# Patient Record
Sex: MALE | Race: BLACK OR AFRICAN AMERICAN | NOT HISPANIC OR LATINO | Employment: OTHER | URBAN - METROPOLITAN AREA
[De-identification: names, ages, dates, MRNs, and addresses within clinical notes are randomized per-mention and may not be internally consistent; named-entity substitution may affect disease eponyms.]

---

## 2017-01-23 ENCOUNTER — TELEPHONE (OUTPATIENT)
Dept: INTERNAL MEDICINE | Facility: CLINIC | Age: 57
End: 2017-01-23

## 2017-01-23 ENCOUNTER — OFFICE VISIT (OUTPATIENT)
Dept: PAIN MEDICINE | Facility: CLINIC | Age: 57
End: 2017-01-23
Payer: MEDICARE

## 2017-01-23 ENCOUNTER — OFFICE VISIT (OUTPATIENT)
Dept: INTERNAL MEDICINE | Facility: CLINIC | Age: 57
End: 2017-01-23
Attending: INTERNAL MEDICINE
Payer: MEDICARE

## 2017-01-23 VITALS
HEART RATE: 83 BPM | TEMPERATURE: 98 F | HEIGHT: 71 IN | SYSTOLIC BLOOD PRESSURE: 100 MMHG | DIASTOLIC BLOOD PRESSURE: 72 MMHG | WEIGHT: 248.25 LBS | OXYGEN SATURATION: 94 % | BODY MASS INDEX: 34.75 KG/M2

## 2017-01-23 VITALS
HEART RATE: 95 BPM | WEIGHT: 246.94 LBS | DIASTOLIC BLOOD PRESSURE: 80 MMHG | HEIGHT: 71 IN | SYSTOLIC BLOOD PRESSURE: 106 MMHG | BODY MASS INDEX: 34.57 KG/M2 | TEMPERATURE: 98 F | RESPIRATION RATE: 18 BRPM

## 2017-01-23 DIAGNOSIS — J01.11 ACUTE RECURRENT FRONTAL SINUSITIS: Primary | ICD-10-CM

## 2017-01-23 DIAGNOSIS — M79.10 MYALGIA: ICD-10-CM

## 2017-01-23 DIAGNOSIS — M96.1 POSTLAMINECTOMY SYNDROME OF LUMBAR REGION: ICD-10-CM

## 2017-01-23 DIAGNOSIS — Z79.891 CHRONIC USE OF OPIATE DRUGS THERAPEUTIC PURPOSES: ICD-10-CM

## 2017-01-23 DIAGNOSIS — M53.3 SACROILIAC JOINT PAIN: ICD-10-CM

## 2017-01-23 DIAGNOSIS — M51.37 DEGENERATION OF LUMBAR OR LUMBOSACRAL INTERVERTEBRAL DISC: Primary | ICD-10-CM

## 2017-01-23 PROCEDURE — 99999 PR PBB SHADOW E&M-EST. PATIENT-LVL IV: CPT | Mod: PBBFAC,,, | Performed by: INTERNAL MEDICINE

## 2017-01-23 PROCEDURE — 3074F SYST BP LT 130 MM HG: CPT | Mod: S$GLB,,, | Performed by: INTERNAL MEDICINE

## 2017-01-23 PROCEDURE — 1159F MED LIST DOCD IN RCRD: CPT | Mod: S$GLB,,, | Performed by: NURSE PRACTITIONER

## 2017-01-23 PROCEDURE — 99214 OFFICE O/P EST MOD 30 MIN: CPT | Mod: S$GLB,,, | Performed by: NURSE PRACTITIONER

## 2017-01-23 PROCEDURE — 99999 PR PBB SHADOW E&M-EST. PATIENT-LVL III: CPT | Mod: PBBFAC,,, | Performed by: NURSE PRACTITIONER

## 2017-01-23 PROCEDURE — 99213 OFFICE O/P EST LOW 20 MIN: CPT | Mod: 25,S$GLB,, | Performed by: INTERNAL MEDICINE

## 2017-01-23 PROCEDURE — 96372 THER/PROPH/DIAG INJ SC/IM: CPT | Mod: S$GLB,,, | Performed by: INTERNAL MEDICINE

## 2017-01-23 PROCEDURE — 3074F SYST BP LT 130 MM HG: CPT | Mod: S$GLB,,, | Performed by: NURSE PRACTITIONER

## 2017-01-23 PROCEDURE — 3079F DIAST BP 80-89 MM HG: CPT | Mod: S$GLB,,, | Performed by: NURSE PRACTITIONER

## 2017-01-23 PROCEDURE — 1159F MED LIST DOCD IN RCRD: CPT | Mod: S$GLB,,, | Performed by: INTERNAL MEDICINE

## 2017-01-23 PROCEDURE — 3078F DIAST BP <80 MM HG: CPT | Mod: S$GLB,,, | Performed by: INTERNAL MEDICINE

## 2017-01-23 RX ORDER — AZITHROMYCIN 250 MG/1
TABLET, FILM COATED ORAL
Qty: 6 TABLET | Refills: 0 | Status: ON HOLD | OUTPATIENT
Start: 2017-01-23 | End: 2017-05-02

## 2017-01-23 RX ORDER — TRAMADOL HYDROCHLORIDE 50 MG/1
50 TABLET ORAL EVERY 6 HOURS PRN
Qty: 120 TABLET | Refills: 1 | Status: SHIPPED | OUTPATIENT
Start: 2017-01-23 | End: 2017-03-23 | Stop reason: SDUPTHER

## 2017-01-23 RX ORDER — TRIAMCINOLONE ACETONIDE 40 MG/ML
40 INJECTION, SUSPENSION INTRA-ARTICULAR; INTRAMUSCULAR
Status: COMPLETED | OUTPATIENT
Start: 2017-01-23 | End: 2017-01-23

## 2017-01-23 RX ADMIN — TRIAMCINOLONE ACETONIDE 40 MG: 40 INJECTION, SUSPENSION INTRA-ARTICULAR; INTRAMUSCULAR at 11:01

## 2017-01-23 NOTE — MR AVS SNAPSHOT
[facility]  Internal Medicine  [address]  [facility] [address]  Phone: [phone]  Fax: [phone]                  Bri Santiago   2017 10:30 AM   Office Visit    Description:  Male : 1960   Provider:  Cate Galeas MD   Department:  [facility]  Internal Medicine           Reason for Visit     Sinusitis           Diagnoses this Visit        Comments    Acute recurrent frontal sinusitis    -  Primary            To Do List           Future Appointments        Provider Department Dept Phone    3/23/2017 7:40 AM JENI Rondon [facility] [phone]      Goals (5 Years of Data)     None       These Medications        Disp Refills Start End    azithromycin (ZITHROMAX Z-ERIN) 250 MG tablet 6 tablet 0 2017     Two po once then one po daily    Pharmacy: [facility] [address] [place] [address] AT [facility] Ph #: [phone]         [facility] On Call     [facility] On Call Nurse [facility] -  Assistance  Registered nurses in the [facility] On Call Center provide clinical advisement, health education, appointment booking, and other advisory services.  Call for this free service at [phone].             Medications           Message regarding Medications     Verify the changes and/or additions to your medication regime listed below are the same as discussed with your clinician today.  If any of these changes or additions are incorrect, please notify your healthcare provider.        START taking these NEW medications        Refills    azithromycin (ZITHROMAX Z-ERIN) 250 MG tablet 0    Sig: Two po once then one po daily    Class: Normal      These medications were administered today        Dose Freq    triamcinolone acetonide injection 40 mg 40 mg Clinic/HOD 1 time    Sig: Inject 1 mL (40 mg total) into the muscle one time.    Class: Normal    Route: Intramuscular           Verify that the below list of medications is  an accurate representation of the medications you are currently taking.  If none reported, the list may be blank. If incorrect, please contact your healthcare provider. Carry this list with you in case of emergency.           Current Medications     acetaminophen (TYLENOL) 500 MG tablet Take 2 tablets (1,000 mg total) by mouth every 8 (eight) hours as needed.    albuterol (PROVENTIL HFA) 90 mcg/actuation inhaler Inhale 2 puffs into the lungs every 6 (six) hours as needed.    atorvastatin (LIPITOR) 20 MG tablet Take 1 tablet (20 mg total) by mouth once daily.    azelastine (ASTELIN) 137 mcg (0.1 %) nasal spray 1 spray (137 mcg total) by Nasal route 2 (two) times daily.    azelastine-fluticasone 137-50 mcg/spray Spry nassal spray 1 spray by Each Nare route 2 (two) times daily.    calcium citrate-vitamin D3 315-200 mg (CITRACAL+D) 315-200 mg-unit per tablet Take 1 tablet by mouth once daily.    chlorthalidone (HYGROTEN) 25 MG Tab Take 1 tablet (25 mg total) by mouth once daily.    cyanocobalamin, vitamin B-12, (VITAMIN B-12) 50 mcg tablet Take 50 mcg by mouth once daily.    docusate sodium (COLACE) 100 MG capsule Take 1 tablet by mouth Twice daily. 1 Capsule Oral Twice a day .  Take with pain medicine    esomeprazole (NEXIUM) 40 MG capsule Take 1 capsule (40 mg total) by mouth every 12 (twelve) hours.    fluticasone (FLONASE) 50 mcg/actuation nasal spray 2 sprays by Each Nare route once daily.    fluticasone-salmeterol 250-50 mcg/dose (ADVAIR DISKUS) 250-50 mcg/dose diskus inhaler Inhale 1 puff into the lungs 2 (two) times daily.    sildenafil (VIAGRA) 100 MG tablet Take 1 tablet (100 mg total) by mouth daily as needed for Erectile Dysfunction.    tamsulosin (FLOMAX) 0.4 mg Cp24 TAKE ONE CAPSULE BY MOUTH EVERY EVENING    tramadol (ULTRAM) 50 mg tablet Take 1 tablet (50 mg total) by mouth every 6 (six) hours as needed for Pain.    VITAMIN D2 50,000 unit capsule TK 1 C PO Q 7 DAYS    zolpidem (AMBIEN) 10 mg Tab Take 1  "tablet (10 mg total) by mouth nightly as needed.    azithromycin (ZITHROMAX Z-ERIN) 250 MG tablet Two po once then one po daily           Clinical Reference Information           Vital Signs - Last Recorded  Most recent update: 1/23/2017 10:50 AM by Alvina Oviedo MA    BP Pulse Temp Ht    100/72 (BP Location: Left arm, Patient Position: Sitting, BP Method: Manual) 83 97.8 °F (36.6 °C) (Oral) 5' 11" (1.803 m)    Wt SpO2 BMI    112.6 kg (248 lb 3.8 oz) (!) 94% 34.62 kg/m2      Blood Pressure          Most Recent Value    BP  100/72      Allergies as of 1/23/2017     Ace Inhibitors    Aspirin    Codeine    Dilaudid  [Hydromorphone]    Penicillins      Immunizations Administered on Date of Encounter - 1/23/2017     None      Administrations This Visit     triamcinolone acetonide injection 40 mg     Admin Date Action Dose Route Administered By             01/23/2017 Given 40 mg Intramuscular Vj Romero LPN                      Instructions    Your test results will be communicated to you via: [facility], Telephone or Letter.  If you have not received your test results within one week. Please contact the clinic at [phone].      Sinusitis, Antibiotic Treatment (Child)  The sinuses are air-filled spaces in the skull. They are behind the forehead, in the nasal bones and cheeks, and around the eyes. When sinuses are healthy, air moves freely and mucus drains. When a child has a cold or an allergy, the lining of the nose and sinuses can become swollen. Mucus can become trapped. Bacteria may then multiply, causing bacterial sinusitis. This is also called a sinus infection.  Sinusitis often starts with a cold. Cold symptoms usually go away in 5 or 10 days. If sinusitis develops, the symptoms continue and may even get worse. Thick, yellow-green mucus may drain from the nose. Your child may cough more. Your child may also have bad breath that doesnt go away. Other symptoms may include pain or swelling in the face, " sore throat, or headache.  The health care provider has prescribed antibiotics to treat the bacterial infection. Symptoms usually get better 2 to 3 days after your child starts the medicine.  Home care  Follow these guidelines when caring for your child at home:  · The health care provider has prescribed an oral antibiotic for your child. This is to help stop the infection. Follow all instructions for giving this medicine to your child. Make sure your child takes the medication every day until it is gone. You should not have any left over. You may also be told to use saline nasal drops or a decongestant.  · If your child has pain, give him or her pain medicine as advised by your childs provider. Don't give your child aspirin unless told to do so. Don't give your child any other medicine without first asking the provider.  · Give your child plenty of time to rest. Try to make your child as comfortable as possible. Some children may be distracted by quiet activities.  · Encourage your child to drink liquids. Toddlers or older children may prefer cold drinks, frozen desserts, or popsicles. They may also like warm chicken soup or beverages with lemon and honey. Don't give honey to children younger than 1 year old.  · Use a cool-mist humidifier in your childs bedroom to make breathing easier, especially at night. Clean and dry the humidifier to keep bacteria and mold from growing. Dont use using a hot water vaporizer. It can cause burns.  · Dont smoke around your child. Tobacco smoke can make your childs symptoms worse.  Follow-up care  Follow up with your childs healthcare provider, or as directed.  When to seek medical advice  Unless advised otherwise, call your child's healthcare provider if:  · Your child is 3 months old or younger and has a fever of 100.4°F (38°C) or higher. Your child may need to see a healthcare provider.  · Your child is of any age and has fevers higher than 104°F (40°C) that come back  again and again.  Call your child's provider right away if your child has any of these:  · Swelling or redness around eyes that lasts all day, not just in the morning  · Vomiting that continues  · Sensitivity to light  · Irritability that gets worse  · Sudden or severe pain in face or head  · Double vision  · Not acting right or not thinking clearly  · Stiff neck  · Breathing problems  · Symptoms not going away in 10 days  © 4539-9747 [employer]. [address] [place] [address], [place] [address]. All rights reserved. This information is not intended as a substitute for professional medical care. Always follow your healthcare professional's instructions.        Self-Care for Sinusitis     Drinking plenty of water can help sinuses drain.     Sinusitis can often be managed with self-care. Self-care can keep sinuses moist and make you feel more comfortable. Remember to follow your doctor's instructions closely, which can make a big difference in getting your sinus problem under control.  Drink fluids  Drinking extra fluids -- a glass every hour or two -- helps thin your mucus, allowing it to drain from your sinuses more easily. A humidifier helps in much the same way. Fluids can also offset the drying effects of certain drugs.  Use saltwater rinses  Rinses help keep your sinuses and nose moist. Mix a teaspoon of salt in 8 ounces of fresh, warm water. Use a bulb syringe to gently squirt the water into your nose a few times a day. You can also buy ready-made saline nasal sprays.  Apply hot or cold packs  Applying heat to the area surrounding your sinuses may make you feel more comfortable. Use a hot water bottle or a hand towel dipped in hot water. Some people also find ice packs effective for relieving pain.  Medications  Your doctor may prescribe medications to help treat your sinusitis. If you have an infection, antibiotics can help clear it up. If you are prescribed antibiotics, take all pills on schedule until  they are gone, even if you feel better. Decongestants help relieve swelling. Use decongestant sprays for short periods only under the direction of your doctor. If you have allergies, your doctor may prescribe medications to help relieve them.   © 9225-6686 [employer]. [address] [place] [address]. All rights reserved. This information is not intended as a substitute for professional medical care. Always follow your healthcare professional's instructions.
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
## 2017-01-23 NOTE — MR AVS SNAPSHOT
[facility]  [address]  [facility] [address]  Phone: [phone]  Fax: [phone]                  Bri Santiago   2017 8:20 AM   Office Visit    Description:  Male : 1960   Provider:  JENI Rondon   Department:  [facility]           Reason for Visit     Low-back Pain           Diagnoses this Visit        Comments    Degeneration of lumbar or lumbosacral intervertebral disc    -  Primary     Myalgia         Postlaminectomy syndrome of lumbar region         Sacroiliac joint pain         Chronic use of opiate drugs therapeutic purposes                To Do List           Future Appointments        Provider Department Dept Phone    2017 10:30 AM Cate Galeas MD [facility] [phone]    3/23/2017 7:40 AM JENI Rondon [facility] [phone]      Goals (5 Years of Data)     None       These Medications        Disp Refills Start End    tramadol (ULTRAM) 50 mg tablet 120 tablet 1 2017 3/24/2017    Take 1 tablet (50 mg total) by mouth every 6 (six) hours as needed for Pain. - Oral    Pharmacy: [facility] [address] [place] [address] AT [facility] Ph #: [phone]         [facility]     [facility] On Call Nurse Care Line -  Assistance  Registered nurses in the [facility] On Call Center provide clinical advisement, health education, appointment booking, and other advisory services.  Call for this free service at [phone].             Medications           Message regarding Medications     Verify the changes and/or additions to your medication regime listed below are the same as discussed with your clinician today.  If any of these changes or additions are incorrect, please notify your healthcare provider.        CHANGE how you are taking these medications     Start Taking Instead of    tramadol (ULTRAM) 50 mg tablet tramadol (ULTRAM)  50 mg tablet    Dosage:  Take 1 tablet (50 mg total) by mouth every 6 (six) hours as needed for Pain. Dosage:  Take 1 tablet (50 mg total) by mouth every 6 (six) hours as needed.    Reason for Change:  Reorder            Verify that the below list of medications is an accurate representation of the medications you are currently taking.  If none reported, the list may be blank. If incorrect, please contact your healthcare provider. Carry this list with you in case of emergency.           Current Medications     acetaminophen (TYLENOL) 500 MG tablet Take 2 tablets (1,000 mg total) by mouth every 8 (eight) hours as needed.    albuterol (PROVENTIL HFA) 90 mcg/actuation inhaler Inhale 2 puffs into the lungs every 6 (six) hours as needed.    atorvastatin (LIPITOR) 20 MG tablet Take 1 tablet (20 mg total) by mouth once daily.    azelastine (ASTELIN) 137 mcg (0.1 %) nasal spray 1 spray (137 mcg total) by Nasal route 2 (two) times daily.    azelastine-fluticasone 137-50 mcg/spray Spry nassal spray 1 spray by Each Nare route 2 (two) times daily.    calcium citrate-vitamin D3 315-200 mg (CITRACAL+D) 315-200 mg-unit per tablet Take 1 tablet by mouth once daily.    chlorthalidone (HYGROTEN) 25 MG Tab Take 1 tablet (25 mg total) by mouth once daily.    cyanocobalamin, vitamin B-12, (VITAMIN B-12) 50 mcg tablet Take 50 mcg by mouth once daily.    docusate sodium (COLACE) 100 MG capsule Take 1 tablet by mouth Twice daily. 1 Capsule Oral Twice a day .  Take with pain medicine    esomeprazole (NEXIUM) 40 MG capsule Take 1 capsule (40 mg total) by mouth every 12 (twelve) hours.    fluticasone (FLONASE) 50 mcg/actuation nasal spray 2 sprays by Each Nare route once daily.    fluticasone-salmeterol 250-50 mcg/dose (ADVAIR DISKUS) 250-50 mcg/dose diskus inhaler Inhale 1 puff into the lungs 2 (two) times daily.    sildenafil (VIAGRA) 100 MG tablet Take 1 tablet (100 mg total) by mouth daily as needed for Erectile Dysfunction.    tamsulosin  "(FLOMAX) 0.4 mg Cp24 TAKE ONE CAPSULE BY MOUTH EVERY EVENING    tramadol (ULTRAM) 50 mg tablet Take 1 tablet (50 mg total) by mouth every 6 (six) hours as needed for Pain.    VITAMIN D2 50,000 unit capsule TK 1 C PO Q 7 DAYS    zolpidem (AMBIEN) 10 mg Tab Take 1 tablet (10 mg total) by mouth nightly as needed.           Clinical Reference Information           Vital Signs - Last Recorded  Most recent update: 1/23/2017  8:27 AM by Leeann Kim MA    BP Pulse Temp Resp Ht Wt    106/80 95 98.4 °F (36.9 °C) (Oral) 18 5' 11" (1.803 m) 112 kg (246 lb 14.6 oz)    BMI                34.44 kg/m2          Blood Pressure          Most Recent Value    BP  106/80      Allergies as of 1/23/2017     Ace Inhibitors    Aspirin    Codeine    Dilaudid  [Hydromorphone]    Penicillins      Immunizations Administered on Date of Encounter - 1/23/2017     None      "

## 2017-01-23 NOTE — PROGRESS NOTES
Subjective:       Patient ID: Bri Santiago is a 56 y.o. male.    Chief Complaint: Sinusitis     Bri Santiago is a 56 y.o.  male who presents for Sinusitis  .  Sinusitis   This is a new problem. The current episode started 1 to 4 weeks ago. The problem has been gradually worsening since onset. The maximum temperature recorded prior to his arrival was 100.4 - 100.9 F. Associated symptoms include coughing (nonproductive, improving), diaphoresis, ear pain (left), headaches and sinus pressure. Past treatments include oral decongestants. The treatment provided no relief.     Review of Systems   Constitutional: Positive for diaphoresis.   HENT: Positive for ear pain (left) and sinus pressure.    Respiratory: Positive for cough (nonproductive, improving).    Neurological: Positive for headaches.       Patient Active Problem List   Diagnosis    Asthma in remission    Von Willebrand disease    HTN (hypertension)    ABDON (obstructive sleep apnea)    Spondylosis without myelopathy    Thoracic or lumbosacral neuritis or radiculitis, unspecified    Spinal stenosis, lumbar region, without neurogenic claudication    Degeneration of lumbar or lumbosacral intervertebral disc    Acquired spondylolisthesis    Pseudoarthrosis    Family history of colon cancer    Vitamin D deficiency    Encounter for monitoring long-term proton pump inhibitor therapy    Postlaminectomy syndrome of lumbar region    Myalgia and myositis    Facet syndrome    Gastroesophageal reflux disease without esophagitis    Osteopenia    Thoracic aorta atherosclerosis    Obesity, Class II, BMI 35-39.9, with comorbidity    BPH (benign prostatic hypertrophy) with urinary obstruction    Cough    Allergic rhinitis    Allergic conjunctivitis of both eyes    Encounter for long-term current use of high risk medication    Gastroesophageal reflux disease with esophagitis       Past Medical History   Diagnosis Date    Acute pancreatitis     Anal  fissure     Anemia     Arthritis     Asthma in remission     Back pain     BPH (benign prostatic hypertrophy)     Cancer 2000     prostate- treated at [facility] with chemo- in remission since 2000    Clotting disorder     Dysphagia 10/7/2014    Family history of colon cancer     GERD (gastroesophageal reflux disease)     H. pylori infection 10/8/2014    Helicobacter pylori (H. pylori) infection      Chronic    History of chronic pancreatitis     HTN (hypertension)     Lumbago 11/12/2012    Obesity     ABDON (obstructive sleep apnea)     Pneumonia      during childhood     Prostate cancer 2000     dx and treated at [facility], had chemotherapy, in remission [id number]    Sacroiliac joint pain 2/10/2015    Spinal stenosis of lumbar region     Trouble in sleeping     Vitamin D deficiency disease     VWD (acquired von Willebrand's disease)        Past Surgical History   Procedure Laterality Date    Lumbar fusion  2012    Carpal tunnel release  2003     left hand    Anal fissure repair       x2    Cervical discectomy  2003    Colonoscopy N/A 10/7/2015     Procedure: COLONOSCOPY;  Surgeon: Rosendo Boyer MD;  Location: [facility] ([address] [place] [address]);  Service: Endoscopy;  Laterality: N/A;  PM Prep    Vasectomy  1996    Tonsillectomy       at age 24    Spine surgery         Family History   Problem Relation Age of Onset    Colon cancer Father 67     colon cancer    Hypertension Father     Glaucoma Father     Colon cancer Paternal Grandfather 65          Coronary artery disease Mother 45    Hypertension Mother     Heart disease Mother     No Known Problems Brother     No Known Problems Sister     No Known Problems Daughter     No Known Problems Son     No Known Problems Brother     No Known Problems Daughter     No Known Problems Daughter     No Known Problems Son     No Known Problems Son     Colon cancer Paternal Uncle 65    Diabetes Mellitus Paternal Grandmother        Social  "History   Substance Use Topics    Smoking status: Never Smoker    Smokeless tobacco: Never Used    Alcohol use No       Objective:   Blood pressure 100/72, pulse 83, temperature 97.8 °F (36.6 °C), temperature source Oral, height 5' 11" (1.803 m), weight 112.6 kg (248 lb 3.8 oz), SpO2 (!) 94 %.     Physical Exam   Constitutional: He is oriented to person, place, and time. He appears well-developed and well-nourished. No distress.   HENT:   Head: Normocephalic and atraumatic.   Right Ear: Tympanic membrane, external ear and ear canal normal.   Left Ear: Tympanic membrane, external ear and ear canal normal.   Nose: Mucosal edema and rhinorrhea present. Right sinus exhibits maxillary sinus tenderness and frontal sinus tenderness. Left sinus exhibits maxillary sinus tenderness and frontal sinus tenderness.   Mouth/Throat: Uvula is midline and oropharynx is clear and moist.   Eyes: Conjunctivae are normal. No scleral icterus.   Cardiovascular: Normal heart sounds.  Exam reveals no gallop and no friction rub.    No murmur heard.  Pulmonary/Chest: Effort normal and breath sounds normal. He has no wheezes. He has no rales.   Abdominal: There is tenderness.   Musculoskeletal: He exhibits no edema or tenderness.   Lymphadenopathy:        Head (right side): No submental and no submandibular adenopathy present.        Head (left side): No submental and no submandibular adenopathy present.     He has no cervical adenopathy.        Right cervical: No superficial cervical adenopathy present.       Left cervical: No superficial cervical adenopathy present.        Right: No supraclavicular adenopathy present.        Left: No supraclavicular adenopathy present.   Neurological: He is alert and oriented to person, place, and time.   Skin: Skin is warm and dry.   Psychiatric: He has a normal mood and affect. Thought content normal.       Prior labs reviewed  Assessment/Plan:        Bri was seen today for sinusitis.    Diagnoses and " all orders for this visit:    Acute recurrent frontal sinusitis  -     triamcinolone acetonide injection 40 mg; Inject 1 mL (40 mg total) into the muscle one time.  -     azithromycin (ZITHROMAX Z-ERIN) 250 MG tablet; Two po once then one po daily      otc decongestants, linda pot

## 2017-01-23 NOTE — TELEPHONE ENCOUNTER
----- Message from Laure White sent at 1/23/2017  9:59 AM CST -----  _  1st Request  _  2nd Request  _  3rd Request        Who: Siobhan from      Why: esomeprazole (NEXIUM) 40 MG capsule, [facility] is calling to check if you received the PA form them faxed over for this RX . They need a list of med the pt has tried and failed and also office notes   What Number to Call Back: fax [phone]  Phone [phone]    When to Expect a call back: (Before the end of the day)   -- if call after 3:00 call back will be tomorrow.

## 2017-01-23 NOTE — PROGRESS NOTES
Patient given Kenalog 40mg IM in the LUOQ. Patient tolerated well and Band-Aid was applied. Lot#[id number] Exp:2018. Patient advised to wait in the lobby for 15 min to make sure no adverse reactions occur. Patient states verbal understanding and has no further questions.

## 2017-01-23 NOTE — PATIENT INSTRUCTIONS
Your test results will be communicated to you via: [facility], Telephone or Letter.  If you have not received your test results within one week. Please contact the clinic at [phone].      Sinusitis, Antibiotic Treatment (Child)  The sinuses are air-filled spaces in the skull. They are behind the forehead, in the nasal bones and cheeks, and around the eyes. When sinuses are healthy, air moves freely and mucus drains. When a child has a cold or an allergy, the lining of the nose and sinuses can become swollen. Mucus can become trapped. Bacteria may then multiply, causing bacterial sinusitis. This is also called a sinus infection.  Sinusitis often starts with a cold. Cold symptoms usually go away in 5 or 10 days. If sinusitis develops, the symptoms continue and may even get worse. Thick, yellow-green mucus may drain from the nose. Your child may cough more. Your child may also have bad breath that doesnt go away. Other symptoms may include pain or swelling in the face, sore throat, or headache.  The health care provider has prescribed antibiotics to treat the bacterial infection. Symptoms usually get better 2 to 3 days after your child starts the medicine.  Home care  Follow these guidelines when caring for your child at home:  · The health care provider has prescribed an oral antibiotic for your child. This is to help stop the infection. Follow all instructions for giving this medicine to your child. Make sure your child takes the medication every day until it is gone. You should not have any left over. You may also be told to use saline nasal drops or a decongestant.  · If your child has pain, give him or her pain medicine as advised by your childs provider. Don't give your child aspirin unless told to do so. Don't give your child any other medicine without first asking the provider.  · Give your child plenty of time to rest. Try to make your child as comfortable as possible. Some children may be distracted by  quiet activities.  · Encourage your child to drink liquids. Toddlers or older children may prefer cold drinks, frozen desserts, or popsicles. They may also like warm chicken soup or beverages with lemon and honey. Don't give honey to children younger than 1 year old.  · Use a cool-mist humidifier in your childs bedroom to make breathing easier, especially at night. Clean and dry the humidifier to keep bacteria and mold from growing. Dont use using a hot water vaporizer. It can cause burns.  · Dont smoke around your child. Tobacco smoke can make your childs symptoms worse.  Follow-up care  Follow up with your childs healthcare provider, or as directed.  When to seek medical advice  Unless advised otherwise, call your child's healthcare provider if:  · Your child is 3 months old or younger and has a fever of 100.4°F (38°C) or higher. Your child may need to see a healthcare provider.  · Your child is of any age and has fevers higher than 104°F (40°C) that come back again and again.  Call your child's provider right away if your child has any of these:  · Swelling or redness around eyes that lasts all day, not just in the morning  · Vomiting that continues  · Sensitivity to light  · Irritability that gets worse  · Sudden or severe pain in face or head  · Double vision  · Not acting right or not thinking clearly  · Stiff neck  · Breathing problems  · Symptoms not going away in 10 days  © 2122-8325 [employer]. [address] [place] [address], [place] [address]. All rights reserved. This information is not intended as a substitute for professional medical care. Always follow your healthcare professional's instructions.        Self-Care for Sinusitis     Drinking plenty of water can help sinuses drain.     Sinusitis can often be managed with self-care. Self-care can keep sinuses moist and make you feel more comfortable. Remember to follow your doctor's instructions closely, which can make a big difference in  getting your sinus problem under control.  Drink fluids  Drinking extra fluids -- a glass every hour or two -- helps thin your mucus, allowing it to drain from your sinuses more easily. A humidifier helps in much the same way. Fluids can also offset the drying effects of certain drugs.  Use saltwater rinses  Rinses help keep your sinuses and nose moist. Mix a teaspoon of salt in 8 ounces of fresh, warm water. Use a bulb syringe to gently squirt the water into your nose a few times a day. You can also buy ready-made saline nasal sprays.  Apply hot or cold packs  Applying heat to the area surrounding your sinuses may make you feel more comfortable. Use a hot water bottle or a hand towel dipped in hot water. Some people also find ice packs effective for relieving pain.  Medications  Your doctor may prescribe medications to help treat your sinusitis. If you have an infection, antibiotics can help clear it up. If you are prescribed antibiotics, take all pills on schedule until they are gone, even if you feel better. Decongestants help relieve swelling. Use decongestant sprays for short periods only under the direction of your doctor. If you have allergies, your doctor may prescribe medications to help relieve them.   © 5351-3394 [employer]. [address] [place] [address], [place] [address]. All rights reserved. This information is not intended as a substitute for professional medical care. Always follow your healthcare professional's instructions.

## 2017-02-03 ENCOUNTER — DOCUMENTATION ONLY (OUTPATIENT)
Dept: RESEARCH | Facility: HOSPITAL | Age: 57
End: 2017-02-03

## 2017-02-03 NOTE — RESEARCH
Documentation of Informed Consent Obtained for Research by Non-[facility] Personnel    Study: PROmoting Successful Weight Loss in Primary CarE in [place] (PROP)  IRB Number: #[id number]; [facility] IRB ID: [id number]  : Kodak Quintero, PhD.  Coordinating Site: [facility] Co-Investigators: Mamie Vaughn MD and Corby Monreal MD  [facility] Approval Date: 11/6/15  This study is reviewed and acknowledged by the [facility] IRB. The IRB of Record is the [facility] ([facility]) IRB.    Is the volunteer currently enrolled in any other research trials (per Epic)? [ ] Yes  [X] No  [facility] staff administering informed consent: FLACO MARTINEZ  Date: 11/16/16  Does the volunteer agree to participate in the trial? [X] Yes  [ ] No  If no, document the reason here:       [X] I acknowledge that I have reviewed the informed consent form and the volunteer signed and dated the signature section of the consent forms.    Name: ([facility] personnel reviewing consent form):           Aaron Edmondson  Review and Scan Date (if different than date of note):   1/26/2017, 2/3/2017  Comments: See  for Consent Forms    [facility]-trained recruiters will obtain informed consent form all participants as well as HIPAA authorization.  Study protocol states all questions and concerns are clarified before any forms are signed. The following elements of the informed consent document were to be discussed:  · What you should know about a research study (e.g. purpose of the consent form; right to refuse or agree and change your mind later; participation is voluntary)?  · Who is doing the study?    · Where is the study being conducted?    · What is the purpose of this study?  · Who is eligible to participate in the study?     · What will happen to you if you take part in the study (e.g. Screening, Measurement visits, Lifestyle change meetings activities)?  · What  are the possible risks and discomforts? Benefits?  · If you do not want to take part in the study, are there other choices?  · If you have any questions or problems, whom can you call?  · What information will be kept private?  · Can your taking part in the study end early?  · What if information becomes available that might affect your decision to stay in the study?  · What charges will you have to pay? What payment will you receive?  · Will you be compensated for a study-related injury or medical illness?

## 2017-03-23 ENCOUNTER — OFFICE VISIT (OUTPATIENT)
Dept: PAIN MEDICINE | Facility: CLINIC | Age: 57
End: 2017-03-23
Payer: MEDICARE

## 2017-03-23 VITALS
WEIGHT: 252.88 LBS | BODY MASS INDEX: 35.4 KG/M2 | SYSTOLIC BLOOD PRESSURE: 108 MMHG | HEART RATE: 90 BPM | DIASTOLIC BLOOD PRESSURE: 76 MMHG | TEMPERATURE: 98 F | HEIGHT: 71 IN

## 2017-03-23 DIAGNOSIS — M48.061 SPINAL STENOSIS, LUMBAR REGION, WITHOUT NEUROGENIC CLAUDICATION: ICD-10-CM

## 2017-03-23 DIAGNOSIS — Z79.891 CHRONIC USE OF OPIATE DRUGS THERAPEUTIC PURPOSES: ICD-10-CM

## 2017-03-23 DIAGNOSIS — M51.37 DEGENERATION OF LUMBAR OR LUMBOSACRAL INTERVERTEBRAL DISC: ICD-10-CM

## 2017-03-23 DIAGNOSIS — D68.00 VON WILLEBRAND DISEASE: ICD-10-CM

## 2017-03-23 DIAGNOSIS — M96.1 POSTLAMINECTOMY SYNDROME OF LUMBAR REGION: ICD-10-CM

## 2017-03-23 DIAGNOSIS — M47.816 LUMBAR FACET ARTHROPATHY: Primary | ICD-10-CM

## 2017-03-23 DIAGNOSIS — M79.10 MYALGIA: ICD-10-CM

## 2017-03-23 PROCEDURE — 99999 PR PBB SHADOW E&M-EST. PATIENT-LVL III: CPT | Mod: PBBFAC,,, | Performed by: NURSE PRACTITIONER

## 2017-03-23 PROCEDURE — 3078F DIAST BP <80 MM HG: CPT | Mod: S$GLB,,, | Performed by: NURSE PRACTITIONER

## 2017-03-23 PROCEDURE — 3074F SYST BP LT 130 MM HG: CPT | Mod: S$GLB,,, | Performed by: NURSE PRACTITIONER

## 2017-03-23 PROCEDURE — 99499 UNLISTED E&M SERVICE: CPT | Mod: S$GLB,,, | Performed by: NURSE PRACTITIONER

## 2017-03-23 PROCEDURE — 99214 OFFICE O/P EST MOD 30 MIN: CPT | Mod: S$GLB,,, | Performed by: NURSE PRACTITIONER

## 2017-03-23 PROCEDURE — 1160F RVW MEDS BY RX/DR IN RCRD: CPT | Mod: S$GLB,,, | Performed by: NURSE PRACTITIONER

## 2017-03-23 RX ORDER — TRAMADOL HYDROCHLORIDE 50 MG/1
50 TABLET ORAL EVERY 6 HOURS PRN
Qty: 120 TABLET | Refills: 1 | Status: SHIPPED | OUTPATIENT
Start: 2017-03-23 | End: 2017-05-22 | Stop reason: SDUPTHER

## 2017-03-23 NOTE — MR AVS SNAPSHOT
[facility]  [address]  [facility] [address]  Phone: [phone]  Fax: [phone]                  Bri Santiago   3/23/2017 8:40 AM   Office Visit    Description:  Male : 1960   Provider:  JENI Rondon   Department:  [facility]           Diagnoses this Visit        Comments    Lumbar facet arthropathy    -  Primary     Degeneration of lumbar or lumbosacral intervertebral disc         Postlaminectomy syndrome of lumbar region         Myalgia         Spinal stenosis, lumbar region, without neurogenic claudication         Von Willebrand disease         Chronic use of opiate drugs therapeutic purposes                To Do List           Future Appointments        Provider Department Dept Phone    2017 9:20 AM JENI Rondon [facility] [phone]      Goals (5 Years of Data)     None       These Medications        Disp Refills Start End    tramadol (ULTRAM) 50 mg tablet 120 tablet 1 3/23/2017 2017    Take 1 tablet (50 mg total) by mouth every 6 (six) hours as needed for Pain. - Oral    Pharmacy: [facility] [address] [place] [address] [address]  [address] Ph #: [phone]         [facility] On Call     [facility] On Call Nurse Care Line -  Assistance  Registered nurses in the [facility] provide clinical advisement, health education, appointment booking, and other advisory services.  Call for this free service at [phone].             Medications           Message regarding Medications     Verify the changes and/or additions to your medication regime listed below are the same as discussed with your clinician today.  If any of these changes or additions are incorrect, please notify your healthcare provider.             Verify that the below list of medications is an accurate representation of the medications you are currently taking.  If none reported, the  list may be blank. If incorrect, please contact your healthcare provider. Carry this list with you in case of emergency.           Current Medications     acetaminophen (TYLENOL) 500 MG tablet Take 2 tablets (1,000 mg total) by mouth every 8 (eight) hours as needed.    albuterol (PROVENTIL HFA) 90 mcg/actuation inhaler Inhale 2 puffs into the lungs every 6 (six) hours as needed.    atorvastatin (LIPITOR) 20 MG tablet Take 1 tablet (20 mg total) by mouth once daily.    azelastine (ASTELIN) 137 mcg (0.1 %) nasal spray 1 spray (137 mcg total) by Nasal route 2 (two) times daily.    azelastine-fluticasone 137-50 mcg/spray Spry nassal spray 1 spray by Each Nare route 2 (two) times daily.    azithromycin (ZITHROMAX Z-ERIN) 250 MG tablet Two po once then one po daily    calcium citrate-vitamin D3 315-200 mg (CITRACAL+D) 315-200 mg-unit per tablet Take 1 tablet by mouth once daily.    chlorthalidone (HYGROTEN) 25 MG Tab Take 1 tablet (25 mg total) by mouth once daily.    cyanocobalamin, vitamin B-12, (VITAMIN B-12) 50 mcg tablet Take 50 mcg by mouth once daily.    docusate sodium (COLACE) 100 MG capsule Take 1 tablet by mouth Twice daily. 1 Capsule Oral Twice a day .  Take with pain medicine    esomeprazole (NEXIUM) 40 MG capsule Take 1 capsule (40 mg total) by mouth every 12 (twelve) hours.    fluticasone (FLONASE) 50 mcg/actuation nasal spray 2 sprays by Each Nare route once daily.    fluticasone-salmeterol 250-50 mcg/dose (ADVAIR DISKUS) 250-50 mcg/dose diskus inhaler Inhale 1 puff into the lungs 2 (two) times daily.    sildenafil (VIAGRA) 100 MG tablet Take 1 tablet (100 mg total) by mouth daily as needed for Erectile Dysfunction.    tamsulosin (FLOMAX) 0.4 mg Cp24 TAKE ONE CAPSULE BY MOUTH EVERY EVENING    tramadol (ULTRAM) 50 mg tablet Take 1 tablet (50 mg total) by mouth every 6 (six) hours as needed for Pain.    VITAMIN D2 50,000 unit capsule TK 1 C PO Q 7 DAYS    zolpidem (AMBIEN) 10 mg Tab Take 1 tablet (10 mg  "total) by mouth nightly as needed.           Clinical Reference Information           Your Vitals Were     BP Pulse Temp Height Weight BMI    108/76 (BP Location: Right arm, Patient Position: Sitting) 90 97.7 °F (36.5 °C) (Oral) 5' 11" (1.803 m) 114.7 kg (252 lb 13.9 oz) 35.27 kg/m2      Blood Pressure          Most Recent Value    BP  108/76      Allergies as of 3/23/2017     Ace Inhibitors    Aspirin    Codeine    Dilaudid  [Hydromorphone]    Penicillins      Immunizations Administered on Date of Encounter - 3/23/2017     None      Language Assistance Services     ATTENTION: Language assistance services are available, free of charge. Please call [phone].      ATENCIÓN: Si heatherla gricelda, tiene a carpenter disposición servicios gratuitos de asistencia lingüística. Llame al [phone].     CHÚ Ý: N?u b?n nói Ti?ng Vi?t, có các d?ch v? h? tr? ngôn ng? mi?n phí dành cho b?n. G?i s? [phone].         [facility] complies with applicable Federal civil rights laws and does not discriminate on the basis of race, color, national origin, age, disability, or sex.        "

## 2017-05-02 ENCOUNTER — TELEPHONE (OUTPATIENT)
Dept: GASTROENTEROLOGY | Facility: CLINIC | Age: 57
End: 2017-05-02

## 2017-05-02 ENCOUNTER — HOSPITAL ENCOUNTER (OUTPATIENT)
Facility: OTHER | Age: 57
Discharge: HOME | End: 2017-05-02
Attending: ANESTHESIOLOGY | Admitting: ANESTHESIOLOGY
Payer: MEDICARE

## 2017-05-02 VITALS
BODY MASS INDEX: 35 KG/M2 | RESPIRATION RATE: 18 BRPM | HEART RATE: 75 BPM | SYSTOLIC BLOOD PRESSURE: 122 MMHG | HEIGHT: 71 IN | DIASTOLIC BLOOD PRESSURE: 75 MMHG | TEMPERATURE: 98 F | OXYGEN SATURATION: 96 % | WEIGHT: 250 LBS

## 2017-05-02 DIAGNOSIS — M47.899 FACET SYNDROME: ICD-10-CM

## 2017-05-02 DIAGNOSIS — M96.1 POSTLAMINECTOMY SYNDROME OF LUMBAR REGION: Primary | ICD-10-CM

## 2017-05-02 PROCEDURE — 64635 DESTROY LUMB/SAC FACET JNT: CPT | Mod: RT,,, | Performed by: ANESTHESIOLOGY

## 2017-05-02 PROCEDURE — 99152 MOD SED SAME PHYS/QHP 5/>YRS: CPT | Mod: ,,, | Performed by: ANESTHESIOLOGY

## 2017-05-02 PROCEDURE — 64636 DESTROY L/S FACET JNT ADDL: CPT | Performed by: ANESTHESIOLOGY

## 2017-05-02 PROCEDURE — 64636 DESTROY L/S FACET JNT ADDL: CPT | Mod: RT,,, | Performed by: ANESTHESIOLOGY

## 2017-05-02 PROCEDURE — 25000003 PHARM REV CODE 250: Performed by: ANESTHESIOLOGY

## 2017-05-02 PROCEDURE — 63600175 PHARM REV CODE 636 W HCPCS: Performed by: ANESTHESIOLOGY

## 2017-05-02 PROCEDURE — 64635 DESTROY LUMB/SAC FACET JNT: CPT | Performed by: ANESTHESIOLOGY

## 2017-05-02 RX ORDER — BUPIVACAINE HYDROCHLORIDE 2.5 MG/ML
INJECTION, SOLUTION EPIDURAL; INFILTRATION; INTRACAUDAL
Status: DISCONTINUED | OUTPATIENT
Start: 2017-05-02 | End: 2017-05-02 | Stop reason: HOSPADM

## 2017-05-02 RX ORDER — MIDAZOLAM HYDROCHLORIDE 1 MG/ML
2 INJECTION INTRAMUSCULAR; INTRAVENOUS
Status: DISCONTINUED | OUTPATIENT
Start: 2017-05-02 | End: 2017-05-02 | Stop reason: HOSPADM

## 2017-05-02 RX ORDER — LIDOCAINE HYDROCHLORIDE 10 MG/ML
10 INJECTION INFILTRATION; PERINEURAL
Status: COMPLETED | OUTPATIENT
Start: 2017-05-02 | End: 2017-05-02

## 2017-05-02 RX ORDER — SODIUM CHLORIDE 9 MG/ML
INJECTION, SOLUTION INTRAVENOUS CONTINUOUS
Status: DISCONTINUED | OUTPATIENT
Start: 2017-05-02 | End: 2017-05-02 | Stop reason: HOSPADM

## 2017-05-02 RX ORDER — MIDAZOLAM HYDROCHLORIDE 1 MG/ML
INJECTION INTRAMUSCULAR; INTRAVENOUS
Status: DISCONTINUED | OUTPATIENT
Start: 2017-05-02 | End: 2017-05-02 | Stop reason: HOSPADM

## 2017-05-02 RX ORDER — BUPIVACAINE HYDROCHLORIDE 2.5 MG/ML
10 INJECTION, SOLUTION EPIDURAL; INFILTRATION; INTRACAUDAL ONCE
Status: DISCONTINUED | OUTPATIENT
Start: 2017-05-02 | End: 2017-05-02 | Stop reason: HOSPADM

## 2017-05-02 RX ORDER — FENTANYL CITRATE 50 UG/ML
100 INJECTION, SOLUTION INTRAMUSCULAR; INTRAVENOUS ONCE
Status: DISCONTINUED | OUTPATIENT
Start: 2017-05-02 | End: 2017-05-02 | Stop reason: HOSPADM

## 2017-05-02 RX ORDER — FENTANYL CITRATE 50 UG/ML
INJECTION, SOLUTION INTRAMUSCULAR; INTRAVENOUS
Status: DISCONTINUED | OUTPATIENT
Start: 2017-05-02 | End: 2017-05-02 | Stop reason: HOSPADM

## 2017-05-02 RX ADMIN — DESMOPRESSIN ACETATE 36 MCG: 4 SOLUTION INTRAVENOUS at 01:05

## 2017-05-02 RX ADMIN — SODIUM CHLORIDE: 0.9 INJECTION, SOLUTION INTRAVENOUS at 12:05

## 2017-05-02 NOTE — IP AVS SNAPSHOT
[facility] Location ([facility])  [address] [place], [place] [address]  Phone: [phone]           Patient Discharge Instructions   Our goal is to set you up for success. This packet includes information on your condition, medications, and your home care.  It will help you care for yourself to prevent having to return to the hospital.     Please ask your nurse if you have any questions.      There are many details to remember when preparing to leave the hospital. Here is what you will need to do:    1. Take your medicine. If you are prescribed medications, review your Medication List on the following pages. You may have new medications to  at the pharmacy and others that you'll need to stop taking. Review the instructions for how and when to take your medications. Talk with your doctor or nurses if you are unsure of what to do.     2. Go to your follow-up appointments. Specific follow-up information is listed in the following pages. Your may be contacted by a nurse or clinical provider about future appointments. Be sure we have all of the phone numbers to reach you. Please contact your provider's office if you are unable to make an appointment.     3. Watch for warning signs. Your doctor or nurse will give you detailed warning signs to watch for and when to call for assistance. These instructions may also include educational information about your condition. If you experience any of warning signs to your health, call your doctor.           [facility]  Unless otherwise directed by your provider, please   contact [facility], our nurse care line   that is available for 24/7 assistance.     [phone] (toll-free)     Registered nurses in the [facility]   provide: appointment scheduling, clinical advisement, health education, and other advisory services.                  ** Verify the list of medication(s) below is accurate and up to date. Carry this with you in case of  emergency. If your medications have changed, please notify your healthcare provider.             Medication List      CONTINUE taking these medications        Additional Info                      acetaminophen 500 MG tablet   Commonly known as:  TYLENOL   Quantity:  90 tablet   Refills:  0   Dose:  1000 mg    Instructions:  Take 2 tablets (1,000 mg total) by mouth every 8 (eight) hours as needed.     Begin Date    AM    Noon    PM    Bedtime       albuterol 90 mcg/actuation inhaler   Commonly known as:  PROVENTIL HFA   Quantity:  18 g   Refills:  11   Dose:  2 puff    Instructions:  Inhale 2 puffs into the lungs every 6 (six) hours as needed.     Begin Date    AM    Noon    PM    Bedtime       atorvastatin 20 MG tablet   Commonly known as:  LIPITOR   Quantity:  90 tablet   Refills:  3   Dose:  20 mg    Instructions:  Take 1 tablet (20 mg total) by mouth once daily.     Begin Date    AM    Noon    PM    Bedtime       azelastine 137 mcg (0.1 %) nasal spray   Commonly known as:  ASTELIN   Quantity:  30 mL   Refills:  11   Dose:  1 spray    Instructions:  1 spray (137 mcg total) by Nasal route 2 (two) times daily.     Begin Date    AM    Noon    PM    Bedtime       azelastine-fluticasone 137-50 mcg/spray [place] nassal spray   Quantity:  23 g   Refills:  6   Dose:  1 spray    Instructions:  1 spray by Each Nare route 2 (two) times daily.     Begin Date    AM    Noon    PM    Bedtime       calcium citrate-vitamin D3 315-200 mg 315-200 mg-unit per tablet   Commonly known as:  CITRACAL+D   Refills:  0   Dose:  1 tablet    Instructions:  Take 1 tablet by mouth once daily.     Begin Date    AM    Noon    PM    Bedtime       chlorthalidone 25 MG Tab   Commonly known as:  HYGROTEN   Quantity:  30 tablet   Refills:  11   Dose:  25 mg    Instructions:  Take 1 tablet (25 mg total) by mouth once daily.     Begin Date    AM    Noon    PM    Bedtime       COLACE 100 MG capsule   Refills:  0   Dose:  1 tablet   Generic drug:  docusate  sodium    Instructions:  Take 1 tablet by mouth Twice daily. 1 Capsule Oral Twice a day .  Take with pain medicine     Begin Date    AM    Noon    PM    Bedtime       esomeprazole 40 MG capsule   Commonly known as:  NEXIUM   Quantity:  60 capsule   Refills:  11   Dose:  40 mg   Comments:  **Patient requests 90 days supply**    Instructions:  Take 1 capsule (40 mg total) by mouth every 12 (twelve) hours.     Begin Date    AM    Noon    PM    Bedtime       fluticasone 50 mcg/actuation nasal spray   Commonly known as:  FLONASE   Quantity:  3 Bottle   Refills:  2   Dose:  2 spray   Comments:  **Patient requests 90 days supply**    Instructions:  2 sprays by Each Nare route once daily.     Begin Date    AM    Noon    PM    Bedtime       fluticasone-salmeterol 250-50 mcg/dose 250-50 mcg/dose diskus inhaler   Commonly known as:  ADVAIR DISKUS   Quantity:  1 each   Refills:  5   Dose:  1 puff    Instructions:  Inhale 1 puff into the lungs 2 (two) times daily.     Begin Date    AM    Noon    PM    Bedtime       sildenafil 100 MG tablet   Commonly known as:  VIAGRA   Quantity:  6 tablet   Refills:  11   Dose:  100 mg    Instructions:  Take 1 tablet (100 mg total) by mouth daily as needed for Erectile Dysfunction.     Begin Date    AM    Noon    PM    Bedtime       tamsulosin 0.4 mg Cp24   Commonly known as:  FLOMAX   Quantity:  90 capsule   Refills:  12   Comments:  **Patient requests 90 days supply**    Instructions:  TAKE ONE CAPSULE BY MOUTH EVERY EVENING     Begin Date    AM    Noon    PM    Bedtime       tramadol 50 mg tablet   Commonly known as:  ULTRAM   Quantity:  120 tablet   Refills:  1   Dose:  50 mg    Instructions:  Take 1 tablet (50 mg total) by mouth every 6 (six) hours as needed for Pain.     Begin Date    AM    Noon    PM    Bedtime       VITAMIN B-12 50 mcg tablet   Refills:  0   Dose:  50 mcg   Generic drug:  cyanocobalamin (vitamin B-12)    Instructions:  Take 50 mcg by mouth once daily.     Begin Date     AM    Noon    PM    Bedtime       VITAMIN D2 50,000 unit Cap   Refills:  1   Generic drug:  ergocalciferol    Instructions:  TK 1 C PO Q 7 DAYS     Begin Date    AM    Noon    PM    Bedtime       zolpidem 10 mg Tab   Commonly known as:  AMBIEN   Quantity:  20 tablet   Refills:  0   Dose:  10 mg    Instructions:  Take 1 tablet (10 mg total) by mouth nightly as needed.     Begin Date    AM    Noon    PM    Bedtime                  Please bring to all follow up appointments:    1. A copy of your discharge instructions.  2. All medicines you are currently taking in their original bottles.  3. Identification and insurance card.    Please arrive 15 minutes ahead of scheduled appointment time.    Please call 24 hours in advance if you must reschedule your appointment and/or time.        Your Scheduled Appointments     May 22, 2017  9:20 AM CDT   Established Patient Visit with JENI Rondon   [facility] ([facility])    [address]  [facility] [address]   [phone]            Beto 15, 2017  8:40 AM CDT   Established Patient Visit with Buffy Villagran SACHA   [facility] ([facility])    [address]  [facility] [address]   [phone]              Your Future Surgeries/Procedures     May 16, 2017   Surgery with Fredy Magana MD   [facility] ([facility])    [address]  [facility] [address]   [phone]                  Discharge Instructions       Home Care Instructions Pain Management:    1. DIET:   You may resume your normal diet today.   2. BATHING:   You may shower with luke warm water. No soaking in tub.  3. DRESSING:   You may remove your bandage today.   4. ACTIVITY LEVEL:   You may resume your normal activities 24 hrs after your procedure.  5. MEDICATIONS:   You may resume your normal medications today.   6. SPECIAL INSTRUCTIONS:   No heat to the injection site for 24 hrs including, bath  "or shower, heating pad, moist heat, or hot tubs.    Use ice pack to injection site for any pain or discomfort.  Apply ice packs for 20 minute intervals as needed.   If you have received any sedatives by mouth today you may not drive for 12 hours.    If you have received any sedation through your IV, you may not drive for 24 hrs.     PLEASE CALL YOUR DOCTOR IF:  1. Redness or swelling around the injection site.  2. Fever of 101 degrees  3. Drainage (pus) from the injection site.  4. For any continuous bleeding (some dried blood over the incision is normal.)  5. For severe headache that is relieved when lying flat.    FOR EMERGENCIES:   If any unusual problems or difficulties occur during clinic hours, call [phone] or [phone].         Admission Information     Date & Time Provider Department CSN    5/2/2017 12:11 PM Fredy Magana MD [facility] [id number]      Care Providers     Provider Role Specialty Primary office phone    Fredy Magana MD Attending Provider Pain Medicine [phone]    Fredy Magana MD Surgeon  Pain Medicine [phone]      Your Vitals Were     BP Pulse Temp Resp Height Weight    117/79 (BP Location: Right arm, Patient Position: Lying, BP Method: Automatic) 77 97.7 °F (36.5 °C) (Oral) 18 5' 11" (1.803 m) 113.4 kg (250 lb)    SpO2 BMI             96% 34.87 kg/m2         Recent Lab Values        5/25/2012 3/11/2014                       12:07 PM 10:20 AM          A1C 4.5 4.7                     Allergies as of 5/2/2017        Reactions    Ace Inhibitors     Aspirin     Other reaction(s): Hives    Codeine     Dilaudid  [Hydromorphone]     Other reaction(s): Itching    Penicillins Hives, Swelling    Has had allergy testing and can prob tolerate penicillin      Advance Directives     An advance directive is a document which, in the event you are no longer able to make decisions for yourself, tells your healthcare team what kind of treatment you do or do not want to " receive, or who you would like to make those decisions for you.  If you do not currently have an advance directive, [facility] encourages you to create one.  For more information call:  [phone]), [phone] ([phone]),  or log on to [email or web address].        Language Assistance Services     ATTENTION: Language assistance services are available, free of charge. Please call [phone].      ATENCIÓN: Si habla español, tiene a carpenter disposición servicios gratuitos de asistencia lingüística. Llame al [phone].     CHÚ Ý: N?u b?n nói Ti?ng Vi?t, có các d?ch v? h? tr? ngôn ng? mi?n phí dành cho b?n. G?i s? [phone].        [facility] Sign-Up     Activating your [facility] account is as easy as 1-2-3!     1) Visit [email or web address], select Sign Up Now, enter this activation code and your date of birth, then select Next.  Activation code not generated  Current Patient Portal Status: Account disabled      2) Create a username and password to use when you visit [facility] in the future and select a security question in case you lose your password and select Next.    3) Enter your e-mail address and click Sign Up!    Additional Information  If you have questions, please e-mail [email or web address] or call [phone] to talk to our [facility] staff. Remember, [facility] is NOT to be used for urgent needs. For medical emergencies, dial 911.          [facility] complies with applicable Federal civil rights laws and does not discriminate on the basis of race, color, national origin, age, disability, or sex.

## 2017-05-02 NOTE — DISCHARGE SUMMARY
Discharge Note  Short Stay      SUMMARY     Admit Date: 5/2/2017    Attending Physician: Fredy Magana      Discharge Physician: Fredy Magana      Discharge Date: 5/2/2017 2:05 PM     PROCEDURE: Right radiofrequency ablation of the the medial branch nerves at the   transverse process of  L4, L5 and sacral ala    2)Conscious sedation provided by MD     REASON FOR PROCEDURE: Facet arthropathy       Disposition: Home or self care    Patient Instructions:   Current Discharge Medication List      CONTINUE these medications which have NOT CHANGED    Details   acetaminophen (TYLENOL) 500 MG tablet Take 2 tablets (1,000 mg total) by mouth every 8 (eight) hours as needed.  Qty: 90 tablet, Refills: 0      albuterol (PROVENTIL HFA) 90 mcg/actuation inhaler Inhale 2 puffs into the lungs every 6 (six) hours as needed.  Qty: 18 g, Refills: 11    Associated Diagnoses: Asthma in remission      atorvastatin (LIPITOR) 20 MG tablet Take 1 tablet (20 mg total) by mouth once daily.  Qty: 90 tablet, Refills: 3    Associated Diagnoses: Hyperlipidemia, unspecified hyperlipidemia type      azelastine (ASTELIN) 137 mcg (0.1 %) nasal spray 1 spray (137 mcg total) by Nasal route 2 (two) times daily.  Qty: 30 mL, Refills: 11    Associated Diagnoses: Chronic allergic rhinitis      calcium citrate-vitamin D3 315-200 mg (CITRACAL+D) 315-200 mg-unit per tablet Take 1 tablet by mouth once daily.      chlorthalidone (HYGROTEN) 25 MG Tab Take 1 tablet (25 mg total) by mouth once daily.  Qty: 30 tablet, Refills: 11    Associated Diagnoses: Essential hypertension      cyanocobalamin, vitamin B-12, (VITAMIN B-12) 50 mcg tablet Take 50 mcg by mouth once daily.      docusate sodium (COLACE) 100 MG capsule Take 1 tablet by mouth Twice daily. 1 Capsule Oral Twice a day .  Take with pain medicine      esomeprazole (NEXIUM) 40 MG capsule Take 1 capsule (40 mg total) by mouth every 12 (twelve) hours.  Qty: 60 capsule, Refills: 11    Comments: **Patient  requests 90 days supply**  Associated Diagnoses: Gastroesophageal reflux disease with esophagitis      fluticasone (FLONASE) 50 mcg/actuation nasal spray 2 sprays by Each Nare route once daily.  Qty: 3 Bottle, Refills: 2    Comments: **Patient requests 90 days supply**  Associated Diagnoses: Chronic nasopharyngitis      fluticasone-salmeterol 250-50 mcg/dose (ADVAIR DISKUS) 250-50 mcg/dose diskus inhaler Inhale 1 puff into the lungs 2 (two) times daily.  Qty: 1 each, Refills: 5      sildenafil (VIAGRA) 100 MG tablet Take 1 tablet (100 mg total) by mouth daily as needed for Erectile Dysfunction.  Qty: 6 tablet, Refills: 11      tamsulosin (FLOMAX) 0.4 mg Cp24 TAKE ONE CAPSULE BY MOUTH EVERY EVENING  Qty: 90 capsule, Refills: 12    Comments: **Patient requests 90 days supply**      tramadol (ULTRAM) 50 mg tablet Take 1 tablet (50 mg total) by mouth every 6 (six) hours as needed for Pain.  Qty: 120 tablet, Refills: 1      VITAMIN D2 50,000 unit capsule TK 1 C PO Q 7 DAYS  Refills: 1      zolpidem (AMBIEN) 10 mg Tab Take 1 tablet (10 mg total) by mouth nightly as needed.  Qty: 20 tablet, Refills: 0    Associated Diagnoses: Sleep disorder      azelastine-fluticasone 137-50 mcg/spray [place] nassal spray 1 spray by Each Nare route 2 (two) times daily.  Qty: 23 g, Refills: 6    Associated Diagnoses: Chronic sinusitis, unspecified location             Resume home diet and activity

## 2017-05-02 NOTE — TELEPHONE ENCOUNTER
----- Message from Salma Izquierdo sent at 5/2/2017  2:38 PM CDT -----  Contact: self - [phone]  anahi - returning your call - please call patient at

## 2017-05-02 NOTE — OP NOTE
Lumbar Medial nerve branch block radiofrequency ablation Under Fluoroscopy     Time-out taken to identify patient and procedure side prior to starting the procedure.     05/02/2017    PROCEDURE: Right radiofrequency ablation of the the medial branch nerves at the   transverse process of  L4, L5 and sacral ala    2)Conscious sedation provided by MD     REASON FOR PROCEDURE: Facet arthropathy     PHYSICIAN: Fredy Magana MD     ASSISTANTS: None     MEDICATIONS INJECTED: 0.25% Bupivicaine total 6mL     LOCAL ANESTHETIC USED: Xylocaine 1% 1mL / site     ESTIMATED BLOOD LOSS: None.     COMPLICATIONS: None.     Interval history: Patient reports that he had complete relief of pain for the day of the procedure, we will proceed with the RFA     TECHNIQUE: Laying in a prone position, the patient was prepped and draped in the usual sterile fashion using ChloraPrep and fenestrated drape. The level was determined under fluoroscopic guidance. Local anesthetic was given by going down to the hub of the 27-gauge 1.25in needle and raising a wheel. A 18-gauge 10mm curved active tip needle was introduced to the anatomic local of the medial branch at each of the above levels using fluoroscopy. Then sensory and motor testing was performed to confirm that the needle tips were in the correct location. Then after negative aspiration, 1 mL of 0.25% bupivacaine was injected into each level. This was followed by thermal lesioning at 80 degrees celsius for 90 seconds.     Conscious sedation provided by M.D   The patient was monitored with continuous pulse oximetry, EKG, and intermittent blood pressure monitors. The patient was hemodynamically stable throughout the entire process was responsive to voice, and breathing spontaneously. Supplemental O2 was provided at 2L/min via nasal cannula. Patient was comfortable for the duration of the procedure.     There was a total of 2mg IV Midazolam and 50mcg Fentanyl titrated for the procedure    The  patient tolerated the procedure well. Was able to move their leg at the knee and ankle at the conclusion of the procedure    The patient was monitored after the procedure. Patient was given post procedure and discharge instructions to follow at home. We will see the patient back in two weeks or the patient may call to inform of status. The patient was discharged in a stable condition

## 2017-05-02 NOTE — TELEPHONE ENCOUNTER
----- Message from Yoselin Salvador sent at 5/2/2017 11:32 AM CDT -----  Contact: Self- [phone]  Merlin- pt called to schedule an ep appt with Dr. Boyer- has a hard time pushing his bowels out but says he's not constipated- please call pt back at [phone]

## 2017-05-02 NOTE — H&P
"HPI  Patient presents for repeat lumbar RFA, previous good relief.  Patient has previously received DDAVP, and is receiving now.  No other health changes since previous encounter.      PMHx, PSHx, Allergies, Medications reviewed in epic    ROS negative except pain complaints in HPI    OBJECTIVE:    /83  Pulse 74  Temp 97.7 °F (36.5 °C) (Oral)   Resp 20  Ht 5' 11" (1.803 m)  Wt 113.4 kg (250 lb)  SpO2 (!) 94%  BMI 34.87 kg/m2    PHYSICAL EXAMINATION:    GENERAL: Well appearing, in no acute distress, alert and oriented x3.  PSYCH:  Mood and affect appropriate.  SKIN: Skin color, texture, turgor normal, no rashes or lesions.  CV: RRR with palpation of the radial artery.  PULM: No evidence of respiratory difficulty, symmetric chest rise. Clear to auscultation.  NEURO: Cranial nerves grossly intact.    Plan:    Proceed with procedure as planned    Fredy Magana  05/02/2017    "

## 2017-05-03 ENCOUNTER — OFFICE VISIT (OUTPATIENT)
Dept: GASTROENTEROLOGY | Facility: CLINIC | Age: 57
End: 2017-05-03
Payer: MEDICARE

## 2017-05-03 ENCOUNTER — TELEPHONE (OUTPATIENT)
Dept: ENDOSCOPY | Facility: HOSPITAL | Age: 57
End: 2017-05-03

## 2017-05-03 VITALS
HEIGHT: 71 IN | HEART RATE: 77 BPM | SYSTOLIC BLOOD PRESSURE: 114 MMHG | BODY MASS INDEX: 35.18 KG/M2 | DIASTOLIC BLOOD PRESSURE: 79 MMHG | WEIGHT: 251.31 LBS

## 2017-05-03 DIAGNOSIS — K92.1 HEMATOCHEZIA: Primary | ICD-10-CM

## 2017-05-03 DIAGNOSIS — K59.09 CONSTIPATION, CHRONIC: ICD-10-CM

## 2017-05-03 PROCEDURE — 99999 PR PBB SHADOW E&M-EST. PATIENT-LVL IV: CPT | Mod: PBBFAC,,, | Performed by: NURSE PRACTITIONER

## 2017-05-03 PROCEDURE — 3074F SYST BP LT 130 MM HG: CPT | Mod: S$GLB,,, | Performed by: NURSE PRACTITIONER

## 2017-05-03 PROCEDURE — 1160F RVW MEDS BY RX/DR IN RCRD: CPT | Mod: S$GLB,,, | Performed by: NURSE PRACTITIONER

## 2017-05-03 PROCEDURE — 3078F DIAST BP <80 MM HG: CPT | Mod: S$GLB,,, | Performed by: NURSE PRACTITIONER

## 2017-05-03 PROCEDURE — 99214 OFFICE O/P EST MOD 30 MIN: CPT | Mod: S$GLB,,, | Performed by: NURSE PRACTITIONER

## 2017-05-03 PROCEDURE — 99499 UNLISTED E&M SERVICE: CPT | Mod: S$PBB,,, | Performed by: NURSE PRACTITIONER

## 2017-05-03 NOTE — MR AVS SNAPSHOT
[facility]  [address]  [facility] [address]  Phone: [phone]  Fax: [phone]                  Bri Santiago   5/3/2017 2:00 PM   Office Visit    Description:  Male : 1960   Provider:  Rosie Gordon NP   Department:  [facility]           Diagnoses this Visit        Comments    Hematochezia    -  Primary     Constipation, chronic                To Do List           Future Appointments        Provider Department Dept Phone    2017 9:20 AM Buffy Villagran [facility] - Pain Management [phone]    6/15/2017 8:40 AM Buffy Villagran [facility] Pain Management [phone]      Your Future Surgeries/Procedures     May 16, 2017   Surgery with Freyd Magana MD   [facility] ([facility])    [address] [facility] [address] [phone]              Goals (5 Years of Data)     None       These Medications        Disp Refills Start End    linaclotide (LINZESS) 145 mcg Cap capsule 30 capsule 2 5/3/2017     Take 1 capsule (145 mcg total) by mouth once daily. - Oral    Pharmacy: [facility] [id number] [place] [address] - [address] [place] [address] AT [facility] [place]  [place] Ph #: [phone]         [facility]     [facility] Nurse Care Line - 24 Assistance  Unless otherwise directed by your provider, please contact [facility], our nurse care line that is available for 24/ assistance.     Registered nurses in the [facility] provide: appointment scheduling, clinical advisement, health education, and other advisory services.  Call: [phone] (toll free)               Medications           Message regarding Medications     Verify the changes and/or additions to your medication regime listed below are the same as discussed with your clinician today.  If any of these changes or additions are incorrect, please notify your healthcare provider.        START  taking these NEW medications        Refills    linaclotide (LINZESS) 145 mcg Cap capsule 2    Sig: Take 1 capsule (145 mcg total) by mouth once daily.    Class: Normal    Route: Oral           Verify that the below list of medications is an accurate representation of the medications you are currently taking.  If none reported, the list may be blank. If incorrect, please contact your healthcare provider. Carry this list with you in case of emergency.           Current Medications     acetaminophen (TYLENOL) 500 MG tablet Take 2 tablets (1,000 mg total) by mouth every 8 (eight) hours as needed.    albuterol (PROVENTIL HFA) 90 mcg/actuation inhaler Inhale 2 puffs into the lungs every 6 (six) hours as needed.    atorvastatin (LIPITOR) 20 MG tablet Take 1 tablet (20 mg total) by mouth once daily.    calcium citrate-vitamin D3 315-200 mg (CITRACAL+D) 315-200 mg-unit per tablet Take 1 tablet by mouth once daily.    chlorthalidone (HYGROTEN) 25 MG Tab Take 1 tablet (25 mg total) by mouth once daily.    cyanocobalamin, vitamin B-12, (VITAMIN B-12) 50 mcg tablet Take 50 mcg by mouth once daily.    docusate sodium (COLACE) 100 MG capsule Take 1 tablet by mouth Twice daily. 1 Capsule Oral Twice a day .  Take with pain medicine    esomeprazole (NEXIUM) 40 MG capsule Take 1 capsule (40 mg total) by mouth every 12 (twelve) hours.    fluticasone (FLONASE) 50 mcg/actuation nasal spray 2 sprays by Each Nare route once daily.    fluticasone-salmeterol 250-50 mcg/dose (ADVAIR DISKUS) 250-50 mcg/dose diskus inhaler Inhale 1 puff into the lungs 2 (two) times daily.    sildenafil (VIAGRA) 100 MG tablet Take 1 tablet (100 mg total) by mouth daily as needed for Erectile Dysfunction.    tamsulosin (FLOMAX) 0.4 mg Cp24 TAKE ONE CAPSULE BY MOUTH EVERY EVENING    tramadol (ULTRAM) 50 mg tablet Take 1 tablet (50 mg total) by mouth every 6 (six) hours as needed for Pain.    VITAMIN D2 50,000 unit capsule TK 1 C PO Q 7 DAYS    zolpidem (AMBIEN) 10  "mg Tab Take 1 tablet (10 mg total) by mouth nightly as needed.    azelastine (ASTELIN) 137 mcg (0.1 %) nasal spray 1 spray (137 mcg total) by Nasal route 2 (two) times daily.    azelastine-fluticasone 137-50 mcg/spray Spry nassal spray 1 spray by Each Nare route 2 (two) times daily.    linaclotide (LINZESS) 145 mcg Cap capsule Take 1 capsule (145 mcg total) by mouth once daily.           Clinical Reference Information           Your Vitals Were     BP Pulse Height Weight BMI    114/79 77 5' 11" (1.803 m) 114 kg (251 lb 5.2 oz) 35.05 kg/m2      Blood Pressure          Most Recent Value    BP  114/79      Allergies as of 5/3/2017     Ace Inhibitors    Aspirin    Codeine    Dilaudid  [Hydromorphone]    Penicillins      Immunizations Administered on Date of Encounter - 5/3/2017     None      Orders Placed During Today's Visit      Normal Orders This Visit    Case request GI: ESOPHAGOGASTRODUODENOSCOPY (EGD), COLONOSCOPY       [facility] Sign-Up     Activating your [facility] account is as easy as 1-2-3!     1) Visit [email or web address], select Sign Up Now, enter this activation code and your date of birth, then select Next.  Activation code not generated  Current Patient Portal Status: Account disabled      2) Create a username and password to use when you visit [facility] in the future and select a security question in case you lose your password and select Next.    3) Enter your e-mail address and click Sign Up!    Additional Information  If you have questions, please e-mail [email or web address] or call [phone] to talk to our [facility] staff. Remember, [facility] is NOT to be used for urgent needs. For medical emergencies, dial 911.         Language Assistance Services     ATTENTION: Language assistance services are available, free of charge. Please call [phone].      ATENCIÓN: Si habla español, tiene a carpenter disposición servicios gratuitos de asistencia lingüística. Llame al [phone].     CHÚ Ý: N?u b?n nói " Ti?ng Vi?t, có các d?ch v? h? tr? ngôn ng? mi?n phí dành cho b?n. G?i s? [phone].         [facility] complies with applicable Federal civil rights laws and does not discriminate on the basis of race, color, national origin, age, disability, or sex.

## 2017-05-03 NOTE — PROGRESS NOTES
"    [facility] Note    Reason for Visit:  The primary encounter diagnosis was Hematochezia. A diagnosis of Constipation, chronic was also pertinent to this visit.    PCP: Cate Galeas       HPI:  This is a 56 y.o. male here for evaluation of constipation.  Mr. Santiago has been followed by Dr. Boyer for GERD w/ LA grade A esophagitis, dysphagia, long term PPI use, and family hx of colon cancer (father, brother, and possibly P.grandfather). His last visit with Dr. Boyer was in 11/2016. He currently reports constipation as detailed below:    Duration - 3-4 months, but hx of constipation a few years ago(seen by [facility] at that time).   Bowel movement frequency - twice per week; with straining  Stool consistency - bristol type 3  Blood in stools - moderate to large amount of BRBPR in toilet and mixed in with stool last month x 3-4 separate occasions.   OB complications - N/A  Digital assistance - denies  Laxatives used - dulcolax on 3 separate occasions w/o much relief-lead to abd cramping. Mag citrate x 4 with small volume BMs. Took Miralax one cap 3-4 times daily x 6 months with not much improvement-a few years ago. Takes one tablet of colace three times daily x many years.   Fiber/water usage - taking fiber one powder- one cap full daily x 2.5 months. No improvement  Probiotics- none  Constipating medications - tramadol    Abdominal pain - + lower abd cramping, generalized bloating and gas. Better after "good BM"  Reflux - + hx GERD. Well controlled with BID Nexium.  Dysphagia/odynophagia - no   GI bleeding - + hematochezia. denies  hematemesis, melena,  black/tarry stools, and coffee ground emesis  NSAID usage - denies    ROS:  Constitutional: No fevers, no chills, No unintentional weight loss, + mild fatigue,   ENT: + allergies  CV: No chest pain, no palpitations, no perif. edema, no sob on exertion  Pulm: No cough, No shortness of breath, no wheezes, no sputum  Ophtho: No vision " changes  GI: see HPI; also occasional nausea, no vomiting, no change in appetite  Derm: No rash  Heme: No lymphadenopathy, No bruising  MSK: No arthritis, + back pain, no muscle weakness  : No dysuria, No hematuria  Endo: No hot or cold intolerance  Neuro: No syncope, No seizure,     Medical History:  has a past medical history of Acute pancreatitis; Anal fissure; Anemia; Arthritis; Asthma in remission; Back pain; BPH (benign prostatic hypertrophy); Cancer (2000); Clotting disorder; Dysphagia (10/7/2014); Family history of colon cancer; GERD (gastroesophageal reflux disease); H. pylori infection (10/8/2014); Helicobacter pylori (H. pylori) infection; History of chronic pancreatitis; HTN (hypertension); Lumbago (11/12/2012); Obesity; ABDON (obstructive sleep apnea); Pneumonia; Prostate cancer (2000); Sacroiliac joint pain (2/10/2015); Spinal stenosis of lumbar region; Trouble in sleeping; Vitamin D deficiency disease; and VWD (acquired von Willebrand's disease).    Surgical History:  has a past surgical history that includes Lumbar fusion (2012); Carpal tunnel release (2003); anal fissure repair; Cervical discectomy (2003); Colonoscopy (N/A, 10/7/2015); Vasectomy (1996); Tonsillectomy; and Spine surgery.    Family History: family history includes Colon cancer (age of onset: 65) in his paternal grandfather and paternal uncle; Colon cancer (age of onset: 67) in his father; Coronary artery disease (age of onset: 45) in his mother; Diabetes Mellitus in his paternal grandmother; Glaucoma in his father; Heart disease in his mother; Hypertension in his father and mother; No Known Problems in his brother, brother, daughter, daughter, daughter, sister, son, son, and son..     Social History:  reports that he has never smoked. He has never used smokeless tobacco. He reports that he does not drink alcohol or use illicit drugs.    Review of patient's allergies indicates:   Allergen Reactions    Ace inhibitors     Aspirin       Other reaction(s): Hives    Codeine     Dilaudid  [hydromorphone]      Other reaction(s): Itching    Penicillins Hives and Swelling     Has had allergy testing and can prob tolerate penicillin       Current Outpatient Prescriptions   Medication Sig    acetaminophen (TYLENOL) 500 MG tablet Take 2 tablets (1,000 mg total) by mouth every 8 (eight) hours as needed.    albuterol (PROVENTIL HFA) 90 mcg/actuation inhaler Inhale 2 puffs into the lungs every 6 (six) hours as needed.    atorvastatin (LIPITOR) 20 MG tablet Take 1 tablet (20 mg total) by mouth once daily.    calcium citrate-vitamin D3 315-200 mg (CITRACAL+D) 315-200 mg-unit per tablet Take 1 tablet by mouth once daily.    chlorthalidone (HYGROTEN) 25 MG Tab Take 1 tablet (25 mg total) by mouth once daily.    cyanocobalamin, vitamin B-12, (VITAMIN B-12) 50 mcg tablet Take 50 mcg by mouth once daily.    docusate sodium (COLACE) 100 MG capsule Take 1 tablet by mouth Twice daily. 1 Capsule Oral Twice a day .  Take with pain medicine    esomeprazole (NEXIUM) 40 MG capsule Take 1 capsule (40 mg total) by mouth every 12 (twelve) hours.    fluticasone-salmeterol 250-50 mcg/dose (ADVAIR DISKUS) 250-50 mcg/dose diskus inhaler Inhale 1 puff into the lungs 2 (two) times daily.    sildenafil (VIAGRA) 100 MG tablet Take 1 tablet (100 mg total) by mouth daily as needed for Erectile Dysfunction.    tramadol (ULTRAM) 50 mg tablet Take 1 tablet (50 mg total) by mouth every 6 (six) hours as needed for Pain.    VITAMIN D2 50,000 unit capsule TK 1 C PO Q 7 DAYS    zolpidem (AMBIEN) 10 mg Tab Take 1 tablet (10 mg total) by mouth nightly as needed.    azelastine (ASTELIN) 137 mcg (0.1 %) nasal spray 1 spray (137 mcg total) by Nasal route 2 (two) times daily.    azelastine-fluticasone 137-50 mcg/spray Spry nassal spray 1 spray by Each Nare route 2 (two) times daily.     No current facility-administered medications for this visit.        Objective Findings:    Vital  "Signs:  /79  Pulse 77  Ht 5' 11" (1.803 m)  Wt 114 kg (251 lb 5.2 oz)  BMI 35.05 kg/m2  Body mass index is 35.05 kg/(m^2).    Physical Exam:  General Appearance: Well appearing in no acute distress  Head:   Normocephalic, without obvious abnormality  Eyes:    No scleral icterus, EOMI  ENT: Neck supple, Lips, mucosa, and tongue normal; teeth and gums normal  Lungs: CTA bilaterally in anterior and posterior fields, no wheezes, no crackles.  Heart:  Regular rate and rhythm, S1, S2 normal, no murmurs heard  Abdomen: Soft, obese, non tender, non distended with positive bowel sounds in all four quadrants. No hepatosplenomegaly, ascites, or mass  Extremities: 2+ radial pulses, no clubbing, cyanosis or edema  Skin: No rash to exposed areas  Neurologic: A&Ox4      Labs:  Lab Results   Component Value Date    WBC 9.32 09/24/2015    HGB 13.0 (L) 09/24/2015    HCT 39.7 (L) 09/24/2015     09/24/2015    CHOL 136 10/18/2016    TRIG 126 10/18/2016    HDL 30 (L) 10/18/2016    ALT 20 10/18/2016    ALT 20 10/18/2016    AST 21 10/18/2016    AST 21 10/18/2016     11/21/2016    K 3.6 11/21/2016     11/21/2016    CREATININE 1.0 11/21/2016    BUN 13 11/21/2016    CO2 29 11/21/2016    TSH 1.007 04/19/2016    PSA 0.96 02/06/2013    INR 1.0 08/27/2014    HGBA1C 4.7 03/11/2014         Endoscopy:    10/7/2015 Colonoscopy Dr. Boyer- non bleeding internal hemorrhoids. Sigmoid tics. 2 mm descending colon polyp. Path-non neoplastic colonic mucosa. Repeat in 2 years (10/2017).   7/2015 manometry w/ impedence Dr. Boyer-ineffective esophageal motility. No significant non acid nor acid reflux.   10/8/2014 EGD Dr. Boyer-HH. LA grade A esophagitis. bx-WNL. Repeat in 6 months to check healing.   10/2013 Colonoscopy Dr. Luis-WNL. Repeat in 2 years  9/2012 colonoscopy Dr. Luis-WNL. Repeat in 1 year  9/2012 EGD Dr. ManzanoHH. Stomach bx-WNL  6/2011 colonoscopy Dr. Boyer  Assessment:  1. Hematochezia    2. Constipation, " chronic           Recommendations:  1. Hematochezia-EGD/colon for further eval. Constipation prep(no DM, no CHF) noted to scheduling note.   2. Constipation-trial 145 mcg linzess once daily 30 min b/f breakfast.     F/u pending results.       Order summary:  Orders Placed This Encounter    Case request GI: ESOPHAGOGASTRODUODENOSCOPY (EGD), COLONOSCOPY         Thank you so much for allowing me to participate in the care of LORIE Crenshaw, FNP-C

## 2017-05-04 ENCOUNTER — TELEPHONE (OUTPATIENT)
Dept: ENDOSCOPY | Facility: HOSPITAL | Age: 57
End: 2017-05-04

## 2017-05-04 NOTE — TELEPHONE ENCOUNTER
Patient wanting to schedule an EGD and colonoscopy.  He informed me that he has von Willebrand's and usually receives an infusion of dDAVP prior to any invasive procedure.  Patient stated that he has seen you in the past for this.  What is your recommendation for proceeding with helping to get this patient scheduled for these ordered procedures?   Please advise.  Thank you.  Laure

## 2017-05-05 DIAGNOSIS — Z12.11 SPECIAL SCREENING FOR MALIGNANT NEOPLASMS, COLON: Primary | ICD-10-CM

## 2017-05-05 RX ORDER — POLYETHYLENE GLYCOL 3350, SODIUM SULFATE ANHYDROUS, SODIUM BICARBONATE, SODIUM CHLORIDE, POTASSIUM CHLORIDE 236; 22.74; 6.74; 5.86; 2.97 G/4L; G/4L; G/4L; G/4L; G/4L
4 POWDER, FOR SOLUTION ORAL ONCE
Qty: 4000 ML | Refills: 0 | Status: SHIPPED | OUTPATIENT
Start: 2017-05-05 | End: 2017-05-05

## 2017-05-12 ENCOUNTER — TELEPHONE (OUTPATIENT)
Dept: ENDOSCOPY | Facility: HOSPITAL | Age: 57
End: 2017-05-12

## 2017-05-12 NOTE — TELEPHONE ENCOUNTER
"Patient contacted to reschedule colonoscopy.  Informed patient that the procedure would need to be rescheduled either to a later time on that same day, or on another day. It was suggested at the time of the original scheduling, to schedule it in late morning or early afternoon for coordination purposes.  I explained to him that there is a process that needs to take place in order for him to have the infusion for his vonWillebrand's done, prior to procedure.   Even after explaining all that was needed to be done in detail, he told me several times that he did not want to change his procedure time.   After further explanation he stated-"I don't care what you do. Just put me on later that day and send me the instructions".  Rescheduled to 6/19/17 at 1:30 pm with Dr Boyer.  Please contact patient to follow-up for coordination of his infusion.  Thank you.  Laure"

## 2017-05-15 ENCOUNTER — TELEPHONE (OUTPATIENT)
Dept: GASTROENTEROLOGY | Facility: CLINIC | Age: 57
End: 2017-05-15

## 2017-05-15 NOTE — TELEPHONE ENCOUNTER
----- Message from Winnie Escobar RN sent at 5/15/2017  9:05 AM CDT -----      ----- Message -----     From: Marcello Álvarez Jr., MD     Sent: 5/15/2017   8:50 AM       To: Winnie Escobar RN    20 mcg of DDAVP given IV over 3-5 minutes 20-30 minutes prior to procedure is the dose to be given; best to give in Endoscopy area; will take forever to try to give over here.  LEAH Álvarez  ----- Message -----     From: Winnie Escobar RN     Sent: 5/15/2017   8:30 AM       To: Marcello Álvarez Jr., MD        ----- Message -----     From: Rita Hogan MA     Sent: 5/15/2017   8:22 AM       To: Henri Zhou,  Mr Santiago is scheduled for a colonoscopy on 6/19 at 1:30. He needs to have an infusion for vonWillebrand's done, prior to the procedure. Can Dr Álvarez do this? If not can you let us know how to order the infusion, so Dr Boyer can.  Thanks,  Rita

## 2017-05-16 ENCOUNTER — HOSPITAL ENCOUNTER (OUTPATIENT)
Facility: OTHER | Age: 57
Discharge: HOME | End: 2017-05-16
Attending: ANESTHESIOLOGY | Admitting: ANESTHESIOLOGY
Payer: MEDICARE

## 2017-05-16 VITALS
TEMPERATURE: 98 F | OXYGEN SATURATION: 100 % | BODY MASS INDEX: 35 KG/M2 | RESPIRATION RATE: 16 BRPM | HEIGHT: 71 IN | SYSTOLIC BLOOD PRESSURE: 123 MMHG | HEART RATE: 66 BPM | DIASTOLIC BLOOD PRESSURE: 77 MMHG | WEIGHT: 250 LBS

## 2017-05-16 DIAGNOSIS — M47.899 FACET SYNDROME: Primary | ICD-10-CM

## 2017-05-16 PROCEDURE — 64635 DESTROY LUMB/SAC FACET JNT: CPT | Performed by: ANESTHESIOLOGY

## 2017-05-16 PROCEDURE — 99152 MOD SED SAME PHYS/QHP 5/>YRS: CPT | Mod: ,,, | Performed by: ANESTHESIOLOGY

## 2017-05-16 PROCEDURE — 63600175 PHARM REV CODE 636 W HCPCS: Performed by: ANESTHESIOLOGY

## 2017-05-16 PROCEDURE — 64635 DESTROY LUMB/SAC FACET JNT: CPT | Mod: LT,,, | Performed by: ANESTHESIOLOGY

## 2017-05-16 PROCEDURE — 64636 DESTROY L/S FACET JNT ADDL: CPT | Mod: LT,,, | Performed by: ANESTHESIOLOGY

## 2017-05-16 PROCEDURE — 25500020 PHARM REV CODE 255: Performed by: ANESTHESIOLOGY

## 2017-05-16 PROCEDURE — 25000003 PHARM REV CODE 250: Performed by: ANESTHESIOLOGY

## 2017-05-16 PROCEDURE — 64636 DESTROY L/S FACET JNT ADDL: CPT | Performed by: ANESTHESIOLOGY

## 2017-05-16 RX ORDER — FENTANYL CITRATE 50 UG/ML
INJECTION, SOLUTION INTRAMUSCULAR; INTRAVENOUS
Status: DISCONTINUED | OUTPATIENT
Start: 2017-05-16 | End: 2017-05-16 | Stop reason: HOSPADM

## 2017-05-16 RX ORDER — LIDOCAINE HYDROCHLORIDE 10 MG/ML
10 INJECTION INFILTRATION; PERINEURAL
Status: COMPLETED | OUTPATIENT
Start: 2017-05-16 | End: 2017-05-16

## 2017-05-16 RX ORDER — SODIUM CHLORIDE 9 MG/ML
INJECTION, SOLUTION INTRAVENOUS CONTINUOUS
Status: DISCONTINUED | OUTPATIENT
Start: 2017-05-16 | End: 2017-05-16 | Stop reason: HOSPADM

## 2017-05-16 RX ORDER — FENTANYL CITRATE 50 UG/ML
100 INJECTION, SOLUTION INTRAMUSCULAR; INTRAVENOUS ONCE
Status: COMPLETED | OUTPATIENT
Start: 2017-05-16 | End: 2017-05-16

## 2017-05-16 RX ORDER — BUPIVACAINE HYDROCHLORIDE 2.5 MG/ML
INJECTION, SOLUTION EPIDURAL; INFILTRATION; INTRACAUDAL
Status: DISCONTINUED | OUTPATIENT
Start: 2017-05-16 | End: 2017-05-16 | Stop reason: HOSPADM

## 2017-05-16 RX ORDER — MIDAZOLAM HYDROCHLORIDE 1 MG/ML
2 INJECTION INTRAMUSCULAR; INTRAVENOUS
Status: DISCONTINUED | OUTPATIENT
Start: 2017-05-16 | End: 2017-05-16 | Stop reason: HOSPADM

## 2017-05-16 RX ORDER — SODIUM CHLORIDE 9 MG/ML
3 INJECTION, SOLUTION INTRAMUSCULAR; INTRAVENOUS; SUBCUTANEOUS ONCE
Status: DISCONTINUED | OUTPATIENT
Start: 2017-05-16 | End: 2017-05-16 | Stop reason: HOSPADM

## 2017-05-16 RX ORDER — BUPIVACAINE HYDROCHLORIDE 2.5 MG/ML
10 INJECTION, SOLUTION EPIDURAL; INFILTRATION; INTRACAUDAL ONCE
Status: DISCONTINUED | OUTPATIENT
Start: 2017-05-16 | End: 2017-05-16 | Stop reason: HOSPADM

## 2017-05-16 RX ORDER — MIDAZOLAM HYDROCHLORIDE 1 MG/ML
INJECTION INTRAMUSCULAR; INTRAVENOUS
Status: DISCONTINUED | OUTPATIENT
Start: 2017-05-16 | End: 2017-05-16 | Stop reason: HOSPADM

## 2017-05-16 RX ADMIN — DESMOPRESSIN ACETATE 34.02 MCG: 4 SOLUTION INTRAVENOUS at 09:05

## 2017-05-16 NOTE — IP AVS SNAPSHOT
[facility], [place], operated by [facility]  [address] [place], [place] [id number]  Phone: [phone]           Patient Discharge Instructions   Our goal is to set you up for success. This packet includes information on your condition, medications, and your home care.  It will help you care for yourself to prevent having to return to the hospital.     Please ask your nurse if you have any questions.      There are many details to remember when preparing to leave the hospital. Here is what you will need to do:    1. Take your medicine. If you are prescribed medications, review your Medication List on the following pages. You may have new medications to  at the pharmacy and others that you'll need to stop taking. Review the instructions for how and when to take your medications. Talk with your doctor or nurses if you are unsure of what to do.     2. Go to your follow-up appointments. Specific follow-up information is listed in the following pages. Your may be contacted by a nurse or clinical provider about future appointments. Be sure we have all of the phone numbers to reach you. Please contact your provider's office if you are unable to make an appointment.     3. Watch for warning signs. Your doctor or nurse will give you detailed warning signs to watch for and when to call for assistance. These instructions may also include educational information about your condition. If you experience any of warning signs to your health, call your doctor.           [facility]  Unless otherwise directed by your provider, please   contact [facility], our nurse care line   that is available for 24/7 assistance.     [phone] (toll-free)     Registered nurses in the [facility]   provide: appointment scheduling, clinical advisement, health education, and other advisory services.                  ** Verify the list of medication(s) below is accurate and up to date. Carry this with you in case of  emergency. If your medications have changed, please notify your healthcare provider.             Medication List      CONTINUE taking these medications        Additional Info                      acetaminophen 500 MG tablet   Commonly known as:  TYLENOL   Quantity:  90 tablet   Refills:  0   Dose:  1000 mg    Instructions:  Take 2 tablets (1,000 mg total) by mouth every 8 (eight) hours as needed.     Begin Date    AM    Noon    PM    Bedtime       albuterol 90 mcg/actuation inhaler   Commonly known as:  PROVENTIL HFA   Quantity:  18 g   Refills:  11   Dose:  2 puff    Instructions:  Inhale 2 puffs into the lungs every 6 (six) hours as needed.     Begin Date    AM    Noon    PM    Bedtime       atorvastatin 20 MG tablet   Commonly known as:  LIPITOR   Quantity:  90 tablet   Refills:  3   Dose:  20 mg    Instructions:  Take 1 tablet (20 mg total) by mouth once daily.     Begin Date    AM    Noon    PM    Bedtime       azelastine 137 mcg (0.1 %) nasal spray   Commonly known as:  ASTELIN   Quantity:  30 mL   Refills:  11   Dose:  1 spray    Instructions:  1 spray (137 mcg total) by Nasal route 2 (two) times daily.     Begin Date    AM    Noon    PM    Bedtime       azelastine-fluticasone 137-50 mcg/spray Di Giorgio nassal spray   Quantity:  23 g   Refills:  6   Dose:  1 spray    Instructions:  1 spray by Each Nare route 2 (two) times daily.     Begin Date    AM    Noon    PM    Bedtime       calcium citrate-vitamin D3 315-200 mg 315-200 mg-unit per tablet   Commonly known as:  CITRACAL+D   Refills:  0   Dose:  1 tablet    Instructions:  Take 1 tablet by mouth once daily.     Begin Date    AM    Noon    PM    Bedtime       chlorthalidone 25 MG Tab   Commonly known as:  HYGROTEN   Quantity:  30 tablet   Refills:  11   Dose:  25 mg    Instructions:  Take 1 tablet (25 mg total) by mouth once daily.     Begin Date    AM    Noon    PM    Bedtime       COLACE 100 MG capsule   Refills:  0   Dose:  1 tablet   Generic drug:  docusate  sodium    Instructions:  Take 1 tablet by mouth Twice daily. 1 Capsule Oral Twice a day .  Take with pain medicine     Begin Date    AM    Noon    PM    Bedtime       esomeprazole 40 MG capsule   Commonly known as:  NEXIUM   Quantity:  60 capsule   Refills:  11   Dose:  40 mg   Comments:  **Patient requests 90 days supply**    Instructions:  Take 1 capsule (40 mg total) by mouth every 12 (twelve) hours.     Begin Date    AM    Noon    PM    Bedtime       fluticasone-salmeterol 250-50 mcg/dose 250-50 mcg/dose diskus inhaler   Commonly known as:  ADVAIR DISKUS   Quantity:  1 each   Refills:  5   Dose:  1 puff    Instructions:  Inhale 1 puff into the lungs 2 (two) times daily.     Begin Date    AM    Noon    PM    Bedtime       sildenafil 100 MG tablet   Commonly known as:  VIAGRA   Quantity:  6 tablet   Refills:  11   Dose:  100 mg    Instructions:  Take 1 tablet (100 mg total) by mouth daily as needed for Erectile Dysfunction.     Begin Date    AM    Noon    PM    Bedtime       tramadol 50 mg tablet   Commonly known as:  ULTRAM   Quantity:  120 tablet   Refills:  1   Dose:  50 mg    Instructions:  Take 1 tablet (50 mg total) by mouth every 6 (six) hours as needed for Pain.     Begin Date    AM    Noon    PM    Bedtime       VITAMIN B-12 50 mcg tablet   Refills:  0   Dose:  50 mcg   Generic drug:  cyanocobalamin (vitamin B-12)    Instructions:  Take 50 mcg by mouth once daily.     Begin Date    AM    Noon    PM    Bedtime       VITAMIN D2 50,000 unit Cap   Refills:  1   Generic drug:  ergocalciferol    Instructions:  TK 1 C PO Q 7 DAYS     Begin Date    AM    Noon    PM    Bedtime       zolpidem 10 mg Tab   Commonly known as:  AMBIEN   Quantity:  20 tablet   Refills:  0   Dose:  10 mg    Instructions:  Take 1 tablet (10 mg total) by mouth nightly as needed.     Begin Date    AM    Noon    PM    Bedtime                  Please bring to all follow up appointments:    1. A copy of your discharge instructions.  2. All  medicines you are currently taking in their original bottles.  3. Identification and insurance card.    Please arrive 15 minutes ahead of scheduled appointment time.    Please call 24 hours in advance if you must reschedule your appointment and/or time.        Your Scheduled Appointments     May 17, 2017  8:00 AM CDT   Consult with MD Pete Odom Jr. - Hematology Oncology ([facility])    [address]  [place] [address]   [phone]            May 22, 2017  9:20 AM CDT   Established Patient Visit with JENI Rondon - Pain Management ([facility])    [address]  [facility] [address]   [phone]            Beto 15, 2017  8:40 AM CDT   Established Patient Visit with JENI Rondon - Pain Management ([facility])    [address]  [facility] [address]   [phone]              Your Future Surgeries/Procedures     Jun 19, 2017   Surgery with Rosendo Boyer MD   [facility] ([facility])    [address] [facility] [id number] [phone]                  Discharge Instructions       Home Care Instructions Pain Management:    1. DIET:   You may resume your normal diet today.   2. BATHING:   You may shower with luke warm water.  3. DRESSING:   You may remove your bandage today.   4. ACTIVITY LEVEL:   You may resume your normal activities 24 hrs after your procedure.  5. MEDICATIONS:   You may resume your normal medications today.   6. SPECIAL INSTRUCTIONS:   No heat to the injection site for 24 hrs including, bath or shower, heating pad, moist heat, or hot tubs.    Use ice pack to injection site for any pain or discomfort.  Apply ice packs for 20 minute intervals as needed.   If you have received any sedatives by mouth today you may not drive for 12 hours.    If you have received any sedation through your IV, you may not drive for 24 hrs.  "    PLEASE CALL YOUR DOCTOR IF:  1. Redness or swelling around the injection site.  2. Fever of 101 degrees  3. Drainage (pus) from the injection site.  4. For any continuous bleeding (some dried blood over the incision is normal.)    FOR EMERGENCIES:   If any unusual problems or difficulties occur during clinic hours, call [phone] or [phone].         Admission Information     Date & Time Provider Department CSN    5/16/2017  7:51 AM Fredy Magana MD [facility] [id number]      Care Providers     Provider Role Specialty Primary office phone    Fredy Magana MD Attending Provider Pain Medicine [phone]    Fredy Magana MD Surgeon  Pain Medicine [phone]      Your Vitals Were     BP Pulse Temp Resp Height Weight    121/71 63 97.7 °F (36.5 °C) (Oral) 16 5' 11" (1.803 m) 113.4 kg (250 lb)    SpO2 BMI             100% 34.87 kg/m2         Recent Lab Values        5/25/2012 3/11/2014                       12:07 PM 10:20 AM          A1C 4.5 4.7                     Allergies as of 5/16/2017        Reactions    Ace Inhibitors     Aspirin     Other reaction(s): Hives    Codeine     Dilaudid  [Hydromorphone]     Other reaction(s): Itching    Penicillins Hives, Swelling    Has had allergy testing and can prob tolerate penicillin      Advance Directives     An advance directive is a document which, in the event you are no longer able to make decisions for yourself, tells your healthcare team what kind of treatment you do or do not want to receive, or who you would like to make those decisions for you.  If you do not currently have an advance directive, [facility] encourages you to create one.  For more information call:  [phone]), [phone] ([phone]),  or log on to [email or web address].        Language Assistance Services     ATTENTION: Language assistance services are available, free of charge. Please call [phone].      ATENCIÓN: Si shahzad panda " a carpenter disposición servicios gratuitos de asistencia lingüística. Aldo al [phone].     NARAYAN Ý: N?u b?n nói Ti?ng Vi?t, có các d?ch v? h? tr? ngôn ng? mi?n phí dành cho b?n. G?i s? [phone].        [facility] Sign-Up     Activating your [facility] account is as easy as 1-2-3!     1) Visit [email or web address], select Sign Up Now, enter this activation code and your date of birth, then select Next.  Activation code not generated  Current Patient Portal Status: Account disabled      2) Create a username and password to use when you visit [facility] in the future and select a security question in case you lose your password and select Next.    3) Enter your e-mail address and click Sign Up!    Additional Information  If you have questions, please e-mail [email or web address] or call [phone] to talk to our [facility] staff. Remember, [facility] is NOT to be used for urgent needs. For medical emergencies, dial 911.          [facility] complies with applicable Federal civil rights laws and does not discriminate on the basis of race, color, national origin, age, disability, or sex.

## 2017-05-16 NOTE — DISCHARGE INSTRUCTIONS

## 2017-05-16 NOTE — DISCHARGE SUMMARY
Discharge Note  Short Stay      SUMMARY     Admit Date: 5/16/2017    Attending Physician: Fredy Magana      Discharge Physician: Fredy Magana      Discharge Date: 5/16/2017 10:17 AM     PROCEDURE: Left radiofrequency ablation of the the medial branch nerves at the   transverse process of  L4, L5 and sacral ala    2)Conscious sedation provided by MD     REASON FOR PROCEDURE: Facet arthropathy     Disposition: Home or self care    Patient Instructions:   Current Discharge Medication List      CONTINUE these medications which have NOT CHANGED    Details   acetaminophen (TYLENOL) 500 MG tablet Take 2 tablets (1,000 mg total) by mouth every 8 (eight) hours as needed.  Qty: 90 tablet, Refills: 0      albuterol (PROVENTIL HFA) 90 mcg/actuation inhaler Inhale 2 puffs into the lungs every 6 (six) hours as needed.  Qty: 18 g, Refills: 11    Associated Diagnoses: Asthma in remission      atorvastatin (LIPITOR) 20 MG tablet Take 1 tablet (20 mg total) by mouth once daily.  Qty: 90 tablet, Refills: 3    Associated Diagnoses: Hyperlipidemia, unspecified hyperlipidemia type      azelastine (ASTELIN) 137 mcg (0.1 %) nasal spray 1 spray (137 mcg total) by Nasal route 2 (two) times daily.  Qty: 30 mL, Refills: 11    Associated Diagnoses: Chronic allergic rhinitis      azelastine-fluticasone 137-50 mcg/spray Spry nassal spray 1 spray by Each Nare route 2 (two) times daily.  Qty: 23 g, Refills: 6    Associated Diagnoses: Chronic sinusitis, unspecified location      calcium citrate-vitamin D3 315-200 mg (CITRACAL+D) 315-200 mg-unit per tablet Take 1 tablet by mouth once daily.      chlorthalidone (HYGROTEN) 25 MG Tab Take 1 tablet (25 mg total) by mouth once daily.  Qty: 30 tablet, Refills: 11    Associated Diagnoses: Essential hypertension      cyanocobalamin, vitamin B-12, (VITAMIN B-12) 50 mcg tablet Take 50 mcg by mouth once daily.      docusate sodium (COLACE) 100 MG capsule Take 1 tablet by mouth Twice daily. 1 Capsule  Oral Twice a day .  Take with pain medicine      esomeprazole (NEXIUM) 40 MG capsule Take 1 capsule (40 mg total) by mouth every 12 (twelve) hours.  Qty: 60 capsule, Refills: 11    Comments: **Patient requests 90 days supply**  Associated Diagnoses: Gastroesophageal reflux disease with esophagitis      fluticasone-salmeterol 250-50 mcg/dose (ADVAIR DISKUS) 250-50 mcg/dose diskus inhaler Inhale 1 puff into the lungs 2 (two) times daily.  Qty: 1 each, Refills: 5      sildenafil (VIAGRA) 100 MG tablet Take 1 tablet (100 mg total) by mouth daily as needed for Erectile Dysfunction.  Qty: 6 tablet, Refills: 11      tramadol (ULTRAM) 50 mg tablet Take 1 tablet (50 mg total) by mouth every 6 (six) hours as needed for Pain.  Qty: 120 tablet, Refills: 1      VITAMIN D2 50,000 unit capsule TK 1 C PO Q 7 DAYS  Refills: 1      zolpidem (AMBIEN) 10 mg Tab Take 1 tablet (10 mg total) by mouth nightly as needed.  Qty: 20 tablet, Refills: 0    Associated Diagnoses: Sleep disorder             Resume home diet and activity

## 2017-05-16 NOTE — OP NOTE
Lumbar Medial nerve branch block radiofrequency ablation Under Fluoroscopy     Time-out taken to identify patient and procedure side prior to starting the procedure.     05/16/2017    PROCEDURE: Left radiofrequency ablation of the the medial branch nerves at the   transverse process of  L4, L5 and sacral ala    2)Conscious sedation provided by MD     REASON FOR PROCEDURE: Facet arthropathy     PHYSICIAN: Fredy Magana MD     ASSISTANTS: None     MEDICATIONS INJECTED: 0.25% Bupivicaine total 6mL     LOCAL ANESTHETIC USED: Xylocaine 1% 1mL / site     ESTIMATED BLOOD LOSS: None.     COMPLICATIONS: None.     Interval history: Patient reports that he had complete relief of pain for the day of the procedure, we will proceed with the RFA     TECHNIQUE: Laying in a prone position, the patient was prepped and draped in the usual sterile fashion using ChloraPrep and fenestrated drape. The level was determined under fluoroscopic guidance. Local anesthetic was given by going down to the hub of the 27-gauge 1.25in needle and raising a wheel. A 18-gauge 10mm curved active tip needle was introduced to the anatomic local of the medial branch at each of the above levels using fluoroscopy. Then sensory and motor testing was performed to confirm that the needle tips were in the correct location. Then after negative aspiration, 1 mL of 0.25% bupivacaine was injected into each level. This was followed by thermal lesioning at 80 degrees celsius for 90 seconds.     Conscious sedation provided by M.D   The patient was monitored with continuous pulse oximetry, EKG, and intermittent blood pressure monitors. The patient was hemodynamically stable throughout the entire process was responsive to voice, and breathing spontaneously. Supplemental O2 was provided at 2L/min via nasal cannula. Patient was comfortable for the duration of the procedure.     There was a total of 2mg IV Midazolam and 75mcg Fentanyl titrated for the procedure    The  patient tolerated the procedure well. Was able to move their leg at the knee and ankle at the conclusion of the procedure    The patient was monitored after the procedure. Patient was given post procedure and discharge instructions to follow at home. We will see the patient back in two weeks or the patient may call to inform of status. The patient was discharged in a stable condition

## 2017-05-17 ENCOUNTER — INITIAL CONSULT (OUTPATIENT)
Dept: HEMATOLOGY/ONCOLOGY | Facility: CLINIC | Age: 57
End: 2017-05-17
Payer: MEDICARE

## 2017-05-17 VITALS
BODY MASS INDEX: 35.56 KG/M2 | HEIGHT: 71 IN | HEART RATE: 72 BPM | WEIGHT: 254 LBS | SYSTOLIC BLOOD PRESSURE: 108 MMHG | DIASTOLIC BLOOD PRESSURE: 75 MMHG

## 2017-05-17 DIAGNOSIS — D68.00 VON WILLEBRAND DISEASE: Primary | ICD-10-CM

## 2017-05-17 PROCEDURE — 99999 PR PBB SHADOW E&M-EST. PATIENT-LVL II: CPT | Mod: PBBFAC,,, | Performed by: INTERNAL MEDICINE

## 2017-05-17 PROCEDURE — 3078F DIAST BP <80 MM HG: CPT | Mod: S$GLB,,, | Performed by: INTERNAL MEDICINE

## 2017-05-17 PROCEDURE — 3074F SYST BP LT 130 MM HG: CPT | Mod: S$GLB,,, | Performed by: INTERNAL MEDICINE

## 2017-05-17 PROCEDURE — 1160F RVW MEDS BY RX/DR IN RCRD: CPT | Mod: S$GLB,,, | Performed by: INTERNAL MEDICINE

## 2017-05-17 PROCEDURE — 99203 OFFICE O/P NEW LOW 30 MIN: CPT | Mod: S$GLB,,, | Performed by: INTERNAL MEDICINE

## 2017-05-17 RX ORDER — HEPARIN 100 UNIT/ML
500 SYRINGE INTRAVENOUS
Status: CANCELLED | OUTPATIENT
Start: 2017-06-19

## 2017-05-17 RX ORDER — SODIUM CHLORIDE 0.9 % (FLUSH) 0.9 %
10 SYRINGE (ML) INJECTION
Status: CANCELLED | OUTPATIENT
Start: 2017-06-19

## 2017-05-17 NOTE — LETTER
May 17, 2017      Rosendo Estevez  [address]  [facility] [address]           [facility]ematology Oncology  [address]  [place] [address]  Phone: [phone]          Patient: Bri Santiago   MR Number: [id number]   YOB: 1960   Date of Visit: 5/17/2017       Dear Rosendo Estevez:    Thank you for referring Bri Santiago to me for evaluation. Attached you will find relevant portions of my assessment and plan of care.    If you have questions, please do not hesitate to call me. I look forward to following Bri Santiago along with you.    Sincerely,    Marcello Álvarez Jr., MD    Enclosure  CC:  No Recipients    If you would like to receive this communication electronically, please contact [email or web address] or [phone] to request more information on [employer] access.    For providers and/or their staff who would like to refer a patient to [facility], please contact us through our one-stop-shop provider referral line, Dilshad Robert, at [phone].    If you feel you have received this communication in error or would no longer like to receive these types of communications, please e-mail [email or web address]

## 2017-05-17 NOTE — PROGRESS NOTES
HISTORY OF PRESENT ILLNESS:  Mr. Santiago is a 56-year-old man whom I last saw in   July 2012.  He had a tonsillectomy about age 32 without complications.  He has   also had teeth extracted without complications.    About 2005, he had a severe injury to his right foot.  Lacerations were sutured,   but he continued to have substantial bleeding.  He had a series of studies   performed at [facility] and was told that he had Von Willebrand's disease.    In the summer of 2016, he saw Dr. Jorge Chung at [facility] about this matter.    Dr. Chung obtained a number of studies, which included a platelet aggregation   test.  There was a marked decrease in platelet aggregation with ristocetin.    The factor VIII level was normal at 89%.  The ristocetin cofactor was in the   lower portion of the normal range at 56 () and the von Willebrand antigen   was 75 ().    The patient was advised at [facility] and again by Dr. Chung that he be given   DDAVP prior to surgical procedures or dental extractions. I agreed with that  advice.    He has had multiple procedures since I last saw him in 2015.  With each of   these, he has been given an injection of DDAVP prior to the procedure and he has   had no abnormal bleeding.  In fact, he had a series of injections into his   lumbar facet joints yesterday and was given DDAVP in the procedure area of the   Pain Management Department at [facility].  There were no complications with that   procedure.    He subsequently had a von Willebrand multimer analysis performed and it was   normal.  As I have noted in the past, it is impossible to be completely certain   whether or not he has von Willebrand disease, as some patients who have this   disorder will have normal coagulation studies at times.  I have recommended that   he be given intravenous DDAVP prior to surgical or invasive procedures.    IMPRESSION:  Probable von Willebrand disease.    RECOMMENDATIONS:  1.  He is scheduled to  have endoscopic examinations at 1:00 p.m. on 06/19/2017.  2.  It has been impossible this time to arrange for him to have DDAVP   administered prior to those procedures.  Therefore, I will order an infusion of   DDAVP, 20 mcg, to be given over 30 minutes at 11:30 a.m. 06/19/2017.  3.  I have told the patient to contact my office if the date or time of his   procedure is changed.  I hope that in the future whatever process has been used   many times over the last several years to administer DDAVP at the locations   of various procedures can be re-instituted, since an additional visit to our chemotherapy  infusion area to receive a simple and generally safe medication seems to waste  considerable time for everyone involved. Why he has been given the drug  before several endoscopiesand other procedures, yet for this procedure it it  impossible remains a mystery to me (and has been very annoying to the patient).      AWB/REJI  dd: 05/17/2017 08:26:06 (CDT)  td: 05/17/2017 23:34:13 (CDT)  Doc ID   #[id number]  Job ID #[id number]    CC: Rosendo Boyer M.D.

## 2017-05-22 ENCOUNTER — OFFICE VISIT (OUTPATIENT)
Dept: PAIN MEDICINE | Facility: CLINIC | Age: 57
End: 2017-05-22
Payer: MEDICARE

## 2017-05-22 VITALS
BODY MASS INDEX: 35.14 KG/M2 | TEMPERATURE: 98 F | HEART RATE: 73 BPM | HEIGHT: 71 IN | SYSTOLIC BLOOD PRESSURE: 107 MMHG | WEIGHT: 251 LBS | DIASTOLIC BLOOD PRESSURE: 74 MMHG

## 2017-05-22 DIAGNOSIS — M47.816 LUMBAR FACET ARTHROPATHY: ICD-10-CM

## 2017-05-22 DIAGNOSIS — M51.37 DEGENERATION OF LUMBAR OR LUMBOSACRAL INTERVERTEBRAL DISC: ICD-10-CM

## 2017-05-22 DIAGNOSIS — M79.18 MYOFASCIAL MUSCLE PAIN: Primary | ICD-10-CM

## 2017-05-22 DIAGNOSIS — M79.10 MYALGIA: ICD-10-CM

## 2017-05-22 DIAGNOSIS — M96.1 POSTLAMINECTOMY SYNDROME OF LUMBAR REGION: ICD-10-CM

## 2017-05-22 PROCEDURE — 99024 POSTOP FOLLOW-UP VISIT: CPT | Mod: S$GLB,,, | Performed by: NURSE PRACTITIONER

## 2017-05-22 PROCEDURE — 20553 NJX 1/MLT TRIGGER POINTS 3/>: CPT | Mod: 58,S$GLB,, | Performed by: NURSE PRACTITIONER

## 2017-05-22 PROCEDURE — 3078F DIAST BP <80 MM HG: CPT | Mod: S$GLB,,, | Performed by: NURSE PRACTITIONER

## 2017-05-22 PROCEDURE — 3074F SYST BP LT 130 MM HG: CPT | Mod: S$GLB,,, | Performed by: NURSE PRACTITIONER

## 2017-05-22 PROCEDURE — 99999 PR PBB SHADOW E&M-EST. PATIENT-LVL III: CPT | Mod: PBBFAC,,, | Performed by: NURSE PRACTITIONER

## 2017-05-22 PROCEDURE — 1160F RVW MEDS BY RX/DR IN RCRD: CPT | Mod: S$GLB,,, | Performed by: NURSE PRACTITIONER

## 2017-05-22 PROCEDURE — 99499 UNLISTED E&M SERVICE: CPT | Mod: S$GLB,,, | Performed by: NURSE PRACTITIONER

## 2017-05-22 RX ORDER — METHYLPREDNISOLONE ACETATE 40 MG/ML
40 INJECTION, SUSPENSION INTRA-ARTICULAR; INTRALESIONAL; INTRAMUSCULAR; SOFT TISSUE ONCE
Status: COMPLETED | OUTPATIENT
Start: 2017-05-22 | End: 2017-05-22

## 2017-05-22 RX ORDER — TRAMADOL HYDROCHLORIDE 50 MG/1
50 TABLET ORAL EVERY 6 HOURS PRN
Qty: 120 TABLET | Refills: 1 | Status: SHIPPED | OUTPATIENT
Start: 2017-05-22 | End: 2017-07-21

## 2017-05-22 RX ADMIN — METHYLPREDNISOLONE ACETATE 40 MG: 40 INJECTION, SUSPENSION INTRA-ARTICULAR; INTRALESIONAL; INTRAMUSCULAR; SOFT TISSUE at 09:05
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
## 2017-06-19 ENCOUNTER — ANESTHESIA (OUTPATIENT)
Dept: ENDOSCOPY | Facility: HOSPITAL | Age: 57
End: 2017-06-19
Payer: MEDICARE

## 2017-06-19 ENCOUNTER — ANESTHESIA EVENT (OUTPATIENT)
Dept: ENDOSCOPY | Facility: HOSPITAL | Age: 57
End: 2017-06-19
Payer: MEDICARE

## 2017-06-19 ENCOUNTER — INFUSION (OUTPATIENT)
Dept: INFUSION THERAPY | Facility: HOSPITAL | Age: 57
End: 2017-06-19
Attending: INTERNAL MEDICINE
Payer: MEDICARE

## 2017-06-19 ENCOUNTER — SURGERY (OUTPATIENT)
Age: 57
End: 2017-06-19

## 2017-06-19 ENCOUNTER — HOSPITAL ENCOUNTER (OUTPATIENT)
Facility: HOSPITAL | Age: 57
Discharge: HOME | End: 2017-06-19
Attending: INTERNAL MEDICINE | Admitting: INTERNAL MEDICINE
Payer: MEDICARE

## 2017-06-19 VITALS
SYSTOLIC BLOOD PRESSURE: 130 MMHG | DIASTOLIC BLOOD PRESSURE: 92 MMHG | BODY MASS INDEX: 34.86 KG/M2 | HEIGHT: 71 IN | HEART RATE: 77 BPM | OXYGEN SATURATION: 98 % | WEIGHT: 249 LBS | RESPIRATION RATE: 16 BRPM | TEMPERATURE: 98 F

## 2017-06-19 VITALS
WEIGHT: 250 LBS | HEART RATE: 86 BPM | DIASTOLIC BLOOD PRESSURE: 79 MMHG | RESPIRATION RATE: 18 BRPM | BODY MASS INDEX: 35 KG/M2 | TEMPERATURE: 98 F | OXYGEN SATURATION: 96 % | SYSTOLIC BLOOD PRESSURE: 113 MMHG | HEIGHT: 71 IN

## 2017-06-19 VITALS — RESPIRATION RATE: 24 BRPM

## 2017-06-19 DIAGNOSIS — D12.2 ADENOMATOUS POLYP OF ASCENDING COLON: Primary | ICD-10-CM

## 2017-06-19 DIAGNOSIS — K92.1 HEMATOCHEZIA: ICD-10-CM

## 2017-06-19 DIAGNOSIS — D68.00 VON WILLEBRAND DISEASE: Primary | ICD-10-CM

## 2017-06-19 PROCEDURE — 88305 TISSUE EXAM BY PATHOLOGIST: CPT | Mod: 26,,, | Performed by: PATHOLOGY

## 2017-06-19 PROCEDURE — 27201012 HC FORCEPS, HOT/COLD, DISP: Performed by: INTERNAL MEDICINE

## 2017-06-19 PROCEDURE — 27201089 HC SNARE, DISP (ANY): Performed by: INTERNAL MEDICINE

## 2017-06-19 PROCEDURE — 45380 COLONOSCOPY AND BIOPSY: CPT | Performed by: INTERNAL MEDICINE

## 2017-06-19 PROCEDURE — 25000003 PHARM REV CODE 250: Performed by: INTERNAL MEDICINE

## 2017-06-19 PROCEDURE — 25000003 PHARM REV CODE 250: Performed by: NURSE ANESTHETIST, CERTIFIED REGISTERED

## 2017-06-19 PROCEDURE — D9220A PRA ANESTHESIA: Mod: ANES,,, | Performed by: ANESTHESIOLOGY

## 2017-06-19 PROCEDURE — 37000009 HC ANESTHESIA EA ADD 15 MINS: Performed by: INTERNAL MEDICINE

## 2017-06-19 PROCEDURE — 63600175 PHARM REV CODE 636 W HCPCS: Performed by: NURSE ANESTHETIST, CERTIFIED REGISTERED

## 2017-06-19 PROCEDURE — 88305 TISSUE EXAM BY PATHOLOGIST: CPT | Performed by: PATHOLOGY

## 2017-06-19 PROCEDURE — 37000008 HC ANESTHESIA 1ST 15 MINUTES: Performed by: INTERNAL MEDICINE

## 2017-06-19 PROCEDURE — 43251 EGD REMOVE LESION SNARE: CPT | Mod: 51,,, | Performed by: INTERNAL MEDICINE

## 2017-06-19 PROCEDURE — 45380 COLONOSCOPY AND BIOPSY: CPT | Mod: PT,,, | Performed by: INTERNAL MEDICINE

## 2017-06-19 PROCEDURE — 43251 EGD REMOVE LESION SNARE: CPT | Performed by: INTERNAL MEDICINE

## 2017-06-19 PROCEDURE — D9220A PRA ANESTHESIA: Mod: CRNA,,, | Performed by: NURSE ANESTHETIST, CERTIFIED REGISTERED

## 2017-06-19 RX ORDER — SODIUM CHLORIDE 0.9 % (FLUSH) 0.9 %
10 SYRINGE (ML) INJECTION
Status: DISCONTINUED | OUTPATIENT
Start: 2017-06-19 | End: 2017-06-19 | Stop reason: HOSPADM

## 2017-06-19 RX ORDER — LIDOCAINE HCL/PF 100 MG/5ML
SYRINGE (ML) INTRAVENOUS
Status: DISCONTINUED | OUTPATIENT
Start: 2017-06-19 | End: 2017-06-19

## 2017-06-19 RX ORDER — PROPOFOL 10 MG/ML
VIAL (ML) INTRAVENOUS
Status: DISCONTINUED | OUTPATIENT
Start: 2017-06-19 | End: 2017-06-19

## 2017-06-19 RX ORDER — PROPOFOL 10 MG/ML
VIAL (ML) INTRAVENOUS CONTINUOUS PRN
Status: DISCONTINUED | OUTPATIENT
Start: 2017-06-19 | End: 2017-06-19

## 2017-06-19 RX ORDER — GLYCOPYRROLATE 0.2 MG/ML
INJECTION INTRAMUSCULAR; INTRAVENOUS
Status: DISCONTINUED | OUTPATIENT
Start: 2017-06-19 | End: 2017-06-19

## 2017-06-19 RX ORDER — SODIUM CHLORIDE 9 MG/ML
INJECTION, SOLUTION INTRAVENOUS CONTINUOUS
Status: DISCONTINUED | OUTPATIENT
Start: 2017-06-19 | End: 2017-06-19 | Stop reason: HOSPADM

## 2017-06-19 RX ORDER — KETAMINE HCL IN 0.9 % NACL 50 MG/5 ML
SYRINGE (ML) INTRAVENOUS
Status: DISCONTINUED | OUTPATIENT
Start: 2017-06-19 | End: 2017-06-19

## 2017-06-19 RX ORDER — HEPARIN 100 UNIT/ML
500 SYRINGE INTRAVENOUS
Status: DISCONTINUED | OUTPATIENT
Start: 2017-06-19 | End: 2017-06-19 | Stop reason: HOSPADM

## 2017-06-19 RX ADMIN — SODIUM CHLORIDE: 0.9 INJECTION, SOLUTION INTRAVENOUS at 01:06

## 2017-06-19 RX ADMIN — SODIUM CHLORIDE, PRESERVATIVE FREE 10 ML: 5 INJECTION INTRAVENOUS at 11:06

## 2017-06-19 RX ADMIN — LIDOCAINE HYDROCHLORIDE 100 MG: 20 INJECTION, SOLUTION INTRAVENOUS at 01:06

## 2017-06-19 RX ADMIN — GLYCOPYRROLATE 0.2 MG: 0.2 INJECTION, SOLUTION INTRAMUSCULAR; INTRAVENOUS at 01:06

## 2017-06-19 RX ADMIN — Medication 50 MG: at 01:06

## 2017-06-19 RX ADMIN — SODIUM CHLORIDE: 0.9 INJECTION, SOLUTION INTRAVENOUS at 12:06

## 2017-06-19 RX ADMIN — SODIUM CHLORIDE 20 MCG: 9 INJECTION, SOLUTION INTRAVENOUS at 11:06

## 2017-06-19 RX ADMIN — Medication 30 MG: at 01:06

## 2017-06-19 RX ADMIN — PROPOFOL 200 MCG/KG/MIN: 10 INJECTION, EMULSION INTRAVENOUS at 01:06

## 2017-06-19 RX ADMIN — PROPOFOL 80 MG: 10 INJECTION, EMULSION INTRAVENOUS at 01:06

## 2017-06-19 NOTE — PATIENT INSTRUCTIONS
Discharge Summary/Instructions after an Endoscopic Procedure  Patient Name: Bri Santiago  Patient MRN: [id number]  Patient YOB: 1960 Monday, June 19, 2017  Rosendo Boyer MD  RESTRICTIONS ON ACTIVITY:  - DO NOT drive a car, operate machinery, make legal/financial decisions, or   drink alcohol until the day after the procedure.    - The following day: return to full activity including work, except no heavy   lifting, straining or running for 3 days if polyps were removed.  - Diet: Eat and drink normally unless instructed otherwise.  TREATMENT FOR COMMON SIDE EFFECTS:  - Mild abdominal pain, bloating or excessive gas: rest, eat lightly and use   a heating pad.  - Sore Throat - treat with throat lozenges. Gargle with warm salt water.  SYMPTOMS TO WATCH FOR AND REPORT TO YOUR PHYSICIAN:  1. Severe abdominal pain or bloating.  2. Pain in chest.  3. Chills or fever occurring within 24 hours after a procedure.  4. A large amount of rectal bleeding, which would show as bright red,   maroon, or black stools. (A small amount of blood from the rectum is not   serious, especially if hemorrhoids are present.)  5. Because air was used during the procedure, expelling large amounts of air   from your rectum or belching is normal.  6. If a bowel prep was taken, you may not have a bowel movement for 1-3   days.  This is normal.  7. Go directly to the emergency room if you notice any of the following:   Chills and/or fever over 101 F   Persistent vomiting   Severe abdominal pain, other than gas cramps   Severe chest pain   Black, tarry stools   Any bleeding - exceeding one tablespoon  Your doctor recommends these additional instructions:  If any biopsies were performed, my office will call you in 5 to 6 business   days with any results.  You are being discharged to home.   Follow an antireflux regimen indefinitely.  This includes:       - Do not lie down for at least 3 to 4 hours after meals.        - Raise the head of  the bed 4 to 6 inches.        - Decrease excess weight.        - Avoid citrus juices and other acidic foods, alcohol, chocolate, mints,   coffee and other caffeinated beverages, carbonated beverages, fatty and   fried foods.        - Avoid tight-fitting clothing.        - Avoid cigarettes and other tobacco products.   We are waiting for your pathology results.   Telephone your endoscopist for pathology results in two weeks.   Your physician has recommended a repeat upper endoscopy in three years for   surveillance based on pathology results.   The findings and recommendations have been discussed with you.   Return to your GI clinic.  For questions, problems or results please call your physician - Rosendo Boyer MD at Work:  [phone].  [facility], EMERGENCY ROOM PHONE NUMBER: [phone]  IF A COMPLICATION OR EMERGENCY SITUATION ARISES AND YOU ARE UNABLE TO REACH   YOUR PHYSICIAN - GO TO THE EMERGENCY ROOM.  Rosendo Boyer MD  6/19/2017 1:34:59 PM  This report has been verified and signed electronically.

## 2017-06-19 NOTE — TRANSFER OF CARE
"Anesthesia Transfer of Care Note    Patient: Bri Santiago    Procedure(s) Performed: Procedure(s) (LRB):  ESOPHAGOGASTRODUODENOSCOPY (EGD) (N/A)  COLONOSCOPY (N/A)    Patient location: PACU    Anesthesia Type: general    Transport from OR: Transported from OR on room air with adequate spontaneous ventilation    Post pain: adequate analgesia    Post assessment: no apparent anesthetic complications    Post vital signs: stable    Level of consciousness: awake and alert    Nausea/Vomiting: no nausea/vomiting    Complications: none    Transfer of care protocol was followed      Last vitals:   Visit Vitals  /79 (BP Location: Left arm, Patient Position: Lying, BP Method: Automatic)   Pulse 94   Temp 36.7 °C (98.1 °F) (Oral)   Resp 18   Ht 5' 11" (1.803 m)   Wt 112.9 kg (249 lb)   SpO2 95%   BMI 34.73 kg/m²     "

## 2017-06-19 NOTE — H&P
[facility]  History & Physical    Subjective:      Chief Complaint/Reason for Admission:    EGD for GERD and colonoscopy for history of colon polyps and family history of CRC.    Bri Santiago is a 56 y.o. male.    Past Medical History:   Diagnosis Date    Acute pancreatitis     Anal fissure     Anemia     Arthritis     Asthma in remission     Back pain     BPH (benign prostatic hypertrophy)     Cancer 2000    prostate- treated at [facility] with chemo- in remission since 2000    Clotting disorder     Dysphagia 10/7/2014    Family history of colon cancer     GERD (gastroesophageal reflux disease)     H. pylori infection 10/8/2014    Helicobacter pylori (H. pylori) infection     Chronic    History of chronic pancreatitis     HTN (hypertension)     Lumbago 11/12/2012    Obesity     ABDON (obstructive sleep apnea)     Pneumonia     during childhood     Prostate cancer 2000    dx and treated at [facility], had chemotherapy, in remission [id number]    Sacroiliac joint pain 2/10/2015    Spinal stenosis of lumbar region     Trouble in sleeping     Vitamin D deficiency disease     VWD (acquired von Willebrand's disease)      Past Surgical History:   Procedure Laterality Date    anal fissure repair      x2    CARPAL TUNNEL RELEASE  2003    left hand    CERVICAL DISCECTOMY  2003    COLONOSCOPY N/A 10/7/2015    Procedure: COLONOSCOPY;  Surgeon: Rosendo Boyer MD;  Location: [facility] ([address] [place] [address]);  Service: Endoscopy;  Laterality: N/A;  PM Prep    LUMBAR FUSION  2012    SPINE SURGERY      TONSILLECTOMY      at age 24    VASECTOMY  1996     Family History   Problem Relation Age of Onset    Colon cancer Father 67     colon cancer    Hypertension Father     Glaucoma Father     Colon cancer Paternal Grandfather 65          Coronary artery disease Mother 45    Hypertension Mother     Heart disease Mother     No Known Problems Brother     No Known Problems Sister      No Known Problems Daughter     No Known Problems Son     No Known Problems Brother     No Known Problems Daughter     No Known Problems Daughter     No Known Problems Son     No Known Problems Son     Colon cancer Paternal Uncle 65    Diabetes Mellitus Paternal Grandmother      Social History   Substance Use Topics    Smoking status: Never Smoker    Smokeless tobacco: Never Used    Alcohol use No       PTA Medications   Medication Sig    acetaminophen (TYLENOL) 500 MG tablet Take 2 tablets (1,000 mg total) by mouth every 8 (eight) hours as needed.    albuterol (PROVENTIL HFA) 90 mcg/actuation inhaler Inhale 2 puffs into the lungs every 6 (six) hours as needed.    atorvastatin (LIPITOR) 20 MG tablet Take 1 tablet (20 mg total) by mouth once daily.    azelastine (ASTELIN) 137 mcg (0.1 %) nasal spray 1 spray (137 mcg total) by Nasal route 2 (two) times daily.    azelastine-fluticasone 137-50 mcg/spray Spry nassal spray 1 spray by Each Nare route 2 (two) times daily.    calcium citrate-vitamin D3 315-200 mg (CITRACAL+D) 315-200 mg-unit per tablet Take 1 tablet by mouth once daily.    chlorthalidone (HYGROTEN) 25 MG Tab Take 1 tablet (25 mg total) by mouth once daily.    cyanocobalamin, vitamin B-12, (VITAMIN B-12) 50 mcg tablet Take 50 mcg by mouth once daily.    docusate sodium (COLACE) 100 MG capsule Take 1 tablet by mouth Twice daily. 1 Capsule Oral Twice a day .  Take with pain medicine    esomeprazole (NEXIUM) 40 MG capsule Take 1 capsule (40 mg total) by mouth every 12 (twelve) hours.    tramadol (ULTRAM) 50 mg tablet Take 1 tablet (50 mg total) by mouth every 6 (six) hours as needed for Pain.    VITAMIN D2 50,000 unit capsule TK 1 C PO Q 7 DAYS    zolpidem (AMBIEN) 10 mg Tab Take 1 tablet (10 mg total) by mouth nightly as needed.    fluticasone-salmeterol 250-50 mcg/dose (ADVAIR DISKUS) 250-50 mcg/dose diskus inhaler Inhale 1 puff into the lungs 2 (two) times daily.    sildenafil  (VIAGRA) 100 MG tablet Take 1 tablet (100 mg total) by mouth daily as needed for Erectile Dysfunction.     Review of patient's allergies indicates:   Allergen Reactions    Ace inhibitors     Aspirin      Other reaction(s): Hives    Codeine     Dilaudid  [hydromorphone]      Other reaction(s): Itching    Penicillins Hives and Swelling     Has had allergy testing and can prob tolerate penicillin        Review of Systems   Constitutional: Negative for chills, fever and weight loss.   Respiratory: Negative for shortness of breath and wheezing.    Cardiovascular: Negative for chest pain.   Gastrointestinal: Positive for heartburn. Negative for abdominal pain, blood in stool, constipation, diarrhea and melena.       Objective:      Vital Signs (Most Recent)  Temp: 98.1 °F (36.7 °C) (06/19/17 1224)  Pulse: 94 (06/19/17 1224)  Resp: 18 (06/19/17 1224)  BP: 127/79 (06/19/17 1224)  SpO2: 95 % (06/19/17 1224)    Vital Signs Range (Last 24H):  Temp:  [98.1 °F (36.7 °C)-98.3 °F (36.8 °C)]   Pulse:  [86-94]   Resp:  [15-51]   BP: (113-127)/(79)   SpO2:  [95 %-96 %]     Physical Exam   Constitutional: He is oriented to person, place, and time. He appears well-developed and well-nourished.   Cardiovascular: Normal rate.    Pulmonary/Chest: Effort normal.   Abdominal: Soft.   Neurological: He is alert and oriented to person, place, and time.   Skin: Skin is warm and dry.   Psychiatric: He has a normal mood and affect. His behavior is normal. Judgment and thought content normal.          Assessment:      Active Hospital Problems    Diagnosis  POA    Hematochezia [K92.1]  Yes      Resolved Hospital Problems    Diagnosis Date Resolved POA   No resolved problems to display.       Plan:    EGD for GERD and colonoscopy for history of colon polyps and family history of CRC.

## 2017-06-19 NOTE — ANESTHESIA PREPROCEDURE EVALUATION
06/19/2017  Bri Santiago is a 56 y.o., male.    Anesthesia Evaluation         Review of Systems  Anesthesia Hx:  No problems with previous Anesthesia   Social:  No Alcohol Use, Non-Smoker    Hematology/Oncology:        Hematology Comments: Von Willebrand disease --  Cancer in past history:  Oncology Comments: prostate     Cardiovascular:   Hypertension    Pulmonary:   Sleep Apnea    Renal/:   BPH Prostate CA   Hepatic/GI:   GERD    Musculoskeletal:  Spine Disorders: lumbar Degenerative disease, Disc disease and Chronic Pain        Physical Exam  General:  Obesity    Airway/Jaw/Neck:  Airway Findings: Mouth Opening: Normal Tongue: Normal  General Airway Assessment: Adult  Mallampati: II  TM Distance: Normal, at least 6 cm            Mental Status:  Mental Status Findings:  Alert and Oriented, Cooperative         Anesthesia Plan  Type of Anesthesia, risks & benefits discussed:  Anesthesia Type:  general  Patient's Preference:   Intra-op Monitoring Plan: standard ASA monitors  Intra-op Monitoring Plan Comments:   Post Op Pain Control Plan:   Post Op Pain Control Plan Comments:   Induction:   IV  Beta Blocker:  Patient is not currently on a Beta-Blocker (No further documentation required).       Informed Consent: Patient understands risks and agrees with Anesthesia plan.  Questions answered. Anesthesia consent signed with patient.  ASA Score: 2     Day of Surgery Review of History & Physical:    H&P update referred to the provider.         Ready For Surgery From Anesthesia Perspective.

## 2017-06-19 NOTE — ANESTHESIA POSTPROCEDURE EVALUATION
"Anesthesia Post Evaluation    Patient: Bri Santiago    Procedure(s) Performed: Procedure(s) (LRB):  ESOPHAGOGASTRODUODENOSCOPY (EGD) (N/A)  COLONOSCOPY (N/A)    Final Anesthesia Type: general  Patient location during evaluation: GI PACU  Patient participation: Yes- Able to Participate  Level of consciousness: awake and alert  Post-procedure vital signs: reviewed and stable  Pain management: adequate  Airway patency: patent  PONV status at discharge: No PONV  Anesthetic complications: no      Cardiovascular status: blood pressure returned to baseline  Respiratory status: unassisted  Hydration status: euvolemic  Follow-up not needed.        Visit Vitals  BP (!) 130/92 (Patient Position: Lying, BP Method: Automatic)   Pulse 77   Temp 36.7 °C (98 °F) (Oral)   Resp 16   Ht 5' 11" (1.803 m)   Wt 112.9 kg (249 lb)   SpO2 98%   BMI 34.73 kg/m²       Pain/Tammy Score: Pain Assessment Performed: Yes (6/19/2017  2:29 PM)  Presence of Pain: denies (6/19/2017  2:29 PM)  Tammy Score: 10 (6/19/2017  2:29 PM)      "

## 2017-06-19 NOTE — PATIENT INSTRUCTIONS
Discharge Summary/Instructions after an Endoscopic Procedure  Patient Name: Bri Santiago  Patient MRN: [id number]  Patient YOB: 1960 Monday, June 19, 2017  Rosendo Boyer MD  RESTRICTIONS ON ACTIVITY:  - DO NOT drive a car, operate machinery, make legal/financial decisions, or   drink alcohol until the day after the procedure.    - The following day: return to full activity including work, except no heavy   lifting, straining or running for 3 days if polyps were removed.  - Diet: Eat and drink normally unless instructed otherwise.  TREATMENT FOR COMMON SIDE EFFECTS:  - Mild abdominal pain, bloating or excessive gas: rest, eat lightly and use   a heating pad.  - Sore Throat - treat with throat lozenges. Gargle with warm salt water.  SYMPTOMS TO WATCH FOR AND REPORT TO YOUR PHYSICIAN:  1. Severe abdominal pain or bloating.  2. Pain in chest.  3. Chills or fever occurring within 24 hours after a procedure.  4. A large amount of rectal bleeding, which would show as bright red,   maroon, or black stools. (A small amount of blood from the rectum is not   serious, especially if hemorrhoids are present.)  5. Because air was used during the procedure, expelling large amounts of air   from your rectum or belching is normal.  6. If a bowel prep was taken, you may not have a bowel movement for 1-3   days.  This is normal.  7. Go directly to the emergency room if you notice any of the following:   Chills and/or fever over 101 F   Persistent vomiting   Severe abdominal pain, other than gas cramps   Severe chest pain   Black, tarry stools   Any bleeding - exceeding one tablespoon  Your doctor recommends these additional instructions:  If any biopsies were performed, my office will call you in 5 to 6 business   days with any results.  You are being discharged to home.   We are waiting for your pathology results.   Telephone your endoscopist for pathology results in two weeks.   Your physician has recommended a repeat  colonoscopy in two years for   surveillance.   The findings and recommendations have been discussed with you.   Return to your GI clinic.  For questions, problems or results please call your physician - Rosendo Boyer MD at Work:  [phone].  [facility], EMERGENCY ROOM PHONE NUMBER: [phone]  IF A COMPLICATION OR EMERGENCY SITUATION ARISES AND YOU ARE UNABLE TO REACH   YOUR PHYSICIAN - GO TO THE EMERGENCY ROOM.  Rosendo Boyer MD  6/19/2017 2:02:36 PM  This report has been verified and signed electronically.

## 2017-06-22 ENCOUNTER — TELEPHONE (OUTPATIENT)
Dept: GASTROENTEROLOGY | Facility: CLINIC | Age: 57
End: 2017-06-22

## 2017-06-22 NOTE — TELEPHONE ENCOUNTER
----- Message from Rosendo Boyer MD sent at 6/21/2017  5:59 PM CDT -----  Rita - please tell Bri that his EGD and colonoscopy pathology was benign and no dysplasia.  Next surveillance colonoscopy in 2-years.    SPECIMEN  1) Small gastric antrum polyp, 1 mm.  2) Ascending colon 1 mm polyp.    FINAL PATHOLOGIC DIAGNOSIS    1. BIOPSY OF GASTRIC ANTRUM:  MILD NONSPECIFIC CHRONIC INFLAMMATORY AND HYPERPLASTIC CHANGES WITH NO CARCINOMA OR  DYSPLASIA IDENTIFIED (INFLAMMATORY POLYP)    2. BIOPSY OF ASCENDING COLON:  FRAGMENT OF BENIGN NONNEOPLASTIC: MUCOSA WITH NO SIGNIFICANT HISTOLOGIC ALTERATION    Diagnosed by: Brian Fernandes M.D.

## 2017-06-23 DIAGNOSIS — G47.9 SLEEP DISORDER: ICD-10-CM

## 2017-06-23 RX ORDER — ZOLPIDEM TARTRATE 10 MG/1
TABLET ORAL
Qty: 20 TABLET | Refills: 0 | Status: SHIPPED | OUTPATIENT
Start: 2017-06-23

## 2017-06-26 ENCOUNTER — TELEPHONE (OUTPATIENT)
Dept: ENDOSCOPY | Facility: HOSPITAL | Age: 57
End: 2017-06-26
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
## 2017-07-25 ENCOUNTER — OFFICE VISIT (OUTPATIENT)
Dept: PAIN MEDICINE | Facility: CLINIC | Age: 57
End: 2017-07-25
Payer: MEDICARE

## 2017-07-25 VITALS
HEIGHT: 71 IN | WEIGHT: 251.31 LBS | RESPIRATION RATE: 18 BRPM | TEMPERATURE: 98 F | DIASTOLIC BLOOD PRESSURE: 77 MMHG | SYSTOLIC BLOOD PRESSURE: 109 MMHG | BODY MASS INDEX: 35.18 KG/M2 | HEART RATE: 69 BPM

## 2017-07-25 DIAGNOSIS — M48.061 SPINAL STENOSIS, LUMBAR REGION, WITHOUT NEUROGENIC CLAUDICATION: ICD-10-CM

## 2017-07-25 DIAGNOSIS — M96.1 POSTLAMINECTOMY SYNDROME OF LUMBAR REGION: Primary | ICD-10-CM

## 2017-07-25 DIAGNOSIS — M51.37 DEGENERATION OF LUMBAR OR LUMBOSACRAL INTERVERTEBRAL DISC: ICD-10-CM

## 2017-07-25 DIAGNOSIS — M51.36 DDD (DEGENERATIVE DISC DISEASE), LUMBAR: ICD-10-CM

## 2017-07-25 DIAGNOSIS — M79.18 MYOFASCIAL MUSCLE PAIN: ICD-10-CM

## 2017-07-25 DIAGNOSIS — M47.899 FACET SYNDROME: ICD-10-CM

## 2017-07-25 DIAGNOSIS — M47.816 LUMBAR FACET ARTHROPATHY: ICD-10-CM

## 2017-07-25 DIAGNOSIS — M53.3 SACROILIAC JOINT PAIN: ICD-10-CM

## 2017-07-25 DIAGNOSIS — M50.30 DDD (DEGENERATIVE DISC DISEASE), CERVICAL: ICD-10-CM

## 2017-07-25 PROCEDURE — 99214 OFFICE O/P EST MOD 30 MIN: CPT | Mod: S$GLB,,, | Performed by: NURSE PRACTITIONER

## 2017-07-25 PROCEDURE — 99499 UNLISTED E&M SERVICE: CPT | Mod: S$GLB,,, | Performed by: NURSE PRACTITIONER

## 2017-07-25 PROCEDURE — 99999 PR PBB SHADOW E&M-EST. PATIENT-LVL III: CPT | Mod: PBBFAC,,, | Performed by: NURSE PRACTITIONER

## 2017-07-25 RX ORDER — TRAMADOL HYDROCHLORIDE 50 MG/1
50 TABLET ORAL EVERY 6 HOURS PRN
Qty: 120 TABLET | Refills: 2 | Status: SHIPPED | OUTPATIENT
Start: 2017-07-25 | End: 2017-10-25 | Stop reason: SDUPTHER

## 2017-07-25 RX ORDER — CYCLOBENZAPRINE HCL 10 MG
10 TABLET ORAL 3 TIMES DAILY PRN
Qty: 90 TABLET | Refills: 2 | Status: SHIPPED | OUTPATIENT
Start: 2017-07-25 | End: 2017-10-23

## 2017-07-25 RX ORDER — POLYETHYLENE GLYCOL-3350 AND ELECTROLYTES 236; 6.74; 5.86; 2.97; 22.74 G/274.31G; G/274.31G; G/274.31G; G/274.31G; G/274.31G
POWDER, FOR SOLUTION ORAL
Refills: 0 | COMMUNITY
Start: 2017-06-07 | End: 2018-04-03

## 2017-08-17 ENCOUNTER — CLINICAL SUPPORT (OUTPATIENT)
Dept: REHABILITATION | Facility: OTHER | Age: 57
End: 2017-08-17
Attending: NURSE PRACTITIONER
Payer: MEDICARE

## 2017-08-17 DIAGNOSIS — M51.36 DDD (DEGENERATIVE DISC DISEASE), LUMBAR: ICD-10-CM

## 2017-08-17 PROBLEM — M51.369 DDD (DEGENERATIVE DISC DISEASE), LUMBAR: Status: ACTIVE | Noted: 2017-08-17

## 2017-08-17 PROCEDURE — G8979 MOBILITY GOAL STATUS: HCPCS | Mod: CJ

## 2017-08-17 PROCEDURE — 97110 THERAPEUTIC EXERCISES: CPT

## 2017-08-17 PROCEDURE — G8978 MOBILITY CURRENT STATUS: HCPCS | Mod: CK

## 2017-08-17 PROCEDURE — 97162 PT EVAL MOD COMPLEX 30 MIN: CPT

## 2017-08-17 NOTE — PROGRESS NOTES
[facility]AL[facility]ATION     Name: Bri Santiago  Clinic Number: [id number]    Bri is a 56 y.o. male evaluated on 08/17/2017.   Time In: 10:45 (patient late)  Time out: 12:10    Diagnosis:   Encounter Diagnosis   Name Primary?    DDD (degenerative disc disease), lumbar      Physician: Buffy Villagran,*  Treatment Orders: PT Eval and Treat    Past Medical History:   Diagnosis Date    Acute pancreatitis     Anal fissure     Anemia     Arthritis     Asthma in remission     Back pain     BPH (benign prostatic hypertrophy)     Cancer 2000    prostate- treated at [facility] with chemo- in remission since 2000    Clotting disorder     Dysphagia 10/7/2014    Family history of colon cancer     GERD (gastroesophageal reflux disease)     H. pylori infection 10/8/2014    Helicobacter pylori (H. pylori) infection     Chronic    History of chronic pancreatitis     HTN (hypertension)     Lumbago 11/12/2012    Obesity     ABDON (obstructive sleep apnea)     Pneumonia     during childhood     Prostate cancer 2000    dx and treated at [facility], had chemotherapy, in remission [id number]    Sacroiliac joint pain 2/10/2015    Spinal stenosis of lumbar region     Trouble in sleeping     Vitamin D deficiency disease     VWD (acquired von Willebrand's disease)      Current Outpatient Prescriptions   Medication Sig    acetaminophen (TYLENOL) 500 MG tablet Take 2 tablets (1,000 mg total) by mouth every 8 (eight) hours as needed.    albuterol (PROVENTIL HFA) 90 mcg/actuation inhaler Inhale 2 puffs into the lungs every 6 (six) hours as needed.    atorvastatin (LIPITOR) 20 MG tablet Take 1 tablet (20 mg total) by mouth once daily.    azelastine (ASTELIN) 137 mcg (0.1 %) nasal spray 1 spray (137 mcg total) by Nasal route 2 (two) times daily.    azelastine-fluticasone 137-50 mcg/spray Spry nassal spray 1 spray by Each Nare route 2 (two) times daily.    calcium citrate-vitamin  D3 315-200 mg (CITRACAL+D) 315-200 mg-unit per tablet Take 1 tablet by mouth once daily.    chlorthalidone (HYGROTEN) 25 MG Tab Take 1 tablet (25 mg total) by mouth once daily.    cyanocobalamin, vitamin B-12, (VITAMIN B-12) 50 mcg tablet Take 50 mcg by mouth once daily.    cyclobenzaprine (FLEXERIL) 10 MG tablet Take 1 tablet (10 mg total) by mouth 3 (three) times daily as needed for Muscle spasms.    docusate sodium (COLACE) 100 MG capsule Take 1 tablet by mouth Twice daily. 1 Capsule Oral Twice a day .  Take with pain medicine    esomeprazole (NEXIUM) 40 MG capsule Take 1 capsule (40 mg total) by mouth every 12 (twelve) hours.    fluticasone-salmeterol 250-50 mcg/dose (ADVAIR DISKUS) 250-50 mcg/dose diskus inhaler Inhale 1 puff into the lungs 2 (two) times daily.    GAVILYTE-G 236-22.74-6.74 -5.86 gram suspension TK 4000 ML PO ONCE    sildenafil (VIAGRA) 100 MG tablet Take 1 tablet (100 mg total) by mouth daily as needed for Erectile Dysfunction.    tramadol (ULTRAM) 50 mg tablet Take 1 tablet (50 mg total) by mouth every 6 (six) hours as needed for Pain.    VITAMIN D2 50,000 unit capsule TK 1 C PO Q 7 DAYS    zolpidem (AMBIEN) 10 mg Tab TAKE 1 TABLET BY MOUTH EVERY DAY AT BEDTIME     No current facility-administered medications for this visit.      Review of patient's allergies indicates:   Allergen Reactions    Ace inhibitors     Aspirin      Other reaction(s): Hives    Codeine     Dilaudid  [hydromorphone]      Other reaction(s): Itching    Penicillins Hives and Swelling     Has had allergy testing and can prob tolerate penicillin     Precautions: Hx of cancer, HTN, pancreatitis, VWD, LUMBAR FUSION, cervical surgery.      Visit # authorized: 12  Authorization period: 10/16/17  Plan of care Expiration: 11/15/2017    HISTORY   History of Present Illness: Patient is a 57 yo male who presents with back pain.  Patient states that back pain started in 2007.  Patient was moving a pallet for work.  History of lumbar fusion, spine stimulator, RFA, and spinal injections.  History of neck surgery. Pain is low back dominant but will travel down legs.  Patient is active with [employer] and likes to cook.  Pain worse with bending.  Patient states that pain is better with standing and walking.  Patient feels like R leg is weaker than L.     Diagnostic Tests: From EPIC MRI  MRI lumbar spine without contrast.    Comparison: 1/26/10    Technique: Sagittal T1, T2, stir and axial T2 and T1-weighted images were obtained.  No IV contrast was utilized.    Results: Status post lumbar L4 through S1 fusion with pedicular screws and vertical rods.  The alignment of the lumbar spine is normal.  Vertebral body heights are well-maintained.  Vertebral body the disk spacers at L4-L5 and L5-S1.  The conus   medullaris terminates in good position.    L1-L2 demonstrate a mild diffuse disk bulge causing no central canal or foraminal stenosis.    L2-L3 and L3-L4 demonstrate no disk abnormality, no central canal or foraminal narrowing.    L4-5 demonstrates mild diffuse disk bulge, patent canal and foramina   .  The L4 pedicular screws appear intact.    L5-S1 demonstrate a widely patent canal, mild left foramina narrowing.  The left L5 pedicular screw   traverse slightly the anterior cortex of the anterior lateral vertebrae.  Bilateral S1 pedicular screws traverse slightly the anterior cortex of S1.    The bilateral sacroiliac joints appear normal.  Signal abnormalities seen within the surgical bed and consistent with granulation tissue, normal post op finding.  There is also 1.3 x 1.6 cm small pocket of fluid just inferior to the left S1 pedicular   screws, likely a small postop hematoma or seroma.  No strong evidence for abscess. No abnormal enhancement seen within the canal, cord or nerve roots.   Impression         Status post L4 through S1 spinal fusion, no central canal or severe foramina narrowing.  Small amount of fluid in the surgical  bed just inferior to the left pedicular screw posteriorly is not uncommonly seen and likely represents a small seroma or hematoma.     Pain Scale: Bri rates pain on a scale of 0-10 to be 10 at worst; 6 currently; 3 at best using VAS.   Pain location: Low back from shoulder blades to hips.     Aggravating factors: running and bending  Easing Factors: walking, and standing.   Disturbed Sleep: yes    Pattern of pain questions:  1.  Where is your pain the worst? Low back   2.  Is your pain constant or intermittent? Constant   3.  Does bending forward make your typical pain worse? Yes  4.  Since the start of your back pain, has there been a change in your bowel or bladder? Yes (MD aware)  5.  What can't you do now that you use to be able to do? Camping, play ball.     Prior Treatment: History of therapy and back surgeries.   Prior functional status: Independent  DME owned/used: none  Occupation:  Currently on disability   Leisure: Be active and spend time with family.                      Pts goals:  Be able to do all the outdoor activities he used to do.     Red Flag Screening:   Cough  Sneeze  Strain: Occasionally  Bladder/ bowel: Yes (MD aware)  Falls: No  General health: Good  Night pain: No   Unexplained weight loss: No     OBJECTIVE     POSTURE  Posture Alignment :forward head  Postural examination/scapula alignment: Rounded shoulder and Head forward  Joint integrity: Lumbar spine hypomobile and painful as seen through spring testing  Skin integrity: WNL   Edema: Not significant   Sitting: Fair  Standing: Fair  Correction of posture: Added slimline roll, Improved posture in sitting.     MOVEMENT LOSS    ROM Loss   Flexion major loss ERP   Extension major loss ERP   Side bending Right major loss ERP   Side bending Left major loss ERP   Rotation Right major loss ERP   Rotation Left major loss ERP     Lower Extremity Strength  Right LE  Left LE    Hip flexion: 5/5 Hip flexion: 5/5   Hip extension:  5/5 Hip  extension: 5/5   Hip abduction: 5/5 Hip abduction: 5/5   Hip adduction:  5/5 Hip adduction:  5/5   Hip Internal rotation   5/5 Hip Internal rotation 5/5   Knee Flexion 5/5 Knee Flexion 5/5   Knee Extension 5/5 Knee Extension 5/5   Ankle dorsiflexion: 5/5 Ankle dorsiflexion: 5/5   Ankle plantarflexion: 5/5 Ankle plantarflexion: 5/5       GAIT:  Assistive Device used: None  Level of Assistance: Independent  Patient displays the following gait deviations: Decreased stride length.      Special Tests:   Test Name  Test Result   Prone Instability Test (--)   SI Joint Provocation Test (--)   Straight Leg Raise (+)   Neural Tension Test (+)   Crossed Straight Leg Raise (+)   Walking on toes (--)   Walking on heels  (--)     Ely's test - positive B  HYUN - positive B  Hip scour - positive R    NEUROLOGICAL SCREENING     Sensory deficit: Tactile WNL    Reflexes:    Left Right   Patella Tendon 2+ 2+   Achilles Tendon 2+ 2+   Babinski NT NT   Clonus - -     REPEATED TEST MOVEMENTS:  Repeated Flexion in Standing worse   Repeated Extension in Standing worse   Repeated Flexion in lying worse   Repeated Extension in lying  worse       STATIC TESTS   Sitting slouched  worse   Sitting erect better  with lumbar roll   Standing slouched worse   Standing erect  better   Lying prone in extension  worse   Long sitting   worse       Baseline Isometric Testing on Med X equipment: Testing administered by PT    Baseline IM Testing Results:   Date of testin2017  ROM 6-21 deg   Max Peak Torque 37    Min Peak Torque 3    Flex/Ext Ratio 12.33;1   % below normative data -93       FOTO: Focus on Therapeutic Outcomes   Category: lumbar   % Impaired: 50%  Current Score  = CK = at least 40% but < 60% impaired, limited or restricted  Goal at Discharge Score = CJ = at least 20% but < 40% impaired, limited or restricted    Score interpretation is as follows:     TEST SCORE  Modifier  Impairment Limitation Restriction    0/50  CH  0 % impaired,  limited or restricted   1-9/50  CI  @ least 1% but less than 20% impaired, limited or restricted   10-19/50  CJ  @ least 20%<40% impaired, limited or restricted   20-29/50  CK  @ least 40%<60% impaired, limited or restricted   30-39/50  CL  @ least 60% <80% impaired, limited or restricted   40-49/50  CM  @ least 80%<100% impaired limited or restricted   50/50  CN  100% impaired, limited or restricted       Treatment   Time In: 11:30  Time Out: 12:10    PT Evaluation Completed? Yes  Discussed Plan of Care with patient: Yes      Written Home Exercises Provided:   Handouts were given to the patient. Pt demo good understanding of the education provided. Bri demonstrated good return demonstration of activities.     - Patient received education regarding proper posture and body mechanics.    - Lenard roll tried, recommended, and purchase information was provided.    - Patient received a handout regarding anticipated muscular soreness following the isometric test and strategies for management were reviewed with patient including stretching, using ice and scheduled rest.     HEP as follows    Z-lie 10 min, 2x/day   Scapular retraction 10x, 3x/day    Pt was instructed in and performed the following:   Cardiovascular exercise and therapeutic exercise to improve posture, lumbar/cervical ROM, strength, and muscular endurance as follows:     Bri received therapeutic exercises to develop/improve posture, lumbar/cervical ROM, strength and muscular endurance for 30 minutes including the following exercises: med-Wheebox lumbar machine testing    [facility] 8/17/2017   Visit Number 1   VAS Pain Rating 6   Scapular Retraction 10   Lumbar Extension Seat Pad 0   Femur Restraint 5   Top Dead Center 21   Counterweight 415   Lumbar Flexion 21   Lumbar Extension 6   Lumbar Peak Torque 37   Min Torque 3   Percent From Norm -93   Ice - Z Lie (in min.) 10     Assessment   This is a 56 y.o. male referred to [facility] and  presents with a medical diagnosis of   Encounter Diagnosis   Name Primary?    DDD (degenerative disc disease), lumbar     and demonstrates limitations as described below in the problem list. Pt rehab potential is Good. Pt presents with lumbar dysfunction, poor posture, muscle weakness.     Pain Pattern: 1 PEN       Patient tolerated tx poorly.  Patient reported no increases in symptoms with med-x testing but had increases with pain during extension in standing, extension in lying, flexion in standing, flexion in lying, PPT in supine, and LTR.  Patient was given HEP and stated that he felt comfortable performing the exercises at home.  He stated that he was planning on buying a lumbar roll for home. Patient stated that ice in z-lie position felt good.     Patient received education on the [facility] program, purpose of the isometric test, progression of back strengthening as well as wellness approach and systemic strengthening.  Details of the program were discussed.  Reviewed that patient should feel support/pressure from med ex restraints but no pain or discomfort and patient expressed understanding.    Based on the above history and physical examination an active physical therapy program is recommended.  Pt will continue to benefit from skilled outpatient physical therapy to address the deficits listed below in the chart, provide pt/family education and to maximize pt's level of independence in the home and community environment. .     No environmental, cultural, spiritual, developmental or education needs expressed or noted    Medical necessity is demonstrated by the following problem list.    Pt presents with the following impairments:   History  Co-morbidities and personal factors that may impact the plan of care Examination  Body Structures and Functions, activity limitations and participation restrictions that may impact the plan of care Clinical Presentation   Decision Making/ Complexity Score    Co-morbidities:   see PMH        Personal Factors:   no deficits Body Regions:   neck  back  lower extremities  upper extremities    Body Systems:   gross symmetry  ROM  strength  gait  transfers  motor control    Activity limitations:   Learning and applying knowledge  no deficits    General Tasks and Commands  no deficits    Communication  no deficits    Mobility  lifting and carrying objects  walking  no deficits    Self care  no deficits    Domestic Life  shopping  cooking  doing house work (cleaning house, washing dishes, laundry)  assisting others    Interactions/Relationships  no deficits    Life Areas  employment    Community and Social Life  recreation and leisure    Participation Restrictions:   Camping, playing sports     evolving clinical presentation with changing clinical characteristics   moderate         GOALS: Pt is in agreement with the following goals.    Short term goals:  6 weeks or 10 visits   1.  Pt will demonstrate increased lumbar ROM by at least 3 degrees from the initial ROM value with improvements noted in functional ROM and ability to perform ADLs  2.  Pt will demonstrate increased maximum isometric torque value by 5% when compared to the initial value resulting in improved ability to perform bending, lifting, and carrying activities safely, confidently.  3.  Patient report a reduction in worst pain score by 1-2 points for improved tolerance during work and recreational activities  4.  Pt able to perform HEP correctly with minimal cueing or supervision for therapist    Long term goals: 13 weeks or 20 visits   1. Pt will demonstrate increased lumbar ROM by at least 6 degrees from initial ROM value, resulting in improved ability to perform functional fwd bending while standing and sitting.   2. Pt will demonstrate increased maximum isometric torque value by 10% when compared to the initial value resulting in improved ability to perform bending, lifting, and carrying activities safely,  "confidently.  3. Pt to demonstrate ability to independently control and reduce their pain through posture positioning and mechanical movements throughout a typical day.  4.  Patient will demonstrate improved overall function per FOTO Survey to CJ = at least 20% but < 40% impaired, limited or restricted score or less.      Plan   Outpatient physical therapy 2x week for 13 weeks or 20 visits to include the following:   - Patient education  - Therapeutic exercise  - Manual therapy  - Performance testing   - Neuromuscular Re-education  - Therapeutic activity   - Modalities    Pt may be seen by PTA as part of the rehabilitation team.     Therapist: Alec Morin, PT  8/17/2017    "I certify the need for these services furnished under this plan of treatment and while under my care."    ____________________________________  Physician/Referring Practitioner    _______________  Date of Signature          "

## 2017-08-17 NOTE — PLAN OF CARE
[facility]     Name: Bri Santiago  Clinic Number: [id number]    Bri is a 56 y.o. male evaluated on 08/17/2017.   Time In: 10:45 (patient late)  Time out: 12:10    Diagnosis:   Encounter Diagnosis   Name Primary?    DDD (degenerative disc disease), lumbar      Physician: Buffy Villagran,*  Treatment Orders: PT Eval and Treat    Past Medical History:   Diagnosis Date    Acute pancreatitis     Anal fissure     Anemia     Arthritis     Asthma in remission     Back pain     BPH (benign prostatic hypertrophy)     Cancer 2000    prostate- treated at [facility] with chemo- in remission since 2000    Clotting disorder     Dysphagia 10/7/2014    Family history of colon cancer     GERD (gastroesophageal reflux disease)     H. pylori infection 10/8/2014    Helicobacter pylori (H. pylori) infection     Chronic    History of chronic pancreatitis     HTN (hypertension)     Lumbago 11/12/2012    Obesity     ABDON (obstructive sleep apnea)     Pneumonia     during childhood     Prostate cancer 2000    dx and treated at [facility], had chemotherapy, in remission [id number]    Sacroiliac joint pain 2/10/2015    Spinal stenosis of lumbar region     Trouble in sleeping     Vitamin D deficiency disease     VWD (acquired von Willebrand's disease)      Current Outpatient Prescriptions   Medication Sig    acetaminophen (TYLENOL) 500 MG tablet Take 2 tablets (1,000 mg total) by mouth every 8 (eight) hours as needed.    albuterol (PROVENTIL HFA) 90 mcg/actuation inhaler Inhale 2 puffs into the lungs every 6 (six) hours as needed.    atorvastatin (LIPITOR) 20 MG tablet Take 1 tablet (20 mg total) by mouth once daily.    azelastine (ASTELIN) 137 mcg (0.1 %) nasal spray 1 spray (137 mcg total) by Nasal route 2 (two) times daily.    azelastine-fluticasone 137-50 mcg/spray Spry nassal spray 1 spray by Each Nare route 2 (two) times daily.    calcium citrate-vitamin  D3 315-200 mg (CITRACAL+D) 315-200 mg-unit per tablet Take 1 tablet by mouth once daily.    chlorthalidone (HYGROTEN) 25 MG Tab Take 1 tablet (25 mg total) by mouth once daily.    cyanocobalamin, vitamin B-12, (VITAMIN B-12) 50 mcg tablet Take 50 mcg by mouth once daily.    cyclobenzaprine (FLEXERIL) 10 MG tablet Take 1 tablet (10 mg total) by mouth 3 (three) times daily as needed for Muscle spasms.    docusate sodium (COLACE) 100 MG capsule Take 1 tablet by mouth Twice daily. 1 Capsule Oral Twice a day .  Take with pain medicine    esomeprazole (NEXIUM) 40 MG capsule Take 1 capsule (40 mg total) by mouth every 12 (twelve) hours.    fluticasone-salmeterol 250-50 mcg/dose (ADVAIR DISKUS) 250-50 mcg/dose diskus inhaler Inhale 1 puff into the lungs 2 (two) times daily.    GAVILYTE-G 236-22.74-6.74 -5.86 gram suspension TK 4000 ML PO ONCE    sildenafil (VIAGRA) 100 MG tablet Take 1 tablet (100 mg total) by mouth daily as needed for Erectile Dysfunction.    tramadol (ULTRAM) 50 mg tablet Take 1 tablet (50 mg total) by mouth every 6 (six) hours as needed for Pain.    VITAMIN D2 50,000 unit capsule TK 1 C PO Q 7 DAYS    zolpidem (AMBIEN) 10 mg Tab TAKE 1 TABLET BY MOUTH EVERY DAY AT BEDTIME     No current facility-administered medications for this visit.      Review of patient's allergies indicates:   Allergen Reactions    Ace inhibitors     Aspirin      Other reaction(s): Hives    Codeine     Dilaudid  [hydromorphone]      Other reaction(s): Itching    Penicillins Hives and Swelling     Has had allergy testing and can prob tolerate penicillin     Precautions: Hx of cancer, HTN, pancreatitis, VWD, LUMBAR FUSION, cervical surgery.      Visit # authorized: 12  Authorization period: 10/16/17  Plan of care Expiration: 11/15/2017    HISTORY   History of Present Illness: Patient is a 57 yo male who presents with back pain.  Patient states that back pain started in 2007.  Patient was moving a pallet for work.  History of lumbar fusion, spine stimulator, RFA, and spinal injections.  History of neck surgery. Pain is low back dominant but will travel down legs.  Patient is active with [employer] and likes to cook.  Pain worse with bending.  Patient states that pain is better with standing and walking.  Patient feels like R leg is weaker than L.     Diagnostic Tests: From EPIC MRI  MRI lumbar spine without contrast.    Comparison: 1/26/10    Technique: Sagittal T1, T2, stir and axial T2 and T1-weighted images were obtained.  No IV contrast was utilized.    Results: Status post lumbar L4 through S1 fusion with pedicular screws and vertical rods.  The alignment of the lumbar spine is normal.  Vertebral body heights are well-maintained.  Vertebral body the disk spacers at L4-L5 and L5-S1.  The conus   medullaris terminates in good position.    L1-L2 demonstrate a mild diffuse disk bulge causing no central canal or foraminal stenosis.    L2-L3 and L3-L4 demonstrate no disk abnormality, no central canal or foraminal narrowing.    L4-5 demonstrates mild diffuse disk bulge, patent canal and foramina   .  The L4 pedicular screws appear intact.    L5-S1 demonstrate a widely patent canal, mild left foramina narrowing.  The left L5 pedicular screw   traverse slightly the anterior cortex of the anterior lateral vertebrae.  Bilateral S1 pedicular screws traverse slightly the anterior cortex of S1.    The bilateral sacroiliac joints appear normal.  Signal abnormalities seen within the surgical bed and consistent with granulation tissue, normal post op finding.  There is also 1.3 x 1.6 cm small pocket of fluid just inferior to the left S1 pedicular   screws, likely a small postop hematoma or seroma.  No strong evidence for abscess. No abnormal enhancement seen within the canal, cord or nerve roots.   Impression         Status post L4 through S1 spinal fusion, no central canal or severe foramina narrowing.  Small amount of fluid in the surgical  bed just inferior to the left pedicular screw posteriorly is not uncommonly seen and likely represents a small seroma or hematoma.     Pain Scale: Bri rates pain on a scale of 0-10 to be 10 at worst; 6 currently; 3 at best using VAS.   Pain location: Low back from shoulder blades to hips.     Aggravating factors: running and bending  Easing Factors: walking, and standing.   Disturbed Sleep: yes    Pattern of pain questions:  1.  Where is your pain the worst? Low back   2.  Is your pain constant or intermittent? Constant   3.  Does bending forward make your typical pain worse? Yes  4.  Since the start of your back pain, has there been a change in your bowel or bladder? Yes (MD aware)  5.  What can't you do now that you use to be able to do? Camping, play ball.     Prior Treatment: History of therapy and back surgeries.   Prior functional status: Independent  DME owned/used: none  Occupation:  Currently on disability   Leisure: Be active and spend time with family.                      Pts goals:  Be able to do all the outdoor activities he used to do.     Red Flag Screening:   Cough  Sneeze  Strain: Occasionally  Bladder/ bowel: Yes (MD aware)  Falls: No  General health: Good  Night pain: No   Unexplained weight loss: No     OBJECTIVE     POSTURE  Posture Alignment :forward head  Postural examination/scapula alignment: Rounded shoulder and Head forward  Joint integrity: Lumbar spine hypomobile and painful as seen through spring testing  Skin integrity: WNL   Edema: Not significant   Sitting: Fair  Standing: Fair  Correction of posture: Added slimline roll, Improved posture in sitting.     MOVEMENT LOSS    ROM Loss   Flexion major loss ERP   Extension major loss ERP   Side bending Right major loss ERP   Side bending Left major loss ERP   Rotation Right major loss ERP   Rotation Left major loss ERP     Lower Extremity Strength  Right LE  Left LE    Hip flexion: 5/5 Hip flexion: 5/5   Hip extension:  5/5 Hip  extension: 5/5   Hip abduction: 5/5 Hip abduction: 5/5   Hip adduction:  5/5 Hip adduction:  5/5   Hip Internal rotation   5/5 Hip Internal rotation 5/5   Knee Flexion 5/5 Knee Flexion 5/5   Knee Extension 5/5 Knee Extension 5/5   Ankle dorsiflexion: 5/5 Ankle dorsiflexion: 5/5   Ankle plantarflexion: 5/5 Ankle plantarflexion: 5/5       GAIT:  Assistive Device used: None  Level of Assistance: Independent  Patient displays the following gait deviations: Decreased stride length.      Special Tests:   Test Name  Test Result   Prone Instability Test (--)   SI Joint Provocation Test (--)   Straight Leg Raise (+)   Neural Tension Test (+)   Crossed Straight Leg Raise (+)   Walking on toes (--)   Walking on heels  (--)     Ely's test - positive B  HYUN - positive B  Hip scour - positive R    NEUROLOGICAL SCREENING     Sensory deficit: Tactile WNL    Reflexes:    Left Right   Patella Tendon 2+ 2+   Achilles Tendon 2+ 2+   Babinski NT NT   Clonus - -     REPEATED TEST MOVEMENTS:  Repeated Flexion in Standing worse   Repeated Extension in Standing worse   Repeated Flexion in lying worse   Repeated Extension in lying  worse       STATIC TESTS   Sitting slouched  worse   Sitting erect better  with lumbar roll   Standing slouched worse   Standing erect  better   Lying prone in extension  worse   Long sitting   worse       Baseline Isometric Testing on Med X equipment: Testing administered by PT    Baseline IM Testing Results:   Date of testin2017  ROM 6-21 deg   Max Peak Torque 37    Min Peak Torque 3    Flex/Ext Ratio 12.33;1   % below normative data -93       FOTO: Focus on Therapeutic Outcomes   Category: lumbar   % Impaired: 50%  Current Score  = CK = at least 40% but < 60% impaired, limited or restricted  Goal at Discharge Score = CJ = at least 20% but < 40% impaired, limited or restricted    Score interpretation is as follows:     TEST SCORE  Modifier  Impairment Limitation Restriction    0/50  CH  0 % impaired,  limited or restricted   1-9/50  CI  @ least 1% but less than 20% impaired, limited or restricted   10-19/50  CJ  @ least 20%<40% impaired, limited or restricted   20-29/50  CK  @ least 40%<60% impaired, limited or restricted   30-39/50  CL  @ least 60% <80% impaired, limited or restricted   40-49/50  CM  @ least 80%<100% impaired limited or restricted   50/50  CN  100% impaired, limited or restricted       Treatment   Time In: 11:30  Time Out: 12:10    PT Evaluation Completed? Yes  Discussed Plan of Care with patient: Yes      Written Home Exercises Provided:   Handouts were given to the patient. Pt demo good understanding of the education provided. Bri demonstrated good return demonstration of activities.     - Patient received education regarding proper posture and body mechanics.    - Lenard roll tried, recommended, and purchase information was provided.    - Patient received a handout regarding anticipated muscular soreness following the isometric test and strategies for management were reviewed with patient including stretching, using ice and scheduled rest.     HEP as follows    Z-lie 10 min, 2x/day   Scapular retraction 10x, 3x/day    Pt was instructed in and performed the following:   Cardiovascular exercise and therapeutic exercise to improve posture, lumbar/cervical ROM, strength, and muscular endurance as follows:     Bri received therapeutic exercises to develop/improve posture, lumbar/cervical ROM, strength and muscular endurance for 30 minutes including the following exercises: med-Kareo lumbar machine testing    [facility] 8/17/2017   Visit Number 1   VAS Pain Rating 6   Scapular Retraction 10   Lumbar Extension Seat Pad 0   Femur Restraint 5   Top Dead Center 21   Counterweight 415   Lumbar Flexion 21   Lumbar Extension 6   Lumbar Peak Torque 37   Min Torque 3   Percent From Norm -93   Ice - Z Lie (in min.) 10     Assessment   This is a 56 y.o. male referred to [facility] and  presents with a medical diagnosis of   Encounter Diagnosis   Name Primary?    DDD (degenerative disc disease), lumbar     and demonstrates limitations as described below in the problem list. Pt rehab potential is Good. Pt presents with lumbar dysfunction, poor posture, muscle weakness.     Pain Pattern: 1 PEN       Patient tolerated tx poorly.  Patient reported no increases in symptoms with med-x testing but had increases with pain during extension in standing, extension in lying, flexion in standing, flexion in lying, PPT in supine, and LTR.  Patient was given HEP and stated that he felt comfortable performing the exercises at home.  He stated that he was planning on buying a lumbar roll for home. Patient stated that ice in z-lie position felt good.     Patient received education on the [facility] program, purpose of the isometric test, progression of back strengthening as well as wellness approach and systemic strengthening.  Details of the program were discussed.  Reviewed that patient should feel support/pressure from med ex restraints but no pain or discomfort and patient expressed understanding.    Based on the above history and physical examination an active physical therapy program is recommended.  Pt will continue to benefit from skilled outpatient physical therapy to address the deficits listed below in the chart, provide pt/family education and to maximize pt's level of independence in the home and community environment. .     No environmental, cultural, spiritual, developmental or education needs expressed or noted    Medical necessity is demonstrated by the following problem list.    Pt presents with the following impairments:   History  Co-morbidities and personal factors that may impact the plan of care Examination  Body Structures and Functions, activity limitations and participation restrictions that may impact the plan of care Clinical Presentation   Decision Making/ Complexity Score    Co-morbidities:   see PMH        Personal Factors:   no deficits Body Regions:   neck  back  lower extremities  upper extremities    Body Systems:   gross symmetry  ROM  strength  gait  transfers  motor control    Activity limitations:   Learning and applying knowledge  no deficits    General Tasks and Commands  no deficits    Communication  no deficits    Mobility  lifting and carrying objects  walking  no deficits    Self care  no deficits    Domestic Life  shopping  cooking  doing house work (cleaning house, washing dishes, laundry)  assisting others    Interactions/Relationships  no deficits    Life Areas  employment    Community and Social Life  recreation and leisure    Participation Restrictions:   Camping, playing sports     evolving clinical presentation with changing clinical characteristics   moderate         GOALS: Pt is in agreement with the following goals.    Short term goals:  6 weeks or 10 visits   1.  Pt will demonstrate increased lumbar ROM by at least 3 degrees from the initial ROM value with improvements noted in functional ROM and ability to perform ADLs  2.  Pt will demonstrate increased maximum isometric torque value by 5% when compared to the initial value resulting in improved ability to perform bending, lifting, and carrying activities safely, confidently.  3.  Patient report a reduction in worst pain score by 1-2 points for improved tolerance during work and recreational activities  4.  Pt able to perform HEP correctly with minimal cueing or supervision for therapist    Long term goals: 13 weeks or 20 visits   1. Pt will demonstrate increased lumbar ROM by at least 6 degrees from initial ROM value, resulting in improved ability to perform functional fwd bending while standing and sitting.   2. Pt will demonstrate increased maximum isometric torque value by 10% when compared to the initial value resulting in improved ability to perform bending, lifting, and carrying activities safely,  "confidently.  3. Pt to demonstrate ability to independently control and reduce their pain through posture positioning and mechanical movements throughout a typical day.  4.  Patient will demonstrate improved overall function per FOTO Survey to CJ = at least 20% but < 40% impaired, limited or restricted score or less.      Plan   Outpatient physical therapy 2x week for 13 weeks or 20 visits to include the following:   - Patient education  - Therapeutic exercise  - Manual therapy  - Performance testing   - Neuromuscular Re-education  - Therapeutic activity   - Modalities    Pt may be seen by PTA as part of the rehabilitation team.     Therapist: Alec Morin, PT  8/17/2017    "I certify the need for these services furnished under this plan of treatment and while under my care."    ____________________________________  Physician/Referring Practitioner    _______________  Date of Signature            "

## 2017-08-17 NOTE — ADDENDUM NOTE
Encounter addended by: Alec Morin PT on: 8/17/2017 12:39 PM<BR>    Actions taken: Actions taken from a BestPractice Advisory

## 2017-08-22 ENCOUNTER — CLINICAL SUPPORT (OUTPATIENT)
Dept: REHABILITATION | Facility: OTHER | Age: 57
End: 2017-08-22
Attending: NURSE PRACTITIONER
Payer: MEDICARE

## 2017-08-22 DIAGNOSIS — M51.36 DDD (DEGENERATIVE DISC DISEASE), LUMBAR: Primary | ICD-10-CM

## 2017-08-22 PROCEDURE — 97110 THERAPEUTIC EXERCISES: CPT
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
## 2017-08-24 ENCOUNTER — CLINICAL SUPPORT (OUTPATIENT)
Dept: REHABILITATION | Facility: OTHER | Age: 57
End: 2017-08-24
Attending: NURSE PRACTITIONER
Payer: MEDICARE

## 2017-08-24 DIAGNOSIS — M51.36 DDD (DEGENERATIVE DISC DISEASE), LUMBAR: Primary | ICD-10-CM

## 2017-08-24 PROCEDURE — 97110 THERAPEUTIC EXERCISES: CPT
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
## 2017-08-29 ENCOUNTER — CLINICAL SUPPORT (OUTPATIENT)
Dept: REHABILITATION | Facility: OTHER | Age: 57
End: 2017-08-29
Attending: NURSE PRACTITIONER
Payer: MEDICARE

## 2017-08-29 DIAGNOSIS — M51.36 DDD (DEGENERATIVE DISC DISEASE), LUMBAR: Primary | ICD-10-CM

## 2017-08-29 PROCEDURE — 97110 THERAPEUTIC EXERCISES: CPT
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
## 2017-09-06 ENCOUNTER — CLINICAL SUPPORT (OUTPATIENT)
Dept: REHABILITATION | Facility: OTHER | Age: 57
End: 2017-09-06
Attending: NURSE PRACTITIONER
Payer: MEDICARE

## 2017-09-06 DIAGNOSIS — M51.36 DDD (DEGENERATIVE DISC DISEASE), LUMBAR: ICD-10-CM

## 2017-09-06 PROCEDURE — 97110 THERAPEUTIC EXERCISES: CPT
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
## 2017-09-08 ENCOUNTER — CLINICAL SUPPORT (OUTPATIENT)
Dept: REHABILITATION | Facility: OTHER | Age: 57
End: 2017-09-08
Attending: NURSE PRACTITIONER
Payer: MEDICARE

## 2017-09-08 DIAGNOSIS — M51.36 DDD (DEGENERATIVE DISC DISEASE), LUMBAR: ICD-10-CM

## 2017-09-08 PROCEDURE — 97110 THERAPEUTIC EXERCISES: CPT

## 2017-09-08 NOTE — PROGRESS NOTES
[facility]      Name: Bri Santiago  Clinic Number: [id number]  Date of Treatment: 2017   Diagnosis:   Encounter Diagnosis   Name Primary?    DDD (degenerative disc disease), lumbar      Physician: Buffy Villagran,*    Pain pattern determined: 1 PEN  Plan of care signed: 17   Time in:   8:30  Time Out: 9:30  Total Treatment time: 40  Precautions: Hx of cancer, HTN, pancreatitis, VWD, LUMBAR FUSION, cervical surgery.   Visit #: 6    POC due:11/15/17  Reassessment due:17, done 17  Next reassessment due: 17    Subjective   Bri reports moderate low back pain.  Pain is improved since starting therapy.  He has been compliant with HEP and states that he believes it helps.     Patient reports their pain to be 6/10 on a 0-10 scale with 0 being no pain and 10 being the worst pain imaginable.    Pain Location: LB, Upper back and shoulders.     Work and leisure:  Currently on disability   Leisure: Be active and spend time with family.                      Pts goals:  Be able to do all the outdoor activities he used to do.      Objective     Baseline IM Testing Results:   Baseline IM Testing Results:   Date of testin2017  ROM 6-21 deg   Max Peak Torque 37    Min Peak Torque 3    Flex/Ext Ratio 12.33;1   % below normative data -93        MOVEMENT LOSS 2017     ROM Loss   Flexion major loss   Extension major loss   Side bending Right moderate loss   Side bending Left moderate loss   Rotation Right major loss   Rotation Left major loss       FOTO: Focus on Therapeutic Outcomes   Category: lumbar   % Impaired: 50%  Current Score  = CK = at least 40% but < 60% impaired, limited or restricted  Goal at Discharge Score = CJ = at least 20% but < 40% impaired, limited or restricted    Treatment    Pt was instructed in and performed the following:     Bri received therapeutic exercises to develop/improved posture, cardiovascular endurance, muscular  endurance, lumbar/cervical ROM, strength and muscular endurance for 50 minutes including the following exercises:     [facility] 9/8/2017   Visit Number 6   VAS Pain Rating 6   Recumbent Bike Seat Pos. 20   Time 10   Scapular Retraction 10   Flexion in Lying 10   Lumbar Extension Seat Pad -   Femur Restraint -   Top Dead Center -   Counterweight -   Lumbar Flexion -   Lumbar Extension -   Lumbar Peak Torque -   Min Torque -   Percent From Norm -   Lumbar Weight 47   Repetitions 20   Rating of Perceived Exertion 4   Ice - Z Lie (in min.) 10   LTR 10x  Active HSS 5s/h 5x B    Peripheral muscle strengthening which included 1 set of 15-20 repetitions at a slow, controlled 7 second per rep pace focused on strengthening supporting musculature for improved body mechanics and functional mobility.  Pt and therapist focused on proper form during treatment to ensure optimal strengthening of each targeted muscle group.  Machines were utilized including( torso rotation omit due to lumbar fusion,) leg extension introduced and add leg curl, chest press, upright row, Tricep extension, bicep curl, leg press, and hip abduction added on 2nd and third visit.     Home Exercise Program as follows:   Z-lie 10 min, 2x/day  Scapular retraction 10x, 3x/day  LTR 10x  Active HSS 5s/h 5x    Handouts were given to the patient. Pt demo good understanding of the education provided. Harlin demonstrated good return demonstration of activities.      Lumbar roll use compliance: unknown  Additional exercises taught this treatment session:   LTR 10x  Active HSS     Assessment     Pt with moderate LBP that decreased during session. Reviewed HEP with min vc for tech. Introduced active hss and LTR with good response.  Added trial for home use. Pt tolerated lumbar medx machine with no c/o pain. ROM could be reassessed next visit. Pt tolerated the medx machines well with no c/o increased LB, shoulder pain or any limb pain. Pt able to tolerate leg  press with no increased symptoms. Educated him to continue to ice in z lie postion at home with his B LE on the couch/ chair or bed to decrease pain. He understood. Patient reports that he has been more aware of seated posture recently and it helps.     Patient is making fair progress towards established goals.  Pt will continue to benefit from skilled outpatient physical therapy to address the deficits stated in the impairment chart, provide pt/family education and to maximize pt's level of independence in the home and community environment.       Pt's spiritual, cultural and educational needs considered and pt agreeable to plan of care and goals as stated below:     Medical necessity is demonstrated by the following IMPAIRMENTS/PROBLEMS:  History  Co-morbidities and personal factors that may impact the plan of care Examination  Body Structures and Functions, activity limitations and participation restrictions that may impact the plan of care Clinical Presentation Decision Making/ Complexity Score   Co-morbidities:   see PMH           Personal Factors:   no deficits Body Regions:   neck  back  lower extremities  upper extremities     Body Systems:   gross symmetry  ROM  strength  gait  transfers  motor control     Activity limitations:   Learning and applying knowledge  no deficits     General Tasks and Commands  no deficits     Communication  no deficits     Mobility  lifting and carrying objects  walking  no deficits     Self care  no deficits     Domestic Life  shopping  cooking  doing house work (cleaning house, washing dishes, laundry)  assisting others     Interactions/Relationships  no deficits     Life Areas  employment     Community and Social Life  recreation and leisure     Participation Restrictions:   Camping, playing sports    evolving clinical presentation with changing clinical characteristics    moderate          Short term goals:  6 weeks or 10 visits   1.  Pt will demonstrate increased lumbar ROM  by at least 3 degrees from the initial ROM value with improvements noted in functional ROM and ability to perform ADLs (progressing, not met)  2.  Pt will demonstrate increased maximum isometric torque value by 5% when compared to the initial value resulting in improved ability to perform bending, lifting, and carrying activities safely, confidently. (progressing, not met)  3.  Patient report a reduction in worst pain score by 1-2 points for improved tolerance during work and recreational activities. (progressing, not met)  4.  Pt able to perform HEP correctly with minimal cueing or supervision for therapist. (progressing, not met)     Long term goals: 13 weeks or 20 visits   1. Pt will demonstrate increased lumbar ROM by at least 6 degrees from initial ROM value, resulting in improved ability to perform functional fwd bending while standing and sitting. (progressing, not met)  2. Pt will demonstrate increased maximum isometric torque value by 10% when compared to the initial value resulting in improved ability to perform bending, lifting, and carrying activities safely, confidently. (progressing, not met)  3. Pt to demonstrate ability to independently control and reduce their pain through posture positioning and mechanical movements throughout a typical day. (progressing, not met)  4.  Patient will demonstrate improved overall function per FOTO Survey to CJ = at least 20% but < 40% impaired, limited or restricted score or less. (progressing, not met)      Plan   Continue with established Plan of Care towards established PT goals.

## 2017-09-12 ENCOUNTER — CLINICAL SUPPORT (OUTPATIENT)
Dept: REHABILITATION | Facility: OTHER | Age: 57
End: 2017-09-12
Attending: NURSE PRACTITIONER
Payer: MEDICARE

## 2017-09-12 DIAGNOSIS — M51.36 DDD (DEGENERATIVE DISC DISEASE), LUMBAR: Primary | ICD-10-CM

## 2017-09-12 PROCEDURE — 97110 THERAPEUTIC EXERCISES: CPT

## 2017-09-12 NOTE — PROGRESS NOTES
[facility]      Name: Bri Santiago  Clinic Number: [id number]  Date of Treatment: 2017   Diagnosis:   Encounter Diagnosis   Name Primary?    DDD (degenerative disc disease), lumbar Yes     Physician: Buffy Villagran,*    Pain pattern determined: 1 PEN  Plan of care signed: 17   Time in:   8:30  Time Out: 9:30  Total Treatment time: 40  Precautions: Hx of cancer, HTN, pancreatitis, VWD, LUMBAR FUSION, cervical surgery.   Visit #: 7    POC due:11/15/17  Reassessment due:17, done 17  Next reassessment due: 17    Subjective   Bri reports moderate low back pain with spasms. Pain is improved since starting therapy.  He has been compliant with HEP and states that he believes it helps, but not long term.    Patient reports their pain to be 6/10 on a 0-10 scale with 0 being no pain and 10 being the worst pain imaginable.    Pain Location: LB, Upper back and shoulders.     Work and leisure:  Currently on disability   Leisure: Be active and spend time with family.                      Pts goals:  Be able to do all the outdoor activities he used to do.      Objective     Baseline IM Testing Results:   Baseline IM Testing Results:   Date of testin2017  ROM 6-21 deg   Max Peak Torque 37    Min Peak Torque 3    Flex/Ext Ratio 12.33;1   % below normative data -93        MOVEMENT LOSS 2017     ROM Loss   Flexion major loss   Extension major loss   Side bending Right moderate loss   Side bending Left moderate loss   Rotation Right major loss   Rotation Left major loss       FOTO: Focus on Therapeutic Outcomes   Category: lumbar   % Impaired: 50%  Current Score  = CK = at least 40% but < 60% impaired, limited or restricted  Goal at Discharge Score = CJ = at least 20% but < 40% impaired, limited or restricted    Treatment    Pt was instructed in and performed the following:     Bri received therapeutic exercises to develop/improved posture,  cardiovascular endurance, muscular endurance, lumbar/cervical ROM, strength and muscular endurance for 50 minutes including the following exercises:   [facility] 9/12/2017   Visit Number 7   VAS Pain Rating 6   Recumbent Bike Seat Pos. 20   Time 10   Scapular Retraction 10   Flexion in Lying 10 SKTC   Lumbar Extension Seat Pad -   Femur Restraint -   Top Dead Center -   Counterweight -   Lumbar Flexion -   Lumbar Extension -   Lumbar Peak Torque -   Min Torque -   Percent From Norm -   Lumbar Weight 50   Repetitions 20   Rating of Perceived Exertion 3   Ice - Z Lie (in min.) 10       LTR 10x  Active HSS 5s/h 5x B    Peripheral muscle strengthening which included 1 set of 15-20 repetitions at a slow, controlled 7 second per rep pace focused on strengthening supporting musculature for improved body mechanics and functional mobility.  Pt and therapist focused on proper form during treatment to ensure optimal strengthening of each targeted muscle group.  Machines were utilized including( torso rotation omit due to lumbar fusion,) leg extension introduced and add leg curl, chest press, upright row, Tricep extension, bicep curl, leg press, and hip abduction added on 2nd and third visit.     Home Exercise Program as follows:   Z-lie 10 min, 2x/day  Scapular retraction 10x, 3x/day  LTR 10x  Active HSS 5s/h 5x  SKTC 10x  Handouts were given to the patient. Pt demo good understanding of the education provided. Harlin demonstrated good return demonstration of activities.      Lumbar roll use compliance: unknown  Additional exercises taught this treatment session:       Assessment     Pt with moderate LBP that decreased during session. Reviewed HEP with min vc for tech.  Pt tolerated lumbar medx machine with no c/o pain. ROM needs to be reassess next session due to pt could be able to tolerate more range. Pt tolerated the medx machines well with no c/o increased LB, shoulder pain or any limb pain. Pt able to tolerate  leg press with no increased symptoms. Educated him  to continue to ice in z lie postion at home with his B LE on the couch/ chair or bed to decrease pain. He understood. Patient reports that he has been more aware of seated posture recently and it helps.     Patient is making fair progress towards established goals.  Pt will continue to benefit from skilled outpatient physical therapy to address the deficits stated in the impairment chart, provide pt/family education and to maximize pt's level of independence in the home and community environment.       Pt's spiritual, cultural and educational needs considered and pt agreeable to plan of care and goals as stated below:     Medical necessity is demonstrated by the following IMPAIRMENTS/PROBLEMS:  History  Co-morbidities and personal factors that may impact the plan of care Examination  Body Structures and Functions, activity limitations and participation restrictions that may impact the plan of care Clinical Presentation Decision Making/ Complexity Score   Co-morbidities:   see PMH           Personal Factors:   no deficits Body Regions:   neck  back  lower extremities  upper extremities     Body Systems:   gross symmetry  ROM  strength  gait  transfers  motor control     Activity limitations:   Learning and applying knowledge  no deficits     General Tasks and Commands  no deficits     Communication  no deficits     Mobility  lifting and carrying objects  walking  no deficits     Self care  no deficits     Domestic Life  shopping  cooking  doing house work (cleaning house, washing dishes, laundry)  assisting others     Interactions/Relationships  no deficits     Life Areas  employment     Community and Social Life  recreation and leisure     Participation Restrictions:   Camping, playing sports    evolving clinical presentation with changing clinical characteristics    moderate          Short term goals:  6 weeks or 10 visits   1.  Pt will demonstrate increased  lumbar ROM by at least 3 degrees from the initial ROM value with improvements noted in functional ROM and ability to perform ADLs (progressing, not met)  2.  Pt will demonstrate increased maximum isometric torque value by 5% when compared to the initial value resulting in improved ability to perform bending, lifting, and carrying activities safely, confidently. (progressing, not met)  3.  Patient report a reduction in worst pain score by 1-2 points for improved tolerance during work and recreational activities. (progressing, not met)  4.  Pt able to perform HEP correctly with minimal cueing or supervision for therapist. (progressing, not met)     Long term goals: 13 weeks or 20 visits   1. Pt will demonstrate increased lumbar ROM by at least 6 degrees from initial ROM value, resulting in improved ability to perform functional fwd bending while standing and sitting. (progressing, not met)  2. Pt will demonstrate increased maximum isometric torque value by 10% when compared to the initial value resulting in improved ability to perform bending, lifting, and carrying activities safely, confidently. (progressing, not met)  3. Pt to demonstrate ability to independently control and reduce their pain through posture positioning and mechanical movements throughout a typical day. (progressing, not met)  4.  Patient will demonstrate improved overall function per FOTO Survey to CJ = at least 20% but < 40% impaired, limited or restricted score or less. (progressing, not met)      Plan   Continue with established Plan of Care towards established PT goals.

## 2017-09-14 ENCOUNTER — CLINICAL SUPPORT (OUTPATIENT)
Dept: REHABILITATION | Facility: OTHER | Age: 57
End: 2017-09-14
Attending: NURSE PRACTITIONER
Payer: MEDICARE

## 2017-09-14 DIAGNOSIS — M51.36 DDD (DEGENERATIVE DISC DISEASE), LUMBAR: Primary | ICD-10-CM

## 2017-09-14 PROCEDURE — 97110 THERAPEUTIC EXERCISES: CPT

## 2017-09-14 NOTE — PROGRESS NOTES
[facility]      Name: Bri Santiago  Clinic Number: [id number]  Date of Treatment: 2017   Diagnosis:   No diagnosis found.  Physician: Buffy Villagran,*    Pain pattern determined: 1 PEN  Plan of care signed: 17   Time in:   8:50 (Pt 20 mins late)  Time Out: 9:50  Total Treatment time: 40  Precautions: Hx of cancer, HTN, pancreatitis, VWD, LUMBAR FUSION, cervical surgery.   Visit #: 8    POC due:11/15/17  Reassessment due:17, done 17  Next reassessment due: 17    Subjective   Bri reports moderate low back pain with spasms. Pain is improved since starting therapy.  He has been compliant with HEP and states that he believes it helps, but not long term.    Patient reports their pain to be 6/10 on a 0-10 scale with 0 being no pain and 10 being the worst pain imaginable.    Pain Location: LB, Upper back and shoulders.     Work and leisure:  Currently on disability   Leisure: Be active and spend time with family.                      Pts goals:  Be able to do all the outdoor activities he used to do.      Objective     Baseline IM Testing Results:   Baseline IM Testing Results:   Date of testin2017  ROM 6-21 deg   Max Peak Torque 37    Min Peak Torque 3    Flex/Ext Ratio 12.33;1   % below normative data -93        MOVEMENT LOSS 2017     ROM Loss   Flexion major loss   Extension major loss   Side bending Right moderate loss   Side bending Left moderate loss   Rotation Right major loss   Rotation Left major loss       FOTO: Focus on Therapeutic Outcomes   Category: lumbar   % Impaired: 50%  Current Score  = CK = at least 40% but < 60% impaired, limited or restricted  Goal at Discharge Score = CJ = at least 20% but < 40% impaired, limited or restricted    Treatment    Pt was instructed in and performed the following:     Bri received therapeutic exercises to develop/improved posture, cardiovascular endurance, muscular endurance,  lumbar/cervical ROM, strength and muscular endurance for 50 minutes including the following exercises:     [facility] 9/14/2017   Visit Number 8   VAS Pain Rating 6   Recumbent Bike Seat Pos. 20   Time 10   Scapular Retraction 10   Flexion in Lying 10   Lumbar Weight 54   Repetitions 20   Rating of Perceived Exertion 4   Ice - Z Lie (in min.) 10       LTR 10x  Active HSS 5s/h 5x B    Peripheral muscle strengthening which included 1 set of 15-20 repetitions at a slow, controlled 7 second per rep pace focused on strengthening supporting musculature for improved body mechanics and functional mobility.  Pt and therapist focused on proper form during treatment to ensure optimal strengthening of each targeted muscle group.  Machines were utilized including( torso rotation omit due to lumbar fusion,) leg extension introduced and add leg curl, chest press, upright row, Tricep extension, bicep curl, leg press, and hip abduction added on 2nd and third visit.     Home Exercise Program as follows:   Z-lie 10 min, 2x/day  Scapular retraction 10x, 3x/day  LTR 10x  Active HSS 5s/h 5x  SKTC 10x  Handouts were given to the patient. Pt demo good understanding of the education provided. Harlin demonstrated good return demonstration of activities.      Lumbar roll use compliance: unknown  Additional exercises taught this treatment session:       Assessment     Pt with moderate LBP that decreased during session. Reviewed HEP with min vc for tech.  Pt tolerated lumbar medx machine with no c/o pain. ROM needs to be reassess next session due to pt could be able to tolerate more range. Pt tolerated the medx machines well with no c/o increased LB, shoulder pain or any limb pain. Pt able to tolerate leg press with no increased symptoms. Educated him  to continue to ice in z lie postion at home with his B LE on the couch/ chair or bed to decrease pain. He understood. Patient reports that he has been more aware of seated posture  recently and it helps.     Patient is making fair progress towards established goals.  Pt will continue to benefit from skilled outpatient physical therapy to address the deficits stated in the impairment chart, provide pt/family education and to maximize pt's level of independence in the home and community environment.       Pt's spiritual, cultural and educational needs considered and pt agreeable to plan of care and goals as stated below:     Medical necessity is demonstrated by the following IMPAIRMENTS/PROBLEMS:  History  Co-morbidities and personal factors that may impact the plan of care Examination  Body Structures and Functions, activity limitations and participation restrictions that may impact the plan of care Clinical Presentation Decision Making/ Complexity Score   Co-morbidities:   see PMH           Personal Factors:   no deficits Body Regions:   neck  back  lower extremities  upper extremities     Body Systems:   gross symmetry  ROM  strength  gait  transfers  motor control     Activity limitations:   Learning and applying knowledge  no deficits     General Tasks and Commands  no deficits     Communication  no deficits     Mobility  lifting and carrying objects  walking  no deficits     Self care  no deficits     Domestic Life  shopping  cooking  doing house work (cleaning house, washing dishes, laundry)  assisting others     Interactions/Relationships  no deficits     Life Areas  employment     Community and Social Life  recreation and leisure     Participation Restrictions:   Camping, playing sports    evolving clinical presentation with changing clinical characteristics    moderate          Short term goals:  6 weeks or 10 visits   1.  Pt will demonstrate increased lumbar ROM by at least 3 degrees from the initial ROM value with improvements noted in functional ROM and ability to perform ADLs (progressing, not met)  2.  Pt will demonstrate increased maximum isometric torque value by 5% when  compared to the initial value resulting in improved ability to perform bending, lifting, and carrying activities safely, confidently. (progressing, not met)  3.  Patient report a reduction in worst pain score by 1-2 points for improved tolerance during work and recreational activities. (progressing, not met)  4.  Pt able to perform HEP correctly with minimal cueing or supervision for therapist. (progressing, not met)     Long term goals: 13 weeks or 20 visits   1. Pt will demonstrate increased lumbar ROM by at least 6 degrees from initial ROM value, resulting in improved ability to perform functional fwd bending while standing and sitting. (progressing, not met)  2. Pt will demonstrate increased maximum isometric torque value by 10% when compared to the initial value resulting in improved ability to perform bending, lifting, and carrying activities safely, confidently. (progressing, not met)  3. Pt to demonstrate ability to independently control and reduce their pain through posture positioning and mechanical movements throughout a typical day. (progressing, not met)  4.  Patient will demonstrate improved overall function per FOTO Survey to CJ = at least 20% but < 40% impaired, limited or restricted score or less. (progressing, not met)      Plan   Continue with established Plan of Care towards established PT goals.

## 2017-10-02 ENCOUNTER — CLINICAL SUPPORT (OUTPATIENT)
Dept: REHABILITATION | Facility: OTHER | Age: 57
End: 2017-10-02
Attending: NURSE PRACTITIONER
Payer: MEDICARE

## 2017-10-02 DIAGNOSIS — M51.36 DDD (DEGENERATIVE DISC DISEASE), LUMBAR: ICD-10-CM

## 2017-10-02 PROCEDURE — 97750 PHYSICAL PERFORMANCE TEST: CPT

## 2017-10-02 PROCEDURE — 97110 THERAPEUTIC EXERCISES: CPT

## 2017-10-02 NOTE — PROGRESS NOTES
[facility]      Name: Bri Santiago  Clinic Number: [id number]  Date of Treatment: 10/02/2017   Diagnosis:   Encounter Diagnosis   Name Primary?    DDD (degenerative disc disease), lumbar      Physician: Buffy Villagran,Christina    Pain pattern determined: 1 PEN  Plan of care signed: 17   Time in:   10:  Time Out: 9:50  Total Treatment time: 40  Precautions: Hx of cancer, HTN, pancreatitis, VWD, LUMBAR FUSION, cervical surgery.   Visit #: 9 (Pt tested this session, Please issue FOTO next session)     POC due:11/15/17  Reassessment done: 17, 10/2/17  Next reassessment due: 17    Subjective   Bri reports moderate-severe low back pain with spasms. Pain has not worsened since starting therapy.  He has been compliant with HEP and states that he believes it helps, but not long term. He reports increased symptoms secondary to poor weather.     Patient reports their pain to be 8/10 on a 0-10 scale with 0 being no pain and 10 being the worst pain imaginable.    Pain Location: LB, Upper back and shoulders.     Work and leisure:  Currently on disability   Leisure: Be active and spend time with family.                      Pts goals:  Be able to do all the outdoor activities he used to do.      Objective     Baseline IM Testing Results:   Baseline IM Testing Results:   Date of testin2017  ROM 6-21 deg   Max Peak Torque 37    Min Peak Torque 3    Flex/Ext Ratio 12.33;1   % below normative data -93          Midpoint IM Testing Results:   Date of testing: 10/02/2017  ROM 27-0   Max Peak Torque 79   Min Peak Torque 32   Flex/Ext Ratio 2.4   % below relative normative data -75   % Change from Initial Evaluation +917       MOVEMENT LOSS 2017     ROM Loss   Flexion major loss   Extension major loss   Side bending Right moderate loss   Side bending Left moderate loss   Rotation Right major loss   Rotation Left major loss       FOTO: Focus on Therapeutic Outcomes    Category: lumbar   % Impaired: 50%  Current Score  = CK = at least 40% but < 60% impaired, limited or restricted  Goal at Discharge Score = CJ = at least 20% but < 40% impaired, limited or restricted    Treatment    Pt was instructed in and performed the following:     Bri received therapeutic exercises to develop/improved posture, cardiovascular endurance, muscular endurance, lumbar/cervical ROM, strength and muscular endurance for 50 minutes including the following exercises:      [facility] 10/2/2017   Visit Number 9   VAS Pain Rating 8   Recumbent Bike Seat Pos. 20   Time 10   Scapular Retraction 10   Flexion in Lying 10   Lumbar Extension Seat Pad -   Femur Restraint -   Top Dead Center -   Counterweight -   Lumbar Flexion 27   Lumbar Extension 0   Lumbar Peak Torque 79   Min Torque 32   Percent From Norm -   Lumbar Weight -   Repetitions -   Rating of Perceived Exertion -   Ice - Z Lie (in min.) 10     LTR 10x  Active HSS 5s/h 5x B  SKTC 10x    Peripheral muscle strengthening which included 1 set of 15-20 repetitions at a slow, controlled 7 second per rep pace focused on strengthening supporting musculature for improved body mechanics and functional mobility.  Pt and therapist focused on proper form during treatment to ensure optimal strengthening of each targeted muscle group.  Machines were utilized including( torso rotation omit due to lumbar fusion,) leg extension introduced and add leg curl, chest press, upright row, Tricep extension, bicep curl, leg press, and hip abduction added on 2nd and third visit.     Home Exercise Program as follows:   Z-lie 10 min, 2x/day  Scapular retraction 10x, 3x/day  LTR 10x  Active HSS 5s/h 5x  SKTC 10x    Handouts were given to the patient. Pt demo good understanding of the education provided. Bri demonstrated good return demonstration of activities.      Lumbar roll use compliance: unknown  Additional exercises taught this treatment session:   HEP review      Assessment     Patient has attended 10 visits at [facility] which included MD evaluation, PT evaluation with isometric testing, and physical therapy treatment including HEP instruction, education, aerobic work, dynamic strengthening on med ex equipment for the spine, and whole body strengthening on med ex equipment with increasing weight loads.  Patient  is demonstrating increased ability to reduce symptoms, improved posture, improved lumbar ROM to 27-0, and improved lumbar strength on med ex test by  917% average.    Pt with moderate LBP that decreased during session. Reviewed HEP with min vc for tech.  Pt tolerated lumbar medx machine testing with no c/o pain. Pt tolerated the medx machines well with no c/o increased LB, shoulder pain or any limb pain. Pt able to tolerate leg press with no increased symptoms. Educated him  to continue to ice in z lie postion at home with his B LE on the couch/ chair or bed to decrease pain. He understood. Patient reports that he has been more aware of seated posture recently and it helps.     Patient is making fair progress towards established goals.  Pt will continue to benefit from skilled outpatient physical therapy to address the deficits stated in the impairment chart, provide pt/family education and to maximize pt's level of independence in the home and community environment.       Pt's spiritual, cultural and educational needs considered and pt agreeable to plan of care and goals as stated below:     Medical necessity is demonstrated by the following IMPAIRMENTS/PROBLEMS:  History  Co-morbidities and personal factors that may impact the plan of care Examination  Body Structures and Functions, activity limitations and participation restrictions that may impact the plan of care Clinical Presentation Decision Making/ Complexity Score   Co-morbidities:   see PMH           Personal Factors:   no deficits Body Regions:   neck  back  lower extremities  upper  extremities     Body Systems:   gross symmetry  ROM  strength  gait  transfers  motor control     Activity limitations:   Learning and applying knowledge  no deficits     General Tasks and Commands  no deficits     Communication  no deficits     Mobility  lifting and carrying objects  walking  no deficits     Self care  no deficits     Domestic Life  shopping  cooking  doing house work (cleaning house, washing dishes, laundry)  assisting others     Interactions/Relationships  no deficits     Life Areas  employment     Community and Social Life  recreation and leisure     Participation Restrictions:   Camping, playing sports    evolving clinical presentation with changing clinical characteristics    moderate          Short term goals:  6 weeks or 10 visits   1.  Pt will demonstrate increased lumbar ROM by at least 3 degrees from the initial ROM value with improvements noted in functional ROM and ability to perform ADLs Met 10/02/2017  2.  Pt will demonstrate increased maximum isometric torque value by 5% when compared to the initial value resulting in improved ability to perform bending, lifting, and carrying activities safely, confidently. Met 10/02/2017  3.  Patient report a reduction in worst pain score by 1-2 points for improved tolerance during work and recreational activities. (progressing, not met)  4.  Pt able to perform HEP correctly with minimal cueing or supervision for therapist. (progressing, not met)     Long term goals: 13 weeks or 20 visits   1. Pt will demonstrate increased lumbar ROM by at least 6 degrees from initial ROM value, resulting in improved ability to perform functional fwd bending while standing and sitting.Met 10/02/2017  2. Pt will demonstrate increased maximum isometric torque value by 10% when compared to the initial value resulting in improved ability to perform bending, lifting, and carrying activities safely, confidently. Met 10/02/2017  3. Pt to demonstrate ability to  independently control and reduce their pain through posture positioning and mechanical movements throughout a typical day. (progressing, not met)  4.  Patient will demonstrate improved overall function per FOTO Survey to CJ = at least 20% but < 40% impaired, limited or restricted score or less. (progressing, not met)      Plan   Continue with established Plan of Care towards established PT goals.

## 2017-10-04 ENCOUNTER — PATIENT OUTREACH (OUTPATIENT)
Dept: INTERNAL MEDICINE | Facility: CLINIC | Age: 57
End: 2017-10-04

## 2017-10-04 ENCOUNTER — CLINICAL SUPPORT (OUTPATIENT)
Dept: REHABILITATION | Facility: OTHER | Age: 57
End: 2017-10-04
Attending: NURSE PRACTITIONER
Payer: MEDICARE

## 2017-10-04 DIAGNOSIS — M51.36 DDD (DEGENERATIVE DISC DISEASE), LUMBAR: ICD-10-CM

## 2017-10-04 PROCEDURE — 97110 THERAPEUTIC EXERCISES: CPT | Performed by: PHYSICAL MEDICINE & REHABILITATION

## 2017-10-04 PROCEDURE — G8978 MOBILITY CURRENT STATUS: HCPCS | Mod: CK | Performed by: PHYSICAL MEDICINE & REHABILITATION

## 2017-10-04 PROCEDURE — G8979 MOBILITY GOAL STATUS: HCPCS | Mod: CJ | Performed by: PHYSICAL MEDICINE & REHABILITATION

## 2017-10-04 NOTE — PROGRESS NOTES
[facility] is committed to your overall health.  To help you get the most out of each of your visits, we will review your information to make sure you are up to date on all of your recommended tests and/or procedures.       It  Has been  found that you may be due for:       Health Maintenance Due   Topic Date Due    TETANUS VACCINE  08/23/1980    Influenza Vaccine  08/01/2017             If you have had any of the above done at another facility, please bring the records or information with you so that your record at [facility] will be complete.  If you would like to schedule any of these, please contact me.     If you are currently taking medication, please bring it with you to your appointment for review.     Also, if you have any type of Advanced Directives, please bring them with you to your office visit so we may scan them into your chart.

## 2017-10-04 NOTE — PROGRESS NOTES
[facility]      Name: Bri Santiago  Clinic Number: [id number]  Date of Treatment: 10/04/2017   Diagnosis:   Encounter Diagnosis   Name Primary?    DDD (degenerative disc disease), lumbar      Physician: Buffy Villagran,*    Pain pattern determined: 1 PEN  Plan of care signed: 17   Time in:   8:30 am  Time Out: 9:50  Total Treatment time: 50  Precautions: Hx of cancer, HTN, pancreatitis, VWD, LUMBAR FUSION, cervical surgery.   Visit #: 10, IM test visit 9    POC due:11/15/17  Reassessment done: 17, 10/2/17  Next reassessment due: 17    Subjective   Bri reports feeling pretty good today.  He reports that the program is helping him and he notes he is moving better and having less pain.  He does his HEP 2-3/day, he uses lumbar roll.  He is very motivated to fel better.   2/10 back pain pre visit, 0/10 post visit.   He has been compliant with HEP and states that he believes it helps, but not long term. He reports increased symptoms secondary to poor weather, he does wear extra t shirt on cold days as this helps with his pain.    Patient reports their pain to be 2/10 on a 0-10 scale with 0 being no pain and 10 being the worst pain imaginable.    Pain Location: LB, Upper back and shoulders.     Work and leisure:  Currently on disability   Leisure: Be active and spend time with family.                      Pts goals:  Be able to do all the outdoor activities he used to do.      Objective     Baseline IM Testing Results:   Baseline IM Testing Results:   Date of testin2017  ROM 6-21 deg   Max Peak Torque 37    Min Peak Torque 3    Flex/Ext Ratio 12.33;1   % below normative data -93          Midpoint IM Testing Results:   Date of testing: 10/02/2017  ROM 27-0 improved to 0-30 visit 10   Max Peak Torque 79   Min Peak Torque 32   Flex/Ext Ratio 2.4   % below relative normative data -75   % Change from Initial Evaluation +917       MOVEMENT LOSS 10/4/17      ROM Loss   Flexion major loss improved to mod loss, fingers 8 inches from floor   Extension major loss improved to mod loss   Side bending Right moderate loss   Side bending Left moderate loss   Rotation Right major loss   Rotation Left major loss     Back pain 2/10  Flexion in lie, no effect, added to HEP to regain ROM -handouts given  Ext in standing, end range stretch, no worse, added to HEP  Taught traffic light model and stopping there ex that makes him worse but adding stretching to assist with mobility    FOTO: Focus on Therapeutic Outcomes   Category: lumbar   % Impaired: 50%  Current Score  = CK = at least 40% but < 60% impaired, limited or restricted  Goal at Discharge Score = CJ = at least 20% but < 40% impaired, limited or restricted  FOTO  visit 10, 41 %, improved from initial  50%, progressing towards goals    Treatment    Pt was instructed in and performed the following:     Bri received therapeutic exercises to develop/improved posture, cardiovascular endurance, muscular endurance, lumbar/cervical ROM, strength and muscular endurance for 50 minutes including the following exercises:          LTR 10x  Active HSS 5s/h 5x B  SKTC 10x  Flexion in lie 10  Ext in standing 10 reps    [facility] 10/4/2017   Visit Number 10   VAS Pain Rating 1   Recumbent Bike Seat Pos. 20   Time 10   Scapular Retraction -   Extension in Standing 10   Flexion in Lying 10   Lumbar Extension Seat Pad -   Femur Restraint -   Top Dead Center -   Counterweight -   Lumbar Flexion 30   Lumbar Extension 0   Lumbar Peak Torque -   Min Torque -   Percent From Norm -   Lumbar Weight 60   Repetitions 20   Rating of Perceived Exertion 3   Ice - Z Lie (in min.) 10         Peripheral muscle strengthening which included 1 set of 15-20 repetitions at a slow, controlled 7 second per rep pace focused on strengthening supporting musculature for improved body mechanics and functional mobility.  Pt and therapist focused on proper form  during treatment to ensure optimal strengthening of each targeted muscle group.  Machines were utilized including( torso rotation omit due to lumbar fusion,) leg extension introduced and add leg curl, chest press, upright row, Tricep extension, bicep curl, leg press, and hip abduction added on 2nd and third visit.     Home Exercise Program as follows:   Z-lie 10 min, 2x/day  Scapular retraction 10x, 3x/day  LTR 10x  Active HSS 5s/h 5x  SKTC 10x  Flexion in lie 2/day  Ext in standing 2/day     Handouts were given to the patient. Pt demo good understanding of the education provided. Harlin demonstrated good return demonstration of activities.      Lumbar roll use compliance:  Yes he is using  Additional exercises taught this treatment session:   HEP review   Flexion in lie 2/day  Ext in standing 2/day       Assessment     Patient has attended 10 visits at [facility] which included MD evaluation, PT evaluation with isometric testing, and physical therapy treatment including HEP instruction, education, aerobic work, dynamic strengthening on med ex equipment for the spine, and whole body strengthening on med ex equipment with increasing weight loads.  Patient  is demonstrating increased ability to reduce symptoms, improved posture, improved lumbar ROM to 27-0, and improved lumbar strength on med ex test by  917% average.   He was reassessed and found lumbar ROM improving.  He tolerated flexion in lie and ext in standing without increased symptoms, and stretches added to HEP.  He is using lumbar roll and consistent with stretching.      Pt with mild  LBP that decreased during session.    Pt tolerated lumbar medx machine there ex in new range with increase in weight, reporting it was easy.  He could tolerate 5-10% next visit.  Pt tolerated the medx machines well with no c/o increased LB, shoulder pain or any limb pain. Pt able to tolerate leg press with no increased symptoms. Educated him  to continue to ice in z  lie postion at home with his B LE on the couch/ chair or bed to decrease pain. He understood. Patient reports that he has been more aware of seated posture recently and it helps.     Patient is making fair progress towards established goals.  Pt will continue to benefit from skilled outpatient physical therapy to address the deficits stated in the impairment chart, provide pt/family education and to maximize pt's level of independence in the home and community environment.       Pt's spiritual, cultural and educational needs considered and pt agreeable to plan of care and goals as stated below:     Medical necessity is demonstrated by the following IMPAIRMENTS/PROBLEMS:  History  Co-morbidities and personal factors that may impact the plan of care Examination  Body Structures and Functions, activity limitations and participation restrictions that may impact the plan of care Clinical Presentation Decision Making/ Complexity Score   Co-morbidities:   see PMH           Personal Factors:   no deficits Body Regions:   neck  back  lower extremities  upper extremities     Body Systems:   gross symmetry  ROM  strength  gait  transfers  motor control     Activity limitations:   Learning and applying knowledge  no deficits     General Tasks and Commands  no deficits     Communication  no deficits     Mobility  lifting and carrying objects  walking  no deficits     Self care  no deficits     Domestic Life  shopping  cooking  doing house work (cleaning house, washing dishes, laundry)  assisting others     Interactions/Relationships  no deficits     Life Areas  employment     Community and Social Life  recreation and leisure     Participation Restrictions:   Camping, playing sports    evolving clinical presentation with changing clinical characteristics    moderate          Short term goals:  6 weeks or 10 visits   1.  Pt will demonstrate increased lumbar ROM by at least 3 degrees from the initial ROM value with improvements  noted in functional ROM and ability to perform ADLs Met 10/02/2017  2.  Pt will demonstrate increased maximum isometric torque value by 5% when compared to the initial value resulting in improved ability to perform bending, lifting, and carrying activities safely, confidently. Met 10/02/2017  3.  Patient report a reduction in worst pain score by 1-2 points for improved tolerance during work and recreational activities. (progressing, not met)  4.  Pt able to perform HEP correctly with minimal cueing or supervision for therapist. (progressing, not met)     Long term goals: 13 weeks or 20 visits   1. Pt will demonstrate increased lumbar ROM by at least 6 degrees from initial ROM value, resulting in improved ability to perform functional fwd bending while standing and sitting.Met 10/02/2017  2. Pt will demonstrate increased maximum isometric torque value by 10% when compared to the initial value resulting in improved ability to perform bending, lifting, and carrying activities safely, confidently. Met 10/02/2017  3. Pt to demonstrate ability to independently control and reduce their pain through posture positioning and mechanical movements throughout a typical day. (progressing, not met)  4.  Patient will demonstrate improved overall function per FOTO Survey to CJ = at least 20% but < 40% impaired, limited or restricted score or less. (progressing, not met)      FOTO  visit 10, 41 %, improved from initial  50%, progressing towards goals    Plan   Continue with established Plan of Care towards established PT goals. Ensure flexion and extension tolerated well

## 2017-10-09 ENCOUNTER — CLINICAL SUPPORT (OUTPATIENT)
Dept: REHABILITATION | Facility: OTHER | Age: 57
End: 2017-10-09
Attending: NURSE PRACTITIONER
Payer: MEDICARE

## 2017-10-09 DIAGNOSIS — M51.36 DDD (DEGENERATIVE DISC DISEASE), LUMBAR: ICD-10-CM

## 2017-10-09 PROCEDURE — 97110 THERAPEUTIC EXERCISES: CPT

## 2017-10-09 NOTE — PROGRESS NOTES
[facility]      Name: Bri Santiago  Clinic Number: [id number]  Date of Treatment: 10/09/2017   Diagnosis:   Encounter Diagnosis   Name Primary?    DDD (degenerative disc disease), lumbar      Physician: Buffy Villagran,*    Pain pattern determined: 1 PEN  Plan of care signed: 17   Time in:   8:10 am  Time Out: 9:10  Total Treatment time: 50  Precautions: Hx of cancer, HTN, pancreatitis, VWD, LUMBAR FUSION, cervical surgery.   Visit #: 11    POC due:11/15/17    Reassessment done: 17, 10/2/17  Next reassessment due: 17    Subjective   Bri reports feeling pretty good today.  He reports that the program is helping him and he notes he is moving better and having less pain. He walked a mile this morning and had no pain. He does his HEP 2-3/day, he uses lumbar roll.  He is very motivated to feel better.   0/10 back pain pre visit, 0/10 post visit.       Patient reports their pain to be 0/10 on a 0-10 scale with 0 being no pain and 10 being the worst pain imaginable.    Pain Location: LB, Upper back and shoulders.     Work and leisure:  Currently on disability   Leisure: Be active and spend time with family.                      Pts goals:  Be able to do all the outdoor activities he used to do.      Objective     Baseline IM Testing Results:   Baseline IM Testing Results:   Date of testin2017  ROM 6-21 deg   Max Peak Torque 37    Min Peak Torque 3    Flex/Ext Ratio 12.33;1   % below normative data -93          [facility] IM Testing Results:   Date of testing: 10/02/2017  ROM 27-0 improved to 0-30 visit 10   Max Peak Torque 79   Min Peak Torque 32   Flex/Ext Ratio 2.4   % below relative normative data -75   % Change from Initial Evaluation +917       MOVEMENT LOSS 10/4/17     ROM Loss   Flexion major loss improved to mod loss, fingers 8 inches from floor   Extension major loss improved to mod loss   Side bending Right moderate loss   Side bending Left  moderate loss   Rotation Right major loss   Rotation Left major loss     Back pain 2/10  Flexion in lie, no effect, added to HEP to regain ROM -handouts given  Ext in standing, end range stretch, no worse, added to HEP  Taught traffic light model and stopping there ex that makes him worse but adding stretching to assist with mobility    FOTO: Focus on Therapeutic Outcomes   Category: lumbar   % Impaired: 50%  Current Score  = CK = at least 40% but < 60% impaired, limited or restricted  Goal at Discharge Score = CJ = at least 20% but < 40% impaired, limited or restricted  FOTO  visit 10, 41 %, improved from initial  50%, progressing towards goals    Treatment    Pt was instructed in and performed the following:     Bri received therapeutic exercises to develop/improved posture, cardiovascular endurance, muscular endurance, lumbar/cervical ROM, strength and muscular endurance for 50 minutes including the following exercises:        [facility] 10/9/2017   Visit Number 11   VAS Pain Rating 0   Recumbent Bike Seat Pos. 20   Time 10   Scapular Retraction 10   Extension in Standing 10   Flexion in Lying 10   Lumbar Weight 63   Repetitions 20   Rating of Perceived Exertion 3   Ice - Z Lie (in min.) 10       LTR 10x  Active HSS 5s/h 5x B  SKTC 10x  Flexion in lie 10  Ext in standing 10 reps          Peripheral muscle strengthening which included 1 set of 15-20 repetitions at a slow, controlled 7 second per rep pace focused on strengthening supporting musculature for improved body mechanics and functional mobility.  Pt and therapist focused on proper form during treatment to ensure optimal strengthening of each targeted muscle group.  Machines were utilized including( torso rotation omit due to lumbar fusion,) leg extension introduced and add leg curl, chest press, upright row, Tricep extension, bicep curl, leg press, and hip abduction added on 2nd and third visit.     Home Exercise Program as follows:   Z-lie 10  min, 2x/day  Scapular retraction 10x, 3x/day  LTR 10x  Active HSS 5s/h 5x  SKTC 10x  Flexion in lie 2/day  Ext in standing 2/day     Handouts were given to the patient. Pt demo good understanding of the education provided. Bri demonstrated good return demonstration of activities.      Lumbar roll use compliance:  Yes he is using  Additional exercises taught this treatment session:   HEP review   Flexion in lie 2/day  Ext in standing 2/day       Assessment     Pt with no LBP pre,during or post session.   Pt tolerated lumbar medx machine with no c/o LBP.   Pt tolerated the medx machines well with no c/o increased LB, shoulder pain or any limb pain. Pt able to tolerate leg press with no increased symptoms. Educated him  to continue to ice in z lie postion at home with his B LE on the couch/ chair or bed to decrease pain. He understood.Praised him walking and encouraged him to cont.  Patient reports that he has been more aware of seated posture recently and it helps.     Patient is making fair progress towards established goals.  Pt will continue to benefit from skilled outpatient physical therapy to address the deficits stated in the impairment chart, provide pt/family education and to maximize pt's level of independence in the home and community environment.       Pt's spiritual, cultural and educational needs considered and pt agreeable to plan of care and goals as stated below:     Medical necessity is demonstrated by the following IMPAIRMENTS/PROBLEMS:  History  Co-morbidities and personal factors that may impact the plan of care Examination  Body Structures and Functions, activity limitations and participation restrictions that may impact the plan of care Clinical Presentation Decision Making/ Complexity Score   Co-morbidities:   see PMH           Personal Factors:   no deficits Body Regions:   neck  back  lower extremities  upper extremities     Body Systems:   gross  symmetry  ROM  strength  gait  transfers  motor control     Activity limitations:   Learning and applying knowledge  no deficits     General Tasks and Commands  no deficits     Communication  no deficits     Mobility  lifting and carrying objects  walking  no deficits     Self care  no deficits     Domestic Life  shopping  cooking  doing house work (cleaning house, washing dishes, laundry)  assisting others     Interactions/Relationships  no deficits     Life Areas  employment     Community and Social Life  recreation and leisure     Participation Restrictions:   Camping, playing sports    evolving clinical presentation with changing clinical characteristics    moderate          Short term goals:  6 weeks or 10 visits   1.  Pt will demonstrate increased lumbar ROM by at least 3 degrees from the initial ROM value with improvements noted in functional ROM and ability to perform ADLs Met 10/02/2017  2.  Pt will demonstrate increased maximum isometric torque value by 5% when compared to the initial value resulting in improved ability to perform bending, lifting, and carrying activities safely, confidently. Met 10/02/2017  3.  Patient report a reduction in worst pain score by 1-2 points for improved tolerance during work and recreational activities. (progressing, not met)  4.  Pt able to perform HEP correctly with minimal cueing or supervision for therapist. (progressing, not met)     Long term goals: 13 weeks or 20 visits   1. Pt will demonstrate increased lumbar ROM by at least 6 degrees from initial ROM value, resulting in improved ability to perform functional fwd bending while standing and sitting.Met 10/02/2017  2. Pt will demonstrate increased maximum isometric torque value by 10% when compared to the initial value resulting in improved ability to perform bending, lifting, and carrying activities safely, confidently. Met 10/02/2017  3. Pt to demonstrate ability to independently control and reduce their pain  through posture positioning and mechanical movements throughout a typical day. (progressing, not met)  4.  Patient will demonstrate improved overall function per FOTO Survey to CJ = at least 20% but < 40% impaired, limited or restricted score or less. (progressing, not met)      FOTO  visit 10, 41 %, improved from initial  50%, progressing towards goals    Plan   Continue with established Plan of Care towards established PT goals. Ensure flexion and extension tolerated well

## 2017-10-12 ENCOUNTER — CLINICAL SUPPORT (OUTPATIENT)
Dept: REHABILITATION | Facility: OTHER | Age: 57
End: 2017-10-12
Attending: NURSE PRACTITIONER
Payer: MEDICARE

## 2017-10-12 DIAGNOSIS — M51.36 DDD (DEGENERATIVE DISC DISEASE), LUMBAR: ICD-10-CM

## 2017-10-12 PROCEDURE — 97110 THERAPEUTIC EXERCISES: CPT

## 2017-10-12 NOTE — PROGRESS NOTES
"[facility]      Name: Bri Santiago  Clinic Number: [id number]  Date of Treatment: 10/12/2017   Diagnosis:   Encounter Diagnosis   Name Primary?    DDD (degenerative disc disease), lumbar      Physician: Buffy Villagran,*    Pain pattern determined: 1 PEP (able tolerate EIL 10/12/17)  Plan of care signed: 17   Time in:   9:30 am  Time Out: 10:30  Total Treatment time: 45  Precautions: Hx of cancer, HTN, pancreatitis, VWD, LUMBAR FUSION, cervical surgery.   Visit #: 12    POC due:11/15/17    Reassessment done: 17, 10/2/17  Next reassessment due: 17    Subjective   Bri reports feeling a little sore and stiff today today. He knows "its gonna rain" soon and finds the change of weather is the only source of increased pain. He also reports increased symptoms when performing EIS. Pt did tolerate EIL without pain, pt instructed to defer EIS at this time. He reports overall, the program is helping him and he notes he is moving better and having less pain. He walked a mile this morning and had no pain. He does his HEP 2-3/day, he uses lumbar roll.  He is very motivated to feel better.   5/10 back pain pre visit, 4/10 post visit.       Patient reports their pain to be 5/10 on a 0-10 scale with 0 being no pain and 10 being the worst pain imaginable.    Pain Location: LB, Upper back and shoulders.     Work and leisure:  Currently on disability   Leisure: Be active and spend time with family.                      Pts goals:  Be able to do all the outdoor activities he used to do.      Objective     Baseline IM Testing Results:   Baseline IM Testing Results:   Date of testin2017  ROM 6-21 deg   Max Peak Torque 37    Min Peak Torque 3    Flex/Ext Ratio 12.33;1   % below normative data -93          [facility] IM Testing Results:   Date of testing: 10/02/2017  ROM 27-0 improved to 0-30 visit 10   Max Peak Torque 79   Min Peak Torque 32   Flex/Ext Ratio 2.4   % below " relative normative data -75   % Change from Initial Evaluation +917       MOVEMENT LOSS 10/4/17     ROM Loss   Flexion major loss improved to mod loss, fingers 8 inches from floor   Extension major loss improved to mod loss   Side bending Right moderate loss   Side bending Left moderate loss   Rotation Right major loss   Rotation Left major loss     5/10 Back pain  EIL- pleasant stretch, reduced symptoms, added trail to HEP  EIS with hand support, ERP worse, defer at this time  EIS with LE supported, pleasant stretch, no effect on symptoms     FOTO: Focus on Therapeutic Outcomes   Category: lumbar   % Impaired: 50%  Current Score  = CK = at least 40% but < 60% impaired, limited or restricted  Goal at Discharge Score = CJ = at least 20% but < 40% impaired, limited or restricted  FOTO  visit 10, 41 %, improved from initial  50%, progressing towards goals    Treatment    Pt was instructed in and performed the following:     Bri received therapeutic exercises to develop/improved posture, cardiovascular endurance, muscular endurance, lumbar/cervical ROM, strength and muscular endurance for 50 minutes including the following exercises:      [facility] 10/12/2017   Visit Number 12   VAS Pain Rating 5   Recumbent Bike Seat Pos. 20   Time 10   Scapular Retraction 10   Extension in Lying 10   Extension in Standing -   Flexion in Lying 10   Lumbar Extension Seat Pad -   Femur Restraint -   Top Dead Center -   Counterweight -   Lumbar Flexion -   Lumbar Extension -   Lumbar Peak Torque -   Min Torque -   Percent From Norm -   Lumbar Weight 66   Repetitions 20   Rating of Perceived Exertion 3   Ice - Z Lie (in min.) 10           LTR 10x  HSS with strap 5s/h 5x B  SKTC 10x  Flexion in lie 10  Ext in standing 10 reps          Peripheral muscle strengthening which included 1 set of 15-20 repetitions at a slow, controlled 7 second per rep pace focused on strengthening supporting musculature for improved body mechanics  and functional mobility.  Pt and therapist focused on proper form during treatment to ensure optimal strengthening of each targeted muscle group.  Machines were utilized including( torso rotation omit due to lumbar fusion,) leg extension introduced and add leg curl, chest press, upright row, Tricep extension, bicep curl, leg press, and hip abduction added on 2nd and third visit.     Home Exercise Program as follows:   Z-lie 10 min, 2x/day  Scapular retraction 10x, 3x/day  LTR 10x, 2x daily    HSS with strap 5s/h 5x  SKTC 10x  Flexion in lie 10x, 2/day  Prone lumbar extension 10x,  2/day       Handouts were given to the patient. Pt demo good understanding of the education provided. Harlin demonstrated good return demonstration of activities.      Lumbar roll use compliance:  Yes he is using  Additional exercises taught this treatment session:   HEP review   Prone lumbar extension 2/day       Assessment     Pt presents with moderate LBP 5/10 which improved to 4/10 following HEP stretches, resistance exercise, and ice in z-lie. Pt demonstrates increased symptoms with EIS, defer exercise at this time. Reassessed tolerance to EIL with reduced overall symptoms. Added trial to HEP.  Pt tolerated lumbar medx machine with no c/o LBP.   Pt tolerated the medx machines well with no c/o increased LB, shoulder pain or any limb pain. Educated him  to continue to ice in z lie postion at home with his B LE on the couch/ chair or bed to decrease pain. He understood. Praised him walking and encouraged him to cont.  Patient reports that he has been more aware of seated posture recently and it helps.     Patient is making fair progress towards established goals.  Pt will continue to benefit from skilled outpatient physical therapy to address the deficits stated in the impairment chart, provide pt/family education and to maximize pt's level of independence in the home and community environment.       Pt's spiritual, cultural and  educational needs considered and pt agreeable to plan of care and goals as stated below:     Medical necessity is demonstrated by the following IMPAIRMENTS/PROBLEMS:  History  Co-morbidities and personal factors that may impact the plan of care Examination  Body Structures and Functions, activity limitations and participation restrictions that may impact the plan of care Clinical Presentation Decision Making/ Complexity Score   Co-morbidities:   see PMH           Personal Factors:   no deficits Body Regions:   neck  back  lower extremities  upper extremities     Body Systems:   gross symmetry  ROM  strength  gait  transfers  motor control     Activity limitations:   Learning and applying knowledge  no deficits     General Tasks and Commands  no deficits     Communication  no deficits     Mobility  lifting and carrying objects  walking  no deficits     Self care  no deficits     Domestic Life  shopping  cooking  doing house work (cleaning house, washing dishes, laundry)  assisting others     Interactions/Relationships  no deficits     Life Areas  employment     Community and Social Life  recreation and leisure     Participation Restrictions:   Camping, playing sports    evolving clinical presentation with changing clinical characteristics    moderate          Short term goals:  6 weeks or 10 visits   1.  Pt will demonstrate increased lumbar ROM by at least 3 degrees from the initial ROM value with improvements noted in functional ROM and ability to perform ADLs Met 10/02/2017  2.  Pt will demonstrate increased maximum isometric torque value by 5% when compared to the initial value resulting in improved ability to perform bending, lifting, and carrying activities safely, confidently. Met 10/02/2017  3.  Patient report a reduction in worst pain score by 1-2 points for improved tolerance during work and recreational activities. (progressing, not met)  4.  Pt able to perform HEP correctly with minimal cueing or  supervision for therapist. (progressing, not met)     Long term goals: 13 weeks or 20 visits   1. Pt will demonstrate increased lumbar ROM by at least 6 degrees from initial ROM value, resulting in improved ability to perform functional fwd bending while standing and sitting.Met 10/02/2017  2. Pt will demonstrate increased maximum isometric torque value by 10% when compared to the initial value resulting in improved ability to perform bending, lifting, and carrying activities safely, confidently. Met 10/02/2017  3. Pt to demonstrate ability to independently control and reduce their pain through posture positioning and mechanical movements throughout a typical day. (progressing, not met)  4.  Patient will demonstrate improved overall function per FOTO Survey to CJ = at least 20% but < 40% impaired, limited or restricted score or less. (progressing, not met)      FOTO  visit 10, 41 %, improved from initial  50%, progressing towards goals    Plan   Continue with established Plan of Care towards established PT goals. Ensure flexion and extension in laying tolerated well

## 2017-10-13 DIAGNOSIS — M51.36 DDD (DEGENERATIVE DISC DISEASE), LUMBAR: Primary | ICD-10-CM

## 2017-10-13 DIAGNOSIS — M79.18 MYOFASCIAL MUSCLE PAIN: ICD-10-CM

## 2017-10-13 DIAGNOSIS — M51.37 DEGENERATION OF LUMBAR OR LUMBOSACRAL INTERVERTEBRAL DISC: ICD-10-CM

## 2017-10-13 DIAGNOSIS — M96.1 POSTLAMINECTOMY SYNDROME OF LUMBAR REGION: ICD-10-CM

## 2017-10-13 DIAGNOSIS — M48.061 SPINAL STENOSIS, LUMBAR REGION, WITHOUT NEUROGENIC CLAUDICATION: ICD-10-CM

## 2017-10-13 DIAGNOSIS — M53.3 SACROILIAC JOINT PAIN: ICD-10-CM

## 2017-10-13 DIAGNOSIS — M47.816 LUMBAR FACET ARTHROPATHY: ICD-10-CM

## 2017-10-16 ENCOUNTER — OFFICE VISIT (OUTPATIENT)
Dept: INTERNAL MEDICINE | Facility: CLINIC | Age: 57
End: 2017-10-16
Attending: INTERNAL MEDICINE
Payer: MEDICARE

## 2017-10-16 ENCOUNTER — DOCUMENTATION ONLY (OUTPATIENT)
Dept: REHABILITATION | Facility: OTHER | Age: 57
End: 2017-10-16

## 2017-10-16 VITALS
WEIGHT: 248 LBS | DIASTOLIC BLOOD PRESSURE: 52 MMHG | OXYGEN SATURATION: 97 % | BODY MASS INDEX: 34.59 KG/M2 | HEART RATE: 85 BPM | SYSTOLIC BLOOD PRESSURE: 100 MMHG

## 2017-10-16 DIAGNOSIS — E78.5 HYPERLIPIDEMIA, UNSPECIFIED HYPERLIPIDEMIA TYPE: ICD-10-CM

## 2017-10-16 DIAGNOSIS — I10 ESSENTIAL HYPERTENSION: ICD-10-CM

## 2017-10-16 DIAGNOSIS — M85.80 OSTEOPENIA, UNSPECIFIED LOCATION: ICD-10-CM

## 2017-10-16 DIAGNOSIS — Z00.00 ANNUAL PHYSICAL EXAM: Primary | ICD-10-CM

## 2017-10-16 DIAGNOSIS — Z23 NEEDS FLU SHOT: ICD-10-CM

## 2017-10-16 DIAGNOSIS — M51.36 DDD (DEGENERATIVE DISC DISEASE), LUMBAR: ICD-10-CM

## 2017-10-16 DIAGNOSIS — E55.9 VITAMIN D DEFICIENCY: ICD-10-CM

## 2017-10-16 DIAGNOSIS — R07.89 ATYPICAL CHEST PAIN: ICD-10-CM

## 2017-10-16 DIAGNOSIS — J45.998 ASTHMA IN REMISSION: ICD-10-CM

## 2017-10-16 DIAGNOSIS — Z23 NEED FOR PNEUMOCOCCAL VACCINATION: ICD-10-CM

## 2017-10-16 DIAGNOSIS — G47.33 OSA (OBSTRUCTIVE SLEEP APNEA): ICD-10-CM

## 2017-10-16 DIAGNOSIS — K21.9 GASTROESOPHAGEAL REFLUX DISEASE WITHOUT ESOPHAGITIS: ICD-10-CM

## 2017-10-16 DIAGNOSIS — Z80.0 FAMILY HISTORY OF COLON CANCER: ICD-10-CM

## 2017-10-16 DIAGNOSIS — J45.990 MILD EXERCISE-INDUCED ASTHMA: ICD-10-CM

## 2017-10-16 DIAGNOSIS — I70.0 THORACIC AORTA ATHEROSCLEROSIS: ICD-10-CM

## 2017-10-16 DIAGNOSIS — Z85.46 HISTORY OF PROSTATE CANCER: ICD-10-CM

## 2017-10-16 LAB
BILIRUB UR QL STRIP: NEGATIVE
CLARITY UR: CLEAR
COLOR UR: YELLOW
GLUCOSE UR QL STRIP: NEGATIVE
HGB UR QL STRIP: ABNORMAL
KETONES UR QL STRIP: NEGATIVE
LEUKOCYTE ESTERASE UR QL STRIP: NEGATIVE
MICROSCOPIC COMMENT: NORMAL
NITRITE UR QL STRIP: NEGATIVE
PH UR STRIP: 6 [PH] (ref 5–8)
PROT UR QL STRIP: NEGATIVE
RBC #/AREA URNS HPF: 4 /HPF (ref 0–4)
SP GR UR STRIP: 1.02 (ref 1–1.03)
SQUAMOUS #/AREA URNS HPF: 2 /HPF
URN SPEC COLLECT METH UR: ABNORMAL
UROBILINOGEN UR STRIP-ACNC: 1 EU/DL

## 2017-10-16 PROCEDURE — 99999 PR PBB SHADOW E&M-EST. PATIENT-LVL III: CPT | Mod: PBBFAC,,, | Performed by: INTERNAL MEDICINE

## 2017-10-16 PROCEDURE — 99499 UNLISTED E&M SERVICE: CPT | Mod: S$PBB,,, | Performed by: INTERNAL MEDICINE

## 2017-10-16 PROCEDURE — 90471 IMMUNIZATION ADMIN: CPT | Mod: 59,S$GLB,, | Performed by: INTERNAL MEDICINE

## 2017-10-16 PROCEDURE — 90686 IIV4 VACC NO PRSV 0.5 ML IM: CPT | Mod: S$GLB,,, | Performed by: INTERNAL MEDICINE

## 2017-10-16 PROCEDURE — 99396 PREV VISIT EST AGE 40-64: CPT | Mod: S$GLB,,, | Performed by: INTERNAL MEDICINE

## 2017-10-16 PROCEDURE — G0008 ADMIN INFLUENZA VIRUS VAC: HCPCS | Mod: 59,S$GLB,, | Performed by: INTERNAL MEDICINE

## 2017-10-16 PROCEDURE — 90732 PPSV23 VACC 2 YRS+ SUBQ/IM: CPT | Mod: S$GLB,,, | Performed by: INTERNAL MEDICINE

## 2017-10-16 PROCEDURE — 90715 TDAP VACCINE 7 YRS/> IM: CPT | Mod: S$GLB,,, | Performed by: INTERNAL MEDICINE

## 2017-10-16 PROCEDURE — G0009 ADMIN PNEUMOCOCCAL VACCINE: HCPCS | Mod: 59,S$GLB,, | Performed by: INTERNAL MEDICINE

## 2017-10-16 PROCEDURE — 81000 URINALYSIS NONAUTO W/SCOPE: CPT

## 2017-10-16 RX ORDER — ALBUTEROL SULFATE 90 UG/1
2 AEROSOL, METERED RESPIRATORY (INHALATION) EVERY 6 HOURS PRN
Qty: 18 G | Refills: 11 | Status: SHIPPED | OUTPATIENT
Start: 2017-10-16 | End: 2018-09-04 | Stop reason: SDUPTHER

## 2017-10-16 RX ORDER — ATORVASTATIN CALCIUM 20 MG/1
20 TABLET, FILM COATED ORAL DAILY
Qty: 90 TABLET | Refills: 3 | Status: SHIPPED | OUTPATIENT
Start: 2017-10-16 | End: 2018-04-12 | Stop reason: SDUPTHER

## 2017-10-16 NOTE — PROGRESS NOTES
"Pt presents to PT with 5/10 moderate low back pain. Pt performed bike warm up and HEP stretches.   Had difficulty checking in pt secondary to pending referral. Pt advised he can complete session but may be billed for session at later time. Pt expressed understanding of problem. Pt opted to stop and call insurance services. Pt said. "These things happen."   "

## 2017-10-16 NOTE — PROGRESS NOTES
Subjective:       Patient ID: Bri Santiago is a 57 y.o. male.    Chief Complaint: Annual Exam     Bri Santiago is a 57 y.o.  male who presents for Annual Exam  .  Patient Active Problem List   Diagnosis    Asthma in remission    Von Willebrand disease    HTN (hypertension)    ABDON (obstructive sleep apnea)    Spondylosis without myelopathy    Thoracic or lumbosacral neuritis or radiculitis, unspecified    Spinal stenosis, lumbar region, without neurogenic claudication    Degeneration of lumbar or lumbosacral intervertebral disc    Acquired spondylolisthesis    Pseudoarthrosis    Family history of colon cancer    Vitamin D deficiency    Encounter for monitoring long-term proton pump inhibitor therapy    Postlaminectomy syndrome of lumbar region    Myalgia and myositis    Facet syndrome    Gastroesophageal reflux disease without esophagitis    Osteopenia    Thoracic aorta atherosclerosis    BMI 34.0-34.9,adult    BPH (benign prostatic hypertrophy) with urinary obstruction    Allergic rhinitis    Allergic conjunctivitis of both eyes    Encounter for long-term current use of high risk medication    Gastroesophageal reflux disease with esophagitis    Hematochezia    DDD (degenerative disc disease), lumbar      Bri aSntiago is a 57 y.o.  male who presents for Annual Exam  Continues to complain of substernal cp. Not exertional, no diaphoresis, non radiating. Has hx of HTN, recently has been tired and BP has been low.     Patient has a history of hyperlipidemia. Pt is complaint with hismedication. Denies muscle cramping and weakness. Pt attempts to follow a low cholesterol diet and exercise. No CP, SOB, orthopnea or PND.         Health Maintenance       Date Due Completion Date    TETANUS VACCINE 08/23/1980 ---    Influenza Vaccine 08/01/2017 10/20/2016    Override on 10/13/2015: Done    Override on 10/20/2014: Done    Colonoscopy 06/19/2019 6/19/2017    Lipid Panel 10/18/2021 10/18/2016           Review of Systems   Constitutional: Negative for chills and fever.   HENT: Negative for rhinorrhea and sore throat.    Respiratory: Negative for cough and shortness of breath.    Cardiovascular: Positive for chest pain. Negative for palpitations.   Gastrointestinal: Negative for nausea and vomiting.   Genitourinary: Negative for dysuria and hematuria.   Musculoskeletal: Negative for arthralgias and back pain.   Skin: Negative for color change and rash.   Neurological: Negative for weakness and numbness.   Psychiatric/Behavioral: Negative for agitation and dysphoric mood.         Past Medical History:   Diagnosis Date    Acute pancreatitis     Anal fissure     Anemia     Arthritis     Asthma in remission     Back pain     BPH (benign prostatic hypertrophy)     Cancer 2000    prostate- treated at [facility] with chemo- in remission since 2000    Clotting disorder     Dysphagia 10/7/2014    Family history of colon cancer     Family history of early CAD     GERD (gastroesophageal reflux disease)     H. pylori infection 10/8/2014    Helicobacter pylori (H. pylori) infection     Chronic    History of chronic pancreatitis     HTN (hypertension)     Lumbago 11/12/2012    Obesity     ABDON (obstructive sleep apnea)     Pneumonia     during childhood     Prostate cancer 2000    dx and treated at [facility], had chemotherapy, in remission [id number]    Sacroiliac joint pain 2/10/2015    Spinal stenosis of lumbar region     Trouble in sleeping     Vitamin D deficiency disease     VWD (acquired von Willebrand's disease)        Past Surgical History:   Procedure Laterality Date    anal fissure repair      x2    CARPAL TUNNEL RELEASE  2003    left hand    CERVICAL DISCECTOMY  2003    COLONOSCOPY N/A 10/7/2015    Procedure: COLONOSCOPY;  Surgeon: Rosendo Boyer MD;  Location: [address];  Service: Endoscopy;  Laterality: N/A;  PM Prep    COLONOSCOPY N/A 6/19/2017    Procedure:  COLONOSCOPY;  Surgeon: Rosendo Boyer MD;  Location: [facility] ([address] [place] [address]);  Service: Endoscopy;  Laterality: N/A;  constipation prep (no DM no CHF)       hx of vonWillebrand's disease-will need infusion prior    LUMBAR FUSION  2012    SPINE SURGERY      TONSILLECTOMY      at age 24    VASECTOMY  1996       Family History   Problem Relation Age of Onset    Colon cancer Father 67     colon cancer    Hypertension Father     Glaucoma Father     Colon cancer Paternal Grandfather 65          Coronary artery disease Mother 45    Hypertension Mother     Heart disease Mother     No Known Problems Brother     No Known Problems Sister     No Known Problems Daughter     No Known Problems Son     Coronary artery disease Brother 51    No Known Problems Daughter     No Known Problems Daughter     No Known Problems Son     No Known Problems Son     Colon cancer Paternal Uncle 65    Diabetes Mellitus Paternal Grandmother        Social History   Substance Use Topics    Smoking status: Never Smoker    Smokeless tobacco: Never Used    Alcohol use No             Objective:   Blood pressure (!) 100/52, pulse 85, weight 112.5 kg (248 lb 0.3 oz), SpO2 97 %.     Physical Exam   Constitutional: He is oriented to person, place, and time. He appears well-developed and well-nourished. No distress.   HENT:   Head: Normocephalic and atraumatic.   Right Ear: External ear normal.   Left Ear: External ear normal.   Eyes: Conjunctivae are normal. No scleral icterus.   Neck: No JVD present. No thyromegaly present.   Cardiovascular: Normal heart sounds.  Exam reveals no gallop and no friction rub.    No murmur heard.  Pulmonary/Chest: Effort normal and breath sounds normal. He has no wheezes. He has no rales.   CP not reproducible with palpation   Abdominal: Soft. Bowel sounds are normal. He exhibits no distension. There is no tenderness.   Musculoskeletal: He exhibits no edema or tenderness.   Lymphadenopathy:     He has no  cervical adenopathy.   Neurological: He is alert and oriented to person, place, and time.   Skin: Skin is warm and dry.   Psychiatric: He has a normal mood and affect. Thought content normal.       Prior labs reviewed  Assessment/Plan:        Bri was seen today for annual exam.    Diagnoses and all orders for this visit:    Annual physical exam  -     Hemoglobin A1c; Future  -     Comprehensive metabolic panel; Future  -     Lipid panel; Future  -     Urinalysis Microscopic; Future  -     Urinalysis  Recommend daily sunscreen, cardiovascular exercise min 30 min 5 days per week. Seatbelts routinely.    Essential hypertension  Comments: currently hypotensive  stop chlorthalidone 25 mg   RN BP check in one week. if elevated will start hctz 12.5 mg daily    Mild exercise-induced asthma  Asthma in remission  -     Cont albuterol (PROVENTIL HFA) 90 mcg/actuation inhaler; Inhale 2 puffs into the lungs every 6 (six) hours as needed.    BMI 34.0-34.9,adult    -rec  diet and exercise  - increase intensity and duration of CV exercise to continue weight loss  - goal wt loss one pound per week  - portion control, healthy choices      Thoracic aorta atherosclerosis    Vitamin D deficiency  -     Vitamin D; Future    Needs flu shot  -     Influenza - Quadrivalent (3 years & older) (PF)    Need for pneumococcal vaccination  -     (In Office Administered) Pneumococcal Polysaccharide Vaccine (23 Valent) (SQ/IM)    ABDON (obstructive sleep apnea)    Family history of colon cancer  -     Cardiac treadmill stress test; Future    Gastroesophageal reflux disease without esophagitis    Osteopenia, unspecified location    Atypical chest pain  -     Cardiac treadmill stress test; Future    Hyperlipidemia, unspecified hyperlipidemia type  -     atorvastatin (LIPITOR) 20 MG tablet; Take 1 tablet (20 mg total) by mouth once daily.  -  well controlled  - cont current medication  - recommend low cholesterol diet and regular cardiovascular  exercise to reduce risk of cardiovascular events  - repeat lipid profile and CMP annually    History of prostate cancer  -     PROSTATE SPECIFIC ANTIGEN, DIAGNOSTIC; Future    Other orders  -     Cancel: Tdap Vaccine  -     (In Office Administered) Tdap Vaccine  -     Urinalysis Microscopic         ADDENDUM: labs showed severe hypokalemia, treated in ED and with po potassium  Repeat still low, replacement continued.   Recent lab now normal. Will not restart chorthalidone in the future without potassium supplementation

## 2017-10-16 NOTE — PROGRESS NOTES
"Patient was given vaccine information sheet for the Golicwomm44 (pneumococcal polyvalent) vaccine. The area of injection was palpated using the acromion process as a landmark. This area was cleaned with alcohol. Using a 25g 1" safety needle, 0.5mL of the vaccine was placed into the right deltoid muscle. The injection site was dressed with a bandage. Patient experienced no complications and was discharged in stable condition. Pneumovax 23 (pneumococcal polyvalent) vaccine Lot: [id number] Exp: [id number]    Patient was given vaccine information sheet for the Tdap immunization. The area of injection was palpated using the acromion process as a landmark. This area was cleaned with alcohol. Using a 25g 1" safety needle, 0.5mL of the vaccine was placed into the left deltoid muscle. The injection site was dressed with a bandage. Patient experienced no complications and was discharged in stable condition. Tdap Lot: [id number] Exp: [id number]    Patient was given vaccine information sheet for the Flu Vaccine. The area of injection was palpated using the acromion process as a landmark. This area was cleaned with alcohol. Using a 25g 1" safety needle, 0.5mL of the vaccine was placed into the left deltoid muscle. The injection site was dressed with a bandage. Patient experienced no complications and was discharged in stable condition. Fluzone vaccine Lot: [id number] Exp: [id number]    "

## 2017-10-17 ENCOUNTER — HOSPITAL ENCOUNTER (EMERGENCY)
Facility: OTHER | Age: 57
Discharge: HOME | End: 2017-10-17
Attending: EMERGENCY MEDICINE
Payer: MEDICARE

## 2017-10-17 ENCOUNTER — TELEPHONE (OUTPATIENT)
Dept: INTERNAL MEDICINE | Facility: CLINIC | Age: 57
End: 2017-10-17

## 2017-10-17 VITALS
WEIGHT: 248 LBS | HEIGHT: 71 IN | BODY MASS INDEX: 34.72 KG/M2 | OXYGEN SATURATION: 97 % | DIASTOLIC BLOOD PRESSURE: 79 MMHG | SYSTOLIC BLOOD PRESSURE: 122 MMHG | TEMPERATURE: 98 F | HEART RATE: 90 BPM | RESPIRATION RATE: 16 BRPM

## 2017-10-17 DIAGNOSIS — E87.6 HYPOKALEMIA: Primary | ICD-10-CM

## 2017-10-17 LAB
ALBUMIN SERPL BCP-MCNC: 3.8 G/DL
ALP SERPL-CCNC: 61 U/L
ALT SERPL W/O P-5'-P-CCNC: 21 U/L
ANION GAP SERPL CALC-SCNC: 8 MMOL/L
AST SERPL-CCNC: 21 U/L
BASOPHILS # BLD AUTO: 0.02 K/UL
BASOPHILS NFR BLD: 0.2 %
BILIRUB SERPL-MCNC: 1.5 MG/DL
BUN SERPL-MCNC: 20 MG/DL
CALCIUM SERPL-MCNC: 9 MG/DL
CHLORIDE SERPL-SCNC: 98 MMOL/L
CO2 SERPL-SCNC: 33 MMOL/L
CREAT SERPL-MCNC: 0.9 MG/DL
DIFFERENTIAL METHOD: NORMAL
EOSINOPHIL # BLD AUTO: 0.3 K/UL
EOSINOPHIL NFR BLD: 3 %
ERYTHROCYTE [DISTWIDTH] IN BLOOD BY AUTOMATED COUNT: 13.4 %
EST. GFR  (AFRICAN AMERICAN): >60 ML/MIN/1.73 M^2
EST. GFR  (NON AFRICAN AMERICAN): >60 ML/MIN/1.73 M^2
GLUCOSE SERPL-MCNC: 98 MG/DL
HCT VFR BLD AUTO: 41.5 %
HGB BLD-MCNC: 14 G/DL
LYMPHOCYTES # BLD AUTO: 1.9 K/UL
LYMPHOCYTES NFR BLD: 21.3 %
MAGNESIUM SERPL-MCNC: 1.9 MG/DL
MCH RBC QN AUTO: 30.1 PG
MCHC RBC AUTO-ENTMCNC: 33.7 G/DL
MCV RBC AUTO: 89 FL
MONOCYTES # BLD AUTO: 0.6 K/UL
MONOCYTES NFR BLD: 6.1 %
NEUTROPHILS # BLD AUTO: 6.3 K/UL
NEUTROPHILS NFR BLD: 69.3 %
PLATELET # BLD AUTO: 266 K/UL
PMV BLD AUTO: 9.7 FL
POTASSIUM SERPL-SCNC: 3.1 MMOL/L
PROT SERPL-MCNC: 8 G/DL
RBC # BLD AUTO: 4.65 M/UL
SODIUM SERPL-SCNC: 139 MMOL/L
WBC # BLD AUTO: 9.06 K/UL

## 2017-10-17 PROCEDURE — 63600175 PHARM REV CODE 636 W HCPCS: Performed by: EMERGENCY MEDICINE

## 2017-10-17 PROCEDURE — 85025 COMPLETE CBC W/AUTO DIFF WBC: CPT

## 2017-10-17 PROCEDURE — 93005 ELECTROCARDIOGRAM TRACING: CPT

## 2017-10-17 PROCEDURE — 96374 THER/PROPH/DIAG INJ IV PUSH: CPT

## 2017-10-17 PROCEDURE — 83735 ASSAY OF MAGNESIUM: CPT

## 2017-10-17 PROCEDURE — 93010 ELECTROCARDIOGRAM REPORT: CPT | Mod: ,,, | Performed by: INTERNAL MEDICINE

## 2017-10-17 PROCEDURE — 80053 COMPREHEN METABOLIC PANEL: CPT

## 2017-10-17 PROCEDURE — 99284 EMERGENCY DEPT VISIT MOD MDM: CPT | Mod: 25

## 2017-10-17 PROCEDURE — 25000003 PHARM REV CODE 250: Performed by: EMERGENCY MEDICINE

## 2017-10-17 RX ORDER — POTASSIUM CHLORIDE 20 MEQ/15ML
60 SOLUTION ORAL
Status: COMPLETED | OUTPATIENT
Start: 2017-10-17 | End: 2017-10-17

## 2017-10-17 RX ORDER — ONDANSETRON 2 MG/ML
4 INJECTION INTRAMUSCULAR; INTRAVENOUS
Status: COMPLETED | OUTPATIENT
Start: 2017-10-17 | End: 2017-10-17

## 2017-10-17 RX ADMIN — POTASSIUM CHLORIDE 60 MEQ: 40 SOLUTION ORAL at 12:10

## 2017-10-17 RX ADMIN — ONDANSETRON 4 MG: 2 INJECTION, SOLUTION INTRAMUSCULAR; INTRAVENOUS at 12:10

## 2017-10-17 NOTE — TELEPHONE ENCOUNTER
Pt called in stating that he had a vm stating that his potassium level is very low and they rec that he goes to ED b/c this can effect his heart.  Pt was calling in to see if he still go to the ED b/c this message was sent to him last night but he just woke up and checked his vm and wants to makes sure this plan is still in effect?    Please authorize and advise

## 2017-10-17 NOTE — TELEPHONE ENCOUNTER
Cate, I attempted to call this patient twice last evening and left my cell number but he did not return the call. He has a  Potassium of 2.7. I did see that he was on chlorthalidone but it looks like it was stopped.

## 2017-10-17 NOTE — ED NOTES
Patient Identifiers for Bri Santiago checked and correct  LOC: The patient is awake, alert and aware of environment with an appropriate affect, the patient is oriented x 3 and speaking appropriate.  APPEARANCE: Patient resting comfortably and in no acute distress, patient is clean and well groomed, patient's clothing is properly fastened.  SKIN: The skin is warm and dry, patient has normal skin turgor and moist mucus membranes,no rashes or lesions.Skin Intact , No Breakdown Noted  Musculoskeletal :  Normal range of motion noted. Moves all extremeties well, No swelling or tenderness noted  RESPIRATORY: Airway is open and patent, respirations are spontaneous, patient has a normal effort and rate.Bilateral Lungs Sounds are clear  CARDIAC: Patient has a normal rate and rhythm, no periphreal edema noted, capillary refill < 3 seconds.   ABDOMEN: Soft and non tender to palpation, no distention noted. Bowels Sounds are normal  PULSES: 2+  And symmetrical in all extremeties  NEUROLOGIC: PERRLA . facial expression is symmetrical, patient moving all extremities, normal sensation in all extremities when touched with a finger.The patient is awake, alert and cooperative with a calm affect, patient is aware of environment.    Will continue to monitor

## 2017-10-17 NOTE — TELEPHONE ENCOUNTER
Returned pt's call - he reports profound fatigue, palpitations and intermittent substernal chest pain x 1 week. rec he go to er for symptoms - reviewed potassium level at 2.7. Pt denies use of chlorthalidone - last use was 6 months ago. Denies use of any other diuretic medication - no hctz or furosemide. Was on oral potassium in past but reports unable to swallow tabs due to size of pill - reports thinks was on this while on diuretic in past. Can consider liquid oral potassium if repeat bmp still markedly low.     rec he go to er due to mult complaints above

## 2017-10-17 NOTE — ED PROVIDER NOTES
"Encounter Date: 10/17/2017    SCRIBE #1 NOTE: I, Lois Bass, am scribing for, and in the presence of,  Dr. Go. I have scribed the entire note.       History     Chief Complaint   Patient presents with    Abnormal Lab     " My doctor called me and told me my potassium was too low at 2.7, so come to the ER. They left me a messgae last night to come here". + generalzied fatigue and weakness x several days. + intermittent left sided chest pains x 1 week. non radiating, described as "sharp, quick pains". Denies SOB or dizziness     Time seen by provider: 11:35 AM    This is a 57 y.o. male who presents with complaint of abnormal lab finding. He reports onset of symptoms was a few days ago. The patient states he had routine blood work drawn with his doctor yesterday. He was then called last night concerning low potassium of 2.7. The patient reports he has been feeling weak and light headed for the last few weeks. He denies any associated nausea, vomiting, shortness of breath, palpitation, fever or chills. The patient reports he has experienced similar symptoms in the past. At that time he was taking Lasix. He reports he has been off of Lasix for about 6 months.       The history is provided by the patient.     Review of patient's allergies indicates:   Allergen Reactions    Ace inhibitors     Aspirin      Other reaction(s): Hives    Codeine     Dilaudid  [hydromorphone]      Other reaction(s): Itching    Penicillins Hives and Swelling     Has had allergy testing and can prob tolerate penicillin     Past Medical History:   Diagnosis Date    Acute pancreatitis     Anal fissure     Anemia     Arthritis     Asthma in remission     Back pain     BPH (benign prostatic hypertrophy)     Cancer 2000    prostate- treated at [facility] with chemo- in remission since 2000    Clotting disorder     Dysphagia 10/7/2014    Family history of colon cancer     Family history of early CAD     GERD (gastroesophageal " reflux disease)     H. pylori infection 10/8/2014    Helicobacter pylori (H. pylori) infection     Chronic    History of chronic pancreatitis     HTN (hypertension)     Lumbago 11/12/2012    Obesity     ABDON (obstructive sleep apnea)     Pneumonia     during childhood     Prostate cancer 2000    dx and treated at [facility] had chemotherapy, in remission [id number]    Sacroiliac joint pain 2/10/2015    Spinal stenosis of lumbar region     Trouble in sleeping     Vitamin D deficiency disease     VWD (acquired von Willebrand's disease)      Past Surgical History:   Procedure Laterality Date    anal fissure repair      x2    CARPAL TUNNEL RELEASE  2003    left hand    CERVICAL DISCECTOMY  2003    COLONOSCOPY N/A 10/7/2015    Procedure: COLONOSCOPY;  Surgeon: Rosendo Boyer MD;  Location: [facility] ENDO (4TH FLR);  Service: Endoscopy;  Laterality: N/A;  PM Prep    COLONOSCOPY N/A 6/19/2017    Procedure: COLONOSCOPY;  Surgeon: Rosendo Boyer MD;  Location: [facility] ENDO (4TH FLR);  Service: Endoscopy;  Laterality: N/A;  constipation prep (no DM no CHF)       hx of vonWillebrand's disease-will need infusion prior    LUMBAR FUSION  2012    SPINE SURGERY      TONSILLECTOMY      at age 24    VASECTOMY  1996     Family History   Problem Relation Age of Onset    Colon cancer Father 67     colon cancer    Hypertension Father     Glaucoma Father     Colon cancer Paternal Grandfather 65          Coronary artery disease Mother 45    Hypertension Mother     Heart disease Mother     No Known Problems Brother     No Known Problems Sister     No Known Problems Daughter     No Known Problems Son     Coronary artery disease Brother 51    No Known Problems Daughter     No Known Problems Daughter     No Known Problems Son     No Known Problems Son     Colon cancer Paternal Uncle 65    Diabetes Mellitus Paternal Grandmother      Social History   Substance Use Topics    Smoking status: Never  Smoker    Smokeless tobacco: Never Used    Alcohol use No     Review of Systems   Constitutional: Positive for fatigue. Negative for chills and fever.   Eyes: Negative for visual disturbance.   Respiratory: Negative for cough and shortness of breath.    Cardiovascular: Negative for chest pain and palpitations.   Gastrointestinal: Negative for abdominal pain, diarrhea and vomiting.   Genitourinary: Negative for dysuria and frequency.   Musculoskeletal: Negative for joint swelling, neck pain and neck stiffness.   Skin: Negative for rash.   Neurological: Positive for weakness (generalized). Negative for numbness and headaches.   Psychiatric/Behavioral: Negative for confusion.       Physical Exam     Initial Vitals [10/17/17 1117]   BP Pulse Resp Temp SpO2   (!) 150/69 96 16 97.6 °F (36.4 °C) 97 %      MAP       96         Physical Exam    Nursing note and vitals reviewed.  Constitutional: He appears well-developed and well-nourished. He is not diaphoretic. No distress.   HENT:   Head: Normocephalic and atraumatic.   Right Ear: External ear normal.   Left Ear: External ear normal.   Eyes: Conjunctivae and EOM are normal.   Neck: Normal range of motion. Neck supple.   Cardiovascular: Normal rate, regular rhythm and normal heart sounds. Exam reveals no gallop and no friction rub.    No murmur heard.  Pulmonary/Chest: He has no wheezes. He has no rhonchi. He has no rales.   Abdominal: Soft. Bowel sounds are normal. There is no tenderness. There is no rebound and no guarding.   Musculoskeletal: Normal range of motion. He exhibits no edema or tenderness.   Lymphadenopathy:     He has no cervical adenopathy.   Neurological: He is alert and oriented to person, place, and time. He has normal strength.   Skin: Skin is warm and dry. No rash noted.         ED Course   Procedures  Labs Reviewed   CBC W/ AUTO DIFFERENTIAL   COMPREHENSIVE METABOLIC PANEL   MAGNESIUM     EKG Readings: (Independently Interpreted)   EKG Reading:  Normal sinus rhythm at 92 bpm. No STEMI. Normal ST segments          Medical Decision Making:   Independently Interpreted Test(s):   I have ordered and independently interpreted EKG Reading(s) - see prior notes  Clinical Tests:   Lab Tests: Ordered and Reviewed  Medical Tests: Ordered and Reviewed  ED Management:  Patient presents complaining of an abnormal lab that he was informed of by his primary care.  He reports his low potassium.  Reports it happened once before, but he has since stopped Lasix.  He is complaining of fatigue but no other symptoms.  EKG demonstrates no concerning changes.  Repeat lab work here demonstrates much improved 3.1.  Replaced orally.  Discharge follow-up with primary care.    I did have an extensive talk regarding signs to return for and need for follow up. Patient expressed understanding and will monitor symptoms closely and follow-up as needed.    PAU Go M.D.  10/17/2017  1:12 PM      Additional MDM:   EKG: I have independently interpreted EKG(s) - see notes.          Scribe Attestation:   Scribe #1: I performed the above scribed service and the documentation accurately describes the services I performed. I attest to the accuracy of the note.    Attending Attestation:           Physician Attestation for Scribe:      Comments: I, Dr. Axel Go, personally performed the services described in this documentation. All medical record entries made by the scribe were at my direction and in my presence.  I have reviewed the chart and agree that the record reflects my personal performance and is accurate and complete. Axel Go MD.  1:13 PM 10/17/2017              ED Course      Clinical Impression:     1. Hypokalemia                                 Axel Go MD  10/17/17 [id number]

## 2017-10-17 NOTE — PROGRESS NOTES
Called patient to tell him his potassium is 2.7. No answer on only phone number noted in chart. I left a message and my cell phone and suggested the patient go to the ER. Per chart he had been on chlorthalidone but appears to have been stopped on 10/4.

## 2017-10-18 ENCOUNTER — TELEPHONE (OUTPATIENT)
Dept: INTERNAL MEDICINE | Facility: CLINIC | Age: 57
End: 2017-10-18

## 2017-10-18 DIAGNOSIS — E87.6 HYPOKALEMIA: Primary | ICD-10-CM

## 2017-10-18 RX ORDER — POTASSIUM CHLORIDE 3 G/15ML
20 SOLUTION ORAL DAILY
Qty: 473 ML | Refills: 1 | Status: SHIPPED | OUTPATIENT
Start: 2017-10-18 | End: 2017-10-25 | Stop reason: SDUPTHER

## 2017-10-18 NOTE — TELEPHONE ENCOUNTER
----- Message from Jeannine Gomez sent at 10/18/2017  1:58 PM CDT -----  Contact: pt  x_  1st Request  _  2nd Request  _  3rd Request      Who:pt    Why: returning call back     What Number to Call Back: [phone]    When to Expect a call back: (Before the end of the day)   -- if call after 3:00 call back will be tomorrow.

## 2017-10-18 NOTE — TELEPHONE ENCOUNTER
"----- Message from Ghazala Mace sent at 10/18/2017 12:36 PM CDT -----  Contact: Patient himself  X 1st Request  _  2nd Request  _  3rd Request    Who:  Bri Santiago (mrn# [id number])    Why:  Patient called requesting to schedule with either Dr. Galeas or Dr. Lopez ONLY before the end of the week.  Says, "he was advised by the ED doctor to follow-up with his PCP within 72 hours because his potassium is very low."  I did attempt to schedule per patient's request but was unsuccessful.  Please give patient a call back at your earliest convenience.     THANKS!    What Number to Call Back: [phone]    When to Expect a call back: (Before the end of the day)   -- if the call is after 12:00, the call back will be tomorrow.                          "

## 2017-10-18 NOTE — TELEPHONE ENCOUNTER
called pt and left an vm stating that I went ahead and scheduled him to see  on 10/25/2017 at 11 am for an ED f/u due to his low potassium . Left office number for pt to call and confirm if this time and day is okay .

## 2017-10-18 NOTE — TELEPHONE ENCOUNTER
Please call mr finley and make sure he is taking potassium supplements. Let him kow I sent in a liquid potassium supplement if he can't take the pils. Needs to repeat his labs in one week.

## 2017-10-18 NOTE — TELEPHONE ENCOUNTER
Call pt and inform him that  escribe him some liquid potassium choride at 40 mg and she wants to repeat lab in one wk.Pt agrees and verbalize and is schedule for repeat labs on 10/25.

## 2017-10-24 ENCOUNTER — CLINICAL SUPPORT (OUTPATIENT)
Dept: REHABILITATION | Facility: OTHER | Age: 57
End: 2017-10-24
Attending: NURSE PRACTITIONER
Payer: MEDICARE

## 2017-10-24 DIAGNOSIS — M51.36 DDD (DEGENERATIVE DISC DISEASE), LUMBAR: ICD-10-CM

## 2017-10-24 PROCEDURE — 97110 THERAPEUTIC EXERCISES: CPT

## 2017-10-24 NOTE — PROGRESS NOTES
[facility]      Name: Bri Santiago  Clinic Number: [id number]  Date of Treatment: 10/24/2017   Diagnosis:   Encounter Diagnosis   Name Primary?    DDD (degenerative disc disease), lumbar      Physician: Buffy Villagran,Christina    Pain pattern determined: 1 PEP (able tolerate EIL 10/12/17)  Plan of care signed: 17   Time in:   9:30 am  Time Out: 10:30  Total Treatment time: 45  Precautions: Hx of cancer, HTN, pancreatitis, VWD, LUMBAR FUSION, cervical surgery.   Visit #: 13    POC due:11/15/17    Reassessment done: 17, 10/2/17, 10/24/17  Next reassessment due: 17    Subjective   Bri reports feeling a little sore and stiff today today. Pt no longer performing EIS as it irritates symptoms. Pt did tolerate EIL without pain and overall reduced symptoms. He reports overall, the program is helping him and he notes he is moving better and having less pain. He does his HEP 2-3/day, he uses lumbar roll.  He is very motivated to feel better.   5/10 back pain pre visit, 4/10 post visit.   Pt had a recent onset of hypokalemia with some lingering fatigue. Pt did not report any SOB, heart palpitations, excessive sweating, or any difficulty performing exercises.     Patient reports their pain to be 5/10 on a 0-10 scale with 0 being no pain and 10 being the worst pain imaginable.    Pain Location: LB, Upper back and shoulders.     Work and leisure:  Currently on disability   Leisure: Be active and spend time with family.                      Pts goals:  Be able to do all the outdoor activities he used to do.      Objective     Baseline IM Testing Results:   Baseline IM Testing Results:   Date of testin2017  ROM 6-21 deg   Max Peak Torque 37    Min Peak Torque 3    Flex/Ext Ratio 12.33;1   % below normative data -93          [facility] IM Testing Results:   Date of testing: 10/02/2017  ROM 27-0 improved to 0-30 visit 10   Max Peak Torque 79   Min Peak Torque 32    Flex/Ext Ratio 2.4   % below relative normative data -75   % Change from Initial Evaluation +917       MOVEMENT LOSS     ROM Loss   Flexion major loss improved to mod loss, fingers 8 inches from floor   Extension major loss improved to mod loss   Side bending Right moderate loss   Side bending Left moderate loss   Rotation Right major loss   Rotation Left major loss     FOTO: Focus on Therapeutic Outcomes   Category: lumbar   % Impaired: 50%  Current Score  = CK = at least 40% but < 60% impaired, limited or restricted  Goal at Discharge Score = CJ = at least 20% but < 40% impaired, limited or restricted  FOTO  visit 10, 41 %, improved from initial  50%, progressing towards goals    Treatment    Pt was instructed in and performed the following:     Bri received therapeutic exercises to develop/improved posture, cardiovascular endurance, muscular endurance, lumbar/cervical ROM, strength and muscular endurance for 50 minutes including the following exercises:      [facility] 10/24/2017   Visit Number 13   VAS Pain Rating 5   Recumbent Bike Seat Pos. 20   Time 10   Scapular Retraction 10   Extension in Lying 10   Extension in Standing -   Flexion in Lying 10   Lumbar Extension Seat Pad -   Femur Restraint -   Top Dead Center -   Counterweight -   Lumbar Flexion -   Lumbar Extension -   Lumbar Peak Torque -   Min Torque -   Percent From Norm -   Lumbar Weight 69   Repetitions 20   Rating of Perceived Exertion 3   Ice - Z Lie (in min.) 10         LTR 10x  HSS with strap 5s/h 5x B  SKTC 10x  Flexion in lie 10  Ext in lying 10 reps      Peripheral muscle strengthening which included 1 set of 15-20 repetitions at a slow, controlled 7 second per rep pace focused on strengthening supporting musculature for improved body mechanics and functional mobility.  Pt and therapist focused on proper form during treatment to ensure optimal strengthening of each targeted muscle group.  Machines were utilized including( torso  rotation omit due to lumbar fusion,) leg extension introduced and add leg curl, chest press, upright row, Tricep extension, bicep curl, leg press, and hip abduction added on 2nd and third visit.     Home Exercise Program as follows:   Z-lie 10 min, 2x/day  Scapular retraction 10x, 3x/day  LTR 10x, 2x daily    HSS with strap 5s/h 5x  SKTC 10x  Flexion in lie 10x, 2/day  Prone lumbar extension 10x,  2/day       Handouts were given to the patient. Pt demo good understanding of the education provided. Harlin demonstrated good return demonstration of activities.      Lumbar roll use compliance:  Yes he is using  Additional exercises taught this treatment session:   HEP review     Assessment     Pt presents with moderate LBP 5/10 which improved to 4/10 following HEP stretches, resistance exercise, and ice in z-lie. Pt continues to demonstrate improved tolerance to EIL with reduced overall symptoms. Continue trial for HEP.  Pt tolerated lumbar medx machine with no c/o LBP.   Pt tolerated the medx machines well with no c/o increased LB, shoulder pain or any limb pain. Educated him  to continue to ice in z lie postion at home with his B LE on the couch/ chair or bed to decrease pain. He understood. Praised him walking and encouraged him to cont.  Patient reports that he has been more aware of seated posture recently and it helps.  Pt had a recent onset of hypokalemia with some lingering fatigue. Pt did not report any SOB, heart palpitations, excessive sweating, or any difficulty performing exercises.    Patient is making fair progress towards established goals.  Pt will continue to benefit from skilled outpatient physical therapy to address the deficits stated in the impairment chart, provide pt/family education and to maximize pt's level of independence in the home and community environment.       Pt's spiritual, cultural and educational needs considered and pt agreeable to plan of care and goals as stated below:      Medical necessity is demonstrated by the following IMPAIRMENTS/PROBLEMS:  History  Co-morbidities and personal factors that may impact the plan of care Examination  Body Structures and Functions, activity limitations and participation restrictions that may impact the plan of care Clinical Presentation Decision Making/ Complexity Score   Co-morbidities:   see PMH           Personal Factors:   no deficits Body Regions:   neck  back  lower extremities  upper extremities     Body Systems:   gross symmetry  ROM  strength  gait  transfers  motor control     Activity limitations:   Learning and applying knowledge  no deficits     General Tasks and Commands  no deficits     Communication  no deficits     Mobility  lifting and carrying objects  walking  no deficits     Self care  no deficits     Domestic Life  shopping  cooking  doing house work (cleaning house, washing dishes, laundry)  assisting others     Interactions/Relationships  no deficits     Life Areas  employment     Community and Social Life  recreation and leisure     Participation Restrictions:   Camping, playing sports    evolving clinical presentation with changing clinical characteristics    moderate          Short term goals:  6 weeks or 10 visits   1.  Pt will demonstrate increased lumbar ROM by at least 3 degrees from the initial ROM value with improvements noted in functional ROM and ability to perform ADLs Met 10/02/2017  2.  Pt will demonstrate increased maximum isometric torque value by 5% when compared to the initial value resulting in improved ability to perform bending, lifting, and carrying activities safely, confidently. Met 10/02/2017  3.  Patient report a reduction in worst pain score by 1-2 points for improved tolerance during work and recreational activities. (progressing, not met)  4.  Pt able to perform HEP correctly with minimal cueing or supervision for therapist. (progressing, not met)     Long term goals: 13 weeks or 20 visits   1.  Pt will demonstrate increased lumbar ROM by at least 6 degrees from initial ROM value, resulting in improved ability to perform functional fwd bending while standing and sitting.Met 10/02/2017  2. Pt will demonstrate increased maximum isometric torque value by 10% when compared to the initial value resulting in improved ability to perform bending, lifting, and carrying activities safely, confidently. Met 10/02/2017  3. Pt to demonstrate ability to independently control and reduce their pain through posture positioning and mechanical movements throughout a typical day. (progressing, not met)  4.  Patient will demonstrate improved overall function per FOTO Survey to CJ = at least 20% but < 40% impaired, limited or restricted score or less. (progressing, not met)      FOTO  visit 10, 41 %, improved from initial  50%, progressing towards goals    Plan   Continue with established Plan of Care towards established PT goals. Ensure flexion and extension in laying tolerated well

## 2017-10-25 ENCOUNTER — OFFICE VISIT (OUTPATIENT)
Dept: INTERNAL MEDICINE | Facility: CLINIC | Age: 57
End: 2017-10-25
Attending: INTERNAL MEDICINE
Payer: MEDICARE

## 2017-10-25 ENCOUNTER — LAB VISIT (OUTPATIENT)
Dept: LAB | Facility: OTHER | Age: 57
End: 2017-10-25
Attending: INTERNAL MEDICINE
Payer: MEDICARE

## 2017-10-25 ENCOUNTER — OFFICE VISIT (OUTPATIENT)
Dept: PAIN MEDICINE | Facility: CLINIC | Age: 57
End: 2017-10-25
Payer: MEDICARE

## 2017-10-25 VITALS
DIASTOLIC BLOOD PRESSURE: 81 MMHG | BODY MASS INDEX: 36.11 KG/M2 | WEIGHT: 257.94 LBS | SYSTOLIC BLOOD PRESSURE: 109 MMHG | RESPIRATION RATE: 16 BRPM | HEIGHT: 71 IN | TEMPERATURE: 97 F | HEART RATE: 74 BPM

## 2017-10-25 VITALS
OXYGEN SATURATION: 97 % | BODY MASS INDEX: 36.15 KG/M2 | HEART RATE: 82 BPM | WEIGHT: 258.19 LBS | DIASTOLIC BLOOD PRESSURE: 84 MMHG | HEIGHT: 71 IN | SYSTOLIC BLOOD PRESSURE: 120 MMHG

## 2017-10-25 DIAGNOSIS — M96.1 POSTLAMINECTOMY SYNDROME OF LUMBAR REGION: ICD-10-CM

## 2017-10-25 DIAGNOSIS — M51.36 DDD (DEGENERATIVE DISC DISEASE), LUMBAR: Primary | ICD-10-CM

## 2017-10-25 DIAGNOSIS — E87.6 HYPOKALEMIA: Primary | ICD-10-CM

## 2017-10-25 DIAGNOSIS — M79.18 MYOFASCIAL MUSCLE PAIN: ICD-10-CM

## 2017-10-25 DIAGNOSIS — E87.6 HYPOKALEMIA: ICD-10-CM

## 2017-10-25 DIAGNOSIS — M48.061 SPINAL STENOSIS, LUMBAR REGION, WITHOUT NEUROGENIC CLAUDICATION: ICD-10-CM

## 2017-10-25 DIAGNOSIS — M53.3 SACROILIAC JOINT PAIN: ICD-10-CM

## 2017-10-25 DIAGNOSIS — M51.37 DEGENERATION OF LUMBAR OR LUMBOSACRAL INTERVERTEBRAL DISC: ICD-10-CM

## 2017-10-25 LAB
ANION GAP SERPL CALC-SCNC: 6 MMOL/L
BUN SERPL-MCNC: 13 MG/DL
CALCIUM SERPL-MCNC: 8.8 MG/DL
CHLORIDE SERPL-SCNC: 103 MMOL/L
CO2 SERPL-SCNC: 30 MMOL/L
CREAT SERPL-MCNC: 1 MG/DL
EST. GFR  (AFRICAN AMERICAN): >60 ML/MIN/1.73 M^2
EST. GFR  (NON AFRICAN AMERICAN): >60 ML/MIN/1.73 M^2
GLUCOSE SERPL-MCNC: 91 MG/DL
POTASSIUM SERPL-SCNC: 3.7 MMOL/L
SODIUM SERPL-SCNC: 139 MMOL/L

## 2017-10-25 PROCEDURE — 99213 OFFICE O/P EST LOW 20 MIN: CPT | Mod: S$GLB,,, | Performed by: INTERNAL MEDICINE

## 2017-10-25 PROCEDURE — 99999 PR PBB SHADOW E&M-EST. PATIENT-LVL III: CPT | Mod: PBBFAC,,, | Performed by: NURSE PRACTITIONER

## 2017-10-25 PROCEDURE — 36415 COLL VENOUS BLD VENIPUNCTURE: CPT

## 2017-10-25 PROCEDURE — 99999 PR PBB SHADOW E&M-EST. PATIENT-LVL III: CPT | Mod: PBBFAC,,, | Performed by: INTERNAL MEDICINE

## 2017-10-25 PROCEDURE — 80048 BASIC METABOLIC PNL TOTAL CA: CPT

## 2017-10-25 PROCEDURE — 99214 OFFICE O/P EST MOD 30 MIN: CPT | Mod: S$GLB,,, | Performed by: NURSE PRACTITIONER

## 2017-10-25 RX ORDER — TRAMADOL HYDROCHLORIDE 50 MG/1
50 TABLET ORAL EVERY 6 HOURS PRN
Qty: 120 TABLET | Refills: 2 | Status: SHIPPED | OUTPATIENT
Start: 2017-10-25 | End: 2018-01-25 | Stop reason: SDUPTHER

## 2017-10-25 RX ORDER — POTASSIUM CHLORIDE 3 G/15ML
20 SOLUTION ORAL DAILY
Qty: 473 ML | Refills: 1 | Status: SHIPPED | OUTPATIENT
Start: 2017-10-25 | End: 2017-11-24
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
## 2017-10-25 NOTE — PROGRESS NOTES
"Subjective:   Patient ID: Bri Santiago is a 57 y.o. male  Chief complaint:   Chief Complaint   Patient presents with    Abnormal Lab     low potassium ED f/u       HPI    Here for er f/u of low potassium  Was low in past and pt not on supplements at that time due to difficulty with swallowing large potassium pills.   K was 2.7 and when repeated in ER without tx was 3.1.   Has been taking 20meq or liquid potassium daily since discharge. tony this well.   Hx of HTN in past - weaned off of bp meds due to being low normal in past - bp wnl today  Has been off of diurretic for > 6 months - reports has improved diet and eating healthy at this time.     Is scheduled for stress test - no cp, sob, chest pressure, palpitations at this time.     Had MRI of neck and lumbar spine outside of [facility] and had to reschedule stress test originally scheduled for 10/17 per pt - followed by [facility] for this.     Reviewed recent labs with pt and informed him of PSA results. He f/u with urology yearly. UA showed microscopic hematuria - denies urinary frequency, urgency, burning with urination, hematuria, foul smelling urine, cloudy urine, suprapubic pain or pressure, flank pain, pain with defecation. He reports has had blood in urine in past      Review of Systems    Objective:  Vitals:    10/25/17 1127   BP: 120/84   Pulse: 82   SpO2: 97%   Weight: 117.1 kg (258 lb 2.5 oz)   Height: 5' 11" (1.803 m)     Body mass index is 36.01 kg/m².    Physical Exam   Constitutional: He is oriented to person, place, and time. He appears well-developed and well-nourished. No distress.   HENT:   Head: Normocephalic and atraumatic.   Eyes: Conjunctivae and EOM are normal.   Neck: Neck supple.   Cardiovascular: Normal rate, regular rhythm and intact distal pulses.    Pulmonary/Chest: Effort normal and breath sounds normal.   Abdominal: Soft. Bowel sounds are normal.   Lymphadenopathy:     He has no cervical adenopathy.   Neurological: He is alert " and oriented to person, place, and time.   Skin: Skin is warm and dry. He is not diaphoretic.   Psychiatric: He has a normal mood and affect. His behavior is normal. Judgment and thought content normal.   Vitals reviewed.      Assessment:  1. Hypokalemia        Plan:  Bri was seen today for abnormal lab.    Diagnoses and all orders for this visit:    Hypokalemia: improved after resuming 20meq of oral potassium. Reviewed lab from today with pt. Will cont 20meq daily and repeat bmp in 2 weeks to eval for stability   -     Basic metabolic panel; Future    Other orders  -     potassium chloride 40 mEq/15 mL Liqd; Take 7.5 mLs (20 mEq total) by mouth once daily.    Health Maintenance   Topic Date Due    Colonoscopy  06/19/2019    Lipid Panel  10/16/2022    TETANUS VACCINE  10/16/2027    Hepatitis C Screening  Completed    Influenza Vaccine  Completed     Pt to boy f/u with urology - denies prostatitis symptoms - UA with microscopic hematuria - he reports will schedule appt after this appt

## 2017-10-26 ENCOUNTER — CLINICAL SUPPORT (OUTPATIENT)
Dept: REHABILITATION | Facility: OTHER | Age: 57
End: 2017-10-26
Attending: NURSE PRACTITIONER
Payer: MEDICARE

## 2017-10-26 DIAGNOSIS — M51.36 DDD (DEGENERATIVE DISC DISEASE), LUMBAR: ICD-10-CM

## 2017-10-26 PROCEDURE — 97110 THERAPEUTIC EXERCISES: CPT

## 2017-10-26 NOTE — PROGRESS NOTES
[facility]      Name: Bri Santiago  Clinic Number: [id number]  Date of Treatment: 10/26/2017   Diagnosis:   Encounter Diagnosis   Name Primary?    DDD (degenerative disc disease), lumbar      Physician: Buffy Villagran,Christina    Pain pattern determined: 1 PEP (able tolerate EIL 10/12/17)  Plan of care signed: 17   Time in:   2:40   Time Out: 3:30  Total Treatment time: 45  Precautions: Hx of cancer, HTN, pancreatitis, VWD, LUMBAR FUSION, cervical surgery.   Visit #: 14    POC due:11/15/17    Reassessment done: 17, 10/2/17, 10/24/17  Next reassessment due: 17    Subjective   Bri reports feeling a little sore and stiff today today. Pt no longer performing EIS as it irritates symptoms. Pt did tolerate EIL without pain and overall reduced symptoms. He reports overall, the program is helping him and he notes he is moving better and having less pain. He does his HEP 2-3/day, he uses lumbar roll.  He is very motivated to feel better.   5/10 back pain pre visit, 4/10 post visit.   Pt had a recent onset of hypokalemia with some lingering fatigue. Pt did not report any SOB, heart palpitations, excessive sweating, or any difficulty performing exercises.     Patient reports their pain to be 5/10 on a 0-10 scale with 0 being no pain and 10 being the worst pain imaginable.    Pain Location: LB, Upper back and shoulders.     Work and leisure:  Currently on disability   Leisure: Be active and spend time with family.                      Pts goals:  Be able to do all the outdoor activities he used to do.      Objective     Baseline IM Testing Results:   Baseline IM Testing Results:   Date of testin2017  ROM 6-21 deg   Max Peak Torque 37    Min Peak Torque 3    Flex/Ext Ratio 12.33;1   % below normative data -93          [facility]point IM Testing Results:   Date of testing: 10/02/2017  ROM 27-0 improved to 0-30 visit 10   Max Peak Torque 79   Min Peak Torque 32   Flex/Ext  Ratio 2.4   % below relative normative data -75   % Change from Initial Evaluation +917       MOVEMENT LOSS     ROM Loss   Flexion major loss improved to mod loss, fingers 8 inches from floor   Extension major loss improved to mod loss   Side bending Right moderate loss   Side bending Left moderate loss   Rotation Right major loss   Rotation Left major loss     FOTO: Focus on Therapeutic Outcomes   Category: lumbar   % Impaired: 50%  Current Score  = CK = at least 40% but < 60% impaired, limited or restricted  Goal at Discharge Score = CJ = at least 20% but < 40% impaired, limited or restricted  FOTO  visit 10, 41 %, improved from initial  50%, progressing towards goals    Treatment    Pt was instructed in and performed the following:     Bri received therapeutic exercises to develop/improved posture, cardiovascular endurance, muscular endurance, lumbar/cervical ROM, strength and muscular endurance for 50 minutes including the following exercises:      [facility] 10/26/2017   Visit Number 14   VAS Pain Rating 5   Recumbent Bike Seat Pos. 20   Time 10   Scapular Retraction 10   Extension in Lying 10   Extension in Standing -   Flexion in Lying 10   Lumbar Extension Seat Pad -   Femur Restraint -   Top Dead Center -   Counterweight -   Lumbar Flexion -   Lumbar Extension -   Lumbar Peak Torque -   Min Torque -   Percent From Norm -   Lumbar Weight 72   Repetitions 20   Rating of Perceived Exertion 3   Ice - Z Lie (in min.) 10     LTR 10x  SKTC 10x  Flexion in lie 10  Ext in lying 10 reps  Supine pelvic tilts + kegel 5-10 s/h 10x,    Peripheral muscle strengthening which included 1 set of 15-20 repetitions at a slow, controlled 7 second per rep pace focused on strengthening supporting musculature for improved body mechanics and functional mobility.  Pt and therapist focused on proper form during treatment to ensure optimal strengthening of each targeted muscle group.  Machines were utilized including( torso  rotation omit due to lumbar fusion,) leg extension introduced and add leg curl, chest press, upright row, Tricep extension, bicep curl, leg press, and hip abduction added on 2nd and third visit.     Home Exercise Program as follows:   Z-lie 10 min, 2x/day  Scapular retraction 10x, 3x/day  LTR 10x, 2x daily    HSS with strap 5s/h 5x  SKTC 10x  Flexion in lie 10x, 2/day  Prone lumbar extension 10x,  2/day   Supine pelvic tilts + kegel 5-10 s/h 10x, 2x daily       Handouts were given to the patient. Pt demo good understanding of the education provided. Harlin demonstrated good return demonstration of activities.      Lumbar roll use compliance:  Yes he is using  Additional exercises taught this treatment session:   HEP review   Supine pelvic tilts + kegel 5-10 s/h 10x, 2x daily     Assessment     Pt presents with moderate LBP 5/10 which improved to 4/10 following HEP stretches, resistance exercise, and ice in z-lie. Pt continues to demonstrate improved tolerance to EIL with reduced overall symptoms. Continue trial for HEP. Pt expressed continued pain with with prolonged standing and walking, suggesting continued poor trunk endurance. Attempted PPT with positive pt response. Added trial for HEP.  Pt tolerated lumbar medx machine with no c/o LBP.   Pt tolerated the medx machines well with no c/o increased LB, shoulder pain or any limb pain. Praised him walking and encouraged him to cont.  Patient reports that he has been more aware of seated posture recently and it helps.  Pt had a recent onset of hypokalemia with some lingering fatigue. Pt did not report any SOB, heart palpitations, excessive sweating, or any difficulty performing exercises.    Patient is making fair progress towards established goals.  Pt will continue to benefit from skilled outpatient physical therapy to address the deficits stated in the impairment chart, provide pt/family education and to maximize pt's level of independence in the home and  community environment.       Pt's spiritual, cultural and educational needs considered and pt agreeable to plan of care and goals as stated below:     Medical necessity is demonstrated by the following IMPAIRMENTS/PROBLEMS:  History  Co-morbidities and personal factors that may impact the plan of care Examination  Body Structures and Functions, activity limitations and participation restrictions that may impact the plan of care Clinical Presentation Decision Making/ Complexity Score   Co-morbidities:   see PMH           Personal Factors:   no deficits Body Regions:   neck  back  lower extremities  upper extremities     Body Systems:   gross symmetry  ROM  strength  gait  transfers  motor control     Activity limitations:   Learning and applying knowledge  no deficits     General Tasks and Commands  no deficits     Communication  no deficits     Mobility  lifting and carrying objects  walking  no deficits     Self care  no deficits     Domestic Life  shopping  cooking  doing house work (cleaning house, washing dishes, laundry)  assisting others     Interactions/Relationships  no deficits     Life Areas  employment     Community and Social Life  recreation and leisure     Participation Restrictions:   Camping, playing sports    evolving clinical presentation with changing clinical characteristics    moderate          Short term goals:  6 weeks or 10 visits   1.  Pt will demonstrate increased lumbar ROM by at least 3 degrees from the initial ROM value with improvements noted in functional ROM and ability to perform ADLs Met 10/02/2017  2.  Pt will demonstrate increased maximum isometric torque value by 5% when compared to the initial value resulting in improved ability to perform bending, lifting, and carrying activities safely, confidently. Met 10/02/2017  3.  Patient report a reduction in worst pain score by 1-2 points for improved tolerance during work and recreational activities. (progressing, not met)  4.  Pt able  to perform HEP correctly with minimal cueing or supervision for therapist. (progressing, not met)     Long term goals: 13 weeks or 20 visits   1. Pt will demonstrate increased lumbar ROM by at least 6 degrees from initial ROM value, resulting in improved ability to perform functional fwd bending while standing and sitting.Met 10/02/2017  2. Pt will demonstrate increased maximum isometric torque value by 10% when compared to the initial value resulting in improved ability to perform bending, lifting, and carrying activities safely, confidently. Met 10/02/2017  3. Pt to demonstrate ability to independently control and reduce their pain through posture positioning and mechanical movements throughout a typical day. (progressing, not met)  4.  Patient will demonstrate improved overall function per FOTO Survey to CJ = at least 20% but < 40% impaired, limited or restricted score or less. (progressing, not met)      FOTO  visit 10, 41 %, improved from initial  50%, progressing towards goals    Plan   Continue with established Plan of Care towards established PT goals. Ensure flexion and extension in laying tolerated well

## 2017-11-02 ENCOUNTER — CLINICAL SUPPORT (OUTPATIENT)
Dept: REHABILITATION | Facility: OTHER | Age: 57
End: 2017-11-02
Attending: NURSE PRACTITIONER
Payer: MEDICARE

## 2017-11-02 DIAGNOSIS — M51.36 DDD (DEGENERATIVE DISC DISEASE), LUMBAR: ICD-10-CM

## 2017-11-02 PROCEDURE — 97110 THERAPEUTIC EXERCISES: CPT | Performed by: PHYSICAL THERAPIST

## 2017-11-02 NOTE — PROGRESS NOTES
[facility]      Name: Bri Santiago  Clinic Number: [id number]  Date of Treatment: 2017   Diagnosis:   Encounter Diagnosis   Name Primary?    DDD (degenerative disc disease), lumbar      Physician: Buffy Villagran,Christina    Pain pattern determined: 1 PEP (able tolerate EIL 10/12/17)  Plan of care signed: 17   Time in:   930  Time Out: 1030  Total Treatment time: 45  Precautions: Hx of cancer, HTN, pancreatitis, VWD, LUMBAR FUSION, cervical surgery.   Visit #: 15    POC due:11/15/17    Reassessment done: 17, 10/2/17, 10/24/17  Next reassessment due: 17    Subjective   Bri reports he no longer performing EIS as it irritates symptoms. Pt did tolerate EIL without pain and overall reduced symptoms. He reports overall, the program is helping him and he notes he is moving better and having less pain. He does his HEP 2-3/day, he uses lumbar roll.  He is very motivated to feel better.  5/10 back pain pre visit, 4/10 post visit.   Pt had a recent onset of hypokalemia with some lingering fatigue. Pt did not report any SOB, heart palpitations, excessive sweating, or any difficulty performing exercises.     Patient reports their pain to be 5/10 on a 0-10 scale with 0 being no pain and 10 being the worst pain imaginable.    Pain Location: LB, Upper back and shoulders.     Work and leisure:  Currently on disability   Leisure: Be active and spend time with family.                      Pts goals:  Be able to do all the outdoor activities he used to do.      Objective     Baseline IM Testing Results:   Baseline IM Testing Results:   Date of testin2017  ROM 6-21 deg   Max Peak Torque 37    Min Peak Torque 3    Flex/Ext Ratio 12.33;1   % below normative data -93          Midpoint IM Testing Results:   Date of testing: 10/02/2017  ROM 27-0 improved to 0-30 visit 10   Max Peak Torque 79   Min Peak Torque 32   Flex/Ext Ratio 2.4   % below relative normative data -75    % Change from Initial Evaluation +917       MOVEMENT LOSS     ROM Loss   Flexion major loss improved to mod loss, fingers 8 inches from floor   Extension major loss improved to mod loss   Side bending Right moderate loss   Side bending Left moderate loss   Rotation Right major loss   Rotation Left major loss     FOTO: Focus on Therapeutic Outcomes   Category: lumbar   % Impaired: 50%  Current Score  = CK = at least 40% but < 60% impaired, limited or restricted  Goal at Discharge Score = CJ = at least 20% but < 40% impaired, limited or restricted  FOTO  visit 10, 41 %, improved from initial  50%, progressing towards goals    Treatment    Pt was instructed in and performed the following:     Bri received therapeutic exercises to develop/improved posture, cardiovascular endurance, muscular endurance, lumbar/cervical ROM, strength and muscular endurance for 50 minutes including the following exercises:      [facility] 11/2/2017   Visit Number 15   VAS Pain Rating 5   Recumbent Bike Seat Pos. 20   Time 10   Scapular Retraction 10   Extension in Lying 10   Extension in Standing -   Flexion in Lying 10   Lumbar Weight 76   Repetitions 20   Rating of Perceived Exertion 3   Ice - Z Lie (in min.) 10         LTR 10x  SKTC 10x  Flexion in lie 10  Ext in lying 10 reps  Supine pelvic tilts + kegel 5-10 s/h 10x,    Peripheral muscle strengthening which included 1 set of 15-20 repetitions at a slow, controlled 7 second per rep pace focused on strengthening supporting musculature for improved body mechanics and functional mobility.  Pt and therapist focused on proper form during treatment to ensure optimal strengthening of each targeted muscle group.  Machines were utilized including( torso rotation omit due to lumbar fusion,) leg extension introduced and add leg curl, chest press, upright row, Tricep extension, bicep curl, leg press, and hip abduction added on 2nd and third visit.     Home Exercise Program as follows:    Z-lie 10 min, 2x/day  Scapular retraction 10x, 3x/day  LTR 10x, 2x daily    HSS with strap 5s/h 5x  SKTC 10x  Flexion in lie 10x, 2/day  Prone lumbar extension 10x,  2/day   Supine pelvic tilts + kegel 5-10 s/h 10x, 2x daily       Handouts were given to the patient. Pt demo good understanding of the education provided. Harlin demonstrated good return demonstration of activities.      Lumbar roll use compliance:  Yes he is using  Additional exercises taught this treatment session:   HEP review   Supine pelvic tilts + kegel 5-10 s/h 10x, 2x daily     Assessment     Pt presents with moderate LBP 5/10 which improved to 4/10 following HEP stretches, resistance exercise, and ice in z-lie. Pt continues to demonstrate improved tolerance to EIL with reduced overall symptoms. Continue trial for HEP. Pt expressed continued pain with with prolonged standing and walking, suggesting continued poor trunk endurance. Continue with PPT with positive pt response.   Pt tolerated lumbar medx machine with no c/o LBP.   Pt tolerated the medx machines well with no c/o increased LB, shoulder pain or any limb pain. Patient reports that he has been more aware of seated posture recently and it helps.  Pt had a recent onset of hypokalemia with some lingering fatigue. Pt did not report any SOB, heart palpitations, excessive sweating, or any difficulty performing exercises.    Patient is making fair progress towards established goals.  Pt will continue to benefit from skilled outpatient physical therapy to address the deficits stated in the impairment chart, provide pt/family education and to maximize pt's level of independence in the home and community environment.       Pt's spiritual, cultural and educational needs considered and pt agreeable to plan of care and goals as stated below:     Medical necessity is demonstrated by the following IMPAIRMENTS/PROBLEMS:  History  Co-morbidities and personal factors that may impact the plan of care  Examination  Body Structures and Functions, activity limitations and participation restrictions that may impact the plan of care Clinical Presentation Decision Making/ Complexity Score   Co-morbidities:   see PMH           Personal Factors:   no deficits Body Regions:   neck  back  lower extremities  upper extremities     Body Systems:   gross symmetry  ROM  strength  gait  transfers  motor control     Activity limitations:   Learning and applying knowledge  no deficits     General Tasks and Commands  no deficits     Communication  no deficits     Mobility  lifting and carrying objects  walking  no deficits     Self care  no deficits     Domestic Life  shopping  cooking  doing house work (cleaning house, washing dishes, laundry)  assisting others     Interactions/Relationships  no deficits     Life Areas  employment     Community and Social Life  recreation and leisure     Participation Restrictions:   Camping, playing sports    evolving clinical presentation with changing clinical characteristics    moderate          Short term goals:  6 weeks or 10 visits   1.  Pt will demonstrate increased lumbar ROM by at least 3 degrees from the initial ROM value with improvements noted in functional ROM and ability to perform ADLs Met 10/02/2017  2.  Pt will demonstrate increased maximum isometric torque value by 5% when compared to the initial value resulting in improved ability to perform bending, lifting, and carrying activities safely, confidently. Met 10/02/2017  3.  Patient report a reduction in worst pain score by 1-2 points for improved tolerance during work and recreational activities. (progressing, not met)  4.  Pt able to perform HEP correctly with minimal cueing or supervision for therapist. (progressing, not met)     Long term goals: 13 weeks or 20 visits   1. Pt will demonstrate increased lumbar ROM by at least 6 degrees from initial ROM value, resulting in improved ability to perform functional fwd bending while  standing and sitting.Met 10/02/2017  2. Pt will demonstrate increased maximum isometric torque value by 10% when compared to the initial value resulting in improved ability to perform bending, lifting, and carrying activities safely, confidently. Met 10/02/2017  3. Pt to demonstrate ability to independently control and reduce their pain through posture positioning and mechanical movements throughout a typical day. (progressing, not met)  4.  Patient will demonstrate improved overall function per FOTO Survey to CJ = at least 20% but < 40% impaired, limited or restricted score or less. (progressing, not met)      FOTO  visit 10, 41 %, improved from initial  50%, progressing towards goals    Plan   Continue with established Plan of Care towards established PT goals. Ensure flexion and extension in laying tolerated well

## 2017-11-08 ENCOUNTER — TELEPHONE (OUTPATIENT)
Dept: INTERNAL MEDICINE | Facility: CLINIC | Age: 57
End: 2017-11-08

## 2017-11-09 NOTE — TELEPHONE ENCOUNTER
Called pt and left  stating to potassium level is in normal range and that  rec that he cont current med.   Left office number for pt to call for any questions .

## 2017-11-09 NOTE — TELEPHONE ENCOUNTER
Please notify pt that his potassium is in normal range - rec cont oral potassium at 20meq daily to maintain this.

## 2017-11-16 ENCOUNTER — CLINICAL SUPPORT (OUTPATIENT)
Dept: REHABILITATION | Facility: OTHER | Age: 57
End: 2017-11-16
Attending: NURSE PRACTITIONER
Payer: MEDICARE

## 2017-11-16 DIAGNOSIS — M51.36 DDD (DEGENERATIVE DISC DISEASE), LUMBAR: ICD-10-CM

## 2017-11-16 PROCEDURE — 97110 THERAPEUTIC EXERCISES: CPT | Performed by: PHYSICAL THERAPIST

## 2017-11-16 NOTE — PROGRESS NOTES
[facility]      Name: Bri Santiago  Clinic Number: [id number]  Date of Treatment: 2017   Diagnosis:   Encounter Diagnosis   Name Primary?    DDD (degenerative disc disease), lumbar      Physician: Buffy Villagran,Christina    Pain pattern determined: 1 PEP (able tolerate EIL 10/12/17)  Plan of care signed: 17   Time in:   930 (scheduled for 11, accomodated)  Time Out: 1030  Total Treatment time: 45  Precautions: Hx of cancer, HTN, pancreatitis, VWD, LUMBAR FUSION, cervical surgery.   Visit #: 16    POC due:11/15/17, sent 17    Reassessment done: 17, 10/2/17, 10/24/17  Next reassessment due: 17    Subjective   Bri reports he no longer performing EIS as it irritates symptoms. Pt did tolerate EIL without pain and overall reduced symptoms. He reports overall, the program is helping him and he notes he is moving better and having less pain. He does his HEP 2-3/day, he uses lumbar roll.  He is very motivated to feel better.  3/10 back pain pre visit, 1/10 post visit.   Pt had a recent onset of hypokalemia with some lingering fatigue. Pt did not report any SOB, heart palpitations, excessive sweating, or any difficulty performing exercises.     Patient reports their pain to be 5/10 on a 0-10 scale with 0 being no pain and 10 being the worst pain imaginable.    Pain Location: LB, Upper back and shoulders.     Work and leisure:  Currently on disability   Leisure: Be active and spend time with family.                      Pts goals:  Be able to do all the outdoor activities he used to do.      Objective     Baseline IM Testing Results:   Baseline IM Testing Results:   Date of testin2017  ROM 6-21 deg   Max Peak Torque 37    Min Peak Torque 3    Flex/Ext Ratio 12.33;1   % below normative data -93          Midpoint IM Testing Results:   Date of testing: 10/02/2017  ROM 27-0 improved to 0-30 visit 10   Max Peak Torque 79   Min Peak Torque 32   Flex/Ext  Ratio 2.4   % below relative normative data -75   % Change from Initial Evaluation +917       MOVEMENT LOSS     ROM Loss   Flexion major loss improved to mod loss, fingers 8 inches from floor   Extension major loss improved to mod loss   Side bending Right moderate loss   Side bending Left moderate loss   Rotation Right major loss   Rotation Left major loss     FOTO: Focus on Therapeutic Outcomes   Category: lumbar   % Impaired: 50%  Current Score  = CK = at least 40% but < 60% impaired, limited or restricted  Goal at Discharge Score = CJ = at least 20% but < 40% impaired, limited or restricted  FOTO  visit 10, 41 %, improved from initial  50%, progressing towards goals    Treatment    Pt was instructed in and performed the following:     Bri received therapeutic exercises to develop/improved posture, cardiovascular endurance, muscular endurance, lumbar ROM, strength and muscular endurance for 50 minutes including the following exercises:        [facility] 11/16/2017   Visit Number 16   VAS Pain Rating 3   Recumbent Bike Seat Pos. 20   Time 10   Scapular Retraction 10   Extension in Lying 10   Flexion in Lying 10   Lumbar Weight 80   Repetitions 20   Rating of Perceived Exertion 2   Ice - Z Lie (in min.) 10         LTR 10x  SKTC 10x  Flexion in lie 10  Ext in lying 10 reps  Supine pelvic tilts + kegel 5-10 s/h 10x,    Peripheral muscle strengthening which included 1 set of 15-20 repetitions at a slow, controlled 7 second per rep pace focused on strengthening supporting musculature for improved body mechanics and functional mobility.  Pt and therapist focused on proper form during treatment to ensure optimal strengthening of each targeted muscle group.  Machines were utilized including( torso rotation omit due to lumbar fusion,) leg extension introduced and add leg curl, chest press, upright row, Tricep extension, bicep curl, leg press, and hip abduction added on 2nd and third visit.     Home Exercise  Program as follows:   Z-lie 10 min, 2x/day  Scapular retraction 10x, 3x/day  LTR 10x, 2x daily    HSS with strap 5s/h 5x  SKTC 10x  Flexion in lie 10x, 2/day  Prone lumbar extension 10x,  2/day   Supine pelvic tilts + kegel 5-10 s/h 10x, 2x daily       Handouts were given to the patient. Pt demo good understanding of the education provided. Harlin demonstrated good return demonstration of activities.      Lumbar roll use compliance:  Yes he is using  Additional exercises taught this treatment session:   HEP review   Supine pelvic tilts + kegel 5-10 s/h 10x, 2x daily     Assessment     Pt presents with moderate LBP 3-5/10 which improved to 2/10 following HEP stretches, resistance exercise, and ice in z-lie. Pt continues to demonstrate improved tolerance to EIL with reduced overall symptoms. Continue trial for HEP. Pt expressed continued pain with with prolonged standing and walking, suggesting continued poor trunk endurance. Continue with PPT with positive pt response.   Pt tolerated lumbar medx machine with no c/o LBP.   Pt tolerated the medx machines well with no c/o increased LB, shoulder pain or any limb pain. Patient reports that he has been more aware of seated posture recently and it helps.  Pt was in [place] with his brother earlier this week as he had a heart attack.  He noted that he managed well with the travel, sitting at hospital etc with some lower back symptoms but doing well.     Patient is making fair progress towards established goals.  Pt will continue to benefit from skilled outpatient physical therapy to address the deficits stated in the impairment chart, provide pt/family education and to maximize pt's level of independence in the home and community environment.       Pt's spiritual, cultural and educational needs considered and pt agreeable to plan of care and goals as stated below:     Medical necessity is demonstrated by the following IMPAIRMENTS/PROBLEMS:  History  Co-morbidities and personal  factors that may impact the plan of care Examination  Body Structures and Functions, activity limitations and participation restrictions that may impact the plan of care Clinical Presentation Decision Making/ Complexity Score   Co-morbidities:   see PMH           Personal Factors:   no deficits Body Regions:   neck  back  lower extremities  upper extremities     Body Systems:   gross symmetry  ROM  strength  gait  transfers  motor control     Activity limitations:   Learning and applying knowledge  no deficits     General Tasks and Commands  no deficits     Communication  no deficits     Mobility  lifting and carrying objects  walking  no deficits     Self care  no deficits     Domestic Life  shopping  cooking  doing house work (cleaning house, washing dishes, laundry)  assisting others     Interactions/Relationships  no deficits     Life Areas  employment     Community and Social Life  recreation and leisure     Participation Restrictions:   Camping, playing sports    evolving clinical presentation with changing clinical characteristics    moderate          Short term goals:  6 weeks or 10 visits   1.  Pt will demonstrate increased lumbar ROM by at least 3 degrees from the initial ROM value with improvements noted in functional ROM and ability to perform ADLs Met 10/02/2017  2.  Pt will demonstrate increased maximum isometric torque value by 5% when compared to the initial value resulting in improved ability to perform bending, lifting, and carrying activities safely, confidently. Met 10/02/2017  3.  Patient report a reduction in worst pain score by 1-2 points for improved tolerance during work and recreational activities. (progressing, not met)  4.  Pt able to perform HEP correctly with minimal cueing or supervision for therapist. (progressing, not met)     Long term goals: 13 weeks or 20 visits   1. Pt will demonstrate increased lumbar ROM by at least 6 degrees from initial ROM value, resulting in improved  ability to perform functional fwd bending while standing and sitting.Met 10/02/2017  2. Pt will demonstrate increased maximum isometric torque value by 10% when compared to the initial value resulting in improved ability to perform bending, lifting, and carrying activities safely, confidently. Met 10/02/2017  3. Pt to demonstrate ability to independently control and reduce their pain through posture positioning and mechanical movements throughout a typical day. (progressing, not met)  4.  Patient will demonstrate improved overall function per FOTO Survey to CJ = at least 20% but < 40% impaired, limited or restricted score or less. (progressing, not met)      FOTO  visit 10, 41 %, improved from initial  50%, progressing towards goals    Plan   Continue with established Plan of Care towards established PT goals. Ensure flexion and extension in laying tolerated well    I certify the need for these services furnished under this plan of treatment and while under my care    ____________________________________  Physician/Referring Practitioner    _______________  Date of Signature

## 2017-11-16 NOTE — PLAN OF CARE
Treatment    Pt was instructed in and performed the following:     Bri received therapeutic exercises to develop/improved posture, cardiovascular endurance, muscular endurance, lumbar ROM, strength and muscular endurance for 50 minutes including the following exercises:        [facility] 11/16/2017   Visit Number 16   VAS Pain Rating 3   Recumbent Bike Seat Pos. 20   Time 10   Scapular Retraction 10   Extension in Lying 10   Flexion in Lying 10   Lumbar Weight 80   Repetitions 20   Rating of Perceived Exertion 2   Ice - Z Lie (in min.) 10         LTR 10x  SKTC 10x  Flexion in lie 10  Ext in lying 10 reps  Supine pelvic tilts + kegel 5-10 s/h 10x,    Peripheral muscle strengthening which included 1 set of 15-20 repetitions at a slow, controlled 7 second per rep pace focused on strengthening supporting musculature for improved body mechanics and functional mobility.  Pt and therapist focused on proper form during treatment to ensure optimal strengthening of each targeted muscle group.  Machines were utilized including( torso rotation omit due to lumbar fusion,) leg extension introduced and add leg curl, chest press, upright row, Tricep extension, bicep curl, leg press, and hip abduction added on 2nd and third visit.     Home Exercise Program as follows:   Z-lie 10 min, 2x/day  Scapular retraction 10x, 3x/day  LTR 10x, 2x daily    HSS with strap 5s/h 5x  SKTC 10x  Flexion in lie 10x, 2/day  Prone lumbar extension 10x,  2/day   Supine pelvic tilts + kegel 5-10 s/h 10x, 2x daily       Handouts were given to the patient. Pt demo good understanding of the education provided. Bri demonstrated good return demonstration of activities.      Lumbar roll use compliance:  Yes he is using  Additional exercises taught this treatment session:   HEP review   Supine pelvic tilts + kegel 5-10 s/h 10x, 2x daily     Assessment     Pt presents with moderate LBP 3-5/10 which improved to 2/10 following HEP stretches,  resistance exercise, and ice in z-lie. Pt continues to demonstrate improved tolerance to EIL with reduced overall symptoms. Continue trial for HEP. Pt expressed continued pain with with prolonged standing and walking, suggesting continued poor trunk endurance. Continue with PPT with positive pt response.   Pt tolerated lumbar medx machine with no c/o LBP.   Pt tolerated the medx machines well with no c/o increased LB, shoulder pain or any limb pain. Patient reports that he has been more aware of seated posture recently and it helps.  Pt was in [place] with his brother earlier this week as he had a heart attack.  He noted that he managed well with the travel, sitting at hospital etc with some lower back symptoms but doing well.     Patient is making fair progress towards established goals.  Pt will continue to benefit from skilled outpatient physical therapy to address the deficits stated in the impairment chart, provide pt/family education and to maximize pt's level of independence in the home and community environment.       Pt's spiritual, cultural and educational needs considered and pt agreeable to plan of care and goals as stated below:     Medical necessity is demonstrated by the following IMPAIRMENTS/PROBLEMS:  History  Co-morbidities and personal factors that may impact the plan of care Examination  Body Structures and Functions, activity limitations and participation restrictions that may impact the plan of care Clinical Presentation Decision Making/ Complexity Score   Co-morbidities:   see PMH           Personal Factors:   no deficits Body Regions:   neck  back  lower extremities  upper extremities     Body Systems:   gross symmetry  ROM  strength  gait  transfers  motor control     Activity limitations:   Learning and applying knowledge  no deficits     General Tasks and Commands  no deficits     Communication  no deficits     Mobility  lifting and carrying objects  walking  no deficits     Self care  no  deficits     Domestic Life  shopping  cooking  doing house work (cleaning house, washing dishes, laundry)  assisting others     Interactions/Relationships  no deficits     Life Areas  employment     Community and Social Life  recreation and leisure     Participation Restrictions:   Camping, playing sports    evolving clinical presentation with changing clinical characteristics    moderate          Short term goals:  6 weeks or 10 visits   1.  Pt will demonstrate increased lumbar ROM by at least 3 degrees from the initial ROM value with improvements noted in functional ROM and ability to perform ADLs Met 10/02/2017  2.  Pt will demonstrate increased maximum isometric torque value by 5% when compared to the initial value resulting in improved ability to perform bending, lifting, and carrying activities safely, confidently. Met 10/02/2017  3.  Patient report a reduction in worst pain score by 1-2 points for improved tolerance during work and recreational activities. (progressing, not met)  4.  Pt able to perform HEP correctly with minimal cueing or supervision for therapist. (progressing, not met)     Long term goals: 13 weeks or 20 visits   1. Pt will demonstrate increased lumbar ROM by at least 6 degrees from initial ROM value, resulting in improved ability to perform functional fwd bending while standing and sitting.Met 10/02/2017  2. Pt will demonstrate increased maximum isometric torque value by 10% when compared to the initial value resulting in improved ability to perform bending, lifting, and carrying activities safely, confidently. Met 10/02/2017  3. Pt to demonstrate ability to independently control and reduce their pain through posture positioning and mechanical movements throughout a typical day. (progressing, not met)  4.  Patient will demonstrate improved overall function per FOTO Survey to CJ = at least 20% but < 40% impaired, limited or restricted score or less. (progressing, not met)      FOTO  visit  10, 41 %, improved from initial  50%, progressing towards goals    Plan   Continue with established Plan of Care towards established PT goals. Ensure flexion and extension in laying tolerated well    I certify the need for these services furnished under this plan of treatment and while under my care    ____________________________________  Physician/Referring Practitioner    _______________  Date of Signature

## 2017-11-21 ENCOUNTER — CLINICAL SUPPORT (OUTPATIENT)
Dept: REHABILITATION | Facility: OTHER | Age: 57
End: 2017-11-21
Attending: NURSE PRACTITIONER
Payer: MEDICARE

## 2017-11-21 DIAGNOSIS — M51.36 DDD (DEGENERATIVE DISC DISEASE), LUMBAR: ICD-10-CM

## 2017-11-21 PROCEDURE — 97110 THERAPEUTIC EXERCISES: CPT

## 2017-11-21 NOTE — PROGRESS NOTES
[facility]      Name: Bri Santiago  Clinic Number: [id number]  Date of Treatment: 2017   Diagnosis:   Encounter Diagnosis   Name Primary?    DDD (degenerative disc disease), lumbar      Physician: Buffy Villagran,Christina    Pain pattern determined: 1 PEP (able tolerate EIL 10/12/17)  Plan of care signed: 17   Time in:   9:30am  Time Out: 10:30am  Total Treatment time: 45  Precautions: Hx of cancer, HTN, pancreatitis, VWD, LUMBAR FUSION, cervical surgery.   Visit #: 17    POC due:11/15/17, sent 17    Reassessment done: 17, 10/2/17, 10/24/17  Next reassessment due: 17    Subjective   Bri reports he performs HEP 3x/day and reports improved functional mobility.  Pt states he would like to ride horses again    Patient reports their pain to be 2/10 on a 0-10 scale with 0 being no pain and 10 being the worst pain imaginable.    Pain Location: LB, Upper back and shoulders.     Work and leisure:  Currently on disability   Leisure: Be active and spend time with family.                      Pts goals:  Be able to do all the outdoor activities he used to do.      Objective     Baseline IM Testing Results:   Baseline IM Testing Results:   Date of testin2017  ROM 6-21 deg   Max Peak Torque 37    Min Peak Torque 3    Flex/Ext Ratio 12.33;1   % below normative data -93          Midpoint IM Testing Results:   Date of testing: 10/02/2017  ROM 27-0 improved to 0-30 visit 10   Max Peak Torque 79   Min Peak Torque 32   Flex/Ext Ratio 2.4   % below relative normative data -75   % Change from Initial Evaluation +917       MOVEMENT LOSS     ROM Loss   Flexion major loss improved to mod loss, fingers 8 inches from floor   Extension major loss improved to mod loss   Side bending Right moderate loss   Side bending Left moderate loss   Rotation Right major loss   Rotation Left major loss     FOTO: Focus on Therapeutic Outcomes   Category: lumbar   % Impaired:  50%  Current Score  = CK = at least 40% but < 60% impaired, limited or restricted  Goal at Discharge Score = CJ = at least 20% but < 40% impaired, limited or restricted  FOTO  visit 10, 41 %, improved from initial  50%, progressing towards goals    Treatment    Pt was instructed in and performed the following:     Bri received therapeutic exercises to develop/improved posture, cardiovascular endurance, muscular endurance, lumbar ROM, strength and muscular endurance for 50 minutes including the following exercises:        [facility] 11/21/2017   Visit Number 17   VAS Pain Rating 4   Recumbent Bike Seat Pos. 20   Time 10   Scapular Retraction 10   Extension in Lying 10   Extension in Standing -   Flexion in Lying 10   Lumbar Extension Seat Pad -   Femur Restraint -   Top Dead Center -   Counterweight -   Lumbar Flexion -   Lumbar Extension -   Lumbar Peak Torque -   Min Torque -   Percent From Norm -   Lumbar Weight 84   Repetitions 20   Rating of Perceived Exertion 3   Ice - Z Lie (in min.) 10      t    LTR 10x  SKTC 10x  Flexion in lie 10  Ext in lying 10 reps  Supine pelvic tilts + kegel 5-10 s/h 10x,    Peripheral muscle strengthening which included 1 set of 15-20 repetitions at a slow, controlled 7 second per rep pace focused on strengthening supporting musculature for improved body mechanics and functional mobility.  Pt and therapist focused on proper form during treatment to ensure optimal strengthening of each targeted muscle group.  Machines were utilized including( torso rotation omit due to lumbar fusion,) leg extension introduced and add leg curl, chest press, upright row, Tricep extension, bicep curl, leg press, and hip abduction added on 2nd and third visit.     Home Exercise Program as follows:   Z-lie 10 min, 2x/day  Scapular retraction 10x, 3x/day  LTR 10x, 2x daily    HSS with strap 5s/h 5x  SKTC 10x  Flexion in lie 10x, 2/day  Prone lumbar extension 10x,  2/day   Supine pelvic tilts +  kegel 5-10 s/h 10x, 2x daily       Handouts were given to the patient. Pt demo good understanding of the education provided. Bri demonstrated good return demonstration of activities.      Lumbar roll use compliance:  Yes he is using  Additional exercises taught this treatment session:   HEP review   Supine pelvic tilts + kegel 5-10 s/h 10x, 2x daily     Assessment   Pt tolerated treatment well without increased pain.  Med X weights increased 5% without difficulty.  Discussed wellness program or gym membership following DC from program.  Discussed importance of Med X mache for lumbar extension strengthening and continued maintenance for LBP. Discussed  horse back riding and preventing LBP through decrease amount of time performing activity  Patient is making fair progress towards established goals.  Pt will continue to benefit from skilled outpatient physical therapy to address the deficits stated in the impairment chart, provide pt/family education and to maximize pt's level of independence in the home and community environment.       Pt's spiritual, cultural and educational needs considered and pt agreeable to plan of care and goals as stated below:     Medical necessity is demonstrated by the following IMPAIRMENTS/PROBLEMS:  History  Co-morbidities and personal factors that may impact the plan of care Examination  Body Structures and Functions, activity limitations and participation restrictions that may impact the plan of care Clinical Presentation Decision Making/ Complexity Score   Co-morbidities:   see PMH           Personal Factors:   no deficits Body Regions:   neck  back  lower extremities  upper extremities     Body Systems:   gross symmetry  ROM  strength  gait  transfers  motor control     Activity limitations:   Learning and applying knowledge  no deficits     General Tasks and Commands  no deficits     Communication  no deficits     Mobility  lifting and carrying objects  walking  no  deficits     Self care  no deficits     Domestic Life  shopping  cooking  doing house work (cleaning house, washing dishes, laundry)  assisting others     Interactions/Relationships  no deficits     Life Areas  employment     Community and Social Life  recreation and leisure     Participation Restrictions:   Camping, playing sports    evolving clinical presentation with changing clinical characteristics    moderate          Short term goals:  6 weeks or 10 visits   1.  Pt will demonstrate increased lumbar ROM by at least 3 degrees from the initial ROM value with improvements noted in functional ROM and ability to perform ADLs Met 10/02/2017  2.  Pt will demonstrate increased maximum isometric torque value by 5% when compared to the initial value resulting in improved ability to perform bending, lifting, and carrying activities safely, confidently. Met 10/02/2017  3.  Patient report a reduction in worst pain score by 1-2 points for improved tolerance during work and recreational activities.MET  4.  Pt able to perform HEP correctly with minimal cueing or supervision for therapist. MET     Long term goals: 13 weeks or 20 visits   1. Pt will demonstrate increased lumbar ROM by at least 6 degrees from initial ROM value, resulting in improved ability to perform functional fwd bending while standing and sitting.Met 10/02/2017  2. Pt will demonstrate increased maximum isometric torque value by 10% when compared to the initial value resulting in improved ability to perform bending, lifting, and carrying activities safely, confidently. Met 10/02/2017  3. Pt to demonstrate ability to independently control and reduce their pain through posture positioning and mechanical movements throughout a typical day. (progressing, not met)  4.  Patient will demonstrate improved overall function per FOTO Survey to CJ = at least 20% but < 40% impaired, limited or restricted score or less. (progressing, not met)      FOTO  visit 10, 41 %,  improved from initial  50%, progressing towards goals    Plan   Continue with established Plan of Care towards established PT goals. Ensure flexion and extension in laying tolerated well    I certify the need for these services furnished under this plan of treatment and while under my care    ____________________________________  Physician/Referring Practitioner    _______________  Date of Signature

## 2017-11-24 ENCOUNTER — CLINICAL SUPPORT (OUTPATIENT)
Dept: REHABILITATION | Facility: OTHER | Age: 57
End: 2017-11-24
Attending: NURSE PRACTITIONER
Payer: MEDICARE

## 2017-11-24 DIAGNOSIS — M51.36 DDD (DEGENERATIVE DISC DISEASE), LUMBAR: ICD-10-CM

## 2017-11-24 PROCEDURE — 97110 THERAPEUTIC EXERCISES: CPT

## 2017-11-24 NOTE — PROGRESS NOTES
[facility]      Name: Bri Santiago  Clinic Number: [id number]  Date of Treatment: 2017   Diagnosis:   No diagnosis found.  Physician: Buffy Villagran,*    Pain pattern determined: 1 PEP (able tolerate EIL 10/12/17)  Plan of care signed: 17   Time in:   9:15am  Time Out: 10:10am  Total Treatment time: 55  Precautions: Hx of cancer, HTN, pancreatitis, VWD, LUMBAR FUSION, cervical surgery.   Visit #: 18    POC due:11/15/17, sent 17    Reassessment done: 17, 10/2/17, 10/24/17, 17  Next reassessment due: 17    Subjective   Bri reports he is not having any pain at this time, overall he is feeling better, he is able to bend and stretch better and is able to do things around the house better.    Patient reports their pain to be 0/10 on a 0-10 scale with 0 being no pain and 10 being the worst pain imaginable.    Pain Location: LB, Upper back and shoulders.     Work and leisure:  Currently on disability   Leisure: Be active and spend time with family.                      Pts goals:  Be able to do all the outdoor activities he used to do.      Objective     Baseline IM Testing Results:   Baseline IM Testing Results:   Date of testin2017  ROM 6-21 deg   Max Peak Torque 37    Min Peak Torque 3    Flex/Ext Ratio 12.33;1   % below normative data -93          Midpoint IM Testing Results:   Date of testing: 10/02/2017  ROM 27-0 improved to 0-30 visit 10   Max Peak Torque 79   Min Peak Torque 32   Flex/Ext Ratio 2.4   % below relative normative data -75   % Change from Initial Evaluation +917       MOVEMENT LOSS (17)     ROM Loss   Flexion Moderate  loss   Extension Moderate loss   Side bending Right Minimal loss   Side bending Left Minimal loss   Rotation Right Minimal loss   Rotation Left Minimal loss     Increased ROM on MEDX this visit from 0-30, to 0-36    FOTO: Focus on Therapeutic Outcomes   Category: lumbar   % Impaired:  50%  Current Score  = CK = at least 40% but < 60% impaired, limited or restricted  Goal at Discharge Score = CJ = at least 20% but < 40% impaired, limited or restricted  FOTO  visit 10, 41 %, improved from initial  50%, progressing towards goals    Treatment    Pt was instructed in and performed the following:     Bri received therapeutic exercises to develop/improved posture, cardiovascular endurance, muscular endurance, lumbar ROM, strength and muscular endurance for 50 minutes including the following exercises:      [facility] 11/24/2017   Visit Number 18   VAS Pain Rating 0   Recumbent Bike Seat Pos. 20   Time 10   Scapular Retraction -   Extension in Lying 10   Extension in Standing -   Flexion in Lying 10   Lumbar Extension Seat Pad -   Femur Restraint -   Top Dead Center -   Counterweight -   Lumbar Flexion 36   Lumbar Extension 0   Lumbar Peak Torque -   Min Torque -   Percent From Norm -   Lumbar Weight 88   Repetitions 20   Rating of Perceived Exertion 3   Ice - Z Lie (in min.) -         LTR 10x  SKTC 10x -NT  Flexion in lie 10  Ext in lying 10 reps  Supine pelvic tilts + kegel 5-10 s/h 10x,    Peripheral muscle strengthening which included 1 set of 15-20 repetitions at a slow, controlled 7 second per rep pace focused on strengthening supporting musculature for improved body mechanics and functional mobility.  Pt and therapist focused on proper form during treatment to ensure optimal strengthening of each targeted muscle group.  Machines were utilized including( torso rotation omit due to lumbar fusion,) leg extension introduced and add leg curl, chest press, upright row, Tricep extension, bicep curl, leg press, and hip abduction added on 2nd and third visit.     Home Exercise Program as follows:   Z-lie 10 min, 2x/day  Scapular retraction 10x, 3x/day  LTR 10x, 2x daily    HSS with strap 5s/h 5x  SKTC 10x  Flexion in lie 10x, 2/day  Prone lumbar extension 10x,  2/day   Supine pelvic tilts +  david 5-10 s/h 10x, 2x daily       Handouts were given to the patient. Pt demo good understanding of the education provided. Bri demonstrated good return demonstration of activities.      Lumbar roll use compliance:  Yes he is using  Additional exercises taught this treatment session:   HEP review       Assessment   Pt tolerated treatment well without increased pain.  Med X weights increased 5% without difficulty, and increased ROM by 6 degrees. Pt would continue to benefit from skilled PT to increase ROM, strength, stability, and functional mobility though therapeutic exercise, manual therapy, and modalities as tolerated.  Patient is making fair progress towards established goals.  Pt will continue to benefit from skilled outpatient physical therapy to address the deficits stated in the impairment chart, provide pt/family education and to maximize pt's level of independence in the home and community environment.       Pt's spiritual, cultural and educational needs considered and pt agreeable to plan of care and goals as stated below:     Medical necessity is demonstrated by the following IMPAIRMENTS/PROBLEMS:  History  Co-morbidities and personal factors that may impact the plan of care Examination  Body Structures and Functions, activity limitations and participation restrictions that may impact the plan of care Clinical Presentation Decision Making/ Complexity Score   Co-morbidities:   see PMH           Personal Factors:   no deficits Body Regions:   neck  back  lower extremities  upper extremities     Body Systems:   gross symmetry  ROM  strength  gait  transfers  motor control     Activity limitations:   Learning and applying knowledge  no deficits     General Tasks and Commands  no deficits     Communication  no deficits     Mobility  lifting and carrying objects  walking  no deficits     Self care  no deficits     Domestic Life  shopping  cooking  doing house work (cleaning house, washing dishes,  laundry)  assisting others     Interactions/Relationships  no deficits     Life Areas  employment     Community and Social Life  recreation and leisure     Participation Restrictions:   Camping, playing sports    evolving clinical presentation with changing clinical characteristics    moderate          Short term goals:  6 weeks or 10 visits   1.  Pt will demonstrate increased lumbar ROM by at least 3 degrees from the initial ROM value with improvements noted in functional ROM and ability to perform ADLs Met 10/02/2017  2.  Pt will demonstrate increased maximum isometric torque value by 5% when compared to the initial value resulting in improved ability to perform bending, lifting, and carrying activities safely, confidently. Met 10/02/2017  3.  Patient report a reduction in worst pain score by 1-2 points for improved tolerance during work and recreational activities.MET  4.  Pt able to perform HEP correctly with minimal cueing or supervision for therapist. MET     Long term goals: 13 weeks or 20 visits   1. Pt will demonstrate increased lumbar ROM by at least 6 degrees from initial ROM value, resulting in improved ability to perform functional fwd bending while standing and sitting.Met 10/02/2017  2. Pt will demonstrate increased maximum isometric torque value by 10% when compared to the initial value resulting in improved ability to perform bending, lifting, and carrying activities safely, confidently. Met 10/02/2017  3. Pt to demonstrate ability to independently control and reduce their pain through posture positioning and mechanical movements throughout a typical day. (progressing, not met)  4.  Patient will demonstrate improved overall function per FOTO Survey to CJ = at least 20% but < 40% impaired, limited or restricted score or less. (progressing, not met)      FOTO  visit 10, 41 %, improved from initial  50%, progressing towards goals    Plan   Continue with established Plan of Care towards established PT  goals.

## 2017-12-18 ENCOUNTER — TELEPHONE (OUTPATIENT)
Dept: INTERNAL MEDICINE | Facility: CLINIC | Age: 57
End: 2017-12-18

## 2017-12-18 NOTE — TELEPHONE ENCOUNTER
Patient called to request appointment today to be seen for sinus headache and congestion. Denies fever and chest congestion. States he has post-nasal drip which is causing him to cough and congestion in his sinuses which is causing a frontal/nasal headache. He is asking for any advice to treat this. Advised him I had appointment available this afternoon with Dr. Lopez. Scheduled appointment.

## 2017-12-18 NOTE — TELEPHONE ENCOUNTER
----- Message from Marysol Guerin sent at 12/18/2017  2:48 PM CST -----  Contact: PURNIMA IYER [[id number]]  x_  1st Request  _  2nd Request  _  3rd Request        Who: PURNIMA IYER [[id number]]    Why: patient states he is waiting on a call back from the nurse.     What Number to Call Back: [phone]    When to Expect a call back: (Before the end of the day)   -- if call after 3:00 call back will be tomorrow.

## 2017-12-19 ENCOUNTER — OFFICE VISIT (OUTPATIENT)
Dept: INTERNAL MEDICINE | Facility: CLINIC | Age: 57
End: 2017-12-19
Attending: INTERNAL MEDICINE
Payer: MEDICARE

## 2017-12-19 VITALS
SYSTOLIC BLOOD PRESSURE: 130 MMHG | WEIGHT: 262.13 LBS | BODY MASS INDEX: 36.7 KG/M2 | HEIGHT: 71 IN | OXYGEN SATURATION: 96 % | DIASTOLIC BLOOD PRESSURE: 84 MMHG | HEART RATE: 83 BPM

## 2017-12-19 DIAGNOSIS — J30.2 CHRONIC SEASONAL ALLERGIC RHINITIS, UNSPECIFIED TRIGGER: ICD-10-CM

## 2017-12-19 DIAGNOSIS — J01.00 ACUTE NON-RECURRENT MAXILLARY SINUSITIS: Primary | ICD-10-CM

## 2017-12-19 PROCEDURE — 99213 OFFICE O/P EST LOW 20 MIN: CPT | Mod: 25,S$GLB,, | Performed by: INTERNAL MEDICINE

## 2017-12-19 PROCEDURE — 96372 THER/PROPH/DIAG INJ SC/IM: CPT | Mod: S$GLB,,, | Performed by: INTERNAL MEDICINE

## 2017-12-19 PROCEDURE — 99999 PR PBB SHADOW E&M-EST. PATIENT-LVL III: CPT | Mod: PBBFAC,,, | Performed by: INTERNAL MEDICINE

## 2017-12-19 RX ORDER — AZELASTINE 1 MG/ML
1 SPRAY, METERED NASAL 2 TIMES DAILY
Qty: 30 ML | Refills: 11 | Status: SHIPPED | OUTPATIENT
Start: 2017-12-19 | End: 2018-04-12 | Stop reason: SDUPTHER

## 2017-12-19 RX ORDER — DOXYCYCLINE 100 MG/1
100 CAPSULE ORAL EVERY 12 HOURS
Qty: 14 CAPSULE | Refills: 0 | Status: SHIPPED | OUTPATIENT
Start: 2017-12-19 | End: 2018-04-03

## 2017-12-19 RX ORDER — FLUTICASONE PROPIONATE 50 MCG
2 SPRAY, SUSPENSION (ML) NASAL DAILY
Qty: 16 G | Refills: 11 | Status: SHIPPED | OUTPATIENT
Start: 2017-12-19 | End: 2018-01-18

## 2017-12-19 RX ORDER — TRIAMCINOLONE ACETONIDE 40 MG/ML
40 INJECTION, SUSPENSION INTRA-ARTICULAR; INTRAMUSCULAR
Status: COMPLETED | OUTPATIENT
Start: 2017-12-19 | End: 2017-12-19

## 2017-12-19 RX ADMIN — TRIAMCINOLONE ACETONIDE 40 MG: 40 INJECTION, SUSPENSION INTRA-ARTICULAR; INTRAMUSCULAR at 03:12

## 2017-12-19 NOTE — PROGRESS NOTES
"Per order, patient to receive 1mL of Kenalog (triamcinolone acetonide) 40mg/mL. The area of injection was palpated using the medial fold and the iliac crest as anatomical landmarks. Patient was advised to relax the muscle. The area was cleaned with alcohol and allowed to dry. Using a 25g 1.5" needle, 1mL of Kenalog (triamcinolone acetonide) 40mg/mL was placed intramuscularly into the left upper outer gluteal quadrant. Patient experienced no complications and was discharged in stable condition. Kenalog Lot: [id number] Exp: [id number]    "

## 2017-12-19 NOTE — PROGRESS NOTES
"Subjective:   Patient ID: Bri Santiago is a 57 y.o. male  Chief complaint:   Chief Complaint   Patient presents with    Sinusitis       HPI  Pt here for UC appt - reports sx for 10 days  Reports inc sinus congestion, post nasal drip, sinus pressure and facial pain, runny nose.    Left ear pressure and mild discomfort  No fevers or chills, nausea, vomiting or diarrhea.   No cough or wheezing.   Tried otc apap and not helpful. Taking claritin for chronic allergic rhinitis and not effective.     Review of Systems   Constitutional: Positive for fatigue. Negative for chills and fever.   HENT: Positive for congestion, ear pain, postnasal drip, rhinorrhea, sinus pain and sinus pressure. Negative for ear discharge and sore throat.    Eyes: Negative for pain and visual disturbance.   Respiratory: Negative for cough, shortness of breath and wheezing.    Cardiovascular: Negative for chest pain and palpitations.   Gastrointestinal: Negative for abdominal pain, constipation and diarrhea.   Genitourinary: Negative for dysuria and hematuria.   Musculoskeletal: Negative for back pain and joint swelling.   Skin: Negative for color change and rash.   Neurological: Positive for headaches. Negative for dizziness, facial asymmetry, weakness, light-headedness and numbness.   Psychiatric/Behavioral: Negative for sleep disturbance. The patient is not nervous/anxious.      Objective:  Vitals:    12/19/17 1500   BP: 130/84   Pulse: 83   SpO2: 96%   Weight: 118.9 kg (262 lb 2 oz)   Height: 5' 11" (1.803 m)     Body mass index is 36.56 kg/m².    Physical Exam   Constitutional: He is oriented to person, place, and time. He appears well-developed and well-nourished.   HENT:   Head: Normocephalic and atraumatic.   Right Ear: External ear normal.   Mouth/Throat: No oropharyngeal exudate.   Small clear effusion at left TM  Nasal turbinates with edema and erythema, no drainage  + ttp over maxillary sinuses  Mild posterior pharyngeal erythema "   Eyes: Conjunctivae and EOM are normal.   Neck: Neck supple.   Cardiovascular: Normal rate, regular rhythm and intact distal pulses.    Pulmonary/Chest: Effort normal and breath sounds normal. He has no wheezes. He has no rales.   Abdominal: Soft. Bowel sounds are normal.   Lymphadenopathy:     He has no cervical adenopathy.   Neurological: He is alert and oriented to person, place, and time.   Skin: Skin is warm and dry. Capillary refill takes less than 2 seconds.   Psychiatric: He has a normal mood and affect. His behavior is normal. Judgment and thought content normal.   Vitals reviewed.    Assessment:  1. Acute non-recurrent maxillary sinusitis    2. Chronic seasonal allergic rhinitis, unspecified trigger        Plan:  Bri was seen today for sinusitis.    Diagnoses and all orders for this visit:    Acute non-recurrent maxillary sinusitis  -     doxycycline (VIBRAMYCIN) 100 MG Cap; Take 1 capsule (100 mg total) by mouth every 12 (twelve) hours.  -     triamcinolone acetonide injection 40 mg; Inject 1 mL (40 mg total) into the muscle one time.    Chronic seasonal allergic rhinitis, unspecified trigger  -     azelastine (ASTELIN) 137 mcg (0.1 %) nasal spray; 1 spray (137 mcg total) by Nasal route 2 (two) times daily.  -     triamcinolone acetonide injection 40 mg; Inject 1 mL (40 mg total) into the muscle one time.    Other orders  -     fluticasone (FLONASE) 50 mcg/actuation nasal spray; 2 sprays by Each Nare route once daily.    rec nasal saline rinses followed by flonase and astelin  Give doxy for sinusitis.   Kenalog inj x 1   A Kenalog injection was given in the clinic today. I discussed the risks and benefits of steroid injection with the patient. The risks include liponecrosis, exacerbation of diabetes, specifically elevated blood sugars, adrenal suppression, and markedly elevated mood and energy. The patient is aware of and accepts these risks.  Er and rtc prompts given - if worsens or does not improve  over next 1-3 days he understands to let me know  otc meds discussed    Health Maintenance   Topic Date Due    Colonoscopy  06/19/2019    Lipid Panel  10/16/2022    TETANUS VACCINE  10/16/2027    Hepatitis C Screening  Completed    Influenza Vaccine  Completed

## 2018-01-05 DIAGNOSIS — E78.5 HYPERLIPIDEMIA, UNSPECIFIED HYPERLIPIDEMIA TYPE: ICD-10-CM

## 2018-01-05 RX ORDER — ATORVASTATIN CALCIUM 20 MG/1
TABLET, FILM COATED ORAL
Qty: 90 TABLET | Refills: 0 | Status: SHIPPED | OUTPATIENT
Start: 2018-01-05 | End: 2018-04-12 | Stop reason: SDUPTHER

## 2018-01-25 ENCOUNTER — OFFICE VISIT (OUTPATIENT)
Dept: PAIN MEDICINE | Facility: CLINIC | Age: 58
End: 2018-01-25
Payer: MEDICARE

## 2018-01-25 VITALS
TEMPERATURE: 98 F | BODY MASS INDEX: 36.42 KG/M2 | HEIGHT: 71 IN | DIASTOLIC BLOOD PRESSURE: 86 MMHG | WEIGHT: 260.13 LBS | HEART RATE: 77 BPM | RESPIRATION RATE: 16 BRPM | SYSTOLIC BLOOD PRESSURE: 126 MMHG

## 2018-01-25 DIAGNOSIS — M48.061 SPINAL STENOSIS, LUMBAR REGION, WITHOUT NEUROGENIC CLAUDICATION: ICD-10-CM

## 2018-01-25 DIAGNOSIS — M50.30 DDD (DEGENERATIVE DISC DISEASE), CERVICAL: ICD-10-CM

## 2018-01-25 DIAGNOSIS — M51.37 DEGENERATION OF LUMBAR OR LUMBOSACRAL INTERVERTEBRAL DISC: ICD-10-CM

## 2018-01-25 DIAGNOSIS — Z79.891 ENCOUNTER FOR LONG-TERM OPIATE ANALGESIC USE: ICD-10-CM

## 2018-01-25 DIAGNOSIS — M96.1 POSTLAMINECTOMY SYNDROME OF LUMBAR REGION: ICD-10-CM

## 2018-01-25 DIAGNOSIS — M53.3 SACROILIAC JOINT PAIN: ICD-10-CM

## 2018-01-25 DIAGNOSIS — M51.36 DDD (DEGENERATIVE DISC DISEASE), LUMBAR: Primary | ICD-10-CM

## 2018-01-25 PROCEDURE — 99999 PR PBB SHADOW E&M-EST. PATIENT-LVL III: CPT | Mod: PBBFAC,,, | Performed by: NURSE PRACTITIONER

## 2018-01-25 PROCEDURE — 80307 DRUG TEST PRSMV CHEM ANLYZR: CPT

## 2018-01-25 PROCEDURE — 99214 OFFICE O/P EST MOD 30 MIN: CPT | Mod: S$GLB,,, | Performed by: NURSE PRACTITIONER

## 2018-01-25 RX ORDER — TRAMADOL HYDROCHLORIDE 50 MG/1
50 TABLET ORAL EVERY 6 HOURS PRN
Qty: 120 TABLET | Refills: 2 | Status: SHIPPED | OUTPATIENT
Start: 2018-01-25 | End: 2018-04-24 | Stop reason: SDUPTHER
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
## 2018-01-31 LAB

## 2018-02-27 ENCOUNTER — DOCUMENTATION ONLY (OUTPATIENT)
Dept: REHABILITATION | Facility: OTHER | Age: 58
End: 2018-02-27

## 2018-02-28 ENCOUNTER — HOSPITAL ENCOUNTER (EMERGENCY)
Facility: OTHER | Age: 58
Discharge: HOME | End: 2018-03-01
Attending: EMERGENCY MEDICINE
Payer: MEDICARE

## 2018-02-28 DIAGNOSIS — R07.9 CHEST PAIN: ICD-10-CM

## 2018-02-28 LAB
ANION GAP SERPL CALC-SCNC: 12 MMOL/L
BASOPHILS # BLD AUTO: 0.01 K/UL
BASOPHILS NFR BLD: 0.1 %
BUN SERPL-MCNC: 12 MG/DL
CALCIUM SERPL-MCNC: 9.1 MG/DL
CHLORIDE SERPL-SCNC: 101 MMOL/L
CO2 SERPL-SCNC: 27 MMOL/L
CREAT SERPL-MCNC: 0.9 MG/DL
DIFFERENTIAL METHOD: ABNORMAL
EOSINOPHIL # BLD AUTO: 0.2 K/UL
EOSINOPHIL NFR BLD: 2.8 %
ERYTHROCYTE [DISTWIDTH] IN BLOOD BY AUTOMATED COUNT: 12.8 %
EST. GFR  (AFRICAN AMERICAN): >60 ML/MIN/1.73 M^2
EST. GFR  (NON AFRICAN AMERICAN): >60 ML/MIN/1.73 M^2
GLUCOSE SERPL-MCNC: 101 MG/DL
HCT VFR BLD AUTO: 41.8 %
HGB BLD-MCNC: 13.7 G/DL
LYMPHOCYTES # BLD AUTO: 2.1 K/UL
LYMPHOCYTES NFR BLD: 24.8 %
MCH RBC QN AUTO: 30 PG
MCHC RBC AUTO-ENTMCNC: 32.8 G/DL
MCV RBC AUTO: 92 FL
MONOCYTES # BLD AUTO: 0.5 K/UL
MONOCYTES NFR BLD: 6 %
NEUTROPHILS # BLD AUTO: 5.7 K/UL
NEUTROPHILS NFR BLD: 66.1 %
PLATELET # BLD AUTO: 242 K/UL
PMV BLD AUTO: 9.6 FL
POTASSIUM SERPL-SCNC: 3.3 MMOL/L
RBC # BLD AUTO: 4.57 M/UL
SODIUM SERPL-SCNC: 140 MMOL/L
TROPONIN I SERPL DL<=0.01 NG/ML-MCNC: 0.01 NG/ML
WBC # BLD AUTO: 8.55 K/UL

## 2018-02-28 PROCEDURE — 93010 ELECTROCARDIOGRAM REPORT: CPT | Mod: ,,, | Performed by: INTERNAL MEDICINE

## 2018-02-28 PROCEDURE — 84484 ASSAY OF TROPONIN QUANT: CPT

## 2018-02-28 PROCEDURE — 25000003 PHARM REV CODE 250: Performed by: EMERGENCY MEDICINE

## 2018-02-28 PROCEDURE — 85025 COMPLETE CBC W/AUTO DIFF WBC: CPT

## 2018-02-28 PROCEDURE — 99284 EMERGENCY DEPT VISIT MOD MDM: CPT | Mod: 25

## 2018-02-28 PROCEDURE — 80048 BASIC METABOLIC PNL TOTAL CA: CPT

## 2018-02-28 PROCEDURE — 93005 ELECTROCARDIOGRAM TRACING: CPT

## 2018-02-28 RX ORDER — ACETAMINOPHEN 325 MG/1
650 TABLET ORAL
Status: COMPLETED | OUTPATIENT
Start: 2018-02-28 | End: 2018-02-28

## 2018-02-28 RX ORDER — SUCRALFATE 1 G/10ML
1 SUSPENSION ORAL
Status: COMPLETED | OUTPATIENT
Start: 2018-02-28 | End: 2018-02-28

## 2018-02-28 RX ORDER — METHOCARBAMOL 500 MG/1
1000 TABLET, FILM COATED ORAL
Status: COMPLETED | OUTPATIENT
Start: 2018-02-28 | End: 2018-02-28

## 2018-02-28 RX ORDER — BUTALBITAL, ACETAMINOPHEN AND CAFFEINE 50; 325; 40 MG/1; MG/1; MG/1
1 TABLET ORAL
Status: COMPLETED | OUTPATIENT
Start: 2018-02-28 | End: 2018-02-28

## 2018-02-28 RX ADMIN — METHOCARBAMOL 1000 MG: 500 TABLET ORAL at 09:02

## 2018-02-28 RX ADMIN — SUCRALFATE 1 G: 1 SUSPENSION ORAL at 09:02

## 2018-02-28 RX ADMIN — BUTALBITAL, ACETAMINOPHEN AND CAFFEINE 1 TABLET: 50; 325; 40 TABLET ORAL at 09:02

## 2018-02-28 RX ADMIN — ACETAMINOPHEN 650 MG: 325 TABLET ORAL at 09:02

## 2018-03-01 VITALS
HEIGHT: 71 IN | DIASTOLIC BLOOD PRESSURE: 94 MMHG | RESPIRATION RATE: 18 BRPM | SYSTOLIC BLOOD PRESSURE: 152 MMHG | TEMPERATURE: 98 F | OXYGEN SATURATION: 95 % | BODY MASS INDEX: 35.84 KG/M2 | WEIGHT: 256 LBS | HEART RATE: 80 BPM

## 2018-03-01 LAB — TROPONIN I SERPL DL<=0.01 NG/ML-MCNC: 0.01 NG/ML

## 2018-03-01 NOTE — ED PROVIDER NOTES
"Encounter Date: 2/28/2018    SCRIBE #1 NOTE: I, Kathi Williamson, am scribing for, and in the presence of, Dr. Zapien.       History     Chief Complaint   Patient presents with    Chest Pain     Midsternal chest pain radiating down left arm for 4 days.  "I just thought it was gas"  Described as tightness and can get short and can feel palipations with the pain.  Sometimes shortness of breath.      Time seen by provider: 8:32 PM    This is a 57 y.o. male who presents with complaint of left sided chest pain that began four days ago. The 7/10 pain is waxing and waning, described as sharp, and radiates down the left arm. He believed pain was due to gas but became concerned when the pain worsened today. He denies that the pain worsens with movement. He reports associated fatigue for two days with intermittent SOB and palpitations, though he notes the palpitations are not new. He also endorses upper mid abdominal pain ("a knot") and chronic back pain. He denies fever, chills, congestion, rhinorrhea, cough, nausea, vomiting, gait problem, dysuria, headache, numbness, or extremity weakness. He was seen at [facility] ED for a similar episode in the past, and he underwent a cardiac stress test with no findings. He had an endoscopy and colonoscopy last year with no significant findings. He is allergic to Aspirin. He no longer takes HTN meds at the direction of his doctor. He has no additional complaints.     Additional past medical, surgical, and social history as outlined in the nursing assessment was reviewed by me.        The history is provided by the patient.     Review of patient's allergies indicates:   Allergen Reactions    Ace inhibitors     Aspirin      Other reaction(s): Hives    Codeine     Dilaudid  [hydromorphone]      Other reaction(s): Itching    Penicillins Hives and Swelling     Has had allergy testing and can prob tolerate penicillin     Past Medical History:   Diagnosis Date    Acute pancreatitis     Anal " fissure     Anemia     Arthritis     Asthma in remission     Back pain     BPH (benign prostatic hypertrophy)     Cancer 2000    prostate- treated at [facility] with chemo- in remission since 2000    Clotting disorder     Dysphagia 10/7/2014    Family history of colon cancer     Family history of early CAD     GERD (gastroesophageal reflux disease)     H. pylori infection 10/8/2014    Helicobacter pylori (H. pylori) infection     Chronic    History of chronic pancreatitis     HTN (hypertension)     Lumbago 11/12/2012    Obesity     ABDON (obstructive sleep apnea)     Pneumonia     during childhood     Prostate cancer 2000    dx and treated at [facility], had chemotherapy, in remission [id number]    Sacroiliac joint pain 2/10/2015    Spinal stenosis of lumbar region     Trouble in sleeping     Vitamin D deficiency disease     VWD (acquired von Willebrand's disease)      Past Surgical History:   Procedure Laterality Date    anal fissure repair      x2    CARPAL TUNNEL RELEASE  2003    left hand    CERVICAL DISCECTOMY  2003    COLONOSCOPY N/A 10/7/2015    Procedure: COLONOSCOPY;  Surgeon: Rosendo Boyer MD;  [facility]  Service: Endoscopy;  Laterality: N/A;  PM Prep    COLONOSCOPY N/A 6/19/2017    Procedure: COLONOSCOPY;  Surgeon: Rosendo Boyer MD;  [facility]  Service: Endoscopy;  Laterality: N/A;  constipation prep (no DM no CHF)       hx of vonWillebrand's disease-will need infusion prior    LUMBAR FUSION  2012    SPINE SURGERY      TONSILLECTOMY      at age 24    VASECTOMY  1996     Family History   Problem Relation Age of Onset    Colon cancer Father 67     colon cancer    Hypertension Father     Glaucoma Father     Colon cancer Paternal Grandfather 65          Coronary artery disease Mother 45    Hypertension Mother     Heart disease Mother     No Known Problems Brother     No Known Problems Sister     No Known Problems  Daughter     No Known Problems Son     Coronary artery disease Brother 51    No Known Problems Daughter     No Known Problems Daughter     No Known Problems Son     No Known Problems Son     Colon cancer Paternal Uncle 65    Diabetes Mellitus Paternal Grandmother      Social History   Substance Use Topics    Smoking status: Never Smoker    Smokeless tobacco: Never Used    Alcohol use No     Review of Systems   Constitutional: Positive for fatigue. Negative for chills and fever.   HENT: Positive for sore throat. Negative for congestion and rhinorrhea.    Respiratory: Positive for shortness of breath. Negative for cough.    Cardiovascular: Positive for chest pain and palpitations.   Gastrointestinal: Positive for abdominal pain. Negative for diarrhea, nausea and vomiting.   Endocrine: Negative for polyuria.   Genitourinary: Negative for decreased urine volume and dysuria.   Musculoskeletal: Positive for back pain and neck pain. Negative for gait problem.   Skin: Negative for rash.   Allergic/Immunologic: Negative for immunocompromised state.   Neurological: Positive for light-headedness. Negative for dizziness, weakness, numbness and headaches.   Hematological: Does not bruise/bleed easily.   Psychiatric/Behavioral: Negative for confusion.       Physical Exam     Initial Vitals [02/28/18 1949]   BP Pulse Resp Temp SpO2   (!) 185/108 88 18 98.5 °F (36.9 °C) 99 %      MAP       133.67         Physical Exam    Nursing note and vitals reviewed.  Constitutional: He appears well-developed and well-nourished. He is not diaphoretic. No distress.   HENT:   Head: Normocephalic and atraumatic.   Right Ear: External ear normal.   Left Ear: External ear normal.   Eyes: Conjunctivae and EOM are normal. Right eye exhibits no discharge. Left eye exhibits no discharge.   Neck: Normal range of motion. Neck supple. No tracheal deviation present.   Cardiovascular: Normal rate, regular rhythm, normal heart sounds and intact  distal pulses. Exam reveals no gallop and no friction rub.    No murmur heard.  Pulmonary/Chest: Breath sounds normal. No stridor. No respiratory distress. He has no wheezes. He has no rhonchi. He has no rales. He exhibits tenderness (left anterior chest wall).   Abdominal: Soft. Bowel sounds are normal. He exhibits no distension. There is no tenderness. There is no rebound and no guarding.   Musculoskeletal: Normal range of motion. He exhibits no edema or tenderness.   There is bilateral tenderness in the trapezius muscles. There is trace pitting edema in the bilateral lower extremities.   Neurological: He is alert and oriented to person, place, and time. He has normal strength. No cranial nerve deficit or sensory deficit.   Skin: Skin is warm and dry. Capillary refill takes less than 2 seconds. No erythema. No pallor.   Psychiatric: He has a normal mood and affect. Thought content normal.         ED Course   Procedures  Labs Reviewed   CBC W/ AUTO DIFFERENTIAL - Abnormal; Notable for the following:        Result Value    RBC 4.57 (*)     Hemoglobin 13.7 (*)     All other components within normal limits   BASIC METABOLIC PANEL - Abnormal; Notable for the following:     Potassium 3.3 (*)     All other components within normal limits   TROPONIN I   TROPONIN I     Imaging Results          X-Ray Chest AP Portable (Final result)  Result time 02/28/18 20:57:44    Final result by Aashish Honeycutt MD (02/28/18 20:57:44)                 Impression:        No detrimental change or radiographic acute intrathoracic process definitively seen on this limited single view.      Electronically signed by: AASHISH HONEYCUTT MD, MD  Date:     02/28/18  Time:    20:57              Narrative:    COMPARISON: Chest radiograph 4/22/16    FINDINGS: AP portable upright view of the chest. Resolution is limited by body habitus with underpenetration. No detrimental change. Chronic nonspecific elevation of the right hemidiaphragm. Right basilar  platelike scarring versus atelectasis, unchanged. No large consolidation or new focal opacity. No large pleural effusion or pneumothorax.  Cardiomediastinal silhouette appears grossly stable without convincing evidence of failure.  No acute osseous process definitively seen.   PA and lateral views can be obtained.                            EKG Readings: (Independently Interpreted)   Initial Reading: No STEMI.   Normal sinus rhythm at a rate of 78 bpm. LVH present.       X-Rays:   Independently Interpreted Readings:   Chest X-Ray: Normal heart size. Poor expiratory effort.     Medical Decision Making:   History:   Old Medical Records: I decided to obtain old medical records.  Old Records Summarized: records from clinic visits, records from previous admission(s) and records from another hospital.       <> Summary of Records: Patient with multiple visits for lumbar arthropathy. He was admitted at [facility] in 2014 for chest pain and had a negative stress test at that time.  Initial Assessment:   Patient presents with persistent chest pain. History provided is atypical for ACS. I will send a troponin. I will obtain an X-Ray to assure no widening of mediastinum. I will give oral analgesia for relief. I will reassess.  Clinical Tests:   Lab Tests: Ordered and Reviewed  Radiological Study: Ordered and Reviewed  Medical Tests: Ordered and Reviewed  ED Management:  9:50 PM - Patient is resting comfortable though his pain persists. His first troponin is normal. I will send a second at 3h to ensure it is not rising. He does not wish to have any additional medication at this time.     11:00 PM - Care endorsed to Dr. Guerrero at this time. Repeat troponin at 11:30pm. If negative patient can be discharged.             Scribe Attestation:   Scribe #1: I performed the above scribed service and the documentation accurately describes the services I performed. I attest to the accuracy of the note.    Attending Attestation:            Physician Attestation for Scribe:  Physician Attestation Statement for Scribe #1: I, Dr. Zapien, reviewed documentation, as scribed by Kathi Williamson in my presence, and it is both accurate and complete.                    Clinical Impression:     1. Chest pain                               Asha Zapien MD  02/28/18 [id number]

## 2018-03-01 NOTE — ED NOTES
Pt complains of left sided chest pain on and off for 3-4 days now, denies any SOB, N/V/D or diaphoresis.   LOC: Pt is awake alert and aware of environment, oriented X3 and speaking appropriately  Appearance: Pt is in no acute distress, Pt is well groomed and clean  Skin: skin is warm and dry with normal turgor, mucus membranes are moist and pink, skin is intact with no bruising or breakdown  Muskuloskeletal: Pt moves all extremities well, there is no obvious swelling or deformities noted, pulses are intact.  Respiratory: Airway is open and patent, respirations are spontaneous and even.  Cardiac: no edema and cap refill is <3sec  Abdomen: soft, non-tender and non-distended  Neuro: Pt follows commands easily and has no obvious deficits

## 2018-03-01 NOTE — ED NOTES
Pt resting in bed with eyes closed, NAD noted, pt has no needs or concerns at this time, blanket given for comfort, call light within reach, will continue to monitor

## 2018-03-20 ENCOUNTER — TELEPHONE (OUTPATIENT)
Dept: INTERNAL MEDICINE | Facility: CLINIC | Age: 58
End: 2018-03-20

## 2018-03-20 DIAGNOSIS — R07.9 CHEST PAIN, UNSPECIFIED TYPE: Primary | ICD-10-CM

## 2018-03-20 NOTE — TELEPHONE ENCOUNTER
Spoke with pt he states he wants a referral  And recommendation for a cardiologist from Dr. Galeas.

## 2018-03-20 NOTE — TELEPHONE ENCOUNTER
----- Message from Ghazala Mace sent at 3/20/2018  2:48 PM CDT -----  Contact: patient himself  X 1st Request  _  2nd Request  _  3rd Request    Who: Bri Santiago (mrn# [id number])     Why: Patient called requesting a referral and a recommendation to see the cardiologist.   Please give a call back at your earliest convenience.        THANKS!    What Number to Call Back: [phone]    .

## 2018-03-21 NOTE — TELEPHONE ENCOUNTER
Spoke w/ pt and provided names to cardiology doc as he rec .  Pt has no further questions or concerns

## 2018-04-03 ENCOUNTER — TELEPHONE (OUTPATIENT)
Dept: CARDIOLOGY | Facility: CLINIC | Age: 58
End: 2018-04-03

## 2018-04-03 ENCOUNTER — HOSPITAL ENCOUNTER (OUTPATIENT)
Dept: CARDIOLOGY | Facility: CLINIC | Age: 58
Discharge: HOME | End: 2018-04-03
Payer: MEDICARE

## 2018-04-03 ENCOUNTER — LAB VISIT (OUTPATIENT)
Dept: LAB | Facility: HOSPITAL | Age: 58
End: 2018-04-03
Payer: MEDICARE

## 2018-04-03 ENCOUNTER — OFFICE VISIT (OUTPATIENT)
Dept: CARDIOLOGY | Facility: CLINIC | Age: 58
End: 2018-04-03
Payer: MEDICARE

## 2018-04-03 VITALS
HEIGHT: 71 IN | HEART RATE: 78 BPM | DIASTOLIC BLOOD PRESSURE: 88 MMHG | BODY MASS INDEX: 36.79 KG/M2 | SYSTOLIC BLOOD PRESSURE: 130 MMHG | WEIGHT: 262.81 LBS

## 2018-04-03 DIAGNOSIS — R53.83 FATIGUE, UNSPECIFIED TYPE: ICD-10-CM

## 2018-04-03 DIAGNOSIS — R07.9 CHEST PAIN, UNSPECIFIED TYPE: Primary | ICD-10-CM

## 2018-04-03 DIAGNOSIS — I10 HYPERTENSION, UNSPECIFIED TYPE: Primary | ICD-10-CM

## 2018-04-03 DIAGNOSIS — I10 HYPERTENSION, UNSPECIFIED TYPE: ICD-10-CM

## 2018-04-03 DIAGNOSIS — R07.9 CHEST PAIN, UNSPECIFIED TYPE: ICD-10-CM

## 2018-04-03 LAB
ANION GAP SERPL CALC-SCNC: 7 MMOL/L
BUN SERPL-MCNC: 18 MG/DL
CALCIUM SERPL-MCNC: 9 MG/DL
CHLORIDE SERPL-SCNC: 97 MMOL/L
CO2 SERPL-SCNC: 34 MMOL/L
CREAT SERPL-MCNC: 1 MG/DL
EST. GFR  (AFRICAN AMERICAN): >60 ML/MIN/1.73 M^2
EST. GFR  (NON AFRICAN AMERICAN): >60 ML/MIN/1.73 M^2
GLUCOSE SERPL-MCNC: 93 MG/DL
MAGNESIUM SERPL-MCNC: 2.1 MG/DL
POTASSIUM SERPL-SCNC: 2.9 MMOL/L
SODIUM SERPL-SCNC: 138 MMOL/L
TSH SERPL DL<=0.005 MIU/L-ACNC: 1.04 UIU/ML

## 2018-04-03 PROCEDURE — 99214 OFFICE O/P EST MOD 30 MIN: CPT | Mod: S$GLB,,, | Performed by: INTERNAL MEDICINE

## 2018-04-03 PROCEDURE — 93000 ELECTROCARDIOGRAM COMPLETE: CPT | Mod: S$GLB,,, | Performed by: INTERNAL MEDICINE

## 2018-04-03 PROCEDURE — 36415 COLL VENOUS BLD VENIPUNCTURE: CPT

## 2018-04-03 PROCEDURE — 3075F SYST BP GE 130 - 139MM HG: CPT | Mod: CPTII,S$GLB,, | Performed by: INTERNAL MEDICINE

## 2018-04-03 PROCEDURE — 84443 ASSAY THYROID STIM HORMONE: CPT

## 2018-04-03 PROCEDURE — 83735 ASSAY OF MAGNESIUM: CPT

## 2018-04-03 PROCEDURE — 3079F DIAST BP 80-89 MM HG: CPT | Mod: CPTII,S$GLB,, | Performed by: INTERNAL MEDICINE

## 2018-04-03 PROCEDURE — 80048 BASIC METABOLIC PNL TOTAL CA: CPT

## 2018-04-03 PROCEDURE — 99999 PR PBB SHADOW E&M-EST. PATIENT-LVL IV: CPT | Mod: PBBFAC,GC,, | Performed by: INTERNAL MEDICINE

## 2018-04-03 RX ORDER — POTASSIUM CHLORIDE 1.5 G/1.58G
40 POWDER, FOR SOLUTION ORAL DAILY
Qty: 8 PACKET | Refills: 0 | Status: SHIPPED | OUTPATIENT
Start: 2018-04-03 | End: 2018-04-07

## 2018-04-03 NOTE — PROGRESS NOTES
CARDIOLOGY CLINIC NOTE    Patient Name: Bri Santiago  YOB: 1960    PRESENTING HISTORY       History of Present Illness:  Mr. Bri Santiago is a 57 y.o. male w/ HLD, sleep apnea, previous hx HTN now unmedicated presenting for evaluation of CP.    Patient has had intermittent left sided CP occurring throughout the day, daily for the last 2 months. Symptoms first started while at bible study. Pain is sharp, left sided with some radiation into left arm. Associated with SOB but no N/V. No exertional dyspnea, orthopnea or other respiratory symptoms outside of episodes. Reports some fluttering a/w episodes, feels like heart is racing.     First episode lasted about 45 minutes but typically they last 3-4 minutes before resolving spontaneously. Patient has a family hx of early CAD with brother and mother being diagnosed in 40s. Had similar symptoms several years ago, evaluated by stress test which was normal. Seen in ED for symptoms at the end of February and had negative troponin.     Review of Systems:  Constitutional: + fatigue, no f/c  Eyes: no visual changes  ENT: no nasal congestion or sore throat  Respiratory: no cough + SOB, -BOYD  Cardiovascular: + chest pain, +palpitations  Gastrointestinal: no nausea or vomiting  Genitourinary: no hematuria or dysuria  Musculoskeletal: no arthralgias or myalgias  Neurological: no seizures or tremors  Skin: No rashes or wounds    PAST HISTORY:     Past Medical History:   Diagnosis Date    Acute pancreatitis     Anal fissure     Anemia     Arthritis     Asthma in remission     Back pain     BPH (benign prostatic hypertrophy)     Cancer 2000    prostate- [facility] at [facility] with chemo- in remission since 2000    Clotting disorder     Dysphagia 10/7/2014    Family history of colon cancer     Family history of early CAD     GERD (gastroesophageal reflux disease)     H. pylori infection 10/8/2014    Helicobacter pylori (H. pylori) infection     Chronic     History of chronic pancreatitis     HTN (hypertension)     Lumbago 11/12/2012    Obesity     ABDON (obstructive sleep apnea)     Pneumonia     during childhood     Prostate cancer 2000    dx and treated at [facility], had chemotherapy, in remission [id number]    Sacroiliac joint pain 2/10/2015    Spinal stenosis of lumbar region     Trouble in sleeping     Vitamin D deficiency disease     VWD (acquired von Willebrand's disease)        Past Surgical History:   Procedure Laterality Date    anal fissure repair      x2    CARPAL TUNNEL RELEASE  2003    left hand    CERVICAL DISCECTOMY  2003    COLONOSCOPY N/A 10/7/2015    Procedure: COLONOSCOPY;  Surgeon: Rosendo Boyer MD;  Location: [facility] ENDO (4TH FLR);  Service: Endoscopy;  Laterality: N/A;  PM Prep    COLONOSCOPY N/A 6/19/2017    Procedure: COLONOSCOPY;  Surgeon: Rosendo Boyer MD;  Location: [facility] ENDO (4TH FLR);  Service: Endoscopy;  Laterality: N/A;  constipation prep (no DM no CHF)       hx of vonWillebrand's disease-will need infusion prior    LUMBAR FUSION  2012    SPINE SURGERY      TONSILLECTOMY      at age 24    VASECTOMY  1996       Family History   Problem Relation Age of Onset    Colon cancer Father 67     colon cancer    Hypertension Father     Glaucoma Father     Colon cancer Paternal Grandfather 65          Coronary artery disease Mother 45    Hypertension Mother     Heart disease Mother     No Known Problems Brother     No Known Problems Sister     No Known Problems Daughter     No Known Problems Son     Coronary artery disease Brother 51    No Known Problems Daughter     No Known Problems Daughter     No Known Problems Son     No Known Problems Son     Colon cancer Paternal Uncle 65    Diabetes Mellitus Paternal Grandmother        Social History     Social History    Marital status:      Spouse name: N/A    Number of children: N/A    Years of education: N/A     Occupational History     disabled due to back injury      Social History Main Topics    Smoking status: Never Smoker    Smokeless tobacco: Never Used    Alcohol use No    Drug use: No    Sexual activity: Not Currently     Partners: Female     Other Topics Concern    None     Social History Narrative    wlking for exercised       MEDICATIONS & ALLERGIES:     Current Outpatient Prescriptions on File Prior to Visit   Medication Sig    acetaminophen (TYLENOL) 500 MG tablet Take 2 tablets (1,000 mg total) by mouth every 8 (eight) hours as needed.    albuterol (PROVENTIL HFA) 90 mcg/actuation inhaler Inhale 2 puffs into the lungs every 6 (six) hours as needed.    atorvastatin (LIPITOR) 20 MG tablet Take 1 tablet (20 mg total) by mouth once daily.    atorvastatin (LIPITOR) 20 MG tablet TAKE 1 TABLET(20 MG) BY MOUTH EVERY DAY    azelastine (ASTELIN) 137 mcg (0.1 %) nasal spray 1 spray (137 mcg total) by Nasal route 2 (two) times daily.    calcium citrate-vitamin D3 315-200 mg (CITRACAL+D) 315-200 mg-unit per tablet Take 1 tablet by mouth once daily.    cyanocobalamin, vitamin B-12, (VITAMIN B-12) 50 mcg tablet Take 50 mcg by mouth once daily.    docusate sodium (COLACE) 100 MG capsule Take 1 tablet by mouth Twice daily. 1 Capsule Oral Twice a day .  Take with pain medicine    esomeprazole (NEXIUM) 40 MG capsule Take 1 capsule (40 mg total) by mouth every 12 (twelve) hours.    fluticasone-salmeterol 250-50 mcg/dose (ADVAIR DISKUS) 250-50 mcg/dose diskus inhaler Inhale 1 puff into the lungs 2 (two) times daily.    traMADol (ULTRAM) 50 mg tablet Take 1 tablet (50 mg total) by mouth every 6 (six) hours as needed for Pain.    VITAMIN D2 50,000 unit capsule TK 1 C PO Q 7 DAYS    zolpidem (AMBIEN) 10 mg Tab TAKE 1 TABLET BY MOUTH EVERY DAY AT BEDTIME    sildenafil (VIAGRA) 100 MG tablet Take 1 tablet (100 mg total) by mouth daily as needed for Erectile Dysfunction.    [DISCONTINUED] doxycycline (VIBRAMYCIN) 100 MG Cap Take 1 capsule  "(100 mg total) by mouth every 12 (twelve) hours.    [DISCONTINUED] GAVILYTE-G 236-22.74-6.74 -5.86 gram suspension TK 4000 ML PO ONCE     No current facility-administered medications on file prior to visit.        Review of patient's allergies indicates:   Allergen Reactions    Ace inhibitors     Aspirin      Other reaction(s): Hives    Codeine     Dilaudid  [hydromorphone]      Other reaction(s): Itching    Penicillins Hives and Swelling     Has had allergy testing and can prob tolerate penicillin       OBJECTIVE:   Vital Signs:  Vitals:    04/03/18 0737   BP: 130/88   Pulse: 78   Weight: 119.2 kg (262 lb 12.6 oz)   Height: 5' 11" (1.803 m)       No results found for this or any previous visit (from the past 24 hour(s)).      Physical Exam:   General:  Obese male in NAD  HEENT:  Normocephalic, atraumatic, PERRL, EOMI  Neck: No carotid bruits. No thyromegaly. Surgical scar to anterior neck  CVS:  RRR, S1 and S2 normal, no murmurs  Resp:  Lungs clear to auscultation, no wheezes, rales, rhonchi, cough  GI:  Abdomen soft, non-tender  MSK:  Trace LE edema bilaterally. No calf tenderness  Skin:  No rashes, ulcers, erythema  Neuro:  No gross motor/sensory deficits  Psych:  Alert and oriented to person, place, and time    ASSESSMENT & PLAN:     57 M with dyslipidemia, hx HTN, sleep apnea, family hx early CAD presenting with sharp left sided chest pain which is not correlated to exertion but with dyspnea and radiation to arm. Will evaluate with stress test for ischemic heart disease and check electrolytes and thyroid function for possible contribution to fatigue and palpitations.    Chest pain, unspecified type   -     Cardiac treadmill stress test; Future  -     Basic metabolic panel; Future  -     Magnesium; Future    Fatigue, unspecified type  -     TSH; Future  -     Basic metabolic panel; Future  -     Magnesium; Future      Seen and discussed with Fellow Dr. Francisco and Attending Dr. Anna Rucker, " MD   Internal Medicine PGY-1  [phone]

## 2018-04-03 NOTE — TELEPHONE ENCOUNTER
Called patient to discuss test results. Hypokalemic to 2.9 today. No significant EKG changes seen this morning. Will prescribe potassium supplementation until he follows up with PCP on 4/12.     aNhum Rucker MD   Internal Medicine PGY-1  [phone]

## 2018-04-04 NOTE — PROGRESS NOTES
I have reviewed the resident's history and physical. I have personally spoken with and examined the patient at bedside. I agree with the findings, assessment, and plan.

## 2018-04-11 ENCOUNTER — HOSPITAL ENCOUNTER (OUTPATIENT)
Dept: CARDIOLOGY | Facility: CLINIC | Age: 58
Discharge: HOME | End: 2018-04-11
Attending: STUDENT IN AN ORGANIZED HEALTH CARE EDUCATION/TRAINING PROGRAM
Payer: MEDICARE

## 2018-04-11 DIAGNOSIS — R07.9 CHEST PAIN, UNSPECIFIED TYPE: ICD-10-CM

## 2018-04-11 LAB — DIASTOLIC DYSFUNCTION: NO

## 2018-04-11 PROCEDURE — 93015 CV STRESS TEST SUPVJ I&R: CPT | Mod: S$GLB,,, | Performed by: INTERNAL MEDICINE

## 2018-04-12 ENCOUNTER — OFFICE VISIT (OUTPATIENT)
Dept: INTERNAL MEDICINE | Facility: CLINIC | Age: 58
End: 2018-04-12
Attending: INTERNAL MEDICINE
Payer: MEDICARE

## 2018-04-12 VITALS
HEIGHT: 71 IN | BODY MASS INDEX: 36.88 KG/M2 | WEIGHT: 263.44 LBS | HEART RATE: 78 BPM | DIASTOLIC BLOOD PRESSURE: 74 MMHG | SYSTOLIC BLOOD PRESSURE: 126 MMHG | OXYGEN SATURATION: 98 %

## 2018-04-12 DIAGNOSIS — R07.89 OTHER CHEST PAIN: Primary | ICD-10-CM

## 2018-04-12 DIAGNOSIS — J30.2 CHRONIC SEASONAL ALLERGIC RHINITIS, UNSPECIFIED TRIGGER: ICD-10-CM

## 2018-04-12 DIAGNOSIS — E78.5 HYPERLIPIDEMIA, UNSPECIFIED HYPERLIPIDEMIA TYPE: ICD-10-CM

## 2018-04-12 DIAGNOSIS — J45.998 ASTHMA IN REMISSION: ICD-10-CM

## 2018-04-12 DIAGNOSIS — N40.1 BENIGN LOCALIZED PROSTATIC HYPERPLASIA WITH LOWER URINARY TRACT SYMPTOMS (LUTS): ICD-10-CM

## 2018-04-12 DIAGNOSIS — K21.00 GASTROESOPHAGEAL REFLUX DISEASE WITH ESOPHAGITIS: ICD-10-CM

## 2018-04-12 DIAGNOSIS — Z85.46 HISTORY OF PROSTATE CANCER: ICD-10-CM

## 2018-04-12 DIAGNOSIS — E87.6 HYPOKALEMIA: ICD-10-CM

## 2018-04-12 DIAGNOSIS — D68.00 VON WILLEBRAND DISEASE: ICD-10-CM

## 2018-04-12 DIAGNOSIS — E66.01 SEVERE OBESITY WITH BODY MASS INDEX (BMI) OF 35.0 TO 39.9 WITH COMORBIDITY: ICD-10-CM

## 2018-04-12 DIAGNOSIS — I77.9 AORTIC DISEASE: ICD-10-CM

## 2018-04-12 LAB — POTASSIUM UR-SCNC: 83 MMOL/L

## 2018-04-12 PROCEDURE — 99499 UNLISTED E&M SERVICE: CPT | Mod: S$GLB,,, | Performed by: INTERNAL MEDICINE

## 2018-04-12 PROCEDURE — 99214 OFFICE O/P EST MOD 30 MIN: CPT | Mod: S$GLB,,, | Performed by: INTERNAL MEDICINE

## 2018-04-12 PROCEDURE — 3074F SYST BP LT 130 MM HG: CPT | Mod: CPTII,S$GLB,, | Performed by: INTERNAL MEDICINE

## 2018-04-12 PROCEDURE — 99999 PR PBB SHADOW E&M-EST. PATIENT-LVL V: CPT | Mod: PBBFAC,,, | Performed by: INTERNAL MEDICINE

## 2018-04-12 PROCEDURE — 3078F DIAST BP <80 MM HG: CPT | Mod: CPTII,S$GLB,, | Performed by: INTERNAL MEDICINE

## 2018-04-12 PROCEDURE — 84133 ASSAY OF URINE POTASSIUM: CPT

## 2018-04-12 RX ORDER — AZELASTINE 1 MG/ML
1 SPRAY, METERED NASAL 2 TIMES DAILY
Qty: 30 ML | Refills: 11 | Status: SHIPPED | OUTPATIENT
Start: 2018-04-12 | End: 2020-06-11

## 2018-04-12 RX ORDER — DOXAZOSIN 1 MG/1
1 TABLET ORAL NIGHTLY
Qty: 30 TABLET | Refills: 11 | Status: SHIPPED | OUTPATIENT
Start: 2018-04-12 | End: 2019-05-28 | Stop reason: SDUPTHER

## 2018-04-12 RX ORDER — ATORVASTATIN CALCIUM 20 MG/1
20 TABLET, FILM COATED ORAL DAILY
Qty: 90 TABLET | Refills: 3 | Status: SHIPPED | OUTPATIENT
Start: 2018-04-12 | End: 2018-12-03

## 2018-04-12 RX ORDER — ESOMEPRAZOLE MAGNESIUM 40 MG/1
40 CAPSULE, DELAYED RELEASE ORAL EVERY 12 HOURS
Qty: 60 CAPSULE | Refills: 11 | Status: SHIPPED | OUTPATIENT
Start: 2018-04-12 | End: 2018-10-02 | Stop reason: ALTCHOICE

## 2018-04-12 NOTE — PROGRESS NOTES
"Subjective:       Patient ID: Bri Santiago is a 57 y.o. male.    Chief Complaint: Follow-up     Bri Santiago is a 57 y.o.  male who presents for Follow-up  .  Up several times a night to urinate.  No dysuria. + fatigue. Using cpap. + somulence. Gets in bed at 9:30, up at 5-5:30. Has history BPH, not on medication. Had used flomax at one time.  Able to fall asleep and returns sleep after bathroom. No am headaches.    CP has continued.  Mild and constant under left breast. Occasional sharp pain that "takes my breath away" that last for a few minutes. Improves with deep breathing. Associated with numbness in left upper arm. Occurs at random times, sitting or walking or driving. Has avoided exertion concerned about this. Had this severe pain during the stress test.   Hehad similar complaints in 2014. CT without contrast was normal in 2014. Does have history of small hiatal hernia.    Labs show a return of his hypokalemia. Not using albuterol frequently. No insulin use. No diuretics. Has occurred intermittently in the past. Told to start potassium by cards and is eating a high potassium diet.       Review of Systems   Constitutional: Positive for fatigue. Negative for chills and fever.   HENT: Negative for rhinorrhea and sore throat.    Respiratory: Positive for cough (dry, worse when supine). Negative for shortness of breath.    Cardiovascular: Positive for chest pain. Negative for palpitations.   Gastrointestinal: Positive for abdominal pain. Negative for nausea and vomiting.   Endocrine: Positive for polyuria. Negative for polydipsia and polyphagia.   Genitourinary: Negative for dysuria and hematuria.   Musculoskeletal: Positive for arthralgias and back pain.   Skin: Negative for color change and rash.   Neurological: Negative for weakness and numbness.   Psychiatric/Behavioral: Positive for sleep disturbance. Negative for agitation and dysphoric mood.       Patient Active Problem List   Diagnosis    Asthma in " remission    Von Willebrand disease    HTN (hypertension)    ABDON (obstructive sleep apnea)    Spondylosis without myelopathy    Thoracic or lumbosacral neuritis or radiculitis, unspecified    Spinal stenosis, lumbar region, without neurogenic claudication    Degeneration of lumbar or lumbosacral intervertebral disc    Acquired spondylolisthesis    Pseudoarthrosis    Family history of colon cancer    Vitamin D deficiency    Encounter for monitoring long-term proton pump inhibitor therapy    Postlaminectomy syndrome of lumbar region    Myalgia and myositis    Facet syndrome    Gastroesophageal reflux disease without esophagitis    Osteopenia    Thoracic aorta atherosclerosis    BMI 34.0-34.9,adult    BPH (benign prostatic hypertrophy) with urinary obstruction    Allergic rhinitis    Allergic conjunctivitis of both eyes    Encounter for long-term current use of high risk medication    Gastroesophageal reflux disease with esophagitis    Hematochezia    DDD (degenerative disc disease), lumbar       Past Medical History:   Diagnosis Date    Acute pancreatitis     Anal fissure     Anemia     Arthritis     Asthma in remission     Back pain     BPH (benign prostatic hypertrophy)     Cancer 2000    prostate- treated at [facility] with chemo- in remission since 2000    Clotting disorder     Dysphagia 10/7/2014    Family history of colon cancer     Family history of early CAD     GERD (gastroesophageal reflux disease)     H. pylori infection 10/8/2014    Helicobacter pylori (H. pylori) infection     Chronic    History of chronic pancreatitis     HTN (hypertension)     Lumbago 11/12/2012    Obesity     ABDON (obstructive sleep apnea)     Pneumonia     during childhood     Prostate cancer 2000    dx and treated at [facility], had chemotherapy, in remission [id number]    Sacroiliac joint pain 2/10/2015    Spinal stenosis of lumbar region     Trouble in sleeping     Vitamin D  "deficiency disease     VWD (acquired von Willebrand's disease)        Past Surgical History:   Procedure Laterality Date    anal fissure repair      x2    CARPAL TUNNEL RELEASE  2003    left hand    CERVICAL DISCECTOMY  2003    COLONOSCOPY N/A 10/7/2015    Procedure: COLONOSCOPY;  Surgeon: Rosendo Boyer MD;  [facility]  Service: Endoscopy;  Laterality: N/A;  PM Prep    COLONOSCOPY N/A 6/19/2017    Procedure: COLONOSCOPY;  Surgeon: Rosendo Boyer MD;  [facility]  Service: Endoscopy;  Laterality: N/A;  constipation prep (no DM no CHF)       hx of vonWillebrand's disease-will need infusion prior    LUMBAR FUSION  2012    SPINE SURGERY      TONSILLECTOMY      at age 24    VASECTOMY  1996       Family History   Problem Relation Age of Onset    Colon cancer Father 67     colon cancer    Hypertension Father     Glaucoma Father     Colon cancer Paternal Grandfather 65          Coronary artery disease Mother 45    Hypertension Mother     Heart disease Mother     No Known Problems Brother     No Known Problems Sister     No Known Problems Daughter     No Known Problems Son     Coronary artery disease Brother 51    No Known Problems Daughter     No Known Problems Daughter     No Known Problems Son     No Known Problems Son     Colon cancer Paternal Uncle 65    Diabetes Mellitus Paternal Grandmother        Social History   Substance Use Topics    Smoking status: Never Smoker    Smokeless tobacco: Never Used    Alcohol use No       Objective:   Blood pressure 126/74, pulse 78, height 5' 11" (1.803 m), weight 119.5 kg (263 lb 7.2 oz), SpO2 98 %.     Physical Exam   Constitutional: He is oriented to person, place, and time. He appears well-developed and well-nourished. No distress.   HENT:   Head: Normocephalic and atraumatic.   Right Ear: External ear normal.   Left Ear: External ear normal.   Eyes: Conjunctivae are normal. No scleral icterus.   Neck: " No JVD present. No thyromegaly present.   Cardiovascular: Normal heart sounds.  Exam reveals no gallop and no friction rub.    No murmur heard.  Pulmonary/Chest: Effort normal and breath sounds normal. He has no wheezes. He has no rales.   Pain not reproducible   Abdominal: Soft. Bowel sounds are normal. He exhibits no distension. There is no tenderness.   + abdominal obesity   Musculoskeletal: He exhibits edema (1+ bilateral). He exhibits no tenderness.   Lymphadenopathy:     He has no cervical adenopathy.   Neurological: He is alert and oriented to person, place, and time.   Skin: Skin is warm and dry.   Psychiatric: He has a normal mood and affect. Thought content normal.       Prior labs reviewed  Assessment/Plan:        Bri was seen today for follow-up.    Diagnoses and all orders for this visit:    Other chest pain  -     CT Chest Abdomen With Contrast (XPD); Future  Differential includes hiatal hernia, esophageal spasm, thoracic radiculopathy, less likely cardiac origin- being evaluated by cardiology    Gastroesophageal reflux disease with esophagitis  -     Increase to esomeprazole (NEXIUM) 40 MG capsule; Take 1 capsule (40 mg total) by mouth every 12 (twelve) hours.  Lifestyle modifications    Hyperlipidemia, unspecified hyperlipidemia type  -     atorvastatin (LIPITOR) 20 MG tablet; Take 1 tablet (20 mg total) by mouth once daily.  -  Previously well controlled  - cont current medication  - recommend low cholesterol diet and regular cardiovascular exercise to reduce risk of cardiovascular events  - repeat lipid profile and CMP annually    Chronic seasonal allergic rhinitis, unspecified trigger  -     azelastine (ASTELIN) 137 mcg (0.1 %) nasal spray; 1 spray (137 mcg total) by Nasal route 2 (two) times daily.    Aortic disease  -     CT Chest Abdomen With Contrast (XPD); Future    Severe obesity with body mass index (BMI) of 35.0 to 39.9 with comorbidity  -     Ambulatory consult to Bariatric  Surgery  Recommend wt loss    Benign localized prostatic hyperplasia with lower urinary tract symptoms (LUTS)  -     Urinalysis; Future  -     doxazosin (CARDURA) 1 MG tablet; Take 1 tablet (1 mg total) by mouth every evening.  -     Ambulatory consult to Urology    History of prostate cancer   -     Ambulatory consult to Urology  Per patient- diagnosed at [facility] over 15 years ago    Hypokalemia  Unclear eitology  Check if urinary excretion  -     Potassium, urine, random  -     Creatinine, urine, random; Future  -     Ambulatory consult to Nephrology  -     Basic metabolic panel; Future  Consider blood gas to evaluate acid/base status    Von Willebrand disease  Stable  No issues    Asthma in remission  Stable, no issues

## 2018-04-13 ENCOUNTER — TELEPHONE (OUTPATIENT)
Dept: INTERNAL MEDICINE | Facility: CLINIC | Age: 58
End: 2018-04-13

## 2018-04-13 ENCOUNTER — TELEPHONE (OUTPATIENT)
Dept: CARDIOLOGY | Facility: CLINIC | Age: 58
End: 2018-04-13

## 2018-04-13 DIAGNOSIS — E87.6 HYPOKALEMIA: Primary | ICD-10-CM

## 2018-04-13 NOTE — TELEPHONE ENCOUNTER
Spoke with pt and scheduled his urine test for Monday 4/16/18 and schedule nephology adonay at main campus for 5/14/18

## 2018-04-13 NOTE — TELEPHONE ENCOUNTER
Pt's urine creatine was not done. Please arrange for urine potassium and urine creatine to be redone. Please assist with nephrology apt

## 2018-04-13 NOTE — TELEPHONE ENCOUNTER
Called Mr. Santiago - explained results of negative exercise EKG stress test - and that chest pain was likely non-cardiac.  Recommend he discuss with primary care physician to work up additional causes of chest pain.    Germán Francisco MD

## 2018-04-13 NOTE — TELEPHONE ENCOUNTER
----- Message from Marysol Bueno sent at 4/13/2018 10:51 AM CDT -----  Contact: PURNIMA IYER [[id number]]            Name of Who is Calling: PURNIMA IYER [[id number]]    What is the request in detail: Returning a call.      Can the clinic reply by [facility]: no    What [facility]: [phone]

## 2018-04-16 ENCOUNTER — HOSPITAL ENCOUNTER (OUTPATIENT)
Dept: RADIOLOGY | Facility: OTHER | Age: 58
Discharge: HOME | End: 2018-04-16
Attending: INTERNAL MEDICINE
Payer: MEDICARE

## 2018-04-16 ENCOUNTER — TELEPHONE (OUTPATIENT)
Dept: INTERNAL MEDICINE | Facility: CLINIC | Age: 58
End: 2018-04-16

## 2018-04-16 DIAGNOSIS — M54.6 THORACIC SPINE PAIN: Primary | ICD-10-CM

## 2018-04-16 DIAGNOSIS — I77.9 AORTIC DISEASE: ICD-10-CM

## 2018-04-16 DIAGNOSIS — R07.89 OTHER CHEST PAIN: ICD-10-CM

## 2018-04-16 PROCEDURE — 71260 CT THORAX DX C+: CPT | Mod: 26,,, | Performed by: RADIOLOGY

## 2018-04-16 PROCEDURE — 25500020 PHARM REV CODE 255: Performed by: INTERNAL MEDICINE

## 2018-04-16 PROCEDURE — 71260 CT THORAX DX C+: CPT | Mod: TC

## 2018-04-16 PROCEDURE — 74160 CT ABDOMEN W/CONTRAST: CPT | Mod: 26,,, | Performed by: RADIOLOGY

## 2018-04-16 PROCEDURE — 74160 CT ABDOMEN W/CONTRAST: CPT | Mod: TC

## 2018-04-16 RX ADMIN — IOHEXOL 100 ML: 350 INJECTION, SOLUTION INTRAVENOUS at 08:04

## 2018-04-16 RX ADMIN — IOHEXOL 15 ML: 350 INJECTION, SOLUTION INTRAVENOUS at 09:04

## 2018-04-16 NOTE — TELEPHONE ENCOUNTER
Please let pt know CT showed a small hiatal hernia. This may be a cause for his pain but I would like him to see spine clinic to determine if they think the pain may be related to his back or neck.  Let him know we can discuss these results in more detail at his upcoming appointment.

## 2018-04-17 ENCOUNTER — TELEPHONE (OUTPATIENT)
Dept: INTERNAL MEDICINE | Facility: CLINIC | Age: 58
End: 2018-04-17

## 2018-04-17 NOTE — TELEPHONE ENCOUNTER
----- Message from Marysol Guerin sent at 4/17/2018  8:00 AM CDT -----  Contact: PURNIMA IYER [[id number]]  Name of Who is Calling: PURNIMA IYER [[id number]]      What is the request in detail: patient is returning a call       Can the clinic reply by [facility]: NO      What Number to Call Back if not in [facility]:  [phone]

## 2018-04-17 NOTE — TELEPHONE ENCOUNTER
Spoke w/ pt and informed him of his CT results and PCP advise for PT to f/u w/ [facility].   Pt verbally understands and stated that he will follow up with [facility] after his pain mgt appt that he already has scheduled.   Pt has no further questions or concerns.

## 2018-04-17 NOTE — TELEPHONE ENCOUNTER
----- Message from Jose Ma sent at 4/17/2018  2:10 PM CDT -----  Contact: patient            Name of Who is Calling: Bri      What is the request in detail: Pt missed your call is requesting a call back to get his CT results.  Please call the patient.      Can the clinic reply by [facility]: no      [facility]: [phone]

## 2018-04-23 ENCOUNTER — TELEPHONE (OUTPATIENT)
Dept: INTERNAL MEDICINE | Facility: CLINIC | Age: 58
End: 2018-04-23

## 2018-04-24 ENCOUNTER — OFFICE VISIT (OUTPATIENT)
Dept: PAIN MEDICINE | Facility: CLINIC | Age: 58
End: 2018-04-24
Payer: MEDICARE

## 2018-04-24 ENCOUNTER — TELEPHONE (OUTPATIENT)
Dept: INTERNAL MEDICINE | Facility: CLINIC | Age: 58
End: 2018-04-24

## 2018-04-24 VITALS
SYSTOLIC BLOOD PRESSURE: 143 MMHG | HEIGHT: 71 IN | HEART RATE: 78 BPM | RESPIRATION RATE: 14 BRPM | DIASTOLIC BLOOD PRESSURE: 2 MMHG | WEIGHT: 267.63 LBS | TEMPERATURE: 98 F | BODY MASS INDEX: 37.47 KG/M2

## 2018-04-24 DIAGNOSIS — M47.816 LUMBAR SPONDYLOSIS: Primary | ICD-10-CM

## 2018-04-24 DIAGNOSIS — E87.6 HYPOKALEMIA: Primary | ICD-10-CM

## 2018-04-24 DIAGNOSIS — D68.00 VON WILLEBRAND DISEASE: ICD-10-CM

## 2018-04-24 DIAGNOSIS — M48.061 SPINAL STENOSIS, LUMBAR REGION, WITHOUT NEUROGENIC CLAUDICATION: ICD-10-CM

## 2018-04-24 DIAGNOSIS — M51.36 DDD (DEGENERATIVE DISC DISEASE), LUMBAR: ICD-10-CM

## 2018-04-24 DIAGNOSIS — M50.30 DDD (DEGENERATIVE DISC DISEASE), CERVICAL: ICD-10-CM

## 2018-04-24 DIAGNOSIS — M79.10 MYALGIA: ICD-10-CM

## 2018-04-24 PROCEDURE — 3077F SYST BP >= 140 MM HG: CPT | Mod: CPTII,S$GLB,, | Performed by: NURSE PRACTITIONER

## 2018-04-24 PROCEDURE — 3078F DIAST BP <80 MM HG: CPT | Mod: CPTII,S$GLB,, | Performed by: NURSE PRACTITIONER

## 2018-04-24 PROCEDURE — 99999 PR PBB SHADOW E&M-EST. PATIENT-LVL III: CPT | Mod: PBBFAC,,, | Performed by: NURSE PRACTITIONER

## 2018-04-24 PROCEDURE — 99214 OFFICE O/P EST MOD 30 MIN: CPT | Mod: S$GLB,,, | Performed by: NURSE PRACTITIONER

## 2018-04-24 RX ORDER — TRAMADOL HYDROCHLORIDE 50 MG/1
50 TABLET ORAL EVERY 6 HOURS PRN
Qty: 120 TABLET | Refills: 2 | Status: SHIPPED | OUTPATIENT
Start: 2018-04-24 | End: 2018-07-23
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
## 2018-04-24 NOTE — TELEPHONE ENCOUNTER
----- Message from Felicitas Cao sent at 4/23/2018  2:24 PM CDT -----  Contact: [phone]  Back/spine dept is requesting pt to have xray of area of complaint. Please call patient and advise thanks

## 2018-04-25 NOTE — TELEPHONE ENCOUNTER
Please ask him to see me tomorrow for follow up of his multiple issues.  Ok to schedule Thursday 4/26 between 10-11 or 1-2:30pm

## 2018-04-26 ENCOUNTER — TELEPHONE (OUTPATIENT)
Dept: PAIN MEDICINE | Facility: CLINIC | Age: 58
End: 2018-04-26

## 2018-04-26 ENCOUNTER — LAB VISIT (OUTPATIENT)
Dept: LAB | Facility: OTHER | Age: 58
End: 2018-04-26
Attending: INTERNAL MEDICINE
Payer: MEDICARE

## 2018-04-26 DIAGNOSIS — E87.6 HYPOKALEMIA: ICD-10-CM

## 2018-04-26 LAB
ANION GAP SERPL CALC-SCNC: 8 MMOL/L
BUN SERPL-MCNC: 20 MG/DL
CALCIUM SERPL-MCNC: 9.1 MG/DL
CHLORIDE SERPL-SCNC: 102 MMOL/L
CO2 SERPL-SCNC: 27 MMOL/L
CREAT SERPL-MCNC: 0.9 MG/DL
EST. GFR  (AFRICAN AMERICAN): >60 ML/MIN/1.73 M^2
EST. GFR  (NON AFRICAN AMERICAN): >60 ML/MIN/1.73 M^2
GLUCOSE SERPL-MCNC: 91 MG/DL
POTASSIUM SERPL-SCNC: 3.9 MMOL/L
SODIUM SERPL-SCNC: 137 MMOL/L

## 2018-04-26 PROCEDURE — 80048 BASIC METABOLIC PNL TOTAL CA: CPT

## 2018-04-26 PROCEDURE — 36415 COLL VENOUS BLD VENIPUNCTURE: CPT

## 2018-04-26 NOTE — TELEPHONE ENCOUNTER
"  Staff contacted the patient to verify the percentage of relief that he received from his previous RFA's.    Patient states,"I had the RFA's before and received about 80% of relief form it."    Staff informed the patient that we will contact [facility] and inform them of the relief as per their request.     Patient verbalized understanding and expressed thanks.     Please review.   "

## 2018-04-26 NOTE — TELEPHONE ENCOUNTER
----- Message from Elvi Kaba sent at 4/26/2018  3:17 PM CDT -----  Good Afternoon,    [facility] is requesting the % of relief from the previous procedures.    Thanks

## 2018-04-30 NOTE — TELEPHONE ENCOUNTER
Called pt and confirmed that PCP rec  he f/u w/ nephrology regarding low potassium level  Pt verbally understands and agree to appt time and date.  Pt has no further questions     appt reminder has been mailed to pt

## 2018-05-08 ENCOUNTER — DOCUMENTATION ONLY (OUTPATIENT)
Dept: REHABILITATION | Facility: OTHER | Age: 58
End: 2018-05-08

## 2018-05-09 DIAGNOSIS — M50.30 DDD (DEGENERATIVE DISC DISEASE), CERVICAL: ICD-10-CM

## 2018-05-09 DIAGNOSIS — M53.3 SACROILIAC JOINT PAIN: ICD-10-CM

## 2018-05-09 DIAGNOSIS — M47.816 LUMBAR SPONDYLOSIS: Primary | ICD-10-CM

## 2018-05-09 DIAGNOSIS — M47.816 LUMBAR FACET ARTHROPATHY: ICD-10-CM

## 2018-05-09 DIAGNOSIS — M51.36 DDD (DEGENERATIVE DISC DISEASE), LUMBAR: ICD-10-CM

## 2018-05-09 DIAGNOSIS — M79.10 MYALGIA: ICD-10-CM

## 2018-05-09 DIAGNOSIS — M51.37 DEGENERATION OF LUMBAR OR LUMBOSACRAL INTERVERTEBRAL DISC: ICD-10-CM

## 2018-05-10 ENCOUNTER — HOSPITAL ENCOUNTER (OUTPATIENT)
Facility: OTHER | Age: 58
Discharge: HOME | End: 2018-05-10
Attending: ANESTHESIOLOGY | Admitting: ANESTHESIOLOGY
Payer: MEDICARE

## 2018-05-10 VITALS
HEIGHT: 71 IN | DIASTOLIC BLOOD PRESSURE: 68 MMHG | RESPIRATION RATE: 18 BRPM | SYSTOLIC BLOOD PRESSURE: 120 MMHG | HEART RATE: 94 BPM | TEMPERATURE: 98 F | BODY MASS INDEX: 37.38 KG/M2 | WEIGHT: 267 LBS | OXYGEN SATURATION: 96 %

## 2018-05-10 DIAGNOSIS — M96.1 POSTLAMINECTOMY SYNDROME OF LUMBAR REGION: ICD-10-CM

## 2018-05-10 DIAGNOSIS — M47.899 FACET SYNDROME: ICD-10-CM

## 2018-05-10 DIAGNOSIS — M51.36 DDD (DEGENERATIVE DISC DISEASE), LUMBAR: ICD-10-CM

## 2018-05-10 DIAGNOSIS — D68.00 VON WILLEBRAND DISEASE: Primary | ICD-10-CM

## 2018-05-10 PROCEDURE — 64635 DESTROY LUMB/SAC FACET JNT: CPT | Performed by: ANESTHESIOLOGY

## 2018-05-10 PROCEDURE — 64636 DESTROY L/S FACET JNT ADDL: CPT | Performed by: ANESTHESIOLOGY

## 2018-05-10 PROCEDURE — 64635 DESTROY LUMB/SAC FACET JNT: CPT | Mod: LT,,, | Performed by: ANESTHESIOLOGY

## 2018-05-10 PROCEDURE — 99152 MOD SED SAME PHYS/QHP 5/>YRS: CPT | Mod: ,,, | Performed by: ANESTHESIOLOGY

## 2018-05-10 PROCEDURE — 64636 DESTROY L/S FACET JNT ADDL: CPT | Mod: LT,,, | Performed by: ANESTHESIOLOGY

## 2018-05-10 PROCEDURE — 25000003 PHARM REV CODE 250: Performed by: ANESTHESIOLOGY

## 2018-05-10 PROCEDURE — 63600175 PHARM REV CODE 636 W HCPCS: Performed by: ANESTHESIOLOGY

## 2018-05-10 RX ORDER — MIDAZOLAM HYDROCHLORIDE 1 MG/ML
INJECTION INTRAMUSCULAR; INTRAVENOUS
Status: DISCONTINUED | OUTPATIENT
Start: 2018-05-10 | End: 2018-05-10 | Stop reason: HOSPADM

## 2018-05-10 RX ORDER — BUPIVACAINE HYDROCHLORIDE 2.5 MG/ML
INJECTION, SOLUTION EPIDURAL; INFILTRATION; INTRACAUDAL
Status: DISCONTINUED | OUTPATIENT
Start: 2018-05-10 | End: 2018-05-10 | Stop reason: HOSPADM

## 2018-05-10 RX ORDER — SODIUM CHLORIDE 9 MG/ML
INJECTION, SOLUTION INTRAVENOUS CONTINUOUS
Status: DISCONTINUED | OUTPATIENT
Start: 2018-05-10 | End: 2018-05-10 | Stop reason: HOSPADM

## 2018-05-10 RX ORDER — FENTANYL CITRATE 50 UG/ML
INJECTION, SOLUTION INTRAMUSCULAR; INTRAVENOUS
Status: DISCONTINUED | OUTPATIENT
Start: 2018-05-10 | End: 2018-05-10 | Stop reason: HOSPADM

## 2018-05-10 RX ORDER — LIDOCAINE HYDROCHLORIDE 10 MG/ML
INJECTION INFILTRATION; PERINEURAL
Status: DISCONTINUED | OUTPATIENT
Start: 2018-05-10 | End: 2018-05-10 | Stop reason: HOSPADM

## 2018-05-10 RX ADMIN — DESMOPRESSIN ACETATE 36.33 MCG: 4 SOLUTION INTRAVENOUS at 07:05

## 2018-05-10 RX ADMIN — SODIUM CHLORIDE: 0.9 INJECTION, SOLUTION INTRAVENOUS at 07:05

## 2018-05-10 NOTE — DISCHARGE INSTRUCTIONS
Home Care Instructions Pain Management:    1. DIET:   You may resume your normal diet today.   2. BATHING:   You may shower with luke warm water.  3. DRESSING:   You may remove your bandage today.   4. ACTIVITY LEVEL:   You may resume your normal activities 24 hrs after your procedure.  5. MEDICATIONS:   You may resume your normal medications today.   6. SPECIAL INSTRUCTIONS:   No heat to the injection site for 24 hrs including, bath or shower, heating pad, moist heat, or hot tubs.    Use ice pack to injection site for any pain or discomfort.  Apply ice packs for 20 minute intervals as needed.   If you have received any sedatives by mouth today you may not drive for 12 hours.    If you have received any sedation through your IV, you may not drive for 24 hrs.     PLEASE CALL YOUR DOCTOR IF:  1. Redness or swelling around the injection site.  2. Fever of 101 degrees  3. Drainage (pus) from the injection site.  4. For any continuous bleeding (some dried blood over the incision is normal.)    FOR EMERGENCIES:   If any unusual problems or difficulties occur during clinic hours, call [phone] or [phone].     Thank you for allowing us to care for you today. You may receive a survey about the care we provided. Your feedback is valuable and helps us provide excellent care throughout the community.

## 2018-05-10 NOTE — INTERVAL H&P NOTE
"The patient has been examined and the H&P has been reviewed:    I concur with the findings and no changes have occurred since H&P was written.    Anesthesia/Surgery risks, benefits and alternative options discussed and understood by patient/family.    HPI  Patient presenting for RFA lumbar spine, no other health changes since previous encounter.    PMHx, PSHx, Allergies, Medications reviewed in epic    ROS negative except pain complaints in HPI    OBJECTIVE:    BP (!) 152/93 (BP Location: Right arm, Patient Position: Lying)   Pulse 75   Temp 98 °F (36.7 °C) (Oral)   Resp 18   Ht 5' 11" (1.803 m)   Wt 121.1 kg (267 lb)   SpO2 96%   BMI 37.24 kg/m²     PHYSICAL EXAMINATION:    GENERAL: Well appearing, in no acute distress, alert and oriented x3.  PSYCH:  Mood and affect appropriate.  SKIN: Skin color, texture, turgor normal, no rashes or lesions.  CV: RRR with palpation of the radial artery.  PULM: No evidence of respiratory difficulty, symmetric chest rise. Clear to auscultation.  NEURO: Cranial nerves grossly intact.    Plan:  ddavp infusion prior to procedure 0.3mcg/kg    Proceed with procedure as planned    Fredy Magana  05/10/2018        There are no hospital problems to display for this patient.    "

## 2018-05-10 NOTE — PLAN OF CARE
PATIENT TOLERATED PROCEDURE WELL. PT COMPLAINS OF  710 PAIN. ASSISTED PATIENT UP FOR FIRST TIME. STEADY ON FEET AND DISCHARGE INSTRUCTIONS GIVEN.

## 2018-05-10 NOTE — DISCHARGE SUMMARY
Discharge Note  Short Stay      SUMMARY     Admit Date: 5/10/2018    Attending Physician: Fredy Magana    Discharge Diagnosis: Lumbar spondylosis [M47.816]    Discharge Physician: Fredy Magana      Discharge Date: 5/10/2018 9:01 AM       PROCEDURE: Left radiofrequency ablation of the the medial branch nerves at the   transverse process of L3, L4, L5  2)Conscious sedation provided by MD     REASON FOR PROCEDURE: Lumbar spondylosis [M47.816]     Disposition: Home or self care    Patient Instructions:   Current Discharge Medication List      CONTINUE these medications which have NOT CHANGED    Details   acetaminophen (TYLENOL) 500 MG tablet Take 2 tablets (1,000 mg total) by mouth every 8 (eight) hours as needed.  Qty: 90 tablet, Refills: 0      albuterol (PROVENTIL HFA) 90 mcg/actuation inhaler Inhale 2 puffs into the lungs every 6 (six) hours as needed.  Qty: 18 g, Refills: 11    Associated Diagnoses: Asthma in remission      atorvastatin (LIPITOR) 20 MG tablet Take 1 tablet (20 mg total) by mouth once daily.  Qty: 90 tablet, Refills: 3    Associated Diagnoses: Hyperlipidemia, unspecified hyperlipidemia type      azelastine (ASTELIN) 137 mcg (0.1 %) nasal spray 1 spray (137 mcg total) by Nasal route 2 (two) times daily.  Qty: 30 mL, Refills: 11    Associated Diagnoses: Chronic seasonal allergic rhinitis, unspecified trigger      calcium citrate-vitamin D3 315-200 mg (CITRACAL+D) 315-200 mg-unit per tablet Take 1 tablet by mouth once daily.      cyanocobalamin, vitamin B-12, (VITAMIN B-12) 50 mcg tablet Take 50 mcg by mouth once daily.      docusate sodium (COLACE) 100 MG capsule Take 1 tablet by mouth Twice daily. 1 Capsule Oral Twice a day .  Take with pain medicine      doxazosin (CARDURA) 1 MG tablet Take 1 tablet (1 mg total) by mouth every evening.  Qty: 30 tablet, Refills: 11    Associated Diagnoses: Benign localized prostatic hyperplasia with lower urinary tract symptoms (LUTS)      esomeprazole  (NEXIUM) 40 MG capsule Take 1 capsule (40 mg total) by mouth every 12 (twelve) hours.  Qty: 60 capsule, Refills: 11    Comments: **Patient requests 90 days supply**  Associated Diagnoses: Gastroesophageal reflux disease with esophagitis      traMADol (ULTRAM) 50 mg tablet Take 1 tablet (50 mg total) by mouth every 6 (six) hours as needed for Pain.  Qty: 120 tablet, Refills: 2      VITAMIN D2 50,000 unit capsule TK 1 C PO Q 7 DAYS  Refills: 1      zolpidem (AMBIEN) 10 mg Tab TAKE 1 TABLET BY MOUTH EVERY DAY AT BEDTIME  Qty: 20 tablet, Refills: 0    Associated Diagnoses: Sleep disorder             Resume home diet and activity

## 2018-05-10 NOTE — OP NOTE
Lumbar Medial nerve branch block radiofrequency ablation Under Fluoroscopy     Time-out taken to identify patient and procedure side prior to starting the procedure.     05/10/2018    PROCEDURE: Left radiofrequency ablation of the the medial branch nerves at the   transverse process of L3, L4, L5  2)Conscious sedation provided by MD     REASON FOR PROCEDURE: Lumbar spondylosis [M47.816]     PHYSICIAN: Fredy Magana MD     ASSISTANTS: None     MEDICATIONS INJECTED: 0.25% Bupivicaine total 6mL     LOCAL ANESTHETIC USED: Xylocaine 1% 1mL / site     ESTIMATED BLOOD LOSS: None.     COMPLICATIONS: None.     Interval history: Patient reports that he had complete relief of pain for the day of the procedure, we will proceed with the RFA     TECHNIQUE: Laying in a prone position, the patient was prepped and draped in the usual sterile fashion using ChloraPrep and fenestrated drape. The level was determined under fluoroscopic guidance. Local anesthetic was given by going down to the hub of the 27-gauge 1.25in needle and raising a wheel. A 18-gauge 10mm curved active tip needle was introduced to the anatomic local of the medial branch at each of the above levels using fluoroscopy. Then sensory and motor testing was performed to confirm that the needle tips were in the correct location. Then after negative aspiration, 1 mL of 0.25% bupivacaine was injected into each level. This was followed by thermal lesioning at 80 degrees celsius for 90 seconds.     Conscious sedation provided by M.D   The patient was monitored with continuous pulse oximetry, EKG, and intermittent blood pressure monitors. The patient was hemodynamically stable throughout the entire process was responsive to voice, and breathing spontaneously. Supplemental O2 was provided at 2L/min via nasal cannula. Patient was comfortable for the duration of the procedure. (See nurse documentation and case log for sedation time)    There was a total of 2mg IV Midazolam and  50mcg Fentanyl titrated for the procedure    The patient tolerated the procedure well. Was able to move their leg at the knee and ankle at the conclusion of the procedure    The patient was monitored after the procedure. Patient was given post procedure and discharge instructions to follow at home. We will see the patient back in two weeks or the patient may call to inform of status. The patient was discharged in a stable condition
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
## 2018-05-14 ENCOUNTER — OFFICE VISIT (OUTPATIENT)
Dept: NEPHROLOGY | Facility: CLINIC | Age: 58
End: 2018-05-14
Payer: MEDICARE

## 2018-05-14 ENCOUNTER — LAB VISIT (OUTPATIENT)
Dept: LAB | Facility: HOSPITAL | Age: 58
End: 2018-05-14
Attending: INTERNAL MEDICINE
Payer: MEDICARE

## 2018-05-14 VITALS
HEART RATE: 82 BPM | DIASTOLIC BLOOD PRESSURE: 70 MMHG | WEIGHT: 264.31 LBS | SYSTOLIC BLOOD PRESSURE: 100 MMHG | OXYGEN SATURATION: 96 % | HEIGHT: 71 IN | BODY MASS INDEX: 37 KG/M2

## 2018-05-14 DIAGNOSIS — E87.6 HYPOKALEMIA: ICD-10-CM

## 2018-05-14 DIAGNOSIS — N40.1 BENIGN PROSTATIC HYPERPLASIA WITH INCOMPLETE BLADDER EMPTYING: ICD-10-CM

## 2018-05-14 DIAGNOSIS — M19.90 OSTEOARTHRITIS, UNSPECIFIED OSTEOARTHRITIS TYPE, UNSPECIFIED SITE: ICD-10-CM

## 2018-05-14 DIAGNOSIS — N18.1 CKD (CHRONIC KIDNEY DISEASE) STAGE 1, GFR 90 ML/MIN OR GREATER: Primary | ICD-10-CM

## 2018-05-14 DIAGNOSIS — R39.14 BENIGN PROSTATIC HYPERPLASIA WITH INCOMPLETE BLADDER EMPTYING: ICD-10-CM

## 2018-05-14 DIAGNOSIS — I10 ESSENTIAL HYPERTENSION: ICD-10-CM

## 2018-05-14 LAB
ALBUMIN SERPL BCP-MCNC: 3.5 G/DL
ANION GAP SERPL CALC-SCNC: 5 MMOL/L
BUN SERPL-MCNC: 10 MG/DL
CALCIUM SERPL-MCNC: 8.9 MG/DL
CHLORIDE SERPL-SCNC: 104 MMOL/L
CO2 SERPL-SCNC: 32 MMOL/L
CREAT SERPL-MCNC: 0.9 MG/DL
EST. GFR  (AFRICAN AMERICAN): >60 ML/MIN/1.73 M^2
EST. GFR  (NON AFRICAN AMERICAN): >60 ML/MIN/1.73 M^2
GLUCOSE SERPL-MCNC: 87 MG/DL
PHOSPHATE SERPL-MCNC: 2.8 MG/DL
POTASSIUM SERPL-SCNC: 4.2 MMOL/L
SODIUM SERPL-SCNC: 141 MMOL/L

## 2018-05-14 PROCEDURE — 3008F BODY MASS INDEX DOCD: CPT | Mod: CPTII,S$GLB,, | Performed by: INTERNAL MEDICINE

## 2018-05-14 PROCEDURE — 99999 PR PBB SHADOW E&M-EST. PATIENT-LVL III: CPT | Mod: PBBFAC,,, | Performed by: INTERNAL MEDICINE

## 2018-05-14 PROCEDURE — 3074F SYST BP LT 130 MM HG: CPT | Mod: CPTII,S$GLB,, | Performed by: INTERNAL MEDICINE

## 2018-05-14 PROCEDURE — 3078F DIAST BP <80 MM HG: CPT | Mod: CPTII,S$GLB,, | Performed by: INTERNAL MEDICINE

## 2018-05-14 PROCEDURE — 80069 RENAL FUNCTION PANEL: CPT

## 2018-05-14 PROCEDURE — 84244 ASSAY OF RENIN: CPT

## 2018-05-14 PROCEDURE — 36415 COLL VENOUS BLD VENIPUNCTURE: CPT

## 2018-05-14 PROCEDURE — 99204 OFFICE O/P NEW MOD 45 MIN: CPT | Mod: S$GLB,,, | Performed by: INTERNAL MEDICINE

## 2018-05-14 NOTE — PROGRESS NOTES
Patient is here today for evaluation of hypokalemia. His most recent lab (4/26/18) Cr; 0.9 mg/dl; eGFR; >60 ml/min; potassium; 3.9 mmol/L   He has chronic pain; for relief uses acetaminophen 1000 mg every 8 h; denies NSAID usage;oxycodone; There is a hx of DJD (lumbrosaccral) ; BPH with LUTS; and ABDON/Current meds include KCL replacement; denies use of diuretics; surreptitious or prescribed; nausea; vomiting; diarrhea;  Today he has no new complaints    ROS;  Obese gentleman; no acute distress; oriented x 3  Mood and Affect; Appropriate  Otherwise non-contributory  Exam  HEENT; Grossly Intact  CHEST; Clear P&A; no rales or rhonchi  HEART; RR; S1&S2 no murmur rub gallop  ABD; BS(+) non-tender; (-)CVAT  EXT; (-) Edema      Impression  Hypokalemia Appear to be corrected Speculate high dose of acetaminophen may contribute to renal loss of potassium; this has been reported in tylenol OD;     Plan  Return Visit; 1 mo

## 2018-05-14 NOTE — LETTER
May 16, 2018      Cate Galeas MD  [address]  [address]  [facility] [address]           [facility]  [address]  [facility] [address]  Phone: [phone]  Fax: [phone]          Patient: Bri Santiago   MR Number: [id number]   YOB: 1960   Date of Visit: 5/14/2018       Dear Dr. Cate Galeas:    Thank you for referring Bri Santiago to me for evaluation. Attached you will find relevant portions of my assessment and plan of care.    If you have questions, please do not hesitate to call me. I look forward to following Bri Santiago along with you.    Sincerely,    Paolo Goss MD    Enclosure  CC:  No Recipients    If you would like to receive this communication electronically, please contact [email or web address] or [phone] to request more information on [facility] Link access.    For providers and/or their staff who would like to refer a patient to [facility], please contact us through our one-stop-shop provider referral line, [facility], at [phone].    If you feel you have received this communication in error or would no longer like to receive these types of communications, please e-mail [email or web address]

## 2018-05-15 ENCOUNTER — TELEPHONE (OUTPATIENT)
Dept: PAIN MEDICINE | Facility: CLINIC | Age: 58
End: 2018-05-15

## 2018-05-15 NOTE — TELEPHONE ENCOUNTER
Hello, this is staff I've received your request I'm returning your call from the pain management clinic at [facility]. I just wanted to let you know that I'm working on getting an answer for you by the time you contact our office with requested information.    Pt was informed via voicemail to contact pain management with percentage of relief from previous in injection on 5/10/18. Pt was informed relief is need for authorization for upcoming procedure.    Pt verbalized understanding

## 2018-05-15 NOTE — TELEPHONE ENCOUNTER
----- Message from Elvi Kaba sent at 5/15/2018 12:30 PM CDT -----  Please provide relief % from the previous procedure, so that we may obtain auth.    Thanks

## 2018-05-16 ENCOUNTER — HOSPITAL ENCOUNTER (OUTPATIENT)
Dept: RADIOLOGY | Facility: OTHER | Age: 58
Discharge: HOME | End: 2018-05-16
Attending: INTERNAL MEDICINE
Payer: MEDICARE

## 2018-05-16 DIAGNOSIS — E87.6 HYPOKALEMIA: ICD-10-CM

## 2018-05-16 LAB — RENIN PLAS-CCNC: 2 NG/ML/H

## 2018-05-16 PROCEDURE — 76770 US EXAM ABDO BACK WALL COMP: CPT | Mod: TC

## 2018-05-16 PROCEDURE — 76770 US EXAM ABDO BACK WALL COMP: CPT | Mod: 26,,, | Performed by: RADIOLOGY

## 2018-05-17 DIAGNOSIS — K59.09 CONSTIPATION, CHRONIC: ICD-10-CM

## 2018-05-17 DIAGNOSIS — K92.1 HEMATOCHEZIA: ICD-10-CM

## 2018-05-18 RX ORDER — LINACLOTIDE 145 UG/1
CAPSULE, GELATIN COATED ORAL
Qty: 30 CAPSULE | Refills: 0 | OUTPATIENT
Start: 2018-05-18

## 2018-05-24 ENCOUNTER — HOSPITAL ENCOUNTER (OUTPATIENT)
Facility: OTHER | Age: 58
Discharge: HOME | End: 2018-05-24
Attending: ANESTHESIOLOGY | Admitting: ANESTHESIOLOGY
Payer: MEDICARE

## 2018-05-24 ENCOUNTER — SURGERY (OUTPATIENT)
Age: 58
End: 2018-05-24

## 2018-05-24 VITALS
WEIGHT: 264.31 LBS | OXYGEN SATURATION: 94 % | SYSTOLIC BLOOD PRESSURE: 138 MMHG | DIASTOLIC BLOOD PRESSURE: 81 MMHG | HEART RATE: 80 BPM | TEMPERATURE: 98 F | RESPIRATION RATE: 18 BRPM | BODY MASS INDEX: 37 KG/M2 | HEIGHT: 71 IN

## 2018-05-24 DIAGNOSIS — M51.36 DDD (DEGENERATIVE DISC DISEASE), LUMBAR: Primary | ICD-10-CM

## 2018-05-24 PROCEDURE — 64635 DESTROY LUMB/SAC FACET JNT: CPT | Performed by: ANESTHESIOLOGY

## 2018-05-24 PROCEDURE — 64636 DESTROY L/S FACET JNT ADDL: CPT | Performed by: ANESTHESIOLOGY

## 2018-05-24 PROCEDURE — 64636 DESTROY L/S FACET JNT ADDL: CPT | Mod: LT,,, | Performed by: ANESTHESIOLOGY

## 2018-05-24 PROCEDURE — 64635 DESTROY LUMB/SAC FACET JNT: CPT | Mod: LT,,, | Performed by: ANESTHESIOLOGY

## 2018-05-24 PROCEDURE — 25000003 PHARM REV CODE 250: Performed by: ANESTHESIOLOGY

## 2018-05-24 PROCEDURE — 63600175 PHARM REV CODE 636 W HCPCS: Mod: JG | Performed by: ANESTHESIOLOGY

## 2018-05-24 PROCEDURE — 99152 MOD SED SAME PHYS/QHP 5/>YRS: CPT | Mod: ,,, | Performed by: ANESTHESIOLOGY

## 2018-05-24 RX ORDER — MIDAZOLAM HYDROCHLORIDE 1 MG/ML
INJECTION INTRAMUSCULAR; INTRAVENOUS
Status: DISCONTINUED | OUTPATIENT
Start: 2018-05-24 | End: 2018-05-24 | Stop reason: HOSPADM

## 2018-05-24 RX ORDER — FENTANYL CITRATE 50 UG/ML
INJECTION, SOLUTION INTRAMUSCULAR; INTRAVENOUS
Status: DISCONTINUED | OUTPATIENT
Start: 2018-05-24 | End: 2018-05-24 | Stop reason: HOSPADM

## 2018-05-24 RX ORDER — LIDOCAINE HYDROCHLORIDE 10 MG/ML
INJECTION INFILTRATION; PERINEURAL
Status: DISCONTINUED | OUTPATIENT
Start: 2018-05-24 | End: 2018-05-24 | Stop reason: HOSPADM

## 2018-05-24 RX ORDER — SODIUM CHLORIDE 9 MG/ML
INJECTION, SOLUTION INTRAVENOUS CONTINUOUS
Status: DISCONTINUED | OUTPATIENT
Start: 2018-05-24 | End: 2018-05-24 | Stop reason: HOSPADM

## 2018-05-24 RX ORDER — BUPIVACAINE HYDROCHLORIDE 2.5 MG/ML
INJECTION, SOLUTION EPIDURAL; INFILTRATION; INTRACAUDAL
Status: DISCONTINUED | OUTPATIENT
Start: 2018-05-24 | End: 2018-05-24 | Stop reason: HOSPADM

## 2018-05-24 RX ADMIN — DESMOPRESSIN ACETATE 36.33 MCG: 4 INJECTION INTRAVENOUS at 09:05

## 2018-05-24 RX ADMIN — FENTANYL CITRATE 50 MCG: 50 INJECTION, SOLUTION INTRAMUSCULAR; INTRAVENOUS at 10:05

## 2018-05-24 RX ADMIN — MIDAZOLAM HYDROCHLORIDE 2 MG: 1 INJECTION, SOLUTION INTRAMUSCULAR; INTRAVENOUS at 10:05

## 2018-05-24 RX ADMIN — BUPIVACAINE HYDROCHLORIDE 10 ML: 2.5 INJECTION, SOLUTION EPIDURAL; INFILTRATION; INTRACAUDAL; PERINEURAL at 10:05

## 2018-05-24 RX ADMIN — LIDOCAINE HYDROCHLORIDE 10 ML: 10 INJECTION, SOLUTION INFILTRATION; PERINEURAL at 10:05

## 2018-05-24 NOTE — DISCHARGE SUMMARY
Discharge Note  Short Stay      SUMMARY     Admit Date: 5/24/2018    Attending Physician: Fredy Magana      Discharge Physician: Fredy Magana      Discharge Date: 5/24/2018 11:04 AM    Procedure(s) (LRB):  RADIOFREQUENCY THERMOCOAGULATION (RFTC)-NERVE-MEDIAN BRANCH-LUMBAR (Left)    Final Diagnosis: Lumbar spondylosis [M47.816]    Disposition: Home or self care    Patient Instructions:   Current Discharge Medication List      CONTINUE these medications which have NOT CHANGED    Details   acetaminophen (TYLENOL) 500 MG tablet Take 2 tablets (1,000 mg total) by mouth every 8 (eight) hours as needed.  Qty: 90 tablet, Refills: 0      albuterol (PROVENTIL HFA) 90 mcg/actuation inhaler Inhale 2 puffs into the lungs every 6 (six) hours as needed.  Qty: 18 g, Refills: 11    Associated Diagnoses: Asthma in remission      atorvastatin (LIPITOR) 20 MG tablet Take 1 tablet (20 mg total) by mouth once daily.  Qty: 90 tablet, Refills: 3    Associated Diagnoses: Hyperlipidemia, unspecified hyperlipidemia type      azelastine (ASTELIN) 137 mcg (0.1 %) nasal spray 1 spray (137 mcg total) by Nasal route 2 (two) times daily.  Qty: 30 mL, Refills: 11    Associated Diagnoses: Chronic seasonal allergic rhinitis, unspecified trigger      calcium citrate-vitamin D3 315-200 mg (CITRACAL+D) 315-200 mg-unit per tablet Take 1 tablet by mouth once daily.      cyanocobalamin, vitamin B-12, (VITAMIN B-12) 50 mcg tablet Take 50 mcg by mouth once daily.      docusate sodium (COLACE) 100 MG capsule Take 1 tablet by mouth Twice daily. 1 Capsule Oral Twice a day .  Take with pain medicine      doxazosin (CARDURA) 1 MG tablet Take 1 tablet (1 mg total) by mouth every evening.  Qty: 30 tablet, Refills: 11    Associated Diagnoses: Benign localized prostatic hyperplasia with lower urinary tract symptoms (LUTS)      esomeprazole (NEXIUM) 40 MG capsule Take 1 capsule (40 mg total) by mouth every 12 (twelve) hours.  Qty: 60 capsule, Refills: 11     Comments: **Patient requests 90 days supply**  Associated Diagnoses: Gastroesophageal reflux disease with esophagitis      traMADol (ULTRAM) 50 mg tablet Take 1 tablet (50 mg total) by mouth every 6 (six) hours as needed for Pain.  Qty: 120 tablet, Refills: 2      VITAMIN D2 50,000 unit capsule TK 1 C PO Q 7 DAYS  Refills: 1      zolpidem (AMBIEN) 10 mg Tab TAKE 1 TABLET BY MOUTH EVERY DAY AT BEDTIME  Qty: 20 tablet, Refills: 0    Associated Diagnoses: Sleep disorder                 Discharge Diagnosis: Lumbar spondylosis [M47.816]  Condition on Discharge: Stable with no complications to procedure   Diet on Discharge: Same as before.  Activity: as per instruction sheet.  Discharge to: Home with a responsible adult.  Follow up: 2-4 weeks

## 2018-05-24 NOTE — DISCHARGE INSTRUCTIONS
Adult Procedural Sedation Instructions    Recovery After Procedural Sedation (Adult)  You have been given medicine by vein to make you sleep during your surgery. This may have included both a pain medicine and sleeping medicine. Most of the effects have worn off. But you may still have some drowsiness for the next 6 to 8 hours.  Home care  Follow these guidelines when you get home:  · For the next 8 hours, you should be watched by a responsible adult. This person should make sure your condition is not getting worse.  · Don't drink any alcohol for the next 24 hours.  · Don't drive, operate dangerous machinery, or make important business or personal decisions during the next 24 hours.  Note: Your healthcare provider may tell you not to take any medicine by mouth for pain or sleep in the next 4 hours. These medicines may react with the medicines you were given in the hospital. This could cause a much stronger response than usual.  Follow-up care  Follow up with your healthcare provider if you are not alert and back to your usual level of activity within 12 hours.  When to seek medical advice  Call your healthcare provider right away if any of these occur:  · Drowsiness gets worse  · Weakness or dizziness gets worse  · Repeated vomiting  · You can't be awakened   Date Last Reviewed: 10/18/2016  © 5243-1011 [employer]. [address] [place] [address], [place] [address]. All rights reserved. This information is not intended as a substitute for professional medical care. Always follow your healthcare professional's instructions.    Home Care Instructions Pain Management:    1. DIET:   You may resume your normal diet today.   2. BATHING:   You may shower with luke warm water.  3. DRESSING:   You may remove your bandage today.   4. ACTIVITY LEVEL:   You may resume your normal activities 24 hrs after your procedure.  5. MEDICATIONS:   You may resume your normal medications today.   6. SPECIAL INSTRUCTIONS:   No heat to  the injection site for 24 hrs including, bath or shower, heating pad, moist heat, or hot tubs.    Use ice pack to injection site for any pain or discomfort.  Apply ice packs for 20 minute intervals as needed.   If you have received any sedatives by mouth today you may not drive for 12 hours.    If you have received any sedation through your IV, you may not drive for 24 hrs.     PLEASE CALL YOUR DOCTOR IF:  1. Redness or swelling around the injection site.  2. Fever of 101 degrees  3. Drainage (pus) from the injection site.  4. For any continuous bleeding (some dried blood over the incision is normal.)    FOR EMERGENCIES:   If any unusual problems or difficulties occur during clinic hours, call [phone] or [phone].     Thank you for allowing us to care for you today. You may receive a survey about the care we provided. Your feedback is valuable and helps us provide excellent care throughout the community.

## 2018-05-24 NOTE — OP NOTE
Lumbar Medial nerve branch block radiofrequency ablation Under Fluoroscopy     Time-out taken to identify patient and procedure side prior to starting the procedure.     05/24/2018    PROCEDURE: Left radiofrequency ablation of the the medial branch nerves at the   transverse process of  L4, L5 and sacral ala    2)Conscious sedation provided by MD     REASON FOR PROCEDURE: Lumbar spondylosis [M47.816]     PHYSICIAN: Fredy Magana MD     ASSISTANTS: None     MEDICATIONS INJECTED: 0.25% Bupivicaine total 6mL     LOCAL ANESTHETIC USED: Xylocaine 1% 1mL / site     ESTIMATED BLOOD LOSS: None.     COMPLICATIONS: None.     Interval history: Patient reports that he had complete relief of pain for the day of the procedure, we will proceed with the RFA     TECHNIQUE: Laying in a prone position, the patient was prepped and draped in the usual sterile fashion using ChloraPrep and fenestrated drape. The level was determined under fluoroscopic guidance. Local anesthetic was given by going down to the hub of the 27-gauge 1.25in needle and raising a wheel. A 18-gauge 10mm curved active tip needle was introduced to the anatomic local of the medial branch at each of the above levels using fluoroscopy. Then sensory and motor testing was performed to confirm that the needle tips were in the correct location. Then after negative aspiration, 1 mL of 0.25% bupivacaine was injected into each level. This was followed by thermal lesioning at 80 degrees celsius for 90 seconds.     Conscious sedation provided by M.D   The patient was monitored with continuous pulse oximetry, EKG, and intermittent blood pressure monitors. The patient was hemodynamically stable throughout the entire process was responsive to voice, and breathing spontaneously. Supplemental O2 was provided at 2L/min via nasal cannula. Patient was comfortable for the duration of the procedure. (See nurse documentation and case log for sedation time)    There was a total of 2mg IV  Midazolam and 50mcg Fentanyl titrated for the procedure    The patient tolerated the procedure well. Was able to move their leg at the knee and ankle at the conclusion of the procedure    The patient was monitored after the procedure. Patient was given post procedure and discharge instructions to follow at home. We will see the patient back in two weeks or the patient may call to inform of status. The patient was discharged in a stable condition
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
## 2018-05-29 ENCOUNTER — TELEPHONE (OUTPATIENT)
Dept: INTERNAL MEDICINE | Facility: CLINIC | Age: 58
End: 2018-05-29

## 2018-05-29 NOTE — TELEPHONE ENCOUNTER
----- Message from Cate Blount sent at 5/29/2018  3:54 PM CDT -----  Contact: PURNIMA IYER [[id number]]            Name of Who is Calling: PURNIMA IYER [[id number]]      What is the request in detail: Please call the patient in regards to his medication esomeprazole (NEXIUM) 40 MG capsule 60 capsule     Can the clinic reply by [facility]: No      What [facility] to [facility]: [phone]

## 2018-05-31 ENCOUNTER — TELEPHONE (OUTPATIENT)
Dept: INTERNAL MEDICINE | Facility: CLINIC | Age: 58
End: 2018-05-31

## 2018-06-11 ENCOUNTER — OFFICE VISIT (OUTPATIENT)
Dept: NEPHROLOGY | Facility: CLINIC | Age: 58
End: 2018-06-11
Payer: MEDICARE

## 2018-06-11 VITALS
HEART RATE: 84 BPM | BODY MASS INDEX: 36.94 KG/M2 | OXYGEN SATURATION: 95 % | SYSTOLIC BLOOD PRESSURE: 110 MMHG | HEIGHT: 71 IN | WEIGHT: 263.88 LBS | DIASTOLIC BLOOD PRESSURE: 80 MMHG

## 2018-06-11 DIAGNOSIS — E87.6 HYPOKALEMIA: ICD-10-CM

## 2018-06-11 DIAGNOSIS — N40.1 BENIGN PROSTATIC HYPERPLASIA WITH LOWER URINARY TRACT SYMPTOMS, SYMPTOM DETAILS UNSPECIFIED: ICD-10-CM

## 2018-06-11 DIAGNOSIS — N18.1 CKD (CHRONIC KIDNEY DISEASE) STAGE 1, GFR 90 ML/MIN OR GREATER: Primary | ICD-10-CM

## 2018-06-11 DIAGNOSIS — M15.9 PRIMARY OSTEOARTHRITIS INVOLVING MULTIPLE JOINTS: ICD-10-CM

## 2018-06-11 DIAGNOSIS — I10 ESSENTIAL HYPERTENSION: ICD-10-CM

## 2018-06-11 PROCEDURE — 99499 UNLISTED E&M SERVICE: CPT | Mod: S$GLB,,, | Performed by: INTERNAL MEDICINE

## 2018-06-11 PROCEDURE — 99999 PR PBB SHADOW E&M-EST. PATIENT-LVL III: CPT | Mod: PBBFAC,,, | Performed by: INTERNAL MEDICINE

## 2018-06-11 NOTE — PROGRESS NOTES
Patient is here today for review of lab. Last sen in renal office 5/14/18.   Renal US show no abnl. Lab show potassium; wnl Today he has no new complaints.  Repeat  Today;     Return Visit; 4 mo; pending above

## 2018-06-20 ENCOUNTER — OFFICE VISIT (OUTPATIENT)
Dept: INTERNAL MEDICINE | Facility: CLINIC | Age: 58
End: 2018-06-20
Attending: INTERNAL MEDICINE
Payer: MEDICARE

## 2018-06-20 VITALS
SYSTOLIC BLOOD PRESSURE: 118 MMHG | DIASTOLIC BLOOD PRESSURE: 80 MMHG | HEART RATE: 87 BPM | BODY MASS INDEX: 36.98 KG/M2 | HEIGHT: 71 IN | WEIGHT: 264.13 LBS | OXYGEN SATURATION: 96 %

## 2018-06-20 DIAGNOSIS — J01.00 ACUTE NON-RECURRENT MAXILLARY SINUSITIS: Primary | ICD-10-CM

## 2018-06-20 PROCEDURE — 99213 OFFICE O/P EST LOW 20 MIN: CPT | Mod: S$GLB,,, | Performed by: INTERNAL MEDICINE

## 2018-06-20 PROCEDURE — 99999 PR PBB SHADOW E&M-EST. PATIENT-LVL III: CPT | Mod: PBBFAC,,, | Performed by: INTERNAL MEDICINE

## 2018-06-20 PROCEDURE — 3079F DIAST BP 80-89 MM HG: CPT | Mod: CPTII,S$GLB,, | Performed by: INTERNAL MEDICINE

## 2018-06-20 PROCEDURE — 3008F BODY MASS INDEX DOCD: CPT | Mod: CPTII,S$GLB,, | Performed by: INTERNAL MEDICINE

## 2018-06-20 PROCEDURE — 3074F SYST BP LT 130 MM HG: CPT | Mod: CPTII,S$GLB,, | Performed by: INTERNAL MEDICINE

## 2018-06-20 RX ORDER — FLUTICASONE PROPIONATE 50 MCG
2 SPRAY, SUSPENSION (ML) NASAL DAILY
Qty: 16 G | Refills: 12 | Status: SHIPPED | OUTPATIENT
Start: 2018-06-20 | End: 2018-07-20

## 2018-06-20 RX ORDER — DOXYCYCLINE HYCLATE 100 MG
100 TABLET ORAL EVERY 12 HOURS
Qty: 14 TABLET | Refills: 0 | Status: SHIPPED | OUTPATIENT
Start: 2018-06-20 | End: 2018-07-02

## 2018-06-20 NOTE — PATIENT INSTRUCTIONS
Twice daily nasal saline rinses (Fluidinova - Engenharia de Fluidos nasal saline 3-4 sprays per nostril twice daily) and gently blow nose followed by flonase 1 spray twice daily for 1 month to look for improvement. Flonase takes about 1 week to start working.     Start mucinex over the counter to thin mucous.

## 2018-06-20 NOTE — PROGRESS NOTES
"Subjective:   Patient ID: Bri Santiago is a 57 y.o. male  Chief complaint:   Chief Complaint   Patient presents with    Sinusitis       HPI  Pt here for inc sinus congestion x > 2 weeks  Inc pressure and pain at frontal and max sinuses   Post nasal drip with thick mucous - unable to cough up  Left ear pressure and pain > right  Cough - dry   Sinus congestion  No sore throat  No fevers or chills   No nausea, vomiting or diarrhea    otc meds that he tried: tylenol sinus, claritin, sudafed, liquid sinus med  Tried Rx: astelin and flonase     Review of Systems    Objective:  Vitals:    06/20/18 1021   BP: 118/80   Pulse: 87   SpO2: 96%   Weight: 119.8 kg (264 lb 1.8 oz)   Height: 5' 11" (1.803 m)     Body mass index is 36.84 kg/m².    Physical Exam   Constitutional: He is oriented to person, place, and time. He appears well-developed and well-nourished.   HENT:   Head: Normocephalic and atraumatic.   Right Ear: External ear normal.   Left Ear: External ear normal.   Mouth/Throat: Oropharynx is clear and moist. No oropharyngeal exudate.   Nasal turbinates with edema and erythema  ttp over maxillary sinuses   Clear effusion at Left TM, no erythema or bulging TM   Eyes: Conjunctivae and EOM are normal.   Neck: Neck supple.   Cardiovascular: Normal rate, regular rhythm and intact distal pulses.    Pulmonary/Chest: Effort normal and breath sounds normal.   Abdominal: Soft. Bowel sounds are normal.   Musculoskeletal: He exhibits no edema.   Lymphadenopathy:     He has no cervical adenopathy.   Neurological: He is alert and oriented to person, place, and time.   Skin: Skin is warm and dry.   Psychiatric: He has a normal mood and affect. His behavior is normal. Judgment and thought content normal.   Vitals reviewed.    Assessment:  1. Acute non-recurrent maxillary sinusitis        Plan:  Bri was seen today for sinusitis.    Diagnoses and all orders for this visit:    Acute non-recurrent maxillary sinusitis  otc meds " discussed   Cont rec nasal saline rinses bid and flonase  -     doxycycline (VIBRA-TABS) 100 MG tablet; Take 1 tablet (100 mg total) by mouth every 12 (twelve) hours.  -     fluticasone (FLONASE) 50 mcg/actuation nasal spray; 2 sprays (100 mcg total) by Each Nare route once daily.  Take probiotic while taking antibiotic  If worsens or does not improve over next few days he understands to let me know      Health Maintenance   Topic Date Due    Influenza Vaccine  08/01/2018    Colonoscopy  06/19/2019    Lipid Panel  10/16/2022    TETANUS VACCINE  10/16/2027    Hepatitis C Screening  Completed

## 2018-07-02 ENCOUNTER — OFFICE VISIT (OUTPATIENT)
Dept: UROLOGY | Facility: CLINIC | Age: 58
End: 2018-07-02
Payer: MEDICARE

## 2018-07-02 VITALS — WEIGHT: 265.19 LBS | BODY MASS INDEX: 37.13 KG/M2 | HEIGHT: 71 IN

## 2018-07-02 DIAGNOSIS — C61 PROSTATE CANCER: ICD-10-CM

## 2018-07-02 DIAGNOSIS — N40.1 BPH WITH URINARY OBSTRUCTION: Primary | ICD-10-CM

## 2018-07-02 DIAGNOSIS — N13.8 BPH WITH URINARY OBSTRUCTION: Primary | ICD-10-CM

## 2018-07-02 PROCEDURE — 3008F BODY MASS INDEX DOCD: CPT | Mod: CPTII,S$GLB,, | Performed by: UROLOGY

## 2018-07-02 PROCEDURE — 99999 PR PBB SHADOW E&M-EST. PATIENT-LVL III: CPT | Mod: PBBFAC,,, | Performed by: UROLOGY

## 2018-07-02 PROCEDURE — 99213 OFFICE O/P EST LOW 20 MIN: CPT | Mod: S$GLB,,, | Performed by: UROLOGY

## 2018-07-02 NOTE — PROGRESS NOTES
Subjective:       Patient ID: Bri Santiago is a 57 y.o. male.    Chief Complaint: erectile dysfunction, unspecified erectile dysfunction type. (annual check up last psa was  10/16/2017  1.5, nocturia 6 to 7 times , burning with urination intermitted .)    HPI   Bri Santiago is a 57 y.o. male  with a questionable history of prostate cancer 20 years ago at [facility]. No records found. Here today for annual follow up. No voiding problems. Last PSA was 1.5     Lab Results   Component Value Date    PSA 0.96 02/06/2013    PSA 1.11 10/17/2012    PSA 1.21 01/31/2012    PSA 1.2 08/05/2011    PSA 1.1 10/07/2010    PSA 0.93 11/13/2009    PSA 2.1 03/25/2009    PSA 1.0 02/06/2008    PSA 1.1 03/20/2007    PSA 1.3 06/19/2006    PSADIAG 1.5 10/16/2017    PSADIAG 1.4 10/20/2016    PSADIAG 0.97 09/15/2015    PSADIAG 1.2 11/10/2014    PSADIAG 1.6 09/22/2014    PSADIAG 0.97 10/14/2013       Past Medical History:   Diagnosis Date    Acute pancreatitis     Anal fissure     Anemia     Arthritis     Asthma in remission     Back pain     BPH (benign prostatic hypertrophy)     Cancer 2000    prostate- treated at [facility] with chemo- in remission since 2000    Clotting disorder     Dysphagia 10/7/2014    Family history of colon cancer     Family history of early CAD     GERD (gastroesophageal reflux disease)     H. pylori infection 10/8/2014    Helicobacter pylori (H. pylori) infection     Chronic    History of chronic pancreatitis     HTN (hypertension)     Lumbago 11/12/2012    Obesity     ABDON (obstructive sleep apnea)     Pneumonia     during childhood     Prostate cancer 2000    dx and treated at [facility], had chemotherapy, in remission [id number]    Sacroiliac joint pain 2/10/2015    Spinal stenosis of lumbar region     Trouble in sleeping     Vitamin D deficiency disease     VWD (acquired von Willebrand's disease)        Past Surgical History:   Procedure Laterality Date    anal fissure repair       x2    CARPAL TUNNEL RELEASE  2003    left hand    CERVICAL DISCECTOMY  2003    COLONOSCOPY N/A 10/7/2015    Procedure: COLONOSCOPY;  Surgeon: Rosendo Boyer MD;  [facility]  Service: Endoscopy;  Laterality: N/A;  PM Prep    COLONOSCOPY N/A 6/19/2017    Procedure: COLONOSCOPY;  Surgeon: Rosendo Boyer MD;  [facility]  Service: Endoscopy;  Laterality: N/A;  constipation prep (no DM no CHF)       hx of vonWillebrand's disease-will need infusion prior    LUMBAR FUSION  2012    RADIOFREQUENCY ABLATION OF LUMBAR MEDIAL BRANCH NERVE AT SINGLE LEVEL Left 5/24/2018    Procedure: RADIOFREQUENCY THERMOCOAGULATION (RFTC)-NERVE-MEDIAN BRANCH-LUMBAR;  Surgeon: Fredy Magana MD;  Location: [facility];  Service: Pain Management;  Laterality: Left;  Left RFA @ L3,4,5  [id number]  with IV Sedation    2 of 2    SPINE SURGERY      TONSILLECTOMY      at age 24    VASECTOMY  1996       Family History   Problem Relation Age of Onset    Colon cancer Father 67        colon cancer    Hypertension Father     Glaucoma Father     Colon cancer Paternal Grandfather 65             Coronary artery disease Mother 45    Hypertension Mother     Heart disease Mother     No Known Problems Brother     No Known Problems Sister     No Known Problems Daughter     No Known Problems Son     Coronary artery disease Brother 51    No Known Problems Daughter     No Known Problems Daughter     No Known Problems Son     No Known Problems Son     Colon cancer Paternal Uncle 65    Diabetes Mellitus Paternal Grandmother        Social History     Social History    Marital status:      Spouse name: N/A    Number of children: N/A    Years of education: N/A     Occupational History    disabled due to back injury      Social History Main Topics    Smoking status: Never Smoker    Smokeless tobacco: Never Used    Alcohol use No    Drug use: No    Sexual activity: Not Currently      Partners: Female     Other Topics Concern    None     Social History Narrative    wlking for exercised       Current Outpatient Prescriptions   Medication Sig Dispense Refill    acetaminophen (TYLENOL) 500 MG tablet Take 2 tablets (1,000 mg total) by mouth every 8 (eight) hours as needed. 90 tablet 0    albuterol (PROVENTIL HFA) 90 mcg/actuation inhaler Inhale 2 puffs into the lungs every 6 (six) hours as needed. 18 g 11    atorvastatin (LIPITOR) 20 MG tablet Take 1 tablet (20 mg total) by mouth once daily. 90 tablet 3    azelastine (ASTELIN) 137 mcg (0.1 %) nasal spray 1 spray (137 mcg total) by Nasal route 2 (two) times daily. 30 mL 11    calcium citrate-vitamin D3 315-200 mg (CITRACAL+D) 315-200 mg-unit per tablet Take 1 tablet by mouth once daily.      cyanocobalamin, vitamin B-12, (VITAMIN B-12) 50 mcg tablet Take 50 mcg by mouth once daily.      docusate sodium (COLACE) 100 MG capsule Take 1 tablet by mouth Twice daily. 1 Capsule Oral Twice a day .  Take with pain medicine      doxazosin (CARDURA) 1 MG tablet Take 1 tablet (1 mg total) by mouth every evening. 30 tablet 11    esomeprazole (NEXIUM) 40 MG capsule Take 1 capsule (40 mg total) by mouth every 12 (twelve) hours. 60 capsule 11    fluticasone (FLONASE) 50 mcg/actuation nasal spray 2 sprays (100 mcg total) by Each Nare route once daily. 16 g 12    traMADol (ULTRAM) 50 mg tablet Take 1 tablet (50 mg total) by mouth every 6 (six) hours as needed for Pain. 120 tablet 2    VITAMIN D2 50,000 unit capsule TK 1 C PO Q 7 DAYS  1    zolpidem (AMBIEN) 10 mg Tab TAKE 1 TABLET BY MOUTH EVERY DAY AT BEDTIME 20 tablet 0     No current facility-administered medications for this visit.        Review of patient's allergies indicates:   Allergen Reactions    Ace inhibitors     Aspirin      Other reaction(s): Hives    Codeine     Dilaudid  [hydromorphone]      Other reaction(s): Itching    Penicillins Hives and Swelling     Has had allergy testing  and can prob tolerate penicillin       Review of Systems   Constitutional: Negative for activity change, appetite change, chills, diaphoresis, fatigue, fever and unexpected weight change.   HENT: Negative for congestion, dental problem, hearing loss, mouth sores, postnasal drip, rhinorrhea, sinus pressure and trouble swallowing.    Eyes: Negative for pain, discharge and itching.   Respiratory: Negative for apnea, cough, choking, chest tightness, shortness of breath and wheezing.    Cardiovascular: Negative for chest pain, palpitations and leg swelling.   Gastrointestinal: Negative for abdominal distention, abdominal pain, anal bleeding, blood in stool, constipation, diarrhea, nausea, rectal pain and vomiting.   Endocrine: Negative for polydipsia and polyuria.   Genitourinary: Negative for decreased urine volume, difficulty urinating, discharge, dysuria, enuresis, flank pain, frequency, genital sores, hematuria, penile pain, penile swelling and scrotal swelling.   Musculoskeletal: Negative for arthralgias, back pain and myalgias.   Skin: Negative for color change, rash and wound.   Neurological: Negative for dizziness, syncope, speech difficulty, light-headedness and headaches.   Hematological: Negative for adenopathy. Does not bruise/bleed easily.   Psychiatric/Behavioral: Negative for behavioral problems, hallucinations and sleep disturbance.       Objective:      Physical Exam   Constitutional: He appears well-developed.   HENT:   Head: Normocephalic.   Eyes: No scleral icterus.   Neck: Neck supple.   Cardiovascular: Normal rate.    Pulmonary/Chest: Effort normal. No respiratory distress.   Abdominal: Soft.   Genitourinary:   Genitourinary Comments: Prostate was smooth without nodularity. No rectal masses.  25 grams.  Normal perineum.   Neurological: He is alert.   Skin: Skin is warm.     Psychiatric: He has a normal mood and affect.       Assessment:       1. BPH with urinary obstruction    2. Prostate cancer         Plan:     Bri was seen today for erectile dysfunction, unspecified erectile dysfunction type..    Diagnoses and all orders for this visit:    BPH with urinary obstruction    Prostate cancer    rtc 1 yr w psa  I, Felicitas Mckeon, am acting as a scribe on this patient encounter in the presence and under the supervision of Dr. Reid.    07/02/2018 9:29 AM  I, Dr. Reid, personally performed the services described in this documentation.   All medical record entries made by the scribe were at my direction and in my presence.   I have reviewed the chart and agree that the record is accurate and complete.   Tyler Reid MD.  10:01 AM 07/02/2018

## 2018-07-16 ENCOUNTER — OFFICE VISIT (OUTPATIENT)
Dept: PAIN MEDICINE | Facility: CLINIC | Age: 58
End: 2018-07-16
Attending: ANESTHESIOLOGY
Payer: MEDICARE

## 2018-07-16 VITALS
HEART RATE: 92 BPM | TEMPERATURE: 99 F | DIASTOLIC BLOOD PRESSURE: 85 MMHG | HEIGHT: 71 IN | SYSTOLIC BLOOD PRESSURE: 133 MMHG | BODY MASS INDEX: 37.22 KG/M2 | WEIGHT: 265.88 LBS

## 2018-07-16 DIAGNOSIS — M47.899 FACET SYNDROME: ICD-10-CM

## 2018-07-16 DIAGNOSIS — M48.061 SPINAL STENOSIS, LUMBAR REGION, WITHOUT NEUROGENIC CLAUDICATION: Primary | ICD-10-CM

## 2018-07-16 DIAGNOSIS — M43.10 ACQUIRED SPONDYLOLISTHESIS: ICD-10-CM

## 2018-07-16 DIAGNOSIS — M51.37 DEGENERATION OF LUMBAR OR LUMBOSACRAL INTERVERTEBRAL DISC: ICD-10-CM

## 2018-07-16 DIAGNOSIS — M51.36 DDD (DEGENERATIVE DISC DISEASE), LUMBAR: ICD-10-CM

## 2018-07-16 DIAGNOSIS — M96.1 POSTLAMINECTOMY SYNDROME OF LUMBAR REGION: ICD-10-CM

## 2018-07-16 PROCEDURE — 3079F DIAST BP 80-89 MM HG: CPT | Mod: CPTII,S$GLB,, | Performed by: ANESTHESIOLOGY

## 2018-07-16 PROCEDURE — 99999 PR PBB SHADOW E&M-EST. PATIENT-LVL III: CPT | Mod: PBBFAC,,, | Performed by: ANESTHESIOLOGY

## 2018-07-16 PROCEDURE — 3008F BODY MASS INDEX DOCD: CPT | Mod: CPTII,S$GLB,, | Performed by: ANESTHESIOLOGY

## 2018-07-16 PROCEDURE — 3075F SYST BP GE 130 - 139MM HG: CPT | Mod: CPTII,S$GLB,, | Performed by: ANESTHESIOLOGY

## 2018-07-16 PROCEDURE — 99213 OFFICE O/P EST LOW 20 MIN: CPT | Mod: S$GLB,,, | Performed by: ANESTHESIOLOGY
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
## 2018-09-04 ENCOUNTER — HOSPITAL ENCOUNTER (OUTPATIENT)
Dept: RADIOLOGY | Facility: OTHER | Age: 58
Discharge: HOME | End: 2018-09-04
Attending: INTERNAL MEDICINE
Payer: MEDICARE

## 2018-09-04 ENCOUNTER — OFFICE VISIT (OUTPATIENT)
Dept: INTERNAL MEDICINE | Facility: CLINIC | Age: 58
End: 2018-09-04
Attending: INTERNAL MEDICINE
Payer: MEDICARE

## 2018-09-04 ENCOUNTER — TELEPHONE (OUTPATIENT)
Dept: INTERNAL MEDICINE | Facility: CLINIC | Age: 58
End: 2018-09-04

## 2018-09-04 VITALS
RESPIRATION RATE: 20 BRPM | WEIGHT: 271.38 LBS | BODY MASS INDEX: 37.99 KG/M2 | SYSTOLIC BLOOD PRESSURE: 124 MMHG | HEART RATE: 93 BPM | OXYGEN SATURATION: 95 % | DIASTOLIC BLOOD PRESSURE: 78 MMHG | HEIGHT: 71 IN

## 2018-09-04 DIAGNOSIS — K21.9 GASTROESOPHAGEAL REFLUX DISEASE, ESOPHAGITIS PRESENCE NOT SPECIFIED: ICD-10-CM

## 2018-09-04 DIAGNOSIS — R06.02 SOB (SHORTNESS OF BREATH): ICD-10-CM

## 2018-09-04 DIAGNOSIS — J30.89 NON-SEASONAL ALLERGIC RHINITIS, UNSPECIFIED TRIGGER: ICD-10-CM

## 2018-09-04 DIAGNOSIS — K44.9 HIATAL HERNIA: ICD-10-CM

## 2018-09-04 DIAGNOSIS — J45.20 MILD INTERMITTENT ASTHMA WITHOUT COMPLICATION: ICD-10-CM

## 2018-09-04 DIAGNOSIS — M25.471 ANKLE EDEMA, BILATERAL: Primary | ICD-10-CM

## 2018-09-04 DIAGNOSIS — M25.472 ANKLE EDEMA, BILATERAL: Primary | ICD-10-CM

## 2018-09-04 DIAGNOSIS — E66.01 SEVERE OBESITY (BMI 35.0-39.9) WITH COMORBIDITY: ICD-10-CM

## 2018-09-04 PROCEDURE — 99499 UNLISTED E&M SERVICE: CPT | Mod: S$GLB,,, | Performed by: INTERNAL MEDICINE

## 2018-09-04 PROCEDURE — 71046 X-RAY EXAM CHEST 2 VIEWS: CPT | Mod: TC,FY

## 2018-09-04 PROCEDURE — 3008F BODY MASS INDEX DOCD: CPT | Mod: CPTII,,, | Performed by: INTERNAL MEDICINE

## 2018-09-04 PROCEDURE — 99214 OFFICE O/P EST MOD 30 MIN: CPT | Mod: PBBFAC,25 | Performed by: INTERNAL MEDICINE

## 2018-09-04 PROCEDURE — 99999 PR PBB SHADOW E&M-EST. PATIENT-LVL IV: CPT | Mod: PBBFAC,,, | Performed by: INTERNAL MEDICINE

## 2018-09-04 PROCEDURE — 71046 X-RAY EXAM CHEST 2 VIEWS: CPT | Mod: 26,,, | Performed by: RADIOLOGY

## 2018-09-04 PROCEDURE — 3074F SYST BP LT 130 MM HG: CPT | Mod: CPTII,,, | Performed by: INTERNAL MEDICINE

## 2018-09-04 PROCEDURE — 99214 OFFICE O/P EST MOD 30 MIN: CPT | Mod: S$PBB,,, | Performed by: INTERNAL MEDICINE

## 2018-09-04 PROCEDURE — 3078F DIAST BP <80 MM HG: CPT | Mod: CPTII,,, | Performed by: INTERNAL MEDICINE

## 2018-09-04 RX ORDER — FLUTICASONE PROPIONATE 50 MCG
1 SPRAY, SUSPENSION (ML) NASAL DAILY
Qty: 16 G | Refills: 11 | Status: SHIPPED | OUTPATIENT
Start: 2018-09-04 | End: 2019-12-13 | Stop reason: SDUPTHER

## 2018-09-04 RX ORDER — ALBUTEROL SULFATE 90 UG/1
2 AEROSOL, METERED RESPIRATORY (INHALATION) EVERY 6 HOURS PRN
Qty: 18 G | Refills: 11 | Status: SHIPPED | OUTPATIENT
Start: 2018-09-04 | End: 2018-12-18

## 2018-09-04 RX ORDER — FLUTICASONE PROPIONATE 110 UG/1
1 AEROSOL, METERED RESPIRATORY (INHALATION) 2 TIMES DAILY
Qty: 12 G | Refills: 0 | Status: SHIPPED | OUTPATIENT
Start: 2018-09-04 | End: 2019-09-04

## 2018-09-04 RX ORDER — PANTOPRAZOLE SODIUM 40 MG/1
TABLET, DELAYED RELEASE ORAL
Qty: 90 TABLET | Refills: 0 | OUTPATIENT
Start: 2018-09-04

## 2018-09-04 RX ORDER — PANTOPRAZOLE SODIUM 40 MG/1
40 TABLET, DELAYED RELEASE ORAL DAILY
Qty: 14 TABLET | Refills: 0 | Status: SHIPPED | OUTPATIENT
Start: 2018-09-04 | End: 2018-10-02 | Stop reason: SDUPTHER

## 2018-09-04 NOTE — TELEPHONE ENCOUNTER
"Patient called wanting to be seen today for swelling in feet and possible fluid on lungs since Wednesday or Thursday of last week.  Positive for SOB with and without activity  Using "Inhaler pump" which is not working - he feels he is wheezing more  Reports pitting edema in bilateral legs up to the knee, skin is shiny and tight.  Negative for chest pain/tightness  States he has been having a tickle in throat which is causing him to gag while eating and subsequently vomit  Reports headache for 1 week. This started with the rest of the symptoms.  BP reading from last Thursday was 150/85. Does not have means to take it again at home.    Sent message to Dr. Lopez to advise.  "

## 2018-09-04 NOTE — PROGRESS NOTES
Subjective:   Patient ID: Bri Santiago is a 58 y.o. male  Chief complaint:   Chief Complaint   Patient presents with    Foot Swelling    Shortness of Breath       HPI    Pt here for UC appt   Pcp: Dr. Galeas    58M with hx of obesity, hiatal hernia, GERD, childhood asthma, hypokalemia tx with oral potassium, HLD, allx rhinitis on flonase and asteline.     Pt was seen by allx in past and started on mult treatments which pt reports were not helpful   Plan was to f/u with ENT for eval for LPR but unclear if this occurred. Pt reports had procedure where scope was placed down nose at  but do not see ENT notes in chart - unclear if this was esophageal pH testing? He has had EGD/Cscope/manometry   He may have followed up with ENT Dr. Dos Santos outside of [facility]? Will obtain [facility] for old records     Hx of asthma in childhood - starting requiring albuterol inh prn over past 4 years - Pt reports wheezing over past week   At baseline will only use albuterol if sick with URI  No fevers  occ chills   Seen by allx in 2016 - tried flovent, Flonase, azelastine, Zyrtec daily which did not control his symptoms. Then switched to advair - today he reports none of these have resolved sx above - still using nasal sprays as above     GERD, hiatal hernia, poor esophageal motility   Follows up with Dr. Solis in GI   Taking nexium twice daily   Still with acid in back of throat, reflux - he does not recall trying protonix     Frustrated with lack of wt loss - did not f/u with bariatric medicine and agrees to do so    Reports here today for bilateral LE x 1 week - has had this intermittently in the past   Has compression stockings at home but not wearing them  Symptoms were worse yesterday   Elevated legs and swelling improved today   No adding or cooking with salt, no take out or restaurant food  Has been taking cardura for >6 months and no change in sx with taking this   No calf pain or trauma     Has stress test 4/2018 and negative  "  Echo 2016 showed normal EF and no garcia dysfunction    He reports 1 week of Itchy ears, Worsening sinus congestion and post nasal drip and occ wheezing x 1 week   No itchy throat, No sore thorat, No runny nose - no facial pain or ear pain     Review of Systems    Objective:  Vitals:    09/04/18 1307   BP: 124/78   BP Location: Left arm   Patient Position: Sitting   BP Method: Large (Manual)   Pulse: 93   Resp: 20   SpO2: 95%   Weight: 123.1 kg (271 lb 6.2 oz)   Height: 5' 11" (1.803 m)     Body mass index is 37.85 kg/m².    Physical Exam   Constitutional: He is oriented to person, place, and time. He appears well-developed and well-nourished. No distress.   HENT:   Head: Normocephalic and atraumatic.   Right Ear: External ear normal.   Left Ear: External ear normal.   Nose: Nose normal.   Mouth/Throat: Oropharynx is clear and moist.   No effusions   Eyes: Conjunctivae and EOM are normal.   Neck: Neck supple. No thyromegaly present.   Cardiovascular: Normal rate, regular rhythm, normal heart sounds and intact distal pulses.   Pulmonary/Chest: Effort normal and breath sounds normal. No stridor. He has no wheezes. He has no rales. He exhibits no tenderness.   Talking in complete sentences   No use of access mm of respiration    Abdominal: Soft. Bowel sounds are normal.   Musculoskeletal: He exhibits edema. He exhibits no tenderness.   Trace edema at bilateral ankles  No ttp of calves   Lymphadenopathy:     He has no cervical adenopathy.   Neurological: He is alert and oriented to person, place, and time.   Skin: Skin is warm and dry. He is not diaphoretic.   Psychiatric: His behavior is normal. Thought content normal.   Vitals reviewed.      Assessment:  1. Ankle edema, bilateral    2. Severe obesity (BMI 35.0-39.9) with comorbidity    3. Non-seasonal allergic rhinitis, unspecified trigger    4. SOB (shortness of breath)    5. Gastroesophageal reflux disease, esophagitis presence not specified    6. Hiatal hernia  "   7. Mild intermittent asthma without complication        Plan:  Bri was seen today for foot swelling and shortness of breath.    Diagnoses and all orders for this visit:    Ankle edema, bilateral  -     2D Echo w/ Color Flow Doppler; Future  -     Brain natriuretic peptide; Future  -     Comprehensive metabolic panel; Future    Severe obesity (BMI 35.0-39.9) with comorbidity  -     Ambulatory Referral to Bariatric Medicine    Non-seasonal allergic rhinitis, unspecified trigger  -     fluticasone (FLONASE) 50 mcg/actuation nasal spray; 1 spray (50 mcg total) by Each Nare route once daily.  -     Ambulatory Referral to ENT    SOB (shortness of breath)  -     X-Ray Chest PA And Lateral; Future    Gastroesophageal reflux disease, esophagitis presence not specified  -     Ambulatory Referral to ENT    Hiatal hernia  -     Ambulatory Referral to ENT    Mild intermittent asthma without complication  -     albuterol (PROVENTIL HFA) 90 mcg/actuation inhaler; Inhale 2 puffs into the lungs every 6 (six) hours as needed.    Other orders  -     fluticasone (FLOVENT HFA) 110 mcg/actuation inhaler; Inhale 1 puff into the lungs 2 (two) times daily. Controller  -     pantoprazole (PROTONIX) 40 MG tablet; Take 1 tablet (40 mg total) by mouth once daily.    Trial of protonix - if not effective he will switch back to nexium  Refer to bariatric med for wt loss consultation - cont diet restrictions for gerd   Refer to ENT for LPR eval - unclear if f/u with [facility]   Request old records from [facility]   Trial of flovent to see if helpful   Check PFTs  Check cxr and echo - labs today   Elevate feet, low salt diet, bnp today and albumin - wear compression stockings - holding off on hctz and lasix due to hypokalemia tx with oral potassium at this time   Suspect venous stasis - may be SE of doxazosin   F/u with pcp   Er and rtc prompts given   Lungs clear on exam today - no signs of bacterial infection at this time - cont allx meds and daily  zyrtec.   If sx worsen he understands to let me know    Health Maintenance   Topic Date Due    Influenza Vaccine  08/01/2018    Colonoscopy  06/19/2019    Lipid Panel  10/16/2022    TETANUS VACCINE  10/16/2027    Hepatitis C Screening  Completed

## 2018-09-05 ENCOUNTER — HOSPITAL ENCOUNTER (OUTPATIENT)
Dept: CARDIOLOGY | Facility: OTHER | Age: 58
Discharge: HOME | End: 2018-09-05
Attending: INTERNAL MEDICINE
Payer: MEDICARE

## 2018-09-05 DIAGNOSIS — M25.472 ANKLE EDEMA, BILATERAL: ICD-10-CM

## 2018-09-05 DIAGNOSIS — M25.471 ANKLE EDEMA, BILATERAL: ICD-10-CM

## 2018-09-05 LAB
DIASTOLIC DYSFUNCTION: YES
ESTIMATED PA SYSTOLIC PRESSURE: 33.69
GLOBAL PERICARDIAL EFFUSION: ABNORMAL
MITRAL VALVE REGURGITATION: ABNORMAL
RETIRED EF AND QEF - SEE NOTES: 65 (ref 55–65)
TRICUSPID VALVE REGURGITATION: ABNORMAL

## 2018-09-05 PROCEDURE — 93306 TTE W/DOPPLER COMPLETE: CPT

## 2018-09-05 NOTE — TELEPHONE ENCOUNTER
Spoke with pt and informed him of test results and that the protonix refill was appropriate pt verbalized unstanding

## 2018-09-06 ENCOUNTER — TELEPHONE (OUTPATIENT)
Dept: INTERNAL MEDICINE | Facility: CLINIC | Age: 58
End: 2018-09-06

## 2018-09-06 NOTE — TELEPHONE ENCOUNTER
Please notify pt that echo returned and shows that his heart squeezes normally     - it does not relax normally - maintaining good control of blood pressure can prevent this from worsening.   - recommend elevating legs when resting, wear compression stockings which can be purchased over the counter for now (prescription may be needed if this worsens), eating a low salt diet and getting regular exercise as tolerated to promote weight loss.

## 2018-09-07 NOTE — TELEPHONE ENCOUNTER
Spoke with pt about the test results and the recommendations of Dr. Lopez. Pt verbalized understanding

## 2018-09-19 ENCOUNTER — TELEPHONE (OUTPATIENT)
Dept: OTOLARYNGOLOGY | Facility: CLINIC | Age: 58
End: 2018-09-19
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
## 2018-10-02 ENCOUNTER — OFFICE VISIT (OUTPATIENT)
Dept: OTOLARYNGOLOGY | Facility: CLINIC | Age: 58
End: 2018-10-02
Attending: INTERNAL MEDICINE
Payer: MEDICARE

## 2018-10-02 VITALS
DIASTOLIC BLOOD PRESSURE: 84 MMHG | WEIGHT: 268 LBS | SYSTOLIC BLOOD PRESSURE: 138 MMHG | HEART RATE: 81 BPM | BODY MASS INDEX: 37.52 KG/M2 | HEIGHT: 71 IN | TEMPERATURE: 98 F

## 2018-10-02 DIAGNOSIS — G47.33 OSA (OBSTRUCTIVE SLEEP APNEA): ICD-10-CM

## 2018-10-02 DIAGNOSIS — J30.89 NON-SEASONAL ALLERGIC RHINITIS, UNSPECIFIED TRIGGER: Primary | ICD-10-CM

## 2018-10-02 DIAGNOSIS — K21.9 LARYNGOPHARYNGEAL REFLUX (LPR): ICD-10-CM

## 2018-10-02 DIAGNOSIS — R13.10 DYSPHAGIA, UNSPECIFIED TYPE: ICD-10-CM

## 2018-10-02 DIAGNOSIS — J34.2 NASAL SEPTAL DEVIATION: ICD-10-CM

## 2018-10-02 DIAGNOSIS — D37.05: ICD-10-CM

## 2018-10-02 PROCEDURE — 31575 DIAGNOSTIC LARYNGOSCOPY: CPT | Mod: S$GLB,,, | Performed by: SPECIALIST

## 2018-10-02 PROCEDURE — 3075F SYST BP GE 130 - 139MM HG: CPT | Mod: CPTII,S$GLB,, | Performed by: SPECIALIST

## 2018-10-02 PROCEDURE — 99214 OFFICE O/P EST MOD 30 MIN: CPT | Mod: 25,S$GLB,, | Performed by: SPECIALIST

## 2018-10-02 PROCEDURE — 3079F DIAST BP 80-89 MM HG: CPT | Mod: CPTII,S$GLB,, | Performed by: SPECIALIST

## 2018-10-02 PROCEDURE — 3008F BODY MASS INDEX DOCD: CPT | Mod: CPTII,S$GLB,, | Performed by: SPECIALIST

## 2018-10-02 RX ORDER — MONTELUKAST SODIUM 10 MG/1
10 TABLET ORAL NIGHTLY
Qty: 30 TABLET | Refills: 11 | Status: SHIPPED | OUTPATIENT
Start: 2018-10-02 | End: 2019-12-16 | Stop reason: SDUPTHER

## 2018-10-02 RX ORDER — TRAMADOL HYDROCHLORIDE 50 MG/1
TABLET ORAL
Refills: 2 | COMMUNITY
Start: 2018-09-10 | End: 2018-10-07 | Stop reason: SDUPTHER

## 2018-10-02 RX ORDER — PANTOPRAZOLE SODIUM 40 MG/1
40 TABLET, DELAYED RELEASE ORAL 2 TIMES DAILY
Qty: 60 TABLET | Refills: 11 | Status: SHIPPED | OUTPATIENT
Start: 2018-10-02 | End: 2019-01-14

## 2018-10-02 NOTE — PROCEDURES
"Laryngoscopy  Date/Time: 10/2/2018 1:22 PM  Performed by: LAURENT Swanson MD  Authorized by: LAURENT Swanson MD     Time out: Immediately prior to procedure a "time out" was called to verify the correct patient, procedure, equipment, support staff and site/side marked as required.    Consent Done?:  Yes (Verbal)  Anesthesia:     Local anesthetic:  Lidocaine 2% and Irvin-Synephrine 1/2% (Topical aerosol)    Patient tolerance:  Patient tolerated the procedure well with no immediate complications    Decongestion performed?: Yes    Laryngoscopy:     Areas examined:  Nasal cavities, nasopharynx, oropharynx, hypopharynx, larynx and vocal cords    Laryngoscope size:  4 mm (Flexible video nasolaryngoscopy)  Nose External:      No external nasal deformity  Nose Intranasal:      Mucosa no polyps     Mucosa ulcers not present     No mucosa lesions present (Clear mucus in the nasal passages bilaterally)     Septum gross deformity ( septum deviated to the left)     Enlarged turbinates ( inferior turbinates enlarged bilaterally)  Nasopharynx:      Mucosa lesions ( soft tissue mass fields right fossa of Rosenmuller and right roof of nasopharynx)     Adenoids not present ( soft tissue mass feels superior nasopharynx and fossa of Rosenmueller on the right)     Posterior choanae patent     Eustachian tube patent ( partially occluded by soft tissue mass on the right)  Larynx/hypopharynx:      No epiglottis lesions     No epiglottis edema     No AE folds lesions     No vocal cord polyps     Equal and normal bilateral ( vocal cords move symmetrically, no mucosal lesions noted)     No hypopharynx lesions     No piriform sinus pooling     No piriform sinus lesions     Post cricoid edema ( mild to moderate)     Post cricoid erythema ( mild to moderate)     Flexible nasolaryngoscopy demonstrates nasal septal deviation, 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

## 2018-10-02 NOTE — PATIENT INSTRUCTIONS
How Acid Reflux Affects Your Throat    Do you have to clear your throat or cough often? Are you hoarse? Do you have trouble swallowing? If you have these or other throat symptoms, you may have acid reflux. This occurs when stomach acid flows back up and irritates your throat.  Why you have throat symptoms  There are muscles (esophageal sphincters) at both ends of the tube that carries food to your stomach (the esophagus). These muscles relax to let food pass. Then they tighten to keep stomach acid down. When the lower esophageal sphincter (LES) doesnt tighten enough, acid can flow back (reflux) from your stomach into your esophagus. This may cause heartburn. In some cases the upper esophageal sphincter (UES) also doesnt work well. Then acid can travel higher and enter your throat (pharynx). In many cases, this causes throat symptoms.  Common throat symptoms  · Need to clear your throat often  · Feeling like youre choking  · Long-term (chronic) cough  · Hoarseness  · Trouble swallowing  · Feel like you have a lump in your throat  · Sour or acid taste  · Sore throat that keeps coming back   Date Last Reviewed: 7/1/2016  © 0349-7687 [employer]. [address] [place] [address], [place] [address]. All rights reserved. This information is not intended as a substitute for professional medical care. Always follow your healthcare professional's instructions.        Tips to Control Acid Reflux    To control acid reflux, youll need to make some basic diet and lifestyle changes. The simple steps outlined below may be all youll need to ease discomfort.  Watch what you eat  · Avoid fatty foods and spicy foods.  · Eat fewer acidic foods, such as citrus and tomato-based foods. These can increase symptoms.  · Limit drinking alcohol, caffeine, and fizzy beverages. All increase acid reflux.  · Try limiting chocolate, peppermint, and spearmint. These can worsen acid reflux in some people.  Watch when you eat  · Avoid lying  down for 3 hours after eating.  · Do not snack before going to bed.  Raise your head  Raising your head and upper body by 4 to 6 inches helps limit reflux when youre lying down. Put blocks under the head of your bed frame to raise it.  Other changes  · Lose weight, if you need to  · Dont exercise near bedtime  · Avoid tight-fitting clothes  · Limit aspirin and ibuprofen  · Stop smoking   Date Last Reviewed: 7/1/2016 © 2000-2017 [employer]. [address] [place] [address], [place] [address]. All rights reserved. This information is not intended as a substitute for professional medical care. Always follow your healthcare professional's instructions.        Lifestyle Changes for Controlling GERD  When you have GERD, stomach acid feels as if its backing up toward your mouth. Whether or not you take medicine to control your GERD, your symptoms can often be improved with lifestyle changes. Talk to your healthcare provider about the following suggestions. These suggestions may help you get relief from your symptoms.      Raise your head  Reflux is more likely to strike when youre lying down flat, because stomach fluid can flow backward more easily. Raising the head of your bed 4 to 6 inches can help. To do this:  · Slide blocks or books under the legs at the head of your bed. Or, place a wedge under the mattress. Many [employer] stores can make a suitable wedge for you. The wedge should run from your waist to the top of your head.  · Dont just prop your head on several pillows. This increases pressure on your stomach. It can make GERD worse.  Watch your eating habits  Certain foods may increase the acid in your stomach or relax the lower esophageal sphincter. This makes GERD more likely. Its best to avoid the following if they cause you symptoms:  · Coffee, tea, and carbonated drinks (with and without caffeine)  · Fatty, fried, or spicy food  · Mint, chocolate, onions, and tomatoes  · Peppermint  · Any other foods  that seem to irritate your stomach or cause you pain  Relieve the pressure  Tips include the following:  · Eat smaller meals, even if you have to eat more often.  · Dont lie down right after you eat. Wait a few hours for your stomach to empty.  · Avoid tight belts and tight-fitting clothes.  · Lose excess weight.  Tobacco and alcohol  Avoid smoking tobacco and drinking alcohol. They can make GERD symptoms worse.  Date Last Reviewed: 7/1/2016  © 0531-7525 [employer]. [address] [place] [address], [place] [address]. All rights reserved. This information is not intended as a substitute for professional medical care. Always follow your healthcare professional's instructions.      I will recheck him in 4 weeks

## 2018-10-02 NOTE — LETTER
October 3, 2018      Rehana Lopez MD  [address]  [facility] [address]           [facility]  [address], [facility] [address]  [facility] [address]  Phone: [phone]  Fax: [phone]          Patient: Bri Santiago   MR Number: [id number]   YOB: 1960   Date of Visit: 10/2/2018       Dear Dr. Rehana Lopez:    Thank you for referring Bri Santiago to me for evaluation. Attached you will find relevant portions of my assessment and plan of care.    If you have questions, please do not hesitate to call me. I look forward to following Bri Santiago along with you.    Sincerely,    LAURENT Swanson MD    Enclosure  CC:  No Recipients    If you would like to receive this communication electronically, please contact [email or web address] or [phone] to request more information on [employer] access.    For providers and/or their staff who would like to refer a patient to [facility], please contact us through our one-stop-shop provider referral line, [facility], at [phone].    If you feel you have received this communication in error or would no longer like to receive these types of communications, please e-mail [email or web address]

## 2018-10-03 NOTE — PROGRESS NOTES
Subjective:       Patient ID: Bri Santiago is a 58 y.o. male.    Chief Complaint: Sinus Problem (onset 1 yr ago)    The patient is coming in for evaluation of multiple problems.  1.  He is having some headache and congestion and postnasal drip.  He is having forehead and mid face pain.  Nasal secretions are clear.  He has been using Flonase, antihistamine and Sudafed with no significant benefit.  2.  Postnasal drip is causing him to have a tickle in his throat which then triggers epic an episode of prolonged coughing.  He has been using albuterol for that with no significant relief.  3.  He does have intermittent wheezing.  4.  In the past he has been diagnosed with laryngopharyngeal reflux.  He has a severe hyperactive gag response and is very difficult to scope.  He does feel like there is swelling in his throat and he is having difficulty swallowing and feels that there is something stuck in his throat at all times.  5.  He is medically disabled because of chronic back problems.  He has undergone 3 back surgeries and has chronic back and neck pain.      Review of Systems   Constitutional: Positive for fatigue. Negative for activity change, appetite change, chills, fever and unexpected weight change.   HENT: Positive for congestion, postnasal drip, sinus pressure, sinus pain, sore throat (Tickle in the throat), trouble swallowing and voice change. Negative for ear discharge, ear pain, facial swelling, hearing loss, mouth sores, rhinorrhea, sneezing and tinnitus.    Eyes: Negative for photophobia, pain, discharge, redness, itching and visual disturbance.   Respiratory: Positive for cough, shortness of breath and wheezing. Negative for apnea and choking.    Cardiovascular: Negative for chest pain and palpitations.   Gastrointestinal: Negative for abdominal distention, abdominal pain, nausea and vomiting.   Musculoskeletal: Positive for back pain, myalgias, neck pain and neck stiffness. Negative for arthralgias.    Skin: Negative.  Negative for color change, pallor and rash.   Allergic/Immunologic: Positive for environmental allergies. Negative for food allergies and immunocompromised state.   Neurological: Positive for headaches. Negative for dizziness, facial asymmetry, speech difficulty, weakness, light-headedness and numbness.   Hematological: Negative for adenopathy. Does not bruise/bleed easily.   Psychiatric/Behavioral: Negative for agitation, confusion, decreased concentration and sleep disturbance.       Flexible nasolaryngoscopy-septum deviated to the right, profuse clear secretions in both nasal passages, soft tissue mass that emanates from the right fossa of Rosenmuller and fills the roof of the right nasopharynx without obstructing the choana, edema and erythema of the arytenoids and interarytenoid mucosa, vocal cords move symmetrically    Objective:      Physical Exam   Constitutional: He is oriented to person, place, and time. He appears well-developed and well-nourished. He is cooperative.   HENT:   Head: Normocephalic.   Right Ear: External ear and ear canal normal. Tympanic membrane is retracted.   Left Ear: External ear and ear canal normal. Tympanic membrane is retracted.   Nose: Mucosal edema (cyanotic, boggy inferior turbinates bilaterally), rhinorrhea (clear mucus bilaterally) and septal deviation (To the left) present.   Mouth/Throat: Uvula is midline, oropharynx is clear and moist and mucous membranes are normal. No oral lesions.   Oral cavity-Mitchell class 3, severely hyperactive gag reflex   Eyes: EOM and lids are normal. Pupils are equal, round, and reactive to light. Right eye exhibits no discharge and no exudate. Left eye exhibits no discharge and no exudate. Right conjunctiva is injected. Left conjunctiva is injected.   Neck: Trachea normal and normal range of motion. No muscular tenderness present. No tracheal deviation present. No thyroid mass and no thyromegaly present.   Cardiovascular:  Normal rate, regular rhythm, normal heart sounds and normal pulses.   Pulmonary/Chest: Effort normal. No stridor. He has decreased breath sounds. He has no wheezes. He has no rhonchi. He has no rales.   Abdominal: Soft. Bowel sounds are normal. There is no tenderness.   Musculoskeletal: Normal range of motion.   Lymphadenopathy:        Head (right side): No submental, no submandibular, no preauricular, no posterior auricular and no occipital adenopathy present.        Head (left side): No submental, no submandibular, no preauricular, no posterior auricular and no occipital adenopathy present.     He has no cervical adenopathy.   Neurological: He is alert and oriented to person, place, and time. He has normal strength. No cranial nerve deficit or sensory deficit. Gait normal.   Skin: Skin is warm and dry. No petechiae and no rash noted. No cyanosis. Nails show no clubbing.   Psychiatric: He has a normal mood and affect. His speech is normal and behavior is normal. Judgment and thought content normal. Cognition and memory are normal.       Assessment:       1. Non-seasonal allergic rhinitis, unspecified trigger    2. ABDON (obstructive sleep apnea)    3. Nasal septal deviation    4. Dysphagia, unspecified type    5. Laryngopharyngeal reflux (LPR)    6. 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  I am increasing his Protonix from once daily to twice daily.   I will recheck him In 4 weeks.  If there are significant abnormalities of the CT scan that appointment will be moved up.

## 2018-10-08 RX ORDER — TRAMADOL HYDROCHLORIDE 50 MG/1
TABLET ORAL
Qty: 120 TABLET | Refills: 0 | Status: SHIPPED | OUTPATIENT
Start: 2018-10-08 | End: 2019-01-21

## 2018-11-19 ENCOUNTER — OFFICE VISIT (OUTPATIENT)
Dept: OTOLARYNGOLOGY | Facility: CLINIC | Age: 58
End: 2018-11-19
Payer: MEDICARE

## 2018-11-19 VITALS — HEIGHT: 71 IN | BODY MASS INDEX: 37.52 KG/M2 | WEIGHT: 268 LBS

## 2018-11-19 DIAGNOSIS — J34.2 NASAL SEPTAL DEVIATION: ICD-10-CM

## 2018-11-19 DIAGNOSIS — G47.33 OSA (OBSTRUCTIVE SLEEP APNEA): ICD-10-CM

## 2018-11-19 DIAGNOSIS — R13.10 DYSPHAGIA, UNSPECIFIED TYPE: ICD-10-CM

## 2018-11-19 DIAGNOSIS — K21.9 LARYNGOPHARYNGEAL REFLUX (LPR): Primary | ICD-10-CM

## 2018-11-19 DIAGNOSIS — J30.89 NON-SEASONAL ALLERGIC RHINITIS, UNSPECIFIED TRIGGER: ICD-10-CM

## 2018-11-19 PROCEDURE — 3008F BODY MASS INDEX DOCD: CPT | Mod: CPTII,S$GLB,, | Performed by: SPECIALIST

## 2018-11-19 PROCEDURE — 99213 OFFICE O/P EST LOW 20 MIN: CPT | Mod: S$GLB,,, | Performed by: SPECIALIST

## 2018-11-19 NOTE — PROGRESS NOTES
"Subjective:       Patient ID: Bri Santiago is a 58 y.o. male.    Chief Complaint: Follow-up    The patient is returning for a follow-up visit.  The scratching sensation that led to paroxysms of coughing in his throat are markedly better.  He only coughs once or twice daily now.  He still has a cessation of lump in his throat and that there is some swelling there.  He has intermittent difficulty swallowing.  He has elevated headboard his bed and is trying his best observed the anti-reflux diet.  He does not eat after his evening meal.  He is very pleased with his results thus far.  He feels he is "much much better".      Review of Systems   Constitutional: Negative for activity change, appetite change, chills, fatigue, fever and unexpected weight change.   HENT: Positive for congestion, postnasal drip, rhinorrhea, sinus pressure, sore throat and trouble swallowing. Negative for ear discharge, ear pain, facial swelling, hearing loss, mouth sores, sinus pain, sneezing, tinnitus and voice change.    Eyes: Negative for photophobia, pain, discharge, redness, itching and visual disturbance.   Respiratory: Positive for cough. Negative for apnea, choking, shortness of breath and wheezing.    Cardiovascular: Negative for chest pain and palpitations.   Gastrointestinal: Negative for abdominal distention, abdominal pain, nausea and vomiting.   Musculoskeletal: Negative for arthralgias, myalgias, neck pain and neck stiffness.   Skin: Negative.  Negative for color change, pallor and rash.   Allergic/Immunologic: Negative for environmental allergies, food allergies and immunocompromised state.   Neurological: Positive for headaches. Negative for dizziness, facial asymmetry, speech difficulty, weakness, light-headedness and numbness.   Hematological: Negative for adenopathy. Does not bruise/bleed easily.   Psychiatric/Behavioral: Negative for agitation, confusion, decreased concentration and sleep disturbance.       Objective: "      Physical Exam   Constitutional: He is oriented to person, place, and time. He appears well-developed and well-nourished. He is cooperative.   HENT:   Head: Normocephalic.   Right Ear: External ear and ear canal normal. Tympanic membrane is retracted.   Left Ear: External ear and ear canal normal. Tympanic membrane is retracted.   Nose: Mucosal edema (cyanotic, boggy inferior turbinates bilaterally), rhinorrhea (clear mucus bilaterally) and septal deviation (To the left) present.   Mouth/Throat: Uvula is midline, oropharynx is clear and moist and mucous membranes are normal. No oral lesions.   Eyes: EOM and lids are normal. Pupils are equal, round, and reactive to light. Right eye exhibits no discharge and no exudate. Left eye exhibits no discharge and no exudate. Right conjunctiva is injected. Left conjunctiva is injected.   Neck: Trachea normal and normal range of motion. No muscular tenderness present. No tracheal deviation present. No thyroid mass and no thyromegaly present.   Cardiovascular: Normal rate, regular rhythm, normal heart sounds and normal pulses.   Pulmonary/Chest: Effort normal. No stridor. He has decreased breath sounds. He has no wheezes. He has no rhonchi. He has no rales.   Abdominal: Soft. Bowel sounds are normal. There is no tenderness.   Musculoskeletal: Normal range of motion.   Lymphadenopathy:        Head (right side): No submental, no submandibular, no preauricular, no posterior auricular and no occipital adenopathy present.        Head (left side): No submental, no submandibular, no preauricular, no posterior auricular and no occipital adenopathy present.     He has no cervical adenopathy.   Neurological: He is alert and oriented to person, place, and time. He has normal strength. No cranial nerve deficit or sensory deficit. Gait normal.   Skin: Skin is warm and dry. No petechiae and no rash noted. No cyanosis. Nails show no clubbing.   Psychiatric: He has a normal mood and affect.  His speech is normal and behavior is normal. Judgment and thought content normal. Cognition and memory are normal.       Assessment:       1. Laryngopharyngeal reflux (LPR)    2. ABDON (obstructive sleep apnea)    3. Non-seasonal allergic rhinitis, unspecified trigger    4. Dysphagia, unspecified type    5. Nasal septal deviation        Plan:       I will recheck the patient in 8 weeks.  I did explain to him that the level of symptom relief he is experiencing exceeds my expectations.  He will continue with anti-reflux diet and call for an earlier appointment if there are problems

## 2018-12-03 ENCOUNTER — OFFICE VISIT (OUTPATIENT)
Dept: INTERNAL MEDICINE | Facility: CLINIC | Age: 58
End: 2018-12-03
Attending: INTERNAL MEDICINE
Payer: MEDICARE

## 2018-12-03 VITALS
WEIGHT: 265.63 LBS | DIASTOLIC BLOOD PRESSURE: 88 MMHG | BODY MASS INDEX: 37.19 KG/M2 | OXYGEN SATURATION: 94 % | SYSTOLIC BLOOD PRESSURE: 120 MMHG | HEIGHT: 71 IN | HEART RATE: 84 BPM

## 2018-12-03 DIAGNOSIS — E66.01 SEVERE OBESITY (BMI 35.0-35.9 WITH COMORBIDITY): ICD-10-CM

## 2018-12-03 DIAGNOSIS — G47.33 OSA (OBSTRUCTIVE SLEEP APNEA): ICD-10-CM

## 2018-12-03 DIAGNOSIS — E78.5 HYPERLIPIDEMIA, UNSPECIFIED HYPERLIPIDEMIA TYPE: ICD-10-CM

## 2018-12-03 DIAGNOSIS — I27.20 PULMONARY HYPERTENSION: ICD-10-CM

## 2018-12-03 DIAGNOSIS — I50.32 DIASTOLIC DYSFUNCTION WITH CHRONIC HEART FAILURE: ICD-10-CM

## 2018-12-03 DIAGNOSIS — Z91.89 AT RISK FOR CARDIOVASCULAR EVENT: ICD-10-CM

## 2018-12-03 DIAGNOSIS — R09.02 HYPOXIA: Primary | ICD-10-CM

## 2018-12-03 PROCEDURE — 99214 OFFICE O/P EST MOD 30 MIN: CPT | Mod: S$GLB,,, | Performed by: INTERNAL MEDICINE

## 2018-12-03 PROCEDURE — 3079F DIAST BP 80-89 MM HG: CPT | Mod: CPTII,S$GLB,, | Performed by: INTERNAL MEDICINE

## 2018-12-03 PROCEDURE — 3074F SYST BP LT 130 MM HG: CPT | Mod: CPTII,S$GLB,, | Performed by: INTERNAL MEDICINE

## 2018-12-03 PROCEDURE — 3008F BODY MASS INDEX DOCD: CPT | Mod: CPTII,S$GLB,, | Performed by: INTERNAL MEDICINE

## 2018-12-03 PROCEDURE — 99999 PR PBB SHADOW E&M-EST. PATIENT-LVL IV: CPT | Mod: PBBFAC,,, | Performed by: INTERNAL MEDICINE

## 2018-12-03 RX ORDER — ATORVASTATIN CALCIUM 40 MG/1
40 TABLET, FILM COATED ORAL DAILY
Qty: 90 TABLET | Refills: 3 | Status: SHIPPED | OUTPATIENT
Start: 2018-12-03 | End: 2019-09-04 | Stop reason: SDUPTHER

## 2018-12-03 NOTE — PROGRESS NOTES
Subjective:       Patient ID: Bri Santiago is a 58 y.o. male.    Chief Complaint: Follow-up     Bri Santiago is a 58 y.o.  male who presents for Follow-up  .  Patient Active Problem List   Diagnosis    Asthma in remission    Von Willebrand disease    HTN (hypertension)    ABDON (obstructive sleep apnea)    Spondylosis without myelopathy    Thoracic or lumbosacral neuritis or radiculitis, unspecified    Spinal stenosis, lumbar region, without neurogenic claudication    Degeneration of lumbar or lumbosacral intervertebral disc    Acquired spondylolisthesis    Pseudoarthrosis    Family history of colon cancer    Vitamin D deficiency    Encounter for monitoring long-term proton pump inhibitor therapy    Postlaminectomy syndrome of lumbar region    Myalgia and myositis    Facet syndrome    Gastroesophageal reflux disease without esophagitis    Osteopenia    Thoracic aorta atherosclerosis    BMI 34.0-34.9,adult    BPH (benign prostatic hypertrophy) with urinary obstruction    Allergic rhinitis    Allergic conjunctivitis of both eyes    Encounter for long-term current use of high risk medication    Gastroesophageal reflux disease with esophagitis    Hematochezia    DDD (degenerative disc disease), lumbar    Nasal septal deviation    Dysphagia    Laryngopharyngeal reflux (LPR)    Diastolic dysfunction with chronic heart failure    At risk for cardiovascular event     Pt has a history of HTN.  Counseled on low salt diet and graded exercise program. Denies CP, SOB, orthopnea or PND.  Currently treated with antihypertensives listed in med card and compliant most of the time. No side effects from medication noted by patient.     Pt has aortic atherosclerosis noted on imaging studies.  Discussed with pt the importance of treating vascular disease with statin therapy.  Pt has/has not been taking a statin. No CP, SOB, no history of TIA or CVA.   Taking his lipitor 20 mg daily. No cramping or  weakness noted.     Hx of asthma, reports compliance with his daily steroid inhaler, using his albuterol less than once a week. Denies SOB, chest tightness or wheezing.     history of diastolic dysfunction, grade 1. Avoids sodium and has been elevating his feet when seated.  No SOB, BOYD, PND.  + edema.  Recent BNP negative.     GERD and cough improved with addition of protonix bid and lifestyle changes.  He is VERY happy to have his cough resolved. Appreciate Dr Swanson's assistance.    Using his cpap daily.       Health Maintenance       Date Due Completion Date    Colonoscopy 06/19/2019 6/19/2017    Lipid Panel 10/16/2022 10/16/2017    TETANUS VACCINE 10/16/2027 10/16/2017          Review of Systems   Constitutional: Negative for chills and fever.   HENT: Negative for rhinorrhea and sore throat.    Respiratory: Negative for cough and shortness of breath.    Cardiovascular: Negative for chest pain and palpitations.   Gastrointestinal: Negative for nausea and vomiting.   Genitourinary: Negative for dysuria and hematuria.   Musculoskeletal: Negative for arthralgias and back pain.   Skin: Negative for color change and rash.   Neurological: Negative for weakness and numbness.   Psychiatric/Behavioral: Negative for agitation and dysphoric mood.         Past Medical History:   Diagnosis Date    Acute pancreatitis     Anal fissure     Anemia     Arthritis     Asthma in remission     Back pain     BPH (benign prostatic hypertrophy)     Cancer 2000    prostate- treated at [facility] with chemo- in remission since 2000    Clotting disorder     Diastolic dysfunction with chronic heart failure 12/3/2018    Dysphagia 10/7/2014    Family history of colon cancer     Family history of early CAD     GERD (gastroesophageal reflux disease)     H. pylori infection 10/8/2014    Helicobacter pylori (H. pylori) infection     Chronic    History of chronic pancreatitis     HTN (hypertension)     Lumbago 11/12/2012    Obesity      ABDON (obstructive sleep apnea)     Pneumonia     during childhood     Prostate cancer 2000    dx and treated at [facility], had chemotherapy, in remission [id number]    Sacroiliac joint pain 2/10/2015    Spinal stenosis of lumbar region     Trouble in sleeping     Vitamin D deficiency disease     VWD (acquired von Willebrand's disease)        Past Surgical History:   Procedure Laterality Date    24 HOUR PH WITH IMPEDANCE N/A 7/23/2015    Performed by Chris Kent MD at [facility] (4TH FLR)    anal fissure repair      x2    BLOCK, NERVE, FACET JOINT, LUMBAR, MEDIAL BRANCH Bilateral 2/12/2014    [facility] at [facility]    BLOCK-NERVE-MEDIAL BRANCH-LUMBAR Bilateral 7/8/2015    [facility] at [facility]    CARPAL TUNNEL RELEASE  2003    left hand    CERVICAL DISCECTOMY  2003    COLONOSCOPY N/A 10/7/2015    Procedure: COLONOSCOPY;  Surgeon: Rosendo Boyer MD;  Location: [facility] (4TH FLR);  Service: Endoscopy;  Laterality: N/A;  PM Prep    COLONOSCOPY N/A 6/19/2017    Procedure: COLONOSCOPY;  Surgeon: Rosendo Boyer MD;  Location: [facility] (4TH FLR);  Service: Endoscopy;  Laterality: N/A;  constipation prep (no DM no CHF)       hx of vonWillebrand's disease-will need infusion prior    COLONOSCOPY N/A 6/19/2017    Performed by [facility] MD at [facility] (4TH FLR)    COLONOSCOPY N/A 10/7/2015    Performed by Ros[facility] MD at [facility] END[facility] (4TH FLR)    COLONOSCOPY N/A 10/3/2013    Performed by Gene Luis MD at [facility] END[facility] (4TH FLR)    ESOPHAGOGASTRODUODENOSCOPY (EGD) N/A 6/19/2017    Performed by Rosendo [facility]er MD at [facility] (4TH FLR)    ESOPHAGOGASTRODUODENOSCOPY (EGD) N/A 10/8/2014    Performed by Rosendo Boyer MD at [facility] (4TH FLR)    INJECTION-JOINT Bilateral 2/25/2015    [facility] at [facility]    LUMBAR FUSION  2012    MANOMETRY-ESOPHAGEAL-HIGH RESOLUTION N/A 7/23/2015     Performed by [facility] at [facility] END[facility] (4TH FLR)    RADIOFREQUENCY ABLATION OF LUMBAR MEDIAL BRANCH NERVE AT SINGLE LEVEL Left 5/24/2018    Procedure: RADIOFREQUENCY THERMOCOAGULATION (RFTC)-NERVE-MEDIAN BRANCH-LUMBAR;  Surgeon: Fredy Magana MD;  Location: [facility];  Service: Pain Management;  Laterality: Left;  Left RFA @ L3,4,5  [id number]  with IV Sedation    2 of 2    RADIOFREQUENCY THERMOCOAGULATION (RFTC)-NERVE-MEDIAN BRANCH-LUMBAR Left 5/24/2018    Performed by Fredy Magana MD at [facility]    RADIOFREQUENCY THERMOCOAGULATION (RFTC)-NERVE-MEDIAN BRANCH-LUMBAR Right 5/10/2018    Performed by Fredy Magana MD at [facility]    RADIOFREQUENCY THERMOCOAGULATION (RFTC)-NERVE-MEDIAN BRANCH-LUMBAR Left 5/16/2017    Performed by Fredy Magana MD at [facility]    RADIOFREQUENCY THERMOCOAGULATION (RFTC)-NERVE-MEDIAN BRANCH-LUMBAR Right 5/2/2017    Performed by Fredy Magana MD at [facility]    RADIOFREQUENCY THERMOCOAGULATION (RFTC)-NERVE-MEDIAN BRANCH-LUMBAR Left 9/8/2015    Performed by [facility] at [facility]    RADIOFREQUENCY THERMOCOAGULATION (RFTC)-NERVE-MEDIAN BRANCH-LUMBAR Right 8/19/2015    Performed by [facility] MD at [facility]    REMOVAL-SPINAL NEUROSTIMULATOR N/A 11/29/2012    Performed by Gonzalez Fisher MD at [facility] OR 2ND FLR    SPINE SURGERY      TONSILLECTOMY      at age 24    VASECTOMY  1996       Family History   Problem Relation Age of Onset    Colon cancer Father 67        colon cancer    Hypertension Father     Glaucoma Father     Colon cancer Paternal Grandfather 65             Coronary artery disease Mother 45    Hypertension Mother     Heart disease Mother     No Known Problems Brother     No Known Problems Sister     No Known Problems Daughter     No Known Problems Son     Coronary artery disease Brother 51    No Known Problems Daughter     No Known Problems Daughter     No Known Problems  "Son     No Known Problems Son     Colon cancer Paternal Uncle 65    Diabetes Mellitus Paternal Grandmother        Social History     Tobacco Use    Smoking status: Never Smoker    Smokeless tobacco: Never Used   Substance Use Topics    Alcohol use: No    Drug use: No             Objective:   Blood pressure 120/88, pulse 84, height 5' 11" (1.803 m), weight 120.5 kg (265 lb 10.5 oz), SpO2 (!) 94 %.     Physical Exam   Constitutional: He is oriented to person, place, and time. He appears well-developed and well-nourished. No distress.   HENT:   Head: Normocephalic and atraumatic.   Right Ear: External ear normal.   Left Ear: External ear normal.   Eyes: Conjunctivae are normal. No scleral icterus.   Neck: No JVD present. No thyromegaly present.   Cardiovascular: Normal heart sounds. Exam reveals no gallop and no friction rub.   No murmur heard.  Pulmonary/Chest: Effort normal and breath sounds normal. He has no wheezes. He has no rales.   Abdominal: Soft. Bowel sounds are normal. He exhibits no distension. There is no tenderness.   Musculoskeletal: He exhibits edema. He exhibits no tenderness.   Lymphadenopathy:     He has no cervical adenopathy.   Neurological: He is alert and oriented to person, place, and time.   Skin: Skin is warm and dry.   Psychiatric: He has a normal mood and affect. Thought content normal.       Prior labs reviewed  Assessment/Plan:        Bri was seen today for follow-up.    Diagnoses and all orders for this visit:    Hypoxia  -     Ambulatory consult to Pulmonology  -     CBC auto differential; Future  -     Complete PFT with bronchodilator; Future  Currently reports he is asymptomatic  Obesity hypoventilations       Diastolic dysfunction with chronic heart failure  -     Ambulatory consult to  Cardiologist   hold off on diuretics for now    Pulmonary hypertension  -     Ambulatory consult to Transplant Cardiologist    Severe obesity (BMI 35.0-35.9 with comorbidity)  -     " Ambulatory consult to Transplant Cardiologist  -     Ambulatory consult to Pulmonology  - rec diet and exercise  - increase intensity and duration of CV exercise to continue weight loss  - goal wt loss one pound per week  - portion control, healthy choices      ABDON (obstructive sleep apnea)  -     Ambulatory consult to Pulmonology  Consider bipap if indicated    At risk for cardiovascular event  Increase lipitor to 40 mg    Hyperlipidemia, unspecified hyperlipidemia type  -     atorvastatin (LIPITOR) 40 MG tablet; Take 1 tablet (40 mg total) by mouth once daily.  -     Lipid panel; Future  -     Comprehensive metabolic panel; Future           Medication List           Accurate as of 12/3/18  3:31 PM. If you have any questions, ask your nurse or doctor.               CHANGE how you take these medications    atorvastatin 40 MG tablet  Commonly known as:  LIPITOR  Take 1 tablet (40 mg total) by mouth once daily.  What changed:    · medication strength  · how much to take  Changed by:  Cate Galeas MD        CONTINUE taking these medications    acetaminophen 500 MG tablet  Commonly known as:  TYLENOL  Take 2 tablets (1,000 mg total) by mouth every 8 (eight) hours as needed.     albuterol 90 mcg/actuation inhaler  Commonly known as:  PROVENTIL HFA  Inhale 2 puffs into the lungs every 6 (six) hours as needed.     azelastine 137 mcg (0.1 %) nasal spray  Commonly known as:  ASTELIN  1 spray (137 mcg total) by Nasal route 2 (two) times daily.     calcium citrate-vitamin D3 315-200 mg 315-200 mg-unit per tablet  Commonly known as:  CITRACAL+D     COLACE 100 MG capsule  Generic drug:  docusate sodium     doxazosin 1 MG tablet  Commonly known as:  CARDURA  Take 1 tablet (1 mg total) by mouth every evening.     * fluticasone 50 mcg/actuation nasal spray  Commonly known as:  FLONASE  1 spray (50 mcg total) by Each Nare route once daily.     * fluticasone 110 mcg/actuation inhaler  Commonly known as:  FLOVENT HFA  Inhale 1  puff into the lungs 2 (two) times daily. Controller     FLUZONE QUAD [id number] (PF) 60 mcg (15 mcg x 4)/0.5 mL Syrg  Generic drug:  flu vac [id number] 36mos up(PF)  as directed     pantoprazole 40 MG tablet  Commonly known as:  PROTONIX  Take 1 tablet (40 mg total) by mouth 2 (two) times daily.     traMADol 50 mg tablet  Commonly known as:  ULTRAM  TAKE 1 TABLET BY MOUTH EVERY 6 HOURS AS NEEDED FOR PAIN     VITAMIN B-12 50 mcg tablet  Generic drug:  cyanocobalamin (vitamin B-12)     VITAMIN D2 50,000 unit Cap  Generic drug:  ergocalciferol     zolpidem 10 mg Tab  Commonly known as:  AMBIEN  TAKE 1 TABLET BY MOUTH EVERY DAY AT BEDTIME         * This list has 2 medication(s) that are the same as other medications prescribed for you. Read the directions carefully, and ask your doctor or other care provider to review them with you.               Where to Get Your Medications      These medications were sent to [facility] [address] [address]  [address] [place] [id number]    Phone:  [phone]   · atorvastatin 40 MG tablet

## 2018-12-04 ENCOUNTER — HOSPITAL ENCOUNTER (OUTPATIENT)
Dept: PULMONOLOGY | Facility: OTHER | Age: 58
Discharge: HOME | End: 2018-12-04
Attending: INTERNAL MEDICINE
Payer: MEDICARE

## 2018-12-04 DIAGNOSIS — I27.9 CHRONIC PULMONARY HEART DISEASE: Primary | ICD-10-CM

## 2018-12-04 DIAGNOSIS — R06.82 TACHYPNEA: ICD-10-CM

## 2018-12-04 DIAGNOSIS — Z79.899 POLYPHARMACY: ICD-10-CM

## 2018-12-04 DIAGNOSIS — R09.02 HYPOXIA: ICD-10-CM

## 2018-12-04 PROCEDURE — 94727 GAS DIL/WSHOT DETER LNG VOL: CPT

## 2018-12-04 PROCEDURE — 94729 DIFFUSING CAPACITY: CPT

## 2018-12-04 PROCEDURE — 94060 EVALUATION OF WHEEZING: CPT

## 2018-12-06 NOTE — PROCEDURES
PULMONARY FUNCTION TEST    HISTORY OF PRESENT ILLNESS:  The patient is a 58-year-old male, 71 inches tall,   260 pounds.    DIAGNOSES:  Hypoxia.    The patient has a forced vital capacity of 2.63 L, which is 63% of predicted,   FEV1 is 2.21, which is 75% of predicted.  FEV1/FVC is 118% of predicted.  FEF   25-75 is 83% of predicted.  There is no significant response to bronchodilators.    Total lung capacity is reduced at 55% of predicted.  This is primarily on the   basis of a decreased expiratory reserve volume, which can be seen with abdominal   obesity or kyphoscoliosis.  Diffusion capacity is normal when corrected for   alveolar ventilation.  Flow volume loop is consistent with above.    ASSESSMENT:  1.  Normal spirometry.  2.  Mild restrictive ventilatory defect.  3.  No evidence of diffusion defect.      MYRTLE/HN  dd: 12/05/2018 19:10:09 (CST)  td: 12/06/2018 01:08:08 (CST)  Doc ID   #[id number]  Job ID #[id number]    CC: Cate Galeas M.D.

## 2018-12-11 ENCOUNTER — INITIAL CONSULT (OUTPATIENT)
Dept: TRANSPLANT | Facility: CLINIC | Age: 58
End: 2018-12-11
Payer: MEDICARE

## 2018-12-11 ENCOUNTER — HOSPITAL ENCOUNTER (OUTPATIENT)
Dept: PULMONOLOGY | Facility: CLINIC | Age: 58
Discharge: HOME | End: 2018-12-11
Payer: MEDICARE

## 2018-12-11 VITALS — WEIGHT: 260 LBS | BODY MASS INDEX: 36.4 KG/M2 | HEIGHT: 71 IN

## 2018-12-11 VITALS
HEIGHT: 68 IN | HEART RATE: 91 BPM | BODY MASS INDEX: 40.6 KG/M2 | SYSTOLIC BLOOD PRESSURE: 127 MMHG | WEIGHT: 267.88 LBS | OXYGEN SATURATION: 95 % | DIASTOLIC BLOOD PRESSURE: 86 MMHG

## 2018-12-11 DIAGNOSIS — R06.02 SHORTNESS OF BREATH: ICD-10-CM

## 2018-12-11 DIAGNOSIS — I27.9 CHRONIC PULMONARY HEART DISEASE: ICD-10-CM

## 2018-12-11 DIAGNOSIS — R07.2 PRECORDIAL PAIN: Primary | ICD-10-CM

## 2018-12-11 DIAGNOSIS — G47.33 OSA (OBSTRUCTIVE SLEEP APNEA): ICD-10-CM

## 2018-12-11 DIAGNOSIS — I50.32 DIASTOLIC DYSFUNCTION WITH CHRONIC HEART FAILURE: ICD-10-CM

## 2018-12-11 PROCEDURE — 3079F DIAST BP 80-89 MM HG: CPT | Mod: CPTII,S$GLB,, | Performed by: INTERNAL MEDICINE

## 2018-12-11 PROCEDURE — 3008F BODY MASS INDEX DOCD: CPT | Mod: CPTII,S$GLB,, | Performed by: INTERNAL MEDICINE

## 2018-12-11 PROCEDURE — 3074F SYST BP LT 130 MM HG: CPT | Mod: CPTII,S$GLB,, | Performed by: INTERNAL MEDICINE

## 2018-12-11 PROCEDURE — 99999 PR PBB SHADOW E&M-EST. PATIENT-LVL III: CPT | Mod: PBBFAC,,, | Performed by: INTERNAL MEDICINE

## 2018-12-11 PROCEDURE — 94618 PULMONARY STRESS TESTING: CPT | Mod: S$GLB,,, | Performed by: INTERNAL MEDICINE

## 2018-12-11 PROCEDURE — 99204 OFFICE O/P NEW MOD 45 MIN: CPT | Mod: S$GLB,,, | Performed by: INTERNAL MEDICINE

## 2018-12-11 NOTE — PROGRESS NOTES
Subjective:    Patient ID:  Bri Santiago is a 58 y.o. male who presents for evaluation of Pulmonary Hypertension.    HPI  Mr. Santiago is a 58 yr old AAM with significant obesity, hiatal hernia, GERD, asthma history (not treated for it now), family history of early CAD, back surgeries, last back surgery 2012 (followed by pain management), ABDON s/p sleep study 2 years ago, last seen 7 months ago, who presents for evaluation of some shortness of breath he has been experiencing. He states it has been more pronounced lately, denies any swelling, abdominal fullness, change in appetite, trouble laying flat, really does not describe heart failure symptoms, but has noticed increased shortness of breath with possible wheeze associated at times with ambulation.     TTE 09/05/18:    1 - Normal left ventricular systolic function (EF 60-65%).     2 - No wall motion abnormalities.     3 - Impaired LV relaxation, normal LAP (grade 1 diastolic dysfunction).     4 - Mild mitral regurgitation.      04/11/18: EKG stress test      Six Minute Walk Test:     12/11/2018---------Distance: 396.24 meters (1300 feet)     O2 Sat % Supplemental Oxygen Heart Rate Blood Pressure Varghese   Scale   Pre-exercise  (Resting) 97 % Room Air 92 bpm 129/83 mmHg 3   During Exercise 94 % Room Air 109 bpm 135/85 mmHg 4   Post-exercise  (Recovery) 95 % Room Air  97 bpm   mmHg     Recovery Time: 54 seconds  Review of Systems   Constitution: Negative for chills, decreased appetite, diaphoresis, fever, weakness, malaise/fatigue, weight gain and weight loss.   HENT: Negative for congestion.    Eyes: Negative for blurred vision and visual disturbance.   Cardiovascular: Positive for dyspnea on exertion. Negative for chest pain, irregular heartbeat, leg swelling, near-syncope, orthopnea, palpitations, paroxysmal nocturnal dyspnea and syncope.   Respiratory: Positive for shortness of breath and snoring. Negative for cough, sleep disturbances due to breathing and  "wheezing.    Hematologic/Lymphatic: Negative for bleeding problem. Does not bruise/bleed easily.   Skin: Negative for poor wound healing and rash.   Musculoskeletal: Negative for arthritis, joint pain and muscle weakness.   Gastrointestinal: Negative for bloating, abdominal pain, anorexia, constipation, diarrhea, hematemesis, hematochezia, melena, nausea and vomiting.   Genitourinary: Negative for frequency and urgency.   Neurological: Negative for difficulty with concentration, excessive daytime sleepiness, dizziness, headaches and light-headedness.   Psychiatric/Behavioral: Negative for depression. The patient does not have insomnia and is not nervous/anxious.         Objective:  /86 (BP Location: Left arm, Patient Position: Sitting, BP Method: Large (Automatic))   Pulse 91   Ht 5' 8" (1.727 m)   Wt 121.5 kg (267 lb 13.7 oz)   SpO2 95%   BMI 40.73 kg/m²       Physical Exam        Chemistry        Component Value Date/Time     12/11/2018 1158    K 3.8 12/11/2018 1158    CL 99 12/11/2018 1158    CO2 31 (H) 12/11/2018 1158    BUN 12 12/11/2018 1158    CREATININE 1.0 12/11/2018 1158    GLU 93 12/11/2018 1158        Component Value Date/Time    CALCIUM 9.7 12/11/2018 1158    ALKPHOS 75 12/11/2018 1158    AST 24 12/11/2018 1158    ALT 27 12/11/2018 1158    BILITOT 1.0 12/11/2018 1158    ESTGFRAFRICA >60.0 12/11/2018 1158    EGFRNONAA >60.0 12/11/2018 1158            Magnesium   Date Value Ref Range Status   12/11/2018 2.1 1.6 - 2.6 mg/dL Final       Lab Results   Component Value Date    WBC 9.77 12/11/2018    HGB 13.5 (L) 12/11/2018    HCT 43.5 12/11/2018    MCV 92 12/11/2018     12/11/2018       Lab Results   Component Value Date    INR 1.0 08/27/2014    INR 1.0 11/23/2012    INR 1.0 07/12/2012       BNP   Date Value Ref Range Status   12/11/2018 <10 0 - 99 pg/mL Final     Comment:     Values of less than 100 pg/ml are consistent with non-CHF populations.   09/04/2018 <10 0 - 99 pg/mL Final     " Comment:     Values of less than 100 pg/ml are consistent with non-CHF populations.   08/27/2014 <10 0 - 99 pg/mL Final     Comment:     Values of less than 100 pg/ml are consistent with non-CHF populations.       Assessment:       1. Precordial pain    2. Shortness of breath    3. Diastolic dysfunction with chronic heart failure    4. BMI 34.0-34.9,adult    5. ABDON (obstructive sleep apnea)        WHO Group 2 or 3   Functional Class 3     Plan:       Patient with likely diastolic dysfunction in setting of elevated PA pressures. Likely sleep apnea as well, not had evaluation for this but has symptoms consistent with it, will refer for sleep apnea assessment.   Additionally it appears he has an appointment later this week with Dr. Hall as well for his shortness of breath and possible pulmonary etiology.    Would like to perform PET stress, patient with strong family history of 1st degree relatives with early CAD.   Of note, his 6MWT showed a drop in his O2 saturation, will see what Dr. Hall thinks but will go ahead and get PFTs if we can get it with his appointment with Dr. Hall on the same day for her review.

## 2018-12-12 NOTE — PROCEDURES
Bri Santiago is a 58 y.o.  male patient, who presents for a 6 minute walk test ordered by MD Andrey.  The diagnosis is Pulmonary Hypertension.  The patient's BMI is 36.3 kg/m2.  Predicted distance (lower limit of normal) is 394.72 meters.      Test Results:    The test was completed without stopping.   The total time walked was 360 seconds.  During walking, the patient reported:  No complaints. The patient used no assistive devices  during testing.     12/11/2018---------Distance: 396.24 meters (1300 feet)     O2 Sat % Supplemental Oxygen Heart Rate Blood Pressure Varghese Scale   Pre-exercise  (Resting) 97 % Room Air 92 bpm 129/83 mmHg 3   During Exercise 94 % Room Air 109 bpm 135/85 mmHg 4   Post-exercise  (Recovery) 95 % Room Air  97 bpm   mmHg       Recovery Time: 54 seconds    Performing nurse/tech: Ricardo MANN      PREVIOUS STUDY:   The patient had a previous study.  10/01/2014---------Distance: 396.24 meters (1300 feet)       O2 Sat % Supplemental Oxygen Heart Rate Blood Pressure Varghese Scale   Pre-exercise  (Resting) 97 % Room Air 70 bpm 117/80 mmHg 5-6   During Exercise 98 % Room Air 88 bpm 133/85 mmHg 7-8   Post-exercise  (Recovery) 96 % Room Air  70 bpm 124/80 mmHg          CLINICAL INTERPRETATION:  Six minute walk distance is 396.24 meters (1300 feet) with somewhat heavy dyspnea.  During exercise, there was significant desaturation while breathing room air.  Both blood pressure and heart rate remained stable with walking.  The patient did not report non-pulmonary symptoms during exercise.  Since the previous study in October 2014, exercise capacity is unchanged.  Based upon age and body mass index, exercise capacity is normal.

## 2018-12-18 ENCOUNTER — OFFICE VISIT (OUTPATIENT)
Dept: PULMONOLOGY | Facility: CLINIC | Age: 58
End: 2018-12-18
Payer: MEDICARE

## 2018-12-18 ENCOUNTER — HOSPITAL ENCOUNTER (OUTPATIENT)
Dept: PULMONOLOGY | Facility: CLINIC | Age: 58
Discharge: HOME | End: 2018-12-18
Payer: MEDICARE

## 2018-12-18 VITALS
HEART RATE: 82 BPM | DIASTOLIC BLOOD PRESSURE: 89 MMHG | BODY MASS INDEX: 40.47 KG/M2 | SYSTOLIC BLOOD PRESSURE: 132 MMHG | OXYGEN SATURATION: 93 % | WEIGHT: 267 LBS | HEIGHT: 68 IN

## 2018-12-18 DIAGNOSIS — D68.00 VON WILLEBRAND DISEASE: ICD-10-CM

## 2018-12-18 DIAGNOSIS — R07.2 PRECORDIAL PAIN: ICD-10-CM

## 2018-12-18 DIAGNOSIS — G47.33 OSA (OBSTRUCTIVE SLEEP APNEA): Primary | ICD-10-CM

## 2018-12-18 DIAGNOSIS — R06.02 SHORTNESS OF BREATH: ICD-10-CM

## 2018-12-18 DIAGNOSIS — E66.01 CLASS 3 SEVERE OBESITY DUE TO EXCESS CALORIES WITH SERIOUS COMORBIDITY AND BODY MASS INDEX (BMI) OF 40.0 TO 44.9 IN ADULT: ICD-10-CM

## 2018-12-18 DIAGNOSIS — I50.32 DIASTOLIC DYSFUNCTION WITH CHRONIC HEART FAILURE: ICD-10-CM

## 2018-12-18 DIAGNOSIS — K21.9 LARYNGOPHARYNGEAL REFLUX (LPR): ICD-10-CM

## 2018-12-18 DIAGNOSIS — J45.40 MODERATE PERSISTENT ASTHMA, UNSPECIFIED WHETHER COMPLICATED: ICD-10-CM

## 2018-12-18 PROBLEM — E66.813 CLASS 3 SEVERE OBESITY DUE TO EXCESS CALORIES WITH SERIOUS COMORBIDITY AND BODY MASS INDEX (BMI) OF 40.0 TO 44.9 IN ADULT: Status: ACTIVE | Noted: 2018-12-18

## 2018-12-18 PROCEDURE — 3075F SYST BP GE 130 - 139MM HG: CPT | Mod: CPTII,S$GLB,, | Performed by: INTERNAL MEDICINE

## 2018-12-18 PROCEDURE — 3008F BODY MASS INDEX DOCD: CPT | Mod: CPTII,S$GLB,, | Performed by: INTERNAL MEDICINE

## 2018-12-18 PROCEDURE — 99499 UNLISTED E&M SERVICE: CPT | Mod: S$GLB,,, | Performed by: INTERNAL MEDICINE

## 2018-12-18 PROCEDURE — 99203 OFFICE O/P NEW LOW 30 MIN: CPT | Mod: 25,S$GLB,, | Performed by: INTERNAL MEDICINE

## 2018-12-18 PROCEDURE — 99999 PR PBB SHADOW E&M-EST. PATIENT-LVL IV: CPT | Mod: PBBFAC,,, | Performed by: INTERNAL MEDICINE

## 2018-12-18 PROCEDURE — 3079F DIAST BP 80-89 MM HG: CPT | Mod: CPTII,S$GLB,, | Performed by: INTERNAL MEDICINE

## 2018-12-18 RX ORDER — BUDESONIDE AND FORMOTEROL FUMARATE DIHYDRATE 160; 4.5 UG/1; UG/1
2 AEROSOL RESPIRATORY (INHALATION) EVERY 12 HOURS
Qty: 10.2 G | Refills: 12 | Status: SHIPPED | OUTPATIENT
Start: 2018-12-18 | End: 2019-09-04

## 2018-12-18 RX ORDER — ALBUTEROL SULFATE 90 UG/1
2 AEROSOL, METERED RESPIRATORY (INHALATION) EVERY 6 HOURS PRN
Qty: 1 INHALER | Refills: 12 | Status: SHIPPED | OUTPATIENT
Start: 2018-12-18 | End: 2018-12-18

## 2018-12-18 RX ORDER — ALBUTEROL SULFATE 90 UG/1
2 AEROSOL, METERED RESPIRATORY (INHALATION) EVERY 6 HOURS PRN
Qty: 18 G | Refills: 4 | Status: SHIPPED | OUTPATIENT
Start: 2018-12-18 | End: 2020-03-09

## 2018-12-18 NOTE — PROGRESS NOTES
Subjective:       Patient ID: Bri Santiago is a 58 y.o. male.    Chief Complaint: Pulmonary Hypertension and Shortness of Breath    HPI   Bri Santiago 58 y.o. male    has a past medical history of Acute pancreatitis, Anal fissure, Anemia, Arthritis, Asthma in remission, Back pain, BPH (benign prostatic hypertrophy), Cancer (2000), Clotting disorder, Diastolic dysfunction with chronic heart failure (12/3/2018), Dysphagia (10/7/2014), Family history of colon cancer, Family history of early CAD, GERD (gastroesophageal reflux disease), H. pylori infection (10/8/2014), Helicobacter pylori (H. pylori) infection, History of chronic pancreatitis, HTN (hypertension), Lumbago (11/12/2012), Obesity, ABDON (obstructive sleep apnea), Pneumonia, Prostate cancer (2000), Sacroiliac joint pain (2/10/2015), Spinal stenosis of lumbar region, Trouble in sleeping, Vitamin D deficiency disease, and VWD (acquired von Willebrand's disease).    has a past surgical history that includes Lumbar fusion (2012); Carpal tunnel release (2003); anal fissure repair; Cervical discectomy (2003); Vasectomy (1996); Tonsillectomy; Spine surgery; RADIOFREQUENCY THERMOCOAGULATION (RFTC)-NERVE-MEDIAN BRANCH-LUMBAR (Left, 5/24/2018); RADIOFREQUENCY THERMOCOAGULATION (RFTC)-NERVE-MEDIAN BRANCH-LUMBAR (Right, 5/10/2018); ESOPHAGOGASTRODUODENOSCOPY (EGD) (N/A, 6/19/2017); COLONOSCOPY (N/A, 6/19/2017); RADIOFREQUENCY THERMOCOAGULATION (RFTC)-NERVE-MEDIAN BRANCH-LUMBAR (Left, 5/16/2017); RADIOFREQUENCY THERMOCOAGULATION (RFTC)-NERVE-MEDIAN BRANCH-LUMBAR (Right, 5/2/2017); COLONOSCOPY (N/A, 10/7/2015); RADIOFREQUENCY THERMOCOAGULATION (RFTC)-NERVE-MEDIAN BRANCH-LUMBAR (Left, 9/8/2015); RADIOFREQUENCY THERMOCOAGULATION (RFTC)-NERVE-MEDIAN BRANCH-LUMBAR (Right, 8/19/2015); MANOMETRY-ESOPHAGEAL-HIGH RESOLUTION (N/A, 7/23/2015); 24 HOUR PH WITH IMPEDANCE (N/A, 7/23/2015); BLOCK-NERVE-MEDIAL BRANCH-LUMBAR (Bilateral, 7/8/2015); INJECTION-JOINT (Bilateral,  2/25/2015); ESOPHAGOGASTRODUODENOSCOPY (EGD) (N/A, 10/8/2014); BLOCK, NERVE, FACET JOINT, LUMBAR, MEDIAL BRANCH (Bilateral, 2/12/2014); COLONOSCOPY (N/A, 10/3/2013); and REMOVAL-SPINAL NEUROSTIMULATOR (N/A, 11/29/2012).   reports that  has never smoked. he has never used smokeless tobacco. He reports that he does not drink alcohol or use drugs.  Referred by: Dr. Cate Galeas  Who had concerns including Pulmonary Hypertension and Shortness of Breath.  The patient's last visit with me was on 10/1/2014.  Feeling more sob recently with exertion, but doesn't stop him      pmh back issues  Prostate cancer- early 2000's  Acid reflux  Von Willebrands Disease  Retired- from   [employer]  History of asthma as a child- outgrew and then recurred  Takes flovent tid, and albuterol 3 times per day - for past month  Notes wheezing more, with more sob and cough    Has ABDON, uses cpap  Walks 1.5 miles per day- 20-25 minutes per day   no hospitlaizations  + nocturnal symptoms    No fever chills, ns, wt changes, nausea, vomiting, diarrhea, constipation, chest pain, tightness, pressure. Remainder of review of systems is negative.  Otherwise asymptomatic from pulmonary standpoint.      Review of Systems   All other systems reviewed and are negative.      Objective:      Physical Exam   Constitutional: He is oriented to person, place, and time. He appears well-developed and well-nourished. He appears not cachectic. No distress.   HENT:   Head: Normocephalic.   Nose: Nose normal. No mucosal edema.   Mouth/Throat: Oropharynx is clear and moist. Normal dentition. No oropharyngeal exudate.   Neck: Normal range of motion. Neck supple.   Cardiovascular: Normal rate, regular rhythm, normal heart sounds and intact distal pulses. Exam reveals no gallop and no friction rub.   No murmur heard.  Pulmonary/Chest: Effort normal and breath sounds normal. No stridor.   Abdominal: Soft. Bowel sounds are normal. He exhibits no distension.    Musculoskeletal: Normal range of motion. He exhibits no edema or tenderness.   Lymphadenopathy: No supraclavicular adenopathy is present.     He has no cervical adenopathy.   Neurological: He is alert and oriented to person, place, and time. Gait normal.   Skin: Skin is warm and dry. No rash noted. He is not diaphoretic. No cyanosis or erythema. No pallor. Nails show no clubbing.   Psychiatric: He has a normal mood and affect. His behavior is normal. Judgment and thought content normal.   Nursing note and vitals reviewed.    Personal Diagnostic Review    No flowsheet data found.      Assessment:       1. ABDON (obstructive sleep apnea)    2. Diastolic dysfunction with chronic heart failure    3. Von Willebrand disease    4. Laryngopharyngeal reflux (LPR)    5. Class 3 severe obesity due to excess calories with serious comorbidity and body mass index (BMI) of 40.0 to 44.9 in adult    6. Moderate persistent asthma, unspecified whether complicated        Outpatient Encounter Medications as of 12/18/2018   Medication Sig Dispense Refill    acetaminophen (TYLENOL) 500 MG tablet Take 2 tablets (1,000 mg total) by mouth every 8 (eight) hours as needed. 90 tablet 0    atorvastatin (LIPITOR) 40 MG tablet Take 1 tablet (40 mg total) by mouth once daily. 90 tablet 3    azelastine (ASTELIN) 137 mcg (0.1 %) nasal spray 1 spray (137 mcg total) by Nasal route 2 (two) times daily. 30 mL 11    calcium citrate-vitamin D3 315-200 mg (CITRACAL+D) 315-200 mg-unit per tablet Take 1 tablet by mouth once daily.      cyanocobalamin, vitamin B-12, (VITAMIN B-12) 50 mcg tablet Take 50 mcg by mouth once daily.      docusate sodium (COLACE) 100 MG capsule Take 1 tablet by mouth Twice daily. 1 Capsule Oral Twice a day .  Take with pain medicine      doxazosin (CARDURA) 1 MG tablet Take 1 tablet (1 mg total) by mouth every evening. 30 tablet 11    flu vac [id number] 36mos up,PF, 60 mcg (15 mcg x 4)/0.5 mL Syrg as directed 0.5 mL 0     fluticasone (FLONASE) 50 mcg/actuation nasal spray 1 spray (50 mcg total) by Each Nare route once daily. 16 g 11    fluticasone (FLOVENT HFA) 110 mcg/actuation inhaler Inhale 1 puff into the lungs 2 (two) times daily. Controller 12 g 0    traMADol (ULTRAM) 50 mg tablet TAKE 1 TABLET BY MOUTH EVERY 6 HOURS AS NEEDED FOR PAIN 120 tablet 0    VITAMIN D2 50,000 unit capsule TK 1 C PO Q 7 DAYS  1    zolpidem (AMBIEN) 10 mg Tab TAKE 1 TABLET BY MOUTH EVERY DAY AT BEDTIME 20 tablet 0    [DISCONTINUED] albuterol (PROVENTIL HFA) 90 mcg/actuation inhaler Inhale 2 puffs into the lungs every 6 (six) hours as needed. 18 g 11    albuterol (VENTOLIN HFA) 90 mcg/actuation inhaler Inhale 2 puffs into the lungs every 6 (six) hours as needed for Wheezing. Rescue 18 g 4    budesonide-formoterol 160-4.5 mcg (SYMBICORT) 160-4.5 mcg/actuation HFAA Inhale 2 puffs into the lungs every 12 (twelve) hours. 10.2 g 12    pantoprazole (PROTONIX) 40 MG tablet Take 1 tablet (40 mg total) by mouth 2 (two) times daily. 60 tablet 11    [DISCONTINUED] albuterol (PROVENTIL/VENTOLIN HFA) 90 mcg/actuation inhaler Inhale 2 puffs into the lungs every 6 (six) hours as needed for Wheezing or Shortness of Breath. 1 Inhaler 12     No facility-administered encounter medications on file as of 12/18/2018.      Orders Placed This Encounter   Procedures    Ambulatory consult to Sleep Disorders     Referral Priority:   Routine     Referral Type:   Consultation     Requested Specialty:   Sleep Medicine     Number of Visits Requested:   1       Plan:        I personally reviewed the      1. Echo report   2. PFT   3. 6MWD   4. CXR   5. CXR report   6. CT chest   7. CT chest report     Assessment:  Bri was seen today for pulmonary hypertension and shortness of breath.    Diagnoses and all orders for this visit:    ABDON (obstructive sleep apnea)  -     Ambulatory consult to Sleep Disorders    Diastolic dysfunction with chronic heart failure    Von Willebrand  disease    Laryngopharyngeal reflux (LPR)    Class 3 severe obesity due to excess calories with serious comorbidity and body mass index (BMI) of 40.0 to 44.9 in adult    Moderate persistent asthma, unspecified whether complicated    Other orders  -     budesonide-formoterol 160-4.5 mcg (SYMBICORT) 160-4.5 mcg/actuation HFAA; Inhale 2 puffs into the lungs every 12 (twelve) hours.  -     Discontinue: albuterol (PROVENTIL/VENTOLIN HFA) 90 mcg/actuation inhaler; Inhale 2 puffs into the lungs every 6 (six) hours as needed for Wheezing or Shortness of Breath.  -     albuterol (VENTOLIN HFA) 90 mcg/actuation inhaler; Inhale 2 puffs into the lungs every 6 (six) hours as needed for Wheezing. Rescue        Plan:  Problem List Items Addressed This Visit     Class 3 severe obesity due to excess calories with serious comorbidity and body mass index (BMI) of 40.0 to 44.9 in adult    Diastolic dysfunction with chronic heart failure    Laryngopharyngeal reflux (LPR)    Moderate persistent asthma    Overview     Start symbicort 2p bid  Prn albuterol  exercise         ABDON (obstructive sleep apnea) - Primary    Overview     With likely ohs  Refer to sleep         Relevant Orders    Ambulatory consult to Sleep Disorders    Von Willebrand disease              Follow-up in about 6 months (around 6/18/2019).    Patient Instructions   Start symbicort 2 puffs twice a day- rinse mouth after use    Use ventolin as needed- keep track of how often you are using    Increase exercise speed and time- about 2 miles in 30 minutes    Referral to sleep clinic    Call me in 2 weeks and let me know how you are doing.        Immunization History   Administered Date(s) Administered    Hepatitis A / Hepatitis B 04/01/2008    Influenza 11/07/2007, 10/21/2010, 12/18/2012, 09/17/2013, 10/20/2014    Influenza - Intradermal - Quadrivalent - PF 09/17/2013, 10/20/2014    Influenza - Intradermal - Trivalent - PF 09/17/2013, 10/20/2014    Influenza -  Quadrivalent - PF 10/13/2015, 10/20/2016, 10/16/2017, 10/02/2018    Influenza Split 12/18/2012    Pneumococcal Polysaccharide - 23 Valent 10/16/2017    Tdap 10/16/2017

## 2018-12-18 NOTE — PATIENT INSTRUCTIONS
Start symbicort 2 puffs twice a day- rinse mouth after use    Use ventolin as needed- keep track of how often you are using    Increase exercise speed and time- about 2 miles in 30 minutes    Referral to sleep clinic    Call me in 2 weeks and let me know how you are doing.

## 2018-12-21 DIAGNOSIS — R07.2 PRECORDIAL PAIN: Primary | ICD-10-CM

## 2018-12-24 ENCOUNTER — CLINICAL SUPPORT (OUTPATIENT)
Dept: CARDIOLOGY | Facility: CLINIC | Age: 58
End: 2018-12-24
Attending: INTERNAL MEDICINE
Payer: MEDICARE

## 2018-12-24 VITALS — HEIGHT: 68 IN | BODY MASS INDEX: 40.47 KG/M2 | WEIGHT: 267 LBS

## 2018-12-24 DIAGNOSIS — R07.2 PRECORDIAL PAIN: ICD-10-CM

## 2018-12-24 LAB
CV STRESS BASE HR: 83
DIASTOLIC BLOOD PRESSURE: 96
NUC REST DIASTOLIC VOLUME INDEX: 83
NUC REST EJECTION FRACTION: 69
NUC REST SYSTOLIC VOLUME INDEX: 25
NUC STRESS DIASTOLIC VOLUME INDEX: 83
NUC STRESS EJECTION FRACTION: 69 %
NUC STRESS SYSTOLIC VOLUME INDEX: 26
OHS CV CPX 85 PERCENT MAX PREDICTED HEART RATE MALE: 139
OHS CV CPX MAX PREDICTED HEART RATE: 164
OHS CV CPX PATIENT IS FEMALE: 0
OHS CV CPX PATIENT IS MALE: 1
OHS CV CPX PEAK DIASTOLIC BLOOD PRESSURE: 84 MMHG
OHS CV CPX PEAK HEAR RATE: 86
OHS CV CPX PEAK RATE PRESSURE PRODUCT: NORMAL
OHS CV CPX PEAK SYSTOLIC BLOOD PRESSURE: 143
OHS CV CPX PERCENT MAX PREDICTED HEART RATE ACHIEVED: 52
OHS CV CPX RATE PRESSURE PRODUCT PRESENTING: NORMAL
SYSTOLIC BLOOD PRESSURE: 155

## 2018-12-24 PROCEDURE — 99999 PR PBB SHADOW E&M-EST. PATIENT-LVL I: CPT | Mod: PBBFAC,,,

## 2018-12-24 PROCEDURE — 78492 MYOCRD IMG PET MLT RST&STRS: CPT | Mod: S$GLB,,, | Performed by: INTERNAL MEDICINE

## 2018-12-24 PROCEDURE — 93015 CV STRESS TEST SUPVJ I&R: CPT | Mod: S$GLB,,, | Performed by: INTERNAL MEDICINE

## 2018-12-24 PROCEDURE — A9555 RB82 RUBIDIUM: HCPCS | Mod: S$GLB,,, | Performed by: INTERNAL MEDICINE

## 2018-12-24 RX ORDER — AMINOPHYLLINE 25 MG/ML
75 INJECTION, SOLUTION INTRAVENOUS
Status: COMPLETED | OUTPATIENT
Start: 2018-12-24 | End: 2018-12-24

## 2018-12-24 RX ORDER — DIPYRIDAMOLE 5 MG/ML
60 INJECTION INTRAVENOUS
Status: COMPLETED | OUTPATIENT
Start: 2018-12-24 | End: 2018-12-24

## 2018-12-24 RX ADMIN — AMINOPHYLLINE 75 MG: 25 INJECTION, SOLUTION INTRAVENOUS at 08:12

## 2018-12-24 RX ADMIN — DIPYRIDAMOLE 60 MG: 5 INJECTION INTRAVENOUS at 08:12

## 2018-12-27 DIAGNOSIS — R06.02 SHORTNESS OF BREATH: ICD-10-CM

## 2019-01-14 ENCOUNTER — OFFICE VISIT (OUTPATIENT)
Dept: OTOLARYNGOLOGY | Facility: CLINIC | Age: 59
End: 2019-01-14
Payer: MEDICARE

## 2019-01-14 VITALS
TEMPERATURE: 98 F | HEIGHT: 68 IN | WEIGHT: 268.19 LBS | DIASTOLIC BLOOD PRESSURE: 86 MMHG | SYSTOLIC BLOOD PRESSURE: 126 MMHG | BODY MASS INDEX: 40.65 KG/M2 | HEART RATE: 77 BPM

## 2019-01-14 DIAGNOSIS — R13.10 DYSPHAGIA, UNSPECIFIED TYPE: Primary | ICD-10-CM

## 2019-01-14 DIAGNOSIS — K21.9 LARYNGOPHARYNGEAL REFLUX (LPR): ICD-10-CM

## 2019-01-14 DIAGNOSIS — J34.2 NASAL SEPTAL DEVIATION: ICD-10-CM

## 2019-01-14 DIAGNOSIS — J34.3 HYPERTROPHY OF BOTH INFERIOR NASAL TURBINATES: ICD-10-CM

## 2019-01-14 DIAGNOSIS — G47.33 OSA (OBSTRUCTIVE SLEEP APNEA): ICD-10-CM

## 2019-01-14 DIAGNOSIS — J30.9 ALLERGIC RHINITIS, UNSPECIFIED SEASONALITY, UNSPECIFIED TRIGGER: ICD-10-CM

## 2019-01-14 PROCEDURE — 99214 PR OFFICE/OUTPT VISIT, EST, LEVL IV, 30-39 MIN: ICD-10-PCS | Mod: 25,S$GLB,, | Performed by: SPECIALIST

## 2019-01-14 PROCEDURE — 3079F DIAST BP 80-89 MM HG: CPT | Mod: CPTII,S$GLB,, | Performed by: SPECIALIST

## 2019-01-14 PROCEDURE — 99499 UNLISTED E&M SERVICE: CPT | Mod: S$GLB,,, | Performed by: SPECIALIST

## 2019-01-14 PROCEDURE — 3008F BODY MASS INDEX DOCD: CPT | Mod: CPTII,S$GLB,, | Performed by: SPECIALIST

## 2019-01-14 PROCEDURE — 3008F PR BODY MASS INDEX (BMI) DOCUMENTED: ICD-10-PCS | Mod: CPTII,S$GLB,, | Performed by: SPECIALIST

## 2019-01-14 PROCEDURE — 3079F PR MOST RECENT DIASTOLIC BLOOD PRESSURE 80-89 MM HG: ICD-10-PCS | Mod: CPTII,S$GLB,, | Performed by: SPECIALIST

## 2019-01-14 PROCEDURE — 99499 RISK ADDL DX/OHS AUDIT: ICD-10-PCS | Mod: S$GLB,,, | Performed by: SPECIALIST

## 2019-01-14 PROCEDURE — 31575 LARYNGOSCOPY: ICD-10-PCS | Mod: S$GLB,,, | Performed by: SPECIALIST

## 2019-01-14 PROCEDURE — 3074F PR MOST RECENT SYSTOLIC BLOOD PRESSURE < 130 MM HG: ICD-10-PCS | Mod: CPTII,S$GLB,, | Performed by: SPECIALIST

## 2019-01-14 PROCEDURE — 3074F SYST BP LT 130 MM HG: CPT | Mod: CPTII,S$GLB,, | Performed by: SPECIALIST

## 2019-01-14 PROCEDURE — 99214 OFFICE O/P EST MOD 30 MIN: CPT | Mod: 25,S$GLB,, | Performed by: SPECIALIST

## 2019-01-14 PROCEDURE — 31575 DIAGNOSTIC LARYNGOSCOPY: CPT | Mod: S$GLB,,, | Performed by: SPECIALIST

## 2019-01-14 RX ORDER — ESOMEPRAZOLE MAGNESIUM 40 MG/1
40 CAPSULE, DELAYED RELEASE ORAL
Qty: 60 CAPSULE | Refills: 11 | Status: SHIPPED | OUTPATIENT
Start: 2019-01-14 | End: 2019-12-02 | Stop reason: SDUPTHER

## 2019-01-14 NOTE — PROGRESS NOTES
Subjective:       Patient ID: Bri Santiago is a 58 y.o. male.    Chief Complaint: Follow-up (with Throat tingleling)    The patient is returning for a two-month follow-up visit.  After starting Protonix after last visit he noticed a distinct improvement in his throat symptoms.  Over the last 2 weeks the they have returned to their former state with tingling in the throat that causes him to have cough and pain.  He is taking his Protonix twice daily and states that he is following his diet has elevated headboard his bed.      Review of Systems   Constitutional: Positive for fatigue. Negative for activity change, appetite change, chills, fever and unexpected weight change.   HENT: Positive for congestion, postnasal drip, sinus pressure, sore throat and trouble swallowing. Negative for ear discharge, ear pain, facial swelling, hearing loss, mouth sores, rhinorrhea, sinus pain, sneezing, tinnitus and voice change.    Eyes: Negative for photophobia, pain, discharge, redness, itching and visual disturbance.   Respiratory: Positive for cough. Negative for apnea, choking, shortness of breath and wheezing.    Cardiovascular: Negative for chest pain and palpitations.   Gastrointestinal: Positive for abdominal pain and nausea. Negative for abdominal distention and vomiting.        Heartburn   Musculoskeletal: Negative for arthralgias, myalgias, neck pain and neck stiffness.   Skin: Negative.  Negative for color change, pallor and rash.   Allergic/Immunologic: Positive for environmental allergies. Negative for food allergies and immunocompromised state.   Neurological: Positive for headaches. Negative for dizziness, facial asymmetry, speech difficulty, weakness, light-headedness and numbness.   Hematological: Negative for adenopathy. Does not bruise/bleed easily.   Psychiatric/Behavioral: Negative for agitation, confusion, decreased concentration and sleep disturbance.       Objective:      Physical Exam   Constitutional: He  is oriented to person, place, and time. He appears well-developed and well-nourished. He is cooperative.   HENT:   Head: Normocephalic.   Right Ear: External ear and ear canal normal. Tympanic membrane is retracted.   Left Ear: External ear and ear canal normal. Tympanic membrane is retracted.   Nose: Mucosal edema (cyanotic, boggy inferior turbinates bilaterally), rhinorrhea (clear mucus bilaterally) and septal deviation (To the left) present.   Mouth/Throat: Uvula is midline, oropharynx is clear and moist and mucous membranes are normal. No oral lesions.   Oral cavity Mitchell class 3, severe hyperactive gag reflex, base of tongue enlarged with marked narrowing of the oropharyngeal inlet   Eyes: EOM and lids are normal. Pupils are equal, round, and reactive to light. Right eye exhibits no discharge and no exudate. Left eye exhibits no discharge and no exudate. Right conjunctiva is injected. Left conjunctiva is injected.   Neck: Trachea normal and normal range of motion. No muscular tenderness present. No tracheal deviation present. No thyroid mass and no thyromegaly present.   Cardiovascular: Normal rate, regular rhythm, normal heart sounds and normal pulses.   Pulmonary/Chest: Effort normal and breath sounds normal. No stridor. He has no decreased breath sounds. He has no wheezes. He has no rhonchi. He has no rales.   Abdominal: Soft. Bowel sounds are normal. There is no tenderness.   Musculoskeletal: Normal range of motion.   Lymphadenopathy:        Head (right side): No submental, no submandibular, no preauricular, no posterior auricular and no occipital adenopathy present.        Head (left side): No submental, no submandibular, no preauricular, no posterior auricular and no occipital adenopathy present.     He has no cervical adenopathy.   Neurological: He is alert and oriented to person, place, and time. He has normal strength. No cranial nerve deficit or sensory deficit. Gait normal.   Skin: Skin is warm  and dry. No petechiae and no rash noted. No cyanosis. Nails show no clubbing.   Psychiatric: He has a normal mood and affect. His speech is normal and behavior is normal. Judgment and thought content normal. Cognition and memory are normal.       Flexible video nasolaryngoscopy-septum deviated to the left, inferior turbinates enlarged bilaterally and nasal changes consistent with allergic rhinitis,, laryngeal examination reveals erythema and edema of the interarytenoid mucosa that has not appreciably changed from endoscopy of 2 months ago    Assessment:       1. Dysphagia, unspecified type    2. Laryngopharyngeal reflux (LPR)    3. Allergic rhinitis, unspecified seasonality, unspecified trigger    4. Nasal septal deviation    5. ABDON (obstructive sleep apnea)        Plan:       I am switching the patient to a different proton pump inhibitor.  I will recheck him in 4 weeks.  He is to continue refraining eating prior to going to bed and following the anti-reflux diet.

## 2019-01-14 NOTE — PROCEDURES
"Laryngoscopy  Date/Time: 1/14/2019 10:43 AM  Performed by: LAURENT Swanson MD  Authorized by: LAURENT Swanson MD     Time out: Immediately prior to procedure a "time out" was called to verify the correct patient, procedure, equipment, support staff and site/side marked as required.    Consent Done?:  Yes (Verbal)  Anesthesia:     Local anesthetic:  Lidocaine 2% and Irvin-Synephrine 1/2% (Topical aerosol)    Patient tolerance:  Patient tolerated the procedure well with no immediate complications    Decongestion performed?: Yes    Laryngoscopy:     Areas examined:  Nasal cavities, nasopharynx, oropharynx, hypopharynx, larynx and vocal cords    Laryngoscope size:  4 mm (Flexible video nasolaryngoscopy)  Nose External:      No external nasal deformity  Nose Intranasal:      Mucosa no polyps     Mucosa ulcers not present     No mucosa lesions present (Clear mucus bilaterally)     Septum gross deformity ( septum deviated to the left)     Enlarged turbinates ( inferior turbinates enlarged and obstructive bilaterally)  Nasopharynx:      No mucosa lesions     Adenoids not present     Posterior choanae patent     Eustachian tube patent  Larynx/hypopharynx:      No epiglottis lesions     No epiglottis edema     No AE folds lesions     No vocal cord polyps     Equal and normal bilateral ( vocal cords move symmetrically, no mucosal lesions noted)     No hypopharynx lesions     No piriform sinus pooling     No piriform sinus lesions     Post cricoid edema ( mild to moderate, no change from previous endoscopy)     Post cricoid erythema ( moderate, minimal worsening since last endoscopy)     Flexible video nasolaryngoscopy consistent with nasal septal deviation, inferior turbinate hypertrophy nasal changes of allergic rhinitis, laryngeal changes of laryngopharyngeal reflux that has not significantly improved with 2 months of Protonix therapy      "

## 2019-01-21 ENCOUNTER — OFFICE VISIT (OUTPATIENT)
Dept: PAIN MEDICINE | Facility: CLINIC | Age: 59
End: 2019-01-21
Payer: MEDICARE

## 2019-01-21 VITALS
TEMPERATURE: 98 F | HEIGHT: 68 IN | SYSTOLIC BLOOD PRESSURE: 139 MMHG | DIASTOLIC BLOOD PRESSURE: 95 MMHG | BODY MASS INDEX: 40.78 KG/M2 | RESPIRATION RATE: 18 BRPM | HEART RATE: 80 BPM

## 2019-01-21 DIAGNOSIS — M51.37 DEGENERATION OF LUMBAR OR LUMBOSACRAL INTERVERTEBRAL DISC: ICD-10-CM

## 2019-01-21 DIAGNOSIS — Z79.891 ENCOUNTER FOR LONG-TERM OPIATE ANALGESIC USE: ICD-10-CM

## 2019-01-21 DIAGNOSIS — M43.10 ACQUIRED SPONDYLOLISTHESIS: ICD-10-CM

## 2019-01-21 DIAGNOSIS — M48.061 SPINAL STENOSIS, LUMBAR REGION, WITHOUT NEUROGENIC CLAUDICATION: Primary | ICD-10-CM

## 2019-01-21 DIAGNOSIS — M51.36 DDD (DEGENERATIVE DISC DISEASE), LUMBAR: ICD-10-CM

## 2019-01-21 PROCEDURE — 99999 PR PBB SHADOW E&M-EST. PATIENT-LVL III: ICD-10-PCS | Mod: PBBFAC,,, | Performed by: NURSE PRACTITIONER

## 2019-01-21 PROCEDURE — 3075F PR MOST RECENT SYSTOLIC BLOOD PRESS GE 130-139MM HG: ICD-10-PCS | Mod: CPTII,S$GLB,, | Performed by: NURSE PRACTITIONER

## 2019-01-21 PROCEDURE — 3080F PR MOST RECENT DIASTOLIC BLOOD PRESSURE >= 90 MM HG: ICD-10-PCS | Mod: CPTII,S$GLB,, | Performed by: NURSE PRACTITIONER

## 2019-01-21 PROCEDURE — 3008F BODY MASS INDEX DOCD: CPT | Mod: CPTII,S$GLB,, | Performed by: NURSE PRACTITIONER

## 2019-01-21 PROCEDURE — 99499 RISK ADDL DX/OHS AUDIT: ICD-10-PCS | Mod: S$GLB,,, | Performed by: NURSE PRACTITIONER

## 2019-01-21 PROCEDURE — 80307 DRUG TEST PRSMV CHEM ANLYZR: CPT

## 2019-01-21 PROCEDURE — 99214 OFFICE O/P EST MOD 30 MIN: CPT | Mod: S$GLB,,, | Performed by: NURSE PRACTITIONER

## 2019-01-21 PROCEDURE — 3075F SYST BP GE 130 - 139MM HG: CPT | Mod: CPTII,S$GLB,, | Performed by: NURSE PRACTITIONER

## 2019-01-21 PROCEDURE — 3008F PR BODY MASS INDEX (BMI) DOCUMENTED: ICD-10-PCS | Mod: CPTII,S$GLB,, | Performed by: NURSE PRACTITIONER

## 2019-01-21 PROCEDURE — 99214 PR OFFICE/OUTPT VISIT, EST, LEVL IV, 30-39 MIN: ICD-10-PCS | Mod: S$GLB,,, | Performed by: NURSE PRACTITIONER

## 2019-01-21 PROCEDURE — 3080F DIAST BP >= 90 MM HG: CPT | Mod: CPTII,S$GLB,, | Performed by: NURSE PRACTITIONER

## 2019-01-21 PROCEDURE — 99999 PR PBB SHADOW E&M-EST. PATIENT-LVL III: CPT | Mod: PBBFAC,,, | Performed by: NURSE PRACTITIONER

## 2019-01-21 PROCEDURE — 99499 UNLISTED E&M SERVICE: CPT | Mod: S$GLB,,, | Performed by: NURSE PRACTITIONER

## 2019-01-21 RX ORDER — TRAMADOL HYDROCHLORIDE 50 MG/1
50 TABLET ORAL EVERY 8 HOURS PRN
Qty: 90 TABLET | Refills: 2 | Status: SHIPPED | OUTPATIENT
Start: 2019-01-21 | End: 2019-02-08 | Stop reason: SDUPTHER
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
## 2019-01-21 NOTE — PROGRESS NOTES
Patient, Bri Santiago (MRN #[id number]), presented with a recorded BMI of 40.78 kg/m^2 consistent with the definition of morbid obesity (ICD-10 E66.01). The patient's morbid obesity was monitored, evaluated, addressed and/or treated. This addendum to the medical record is made on 01/21/2019.

## 2019-01-25 ENCOUNTER — CLINICAL SUPPORT (OUTPATIENT)
Dept: CARDIOLOGY | Facility: CLINIC | Age: 59
End: 2019-01-25
Attending: INTERNAL MEDICINE
Payer: MEDICARE

## 2019-01-25 ENCOUNTER — OFFICE VISIT (OUTPATIENT)
Dept: TRANSPLANT | Facility: CLINIC | Age: 59
End: 2019-01-25
Payer: MEDICARE

## 2019-01-25 ENCOUNTER — HOSPITAL ENCOUNTER (OUTPATIENT)
Dept: RADIOLOGY | Facility: HOSPITAL | Age: 59
Discharge: HOME | End: 2019-01-25
Attending: INTERNAL MEDICINE
Payer: MEDICARE

## 2019-01-25 VITALS
DIASTOLIC BLOOD PRESSURE: 83 MMHG | HEIGHT: 71 IN | SYSTOLIC BLOOD PRESSURE: 133 MMHG | WEIGHT: 268.5 LBS | HEART RATE: 85 BPM | BODY MASS INDEX: 37.59 KG/M2 | OXYGEN SATURATION: 92 %

## 2019-01-25 DIAGNOSIS — R06.02 SHORTNESS OF BREATH: ICD-10-CM

## 2019-01-25 DIAGNOSIS — I50.32 DIASTOLIC DYSFUNCTION WITH CHRONIC HEART FAILURE: ICD-10-CM

## 2019-01-25 DIAGNOSIS — I27.9 CHRONIC PULMONARY HEART DISEASE: Primary | ICD-10-CM

## 2019-01-25 DIAGNOSIS — I27.9 CHRONIC CARDIOPULMONARY DISEASE: Primary | ICD-10-CM

## 2019-01-25 DIAGNOSIS — I27.9 CHRONIC CARDIOPULMONARY DISEASE: ICD-10-CM

## 2019-01-25 DIAGNOSIS — R00.2 PALPITATIONS: ICD-10-CM

## 2019-01-25 PROCEDURE — 3008F PR BODY MASS INDEX (BMI) DOCUMENTED: ICD-10-PCS | Mod: CPTII,S$GLB,, | Performed by: INTERNAL MEDICINE

## 2019-01-25 PROCEDURE — 3075F SYST BP GE 130 - 139MM HG: CPT | Mod: CPTII,S$GLB,, | Performed by: INTERNAL MEDICINE

## 2019-01-25 PROCEDURE — 93224 XTRNL ECG REC UP TO 48 HRS: CPT | Mod: S$GLB,,, | Performed by: INTERNAL MEDICINE

## 2019-01-25 PROCEDURE — 3075F PR MOST RECENT SYSTOLIC BLOOD PRESS GE 130-139MM HG: ICD-10-PCS | Mod: CPTII,S$GLB,, | Performed by: INTERNAL MEDICINE

## 2019-01-25 PROCEDURE — 3008F BODY MASS INDEX DOCD: CPT | Mod: CPTII,S$GLB,, | Performed by: INTERNAL MEDICINE

## 2019-01-25 PROCEDURE — 78582 NM LUNG VENTILATION AND PERFUSION IMAGING: ICD-10-PCS | Mod: 26,,, | Performed by: RADIOLOGY

## 2019-01-25 PROCEDURE — 99999 PR PBB SHADOW E&M-EST. PATIENT-LVL IV: ICD-10-PCS | Mod: PBBFAC,,, | Performed by: INTERNAL MEDICINE

## 2019-01-25 PROCEDURE — 99499 RISK ADDL DX/OHS AUDIT: ICD-10-PCS | Mod: S$GLB,,, | Performed by: INTERNAL MEDICINE

## 2019-01-25 PROCEDURE — 78582 LUNG VENTILAT&PERFUS IMAGING: CPT | Mod: 26,,, | Performed by: RADIOLOGY

## 2019-01-25 PROCEDURE — 99214 PR OFFICE/OUTPT VISIT, EST, LEVL IV, 30-39 MIN: ICD-10-PCS | Mod: S$GLB,,, | Performed by: INTERNAL MEDICINE

## 2019-01-25 PROCEDURE — 99999 PR PBB SHADOW E&M-EST. PATIENT-LVL IV: CPT | Mod: PBBFAC,,, | Performed by: INTERNAL MEDICINE

## 2019-01-25 PROCEDURE — 93224 HOLTER MONITOR - 48 HOUR (CUPID ONLY): ICD-10-PCS | Mod: S$GLB,,, | Performed by: INTERNAL MEDICINE

## 2019-01-25 PROCEDURE — 3079F DIAST BP 80-89 MM HG: CPT | Mod: CPTII,S$GLB,, | Performed by: INTERNAL MEDICINE

## 2019-01-25 PROCEDURE — 78582 LUNG VENTILAT&PERFUS IMAGING: CPT | Mod: TC

## 2019-01-25 PROCEDURE — 99499 UNLISTED E&M SERVICE: CPT | Mod: S$GLB,,, | Performed by: INTERNAL MEDICINE

## 2019-01-25 PROCEDURE — 3079F PR MOST RECENT DIASTOLIC BLOOD PRESSURE 80-89 MM HG: ICD-10-PCS | Mod: CPTII,S$GLB,, | Performed by: INTERNAL MEDICINE

## 2019-01-25 PROCEDURE — 99214 OFFICE O/P EST MOD 30 MIN: CPT | Mod: S$GLB,,, | Performed by: INTERNAL MEDICINE

## 2019-01-26 LAB
6MAM UR QL: NOT DETECTED
7AMINOCLONAZEPAM UR QL: NOT DETECTED
A-OH ALPRAZ UR QL: NOT DETECTED
ALPRAZ UR QL: NOT DETECTED
AMPHET UR QL SCN: NOT DETECTED
ANNOTATION COMMENT IMP: NORMAL
ANNOTATION COMMENT IMP: NORMAL
BARBITURATES UR QL: NOT DETECTED
BUPRENORPHINE UR QL: NOT DETECTED
BZE UR QL: NOT DETECTED
CARBOXYTHC UR QL: NOT DETECTED
CARISOPRODOL UR QL: NOT DETECTED
CLONAZEPAM UR QL: NOT DETECTED
CODEINE UR QL: NOT DETECTED
CREAT UR-MCNC: 242.7 MG/DL (ref 20–400)
DIAZEPAM UR QL: NOT DETECTED
ETHYL GLUCURONIDE UR QL: PRESENT
FENTANYL UR QL: NOT DETECTED
HYDROCODONE UR QL: NOT DETECTED
HYDROMORPHONE UR QL: NOT DETECTED
LORAZEPAM UR QL: NOT DETECTED
MDA UR QL: NOT DETECTED
MDEA UR QL: NOT DETECTED
MDMA UR QL: NOT DETECTED
ME-PHENIDATE UR QL: NOT DETECTED
MEPERIDINE UR QL: NOT DETECTED
METHADONE UR QL: NOT DETECTED
METHAMPHET UR QL: NOT DETECTED
MIDAZOLAM UR QL SCN: NOT DETECTED
MORPHINE UR QL: NOT DETECTED
NORBUPRENORPHINE UR QL CFM: NOT DETECTED
NORDIAZEPAM UR QL: NOT DETECTED
NORFENTANYL UR QL: NOT DETECTED
NORHYDROCODONE UR QL CFM: NOT DETECTED
NOROXYCODONE UR QL CFM: NOT DETECTED
NOROXYMORPHONE: NOT DETECTED
OXAZEPAM UR QL: NOT DETECTED
OXYCODONE UR QL: NOT DETECTED
OXYMORPHONE UR QL: NOT DETECTED
PATHOLOGY STUDY: NORMAL
PCP UR QL: NOT DETECTED
PHENTERMINE UR QL: NOT DETECTED
PROPOXYPH UR QL: NOT DETECTED
SERVICE CMNT-IMP: NORMAL
TAPENTADOL UR QL SCN: NOT DETECTED
TAPENTADOL-O-SULF: NOT DETECTED
TEMAZEPAM UR QL: NOT DETECTED
TRAMADOL UR QL: NOT DETECTED
ZOLPIDEM UR QL: NOT DETECTED

## 2019-01-30 ENCOUNTER — DOCUMENTATION ONLY (OUTPATIENT)
Dept: CARDIOLOGY | Facility: CLINIC | Age: 59
End: 2019-01-30

## 2019-01-30 ENCOUNTER — INITIAL CONSULT (OUTPATIENT)
Dept: CARDIOLOGY | Facility: CLINIC | Age: 59
End: 2019-01-30
Payer: MEDICARE

## 2019-01-30 VITALS
DIASTOLIC BLOOD PRESSURE: 83 MMHG | SYSTOLIC BLOOD PRESSURE: 137 MMHG | WEIGHT: 271.19 LBS | BODY MASS INDEX: 37.97 KG/M2 | HEART RATE: 75 BPM | HEIGHT: 71 IN | OXYGEN SATURATION: 96 %

## 2019-01-30 DIAGNOSIS — I10 ESSENTIAL HYPERTENSION: ICD-10-CM

## 2019-01-30 DIAGNOSIS — R07.89 ATYPICAL CHEST PAIN: Primary | ICD-10-CM

## 2019-01-30 DIAGNOSIS — I50.32 DIASTOLIC DYSFUNCTION WITH CHRONIC HEART FAILURE: ICD-10-CM

## 2019-01-30 PROCEDURE — 3079F PR MOST RECENT DIASTOLIC BLOOD PRESSURE 80-89 MM HG: ICD-10-PCS | Mod: CPTII,S$GLB,, | Performed by: INTERNAL MEDICINE

## 2019-01-30 PROCEDURE — 99999 PR PBB SHADOW E&M-EST. PATIENT-LVL III: CPT | Mod: PBBFAC,,, | Performed by: INTERNAL MEDICINE

## 2019-01-30 PROCEDURE — 3008F BODY MASS INDEX DOCD: CPT | Mod: CPTII,S$GLB,, | Performed by: INTERNAL MEDICINE

## 2019-01-30 PROCEDURE — 99215 PR OFFICE/OUTPT VISIT, EST, LEVL V, 40-54 MIN: ICD-10-PCS | Mod: S$GLB,,, | Performed by: INTERNAL MEDICINE

## 2019-01-30 PROCEDURE — 99999 PR PBB SHADOW E&M-EST. PATIENT-LVL III: ICD-10-PCS | Mod: PBBFAC,,, | Performed by: INTERNAL MEDICINE

## 2019-01-30 PROCEDURE — 99499 UNLISTED E&M SERVICE: CPT | Mod: S$GLB,,, | Performed by: INTERNAL MEDICINE

## 2019-01-30 PROCEDURE — 3079F DIAST BP 80-89 MM HG: CPT | Mod: CPTII,S$GLB,, | Performed by: INTERNAL MEDICINE

## 2019-01-30 PROCEDURE — 3075F SYST BP GE 130 - 139MM HG: CPT | Mod: CPTII,S$GLB,, | Performed by: INTERNAL MEDICINE

## 2019-01-30 PROCEDURE — 99215 OFFICE O/P EST HI 40 MIN: CPT | Mod: S$GLB,,, | Performed by: INTERNAL MEDICINE

## 2019-01-30 PROCEDURE — 3008F PR BODY MASS INDEX (BMI) DOCUMENTED: ICD-10-PCS | Mod: CPTII,S$GLB,, | Performed by: INTERNAL MEDICINE

## 2019-01-30 PROCEDURE — 99499 RISK ADDL DX/OHS AUDIT: ICD-10-PCS | Mod: S$GLB,,, | Performed by: INTERNAL MEDICINE

## 2019-01-30 PROCEDURE — 3075F PR MOST RECENT SYSTOLIC BLOOD PRESS GE 130-139MM HG: ICD-10-PCS | Mod: CPTII,S$GLB,, | Performed by: INTERNAL MEDICINE

## 2019-01-30 RX ORDER — SODIUM CHLORIDE 9 MG/ML
3 INJECTION, SOLUTION INTRAVENOUS CONTINUOUS
Status: CANCELLED | OUTPATIENT
Start: 2019-01-30 | End: 2019-01-30

## 2019-01-30 RX ORDER — DIPHENHYDRAMINE HCL 25 MG
50 CAPSULE ORAL ONCE
Status: CANCELLED | OUTPATIENT
Start: 2019-01-30 | End: 2019-01-30

## 2019-01-30 NOTE — LETTER
January 30, 2019      Joanne Alvarez MD  [address]  [facility] [address]           [facility]  [address]  [facility] [address]  Phone: [phone]          Patient: Bri Santiago   MR Number: [id number]   YOB: 1960   Date of Visit: 1/30/2019       Dear Dr. Joanne Alvarez:    Thank you for referring Bri Santiago to me for evaluation. Attached you will find relevant portions of my assessment and plan of care.    If you have questions, please do not hesitate to call me. I look forward to following Bri Santiago along with you.    Sincerely,    Kyle Magana MD    Enclosure  CC:  No Recipients    If you would like to receive this communication electronically, please contact [email or web address] or [phone] to request more information on [facility] access.    For providers and/or their staff who would like to refer a patient to [facility], please contact us through our one-stop-shop provider referral line, [facility], at [phone].    If you feel you have received this communication in error or would no longer like to receive these types of communications, please e-mail [email or web address]

## 2019-01-30 NOTE — PROGRESS NOTES
Patient, Bri Santiago (MRN #[id number]), presented with a recorded BMI of 37.82 kg/m^2 and a documented comorbidity(s):  - Hypertension  - Atrial Fibrillation  to which the severe obesity is a contributing factor. This is consistent with the definition of severe obesity (BMI 35.0-39.9) with comorbidity (ICD-10 E66.01, Z68.35). The patient's severe obesity was monitored, evaluated, addressed and/or treated. This addendum to the medical record is made on 01/30/2019.

## 2019-01-30 NOTE — ASSESSMENT & PLAN NOTE
Mildly decompensated.  May be related to HTN and ABDON.    - continue medical therapy and risk factor modification  - lifestyle modification   - pt to f/u w/ repeat sleep study for refitting of CPAP

## 2019-01-30 NOTE — PROGRESS NOTES
OUTPATIENT CATHETERIZATION INSTRUCTIONS    You have been scheduled for a procedure in the catheterization lab on Thursday, February 14, 2019.     Please report to the Cardiology Waiting Area on the Third floor of the hospital and check in at 7:30 AM.   You will then be taken to the SSCU (Short Stay Cardiac Unit) and prepared for your procedure. Please be aware that this is not the time of your procedure but the time you are to arrive. The procedures are scheduled on an hourly basis; however, emergency cases take precedence over all other cases.       You may not have anything to eat or drink after midnight the night before your test. You may take your regular morning medications with water. If there are any medications that you should not take you will be instructed to hold them that morning. If you are diabetic and on Metformin (Glucophage) do not take it the day before, the day of, and for 2 days after your procedure.      The procedure will take 1-2 hours to perform. After the procedure, you will return to SSCU on the third floor of the hospital. You will need to lie still (or keep your arm still) for the next 4 to 6 hours to minimize bleeding from the puncture site. Your family may remain in the room with you during this time.       You may be able to be discharged home that same afternoon if there is someone to drive you home and there were no complications. If you have one of the balloon, stent, or device procedures you may spend the night in the hospital. Your doctor will determine, based on your progress, the date and time of your discharge. The results of your procedure will be discussed with you before you are discharged. Any further testing or procedures will be scheduled for you either before you leave or you will be called with these appointments.       If you should have any questions, concerns, or need to change the date of your procedure, please call MARCELO Monge @ [phone]    Special  Instructions:  Drink plenty of water the day before and after your procedure.     THE ABOVE INSTRUCTIONS WERE GIVEN TO THE PATIENT VERBALLY AND THEY VERBALIZED UNDERSTANDING.  THEY DO NOT REQUIRE ANY SPECIAL NEEDS AND DO NOT HAVE ANY LEARNING BARRIERS.          Directions for Reporting to Cardiology Waiting Area in the Hospital  If you park in the Parking Garage:  Take elevators to the1st floor of the parking garage.  Continue past the gift shop, coffee shop, and piano.  Take a right and go to the gold elevators. (Elevator B)  Take the elevator to the 3rd floor.  Follow the arrow on the sign on the wall that says Cath Lab Registration/EP Lab Registration.  Follow the long hallway all the way around until you come to a big open area.  This is the registration area.  Check in at Reception Desk.    OR    If family is dropping you off:  Have them drop you off at the front of the Hospital under the green overhang.  Enter through the doors and take a right.  Take the E elevators to the 3rd floor Cardiology Waiting Area.  Check in at the Reception Desk in the waiting room.

## 2019-01-30 NOTE — PROGRESS NOTES
Subjective:    Patient ID:  Bri Santiago is a 58 y.o. male who presents for evaluation of Abnormal Stress Test    Referring: Dr. SONALI Alvarez    HPI  Pt is a 59 yo M w/ PMH of MO, ABDON on CPAP, ASA allergy (urticaria), and VWD who presents for evaluation for a LHC.  The patient mentions that he has had cp which he describes as sharp, left sided, and at times a/w sob for the past 2-3 weeks.  He mentions that the pain comes on randomly and lasts a few seconds.  He denies it being a/w exertion however notes grigsby w/ > 4-5 blocks of ambulation.  He was seen in heart failure clinic by Dr. Alvarez who recommended a PET stress.  The test noted no ischemia at rest or stress and patient was referred to interventional cardiology for evaluation for LHC.  He also admits to edema and palpitations.  There were no other reports.        Review of Systems   Constitution: Negative for diaphoresis and fever.   HENT: Negative for congestion and hearing loss.    Eyes: Negative for blurred vision and pain.   Cardiovascular: Positive for chest pain, dyspnea on exertion, leg swelling and palpitations. Negative for claudication, near-syncope and syncope.   Respiratory: Positive for shortness of breath. Negative for sleep disturbances due to breathing.    Hematologic/Lymphatic: Negative for bleeding problem. Does not bruise/bleed easily.   Skin: Negative for color change and poor wound healing.   Gastrointestinal: Negative for abdominal pain and nausea.   Genitourinary: Negative for bladder incontinence and flank pain.   Neurological: Negative for focal weakness and light-headedness.        Objective:    Physical Exam   Constitutional: He is oriented to person, place, and time. He appears well-developed and well-nourished.   HENT:   Head: Normocephalic and atraumatic.   Mouth/Throat: Oropharynx is clear and moist.   Eyes: EOM are normal. Pupils are equal, round, and reactive to light. No scleral icterus.   Neck: Normal range of motion. Neck  supple. No JVD present.   Cardiovascular: Normal rate, regular rhythm, S1 normal, S2 normal and intact distal pulses. Exam reveals no gallop and no friction rub.   No murmur heard.  NL Aleksandr's test   Pulmonary/Chest: Effort normal and breath sounds normal. No respiratory distress. He has no wheezes. He has no rales. He exhibits no tenderness.   Abdominal: Soft. Bowel sounds are normal. He exhibits no distension and no mass. There is no tenderness. There is no rebound.   obese   Musculoskeletal: Normal range of motion. He exhibits no edema or tenderness.   BLE 2+ pitting edema   Neurological: He is alert and oriented to person, place, and time. He displays normal reflexes. Coordination normal.   Skin: Skin is warm and dry. He is not diaphoretic. No pallor.   Psychiatric: He has a normal mood and affect. His behavior is normal. Judgment normal.         Assessment:       1. Atypical chest pain    2. Essential hypertension    3. Diastolic dysfunction with chronic heart failure         Plan:       Atypical chest pain  Pt w/ 1st degree relative history of CAD.  PET stress negative for ischemia.    1. Cardiac catheterization with probable PCI.   2. Antiplatelets: none given ASA allergy, if PCI will load w/ clopidogrel and desensitize to ASA  3. Access: R radial 5/6 Fr sheath  4. Catheters: 5 Fr Brant  5. Pt is a ALBERTO candidate and understands the importance of taking plavix for at least one year, understands that in case of receiving a drug coated stent the failure to comply with dual anti-platelet therapy is likely to result in stent clothing, heart attack and death.   6. The risks, benefits, and alternatives of coronary vascular angiography and possible intervention were discussed with the patient. All questions were answered and informed consent was obtained. I had a detailed discussion with the patient regarding risk of stroke, MI, bleeding access site complications including limb loss, allergy, kidney failure including  dialysis and death.  7. The patient understands the risks and benefits and wishes to go ahead with the procedure.  8. [facility] Clinical Frailty Scale: Managing well  9. All patient's questions were answered      Diastolic dysfunction with chronic heart failure  Mildly decompensated.  May be related to HTN and ABDON.    - continue medical therapy and risk factor modification  - lifestyle modification   - pt to f/u w/ repeat sleep study for refitting of CPAP    HTN (hypertension)  Mildly elevated in clinic.  Goal < 140/90  - continue medical therapy   - continue risk factor and lifestyle modification        Harris Bolaños M.D.  Interventional Cardiology

## 2019-01-30 NOTE — H&P (VIEW-ONLY)
Subjective:    Patient ID:  Bri Santiago is a 58 y.o. male who presents for evaluation of Abnormal Stress Test    Referring: Dr. SONALI Alvarez    HPI  Pt is a 57 yo M w/ PMH of MO, ABDON on CPAP, ASA allergy (urticaria), and VWD who presents for evaluation for a LHC.  The patient mentions that he has had cp which he describes as sharp, left sided, and at times a/w sob for the past 2-3 weeks.  He mentions that the pain comes on randomly and lasts a few seconds.  He denies it being a/w exertion however notes grigsby w/ > 4-5 blocks of ambulation.  He was seen in heart failure clinic by Dr. Alvarez who recommended a PET stress.  The test noted no ischemia at rest or stress and patient was referred to interventional cardiology for evaluation for LHC.  He also admits to edema and palpitations.  There were no other reports.        Review of Systems   Constitution: Negative for diaphoresis and fever.   HENT: Negative for congestion and hearing loss.    Eyes: Negative for blurred vision and pain.   Cardiovascular: Positive for chest pain, dyspnea on exertion, leg swelling and palpitations. Negative for claudication, near-syncope and syncope.   Respiratory: Positive for shortness of breath. Negative for sleep disturbances due to breathing.    Hematologic/Lymphatic: Negative for bleeding problem. Does not bruise/bleed easily.   Skin: Negative for color change and poor wound healing.   Gastrointestinal: Negative for abdominal pain and nausea.   Genitourinary: Negative for bladder incontinence and flank pain.   Neurological: Negative for focal weakness and light-headedness.        Objective:    Physical Exam   Constitutional: He is oriented to person, place, and time. He appears well-developed and well-nourished.   HENT:   Head: Normocephalic and atraumatic.   Mouth/Throat: Oropharynx is clear and moist.   Eyes: EOM are normal. Pupils are equal, round, and reactive to light. No scleral icterus.   Neck: Normal range of motion. Neck  supple. No JVD present.   Cardiovascular: Normal rate, regular rhythm, S1 normal, S2 normal and intact distal pulses. Exam reveals no gallop and no friction rub.   No murmur heard.  NL Aleksandr's test   Pulmonary/Chest: Effort normal and breath sounds normal. No respiratory distress. He has no wheezes. He has no rales. He exhibits no tenderness.   Abdominal: Soft. Bowel sounds are normal. He exhibits no distension and no mass. There is no tenderness. There is no rebound.   obese   Musculoskeletal: Normal range of motion. He exhibits no edema or tenderness.   BLE 2+ pitting edema   Neurological: He is alert and oriented to person, place, and time. He displays normal reflexes. Coordination normal.   Skin: Skin is warm and dry. He is not diaphoretic. No pallor.   Psychiatric: He has a normal mood and affect. His behavior is normal. Judgment normal.         Assessment:       1. Atypical chest pain    2. Essential hypertension    3. Diastolic dysfunction with chronic heart failure         Plan:       Atypical chest pain  Pt w/ 1st degree relative history of CAD.  PET stress negative for ischemia.    1. Cardiac catheterization with probable PCI.   2. Antiplatelets: none given ASA allergy, if PCI will load w/ clopidogrel and desensitize to ASA  3. Access: R radial 5/6 Fr sheath  4. Catheters: 5 Fr Brant  5. Pt is a ALBERTO candidate and understands the importance of taking plavix for at least one year, understands that in case of receiving a drug coated stent the failure to comply with dual anti-platelet therapy is likely to result in stent clothing, heart attack and death.   6. The risks, benefits, and alternatives of coronary vascular angiography and possible intervention were discussed with the patient. All questions were answered and informed consent was obtained. I had a detailed discussion with the patient regarding risk of stroke, MI, bleeding access site complications including limb loss, allergy, kidney failure including  dialysis and death.  7. The patient understands the risks and benefits and wishes to go ahead with the procedure.  8. [facility] Clinical Frailty Scale: Managing well  9. All patient's questions were answered      Diastolic dysfunction with chronic heart failure  Mildly decompensated.  May be related to HTN and ABODN.    - continue medical therapy and risk factor modification  - lifestyle modification   - pt to f/u w/ repeat sleep study for refitting of CPAP    HTN (hypertension)  Mildly elevated in clinic.  Goal < 140/90  - continue medical therapy   - continue risk factor and lifestyle modification        Harris Bolaños M.D.  Interventional Cardiology

## 2019-01-30 NOTE — ASSESSMENT & PLAN NOTE
Pt w/ 1st degree relative history of CAD.  PET stress negative for ischemia.    1. Cardiac catheterization with probable PCI.   2. Antiplatelets: none given ASA allergy, if PCI will load w/ clopidogrel and desensitize to ASA  3. Access: R radial 5/6 Fr sheath  4. Catheters: 5 Fr Brant  5. Pt is a ALBERTO candidate and understands the importance of taking plavix for at least one year, understands that in case of receiving a drug coated stent the failure to comply with dual anti-platelet therapy is likely to result in stent clothing, heart attack and death.   6. The risks, benefits, and alternatives of coronary vascular angiography and possible intervention were discussed with the patient. All questions were answered and informed consent was obtained. I had a detailed discussion with the patient regarding risk of stroke, MI, bleeding access site complications including limb loss, allergy, kidney failure including dialysis and death.  7. The patient understands the risks and benefits and wishes to go ahead with the procedure.  8. [facility] Clinical Frailty Scale: Managing well  9. All patient's questions were answered

## 2019-01-30 NOTE — ASSESSMENT & PLAN NOTE
Mildly elevated in clinic.  Goal < 140/90  - continue medical therapy   - continue risk factor and lifestyle modification

## 2019-02-05 LAB
OHS CV EVENT MONITOR DAY: 0
OHS CV HOLTER LENGTH DECIMAL HOURS: 48
OHS CV HOLTER LENGTH HOURS: 48
OHS CV HOLTER LENGTH MINUTES: 0

## 2019-02-05 NOTE — PROGRESS NOTES
Subjective:    Patient ID:  Bri Santiago is a 58 y.o. male who presents for evaluation of Pulmonary Hypertension.    HPI  Mr. Santiago is a 58 yr old AAM with significant obesity, hiatal hernia, GERD, asthma history (not treated for it now), family history of early CAD, back surgeries, last back surgery 2012 (followed by pain management), ABDON s/p sleep study 2 years ago, last seen 7 months ago, who is here to follow up on shortness of breath he is experiencing. Patient with history of CAD in the family, therefore although atypical, not always exertional, obtained PET stress which was negative for ischemia/discreet lesion. Patient feels the breathing issues have worsened since he was last seen, both at rest and stress. Denies chest pain with exertion, but feels at rest he sometimes experiences it, when he is not exerting himself, associated with shortness of breath and occasionally diaphoresis.     PET stress - no significant ischemia.   TTE 09/05/18:    1 - Normal left ventricular systolic function (EF 60-65%).     2 - No wall motion abnormalities.     3 - Impaired LV relaxation, normal LAP (grade 1 diastolic dysfunction).     4 - Mild mitral regurgitation.      04/11/18: EKG stress test      Six Minute Walk Test:     12/11/2018---------Distance: 396.24 meters (1300 feet)     O2 Sat % Supplemental Oxygen Heart Rate Blood Pressure [place]   Scale   Pre-exercise  (Resting) 97 % Room Air 92 bpm 129/83 mmHg 3   During Exercise 94 % Room Air 109 bpm 135/85 mmHg 4   Post-exercise  (Recovery) 95 % Room Air  97 bpm   mmHg     Recovery Time: 54 seconds  Review of Systems   Constitution: Negative for chills, decreased appetite, diaphoresis, fever, weakness, malaise/fatigue, weight gain and weight loss.   HENT: Negative for congestion.    Eyes: Negative for blurred vision and visual disturbance.   Cardiovascular: Positive for dyspnea on exertion. Negative for chest pain, irregular heartbeat, leg swelling, near-syncope,  "orthopnea, palpitations, paroxysmal nocturnal dyspnea and syncope.   Respiratory: Positive for shortness of breath and snoring. Negative for cough, sleep disturbances due to breathing and wheezing.    Hematologic/Lymphatic: Negative for bleeding problem. Does not bruise/bleed easily.   Skin: Negative for poor wound healing and rash.   Musculoskeletal: Negative for arthritis, joint pain and muscle weakness.   Gastrointestinal: Negative for bloating, abdominal pain, anorexia, constipation, diarrhea, hematemesis, hematochezia, melena, nausea and vomiting.   Genitourinary: Negative for frequency and urgency.   Neurological: Negative for difficulty with concentration, excessive daytime sleepiness, dizziness, headaches and light-headedness.   Psychiatric/Behavioral: Negative for depression. The patient does not have insomnia and is not nervous/anxious.         Objective:  /83 (BP Location: Right arm, Patient Position: Sitting, BP Method: X-Large (Automatic))   Pulse 85   Ht 5' 11" (1.803 m)   Wt 121.8 kg (268 lb 8.3 oz)   SpO2 (!) 92%   BMI 37.45 kg/m²       Physical Exam   Constitutional: He is oriented to person, place, and time. He appears well-developed and well-nourished. No distress.   HENT:   Head: Normocephalic and atraumatic.   Nose: Nose normal.   Mouth/Throat: Oropharynx is clear and moist. No oropharyngeal exudate.   Eyes: Conjunctivae and EOM are normal. Pupils are equal, round, and reactive to light. No scleral icterus.   Neck: Normal range of motion. Neck supple. No JVD present. No tracheal deviation present. No thyromegaly present.   Cardiovascular: Normal rate, regular rhythm, S1 normal, S2 normal and normal heart sounds. Exam reveals no gallop and no friction rub.   No murmur heard.  Pulmonary/Chest: Effort normal and breath sounds normal. No respiratory distress. He has no wheezes. He has no rales. He exhibits no tenderness.   Abdominal: Soft. Bowel sounds are normal. He exhibits no " distension and no mass. There is no tenderness. There is no rebound and no guarding.   Musculoskeletal: Normal range of motion. He exhibits no edema or tenderness.   Neurological: He is alert and oriented to person, place, and time. Coordination normal.   Skin: Skin is warm and dry. No rash noted. He is not diaphoretic. No erythema. No pallor.   Psychiatric: He has a normal mood and affect. His behavior is normal. Judgment and thought content normal.   Nursing note and vitals reviewed.          Chemistry        Component Value Date/Time     01/25/2019 0713    K 4.1 01/25/2019 0713     01/25/2019 0713    CO2 30 (H) 01/25/2019 0713    BUN 12 01/25/2019 0713    CREATININE 1.0 01/25/2019 0713     01/25/2019 0713        Component Value Date/Time    CALCIUM 9.1 01/25/2019 0713    ALKPHOS 75 01/25/2019 0713    AST 19 01/25/2019 0713    ALT 16 01/25/2019 0713    BILITOT 0.8 01/25/2019 0713    ESTGFRAFRICA >60.0 01/25/2019 0713    EGFRNONAA >60.0 01/25/2019 0713            Magnesium   Date Value Ref Range Status   01/25/2019 2.1 1.6 - 2.6 mg/dL Final       Lab Results   Component Value Date    WBC 7.31 01/25/2019    HGB 13.9 (L) 01/25/2019    HCT 45.3 01/25/2019    MCV 92 01/25/2019     01/25/2019       Lab Results   Component Value Date    INR 1.0 08/27/2014    INR 1.0 11/23/2012    INR 1.0 07/12/2012       BNP   Date Value Ref Range Status   01/25/2019 <10 0 - 99 pg/mL Final     Comment:     Values of less than 100 pg/ml are consistent with non-CHF populations.   12/11/2018 <10 0 - 99 pg/mL Final     Comment:     Values of less than 100 pg/ml are consistent with non-CHF populations.   09/04/2018 <10 0 - 99 pg/mL Final     Comment:     Values of less than 100 pg/ml are consistent with non-CHF populations.       Assessment:       1. Chronic pulmonary heart disease    2. Diastolic dysfunction with chronic heart failure    3. Palpitations        WHO Group 2 or 3   Functional Class 3     Plan:        Despite PET stress being relatively stable, will go ahead and refer for LHC to evaluate for definitive evidence or lack there of CAD.   Recommend 2 gram sodium restriction and 1500cc fluid restriction.  Encourage physical activity with graded exercise program.  Requested patient to weigh themselves daily, and to notify us if their weight increases by more than 3 lbs in 1 day or 5 lbs in 1 week.  Follow up on pulmonary recommendations as well.   Patient is of note, waiting for repeat ABDON evaluation.

## 2019-02-08 RX ORDER — TRAMADOL HYDROCHLORIDE 50 MG/1
50 TABLET ORAL EVERY 8 HOURS PRN
Qty: 90 TABLET | Refills: 2 | Status: SHIPPED | OUTPATIENT
Start: 2019-02-08 | End: 2019-06-20 | Stop reason: SDUPTHER

## 2019-02-08 NOTE — TELEPHONE ENCOUNTER
----- Message from Marysol Bueno sent at 2/8/2019  3:24 PM CST -----  Contact: PURNIMA IYER [[id number]]  Name of Who is Calling: PURNIMA IYER [[id number]]    What is the request in detail: patient need doctor to call in a refill for medication traMADol (ULTRAM) 50 mg tablet states pharmacy only gave a 7 day supply and was told by pharmacy that the doctor need to recall in the prescription. Please call     Can the clinic reply by [facility]: no    What Number to Call Back if not in [facility]:[phone]

## 2019-02-08 NOTE — TELEPHONE ENCOUNTER
----- Message from Marysol Bueno sent at 2/8/2019  3:24 PM CST -----  Contact: PURNIMA IYER [[id number]]  Name of Who is Calling: PURNIMA IYER [[id number]]    What is the request in detail: patient need doctor to call in a refill for medication traMADol (ULTRAM) 50 mg tablet states pharmacy only gave a 7 day supply and was told by pharmacy that the doctor need to recall in the prescription. Please call     Can the clinic reply by [facility]: no    What Number to Call Back if not in [facility]:[phone]

## 2019-02-11 ENCOUNTER — OFFICE VISIT (OUTPATIENT)
Dept: OTOLARYNGOLOGY | Facility: CLINIC | Age: 59
End: 2019-02-11
Payer: MEDICARE

## 2019-02-11 VITALS — BODY MASS INDEX: 37.94 KG/M2 | HEIGHT: 71 IN | WEIGHT: 271 LBS

## 2019-02-11 DIAGNOSIS — J30.89 NON-SEASONAL ALLERGIC RHINITIS, UNSPECIFIED TRIGGER: ICD-10-CM

## 2019-02-11 DIAGNOSIS — R13.10 DYSPHAGIA, UNSPECIFIED TYPE: Primary | ICD-10-CM

## 2019-02-11 DIAGNOSIS — K21.9 LARYNGOPHARYNGEAL REFLUX (LPR): ICD-10-CM

## 2019-02-11 DIAGNOSIS — J34.2 NASAL SEPTAL DEVIATION: ICD-10-CM

## 2019-02-11 DIAGNOSIS — G47.33 OSA (OBSTRUCTIVE SLEEP APNEA): ICD-10-CM

## 2019-02-11 PROCEDURE — 99214 OFFICE O/P EST MOD 30 MIN: CPT | Mod: S$GLB,,, | Performed by: SPECIALIST

## 2019-02-11 PROCEDURE — 99214 PR OFFICE/OUTPT VISIT, EST, LEVL IV, 30-39 MIN: ICD-10-PCS | Mod: S$GLB,,, | Performed by: SPECIALIST

## 2019-02-11 PROCEDURE — 3008F BODY MASS INDEX DOCD: CPT | Mod: CPTII,S$GLB,, | Performed by: SPECIALIST

## 2019-02-11 PROCEDURE — 99499 RISK ADDL DX/OHS AUDIT: ICD-10-PCS | Mod: S$GLB,,, | Performed by: SPECIALIST

## 2019-02-11 PROCEDURE — 99499 UNLISTED E&M SERVICE: CPT | Mod: S$GLB,,, | Performed by: SPECIALIST

## 2019-02-11 PROCEDURE — 3008F PR BODY MASS INDEX (BMI) DOCUMENTED: ICD-10-PCS | Mod: CPTII,S$GLB,, | Performed by: SPECIALIST

## 2019-02-11 NOTE — PROGRESS NOTES
"Subjective:       Patient ID: Bri Santiago is a 58 y.o. male.    Chief Complaint: Follow-up and Sinus Problem    The patient is returning for a follow-up visit.  He has a sensation of a tickling and" trash in the throat" all the time.  It has improved somewhat since switching from Protonix to Nexium.  He is not choking on food.  He is continuing to uses CPAP for his obstructive sleep apnea.  He does not eat after 6:00 p.m. any has elevated headboard of his bed.      Review of Systems   Constitutional: Positive for fatigue. Negative for activity change, appetite change, chills, fever and unexpected weight change.   HENT: Positive for congestion, postnasal drip, sinus pressure, sore throat and trouble swallowing. Negative for ear discharge, ear pain, facial swelling, hearing loss, mouth sores, rhinorrhea, sinus pain, sneezing, tinnitus and voice change.    Eyes: Negative for photophobia, pain, discharge, redness, itching and visual disturbance.   Respiratory: Positive for cough. Negative for apnea, choking, shortness of breath and wheezing.    Cardiovascular: Negative for chest pain and palpitations.   Gastrointestinal: Negative for abdominal distention, abdominal pain, nausea and vomiting.   Musculoskeletal: Negative for arthralgias, myalgias, neck pain and neck stiffness.   Skin: Negative.  Negative for color change, pallor and rash.   Allergic/Immunologic: Positive for environmental allergies. Negative for food allergies and immunocompromised state.   Neurological: Positive for headaches. Negative for dizziness, facial asymmetry, speech difficulty, weakness, light-headedness and numbness.   Hematological: Negative for adenopathy. Does not bruise/bleed easily.   Psychiatric/Behavioral: Negative for agitation, confusion, decreased concentration and sleep disturbance.       Objective:      Physical Exam   Constitutional: He is oriented to person, place, and time. He appears well-developed and well-nourished. He is " cooperative.   HENT:   Head: Normocephalic.   Right Ear: External ear and ear canal normal. Tympanic membrane is retracted.   Left Ear: External ear and ear canal normal. Tympanic membrane is retracted.   Nose: Mucosal edema (cyanotic, boggy inferior turbinates bilaterally), rhinorrhea (clear mucus bilaterally) and septal deviation (To the left) present.   Mouth/Throat: Uvula is midline, oropharynx is clear and moist and mucous membranes are normal. No oral lesions.   Oral cavity-Mitchell class 3, marked enlargement of base tongue, severe hyperactive gag reflex with marked narrowing the oropharyngeal inlet, unable to adequately access oropharynx because of hyperactive gag response   Eyes: EOM and lids are normal. Pupils are equal, round, and reactive to light. Right eye exhibits no discharge and no exudate. Left eye exhibits no discharge and no exudate. Right conjunctiva is injected. Left conjunctiva is injected.   Neck: Trachea normal and normal range of motion. No muscular tenderness present. No tracheal deviation present. No thyroid mass and no thyromegaly present.   Cardiovascular: Normal rate, regular rhythm, normal heart sounds and normal pulses.   Pulmonary/Chest: Effort normal and breath sounds normal. No stridor. He has no decreased breath sounds. He has no wheezes. He has no rhonchi. He has no rales.   Abdominal: Soft. Bowel sounds are normal. There is no tenderness.   Musculoskeletal: Normal range of motion.   Lymphadenopathy:        Head (right side): No submental, no submandibular, no preauricular, no posterior auricular and no occipital adenopathy present.        Head (left side): No submental, no submandibular, no preauricular, no posterior auricular and no occipital adenopathy present.     He has no cervical adenopathy.   Neurological: He is alert and oriented to person, place, and time. He has normal strength. No cranial nerve deficit or sensory deficit. Gait normal.   Skin: Skin is warm and dry. No  petechiae and no rash noted. No cyanosis. Nails show no clubbing.   Psychiatric: He has a normal mood and affect. His speech is normal and behavior is normal. Judgment and thought content normal. Cognition and memory are normal.       Assessment:       1. Dysphagia, unspecified type    2. Laryngopharyngeal reflux (LPR)    3. Non-seasonal allergic rhinitis, unspecified trigger    4. Nasal septal deviation    5. ABDON (obstructive sleep apnea)        Plan:       Will have the patient continue with his current drug regimen.  I will recheck him in 2 months, or sooner on an as-needed basis.

## 2019-02-14 ENCOUNTER — HOSPITAL ENCOUNTER (OUTPATIENT)
Facility: HOSPITAL | Age: 59
Discharge: HOME | End: 2019-02-14
Attending: INTERNAL MEDICINE | Admitting: INTERNAL MEDICINE
Payer: MEDICARE

## 2019-02-14 VITALS
BODY MASS INDEX: 36.68 KG/M2 | DIASTOLIC BLOOD PRESSURE: 84 MMHG | WEIGHT: 262 LBS | RESPIRATION RATE: 18 BRPM | HEIGHT: 71 IN | SYSTOLIC BLOOD PRESSURE: 139 MMHG | HEART RATE: 86 BPM | TEMPERATURE: 97 F | OXYGEN SATURATION: 94 %

## 2019-02-14 DIAGNOSIS — R06.02 SHORTNESS OF BREATH: Primary | ICD-10-CM

## 2019-02-14 DIAGNOSIS — R07.89 ATYPICAL CHEST PAIN: ICD-10-CM

## 2019-02-14 LAB
ABO + RH BLD: NORMAL
ANION GAP SERPL CALC-SCNC: 7 MMOL/L
BASOPHILS # BLD AUTO: 0.02 K/UL
BASOPHILS NFR BLD: 0.3 %
BLD GP AB SCN CELLS X3 SERPL QL: NORMAL
BUN SERPL-MCNC: 13 MG/DL
CALCIUM SERPL-MCNC: 8.5 MG/DL
CHLORIDE SERPL-SCNC: 102 MMOL/L
CO2 SERPL-SCNC: 29 MMOL/L
CREAT SERPL-MCNC: 0.9 MG/DL
DIFFERENTIAL METHOD: ABNORMAL
EOSINOPHIL # BLD AUTO: 0.2 K/UL
EOSINOPHIL NFR BLD: 3.3 %
ERYTHROCYTE [DISTWIDTH] IN BLOOD BY AUTOMATED COUNT: 12.6 %
EST. GFR  (AFRICAN AMERICAN): >60 ML/MIN/1.73 M^2
EST. GFR  (NON AFRICAN AMERICAN): >60 ML/MIN/1.73 M^2
GLUCOSE SERPL-MCNC: 93 MG/DL
HCT VFR BLD AUTO: 40.4 %
HGB BLD-MCNC: 13.2 G/DL
IMM GRANULOCYTES # BLD AUTO: 0.01 K/UL
IMM GRANULOCYTES NFR BLD AUTO: 0.2 %
LYMPHOCYTES # BLD AUTO: 1.5 K/UL
LYMPHOCYTES NFR BLD: 22.5 %
MCH RBC QN AUTO: 29.5 PG
MCHC RBC AUTO-ENTMCNC: 32.7 G/DL
MCV RBC AUTO: 90 FL
MONOCYTES # BLD AUTO: 0.5 K/UL
MONOCYTES NFR BLD: 6.8 %
NEUTROPHILS # BLD AUTO: 4.5 K/UL
NEUTROPHILS NFR BLD: 66.9 %
NRBC BLD-RTO: 0 /100 WBC
PLATELET # BLD AUTO: 229 K/UL
PMV BLD AUTO: 9.5 FL
POTASSIUM SERPL-SCNC: 3.6 MMOL/L
RBC # BLD AUTO: 4.48 M/UL
SODIUM SERPL-SCNC: 138 MMOL/L
WBC # BLD AUTO: 6.66 K/UL

## 2019-02-14 PROCEDURE — C1751 CATH, INF, PER/CENT/MIDLINE: HCPCS | Performed by: INTERNAL MEDICINE

## 2019-02-14 PROCEDURE — 99152 MOD SED SAME PHYS/QHP 5/>YRS: CPT | Performed by: INTERNAL MEDICINE

## 2019-02-14 PROCEDURE — 99153 MOD SED SAME PHYS/QHP EA: CPT | Performed by: INTERNAL MEDICINE

## 2019-02-14 PROCEDURE — 93460 R&L HRT ART/VENTRICLE ANGIO: CPT | Performed by: INTERNAL MEDICINE

## 2019-02-14 PROCEDURE — 25000003 PHARM REV CODE 250: Performed by: STUDENT IN AN ORGANIZED HEALTH CARE EDUCATION/TRAINING PROGRAM

## 2019-02-14 PROCEDURE — 93005 ELECTROCARDIOGRAM TRACING: CPT | Mod: 59

## 2019-02-14 PROCEDURE — 25000003 PHARM REV CODE 250: Performed by: INTERNAL MEDICINE

## 2019-02-14 PROCEDURE — C1769 GUIDE WIRE: HCPCS | Performed by: INTERNAL MEDICINE

## 2019-02-14 PROCEDURE — C1887 CATHETER, GUIDING: HCPCS | Performed by: INTERNAL MEDICINE

## 2019-02-14 PROCEDURE — C1894 INTRO/SHEATH, NON-LASER: HCPCS | Performed by: INTERNAL MEDICINE

## 2019-02-14 PROCEDURE — 85025 COMPLETE CBC W/AUTO DIFF WBC: CPT

## 2019-02-14 PROCEDURE — 36415 COLL VENOUS BLD VENIPUNCTURE: CPT

## 2019-02-14 PROCEDURE — 93460 PR CATH PLACE/CORON ANGIO, IMG SUPER/INTERP,R&L HRT CATH, L HRT VENTRIC: ICD-10-PCS | Mod: 26,,, | Performed by: INTERNAL MEDICINE

## 2019-02-14 PROCEDURE — 93010 EKG 12-LEAD: ICD-10-PCS | Mod: ,,, | Performed by: INTERNAL MEDICINE

## 2019-02-14 PROCEDURE — 86901 BLOOD TYPING SEROLOGIC RH(D): CPT

## 2019-02-14 PROCEDURE — 80048 BASIC METABOLIC PNL TOTAL CA: CPT

## 2019-02-14 PROCEDURE — 25500020 PHARM REV CODE 255: Performed by: INTERNAL MEDICINE

## 2019-02-14 PROCEDURE — 93010 ELECTROCARDIOGRAM REPORT: CPT | Mod: ,,, | Performed by: INTERNAL MEDICINE

## 2019-02-14 PROCEDURE — 93460 R&L HRT ART/VENTRICLE ANGIO: CPT | Mod: 26,,, | Performed by: INTERNAL MEDICINE

## 2019-02-14 PROCEDURE — 63600175 PHARM REV CODE 636 W HCPCS: Performed by: INTERNAL MEDICINE

## 2019-02-14 RX ORDER — LIDOCAINE HYDROCHLORIDE 20 MG/ML
INJECTION, SOLUTION EPIDURAL; INFILTRATION; INTRACAUDAL; PERINEURAL
Status: DISCONTINUED | OUTPATIENT
Start: 2019-02-14 | End: 2019-02-14 | Stop reason: HOSPADM

## 2019-02-14 RX ORDER — VERAPAMIL HYDROCHLORIDE 2.5 MG/ML
INJECTION, SOLUTION INTRAVENOUS
Status: DISCONTINUED | OUTPATIENT
Start: 2019-02-14 | End: 2019-02-14 | Stop reason: HOSPADM

## 2019-02-14 RX ORDER — ACETAMINOPHEN 500 MG
1000 TABLET ORAL EVERY 8 HOURS PRN
Status: DISCONTINUED | OUTPATIENT
Start: 2019-02-14 | End: 2019-02-14 | Stop reason: HOSPADM

## 2019-02-14 RX ORDER — MIDAZOLAM HYDROCHLORIDE 1 MG/ML
INJECTION, SOLUTION INTRAMUSCULAR; INTRAVENOUS
Status: DISCONTINUED | OUTPATIENT
Start: 2019-02-14 | End: 2019-02-14 | Stop reason: HOSPADM

## 2019-02-14 RX ORDER — NITROGLYCERIN 5 MG/ML
INJECTION, SOLUTION INTRAVENOUS
Status: DISCONTINUED | OUTPATIENT
Start: 2019-02-14 | End: 2019-02-14 | Stop reason: HOSPADM

## 2019-02-14 RX ORDER — HYDROCHLOROTHIAZIDE 25 MG/1
25 TABLET ORAL DAILY
Qty: 30 TABLET | Refills: 11 | Status: SHIPPED | OUTPATIENT
Start: 2019-02-14 | End: 2019-04-25

## 2019-02-14 RX ORDER — CLOPIDOGREL BISULFATE 75 MG/1
75 TABLET ORAL DAILY
Status: DISCONTINUED | OUTPATIENT
Start: 2019-02-14 | End: 2019-02-14

## 2019-02-14 RX ORDER — CLOPIDOGREL BISULFATE 75 MG/1
75 TABLET ORAL DAILY
Qty: 30 TABLET | Refills: 11 | Status: ON HOLD | OUTPATIENT
Start: 2019-02-14 | End: 2019-12-31 | Stop reason: SDUPTHER

## 2019-02-14 RX ORDER — SODIUM CHLORIDE 9 MG/ML
3 INJECTION, SOLUTION INTRAVENOUS CONTINUOUS
Status: ACTIVE | OUTPATIENT
Start: 2019-02-14 | End: 2019-02-14

## 2019-02-14 RX ORDER — HEPARIN SODIUM 200 [USP'U]/100ML
INJECTION, SOLUTION INTRAVENOUS
Status: DISCONTINUED | OUTPATIENT
Start: 2019-02-14 | End: 2019-02-14 | Stop reason: HOSPADM

## 2019-02-14 RX ORDER — HYDROCHLOROTHIAZIDE 25 MG/1
25 TABLET ORAL DAILY
Status: DISCONTINUED | OUTPATIENT
Start: 2019-02-14 | End: 2019-02-14

## 2019-02-14 RX ORDER — FENTANYL CITRATE 50 UG/ML
INJECTION, SOLUTION INTRAMUSCULAR; INTRAVENOUS
Status: DISCONTINUED | OUTPATIENT
Start: 2019-02-14 | End: 2019-02-14 | Stop reason: HOSPADM

## 2019-02-14 RX ORDER — HEPARIN SODIUM 1000 [USP'U]/ML
INJECTION, SOLUTION INTRAVENOUS; SUBCUTANEOUS
Status: DISCONTINUED | OUTPATIENT
Start: 2019-02-14 | End: 2019-02-14 | Stop reason: HOSPADM

## 2019-02-14 RX ORDER — DIPHENHYDRAMINE HCL 25 MG
50 CAPSULE ORAL ONCE
Status: COMPLETED | OUTPATIENT
Start: 2019-02-14 | End: 2019-02-14

## 2019-02-14 RX ADMIN — SODIUM CHLORIDE 3 ML/KG/HR: 0.9 INJECTION, SOLUTION INTRAVENOUS at 07:02

## 2019-02-14 RX ADMIN — DIPHENHYDRAMINE HYDROCHLORIDE 50 MG: 25 CAPSULE ORAL at 07:02

## 2019-02-14 RX ADMIN — ACETAMINOPHEN 1000 MG: 500 TABLET ORAL at 03:02

## 2019-02-14 NOTE — Clinical Note
75 ml injected throughout the case. 125 mL total wasted during the case. 200 mL total used in the case.

## 2019-02-14 NOTE — PLAN OF CARE
Problem: Adult Inpatient Plan of Care  Goal: Plan of Care Review  Outcome: Ongoing (interventions implemented as appropriate)  Received report from MARCELO Wallis. Patient s/p Left and Right heart cath, AAOx4. VSS, no c/o pain or discomfort at this time, resp even and unlabored. Right wrist vasc band intact. No active bleeding. No hematoma noted. Right AC with gauze and koban dressing c/d/i. No active bleeding. No hematoma noted. right groin gauze/tegaderm dressing is CDI. Pulses 2+.  No active bleeding. No hematoma noted. Post procedure protocol reviewed with patient and patient's family. Understanding verbalized. Family members at bedside. Nurse call bell within reach. Will continue to monitor per post procedure protocol.

## 2019-02-14 NOTE — DISCHARGE INSTRUCTIONS
Discharge Instructions and Care of Your Wrist After a Cardiac Catheterization Procedure Performed via the Radial Artery    For 3-5 days following the procedure:  Do not subject your affected hand/arm to any forceful movements (i.e. supporting weight when rising from a chair or bed)  Avoid excessive (extension/flexion) wrist movement (i.e. supporting weight when rising from a chair or bed, push-ups, lifting garage doors, etc.)  Do not drive a car for 24 hours  The dressing on the puncture site may be removed after 24 hours and left open to air. If there is minor oozing, you may apply a Band-aid and remove after 12 hours  You may shower on the day following your procedure. Do not take a tub bath or submerge the puncture site in water for 3 days following the procedure  Do not lift anything heavier than 3 to 5 pounds with the affected hand/arm  Do not operate a lawnmower, motorcycle, chainsaw, or all-terrain vehicle   Do not engage in vigorous exercise (i.e. Tennis, Golf, Bowling) using the affected arm    If bleeding should occur following discharge:  Sit down and apply firm pressure to the puncture site with your fingers for 10 minutes   If the bleeding stops, continue to sit quietly, keeping your wrist straight for 2 hours. Notify your physician as soon as possible   If bleeding does not stop after 10 minutes or if there is a large amount of bleeding or spurting, call 911 immediately. Do not drive yourself to the hospital.     You should expect mild tingling in your hand and tenderness at the puncture site for up to 3 days.     Notify your physician if these symptoms persist or if you experience:  Change in color or temperature of the hand or arm  Redness, heat, or pus at the puncture site  Chills or fever greater than 101.0 F      Cath discharge instructions:  1. Do not strain or lift anything greater than 4 lbs to wrist that was accessed.  2. Do not drive or operate any dangerous machinery for 24 hours.  3. Keep  the dressing on, clean, and dry for 24 hours.  4. After 24 hours, the dressing may be removed and a shower is allowed.  5. Clean the area with mild soap and water and then cover it with a bandage.  6. Once the skin has healed, bathing in a tub or swimming is allowed.  7. Inspect the access site daily and report to the physician any swelling at the site that  cannot be controlled with manual pressure for 10 minutes, unusual pain at the  access site or affected extremity, unusual swelling at the access site, or signs or  symptoms of infection such as redness, pain, or fever.  Call 911 if you have:  Bleeding from the puncture site that you cannot stop by doing the following:  Keep your wrist straight and apply firm pressure to the site using your fingers and a gauze pad. Keep the pressure on for 20 minutes. Continue this until the bleeding stops. This may take awhile. When bleeding stops, cover the site with a sterile bandage and keep your wrist still as much as possible.      1. Do not strain or lift anything greater than 5 lb for 1 week.   2. Do not drive or operate any dangerous machinery for 24 hours.   3. Keep the dressing on, clean, and dry for 24 hours.   4. After 24 hours, the dressing may be removed and a shower is allowed.   5. Clean the area with mild soap and water and then leave it opened to air.   6. Once the skin has healed, bathing in a tub or swimming is allowed.   7. Inspect the groin site daily and report to the physician any swelling at the site that   cannot be controlled with manual pressure for 10 minutes, unusual pain at the   access site or affected extremity, unusual swelling at the access site, or signs or   symptoms of infection such as redness, pain, or fever.   Call 911 if you have:   Bleeding from the puncture site that you cannot stop by doing the following:   Relax and lie down right away. Keep your leg flat and apply firm pressure to the site using your fingers and a gauze pad. Keep  the pressure on for 20 minutes. Continue this until the bleeding stops. This may take awhile. When bleeding stops, cover the site with a sterile bandage and keep your leg still as much as possible.

## 2019-02-14 NOTE — DISCHARGE SUMMARY
Discharge Summary  Interventional Cardiology       Admit Date: 2/14/2019    Discharge Date :2/14/2019    Attending/Discharging Physician: Dr. Kyle Magana     Discharged Condition: good    Discharge Diagnosis: Non-obstructive CAD and diastolic congestive heart failure    Treatments/Procedures: Procedure(s) (LRB):  Left heart cath (Left)  CATHETERIZATION, HEART, BOTH LEFT AND RIGHT (N/A)    Hospital Course: Access obtained via right AC vein for RHC. Access initially obtained via right radial artery for LHC however patient developed vasospasm so right femoral CFA access was obtained. LHC revealed non-obstructive CAD. LVEDP ~30 mmHg. Patient monitored post procedure in recovery and did well. Given his history of ASA allergy, started him on Plavix 75 mg QD. For his HFpEF, started him on HCTZ 25 mg QD to achieve better BP control and volume optimize him. Patient should see his O/P PCP and f/u with HTS clinic to have blood work done in 1 week (BMP).     Significant Diagnostic Studies: see [facility] for details of LHC and RHC reports   RHC: RA 15/13/11, RV 47/6/9, PA 50/5/34, PCWP 32/29/24  LHC: LVEDP 23, non-obstructive CAD, 40% occlusion of LCx, RCA and LAD    Disposition: Home or Self Care    Diet: Cardiac     Follow up: Office 10-14 days    Activity: No heavy lifting till seen in office.    Patient Instructions:   Current Discharge Medication List      START taking these medications    Details   clopidogrel (PLAVIX) 75 mg tablet Take 1 tablet (75 mg total) by mouth once daily.  Qty: 30 tablet, Refills: 11      hydroCHLOROthiazide (HYDRODIURIL) 25 MG tablet Take 1 tablet (25 mg total) by mouth once daily.  Qty: 30 tablet, Refills: 11         CONTINUE these medications which have NOT CHANGED    Details   acetaminophen (TYLENOL) 500 MG tablet Take 2 tablets (1,000 mg total) by mouth every 8 (eight) hours as needed.  Qty: 90 tablet, Refills: 0      albuterol (VENTOLIN HFA) 90 mcg/actuation inhaler Inhale 2 puffs into the  lungs every 6 (six) hours as needed for Wheezing. Rescue  Qty: 18 g, Refills: 4      atorvastatin (LIPITOR) 40 MG tablet Take 1 tablet (40 mg total) by mouth once daily.  Qty: 90 tablet, Refills: 3    Associated Diagnoses: Hyperlipidemia, unspecified hyperlipidemia type      azelastine (ASTELIN) 137 mcg (0.1 %) nasal spray 1 spray (137 mcg total) by Nasal route 2 (two) times daily.  Qty: 30 mL, Refills: 11    Associated Diagnoses: Chronic seasonal allergic rhinitis, unspecified trigger      budesonide-formoterol 160-4.5 mcg (SYMBICORT) 160-4.5 mcg/actuation HFAA Inhale 2 puffs into the lungs every 12 (twelve) hours.  Qty: 10.2 g, Refills: 12      calcium citrate-vitamin D3 315-200 mg (CITRACAL+D) 315-200 mg-unit per tablet Take 1 tablet by mouth once daily.      cyanocobalamin, vitamin B-12, (VITAMIN B-12) 50 mcg tablet Take 50 mcg by mouth once daily.      docusate sodium (COLACE) 100 MG capsule Take 1 tablet by mouth Twice daily. 1 Capsule Oral Twice a day .  Take with pain medicine      doxazosin (CARDURA) 1 MG tablet Take 1 tablet (1 mg total) by mouth every evening.  Qty: 30 tablet, Refills: 11    Associated Diagnoses: Benign localized prostatic hyperplasia with lower urinary tract symptoms (LUTS)      esomeprazole (NEXIUM) 40 MG capsule Take 1 capsule (40 mg total) by mouth 2 (two) times daily before meals.  Qty: 60 capsule, Refills: 11      fluticasone (FLONASE) 50 mcg/actuation nasal spray 1 spray (50 mcg total) by Each Nare route once daily.  Qty: 16 g, Refills: 11    Associated Diagnoses: Non-seasonal allergic rhinitis, unspecified trigger      fluticasone (FLOVENT HFA) 110 mcg/actuation inhaler Inhale 1 puff into the lungs 2 (two) times daily. Controller  Qty: 12 g, Refills: 0      traMADol (ULTRAM) 50 mg tablet Take 1 tablet (50 mg total) by mouth every 8 (eight) hours as needed for Pain.  Qty: 90 tablet, Refills: 2      zolpidem (AMBIEN) 10 mg Tab TAKE 1 TABLET BY MOUTH EVERY DAY AT BEDTIME  Qty: 20  tablet, Refills: 0    Associated Diagnoses: Sleep disorder      flu vac [id number] 36mos up,PF, 60 mcg (15 mcg x 4)/0.5 mL Syrg as directed  Qty: 0.5 mL, Refills: 0      VITAMIN D2 50,000 unit capsule TK 1 C PO Q 7 DAYS  Refills: 1             Harriet Hosue, PGY-4 (Cardiology Fellow)

## 2019-02-14 NOTE — INTERVAL H&P NOTE
The patient has been examined and the H&P has been reviewed:    I concur with the findings and no changes have occurred since H&P was written.    Anesthesia/Surgery risks, benefits and alternative options discussed and understood by patient/family.          Active Hospital Problems    Diagnosis  POA    Atypical chest pain [R07.89]  Yes      Resolved Hospital Problems   No resolved problems to display.

## 2019-02-14 NOTE — PLAN OF CARE
Interventional Cardiology Note Update    Consented pt for RHC given symptoms of CHF. Access via right AC vein.     Harriet House, PGY-4 (Cardiology Fellow)

## 2019-02-14 NOTE — PLAN OF CARE
Problem: Adult Inpatient Plan of Care  Goal: Plan of Care Review  Outcome: Ongoing (interventions implemented as appropriate)  Pt arrived to floor ambulatory from home accompanied by his son. Pt oriented to room. Iv inserted. Admit assessment completed. Nurse kyle raza within reach. Will continue to monitor

## 2019-02-14 NOTE — PLAN OF CARE
Problem: Adult Inpatient Plan of Care  Goal: Plan of Care Review  Outcome: Outcome(s) achieved Date Met: 02/14/19  Post procedure protocol completed. Patient discharged per MD orders. Instructions given on medications, wound care, activity, signs of infection, when to call MD, and follow up appointments. Pt verbalized understanding.  Patient AAOx4, VSS, no c/o pain or discomfort at this time. Right wrist/ right AC and right groin with gauze/tranparent dressings c/d/i. No active bleeding. No hematoma noted. Telemetry and PIV removed. Patient left unit via wheelchair with transport and family.

## 2019-02-19 LAB — OHS CV CATH LVEDP PRE: 23

## 2019-04-23 ENCOUNTER — OFFICE VISIT (OUTPATIENT)
Dept: INTERNAL MEDICINE | Facility: CLINIC | Age: 59
End: 2019-04-23
Attending: INTERNAL MEDICINE
Payer: MEDICARE

## 2019-04-23 ENCOUNTER — LAB VISIT (OUTPATIENT)
Dept: LAB | Facility: OTHER | Age: 59
End: 2019-04-23
Attending: INTERNAL MEDICINE
Payer: MEDICARE

## 2019-04-23 ENCOUNTER — TELEPHONE (OUTPATIENT)
Dept: PULMONOLOGY | Facility: CLINIC | Age: 59
End: 2019-04-23

## 2019-04-23 VITALS
HEIGHT: 71 IN | HEART RATE: 90 BPM | SYSTOLIC BLOOD PRESSURE: 108 MMHG | BODY MASS INDEX: 37.47 KG/M2 | WEIGHT: 267.63 LBS | DIASTOLIC BLOOD PRESSURE: 80 MMHG | OXYGEN SATURATION: 93 %

## 2019-04-23 DIAGNOSIS — E87.6 HYPOKALEMIA: ICD-10-CM

## 2019-04-23 DIAGNOSIS — I51.89 DIASTOLIC DYSFUNCTION: Primary | ICD-10-CM

## 2019-04-23 DIAGNOSIS — I50.32 DIASTOLIC DYSFUNCTION WITH CHRONIC HEART FAILURE: ICD-10-CM

## 2019-04-23 DIAGNOSIS — G47.33 OSA (OBSTRUCTIVE SLEEP APNEA): ICD-10-CM

## 2019-04-23 DIAGNOSIS — I51.89 DIASTOLIC DYSFUNCTION: ICD-10-CM

## 2019-04-23 LAB
ANION GAP SERPL CALC-SCNC: 9 MMOL/L (ref 8–16)
BUN SERPL-MCNC: 12 MG/DL (ref 6–20)
CALCIUM SERPL-MCNC: 9.4 MG/DL (ref 8.7–10.5)
CHLORIDE SERPL-SCNC: 97 MMOL/L (ref 95–110)
CO2 SERPL-SCNC: 32 MMOL/L (ref 23–29)
CREAT SERPL-MCNC: 1 MG/DL (ref 0.5–1.4)
EST. GFR  (AFRICAN AMERICAN): >60 ML/MIN/1.73 M^2
EST. GFR  (NON AFRICAN AMERICAN): >60 ML/MIN/1.73 M^2
GLUCOSE SERPL-MCNC: 89 MG/DL (ref 70–110)
POTASSIUM SERPL-SCNC: 3.3 MMOL/L (ref 3.5–5.1)
SODIUM SERPL-SCNC: 138 MMOL/L (ref 136–145)

## 2019-04-23 PROCEDURE — 36415 COLL VENOUS BLD VENIPUNCTURE: CPT

## 2019-04-23 PROCEDURE — 3074F PR MOST RECENT SYSTOLIC BLOOD PRESSURE < 130 MM HG: ICD-10-PCS | Mod: CPTII,S$GLB,, | Performed by: INTERNAL MEDICINE

## 2019-04-23 PROCEDURE — 99213 OFFICE O/P EST LOW 20 MIN: CPT | Mod: S$GLB,,, | Performed by: INTERNAL MEDICINE

## 2019-04-23 PROCEDURE — 3074F SYST BP LT 130 MM HG: CPT | Mod: CPTII,S$GLB,, | Performed by: INTERNAL MEDICINE

## 2019-04-23 PROCEDURE — 3079F DIAST BP 80-89 MM HG: CPT | Mod: CPTII,S$GLB,, | Performed by: INTERNAL MEDICINE

## 2019-04-23 PROCEDURE — 3008F BODY MASS INDEX DOCD: CPT | Mod: CPTII,S$GLB,, | Performed by: INTERNAL MEDICINE

## 2019-04-23 PROCEDURE — 3079F PR MOST RECENT DIASTOLIC BLOOD PRESSURE 80-89 MM HG: ICD-10-PCS | Mod: CPTII,S$GLB,, | Performed by: INTERNAL MEDICINE

## 2019-04-23 PROCEDURE — 99213 PR OFFICE/OUTPT VISIT, EST, LEVL III, 20-29 MIN: ICD-10-PCS | Mod: S$GLB,,, | Performed by: INTERNAL MEDICINE

## 2019-04-23 PROCEDURE — 99999 PR PBB SHADOW E&M-EST. PATIENT-LVL III: CPT | Mod: PBBFAC,,, | Performed by: INTERNAL MEDICINE

## 2019-04-23 PROCEDURE — 99999 PR PBB SHADOW E&M-EST. PATIENT-LVL III: ICD-10-PCS | Mod: PBBFAC,,, | Performed by: INTERNAL MEDICINE

## 2019-04-23 PROCEDURE — 80048 BASIC METABOLIC PNL TOTAL CA: CPT

## 2019-04-23 PROCEDURE — 3008F PR BODY MASS INDEX (BMI) DOCUMENTED: ICD-10-PCS | Mod: CPTII,S$GLB,, | Performed by: INTERNAL MEDICINE

## 2019-04-23 NOTE — TELEPHONE ENCOUNTER
Attempted to call patient 2x's. A voicemail was left for  Jack informing him that I was returning his call from Dr. Hall office in regards to his message that was left, provided patient with my name and a call back number as well.

## 2019-04-23 NOTE — PROGRESS NOTES
"Subjective:   Patient ID: Bri Santiago is a 58 y.o. male  Chief complaint: No chief complaint on file.      HPI  Pt here for f/u   Pcp: Dr. Galeas     Hx of ABDON on cpap - had new sleep study ordered but not arranged to date - difficulty with arranging f/u appt   - he agrees to f/u on this with sleep due to c/f needing machine and settings eval    HFpEF and LE edema: f/u with cards and had Had left heart cath 2/14/19  - pet stress test performed 12/2018 and reviewed   -cxr 9/2018 reviewed   - BNP wnl  - v/q scan with low prob for pe  Started on hctz 25 by cards after recent heart cath in Feb  - not effective for edema per pt  - not on a fluid restriction  - reports diligent with limiting salt in diet  -reports urinates frequently during the day 2/2 to med   - had hypokalemia with lasix - difficulty with swallowing large potassium pills and was witched to liquid potassium which he tolerated but did not like taste   - at some point was taken off of oral potassium as "levels were ok" - seen by renal and possibly attrib in part to high dose daily apap use along with diuretic use  - has since decr apap use from daily to 2-3 times per week   Due for bmp after started on hctz   -no cp or new changes in resp status     Review of Systems    Objective:  Vitals:    04/23/19 1412   BP: 108/80   BP Location: Left arm   Patient Position: Sitting   BP Method: Large (Manual)   Pulse: 90   SpO2: (!) 93%   Weight: 121.4 kg (267 lb 10.2 oz)   Height: 5' 11" (1.803 m)     Body mass index is 37.33 kg/m².    Physical Exam   Constitutional: He is oriented to person, place, and time. He appears well-developed and well-nourished.   HENT:   Head: Normocephalic and atraumatic.   Eyes: Conjunctivae and EOM are normal.   Neck: Neck supple.   Cardiovascular: Normal rate, regular rhythm and intact distal pulses.   Pulmonary/Chest: Effort normal and breath sounds normal. No stridor. No respiratory distress. He has no wheezes. He has no rales. " He exhibits no tenderness.   Talking in complete sentences    Abdominal: Soft. Bowel sounds are normal.   Musculoskeletal: He exhibits edema. He exhibits no tenderness.   No calf ttp or edema  +1 edema to mid shins B   Lymphadenopathy:     He has no cervical adenopathy.   Neurological: He is alert and oriented to person, place, and time.   Skin: Skin is warm and dry.   Psychiatric: He has a normal mood and affect. His behavior is normal. Judgment and thought content normal.   Vitals reviewed.      Assessment:  1. Diastolic dysfunction    2. Diastolic dysfunction with chronic heart failure    3. ABDON (obstructive sleep apnea)    4. Hypokalemia        Plan:  Diagnoses and all orders for this visit:    Diastolic dysfunction  -     Basic metabolic panel; Future  Check bmp   Will consider inc hctz vs switching to aldactone due to hypokalemia in past   - may need fluid restriction  bp controlled     Diastolic dysfunction with chronic heart failure    ABDON (obstructive sleep apnea)  F/u with sleep/pulm for abdon to set up new home sleep study    Hypokalemia  At some point stopped oral liquid potassium  Had normal potassium levels in epic off of oral med   Check bmp as above and will make further recs pending those results    Health Maintenance   Topic Date Due    Colonoscopy  06/19/2019    Lipid Panel  12/04/2023    TETANUS VACCINE  10/16/2027    Hepatitis C Screening  Completed    Pneumococcal Vaccine (Medium Risk)  Completed    Influenza Vaccine  Completed

## 2019-04-23 NOTE — TELEPHONE ENCOUNTER
----- Message from Leonela Astudillo sent at 4/23/2019  9:13 AM CDT -----  Contact:   Pt   [phone]  Needs Advice    Reason for call:  Pt  Called stating that he needs an sooner appt with the Dr due to being full of fluid ,  Give the pt a call back to schedule         Communication Preference:  PHONE      Additional Information:

## 2019-04-25 ENCOUNTER — TELEPHONE (OUTPATIENT)
Dept: INTERNAL MEDICINE | Facility: CLINIC | Age: 59
End: 2019-04-25

## 2019-04-25 DIAGNOSIS — E87.6 HYPOKALEMIA: Primary | ICD-10-CM

## 2019-04-25 RX ORDER — SPIRONOLACTONE 25 MG/1
25 TABLET ORAL DAILY
Qty: 30 TABLET | Refills: 2 | Status: SHIPPED | OUTPATIENT
Start: 2019-04-25 | End: 2019-06-14 | Stop reason: SDUPTHER

## 2019-04-25 NOTE — TELEPHONE ENCOUNTER
Please notify pt that potassium has mildly decreased with the HCTZ     rec stop HCTZ and switch to a different diuretic called spironolactone - this removes salt water from the body but helps the body hold on to potassium.     Stop hctz now   Start at spironolactone 25mg daily now and will plan to increase based on fluid response     - please arrange bmp in 1 week to ensure potassium normalizing

## 2019-04-25 NOTE — TELEPHONE ENCOUNTER
Luan w/ pt and informed him of labs and RX change   Pt verbally understand and agree to come in next week to compete BMP lab

## 2019-04-26 ENCOUNTER — OFFICE VISIT (OUTPATIENT)
Dept: OTOLARYNGOLOGY | Facility: CLINIC | Age: 59
End: 2019-04-26
Payer: MEDICARE

## 2019-04-26 VITALS
WEIGHT: 262.31 LBS | BODY MASS INDEX: 36.72 KG/M2 | DIASTOLIC BLOOD PRESSURE: 89 MMHG | HEART RATE: 78 BPM | SYSTOLIC BLOOD PRESSURE: 134 MMHG | HEIGHT: 71 IN | TEMPERATURE: 98 F

## 2019-04-26 DIAGNOSIS — R13.10 DYSPHAGIA, UNSPECIFIED TYPE: ICD-10-CM

## 2019-04-26 DIAGNOSIS — J30.89 NON-SEASONAL ALLERGIC RHINITIS, UNSPECIFIED TRIGGER: ICD-10-CM

## 2019-04-26 DIAGNOSIS — K21.9 LARYNGOPHARYNGEAL REFLUX (LPR): Primary | ICD-10-CM

## 2019-04-26 DIAGNOSIS — J45.998 ASTHMA IN REMISSION: ICD-10-CM

## 2019-04-26 DIAGNOSIS — G47.33 OSA (OBSTRUCTIVE SLEEP APNEA): ICD-10-CM

## 2019-04-26 DIAGNOSIS — H10.13 ALLERGIC CONJUNCTIVITIS OF BOTH EYES: ICD-10-CM

## 2019-04-26 DIAGNOSIS — J34.2 NASAL SEPTAL DEVIATION: ICD-10-CM

## 2019-04-26 PROCEDURE — 3008F BODY MASS INDEX DOCD: CPT | Mod: CPTII,S$GLB,, | Performed by: SPECIALIST

## 2019-04-26 PROCEDURE — 3008F PR BODY MASS INDEX (BMI) DOCUMENTED: ICD-10-PCS | Mod: CPTII,S$GLB,, | Performed by: SPECIALIST

## 2019-04-26 PROCEDURE — 99214 PR OFFICE/OUTPT VISIT, EST, LEVL IV, 30-39 MIN: ICD-10-PCS | Mod: 25,S$GLB,, | Performed by: SPECIALIST

## 2019-04-26 PROCEDURE — 3079F PR MOST RECENT DIASTOLIC BLOOD PRESSURE 80-89 MM HG: ICD-10-PCS | Mod: CPTII,S$GLB,, | Performed by: SPECIALIST

## 2019-04-26 PROCEDURE — 99499 UNLISTED E&M SERVICE: CPT | Mod: S$GLB,,, | Performed by: SPECIALIST

## 2019-04-26 PROCEDURE — 31575 LARYNGOSCOPY: ICD-10-PCS | Mod: S$GLB,,, | Performed by: SPECIALIST

## 2019-04-26 PROCEDURE — 31575 DIAGNOSTIC LARYNGOSCOPY: CPT | Mod: S$GLB,,, | Performed by: SPECIALIST

## 2019-04-26 PROCEDURE — 3075F PR MOST RECENT SYSTOLIC BLOOD PRESS GE 130-139MM HG: ICD-10-PCS | Mod: CPTII,S$GLB,, | Performed by: SPECIALIST

## 2019-04-26 PROCEDURE — 3075F SYST BP GE 130 - 139MM HG: CPT | Mod: CPTII,S$GLB,, | Performed by: SPECIALIST

## 2019-04-26 PROCEDURE — 99499 RISK ADDL DX/OHS AUDIT: ICD-10-PCS | Mod: S$GLB,,, | Performed by: SPECIALIST

## 2019-04-26 PROCEDURE — 3079F DIAST BP 80-89 MM HG: CPT | Mod: CPTII,S$GLB,, | Performed by: SPECIALIST

## 2019-04-26 PROCEDURE — 99214 OFFICE O/P EST MOD 30 MIN: CPT | Mod: 25,S$GLB,, | Performed by: SPECIALIST

## 2019-04-26 RX ORDER — AZELASTINE HYDROCHLORIDE 0.5 MG/ML
1 SOLUTION/ DROPS OPHTHALMIC 2 TIMES DAILY
Qty: 4 ML | Refills: 11 | Status: SHIPPED | OUTPATIENT
Start: 2019-04-26 | End: 2019-09-04 | Stop reason: CLARIF

## 2019-04-26 RX ORDER — MONTELUKAST SODIUM 10 MG/1
TABLET ORAL
Refills: 8 | COMMUNITY
Start: 2019-03-09 | End: 2019-12-17 | Stop reason: SDUPTHER

## 2019-04-26 RX ORDER — PREDNISONE 10 MG/1
TABLET ORAL
Qty: 18 TABLET | Refills: 0 | Status: SHIPPED | OUTPATIENT
Start: 2019-04-26 | End: 2019-08-06 | Stop reason: ALTCHOICE

## 2019-04-26 RX ORDER — IPRATROPIUM BROMIDE 21 UG/1
2 SPRAY, METERED NASAL 2 TIMES DAILY
Qty: 30 ML | Refills: 11 | Status: SHIPPED | OUTPATIENT
Start: 2019-04-26 | End: 2020-09-11 | Stop reason: SDUPTHER

## 2019-04-26 NOTE — PROCEDURES
"Laryngoscopy  Date/Time: 4/26/2019 9:33 AM  Performed by: LAURENT Swanson MD  Authorized by: LAURENT Swanson MD     Time out: Immediately prior to procedure a "time out" was called to verify the correct patient, procedure, equipment, support staff and site/side marked as required.    Consent Done?:  Yes (Verbal)  Anesthesia:     Local anesthetic:  Lidocaine 2% and Irvin-Synephrine 1/2% (Topical aerosol)    Patient tolerance:  Patient tolerated the procedure well with no immediate complications    Decongestion performed?: Yes    Laryngoscopy:     Areas examined:  Nasal cavities, nasopharynx, oropharynx, hypopharynx, larynx and vocal cords    Laryngoscope size:  3 mm (Flexible video nasolaryngoscopy)  Nose External:      No external nasal deformity  Nose Intranasal:      Mucosa no polyps     Mucosa ulcers not present     No mucosa lesions present (Clear mucus in the nasal passages bilaterally)     Septum gross deformity ( septum deviated to the left anteriorly and low in the nose with counter been superiorly to the right side blocking the middle meatus high in the nose more posterior)     Enlarged turbinates ( inferior turbinates enlarged bilaterally)  Nasopharynx:      No mucosa lesions     Adenoids not present     Posterior choanae patent     Eustachian tube patent  Larynx/hypopharynx:      No epiglottis lesions     No epiglottis edema     No AE folds lesions     No vocal cord polyps     Equal and normal bilateral ( vocal cords move symmetrically, no mucosal lesions noted)     No hypopharynx lesions     No piriform sinus pooling     No piriform sinus lesions     Post cricoid edema (Moderate)     Post cricoid erythema ( moderate, worse than at last endoscopy)     Flexible video nasolaryngoscopy-septal buckling with low deviation to the left and high deviation to the right more posteriorly, nasal changes of allergic rhinitis and inferior turbinate hypertrophy; laryngeal findings of erythema and edema of the " interarytenoid and post cricoid area that has worsened since last endoscopy, vocal cords are normal

## 2019-04-26 NOTE — PROGRESS NOTES
Subjective:       Patient ID: Bri Santiago is a 58 y.o. male.    Chief Complaint: Follow-up (with cough)    The patient is coming in for a follow-up visit.  He had been doing very well with his laryngopharyngeal reflux until the last 3 weeks.  He has developed a tingling in his throat and feeling some swelling.  He has noticed a slight increase in phlegm, but nothing compared to what he experienced prior to starting the PPI.  He is also have some congestion postnasal drip and itchy eyes.  He is coughing but not wheezing.  Secretions are clear.  He continues to have the headboard his bed elevated and refrain from eating for 2-3 hours before going to bed.  He is using CPAP for sleep apnea.    He is using Astelin, Flonase and Singulair to control his allergy symptoms.  He uses Symbicort and Flovent inhalers and  the awfully routinely and albuterol inhaler as a rescue inhaler to control his asthma.  He is taking Nexium twice daily for his laryngopharyngeal reflux.    Review of Systems   Constitutional: Positive for fatigue. Negative for activity change, appetite change, chills, fever and unexpected weight change.   HENT: Positive for congestion, postnasal drip, rhinorrhea, sinus pressure, sore throat and trouble swallowing. Negative for ear discharge, ear pain, facial swelling, hearing loss, mouth sores, sinus pain, sneezing, tinnitus and voice change.    Eyes: Positive for discharge, redness and itching. Negative for photophobia, pain and visual disturbance.   Respiratory: Positive for cough. Negative for apnea, choking, shortness of breath and wheezing.    Cardiovascular: Negative for chest pain and palpitations.   Gastrointestinal: Negative for abdominal distention, abdominal pain, nausea and vomiting.   Musculoskeletal: Negative for arthralgias, myalgias, neck pain and neck stiffness.   Skin: Negative.  Negative for color change, pallor and rash.   Allergic/Immunologic: Positive for environmental allergies.  Negative for food allergies and immunocompromised state.   Neurological: Positive for headaches. Negative for dizziness, facial asymmetry, speech difficulty, weakness, light-headedness and numbness.   Hematological: Negative for adenopathy. Does not bruise/bleed easily.   Psychiatric/Behavioral: Negative for agitation, confusion, decreased concentration and sleep disturbance.       Objective:      Physical Exam   Constitutional: He is oriented to person, place, and time. He appears well-developed and well-nourished. He is cooperative.   HENT:   Head: Normocephalic.   Right Ear: External ear and ear canal normal. Tympanic membrane is retracted.   Left Ear: External ear and ear canal normal. Tympanic membrane is retracted.   Nose: Mucosal edema (cyanotic, boggy inferior turbinates bilaterally), rhinorrhea (clear mucus bilaterally) and septal deviation (To the left) present.   Mouth/Throat: Uvula is midline, oropharynx is clear and moist and mucous membranes are normal. No oral lesions. Abnormal dentition.   Oral cavity-Mitchell class 3, heart enlargement of base of tongue, severe hyperactive gag response pro habits adequate evaluation of palate and uvula   Eyes: Pupils are equal, round, and reactive to light. EOM and lids are normal. Right eye exhibits no discharge and no exudate. Left eye exhibits no discharge and no exudate. Right conjunctiva is injected. Left conjunctiva is injected.   Neck: Trachea normal and normal range of motion. No muscular tenderness present. No tracheal deviation present. No thyroid mass and no thyromegaly present.   Cardiovascular: Normal rate, regular rhythm, normal heart sounds and normal pulses.   Pulmonary/Chest: Effort normal and breath sounds normal. No stridor. He has no decreased breath sounds. He has no wheezes. He has no rhonchi. He has no rales.   Abdominal: Soft. Bowel sounds are normal. There is no tenderness.   Musculoskeletal: Normal range of motion.   Lymphadenopathy:         Head (right side): No submental, no submandibular, no preauricular, no posterior auricular and no occipital adenopathy present.        Head (left side): No submental, no submandibular, no preauricular, no posterior auricular and no occipital adenopathy present.     He has no cervical adenopathy.   Neurological: He is alert and oriented to person, place, and time. He has normal strength. No cranial nerve deficit or sensory deficit. Gait normal.   Skin: Skin is warm and dry. No petechiae and no rash noted. No cyanosis. Nails show no clubbing.   Psychiatric: He has a normal mood and affect. His speech is normal and behavior is normal. Judgment and thought content normal. Cognition and memory are normal.       Flexible nasolaryngoscopy-septum deviated anteriorly to the left low in the nose and more posteriorly high to the right, Cassidy turbinates with clear mucus bilaterally, inferior turbinate hypertrophy bilaterally; laryngeal findings show edema and erythema of the interarytenoid mucosa consistent with laryngopharyngeal reflux, worse than at last endoscopy     Assessment:       1. Laryngopharyngeal reflux (LPR)    2. Dysphagia, unspecified type    3. Non-seasonal allergic rhinitis, unspecified trigger    4. ABDON (obstructive sleep apnea)    5. Nasal septal deviation    6. Asthma in remission    7. Allergic conjunctivitis of both eyes        Plan:       I will place the patient on a prednisone taper and having prescribing ipratropium bromide to control postnasal drip in [employer] for his allergic conjunctivitis.  I will recheck him in 6 weeks.

## 2019-05-02 ENCOUNTER — TELEPHONE (OUTPATIENT)
Dept: PAIN MEDICINE | Facility: CLINIC | Age: 59
End: 2019-05-02

## 2019-05-02 NOTE — TELEPHONE ENCOUNTER
----- Message from Fabby Silvestre sent at 5/2/2019  2:30 PM CDT -----  Contact: Pt    Name of Who is Calling:PURNIMA IYER [[id number]]    What is the request in detail: Patient would like a call back regarding a release form for the blood thinner,please fax information to Dr. Magana  [phone]   Please contact to further discuss and advise    Can the clinic reply by [facility]:  No    [facility]: [phone]

## 2019-05-03 ENCOUNTER — TELEPHONE (OUTPATIENT)
Dept: INTERNAL MEDICINE | Facility: CLINIC | Age: 59
End: 2019-05-03

## 2019-05-03 ENCOUNTER — OFFICE VISIT (OUTPATIENT)
Dept: UROLOGY | Facility: CLINIC | Age: 59
End: 2019-05-03
Payer: MEDICARE

## 2019-05-03 ENCOUNTER — LAB VISIT (OUTPATIENT)
Dept: LAB | Facility: OTHER | Age: 59
End: 2019-05-03
Attending: INTERNAL MEDICINE
Payer: MEDICARE

## 2019-05-03 VITALS
BODY MASS INDEX: 37.71 KG/M2 | DIASTOLIC BLOOD PRESSURE: 76 MMHG | HEART RATE: 77 BPM | SYSTOLIC BLOOD PRESSURE: 122 MMHG | WEIGHT: 269.38 LBS | HEIGHT: 71 IN

## 2019-05-03 DIAGNOSIS — E87.6 HYPOKALEMIA: ICD-10-CM

## 2019-05-03 DIAGNOSIS — N40.1 BPH WITH URINARY OBSTRUCTION: ICD-10-CM

## 2019-05-03 DIAGNOSIS — N52.01 ERECTILE DYSFUNCTION DUE TO ARTERIAL INSUFFICIENCY: Primary | ICD-10-CM

## 2019-05-03 DIAGNOSIS — N40.1 BENIGN PROSTATIC HYPERPLASIA WITH LOWER URINARY TRACT SYMPTOMS, SYMPTOM DETAILS UNSPECIFIED: ICD-10-CM

## 2019-05-03 DIAGNOSIS — R35.1 NOCTURIA: ICD-10-CM

## 2019-05-03 DIAGNOSIS — N45.1 EPIDIDYMITIS, RIGHT: ICD-10-CM

## 2019-05-03 DIAGNOSIS — N13.8 BPH WITH URINARY OBSTRUCTION: ICD-10-CM

## 2019-05-03 DIAGNOSIS — Z12.5 SCREENING FOR PROSTATE CANCER: ICD-10-CM

## 2019-05-03 DIAGNOSIS — Z80.42 FAMILY HISTORY OF PROSTATE CANCER IN FATHER: ICD-10-CM

## 2019-05-03 DIAGNOSIS — N50.89 SCROTAL SWELLING: ICD-10-CM

## 2019-05-03 LAB
ANION GAP SERPL CALC-SCNC: 7 MMOL/L (ref 8–16)
BILIRUB SERPL-MCNC: NORMAL MG/DL
BILIRUB UR QL STRIP: NEGATIVE
BLOOD URINE, POC: NORMAL
BUN SERPL-MCNC: 18 MG/DL (ref 6–20)
CALCIUM SERPL-MCNC: 8.8 MG/DL (ref 8.7–10.5)
CHLORIDE SERPL-SCNC: 104 MMOL/L (ref 95–110)
CLARITY UR REFRACT.AUTO: CLEAR
CO2 SERPL-SCNC: 28 MMOL/L (ref 23–29)
COLOR UR AUTO: YELLOW
COLOR, POC UA: YELLOW
CREAT SERPL-MCNC: 1 MG/DL (ref 0.5–1.4)
EST. GFR  (AFRICAN AMERICAN): >60 ML/MIN/1.73 M^2
EST. GFR  (NON AFRICAN AMERICAN): >60 ML/MIN/1.73 M^2
GLUCOSE SERPL-MCNC: 74 MG/DL (ref 70–110)
GLUCOSE UR QL STRIP: NEGATIVE
GLUCOSE UR QL STRIP: NORMAL
HGB UR QL STRIP: ABNORMAL
KETONES UR QL STRIP: NEGATIVE
KETONES UR QL STRIP: NORMAL
LEUKOCYTE ESTERASE UR QL STRIP: NEGATIVE
LEUKOCYTE ESTERASE URINE, POC: NORMAL
MICROSCOPIC COMMENT: NORMAL
NITRITE UR QL STRIP: NEGATIVE
NITRITE, POC UA: NORMAL
PH UR STRIP: 5 [PH] (ref 5–8)
PH, POC UA: 5
POC RESIDUAL URINE VOLUME: 22 ML (ref 0–100)
POTASSIUM SERPL-SCNC: 3.6 MMOL/L (ref 3.5–5.1)
PROT UR QL STRIP: NEGATIVE
PROTEIN, POC: NORMAL
RBC #/AREA URNS AUTO: 3 /HPF (ref 0–4)
SODIUM SERPL-SCNC: 139 MMOL/L (ref 136–145)
SP GR UR STRIP: 1.03 (ref 1–1.03)
SPECIFIC GRAVITY, POC UA: 1.01
SQUAMOUS #/AREA URNS AUTO: 0 /HPF
URN SPEC COLLECT METH UR: ABNORMAL
UROBILINOGEN, POC UA: NORMAL
WBC #/AREA URNS AUTO: 0 /HPF (ref 0–5)

## 2019-05-03 PROCEDURE — 99215 PR OFFICE/OUTPT VISIT, EST, LEVL V, 40-54 MIN: ICD-10-PCS | Mod: 25,S$GLB,, | Performed by: NURSE PRACTITIONER

## 2019-05-03 PROCEDURE — 36415 COLL VENOUS BLD VENIPUNCTURE: CPT

## 2019-05-03 PROCEDURE — 81002 PR URINALYSIS NONAUTO W/O SCOPE: ICD-10-PCS | Mod: S$GLB,,, | Performed by: NURSE PRACTITIONER

## 2019-05-03 PROCEDURE — 87086 URINE CULTURE/COLONY COUNT: CPT

## 2019-05-03 PROCEDURE — 99999 PR PBB SHADOW E&M-EST. PATIENT-LVL IV: ICD-10-PCS | Mod: PBBFAC,,, | Performed by: NURSE PRACTITIONER

## 2019-05-03 PROCEDURE — 3008F PR BODY MASS INDEX (BMI) DOCUMENTED: ICD-10-PCS | Mod: CPTII,S$GLB,, | Performed by: NURSE PRACTITIONER

## 2019-05-03 PROCEDURE — 99215 OFFICE O/P EST HI 40 MIN: CPT | Mod: 25,S$GLB,, | Performed by: NURSE PRACTITIONER

## 2019-05-03 PROCEDURE — 51798 PR MEAS,POST-VOID RES,US,NON-IMAGING: ICD-10-PCS | Mod: S$GLB,,, | Performed by: NURSE PRACTITIONER

## 2019-05-03 PROCEDURE — 3078F PR MOST RECENT DIASTOLIC BLOOD PRESSURE < 80 MM HG: ICD-10-PCS | Mod: CPTII,S$GLB,, | Performed by: NURSE PRACTITIONER

## 2019-05-03 PROCEDURE — 80048 BASIC METABOLIC PNL TOTAL CA: CPT

## 2019-05-03 PROCEDURE — 3078F DIAST BP <80 MM HG: CPT | Mod: CPTII,S$GLB,, | Performed by: NURSE PRACTITIONER

## 2019-05-03 PROCEDURE — 99999 PR PBB SHADOW E&M-EST. PATIENT-LVL IV: CPT | Mod: PBBFAC,,, | Performed by: NURSE PRACTITIONER

## 2019-05-03 PROCEDURE — 3074F PR MOST RECENT SYSTOLIC BLOOD PRESSURE < 130 MM HG: ICD-10-PCS | Mod: CPTII,S$GLB,, | Performed by: NURSE PRACTITIONER

## 2019-05-03 PROCEDURE — 51798 US URINE CAPACITY MEASURE: CPT | Mod: S$GLB,,, | Performed by: NURSE PRACTITIONER

## 2019-05-03 PROCEDURE — 3008F BODY MASS INDEX DOCD: CPT | Mod: CPTII,S$GLB,, | Performed by: NURSE PRACTITIONER

## 2019-05-03 PROCEDURE — 81001 URINALYSIS AUTO W/SCOPE: CPT

## 2019-05-03 PROCEDURE — 81002 URINALYSIS NONAUTO W/O SCOPE: CPT | Mod: S$GLB,,, | Performed by: NURSE PRACTITIONER

## 2019-05-03 PROCEDURE — 3074F SYST BP LT 130 MM HG: CPT | Mod: CPTII,S$GLB,, | Performed by: NURSE PRACTITIONER

## 2019-05-03 RX ORDER — DOXYCYCLINE HYCLATE 100 MG
100 TABLET ORAL 2 TIMES DAILY
Qty: 20 TABLET | Refills: 0 | Status: SHIPPED | OUTPATIENT
Start: 2019-05-03 | End: 2019-05-13

## 2019-05-03 NOTE — PROGRESS NOTES
"CHIEF COMPLAINT:    Mr. Santiago is a 58 y.o. male presenting for swelling of R scrotum.    PRESENTING ILLNESS:    Bri Santiago is a 58 y.o. male new patient to me (records of past medical, family and social history personally reviewed by me) w/  h/o HTN, ABDON, BPH, prostatitis, ED.    Last seen in clinic 18 w/ Dr. Reid for ED.    Today pt returns to clinic reporting acute swelling of R scrotum. 5/10 pain, yesterday was 10/10, much improved today. Denies dysuria, GH, irritative/obstructive urinary sxs. Nocturia 4-5x/night. Denies h/o DM, however he has h/o ABDON, however has not been using CPAP d/t wait for renewal of equipment. Only 1/4 cup of water prior to bedtime for meds. Denies f/c, n/v, abd/pelvic/flank pain, h/o kidney stones. Reports h/o prostate CA, dx'd in  by [facility], reports was tx'd w/ "chemo." Positive family h/o prostate CA in father (dx'd early 60s - s/p prostectomy), uncle ( from prostate CA), and paternal GF ( from prostate CA).    UA dip in clinic today w/ trace of blood.    PVR in clinic today: 22mL.    REVIEW OF SYSTEMS:    Bri Santiago denies headache, blurred vision, fever, nausea, vomiting, chills, abdominal pain, bleeding per rectum, cough, SOB, recent loss of consciousness, recent mental status changes, seizures, dizziness, or upper or lower extremity weakness.    PATIENT HISTORY:    Past Medical History:   Diagnosis Date    Acute pancreatitis     Anal fissure     Anemia     Arthritis     Asthma in remission     Back pain     BPH (benign prostatic hypertrophy)     Cancer     prostate- treated at [facility] with chemo- in remission since     Clotting disorder     Diastolic dysfunction with chronic heart failure 12/3/2018    Dysphagia 10/7/2014    Family history of colon cancer     Family history of early CAD     GERD (gastroesophageal reflux disease)     Helicobacter pylori (H. pylori) infection     Chronic    History of chronic pancreatitis  "    HTN (hypertension)     Lumbago 11/12/2012    Obesity     ABDON (obstructive sleep apnea)     Pneumonia     during childhood     Prostate cancer 2000    dx and treated at [facility], had chemotherapy, in remission [id number]    Sacroiliac joint pain 2/10/2015    Spinal stenosis of lumbar region     Trouble in sleeping     Vitamin D deficiency disease     VWD (acquired von Willebrand's disease)        Past Surgical History:   Procedure Laterality Date    24 HOUR PH WITH IMPEDANCE N/A 7/23/2015    Performed [facility] at [facility] ([facility]    anal fissure repair      x2    BACK SURGERY      BLOCK, NERVE, FACET JOINT, LUMBAR, MEDIAL BRANCH Bilateral 2/12/2014    [facility] by [facility] at [facility]    BLOCK-NERVE-MEDIAL BRANCH-LUMBAR Bilateral 7/8/2015    [facility] at [facility]    CARPAL TUNNEL RELEASE  2003    left hand    CATHETERIZATION, HEART, BOTH LEFT AND RIGHT N/A 2/14/2019    Performed by [facility] at [facility] CATH LAB    CERVICAL DISCECTOMY  2003    COLONOSCOPY N/A 6/19/2017    Performed by Rosendo Boyer [facility] at [facility] ENDO (4TH FLR)    COLONOSCOPY N/A 10/7/2015    Performed by [facility] at [facility] (4TH FLR)    COLONOSCOPY N/A 10/3/2013    Performed by Gene Luis MD at [facility] (4TH FLR)    ESOPHAGOGASTRODUODENOSCOPY (EGD) N/A 6/19/2017    Performed by [facility] at [facility] ENDO (4TH FLR)    ESOPHAGOGASTRODUODENOSCOPY (EGD) N/A 10/8/2014    Performed by Rosendo Boyer [facility] at [facility] ENDO (4TH FLR)    INJECTION-JOINT Bilateral 2/25/2015    [facility] by [facility] at [facility]    Left heart cath Left 2/14/2019    Performed by [facility] at [facility] CATH LAB    LUMBAR FUSION  2012    MANOMETRY-ESOPHAGEAL-HIGH RESOLUTION N/A 7/23/2015    Performed by Chris Kent [facility] at [facility] (4TH FLR)    RADIOFREQUENCY THERMOCOAGULATION (RFTC)-NERVE-MEDIAN BRANCH-LUMBAR Left 5/24/2018     [facility]    RADIOFREQUENCY THERMOCOAGULATION (RFTC)-NERVE-MEDIAN BRANCH-LUMBAR Right 5/10/2018    [facility]    RADIOFREQUENCY THERMOCOAGULATION (RFTC)-NERVE-MEDIAN BRANCH-LUMBAR Left 5/16/2017    [facility]    RADIOFREQUENCY THERMOCOAGULATION (RFTC)-NERVE-MEDIAN BRANCH-LUMBAR Right 5/2/2017    [facility]    RADIOFREQUENCY THERMOCOAGULATION (RFTC)-NERVE-MEDIAN BRANCH-LUMBAR Left 9/8/2015    [facility]    RADIOFREQUENCY THERMOCOAGULATION (RFTC)-NERVE-MEDIAN BRANCH-LUMBAR Right 8/19/2015    [facility]    REMOVAL-SPINAL NEUROSTIMULATOR N/A 11/29/2012    [facility] OR [address] [place] [address]    SPINE SURGERY      TONSILLECTOMY      at age 24    VASECTOMY  1996       Family History   Problem Relation Age of Onset    Colon cancer Father 67        colon cancer    Hypertension Father     Glaucoma Father     Colon cancer Paternal Grandfather 65             Coronary artery disease Mother 45    Hypertension Mother     Heart disease Mother     No Known Problems Brother     No Known Problems Sister     No Known Problems Daughter     No Known Problems Son     Coronary artery disease Brother 51    No Known Problems Daughter     No Known Problems Daughter     No Known Problems Son     No Known Problems Son     Colon cancer Paternal Uncle 65    Diabetes Mellitus Paternal Grandmother        Social History     Socioeconomic History    Marital status:      Spouse name: Not on file    Number of children: Not on file    Years of education: Not on file    Highest education level: Not on file   Occupational History    Occupation: disabled due to back injury   Social Needs    Financial resource strain: Not on file    Food insecurity:      Worry: Not on file     Inability: Not on file    Transportation needs:     Medical: Not on file     Non-medical: Not on file   Tobacco Use    Smoking status: Never Smoker    Smokeless tobacco: Never Used   Substance and Sexual Activity    Alcohol use: Yes     Alcohol/week: 0.6 oz     Types: 1 Glasses of wine per week     Comment: social    Drug use: No    Sexual activity: Not Currently     Partners: Female   Lifestyle    Physical activity:     Days per week: Not on file     Minutes per session: Not on file    Stress: Not on file   Relationships    Social connections:     Talks on phone: Not on file     Gets together: Not on file     Attends Tenriism service: Not on file     Active member of club or organization: Not on file     Attends meetings of clubs or organizations: Not on file     Relationship status: Not on file   Other Topics Concern    Not on file   Social History Narrative    wlking for exercised       Allergies:  Ace inhibitors; Aspirin; Codeine; Dilaudid  [hydromorphone]; and Penicillins    Medications:    Current Outpatient Medications:     acetaminophen (TYLENOL) 500 MG tablet, Take 2 tablets (1,000 mg total) by mouth every 8 (eight) hours as needed., Disp: 90 tablet, Rfl: 0    albuterol (VENTOLIN HFA) 90 mcg/actuation inhaler, Inhale 2 puffs into the lungs every 6 (six) hours as needed for Wheezing. Rescue, Disp: 18 g, Rfl: 4    atorvastatin (LIPITOR) 40 MG tablet, Take 1 tablet (40 mg total) by mouth once daily., Disp: 90 tablet, Rfl: 3    azelastine (ASTELIN) 137 mcg (0.1 %) nasal spray, 1 spray (137 mcg total) by Nasal route 2 (two) times daily., Disp: 30 mL, Rfl: 11    azelastine (OPTIVAR) 0.05 % ophthalmic solution, Place 1 drop into both eyes 2 (two) times daily., Disp: 4 mL, Rfl: 11    budesonide-formoterol 160-4.5 mcg (SYMBICORT) 160-4.5 mcg/actuation HFAA, Inhale 2 puffs into the lungs every 12 (twelve) hours., Disp: 10.2 g, Rfl: 12    calcium citrate-vitamin D3 315-200 mg  (CITRACAL+D) 315-200 mg-unit per tablet, Take 1 tablet by mouth once daily., Disp: , Rfl:     clopidogrel (PLAVIX) 75 mg tablet, Take 1 tablet (75 mg total) by mouth once daily., Disp: 30 tablet, Rfl: 11    cyanocobalamin, vitamin B-12, (VITAMIN B-12) 50 mcg tablet, Take 50 mcg by mouth once daily., Disp: , Rfl:     docusate sodium (COLACE) 100 MG capsule, Take 1 tablet by mouth Twice daily. 1 Capsule Oral Twice a day .  Take with pain medicine, Disp: , Rfl:     doxazosin (CARDURA) 1 MG tablet, Take 1 tablet (1 mg total) by mouth every evening., Disp: 30 tablet, Rfl: 11    doxycycline (VIBRA-TABS) 100 MG tablet, Take 1 tablet (100 mg total) by mouth 2 (two) times daily. for 10 days, Disp: 20 tablet, Rfl: 0    esomeprazole (NEXIUM) 40 MG capsule, Take 1 capsule (40 mg total) by mouth 2 (two) times daily before meals., Disp: 60 capsule, Rfl: 11    flu vac [id number] 36mos up,PF, 60 mcg (15 mcg x 4)/0.5 mL Syrg, as directed, Disp: 0.5 mL, Rfl: 0    fluticasone (FLONASE) 50 mcg/actuation nasal spray, 1 spray (50 mcg total) by Each Nare route once daily., Disp: 16 g, Rfl: 11    fluticasone (FLOVENT HFA) 110 mcg/actuation inhaler, Inhale 1 puff into the lungs 2 (two) times daily. Controller, Disp: 12 g, Rfl: 0    ipratropium (ATROVENT) 0.03 % nasal spray, 2 sprays by Nasal route 2 (two) times daily. May be use more often if needed, Disp: 30 mL, Rfl: 11    montelukast (SINGULAIR) 10 mg tablet, , Disp: , Rfl: 8    predniSONE (DELTASONE) 10 MG tablet, Three tablets for 3 days, then 2 tablets for 3 days, then 1 tablet for 3 days.  Best if taken as single dose early in the morning with food, Disp: 18 tablet, Rfl: 0    spironolactone (ALDACTONE) 25 MG tablet, Take 1 tablet (25 mg total) by mouth once daily., Disp: 30 tablet, Rfl: 2    traMADol (ULTRAM) 50 mg tablet, Take 1 tablet (50 mg total) by mouth every 8 (eight) hours as needed for Pain., Disp: 90 tablet, Rfl: 2    VITAMIN D2 50,000 unit capsule, TK 1 C PO Q  7 DAYS, Disp: , Rfl: 1    zolpidem (AMBIEN) 10 mg Tab, TAKE 1 TABLET BY MOUTH EVERY DAY AT BEDTIME, Disp: 20 tablet, Rfl: 0    PHYSICAL EXAMINATION:    The patient generally appears in good health, is appropriately interactive, and is in no apparent distress.     Eyes: anicteric sclerae, moist conjunctivae; no lid-lag; PERRLA     HENT: Atraumatic; oropharynx clear with moist mucous membranes and no mucosal ulcerations;normal hard and soft palate.  No evidence of lymphadenopathy.    Neck: Trachea midline.  No thyromegaly.    Musculoskeletal: No abnormal gait.    Skin: No lesions.    Mental: Cooperative with normal affect.  Is oriented to time, place, and person.    Neuro: Grossly intact.    Chest: Normal inspiratory effort.   No accessory muscles.  No audible wheezes.  Respirations symmetric on inspiration and expiration.    Heart: Regular rhythm.      Abdomen:  Soft, non-tender. No masses or organomegaly. Bladder is not palpable. No evidence of flank discomfort. No evidence of inguinal hernia.    Genitourinary: The penis is circumcised with no evidence of plaques or induration. The urethral meatus is normal. The testes, epididymides, and cord structures are normal in size and contour bilaterally. The scrotum is normal in size and contour.    Normal anal sphincter tone. No rectal mass.    The prostate is enlarged. Normal landmarks. Lateral sulci. Median furrow intact.  No nodularity or induration. Seminal vesicles are normal.    Extremities: No clubbing, cyanosis, or edema      LABS:    Lab Results   Component Value Date    CREATININE 1.0 05/03/2019       Lab Results   Component Value Date    PSA 0.96 02/06/2013    PSA 1.11 10/17/2012    PSA 1.21 01/31/2012    PSADIAG 0.98 05/03/2019    PSADIAG 1.5 10/16/2017    PSADIAG 1.4 10/20/2016       IMPRESSION:    Encounter Diagnoses   Name Primary?    Erectile dysfunction due to arterial insufficiency Yes    BPH with urinary obstruction     Screening for prostate cancer      Epididymitis, right     Scrotal swelling     Benign prostatic hyperplasia with lower urinary tract symptoms, symptom details unspecified      Family history of prostate cancer in father     Nocturia        PLAN:    I spent 40 minutes with the patient of which more than half was spent in direct consultation with the patient in regards to our treatment and plan.    Discussed and reviewed epididymitis, scrotal anatomy, and possible etiologies including but not limited inflammation and/or infection. Discussed and recommend starting doxycyline as rx'd, reviewed proper take, and possible adv effects. Discussed  may start ibuprofen 400-600mg by mouth three times per day with food for 5-10 days, discussed potential adverse effects. Discussed supportive treatment with scrotal support, appropriate rest, and use of topical ice packs. Advised to report to local ED for fevers/chills, acute worsening scrotal/testicular pain.    Will send urine for evaluation.    Discussed and recommend limiting/avoiding fluids, especially caffeine and alcohol 2-4 hrs prior to bedtime to reduce episodes of nocturia. Recommend following-up w/ sleep medicine re ABDON.    Will obtain updated PSA today.    Patient verbalized and expressed understanding, and agree w/ plan.

## 2019-05-03 NOTE — TELEPHONE ENCOUNTER
Spoke with pt and gave him the recommendations :  Please notify pt that potassium is now improved - if he is still has edema of extremities then can inc dose from 25 to 50mg daily - please let pcp know rechedule appt to 5/22/19 with Dr. Lopez. He has a procedure on 5/23/19

## 2019-05-03 NOTE — TELEPHONE ENCOUNTER
Please notify pt that potassium is now improved - if he is still has edema of extremities then can inc dose from 25 to 50mg daily - please let pcp know

## 2019-05-03 NOTE — PATIENT INSTRUCTIONS
Treating Epididymitis and Orchitis    You have inflammation of the epididymis (epididymitis) and testicle (orchitis). This is likely due to an infection. In many cases, epididymitis and orchitis occur along with urinary tract infections. Treatment includes medicine to get rid of the infection. It also includes medicine and other methods to relieve symptoms.  Possible treatments  · Antibiotics. Acute epididymitis is most often treated with oral antibiotics. You may also be given an injection of antibiotics. Be sure to take all of your medicine until it is gone, even if you feel better.  · Anti-inflammatories. You may be prescribed medicine to reduce swelling and tenderness.  · Rest. You will most likely need to rest for 3 to 4 days until swelling and fever are gone. When you are able, lie down with a towel folded under the scrotum to raise it slightly. This can help relieve discomfort.  · Scrotal support. If your testicles are swollen, wear an athletic supporter (jockstrap) or spandex shorts. This may help control swelling and ease symptoms.  · Ice and heat. To relieve swelling, use an ice pack wrapped in a thin towel on the scrotum. Once swelling is gone, sit in a warm bath to increase blood flow to the area.  After treatment  The inflammation will go away with treatment. But you may have an achy feeling in the testicles for 2 to 4 weeks. This does not mean the infection has come back. The testicles just take time to heal. But if you feel a lump in a testicle after treatment, see your doctor. Once the inflammation is gone, you can be active again.  If you are sexually active, your partner(s) need to see a healthcare provider as well. This is because sex can sometimes spread the infection that causes this condition.   Date Last Reviewed: 1/1/2017  © 9433-0732 [facility]. [address] [place] [address], [place] [address]. All rights reserved. This information is not intended as a substitute for professional  medical care. Always follow your healthcare professional's instructions.        What are Epididymitis and Orchitis?    The epididymis is a coiled tube. It is part of the male reproductive system. A mans two testicles sit inside the scrotum. One epididymis sits behind each testicle. Epididymitis is inflammation (swelling and irritation) of this tube. Orchitis occurs when the inflammation spreads to the testicle.  Normal flow of sperm and urine  Sperm are made in the testicles. Sperm travel from the testicles through the epididymis. They flow into a tube called the vas deferens. During ejaculation, sperm pass from the vas deferens through the urethra out of the body. During urination, urine flows from the bladder through the urethra out of the body.  How the problem starts  The problem is thought to be due to bacteria. If bacteria get into the urethra, they may cause an infection. Infection can then travel up the urethra into the epididymis. This leads to inflammation. Pain and swelling can then spread to the testicle. This is called orchitis. In rare cases, orchitis can be due to an infection with the mumps virus. There are two kinds of inflammation:  · Acute inflammation comes on quickly. Symptoms may include:  ¨ Pain and swelling in the scrotum  ¨ Frequent urge to urinate  ¨ Discharge from the penis  ¨ Pain during ejaculation  ¨ Fever  · Chronic inflammation is most often the late phase of an acute infection. Symptoms may include:  ¨ An ache or dull pain in the scrotum, which may spread to the groin  ¨ A heavy feeling in the scrotum  Date Last Reviewed: 1/1/2017  © 3218-0310 [employer]. [address] [place] [address], [place] [address]. All rights reserved. This information is not intended as a substitute for professional medical care. Always follow your healthcare professional's instructions.

## 2019-05-04 LAB — BACTERIA UR CULT: NO GROWTH

## 2019-05-07 ENCOUNTER — TELEPHONE (OUTPATIENT)
Dept: UROLOGY | Facility: CLINIC | Age: 59
End: 2019-05-07

## 2019-05-07 DIAGNOSIS — R31.29 OTHER MICROSCOPIC HEMATURIA: Primary | ICD-10-CM

## 2019-05-07 NOTE — TELEPHONE ENCOUNTER
Discussed and reviewed recent (5/3/19) UA micro (RBC 3). Negative urine cx.  PSA 0.98.    Discussed pt had unremarkable [place] 1 yr ago (5/14/18), which was unremarkable.    Discussed and reviewed hematuria, potential etiologies for blood found in the urine, including but not limited kidney stones, inflammation, neoplasm. Discussed and recommend hematuria evaluation with cystoscopy, and CTU. Reviewed procedures.    Pt verbalized understanding and agree w/ plan.

## 2019-05-09 ENCOUNTER — HOSPITAL ENCOUNTER (OUTPATIENT)
Facility: OTHER | Age: 59
Discharge: HOME | End: 2019-05-09
Attending: ANESTHESIOLOGY | Admitting: ANESTHESIOLOGY
Payer: MEDICARE

## 2019-05-09 VITALS
SYSTOLIC BLOOD PRESSURE: 134 MMHG | HEIGHT: 71 IN | DIASTOLIC BLOOD PRESSURE: 64 MMHG | WEIGHT: 260 LBS | OXYGEN SATURATION: 95 % | RESPIRATION RATE: 14 BRPM | BODY MASS INDEX: 36.4 KG/M2 | HEART RATE: 83 BPM | TEMPERATURE: 98 F

## 2019-05-09 DIAGNOSIS — M47.819 SPONDYLOSIS WITHOUT MYELOPATHY: Primary | ICD-10-CM

## 2019-05-09 DIAGNOSIS — M47.899 FACET SYNDROME: ICD-10-CM

## 2019-05-09 DIAGNOSIS — M96.1 POSTLAMINECTOMY SYNDROME OF LUMBAR REGION: ICD-10-CM

## 2019-05-09 PROCEDURE — 63600175 PHARM REV CODE 636 W HCPCS: Mod: JG | Performed by: ANESTHESIOLOGY

## 2019-05-09 PROCEDURE — 64635 PR DESTROY LUMB/SAC FACET JNT: ICD-10-PCS | Mod: RT,,, | Performed by: ANESTHESIOLOGY

## 2019-05-09 PROCEDURE — 99152 PR MOD CONSCIOUS SEDATION, SAME PHYS, 5+ YRS, FIRST 15 MIN: ICD-10-PCS | Mod: ,,, | Performed by: ANESTHESIOLOGY

## 2019-05-09 PROCEDURE — 64635 DESTROY LUMB/SAC FACET JNT: CPT | Mod: RT,,, | Performed by: ANESTHESIOLOGY

## 2019-05-09 PROCEDURE — 64636 DESTROY L/S FACET JNT ADDL: CPT | Performed by: ANESTHESIOLOGY

## 2019-05-09 PROCEDURE — 99152 MOD SED SAME PHYS/QHP 5/>YRS: CPT | Mod: ,,, | Performed by: ANESTHESIOLOGY

## 2019-05-09 PROCEDURE — 64636 PR DESTROY L/S FACET JNT ADDL: ICD-10-PCS | Mod: RT,,, | Performed by: ANESTHESIOLOGY

## 2019-05-09 PROCEDURE — 64635 DESTROY LUMB/SAC FACET JNT: CPT | Performed by: ANESTHESIOLOGY

## 2019-05-09 PROCEDURE — 25000003 PHARM REV CODE 250: Performed by: ANESTHESIOLOGY

## 2019-05-09 PROCEDURE — 64636 DESTROY L/S FACET JNT ADDL: CPT | Mod: RT,,, | Performed by: ANESTHESIOLOGY

## 2019-05-09 RX ORDER — FENTANYL CITRATE 50 UG/ML
INJECTION, SOLUTION INTRAMUSCULAR; INTRAVENOUS
Status: DISCONTINUED | OUTPATIENT
Start: 2019-05-09 | End: 2019-05-09 | Stop reason: HOSPADM

## 2019-05-09 RX ORDER — MIDAZOLAM HYDROCHLORIDE 1 MG/ML
INJECTION INTRAMUSCULAR; INTRAVENOUS
Status: DISCONTINUED | OUTPATIENT
Start: 2019-05-09 | End: 2019-05-09 | Stop reason: HOSPADM

## 2019-05-09 RX ORDER — LIDOCAINE HYDROCHLORIDE 10 MG/ML
INJECTION INFILTRATION; PERINEURAL
Status: DISCONTINUED | OUTPATIENT
Start: 2019-05-09 | End: 2019-05-09 | Stop reason: HOSPADM

## 2019-05-09 RX ORDER — BUPIVACAINE HYDROCHLORIDE 2.5 MG/ML
INJECTION, SOLUTION EPIDURAL; INFILTRATION; INTRACAUDAL
Status: DISCONTINUED | OUTPATIENT
Start: 2019-05-09 | End: 2019-05-09 | Stop reason: HOSPADM

## 2019-05-09 RX ORDER — SODIUM CHLORIDE 9 MG/ML
INJECTION, SOLUTION INTRAVENOUS CONTINUOUS
Status: DISCONTINUED | OUTPATIENT
Start: 2019-05-09 | End: 2019-05-09 | Stop reason: HOSPADM

## 2019-05-09 RX ADMIN — DESMOPRESSIN ACETATE 36.66 MCG: 4 SOLUTION INTRAVENOUS at 08:05

## 2019-05-09 RX ADMIN — SODIUM CHLORIDE: 0.9 INJECTION, SOLUTION INTRAVENOUS at 08:05

## 2019-05-09 NOTE — OP NOTE
Lumbar Medial nerve branch block radiofrequency ablation Under Fluoroscopy     Time-out taken to identify patient and procedure side prior to starting the procedure.     05/09/2019    PROCEDURE: Right radiofrequency ablation of the the medial branch nerves at the   transverse process of  L4, L5 and sacral ala    2)Conscious sedation provided by MD     REASON FOR PROCEDURE: Lumbar spondylosis [M47.816]     PHYSICIAN: Fredy Magana MD     ASSISTANTS: None     MEDICATIONS INJECTED: 0.25% Bupivicaine total 6mL     LOCAL ANESTHETIC USED: Xylocaine 1% 1mL / site     ESTIMATED BLOOD LOSS: None.     COMPLICATIONS: None.     Interval history: Patient reports that he had complete relief of pain for the day of the procedure, we will proceed with the RFA     TECHNIQUE: Laying in a prone position, the patient was prepped and draped in the usual sterile fashion using ChloraPrep and fenestrated drape. The level was determined under fluoroscopic guidance. Local anesthetic was given by going down to the hub of the 27-gauge 1.25in needle and raising a wheel. A 18-gauge 10mm curved active tip needle was introduced to the anatomic local of the medial branch at each of the above levels using fluoroscopy. Then sensory and motor testing was performed to confirm that the needle tips were in the correct location. Then after negative aspiration, 1 mL of 0.25% bupivacaine was injected into each level. This was followed by thermal lesioning at 80 degrees celsius for 90 seconds.     Conscious sedation provided by M.D   The patient was monitored with continuous pulse oximetry, EKG, and intermittent blood pressure monitors. The patient was hemodynamically stable throughout the entire process was responsive to voice, and breathing spontaneously. Supplemental O2 was provided at 2L/min via nasal cannula. Patient was comfortable for the duration of the procedure. (See nurse documentation and case log for sedation time)    There was a total of 2mg  IV Midazolam and 75 mcg Fentanyl titrated for the procedure    The patient tolerated the procedure well. Was able to move their leg at the knee and ankle at the conclusion of the procedure    The patient was monitored after the procedure. Patient was given post procedure and discharge instructions to follow at home. We will see the patient back in two weeks or the patient may call to inform of status. The patient was discharged in a stable condition

## 2019-05-09 NOTE — H&P
"HPI  Patient presenting for  Procedure(s) (LRB):  RADIOFREQUENCY ABLATION, RIGHT L3,L4,L5 (Right)    Patient in the interval has been started on plavix but has stopped for 7 days.  Heart cath from 2/14/2019 revealed non-obstructive CAD.      Additionally patient has hx of vWF deficiency and has received ddavp infusions for these procedures which have helped substantially in the past.    No health changes since previous encounter    PMHx, PSHx, Allergies, Medications reviewed in epic    ROS negative except pain complaints in HPI    OBJECTIVE:    BP (!) 144/83 (BP Location: Right arm, Patient Position: Lying)   Pulse 79   Temp 98 °F (36.7 °C) (Oral)   Resp 16   Ht 5' 11" (1.803 m)   Wt 117.9 kg (260 lb)   SpO2 95%   BMI 36.26 kg/m²     PHYSICAL EXAMINATION:    GENERAL: Well appearing, in no acute distress, alert and oriented x3.  PSYCH:  Mood and affect appropriate.  SKIN: Skin color, texture, turgor normal, no rashes or lesions.  CV: RRR with palpation of the radial artery.  PULM: No evidence of respiratory difficulty, symmetric chest rise. Clear to auscultation.  NEURO: Cranial nerves grossly intact.    Lab Results   Component Value Date    WBC 6.66 02/14/2019    HGB 13.2 (L) 02/14/2019    HCT 40.4 02/14/2019    MCV 90 02/14/2019     02/14/2019     BMP  Lab Results   Component Value Date     05/03/2019    K 3.6 05/03/2019     05/03/2019    CO2 28 05/03/2019    BUN 18 05/03/2019    CREATININE 1.0 05/03/2019    CALCIUM 8.8 05/03/2019    ANIONGAP 7 (L) 05/03/2019    ESTGFRAFRICA >60 05/03/2019    EGFRNONAA >60 05/03/2019       Plan:  DDAVP infusion 0.3mcg/kg during procedure    Proceed with procedure as planned    Resume plavix tomorrow.    Fredy Magana  05/09/2019    "

## 2019-05-09 NOTE — DISCHARGE INSTRUCTIONS
Recovery After Procedural Sedation (Adult)  You have been given medicine by vein to make you sleep during your surgery. This may have included both a pain medicine and sleeping medicine. Most of the effects have worn off. But you may still have some drowsiness for the next 6 to 8 hours.  Home care  Follow these guidelines when you get home:  · For the next 8 hours, you should be watched by a responsible adult. This person should make sure your condition is not getting worse.  · Don't drink any alcohol for the next 24 hours.  · Don't drive, operate dangerous machinery, or make important business or personal decisions during the next 24 hours.  Note: Your healthcare provider may tell you not to take any medicine by mouth for pain or sleep in the next 4 hours. These medicines may react with the medicines you were given in the hospital. This could cause a much stronger response than usual.  Follow-up care  Follow up with your healthcare provider if you are not alert and back to your usual level of activity within 12 hours.  When to seek medical advice  Call your healthcare provider right away if any of these occur:  · Drowsiness gets worse  · Weakness or dizziness gets worse  · Repeated vomiting  · You can't be awakened   Date Last Reviewed: 10/18/2016  © 7678-1188 [employer]. [address] [place] [address], [place] [address]. All rights reserved. This information is not intended as a substitute for professional medical care. Always follow your healthcare professional's instructions.         Thank you for allowing us to care for you today. You may receive a survey about the care we provided. Your feedback is valuable and helps us provide excellent care throughout the community.     Home Care Instructions for Pain Management:    1. DIET:   You may resume your normal diet today.   2. BATHING:   You may shower with luke warm water. No tub baths or anything that will soak injection sites under water for the next 24  hours.  3. DRESSING:   You may remove your bandage today.   4. ACTIVITY LEVEL:   You may resume your normal activities 24 hrs after your procedure. Nothing strenuous today.  5. MEDICATIONS:   You may resume your normal medications today. To restart blood thinners, ask your doctor.  6. DRIVING    If you have received any sedatives by mouth today, you may not drive for 12 hours.    If you have received any sedation through your IV, you may not drive for 24 hrs.   7. SPECIAL INSTRUCTIONS:   No heat to the injection site for 24 hrs including, hot bath or shower, heating pad, moist heat, or hot tubs.    Use ice pack to injection site for any pain or discomfort.  Apply ice packs for 20 minute intervals as needed.    IF you have diabetes, be sure to monitor your blood sugar more closely. IF your injection contained steroids your blood sugar levels may become higher than normal.    If you are still having pain upon discharge:  Your pain may improve over the next 48 hours. The anesthetic (numbing medication) works immediately to 48 hours. IF your injection contained a steroid (anti-inflammatory medication), it takes approximately 3 days to start feeling relief and 7-10 days to see your greatest results from the medication. It is possible you may need subsequent injections. This would be discussed at your follow up appointment with pain management or your referring doctor.      PLEASE CALL YOUR DOCTOR IF:  1. Redness or swelling around the injection site.  2. Fever of 101 degrees or more  3. Drainage (pus) from the injection site.  4. For any continuous bleeding (some dried blood over the incision is normal.)    FOR EMERGENCIES:   If any unusual problems or difficulties occur during clinic hours, call [phone] or [phone].

## 2019-05-23 ENCOUNTER — HOSPITAL ENCOUNTER (OUTPATIENT)
Facility: OTHER | Age: 59
Discharge: HOME | End: 2019-05-23
Attending: ANESTHESIOLOGY | Admitting: ANESTHESIOLOGY
Payer: MEDICARE

## 2019-05-23 ENCOUNTER — HOSPITAL ENCOUNTER (OUTPATIENT)
Dept: RADIOLOGY | Facility: HOSPITAL | Age: 59
Discharge: HOME | End: 2019-05-23
Attending: NURSE PRACTITIONER
Payer: MEDICARE

## 2019-05-23 VITALS
DIASTOLIC BLOOD PRESSURE: 77 MMHG | BODY MASS INDEX: 37.71 KG/M2 | OXYGEN SATURATION: 94 % | HEIGHT: 71 IN | TEMPERATURE: 97 F | SYSTOLIC BLOOD PRESSURE: 119 MMHG | RESPIRATION RATE: 18 BRPM | WEIGHT: 269.38 LBS | HEART RATE: 76 BPM

## 2019-05-23 DIAGNOSIS — R31.29 OTHER MICROSCOPIC HEMATURIA: ICD-10-CM

## 2019-05-23 DIAGNOSIS — M47.816 OSTEOARTHRITIS OF LUMBAR SPINE, UNSPECIFIED SPINAL OSTEOARTHRITIS COMPLICATION STATUS: ICD-10-CM

## 2019-05-23 DIAGNOSIS — G89.29 CHRONIC PAIN: ICD-10-CM

## 2019-05-23 DIAGNOSIS — M47.816 LUMBAR SPONDYLOSIS: Primary | ICD-10-CM

## 2019-05-23 PROCEDURE — 64636 DESTROY L/S FACET JNT ADDL: CPT | Mod: LT | Performed by: ANESTHESIOLOGY

## 2019-05-23 PROCEDURE — 25500020 PHARM REV CODE 255: Performed by: NURSE PRACTITIONER

## 2019-05-23 PROCEDURE — 99152 MOD SED SAME PHYS/QHP 5/>YRS: CPT | Mod: ,,, | Performed by: ANESTHESIOLOGY

## 2019-05-23 PROCEDURE — 25000003 PHARM REV CODE 250: Performed by: ANESTHESIOLOGY

## 2019-05-23 PROCEDURE — 99152 PR MOD CONSCIOUS SEDATION, SAME PHYS, 5+ YRS, FIRST 15 MIN: ICD-10-PCS | Mod: ,,, | Performed by: ANESTHESIOLOGY

## 2019-05-23 PROCEDURE — 74178 CT ABD&PLV WO CNTR FLWD CNTR: CPT | Mod: 26,,, | Performed by: RADIOLOGY

## 2019-05-23 PROCEDURE — 74178 CT ABD&PLV WO CNTR FLWD CNTR: CPT | Mod: TC

## 2019-05-23 PROCEDURE — 25000003 PHARM REV CODE 250: Performed by: STUDENT IN AN ORGANIZED HEALTH CARE EDUCATION/TRAINING PROGRAM

## 2019-05-23 PROCEDURE — 64635 DESTROY LUMB/SAC FACET JNT: CPT | Mod: LT | Performed by: ANESTHESIOLOGY

## 2019-05-23 PROCEDURE — 64636 DESTROY L/S FACET JNT ADDL: CPT | Mod: LT,,, | Performed by: ANESTHESIOLOGY

## 2019-05-23 PROCEDURE — 64635 PR DESTROY LUMB/SAC FACET JNT: ICD-10-PCS | Mod: LT,,, | Performed by: ANESTHESIOLOGY

## 2019-05-23 PROCEDURE — 64635 DESTROY LUMB/SAC FACET JNT: CPT | Mod: LT,,, | Performed by: ANESTHESIOLOGY

## 2019-05-23 PROCEDURE — 64636 PR DESTROY L/S FACET JNT ADDL: ICD-10-PCS | Mod: LT,,, | Performed by: ANESTHESIOLOGY

## 2019-05-23 PROCEDURE — 63600175 PHARM REV CODE 636 W HCPCS: Performed by: ANESTHESIOLOGY

## 2019-05-23 PROCEDURE — 74178 CT UROGRAM ABD PELVIS W WO: ICD-10-PCS | Mod: 26,,, | Performed by: RADIOLOGY

## 2019-05-23 RX ORDER — SODIUM CHLORIDE 9 MG/ML
500 INJECTION, SOLUTION INTRAVENOUS CONTINUOUS
Status: DISCONTINUED | OUTPATIENT
Start: 2019-05-23 | End: 2019-05-23 | Stop reason: HOSPADM

## 2019-05-23 RX ORDER — FENTANYL CITRATE 50 UG/ML
INJECTION, SOLUTION INTRAMUSCULAR; INTRAVENOUS
Status: DISCONTINUED | OUTPATIENT
Start: 2019-05-23 | End: 2019-05-23 | Stop reason: HOSPADM

## 2019-05-23 RX ORDER — MIDAZOLAM HYDROCHLORIDE 1 MG/ML
INJECTION INTRAMUSCULAR; INTRAVENOUS
Status: DISCONTINUED | OUTPATIENT
Start: 2019-05-23 | End: 2019-05-23 | Stop reason: HOSPADM

## 2019-05-23 RX ORDER — LIDOCAINE HYDROCHLORIDE 10 MG/ML
INJECTION INFILTRATION; PERINEURAL
Status: DISCONTINUED | OUTPATIENT
Start: 2019-05-23 | End: 2019-05-23 | Stop reason: HOSPADM

## 2019-05-23 RX ORDER — BUPIVACAINE HYDROCHLORIDE 2.5 MG/ML
INJECTION, SOLUTION EPIDURAL; INFILTRATION; INTRACAUDAL
Status: DISCONTINUED | OUTPATIENT
Start: 2019-05-23 | End: 2019-05-23 | Stop reason: HOSPADM

## 2019-05-23 RX ADMIN — IOHEXOL 125 ML: 350 INJECTION, SOLUTION INTRAVENOUS at 03:05

## 2019-05-23 RX ADMIN — SODIUM CHLORIDE 500 ML: 0.9 INJECTION, SOLUTION INTRAVENOUS at 08:05

## 2019-05-23 RX ADMIN — DESMOPRESSIN ACETATE 36.66 MCG: 4 SOLUTION INTRAVENOUS at 08:05

## 2019-05-23 NOTE — DISCHARGE SUMMARY
Discharge Note  Short Stay      SUMMARY     Admit Date: 5/23/2019    Attending Physician: Sherrie Lui      Discharge Physician: Sherrie Lui      Discharge Date: 5/23/2019 9:21 AM    Procedure(s) (LRB):  RADIOFREQUENCY ABLATION, LEFT L3,L4,L5 (Left)    Final Diagnosis: Lumbar spondylosis [M47.816]    Disposition: Home or self care    Patient Instructions:   Current Discharge Medication List      CONTINUE these medications which have NOT CHANGED    Details   acetaminophen (TYLENOL) 500 MG tablet Take 2 tablets (1,000 mg total) by mouth every 8 (eight) hours as needed.  Qty: 90 tablet, Refills: 0      albuterol (VENTOLIN HFA) 90 mcg/actuation inhaler Inhale 2 puffs into the lungs every 6 (six) hours as needed for Wheezing. Rescue  Qty: 18 g, Refills: 4      atorvastatin (LIPITOR) 40 MG tablet Take 1 tablet (40 mg total) by mouth once daily.  Qty: 90 tablet, Refills: 3    Associated Diagnoses: Hyperlipidemia, unspecified hyperlipidemia type      azelastine (ASTELIN) 137 mcg (0.1 %) nasal spray 1 spray (137 mcg total) by Nasal route 2 (two) times daily.  Qty: 30 mL, Refills: 11    Associated Diagnoses: Chronic seasonal allergic rhinitis, unspecified trigger      azelastine (OPTIVAR) 0.05 % ophthalmic solution Place 1 drop into both eyes 2 (two) times daily.  Qty: 4 mL, Refills: 11      budesonide-formoterol 160-4.5 mcg (SYMBICORT) 160-4.5 mcg/actuation HFAA Inhale 2 puffs into the lungs every 12 (twelve) hours.  Qty: 10.2 g, Refills: 12      calcium citrate-vitamin D3 315-200 mg (CITRACAL+D) 315-200 mg-unit per tablet Take 1 tablet by mouth once daily.      clopidogrel (PLAVIX) 75 mg tablet Take 1 tablet (75 mg total) by mouth once daily.  Qty: 30 tablet, Refills: 11      cyanocobalamin, vitamin B-12, (VITAMIN B-12) 50 mcg tablet Take 50 mcg by mouth once daily.      docusate sodium (COLACE) 100 MG capsule Take 1 tablet by mouth Twice daily. 1 Capsule Oral Twice a day .  Take with pain medicine      doxazosin  (CARDURA) 1 MG tablet Take 1 tablet (1 mg total) by mouth every evening.  Qty: 30 tablet, Refills: 11    Associated Diagnoses: Benign localized prostatic hyperplasia with lower urinary tract symptoms (LUTS)      esomeprazole (NEXIUM) 40 MG capsule Take 1 capsule (40 mg total) by mouth 2 (two) times daily before meals.  Qty: 60 capsule, Refills: 11      flu vac [id number] 36mos up,PF, 60 mcg (15 mcg x 4)/0.5 mL Syrg as directed  Qty: 0.5 mL, Refills: 0      fluticasone (FLONASE) 50 mcg/actuation nasal spray 1 spray (50 mcg total) by Each Nare route once daily.  Qty: 16 g, Refills: 11    Associated Diagnoses: Non-seasonal allergic rhinitis, unspecified trigger      fluticasone (FLOVENT HFA) 110 mcg/actuation inhaler Inhale 1 puff into the lungs 2 (two) times daily. Controller  Qty: 12 g, Refills: 0      ipratropium (ATROVENT) 0.03 % nasal spray 2 sprays by Nasal route 2 (two) times daily. May be use more often if needed  Qty: 30 mL, Refills: 11      montelukast (SINGULAIR) 10 mg tablet Refills: 8      predniSONE (DELTASONE) 10 MG tablet Three tablets for 3 days, then 2 tablets for 3 days, then 1 tablet for 3 days.  Best if taken as single dose early in the morning with food  Qty: 18 tablet, Refills: 0      spironolactone (ALDACTONE) 25 MG tablet Take 1 tablet (25 mg total) by mouth once daily.  Qty: 30 tablet, Refills: 2      VITAMIN D2 50,000 unit capsule TK 1 C PO Q 7 DAYS  Refills: 1      zolpidem (AMBIEN) 10 mg Tab TAKE 1 TABLET BY MOUTH EVERY DAY AT BEDTIME  Qty: 20 tablet, Refills: 0    Associated Diagnoses: Sleep disorder                 Discharge Diagnosis: Lumbar spondylosis [M47.816]  Condition on Discharge: Stable with no complications to procedure   Diet on Discharge: Same as before.  Activity: as per instruction sheet.  Discharge to: Home with a responsible adult.  Follow up: 2-4 weeks

## 2019-05-23 NOTE — OP NOTE
Lumbar Medial nerve branch block radiofrequency ablation Under Fluoroscopy     Time-out taken to identify patient and procedure side prior to starting the procedure.     05/23/2019    PROCEDURE: Left radiofrequency ablation of the the medial branch nerves at the   transverse process of  L4, L5 and sacral ala    2)Conscious sedation provided by MD     REASON FOR PROCEDURE: Lumbar spondylosis [M47.816]     PHYSICIAN: Fredy Magana MD     ASSISTANTS: None     MEDICATIONS INJECTED: 0.25% Bupivicaine total 6mL     LOCAL ANESTHETIC USED: Xylocaine 1% 1mL / site     ESTIMATED BLOOD LOSS: None.     COMPLICATIONS: None.     Interval history: Patient reports that he had complete relief of pain for the day of the procedure, we will proceed with the RFA     TECHNIQUE: Laying in a prone position, the patient was prepped and draped in the usual sterile fashion using ChloraPrep and fenestrated drape. The level was determined under fluoroscopic guidance. Local anesthetic was given by going down to the hub of the 27-gauge 1.25in needle and raising a wheel. A 18-gauge 10mm curved active tip needle was introduced to the anatomic local of the medial branch at each of the above levels using fluoroscopy. Then sensory and motor testing was performed to confirm that the needle tips were in the correct location. Then after negative aspiration, 1 mL of 0.25% bupivacaine was injected into each level. This was followed by thermal lesioning at 80 degrees celsius for 90 seconds.     Conscious sedation provided by M.D   The patient was monitored with continuous pulse oximetry, EKG, and intermittent blood pressure monitors. The patient was hemodynamically stable throughout the entire process was responsive to voice, and breathing spontaneously. Supplemental O2 was provided at 2L/min via nasal cannula. Patient was comfortable for the duration of the procedure. (See nurse documentation and case log for sedation time)    There was a total of 2mg IV  Midazolam and 100mcg Fentanyl titrated for the procedure    The patient tolerated the procedure well. Was able to move their leg at the knee and ankle at the conclusion of the procedure    The patient was monitored after the procedure. Patient was given post procedure and discharge instructions to follow at home. We will see the patient back in two weeks or the patient may call to inform of status. The patient was discharged in a stable condition     Attending physician was present throughout the entire case and all critical portions.

## 2019-05-23 NOTE — DISCHARGE INSTRUCTIONS
Adult Procedural Sedation Instructions    Recovery After Procedural Sedation (Adult)  You have been given medicine by vein to make you sleep during your surgery. This may have included both a pain medicine and sleeping medicine. Most of the effects have worn off. But you may still have some drowsiness for the next 6 to 8 hours.  Home care  Follow these guidelines when you get home:  · For the next 8 hours, you should be watched by a responsible adult. This person should make sure your condition is not getting worse.  · Don't drink any alcohol for the next 24 hours.  · Don't drive, operate dangerous machinery, or make important business or personal decisions during the next 24 hours.  Note: Your healthcare provider may tell you not to take any medicine by mouth for pain or sleep in the next 4 hours. These medicines may react with the medicines you were given in the hospital. This could cause a much stronger response than usual.  Follow-up care  Follow up with your healthcare provider if you are not alert and back to your usual level of activity within 12 hours.  When to seek medical advice  Call your healthcare provider right away if any of these occur:  · Drowsiness gets worse  · Weakness or dizziness gets worse  · Repeated vomiting  · You can't be awakened   Date Last Reviewed: 10/18/2016  © 7355-9485 [employer]. [address] [place] [address], [place] [address]. All rights reserved. This information is not intended as a substitute for professional medical care. Always follow your healthcare professional's instructions.       Thank you for allowing us to care for you today. You may receive a survey about the care we provided. Your feedback is valuable and helps us provide excellent care throughout the community.     Home Care Instructions for Pain Management:    1. DIET:   You may resume your normal diet today.   2. BATHING:   You may shower with luke warm water. No tub baths or anything that will soak  injection sites under water for the next 24 hours.  3. DRESSING:   You may remove your bandage today.   4. ACTIVITY LEVEL:   You may resume your normal activities 24 hrs after your procedure. Nothing strenuous today.  5. MEDICATIONS:   You may resume your normal medications today. To restart blood thinners, ask your doctor.  6. DRIVING    If you have received any sedatives by mouth today, you may not drive for 12 hours.    If you have received any sedation through your IV, you may not drive for 24 hrs.   7. SPECIAL INSTRUCTIONS:   No heat to the injection site for 24 hrs including, hot bath or shower, heating pad, moist heat, or hot tubs.    Use ice pack to injection site for any pain or discomfort.  Apply ice packs for 20 minute intervals as needed.    IF you have diabetes, be sure to monitor your blood sugar more closely. IF your injection contained steroids your blood sugar levels may become higher than normal.    If you are still having pain upon discharge:  Your pain may improve over the next 48 hours. The anesthetic (numbing medication) works immediately to 48 hours. IF your injection contained a steroid (anti-inflammatory medication), it takes approximately 3 days to start feeling relief and 7-10 days to see your greatest results from the medication. It is possible you may need subsequent injections. This would be discussed at your follow up appointment with pain management or your referring doctor.      PLEASE CALL YOUR DOCTOR IF:  1. Redness or swelling around the injection site.  2. Fever of 101 degrees or more  3. Drainage (pus) from the injection site.  4. For any continuous bleeding (some dried blood over the incision is normal.)    FOR EMERGENCIES:   If any unusual problems or difficulties occur during clinic hours, call [phone] or [phone].

## 2019-05-24 ENCOUNTER — TELEPHONE (OUTPATIENT)
Dept: UROLOGY | Facility: CLINIC | Age: 59
End: 2019-05-24

## 2019-05-24 NOTE — TELEPHONE ENCOUNTER
Discussed and reviewed recent CTU findings.  Impression       1. Circumferential bladder wall thickening and intraluminal soft tissue thickening.  Consider correlation with cystoscopy.  2. Prostatomegaly.     Advised to continue w/ cysto as scheduled.  Pt verbalized understanding and agree w/ plan.      ----- Message from Chelita Harris sent at 5/24/2019 12:09 PM CDT -----  Contact: pt#[phone]  Patient Returning Call from [facility]    Who Left Message for Patient:Stefano Trinh   Communication Preference:call  Additional Information:

## 2019-05-28 ENCOUNTER — HOSPITAL ENCOUNTER (OUTPATIENT)
Dept: UROLOGY | Facility: HOSPITAL | Age: 59
Discharge: HOME | End: 2019-05-28
Attending: UROLOGY
Payer: MEDICARE

## 2019-05-28 VITALS
HEART RATE: 77 BPM | DIASTOLIC BLOOD PRESSURE: 85 MMHG | SYSTOLIC BLOOD PRESSURE: 129 MMHG | WEIGHT: 263 LBS | HEIGHT: 71 IN | BODY MASS INDEX: 36.82 KG/M2 | RESPIRATION RATE: 18 BRPM | TEMPERATURE: 98 F

## 2019-05-28 DIAGNOSIS — N40.1 BENIGN LOCALIZED PROSTATIC HYPERPLASIA WITH LOWER URINARY TRACT SYMPTOMS (LUTS): ICD-10-CM

## 2019-05-28 DIAGNOSIS — E78.5 HYPERLIPIDEMIA, UNSPECIFIED HYPERLIPIDEMIA TYPE: ICD-10-CM

## 2019-05-28 DIAGNOSIS — R31.29 OTHER MICROSCOPIC HEMATURIA: ICD-10-CM

## 2019-05-28 PROCEDURE — 52000 CYSTOURETHROSCOPY: CPT | Mod: ,,, | Performed by: UROLOGY

## 2019-05-28 PROCEDURE — 52000 CYSTOURETHROSCOPY: CPT

## 2019-05-28 PROCEDURE — 52000 PR CYSTOURETHROSCOPY: ICD-10-PCS | Mod: ,,, | Performed by: UROLOGY

## 2019-05-28 RX ORDER — LIDOCAINE HYDROCHLORIDE 20 MG/ML
JELLY TOPICAL ONCE
Status: COMPLETED | OUTPATIENT
Start: 2019-05-28 | End: 2019-05-28

## 2019-05-28 RX ORDER — CIPROFLOXACIN 500 MG/1
500 TABLET ORAL ONCE
Status: COMPLETED | OUTPATIENT
Start: 2019-05-28 | End: 2019-05-28

## 2019-05-28 RX ADMIN — LIDOCAINE HYDROCHLORIDE: 20 JELLY TOPICAL at 01:05

## 2019-05-28 RX ADMIN — CIPROFLOXACIN 500 MG: 500 TABLET ORAL at 01:05

## 2019-05-28 NOTE — PATIENT INSTRUCTIONS
What to Expect After a Cystoscopy  For the next 24-48 hours, you may feel a mild burning when you urinate. This burning is normal and expected. Drink plenty of water to dilute the urine to help relieve the burning sensation. You may also see a small amount of blood in your urine after the procedure.    Unless you are already taking antibiotics, you may be given an antibiotic after the test to prevent infection.    Signs and Symptoms to Report  Call the [facility] at [phone] if you develop any of the following:  · Fever of 101 degrees or higher  · Chills or persistent bleeding  · Inability to urinate

## 2019-05-28 NOTE — OP NOTE
DATE OF PROCEDURE:  05/28/2019.    PREOPERATIVE DIAGNOSIS:  Microscopic hematuria.    POSTOPERATIVE DIAGNOSES:  Microscopic hematuria, mild BPH with outlet   obstruction.    OPERATION PERFORMED:  Flexible cystoscopy.    PROCEDURE IN DETAIL:  The patient had normal upper tracts by CT scan.  He was   prepped and draped in supine position.  Xylocaine jelly was instilled per   urethra.  The anterior urethra was normal.  Prostatic urethra was 4 cm with mild   lateral lobe enlargement.  There was no evidence of a ball-valving median lobe.    Both ureteral orifices were normal size, shape and position with clear efflux.    There were grade 1 to 2 trabeculations.  There were no stones, tumors, foreign   bodies or active infections noted.    IMPRESSION:  Mild BPH.    RECOMMENDATIONS:  The patient's urine is negative today.  There is no hematuria.    RECOMMENDATIONS:  The patient's symptoms do not seem to warrant tamsulosin.  He   will continue taking Cardura.  I will see him back in 6 months for urinalysis   and to check his symptoms.  If his symptoms worsen, he would be a good candidate   for Rezum or laser prostatectomy; however, I do not feel this is in his best   interest at this time.      JOSÉ LUIS  dd: 05/28/2019 13:46:41 (CDT)  td: 05/28/2019 14:17:49 (CDT)  Doc ID   #[id number]  Job ID #[id number]    CC:

## 2019-05-29 DIAGNOSIS — N40.1 BENIGN LOCALIZED PROSTATIC HYPERPLASIA WITH LOWER URINARY TRACT SYMPTOMS (LUTS): ICD-10-CM

## 2019-05-29 RX ORDER — ATORVASTATIN CALCIUM 20 MG/1
TABLET, FILM COATED ORAL
Qty: 90 TABLET | Refills: 0 | Status: SHIPPED | OUTPATIENT
Start: 2019-05-29 | End: 2019-08-06

## 2019-05-29 RX ORDER — DOXAZOSIN 1 MG/1
TABLET ORAL
Qty: 30 TABLET | Refills: 0 | Status: SHIPPED | OUTPATIENT
Start: 2019-05-29 | End: 2019-07-02 | Stop reason: SDUPTHER

## 2019-05-30 RX ORDER — DOXAZOSIN 1 MG/1
TABLET ORAL
Qty: 90 TABLET | Refills: 0 | OUTPATIENT
Start: 2019-05-30

## 2019-06-14 ENCOUNTER — OFFICE VISIT (OUTPATIENT)
Dept: UROLOGY | Facility: CLINIC | Age: 59
End: 2019-06-14
Payer: MEDICARE

## 2019-06-14 VITALS
SYSTOLIC BLOOD PRESSURE: 115 MMHG | BODY MASS INDEX: 37.04 KG/M2 | HEART RATE: 76 BPM | HEIGHT: 71 IN | DIASTOLIC BLOOD PRESSURE: 76 MMHG | WEIGHT: 264.56 LBS

## 2019-06-14 DIAGNOSIS — N40.1 BENIGN PROSTATIC HYPERPLASIA WITH URINARY OBSTRUCTION: Primary | ICD-10-CM

## 2019-06-14 DIAGNOSIS — N13.8 BENIGN PROSTATIC HYPERPLASIA WITH URINARY OBSTRUCTION: Primary | ICD-10-CM

## 2019-06-14 DIAGNOSIS — Z80.42 FAMILY HISTORY OF PROSTATE CANCER IN FATHER: ICD-10-CM

## 2019-06-14 DIAGNOSIS — N45.1 RIGHT EPIDIDYMITIS: ICD-10-CM

## 2019-06-14 DIAGNOSIS — N50.89 SCROTAL SWELLING: ICD-10-CM

## 2019-06-14 DIAGNOSIS — R35.1 NOCTURIA: ICD-10-CM

## 2019-06-14 LAB
BILIRUB SERPL-MCNC: NORMAL MG/DL
BLOOD URINE, POC: NORMAL
COLOR, POC UA: NORMAL
GLUCOSE UR QL STRIP: NORMAL
KETONES UR QL STRIP: NORMAL
LEUKOCYTE ESTERASE URINE, POC: NORMAL
NITRITE, POC UA: NORMAL
PH, POC UA: 5
POC RESIDUAL URINE VOLUME: 27 ML (ref 0–100)
PROTEIN, POC: NORMAL
SPECIFIC GRAVITY, POC UA: 1.01
UROBILINOGEN, POC UA: NORMAL

## 2019-06-14 PROCEDURE — 3008F PR BODY MASS INDEX (BMI) DOCUMENTED: ICD-10-PCS | Mod: CPTII,S$GLB,, | Performed by: NURSE PRACTITIONER

## 2019-06-14 PROCEDURE — 51798 POCT BLADDER SCAN: ICD-10-PCS | Mod: S$GLB,,, | Performed by: NURSE PRACTITIONER

## 2019-06-14 PROCEDURE — 51798 US URINE CAPACITY MEASURE: CPT | Mod: S$GLB,,, | Performed by: NURSE PRACTITIONER

## 2019-06-14 PROCEDURE — 99214 PR OFFICE/OUTPT VISIT, EST, LEVL IV, 30-39 MIN: ICD-10-PCS | Mod: 25,S$GLB,, | Performed by: NURSE PRACTITIONER

## 2019-06-14 PROCEDURE — 81002 POCT URINE DIPSTICK WITHOUT MICROSCOPE: ICD-10-PCS | Mod: S$GLB,,, | Performed by: NURSE PRACTITIONER

## 2019-06-14 PROCEDURE — 3074F SYST BP LT 130 MM HG: CPT | Mod: CPTII,S$GLB,, | Performed by: NURSE PRACTITIONER

## 2019-06-14 PROCEDURE — 81002 URINALYSIS NONAUTO W/O SCOPE: CPT | Mod: S$GLB,,, | Performed by: NURSE PRACTITIONER

## 2019-06-14 PROCEDURE — 99999 PR PBB SHADOW E&M-EST. PATIENT-LVL IV: CPT | Mod: PBBFAC,,, | Performed by: NURSE PRACTITIONER

## 2019-06-14 PROCEDURE — 99999 PR PBB SHADOW E&M-EST. PATIENT-LVL IV: ICD-10-PCS | Mod: PBBFAC,,, | Performed by: NURSE PRACTITIONER

## 2019-06-14 PROCEDURE — 3078F PR MOST RECENT DIASTOLIC BLOOD PRESSURE < 80 MM HG: ICD-10-PCS | Mod: CPTII,S$GLB,, | Performed by: NURSE PRACTITIONER

## 2019-06-14 PROCEDURE — 3074F PR MOST RECENT SYSTOLIC BLOOD PRESSURE < 130 MM HG: ICD-10-PCS | Mod: CPTII,S$GLB,, | Performed by: NURSE PRACTITIONER

## 2019-06-14 PROCEDURE — 99214 OFFICE O/P EST MOD 30 MIN: CPT | Mod: 25,S$GLB,, | Performed by: NURSE PRACTITIONER

## 2019-06-14 PROCEDURE — 3008F BODY MASS INDEX DOCD: CPT | Mod: CPTII,S$GLB,, | Performed by: NURSE PRACTITIONER

## 2019-06-14 PROCEDURE — 3078F DIAST BP <80 MM HG: CPT | Mod: CPTII,S$GLB,, | Performed by: NURSE PRACTITIONER

## 2019-06-14 RX ORDER — SPIRONOLACTONE 25 MG/1
25 TABLET ORAL DAILY
Qty: 30 TABLET | Refills: 2 | Status: SHIPPED | OUTPATIENT
Start: 2019-06-14 | End: 2019-06-18 | Stop reason: SDUPTHER

## 2019-06-14 NOTE — PROGRESS NOTES
CHIEF COMPLAINT:    Mr. Santiago is a 58 y.o. male presenting for f/u R scrotal swelling, R epididymitis.    PRESENTING ILLNESS:    Bri Santiago is a 58 y.o. male w/  h/o HTN, ABDON, BPH, prostatitis, ED, microhematuria (unremarkable w/u 5/2019).    Last seen 5/28/19 for cysto w/ Dr. Reid. Which revealed mild BPH. Recommended to continue w/ Cardura. Discussed Rezum/simple prostatectomy. Advised to f/u in 6 months.    Today pt returns to clinic for f/u swelling of R scrotum. Reports feeling much better. Reports intermittent discomfort. He has transitioned into more supportive underwear. Denies dysuria, GH, n/v, f/c, abd/pelvic/bladder pain. Reports mother passed away 2 months ago from kidney dz.    UA dip in clinic today revealed trace of blood.    PVR in clinic today: 27mL.    REVIEW OF SYSTEMS:    Bri Santiago denies headache, blurred vision, fever, nausea, vomiting, chills, abdominal pain, bleeding per rectum, cough, SOB, recent loss of consciousness, recent mental status changes, seizures, dizziness, or upper or lower extremity weakness.    PATIENT HISTORY:    Past Medical History:   Diagnosis Date    Acute pancreatitis     Anal fissure     Anemia     Arthritis     Asthma in remission     Back pain     BPH (benign prostatic hypertrophy)     Cancer 2000    prostate- [facility] at [facility] with chemo- in remission since 2000    Clotting disorder     Diastolic dysfunction with chronic heart failure 12/3/2018    Dysphagia 10/7/2014    Family history of colon cancer     Family history of early CAD     GERD (gastroesophageal reflux disease)     Helicobacter pylori (H. pylori) infection     Chronic    History of chronic pancreatitis     HTN (hypertension)     Lumbago 11/12/2012    Obesity     ABDON (obstructive sleep apnea)     Pneumonia     during childhood     Prostate cancer 2000    dx and treated at [facility], had chemotherapy, in remission [id number]    Sacroiliac joint pain 2/10/2015     Spinal stenosis of lumbar region     Trouble in sleeping     Vitamin D deficiency disease     VWD (acquired von Willebrand's disease)        Past Surgical History:   Procedure Laterality Date    24 HOUR PH WITH IMPEDANCE N/A 7/23/2015    Performed by Chris Kent MD at [facility] (4TH FLR)    anal fissure repair      x2    BACK SURGERY      BLOCK, NERVE, FACET JOINT, LUMBAR, MEDIAL BRANCH Bilateral 2/12/2014    [facility] at [facility]    BLOCK-NERVE-MEDIAL BRANCH-LUMBAR Bilateral 7/8/2015    [facility] at [facility]    CARPAL TUNNEL RELEASE  2003    left hand    CATHETERIZATION, HEART, BOTH LEFT AND RIGHT N/A 2/14/2019    Performed by Kyle [facility] at [facility] CATH LAB    CERVICAL DISCECTOMY  2003    COLONOSCOPY N/A 6/19/2017    Performed by Rosendo Boyer MD at [facility] (4TH FLR)    COLONOSCOPY N/A 10/7/2015    Performed by Rosendo Boyer [facility] at [facility] (4TH FLR)    COLONOSCOPY N/A 10/3/2013    Performed by Gene Luis MD at [facility] (4TH FLR)    ESOPHAGOGASTRODUODENOSCOPY (EGD) N/A 6/19/2017    Performed by Rosendo Boyer MD at [facility] (4TH FLR)    ESOPHAGOGASTRODUODENOSCOPY (EGD) N/A 10/8/2014    Performed by Rosendo Boyer MD at [facility] (4TH FLR)    INJECTION-JOINT Bilateral 2/25/2015    [facility]    Left heart cath Left 2/14/2019    Performed by Kyle [facility] at [facility] CATH LAB    LUMBAR FUSION  2012    MANOMETRY-ESOPHAGEAL-HIGH RESOLUTION N/A 7/23/2015    Performed by Chris Kent MD at [facility] (4TH FLR)    RADIOFREQUENCY ABLATION, LEFT L3,L4,L5 Left 5/23/2019    Per[facility] by [facility] at [facility]    RADIOFREQUENCY ABLATION, RIGHT L3,L4,L5 Right 5/9/2019    Performed by [facility] at [facility]    RADIOFREQUENCY THERMOCOAGULATION (RFTC)-NERVE-MEDIAN BRANCH-LUMBAR Left 5/24/2018    Performed [facility] at [facility] PAIN  MGT    RADIOFREQUENCY THERMOCOAGULATION (RFTC)-NERVE-MEDIAN BRANCH-LUMBAR Right 5/10/2018    Performed by Fredy Magana [facility] at [facility] MGT    RADIOFREQUENCY THERMOCOAGULATION (RFTC)-NERVE-MEDIAN BRANCH-LUMBAR Left 5/16/2017    Performed by [facility] at [facility] MGT    RADIOFREQUENCY THERMOCOAGULATION (RFTC)-NERVE-MEDIAN BRANCH-LUMBAR Right 5/2/2017    Performed by Fredy aMgana MD at [facility] MGT    RADIOFREQUENCY THERMOCOAGULATION (RFTC)-NERVE-MEDIAN BRANCH-LUMBAR Left 9/8/2015    Performed by [facility] at [facility] MGT    RADIOFREQUENCY THERMOCOAGULATION (RFTC)-NERVE-MEDIAN BRANCH-LUMBAR Right 8/19/2015    Performed by Fredy Magana [facility] at [facility] MGT    REMOVAL-SPINAL NEUROSTIMULATOR N/A 11/29/2012    Performed by Gonzalez Fisher MD at [facility] OR [address] [place] [address]    SPINE SURGERY      TONSILLECTOMY      at age 24    VASECTOMY  1996       Family History   Problem Relation Age of Onset    Colon cancer Father 67        colon cancer    Hypertension Father     Glaucoma Father     Colon cancer Paternal Grandfather 65             Coronary artery disease Mother 45    Hypertension Mother     Heart disease Mother     No Known Problems Brother     No Known Problems Sister     No Known Problems Daughter     No Known Problems Son     Coronary artery disease Brother 51    No Known Problems Daughter     No Known Problems Daughter     No Known Problems Son     No Known Problems Son     Colon cancer Paternal Uncle 65    Diabetes Mellitus Paternal Grandmother        Social History     Socioeconomic History    Marital status:      Spouse name: Not on file    Number of children: Not on file    Years of education: Not on file    Highest education level: Not on file   Occupational History    Occupation: disabled due to back injury   Social Needs    Financial resource strain: Not on file    Food insecurity:     Worry: Not on file     Inability: Not on file     Transportation needs:     Medical: Not on file     Non-medical: Not on file   Tobacco Use    Smoking status: Never Smoker    Smokeless tobacco: Never Used   Substance and Sexual Activity    Alcohol use: Yes     Alcohol/week: 0.6 oz     Types: 1 Glasses of wine per week     Comment: social    Drug use: No    Sexual activity: Not Currently     Partners: Female   Lifestyle    Physical activity:     Days per week: Not on file     Minutes per session: Not on file    Stress: Not on file   Relationships    Social connections:     Talks on phone: Not on file     Gets together: Not on file     Attends Caodaism service: Not on file     Active member of club or organization: Not on file     Attends meetings of clubs or organizations: Not on file     Relationship status: Not on file   Other Topics Concern    Not on file   Social History Narrative    wlking for exercised       Allergies:  Ace inhibitors; Aspirin; Codeine; Dilaudid  [hydromorphone]; and Penicillins    Medications:    Current Outpatient Medications:     acetaminophen (TYLENOL) 500 MG tablet, Take 2 tablets (1,000 mg total) by mouth every 8 (eight) hours as needed., Disp: 90 tablet, Rfl: 0    albuterol (VENTOLIN HFA) 90 mcg/actuation inhaler, Inhale 2 puffs into the lungs every 6 (six) hours as needed for Wheezing. Rescue, Disp: 18 g, Rfl: 4    atorvastatin (LIPITOR) 20 MG tablet, TAKE 1 TABLET(20 MG) BY MOUTH EVERY DAY, Disp: 90 tablet, Rfl: 0    atorvastatin (LIPITOR) 40 MG tablet, Take 1 tablet (40 mg total) by mouth once daily., Disp: 90 tablet, Rfl: 3    azelastine (ASTELIN) 137 mcg (0.1 %) nasal spray, 1 spray (137 mcg total) by Nasal route 2 (two) times daily., Disp: 30 mL, Rfl: 11    azelastine (OPTIVAR) 0.05 % ophthalmic solution, Place 1 drop into both eyes 2 (two) times daily., Disp: 4 mL, Rfl: 11    budesonide-formoterol 160-4.5 mcg (SYMBICORT) 160-4.5 mcg/actuation HFAA, Inhale 2 puffs into the lungs every 12 (twelve) hours., Disp:  10.2 g, Rfl: 12    calcium citrate-vitamin D3 315-200 mg (CITRACAL+D) 315-200 mg-unit per tablet, Take 1 tablet by mouth once daily., Disp: , Rfl:     clopidogrel (PLAVIX) 75 mg tablet, Take 1 tablet (75 mg total) by mouth once daily., Disp: 30 tablet, Rfl: 11    cyanocobalamin, vitamin B-12, (VITAMIN B-12) 50 mcg tablet, Take 50 mcg by mouth once daily., Disp: , Rfl:     docusate sodium (COLACE) 100 MG capsule, Take 1 tablet by mouth Twice daily. 1 Capsule Oral Twice a day .  Take with pain medicine, Disp: , Rfl:     doxazosin (CARDURA) 1 MG tablet, TAKE 1 TABLET(1 MG) BY MOUTH EVERY EVENING, Disp: 30 tablet, Rfl: 0    esomeprazole (NEXIUM) 40 MG capsule, Take 1 capsule (40 mg total) by mouth 2 (two) times daily before meals., Disp: 60 capsule, Rfl: 11    flu vac [id number] 36mos up,PF, 60 mcg (15 mcg x 4)/0.5 mL Syrg, as directed, Disp: 0.5 mL, Rfl: 0    fluticasone (FLONASE) 50 mcg/actuation nasal spray, 1 spray (50 mcg total) by Each Nare route once daily., Disp: 16 g, Rfl: 11    fluticasone (FLOVENT HFA) 110 mcg/actuation inhaler, Inhale 1 puff into the lungs 2 (two) times daily. Controller, Disp: 12 g, Rfl: 0    ipratropium (ATROVENT) 0.03 % nasal spray, 2 sprays by Nasal route 2 (two) times daily. May be use more often if needed, Disp: 30 mL, Rfl: 11    montelukast (SINGULAIR) 10 mg tablet, , Disp: , Rfl: 8    predniSONE (DELTASONE) 10 MG tablet, Three tablets for 3 days, then 2 tablets for 3 days, then 1 tablet for 3 days.  Best if taken as single dose early in the morning with food, Disp: 18 tablet, Rfl: 0    spironolactone (ALDACTONE) 25 MG tablet, Take 1 tablet (25 mg total) by mouth once daily., Disp: 30 tablet, Rfl: 2    VITAMIN D2 50,000 unit capsule, TK 1 C PO Q 7 DAYS, Disp: , Rfl: 1    zolpidem (AMBIEN) 10 mg Tab, TAKE 1 TABLET BY MOUTH EVERY DAY AT BEDTIME, Disp: 20 tablet, Rfl: 0    PHYSICAL EXAMINATION:    The patient generally appears in good health, is appropriately interactive,  and is in no apparent distress.     Eyes: anicteric sclerae, moist conjunctivae; no lid-lag; PERRLA     HENT: Atraumatic; oropharynx clear with moist mucous membranes and no mucosal ulcerations;normal hard and soft palate.  No evidence of lymphadenopathy.    Neck: Trachea midline.  No thyromegaly.    Musculoskeletal: No abnormal gait.    Skin: No lesions.    Mental: Cooperative with normal affect.  Is oriented to time, place, and person.    Neuro: Grossly intact.    Chest: Normal inspiratory effort.   No accessory muscles.  No audible wheezes.  Respirations symmetric on inspiration and expiration.    Heart: Regular rhythm.      Abdomen:  Soft, non-tender. No masses or organomegaly. Bladder is not palpable. No evidence of flank discomfort. No evidence of inguinal hernia.    Extremities: No clubbing, cyanosis, or edema.    LABS:    Lab Results   Component Value Date    CREATININE 1.0 05/03/2019     Lab Results   Component Value Date    PSA 0.96 02/06/2013    PSA 1.11 10/17/2012    PSA 1.21 01/31/2012    PSADIAG 0.98 05/03/2019    PSADIAG 1.5 10/16/2017    PSADIAG 1.4 10/20/2016     Hemoglobin A1C   Date Value Ref Range Status   10/16/2017 4.6 4.0 - 5.6 % Final     Comment:     According to ADA guidelines, hemoglobin A1c <7.0% represents  optimal control in non-pregnant diabetic patients. Different  metrics may apply to specific patient populations.   Standards of Medical Care in Diabetes-2016.  For the purpose of screening for the presence of diabetes:  <5.7%     Consistent with the absence of diabetes  5.7-6.4%  Consistent with increasing risk for diabetes   (prediabetes)  >or=6.5%  Consistent with diabetes  Currently, no consensus exists for use of hemoglobin A1c  for diagnosis of diabetes for children.  This Hemoglobin A1c assay has significant interference with fetal   hemoglobin   (HbF). The results are invalid for patients with abnormal amounts of   HbF,   including those with known Hereditary Persistence   of  Fetal Hemoglobin. Heterozygous hemoglobin variants (HbAS, HbAC,   HbAD, HbAE, HbA2) do not significantly interfere with this assay;   however, presence of multiple variants in a sample may impact the %   interference.     03/11/2014 4.7 4.5 - 6.2 % Final   05/25/2012 4.5 4.0 - 6.2 % Final     IMPRESSION:    Encounter Diagnoses   Name Primary?    Benign prostatic hyperplasia with urinary obstruction Yes    Family history of prostate cancer in father     Right epididymitis     Scrotal swelling     Nocturia      PLAN:    I spent 25 minutes with the patient of which more than half was spent in direct consultation with the patient in regards to our treatment and plan.    Recommend continue supportive treatment with scrotal support, appropriate rest, and use of topical ice packs.    Continue w/ doxazosin as rx'd.    Continue to limit fluids 2-4 hrs prior to bedtime.    F/u 6 months for microhematuria f/u.    Patient verbalized and expressed understanding, and agree w/ plan.

## 2019-06-14 NOTE — TELEPHONE ENCOUNTER
----- Message from Mallorie Bolaños sent at 6/14/2019  4:48 PM CDT -----  Contact: Self      Can the clinic reply in [facility]: N      Please refill the medication(s) listed below. Please call the patient when the prescription(s) is ready for  at this phone number  [phone]      Medication #1  spironolactone (ALDACTONE) 25 MG tablet    Medication #2       Preferred Pharmacy: [facility] at [phone] fax [phone]

## 2019-06-14 NOTE — PATIENT INSTRUCTIONS
BPH (Enlarged Prostate)  The prostate is a gland at the base of the bladder. As some men get older, the prostate may get bigger in size. This problem is called benign prostatic hyperplasia (BPH). BPH puts pressure on the urethra. This is the tube that carries urine from the bladder to the penis. It may interfere with the flow of urine. It may also keep the bladder from emptying fully.    Symptoms of BPH include trouble starting urination and feeling as though the bladder isnt emptying all the way. It also includes a weak urine stream, dribbling and leaking of urine, and frequent and urgent urination (especially at night). BPH can increase the risk of urinary infections. It can also block off urine flow completely. If this occurs, a thin tube (catheter) may be passed into the bladder to help drain urine.  If symptoms are mild, no treatment may be needed right now. If symptoms are more severe, treatment is likely needed. The goal of treatment is to improve urine flow and reduce symptoms. Treatments can include medicine and procedures. Your healthcare provider will discuss treatment options with you as needed.  Home care  The following guidelines will help you care for yourself at home:  · Urinate as soon as you feel the urge. Don't try to hold your urine.  · Don't limit your fluid intake during the day. Drink 6 to 8 glasses of water or liquids a day. This prevents bacteria from building up in the bladder.  · Avoid drinking fluids after dinner to help reduce urination during the night.  · Avoid medicines that can worsen your symptoms. These include certain cold and allergy medicines and antidepressants. Diuretics used for high blood pressure can also worsen symptoms. Talk to your doctor about the medicines you take. Other choices may work better for you.  Prostate cancer screening  BPH does not increase the risk of prostate cancer. But because prostate cancer is a common cancer in men, screening is sometimes  recommended. This may help detect the cancer in its early stages when treatment is most effective. Factors that can increase the risk of prostate cancer include being -American or having a father or brother who had prostate cancer. A high-fat diet may also increase the risk of prostate cancer. Talk to your healthcare provider to see whether you should be screened for prostate cancer.  Follow-up care  Follow up with your healthcare provider, or as advised  To learn more, go to:  · National Kidney & Urologic Diseases Information Clearinghouse  kidney.niddk.nih.gov, 230.336.6930  When to seek medical advice  Call your healthcare provider right away if any of these occur:  · Fever of 100.4°F (38.0°C) or higher, or as advised  · Unable to pass urine for 8 hours  · Increasing pressure or pain in your bladder (lower abdomen)  · Blood in the urine  · Increasing low back pain, not related to injury  · Symptoms of urinary infection (increased urge to urinate, burning when passing urine, foul-smelling urine)  Date Last Reviewed: 7/1/2016 © 2000-2017 [employer]. [address] [place] [address], [place] [address]. All rights reserved. This information is not intended as a substitute for professional medical care. Always follow your healthcare professional's instructions.

## 2019-06-18 RX ORDER — SPIRONOLACTONE 25 MG/1
25 TABLET ORAL DAILY
Qty: 30 TABLET | Refills: 2 | Status: SHIPPED | OUTPATIENT
Start: 2019-06-18 | End: 2019-06-20

## 2019-06-18 NOTE — TELEPHONE ENCOUNTER
----- Message from Errol Berry sent at 6/18/2019  3:42 PM CDT -----  Contact: Pt   Can the clinic reply in [facility]: No     Please refill the medication(s) listed below. The patient can be reached at this phone number once it is called into the pharmacy.    Medication #1 spironolactone (ALDACTONE) 25 MG tablet// pt states it 2 tables and he needs 90 day supply     Medication #2    Preferred Pharmacy:[facility] [address] [place] [address] AT [address]

## 2019-06-19 NOTE — TELEPHONE ENCOUNTER
----- Message from Errol Berry sent at 6/19/2019  4:46 PM CDT -----  Contact: Pt         Can the clinic reply in [facility]: No     Please refill the medication(s) listed below. The patient can be reached at this phone numbe [phone] once it is called into the pharmacy.     Medication #1 spironolactone (ALDACTONE) 25 MG tablet// pt states it 2 tables and he needs 90 day supply //     Medication #2     Preferred Pharmacy:[facility] [address] [place] [address] AT [address]

## 2019-06-20 ENCOUNTER — OFFICE VISIT (OUTPATIENT)
Dept: PAIN MEDICINE | Facility: CLINIC | Age: 59
End: 2019-06-20
Payer: MEDICARE

## 2019-06-20 VITALS
DIASTOLIC BLOOD PRESSURE: 83 MMHG | BODY MASS INDEX: 37.04 KG/M2 | HEIGHT: 71 IN | HEART RATE: 72 BPM | SYSTOLIC BLOOD PRESSURE: 134 MMHG | TEMPERATURE: 98 F | WEIGHT: 264.56 LBS

## 2019-06-20 DIAGNOSIS — M47.816 LUMBAR SPONDYLOSIS: Primary | ICD-10-CM

## 2019-06-20 DIAGNOSIS — M47.816 OSTEOARTHRITIS OF LUMBAR SPINE, UNSPECIFIED SPINAL OSTEOARTHRITIS COMPLICATION STATUS: ICD-10-CM

## 2019-06-20 DIAGNOSIS — M47.899 FACET SYNDROME: ICD-10-CM

## 2019-06-20 DIAGNOSIS — M96.1 POSTLAMINECTOMY SYNDROME OF LUMBAR REGION: ICD-10-CM

## 2019-06-20 PROCEDURE — 3075F SYST BP GE 130 - 139MM HG: CPT | Mod: CPTII,S$GLB,, | Performed by: NURSE PRACTITIONER

## 2019-06-20 PROCEDURE — 99214 PR OFFICE/OUTPT VISIT, EST, LEVL IV, 30-39 MIN: ICD-10-PCS | Mod: S$GLB,,, | Performed by: NURSE PRACTITIONER

## 2019-06-20 PROCEDURE — 3079F PR MOST RECENT DIASTOLIC BLOOD PRESSURE 80-89 MM HG: ICD-10-PCS | Mod: CPTII,S$GLB,, | Performed by: NURSE PRACTITIONER

## 2019-06-20 PROCEDURE — 3079F DIAST BP 80-89 MM HG: CPT | Mod: CPTII,S$GLB,, | Performed by: NURSE PRACTITIONER

## 2019-06-20 PROCEDURE — 99499 UNLISTED E&M SERVICE: CPT | Mod: S$GLB,,, | Performed by: NURSE PRACTITIONER

## 2019-06-20 PROCEDURE — 99499 RISK ADDL DX/OHS AUDIT: ICD-10-PCS | Mod: S$GLB,,, | Performed by: NURSE PRACTITIONER

## 2019-06-20 PROCEDURE — 99999 PR PBB SHADOW E&M-EST. PATIENT-LVL III: CPT | Mod: PBBFAC,,, | Performed by: NURSE PRACTITIONER

## 2019-06-20 PROCEDURE — 3008F PR BODY MASS INDEX (BMI) DOCUMENTED: ICD-10-PCS | Mod: CPTII,S$GLB,, | Performed by: NURSE PRACTITIONER

## 2019-06-20 PROCEDURE — 3008F BODY MASS INDEX DOCD: CPT | Mod: CPTII,S$GLB,, | Performed by: NURSE PRACTITIONER

## 2019-06-20 PROCEDURE — 99214 OFFICE O/P EST MOD 30 MIN: CPT | Mod: S$GLB,,, | Performed by: NURSE PRACTITIONER

## 2019-06-20 PROCEDURE — 3075F PR MOST RECENT SYSTOLIC BLOOD PRESS GE 130-139MM HG: ICD-10-PCS | Mod: CPTII,S$GLB,, | Performed by: NURSE PRACTITIONER

## 2019-06-20 PROCEDURE — 99999 PR PBB SHADOW E&M-EST. PATIENT-LVL III: ICD-10-PCS | Mod: PBBFAC,,, | Performed by: NURSE PRACTITIONER

## 2019-06-20 RX ORDER — SPIRONOLACTONE 50 MG/1
TABLET, FILM COATED ORAL
Qty: 90 TABLET | Refills: 3 | Status: SHIPPED | OUTPATIENT
Start: 2019-06-20 | End: 2020-01-22

## 2019-06-20 RX ORDER — TRAMADOL HYDROCHLORIDE 50 MG/1
50 TABLET ORAL EVERY 8 HOURS PRN
Qty: 90 TABLET | Refills: 2 | Status: SHIPPED | OUTPATIENT
Start: 2019-06-20 | End: 2019-12-27 | Stop reason: SDUPTHER

## 2019-06-20 RX ORDER — SPIRONOLACTONE 25 MG/1
50 TABLET ORAL DAILY
Qty: 90 TABLET | Refills: 2 | Status: CANCELLED | OUTPATIENT
Start: 2019-06-20 | End: 2020-06-19

## 2019-06-20 NOTE — TELEPHONE ENCOUNTER
Pt came in stating that  has change his fluid meds from once a day to twice a day but the refill that was sent in is still saying take 1 tab once daily .    Pt asked if refill can be sent back over stating take 2 tabs by mouth once daily ,.    Pt has been out of RX for 5 days now and have started swelling in his hands, feet and legs.
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
## 2019-06-20 NOTE — TELEPHONE ENCOUNTER
No problem - just needed clarification on how pt is taking med at this time (which dose was effective). I sent in one 50mg dose to be taken daily in place of two 25mg tabs to reduce number pills needed daily - please notify pt of change

## 2019-07-02 DIAGNOSIS — N40.1 BENIGN LOCALIZED PROSTATIC HYPERPLASIA WITH LOWER URINARY TRACT SYMPTOMS (LUTS): ICD-10-CM

## 2019-07-12 ENCOUNTER — HOSPITAL ENCOUNTER (OUTPATIENT)
Dept: RADIOLOGY | Facility: OTHER | Age: 59
Discharge: HOME | End: 2019-07-12
Attending: NURSE PRACTITIONER
Payer: MEDICARE

## 2019-07-12 DIAGNOSIS — M96.1 POSTLAMINECTOMY SYNDROME OF LUMBAR REGION: ICD-10-CM

## 2019-07-12 DIAGNOSIS — M47.816 LUMBAR SPONDYLOSIS: ICD-10-CM

## 2019-07-12 PROCEDURE — 72158 MRI LUMBAR SPINE W/O & W/DYE: CPT | Mod: 26,,, | Performed by: RADIOLOGY

## 2019-07-12 PROCEDURE — A9585 GADOBUTROL INJECTION: HCPCS | Performed by: NURSE PRACTITIONER

## 2019-07-12 PROCEDURE — 72158 MRI LUMBAR SPINE W/O & W/DYE: CPT | Mod: TC

## 2019-07-12 PROCEDURE — 25500020 PHARM REV CODE 255: Performed by: NURSE PRACTITIONER

## 2019-07-12 PROCEDURE — 72158 MRI LUMBAR SPINE W WO CONTRAST: ICD-10-PCS | Mod: 26,,, | Performed by: RADIOLOGY

## 2019-07-12 RX ORDER — GADOBUTROL 604.72 MG/ML
10 INJECTION INTRAVENOUS
Status: COMPLETED | OUTPATIENT
Start: 2019-07-12 | End: 2019-07-12

## 2019-07-12 RX ADMIN — GADOBUTROL 10 ML: 604.72 INJECTION INTRAVENOUS at 12:07

## 2019-07-15 ENCOUNTER — TELEPHONE (OUTPATIENT)
Dept: PAIN MEDICINE | Facility: CLINIC | Age: 59
End: 2019-07-15

## 2019-07-15 DIAGNOSIS — M51.37 DEGENERATION OF LUMBAR OR LUMBOSACRAL INTERVERTEBRAL DISC: ICD-10-CM

## 2019-07-15 DIAGNOSIS — M96.1 POSTLAMINECTOMY SYNDROME OF LUMBAR REGION: ICD-10-CM

## 2019-07-15 DIAGNOSIS — M47.816 LUMBAR SPONDYLOSIS: Primary | ICD-10-CM

## 2019-07-15 DIAGNOSIS — M48.061 SPINAL STENOSIS, LUMBAR REGION, WITHOUT NEUROGENIC CLAUDICATION: ICD-10-CM

## 2019-07-16 RX ORDER — SPIRONOLACTONE 25 MG/1
TABLET ORAL
Refills: 3 | COMMUNITY
Start: 2019-06-21 | End: 2019-09-04

## 2019-07-16 RX ORDER — DOXAZOSIN 1 MG/1
TABLET ORAL
Qty: 30 TABLET | Refills: 3 | Status: SHIPPED | OUTPATIENT
Start: 2019-07-16 | End: 2019-12-02 | Stop reason: SDUPTHER

## 2019-07-18 ENCOUNTER — CLINICAL SUPPORT (OUTPATIENT)
Dept: REHABILITATION | Facility: OTHER | Age: 59
End: 2019-07-18
Attending: NURSE PRACTITIONER
Payer: MEDICARE

## 2019-07-18 DIAGNOSIS — M54.50 CHRONIC BILATERAL LOW BACK PAIN WITHOUT SCIATICA: ICD-10-CM

## 2019-07-18 DIAGNOSIS — G89.29 CHRONIC BILATERAL LOW BACK PAIN WITHOUT SCIATICA: ICD-10-CM

## 2019-07-18 PROBLEM — M51.36 DDD (DEGENERATIVE DISC DISEASE), LUMBAR: Status: RESOLVED | Noted: 2017-08-17 | Resolved: 2019-07-18

## 2019-07-18 PROBLEM — M51.369 DDD (DEGENERATIVE DISC DISEASE), LUMBAR: Status: RESOLVED | Noted: 2017-08-17 | Resolved: 2019-07-18

## 2019-07-18 PROCEDURE — G8978 MOBILITY CURRENT STATUS: HCPCS | Mod: CK

## 2019-07-18 PROCEDURE — 97162 PT EVAL MOD COMPLEX 30 MIN: CPT

## 2019-07-18 PROCEDURE — G8979 MOBILITY GOAL STATUS: HCPCS | Mod: CJ

## 2019-07-18 PROCEDURE — 97110 THERAPEUTIC EXERCISES: CPT

## 2019-07-19 NOTE — PLAN OF CARE
[facility] - PHYSICAL THERAPY EVALUATION     Name: Bri Santiago  [facility] Number: [id number]    Therapy Diagnosis:   Encounter Diagnosis   Name Primary?    Chronic bilateral low back pain without sciatica       Physician: Buffy Villagran,*    Physician Orders: PT Eval and Treat   Medical Diagnosis from Referral: Lumbar spondylosis, Postlaminectomy syndrome of lumbar region, Degeneration of lumbar or lumbosacral intervertebral disc, Spinal stenosis, lumbar region, without neurogenic claudication  Evaluation Date: 7/18/2019  Authorization Period Expiration: 7/14/19  Plan of Care Expiration: 10/16/19  Reassessment Due: 8/18/19  Visit # / Visits authorized: 1/ 20 (FOTO not completed online, capture next session)     Time In: 9:00  Time Out: 10:30  Total Billable Time: 90 minutes    Precautions: Lumbar fusion L4-S1 3x, cervical surgery,  CHF Osteopenia, hx of prostate cancer    Pattern of pain determined: 1 PEN      Subjective   Date of onset: Last MD visit 6/20/19  History of current condition - Arsenmarcelo reports: He has had low back pain since injuring himself while working more than a decade ago. He had progressive weakness of the legs and pain until he had surgery. His first surgery had complications and needed to 2 corrections before improving. The final surgery helped for years before having pain increase a few years ago. He reports his pain significantly improved from previous PT and he has kept up with his stretches and walking. When his mom got sick he had to fall out of the regular stretching habit and the pain has increased over the past 9 months. He recently had an RFA procedure but he reports the recent one was extremely painful when performed on the left side. The RFA before that was more effective when performed on right side. He reports the pain is a different qualty than when he last attended PT. Now he reports the pain is located in the mid-back and refers down to low back. He reports his  right leg is weaker than left and he has to hold on for balance more. He denies numbness and tingling down his legs. He does does have some numbness in hands when laying on back.      Medical History:   Past Medical History:   Diagnosis Date    Acute pancreatitis     Anal fissure     Anemia     Arthritis     Asthma in remission     Back pain     BPH (benign prostatic hypertrophy)     Cancer 2000    prostate- [facility] at [facility] with chemo- in remission since 2000    Clotting disorder     Diastolic dysfunction with chronic heart failure 12/3/2018    Dysphagia 10/7/2014    Family history of colon cancer     Family history of early CAD     GERD (gastroesophageal reflux disease)     Helicobacter pylori (H. pylori) infection     Chronic    History of chronic pancreatitis     HTN (hypertension)     Lumbago 11/12/2012    Obesity     ABDON (obstructive sleep apnea)     Pneumonia     during childhood     Prostate cancer 2000    dx and treated at [facility], had chemotherapy, in remission [id number]    Sacroiliac joint pain 2/10/2015    Spinal stenosis of lumbar region     Trouble in sleeping     Vitamin D deficiency disease     VWD (acquired von Willebrand's disease)        Surgical History:   Bri Santiago  has a past surgical history that includes Lumbar fusion (2012); Carpal tunnel release (2003); anal fissure repair; Cervical discectomy (2003); Colonoscopy (N/A, 10/7/2015); Vasectomy (1996); Tonsillectomy; Spine surgery; Colonoscopy (N/A, 6/19/2017); Radiofrequency ablation of lumbar medial branch nerve at single level (Left, 5/24/2018); Back surgery; Left heart catheterization (Left, 2/14/2019); Catheterization of both left and right heart (N/A, 2/14/2019); Radiofrequency ablation (Right, 5/9/2019); and Radiofrequency ablation (Left, 5/23/2019).    Medications:   Bri has a current medication list which includes the following prescription(s): acetaminophen, albuterol, atorvastatin,  atorvastatin, azelastine, azelastine, budesonide-formoterol 160-4.5 mcg, calcium citrate-vitamin d3 315-200 mg, clopidogrel, cyanocobalamin (vitamin b-12), docusate sodium, doxazosin, esomeprazole, flu vac [id number] 36mos up(pf), fluticasone propionate, fluticasone propionate, ipratropium, montelukast, prednisone, spironolactone, spironolactone, tramadol, vitamin d2, and zolpidem.    Allergies:   Review of patient's allergies indicates:   Allergen Reactions    Ace inhibitors     Aspirin      Other reaction(s): Hives    Codeine     Dilaudid  [hydromorphone]      Other reaction(s): Itching    Penicillins Hives and Swelling     Has had allergy testing and can prob tolerate penicillin        Imaging, MRI studies:   Narrative     EXAMINATION:  MRI LUMBAR SPINE W WO CONTRAST    CLINICAL HISTORY:  Low back pain, >6wks conservative tx, persistent-progressive sx, surgical candidate; Spondylosis without myelopathy or radiculopathy, lumbar region    TECHNIQUE:  Multiplanar, multisequence MR images were acquired from the thoracolumbar junction to the sacrum without the administration of contrast.    COMPARISON:  MRI lumbar spine with and without contrast dated 03/13/2014 in CT urogram abdomen pelvis dated 05/23/2019    FINDINGS:  Posterior fusion hardware spanning L4 through S1.  Vertebral body heights and alignment are maintained including mild anterior wedging at L1.  There is degenerative endplate edema centered at L1-L2 with mild corresponding enhancement.  Additional Modic type 2 endplate changes at L4-L5 and L5-S1.  Spinal cord terminus lies at L1-L2.  Postoperative granulation changes at the surgical bed with stable nonenhancing seroma inferior to the left S1 pedicle screw measuring 2.1 x 1.6 cm (series 6, image 31; series 3, image 10).  No abnormal nerve root enhancement or epidural collection.  Right lower pole renal cyst.    T12-L1: No significant posterior disc herniation, spinal canal stenosis, or neural  "foraminal impingement.    L1-L2: Circumferential bulge resulting with partial collapse of the anterior thecal sac.  No significant neural foraminal impingement.    L2-L3: No significant posterior disc herniation, spinal canal stenosis, or neural foraminal impingement.    L4-L5: Progressive disc height loss.  No significant spinal canal stenosis or neural foraminal impingement.    L5-S1: Widely patent canal with mild right neural foraminal narrowing.  Left L5 and bilateral S1 pedicular screws traverse slightly anterior to the cortex, similar.      Impression       Degenerative endplate edema centered at L1-L2.  Otherwise, stable postoperative appearance with multilevel spondylosis as detailed.         Prior Therapy: Previous improvement with HB protocol and wellness program (Compliant with old HEP 1-2x weekly)   Prior Treatment: Multiple lumbar fusions Had unsuccessful fusion 2009 had correction in 2011 and Last in 2012) (L4-S1), Multiple RFAs and ODALYS  Social History: 1 story, lives with their spouse and lives with a 17 y/o grandson  Occupation: On disability   Leisure: family       Prior Level of Function: No limitations  Current Level of Function: Moderate limitation with Working in yard, cooking, shopping, reaching to feet (putting shoes or socks on without aid, washing feet), household chores   DME owned/used: [employer]         Pain:  Current 7/10, worst 10/10, best 3/10   Location:  Bilateral lower thoracic to the low back   Description: "Just pain" burning, fatigued   Aggravating Factors: Morning, bending, twisting, prolonged sitting, stairs   Easing Factors:  walking (loosens)  Disturbed Sleep: Yes, wakes up stiff. Usually sleeps with a wedge on side with pillow. Rarely on back because of increased back pain.         Pattern of pain questions:  1.  Where is your pain the worst? Back   2.  Is your pain constant or intermittent? Constant  3.  Does bending forward make your typical pain worse? Yes   4.  " "Since the start of your back pain, has there been a change in your bowel or bladder? No  5.  What can't you do now that you use to be able to do? "A lot of things"  Working in yard, cooking, shopping, reaching to feet (putting shoes or socks on without aid, washing feet), household chores       Pts goals: Do more activity, get back to taking care of yard and cooking       Red Flag Screening:   Cough  Sneeze  Strain: (+)  Bladder/ bowel: (--) (More urinary frequency, constipation)    Falls: (--)  Night pain: (--)  Unexplained weight loss: (--)  General health: Good    OBJECTIVE     Postural examination/scapula alignment: Rounded shoulder, Head forward and increased lordosis  Joint integrity: hypomobile thorughout thoracic spine, lumbar not assessed per fusion, increased paraspinal tone grossly through whole spine  Sitting: Fair-Poor  Standing: Poor  Correction of posture: NT    MOVEMENT LOSS    ROM Loss   Flexion major loss and fingertips to knees ERP   Extension major loss ERP    Side bending Right major loss ERP R>L   Side bending Left major loss ERP   Rotation Right moderate loss and ERP R>L   Rotation Left moderate loss and ERP     Lower Extremity Strength  Right LE  Left LE    Hip flexion: 5/5 Hip flexion: 5/5   Hip extension:  4-/5 Hip extension: 4-/5   Hip abduction: 4+/5 Hip abduction: 4+/5   Hip adduction:  5/5 Hip adduction:  5/5   Hip Internal rotation   5/5 Hip Internal rotation 5/5   Knee Flexion 5/5 Knee Flexion 5/5   Knee Extension 5/5 Knee Extension 5/5   Ankle dorsiflexion: 5/5 Ankle dorsiflexion: 5/5   Ankle plantarflexion: 5/5 Ankle plantarflexion: 5/5       GAIT:  Assistive Device used: none  Level of Assistance: independent  Patient displays the following gait deviations:  no gait deviations observed.     Special Tests:   Test Name  Test Result   Prone Instability Test (--)   SI Joint Provocation Test (--)   Straight Leg Raise (--)   Neural Tension Test (+)   Crossed Straight Leg Raise (--) "   Walking on toes (--)   Walking on heels  (--)       NEUROLOGICAL SCREENING     Sensory deficit: LE intact to light touch      Reflexes:    Left Right   Patella Tendon absent absent   Achilles Tendon absent absent   Babinski  (--) (--)   Clonus (--) (--)     REPEATED TEST MOVEMENTS:  Repeated Flexion in Standing end range pain  worse   Repeated Extension in Standing end range pain  Improved mechanics when performing with towel    Repeated Flexion in lying no effect   Repeated Extension in lying  increased low back tightness   SKTC: Better  HS stretch: Better  Scapular retractions: Better shoulder pain     STATIC TESTS   Sitting slouched  no effect   Sitting erect better   Standing slouched no effect   Standing erect  better   Lying prone in extension     Long sitting          Baseline Isometric Testing on Med X equipment: Testing administered by PT  Date of testin19  Lumbar Flexion 21   Lumbar Extension 0   Lumbar Peak Torque 90   Min Torque 20   Test Percent Below Normative Data 33   Flx/ext ratio:            4.5    CMS Impairment/Limitation/Restriction for FOTO Lumbar Survey    Outcomes not captured at time of evaluation. Based on subjective reports and and objective findings, estimate 40-50 limitation at this time.   Limitation Score: 40-50%  Category: Mobility    Current : CK = at least 40% but < 60% impaired, limited or restricted  Goal: CJ = at least 20% but < 40% impaired, limited or restricted  Discharge:              Treatment   Treatment Time In: 9:50  Treatment Time Out: 10:30  Total Treatment time separate from Evaluation: 40 minutes      Bri received therapeutic exercises to develop/improve posture, lumbar/cervical ROM, strength and muscular endurance for 30 minutes including the following exercises:   [facility] 2019   Visit Number 1   VAS Pain Rating 3   Recumbent Bike Seat Pos. -   Time -   Scapular Retraction -   Extension in Lying -   Extension in Standing -   Flexion in  Lying -   Lumbar Extension Seat Pad 0   Femur Restraint 5   Top Dead Center 24   Counterweight 416   Lumbar Flexion 21   Lumbar Extension 0   Lumbar Peak Torque 90   Min Torque 20   Test Percent Below Normative Data 33   Lumbar Weight 50   Repetitions -   Rating of Perceived Exertion -   Ice - Z Lie (in min.) 10           Written Home Exercises Provided: Patient instructed to cont prior HEP.  Exercises were reviewed and Bri was able to demonstrate them prior to the end of the session.  Bri demonstrated good  understanding of the education provided.     See EMR under Patient Instructions for exercises provided 7/18/2019.      Education provided:   - Patient received education regarding proper posture and body mechanics.  Patient was given top 10 tips handout which discusses posture seated, standing, lifting correctly, components of exercise, importance of nutrition and hydration, and importance of sleep.  - Lenard roll tried, recommended, and purchase information was provided.    - Patient received a handout regarding anticipated muscular soreness following the isometric test and strategies for management were reviewed with patient including stretching, using ice and scheduled rest.   - Patient received education on the [facility] program, purpose of the isometric test, progression of back strengthening as well as wellness approach and systemic strengthening.  Details of the program were discussed.  Reviewed that patient should feel support/pressure from med ex restraints but no pain or discomfort and patient expressed understanding.    Bri received cold pack for 10 minutes to the lumbar spine in z-lie.    Assessment   Bri is a 58 y.o. male referred to [facility] with a medical diagnosis of Lumbar spondylosis, Postlaminectomy syndrome of lumbar region, Degeneration of lumbar or lumbosacral intervertebral disc, Spinal stenosis, lumbar region, without neurogenic claudication. Pt presents with  lumbar derangement, decreased lumbar strength and ROM compared to norms, decreased LE ROM, decreased LE strength, decreased hip flexibility, poor postural alignment, poor body mechanics, impaired balance. Pt demonstrated good improvement of symptoms with review of HEP and addition of scapular exercises. He reported feeling better by end of session. Pt has had previous excellent response to HB protocol and wellness program after last session but did not continue resistance exercise or stretching routine regularly. Discussed need to join personal gym on completion of wellness or HB program and mainting daily HEP compliance to reduce continued flare up of symptoms. Pt understood.       Pain Pattern: 1 PEN       Pt prognosis is Good.   Pt will benefit from skilled outpatient Physical Therapy to address the deficits stated above and in the chart below, provide pt/family education, and to maximize pt's level of independence. Based on the above history and physical examination an active physical therapy program is recommended.  Pt will continue to benefit from skilled outpatient physical therapy to address the deficits listed below in the chart, provide pt/family education and to maximize pt's level of independence in the home and community environment. .       Plan of care discussed with patient: Yes  Pt's spiritual, cultural and educational needs considered and patient is agreeable to the plan of care and goals as stated below:     Anticipated Barriers for therapy: Previous non-compliance with HEP, multiple lumbar surgeries, CHF, osteopenia    PT Evaluation Completed? Yes    Medical necessity is demonstrated by the following problem list.    Pt presents with the following impairments:     History  Co-morbidities and personal factors that may impact the plan of care Co-morbidities:   see precautions    Personal Factors:   coping style  lifestyle     high   Examination  Body Structures and Functions, activity limitations  and participation restrictions that may impact the plan of care Body Regions:   back  lower extremities    Body Systems:    gross symmetry  ROM  strength  gross coordinated movement  balance  transitions  motor control    Participation Restrictions:   Unable to work     Activity limitations:   Learning and applying knowledge  no deficits    General Tasks and Commands  no deficits    Communication  no deficits    Mobility  lifting and carrying objects  driving (bike, car, motorcycle)    Self care  washing oneself (bathing, drying, washing hands)  dressing    Domestic Life  shopping  cooking  doing house work (cleaning house, washing dishes, laundry)  assisting others    Interactions/Relationships  family relationships    Life Areas  employment    Community and Social Life  community life  recreation and leisure         high   Clinical Presentation evolving clinical presentation with changing clinical characteristics moderate   Decision Making/ Complexity Score: moderate       GOALS: Pt is in agreement with the following goals.    Short term goals:  6 weeks or 10 visits   1.  Pt will demonstrate increased lumbar ROM by at least 6 degrees from the initial ROM value with improvements noted in functional ROM and ability to perform ADLs.  2.  Pt will demonstrate increased maximum isometric torque value by 10% when compared to the initial value resulting in improved ability to perform bending, lifting, and carrying activities safely, confidently.    3.  Patient report a reduction in worst pain score by 1-2 points for improved tolerance for prolonged sitting needing for driving.  4.  Pt able to perform HEP correctly with minimal cueing or supervision from therapist to encourage independent management of symptoms.         Long term goals: 13 weeks or 20 visits   1. Pt will demonstrate increased lumbar ROM by at least 12 degrees from initial ROM value, resulting in improved ability to perform functional fwd bending while  "standing and sitting.   2. Pt will demonstrate increased maximum isometric torque value by 25% when compared to the initial value resulting in improved ability to perform bending, lifting, and carrying activities safely, confidently.  3. Pt to demonstrate ability to independently control and reduce their pain through posture positioning and mechanical movements throughout a typical day.  4.  Pt will demonstrate reduced pain and improved functional outcomes as reported on the FOTO by reaching a score of CJ = at least 20% but < 40% impaired, limited or restricted or less in order to demonstrate subjective improvement in pt's condition.    5. Pt will demonstrate independence with the HEP at discharge  6.   Pt will demonstrate appropriate squat technique for improved tolerance of lifting and carrying functional activity needed for household tasks.   7. Pt will express ability to mow lawn without significantly increased pain (patient goal)      Plan   Outpatient physical therapy 2x week for 10 weeks or 20 visits to include the following:   - Patient education  - Therapeutic exercise  - Manual therapy  - Performance testing   - Neuromuscular Re-education  - Therapeutic activity   - Modalities    Pt may be seen by PTA as part of the rehabilitation team.     Therapist: Almita Riley, PT  7/18/2019    "I certify the need for these services furnished under this plan of treatment and while under my care."    ____________________________________  Physician/Referring Practitioner    _______________  Date of Signature          "

## 2019-07-30 ENCOUNTER — TELEPHONE (OUTPATIENT)
Dept: OTOLARYNGOLOGY | Facility: CLINIC | Age: 59
End: 2019-07-30

## 2019-07-30 NOTE — TELEPHONE ENCOUNTER
----- Message from Mallorie Bolaños sent at 7/30/2019  4:07 PM CDT -----  Contact: Self      -------FST Request------      Name of Who is Calling: PURNIMA IYER [[id number]]      What is the request in detail: Pt states he is experiencing severe sinus congestion. Pt states he needs to be seen today. Please contact to further discuss and advise.        Can the clinic reply by [facility]: N      What [facility] to [facility] Back [facility]: [phone]

## 2019-07-30 NOTE — TELEPHONE ENCOUNTER
Talk with pt he was very upset that he could not get an appt until 08/06 pt was offer to be put on schedule with a wait pt said he will not wait longer then 15min.

## 2019-08-01 ENCOUNTER — CLINICAL SUPPORT (OUTPATIENT)
Dept: REHABILITATION | Facility: OTHER | Age: 59
End: 2019-08-01
Attending: NURSE PRACTITIONER
Payer: MEDICARE

## 2019-08-01 DIAGNOSIS — G89.29 CHRONIC BILATERAL LOW BACK PAIN WITHOUT SCIATICA: ICD-10-CM

## 2019-08-01 DIAGNOSIS — M54.50 CHRONIC BILATERAL LOW BACK PAIN WITHOUT SCIATICA: ICD-10-CM

## 2019-08-01 PROCEDURE — 97110 THERAPEUTIC EXERCISES: CPT

## 2019-08-01 NOTE — PROGRESS NOTES
[facility]      Name: Bri Santiago  Clinic Number: [id number]    Therapy Diagnosis: No diagnosis found.  Physician: Buffy Villagran,*    Visit Date: 2019    Physician Orders: PT Eval and Treat   Medical Diagnosis from Referral: Lumbar spondylosis, Postlaminectomy syndrome of lumbar region, Degeneration of lumbar or lumbosacral intervertebral disc, Spinal stenosis, lumbar region, without neurogenic claudication  Evaluation Date: 2019  Authorization Period Expiration: 19  Plan of Care Expiration: 10/16/19  Reassessment Due: 19  Visit # / Visits authorized:       Time In: 08:10  Time Out: 09:00  Total Billable Time: 40 minutes     Precautions: Lumbar fusion L4-S1 3x, cervical surgery,  CHF Osteopenia, hx of prostate cancer     Pattern of pain determined: 1 PEN      Subjective   Bir reports no significant change in symptoms, in more pain today than initial evaluation.  Pt reports pain in the low back about 7/10, stretches are going well less the EIL where he has pain during and after    Patient reports tolerating previous visit fair  Patient reports their pain to be 7/10 on a 0-10 scale with 0 being no pain and 10 being the worst pain imaginable.  Pain Location: Low back     Occupation: On disability   Leisure: family     Pts goals: Do more activity, get back to taking care of yard and cooking       Objective     Baseline IM Testing Results:   Date of testin2019  ROM 0-21 deg   Max Peak Torque 90    Min Peak Torque 20    Flex/Ext Ratio 4.5   % below normative data 33        CMS Impairment/Limitation/Restriction for FOTO Lumbar Survey     Outcomes not captured at time of evaluation. Based on subjective reports and and objective findings, estimate 40-50 limitation at this time.   Limitation Score: 40-50%  Category: Mobility     Current : CK = at least 40% but < 60% impaired, limited or restricted  Goal: CJ = at least 20% but < 40% impaired,  limited or restricted  Discharge:              Treatment    Pt was instructed in and performed the following:     Bri received therapeutic exercises to develop/improved posture, cardiovascular endurance, muscular endurance, lumbar/cervical ROM, strength and muscular endurance for 30 minutes including the following exercises:     [facility] 8/1/2019   Visit Number 2   VAS Pain Rating 7   Recumbent Bike Seat Pos. 20   Time 5   Scapular Retraction -   Lumbar Stretches - Slouch Overcorrection 10   Extension in Lying -   Extension in Standing -   Flexion in Lying 10   Lumbar Extension Seat Pad -   Femur Restraint -   Top Dead Center -   Counterweight -   Lumbar Flexion -   Lumbar Extension -   Lumbar Peak Torque -   Min Torque -   Test Percent Below Normative Data -   Lumbar Weight 60   Repetitions 20   Rating of Perceived Exertion 3   Ice - Z Lie (in min.) 10             Peripheral muscle strengthening which included 1 set of 15-20 repetitions at a slow, controlled 10-13 second per rep pace focused on strengthening supporting musculature for improved body mechanics and functional mobility.  Pt and therapist focused on proper form during treatment to ensure optimal strengthening of each targeted muscle group.  Machines were utilized including torso rotation, leg extension, leg curl, chest press, upright row. Tricep extension, bicep curl, leg press, and hip abduction added visit 3    Bri received the following manual therapy techniques: none were applied.         Home Exercises Provided and Patient Education Provided     Education provided:   - Patient received education in benefits of progressing mobility and strengthening gradually  - Discussed exertion scale, slow progressive resistance protocol to promote safe and healthy strengthening of supportive musculature  by performing 15-20 reps, 7 sec per rep, and increasing weight by 5 % at 20 reps only if there ex done safely, slowly, using correct  "musculature, exertion and no pain.  Patient expressed understanding.  -Pt educated on strategies to safely perform examination and exercise using "Stop Light" analogy to describe how to avoid or stop irritating motions, proceed with caution with some movements, and progress positive exercises.       Written Home Exercises Provided: Patient instructed to cont prior HEP.  Exercises were reviewed and Bri was able to demonstrate them prior to the end of the session.  Bri demonstrated fair  understanding of the education provided.     See EMR under Patient Instructions for exercises provided prior visit.          Assessment     Pt presents to second healthy back visit reporting decreased pain post therapy. Pt did not complete EIL this visit due to increased pain in the low back during and after finishing exercise at home. Pt states increased discomfort with L side low back with LTR to the R. Pt instructed on seated slouch over correct and he demonstrated well without increased LBP Pt was able to start strengthening and endurance training on the lumbar MedX at over 50% of max peak torque according to the initial visit isometric test. Pt was able to complete 20 reps, with 3/10 RPE. Pt was also able to complete half of the peripheral strengthening exercises without increased discomfort and will complete the complete circuit next visit as tolerated.    Patient is making good progress towards established goals.  Pt will continue to benefit from skilled outpatient physical therapy to address the deficits stated in the impairment chart, provide pt/family education and to maximize pt's level of independence in the home and community environment.     Anticipated Barriers for therapy: Previous non-compliance with HEP, multiple lumbar surgeries, CHF, osteopenia     Pt's spiritual, cultural and educational needs considered and pt agreeable to plan of care and goals as stated below:       GOALS: Pt is in agreement with the " following goals.     Short term goals:  6 weeks or 10 visits   1.  Pt will demonstrate increased lumbar ROM by at least 6 degrees from the initial ROM value with improvements noted in functional ROM and ability to perform ADLs.  2.  Pt will demonstrate increased maximum isometric torque value by 10% when compared to the initial value resulting in improved ability to perform bending, lifting, and carrying activities safely, confidently.     3.  Patient report a reduction in worst pain score by 1-2 points for improved tolerance for prolonged sitting needing for driving.  4.  Pt able to perform HEP correctly with minimal cueing or supervision from therapist to encourage independent management of symptoms.            Long term goals: 13 weeks or 20 visits   1. Pt will demonstrate increased lumbar ROM by at least 12 degrees from initial ROM value, resulting in improved ability to perform functional fwd bending while standing and sitting.   2. Pt will demonstrate increased maximum isometric torque value by 25% when compared to the initial value resulting in improved ability to perform bending, lifting, and carrying activities safely, confidently.  3. Pt to demonstrate ability to independently control and reduce their pain through posture positioning and mechanical movements throughout a typical day.  4.  Pt will demonstrate reduced pain and improved functional outcomes as reported on the FOTO by reaching a score of CJ = at least 20% but < 40% impaired, limited or restricted or less in order to demonstrate subjective improvement in pt's condition.    5. Pt will demonstrate independence with the HEP at discharge  6.   Pt will demonstrate appropriate squat technique for improved tolerance of lifting and carrying functional activity needed for household tasks.   7. Pt will express ability to mow lawn without significantly increased pain (patient goal)      Plan   Continue with established Plan of Care towards established PT  goals.

## 2019-08-02 ENCOUNTER — PATIENT OUTREACH (OUTPATIENT)
Dept: ADMINISTRATIVE | Facility: OTHER | Age: 59
End: 2019-08-02

## 2019-08-06 ENCOUNTER — CLINICAL SUPPORT (OUTPATIENT)
Dept: REHABILITATION | Facility: OTHER | Age: 59
End: 2019-08-06
Attending: NURSE PRACTITIONER
Payer: MEDICARE

## 2019-08-06 ENCOUNTER — OFFICE VISIT (OUTPATIENT)
Dept: OTOLARYNGOLOGY | Facility: CLINIC | Age: 59
End: 2019-08-06
Payer: MEDICARE

## 2019-08-06 VITALS
DIASTOLIC BLOOD PRESSURE: 80 MMHG | TEMPERATURE: 98 F | HEART RATE: 85 BPM | HEIGHT: 71 IN | WEIGHT: 269.19 LBS | SYSTOLIC BLOOD PRESSURE: 116 MMHG | BODY MASS INDEX: 37.69 KG/M2

## 2019-08-06 DIAGNOSIS — H69.93 DYSFUNCTION OF BOTH EUSTACHIAN TUBES: ICD-10-CM

## 2019-08-06 DIAGNOSIS — M54.50 CHRONIC BILATERAL LOW BACK PAIN WITHOUT SCIATICA: ICD-10-CM

## 2019-08-06 DIAGNOSIS — J34.2 NASAL SEPTAL DEVIATION: ICD-10-CM

## 2019-08-06 DIAGNOSIS — G89.29 CHRONIC NONINTRACTABLE HEADACHE, UNSPECIFIED HEADACHE TYPE: ICD-10-CM

## 2019-08-06 DIAGNOSIS — J30.9 ALLERGIC RHINITIS, UNSPECIFIED SEASONALITY, UNSPECIFIED TRIGGER: ICD-10-CM

## 2019-08-06 DIAGNOSIS — R05.9 COUGH: ICD-10-CM

## 2019-08-06 DIAGNOSIS — G89.29 CHRONIC BILATERAL LOW BACK PAIN WITHOUT SCIATICA: ICD-10-CM

## 2019-08-06 DIAGNOSIS — R13.10 DYSPHAGIA, UNSPECIFIED TYPE: ICD-10-CM

## 2019-08-06 DIAGNOSIS — K21.9 LARYNGOPHARYNGEAL REFLUX (LPR): ICD-10-CM

## 2019-08-06 DIAGNOSIS — R09.82 PND (POST-NASAL DRIP): ICD-10-CM

## 2019-08-06 DIAGNOSIS — R51.9 CHRONIC NONINTRACTABLE HEADACHE, UNSPECIFIED HEADACHE TYPE: ICD-10-CM

## 2019-08-06 DIAGNOSIS — J01.90 ACUTE NON-RECURRENT SINUSITIS, UNSPECIFIED LOCATION: Primary | ICD-10-CM

## 2019-08-06 PROCEDURE — 99499 UNLISTED E&M SERVICE: CPT | Mod: S$GLB,,, | Performed by: SPECIALIST

## 2019-08-06 PROCEDURE — 3079F PR MOST RECENT DIASTOLIC BLOOD PRESSURE 80-89 MM HG: ICD-10-PCS | Mod: CPTII,S$GLB,, | Performed by: SPECIALIST

## 2019-08-06 PROCEDURE — 99213 PR OFFICE/OUTPT VISIT, EST, LEVL III, 20-29 MIN: ICD-10-PCS | Mod: 25,S$GLB,, | Performed by: SPECIALIST

## 2019-08-06 PROCEDURE — 99213 OFFICE O/P EST LOW 20 MIN: CPT | Mod: 25,S$GLB,, | Performed by: SPECIALIST

## 2019-08-06 PROCEDURE — 31575 LARYNGOSCOPY: ICD-10-PCS | Mod: S$GLB,,, | Performed by: SPECIALIST

## 2019-08-06 PROCEDURE — 99499 RISK ADDL DX/OHS AUDIT: ICD-10-PCS | Mod: S$GLB,,, | Performed by: SPECIALIST

## 2019-08-06 PROCEDURE — 31575 DIAGNOSTIC LARYNGOSCOPY: CPT | Mod: S$GLB,,, | Performed by: SPECIALIST

## 2019-08-06 PROCEDURE — 3074F PR MOST RECENT SYSTOLIC BLOOD PRESSURE < 130 MM HG: ICD-10-PCS | Mod: CPTII,S$GLB,, | Performed by: SPECIALIST

## 2019-08-06 PROCEDURE — 3074F SYST BP LT 130 MM HG: CPT | Mod: CPTII,S$GLB,, | Performed by: SPECIALIST

## 2019-08-06 PROCEDURE — 3008F PR BODY MASS INDEX (BMI) DOCUMENTED: ICD-10-PCS | Mod: CPTII,S$GLB,, | Performed by: SPECIALIST

## 2019-08-06 PROCEDURE — 97110 THERAPEUTIC EXERCISES: CPT

## 2019-08-06 PROCEDURE — 3008F BODY MASS INDEX DOCD: CPT | Mod: CPTII,S$GLB,, | Performed by: SPECIALIST

## 2019-08-06 PROCEDURE — 3079F DIAST BP 80-89 MM HG: CPT | Mod: CPTII,S$GLB,, | Performed by: SPECIALIST

## 2019-08-06 RX ORDER — CIPROFLOXACIN 500 MG/1
500 TABLET ORAL 2 TIMES DAILY
Qty: 20 TABLET | Refills: 0 | Status: SHIPPED | OUTPATIENT
Start: 2019-08-06 | End: 2019-08-16

## 2019-08-06 RX ORDER — PREDNISONE 5 MG/1
TABLET ORAL
Qty: 45 TABLET | Refills: 0 | Status: SHIPPED | OUTPATIENT
Start: 2019-08-06 | End: 2019-09-04 | Stop reason: CLARIF

## 2019-08-06 RX ORDER — AMOXICILLIN AND CLAVULANATE POTASSIUM 875; 125 MG/1; MG/1
TABLET, FILM COATED ORAL
Qty: 20 TABLET | Refills: 0 | Status: SHIPPED | OUTPATIENT
Start: 2019-08-06 | End: 2019-08-06 | Stop reason: CLARIF

## 2019-08-06 NOTE — PATIENT INSTRUCTIONS
Slouch Correct          Practice using the postural muscles by slouching and then correcting.  Correct and hold 3 sec.  Slouch again and correct, hold 3 sec.  Do 10 times.  On the last one, over correct into very erect posture and then back off 10 % and maintain this.   Use a lumbar roll when done the exercise to make sure you are sitting tall.   Do this exercise until it is natural for you to sit tall.

## 2019-08-06 NOTE — PROGRESS NOTES
Subjective:       Patient ID: Bri Santiago is a 58 y.o. male.    Chief Complaint: Sinus Problem and Nasal Congestion    The patient is returning for follow-up visit.  There are multiple issues to discuss.  It has been 4 months since I last saw him.  1.  He is having headache, congestion, postnasal drip and sore throat to some degree on a daily basis.  Nasal secretions are clear most of the day but yellow in the mornings.  He has been taking Singulair, Flonase, ipratropium bromide spray and oral antihistamine on a daily basis without controlling symptoms.  Headaches are in the forehead, mid brow in occipital area bilaterally.  He is not having fever.  2.  He has been taking his pantoprazole for the laryngopharyngeal reflux.  Those symptoms have improved significantly.  He does still have some throat clearing and dysphagia but rarely so.  3.  Regarding sleep apnea he claims that the sleep clinic has not gotten in touch with him to schedule a sleep study.    Review of Systems   Constitutional: Positive for fatigue. Negative for activity change, appetite change, chills, fever and unexpected weight change.   HENT: Positive for congestion, postnasal drip, rhinorrhea, sinus pressure, sinus pain, sneezing and sore throat. Negative for ear discharge, ear pain, facial swelling, hearing loss, mouth sores, tinnitus, trouble swallowing and voice change.         Loud snoring  Witnessed apneas  Morning fatigue   Eyes: Positive for pain. Negative for photophobia, discharge, redness, itching and visual disturbance.   Respiratory: Positive for cough. Negative for apnea, choking, shortness of breath and wheezing.    Cardiovascular: Negative for chest pain and palpitations.   Gastrointestinal: Negative for abdominal distention, abdominal pain, nausea and vomiting.   Musculoskeletal: Negative for arthralgias, myalgias, neck pain and neck stiffness.   Skin: Negative.  Negative for color change, pallor and rash.   Allergic/Immunologic:  Positive for environmental allergies. Negative for food allergies and immunocompromised state.   Neurological: Positive for headaches. Negative for dizziness, facial asymmetry, speech difficulty, weakness, light-headedness and numbness.   Hematological: Negative for adenopathy. Does not bruise/bleed easily.   Psychiatric/Behavioral: Negative for agitation, confusion, decreased concentration and sleep disturbance.       Objective:      Physical Exam   Constitutional: He is oriented to person, place, and time. He appears well-developed and well-nourished. He is cooperative.   HENT:   Head: Normocephalic.   Right Ear: External ear and ear canal normal. Tympanic membrane is retracted.   Left Ear: External ear and ear canal normal. Tympanic membrane is retracted.   Nose: Mucosal edema (cyanotic, boggy inferior turbinates bilaterally), rhinorrhea (clear mucus bilaterally) and septal deviation (To the right) present.   Mouth/Throat: Uvula is midline, oropharynx is clear and moist and mucous membranes are normal. No oral lesions.   Oral cavity-Mitchell class 3 significant enlargement of base of tongue, severe hyperactive gag response that prohibits adequate evaluation of tonsils, palate and uvula   Eyes: Pupils are equal, round, and reactive to light. EOM and lids are normal. Right eye exhibits no discharge and no exudate. Left eye exhibits no discharge and no exudate. Right conjunctiva is injected. Left conjunctiva is injected.   Neck: Trachea normal and normal range of motion. No muscular tenderness present. No tracheal deviation present. No thyroid mass and no thyromegaly present.   Cardiovascular: Normal rate, regular rhythm, normal heart sounds and normal pulses.   Pulmonary/Chest: Effort normal and breath sounds normal. No stridor. He has no decreased breath sounds. He has no wheezes. He has no rhonchi. He has no rales.   Abdominal: Soft. Bowel sounds are normal. There is no tenderness.   Musculoskeletal: Normal range  of motion.   Lymphadenopathy:        Head (right side): No submental, no submandibular, no preauricular, no posterior auricular and no occipital adenopathy present.        Head (left side): No submental, no submandibular, no preauricular, no posterior auricular and no occipital adenopathy present.     He has no cervical adenopathy.   Neurological: He is alert and oriented to person, place, and time. He has normal strength. No cranial nerve deficit or sensory deficit. Gait normal.   Skin: Skin is warm and dry. No petechiae and no rash noted. No cyanosis. Nails show no clubbing.   Psychiatric: He has a normal mood and affect. His speech is normal and behavior is normal. Judgment and thought content normal. Cognition and memory are normal.       Flexible video nasolaryngoscopy-profuse clear rhinorrhea with some pus in the nasal passages bilaterally, septum deviated to the right; laryngeal findings show edema with minimal erythema of the interarytenoid mucosa, significant improvement of for laryngopharyngeal reflux findings from last endoscopy    Assessment:       1. Acute non-recurrent sinusitis, unspecified location    2. Allergic rhinitis, unspecified seasonality, unspecified trigger    3. Dysfunction of both eustachian tubes    4. Nasal septal deviation    5. PND (post-nasal drip)    6. Cough    7. Chronic nonintractable headache, unspecified headache type    8. Dysphagia, unspecified type    9. Laryngopharyngeal reflux (LPR)        Plan:       I am placing the patient on an oral steroid taper and antibiotic.  I have am  scheduling the patient for CT scan of the sinuses at the end of his antibiotic therapy.    I will recheck him in 10 days.

## 2019-08-06 NOTE — PROCEDURES
"Laryngoscopy  Date/Time: 8/6/2019 4:25 PM  Performed by: LAURENT Swanson MD  Authorized by: LAURENT Swanson MD     Time out: Immediately prior to procedure a "time out" was called to verify the correct patient, procedure, equipment, support staff and site/side marked as required.    Consent Done?:  Yes (Verbal)  Anesthesia:     Local anesthetic:  Lidocaine 2% and Irvin-Synephrine 1/2% (Topical aerosol)    Patient tolerance:  Patient tolerated the procedure well with no immediate complications    Decongestion performed?: Yes    Laryngoscopy:     Areas examined:  Vocal cords, larynx, hypopharynx, oropharynx, nasopharynx and nasal cavities    Laryngoscope size:  4 mm  Nose External:      No external nasal deformity  Nose Intranasal:      Mucosa no polyps     Mucosa ulcers not present     No mucosa lesions present ( profuse clear rhinorrhea bilaterally with some thin yellow pus intermittent gold)     Septum gross deformity ( septum deviated to the right)     Enlarged turbinates ( inferior turbinate extremely hypertrophic and obstructive bilaterally)  Nasopharynx:      No mucosa lesions     Adenoids not present ( profuse clear rhinorrhea in the nasopharynx)     Posterior choanae patent     Eustachian tube patent  Larynx/hypopharynx:      No epiglottis lesions     No epiglottis edema     No AE folds lesions     No vocal cord polyps     Equal and normal bilateral ( vocal cords move symmetrically, no mucosal lesions noted)     No hypopharynx lesions     No piriform sinus pooling     No piriform sinus lesions     Post cricoid edema (Mild to moderate interarytenoid mucosal edema, significantly improved from last endoscopy)     Post cricoid erythema ( minimal interarytenoid, significantly improved from last endoscopy)     Flexible nasolaryngoscopy-profuse rhinorrhea with some infection in the nasal passages bilaterally, septal deviation; laryngeal changes of laryngopharyngeal reflux that is responding to treatment      "

## 2019-08-06 NOTE — PROGRESS NOTES
"[facility]      Name: Bri Santiago  Clinic Number: [id number]    Therapy Diagnosis:   Encounter Diagnosis   Name Primary?    Chronic bilateral low back pain without sciatica      Physician: Buffy Villagran,*    Visit Date: 2019    Physician Orders: PT Eval and Treat   Medical Diagnosis from Referral: Lumbar spondylosis, Postlaminectomy syndrome of lumbar region, Degeneration of lumbar or lumbosacral intervertebral disc, Spinal stenosis, lumbar region, without neurogenic claudication  Evaluation Date: 2019  Authorization Period Expiration: 19  Plan of Care Expiration: 10/16/19  Reassessment Due: 19  Visit # / Visits authorized: 3/ 20      Time In: 08:35  Time Out: 09:30  Total Billable Time: 40 minutes     Precautions: Lumbar fusion L4-S1 3x, cervical surgery,  CHF Osteopenia, hx of prostate cancer     Pattern of pain determined: 1 PEN      Subjective   Arsenlin reports no significant change in symptoms, states he is in a little pain, "the weather keeps changing"    Patient reports tolerating previous visit fair  Patient reports their pain to be 7/10 on a 0-10 scale with 0 being no pain and 10 being the worst pain imaginable.  Pain Location: Low back     Occupation: On disability   Leisure: family     Pts goals: Do more activity, get back to taking care of yard and cooking       Objective     Baseline IM Testing Results:   Date of testin2019  ROM 0-21 deg   Max Peak Torque 90    Min Peak Torque 20    Flex/Ext Ratio 4.5   % below normative data 33        CMS Impairment/Limitation/Restriction for FOTO Lumbar Survey     Outcomes not captured at time of evaluation. Based on subjective reports and and objective findings, estimate 40-50 limitation at this time.   Limitation Score: 40-50%  Category: Mobility     Current : CK = at least 40% but < 60% impaired, limited or restricted  Goal: CJ = at least 20% but < 40% impaired, limited or " restricted  Discharge:              Treatment    Pt was instructed in and performed the following:     Bri received therapeutic exercises to develop/improved posture, cardiovascular endurance, muscular endurance, lumbar/cervical ROM, strength and muscular endurance for 30 minutes including the following exercises:     [facility] 8/6/2019   Visit Number 3   VAS Pain Rating 7   Treadmill Time (in min.) 10   Speed 2   Recumbent Bike Seat Pos. -   Time -   Scapular Retraction -   Lumbar Stretches - Slouch Overcorrection 10   Extension in Lying -   Extension in Standing -   Flexion in Lying 10   Lumbar Extension Seat Pad -   Femur Restraint -   Top Dead Center -   Counterweight -   Lumbar Flexion 24   Lumbar Extension 0   Lumbar Peak Torque -   Min Torque -   Test Percent Below Normative Data -   Lumbar Weight 63   Repetitions 20   Rating of Perceived Exertion 3   Ice - Z Lie (in min.) 10       Slouch and correct 10x  HERNAN with ball 10x  Bent knee fall out 10x  TA Isometric with P-Ball 10x        Peripheral muscle strengthening which included 1 set of 15-20 repetitions at a slow, controlled 10-13 second per rep pace focused on strengthening supporting musculature for improved body mechanics and functional mobility.  Pt and therapist focused on proper form during treatment to ensure optimal strengthening of each targeted muscle group.  Machines were utilized including torso rotation, leg extension, leg curl, chest press, upright row. Tricep extension, bicep curl, leg press, and hip abduction added visit 3    Bri received the following manual therapy techniques: none were applied.         Home Exercises Provided and Patient Education Provided     Education provided:   - Patient received education in benefits of progressing mobility and strengthening gradually  - Discussed exertion scale, slow progressive resistance protocol to promote safe and healthy strengthening of supportive musculature  by performing  "15-20 reps, 7 sec per rep, and increasing weight by 5 % at 20 reps only if there ex done safely, slowly, using correct musculature, exertion and no pain.  Patient expressed understanding.  -Pt educated on strategies to safely perform examination and exercise using "Stop Light" analogy to describe how to avoid or stop irritating motions, proceed with caution with some movements, and progress positive exercises.       Written Home Exercises Provided: Patient instructed to cont prior HEP.  Exercises were reviewed and Bri was able to demonstrate them prior to the end of the session.  Bri demonstrated fair  understanding of the education provided.     See EMR under Patient Instructions for exercises provided prior visit.          Assessment     Pt present to therapy with some low back pain and reports feeling a little better than when he came in. Pt was able to increase resistance on the lumbar med x by 5% and increaes flexion ROM by 3 deg. Added BKFO in replacement of LTR due to pain and added TA isometric.    Patient is making good progress towards established goals.  Pt will continue to benefit from skilled outpatient physical therapy to address the deficits stated in the impairment chart, provide pt/family education and to maximize pt's level of independence in the home and community environment.     Anticipated Barriers for therapy: Previous non-compliance with HEP, multiple lumbar surgeries, CHF, osteopenia     Pt's spiritual, cultural and educational needs considered and pt agreeable to plan of care and goals as stated below:       GOALS: Pt is in agreement with the following goals.     Short term goals:  6 weeks or 10 visits   1.  Pt will demonstrate increased lumbar ROM by at least 6 degrees from the initial ROM value with improvements noted in functional ROM and ability to perform ADLs.  2.  Pt will demonstrate increased maximum isometric torque value by 10% when compared to the initial value resulting in " improved ability to perform bending, lifting, and carrying activities safely, confidently.     3.  Patient report a reduction in worst pain score by 1-2 points for improved tolerance for prolonged sitting needing for driving.  4.  Pt able to perform HEP correctly with minimal cueing or supervision from therapist to encourage independent management of symptoms.            Long term goals: 13 weeks or 20 visits   1. Pt will demonstrate increased lumbar ROM by at least 12 degrees from initial ROM value, resulting in improved ability to perform functional fwd bending while standing and sitting.   2. Pt will demonstrate increased maximum isometric torque value by 25% when compared to the initial value resulting in improved ability to perform bending, lifting, and carrying activities safely, confidently.  3. Pt to demonstrate ability to independently control and reduce their pain through posture positioning and mechanical movements throughout a typical day.  4.  Pt will demonstrate reduced pain and improved functional outcomes as reported on the FOTO by reaching a score of CJ = at least 20% but < 40% impaired, limited or restricted or less in order to demonstrate subjective improvement in pt's condition.    5. Pt will demonstrate independence with the HEP at discharge  6.   Pt will demonstrate appropriate squat technique for improved tolerance of lifting and carrying functional activity needed for household tasks.   7. Pt will express ability to mow lawn without significantly increased pain (patient goal)      Plan   Continue with established Plan of Care towards established PT goals.

## 2019-08-07 ENCOUNTER — HOSPITAL ENCOUNTER (EMERGENCY)
Facility: OTHER | Age: 59
Discharge: HOME | End: 2019-08-07
Attending: EMERGENCY MEDICINE
Payer: MEDICARE

## 2019-08-07 ENCOUNTER — TELEPHONE (OUTPATIENT)
Dept: INTERNAL MEDICINE | Facility: CLINIC | Age: 59
End: 2019-08-07

## 2019-08-07 VITALS
HEIGHT: 71 IN | RESPIRATION RATE: 14 BRPM | BODY MASS INDEX: 36.4 KG/M2 | SYSTOLIC BLOOD PRESSURE: 112 MMHG | WEIGHT: 260 LBS | TEMPERATURE: 98 F | HEART RATE: 64 BPM | DIASTOLIC BLOOD PRESSURE: 68 MMHG | OXYGEN SATURATION: 94 %

## 2019-08-07 DIAGNOSIS — R42 DIZZINESS: Primary | ICD-10-CM

## 2019-08-07 LAB
ANION GAP SERPL CALC-SCNC: 10 MMOL/L (ref 8–16)
BASOPHILS # BLD AUTO: 0.03 K/UL (ref 0–0.2)
BASOPHILS NFR BLD: 0.4 % (ref 0–1.9)
BUN SERPL-MCNC: 11 MG/DL (ref 6–20)
CALCIUM SERPL-MCNC: 9 MG/DL (ref 8.7–10.5)
CHLORIDE SERPL-SCNC: 103 MMOL/L (ref 95–110)
CO2 SERPL-SCNC: 28 MMOL/L (ref 23–29)
CREAT SERPL-MCNC: 0.9 MG/DL (ref 0.5–1.4)
DIFFERENTIAL METHOD: ABNORMAL
EOSINOPHIL # BLD AUTO: 0.4 K/UL (ref 0–0.5)
EOSINOPHIL NFR BLD: 5.2 % (ref 0–8)
ERYTHROCYTE [DISTWIDTH] IN BLOOD BY AUTOMATED COUNT: 13.1 % (ref 11.5–14.5)
EST. GFR  (AFRICAN AMERICAN): >60 ML/MIN/1.73 M^2
EST. GFR  (NON AFRICAN AMERICAN): >60 ML/MIN/1.73 M^2
GLUCOSE SERPL-MCNC: 85 MG/DL (ref 70–110)
HCT VFR BLD AUTO: 42 % (ref 40–54)
HGB BLD-MCNC: 13.1 G/DL (ref 14–18)
IMM GRANULOCYTES # BLD AUTO: 0.03 K/UL (ref 0–0.04)
IMM GRANULOCYTES NFR BLD AUTO: 0.4 % (ref 0–0.5)
INR PPP: 0.9 (ref 0.8–1.2)
LYMPHOCYTES # BLD AUTO: 2 K/UL (ref 1–4.8)
LYMPHOCYTES NFR BLD: 24.7 % (ref 18–48)
MCH RBC QN AUTO: 29.4 PG (ref 27–31)
MCHC RBC AUTO-ENTMCNC: 31.2 G/DL (ref 32–36)
MCV RBC AUTO: 94 FL (ref 82–98)
MONOCYTES # BLD AUTO: 0.6 K/UL (ref 0.3–1)
MONOCYTES NFR BLD: 7.1 % (ref 4–15)
NEUTROPHILS # BLD AUTO: 5.1 K/UL (ref 1.8–7.7)
NEUTROPHILS NFR BLD: 62.2 % (ref 38–73)
NRBC BLD-RTO: 0 /100 WBC
PLATELET # BLD AUTO: 241 K/UL (ref 150–350)
PMV BLD AUTO: 9.5 FL (ref 9.2–12.9)
POTASSIUM SERPL-SCNC: 4.1 MMOL/L (ref 3.5–5.1)
PROTHROMBIN TIME: 10.1 SEC (ref 9–12.5)
RBC # BLD AUTO: 4.46 M/UL (ref 4.6–6.2)
SODIUM SERPL-SCNC: 141 MMOL/L (ref 136–145)
TROPONIN I SERPL DL<=0.01 NG/ML-MCNC: <0.006 NG/ML (ref 0–0.03)
TSH SERPL DL<=0.005 MIU/L-ACNC: 1.49 UIU/ML (ref 0.4–4)
WBC # BLD AUTO: 8.15 K/UL (ref 3.9–12.7)

## 2019-08-07 PROCEDURE — 84443 ASSAY THYROID STIM HORMONE: CPT

## 2019-08-07 PROCEDURE — 25000003 PHARM REV CODE 250: Performed by: EMERGENCY MEDICINE

## 2019-08-07 PROCEDURE — 84484 ASSAY OF TROPONIN QUANT: CPT

## 2019-08-07 PROCEDURE — 85610 PROTHROMBIN TIME: CPT

## 2019-08-07 PROCEDURE — 85025 COMPLETE CBC W/AUTO DIFF WBC: CPT

## 2019-08-07 PROCEDURE — 96361 HYDRATE IV INFUSION ADD-ON: CPT

## 2019-08-07 PROCEDURE — 80048 BASIC METABOLIC PNL TOTAL CA: CPT

## 2019-08-07 PROCEDURE — 63600175 PHARM REV CODE 636 W HCPCS: Performed by: EMERGENCY MEDICINE

## 2019-08-07 PROCEDURE — 36415 COLL VENOUS BLD VENIPUNCTURE: CPT

## 2019-08-07 PROCEDURE — 93010 EKG 12-LEAD: ICD-10-PCS | Mod: ,,, | Performed by: INTERNAL MEDICINE

## 2019-08-07 PROCEDURE — 93005 ELECTROCARDIOGRAM TRACING: CPT

## 2019-08-07 PROCEDURE — 96360 HYDRATION IV INFUSION INIT: CPT

## 2019-08-07 PROCEDURE — 99284 EMERGENCY DEPT VISIT MOD MDM: CPT | Mod: 25

## 2019-08-07 PROCEDURE — 93010 ELECTROCARDIOGRAM REPORT: CPT | Mod: ,,, | Performed by: INTERNAL MEDICINE

## 2019-08-07 RX ORDER — ACETAMINOPHEN 500 MG
1000 TABLET ORAL
Status: COMPLETED | OUTPATIENT
Start: 2019-08-07 | End: 2019-08-07

## 2019-08-07 RX ORDER — MECLIZINE HYDROCHLORIDE 25 MG/1
25 TABLET ORAL 3 TIMES DAILY PRN
Qty: 20 TABLET | Refills: 0 | Status: SHIPPED | OUTPATIENT
Start: 2019-08-07 | End: 2019-12-27

## 2019-08-07 RX ORDER — MECLIZINE HYDROCHLORIDE 25 MG/1
25 TABLET ORAL
Status: COMPLETED | OUTPATIENT
Start: 2019-08-07 | End: 2019-08-07

## 2019-08-07 RX ORDER — ONDANSETRON 4 MG/1
4 TABLET, ORALLY DISINTEGRATING ORAL
Status: COMPLETED | OUTPATIENT
Start: 2019-08-07 | End: 2019-08-07

## 2019-08-07 RX ORDER — SODIUM CHLORIDE 9 MG/ML
1000 INJECTION, SOLUTION INTRAVENOUS
Status: COMPLETED | OUTPATIENT
Start: 2019-08-07 | End: 2019-08-07

## 2019-08-07 RX ADMIN — ONDANSETRON 4 MG: 4 TABLET, ORALLY DISINTEGRATING ORAL at 07:08

## 2019-08-07 RX ADMIN — MECLIZINE HYDROCHLORIDE 25 MG: 25 TABLET ORAL at 07:08

## 2019-08-07 RX ADMIN — ACETAMINOPHEN 1000 MG: 500 TABLET ORAL at 07:08

## 2019-08-07 RX ADMIN — SODIUM CHLORIDE 1000 ML: 0.9 INJECTION, SOLUTION INTRAVENOUS at 07:08

## 2019-08-07 NOTE — ED PROVIDER NOTES
"Encounter Date: 8/7/2019    SCRIBE #1 NOTE: I, Kathi Williamson, am scribing for, and in the presence of, Dr. Humphrey.       History     Chief Complaint   Patient presents with    Dizziness     pt reports waking up saturday morning with dizziness that has gotten worse today. pt reports headache and nausea but denies vomiting, cp, sob. pt describes " room is spinning". pt denies hx of vertigo      Time seen by provider: 5:46 PM    This is a 58 y.o. male with hx of HTN, CHF, clotting disorder, and prostate cancer who presents with complaint of intermittent dizziness that began four days ago. He describes room spinning sensation and feeling unsteady as if he were intoxicated. Dizziness is unchanged with movement. He reports dizziness has improved in the last two days, but worsened today with new onset light headedness and feeling near syncope while driving. He reports diaphoresis at baseline, visual disturbance ("seeing stars"), bilateral eye drainage, nausea, unsteady gait, and frontal headache that radiates to the occipital region. He denies double vision, shortness of breath, chest pain, or vomiting. He denies experiencing similar episode in the past. He was seen by ENT yesterday for sinus issues, but did not have eye drainage at that time. He called his PCP for current symptoms, but cannot be seen until 8/12/19. He reports occasional use of alcohol, but denies use of tobacco or illicit drugs. He denies hx of MI or CVA. He reports FHx of his mother's MI at 40 years old and grandmother's CVA at 60 years old.    The history is provided by the patient.     Review of patient's allergies indicates:   Allergen Reactions    Ace inhibitors     Aspirin      Other reaction(s): Hives    Codeine     Dilaudid  [hydromorphone]      Other reaction(s): Itching    Penicillins Hives and Swelling     Has had allergy testing and can prob tolerate penicillin     Past Medical History:   Diagnosis Date    Acute pancreatitis     " Anal fissure     Anemia     Arthritis     Asthma in remission     Back pain     BPH (benign prostatic hypertrophy)     Cancer 2000    prostate- treated at [facility] with chemo- in remission since 2000    Clotting disorder     Diastolic dysfunction with chronic heart failure 12/3/2018    Dysphagia 10/7/2014    Family history of colon cancer     Family history of early CAD     GERD (gastroesophageal reflux disease)     Helicobacter pylori (H. pylori) infection     Chronic    History of chronic pancreatitis     HTN (hypertension)     Lumbago 11/12/2012    Obesity     ABDON (obstructive sleep apnea)     Pneumonia     during childhood     Prostate cancer 2000    dx and treated at [facility] had chemotherapy, in remission [id number]    Sacroiliac joint pain 2/10/2015    Spinal stenosis of lumbar region     Trouble in sleeping     Vitamin D deficiency disease     VWD (acquired von Willebrand's disease)      Past Surgical History:   Procedure Laterality Date    24 HOUR PH WITH IMPEDANCE N/A 7/23/2015    Performed by Chris Kent [facility] at [facility] ENDO (4TH FLR)    anal fissure repair      x2    BACK SURGERY      BLOCK, NERVE, FACET JOINT, LUMBAR, MEDIAL BRANCH Bilateral 2/12/2014    [facility] MGT    BLOCK-NERVE-MEDIAL BRANCH-LUMBAR Bilateral 7/8/2015    [facility] at [facility] MGT    CARPAL TUNNEL RELEASE  2003    left hand    CATHETERIZATION, HEART, BOTH LEFT AND RIGHT N/A 2/14/2019    Performed by [facility] at [facility] CATH LAB    CERVICAL DISCECTOMY  2003    COLONOSCOPY N/A 6/19/2017    Performed by Rosendo Boyer MD at [facility] ENDO (4TH FLR)    COLONOSCOPY N/A 10/7/2015    [facility] by [facility] at [facility] ENDO (4TH FLR)    COLONOSCOPY N/A 10/3/2013    Performed by Gene Luis MD at [facility] ENDO (4TH FLR)    ESOPHAGOGASTRODUODENOSCOPY (EGD) N/A 6/19/2017    Performed by [facility] at [facility] ENDO (4TH FLR)     ESOPHAGOGASTRODUODENOSCOPY (EGD) N/A 10/8/2014    Performed by [facility] at [facility] ENDO (4TH FLR)    INJECTION-JOINT Bilateral 2/25/2015    [facility]    Left heart cath Left 2/14/2019    Performed by [facility] at [facility] CATH LAB    LUMBAR FUSION  2012    MANOMETRY-ESOPHAGEAL-HIGH RESOLUTION N/A 7/23/2015    Performed by Chris Kent [facility] at [facility] ENDO (4TH FLR)    RADIOFREQUENCY ABLATION, LEFT L3,L4,L5 Left 5/23/2019    Per[facility]    RADIOFREQUENCY ABLATION, RIGHT L3,L4,L5 Right 5/9/2019    Per[facility] at [facility]    RADIOFREQUENCY THERMOCOAGULATION (RFTC)-NERVE-MEDIAN BRANCH-LUMBAR Left 5/24/2018    Performed by Fredy Magana [facility] at [facility]    RADIOFREQUENCY THERMOCOAGULATION (RFTC)-NERVE-MEDIAN BRANCH-LUMBAR Right 5/10/2018    Performed by [facility]ana [facility]    RADIOFREQUENCY THERMOCOAGULATION (RFTC)-NERVE-MEDIAN BRANCH-LUMBAR Left 5/16/2017    Performed by [facility]    RADIOFREQUENCY THERMOCOAGULATION (RFTC)-NERVE-MEDIAN BRANCH-LUMBAR Right 5/2/2017    Performed by Fredy [facility]ana [facility]    RADIOFREQUENCY THERMOCOAGULATION (RFTC)-NERVE-MEDIAN BRANCH-LUMBAR Left 9/8/2015    [facility]    RADIOFREQUENCY THERMOCOAGULATION (RFTC)-NERVE-MEDIAN BRANCH-LUMBAR Right 8/19/2015    Per[facility] at [facility]    REMOVAL-SPINAL NEUROSTIMULATOR N/A 11/29/2012    Performed by Gonzalez Fisher [facility] at [facility] OR 2ND FLR    SPINE SURGERY      TONSILLECTOMY      at age 24    VASECTOMY  1996     Family History   Problem Relation Age of Onset    Colon cancer Father 67        colon cancer    Hypertension Father     Glaucoma Father     Colon cancer Paternal Grandfather 65             Coronary artery disease Mother 45    Hypertension Mother      "Heart disease Mother     No Known Problems Brother     No Known Problems Sister     No Known Problems Daughter     No Known Problems Son     Coronary artery disease Brother 51    No Known Problems Daughter     No Known Problems Daughter     No Known Problems Son     No Known Problems Son     Colon cancer Paternal Uncle 65    Diabetes Mellitus Paternal Grandmother      Social History     Tobacco Use    Smoking status: Never Smoker    Smokeless tobacco: Never Used   Substance Use Topics    Alcohol use: Yes     Alcohol/week: 0.6 oz     Types: 1 Glasses of wine per week     Comment: social    Drug use: No     Review of Systems   Constitutional: Positive for diaphoresis. Negative for activity change, appetite change, chills and fever.   HENT: Negative for congestion, ear pain and sore throat.    Eyes: Positive for discharge and visual disturbance ("seeing stars").        Negative for double vision.   Respiratory: Negative for cough, choking, chest tightness and shortness of breath.    Cardiovascular: Negative for chest pain.   Gastrointestinal: Positive for nausea. Negative for abdominal pain, diarrhea and vomiting.   Genitourinary: Negative for difficulty urinating and dysuria.   Musculoskeletal: Positive for gait problem. Negative for arthralgias.   Skin: Negative for color change.   Neurological: Positive for dizziness, light-headedness and headaches. Negative for syncope, weakness and numbness.   Hematological: Does not bruise/bleed easily.       Physical Exam     Initial Vitals [08/07/19 1711]   BP Pulse Resp Temp SpO2   (!) 146/88 80 18 98.6 °F (37 °C) 96 %      MAP       --         Physical Exam    Nursing note and vitals reviewed.  Constitutional: He appears well-developed and well-nourished. He is not diaphoretic. No distress.   HENT:   Head: Normocephalic and atraumatic.   Mouth/Throat: Oropharynx is clear and moist.   Eyes: Conjunctivae and EOM are normal. Pupils are equal, round, and reactive " to light. Right eye exhibits no discharge. Left eye exhibits no discharge. No scleral icterus.   No nystagmus. No vertical skew.   Neck: Normal range of motion. Neck supple.   Cardiovascular: Normal rate, regular rhythm and normal heart sounds. Exam reveals no gallop and no friction rub.    No murmur heard.  Pulmonary/Chest: Breath sounds normal. No respiratory distress. He has no wheezes. He has no rhonchi. He has no rales.   Abdominal: Soft. Bowel sounds are normal. He exhibits no distension. There is no tenderness. There is no rebound and no guarding.   Musculoskeletal: Normal range of motion. He exhibits no edema or tenderness.   Neurological: He is alert and oriented to person, place, and time. He has normal strength. No cranial nerve deficit or sensory deficit. GCS score is 15. GCS eye subscore is 4. GCS verbal subscore is 5. GCS motor subscore is 6.   When ambulating, seems to lean to the right. Normal finger to nose. Negative pronator drift.    Skin: Skin is warm and dry.   Psychiatric: He has a normal mood and affect. His behavior is normal. Judgment and thought content normal.         ED Course   Procedures  Labs Reviewed   CBC W/ AUTO DIFFERENTIAL - Abnormal; Notable for the following components:       Result Value    RBC 4.46 (*)     Hemoglobin 13.1 (*)     Mean Corpuscular Hemoglobin Conc 31.2 (*)     All other components within normal limits   BASIC METABOLIC PANEL    Narrative:     Recoll. [id number] by [email or web address] at 08/07/2019 18:51, reason: Specimen   hemolyzed notified Rosie Mcgraw   TROPONIN I    Narrative:     Recoll. [id number] by [email or web address] at 08/07/2019 18:51, reason: Specimen   hemolyzed notified Rosie Lucien   TSH    Narrative:     Recoll. [id number] by [email or web address] at 08/07/2019 18:51, reason: Specimen   hemolyzed notified Rosie Mcgraw   PROTIME-INR    Narrative:     Recoll. [id number] by [email or web address] at 08/07/2019 18:51, reason: Specimen   hemolyzed notified Rosie Mcgraw     EKG Readings: (Independently Interpreted)    Normal sinus rhythm at rate of 76. No STEMI. Normal intervals. Normal QRS. No acute ST or T wave abnormalities. Overall impression: Normal.        Imaging Results          MRI Brain Without Contrast (Final result)  Result time 08/07/19 19:15:24    Final result by Dl Watkins MD (08/07/19 19:15:24)                 Impression:      1. No acute intracranial abnormalities, no findings to suggest acute infarct or hemorrhage.  2. Findings suggesting sequela of chronic microvascular ischemic change.  3. Sinus disease.      Electronically signed by: Dl Watkins MD  Date:    08/07/2019  Time:    19:15             Narrative:    EXAMINATION:  MRI BRAIN WITHOUT CONTRAST    CLINICAL HISTORY:  Ataxia, stroke suspected as etiology;    TECHNIQUE:  Multiplanar multisequence MR imaging of the brain was performed without contrast.    COMPARISON:  None.    FINDINGS:  There is no intracranial mass, or acute hemorrhage. There is no restricted diffusion to suggest acute ischemia.  There are a few scattered punctate foci of T2/FLAIR signal abnormality within the supratentorial white matter, nonspecific, however suggests sequela of chronic microvascular ischemic change.  There is no hydrocephalus. There are no significant extra-axial or extracranial abnormalities.    The globes, orbits, pituitary gland, pineal gland and craniocervical junction are normal in configuration.  There is mucous membrane thickening of the bilateral maxillary sinuses versus layering fluid.  The major vascular flow voids are patent.                                 Medical Decision Making:   Independently Interpreted Test(s):   I have ordered and independently interpreted EKG Reading(s) - see prior notes  Clinical Tests:   Lab Tests: Ordered and Reviewed  Radiological Study: Ordered and Reviewed  Medical Tests: Reviewed  ED Management:  58-year-old male with von Willebrand's disease presents with dizziness for the last 4 days has wax and wane as well as  headache. No focal neurological deficits on his examination. No shuffling ataxia but does appear to be walking to the right.  No other cerebellar findings.  I am concerned of stroke in this patient.  Due to possibility of a cerebellar bleed or infarct I do feel an MRI is better benefit than a CT scan.  Will get labs, give Tylenol and observed.  I doubt acute coronary syndrome.            Scribe Attestation:   Scribe #1: I performed the above scribed service and the documentation accurately describes the services I performed. I attest to the accuracy of the note.    Attending Attestation:           Physician Attestation for Scribe:  Physician Attestation Statement for Scribe #1: I, Dr. Humphrey, reviewed documentation, as scribed by Kathi Williamson in my presence, and it is both accurate and complete.                 ED Course as of Aug 07 2107   Wed Aug 07, 2019   1957 MRI results reviewed which showed no stroke or bleed.  I updated the patient.  Pending labs.    [SM]   2107 Blood workReviewed showing no acute abnormalities.  Will discharge to follow up with primary care as an outpatient.      Patient discharged home in stable condition. Diagnosis and treatment plan explained to patient. I have answered all questions and the patient is satisfied with the plan of care. The patient demonstrates understanding of the care plan. This is the extent to the patients complaints today here in the emergency department.    [SM]      ED Course User Index  [SM] Karthik Humphrey DO     Clinical Impression:     1. Dizziness                                 Karthik Humphrey,   08/07/19 2108

## 2019-08-07 NOTE — ED NOTES
Rounding has been complete. Pt resting on stretcher AAOx3. NAD noted. Pt spouse at bedside,pt watching t.v, updated on POC. No further questions at this time. Call bell within reach. Will continue to monitor.

## 2019-08-07 NOTE — TELEPHONE ENCOUNTER
----- Message from Francesco Power, Patient Care Assistant sent at 8/7/2019  4:24 PM CDT -----  Contact: PURNIMA IYER [[id number]]  Name of Who is Calling: PURNIMA IYER [[id number]]    What is the request in detail:Patient is requesting a call back in regards to feeling dizzy since Saturday. Please contact to further discuss and advise      Can the clinic reply by [facility]: No    [facility]   [phone]

## 2019-08-07 NOTE — ED NOTES
Pt to ED with c/o dizziness x5 days. Pt reporting intermittent dizziness for past 5 days. Pt describes dizziness as self is spinning and room is spinning. Pt was recently seen by ENT for sinus congestion and post-nasal drip, prescribed abx and PO steroids. Pt notified ENT of dizziness s/s. Pt reporting no alleviation of s/s up tot this encounter. Pt also reporting frontal H/A. Pt denies emesis, SOB, CP. Will Continue to monitor.     Two patient identifiers have been checked and are correct.      Appearance: Pt awake, alert & oriented to person, place & time. Pt in no acute distress at present time. Pt is clean and well groomed with clothes appropriately fastened.   Skin: Skin warm, dry & intact. Color consistent with ethnicity. Mucous membranes moist. No breakdown or brusing noted.   Musculoskeletal: Patient moving all extremities well, no obvious swelling or deformities noted.   Respiratory: Respirations spontaneous, even, and non-labored. Visible chest rise noted. Airway is open and patent. No accessory muscle use noted.   Neurologic: Sensation is intact. Speech is clear and appropriate. Eyes open spontaneously, behavior appropriate to situation, follows commands, facial expression symmetrical, bilateral hand grasp equal and even, purposeful motor response noted.  Cardiac: All peripheral pulses present. No Bilateral lower extremity edema. Cap refill is <3 seconds.  Abdomen: Abdomen soft, non-tender to palpation.   : Pt reports no dysuria or hematuria.

## 2019-08-08 NOTE — ED NOTES
Pt lying in bed, respirations even, unlabored, eyes open spontaneously, NAd noted, family member at bedside, call bell within reach. Pt updated on plan of care, will continue to monitor

## 2019-08-08 NOTE — ED NOTES
"Lying on the stretcher on his left side with eyes closed. Eyes open with voice. Lighting remains on low setting. Pt states HA 10/10 "not better." States dizziness and nausea is improving. IV fluids infusing maintained. Skin is warm and dry. Resp:18 even and unlabored. NADN. Pt's friend at BS. Bed locked in low position, side rails up x2 and call light within reach. Will continue to monitor. Report given MARCELO Ray  "

## 2019-08-08 NOTE — ED NOTES
"ROUNDING:  Lying on the stretcher with HOB at semi-fowlers. AAOx4. States HA pain 10/10; c/o dizziness "room is spinning", nausea and blurred vision. Denies any other discomfort at this time. Skin is warm and dry. Resp:20 even and unlabored. Comfort and BR needs addressed. Plan of care updated. ALETHA. Pt's friend at BS. Bed locked in low position, side rails up x2 and call light within reach. Will continue to monitor.  "

## 2019-08-14 ENCOUNTER — PATIENT OUTREACH (OUTPATIENT)
Dept: ADMINISTRATIVE | Facility: OTHER | Age: 59
End: 2019-08-14

## 2019-08-15 ENCOUNTER — CLINICAL SUPPORT (OUTPATIENT)
Dept: REHABILITATION | Facility: OTHER | Age: 59
End: 2019-08-15
Attending: NURSE PRACTITIONER
Payer: MEDICARE

## 2019-08-15 ENCOUNTER — HOSPITAL ENCOUNTER (OUTPATIENT)
Dept: RADIOLOGY | Facility: OTHER | Age: 59
Discharge: HOME | End: 2019-08-15
Attending: SPECIALIST
Payer: MEDICARE

## 2019-08-15 DIAGNOSIS — J01.90 ACUTE NON-RECURRENT SINUSITIS, UNSPECIFIED LOCATION: ICD-10-CM

## 2019-08-15 DIAGNOSIS — G89.29 CHRONIC NONINTRACTABLE HEADACHE, UNSPECIFIED HEADACHE TYPE: ICD-10-CM

## 2019-08-15 DIAGNOSIS — R51.9 CHRONIC NONINTRACTABLE HEADACHE, UNSPECIFIED HEADACHE TYPE: ICD-10-CM

## 2019-08-15 DIAGNOSIS — G89.29 CHRONIC BILATERAL LOW BACK PAIN WITHOUT SCIATICA: ICD-10-CM

## 2019-08-15 DIAGNOSIS — R09.82 PND (POST-NASAL DRIP): ICD-10-CM

## 2019-08-15 DIAGNOSIS — M54.50 CHRONIC BILATERAL LOW BACK PAIN WITHOUT SCIATICA: ICD-10-CM

## 2019-08-15 DIAGNOSIS — J34.2 NASAL SEPTAL DEVIATION: ICD-10-CM

## 2019-08-15 PROCEDURE — 70486 CT MAXILLOFACIAL W/O DYE: CPT | Mod: TC

## 2019-08-15 PROCEDURE — 70486 CT MAXILLOFACIAL W/O DYE: CPT | Mod: 26,,, | Performed by: RADIOLOGY

## 2019-08-15 PROCEDURE — 70486 CT MEDTRONIC SINUSES WITHOUT: ICD-10-PCS | Mod: 26,,, | Performed by: RADIOLOGY

## 2019-08-15 PROCEDURE — 97110 THERAPEUTIC EXERCISES: CPT

## 2019-08-15 NOTE — PROGRESS NOTES
[facility]      Name: Bri Santiago  Clinic Number: [id number]    Therapy Diagnosis:   Encounter Diagnosis   Name Primary?    Chronic bilateral low back pain without sciatica      Physician: Buffy Villagran,*    Visit Date: 8/15/2019    Physician Orders: PT Eval and Treat   Medical Diagnosis from Referral: Lumbar spondylosis, Postlaminectomy syndrome of lumbar region, Degeneration of lumbar or lumbosacral intervertebral disc, Spinal stenosis, lumbar region, without neurogenic claudication  Evaluation Date: 2019  Authorization Period Expiration: 19  Plan of Care Expiration: 10/16/19  Reassessment Due: 19  Visit # / Visits authorized:  (Pt performed 66 ft/lbs but not recorded per PT error)      Time In: 09:20  Time Out: 10:20  Total Billable Time: 40 minutes     Precautions: Lumbar fusion L4-S1 3x, cervical surgery,  CHF Osteopenia, hx of prostate cancer     Pattern of pain determined: 1 PEN      Subjective   Bri reports he has been very busy lately. He has been able to do HEP 3x daily and finds it helps his stiffness     Patient reports tolerating previous visit fair  Patient reports their pain to be 6/10 on a 0-10 scale with 0 being no pain and 10 being the worst pain imaginable.  Pain Location: Low back     Occupation: On disability   Leisure: family     Pts goals: Do more activity, get back to taking care of yard and cooking       Objective     Baseline IM Testing Results:   Date of testin2019  ROM 0-21 deg   Max Peak Torque 90    Min Peak Torque 20    Flex/Ext Ratio 4.5   % below normative data 33        CMS Impairment/Limitation/Restriction for FOTO Lumbar Survey     Outcomes not captured at time of evaluation. Based on subjective reports and and objective findings, estimate 40-50 limitation at this time.   Limitation Score: 40-50%  Category: Mobility     Current : CK = at least 40% but < 60% impaired, limited or restricted  Goal: CJ = at  least 20% but < 40% impaired, limited or restricted  Discharge:              Treatment    Pt was instructed in and performed the following:     Bri received therapeutic exercises to develop/improved posture, cardiovascular endurance, muscular endurance, lumbar/cervical ROM, strength and muscular endurance for 30 minutes including the following exercises:        [facility] 8/6/2019   Visit Number 3   VAS Pain Rating 7   Treadmill Time (in min.) 10   Speed 2   Recumbent Bike Seat Pos. -   Time -   Scapular Retraction -   Lumbar Stretches - Slouch Overcorrection 10   Extension in Lying -   Extension in Standing -   Flexion in Lying 10   Lumbar Extension Seat Pad -   Femur Restraint -   Top Dead Center -   Counterweight -   Lumbar Flexion 24   Lumbar Extension 0   Lumbar Peak Torque -   Min Torque -   Test Percent Below Normative Data -   Lumbar Weight 63   Repetitions 20   Rating of Perceived Exertion 3   Ice - Z Lie (in min.) 10         Performed today:   Slouch and correct 10x  HERNAN with ball 10x  PPT 5 s/h 10x  Bent knee fall out 10x  Standing lumbar extension with towel overpressure 10x  Scapular retractions 10x     Performed from previous PT:  TA Isometric with P-Ball 10x        Peripheral muscle strengthening which included 1 set of 15-20 repetitions at a slow, controlled 10-13 second per rep pace focused on strengthening supporting musculature for improved body mechanics and functional mobility.  Pt and therapist focused on proper form during treatment to ensure optimal strengthening of each targeted muscle group.  Machines were utilized including torso rotation, leg extension, leg curl, chest press, upright row. Tricep extension, bicep curl, leg press, and hip abduction added visit 3    Bri received the following manual therapy techniques: none were applied.         Home Exercises Provided and Patient Education Provided     Education provided:   - Patient received education in benefits of  "progressing mobility and strengthening gradually  - Discussed exertion scale, slow progressive resistance protocol to promote safe and healthy strengthening of supportive musculature  by performing 15-20 reps, 7 sec per rep, and increasing weight by 5 % at 20 reps only if there ex done safely, slowly, using correct musculature, exertion and no pain.  Patient expressed understanding.  -Pt educated on strategies to safely perform examination and exercise using "Stop Light" analogy to describe how to avoid or stop irritating motions, proceed with caution with some movements, and progress positive exercises.       Written Home Exercises Provided: Patient instructed to cont prior HEP.  Exercises were reviewed and Bri was able to demonstrate them prior to the end of the session.  Bri demonstrated fair  understanding of the education provided.     See EMR under Patient Instructions for exercises provided prior visit.          Assessment     Pt present to therapy with some low back pain and reports feeling a little better than when he came in. Pt was able to increase resistance on the lumbar med x by 5% to 66 ft/lbs but was weight was not changed per PT error. Plan to increase to 69 ft/lbs next session as tolerated. Reveiwed EIS with towel overpressure and scapular retraction from evaluation with minimal v/c.     Patient is making good progress towards established goals.  Pt will continue to benefit from skilled outpatient physical therapy to address the deficits stated in the impairment chart, provide pt/family education and to maximize pt's level of independence in the home and community environment.     Anticipated Barriers for therapy: Previous non-compliance with HEP, multiple lumbar surgeries, CHF, osteopenia     Pt's spiritual, cultural and educational needs considered and pt agreeable to plan of care and goals as stated below:       GOALS: Pt is in agreement with the following goals.     Short term goals:  6 " weeks or 10 visits   1.  Pt will demonstrate increased lumbar ROM by at least 6 degrees from the initial ROM value with improvements noted in functional ROM and ability to perform ADLs.  2.  Pt will demonstrate increased maximum isometric torque value by 10% when compared to the initial value resulting in improved ability to perform bending, lifting, and carrying activities safely, confidently.     3.  Patient report a reduction in worst pain score by 1-2 points for improved tolerance for prolonged sitting needing for driving.  4.  Pt able to perform HEP correctly with minimal cueing or supervision from therapist to encourage independent management of symptoms.            Long term goals: 13 weeks or 20 visits   1. Pt will demonstrate increased lumbar ROM by at least 12 degrees from initial ROM value, resulting in improved ability to perform functional fwd bending while standing and sitting.   2. Pt will demonstrate increased maximum isometric torque value by 25% when compared to the initial value resulting in improved ability to perform bending, lifting, and carrying activities safely, confidently.  3. Pt to demonstrate ability to independently control and reduce their pain through posture positioning and mechanical movements throughout a typical day.  4.  Pt will demonstrate reduced pain and improved functional outcomes as reported on the FOTO by reaching a score of CJ = at least 20% but < 40% impaired, limited or restricted or less in order to demonstrate subjective improvement in pt's condition.    5. Pt will demonstrate independence with the HEP at discharge  6.   Pt will demonstrate appropriate squat technique for improved tolerance of lifting and carrying functional activity needed for household tasks.   7. Pt will express ability to mow lawn without significantly increased pain (patient goal)      Plan   Continue with established Plan of Care towards established PT goals.

## 2019-08-16 ENCOUNTER — OFFICE VISIT (OUTPATIENT)
Dept: OTOLARYNGOLOGY | Facility: CLINIC | Age: 59
End: 2019-08-16
Payer: MEDICARE

## 2019-08-16 VITALS
DIASTOLIC BLOOD PRESSURE: 82 MMHG | HEIGHT: 71 IN | TEMPERATURE: 98 F | WEIGHT: 260 LBS | SYSTOLIC BLOOD PRESSURE: 118 MMHG | BODY MASS INDEX: 36.4 KG/M2 | HEART RATE: 80 BPM

## 2019-08-16 DIAGNOSIS — K21.9 LARYNGOPHARYNGEAL REFLUX (LPR): ICD-10-CM

## 2019-08-16 DIAGNOSIS — J01.91 ACUTE RECURRENT SINUSITIS, UNSPECIFIED LOCATION: Primary | ICD-10-CM

## 2019-08-16 DIAGNOSIS — J30.9 ALLERGIC RHINITIS, UNSPECIFIED SEASONALITY, UNSPECIFIED TRIGGER: ICD-10-CM

## 2019-08-16 DIAGNOSIS — J34.2 NASAL SEPTAL DEVIATION: ICD-10-CM

## 2019-08-16 DIAGNOSIS — R13.10 DYSPHAGIA, UNSPECIFIED TYPE: ICD-10-CM

## 2019-08-16 PROCEDURE — 3079F DIAST BP 80-89 MM HG: CPT | Mod: CPTII,S$GLB,, | Performed by: SPECIALIST

## 2019-08-16 PROCEDURE — 99214 PR OFFICE/OUTPT VISIT, EST, LEVL IV, 30-39 MIN: ICD-10-PCS | Mod: S$GLB,,, | Performed by: SPECIALIST

## 2019-08-16 PROCEDURE — 3079F PR MOST RECENT DIASTOLIC BLOOD PRESSURE 80-89 MM HG: ICD-10-PCS | Mod: CPTII,S$GLB,, | Performed by: SPECIALIST

## 2019-08-16 PROCEDURE — 3074F SYST BP LT 130 MM HG: CPT | Mod: CPTII,S$GLB,, | Performed by: SPECIALIST

## 2019-08-16 PROCEDURE — 99214 OFFICE O/P EST MOD 30 MIN: CPT | Mod: S$GLB,,, | Performed by: SPECIALIST

## 2019-08-16 PROCEDURE — 99499 RISK ADDL DX/OHS AUDIT: ICD-10-PCS | Mod: S$GLB,,, | Performed by: SPECIALIST

## 2019-08-16 PROCEDURE — 3008F PR BODY MASS INDEX (BMI) DOCUMENTED: ICD-10-PCS | Mod: CPTII,S$GLB,, | Performed by: SPECIALIST

## 2019-08-16 PROCEDURE — 3074F PR MOST RECENT SYSTOLIC BLOOD PRESSURE < 130 MM HG: ICD-10-PCS | Mod: CPTII,S$GLB,, | Performed by: SPECIALIST

## 2019-08-16 PROCEDURE — 3008F BODY MASS INDEX DOCD: CPT | Mod: CPTII,S$GLB,, | Performed by: SPECIALIST

## 2019-08-16 PROCEDURE — 99499 UNLISTED E&M SERVICE: CPT | Mod: S$GLB,,, | Performed by: SPECIALIST

## 2019-08-16 NOTE — PROGRESS NOTES
Subjective:       Patient ID: Bri Santiago is a 58 y.o. male.    Chief Complaint: Sinus Problem; Follow-up; Dizziness; and Ear Fullness    The patient has finished with his antibiotics and is feeling significantly better.  His established pattern is to developed an episode of acute sinusitis every 4-6 weeks for which he takes antibiotics.  After the antibiotics he symptoms are clear for a month or so and then recur.  He has headaches associated with the sinus infections that are significantly less severe but present during the time between the infections.  He is using Astelin, Flonase, Claritin and Singulair routinely and ipratropium bromide spray when needed.    He has been using Nexium for his laryngopharyngeal reflux.  The he has mild dysphagia and phlegm in the throat.  Those symptoms are improving with medical therapy.    Review of Systems   Constitutional: Positive for fatigue. Negative for activity change, appetite change, chills, fever and unexpected weight change.   HENT: Positive for congestion, postnasal drip, rhinorrhea, sinus pressure, sinus pain, sneezing, sore throat and trouble swallowing. Negative for ear discharge, ear pain, facial swelling, hearing loss, mouth sores, tinnitus and voice change.         Loud snoring  Witnessed apneas,  Morning fatigue   Eyes: Positive for pain. Negative for photophobia, discharge, redness, itching and visual disturbance.   Respiratory: Positive for cough. Negative for apnea, choking, shortness of breath and wheezing.    Cardiovascular: Negative for chest pain and palpitations.   Gastrointestinal: Negative for abdominal distention, abdominal pain, nausea and vomiting.   Musculoskeletal: Negative for arthralgias, myalgias, neck pain and neck stiffness.   Skin: Negative.  Negative for color change, pallor and rash.   Allergic/Immunologic: Negative for environmental allergies, food allergies and immunocompromised state.   Neurological: Positive for headaches.  Negative for dizziness, facial asymmetry, speech difficulty, weakness, light-headedness and numbness.   Hematological: Negative for adenopathy. Does not bruise/bleed easily.   Psychiatric/Behavioral: Negative for agitation, confusion, decreased concentration and sleep disturbance.       Objective:      Physical Exam   Constitutional: He is oriented to person, place, and time. He appears well-developed and well-nourished. He is cooperative.   HENT:   Head: Normocephalic.   Right Ear: External ear and ear canal normal. Tympanic membrane is retracted.   Left Ear: External ear and ear canal normal. Tympanic membrane is retracted.   Nose: Mucosal edema (cyanotic, boggy inferior turbinates bilaterally), rhinorrhea (clear mucus bilaterally) and septal deviation (To the right) present.   Mouth/Throat: Uvula is midline, oropharynx is clear and moist and mucous membranes are normal. No oral lesions.   Oral cavity-Mitchell class 3 with enlargement base of tongue, severe hyperactive gag response prohibits evaluation of tonsils, palate and uvula   Eyes: Pupils are equal, round, and reactive to light. EOM and lids are normal. Right eye exhibits no discharge and no exudate. Left eye exhibits no discharge and no exudate. Right conjunctiva is injected. Left conjunctiva is injected.   Neck: Trachea normal and normal range of motion. No muscular tenderness present. No tracheal deviation present. No thyroid mass and no thyromegaly present.   Cardiovascular: Normal rate, regular rhythm, normal heart sounds and normal pulses.   Pulmonary/Chest: Effort normal and breath sounds normal. No stridor. He has no decreased breath sounds. He has no wheezes. He has no rhonchi. He has no rales.   Abdominal: Soft. Bowel sounds are normal. There is no tenderness.   Musculoskeletal: Normal range of motion.   Lymphadenopathy:        Head (right side): No submental, no submandibular, no preauricular, no posterior auricular and no occipital adenopathy  present.        Head (left side): No submental, no submandibular, no preauricular, no posterior auricular and no occipital adenopathy present.     He has no cervical adenopathy.   Neurological: He is alert and oriented to person, place, and time. He has normal strength. No cranial nerve deficit or sensory deficit. Gait normal.   Skin: Skin is warm and dry. No petechiae and no rash noted. No cyanosis. Nails show no clubbing.   Psychiatric: He has a normal mood and affect. His speech is normal and behavior is normal. Judgment and thought content normal. Cognition and memory are normal.       CT scan of the sinuses-mucosal thickening in the maxillary sinuses bilaterally, 3 mm on the right and 4 mm on left, septum deviated to the left, inferior turbinates enlarged bilaterally     Assessment:       1. Acute recurrent sinusitis, unspecified location    2. Allergic rhinitis, unspecified seasonality, unspecified trigger    3. Nasal septal deviation    4. Laryngopharyngeal reflux (LPR)    5. Dysphagia, unspecified type        Plan:       I am recommending to the patient that he undergo bilateral maxillary sinus balloon sinus plasty in the operating room with pre treatment of his von Willebrand's disease of with DDAVP.  He will need to see the anesthesiologist and intensivist preoperatively.  I have discussed the risks and benefits of surgery with the patient as outlined on the informed consent and answered all of his questions.  The operative consent was signed and witnessed.  He will be given handouts on endoscopic sinus surgery and aftercare.  I will recheck him 1 week postoperatively.

## 2019-08-16 NOTE — PATIENT INSTRUCTIONS
After Endoscopic Sinus Surgery    After surgery youll be moved to a recovery room. You may feel groggy from the anesthesia and will likely have some discomfort. There will be a dressing under your nose to absorb drainage. You may also have packing (absorbent bandage) inside your nose. You can usually go home as soon as youre no longer feeling groggy. This is usually the same day. In certain cases, you may need to stay overnight.  The first week  Your doctor will schedule an office visit a few days after surgery to check on your progress. At this visit, your doctor will remove dried blood and mucus to help you heal. He or she will also remove any nasal packing. Its normal to feel stuffiness and have pinkish or dark red drainage. Change your nasal dressing as needed, and take any prescribed medicines. Also be sure to drink plenty of water. Other guidelines from your doctor may include:  · Rinsing your nose and sinuses with saltwater  · Sneeze with your mouth open  · Not blowing your nose  · Not doing strenuous exercise, straining, or lifting  · Using a humidifier to keep nasal passages moist  · Not taking aspirin or ibuprofen  · Sleeping with your upper body raised  · Not eating hot or spicy foods  The next few weeks  As youre healing, its normal to feel some stuffiness and have nasal crusting. Keeping your nasal passages clean and moist will help speed the healing process and prevent scarring. Also be sure to:  · Take medicine as directed  · Stay away from irritating substances such as dust, chalk, and harsh chemicals  · Use saltwater rinses or a humidifier as directed.  · Drink plenty of water  · Stay away from people who have a cold  · Stay away from allergic triggers  · Talk with your doctor before swimming or air travel  When to seek medical care  Call your healthcare provider right away if you notice any of the following:  · Large amount of bright red bleeding  · Fever of 100.4°F (38°C) or higher, or as  directed by your healthcare provider  · Changes in vision, or swelling around the eye  · Signs of infection, such as yellow or greenish drainage  · Constant headache or increasing pain  · Drainage of a large amount of clear fluid  · Extreme tiredness, or a stiff neck   Date Last Reviewed: 10/1/2016  © 5735-5925 [employer], [employer]. [address] [place] [address], [place] [address]. All rights reserved. This information is not intended as a substitute for professional medical care. Always follow your healthcare professional's instructions.        Endoscopic Sinus Surgery  The sinuses are hollow areas formed by the bones of the face. Normally, a thin layer of mucus drains from the sinuses into the nose. If the drainage path is blocked, problems such as infection can result. Endoscopic sinus surgery can be done to help clear blockages. The surgeon uses a thin, lighted tube (endoscope) that is put into your nose. The tube lets the doctor see and operate inside your nose and sinuses.     Straightening the septum       Removing polyps         Opening the ethmoid sinuses       Clearing the outflow pathway      Straightening the septum  The septum is a piece of cartilage and bone that runs straight down the inside of the nose. It divides the nose into two sides.  A deviated septum is crooked instead of straight. A crooked septum can cause breathing problems. To fix a deviated septum, the doctor reshapes or trims the cartilage and bone. There is enough septum left for the nose to hold its shape. But the air has more space to move in and out of the nose. This improves your breathing.  Removing polyps  Polyps are small growths. They can grow in both the nose and sinuses. The surgeon may use different methods to remove them. Often, the surgeon uses special tools to remove polyps without harming nearby tissues.  Opening the ethmoid sinuses  The ethmoid sinuses are made up of many small air spaces, like a honeycomb. Like the other  sinuses, the ethmoids have a lining that makes mucus. In some cases the drainage path is blocked. The doctor may open the thin walls of bone that separate the air spaces. This creates a passage for mucus to drain more easily.  Clearing the major outflow pathway of the sinuses  The osteomeatal complex is a term for a major outflow tract of your sinuses. Similar to a traffic jam, when this area becomes blocked, you may get symptoms in your maxillary, ethmoid, and frontal sinuses. Opening this area is a primary step in most sinus surgeries. The uncinate process is a small piece of bone and tissue in the sinuses. It forms an outlet for part of the sinuses. If this tissue is swollen (inflamed), it will block drainage of mucus. The doctor may remove the uncinate process so that mucus can drain.  Date Last Reviewed: 10/1/2016  © 5617-4980 [employer]. [address] [place] [address]. All rights reserved. This information is not intended as a substitute for professional medical care. Always follow your healthcare professional's instructions.        Preparing for Endoscopic Sinus Surgery    You are scheduled to have sinus surgery. You will be asked to do some things to get ready. Be sure to follow the instructions youre given. If you have questions, call your doctors office.   Before surgery  To get ready for the surgery, you may be told to do the following:  · Tell your doctor about all of the medicines, supplements, and herbal remedies that you take. Ask if you should stop taking them before surgery.  · Tell your doctor if you have a pacemaker.  · Dont take any medicines that contain aspirin or ibuprofen for 7 days before surgery.  · Arrange for an adult family member or friend to drive you home after surgery.  · Follow any directions you are given for not eating or drinking before surgery.  Anesthesia  Anesthesia is medicine that keeps you free from pain. It is given by a trained doctor called  an anesthesiologist. He or she will talk to you about the type of anesthesia that will be used during surgery. You may be given one or more of the following:  · General anesthesia. This puts you in a state like deep sleep throughout the procedure.  · Local anesthesia. This is injected into the skin and numbs just the area being treated.  · Monitored sedation. This makes you drowsy or slightly asleep, but doesnt put you into a deep sleep.  Risks and possible complications of endoscopic surgery  This surgery has a high rate of success. But it does carry the same risks as other surgery. These include:  · Infection  · Bruising  · Excessive bleeding  · Altered sense of taste or smell  · Spinal fluid leakage (very rare)  · Injury to the eye (very rare)   Date Last Reviewed: 10/1/2016  © 8449-2056 [employer]. [address] [place] [address], [place] [address]. All rights reserved. This information is not intended as a substitute for professional medical care. Always follow your healthcare professional's instructions.

## 2019-08-22 ENCOUNTER — CLINICAL SUPPORT (OUTPATIENT)
Dept: REHABILITATION | Facility: OTHER | Age: 59
End: 2019-08-22
Attending: SPECIALIST
Payer: MEDICARE

## 2019-08-22 DIAGNOSIS — M54.50 CHRONIC BILATERAL LOW BACK PAIN WITHOUT SCIATICA: ICD-10-CM

## 2019-08-22 DIAGNOSIS — G89.29 CHRONIC BILATERAL LOW BACK PAIN WITHOUT SCIATICA: ICD-10-CM

## 2019-08-22 PROCEDURE — 97110 THERAPEUTIC EXERCISES: CPT

## 2019-08-22 NOTE — PROGRESS NOTES
[facility]      Name: Bri Santiago  Clinic Number: [id number]    Therapy Diagnosis:   Encounter Diagnosis   Name Primary?    Chronic bilateral low back pain without sciatica      Physician: Buffy Villagran,Christina    Visit Date: 2019    Physician Orders: PT Eval and Treat   Medical Diagnosis from Referral: Lumbar spondylosis, Postlaminectomy syndrome of lumbar region, Degeneration of lumbar or lumbosacral intervertebral disc, Spinal stenosis, lumbar region, without neurogenic claudication  Evaluation Date: 2019  Authorization Period Expiration: 19  Plan of Care Expiration: 10/16/19  Reassessment Due: 19  Visit # / Visits authorized:       Time In:  10:00  Time Out: 11:00     Total Billable Time: 30 minutes       Precautions: Lumbar fusion L4-S1 3x, cervical surgery,  CHF Osteopenia, hx of prostate cancer     Pattern of pain determined: 1 PEN      Subjective   Bri reports he has been very busy lately. He has been able to do HEP 3x daily and finds it helps his stiffness     Patient reports tolerating previous visit fair  Patient reports their pain to be 6/10 on a 0-10 scale with 0 being no pain and 10 being the worst pain imaginable.  Pain Location: Low back     Occupation: On disability   Leisure: family     Pts goals: Do more activity, get back to taking care of yard and cooking       Objective     Baseline IM Testing Results:   Date of testin2019  ROM 0-21 deg  (increased to 0-24 19)   Max Peak Torque 90    Min Peak Torque 20    Flex/Ext Ratio 4.5   % below normative data 33        CMS Impairment/Limitation/Restriction for FOTO Lumbar Survey     Outcomes not captured at time of evaluation. Based on subjective reports and and objective findings, estimate 40-50 limitation at this time.   Limitation Score: 40-50%  Category: Mobility     Current : CK = at least 40% but < 60% impaired, limited or restricted  Goal: CJ = at least 20% but < 40%  impaired, limited or restricted  Discharge:              Treatment    Pt was instructed in and performed the following:     Bri received therapeutic exercises to develop/improved posture, cardiovascular endurance, muscular endurance, lumbar/cervical ROM, strength and muscular endurance for 30 minutes including the following exercises:     [facility] 8/22/2019   Visit Number 5   VAS Pain Rating 6   Treadmill Time (in min.) 10   Speed -   Recumbent Bike Seat Pos. -   Time -   Scapular Retraction -   Lumbar Stretches - Slouch Overcorrection -   Extension in Lying -   Extension in Standing -   Flexion in Lying -   Lumbar Extension Seat Pad -   Femur Restraint -   Top Dead Center -   Counterweight -   Lumbar Flexion -   Lumbar Extension -   Lumbar Peak Torque -   Min Torque -   Test Percent Below Normative Data -   Lumbar Weight 69   Repetitions 20   Rating of Perceived Exertion 3   Ice - Z Lie (in min.) 10           Performed today:   Slouch and correct 10x  HERNAN with ball 10x  PPT 5 s/h 10x  PPT + Bridges 10x   Bent knee fall out 10x  Standing lumbar extension with towel overpressure 10x  Scapular retractions 10x   PPT seated 10x  Hip/back disassociation 10x   Sit<>stand squat training 10x       Peripheral muscle strengthening which included 1 set of 15-20 repetitions at a slow, controlled 10-13 second per rep pace focused on strengthening supporting musculature for improved body mechanics and functional mobility.  Pt and therapist focused on proper form during treatment to ensure optimal strengthening of each targeted muscle group.  Machines were utilized including torso rotation, leg extension, leg curl, chest press, upright row. Tricep extension, bicep curl, leg press, and hip abduction added visit 3    Bri received the following manual therapy techniques: none were applied.         Home Exercises Provided and Patient Education Provided     Education provided:   - Patient received education in benefits  "of progressing mobility and strengthening gradually  - Discussed exertion scale, slow progressive resistance protocol to promote safe and healthy strengthening of supportive musculature  by performing 15-20 reps, 7 sec per rep, and increasing weight by 5 % at 20 reps only if there ex done safely, slowly, using correct musculature, exertion and no pain.  Patient expressed understanding.  -Pt educated on strategies to safely perform examination and exercise using "Stop Light" analogy to describe how to avoid or stop irritating motions, proceed with caution with some movements, and progress positive exercises.       Written Home Exercises Provided: Patient instructed to cont prior HEP.  Exercises were reviewed and Bri was able to demonstrate them prior to the end of the session.  Arsenmarcelo demonstrated fair  understanding of the education provided.     See EMR under Patient Instructions for exercises provided prior visit.          Assessment     Pt present to therapy with some low back pain and reports feeling a little better than when he came in. Reviewed HEP with minimal v/c. Progressed core exercise to seated PPT and hip/back disassociation with positive pt response. Pt was able to tolerate Med X lumbar extension exercise with 5% weight increase without increased symptoms.  Plan to increase Med X ROM or weights 5% next session.     Patient is making good progress towards established goals.  Pt will continue to benefit from skilled outpatient physical therapy to address the deficits stated in the impairment chart, provide pt/family education and to maximize pt's level of independence in the home and community environment.     Anticipated Barriers for therapy: Previous non-compliance with HEP, multiple lumbar surgeries, CHF, osteopenia     Pt's spiritual, cultural and educational needs considered and pt agreeable to plan of care and goals as stated below:       GOALS: Pt is in agreement with the following goals.   "   Short term goals:  6 weeks or 10 visits   1.  Pt will demonstrate increased lumbar ROM by at least 6 degrees from the initial ROM value with improvements noted in functional ROM and ability to perform ADLs.  2.  Pt will demonstrate increased maximum isometric torque value by 10% when compared to the initial value resulting in improved ability to perform bending, lifting, and carrying activities safely, confidently.     3.  Patient report a reduction in worst pain score by 1-2 points for improved tolerance for prolonged sitting needing for driving.  4.  Pt able to perform HEP correctly with minimal cueing or supervision from therapist to encourage independent management of symptoms.            Long term goals: 13 weeks or 20 visits   1. Pt will demonstrate increased lumbar ROM by at least 12 degrees from initial ROM value, resulting in improved ability to perform functional fwd bending while standing and sitting.   2. Pt will demonstrate increased maximum isometric torque value by 25% when compared to the initial value resulting in improved ability to perform bending, lifting, and carrying activities safely, confidently.  3. Pt to demonstrate ability to independently control and reduce their pain through posture positioning and mechanical movements throughout a typical day.  4.  Pt will demonstrate reduced pain and improved functional outcomes as reported on the FOTO by reaching a score of CJ = at least 20% but < 40% impaired, limited or restricted or less in order to demonstrate subjective improvement in pt's condition.    5. Pt will demonstrate independence with the HEP at discharge  6.   Pt will demonstrate appropriate squat technique for improved tolerance of lifting and carrying functional activity needed for household tasks.   7. Pt will express ability to mow lawn without significantly increased pain (patient goal)      Plan   Continue with established Plan of Care towards established PT goals.

## 2019-08-27 ENCOUNTER — CLINICAL SUPPORT (OUTPATIENT)
Dept: REHABILITATION | Facility: OTHER | Age: 59
End: 2019-08-27
Attending: SPECIALIST
Payer: MEDICARE

## 2019-08-27 DIAGNOSIS — M54.50 CHRONIC BILATERAL LOW BACK PAIN WITHOUT SCIATICA: ICD-10-CM

## 2019-08-27 DIAGNOSIS — G89.29 CHRONIC BILATERAL LOW BACK PAIN WITHOUT SCIATICA: ICD-10-CM

## 2019-08-27 PROCEDURE — 97110 THERAPEUTIC EXERCISES: CPT

## 2019-08-27 NOTE — PROGRESS NOTES
[facility]      Name: Bri Santiago  Clinic Number: [id number]    Therapy Diagnosis:   Encounter Diagnosis   Name Primary?    Chronic bilateral low back pain without sciatica      Physician: Buffy Villagran,Christina    Visit Date: 2019    Physician Orders: PT Eval and Treat   Medical Diagnosis from Referral: Lumbar spondylosis, Postlaminectomy syndrome of lumbar region, Degeneration of lumbar or lumbosacral intervertebral disc, Spinal stenosis, lumbar region, without neurogenic claudication  Evaluation Date: 2019  Authorization Period Expiration: 19  Plan of Care Expiration: 10/16/19  Reassessment Due: 19  Visit # / Visits authorized:       Time In:  9:30  Time Out: 10:30     Total Billable Time: 30 minutes       Precautions: Lumbar fusion L4-S1 3x, cervical surgery,  CHF Osteopenia, hx of prostate cancer     Pattern of pain determined: 1 PEN      Subjective   Arsenlin reports he is feeling stiff night now and has about 6/10 pain level    Patient reports tolerating previous visit fair  Patient reports their pain to be 6/10 on a 0-10 scale with 0 being no pain and 10 being the worst pain imaginable.  Pain Location: Low back     Occupation: On disability   Leisure: family     Pts goals: Do more activity, get back to taking care of yard and cooking       Objective     Baseline IM Testing Results:   Date of testin2019  ROM 0-21 deg  (increased to 0-24 19)   Max Peak Torque 90    Min Peak Torque 20    Flex/Ext Ratio 4.5   % below normative data 33        CMS Impairment/Limitation/Restriction for FOTO Lumbar Survey     Outcomes not captured at time of evaluation. Based on subjective reports and and objective findings, estimate 40-50 limitation at this time.   Limitation Score: 40-50%  Category: Mobility     Current : CK = at least 40% but < 60% impaired, limited or restricted  Goal: CJ = at least 20% but < 40% impaired, limited or  restricted  Discharge:              Treatment    Pt was instructed in and performed the following:     Bri received therapeutic exercises to develop/improved posture, cardiovascular endurance, muscular endurance, lumbar/cervical ROM, strength and muscular endurance for 30 minutes including the following exercises:     [facility] 8/27/2019   Visit Number 6   VAS Pain Rating 6   Treadmill Time (in min.) 10   Speed -   Recumbent Bike Seat Pos. -   Time -   Scapular Retraction -   Lumbar Stretches - Slouch Overcorrection -   Extension in Lying -   Extension in Standing -   Flexion in Lying 10   Lumbar Extension Seat Pad -   Femur Restraint -   Top Dead Center -   Counterweight 385   Lumbar Flexion -   Lumbar Extension -   Lumbar Peak Torque -   Min Torque -   Test Percent Below Normative Data -   Lumbar Weight 73   Repetitions 20   Rating of Perceived Exertion 3   Ice - Z Lie (in min.) 10         Exercises completd this visit:  HERNAN with ball 10x  PPT 5 s/h 10x  PPT + Bridges 10x   Bent knee fall out 10x  Standing lumbar extension with towel overpressure 10x  Scapular retractions 10x     Exercises completed on previous visits/HEP::   Slouch and correct 10x  PPT seated 10x  Hip/back disassociation 10x   Sit<>stand squat training 10x       Peripheral muscle strengthening which included 1 set of 15-20 repetitions at a slow, controlled 10-13 second per rep pace focused on strengthening supporting musculature for improved body mechanics and functional mobility.  Pt and therapist focused on proper form during treatment to ensure optimal strengthening of each targeted muscle group.  Machines were utilized including torso rotation, leg extension, leg curl, chest press, upright row. Tricep extension, bicep curl, leg press, and hip abduction added visit 3    Bri received the following manual therapy techniques: none were applied.         Home Exercises Provided and Patient Education Provided     Education provided:  "  - Patient received education in benefits of progressing mobility and strengthening gradually  - Discussed exertion scale, slow progressive resistance protocol to promote safe and healthy strengthening of supportive musculature  by performing 15-20 reps, 7 sec per rep, and increasing weight by 5 % at 20 reps only if there ex done safely, slowly, using correct musculature, exertion and no pain.  Patient expressed understanding.  -Pt educated on strategies to safely perform examination and exercise using "Stop Light" analogy to describe how to avoid or stop irritating motions, proceed with caution with some movements, and progress positive exercises.       Written Home Exercises Provided: Patient instructed to cont prior HEP.  Exercises were reviewed and Arsenmarcelo was able to demonstrate them prior to the end of the session.  Bri demonstrated fair  understanding of the education provided.     See EMR under Patient Instructions for exercises provided prior visit.          Assessment     Pt present to therapy with some low back pain and reports feeling a little better than when he came in. Reviewed HEP with good progression. Pt required max verbal and visual cues to preform supine PPT, he has a tendency to over brace at the abdomen and there is minimal rotation at the hips, he did best with visual cues. Pt was able to tolerate Med X lumbar extension exercise with 5% weight increase without increased symptoms.  Plan to increase Med X ROM or weights 5% next session.     Patient is making good progress towards established goals.  Pt will continue to benefit from skilled outpatient physical therapy to address the deficits stated in the impairment chart, provide pt/family education and to maximize pt's level of independence in the home and community environment.     Anticipated Barriers for therapy: Previous non-compliance with HEP, multiple lumbar surgeries, CHF, osteopenia     Pt's spiritual, cultural and educational " needs considered and pt agreeable to plan of care and goals as stated below:       GOALS: Pt is in agreement with the following goals.     Short term goals:  6 weeks or 10 visits   1.  Pt will demonstrate increased lumbar ROM by at least 6 degrees from the initial ROM value with improvements noted in functional ROM and ability to perform ADLs.  2.  Pt will demonstrate increased maximum isometric torque value by 10% when compared to the initial value resulting in improved ability to perform bending, lifting, and carrying activities safely, confidently.     3.  Patient report a reduction in worst pain score by 1-2 points for improved tolerance for prolonged sitting needing for driving.  4.  Pt able to perform HEP correctly with minimal cueing or supervision from therapist to encourage independent management of symptoms.            Long term goals: 13 weeks or 20 visits   1. Pt will demonstrate increased lumbar ROM by at least 12 degrees from initial ROM value, resulting in improved ability to perform functional fwd bending while standing and sitting.   2. Pt will demonstrate increased maximum isometric torque value by 25% when compared to the initial value resulting in improved ability to perform bending, lifting, and carrying activities safely, confidently.  3. Pt to demonstrate ability to independently control and reduce their pain through posture positioning and mechanical movements throughout a typical day.  4.  Pt will demonstrate reduced pain and improved functional outcomes as reported on the FOTO by reaching a score of CJ = at least 20% but < 40% impaired, limited or restricted or less in order to demonstrate subjective improvement in pt's condition.    5. Pt will demonstrate independence with the HEP at discharge  6.   Pt will demonstrate appropriate squat technique for improved tolerance of lifting and carrying functional activity needed for household tasks.   7. Pt will express ability to mow lawn without  significantly increased pain (patient goal)      Plan   Continue with established Plan of Care towards established PT goals.

## 2019-08-29 ENCOUNTER — CLINICAL SUPPORT (OUTPATIENT)
Dept: REHABILITATION | Facility: OTHER | Age: 59
End: 2019-08-29
Attending: SPECIALIST
Payer: MEDICARE

## 2019-08-29 DIAGNOSIS — M54.50 CHRONIC BILATERAL LOW BACK PAIN WITHOUT SCIATICA: ICD-10-CM

## 2019-08-29 DIAGNOSIS — G89.29 CHRONIC BILATERAL LOW BACK PAIN WITHOUT SCIATICA: ICD-10-CM

## 2019-08-29 PROCEDURE — 97110 THERAPEUTIC EXERCISES: CPT

## 2019-08-29 NOTE — PROGRESS NOTES
[facility]      Name: Bri Santiago  Clinic Number: [id number]    Therapy Diagnosis:   Encounter Diagnosis   Name Primary?    Chronic bilateral low back pain without sciatica      Physician: Buffy Villagran,Christina    Visit Date: 2019    Physician Orders: PT Eval and Treat   Medical Diagnosis from Referral: Lumbar spondylosis, Postlaminectomy syndrome of lumbar region, Degeneration of lumbar or lumbosacral intervertebral disc, Spinal stenosis, lumbar region, without neurogenic claudication  Evaluation Date: 2019  Authorization Period Expiration: 19  Plan of Care Expiration: 10/16/19  Reassessment Due: 19  Visit # / Visits authorized:        Time In:  9:50  Time Out: 10:50     Total Billable Time: 30 minutes       Precautions: Lumbar fusion L4-S1 3x, cervical surgery,  CHF Osteopenia, hx of prostate cancer     Pattern of pain determined: 1 PEN      Subjective   Harlin reports he is feeling stiff but is getting a little better overall.     Patient reports tolerating previous visit fair  Patient reports their pain to be 5/10 on a 0-10 scale with 0 being no pain and 10 being the worst pain imaginable.  Pain Location: Low back     Occupation: On disability   Leisure: family     Pts goals: Do more activity, get back to taking care of yard and cooking       Objective     Baseline IM Testing Results:   Date of testin2019  ROM 0-21 deg  (increased to 0-30 19)   Max Peak Torque 90    Min Peak Torque 20    Flex/Ext Ratio 4.5   % below normative data 33        CMS Impairment/Limitation/Restriction for FOTO Lumbar Survey     Outcomes not captured at time of evaluation. Based on subjective reports and and objective findings, estimate 40-50 limitation at this time.   Limitation Score: 40-50%  Category: Mobility     Current : CK = at least 40% but < 60% impaired, limited or restricted  Goal: CJ = at least 20% but < 40% impaired, limited or  restricted  Discharge:              Treatment    Pt was instructed in and performed the following:     Bri received therapeutic exercises to develop/improved posture, cardiovascular endurance, muscular endurance, lumbar/cervical ROM, strength and muscular endurance for 30 minutes including the following exercises:     [facility] 8/29/2019   Visit Number 7   VAS Pain Rating 5   Treadmill Time (in min.) 10   Speed -   Recumbent Bike Seat Pos. -   Time -   Scapular Retraction -   Lumbar Stretches - Slouch Overcorrection -   Extension in Lying -   Extension in Standing -   Flexion in Lying -   Lumbar Extension Seat Pad -   Femur Restraint -   Top Dead Center -   Counterweight -   Lumbar Flexion 30   Lumbar Extension 0   Lumbar Peak Torque -   Min Torque -   Test Percent Below Normative Data -   Lumbar Weight 73   Repetitions 20   Rating of Perceived Exertion 3   Ice - Z Lie (in min.) 10     Exercises completd this visit:  HERNAN with ball 10x  PPT 5 s/h 10x  PPT + Bridges 10x   Bent knee fall out 10x  Standing lumbar extension with towel overpressure 10x  Scapular retractions 10x   PPT seated 10x  Hip/back disassociation 10x   Sit<>stand squat training 10x     Exercises completed on previous visits/HEP::   Slouch and correct 10x        Peripheral muscle strengthening which included 1 set of 15-20 repetitions at a slow, controlled 10-13 second per rep pace focused on strengthening supporting musculature for improved body mechanics and functional mobility.  Pt and therapist focused on proper form during treatment to ensure optimal strengthening of each targeted muscle group.  Machines were utilized including torso rotation, leg extension, leg curl, chest press, upright row. Tricep extension, bicep curl, leg press, and hip abduction added visit 3    Bri received the following manual therapy techniques: none were applied.         Home Exercises Provided and Patient Education Provided     Education provided:   -  "Patient received education in benefits of progressing mobility and strengthening gradually  - Discussed exertion scale, slow progressive resistance protocol to promote safe and healthy strengthening of supportive musculature  by performing 15-20 reps, 7 sec per rep, and increasing weight by 5 % at 20 reps only if there ex done safely, slowly, using correct musculature, exertion and no pain.  Patient expressed understanding.  -Pt educated on strategies to safely perform examination and exercise using "Stop Light" analogy to describe how to avoid or stop irritating motions, proceed with caution with some movements, and progress positive exercises.       Written Home Exercises Provided: Patient instructed to cont prior HEP.  Exercises were reviewed and Arsenmarcelo was able to demonstrate them prior to the end of the session.  Bri demonstrated fair  understanding of the education provided.     See EMR under Patient Instructions for exercises provided prior visit.          Assessment     Pt present to therapy with some low back pain and reports feeling a little better than when he came in. Reviewed HEP with good progression. Pt required mod verbal and visual cues to preform supine PPT, he has a tendency to over brace at the abdomen and there is minimal rotation at the hips, he did best with visual cues. Reassessed Med X lumbar ROM with patient  able to tolerate increase to 30-0 at same weight without increased symptoms. Pt unable to increased ROM without removing stabilization belt.   Plan to increase Med X weights 5% next session.     Patient is making good progress towards established goals.  Pt will continue to benefit from skilled outpatient physical therapy to address the deficits stated in the impairment chart, provide pt/family education and to maximize pt's level of independence in the home and community environment.     Anticipated Barriers for therapy: Previous non-compliance with HEP, multiple lumbar surgeries, " CHF, osteopenia     Pt's spiritual, cultural and educational needs considered and pt agreeable to plan of care and goals as stated below:       GOALS: Pt is in agreement with the following goals.     Short term goals:  6 weeks or 10 visits   1.  Pt will demonstrate increased lumbar ROM by at least 6 degrees from the initial ROM value with improvements noted in functional ROM and ability to perform ADLs. Met 8/29/19  2.  Pt will demonstrate increased maximum isometric torque value by 10% when compared to the initial value resulting in improved ability to perform bending, lifting, and carrying activities safely, confidently.     3.  Patient report a reduction in worst pain score by 1-2 points for improved tolerance for prolonged sitting needing for driving.  4.  Pt able to perform HEP correctly with minimal cueing or supervision from therapist to encourage independent management of symptoms.            Long term goals: 13 weeks or 20 visits   1. Pt will demonstrate increased lumbar ROM by at least 12 degrees from initial ROM value, resulting in improved ability to perform functional fwd bending while standing and sitting.   2. Pt will demonstrate increased maximum isometric torque value by 25% when compared to the initial value resulting in improved ability to perform bending, lifting, and carrying activities safely, confidently.  3. Pt to demonstrate ability to independently control and reduce their pain through posture positioning and mechanical movements throughout a typical day.  4.  Pt will demonstrate reduced pain and improved functional outcomes as reported on the FOTO by reaching a score of CJ = at least 20% but < 40% impaired, limited or restricted or less in order to demonstrate subjective improvement in pt's condition.    5. Pt will demonstrate independence with the HEP at discharge  6.   Pt will demonstrate appropriate squat technique for improved tolerance of lifting and carrying functional activity  needed for household tasks.   7. Pt will express ability to mow lawn without significantly increased pain (patient goal)      Plan   Continue with established Plan of Care towards established PT goals.

## 2019-09-04 ENCOUNTER — ANESTHESIA EVENT (OUTPATIENT)
Dept: SURGERY | Facility: OTHER | Age: 59
End: 2019-09-04
Payer: MEDICARE

## 2019-09-04 ENCOUNTER — OFFICE VISIT (OUTPATIENT)
Dept: INTERNAL MEDICINE | Facility: CLINIC | Age: 59
End: 2019-09-04
Payer: MEDICARE

## 2019-09-04 ENCOUNTER — HOSPITAL ENCOUNTER (OUTPATIENT)
Dept: PREADMISSION TESTING | Facility: OTHER | Age: 59
Discharge: HOME | End: 2019-09-04
Attending: SPECIALIST
Payer: MEDICARE

## 2019-09-04 VITALS
BODY MASS INDEX: 37.13 KG/M2 | SYSTOLIC BLOOD PRESSURE: 116 MMHG | DIASTOLIC BLOOD PRESSURE: 78 MMHG | WEIGHT: 265.19 LBS | HEART RATE: 89 BPM | HEIGHT: 71 IN | OXYGEN SATURATION: 96 %

## 2019-09-04 VITALS
HEIGHT: 71 IN | SYSTOLIC BLOOD PRESSURE: 126 MMHG | HEART RATE: 74 BPM | RESPIRATION RATE: 16 BRPM | WEIGHT: 260 LBS | DIASTOLIC BLOOD PRESSURE: 77 MMHG | OXYGEN SATURATION: 95 % | TEMPERATURE: 98 F | BODY MASS INDEX: 36.4 KG/M2

## 2019-09-04 DIAGNOSIS — K21.9 GASTROESOPHAGEAL REFLUX DISEASE WITHOUT ESOPHAGITIS: ICD-10-CM

## 2019-09-04 DIAGNOSIS — E78.5 HYPERLIPIDEMIA, UNSPECIFIED HYPERLIPIDEMIA TYPE: ICD-10-CM

## 2019-09-04 DIAGNOSIS — G89.29 CHRONIC BILATERAL LOW BACK PAIN WITHOUT SCIATICA: ICD-10-CM

## 2019-09-04 DIAGNOSIS — G47.33 OSA (OBSTRUCTIVE SLEEP APNEA): ICD-10-CM

## 2019-09-04 DIAGNOSIS — I50.32 DIASTOLIC DYSFUNCTION WITH CHRONIC HEART FAILURE: Primary | ICD-10-CM

## 2019-09-04 DIAGNOSIS — M48.061 SPINAL STENOSIS, LUMBAR REGION, WITHOUT NEUROGENIC CLAUDICATION: ICD-10-CM

## 2019-09-04 DIAGNOSIS — M54.50 CHRONIC BILATERAL LOW BACK PAIN WITHOUT SCIATICA: ICD-10-CM

## 2019-09-04 DIAGNOSIS — I10 ESSENTIAL HYPERTENSION: ICD-10-CM

## 2019-09-04 DIAGNOSIS — J45.40 MODERATE PERSISTENT ASTHMA WITHOUT COMPLICATION: ICD-10-CM

## 2019-09-04 DIAGNOSIS — D68.00 VON WILLEBRAND DISEASE: ICD-10-CM

## 2019-09-04 DIAGNOSIS — I70.0 THORACIC AORTA ATHEROSCLEROSIS: ICD-10-CM

## 2019-09-04 PROBLEM — N45.1 RIGHT EPIDIDYMITIS: Status: RESOLVED | Noted: 2019-06-14 | Resolved: 2019-09-04

## 2019-09-04 PROCEDURE — 3008F BODY MASS INDEX DOCD: CPT | Mod: CPTII,S$GLB,, | Performed by: INTERNAL MEDICINE

## 2019-09-04 PROCEDURE — 99999 PR PBB SHADOW E&M-EST. PATIENT-LVL III: CPT | Mod: PBBFAC,,, | Performed by: INTERNAL MEDICINE

## 2019-09-04 PROCEDURE — 99499 RISK ADDL DX/OHS AUDIT: ICD-10-PCS | Mod: S$GLB,,, | Performed by: INTERNAL MEDICINE

## 2019-09-04 PROCEDURE — 3008F PR BODY MASS INDEX (BMI) DOCUMENTED: ICD-10-PCS | Mod: CPTII,S$GLB,, | Performed by: INTERNAL MEDICINE

## 2019-09-04 PROCEDURE — 3078F PR MOST RECENT DIASTOLIC BLOOD PRESSURE < 80 MM HG: ICD-10-PCS | Mod: CPTII,S$GLB,, | Performed by: INTERNAL MEDICINE

## 2019-09-04 PROCEDURE — 3074F SYST BP LT 130 MM HG: CPT | Mod: CPTII,S$GLB,, | Performed by: INTERNAL MEDICINE

## 2019-09-04 PROCEDURE — 99214 OFFICE O/P EST MOD 30 MIN: CPT | Mod: S$GLB,,, | Performed by: INTERNAL MEDICINE

## 2019-09-04 PROCEDURE — 99999 PR PBB SHADOW E&M-EST. PATIENT-LVL III: ICD-10-PCS | Mod: PBBFAC,,, | Performed by: INTERNAL MEDICINE

## 2019-09-04 PROCEDURE — 99499 UNLISTED E&M SERVICE: CPT | Mod: S$GLB,,, | Performed by: INTERNAL MEDICINE

## 2019-09-04 PROCEDURE — 99214 PR OFFICE/OUTPT VISIT, EST, LEVL IV, 30-39 MIN: ICD-10-PCS | Mod: S$GLB,,, | Performed by: INTERNAL MEDICINE

## 2019-09-04 PROCEDURE — 3078F DIAST BP <80 MM HG: CPT | Mod: CPTII,S$GLB,, | Performed by: INTERNAL MEDICINE

## 2019-09-04 PROCEDURE — 3074F PR MOST RECENT SYSTOLIC BLOOD PRESSURE < 130 MM HG: ICD-10-PCS | Mod: CPTII,S$GLB,, | Performed by: INTERNAL MEDICINE

## 2019-09-04 RX ORDER — LIDOCAINE HYDROCHLORIDE 10 MG/ML
1 INJECTION, SOLUTION EPIDURAL; INFILTRATION; INTRACAUDAL; PERINEURAL ONCE
Status: CANCELLED | OUTPATIENT
Start: 2019-09-04 | End: 2019-09-04

## 2019-09-04 RX ORDER — LIDOCAINE HYDROCHLORIDE 10 MG/ML
0.5 INJECTION, SOLUTION EPIDURAL; INFILTRATION; INTRACAUDAL; PERINEURAL ONCE
Status: CANCELLED | OUTPATIENT
Start: 2019-09-04 | End: 2019-09-04

## 2019-09-04 RX ORDER — SODIUM CHLORIDE, SODIUM LACTATE, POTASSIUM CHLORIDE, CALCIUM CHLORIDE 600; 310; 30; 20 MG/100ML; MG/100ML; MG/100ML; MG/100ML
INJECTION, SOLUTION INTRAVENOUS CONTINUOUS
Status: CANCELLED | OUTPATIENT
Start: 2019-09-04

## 2019-09-04 RX ORDER — DEXAMETHASONE SODIUM PHOSPHATE 4 MG/ML
12 INJECTION, SOLUTION INTRA-ARTICULAR; INTRALESIONAL; INTRAMUSCULAR; INTRAVENOUS; SOFT TISSUE
Status: CANCELLED | OUTPATIENT
Start: 2019-09-04

## 2019-09-04 RX ORDER — ATORVASTATIN CALCIUM 40 MG/1
40 TABLET, FILM COATED ORAL DAILY
Qty: 90 TABLET | Refills: 0 | Status: SHIPPED | OUTPATIENT
Start: 2019-09-04 | End: 2020-03-03

## 2019-09-04 NOTE — PRE ADMISSION SCREENING
Spoke with Judy -nurse at Dr Swanson s office. Informed pt needs medical clearnce from Dr Galeas. States she will inform Dr Swanson and he will put orders in Epic for [facility]DA[facility].

## 2019-09-04 NOTE — PROGRESS NOTES
Subjective:       Patient ID: Bri Santiago is a 59 y.o. male.    Chief Complaint: Pre-op Exam (9/9/19 scheduled surgery)    Pt here for preop for upcoming sinus surgery related to chronic/recurrent sinusitis. He is normally seen by his PCP but she was unavailable today and surgery is in 5 days. Pt is active and able to achieve >4 METS (carries groceries, walks up several flight of stairs, walks for exercise 3-4 days per week) without cp/sob/angina. He has tolerated anesthesia in the past without known major complications.    He has probable von Willebrand disease and requires DDAVP prior to invasive procedures; his surgeon is aware based on their last note.     Pt's BP is well controlled. Tolerating meds well. Pt denies cp/sob/ha/vision or neuro changes. Checking at home and is well controlled.     HLD is tx with lipitor which he tolerates well.     He has ABDON but not currently using CPAP; he is awaiting new sleep study as his current machine is outdated.     He has mild persistent asthma that is controlled with symbicort. He has mistakenly been taking flovent also so we discussed stopping this. He uses albuterol rescue infrequently.     He has h/o prostate CA but KIMBERLYN for many years. He has had multiple back/neck surgeries and requires tramadol prn. He sees pain mgmt and has procedures periodically to manage pain.      GERD is well controlled on nexium. He has been unable to reduce dose but does not have red flag symptoms.     He has dx of diastolic (grade 1) CHF but this currently well compensated and is not aware of having exacerbations in the past. Last ECHO showed normal EF. He did have heart cath earlier this year which showed non-obstr CAD. PET stress prior to this was normal but pt was having chest pain which prompted cath. Nevertheless, he is not having these symptoms now. He is on plavix as he is allergic to aspirin. He has mild chronic edema but is managed with daily spironolactone. This is at baseline  and stable.     Review of Systems   Constitutional: Negative for appetite change, fever and unexpected weight change.   Eyes: Negative for visual disturbance.   Respiratory: Negative for shortness of breath.    Cardiovascular: Positive for leg swelling. Negative for chest pain.   Gastrointestinal: Negative for abdominal pain, blood in stool, constipation and diarrhea.   Genitourinary: Negative for difficulty urinating.   Neurological: Negative for light-headedness and headaches.       Objective:      Physical Exam   Constitutional: He is oriented to person, place, and time. He appears well-developed and well-nourished.   HENT:   Head: Normocephalic and atraumatic.   Eyes: Pupils are equal, round, and reactive to light. EOM are normal.   Neck: Normal range of motion. Neck supple. Carotid bruit is not present. No thyromegaly present.   Cardiovascular: Normal rate, regular rhythm, S1 normal, S2 normal and normal heart sounds.   No murmur heard.  Pulmonary/Chest: Effort normal and breath sounds normal. He has no wheezes.   Abdominal: Soft. Bowel sounds are normal. He exhibits no mass. There is no hepatosplenomegaly. There is no tenderness.   Lymphadenopathy:     He has no cervical adenopathy.   Neurological: He is alert and oriented to person, place, and time. No cranial nerve deficit.       Assessment:       1. Diastolic dysfunction with chronic heart failure    2. Hyperlipidemia, unspecified hyperlipidemia type    3. Spinal stenosis, lumbar region, without neurogenic claudication    4. Moderate persistent asthma without complication    5. Essential hypertension    6. Thoracic aorta atherosclerosis    7. Von Willebrand disease    8. Gastroesophageal reflux disease without esophagitis    9. Chronic bilateral low back pain without sciatica    10. ABDON (obstructive sleep apnea)        Plan:       1. EKG on 8/7/19--nsr  2. Prior labs reviewed and look ok  3. He will need DDAVP prior to procedure due to von Willebrand; he  stopped plavix 9/4  4. RCRI is 6% with 1 risk factor of CHF; his functional status is good and is without cardiac symptoms; chronic diseases are stable and well compensated; therefore, he is medically optimized to proceed with planned procedure

## 2019-09-04 NOTE — DISCHARGE INSTRUCTIONS
PRE-ADMIT TESTING -  [phone]    [address]  [place] [facility]          Your surgery has been scheduled at [facility]. We are pleased to have the opportunity to serve you. For Further Information please call [phone].    On the day of surgery please report to the Information Desk on the 1st floor.    · CONTACT YOUR PHYSICIAN'S OFFICE THE DAY PRIOR TO YOUR SURGERY TO OBTAIN YOUR ARRIVAL TIME.     · The evening before surgery do not eat anything after 9 p.m. ( this includes hard candy, chewing gum and mints).  You may only have GATORADE, POWERADE AND WATER  from 9 p.m. until you leave your home.   DO NOT DRINK ANY LIQUIDS ON THE WAY TO THE HOSPITAL.      SPECIAL MEDICATION INSTRUCTIONS: TAKE medications checked off by the Anesthesiologist on your Medication List.    Angiogram Patients: Take medications as instructed by your physician, including aspirin.     Surgery Patients:    If you take ASPIRIN - Your PHYSICIAN/SURGEON will need to inform you IF/OR when you need to stop taking aspirin prior to your surgery.     Do Not take any medications containing IBUPROFEN.  Do Not Wear any make-up or dark nail polish   (especially eye make-up) to surgery. If you come to surgery with makeup on you will be required to remove the makeup or nail polish.    Do not shave your surgical area at least 5 days prior to your surgery. The surgical prep will be performed at the hospital according to Infection Control regulations.    Leave all valuables at home.   Do Not wear any jewelry or watches, including any metal in body piercings. Jewelry must be removed prior to coming to the hospital.  There is a possibility that rings that are unable to be removed may be cut off if they are on the surgical extremity.    Contact Lens must be removed before surgery. Either do not wear the contact lens or bring a case and solution for storage.  Please bring a container for eyeglasses or dentures as required.  Bring  any paperwork your physician has provided, such as consent forms,  history and physicals, doctor's orders, etc.   Bring comfortable clothes that are loose fitting to wear upon discharge. Take into consideration the type of surgery being performed.  Maintain your diet as advised per your physician the day prior to surgery.      Adequate rest the night before surgery is advised.   Park in the Parking lot behind the hospital or in the [place] [facility] across the street from the parking lot. Parking is complimentary.  If you will be discharged the same day as your procedure, please arrange for a responsible adult to drive you home or to accompany you if traveling by taxi.   YOU WILL NOT BE PERMITTED TO DRIVE OR TO LEAVE THE HOSPITAL ALONE AFTER SURGERY.   It is strongly recommended that you arrange for someone to remain with you for the first 24 hrs following your surgery.    The Surgeon will speak to your family/visitor after your surgery regarding the outcome of your surgery and post op care.  The Surgeon may speak to you after your surgery, but there is a possibility you may not remember the details.  Please check with your family members regarding the conversation with the Surgeon.    We strongly recommend whoever is bringing you home be present for discharge instructions.  This will ensure a thorough understanding for your post op home care.      Thank you for your cooperation.  The Staff of [facility].                Bathing Instructions with Hibiclens     Shower the evening before and morning of your procedure with Hibiclens:   Wash your face with water and your regular face wash/soap   Apply Hibiclens directly on your skin or on a wet washcloth and wash gently. When showering: Move away from the shower stream when applying Hibiclens to avoid rinsing off too soon.   Rinse thoroughly with warm water   Do not dilute Hibiclens         Dry off as usual, do not use any deodorant,  powder, body lotions, perfume, after shave or cologne.

## 2019-09-04 NOTE — ANESTHESIA PREPROCEDURE EVALUATION
09/04/2019  Bri Santiago is a 59 y.o., male.    Anesthesia Evaluation    I have reviewed the Patient Summary Reports.    I have reviewed the Nursing Notes.   I have reviewed the Medications.     Review of Systems  Anesthesia Hx:  No problems with previous Anesthesia  Denies Family Hx of Anesthesia complications.   Denies Personal Hx of Anesthesia complications.   Social:  Non-Smoker    Hematology/Oncology:     Oncology Normal   Hematology Comments: Has Von Willebrands Dx    EENT/Dental:EENT/Dental Normal   Cardiovascular:   Hypertension, well controlled CHF Orthopnea BOYD    Pulmonary:   Asthma mild Sleep Apnea    Hepatic/GI:   GERD, well controlled    Musculoskeletal:   Arthritis     Neurological:   Neuromuscular Disease,    Endocrine:  Endocrine Normal        Physical Exam  General:  Obesity    Airway/Jaw/Neck:  Airway Findings: Mouth Opening: Normal Tongue: Normal  General Airway Assessment: Adult  Mallampati: II  TM Distance: Normal, at least 6 cm  Jaw/Neck Findings:     Neck ROM: Normal ROM      Dental:  Dental Findings: In tact             Anesthesia Plan  Type of Anesthesia, risks & benefits discussed:  Anesthesia Type:  general  Patient's Preference:   Intra-op Monitoring Plan: standard ASA monitors  Intra-op Monitoring Plan Comments:   Post Op Pain Control Plan: per primary service following discharge from PACU  Post Op Pain Control Plan Comments:   Induction:   IV  Beta Blocker:         Informed Consent: Patient understands risks and agrees with Anesthesia plan.  Questions answered. Anesthesia consent signed with patient.  ASA Score: 3     Day of Surgery Review of History & Physical:    H&P update referred to the surgeon.     Anesthesia Plan Notes: Pt needs medical clearance. Will go see Dr saba prior to sx.,Addendum,Clearance in epic,needs DDAVP before surg        Ready For Surgery From  Anesthesia Perspective.

## 2019-09-04 NOTE — PRE ADMISSION SCREENING
Dr Swanson s office called to communicate that pt needs medical clearance  . Message left for Jenny Michaud . Pt calling Dr Galeas to obtain medical clearance.

## 2019-09-05 ENCOUNTER — TELEPHONE (OUTPATIENT)
Dept: HEMATOLOGY/ONCOLOGY | Facility: CLINIC | Age: 59
End: 2019-09-05

## 2019-09-05 ENCOUNTER — CLINICAL SUPPORT (OUTPATIENT)
Dept: REHABILITATION | Facility: OTHER | Age: 59
End: 2019-09-05
Attending: SPECIALIST
Payer: MEDICARE

## 2019-09-05 DIAGNOSIS — G89.29 CHRONIC BILATERAL LOW BACK PAIN WITHOUT SCIATICA: ICD-10-CM

## 2019-09-05 DIAGNOSIS — M54.50 CHRONIC BILATERAL LOW BACK PAIN WITHOUT SCIATICA: ICD-10-CM

## 2019-09-05 PROCEDURE — 97110 THERAPEUTIC EXERCISES: CPT

## 2019-09-05 NOTE — TELEPHONE ENCOUNTER
"Call returned. No answer. Voicemail left to please call back.       ----- Message from Shiela Gil sent at 9/5/2019  4:38 PM CDT -----  Contact: EMT [id number]  Name Of Caller: EMT [id number] or [phone]  Provider Name: Dr. Álvarez's pt   What is the nature of the call?:    - EMT called in regards to the pt being scheduled for a F/U and additional testing. He asked them to call and discuss his current status, but there's not a referred physician. Ask that the pt be contacted directly [phone].  Does patient feel the need to be seen today? Unclear   Relationship to the Pt?: none  Contact Preference?: [phone] or [phone]      Additional Notes:  "Thank you for all that you do for our patients'"        "

## 2019-09-05 NOTE — PROGRESS NOTES
[facility]      Name: Bri Santiago  Clinic Number: [id number]    Therapy Diagnosis:   Encounter Diagnosis   Name Primary?    Chronic bilateral low back pain without sciatica      Physician: Buffy Villagran,Christina    Visit Date: 2019    Physician Orders: PT Eval and Treat   Medical Diagnosis from Referral: Lumbar spondylosis, Postlaminectomy syndrome of lumbar region, Degeneration of lumbar or lumbosacral intervertebral disc, Spinal stenosis, lumbar region, without neurogenic claudication  Evaluation Date: 2019  Authorization Period Expiration: 19  Plan of Care Expiration: 10/16/19  Reassessment Due: 19  Visit # / Visits authorized:        Time In:  9:35  Time Out: 10:30     Total Billable Time: 25 minutes    Precautions: Lumbar fusion L4-S1 3x, cervical surgery,  CHF Osteopenia, hx of prostate cancer     Pattern of pain determined: 1 PEN      Subjective   Arsenlin reports he has had increased low back pain since last session. He has tried to stretch and stretch without any significant change in symptoms.     Patient reports tolerating previous visit fair  Patient reports their pain to be 5/10 on a 0-10 scale with 0 being no pain and 10 being the worst pain imaginable.  Pain Location: Low back     Occupation: On disability   Leisure: family     Pts goals: Do more activity, get back to taking care of yard and cooking       Objective     Baseline IM Testing Results:   Date of testin2019  ROM 0-21 deg  (reduced to to 0-27 19)   Max Peak Torque 90    Min Peak Torque 20    Flex/Ext Ratio 4.5   % below normative data 33        CMS Impairment/Limitation/Restriction for FOTO Lumbar Survey     Outcomes not captured at time of evaluation. Based on subjective reports and and objective findings, estimate 40-50 limitation at this time.   Limitation Score: 40-50%  Category: Mobility     Current : CK = at least 40% but < 60% impaired, limited or  restricted  Goal: CJ = at least 20% but < 40% impaired, limited or restricted  Discharge:              Treatment    Pt was instructed in and performed the following:     Bri received therapeutic exercises to develop/improved posture, cardiovascular endurance, muscular endurance, lumbar/cervical ROM, strength and muscular endurance for 30 minutes including the following exercises:   [facility] 9/5/2019   Visit Number 8   VAS Pain Rating 7   Treadmill Time (in min.) 10   Speed -   Recumbent Bike Seat Pos. -   Time -   Scapular Retraction -   Lumbar Stretches - Slouch Overcorrection -   Extension in Lying -   Extension in Standing -   Flexion in Lying -   Lumbar Extension Seat Pad -   Femur Restraint -   Top Dead Center -   Counterweight -   Lumbar Flexion 27   Lumbar Extension 0   Lumbar Peak Torque -   Min Torque -   Test Percent Below Normative Data -   Lumbar Weight 73   Repetitions 20   Rating of Perceived Exertion 3   Ice - Z Lie (in min.) 10         Exercises completd this visit:  HERNAN with ball 10x  PPT 5 s/h 10x  PPT + Bridges 10x   Bent knee fall out 10x  Standing lumbar extension with towel overpressure 10x  Scapular retractions 10x     Exercises completed on previous visits/HEP::   Slouch and correct 10x  Hip/back disassociation 10x   Sit<>stand squat training 10x          Peripheral muscle strengthening which included 1 set of 15-20 repetitions at a slow, controlled 10-13 second per rep pace focused on strengthening supporting musculature for improved body mechanics and functional mobility.  Pt and therapist focused on proper form during treatment to ensure optimal strengthening of each targeted muscle group.  Machines were utilized including torso rotation, leg extension, leg curl, chest press, upright row. Tricep extension, bicep curl, leg press, and hip abduction added visit 3    Bri received the following manual therapy techniques: none were applied.         Home Exercises Provided and  "Patient Education Provided     Education provided:   - Patient received education in benefits of progressing mobility and strengthening gradually  - Discussed exertion scale, slow progressive resistance protocol to promote safe and healthy strengthening of supportive musculature  by performing 15-20 reps, 7 sec per rep, and increasing weight by 5 % at 20 reps only if there ex done safely, slowly, using correct musculature, exertion and no pain.  Patient expressed understanding.  -Pt educated on strategies to safely perform examination and exercise using "Stop Light" analogy to describe how to avoid or stop irritating motions, proceed with caution with some movements, and progress positive exercises.       Written Home Exercises Provided: Patient instructed to cont prior HEP.  Exercises were reviewed and Bri was able to demonstrate them prior to the end of the session.  Bri demonstrated fair  understanding of the education provided.     See EMR under Patient Instructions for exercises provided prior visit.          Assessment     Pt present to therapy with increased low back pain since last session. Pt's low back strain may be from increasing ROM by 6 degrees last session. Reassessed Med X lumbar ROM with patient  able to tolerate reduction of range to 27-0 at same weight without increased symptoms. Plan to increase Med X weights 5% next session. Pt reported feeling better by end of treatment session.     Patient is making good progress towards established goals.  Pt will continue to benefit from skilled outpatient physical therapy to address the deficits stated in the impairment chart, provide pt/family education and to maximize pt's level of independence in the home and community environment.     Anticipated Barriers for therapy: Previous non-compliance with HEP, multiple lumbar surgeries, CHF, osteopenia     Pt's spiritual, cultural and educational needs considered and pt agreeable to plan of care and goals " as stated below:       GOALS: Pt is in agreement with the following goals.     Short term goals:  6 weeks or 10 visits   1.  Pt will demonstrate increased lumbar ROM by at least 6 degrees from the initial ROM value with improvements noted in functional ROM and ability to perform ADLs. Increased 3 degrees, progressing.   2.  Pt will demonstrate increased maximum isometric torque value by 10% when compared to the initial value resulting in improved ability to perform bending, lifting, and carrying activities safely, confidently.     3.  Patient report a reduction in worst pain score by 1-2 points for improved tolerance for prolonged sitting needing for driving.  4.  Pt able to perform HEP correctly with minimal cueing or supervision from therapist to encourage independent management of symptoms.            Long term goals: 13 weeks or 20 visits   1. Pt will demonstrate increased lumbar ROM by at least 12 degrees from initial ROM value, resulting in improved ability to perform functional fwd bending while standing and sitting.   2. Pt will demonstrate increased maximum isometric torque value by 25% when compared to the initial value resulting in improved ability to perform bending, lifting, and carrying activities safely, confidently.  3. Pt to demonstrate ability to independently control and reduce their pain through posture positioning and mechanical movements throughout a typical day.  4.  Pt will demonstrate reduced pain and improved functional outcomes as reported on the FOTO by reaching a score of CJ = at least 20% but < 40% impaired, limited or restricted or less in order to demonstrate subjective improvement in pt's condition.    5. Pt will demonstrate independence with the HEP at discharge  6.   Pt will demonstrate appropriate squat technique for improved tolerance of lifting and carrying functional activity needed for household tasks.   7. Pt will express ability to mow lawn without significantly increased  pain (patient goal)      Plan   Continue with established Plan of Care towards established PT goals.

## 2019-09-09 ENCOUNTER — ANESTHESIA (OUTPATIENT)
Dept: SURGERY | Facility: OTHER | Age: 59
End: 2019-09-09
Payer: MEDICARE

## 2019-09-09 ENCOUNTER — TELEPHONE (OUTPATIENT)
Dept: OTOLARYNGOLOGY | Facility: CLINIC | Age: 59
End: 2019-09-09

## 2019-09-09 NOTE — PROGRESS NOTES
Subjective:       Patient ID: Bri Santiago is a 59 y.o. male.    Chief Complaint: No chief complaint on file.    HPI 59-year-old man with history of possible von Willebrand's disease who was referred prior to upcoming sinus surgery.    He previously had seen Dr. Álvarez in our department in 2017, see his note for full details.    The patient has done well with DDAVP infusions prior to multiple previous procedures.    He reports no unusual bleeding. He has continued to be on Plavix without any complications.  Review of Systems   Constitutional: Negative for activity change and fever.   HENT: Positive for congestion, postnasal drip and sinus pressure. Negative for nosebleeds.    Gastrointestinal: Negative for anal bleeding.   Genitourinary: Negative for hematuria.   Psychiatric/Behavioral: Negative for dysphoric mood. The patient is not nervous/anxious.        Objective:      Physical Exam   Constitutional: He is oriented to person, place, and time. He appears well-developed and well-nourished.   HENT:   Mouth/Throat: No oropharyngeal exudate.   Cardiovascular: Normal rate and regular rhythm.   Pulmonary/Chest: Effort normal and breath sounds normal. He has no wheezes. He has no rales.   Neurological: He is oriented to person, place, and time.   Psychiatric: He has a normal mood and affect. His behavior is normal. Thought content normal.   Vitals reviewed.      Assessment:       1. Von Willebrand disease        Plan:       Recommend DDAVP   infusion , 20 mcg, to be given over 30 minutes, 1 hr before the procedure.  We can arrange for that to be done at the [facility].

## 2019-09-10 ENCOUNTER — INITIAL CONSULT (OUTPATIENT)
Dept: HEMATOLOGY/ONCOLOGY | Facility: CLINIC | Age: 59
End: 2019-09-10
Payer: MEDICARE

## 2019-09-10 VITALS
DIASTOLIC BLOOD PRESSURE: 76 MMHG | SYSTOLIC BLOOD PRESSURE: 124 MMHG | RESPIRATION RATE: 18 BRPM | HEART RATE: 82 BPM | WEIGHT: 267.63 LBS | BODY MASS INDEX: 37.47 KG/M2 | HEIGHT: 71 IN | TEMPERATURE: 98 F | OXYGEN SATURATION: 96 %

## 2019-09-10 DIAGNOSIS — D68.00 VON WILLEBRAND DISEASE: Primary | ICD-10-CM

## 2019-09-10 PROCEDURE — 99999 PR PBB SHADOW E&M-EST. PATIENT-LVL III: CPT | Mod: PBBFAC,,, | Performed by: INTERNAL MEDICINE

## 2019-09-10 PROCEDURE — 3078F PR MOST RECENT DIASTOLIC BLOOD PRESSURE < 80 MM HG: ICD-10-PCS | Mod: CPTII,S$GLB,, | Performed by: INTERNAL MEDICINE

## 2019-09-10 PROCEDURE — 99499 UNLISTED E&M SERVICE: CPT | Mod: S$GLB,,, | Performed by: INTERNAL MEDICINE

## 2019-09-10 PROCEDURE — 3078F DIAST BP <80 MM HG: CPT | Mod: CPTII,S$GLB,, | Performed by: INTERNAL MEDICINE

## 2019-09-10 PROCEDURE — 3008F BODY MASS INDEX DOCD: CPT | Mod: CPTII,S$GLB,, | Performed by: INTERNAL MEDICINE

## 2019-09-10 PROCEDURE — 99214 OFFICE O/P EST MOD 30 MIN: CPT | Mod: S$GLB,,, | Performed by: INTERNAL MEDICINE

## 2019-09-10 PROCEDURE — 99999 PR PBB SHADOW E&M-EST. PATIENT-LVL III: ICD-10-PCS | Mod: PBBFAC,,, | Performed by: INTERNAL MEDICINE

## 2019-09-10 PROCEDURE — 3008F PR BODY MASS INDEX (BMI) DOCUMENTED: ICD-10-PCS | Mod: CPTII,S$GLB,, | Performed by: INTERNAL MEDICINE

## 2019-09-10 PROCEDURE — 3074F SYST BP LT 130 MM HG: CPT | Mod: CPTII,S$GLB,, | Performed by: INTERNAL MEDICINE

## 2019-09-10 PROCEDURE — 99214 PR OFFICE/OUTPT VISIT, EST, LEVL IV, 30-39 MIN: ICD-10-PCS | Mod: S$GLB,,, | Performed by: INTERNAL MEDICINE

## 2019-09-10 PROCEDURE — 3074F PR MOST RECENT SYSTOLIC BLOOD PRESSURE < 130 MM HG: ICD-10-PCS | Mod: CPTII,S$GLB,, | Performed by: INTERNAL MEDICINE

## 2019-09-10 PROCEDURE — 99499 RISK ADDL DX/OHS AUDIT: ICD-10-PCS | Mod: S$GLB,,, | Performed by: INTERNAL MEDICINE

## 2019-09-10 RX ORDER — SODIUM CHLORIDE 0.9 % (FLUSH) 0.9 %
10 SYRINGE (ML) INJECTION
Status: CANCELLED | OUTPATIENT
Start: 2019-09-24

## 2019-09-10 RX ORDER — HEPARIN 100 UNIT/ML
500 SYRINGE INTRAVENOUS
Status: CANCELLED | OUTPATIENT
Start: 2019-09-24

## 2019-09-10 NOTE — LETTER
September 10, 2019      LAURENT Swanson MD  [address]  [address]  [facility] [address]           [facility]  [address]  [place] [address]  Phone: [phone]          Patient: Bri Santiago   MR Number: [id number]   YOB: 1960   Date of Visit: 9/10/2019       Dear Dr. LAURENT Swanson:    Thank you for referring Bri Santiago to me for evaluation. Attached you will find relevant portions of my assessment and plan of care.    If you have questions, please do not hesitate to call me. I look forward to following Bri Santiago along with you.    Sincerely,    Stefano Tyler MD    Enclosure  CC:  No Recipients    If you would like to receive this communication electronically, please contact [email or web address] or [phone] to request more information on [facility] [employer] access.    For providers and/or their staff who would like to refer a patient to [facility], please contact us through our one-stop-shop provider referral line, [facility], at [phone].    If you feel you have received this communication in error or would no longer like to receive these types of communications, please e-mail [email or web address]

## 2019-09-10 NOTE — Clinical Note
Will need DDAVP infusion 1 hr prior to his upcoming sinus surgery-will let you know when that is scheduled so we can schedule the infusion

## 2019-09-11 ENCOUNTER — OFFICE VISIT (OUTPATIENT)
Dept: UROLOGY | Facility: CLINIC | Age: 59
End: 2019-09-11
Payer: MEDICARE

## 2019-09-11 ENCOUNTER — CLINICAL SUPPORT (OUTPATIENT)
Dept: REHABILITATION | Facility: OTHER | Age: 59
End: 2019-09-11
Attending: SPECIALIST
Payer: MEDICARE

## 2019-09-11 VITALS
BODY MASS INDEX: 37 KG/M2 | HEART RATE: 88 BPM | WEIGHT: 264.31 LBS | DIASTOLIC BLOOD PRESSURE: 76 MMHG | HEIGHT: 71 IN | SYSTOLIC BLOOD PRESSURE: 112 MMHG

## 2019-09-11 DIAGNOSIS — N41.9 PROSTATITIS, UNSPECIFIED PROSTATITIS TYPE: Primary | ICD-10-CM

## 2019-09-11 DIAGNOSIS — N13.8 BPH WITH OBSTRUCTION/LOWER URINARY TRACT SYMPTOMS: ICD-10-CM

## 2019-09-11 DIAGNOSIS — N40.1 BPH WITH OBSTRUCTION/LOWER URINARY TRACT SYMPTOMS: ICD-10-CM

## 2019-09-11 DIAGNOSIS — M54.50 CHRONIC BILATERAL LOW BACK PAIN WITHOUT SCIATICA: ICD-10-CM

## 2019-09-11 DIAGNOSIS — G89.29 CHRONIC BILATERAL LOW BACK PAIN WITHOUT SCIATICA: ICD-10-CM

## 2019-09-11 PROCEDURE — 3074F SYST BP LT 130 MM HG: CPT | Mod: CPTII,S$GLB,, | Performed by: NURSE PRACTITIONER

## 2019-09-11 PROCEDURE — 87086 URINE CULTURE/COLONY COUNT: CPT

## 2019-09-11 PROCEDURE — 81002 PR URINALYSIS NONAUTO W/O SCOPE: ICD-10-PCS | Mod: S$GLB,,, | Performed by: NURSE PRACTITIONER

## 2019-09-11 PROCEDURE — 3078F DIAST BP <80 MM HG: CPT | Mod: CPTII,S$GLB,, | Performed by: NURSE PRACTITIONER

## 2019-09-11 PROCEDURE — 99214 OFFICE O/P EST MOD 30 MIN: CPT | Mod: 25,S$GLB,, | Performed by: NURSE PRACTITIONER

## 2019-09-11 PROCEDURE — 99999 PR PBB SHADOW E&M-EST. PATIENT-LVL III: CPT | Mod: PBBFAC,,, | Performed by: NURSE PRACTITIONER

## 2019-09-11 PROCEDURE — 3008F BODY MASS INDEX DOCD: CPT | Mod: CPTII,S$GLB,, | Performed by: NURSE PRACTITIONER

## 2019-09-11 PROCEDURE — 99214 PR OFFICE/OUTPT VISIT, EST, LEVL IV, 30-39 MIN: ICD-10-PCS | Mod: 25,S$GLB,, | Performed by: NURSE PRACTITIONER

## 2019-09-11 PROCEDURE — 3078F PR MOST RECENT DIASTOLIC BLOOD PRESSURE < 80 MM HG: ICD-10-PCS | Mod: CPTII,S$GLB,, | Performed by: NURSE PRACTITIONER

## 2019-09-11 PROCEDURE — 3074F PR MOST RECENT SYSTOLIC BLOOD PRESSURE < 130 MM HG: ICD-10-PCS | Mod: CPTII,S$GLB,, | Performed by: NURSE PRACTITIONER

## 2019-09-11 PROCEDURE — 99999 PR PBB SHADOW E&M-EST. PATIENT-LVL III: ICD-10-PCS | Mod: PBBFAC,,, | Performed by: NURSE PRACTITIONER

## 2019-09-11 PROCEDURE — 81002 URINALYSIS NONAUTO W/O SCOPE: CPT | Mod: S$GLB,,, | Performed by: NURSE PRACTITIONER

## 2019-09-11 PROCEDURE — 97110 THERAPEUTIC EXERCISES: CPT

## 2019-09-11 PROCEDURE — 3008F PR BODY MASS INDEX (BMI) DOCUMENTED: ICD-10-PCS | Mod: CPTII,S$GLB,, | Performed by: NURSE PRACTITIONER

## 2019-09-11 RX ORDER — SULFAMETHOXAZOLE AND TRIMETHOPRIM 800; 160 MG/1; MG/1
1 TABLET ORAL 2 TIMES DAILY
Qty: 28 TABLET | Refills: 0 | Status: SHIPPED | OUTPATIENT
Start: 2019-09-11 | End: 2019-09-25

## 2019-09-11 NOTE — PROGRESS NOTES
CHIEF COMPLAINT:    Mr. Santiago is a 59 y.o. male presenting for groin pain and hematuria.  PRESENTING ILLNESS:    Bri Santiago is a 59 y.o. male who presents for groin pain and hematuria. His last clinic visit was 6/14/19 with WHITLEY Trinh NP.    Today patient presents to clinic for bilateral groin pain and hematuria for the past few days. He reports hematuria comes and goes, no blood clots. He did have negative hematuria work up done 5/2019. He reports low abdominal and back pain as well. He does have chronic back pain but feels it is a little worse than usual. No hx of kidney stones. Denies scrotal swelling or testicle pain.  Baseline urinary symptoms include frequency, nocturia x 3-4, post void dribbling and occasional straining. FOS good once started. No change to baseline symptoms over the past few days. Takes cardura daily.  Denies dysuria or flank pain.   Denies fever or chills.   Denies hx of UTI.     5/28/19 cysto with Dr. Reid  Recommended to continue cardura and if symptoms worsen, possible Rezum or laser prostatectomy.    5/23/19 CT urogram  Kidneys are normal size and demonstrate appropriate excretion contrast.  No renal stone.  Several bilateral subcentimeter renal hypodensities too small to characterize however likely represent cysts.  No hydronephrosis or ureteral dilatation.      REVIEW OF SYSTEMS:    Review of Systems    Constitutional: Negative for fever and chills.   HENT: Negative for hearing loss.   Eyes: Negative for visual disturbance.   Respiratory: Negative for shortness of breath.   Cardiovascular: Negative for chest pain.   Gastrointestinal: Negative for nausea, vomiting, and constipation.   Genitourinary:  See HPI  Neurological: Negative for dizziness.   Hematological: Does not bruise/bleed easily.   Psychiatric/Behavioral: Negative for confusion.       PATIENT HISTORY:    Past Medical History:   Diagnosis Date    Acute pancreatitis     Anal fissure     Anemia     Arthritis     Asthma  in remission     Back pain     BPH (benign prostatic hypertrophy)     Cancer 2000    prostate- treated at [facility] with chemo- in remission since 2000    Clotting disorder     Diastolic dysfunction with chronic heart failure 12/3/2018    Dysphagia 10/7/2014    Family history of colon cancer     Family history of early CAD     GERD (gastroesophageal reflux disease)     Helicobacter pylori (H. pylori) infection     Chronic    History of chronic pancreatitis     HTN (hypertension)     Lumbago 11/12/2012    Obesity     ABDON (obstructive sleep apnea)     Pneumonia     during childhood     Prostate cancer 2000    dx and treated at [facility], had chemotherapy, in remission [id number]    Sacroiliac joint pain 2/10/2015    Spinal stenosis of lumbar region     Trouble in sleeping     Vitamin D deficiency disease     Von Willebrand disease     VWD (acquired von Willebrand's disease)        Past Surgical History:   Procedure Laterality Date    24 HOUR PH WITH IMPEDANCE N/A 7/23/2015    Performed by Chris Kent MD at [facility] ENDO (4TH FLR)    anal fissure repair      x2    BACK SURGERY  2012    BLOCK, NERVE, FACET JOINT, LUMBAR, MEDIAL BRANCH Bilateral 2/12/2014    Performed by [facility]y [facility] at [facility] MGT    BLOCK-NERVE-MEDIAL BRANCH-LUMBAR Bilateral 7/8/2015    Performed by [facility] at [facility] MGT    CARPAL TUNNEL RELEASE  2003    left hand    CATHETERIZATION, HEART, BOTH LEFT AND RIGHT N/A 2/14/2019    Performed by Kyle Magana MD at [facility] CATH LAB    CERVICAL DISCECTOMY  2003    COLONOSCOPY N/A 6/19/2017    Performed by Rosendo Boyer MD at [facility] ENDO (4TH FLR)    COLONOSCOPY N/A 10/7/2015    Performed by Rosendo Boyer MD at [facility] ENDO (4TH FLR)    COLONOSCOPY N/A 10/3/2013    Performed by Gene Luis MD at [facility] ENDO (4TH FLR)    ESOPHAGOGASTRODUODENOSCOPY (EGD) N/A 6/19/2017    Performed by [facility] at [facility] (4[facility] FLR)     ESOPHAGOGASTRODUODENOSCOPY (EGD) N/A 10/8/2014    Performed by Rosendo Boyer MD at [facility] ENDO (4TH FLR)    INJECTION-JOINT Bilateral 2/25/2015    Per[facility] by [facility] at [facility]    Left heart cath Left 2/14/2019    Performed by Kyle [facility]ana MD at [facility] CATH LAB    LUMBAR FUSION  2012    MANOMETRY-ESOPHAGEAL-HIGH RESOLUTION N/A 7/23/2015    Performed by Chris Kent MD at [facility] ENDO (4TH FLR)    RADIOFREQUENCY ABLATION, LEFT L3,L4,L5 Left 5/23/2019    Performed by [facility]ana MD at [facility]    RADIOFREQUENCY ABLATION, RIGHT L3,L4,L5 Right 5/9/2019    Performed by Fred[facility]ana MD at [facility]    RADIOFREQUENCY THERMOCOAGULATION (RFTC)-NERVE-MEDIAN BRANCH-LUMBAR Left 5/24/2018    Performed by [facility] at [facility]    RADIOFREQUENCY THERMOCOAGULATION (RFTC)-NERVE-MEDIAN BRANCH-LUMBAR Right 5/10/2018    Performed by [facility]ana [facility] at [facility]    RADIOFREQUENCY THERMOCOAGULATION (RFTC)-NERVE-MEDIAN BRANCH-LUMBAR Left 5/16/2017    Performed by [facility]ana MD at [facility]    RADIOFREQUENCY THERMOCOAGULATION (RFTC)-NERVE-MEDIAN BRANCH-LUMBAR Right 5/2/2017    Performed by [facility]ana MD at [facility]    RADIOFREQUENCY THERMOCOAGULATION (RFTC)-NERVE-MEDIAN BRANCH-LUMBAR Left 9/8/2015    Performed by [facility]ana [facility] at [facility]    RADIOFREQUENCY THERMOCOAGULATION (RFTC)-NERVE-MEDIAN BRANCH-LUMBAR Right 8/19/2015    Performed by [facility]ana MD at [facility]    REMOVAL-SPINAL NEUROSTIMULATOR N/A 11/29/2012    Performed by Gonzalez Fisher MD at [facility] OR 2ND FLR    SPINE SURGERY      TONSILLECTOMY      at age 24    VASECTOMY  1996       Family History   Problem Relation Age of Onset    Colon cancer Father 67        colon cancer    Hypertension Father     Glaucoma Father     Colon cancer Paternal Grandfather 65             Coronary artery disease Mother 45    Hypertension Mother      Heart disease Mother     No Known Problems Brother     No Known Problems Sister     No Known Problems Daughter     No Known Problems Son     Coronary artery disease Brother 51    No Known Problems Daughter     No Known Problems Daughter     No Known Problems Son     No Known Problems Son     Colon cancer Paternal Uncle 65    Diabetes Mellitus Paternal Grandmother        Social History     Socioeconomic History    Marital status:      Spouse name: Not on file    Number of children: Not on file    Years of education: Not on file    Highest education level: Not on file   Occupational History    Occupation: disabled due to back injury   Social Needs    Financial resource strain: Not on file    Food insecurity:     Worry: Not on file     Inability: Not on file    Transportation needs:     Medical: Not on file     Non-medical: Not on file   Tobacco Use    Smoking status: Never Smoker    Smokeless tobacco: Never Used   Substance and Sexual Activity    Alcohol use: Yes     Alcohol/week: 0.6 oz     Types: 1 Glasses of wine per week     Comment: social    Drug use: No    Sexual activity: Not Currently     Partners: Female   Lifestyle    Physical activity:     Days per week: Not on file     Minutes per session: Not on file    Stress: Not on file   Relationships    Social connections:     Talks on phone: Not on file     Gets together: Not on file     Attends [employer] service: Not on file     Active member of club or organization: Not on file     Attends meetings of clubs or organizations: Not on file     Relationship status: Not on file   Other Topics Concern    Not on file   Social History Narrative    wlking for exercised       Allergies:  Ace inhibitors; Aspirin; Codeine; Dilaudid  [hydromorphone]; and Penicillins    Medications:    Current Outpatient Medications:     acetaminophen (TYLENOL) 500 MG tablet, Take 2 tablets (1,000 mg total) by mouth every 8 (eight) hours as needed., Disp:  90 tablet, Rfl: 0    albuterol (VENTOLIN HFA) 90 mcg/actuation inhaler, Inhale 2 puffs into the lungs every 6 (six) hours as needed for Wheezing. Rescue, Disp: 18 g, Rfl: 4    atorvastatin (LIPITOR) 40 MG tablet, Take 1 tablet (40 mg total) by mouth once daily., Disp: 90 tablet, Rfl: 0    azelastine (ASTELIN) 137 mcg (0.1 %) nasal spray, 1 spray (137 mcg total) by Nasal route 2 (two) times daily., Disp: 30 mL, Rfl: 11    calcium citrate-vitamin D3 315-200 mg (CITRACAL+D) 315-200 mg-unit per tablet, Take 1 tablet by mouth once daily., Disp: , Rfl:     clopidogrel (PLAVIX) 75 mg tablet, Take 1 tablet (75 mg total) by mouth once daily., Disp: 30 tablet, Rfl: 11    cyanocobalamin, vitamin B-12, (VITAMIN B-12) 50 mcg tablet, Take 50 mcg by mouth once daily., Disp: , Rfl:     docusate sodium (COLACE) 100 MG capsule, Take 1 tablet by mouth Twice daily. 1 Capsule Oral Twice a day .  Take with pain medicine, Disp: , Rfl:     doxazosin (CARDURA) 1 MG tablet, TAKE 1 TABLET(1 MG) BY MOUTH EVERY EVENING, Disp: 30 tablet, Rfl: 3    esomeprazole (NEXIUM) 40 MG capsule, Take 1 capsule (40 mg total) by mouth 2 (two) times daily before meals., Disp: 60 capsule, Rfl: 11    fluticasone (FLONASE) 50 mcg/actuation nasal spray, 1 spray (50 mcg total) by Each Nare route once daily., Disp: 16 g, Rfl: 11    ipratropium (ATROVENT) 0.03 % nasal spray, 2 sprays by Nasal route 2 (two) times daily. May be use more often if needed, Disp: 30 mL, Rfl: 11    meclizine (ANTIVERT) 25 mg tablet, Take 1 tablet (25 mg total) by mouth 3 (three) times daily as needed., Disp: 20 tablet, Rfl: 0    montelukast (SINGULAIR) 10 mg tablet, , Disp: , Rfl: 8    spironolactone (ALDACTONE) 50 MG tablet, Take two 25 mg tabs once daily ., Disp: 90 tablet, Rfl: 3    traMADol (ULTRAM) 50 mg tablet, Take 1 tablet (50 mg total) by mouth every 8 (eight) hours as needed for Pain., Disp: 90 tablet, Rfl: 2    VITAMIN D2 50,000 unit capsule, TK 1 C PO Q 7 DAYS,  Disp: , Rfl: 1    zolpidem (AMBIEN) 10 mg Tab, TAKE 1 TABLET BY MOUTH EVERY DAY AT BEDTIME, Disp: 20 tablet, Rfl: 0    budesonide-formoterol 160-4.5 mcg (SYMBICORT) 160-4.5 mcg/actuation HFAA, Inhale 2 puffs into the lungs every 12 (twelve) hours., Disp: 10.2 g, Rfl: 12    sulfamethoxazole-trimethoprim 800-160mg (BACTRIM DS) 800-160 mg Tab, Take 1 tablet by mouth 2 (two) times daily. for 14 days, Disp: 28 tablet, Rfl: 0    PHYSICAL EXAMINATION:    Constitutional: He is oriented to person, place, and time. He appears well-developed and well-nourished.  He is in no apparent distress.    Neck: Normal ROM.     Cardiovascular: Normal rate.      Pulmonary/Chest: Effort normal. No respiratory distress.     Abdominal:  He exhibits no distension.  There is no CVA tenderness.     Lymphadenopathy:        Right: No supraclavicular adenopathy present.        Left: No supraclavicular adenopathy present.     Neurological: He is alert and oriented to person, place, and time.     Skin: Skin is warm and dry.     Extremities: Normal ROM    Psych: Cooperative with normal affect.    Genitourinary: The prostate is enlarged.  Prostate is smooth, with no nodules noted. Mild tenderness on exam.        Physical Exam   Musculoskeletal:        Back:          LABS:    U/a: trace blood, otherwise negative    Lab Results   Component Value Date    PSA 0.96 02/06/2013    PSA 1.11 10/17/2012    PSA 1.21 01/31/2012    PSADIAG 0.98 05/03/2019    PSADIAG 1.5 10/16/2017    PSADIAG 1.4 10/20/2016       IMPRESSION:    Encounter Diagnoses   Name Primary?    Prostatitis, unspecified prostatitis type Yes    BPH with obstruction/lower urinary tract symptoms          PLAN:  -Discussed prostatitis, acute vs chronic.  Unable to take NSAIDS, allergy to ASA  Discussed side effects, indications, and MOA for bactrim. Prescription sent to the pharmacy. Pt verbalized understanding.  Take tylenol as needed for pain  -Urine specimen sent for urine  culture  -Continue cardura  -Avoid Bladder Irritants: Tea, coffee, caffeine, alcohol, artificial sweeteners, citrus, spicy foods, acidic foods,chocolate, tomato-based foods, smoking  -F/u in clinic if symptoms worsen or persist  Can f/u in clinic with Dr. Reid if considering surgical intervention for prostate    I spent 25 minutes with the patient of which more than half was spent in coordinating the patient's care as well as in direct consultation with the patient in regards to our treatment and plan.

## 2019-09-11 NOTE — PATIENT INSTRUCTIONS
Chronic Prostatitis/Chronic Pelvic Pain Syndrome (CP/CPPS)      With a healthy prostate, urine flows easily through the urethra.     Chronic prostatitis/chronic pelvic pain syndrome (CP/CPPS) is ongoing pain in the area of the prostate gland. CP/CPPS is the most common of the 4 types of prostatitis which also include acute bacterial prostatitis, chronic bacterial prostatitis, and asymptomatic inflammatory prostatitis. The prostate gland is part of the male reproductive system. It sits just below the bladder and surrounds the urethra. The urethra is the tube that takes urine and semen out of the body. CP/CPPS is the most common form of pain of the gland. It is also known as nonbacterial prostatitis. Symptoms such as pain and trouble urinating may come and go.  What causes CP/CPPS?  The exact cause of CP/CPPS isnt known. It may be caused by an infection that comes back again and again. It may be caused by inflammation of the gland. Muscle spasms in the pelvis may be a cause. Other causes of CP/CPPS may include:  · Stress that tightens the pelvic muscles  · Urine flowing back up into the prostate ducts  · Not ejaculating often  In many cases, the cause isnt clear.  What are the symptoms of CP/CPPS?  Some men dont have symptoms. Or, they may have symptoms that come and go. The symptoms can include:  · Pain in the genitals and pelvic area  · Trouble urinating  · Pain while urinating  · Pain during or after ejaculation  How is CP/CPPS diagnosed?  Your healthcare provider will ask about your medical history and your symptoms. He or she may give you a physical exam, including a rectal exam. Your urine, blood, and semen may be tested for bacteria or certain chemicals. In some cases, you may have other tests. You may have an ultrasound or a transrectal ultrasound-guided biopsy. This is done to take tiny pieces of tissue to look at with a microscope. Or, you may have imaging tests such as a CT scan, MRI, or urodynamic  studies that look at urine flow and other issues. These are done to look at your abdomen and pelvic areas.  How is CP/CPPS treated?  The goal of treatment is to help relieve symptoms. Treatments can include one or more of these:  · Antibiotics  · Anti-inflammatory or muscle-relaxing medicines  · Alpha-blocker medicines, which relax the muscles in and around the gland  · Sitz baths  · Prostate massage  · Dietary changes  · Biofeedback  · Surgery  · Other medicines or herbal treatments  Date Last Reviewed: 1/1/2017 © 2000-2017 [employer]. [address] [place] [id number]. All rights reserved. This information is not intended as a substitute for professional medical care. Always follow your healthcare professional's instructions.

## 2019-09-11 NOTE — PROGRESS NOTES
[facility]      Name: Bri Santiago  Clinic Number: [id number]    Therapy Diagnosis:   Encounter Diagnosis   Name Primary?    Chronic bilateral low back pain without sciatica      Physician: Buffy Villagran,*    Visit Date: 2019    Physician Orders: PT Eval and Treat   Medical Diagnosis from Referral: Lumbar spondylosis, Postlaminectomy syndrome of lumbar region, Degeneration of lumbar or lumbosacral intervertebral disc, Spinal stenosis, lumbar region, without neurogenic claudication  Evaluation Date: 2019  Authorization Period Expiration: 19  Plan of Care Expiration: 10/16/19  Reassessment Due: 19  Visit # / Visits authorized:        Time In:  10:30  Time Out: 11:30     Total Billable Time: 30 minutes    Precautions: Lumbar fusion L4-S1 3x, cervical surgery,  CHF Osteopenia, hx of prostate cancer     Pattern of pain determined: 1 PEN      Subjective   Bri reports he is in a lot of pain today, he went to sleep fine and woke up with a lot of pain, describes it as a deep ache. Pt states that he did take tylenol prior to coming to therapy and that didn't help but did not take Tramadol because he had to drive.     Patient reports tolerating previous visit fair  Patient reports their pain to be 9/10 on a 0-10 scale with 0 being no pain and 10 being the worst pain imaginable.  Pain Location: Low back     Occupation: On disability   Leisure: family     Pts goals: Do more activity, get back to taking care of yard and cooking       Objective     Baseline IM Testing Results:   Date of testin2019  ROM 0-21 deg  (reduced to to 0-27 19)   Max Peak Torque 90    Min Peak Torque 20    Flex/Ext Ratio 4.5   % below normative data 33        CMS Impairment/Limitation/Restriction for FOTO Lumbar Survey     Outcomes not captured at time of evaluation. Based on subjective reports and and objective findings, estimate 40-50 limitation at this time.    Limitation Score: 40-50%  Category: Mobility     Current : CK = at least 40% but < 60% impaired, limited or restricted  Goal: CJ = at least 20% but < 40% impaired, limited or restricted  Discharge:              Treatment    Pt was instructed in and performed the following:     Bri received therapeutic exercises to develop/improved posture, cardiovascular endurance, muscular endurance, lumbar/cervical ROM, strength and muscular endurance for 30 minutes including the following exercises:       [facility] 9/11/2019   Visit Number 9   VAS Pain Rating 9   Treadmill Time (in min.) 6   Speed -   Recumbent Bike Seat Pos. -   Time -   Scapular Retraction -   Lumbar Stretches - Slouch Overcorrection -   Extension in Lying -   Extension in Standing -   Flexion in Lying -   Manual Therapy 10   Lumbar Extension Seat Pad -   Femur Restraint -   Top Dead Center -   Counterweight -   Lumbar Flexion 24   Lumbar Extension 6   Lumbar Peak Torque -   Min Torque -   Test Percent Below Normative Data -   Lumbar Weight 60   Repetitions 20   Rating of Perceived Exertion 3   Ice - Z Lie (in min.) 10     **RESUME STANDARD WEIGHTS NEXT VISIT**      Exercises completd this visit:    Supine Hip ADD isometric 10x  Supine PPT 5 second hold 10x  Prone Lying 2 min  Prone glute sets 10x    Exercises completed on previous visits/HEP::   Slouch and correct 10x  Hip/back disassociation 10x   Sit<>stand squat training 10x    HERNAN with ball 10x  PPT + Bridges 10x   Bent knee fall out 10x  Standing lumbar extension with towel overpressure 10x  Scapular retractions 10x       Peripheral muscle strengthening which included 1 set of 15-20 repetitions at a slow, controlled 10-13 second per rep pace focused on strengthening supporting musculature for improved body mechanics and functional mobility.  Pt and therapist focused on proper form during treatment to ensure optimal strengthening of each targeted muscle group.  Machines were utilized  "including torso rotation, leg extension, leg curl, chest press, upright row. Tricep extension, bicep curl, leg press, and hip abduction added visit 3    Bri received the following manual therapy techniques: STM to low back in prone with muscle roller and manually with sambra muscle gel         Home Exercises Provided and Patient Education Provided     Education provided:   - Patient received education in benefits of progressing mobility and strengthening gradually  - Discussed exertion scale, slow progressive resistance protocol to promote safe and healthy strengthening of supportive musculature  by performing 15-20 reps, 7 sec per rep, and increasing weight by 5 % at 20 reps only if there ex done safely, slowly, using correct musculature, exertion and no pain.  Patient expressed understanding.  -Pt educated on strategies to safely perform examination and exercise using "Stop Light" analogy to describe how to avoid or stop irritating motions, proceed with caution with some movements, and progress positive exercises.       Written Home Exercises Provided: Patient instructed to cont prior HEP.  Exercises were reviewed and Bri was able to demonstrate them prior to the end of the session.  Bri demonstrated fair  understanding of the education provided.     See EMR under Patient Instructions for exercises provided prior visit.          Assessment     Pt present to therapy with increased low back pain stating that he wanted to go through with his visit because he usually feels better when he leaves therapy. Completed STM manually and with roller but he did not feel any change in pain and completed exercises and positioning without a decrease in pain. Adjusted the ROM and weights this visit due to increased pain this visit and will resume weights and strengthening exercises next visit.    Patient is making good progress towards established goals.  Pt will continue to benefit from skilled outpatient physical " therapy to address the deficits stated in the impairment chart, provide pt/family education and to maximize pt's level of independence in the home and community environment.     Anticipated Barriers for therapy: Previous non-compliance with HEP, multiple lumbar surgeries, CHF, osteopenia     Pt's spiritual, cultural and educational needs considered and pt agreeable to plan of care and goals as stated below:       GOALS: Pt is in agreement with the following goals.     Short term goals:  6 weeks or 10 visits   1.  Pt will demonstrate increased lumbar ROM by at least 6 degrees from the initial ROM value with improvements noted in functional ROM and ability to perform ADLs. Increased 3 degrees, progressing.   2.  Pt will demonstrate increased maximum isometric torque value by 10% when compared to the initial value resulting in improved ability to perform bending, lifting, and carrying activities safely, confidently.     3.  Patient report a reduction in worst pain score by 1-2 points for improved tolerance for prolonged sitting needing for driving.  4.  Pt able to perform HEP correctly with minimal cueing or supervision from therapist to encourage independent management of symptoms.            Long term goals: 13 weeks or 20 visits   1. Pt will demonstrate increased lumbar ROM by at least 12 degrees from initial ROM value, resulting in improved ability to perform functional fwd bending while standing and sitting.   2. Pt will demonstrate increased maximum isometric torque value by 25% when compared to the initial value resulting in improved ability to perform bending, lifting, and carrying activities safely, confidently.  3. Pt to demonstrate ability to independently control and reduce their pain through posture positioning and mechanical movements throughout a typical day.  4.  Pt will demonstrate reduced pain and improved functional outcomes as reported on the FOTO by reaching a score of CJ = at least 20% but < 40%  impaired, limited or restricted or less in order to demonstrate subjective improvement in pt's condition.    5. Pt will demonstrate independence with the HEP at discharge  6.   Pt will demonstrate appropriate squat technique for improved tolerance of lifting and carrying functional activity needed for household tasks.   7. Pt will express ability to mow lawn without significantly increased pain (patient goal)      Plan   Continue with established Plan of Care towards established PT goals.

## 2019-09-12 ENCOUNTER — TELEPHONE (OUTPATIENT)
Dept: HEMATOLOGY/ONCOLOGY | Facility: CLINIC | Age: 59
End: 2019-09-12

## 2019-09-12 NOTE — TELEPHONE ENCOUNTER
Returned call to patient. No answer. Voicemail let to please call back to clinic.     ----- Message from Keyona Oviedo sent at 9/12/2019  1:02 PM CDT -----  Contact: Self/[phone]  I can't forward notes to a doctor visit.     ----- Message -----  From: Makayla Patterson RN  Sent: 9/12/2019  12:57 PM  To: Keyona Oviedo        ----- Message -----  From: Fiona Moncada MA  Sent: 9/12/2019  11:59 AM  To: Jayson BOWMAN Staff    Pt states he is trying to schedule for his surgery but he cant until he get the charts notes sent to Dr. Franco. Pt states he is now at the office

## 2019-09-13 LAB — BACTERIA UR CULT: NO GROWTH

## 2019-09-16 ENCOUNTER — TELEPHONE (OUTPATIENT)
Dept: PEDIATRIC UROLOGY | Facility: CLINIC | Age: 59
End: 2019-09-16

## 2019-09-16 NOTE — TELEPHONE ENCOUNTER
Unable to reach pt. Result letter mailed        ----- Message from Carmen Paniagua NP sent at 9/16/2019  7:56 AM CDT -----  Please notify patient his urine culture was negative for infection.

## 2019-09-16 NOTE — TELEPHONE ENCOUNTER
Left voicemail for pt to return call to clinic regarding results      ----- Message from Carmen Paniagua NP sent at 9/16/2019  7:56 AM CDT -----  Please notify patient his urine culture was negative for infection.

## 2019-09-17 ENCOUNTER — PATIENT OUTREACH (OUTPATIENT)
Dept: ADMINISTRATIVE | Facility: OTHER | Age: 59
End: 2019-09-17

## 2019-09-18 ENCOUNTER — CLINICAL SUPPORT (OUTPATIENT)
Dept: REHABILITATION | Facility: OTHER | Age: 59
End: 2019-09-18
Attending: SPECIALIST
Payer: MEDICARE

## 2019-09-18 DIAGNOSIS — M54.50 CHRONIC BILATERAL LOW BACK PAIN WITHOUT SCIATICA: ICD-10-CM

## 2019-09-18 DIAGNOSIS — G89.29 CHRONIC BILATERAL LOW BACK PAIN WITHOUT SCIATICA: ICD-10-CM

## 2019-09-18 PROCEDURE — 97110 THERAPEUTIC EXERCISES: CPT

## 2019-09-18 NOTE — PROGRESS NOTES
[facility]      Name: Bri Santiago  Clinic Number: [id number]    Therapy Diagnosis:   Encounter Diagnosis   Name Primary?    Chronic bilateral low back pain without sciatica      Physician: Buffy Villagran,hCristina    Visit Date: 2019    Physician Orders: PT Eval and Treat   Medical Diagnosis from Referral: Lumbar spondylosis, Postlaminectomy syndrome of lumbar region, Degeneration of lumbar or lumbosacral intervertebral disc, Spinal stenosis, lumbar region, without neurogenic claudication  Evaluation Date: 2019  Authorization Period Expiration: 19  Plan of Care Expiration: 10/16/19  Reassessment Due: 10/16/19  Visit # / Visits authorized: 10/ 20       Time In:  9:30  Time Out: 10:30     Total Billable Time: 30 minutes    Precautions: Lumbar fusion L4-S1 3x, cervical surgery,  CHF Osteopenia, hx of prostate cancer     Pattern of pain determined: 1 PEN      Subjective   Bri reports that he is feeling a little bit better than he did the last visit, the only thing that is helping is heat at home. He has a appointment with pain management on Friday before his appointment here.    Patient reports tolerating previous visit fair  Patient reports their pain to be 9/10 on a 0-10 scale with 0 being no pain and 10 being the worst pain imaginable.  Pain Location: Low back     Occupation: On disability   Leisure: family     Pts goals: Do more activity, get back to taking care of yard and cooking       Objective     Baseline IM Testing Results:   Date of testin2019  ROM 0-21 deg  (reduced to to 0-27 19)   Max Peak Torque 90    Min Peak Torque 20    Flex/Ext Ratio 4.5   % below normative data 33        CMS Impairment/Limitation/Restriction for FOTO Lumbar Survey     Outcomes not captured at time of evaluation. Based on subjective reports and and objective findings, estimate 40-50 limitation at this time.   Limitation Score: 40-50%  Category: Mobility     Current :  CK = at least 40% but < 60% impaired, limited or restricted  Goal: CJ = at least 20% but < 40% impaired, limited or restricted  Discharge:              Treatment    Pt was instructed in and performed the following:     Bri received therapeutic exercises to develop/improved posture, cardiovascular endurance, muscular endurance, lumbar/cervical ROM, strength and muscular endurance for 30 minutes including the following exercises:       [facility] 9/18/2019   Visit Number 10   VAS Pain Rating 7   Treadmill Time (in min.) 10   Speed -   Recumbent Bike Seat Pos. -   Time -   Scapular Retraction -   Lumbar Stretches - Slouch Overcorrection -   Extension in Lying -   Extension in Standing -   Flexion in Lying 10   Manual Therapy -   Lumbar Extension Seat Pad -   Femur Restraint -   Top Dead Center -   Counterweight -   Lumbar Flexion 24   Lumbar Extension 3   Lumbar Peak Torque 110   Min Torque 62   Test Percent Below Normative Data 50   Test Percent Gain in Strength from Initial  75   Lumbar Weight -   Repetitions -   Rating of Perceived Exertion -   Ice - Z Lie (in min.) -           Exercises completd this visit:    DKTC with ball 10x  PPT with legs on ball 10x  Bent knee fall out 10x    Exercises completed on previous visits/HEP::   Slouch and correct 10x  Hip/back disassociation 10x   Sit<>stand squat training 10x    HERNAN with ball 10x  PPT + Bridges 10x     Standing lumbar extension with towel overpressure 10x  Scapular retractions 10x       Peripheral muscle strengthening which included 1 set of 15-20 repetitions at a slow, controlled 10-13 second per rep pace focused on strengthening supporting musculature for improved body mechanics and functional mobility.  Pt and therapist focused on proper form during treatment to ensure optimal strengthening of each targeted muscle group.  Machines were utilized including torso rotation, leg extension, leg curl, chest press, upright row. Tricep extension, bicep curl,  "leg press, and hip abduction added visit 3    Bri received the following manual therapy techniques: none        Home Exercises Provided and Patient Education Provided     Education provided:   - Patient received education in benefits of progressing mobility and strengthening gradually  - Discussed exertion scale, slow progressive resistance protocol to promote safe and healthy strengthening of supportive musculature  by performing 15-20 reps, 7 sec per rep, and increasing weight by 5 % at 20 reps only if there ex done safely, slowly, using correct musculature, exertion and no pain.  Patient expressed understanding.  -Pt educated on strategies to safely perform examination and exercise using "Stop Light" analogy to describe how to avoid or stop irritating motions, proceed with caution with some movements, and progress positive exercises.       Written Home Exercises Provided: Patient instructed to cont prior HEP.  Exercises were reviewed and Bri was able to demonstrate them prior to the end of the session.  Bri demonstrated fair  understanding of the education provided.     See EMR under Patient Instructions for exercises provided prior visit.          Assessment     Patient has attended 10 visits at [facility] which included MD evaluation, PT evaluation with isometric testing, and physical therapy treatment including HEP instruction, education, aerobic work, dynamic strengthening on med ex equipment for the spine, and whole body strengthening on med ex equipment with increasing weight loads.  Patient  is demonstrating no change in ability to reduce symptoms, pt is having more pain today than when he initially started therapy, improved posture, increased flexion ROM, and increased strength on med ex test by 75  average.    Patient is making good progress towards established goals.  Pt will continue to benefit from skilled outpatient physical therapy to address the deficits stated in the impairment " chart, provide pt/family education and to maximize pt's level of independence in the home and community environment.     Anticipated Barriers for therapy: Previous non-compliance with HEP, multiple lumbar surgeries, CHF, osteopenia     Pt's spiritual, cultural and educational needs considered and pt agreeable to plan of care and goals as stated below:       GOALS: Pt is in agreement with the following goals.     Short term goals:  6 weeks or 10 visits   1.  Pt will demonstrate increased lumbar ROM by at least 6 degrees from the initial ROM value with improvements noted in functional ROM and ability to perform ADLs. Partially Met.   2.  Pt will demonstrate increased maximum isometric torque value by 10% when compared to the initial value resulting in improved ability to perform bending, lifting, and carrying activities safely, confidently. MET 9/18/2019   3.  Patient report a reduction in worst pain score by 1-2 points for improved tolerance for prolonged sitting needing for driving. Progressing  4.  Pt able to perform HEP correctly with minimal cueing or supervision from therapist to encourage independent management of symptoms. Partially Met           Long term goals: 13 weeks or 20 visits   1. Pt will demonstrate increased lumbar ROM by at least 12 degrees from initial ROM value, resulting in improved ability to perform functional fwd bending while standing and sitting.   2. Pt will demonstrate increased maximum isometric torque value by 25% when compared to the initial value resulting in improved ability to perform bending, lifting, and carrying activities safely, confidently.  3. Pt to demonstrate ability to independently control and reduce their pain through posture positioning and mechanical movements throughout a typical day.  4.  Pt will demonstrate reduced pain and improved functional outcomes as reported on the FOTO by reaching a score of CJ = at least 20% but < 40% impaired, limited or restricted or less  in order to demonstrate subjective improvement in pt's condition.    5. Pt will demonstrate independence with the HEP at discharge  6.   Pt will demonstrate appropriate squat technique for improved tolerance of lifting and carrying functional activity needed for household tasks.   7. Pt will express ability to mow lawn without significantly increased pain (patient goal)      Plan   Continue with established Plan of Care towards established PT goals.

## 2019-09-19 ENCOUNTER — TELEPHONE (OUTPATIENT)
Dept: PAIN MEDICINE | Facility: CLINIC | Age: 59
End: 2019-09-19

## 2019-09-19 NOTE — TELEPHONE ENCOUNTER
Good morning,afternoon Mr./Mrs.    My name is Yoselin I am contacting you from [facility] regarding your appointment scheduled for, 09/20/2019 with, Buffy Henry just confirming you will be able to make it.        Pt verbalized understanding and has confirmed appt.  MD

## 2019-09-20 ENCOUNTER — CLINICAL SUPPORT (OUTPATIENT)
Dept: REHABILITATION | Facility: OTHER | Age: 59
End: 2019-09-20
Attending: SPECIALIST
Payer: MEDICARE

## 2019-09-20 DIAGNOSIS — M54.50 CHRONIC BILATERAL LOW BACK PAIN WITHOUT SCIATICA: ICD-10-CM

## 2019-09-20 DIAGNOSIS — G89.29 CHRONIC BILATERAL LOW BACK PAIN WITHOUT SCIATICA: ICD-10-CM

## 2019-09-20 PROCEDURE — 97110 THERAPEUTIC EXERCISES: CPT

## 2019-09-20 NOTE — PROGRESS NOTES
[facility]      Name: Bri Santiago  Clinic Number: [id number]    Therapy Diagnosis:   No diagnosis found.  Physician: Buffy Villagran,*    Visit Date: 2019    Physician Orders: PT Eval and Treat   Medical Diagnosis from Referral: Lumbar spondylosis, Postlaminectomy syndrome of lumbar region, Degeneration of lumbar or lumbosacral intervertebral disc, Spinal stenosis, lumbar region, without neurogenic claudication  Evaluation Date: 2019  Authorization Period Expiration: 19  Plan of Care Expiration: 10/16/19  Reassessment Due: 10/16/19  Visit # / Visits authorized:        Time In:  9:45  Time Out: 10:30     Total Billable Time: 30 minutes    Precautions: Lumbar fusion L4-S1 3x, cervical surgery,  CHF Osteopenia, hx of prostate cancer     Pattern of pain determined: 1 PEN      Subjective   Bri reports that he is feeling a little bit better than he did the last visit, the only thing that is helping is heat at home. He has a appointment with pain management on Friday before his appointment here.    Patient reports tolerating previous visit fair  Patient reports their pain to be 7/10 on a 0-10 scale with 0 being no pain and 10 being the worst pain imaginable.  Pain Location: Low back     Occupation: On disability   Leisure: family     Pts goals: Do more activity, get back to taking care of yard and cooking       Objective     Baseline IM Testing Results:   Date of testin2019  ROM 0-21 deg  (reduced to to 0-27 19)   Max Peak Torque 90    Min Peak Torque 20    Flex/Ext Ratio 4.5   % below normative data 33     [facility] IM Testing Results:   Date of testin2019  ROM 3-24   Max Peak Torque 110   Min Peak Torque 62    Flex/Ext Ratio    % below normative data 50          CMS Impairment/Limitation/Restriction for FOTO Lumbar Survey     Outcomes not captured at time of evaluation. Based on subjective reports and and objective findings, estimate  40-50 limitation at this time.   Limitation Score: 40-50%  Category: Mobility     Current : CK = at least 40% but < 60% impaired, limited or restricted  Goal: CJ = at least 20% but < 40% impaired, limited or restricted  Discharge:              Treatment    Pt was instructed in and performed the following:     Bri received therapeutic exercises to develop/improved posture, cardiovascular endurance, muscular endurance, lumbar/cervical ROM, strength and muscular endurance for 30 minutes including the following exercises:       [facility] 9/18/2019   Visit Number 10   VAS Pain Rating 7   Treadmill Time (in min.) 10   Speed -   Recumbent Bike Seat Pos. -   Time -   Scapular Retraction -   Lumbar Stretches - Slouch Overcorrection -   Extension in Lying -   Extension in Standing -   Flexion in Lying 10   Manual Therapy -   Lumbar Extension Seat Pad -   Femur Restraint -   Top Dead Center -   Counterweight -   Lumbar Flexion 24   Lumbar Extension 3   Lumbar Peak Torque 110   Min Torque 62   Test Percent Below Normative Data 50   Test Percent Gain in Strength from Initial  75   Lumbar Weight -   Repetitions -   Rating of Perceived Exertion -   Ice - Z Lie (in min.) -           Exercises completd this visit:    DKTC with ball 10x  PPT with legs on ball 10x  Bent knee fall out 10x    Exercises completed on previous visits/HEP::   Slouch and correct 10x  Hip/back disassociation 10x   Sit<>stand squat training 10x    HERNAN with ball 10x  PPT + Bridges 10x     Standing lumbar extension with towel overpressure 10x  Scapular retractions 10x       Peripheral muscle strengthening which included 1 set of 15-20 repetitions at a slow, controlled 10-13 second per rep pace focused on strengthening supporting musculature for improved body mechanics and functional mobility.  Pt and therapist focused on proper form during treatment to ensure optimal strengthening of each targeted muscle group.  Machines were utilized including  "torso rotation, leg extension, leg curl, chest press, upright row. Tricep extension, bicep curl, leg press, and hip abduction added visit 3    Bri received the following manual therapy techniques: none        Home Exercises Provided and Patient Education Provided     Education provided:   - Patient received education in benefits of progressing mobility and strengthening gradually  - Discussed exertion scale, slow progressive resistance protocol to promote safe and healthy strengthening of supportive musculature  by performing 15-20 reps, 7 sec per rep, and increasing weight by 5 % at 20 reps only if there ex done safely, slowly, using correct musculature, exertion and no pain.  Patient expressed understanding.  -Pt educated on strategies to safely perform examination and exercise using "Stop Light" analogy to describe how to avoid or stop irritating motions, proceed with caution with some movements, and progress positive exercises.       Written Home Exercises Provided: Patient instructed to cont prior HEP.  Exercises were reviewed and Bri was able to demonstrate them prior to the end of the session.  Bri demonstrated fair  understanding of the education provided.     See EMR under Patient Instructions for exercises provided prior visit.          Assessment     Pt was able to complete therapy this visit without increasing pain, states that it feels the same as when he came in. Resumed previous resistance on the lumbar medx, he was able to complete 15 repetitions and will increase repetitions next visit.    Patient is making good progress towards established goals.  Pt will continue to benefit from skilled outpatient physical therapy to address the deficits stated in the impairment chart, provide pt/family education and to maximize pt's level of independence in the home and community environment.     Anticipated Barriers for therapy: Previous non-compliance with HEP, multiple lumbar surgeries, CHF, " osteopenia     Pt's spiritual, cultural and educational needs considered and pt agreeable to plan of care and goals as stated below:       GOALS: Pt is in agreement with the following goals.     Short term goals:  6 weeks or 10 visits   1.  Pt will demonstrate increased lumbar ROM by at least 6 degrees from the initial ROM value with improvements noted in functional ROM and ability to perform ADLs. Partially Met.   2.  Pt will demonstrate increased maximum isometric torque value by 10% when compared to the initial value resulting in improved ability to perform bending, lifting, and carrying activities safely, confidently. MET 9/18/2019   3.  Patient report a reduction in worst pain score by 1-2 points for improved tolerance for prolonged sitting needing for driving. Progressing  4.  Pt able to perform HEP correctly with minimal cueing or supervision from therapist to encourage independent management of symptoms. Partially Met           Long term goals: 13 weeks or 20 visits   1. Pt will demonstrate increased lumbar ROM by at least 12 degrees from initial ROM value, resulting in improved ability to perform functional fwd bending while standing and sitting.   2. Pt will demonstrate increased maximum isometric torque value by 25% when compared to the initial value resulting in improved ability to perform bending, lifting, and carrying activities safely, confidently.  3. Pt to demonstrate ability to independently control and reduce their pain through posture positioning and mechanical movements throughout a typical day.  4.  Pt will demonstrate reduced pain and improved functional outcomes as reported on the FOTO by reaching a score of CJ = at least 20% but < 40% impaired, limited or restricted or less in order to demonstrate subjective improvement in pt's condition.    5. Pt will demonstrate independence with the HEP at discharge  6.   Pt will demonstrate appropriate squat technique for improved tolerance of lifting  and carrying functional activity needed for household tasks.   7. Pt will express ability to mow lawn without significantly increased pain (patient goal)      Plan   Continue with established Plan of Care towards established PT goals.

## 2019-09-23 ENCOUNTER — CLINICAL SUPPORT (OUTPATIENT)
Dept: REHABILITATION | Facility: OTHER | Age: 59
End: 2019-09-23
Attending: SPECIALIST
Payer: MEDICARE

## 2019-09-23 DIAGNOSIS — G89.29 CHRONIC BILATERAL LOW BACK PAIN WITHOUT SCIATICA: ICD-10-CM

## 2019-09-23 DIAGNOSIS — M54.50 CHRONIC BILATERAL LOW BACK PAIN WITHOUT SCIATICA: ICD-10-CM

## 2019-09-23 PROCEDURE — 97110 THERAPEUTIC EXERCISES: CPT

## 2019-09-23 NOTE — PROGRESS NOTES
[facility]      Name: Bri Santiago  Clinic Number: [id number]    Therapy Diagnosis:   Encounter Diagnosis   Name Primary?    Chronic bilateral low back pain without sciatica      Physician: Buffy Villagran,Christina    Visit Date: 2019    Physician Orders: PT Eval and Treat   Medical Diagnosis from Referral: Lumbar spondylosis, Postlaminectomy syndrome of lumbar region, Degeneration of lumbar or lumbosacral intervertebral disc, Spinal stenosis, lumbar region, without neurogenic claudication  Evaluation Date: 2019  Authorization Period Expiration: 19  Plan of Care Expiration: 10/16/19  Reassessment Due: 10/16/19  Visit # / Visits authorized:        Time In:  830  Time Out: 930     Total Billable Time: 40 minutes    Precautions: Lumbar fusion L4-S1 3x, cervical surgery,  CHF Osteopenia, hx of prostate cancer     Pattern of pain determined: 1 PEN      Subjective   Arsenlin reports back pain is unchanged since previous session   Patient reports tolerating previous visit fair  Patient reports their pain to be 7/10 on a 0-10 scale with 0 being no pain and 10 being the worst pain imaginable.  Pain Location: Low back     Occupation: On disability   Leisure: family     Pts goals: Do more activity, get back to taking care of yard and cooking       Objective     Baseline IM Testing Results:   Date of testin2019  ROM 0-21 deg  (reduced to to 0-27 19)   Max Peak Torque 90    Min Peak Torque 20    Flex/Ext Ratio 4.5   % below normative data 33     Midpoint IM Testing Results:   Date of testin2019  ROM 3-24   Max Peak Torque 110   Min Peak Torque 62    Flex/Ext Ratio    % below normative data 50          CMS Impairment/Limitation/Restriction for FOTO Lumbar Survey     Outcomes not captured at time of evaluation. Based on subjective reports and and objective findings, estimate 40-50 limitation at this time.   Limitation Score: 40-50%  Category:  Mobility     Current : CK = at least 40% but < 60% impaired, limited or restricted  Goal: CJ = at least 20% but < 40% impaired, limited or restricted  Discharge:              Treatment    Pt was instructed in and performed the following:     Bri received therapeutic exercises to develop/improved posture, cardiovascular endurance, muscular endurance, lumbar/cervical ROM, strength and muscular endurance for 30 minutes including the following exercises:   [facility] 9/23/2019   Visit Number 12   VAS Pain Rating 7   Treadmill Time (in min.) 10   Speed -   Recumbent Bike Seat Pos. -   Time -   Scapular Retraction -   Lumbar Stretches - Slouch Overcorrection -   Extension in Lying -   Extension in Standing -   Flexion in Lying 10   Manual Therapy -   Lumbar Extension Seat Pad -   Femur Restraint -   Top Dead Center -   Counterweight -   Lumbar Flexion -   Lumbar Extension -   Lumbar Peak Torque -   Min Torque -   Test Percent Below Normative Data -   Test Percent Gain in Strength from Initial  -   Lumbar Weight 76   Repetitions 20   Rating of Perceived Exertion 5   Ice - Z Lie (in min.) 10         Exercises completd this visit:    DKTC with ball 10x  PPT with legs on ball 10x  Bent knee fall out 10x    Exercises completed on previous visits/HEP::   Slouch and correct 10x  Hip/back disassociation 10x   Sit<>stand squat training 10x    HERNAN with ball 10x  PPT + Bridges 10x     Standing lumbar extension with towel overpressure 10x  Scapular retractions 10x       Peripheral muscle strengthening which included 1 set of 15-20 repetitions at a slow, controlled 10-13 second per rep pace focused on strengthening supporting musculature for improved body mechanics and functional mobility.  Pt and therapist focused on proper form during treatment to ensure optimal strengthening of each targeted muscle group.  Machines were utilized including torso rotation, leg extension, leg curl, chest press, upright row. Tricep  "extension, bicep curl, leg press, and hip abduction added visit 3    Bri received the following manual therapy techniques: none    Home Exercises Provided and Patient Education Provided     Education provided:   - Patient received education in benefits of progressing mobility and strengthening gradually  - Discussed exertion scale, slow progressive resistance protocol to promote safe and healthy strengthening of supportive musculature  by performing 15-20 reps, 7 sec per rep, and increasing weight by 5 % at 20 reps only if there ex done safely, slowly, using correct musculature, exertion and no pain.  Patient expressed understanding.  -Pt educated on strategies to safely perform examination and exercise using "Stop Light" analogy to describe how to avoid or stop irritating motions, proceed with caution with some movements, and progress positive exercises.       Written Home Exercises Provided: Patient instructed to cont prior HEP.  Exercises were reviewed and Bri was able to demonstrate them prior to the end of the session.  Bri demonstrated fair  understanding of the education provided.     See EMR under Patient Instructions for exercises provided prior visit.    Assessment     Pt able to complete all components of session with no adverse effects. Able to complete 20 repetitions with appropriate RPE. Will increase 5% next session pending pt report of DOMS.     Patient is making good progress towards established goals.  Pt will continue to benefit from skilled outpatient physical therapy to address the deficits stated in the impairment chart, provide pt/family education and to maximize pt's level of independence in the home and community environment.     Anticipated Barriers for therapy: Previous non-compliance with HEP, multiple lumbar surgeries, CHF, osteopenia     Pt's spiritual, cultural and educational needs considered and pt agreeable to plan of care and goals as stated below:       GOALS: Pt is in " agreement with the following goals.     Short term goals:  6 weeks or 10 visits   1.  Pt will demonstrate increased lumbar ROM by at least 6 degrees from the initial ROM value with improvements noted in functional ROM and ability to perform ADLs. Partially Met.   2.  Pt will demonstrate increased maximum isometric torque value by 10% when compared to the initial value resulting in improved ability to perform bending, lifting, and carrying activities safely, confidently. MET 9/18/2019   3.  Patient report a reduction in worst pain score by 1-2 points for improved tolerance for prolonged sitting needing for driving. Progressing  4.  Pt able to perform HEP correctly with minimal cueing or supervision from therapist to encourage independent management of symptoms. Partially Met           Long term goals: 13 weeks or 20 visits   1. Pt will demonstrate increased lumbar ROM by at least 12 degrees from initial ROM value, resulting in improved ability to perform functional fwd bending while standing and sitting.   2. Pt will demonstrate increased maximum isometric torque value by 25% when compared to the initial value resulting in improved ability to perform bending, lifting, and carrying activities safely, confidently.  3. Pt to demonstrate ability to independently control and reduce their pain through posture positioning and mechanical movements throughout a typical day.  4.  Pt will demonstrate reduced pain and improved functional outcomes as reported on the FOTO by reaching a score of CJ = at least 20% but < 40% impaired, limited or restricted or less in order to demonstrate subjective improvement in pt's condition.    5. Pt will demonstrate independence with the HEP at discharge  6.   Pt will demonstrate appropriate squat technique for improved tolerance of lifting and carrying functional activity needed for household tasks.   7. Pt will express ability to mow lawn without significantly increased pain (patient  goal)      Plan   Continue with established Plan of Care towards established PT goals.

## 2019-09-25 ENCOUNTER — PATIENT OUTREACH (OUTPATIENT)
Dept: ADMINISTRATIVE | Facility: OTHER | Age: 59
End: 2019-09-25

## 2019-09-26 ENCOUNTER — TELEPHONE (OUTPATIENT)
Dept: PAIN MEDICINE | Facility: CLINIC | Age: 59
End: 2019-09-26

## 2019-09-26 NOTE — TELEPHONE ENCOUNTER
My name is Staff, I am contacting you from [facility] regarding your appointment scheduled for 09.27.19, with JERI, just confirming you will be able to make it.    If you feel you need to reschedule or canceled please give our office a call so we can better assist you.      Staff requesting patient to arrive 15 mins ahead of schedule appointment time.    Pt verbalized understanding and has confirmed appt

## 2019-09-27 DIAGNOSIS — M47.816 OSTEOARTHRITIS OF LUMBAR SPINE, UNSPECIFIED SPINAL OSTEOARTHRITIS COMPLICATION STATUS: ICD-10-CM

## 2019-09-27 DIAGNOSIS — M96.1 POSTLAMINECTOMY SYNDROME OF LUMBAR REGION: ICD-10-CM

## 2019-09-27 DIAGNOSIS — M47.816 LUMBAR SPONDYLOSIS: Primary | ICD-10-CM

## 2019-10-02 ENCOUNTER — CLINICAL SUPPORT (OUTPATIENT)
Dept: REHABILITATION | Facility: OTHER | Age: 59
End: 2019-10-02
Attending: SPECIALIST
Payer: MEDICARE

## 2019-10-02 DIAGNOSIS — D68.00 VON WILLEBRAND DISEASE: ICD-10-CM

## 2019-10-02 PROCEDURE — 97110 THERAPEUTIC EXERCISES: CPT

## 2019-10-02 NOTE — PROGRESS NOTES
[facility]      Name: Bri Santiago  Clinic Number: [id number]    Therapy Diagnosis:   Encounter Diagnosis   Name Primary?    Von Willebrand disease      Physician: Buffy Villagran,Christina    Visit Date: 10/2/2019    Physician Orders: PT Eval and Treat   Medical Diagnosis from Referral: Lumbar spondylosis, Postlaminectomy syndrome of lumbar region, Degeneration of lumbar or lumbosacral intervertebral disc, Spinal stenosis, lumbar region, without neurogenic claudication  Evaluation Date: 2019  Authorization Period Expiration: 19  Plan of Care Expiration: 10/16/19  Reassessment Due: 10/16/19  Visit # / Visits authorized:        Time In:  830  Time Out: 930     Total Billable Time: 40 minutes    Precautions: Lumbar fusion L4-S1 3x, cervical surgery,  CHF Osteopenia, hx of prostate cancer     Pattern of pain determined: 1 PEN      Subjective   Bri reports back pain is unchanged. Pt is complain with her HEP.   Patient reports tolerating previous visit fair  Patient reports their pain to be 6/10 on a 0-10 scale with 0 being no pain and 10 being the worst pain imaginable.  Pain Location: Low back     Occupation: On disability   Leisure: family     Pts goals: Do more activity, get back to taking care of yard and cooking       Objective     Baseline IM Testing Results:   Date of testin2019  ROM 0-21 deg  (reduced to to 0-27 19)   Max Peak Torque 90    Min Peak Torque 20    Flex/Ext Ratio 4.5   % below normative data 33     Midpoint IM Testing Results:   Date of testin2019  ROM 3-24   Max Peak Torque 110   Min Peak Torque 62    Flex/Ext Ratio    % below normative data 50          CMS Impairment/Limitation/Restriction for FOTO Lumbar Survey     Outcomes not captured at time of evaluation. Based on subjective reports and and objective findings, estimate 40-50 limitation at this time.   Limitation Score: 40-50%  Category: Mobility     Current : CK = at  least 40% but < 60% impaired, limited or restricted  Goal: CJ = at least 20% but < 40% impaired, limited or restricted  Discharge:              Treatment    Pt was instructed in and performed the following:     Bri received therapeutic exercises to develop/improved posture, cardiovascular endurance, muscular endurance, lumbar/cervical ROM, strength and muscular endurance for 30 minutes including the following exercises:   [facility] 10/2/2019   Visit Number 13   VAS Pain Rating 7   Treadmill Time (in min.) 10   Speed -   Recumbent Bike Seat Pos. -   Time -   Scapular Retraction -   Lumbar Stretches - Slouch Overcorrection -   Extension in Lying -   Extension in Standing -   Flexion in Lying -   Manual Therapy -   Lumbar Extension Seat Pad -   Femur Restraint -   Top Dead Center -   Counterweight -   Lumbar Flexion -   Lumbar Extension -   Lumbar Peak Torque -   Min Torque -   Test Percent Below Normative Data -   Test Percent Gain in Strength from Initial  -   Lumbar Weight 80   Repetitions 20   Rating of Perceived Exertion 4   Ice - Z Lie (in min.) 10       Exercises completd this visit:    DKTC with ball 10x  PPT with legs on ball 10x  Bent knee fall out 10x  PPT + Bridges 10x   Open Book 10x    Exercises completed on previous visits/HEP::   Slouch and correct 10x  Hip/back disassociation 10x   Sit<>stand squat training 10x           Standing lumbar extension with towel overpressure 10x  Scapular retractions 10x       Peripheral muscle strengthening which included 1 set of 15-20 repetitions at a slow, controlled 10-13 second per rep pace focused on strengthening supporting musculature for improved body mechanics and functional mobility.  Pt and therapist focused on proper form during treatment to ensure optimal strengthening of each targeted muscle group.  Machines were utilized including torso rotation, leg extension, leg curl, chest press, upright row. Tricep extension, bicep curl, leg press, and hip  "abduction added visit 3    Bri received the following manual therapy techniques: none    Home Exercises Provided and Patient Education Provided     Education provided:   - Patient received education in benefits of progressing mobility and strengthening gradually  - Discussed exertion scale, slow progressive resistance protocol to promote safe and healthy strengthening of supportive musculature  by performing 15-20 reps, 7 sec per rep, and increasing weight by 5 % at 20 reps only if there ex done safely, slowly, using correct musculature, exertion and no pain.  Patient expressed understanding.  -Pt educated on strategies to safely perform examination and exercise using "Stop Light" analogy to describe how to avoid or stop irritating motions, proceed with caution with some movements, and progress positive exercises.       Written Home Exercises Provided: Patient instructed to cont prior HEP.  Exercises were reviewed and Bri was able to demonstrate them prior to the end of the session.  Bri demonstrated fair  understanding of the education provided.     See EMR under Patient Instructions for exercises provided prior visit.    Assessment     Pt able to complete all components of session with a decrease in LBP. Able to complete 20 repetitions with a weight increase with appropriate RPE. Will increase 5% next session pending pt report of DOMS. Try cupping if pt continues to have pain.     Patient is making good progress towards established goals.  Pt will continue to benefit from skilled outpatient physical therapy to address the deficits stated in the impairment chart, provide pt/family education and to maximize pt's level of independence in the home and community environment.     Anticipated Barriers for therapy: Previous non-compliance with HEP, multiple lumbar surgeries, CHF, osteopenia     Pt's spiritual, cultural and educational needs considered and pt agreeable to plan of care and goals as stated below: "       GOALS: Pt is in agreement with the following goals.     Short term goals:  6 weeks or 10 visits   1.  Pt will demonstrate increased lumbar ROM by at least 6 degrees from the initial ROM value with improvements noted in functional ROM and ability to perform ADLs. Partially Met.   2.  Pt will demonstrate increased maximum isometric torque value by 10% when compared to the initial value resulting in improved ability to perform bending, lifting, and carrying activities safely, confidently. MET 9/18/2019   3.  Patient report a reduction in worst pain score by 1-2 points for improved tolerance for prolonged sitting needing for driving. Progressing  4.  Pt able to perform HEP correctly with minimal cueing or supervision from therapist to encourage independent management of symptoms. Partially Met           Long term goals: 13 weeks or 20 visits   1. Pt will demonstrate increased lumbar ROM by at least 12 degrees from initial ROM value, resulting in improved ability to perform functional fwd bending while standing and sitting.   2. Pt will demonstrate increased maximum isometric torque value by 25% when compared to the initial value resulting in improved ability to perform bending, lifting, and carrying activities safely, confidently.  3. Pt to demonstrate ability to independently control and reduce their pain through posture positioning and mechanical movements throughout a typical day.  4.  Pt will demonstrate reduced pain and improved functional outcomes as reported on the FOTO by reaching a score of CJ = at least 20% but < 40% impaired, limited or restricted or less in order to demonstrate subjective improvement in pt's condition.    5. Pt will demonstrate independence with the HEP at discharge  6.   Pt will demonstrate appropriate squat technique for improved tolerance of lifting and carrying functional activity needed for household tasks.   7. Pt will express ability to mow lawn without significantly increased  pain (patient goal)      Plan   Continue with established Plan of Care towards established PT goals.

## 2019-10-03 NOTE — PRE ADMISSION SCREENING
Pre admit phone call completed.    Instructions given to patient about NPO status as follows:     The evening before surgery do not eat anything after 9 p.m. ( this includes hard candy, chewing gum and mints).  You may only have GATORADE, POWERADE AND WATER from 9 p.m. until you leave your home. DO NOT  DRINK ANY LIQUIDS ON THE WAY TO THE HOSPITAL.      Patient was also instructed on the below information:    Park in the Parking lot behind the hospital or in the [employer] [facility] across the street from the parking lot.  Parking is complimentary.  If you will be discharged the same day as your procedure, please arrange for a responsible adult to drive you home or  to accompany you if traveling by taxi.  YOU WILL NOT BE PERMITTED TO DRIVE OR TO LEAVE THE HOSPITAL ALONE AFTER SURGERY.  It is strongly recommended that you arrange for someone to remain with you for the first 24 hrs following your surgery.    Patient verbalized understanding of above instructions.

## 2019-10-04 ENCOUNTER — TELEPHONE (OUTPATIENT)
Dept: OTOLARYNGOLOGY | Facility: CLINIC | Age: 59
End: 2019-10-04

## 2019-10-04 ENCOUNTER — CLINICAL SUPPORT (OUTPATIENT)
Dept: REHABILITATION | Facility: OTHER | Age: 59
End: 2019-10-04
Attending: SPECIALIST
Payer: MEDICARE

## 2019-10-04 DIAGNOSIS — M54.50 CHRONIC BILATERAL LOW BACK PAIN WITHOUT SCIATICA: ICD-10-CM

## 2019-10-04 DIAGNOSIS — D68.00 VON WILLEBRAND DISEASE: ICD-10-CM

## 2019-10-04 DIAGNOSIS — G89.29 CHRONIC BILATERAL LOW BACK PAIN WITHOUT SCIATICA: ICD-10-CM

## 2019-10-04 PROCEDURE — 97110 THERAPEUTIC EXERCISES: CPT

## 2019-10-04 NOTE — PROGRESS NOTES
[facility]      Name: Bri Santiago  Clinic Number: [id number]    Therapy Diagnosis:   Encounter Diagnoses   Name Primary?    Von Willebrand disease     Chronic bilateral low back pain without sciatica      Physician: Buffy Villagran,Christina    Visit Date: 10/4/2019    Physician Orders: PT Eval and Treat   Medical Diagnosis from Referral: Lumbar spondylosis, Postlaminectomy syndrome of lumbar region, Degeneration of lumbar or lumbosacral intervertebral disc, Spinal stenosis, lumbar region, without neurogenic claudication  Evaluation Date: 2019  Authorization Period Expiration: 19  Plan of Care Expiration: 10/16/19  Reassessment Due: 10/16/19  Visit # / Visits authorized:  (ROM assessment at 15/20)      Time In: 905  Time Out: 100     Total Billable Time: 50 minutes    Precautions: Lumbar fusion L4-S1 3x, cervical surgery,  CHF Osteopenia, hx of prostate cancer     Pattern of pain determined: 1 PEN      Subjective   Bri reports his back is not as stiff and feels a 6/10 LBP currently.         Patient reports tolerating previous visit fair  Patient reports their pain to be 6/10 on a 0-10 scale with 0 being no pain and 10 being the worst pain imaginable.  Pain Location: Low back     Occupation: On disability   Leisure: family     Pts goals: Do more activity, get back to taking care of yard and cooking       Objective     Baseline IM Testing Results:   Date of testin2019  ROM 0-21 deg  (reduced to to 0-27 19)   Max Peak Torque 90    Min Peak Torque 20    Flex/Ext Ratio 4.5   % below normative data 33     Midpoint IM Testing Results:   Date of testin2019  ROM 3-24   Max Peak Torque 110   Min Peak Torque 62    Flex/Ext Ratio    % below normative data 50          CMS Impairment/Limitation/Restriction for FOTO Lumbar Survey     Outcomes not captured at time of evaluation. Based on subjective reports and and objective findings, estimate 40-50  "limitation at this time.   Limitation Score: 40-50%  Category: Mobility     Current : CK = at least 40% but < 60% impaired, limited or restricted  Goal: CJ = at least 20% but < 40% impaired, limited or restricted  Discharge:              Treatment    Pt was instructed in and performed the following:     Bri received therapeutic exercises to develop/improved posture, cardiovascular endurance, muscular endurance, lumbar/cervical ROM, strength and muscular endurance for 50  minutes including the following exercises:     [facility] 10/4/2019   Visit Number 14   VAS Pain Rating 6   Treadmill Time (in min.) 5   Speed 2.1   Recumbent Bike Seat Pos. -   Time -   Scapular Retraction -   Lumbar Stretches - Slouch Overcorrection -   Extension in Lying -   Extension in Standing -   Flexion in Lying 10   Manual Therapy -   Lumbar Extension Seat Pad -   Femur Restraint 6   Top Dead Center -   Counterweight -   Lumbar Flexion -   Lumbar Extension -   Lumbar Peak Torque -   Min Torque -   Test Percent Below Normative Data -   Test Percent Gain in Strength from Initial  -   Lumbar Weight 84   Repetitions 15   Rating of Perceived Exertion 4   Ice - Z Lie (in min.) 10         Exercises completd this visit: (bolded)  DKTC with ball 10x with 5" holds  PPT with legs on ball 10x  Bent knee fall out 10x  PPT + Bridges 10x   Open Book 10x    Exercises completed on previous visits/HEP::   Slouch and correct 10x  Hip/back disassociation 10x   Sit<>stand squat training 10x           Standing lumbar extension with towel overpressure 10x  Scapular retractions 10x       Peripheral muscle strengthening which included 1 set of 15-20 repetitions at a slow, controlled 10-13 second per rep pace focused on strengthening supporting musculature for improved body mechanics and functional mobility.  Pt and therapist focused on proper form during treatment to ensure optimal strengthening of each targeted muscle group.  Machines were utilized " "including torso rotation, leg extension, leg curl, chest press, upright row. Tricep extension, bicep curl, leg press, and hip abduction added visit 3    Bri received the following manual therapy techniques: none    Home Exercises Provided and Patient Education Provided     Education provided:   - Patient received education in benefits of progressing mobility and strengthening gradually  - Discussed exertion scale, slow progressive resistance protocol to promote safe and healthy strengthening of supportive musculature  by performing 15-20 reps, 7 sec per rep, and increasing weight by 5 % at 20 reps only if there ex done safely, slowly, using correct musculature, exertion and no pain.  Patient expressed understanding.  -Pt educated on strategies to safely perform examination and exercise using "Stop Light" analogy to describe how to avoid or stop irritating motions, proceed with caution with some movements, and progress positive exercises.       Written Home Exercises Provided: Patient instructed to cont prior HEP.  Exercises were reviewed and Bri was able to demonstrate them prior to the end of the session.  Bri demonstrated fair  understanding of the education provided.     See EMR under Patient Instructions for exercises provided prior visit.    Assessment     Patient reported improved back stiffness, but remained with 6/10 pain. He received a 5% weight increase to 84#. He completed 15 reps, with an RPE of 4 today. He had no new c/o post therex. Continue to progress reps as tolerated. Re-assess ROM at next visit.     Patient is making good progress towards established goals.  Pt will continue to benefit from skilled outpatient physical therapy to address the deficits stated in the impairment chart, provide pt/family education and to maximize pt's level of independence in the home and community environment.     Anticipated Barriers for therapy: Previous non-compliance with HEP, multiple lumbar surgeries, " CHF, osteopenia     Pt's spiritual, cultural and educational needs considered and pt agreeable to plan of care and goals as stated below:       GOALS: Pt is in agreement with the following goals.     Short term goals:  6 weeks or 10 visits   1.  Pt will demonstrate increased lumbar ROM by at least 6 degrees from the initial ROM value with improvements noted in functional ROM and ability to perform ADLs. Partially Met.   2.  Pt will demonstrate increased maximum isometric torque value by 10% when compared to the initial value resulting in improved ability to perform bending, lifting, and carrying activities safely, confidently. MET 9/18/2019   3.  Patient report a reduction in worst pain score by 1-2 points for improved tolerance for prolonged sitting needing for driving. Progressing  4.  Pt able to perform HEP correctly with minimal cueing or supervision from therapist to encourage independent management of symptoms. Partially Met           Long term goals: 13 weeks or 20 visits   1. Pt will demonstrate increased lumbar ROM by at least 12 degrees from initial ROM value, resulting in improved ability to perform functional fwd bending while standing and sitting.   2. Pt will demonstrate increased maximum isometric torque value by 25% when compared to the initial value resulting in improved ability to perform bending, lifting, and carrying activities safely, confidently.  3. Pt to demonstrate ability to independently control and reduce their pain through posture positioning and mechanical movements throughout a typical day.  4.  Pt will demonstrate reduced pain and improved functional outcomes as reported on the FOTO by reaching a score of CJ = at least 20% but < 40% impaired, limited or restricted or less in order to demonstrate subjective improvement in pt's condition.    5. Pt will demonstrate independence with the HEP at discharge  6.   Pt will demonstrate appropriate squat technique for improved tolerance of  lifting and carrying functional activity needed for household tasks.   7. Pt will express ability to mow lawn without significantly increased pain (patient goal)      Plan   Continue with established Plan of Care towards established PT goals.

## 2019-10-05 NOTE — H&P
[facility]  History & Physical    Subjective:      Chief Complaint/Reason for Admission:  Sinus surgery    Bri Santiago is a 59 y.o. male.  Who has recurring/chronic maxillary sinusitis bilaterally and bilateral headaches secondarily.  He has von Willebrand's disease and will be treated with DDAVP preoperatively.    Past Medical History:   Diagnosis Date    Acute pancreatitis     Anal fissure     Anemia     Anticoagulant long-term use     Arthritis     Asthma in remission     Back pain     BPH (benign prostatic hypertrophy)     Cancer 2000    prostate- treated at [facility] with chemo- in remission since 2000    Clotting disorder     Diastolic dysfunction with chronic heart failure 12/3/2018    Dysphagia 10/7/2014    Family history of colon cancer     Family history of early CAD     GERD (gastroesophageal reflux disease)     Helicobacter pylori (H. pylori) infection     Chronic    History of chronic pancreatitis     HTN (hypertension)     Lumbago 11/12/2012    Obesity     ABDON (obstructive sleep apnea)     Pneumonia     during childhood     Prostate cancer 2000    dx and treated at [facility], had chemotherapy, in remission [id number]    Sacroiliac joint pain 2/10/2015    Spinal stenosis of lumbar region     Trouble in sleeping     Vitamin D deficiency disease     Von Willebrand disease     VWD (acquired von Willebrand's disease)      Past Surgical History:   Procedure Laterality Date    anal fissure repair      x2    BACK SURGERY  2012    CARPAL TUNNEL RELEASE  2003    left hand    CATHETERIZATION OF BOTH LEFT AND RIGHT HEART N/A 2/14/2019    Procedure: CATHETERIZATION, HEART, BOTH LEFT AND RIGHT;  Surgeon: Kyle Magana MD;  Location: [facility] CATH LAB;  Service: Cardiology;  Laterality: N/A;    CERVICAL DISCECTOMY  2003    COLONOSCOPY N/A 10/7/2015    Procedure: COLONOSCOPY;  Surgeon: Rosendo Boyer MD;  Location: [facility] ENDO ([facility] FLR);  Service:  Endoscopy;  Laterality: N/A;  PM Prep    COLONOSCOPY N/A 6/19/2017    Procedure: COLONOSCOPY;  Surgeon: Rosendo Boyer MD;  Location: [facility] ENDO (4TH FLR);  Service: Endoscopy;  Laterality: N/A;  constipation prep (no DM no CHF)       hx of vonWillebrand's disease-will need infusion prior    LEFT HEART CATHETERIZATION Left 2/14/2019    Procedure: Left heart cath;  Surgeon: Kyle Magana MD;  Location: [facility] CATH LAB;  Service: Cardiology;  Laterality: Left;    LUMBAR FUSION  2012    RADIOFREQUENCY ABLATION Right 5/9/2019    Procedure: RADIOFREQUENCY ABLATION, RIGHT L3,L4,L5;  Surgeon: Fredy Magana MD;  Location: [facility] PAIN MGT;  Service: Pain Management;  Laterality: Right;  1 of 2  RT RFA L3,4,5  PLAVIX clearance received, pt needs DDAVP    RADIOFREQUENCY ABLATION Left 5/23/2019    Procedure: RADIOFREQUENCY ABLATION, LEFT L3,L4,L5;  Surgeon: Fredy Magana MD;  Location: [facility] PAIN MGT;  Service: Pain Management;  Laterality: Left;  2 of 2  LT RFA L3,4,5  PLAVIX clearance received, pt needs DDAVP    RADIOFREQUENCY ABLATION OF LUMBAR MEDIAL BRANCH NERVE AT SINGLE LEVEL Left 5/24/2018    Procedure: RADIOFREQUENCY THERMOCOAGULATION (RFTC)-NERVE-MEDIAN BRANCH-LUMBAR;  Surgeon: Fredy Magana MD;  Location: [facility] PAIN MGT;  Service: Pain Management;  Laterality: Left;  Left RFA @ L3,4,5  [id number]  with IV Sedation    2 of 2    SPINE SURGERY      TONSILLECTOMY      at age 24    VASECTOMY  1996     Family History   Problem Relation Age of Onset    Colon cancer Father 67        colon cancer    Hypertension Father     Glaucoma Father     Colon cancer Paternal Grandfather 65             Coronary artery disease Mother 45    Hypertension Mother     Heart disease Mother     No Known Problems Brother     No Known Problems Sister     No Known Problems Daughter     No Known Problems Son     Coronary artery disease Brother 51    No Known Problems Daughter     No Known Problems Daughter      No Known Problems Son     No Known Problems Son     Colon cancer Paternal Uncle 65    Diabetes Mellitus Paternal Grandmother      Social History     Tobacco Use    Smoking status: Never Smoker    Smokeless tobacco: Never Used   Substance Use Topics    Alcohol use: Yes     Alcohol/week: 1.0 standard drinks     Types: 1 Glasses of wine per week     Comment: social    Drug use: No       No medications prior to admission.     Review of patient's allergies indicates:   Allergen Reactions    Ace inhibitors     Aspirin      Other reaction(s): Hives  Other reaction(s): Hives    Codeine     Dilaudid  [hydromorphone]      Other reaction(s): Itching    Penicillins Hives and Swelling     Has had allergy testing and can prob tolerate penicillin        Review of Systems   Constitutional: Negative for chills and fever.   HENT: Positive for congestion, sinus pain and sore throat. Negative for ear discharge, ear pain and hearing loss.    Eyes: Positive for pain. Negative for blurred vision and discharge.   Respiratory: Positive for cough. Negative for sputum production, shortness of breath and wheezing.    Cardiovascular: Negative.    Gastrointestinal: Negative for abdominal pain, diarrhea, heartburn and vomiting.   Genitourinary: Negative.    Musculoskeletal: Positive for back pain, joint pain and neck pain.   Skin: Negative.    Neurological: Positive for headaches. Negative for dizziness.   Endo/Heme/Allergies: Positive for environmental allergies. Does not bruise/bleed easily.        Von Willebrand's disease   Psychiatric/Behavioral: Negative.  Negative for memory loss.       Objective:      Vital Signs (Most Recent)       Vital Signs Range (Last 24H):       Physical Exam   Constitutional: He is oriented to person, place, and time. He appears well-developed and well-nourished.   HENT:   Head: Normocephalic.   Right Ear: Hearing, tympanic membrane, external ear and ear canal normal.   Left Ear: Hearing, tympanic  membrane, external ear and ear canal normal.   Nose: Nose normal. No mucosal edema, rhinorrhea or septal deviation.   Mouth/Throat: Uvula is midline, oropharynx is clear and moist and mucous membranes are normal. No oropharyngeal exudate. Tonsils are 1+ on the right. Tonsils are 1+ on the left.   Septum-deviated to the right  Oral cavity-Mitchell class 3 with hyperactive gag response an enlarged base of tongue   Eyes: Pupils are equal, round, and reactive to light. Conjunctivae, EOM and lids are normal. Right eye exhibits no discharge. Left eye exhibits no discharge. Right conjunctiva is not injected. Left conjunctiva is not injected.   Neck: Trachea normal, normal range of motion and full passive range of motion without pain. Neck supple. No tracheal deviation present. No thyroid mass and no thyromegaly present.   Cardiovascular: Normal rate, regular rhythm, normal heart sounds and normal pulses. Exam reveals no gallop.   No murmur heard.  Pulmonary/Chest: Effort normal and breath sounds normal. He has no decreased breath sounds. He has no wheezes. He has no rhonchi. He has no rales.   Abdominal: Soft. Bowel sounds are normal. There is no tenderness.   Musculoskeletal: Normal range of motion.        Right shoulder: Normal.   Lymphadenopathy:        Head (right side): No submental, no submandibular and no occipital adenopathy present.        Head (left side): No submental, no submandibular and no occipital adenopathy present.     He has no cervical adenopathy.   Neurological: He is alert and oriented to person, place, and time. He has normal strength. No cranial nerve deficit or sensory deficit. Gait normal.   Skin: Skin is warm and dry. No rash noted. No cyanosis. Nails show no clubbing.   Psychiatric: He has a normal mood and affect. His speech is normal and behavior is normal. Cognition and memory are normal.   Vitals reviewed.      Data Review:  None   ECG: See results of EKG performed on 08/07/2019.      Assessment:    1.  Recurrent/chronic maxillary sinusitis bilaterally  2.  Headaches secondarily  3.  Nasal septal deviation, mild  4.  Laryngopharyngeal reflux  5.  Dysphagia secondarily  6.  Von Willebrand's disease  There are no hospital problems to display for this patient.      Plan:    The patient is being admitted for endoscopic sinus surgery with balloon dilation of maxillary sinuses and culture of the maxillary sinuses bilaterally. DDAVP will be administered 1 hr prior to his surgery.

## 2019-10-07 ENCOUNTER — HOSPITAL ENCOUNTER (OUTPATIENT)
Facility: OTHER | Age: 59
Discharge: HOME | End: 2019-10-07
Attending: SPECIALIST | Admitting: SPECIALIST
Payer: MEDICARE

## 2019-10-07 VITALS
BODY MASS INDEX: 36.4 KG/M2 | DIASTOLIC BLOOD PRESSURE: 83 MMHG | RESPIRATION RATE: 18 BRPM | HEIGHT: 71 IN | TEMPERATURE: 98 F | OXYGEN SATURATION: 99 % | HEART RATE: 102 BPM | WEIGHT: 260 LBS | SYSTOLIC BLOOD PRESSURE: 136 MMHG

## 2019-10-07 DIAGNOSIS — J01.91 ACUTE RECURRENT SINUSITIS, UNSPECIFIED LOCATION: ICD-10-CM

## 2019-10-07 DIAGNOSIS — J34.2 NASAL SEPTAL DEVIATION: ICD-10-CM

## 2019-10-07 DIAGNOSIS — J32.0 CHRONIC MAXILLARY SINUSITIS: ICD-10-CM

## 2019-10-07 DIAGNOSIS — J01.01 ACUTE RECURRENT MAXILLARY SINUSITIS: Primary | ICD-10-CM

## 2019-10-07 PROCEDURE — 36000710: Performed by: SPECIALIST

## 2019-10-07 PROCEDURE — 87102 FUNGUS ISOLATION CULTURE: CPT | Mod: 59

## 2019-10-07 PROCEDURE — 71000016 HC POSTOP RECOV ADDL HR: Performed by: SPECIALIST

## 2019-10-07 PROCEDURE — 31295 NSL/SINS NDSC SURG MAX SINS: CPT | Mod: 50,,, | Performed by: SPECIALIST

## 2019-10-07 PROCEDURE — 71000015 HC POSTOP RECOV 1ST HR: Performed by: SPECIALIST

## 2019-10-07 PROCEDURE — 71000039 HC RECOVERY, EACH ADD'L HOUR: Performed by: SPECIALIST

## 2019-10-07 PROCEDURE — 25000003 PHARM REV CODE 250: Performed by: SPECIALIST

## 2019-10-07 PROCEDURE — 31295 PR ENDOSCOPY, NASAL/SINUS, MAXILL SINUS OSTIUM, W/BALLOON DILAT: ICD-10-PCS | Mod: 50,,, | Performed by: SPECIALIST

## 2019-10-07 PROCEDURE — 27201423 OPTIME MED/SURG SUP & DEVICES STERILE SUPPLY: Performed by: SPECIALIST

## 2019-10-07 PROCEDURE — 37000008 HC ANESTHESIA 1ST 15 MINUTES: Performed by: SPECIALIST

## 2019-10-07 PROCEDURE — 37000009 HC ANESTHESIA EA ADD 15 MINS: Performed by: SPECIALIST

## 2019-10-07 PROCEDURE — 71000033 HC RECOVERY, INTIAL HOUR: Performed by: SPECIALIST

## 2019-10-07 PROCEDURE — 63600175 PHARM REV CODE 636 W HCPCS: Performed by: ANESTHESIOLOGY

## 2019-10-07 PROCEDURE — 63600175 PHARM REV CODE 636 W HCPCS: Performed by: NURSE ANESTHETIST, CERTIFIED REGISTERED

## 2019-10-07 PROCEDURE — C1726 CATH, BAL DIL, NON-VASCULAR: HCPCS | Performed by: SPECIALIST

## 2019-10-07 PROCEDURE — 63600175 PHARM REV CODE 636 W HCPCS: Performed by: SPECIALIST

## 2019-10-07 PROCEDURE — 36000711: Performed by: SPECIALIST

## 2019-10-07 PROCEDURE — 87075 CULTR BACTERIA EXCEPT BLOOD: CPT

## 2019-10-07 PROCEDURE — 25000003 PHARM REV CODE 250: Performed by: NURSE ANESTHETIST, CERTIFIED REGISTERED

## 2019-10-07 PROCEDURE — 61782 PR STEREOTACTIC COMP ASSIST PROC,CRANIAL,EXTRADURAL: ICD-10-PCS | Mod: ,,, | Performed by: SPECIALIST

## 2019-10-07 PROCEDURE — 61782 SCAN PROC CRANIAL EXTRA: CPT | Mod: ,,, | Performed by: SPECIALIST

## 2019-10-07 PROCEDURE — 25000242 PHARM REV CODE 250 ALT 637 W/ HCPCS: Performed by: NURSE ANESTHETIST, CERTIFIED REGISTERED

## 2019-10-07 PROCEDURE — S0077 INJECTION, CLINDAMYCIN PHOSP: HCPCS | Performed by: SPECIALIST

## 2019-10-07 PROCEDURE — 87070 CULTURE OTHR SPECIMN AEROBIC: CPT | Mod: 59

## 2019-10-07 RX ORDER — CEPHALEXIN 500 MG/1
500 CAPSULE ORAL 4 TIMES DAILY
Qty: 40 CAPSULE | Refills: 0 | Status: SHIPPED | OUTPATIENT
Start: 2019-10-07 | End: 2019-12-27

## 2019-10-07 RX ORDER — ROCURONIUM BROMIDE 10 MG/ML
INJECTION, SOLUTION INTRAVENOUS
Status: DISCONTINUED | OUTPATIENT
Start: 2019-10-07 | End: 2019-10-07

## 2019-10-07 RX ORDER — DIPHENHYDRAMINE HYDROCHLORIDE 50 MG/ML
25 INJECTION INTRAMUSCULAR; INTRAVENOUS EVERY 6 HOURS PRN
Status: DISCONTINUED | OUTPATIENT
Start: 2019-10-07 | End: 2019-10-07 | Stop reason: HOSPADM

## 2019-10-07 RX ORDER — EPINEPHRINE 0.1 MG/ML
INJECTION INTRAVENOUS
Status: DISCONTINUED | OUTPATIENT
Start: 2019-10-07 | End: 2019-10-07 | Stop reason: HOSPADM

## 2019-10-07 RX ORDER — PROPOFOL 10 MG/ML
VIAL (ML) INTRAVENOUS
Status: DISCONTINUED | OUTPATIENT
Start: 2019-10-07 | End: 2019-10-07

## 2019-10-07 RX ORDER — ONDANSETRON 8 MG/1
8 TABLET, ORALLY DISINTEGRATING ORAL EVERY 6 HOURS PRN
Qty: 6 TABLET | Refills: 0 | Status: SHIPPED | OUTPATIENT
Start: 2019-10-07 | End: 2019-12-27

## 2019-10-07 RX ORDER — SODIUM CHLORIDE, SODIUM LACTATE, POTASSIUM CHLORIDE, CALCIUM CHLORIDE 600; 310; 30; 20 MG/100ML; MG/100ML; MG/100ML; MG/100ML
INJECTION, SOLUTION INTRAVENOUS CONTINUOUS
Status: DISCONTINUED | OUTPATIENT
Start: 2019-10-07 | End: 2019-10-07 | Stop reason: HOSPADM

## 2019-10-07 RX ORDER — CLINDAMYCIN PHOSPHATE 900 MG/50ML
900 INJECTION, SOLUTION INTRAVENOUS
Status: COMPLETED | OUTPATIENT
Start: 2019-10-07 | End: 2019-10-07

## 2019-10-07 RX ORDER — ALBUTEROL SULFATE 90 UG/1
AEROSOL, METERED RESPIRATORY (INHALATION)
Status: DISCONTINUED | OUTPATIENT
Start: 2019-10-07 | End: 2019-10-07

## 2019-10-07 RX ORDER — LIDOCAINE HYDROCHLORIDE 10 MG/ML
1 INJECTION, SOLUTION EPIDURAL; INFILTRATION; INTRACAUDAL; PERINEURAL ONCE
Status: DISCONTINUED | OUTPATIENT
Start: 2019-10-07 | End: 2019-10-07 | Stop reason: HOSPADM

## 2019-10-07 RX ORDER — ONDANSETRON 8 MG/1
8 TABLET, ORALLY DISINTEGRATING ORAL EVERY 6 HOURS PRN
Status: CANCELLED | OUTPATIENT
Start: 2019-10-07

## 2019-10-07 RX ORDER — FENTANYL CITRATE 50 UG/ML
25 INJECTION, SOLUTION INTRAMUSCULAR; INTRAVENOUS EVERY 5 MIN PRN
Status: DISCONTINUED | OUTPATIENT
Start: 2019-10-07 | End: 2019-10-07 | Stop reason: HOSPADM

## 2019-10-07 RX ORDER — ACETAMINOPHEN 325 MG/1
650 TABLET ORAL EVERY 4 HOURS PRN
Status: CANCELLED | OUTPATIENT
Start: 2019-10-07

## 2019-10-07 RX ORDER — SODIUM CHLORIDE 0.9 % (FLUSH) 0.9 %
3 SYRINGE (ML) INJECTION
Status: DISCONTINUED | OUTPATIENT
Start: 2019-10-07 | End: 2019-10-07 | Stop reason: HOSPADM

## 2019-10-07 RX ORDER — MEPERIDINE HYDROCHLORIDE 25 MG/ML
12.5 INJECTION INTRAMUSCULAR; INTRAVENOUS; SUBCUTANEOUS ONCE AS NEEDED
Status: DISCONTINUED | OUTPATIENT
Start: 2019-10-07 | End: 2019-10-07 | Stop reason: HOSPADM

## 2019-10-07 RX ORDER — ONDANSETRON 2 MG/ML
4 INJECTION INTRAMUSCULAR; INTRAVENOUS DAILY PRN
Status: DISCONTINUED | OUTPATIENT
Start: 2019-10-07 | End: 2019-10-07 | Stop reason: HOSPADM

## 2019-10-07 RX ORDER — FENTANYL CITRATE 50 UG/ML
INJECTION, SOLUTION INTRAMUSCULAR; INTRAVENOUS
Status: DISCONTINUED | OUTPATIENT
Start: 2019-10-07 | End: 2019-10-07

## 2019-10-07 RX ORDER — SODIUM CHLORIDE, SODIUM LACTATE, POTASSIUM CHLORIDE, CALCIUM CHLORIDE 600; 310; 30; 20 MG/100ML; MG/100ML; MG/100ML; MG/100ML
INJECTION, SOLUTION INTRAVENOUS CONTINUOUS
Status: CANCELLED | OUTPATIENT
Start: 2019-10-07

## 2019-10-07 RX ORDER — DEXAMETHASONE SODIUM PHOSPHATE 4 MG/ML
12 INJECTION, SOLUTION INTRA-ARTICULAR; INTRALESIONAL; INTRAMUSCULAR; INTRAVENOUS; SOFT TISSUE
Status: DISCONTINUED | OUTPATIENT
Start: 2019-10-07 | End: 2019-10-07 | Stop reason: HOSPADM

## 2019-10-07 RX ORDER — MIDAZOLAM HYDROCHLORIDE 1 MG/ML
INJECTION INTRAMUSCULAR; INTRAVENOUS
Status: DISCONTINUED | OUTPATIENT
Start: 2019-10-07 | End: 2019-10-07

## 2019-10-07 RX ORDER — LIDOCAINE HYDROCHLORIDE 10 MG/ML
0.5 INJECTION, SOLUTION EPIDURAL; INFILTRATION; INTRACAUDAL; PERINEURAL ONCE
Status: DISCONTINUED | OUTPATIENT
Start: 2019-10-07 | End: 2019-10-07 | Stop reason: HOSPADM

## 2019-10-07 RX ORDER — LIDOCAINE HCL/PF 100 MG/5ML
SYRINGE (ML) INTRAVENOUS
Status: DISCONTINUED | OUTPATIENT
Start: 2019-10-07 | End: 2019-10-07

## 2019-10-07 RX ADMIN — ALBUTEROL SULFATE 5 PUFF: 90 AEROSOL, METERED RESPIRATORY (INHALATION) at 11:10

## 2019-10-07 RX ADMIN — PROPOFOL 200 MG: 10 INJECTION, EMULSION INTRAVENOUS at 11:10

## 2019-10-07 RX ADMIN — CARBOXYMETHYLCELLULOSE SODIUM 2 DROP: 2.5 SOLUTION/ DROPS OPHTHALMIC at 11:10

## 2019-10-07 RX ADMIN — SODIUM CHLORIDE, SODIUM LACTATE, POTASSIUM CHLORIDE, AND CALCIUM CHLORIDE: 600; 310; 30; 20 INJECTION, SOLUTION INTRAVENOUS at 11:10

## 2019-10-07 RX ADMIN — FENTANYL CITRATE 100 MCG: 50 INJECTION, SOLUTION INTRAMUSCULAR; INTRAVENOUS at 11:10

## 2019-10-07 RX ADMIN — CLINDAMYCIN PHOSPHATE 900 MG: 18 INJECTION, SOLUTION INTRAVENOUS at 11:10

## 2019-10-07 RX ADMIN — FENTANYL CITRATE 50 MCG: 50 INJECTION, SOLUTION INTRAMUSCULAR; INTRAVENOUS at 11:10

## 2019-10-07 RX ADMIN — LIDOCAINE HYDROCHLORIDE 50 MG: 20 INJECTION, SOLUTION INTRAVENOUS at 12:10

## 2019-10-07 RX ADMIN — ROCURONIUM BROMIDE 50 MG: 10 INJECTION, SOLUTION INTRAVENOUS at 11:10

## 2019-10-07 RX ADMIN — MIDAZOLAM HYDROCHLORIDE 2 MG: 1 INJECTION, SOLUTION INTRAMUSCULAR; INTRAVENOUS at 10:10

## 2019-10-07 RX ADMIN — LIDOCAINE HYDROCHLORIDE 100 MG: 20 INJECTION, SOLUTION INTRAVENOUS at 11:10

## 2019-10-07 RX ADMIN — DESMOPRESSIN ACETATE 20 MCG: 4 SOLUTION INTRAVENOUS at 10:10

## 2019-10-07 RX ADMIN — DEXAMETHASONE SODIUM PHOSPHATE 12 MG: 4 INJECTION, SOLUTION INTRAMUSCULAR; INTRAVENOUS at 11:10

## 2019-10-07 RX ADMIN — SODIUM CHLORIDE, SODIUM LACTATE, POTASSIUM CHLORIDE, AND CALCIUM CHLORIDE: 600; 310; 30; 20 INJECTION, SOLUTION INTRAVENOUS at 10:10

## 2019-10-07 RX ADMIN — SUGAMMADEX 472 MG: 100 INJECTION, SOLUTION INTRAVENOUS at 12:10

## 2019-10-07 NOTE — PLAN OF CARE
Bri Santiago has met all discharge criteria from Phase II. Vital Signs are stable, ambulating  without difficulty. Discharge instructions given, patient verbalized understanding. Discharged from facility via wheelchair in stable condition.

## 2019-10-07 NOTE — INTERVAL H&P NOTE
The patient has been examined and the H&P has been reviewed:    I concur with the findings and no changes have occurred since H&P was written.    Anesthesia/Surgery risks, benefits and alternative options discussed and understood by patient/family.          Active Hospital Problems    Diagnosis  POA    Acute recurrent sinusitis [J01.91]  Yes      Resolved Hospital Problems   No resolved problems to display.

## 2019-10-07 NOTE — DISCHARGE INSTRUCTIONS
Nasal Surgery: Your Recovery  Youve just had nasal surgery. During the first weeks after surgery, be sure to follow the advice of your doctor. The tips on this sheet can help speed your recovery.    Tips for healing  · Dont take medicines containing aspirin or ibuprofen.  · Don't bump your nose or touch the splint or packing.  · Don't bend or lift.  · Sneeze or cough with your mouth open to reduce pressure inside your nose.  · Keep from blowing your nose until youre told its OK to do so.  · Keep eyeglasses from resting on your nose by taping them above the nose.  · Protect your nose from the sun.  · After packing is removed, start saltwater rinses if prescribed.  · Keep follow-up appointments with your doctor.    Follow-up visits  Your doctor will most likely want to see you within a week to check your healing. Any packing, splint, or dressings will probably be removed at that time. You may feel slight discomfort and bleed a little when this is done.    Assessing the results  During later follow-up visits, your doctor will assess your healing and the results of your surgery. Talk with your doctor about any problems or concerns you may have.    When to call the doctor  Call the doctor if you notice any of the following:  · Sudden increase in pain, swelling, or bruising  · Fever  · Heavy bleeding  · Yellow or greenish drainage from the nose  · Unrelieved headache  · Decreased or double vision  · Stiff neck or very tired feeling         Anesthesia: After Your Surgery  Youve just had surgery. During surgery, you received medication called anesthesia to keep you comfortable and pain-free. After surgery, you may experience some pain or nausea. This is common. Here are some tips for feeling better and recovering after surgery.    Going home  Your doctor or nurse will show you how to take care of yourself when you go home. He or she will also answer your questions. Have an adult family member or friend drive you  home. For the first 24 hours after your surgery:    · Do not drive or use heavy equipment.  · Do not make important decisions or sign legal documents.  · Avoid alcohol.  · Have someone stay with you, if needed. He or she can watch for problems and help keep you safe.  · Take your time getting up from a seated or lying position. You may experience dizziness for 24 hours.    Be sure to keep all follow-up appointments with your doctor. And rest after your procedure for as long as your doctor tells you to.    Coping with pain  If you have pain after surgery, pain medication will help you feel better. Take it as directed, before pain becomes severe. Also, ask your doctor or pharmacist about other ways to control pain, such as with heat, ice, and relaxation. And follow any other instructions your surgeon or nurse gives you.    URINARY RETENTION  Should you experience a decrease in your urine output or are unable to urinate following surgery, this can be due to the medications given during surgery.  We recommend you going to the nearest Emergency Department.    Tips for taking pain medication  To get the best relief possible, remember these points:    · Pain medications can upset your stomach. Taking them with a little food may help.  · Most pain relievers taken by mouth need at least 20 to 30 minutes to take effect.  · Taking medication on a schedule can help you remember to take it. Try to time your medication so that you can take it before beginning an activity, such as dressing, walking, or sitting down for dinner.  · Constipation is a common side effect of pain medications. Contact your doctor before taking any medications like laxatives or stool softeners to help relieve constipation. Also ask about any dietary restrictions, because drinking lots of fluids and eating foods like fruits and vegetables that are high in fiber can also help. Remember, dont take laxatives unless your surgeon has prescribed them.  · Mixing  alcohol and pain medication can cause dizziness and slow your breathing. It can even be fatal. Dont drink alcohol while taking pain medication.  · Pain medication can slow your reflexes. Dont drive or operate machinery while taking pain medication.    If your health care provider tells you to take acetaminophen to help relieve your pain, ask him or her how much you are supposed to take each day. (Acetaminophen is the generic name for Tylenol and other brand-name pain relievers.) Acetaminophen or other pain relievers may interact with your prescription medicines or other over-the-counter (OTC) drugs. Some prescription medications contain acetaminophen along with other active ingredients. Using both prescription and OTC acetaminophen for pain can cause you to overdose. The FDA recommends that you read the labels on your OTC medications carefully. This will help you to clearly understand the list of active ingredients, dosing instructions, and any warnings. It may also help you avoid taking too much acetaminophen. If you have questions or don't understand the information, ask your pharmacist or health care provider to explain it to you before you take the OTC medication.    Managing nausea  Some people have an upset stomach after surgery. This is often due to anesthesia, pain, pain medications, or the stress of surgery. The following tips will help you manage nausea and get good nutrition as you recover. If you were on a special diet before surgery, ask your doctor if you should follow it during recovery. These tips may help:    · Dont push yourself to eat. Your body will tell you when to eat and how much.  · Start off with clear liquids and soup. They are easier to digest.  · Progress to semi-solid foods (mashed potatoes, applesauce, and gelatin) as you feel ready.  · Slowly move to solid foods. Dont eat fatty, rich, or spicy foods at first.  · Dont force yourself to have three large meals a day. Instead, eat  smaller amounts more often.  · Take pain medications with a small amount of solid food, such as crackers or toast to avoid nausea.      Call your surgeon if    · You feel too sleepy, dizzy, or groggy (medication may be too strong).  · You have side effects like nausea, vomiting, or skin changes (rash, itching, or hives).     © 0785-6736 [employer]. [address] [place] [address], [place] [address]. All rights reserved. This information is not intended as a substitute for professional medical care. Always follow your healthcare professional's instructions.    PLEASE FOLLOW ANY OTHER INSTRUCTIONS PROVIDED TO YOU BY DR. MCKENZIE!

## 2019-10-07 NOTE — ANESTHESIA POSTPROCEDURE EVALUATION
Anesthesia Post Evaluation    Patient: Bri Santiago    Procedure(s) Performed: Procedure(s) (LRB):  FESS, USING COMPUTER-ASSISTED NAVIGATION (Bilateral)  SINUPLASTY, USING BALLOON (Bilateral)    Final Anesthesia Type: general  Patient location during evaluation: PACU  Patient participation: Yes- Able to Participate  Level of consciousness: awake and alert and oriented  Post-procedure vital signs: reviewed and stable  Pain management: adequate  Airway patency: patent  PONV status at discharge: No PONV  Anesthetic complications: no      Cardiovascular status: blood pressure returned to baseline and hemodynamically stable  Respiratory status: unassisted, spontaneous ventilation and room air  Hydration status: euvolemic  Follow-up not needed.          Vitals Value Taken Time   /81 10/7/2019  1:15 PM   Temp 36.8 °C (98.2 °F) 10/7/2019  1:15 PM   Pulse 108 10/7/2019  1:23 PM   Resp 18 10/7/2019  1:15 PM   SpO2 95 % 10/7/2019  1:23 PM   Vitals shown include unvalidated device data.      Event Time     Out of Recovery 13:25:44          Pain/Tammy Score: Tammy Score: 9 (10/7/2019  1:15 PM)

## 2019-10-07 NOTE — TRANSFER OF CARE
"Anesthesia Transfer of Care Note    Patient: Bri Santiago    Procedure(s) Performed: Procedure(s) (LRB):  FESS, USING COMPUTER-ASSISTED NAVIGATION (Bilateral)  SINUPLASTY, USING BALLOON (Bilateral)    Patient location: PACU    Anesthesia Type: general    Transport from OR: Transported from OR on 100% O2 by closed face mask with adequate spontaneous ventilation    Post pain: adequate analgesia    Post assessment: no apparent anesthetic complications    Post vital signs: stable    Level of consciousness: awake    Nausea/Vomiting: no nausea/vomiting    Complications: none    Transfer of care protocol was followed      Last vitals:   Visit Vitals  BP (!) 151/80 (BP Location: Right arm, Patient Position: Lying)   Pulse 108   Temp 36.9 °C (98.5 °F) (Oral)   Resp 16   Ht 5' 11" (1.803 m)   Wt 117.9 kg (260 lb)   SpO2 (!) 94%   BMI 36.26 kg/m²     "

## 2019-10-09 ENCOUNTER — OFFICE VISIT (OUTPATIENT)
Dept: OTOLARYNGOLOGY | Facility: CLINIC | Age: 59
End: 2019-10-09
Payer: MEDICARE

## 2019-10-09 ENCOUNTER — CLINICAL SUPPORT (OUTPATIENT)
Dept: REHABILITATION | Facility: OTHER | Age: 59
End: 2019-10-09
Attending: SPECIALIST
Payer: MEDICARE

## 2019-10-09 VITALS — BODY MASS INDEX: 37 KG/M2 | WEIGHT: 264.31 LBS | HEIGHT: 71 IN

## 2019-10-09 DIAGNOSIS — J34.2 NASAL SEPTAL DEVIATION: ICD-10-CM

## 2019-10-09 DIAGNOSIS — J30.89 NON-SEASONAL ALLERGIC RHINITIS, UNSPECIFIED TRIGGER: ICD-10-CM

## 2019-10-09 DIAGNOSIS — J32.0 CHRONIC MAXILLARY SINUSITIS: Primary | ICD-10-CM

## 2019-10-09 DIAGNOSIS — R51.9 NONINTRACTABLE EPISODIC HEADACHE, UNSPECIFIED HEADACHE TYPE: ICD-10-CM

## 2019-10-09 DIAGNOSIS — M54.50 CHRONIC BILATERAL LOW BACK PAIN WITHOUT SCIATICA: ICD-10-CM

## 2019-10-09 DIAGNOSIS — G89.29 CHRONIC BILATERAL LOW BACK PAIN WITHOUT SCIATICA: ICD-10-CM

## 2019-10-09 DIAGNOSIS — J45.998 ASTHMA IN REMISSION: ICD-10-CM

## 2019-10-09 LAB
BACTERIA SPEC AEROBE CULT: NORMAL
BACTERIA SPEC AEROBE CULT: NORMAL

## 2019-10-09 PROCEDURE — 97110 THERAPEUTIC EXERCISES: CPT

## 2019-10-09 PROCEDURE — 99499 UNLISTED E&M SERVICE: CPT | Mod: S$GLB,,, | Performed by: SPECIALIST

## 2019-10-09 PROCEDURE — 3008F BODY MASS INDEX DOCD: CPT | Mod: CPTII,S$GLB,, | Performed by: SPECIALIST

## 2019-10-09 PROCEDURE — 99213 OFFICE O/P EST LOW 20 MIN: CPT | Mod: S$GLB,,, | Performed by: SPECIALIST

## 2019-10-09 PROCEDURE — 3008F PR BODY MASS INDEX (BMI) DOCUMENTED: ICD-10-PCS | Mod: CPTII,S$GLB,, | Performed by: SPECIALIST

## 2019-10-09 PROCEDURE — 99213 PR OFFICE/OUTPT VISIT, EST, LEVL III, 20-29 MIN: ICD-10-PCS | Mod: S$GLB,,, | Performed by: SPECIALIST

## 2019-10-09 PROCEDURE — 99499 RISK ADDL DX/OHS AUDIT: ICD-10-PCS | Mod: S$GLB,,, | Performed by: SPECIALIST

## 2019-10-09 NOTE — PROGRESS NOTES
Subjective:       Patient ID: Bri Santiago is a 59 y.o. male.    Chief Complaint: No chief complaint on file.    The patient is now 2 days postop balloon sinuplasty of the maxillary sinuses.  He has had no active nasal bleeding anteriorly but has sniff 10 expectorated blood clots for the 1st 36 hr postop.  He has not had any bleeding today.  He is having pressure in the forehead area and the headache in the central brow area.  Nasal secretions are clear.  He has not resumed taking his allergy meds (Astelin, Flonase, Singulair and ipratropium bromide) nor has he resume taking his Plavix.    Review of Systems   Constitutional: Positive for fatigue. Negative for activity change, appetite change, chills, fever and unexpected weight change.   HENT: Positive for congestion, postnasal drip, rhinorrhea, sinus pressure, sinus pain and sore throat. Negative for ear discharge, ear pain, facial swelling, hearing loss, mouth sores, sneezing, tinnitus, trouble swallowing and voice change.    Eyes: Negative for photophobia, pain, discharge, redness, itching and visual disturbance.   Respiratory: Positive for cough. Negative for apnea, choking, shortness of breath and wheezing.    Cardiovascular: Negative for chest pain and palpitations.   Gastrointestinal: Negative for abdominal distention, abdominal pain, nausea and vomiting.   Musculoskeletal: Negative for arthralgias, myalgias, neck pain and neck stiffness.   Skin: Negative.  Negative for color change, pallor and rash.   Allergic/Immunologic: Positive for environmental allergies. Negative for food allergies and immunocompromised state.   Neurological: Positive for headaches. Negative for dizziness, facial asymmetry, speech difficulty, weakness, light-headedness and numbness.   Hematological: Negative for adenopathy. Bruises/bleeds easily (Helm Willebrand's disease).   Psychiatric/Behavioral: Negative for agitation, confusion, decreased concentration and sleep disturbance.        Objective:      Physical Exam   Constitutional: He is oriented to person, place, and time. He appears well-developed and well-nourished. He is cooperative.   HENT:   Head: Normocephalic.   Right Ear: External ear and ear canal normal. Tympanic membrane is retracted.   Left Ear: External ear and ear canal normal. Tympanic membrane is retracted.   Nose: Mucosal edema (cyanotic, boggy inferior turbinates bilaterally), rhinorrhea (clear mucus bilaterally) and septal deviation (To the right) present. No epistaxis.   Mouth/Throat: Uvula is midline, oropharynx is clear and moist and mucous membranes are normal. No oral lesions. No oropharyngeal exudate ( no bleeding from the nasopharynx).   Oral cavity-Mitchell class 3, severe hyperactive gag response, enlargement base of tongue, tonsils, palate and uvula not assessed secondary to gagging   Eyes: Pupils are equal, round, and reactive to light. EOM and lids are normal. Right eye exhibits no discharge and no exudate. Left eye exhibits no discharge and no exudate. Right conjunctiva is injected. Left conjunctiva is injected.   Neck: Trachea normal and normal range of motion. No muscular tenderness present. No tracheal deviation present. No thyroid mass and no thyromegaly present.   Cardiovascular: Normal rate, regular rhythm, normal heart sounds and normal pulses.   Pulmonary/Chest: Effort normal and breath sounds normal. No stridor. He has no decreased breath sounds. He has no wheezes. He has no rhonchi. He has no rales.   Abdominal: Soft. Bowel sounds are normal. There is no tenderness.   Musculoskeletal: Normal range of motion.   Lymphadenopathy:        Head (right side): No submental, no submandibular, no preauricular, no posterior auricular and no occipital adenopathy present.        Head (left side): No submental, no submandibular, no preauricular, no posterior auricular and no occipital adenopathy present.     He has no cervical adenopathy.   Neurological: He is  alert and oriented to person, place, and time. He has normal strength. No cranial nerve deficit or sensory deficit. Gait normal.   Skin: Skin is warm and dry. No petechiae and no rash noted. No cyanosis. Nails show no clubbing.   Psychiatric: He has a normal mood and affect. His speech is normal and behavior is normal. Judgment and thought content normal. Cognition and memory are normal.       Assessment:       1. Chronic maxillary sinusitis    2. Non-seasonal allergic rhinitis, unspecified trigger    3. Asthma in remission    4. Nasal septal deviation    5. Nonintractable episodic headache, unspecified headache type        Plan:       I will have the patient restart his Astelin, Flonase Singulair and ipratropium bromide nasal sprays.  He may also restarted his Plavix.  He is approved to proceed with receiving and influenza immunization today.  I will recheck him in 3 weeks.  He will continue nasal saline irrigations for at least 1 week with the restriction of no blowing his nose until he is 1 week postop.

## 2019-10-09 NOTE — PROGRESS NOTES
[facility]      Name: Bri Santiago  Clinic Number: [id number]    Therapy Diagnosis:   Encounter Diagnosis   Name Primary?    Chronic bilateral low back pain without sciatica      Physician: Buffy Villagran,Christina    Visit Date: 10/9/2019    Physician Orders: PT Eval and Treat   Medical Diagnosis from Referral: Lumbar spondylosis, Postlaminectomy syndrome of lumbar region, Degeneration of lumbar or lumbosacral intervertebral disc, Spinal stenosis, lumbar region, without neurogenic claudication  Evaluation Date: 2019  Authorization Period Expiration: 19  Plan of Care Expiration: 10/16/19  Reassessment Due: 10/16/19  Visit # / Visits authorized: 15/ 20 (Assess ROM)      Time In: 0845  Time Out: 9:40     Total Billable Time: 50 minutes    Precautions: Lumbar fusion L4-S1 3x, cervical surgery,  CHF Osteopenia, hx of prostate cancer     Pattern of pain determined: 1 PEN      Subjective   Bri reports his back is feeling better, not as bad as it used to be. He cannot lay down because he had a sinus procedure, will modify exercises accordingly     Patient reports tolerating previous visit fair  Patient reports their pain to be 6/10 on a 0-10 scale with 0 being no pain and 10 being the worst pain imaginable.  Pain Location: Low back     Occupation: On disability   Leisure: family     Pts goals: Do more activity, get back to taking care of yard and cooking       Objective     Baseline IM Testing Results:   Date of testin2019  ROM 0-21 deg  (reduced to to 0-27 19)   Max Peak Torque 90    Min Peak Torque 20    Flex/Ext Ratio 4.5   % below normative data 33     Midpoint IM Testing Results:   Date of testin2019  ROM 3-24   Max Peak Torque 110   Min Peak Torque 62    Flex/Ext Ratio    % below normative data 50          CMS Impairment/Limitation/Restriction for FOTO Lumbar Survey     Outcomes not captured at time of evaluation. Based on subjective reports and  "and objective findings, estimate 40-50 limitation at this time.   Limitation Score: 40-50%  Category: Mobility     Current : CK = at least 40% but < 60% impaired, limited or restricted  Goal: CJ = at least 20% but < 40% impaired, limited or restricted  Discharge:              Treatment    Pt was instructed in and performed the following:     Bri received therapeutic exercises to develop/improved posture, cardiovascular endurance, muscular endurance, lumbar/cervical ROM, strength and muscular endurance for 50  minutes including the following exercises:     [facility] 10/9/2019   Visit Number 15   VAS Pain Rating 5   Treadmill Time (in min.) 10   Speed -   Recumbent Bike Seat Pos. -   Time -   Scapular Retraction -   Lumbar Stretches - Slouch Overcorrection -   Extension in Lying -   Extension in Standing -   Flexion in Lying -   Manual Therapy -   Lumbar Extension Seat Pad -   Femur Restraint -   Top Dead Center -   Counterweight -   Lumbar Flexion -   Lumbar Extension -   Lumbar Peak Torque -   Min Torque -   Test Percent Below Normative Data -   Test Percent Gain in Strength from Initial  -   Lumbar Weight 84   Repetitions 20   Rating of Perceived Exertion 3   Ice - Z Lie (in min.) 10     *Exercises modified this visit due to pt sinus surgery    Exercises completd this visit:  Seated/Clouch to correct 20x  Seated hip abd with Black TB 20x  Seated hip flexion with Black TB 20x  Standing hip extension with Black TB 20x    Exercises completed on previous visits/HEP::   Slouch and correct 10x  Hip/back disassociation 10x   Sit<>stand squat training 10x    DKTC with ball 10x with 5" holds  PPT with legs on ball 10x  Bent knee fall out 10x  PPT + Bridges 10x   Open Book 10x  Standing lumbar extension with towel overpressure 10x  Scapular retractions 10x       Peripheral muscle strengthening which included 1 set of 15-20 repetitions at a slow, controlled 10-13 second per rep pace focused on strengthening " "supporting musculature for improved body mechanics and functional mobility.  Pt and therapist focused on proper form during treatment to ensure optimal strengthening of each targeted muscle group.  Machines were utilized including torso rotation, leg extension, leg curl, chest press, upright row. Tricep extension, bicep curl, leg press, and hip abduction added visit 3    Bri received the following manual therapy techniques: none    Home Exercises Provided and Patient Education Provided     Education provided:   - Patient received education in benefits of progressing mobility and strengthening gradually  - Discussed exertion scale, slow progressive resistance protocol to promote safe and healthy strengthening of supportive musculature  by performing 15-20 reps, 7 sec per rep, and increasing weight by 5 % at 20 reps only if there ex done safely, slowly, using correct musculature, exertion and no pain.  Patient expressed understanding.  -Pt educated on strategies to safely perform examination and exercise using "Stop Light" analogy to describe how to avoid or stop irritating motions, proceed with caution with some movements, and progress positive exercises.       Written Home Exercises Provided: Patient instructed to cont prior HEP.  Exercises were reviewed and Bri was able to demonstrate them prior to the end of the session.  Bri demonstrated fair  understanding of the education provided.     See EMR under Patient Instructions for exercises provided prior visit.    Assessment     Patient was able to complete therapy this visit with modifications due to inability lay back or bend forward per physician instructions for post sinus surgery protocol. Added hip stability exercises and standing hip/glute exercises for modification. He was able to complete lumbar medx while maintaining resistance and completing 20 reps. Will increase by 5% next visit and re assess ROM.    Patient is making good progress towards " established goals.  Pt will continue to benefit from skilled outpatient physical therapy to address the deficits stated in the impairment chart, provide pt/family education and to maximize pt's level of independence in the home and community environment.     Anticipated Barriers for therapy: Previous non-compliance with HEP, multiple lumbar surgeries, CHF, osteopenia     Pt's spiritual, cultural and educational needs considered and pt agreeable to plan of care and goals as stated below:       GOALS: Pt is in agreement with the following goals.     Short term goals:  6 weeks or 10 visits   1.  Pt will demonstrate increased lumbar ROM by at least 6 degrees from the initial ROM value with improvements noted in functional ROM and ability to perform ADLs. Partially Met.   2.  Pt will demonstrate increased maximum isometric torque value by 10% when compared to the initial value resulting in improved ability to perform bending, lifting, and carrying activities safely, confidently. MET 9/18/2019   3.  Patient report a reduction in worst pain score by 1-2 points for improved tolerance for prolonged sitting needing for driving. Progressing  4.  Pt able to perform HEP correctly with minimal cueing or supervision from therapist to encourage independent management of symptoms. Partially Met           Long term goals: 13 weeks or 20 visits   1. Pt will demonstrate increased lumbar ROM by at least 12 degrees from initial ROM value, resulting in improved ability to perform functional fwd bending while standing and sitting.   2. Pt will demonstrate increased maximum isometric torque value by 25% when compared to the initial value resulting in improved ability to perform bending, lifting, and carrying activities safely, confidently.  3. Pt to demonstrate ability to independently control and reduce their pain through posture positioning and mechanical movements throughout a typical day.  4.  Pt will demonstrate reduced pain and  improved functional outcomes as reported on the FOTO by reaching a score of CJ = at least 20% but < 40% impaired, limited or restricted or less in order to demonstrate subjective improvement in pt's condition.    5. Pt will demonstrate independence with the HEP at discharge  6.   Pt will demonstrate appropriate squat technique for improved tolerance of lifting and carrying functional activity needed for household tasks.   7. Pt will express ability to mow lawn without significantly increased pain (patient goal)      Plan   Continue with established Plan of Care towards established PT goals.

## 2019-10-09 NOTE — PLAN OF CARE
[facility]      Name: Bri Santiago  Clinic Number: [id number]    Therapy Diagnosis:   Encounter Diagnosis   Name Primary?    Chronic bilateral low back pain without sciatica      Physician: Buffy Villagran,Christina    Visit Date: 10/9/2019    Physician Orders: PT Eval and Treat   Medical Diagnosis from Referral: Lumbar spondylosis, Postlaminectomy syndrome of lumbar region, Degeneration of lumbar or lumbosacral intervertebral disc, Spinal stenosis, lumbar region, without neurogenic claudication  Evaluation Date: 2019  Authorization Period Expiration: 19  Plan of Care Expiration: 10/16/19  Reassessment Due: 10/16/19  Visit # / Visits authorized: 15/ 20 (Assess ROM)      Time In: 0845  Time Out: 9:40     Total Billable Time: 50 minutes    Precautions: Lumbar fusion L4-S1 3x, cervical surgery,  CHF Osteopenia, hx of prostate cancer     Pattern of pain determined: 1 PEN      Subjective   Bri reports his back is feeling better, not as bad as it used to be. He cannot lay down because he had a sinus procedure, will modify exercises accordingly     Patient reports tolerating previous visit fair  Patient reports their pain to be 6/10 on a 0-10 scale with 0 being no pain and 10 being the worst pain imaginable.  Pain Location: Low back     Occupation: On disability   Leisure: family     Pts goals: Do more activity, get back to taking care of yard and cooking       Objective     Baseline IM Testing Results:   Date of testin2019  ROM 0-21 deg  (reduced to to 0-27 19)   Max Peak Torque 90    Min Peak Torque 20    Flex/Ext Ratio 4.5   % below normative data 33     Midpoint IM Testing Results:   Date of testin2019  ROM 3-24   Max Peak Torque 110   Min Peak Torque 62    Flex/Ext Ratio    % below normative data 50          CMS Impairment/Limitation/Restriction for FOTO Lumbar Survey     Outcomes not captured at time of evaluation. Based on subjective reports and  "and objective findings, estimate 40-50 limitation at this time.   Limitation Score: 40-50%  Category: Mobility     Current : CK = at least 40% but < 60% impaired, limited or restricted  Goal: CJ = at least 20% but < 40% impaired, limited or restricted  Discharge:              Treatment    Pt was instructed in and performed the following:     Bri received therapeutic exercises to develop/improved posture, cardiovascular endurance, muscular endurance, lumbar/cervical ROM, strength and muscular endurance for 50  minutes including the following exercises:     [facility] 10/9/2019   Visit Number 15   VAS Pain Rating 5   Treadmill Time (in min.) 10   Speed -   Recumbent Bike Seat Pos. -   Time -   Scapular Retraction -   Lumbar Stretches - Slouch Overcorrection -   Extension in Lying -   Extension in Standing -   Flexion in Lying -   Manual Therapy -   Lumbar Extension Seat Pad -   Femur Restraint -   Top Dead Center -   Counterweight -   Lumbar Flexion -   Lumbar Extension -   Lumbar Peak Torque -   Min Torque -   Test Percent Below Normative Data -   Test Percent Gain in Strength from Initial  -   Lumbar Weight 84   Repetitions 20   Rating of Perceived Exertion 3   Ice - Z Lie (in min.) 10     *Exercises modified this visit due to pt sinus surgery    Exercises completd this visit:  Seated/Clouch to correct 20x  Seated hip abd with Black TB 20x  Seated hip flexion with Black TB 20x  Standing hip extension with Black TB 20x    Exercises completed on previous visits/HEP::   Slouch and correct 10x  Hip/back disassociation 10x   Sit<>stand squat training 10x    DKTC with ball 10x with 5" holds  PPT with legs on ball 10x  Bent knee fall out 10x  PPT + Bridges 10x   Open Book 10x  Standing lumbar extension with towel overpressure 10x  Scapular retractions 10x       Peripheral muscle strengthening which included 1 set of 15-20 repetitions at a slow, controlled 10-13 second per rep pace focused on strengthening " "supporting musculature for improved body mechanics and functional mobility.  Pt and therapist focused on proper form during treatment to ensure optimal strengthening of each targeted muscle group.  Machines were utilized including torso rotation, leg extension, leg curl, chest press, upright row. Tricep extension, bicep curl, leg press, and hip abduction added visit 3    Bri received the following manual therapy techniques: none    Home Exercises Provided and Patient Education Provided     Education provided:   - Patient received education in benefits of progressing mobility and strengthening gradually  - Discussed exertion scale, slow progressive resistance protocol to promote safe and healthy strengthening of supportive musculature  by performing 15-20 reps, 7 sec per rep, and increasing weight by 5 % at 20 reps only if there ex done safely, slowly, using correct musculature, exertion and no pain.  Patient expressed understanding.  -Pt educated on strategies to safely perform examination and exercise using "Stop Light" analogy to describe how to avoid or stop irritating motions, proceed with caution with some movements, and progress positive exercises.       Written Home Exercises Provided: Patient instructed to cont prior HEP.  Exercises were reviewed and Bri was able to demonstrate them prior to the end of the session.  Bri demonstrated fair  understanding of the education provided.     See EMR under Patient Instructions for exercises provided prior visit.    Assessment     Patient was able to complete therapy this visit with modifications due to inability lay back or bend forward per physician instructions for post sinus surgery protocol. Added hip stability exercises and standing hip/glute exercises for modification. He was able to complete lumbar medx while maintaining resistance and completing 20 reps. Will increase by 5% next visit and re assess ROM.    Patient is making good progress towards " established goals.  Pt will continue to benefit from skilled outpatient physical therapy to address the deficits stated in the impairment chart, provide pt/family education and to maximize pt's level of independence in the home and community environment.     Anticipated Barriers for therapy: Previous non-compliance with HEP, multiple lumbar surgeries, CHF, osteopenia     Pt's spiritual, cultural and educational needs considered and pt agreeable to plan of care and goals as stated below:       GOALS: Pt is in agreement with the following goals.     Short term goals:  6 weeks or 10 visits   1.  Pt will demonstrate increased lumbar ROM by at least 6 degrees from the initial ROM value with improvements noted in functional ROM and ability to perform ADLs. Partially Met.   2.  Pt will demonstrate increased maximum isometric torque value by 10% when compared to the initial value resulting in improved ability to perform bending, lifting, and carrying activities safely, confidently. MET 9/18/2019   3.  Patient report a reduction in worst pain score by 1-2 points for improved tolerance for prolonged sitting needing for driving. Progressing  4.  Pt able to perform HEP correctly with minimal cueing or supervision from therapist to encourage independent management of symptoms. Partially Met           Long term goals: 13 weeks or 20 visits   1. Pt will demonstrate increased lumbar ROM by at least 12 degrees from initial ROM value, resulting in improved ability to perform functional fwd bending while standing and sitting.   2. Pt will demonstrate increased maximum isometric torque value by 25% when compared to the initial value resulting in improved ability to perform bending, lifting, and carrying activities safely, confidently.  3. Pt to demonstrate ability to independently control and reduce their pain through posture positioning and mechanical movements throughout a typical day.  4.  Pt will demonstrate reduced pain and  improved functional outcomes as reported on the FOTO by reaching a score of CJ = at least 20% but < 40% impaired, limited or restricted or less in order to demonstrate subjective improvement in pt's condition.    5. Pt will demonstrate independence with the HEP at discharge  6.   Pt will demonstrate appropriate squat technique for improved tolerance of lifting and carrying functional activity needed for household tasks.   7. Pt will express ability to mow lawn without significantly increased pain (patient goal)      Plan   Continue with established Plan of Care towards established PT goals.

## 2019-10-10 ENCOUNTER — IMMUNIZATION (OUTPATIENT)
Dept: PHARMACY | Facility: CLINIC | Age: 59
End: 2019-10-10
Payer: COMMERCIAL

## 2019-10-11 ENCOUNTER — CLINICAL SUPPORT (OUTPATIENT)
Dept: REHABILITATION | Facility: OTHER | Age: 59
End: 2019-10-11
Attending: SPECIALIST
Payer: MEDICARE

## 2019-10-11 DIAGNOSIS — G89.29 CHRONIC BILATERAL LOW BACK PAIN WITHOUT SCIATICA: ICD-10-CM

## 2019-10-11 DIAGNOSIS — M54.50 CHRONIC BILATERAL LOW BACK PAIN WITHOUT SCIATICA: ICD-10-CM

## 2019-10-11 LAB
BACTERIA SPEC ANAEROBE CULT: NORMAL
BACTERIA SPEC ANAEROBE CULT: NORMAL

## 2019-10-11 PROCEDURE — 97110 THERAPEUTIC EXERCISES: CPT

## 2019-10-11 NOTE — PROGRESS NOTES
[facility]      Name: Bri Santiago  Clinic Number: [id number]    Therapy Diagnosis:   Encounter Diagnosis   Name Primary?    Chronic bilateral low back pain without sciatica      Physician: Buffy Villagran,Christina    Visit Date: 10/11/2019    Physician Orders: PT Eval and Treat   Medical Diagnosis from Referral: Lumbar spondylosis, Postlaminectomy syndrome of lumbar region, Degeneration of lumbar or lumbosacral intervertebral disc, Spinal stenosis, lumbar region, without neurogenic claudication  Evaluation Date: 2019  Authorization Period Expiration: 19  Plan of Care Expiration: 10/16/19  Reassessment Due: 11/10/19  Visit # / Visits authorized:  (Continue to Assess ROM)      Time In: 08   Time Out: 9:40     Total Billable Time: 40 minutes    Precautions: Lumbar fusion L4-S1 3x, cervical surgery,  CHF Osteopenia, hx of prostate cancer     Pattern of pain determined: 1 PEN      Subjective   Bri reports his back is feeling better, not as bad as it used to be. He cannot lay down because he had a sinus procedure, will modify exercises accordingly.     Patient reports tolerating previous visit fair  Patient reports their pain to be 6/10 on a 0-10 scale with 0 being no pain and 10 being the worst pain imaginable.  Pain Location: Low back     Occupation: On disability   Leisure: family     Pts goals: Do more activity, get back to taking care of yard and cooking       Objective     Baseline IM Testing Results:   Date of testin2019  ROM 0-21 deg  (reduced to to 0-27 19)   Max Peak Torque 90    Min Peak Torque 20    Flex/Ext Ratio 4.5   % below normative data 33     Midpoint IM Testing Results:   Date of testin2019  ROM 3-24   Max Peak Torque 110   Min Peak Torque 62    Flex/Ext Ratio    % below normative data 50          CMS Impairment/Limitation/Restriction for FOTO Lumbar Survey     Outcomes not captured at time of evaluation. Based on  "subjective reports and and objective findings, estimate 40-50 limitation at this time.   Limitation Score: 40-50%  Category: Mobility     Current : CK = at least 40% but < 60% impaired, limited or restricted  Goal: CJ = at least 20% but < 40% impaired, limited or restricted  Discharge:              Treatment    Pt was instructed in and performed the following:     Bri received therapeutic exercises to develop/improved posture, cardiovascular endurance, muscular endurance, lumbar/cervical ROM, strength and muscular endurance for 50  minutes including the following exercises:   [facility] 10/11/2019   Visit Number 16   VAS Pain Rating 4   Treadmill Time (in min.) 10   Speed -   Recumbent Bike Seat Pos. -   Time -   Scapular Retraction -   Lumbar Stretches - Slouch Overcorrection -   Extension in Lying -   Extension in Standing -   Flexion in Lying -   Manual Therapy -   Lumbar Extension Seat Pad -   Femur Restraint -   Top Dead Center -   Counterweight -   Lumbar Flexion 27   Lumbar Extension 0   Lumbar Peak Torque -   Min Torque -   Test Percent Below Normative Data -   Test Percent Gain in Strength from Initial  -   Lumbar Weight 84   Repetitions 20   Rating of Perceived Exertion 5   Ice - Z Lie (in min.) 10         *Exercises modified this visit due to pt sinus surgery    Exercises completd this visit:  Seated/Clouch to correct 20x  Seated hip abd with Black TB 20x  Seated hip flexion with Black TB 20x  Standing hip extension with Black TB 20x    Exercises completed on previous visits/HEP::   Slouch and correct 10x  Hip/back disassociation 10x   Sit<>stand squat training 10x    DKTC with ball 10x with 5" holds  PPT with legs on ball 10x  Bent knee fall out 10x  PPT + Bridges 10x   Open Book 10x  Standing lumbar extension with towel overpressure 10x  Scapular retractions 10x       Peripheral muscle strengthening which included 1 set of 15-20 repetitions at a slow, controlled 10-13 second per rep pace " "focused on strengthening supporting musculature for improved body mechanics and functional mobility.  Pt and therapist focused on proper form during treatment to ensure optimal strengthening of each targeted muscle group.  Machines were utilized including torso rotation, leg extension, leg curl, chest press, upright row. Tricep extension, bicep curl, leg press, and hip abduction added visit 3    Bri received the following manual therapy techniques: none    Home Exercises Provided and Patient Education Provided     Education provided:   - Patient received education in benefits of progressing mobility and strengthening gradually  - Discussed exertion scale, slow progressive resistance protocol to promote safe and healthy strengthening of supportive musculature  by performing 15-20 reps, 7 sec per rep, and increasing weight by 5 % at 20 reps only if there ex done safely, slowly, using correct musculature, exertion and no pain.  Patient expressed understanding.  -Pt educated on strategies to safely perform examination and exercise using "Stop Light" analogy to describe how to avoid or stop irritating motions, proceed with caution with some movements, and progress positive exercises.       Written Home Exercises Provided: Patient instructed to cont prior HEP.  Exercises were reviewed and Bri was able to demonstrate them prior to the end of the session.  Bri demonstrated fair  understanding of the education provided.     See EMR under Patient Instructions for exercises provided prior visit.    Assessment     Patient was able to complete therapy this visit with modifications due to inability lay back or bend forward per physician instructions for post sinus surgery protocol. Reviewed hip stability exercises and standing hip/glute exercises for modification. Reassessed Med X lumbar ROM with patient  able to tolerate increase to 27-0 at same weight with minimally increased "tightness" at end range flexion but no " reports of increased back pain symptoms. Monitor next session. Maintain weights.    Patient is making good progress towards established goals.  Pt will continue to benefit from skilled outpatient physical therapy to address the deficits stated in the impairment chart, provide pt/family education and to maximize pt's level of independence in the home and community environment.     Anticipated Barriers for therapy: Previous non-compliance with HEP, multiple lumbar surgeries, CHF, osteopenia     Pt's spiritual, cultural and educational needs considered and pt agreeable to plan of care and goals as stated below:       GOALS: Pt is in agreement with the following goals.     Short term goals:  6 weeks or 10 visits   1.  Pt will demonstrate increased lumbar ROM by at least 6 degrees from the initial ROM value with improvements noted in functional ROM and ability to perform ADLs. Partially Met.   2.  Pt will demonstrate increased maximum isometric torque value by 10% when compared to the initial value resulting in improved ability to perform bending, lifting, and carrying activities safely, confidently. MET 9/18/2019   3.  Patient report a reduction in worst pain score by 1-2 points for improved tolerance for prolonged sitting needing for driving. Progressing  4.  Pt able to perform HEP correctly with minimal cueing or supervision from therapist to encourage independent management of symptoms. Partially Met           Long term goals: 13 weeks or 20 visits   1. Pt will demonstrate increased lumbar ROM by at least 12 degrees from initial ROM value, resulting in improved ability to perform functional fwd bending while standing and sitting.   2. Pt will demonstrate increased maximum isometric torque value by 25% when compared to the initial value resulting in improved ability to perform bending, lifting, and carrying activities safely, confidently.  3. Pt to demonstrate ability to independently control and reduce their pain  through posture positioning and mechanical movements throughout a typical day.  4.  Pt will demonstrate reduced pain and improved functional outcomes as reported on the FOTO by reaching a score of CJ = at least 20% but < 40% impaired, limited or restricted or less in order to demonstrate subjective improvement in pt's condition.    5. Pt will demonstrate independence with the HEP at discharge  6.   Pt will demonstrate appropriate squat technique for improved tolerance of lifting and carrying functional activity needed for household tasks.   7. Pt will express ability to mow lawn without significantly increased pain (patient goal)      Plan   Continue with established Plan of Care towards established PT goals.

## 2019-10-15 ENCOUNTER — CLINICAL SUPPORT (OUTPATIENT)
Dept: REHABILITATION | Facility: OTHER | Age: 59
End: 2019-10-15
Attending: SPECIALIST
Payer: MEDICARE

## 2019-10-15 DIAGNOSIS — M54.50 CHRONIC BILATERAL LOW BACK PAIN WITHOUT SCIATICA: ICD-10-CM

## 2019-10-15 DIAGNOSIS — G89.29 CHRONIC BILATERAL LOW BACK PAIN WITHOUT SCIATICA: ICD-10-CM

## 2019-10-15 PROCEDURE — 97110 THERAPEUTIC EXERCISES: CPT

## 2019-10-15 NOTE — PROGRESS NOTES
[facility]      Name: Bri Santiago  Clinic Number: [id number]    Therapy Diagnosis:   Encounter Diagnosis   Name Primary?    Chronic bilateral low back pain without sciatica      Physician: Buffy Villagran,Crhistina    Visit Date: 10/15/2019    Physician Orders: PT Eval and Treat   Medical Diagnosis from Referral: Lumbar spondylosis, Postlaminectomy syndrome of lumbar region, Degeneration of lumbar or lumbosacral intervertebral disc, Spinal stenosis, lumbar region, without neurogenic claudication  Evaluation Date: 2019  Authorization Period Expiration: 19  Plan of Care Expiration: 11/15/19  Reassessment Due: 11/10/19  Visit # / Visits authorized:  (Do not change ROM on MedX)      Time In: 0800    Time Out: 9:00     Total Billable Time: 40 minutes    Precautions: Lumbar fusion L4-S1 3x, cervical surgery,  CHF Osteopenia, hx of prostate cancer     Pattern of pain determined: 1 PEN      Subjective   Bri reports his back is hurting and thinks it is from the adjustment of ROM, he can feel the stretching with the exercises    Patient reports tolerating previous visit fair  Patient reports their pain to be 6/10 on a 0-10 scale with 0 being no pain and 10 being the worst pain imaginable.  Pain Location: Low back     Occupation: On disability   Leisure: family     Pts goals: Do more activity, get back to taking care of yard and cooking       Objective     Baseline IM Testing Results:   Date of testin2019  ROM 0-21 deg  (reduced to to 0-27 19)   Max Peak Torque 90    Min Peak Torque 20    Flex/Ext Ratio 4.5   % below normative data 33     Midpoint IM Testing Results:   Date of testin2019  ROM 3-24   Max Peak Torque 110   Min Peak Torque 62    Flex/Ext Ratio    % below normative data 50          CMS Impairment/Limitation/Restriction for FOTO Lumbar Survey     Outcomes not captured at time of evaluation. Based on subjective reports and and objective  "findings, estimate 40-50 limitation at this time.   Limitation Score: 40-50%  Category: Mobility     Current : CK = at least 40% but < 60% impaired, limited or restricted  Goal: CJ = at least 20% but < 40% impaired, limited or restricted  Discharge:              Treatment    Pt was instructed in and performed the following:     Bri received therapeutic exercises to develop/improved posture, cardiovascular endurance, muscular endurance, lumbar/cervical ROM, strength and muscular endurance for 50  minutes including the following exercises:     [facility] 10/15/2019   Visit Number 17   VAS Pain Rating 6   Treadmill Time (in min.) 10   Speed -   Recumbent Bike Seat Pos. -   Time -   Scapular Retraction -   Lumbar Stretches - Slouch Overcorrection -   Extension in Lying -   Extension in Standing -   Flexion in Lying 10   Manual Therapy -   Lumbar Extension Seat Pad -   Femur Restraint -   Top Dead Center -   Counterweight -   Lumbar Flexion 24   Lumbar Extension 0   Lumbar Peak Torque -   Min Torque -   Test Percent Below Normative Data -   Test Percent Gain in Strength from Initial  -   Lumbar Weight 88   Repetitions 18   Rating of Perceived Exertion 4   Ice - Z Lie (in min.) -   Ice - Sitting 10             *Exercises modified this visit due to pt sinus surgery    Exercises completd this visit:  Seated/Clouch to correct 20x  Seated hip abd with Black TB 20x  Seated hip flexion with Black TB 20x  Standing hip extension with Black TB 20x    Exercises completed on previous visits/HEP::   Slouch and correct 10x  Hip/back disassociation 10x   Sit<>stand squat training 10x    DKTC with ball 10x with 5" holds  PPT with legs on ball 10x  Bent knee fall out 10x  PPT + Bridges 10x   Open Book 10x  Standing lumbar extension with towel overpressure 10x  Scapular retractions 10x       Peripheral muscle strengthening which included 1 set of 15-20 repetitions at a slow, controlled 10-13 second per rep pace focused on " "strengthening supporting musculature for improved body mechanics and functional mobility.  Pt and therapist focused on proper form during treatment to ensure optimal strengthening of each targeted muscle group.  Machines were utilized including torso rotation, leg extension, leg curl, chest press, upright row. Tricep extension, bicep curl, leg press, and hip abduction added visit 3    Bri received the following manual therapy techniques: none    Home Exercises Provided and Patient Education Provided     Education provided:   - Patient received education in benefits of progressing mobility and strengthening gradually  - Discussed exertion scale, slow progressive resistance protocol to promote safe and healthy strengthening of supportive musculature  by performing 15-20 reps, 7 sec per rep, and increasing weight by 5 % at 20 reps only if there ex done safely, slowly, using correct musculature, exertion and no pain.  Patient expressed understanding.  -Pt educated on strategies to safely perform examination and exercise using "Stop Light" analogy to describe how to avoid or stop irritating motions, proceed with caution with some movements, and progress positive exercises.       Written Home Exercises Provided: Patient instructed to cont prior HEP.  Exercises were reviewed and Bri was able to demonstrate them prior to the end of the session.  Bri demonstrated fair  understanding of the education provided.     See EMR under Patient Instructions for exercises provided prior visit.    Assessment     Patient was able to complete therapy this visit with modifications due to inability lay back or bend forward per physician instructions for post sinus surgery protocol. Continued hip stability exercises and standing hip/glute exercises for modification. Reassessed Med X lumbar ROM with patient and returned to 0-24 and do not plan on adjusting, Pt is able to increase flexion ROM but cannot tolerate pushing weight with a " more flexed ROM.     Patient is making good progress towards established goals.  Pt will continue to benefit from skilled outpatient physical therapy to address the deficits stated in the impairment chart, provide pt/family education and to maximize pt's level of independence in the home and community environment.     Anticipated Barriers for therapy: Previous non-compliance with HEP, multiple lumbar surgeries, CHF, osteopenia     Pt's spiritual, cultural and educational needs considered and pt agreeable to plan of care and goals as stated below:       GOALS: Pt is in agreement with the following goals.     Short term goals:  6 weeks or 10 visits   1.  Pt will demonstrate increased lumbar ROM by at least 6 degrees from the initial ROM value with improvements noted in functional ROM and ability to perform ADLs. Partially Met.   2.  Pt will demonstrate increased maximum isometric torque value by 10% when compared to the initial value resulting in improved ability to perform bending, lifting, and carrying activities safely, confidently. MET 9/18/2019   3.  Patient report a reduction in worst pain score by 1-2 points for improved tolerance for prolonged sitting needing for driving. Progressing  4.  Pt able to perform HEP correctly with minimal cueing or supervision from therapist to encourage independent management of symptoms. Partially Met           Long term goals: 13 weeks or 20 visits   1. Pt will demonstrate increased lumbar ROM by at least 12 degrees from initial ROM value, resulting in improved ability to perform functional fwd bending while standing and sitting.   2. Pt will demonstrate increased maximum isometric torque value by 25% when compared to the initial value resulting in improved ability to perform bending, lifting, and carrying activities safely, confidently.  3. Pt to demonstrate ability to independently control and reduce their pain through posture positioning and mechanical movements throughout a  typical day.  4.  Pt will demonstrate reduced pain and improved functional outcomes as reported on the FOTO by reaching a score of CJ = at least 20% but < 40% impaired, limited or restricted or less in order to demonstrate subjective improvement in pt's condition.    5. Pt will demonstrate independence with the HEP at discharge  6.   Pt will demonstrate appropriate squat technique for improved tolerance of lifting and carrying functional activity needed for household tasks.   7. Pt will express ability to mow lawn without significantly increased pain (patient goal)      Plan   Continue with established Plan of Care towards established PT goals.

## 2019-10-15 NOTE — PLAN OF CARE
[facility]      Name: Bri Santiago  Clinic Number: [id number]    Therapy Diagnosis:   Encounter Diagnosis   Name Primary?    Chronic bilateral low back pain without sciatica      Physician: Buffy Villagran,Christina    Visit Date: 10/15/2019    Physician Orders: PT Eval and Treat   Medical Diagnosis from Referral: Lumbar spondylosis, Postlaminectomy syndrome of lumbar region, Degeneration of lumbar or lumbosacral intervertebral disc, Spinal stenosis, lumbar region, without neurogenic claudication  Evaluation Date: 2019  Authorization Period Expiration: 19  Plan of Care Expiration: 11/15/19  Reassessment Due: 11/10/19  Visit # / Visits authorized:  (Do not change ROM on MedX)      Time In: 0800    Time Out: 9:00     Total Billable Time: 40 minutes    Precautions: Lumbar fusion L4-S1 3x, cervical surgery,  CHF Osteopenia, hx of prostate cancer     Pattern of pain determined: 1 PEN      Subjective   Bri reports his back is hurting and thinks it is from the adjustment of ROM, he can feel the stretching with the exercises    Patient reports tolerating previous visit fair  Patient reports their pain to be 6/10 on a 0-10 scale with 0 being no pain and 10 being the worst pain imaginable.  Pain Location: Low back     Occupation: On disability   Leisure: family     Pts goals: Do more activity, get back to taking care of yard and cooking       Objective     Baseline IM Testing Results:   Date of testin2019  ROM 0-21 deg  (reduced to to 0-27 19)   Max Peak Torque 90    Min Peak Torque 20    Flex/Ext Ratio 4.5   % below normative data 33     Midpoint IM Testing Results:   Date of testin2019  ROM 3-24   Max Peak Torque 110   Min Peak Torque 62    Flex/Ext Ratio    % below normative data 50          CMS Impairment/Limitation/Restriction for FOTO Lumbar Survey     Outcomes not captured at time of evaluation. Based on subjective reports and and objective  "findings, estimate 40-50 limitation at this time.   Limitation Score: 40-50%  Category: Mobility     Current : CK = at least 40% but < 60% impaired, limited or restricted  Goal: CJ = at least 20% but < 40% impaired, limited or restricted  Discharge:              Treatment    Pt was instructed in and performed the following:     Bri received therapeutic exercises to develop/improved posture, cardiovascular endurance, muscular endurance, lumbar/cervical ROM, strength and muscular endurance for 50  minutes including the following exercises:     [facility] 10/15/2019   Visit Number 17   VAS Pain Rating 6   Treadmill Time (in min.) 10   Speed -   Recumbent Bike Seat Pos. -   Time -   Scapular Retraction -   Lumbar Stretches - Slouch Overcorrection -   Extension in Lying -   Extension in Standing -   Flexion in Lying 10   Manual Therapy -   Lumbar Extension Seat Pad -   Femur Restraint -   Top Dead Center -   Counterweight -   Lumbar Flexion 24   Lumbar Extension 0   Lumbar Peak Torque -   Min Torque -   Test Percent Below Normative Data -   Test Percent Gain in Strength from Initial  -   Lumbar Weight 88   Repetitions 18   Rating of Perceived Exertion 4   Ice - Z Lie (in min.) -   Ice - Sitting 10             *Exercises modified this visit due to pt sinus surgery    Exercises completd this visit:  Seated/Clouch to correct 20x  Seated hip abd with Black TB 20x  Seated hip flexion with Black TB 20x  Standing hip extension with Black TB 20x    Exercises completed on previous visits/HEP::   Slouch and correct 10x  Hip/back disassociation 10x   Sit<>stand squat training 10x    DKTC with ball 10x with 5" holds  PPT with legs on ball 10x  Bent knee fall out 10x  PPT + Bridges 10x   Open Book 10x  Standing lumbar extension with towel overpressure 10x  Scapular retractions 10x       Peripheral muscle strengthening which included 1 set of 15-20 repetitions at a slow, controlled 10-13 second per rep pace focused on " "strengthening supporting musculature for improved body mechanics and functional mobility.  Pt and therapist focused on proper form during treatment to ensure optimal strengthening of each targeted muscle group.  Machines were utilized including torso rotation, leg extension, leg curl, chest press, upright row. Tricep extension, bicep curl, leg press, and hip abduction added visit 3    Bri received the following manual therapy techniques: none    Home Exercises Provided and Patient Education Provided     Education provided:   - Patient received education in benefits of progressing mobility and strengthening gradually  - Discussed exertion scale, slow progressive resistance protocol to promote safe and healthy strengthening of supportive musculature  by performing 15-20 reps, 7 sec per rep, and increasing weight by 5 % at 20 reps only if there ex done safely, slowly, using correct musculature, exertion and no pain.  Patient expressed understanding.  -Pt educated on strategies to safely perform examination and exercise using "Stop Light" analogy to describe how to avoid or stop irritating motions, proceed with caution with some movements, and progress positive exercises.       Written Home Exercises Provided: Patient instructed to cont prior HEP.  Exercises were reviewed and Bri was able to demonstrate them prior to the end of the session.  Bri demonstrated fair  understanding of the education provided.     See EMR under Patient Instructions for exercises provided prior visit.    Assessment     Patient was able to complete therapy this visit with modifications due to inability lay back or bend forward per physician instructions for post sinus surgery protocol. Continued hip stability exercises and standing hip/glute exercises for modification. Reassessed Med X lumbar ROM with patient and returned to 0-24 and do not plan on adjusting, Pt is able to increase flexion ROM but cannot tolerate pushing weight with a " more flexed ROM.     Patient is making good progress towards established goals.  Pt will continue to benefit from skilled outpatient physical therapy to address the deficits stated in the impairment chart, provide pt/family education and to maximize pt's level of independence in the home and community environment.     Anticipated Barriers for therapy: Previous non-compliance with HEP, multiple lumbar surgeries, CHF, osteopenia     Pt's spiritual, cultural and educational needs considered and pt agreeable to plan of care and goals as stated below:       GOALS: Pt is in agreement with the following goals.     Short term goals:  6 weeks or 10 visits   1.  Pt will demonstrate increased lumbar ROM by at least 6 degrees from the initial ROM value with improvements noted in functional ROM and ability to perform ADLs. Partially Met.   2.  Pt will demonstrate increased maximum isometric torque value by 10% when compared to the initial value resulting in improved ability to perform bending, lifting, and carrying activities safely, confidently. MET 9/18/2019   3.  Patient report a reduction in worst pain score by 1-2 points for improved tolerance for prolonged sitting needing for driving. Progressing  4.  Pt able to perform HEP correctly with minimal cueing or supervision from therapist to encourage independent management of symptoms. Partially Met           Long term goals: 13 weeks or 20 visits   1. Pt will demonstrate increased lumbar ROM by at least 12 degrees from initial ROM value, resulting in improved ability to perform functional fwd bending while standing and sitting.   2. Pt will demonstrate increased maximum isometric torque value by 25% when compared to the initial value resulting in improved ability to perform bending, lifting, and carrying activities safely, confidently.  3. Pt to demonstrate ability to independently control and reduce their pain through posture positioning and mechanical movements throughout a  typical day.  4.  Pt will demonstrate reduced pain and improved functional outcomes as reported on the FOTO by reaching a score of CJ = at least 20% but < 40% impaired, limited or restricted or less in order to demonstrate subjective improvement in pt's condition.    5. Pt will demonstrate independence with the HEP at discharge  6.   Pt will demonstrate appropriate squat technique for improved tolerance of lifting and carrying functional activity needed for household tasks.   7. Pt will express ability to mow lawn without significantly increased pain (patient goal)      Plan   Continue with established Plan of Care towards established PT goals.

## 2019-10-17 ENCOUNTER — CLINICAL SUPPORT (OUTPATIENT)
Dept: REHABILITATION | Facility: OTHER | Age: 59
End: 2019-10-17
Attending: SPECIALIST
Payer: MEDICARE

## 2019-10-17 DIAGNOSIS — M54.50 CHRONIC BILATERAL LOW BACK PAIN WITHOUT SCIATICA: ICD-10-CM

## 2019-10-17 DIAGNOSIS — G89.29 CHRONIC BILATERAL LOW BACK PAIN WITHOUT SCIATICA: ICD-10-CM

## 2019-10-17 PROCEDURE — 97110 THERAPEUTIC EXERCISES: CPT

## 2019-10-17 NOTE — PROGRESS NOTES
[facility]      Name: Bri Santiago  Clinic Number: [id number]    Therapy Diagnosis:   Encounter Diagnosis   Name Primary?    Chronic bilateral low back pain without sciatica      Physician: Buffy Villagran,*    Visit Date: 10/17/2019    Physician Orders: PT Eval and Treat   Medical Diagnosis from Referral: Lumbar spondylosis, Postlaminectomy syndrome of lumbar region, Degeneration of lumbar or lumbosacral intervertebral disc, Spinal stenosis, lumbar region, without neurogenic claudication  Evaluation Date: 2019  Authorization Period Expiration: 19  Plan of Care Expiration: 11/15/19  Reassessment Due: 11/10/19  Visit # / Visits authorized:  (Do not change ROM on MedX)      Time In: 9:45   Time Out: 10:40     Total Billable Time: 40 minutes    Precautions: Lumbar fusion L4-S1 3x, cervical surgery,  CHF Osteopenia, hx of prostate cancer     Pattern of pain determined: 1 PEN      Subjective   Bri reports he is feeling okay today. He walked for about 30 min this morning and it felt good    Patient reports tolerating previous visit fair  Patient reports their pain to be 6/10 on a 0-10 scale with 0 being no pain and 10 being the worst pain imaginable.  Pain Location: Low back     Occupation: On disability   Leisure: family     Pts goals: Do more activity, get back to taking care of yard and cooking       Objective     Baseline IM Testing Results:   Date of testin2019  ROM 0-21 deg  (reduced to to 0-27 19)   Max Peak Torque 90    Min Peak Torque 20    Flex/Ext Ratio 4.5   % below normative data 33     Midpoint IM Testing Results:   Date of testin2019  ROM 3-24   Max Peak Torque 110   Min Peak Torque 62    Flex/Ext Ratio    % below normative data 50          CMS Impairment/Limitation/Restriction for FOTO Lumbar Survey     Outcomes not captured at time of evaluation. Based on subjective reports and and objective findings, estimate 40-50  "limitation at this time.   Limitation Score: 40-50%  Category: Mobility     Current : CK = at least 40% but < 60% impaired, limited or restricted  Goal: CJ = at least 20% but < 40% impaired, limited or restricted  Discharge:              Treatment    Pt was instructed in and performed the following:     Bri received therapeutic exercises to develop/improved posture, cardiovascular endurance, muscular endurance, lumbar/cervical ROM, strength and muscular endurance for 50  minutes including the following exercises:     [facility] 10/17/2019   Visit Number 18   VAS Pain Rating 5   Treadmill Time (in min.) 10   Speed -   Recumbent Bike Seat Pos. -   Time -   Scapular Retraction -   Lumbar Stretches - Slouch Overcorrection -   Extension in Lying -   Extension in Standing -   Flexion in Lying -   Manual Therapy -   Lumbar Extension Seat Pad -   Femur Restraint -   Top Dead Center -   Counterweight -   Lumbar Flexion -   Lumbar Extension -   Lumbar Peak Torque -   Min Torque -   Test Percent Below Normative Data -   Test Percent Gain in Strength from Initial  -   Lumbar Weight 88   Repetitions 20   Rating of Perceived Exertion 4   Ice - Z Lie (in min.) -   Ice - Sitting 10       *Exercises modified this visit due to pt sinus surgery    Exercises completd this visit:  Seated/Clouch to correct 20x  Seated hip abd with Black TB 20x  Seated hip flexion with Black TB 20x  Standing hip extension with Black TB 20x  Sit to stand 2x10  Standing hip ABD with black TB 20x     Exercises completed on previous visits/HEP::   Slouch and correct 10x  Hip/back disassociation 10x   Sit<>stand squat training 10x    DKTC with ball 10x with 5" holds  PPT with legs on ball 10x  Bent knee fall out 10x  PPT + Bridges 10x   Open Book 10x  Standing lumbar extension with towel overpressure 10x  Scapular retractions 10x       Peripheral muscle strengthening which included 1 set of 15-20 repetitions at a slow, controlled 10-13 second per " "rep pace focused on strengthening supporting musculature for improved body mechanics and functional mobility.  Pt and therapist focused on proper form during treatment to ensure optimal strengthening of each targeted muscle group.  Machines were utilized including torso rotation, leg extension, leg curl, chest press, upright row. Tricep extension, bicep curl, leg press, and hip abduction added visit 3    Bri received the following manual therapy techniques: none    Home Exercises Provided and Patient Education Provided     Education provided:   - Patient received education in benefits of progressing mobility and strengthening gradually  - Discussed exertion scale, slow progressive resistance protocol to promote safe and healthy strengthening of supportive musculature  by performing 15-20 reps, 7 sec per rep, and increasing weight by 5 % at 20 reps only if there ex done safely, slowly, using correct musculature, exertion and no pain.  Patient expressed understanding.  -Pt educated on strategies to safely perform examination and exercise using "Stop Light" analogy to describe how to avoid or stop irritating motions, proceed with caution with some movements, and progress positive exercises.       Written Home Exercises Provided: Patient instructed to cont prior HEP.  Exercises were reviewed and Bri was able to demonstrate them prior to the end of the session.  Bri demonstrated fair  understanding of the education provided.     See EMR under Patient Instructions for exercises provided prior visit.    Assessment     Patient was able to complete therapy this visit with modifications due to inability lay back or bend forward per physician instructions for post sinus surgery protocol. Added additional exercises sit to stand and standing hip abd, pt states that he can feel the muscles working. He was able to progress to 20 reps on the lumbar medx at the same weight. Will continue as tolerated.    Patient is making " good progress towards established goals.  Pt will continue to benefit from skilled outpatient physical therapy to address the deficits stated in the impairment chart, provide pt/family education and to maximize pt's level of independence in the home and community environment.     Anticipated Barriers for therapy: Previous non-compliance with HEP, multiple lumbar surgeries, CHF, osteopenia     Pt's spiritual, cultural and educational needs considered and pt agreeable to plan of care and goals as stated below:       GOALS: Pt is in agreement with the following goals.     Short term goals:  6 weeks or 10 visits   1.  Pt will demonstrate increased lumbar ROM by at least 6 degrees from the initial ROM value with improvements noted in functional ROM and ability to perform ADLs. Partially Met.   2.  Pt will demonstrate increased maximum isometric torque value by 10% when compared to the initial value resulting in improved ability to perform bending, lifting, and carrying activities safely, confidently. MET 9/18/2019   3.  Patient report a reduction in worst pain score by 1-2 points for improved tolerance for prolonged sitting needing for driving. Progressing  4.  Pt able to perform HEP correctly with minimal cueing or supervision from therapist to encourage independent management of symptoms. Partially Met           Long term goals: 13 weeks or 20 visits   1. Pt will demonstrate increased lumbar ROM by at least 12 degrees from initial ROM value, resulting in improved ability to perform functional fwd bending while standing and sitting.   2. Pt will demonstrate increased maximum isometric torque value by 25% when compared to the initial value resulting in improved ability to perform bending, lifting, and carrying activities safely, confidently.  3. Pt to demonstrate ability to independently control and reduce their pain through posture positioning and mechanical movements throughout a typical day.  4.  Pt will demonstrate  reduced pain and improved functional outcomes as reported on the FOTO by reaching a score of CJ = at least 20% but < 40% impaired, limited or restricted or less in order to demonstrate subjective improvement in pt's condition.    5. Pt will demonstrate independence with the HEP at discharge  6.   Pt will demonstrate appropriate squat technique for improved tolerance of lifting and carrying functional activity needed for household tasks.   7. Pt will express ability to mow lawn without significantly increased pain (patient goal)      Plan   Continue with established Plan of Care towards established PT goals.

## 2019-10-21 ENCOUNTER — CLINICAL SUPPORT (OUTPATIENT)
Dept: REHABILITATION | Facility: OTHER | Age: 59
End: 2019-10-21
Attending: SPECIALIST
Payer: MEDICARE

## 2019-10-21 DIAGNOSIS — G89.29 CHRONIC BILATERAL LOW BACK PAIN WITHOUT SCIATICA: ICD-10-CM

## 2019-10-21 DIAGNOSIS — M54.50 CHRONIC BILATERAL LOW BACK PAIN WITHOUT SCIATICA: ICD-10-CM

## 2019-10-21 PROCEDURE — 97110 THERAPEUTIC EXERCISES: CPT

## 2019-10-21 NOTE — PROGRESS NOTES
[facility]      Name: Bri Santiago  Clinic Number: [id number]    Therapy Diagnosis:   Encounter Diagnosis   Name Primary?    Chronic bilateral low back pain without sciatica      Physician: Buffy Villagran,Christina    Visit Date: 10/21/2019    Physician Orders: PT Eval and Treat   Medical Diagnosis from Referral: Lumbar spondylosis, Postlaminectomy syndrome of lumbar region, Degeneration of lumbar or lumbosacral intervertebral disc, Spinal stenosis, lumbar region, without neurogenic claudication  Evaluation Date: 2019  Authorization Period Expiration: 19  Plan of Care Expiration: 11/15/19  Reassessment Due: 11/10/19  Visit # / Visits authorized:  (Do not change ROM on MedX)      Time In: 936  Time Out: 1030     Total Billable Time: 35 minutes    Precautions: Lumbar fusion L4-S1 3x, cervical surgery,  CHF Osteopenia, hx of prostate cancer     Pattern of pain determined: 1 PEN      Subjective   Bri  Arrives to PT today with c/o of 5/10 LBP. He has no other new c/o. He reports the sitting exercises felt a lot better than supine.     Patient reports tolerating previous visit fair  Patient reports their pain to be 5/10 on a 0-10 scale with 0 being no pain and 10 being the worst pain imaginable.  Pain Location: Low back     Occupation: On disability   Leisure: family     Pts goals: Do more activity, get back to taking care of yard and cooking       Objective     Baseline IM Testing Results:   Date of testin2019  ROM 0-21 deg  (reduced to to 0-27 19)   Max Peak Torque 90    Min Peak Torque 20    Flex/Ext Ratio 4.5   % below normative data 33     Midpoint IM Testing Results:   Date of testin2019  ROM 3-24   Max Peak Torque 110   Min Peak Torque 62    Flex/Ext Ratio    % below normative data 50          CMS Impairment/Limitation/Restriction for FOTO Lumbar Survey     Outcomes not captured at time of evaluation. Based on subjective reports and and  "objective findings, estimate 40-50 limitation at this time.   Limitation Score: 40-50%  Category: Mobility     Current : CK = at least 40% but < 60% impaired, limited or restricted  Goal: CJ = at least 20% but < 40% impaired, limited or restricted  Discharge:              Treatment    Pt was instructed in and performed the following:     Bri received therapeutic exercises to develop/improved posture, cardiovascular endurance, muscular endurance, lumbar/cervical ROM, strength and muscular endurance for 54  minutes including the following exercises:     [facility] 10/21/2019   Visit Number 19   VAS Pain Rating 5   Treadmill Time (in min.) 10   Speed -   Recumbent Bike Seat Pos. -   Time -   Scapular Retraction -   Lumbar Stretches - Slouch Overcorrection -   Extension in Lying -   Extension in Standing -   Flexion in Lying -   Manual Therapy -   Lumbar Extension Seat Pad -   Femur Restraint -   Top Dead Center -   Counterweight -   Lumbar Flexion -   Lumbar Extension -   Lumbar Peak Torque -   Min Torque -   Test Percent Below Normative Data -   Test Percent Gain in Strength from Initial  -   Lumbar Weight 92   Repetitions 15   Rating of Perceived Exertion 5   Ice - Z Lie (in min.) -   Ice - Sitting 10       *Exercises modified this visit due to pt sinus surgery    Exercises completd this visit:  Seated/Slouch to correct 20x  Seated hip abd with Black TB 20x  Seated hip flexion with Black TB 20x  Standing hip extension with Black TB 20x  Sit to stand 2x10  Standing hip ABD with black TB 20x     Exercises completed on previous visits/HEP::   Slouch and correct 10x  Hip/back disassociation 10x   Sit<>stand squat training 10x    DKTC with ball 10x with 5" holds  PPT with legs on ball 10x  Bent knee fall out 10x  PPT + Bridges 10x   Open Book 10x  Standing lumbar extension with towel overpressure 10x  Scapular retractions 10x       Peripheral muscle strengthening which included 1 set of 15-20 repetitions at a " "slow, controlled 10-13 second per rep pace focused on strengthening supporting musculature for improved body mechanics and functional mobility.  Pt and therapist focused on proper form during treatment to ensure optimal strengthening of each targeted muscle group.  Machines were utilized including torso rotation, leg extension, leg curl, chest press, upright row. Tricep extension, bicep curl, leg press, and hip abduction added visit 3    Bri received the following manual therapy techniques: none    Home Exercises Provided and Patient Education Provided     Education provided:   - Patient received education in benefits of progressing mobility and strengthening gradually  - Discussed exertion scale, slow progressive resistance protocol to promote safe and healthy strengthening of supportive musculature  by performing 15-20 reps, 7 sec per rep, and increasing weight by 5 % at 20 reps only if there ex done safely, slowly, using correct musculature, exertion and no pain.  Patient expressed understanding.  -Pt educated on strategies to safely perform examination and exercise using "Stop Light" analogy to describe how to avoid or stop irritating motions, proceed with caution with some movements, and progress positive exercises.       Written Home Exercises Provided: Patient instructed to cont prior HEP.  Exercises were reviewed and Bri was able to demonstrate them prior to the end of the session.  Bri demonstrated fair  understanding of the education provided.     See EMR under Patient Instructions for exercises provided prior visit.    Assessment     Patient was given a 5% weight increase to 92# today. He completed 15 reps at an RPE of 5 today. DC testing will be completed at next visit.     Patient is making good progress towards established goals.  Pt will continue to benefit from skilled outpatient physical therapy to address the deficits stated in the impairment chart, provide pt/family education and to " maximize pt's level of independence in the home and community environment.     Anticipated Barriers for therapy: Previous non-compliance with HEP, multiple lumbar surgeries, CHF, osteopenia     Pt's spiritual, cultural and educational needs considered and pt agreeable to plan of care and goals as stated below:       GOALS: Pt is in agreement with the following goals.     Short term goals:  6 weeks or 10 visits   1.  Pt will demonstrate increased lumbar ROM by at least 6 degrees from the initial ROM value with improvements noted in functional ROM and ability to perform ADLs. Partially Met.   2.  Pt will demonstrate increased maximum isometric torque value by 10% when compared to the initial value resulting in improved ability to perform bending, lifting, and carrying activities safely, confidently. MET 9/18/2019   3.  Patient report a reduction in worst pain score by 1-2 points for improved tolerance for prolonged sitting needing for driving. Progressing  4.  Pt able to perform HEP correctly with minimal cueing or supervision from therapist to encourage independent management of symptoms. Partially Met           Long term goals: 13 weeks or 20 visits   1. Pt will demonstrate increased lumbar ROM by at least 12 degrees from initial ROM value, resulting in improved ability to perform functional fwd bending while standing and sitting.   2. Pt will demonstrate increased maximum isometric torque value by 25% when compared to the initial value resulting in improved ability to perform bending, lifting, and carrying activities safely, confidently.  3. Pt to demonstrate ability to independently control and reduce their pain through posture positioning and mechanical movements throughout a typical day.  4.  Pt will demonstrate reduced pain and improved functional outcomes as reported on the FOTO by reaching a score of CJ = at least 20% but < 40% impaired, limited or restricted or less in order to demonstrate subjective  improvement in pt's condition.    5. Pt will demonstrate independence with the HEP at discharge  6.   Pt will demonstrate appropriate squat technique for improved tolerance of lifting and carrying functional activity needed for household tasks.   7. Pt will express ability to mow lawn without significantly increased pain (patient goal)      Plan   Continue with established Plan of Care towards established PT goals.

## 2019-10-24 ENCOUNTER — CLINICAL SUPPORT (OUTPATIENT)
Dept: REHABILITATION | Facility: OTHER | Age: 59
End: 2019-10-24
Attending: SPECIALIST
Payer: MEDICARE

## 2019-10-24 DIAGNOSIS — M54.50 CHRONIC BILATERAL LOW BACK PAIN WITHOUT SCIATICA: ICD-10-CM

## 2019-10-24 DIAGNOSIS — G89.29 CHRONIC BILATERAL LOW BACK PAIN WITHOUT SCIATICA: ICD-10-CM

## 2019-10-24 PROCEDURE — G8979 MOBILITY GOAL STATUS: HCPCS | Mod: CJ

## 2019-10-24 PROCEDURE — 97110 THERAPEUTIC EXERCISES: CPT

## 2019-10-24 PROCEDURE — G8980 MOBILITY D/C STATUS: HCPCS | Mod: CJ

## 2019-10-24 NOTE — PATIENT INSTRUCTIONS
"                                                                                                                                                                                      HEALTHY BACK TOOLS            KEEP YOUR SPINE FEELING FINE                          HEALTHY HABITS    Do your "go to" stretches 2/day                                   Get a good night's rest  Watch your POSTURE in sitting and standing             Drink PLENTY of water  Use a lumbar roll                                                          Eat LOTS of fruits & vegetables  Get up often (walk and stretch)                                    Manage your stress  Make your workplace IDEAL for you                           Don't smoke  Lift correctly                                                                 Exercise                                                       WHAT TO DO WHEN SYMPTOMS FLARE UP    Back and neck pain may occasionally flare up.   When  this happens,   Remember to manage your symptoms using your tools.  Be encouraged,   flare ups will usually pass.    "Go To" Stretches                            Keep Moving    "Go To" positions                            Slowly resume normal activities    Z Lie                                                   Relaxation techniques  Ice                                                         MY EXERCISE PLAN    Go To Stretches  (2/day)  Knees to chest  Slouch/Correct  Supine pelvic tilt                 Strengthening (2-3/week)  Bridges  Exercises with back band      Cardio (3-5/week)  Walking                                                                                                                                                                                          "

## 2019-10-24 NOTE — PROGRESS NOTES
[facility] Physical [facility]      Name: Bri Santiago  Clinic Number: [id number]    Therapy Diagnosis:   Encounter Diagnosis   Name Primary?    Chronic bilateral low back pain without sciatica      Physician: Buffy Villagran,Christina    Visit Date: 10/24/2019    Physician Orders: PT Eval and Treat   Medical Diagnosis from Referral: Lumbar spondylosis, Postlaminectomy syndrome of lumbar region, Degeneration of lumbar or lumbosacral intervertebral disc, Spinal stenosis, lumbar region, without neurogenic claudication  Evaluation Date: 2019  Authorization Period Expiration: 19  Plan of Care Expiration: 11/15/19  Reassessment Due: 11/10/19  Visit # / Visits authorized:  (Do not change ROM on MedX)      Time In: 1000  Time Out: 1100     Total Billable Time: 35 minutes    Precautions: Lumbar fusion L4-S1 3x, cervical surgery,  CHF Osteopenia, hx of prostate cancer     Pattern of pain determined: 1 PEN      Subjective   Bri  Arrives to PT today with c/o of 5/10 LBP. He has no other new c/o. He reports the sitting exercises felt a lot better than supine.     Patient reports tolerating previous visit fair  Patient reports their pain to be 5/10 on a 0-10 scale with 0 being no pain and 10 being the worst pain imaginable.  Pain Location: Low back     Occupation: On disability   Leisure: family     Pts goals: Do more activity, get back to taking care of yard and cooking       Objective     Baseline IM Testing Results:   Date of testin2019  ROM 0-21 deg  (reduced to to 0-27 19)   Max Peak Torque 90    Min Peak Torque 20    Flex/Ext Ratio 4.5   % below normative data 33     Midpoint IM Testing Results:   Date of testin2019  ROM 3-24   Max Peak Torque 110   Min Peak Torque 62    Flex/Ext Ratio    % below normative data 50     Final IM Testing Results:   Date of testing: 10/24/2019  ROM 0-24   Max Peak Torque 117   Min Peak Torque 69    Flex/Ext Ratio    % below normative data  "48        CMS Impairment/Limitation/Restriction for FOTO Lumbar Survey     Outcomes not captured at time of evaluation. Based on subjective reports and and objective findings, estimate 40-50 limitation at this time.   Limitation Score: 40-50%  Category: Mobility     Current : CK = at least 40% but < 60% impaired, limited or restricted  Goal: CJ = at least 20% but < 40% impaired, limited or restricted  Discharge: CJ - 35% limitation             Treatment    Pt was instructed in and performed the following:     Bri received therapeutic exercises to develop/improved posture, cardiovascular endurance, muscular endurance, lumbar/cervical ROM, strength and muscular endurance for 54  minutes including the following exercises:     [facility] 10/24/2019   Visit Number 20   VAS Pain Rating 5   Treadmill Time (in min.) 10   Speed -   Recumbent Bike Seat Pos. -   Time -   Scapular Retraction -   Lumbar Stretches - Slouch Overcorrection -   Extension in Lying -   Extension in Standing -   Flexion in Lying -   Manual Therapy -   Lumbar Extension Seat Pad -   Femur Restraint -   Top Dead Center -   Counterweight -   Lumbar Flexion 24   Lumbar Extension 0   Lumbar Peak Torque 117   Min Torque 69   Test Percent Below Normative Data 48   Test Percent Gain in Strength from Initial  165   Lumbar Weight -   Repetitions -   Rating of Perceived Exertion -   Ice - Z Lie (in min.) -   Ice - Sitting 10         *Exercises modified this visit due to pt sinus surgery    Exercises completd this visit:  Seated/Slouch to correct 20x  Seated hip abd with Black TB 20x  Seated hip flexion with Black TB 20x  Standing hip extension with Black TB 20x  Sit to stand 2x10  Standing hip ABD with black TB 20x     Exercises completed on previous visits/HEP::   Slouch and correct 10x  Hip/back disassociation 10x   Sit<>stand squat training 10x    DKTC with ball 10x with 5" holds  PPT with legs on ball 10x  Bent knee fall out 10x  PPT + Bridges 10x " "  Open Book 10x  Standing lumbar extension with towel overpressure 10x  Scapular retractions 10x       Peripheral muscle strengthening which included 1 set of 15-20 repetitions at a slow, controlled 10-13 second per rep pace focused on strengthening supporting musculature for improved body mechanics and functional mobility.  Pt and therapist focused on proper form during treatment to ensure optimal strengthening of each targeted muscle group.  Machines were utilized including torso rotation, leg extension, leg curl, chest press, upright row. Tricep extension, bicep curl, leg press, and hip abduction added visit 3    Bri received the following manual therapy techniques: none    Home Exercises Provided and Patient Education Provided     Education provided:   - Patient received education in benefits of progressing mobility and strengthening gradually  - Discussed exertion scale, slow progressive resistance protocol to promote safe and healthy strengthening of supportive musculature  by performing 15-20 reps, 7 sec per rep, and increasing weight by 5 % at 20 reps only if there ex done safely, slowly, using correct musculature, exertion and no pain.  Patient expressed understanding.  -Pt educated on strategies to safely perform examination and exercise using "Stop Light" analogy to describe how to avoid or stop irritating motions, proceed with caution with some movements, and progress positive exercises.       Written Home Exercises Provided: Patient instructed to cont prior HEP.  Exercises were reviewed and Bri was able to demonstrate them prior to the end of the session.  Bri demonstrated fair  understanding of the education provided.     See EMR under Patient Instructions for exercises provided prior visit.    Assessment     Patient has attended 20 visits of the [facility]Back program for aerobic work, med ex isometric testing with dynamic strengthening on med ex equipment for spine, whole body strengthening on " med ex equipment, HEP, education.  Patient has completed Healthy Back Program and is ready to be transitioned into wellness program.  Importance of wellness program, and attending 2/month to maintain strength stressed.  Importance of continuing there ex and body mech and ergonomics stressed.   Patient demonstrates improvement in ability to reduce symptoms, improved posture, improved ROM and improved strength.   Reviewed HEP, and importance of maintaining a healthy spine through continued stretching and performance of HEP, good posture, good ergonomics, good body mech with ADL, leisure, and work.  Discharge handout with HEP given, and discussed aspects of exercise program, cardio, strengthening, and stretching.    Patient expressed understanding information given.  Patient plans to attend wellness and is ready to transition to wellness.    Anticipated Barriers for therapy: Previous non-compliance with HEP, multiple lumbar surgeries, CHF, osteopenia     Pt's spiritual, cultural and educational needs considered and pt agreeable to plan of care and goals as stated below:       GOALS: Pt is in agreement with the following goals.     Short term goals:  6 weeks or 10 visits   1.  Pt will demonstrate increased lumbar ROM by at least 6 degrees from the initial ROM value with improvements noted in functional ROM and ability to perform ADLs. Partially Met.   2.  Pt will demonstrate increased maximum isometric torque value by 10% when compared to the initial value resulting in improved ability to perform bending, lifting, and carrying activities safely, confidently. MET 9/18/2019   3.  Patient report a reduction in worst pain score by 1-2 points for improved tolerance for prolonged sitting needing for driving. Partially Met  4.  Pt able to perform HEP correctly with minimal cueing or supervision from therapist to encourage independent management of symptoms. Partially Met           Long term goals: 13 weeks or 20 visits   1. Pt  will demonstrate increased lumbar ROM by at least 12 degrees from initial ROM value, resulting in improved ability to perform functional fwd bending while standing and sitting. Not Met  2. Pt will demonstrate increased maximum isometric torque value by 25% when compared to the initial value resulting in improved ability to perform bending, lifting, and carrying activities safely, confidently. MET 10/24/2019  3. Pt to demonstrate ability to independently control and reduce their pain through posture positioning and mechanical movements throughout a typical day. MET 10/24/2019  4.  Pt will demonstrate reduced pain and improved functional outcomes as reported on the FOTO by reaching a score of CJ = at least 20% but < 40% impaired, limited or restricted or less in order to demonstrate subjective improvement in pt's condition.   MET 10/24/2019  5. Pt will demonstrate independence with the HEP at discharge MET 10/24/2019  6.   Pt will demonstrate appropriate squat technique for improved tolerance of lifting and carrying functional activity needed for household tasks. MET 10/24/2019  7. Pt will express ability to mow lawn without significantly increased pain MET 10/24/2019      Plan   Continue with established Plan of Care towards established PT goals.

## 2019-11-05 ENCOUNTER — OFFICE VISIT (OUTPATIENT)
Dept: OTOLARYNGOLOGY | Facility: CLINIC | Age: 59
End: 2019-11-05
Payer: MEDICARE

## 2019-11-05 VITALS
HEIGHT: 71 IN | WEIGHT: 264 LBS | SYSTOLIC BLOOD PRESSURE: 111 MMHG | DIASTOLIC BLOOD PRESSURE: 80 MMHG | BODY MASS INDEX: 36.96 KG/M2 | HEART RATE: 81 BPM

## 2019-11-05 DIAGNOSIS — J30.89 NON-SEASONAL ALLERGIC RHINITIS, UNSPECIFIED TRIGGER: ICD-10-CM

## 2019-11-05 DIAGNOSIS — J34.2 NASAL SEPTAL DEVIATION: ICD-10-CM

## 2019-11-05 DIAGNOSIS — R51.9 NONINTRACTABLE EPISODIC HEADACHE, UNSPECIFIED HEADACHE TYPE: ICD-10-CM

## 2019-11-05 DIAGNOSIS — G47.33 OSA (OBSTRUCTIVE SLEEP APNEA): ICD-10-CM

## 2019-11-05 DIAGNOSIS — J01.01 ACUTE RECURRENT MAXILLARY SINUSITIS: Primary | ICD-10-CM

## 2019-11-05 DIAGNOSIS — J32.0 CHRONIC MAXILLARY SINUSITIS: ICD-10-CM

## 2019-11-05 PROCEDURE — 3074F SYST BP LT 130 MM HG: CPT | Mod: CPTII,S$GLB,, | Performed by: SPECIALIST

## 2019-11-05 PROCEDURE — 3079F DIAST BP 80-89 MM HG: CPT | Mod: CPTII,S$GLB,, | Performed by: SPECIALIST

## 2019-11-05 PROCEDURE — 99213 OFFICE O/P EST LOW 20 MIN: CPT | Mod: S$GLB,,, | Performed by: SPECIALIST

## 2019-11-05 PROCEDURE — 3008F PR BODY MASS INDEX (BMI) DOCUMENTED: ICD-10-PCS | Mod: CPTII,S$GLB,, | Performed by: SPECIALIST

## 2019-11-05 PROCEDURE — 99213 PR OFFICE/OUTPT VISIT, EST, LEVL III, 20-29 MIN: ICD-10-PCS | Mod: S$GLB,,, | Performed by: SPECIALIST

## 2019-11-05 PROCEDURE — 3074F PR MOST RECENT SYSTOLIC BLOOD PRESSURE < 130 MM HG: ICD-10-PCS | Mod: CPTII,S$GLB,, | Performed by: SPECIALIST

## 2019-11-05 PROCEDURE — 3079F PR MOST RECENT DIASTOLIC BLOOD PRESSURE 80-89 MM HG: ICD-10-PCS | Mod: CPTII,S$GLB,, | Performed by: SPECIALIST

## 2019-11-05 PROCEDURE — 3008F BODY MASS INDEX DOCD: CPT | Mod: CPTII,S$GLB,, | Performed by: SPECIALIST

## 2019-11-05 NOTE — PROGRESS NOTES
Subjective:       Patient ID: Bri Santiago is a 59 y.o. male.    Chief Complaint: Sinus Problem and Post-op Evaluation    The patient is now three in 1/2 weeks postop.  He is not having any drainage from his nose.  He has facial pain and headaches have resolved.  Nasal secretions are clear.  He is using Flonase, Astelin, Singulair and ipratropium bromide routinely.  He is tolerating his CPAP for his sleep apnea.    Review of Systems   Constitutional: Positive for fatigue. Negative for activity change, appetite change, chills, fever and unexpected weight change.   HENT: Positive for congestion, postnasal drip and rhinorrhea. Negative for ear discharge, ear pain, facial swelling, hearing loss, mouth sores, sinus pressure, sinus pain, sneezing, sore throat, tinnitus, trouble swallowing and voice change.    Eyes: Positive for discharge, redness and itching. Negative for photophobia, pain and visual disturbance.   Respiratory: Negative for apnea, cough, choking, shortness of breath and wheezing.    Cardiovascular: Negative for chest pain and palpitations.   Gastrointestinal: Negative for abdominal distention, abdominal pain, nausea and vomiting.   Musculoskeletal: Positive for back pain, neck pain and neck stiffness. Negative for arthralgias and myalgias.   Skin: Negative.  Negative for color change, pallor and rash.   Allergic/Immunologic: Positive for environmental allergies. Negative for food allergies and immunocompromised state.   Neurological: Positive for headaches. Negative for dizziness, facial asymmetry, speech difficulty, weakness, light-headedness and numbness.   Hematological: Negative for adenopathy. Does not bruise/bleed easily.   Psychiatric/Behavioral: Negative for agitation, confusion, decreased concentration and sleep disturbance.       Objective:      Physical Exam   Constitutional: He is oriented to person, place, and time. He appears well-developed and well-nourished. He is cooperative.   HENT:    Head: Normocephalic.   Right Ear: External ear and ear canal normal. Tympanic membrane is retracted.   Left Ear: External ear and ear canal normal. Tympanic membrane is retracted.   Nose: Mucosal edema (cyanotic, boggy inferior turbinates bilaterally), rhinorrhea (clear mucus bilaterally) and septal deviation (To the right) present.   Mouth/Throat: Uvula is midline, oropharynx is clear and moist and mucous membranes are normal. No oral lesions.   Oral cavity-Mitchell class 3, severe hyperactive gag response prohibits direct evaluation of oropharynx    Eyes: Pupils are equal, round, and reactive to light. EOM and lids are normal. Right eye exhibits no discharge and no exudate. Left eye exhibits no discharge and no exudate. Right conjunctiva is injected. Left conjunctiva is injected.   Neck: Trachea normal and normal range of motion. No muscular tenderness present. No tracheal deviation present. No thyroid mass and no thyromegaly present.   Cardiovascular: Normal rate, regular rhythm, normal heart sounds and normal pulses.   Pulmonary/Chest: Effort normal and breath sounds normal. No stridor. He has no decreased breath sounds. He has no wheezes. He has no rhonchi. He has no rales.   Abdominal: Soft. Bowel sounds are normal. There is no tenderness.   Musculoskeletal: Normal range of motion.   Lymphadenopathy:        Head (right side): No submental, no submandibular, no preauricular, no posterior auricular and no occipital adenopathy present.        Head (left side): No submental, no submandibular, no preauricular, no posterior auricular and no occipital adenopathy present.     He has no cervical adenopathy.   Neurological: He is alert and oriented to person, place, and time. He has normal strength. No cranial nerve deficit or sensory deficit. Gait normal.   Skin: Skin is warm and dry. No petechiae and no rash noted. No cyanosis. Nails show no clubbing.   Psychiatric: He has a normal mood and affect. His speech is  normal and behavior is normal. Judgment and thought content normal. Cognition and memory are normal.       Assessment:       1. Acute recurrent maxillary sinusitis    2. Non-seasonal allergic rhinitis, unspecified trigger    3. ABDON (obstructive sleep apnea)    4. Nasal septal deviation    5. Chronic maxillary sinusitis    6. Nonintractable episodic headache, unspecified headache type        Plan:       I will have the patient continue with his allergy meds.  He can stop the ipratropium bromide spray if he is not having rhinorrhea or postnasal drip.  I will recheck him in 3 months, or sooner on an as-needed basis.

## 2019-11-12 LAB
FUNGUS SPEC CULT: NORMAL
FUNGUS SPEC CULT: NORMAL

## 2019-12-02 DIAGNOSIS — N40.1 BENIGN LOCALIZED PROSTATIC HYPERPLASIA WITH LOWER URINARY TRACT SYMPTOMS (LUTS): ICD-10-CM

## 2019-12-02 DIAGNOSIS — K21.9 GASTROESOPHAGEAL REFLUX DISEASE WITHOUT ESOPHAGITIS: Primary | ICD-10-CM

## 2019-12-02 RX ORDER — DOXAZOSIN 1 MG/1
TABLET ORAL
Qty: 90 TABLET | Refills: 3 | Status: SHIPPED | OUTPATIENT
Start: 2019-12-02 | End: 2020-12-02

## 2019-12-02 RX ORDER — ESOMEPRAZOLE MAGNESIUM 40 MG/1
40 CAPSULE, DELAYED RELEASE ORAL
Qty: 90 CAPSULE | Refills: 3 | Status: SHIPPED | OUTPATIENT
Start: 2019-12-02 | End: 2020-09-09 | Stop reason: SDUPTHER

## 2019-12-02 NOTE — TELEPHONE ENCOUNTER
----- Message from Francesco Power, Patient Care Assistant sent at 12/2/2019  2:54 PM CST -----  Contact: PURNIMA IYER [[id number]]  Can the clinic reply in [facility]: No    Please refill the medication(s) listed below. The patient can be reached at this phone number once it is called into the pharmacy. [phone]    Medication #1 Rx blood pressure medication patient didn't know name    Medication #2    Preferred Pharmacy:   [id number] - [facility] [address]

## 2019-12-02 NOTE — TELEPHONE ENCOUNTER
LOV 9/4/2019 (with Dr. Joaquin).  Scheduled f/u appt on 1/9/2020 with Dr. Galeas. Pended only 90 days of meds as patient was informed he would need a f/u appt to get more refills.

## 2019-12-05 ENCOUNTER — OFFICE VISIT (OUTPATIENT)
Dept: INTERNAL MEDICINE | Facility: CLINIC | Age: 59
End: 2019-12-05
Payer: MEDICARE

## 2019-12-05 ENCOUNTER — TELEPHONE (OUTPATIENT)
Dept: INTERNAL MEDICINE | Facility: CLINIC | Age: 59
End: 2019-12-05

## 2019-12-05 VITALS
RESPIRATION RATE: 12 BRPM | HEART RATE: 92 BPM | BODY MASS INDEX: 36.82 KG/M2 | DIASTOLIC BLOOD PRESSURE: 89 MMHG | OXYGEN SATURATION: 96 % | SYSTOLIC BLOOD PRESSURE: 134 MMHG | TEMPERATURE: 99 F | HEIGHT: 71 IN

## 2019-12-05 DIAGNOSIS — J11.1 INFLUENZA: Primary | ICD-10-CM

## 2019-12-05 DIAGNOSIS — R68.89 FLU-LIKE SYMPTOMS: Primary | ICD-10-CM

## 2019-12-05 PROCEDURE — 3079F DIAST BP 80-89 MM HG: CPT | Mod: CPTII,S$GLB,, | Performed by: INTERNAL MEDICINE

## 2019-12-05 PROCEDURE — 99213 OFFICE O/P EST LOW 20 MIN: CPT | Mod: S$GLB,,, | Performed by: INTERNAL MEDICINE

## 2019-12-05 PROCEDURE — 99999 PR PBB SHADOW E&M-EST. PATIENT-LVL III: ICD-10-PCS | Mod: PBBFAC,,, | Performed by: INTERNAL MEDICINE

## 2019-12-05 PROCEDURE — 99213 PR OFFICE/OUTPT VISIT, EST, LEVL III, 20-29 MIN: ICD-10-PCS | Mod: S$GLB,,, | Performed by: INTERNAL MEDICINE

## 2019-12-05 PROCEDURE — 3075F SYST BP GE 130 - 139MM HG: CPT | Mod: CPTII,S$GLB,, | Performed by: INTERNAL MEDICINE

## 2019-12-05 PROCEDURE — 99999 PR PBB SHADOW E&M-EST. PATIENT-LVL III: CPT | Mod: PBBFAC,,, | Performed by: INTERNAL MEDICINE

## 2019-12-05 PROCEDURE — 3008F BODY MASS INDEX DOCD: CPT | Mod: CPTII,S$GLB,, | Performed by: INTERNAL MEDICINE

## 2019-12-05 PROCEDURE — 3008F PR BODY MASS INDEX (BMI) DOCUMENTED: ICD-10-PCS | Mod: CPTII,S$GLB,, | Performed by: INTERNAL MEDICINE

## 2019-12-05 PROCEDURE — 3075F PR MOST RECENT SYSTOLIC BLOOD PRESS GE 130-139MM HG: ICD-10-PCS | Mod: CPTII,S$GLB,, | Performed by: INTERNAL MEDICINE

## 2019-12-05 PROCEDURE — 3079F PR MOST RECENT DIASTOLIC BLOOD PRESSURE 80-89 MM HG: ICD-10-PCS | Mod: CPTII,S$GLB,, | Performed by: INTERNAL MEDICINE

## 2019-12-05 RX ORDER — OSELTAMIVIR PHOSPHATE 75 MG/1
75 CAPSULE ORAL 2 TIMES DAILY
Qty: 10 CAPSULE | Refills: 0 | Status: CANCELLED | OUTPATIENT
Start: 2019-12-05 | End: 2019-12-10

## 2019-12-05 RX ORDER — OSELTAMIVIR PHOSPHATE 75 MG/1
75 CAPSULE ORAL 2 TIMES DAILY
Qty: 10 CAPSULE | Refills: 0 | Status: SHIPPED | OUTPATIENT
Start: 2019-12-05 | End: 2019-12-10

## 2019-12-05 NOTE — PROGRESS NOTES
Subjective:       Patient ID: Bri Santiago is a 59 y.o. male.    Chief Complaint: Influenza    Pt c/o cough that is productive of occasional green sputum for 2 days. He has nasal congestion but no rhinorrhea. He says girlfriend took temperature last night and he had fever. He took theraflu today which only gave limited benefit. He feels general myalgias. He did get flu shot. He does not feel asthma is acting up and denies sob/wheezing.     Review of Systems   Constitutional: Negative for fever.   HENT: Positive for congestion. Negative for rhinorrhea and sore throat.    Respiratory: Positive for cough. Negative for shortness of breath and wheezing.    Cardiovascular: Negative for chest pain and palpitations.       Objective:      Physical Exam   Constitutional: He is oriented to person, place, and time. He appears well-developed and well-nourished.   HENT:   Right Ear: Tympanic membrane, external ear and ear canal normal.   Left Ear: Tympanic membrane, external ear and ear canal normal.   Nose: No mucosal edema or rhinorrhea.   Mouth/Throat: No oropharyngeal exudate or posterior oropharyngeal erythema.   Neck: Neck supple. No thyromegaly present.   Cardiovascular: Normal rate, regular rhythm and normal heart sounds.   Pulmonary/Chest: Effort normal and breath sounds normal. No respiratory distress. He has no wheezes. He has no rales.   Lymphadenopathy:     He has no cervical adenopathy.   Neurological: He is alert and oriented to person, place, and time.   Psychiatric: He has a normal mood and affect.       Assessment:       1. Influenza        Plan:       1. Symptoms seem c/w flu although afebrile today, he took theraflu which contains apap and may have suppressed fever; b/c of comorbidities I have suggested tamiflu which he will take  2. ED and RTC prompts d/w pt and he understood  3. Ok to use proper otc meds prn--proper use d/w pt

## 2019-12-05 NOTE — TELEPHONE ENCOUNTER
Spoke with patient. He reports the following symptoms occurring since Tuesday mid-day:    - chest congestion  - sinus congestion  - body aches  - fever  - cough (productive - green mucus)  - headache    Denies: nausea/vomiting    He has been taking Theraflu, Vicks, and Vitamin C with no help. Sent message to Dr. Galeas

## 2019-12-13 DIAGNOSIS — J30.89 NON-SEASONAL ALLERGIC RHINITIS, UNSPECIFIED TRIGGER: ICD-10-CM

## 2019-12-13 RX ORDER — FLUTICASONE PROPIONATE 50 UG/1
SPRAY, METERED NASAL
Qty: 9.9 ML | Refills: 11 | Status: SHIPPED | OUTPATIENT
Start: 2019-12-13 | End: 2020-06-11

## 2019-12-17 RX ORDER — MONTELUKAST SODIUM 10 MG/1
TABLET ORAL
Qty: 30 TABLET | Refills: 0 | Status: SHIPPED | OUTPATIENT
Start: 2019-12-17 | End: 2019-12-27

## 2019-12-17 RX ORDER — MONTELUKAST SODIUM 10 MG/1
TABLET ORAL
Qty: 30 TABLET | Refills: 11 | Status: SHIPPED | OUTPATIENT
Start: 2019-12-17 | End: 2020-06-11 | Stop reason: SDUPTHER

## 2019-12-26 ENCOUNTER — TELEPHONE (OUTPATIENT)
Dept: GASTROENTEROLOGY | Facility: CLINIC | Age: 59
End: 2019-12-26

## 2019-12-26 NOTE — TELEPHONE ENCOUNTER
----- Message from Mona Jacob sent at 12/26/2019  4:04 PM CST -----  Contact: PT  PT is requesting a annual with Merlin so pt can get his colonoscopy ordered    Callback: [phone]

## 2019-12-27 ENCOUNTER — PATIENT OUTREACH (OUTPATIENT)
Dept: ADMINISTRATIVE | Facility: OTHER | Age: 59
End: 2019-12-27

## 2019-12-27 ENCOUNTER — OFFICE VISIT (OUTPATIENT)
Dept: PAIN MEDICINE | Facility: CLINIC | Age: 59
End: 2019-12-27
Payer: MEDICARE

## 2019-12-27 VITALS
OXYGEN SATURATION: 100 % | BODY MASS INDEX: 37.75 KG/M2 | DIASTOLIC BLOOD PRESSURE: 82 MMHG | HEIGHT: 71 IN | HEART RATE: 75 BPM | WEIGHT: 269.63 LBS | RESPIRATION RATE: 18 BRPM | SYSTOLIC BLOOD PRESSURE: 141 MMHG | TEMPERATURE: 98 F

## 2019-12-27 DIAGNOSIS — M47.899 FACET SYNDROME: ICD-10-CM

## 2019-12-27 DIAGNOSIS — M96.1 POSTLAMINECTOMY SYNDROME OF LUMBAR REGION: ICD-10-CM

## 2019-12-27 DIAGNOSIS — M48.061 SPINAL STENOSIS, LUMBAR REGION, WITHOUT NEUROGENIC CLAUDICATION: ICD-10-CM

## 2019-12-27 DIAGNOSIS — M47.816 OSTEOARTHRITIS OF LUMBAR SPINE, UNSPECIFIED SPINAL OSTEOARTHRITIS COMPLICATION STATUS: ICD-10-CM

## 2019-12-27 DIAGNOSIS — M47.816 LUMBAR SPONDYLOSIS: Primary | ICD-10-CM

## 2019-12-27 PROCEDURE — 99214 PR OFFICE/OUTPT VISIT, EST, LEVL IV, 30-39 MIN: ICD-10-PCS | Mod: S$GLB,,, | Performed by: NURSE PRACTITIONER

## 2019-12-27 PROCEDURE — 3077F PR MOST RECENT SYSTOLIC BLOOD PRESSURE >= 140 MM HG: ICD-10-PCS | Mod: CPTII,S$GLB,, | Performed by: NURSE PRACTITIONER

## 2019-12-27 PROCEDURE — 99214 OFFICE O/P EST MOD 30 MIN: CPT | Mod: S$GLB,,, | Performed by: NURSE PRACTITIONER

## 2019-12-27 PROCEDURE — 99999 PR PBB SHADOW E&M-EST. PATIENT-LVL III: CPT | Mod: PBBFAC,,, | Performed by: NURSE PRACTITIONER

## 2019-12-27 PROCEDURE — 3079F PR MOST RECENT DIASTOLIC BLOOD PRESSURE 80-89 MM HG: ICD-10-PCS | Mod: CPTII,S$GLB,, | Performed by: NURSE PRACTITIONER

## 2019-12-27 PROCEDURE — 99999 PR PBB SHADOW E&M-EST. PATIENT-LVL III: ICD-10-PCS | Mod: PBBFAC,,, | Performed by: NURSE PRACTITIONER

## 2019-12-27 PROCEDURE — 3008F PR BODY MASS INDEX (BMI) DOCUMENTED: ICD-10-PCS | Mod: CPTII,S$GLB,, | Performed by: NURSE PRACTITIONER

## 2019-12-27 PROCEDURE — 3077F SYST BP >= 140 MM HG: CPT | Mod: CPTII,S$GLB,, | Performed by: NURSE PRACTITIONER

## 2019-12-27 PROCEDURE — 3008F BODY MASS INDEX DOCD: CPT | Mod: CPTII,S$GLB,, | Performed by: NURSE PRACTITIONER

## 2019-12-27 PROCEDURE — 3079F DIAST BP 80-89 MM HG: CPT | Mod: CPTII,S$GLB,, | Performed by: NURSE PRACTITIONER

## 2019-12-27 RX ORDER — TRAMADOL HYDROCHLORIDE 50 MG/1
50 TABLET ORAL EVERY 8 HOURS PRN
Qty: 90 TABLET | Refills: 2 | Status: SHIPPED | OUTPATIENT
Start: 2019-12-27 | End: 2020-07-30 | Stop reason: SDUPTHER

## 2019-12-27 RX ORDER — DICLOFENAC SODIUM 10 MG/G
2 GEL TOPICAL 3 TIMES DAILY
Qty: 1 TUBE | Refills: 0 | Status: SHIPPED | OUTPATIENT
Start: 2019-12-27 | End: 2021-11-26

## 2019-12-27 RX ORDER — BUDESONIDE AND FORMOTEROL FUMARATE DIHYDRATE 160; 4.5 UG/1; UG/1
AEROSOL RESPIRATORY (INHALATION)
COMMUNITY
Start: 2019-12-13 | End: 2020-03-26

## 2019-12-27 RX ORDER — SPIRONOLACTONE 25 MG/1
TABLET ORAL
COMMUNITY
Start: 2019-12-15 | End: 2020-01-22
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
## 2019-12-31 DIAGNOSIS — D64.9 LOW HEMOGLOBIN: ICD-10-CM

## 2019-12-31 DIAGNOSIS — Z83.719 FAMILY HISTORY OF COLONIC POLYPS: Primary | ICD-10-CM

## 2019-12-31 DIAGNOSIS — K31.7 GASTRIC POLYPS: ICD-10-CM

## 2019-12-31 DIAGNOSIS — Z86.010 HISTORY OF COLON POLYPS: ICD-10-CM

## 2019-12-31 DIAGNOSIS — K21.9 GASTROESOPHAGEAL REFLUX DISEASE, ESOPHAGITIS PRESENCE NOT SPECIFIED: Primary | ICD-10-CM

## 2019-12-31 DIAGNOSIS — Z80.0 FAMILY HISTORY OF COLON CANCER: ICD-10-CM

## 2019-12-31 RX ORDER — CLOPIDOGREL BISULFATE 75 MG/1
75 TABLET ORAL DAILY
Qty: 30 TABLET | Refills: 11 | Status: SHIPPED | OUTPATIENT
Start: 2019-12-31 | End: 2021-01-04 | Stop reason: SDUPTHER

## 2019-12-31 NOTE — TELEPHONE ENCOUNTER
Spoke with patient. Informed patient that Dr Boyer placed order for colonoscopy and endo schedulers will contact him. Patient states that MD usually does EGD at the same time and wants to know if he placed order for that also. Informed patient only have orders for colonoscopy but will message Dr Boyer to determine if EGD needed also.

## 2019-12-31 NOTE — TELEPHONE ENCOUNTER
----- Message from Errol Berry sent at 12/31/2019  8:57 AM CST -----  Contact: Pt   Can the clinic reply in [facility]: No     Please refill the medication(s) listed below. The patient can be reached at this phone number once it is called into the pharmacy.    Medication #1Clotidogrel 75mg     Medication #2    Preferred Pharmacy:[id number] - [address]

## 2020-01-06 PROBLEM — J01.91 ACUTE RECURRENT SINUSITIS: Status: RESOLVED | Noted: 2019-10-07 | Resolved: 2020-01-06

## 2020-01-07 ENCOUNTER — TELEPHONE (OUTPATIENT)
Dept: ENDOSCOPY | Facility: HOSPITAL | Age: 60
End: 2020-01-07

## 2020-01-07 NOTE — TELEPHONE ENCOUNTER
Attempted to contact patient to schedule procedures.  No answer and Endoscopy scheduling department number provided for him to call back to.

## 2020-01-07 NOTE — TELEPHONE ENCOUNTER
Patient needs to be scheduled for EGD and Colonoscopy. He currently is on Plavix. Per our Endoscopy protocol he will need to hold 5 days prior. Can he hold his Plavix 5 days prior to procedures? Please advise.    Thanks,  MARCELO Dunn

## 2020-01-09 ENCOUNTER — TELEPHONE (OUTPATIENT)
Dept: ENDOSCOPY | Facility: HOSPITAL | Age: 60
End: 2020-01-09

## 2020-01-09 NOTE — TELEPHONE ENCOUNTER
Please see message below with approval to hold Plavix 5 days prior from Dr. Stefano Tyler.      Stefano yTler MD to Makayla Patterson RN    5:55 PM   I have no objection to that           4:36 PM   Makayla Patterson RN routed this conversation to Stefano Tyler MD         4:32 PM - Rehana Lopez MD routed this conversation to Jayson Lopez MD 4:32 PM   Note      Defer decision to pt's hematologist           Boris Kaba LPN   to Cate Galeas MD  2:39 PM   Okay for patient to hold plavix five days prior to EGD and colonoscopy?            Me 2:35 PM - You routed this conversation to MD Hector Santiago Staff     2:35 PM   Note      Patient needs to be scheduled for EGD and Colonoscopy. He currently is on Plavix. Per our Endoscopy protocol he will need to hold 5 days prior. Can he hold his Plavix 5 days prior to procedures? Please advise.     Thanks,  MARCELO Dunn

## 2020-01-13 ENCOUNTER — PATIENT OUTREACH (OUTPATIENT)
Dept: ADMINISTRATIVE | Facility: OTHER | Age: 60
End: 2020-01-13

## 2020-01-14 ENCOUNTER — OFFICE VISIT (OUTPATIENT)
Dept: UROLOGY | Facility: CLINIC | Age: 60
End: 2020-01-14
Payer: MEDICARE

## 2020-01-14 VITALS
DIASTOLIC BLOOD PRESSURE: 83 MMHG | HEART RATE: 84 BPM | WEIGHT: 270.31 LBS | HEIGHT: 71 IN | BODY MASS INDEX: 37.84 KG/M2 | SYSTOLIC BLOOD PRESSURE: 126 MMHG

## 2020-01-14 DIAGNOSIS — M47.816 LUMBAR SPONDYLOSIS: Primary | ICD-10-CM

## 2020-01-14 DIAGNOSIS — N13.8 BPH WITH URINARY OBSTRUCTION: Primary | ICD-10-CM

## 2020-01-14 DIAGNOSIS — N40.1 BPH WITH URINARY OBSTRUCTION: Primary | ICD-10-CM

## 2020-01-14 PROCEDURE — 3008F BODY MASS INDEX DOCD: CPT | Mod: CPTII,S$GLB,, | Performed by: UROLOGY

## 2020-01-14 PROCEDURE — 99213 OFFICE O/P EST LOW 20 MIN: CPT | Mod: S$GLB,,, | Performed by: UROLOGY

## 2020-01-14 PROCEDURE — 3079F PR MOST RECENT DIASTOLIC BLOOD PRESSURE 80-89 MM HG: ICD-10-PCS | Mod: CPTII,S$GLB,, | Performed by: UROLOGY

## 2020-01-14 PROCEDURE — 99999 PR PBB SHADOW E&M-EST. PATIENT-LVL III: ICD-10-PCS | Mod: PBBFAC,,, | Performed by: UROLOGY

## 2020-01-14 PROCEDURE — 3074F SYST BP LT 130 MM HG: CPT | Mod: CPTII,S$GLB,, | Performed by: UROLOGY

## 2020-01-14 PROCEDURE — 3079F DIAST BP 80-89 MM HG: CPT | Mod: CPTII,S$GLB,, | Performed by: UROLOGY

## 2020-01-14 PROCEDURE — 99213 PR OFFICE/OUTPT VISIT, EST, LEVL III, 20-29 MIN: ICD-10-PCS | Mod: S$GLB,,, | Performed by: UROLOGY

## 2020-01-14 PROCEDURE — 3074F PR MOST RECENT SYSTOLIC BLOOD PRESSURE < 130 MM HG: ICD-10-PCS | Mod: CPTII,S$GLB,, | Performed by: UROLOGY

## 2020-01-14 PROCEDURE — 3008F PR BODY MASS INDEX (BMI) DOCUMENTED: ICD-10-PCS | Mod: CPTII,S$GLB,, | Performed by: UROLOGY

## 2020-01-14 PROCEDURE — 99999 PR PBB SHADOW E&M-EST. PATIENT-LVL III: CPT | Mod: PBBFAC,,, | Performed by: UROLOGY

## 2020-01-14 NOTE — LETTER
January 14, 2020      Cate Galeas MD  [address]  [address]  [facility] [address]           [facility] - Urology 4th Floor  [address]  [place] [address]  Phone: [phone]          Patient: Bri Santiago   MR Number: [id number]   YOB: 1960   Date of Visit: 1/14/2020       Dear Dr. Cate Galeas:    Thank you for referring Bri Santiago to me for evaluation. Attached you will find relevant portions of my assessment and plan of care.    If you have questions, please do not hesitate to call me. I look forward to following Bri Santiago along with you.    Sincerely,    Tyler Reid Jr., MD    Enclosure  CC:  No Recipients    If you would like to receive this communication electronically, please contact [email or web address] or [phone] to request more information on [facility] access.    For providers and/or their staff who would like to refer a patient to [facility], please contact us through our one-stop-shop provider referral line, [facility], at [phone].    If you feel you have received this communication in error or would no longer like to receive these types of communications, please e-mail [email or web address]

## 2020-01-14 NOTE — PROGRESS NOTES
Subjective:       Patient ID: Bri Santiago is a 59 y.o. male.    Chief Complaint: Benign Prostatic Hypertrophy (annual visit)    HPI   Bri Santiago is a 59 y.o. male with a PMHx of BPH, Anemia, Arthritis, and HTN who presents to the clinic for BPH management.  He reports a strong stream and mild frequency issue. His PSA 8 months ago 0.98. Has fam hist pca      Past Medical History:   Diagnosis Date    Acute pancreatitis     Anal fissure     Anemia     Anticoagulant long-term use     Arthritis     Asthma in remission     Back pain     BPH (benign prostatic hypertrophy)     Cancer 2000    prostate- treated at [facility] with chemo- in remission since 2000    Chronic maxillary sinusitis     Clotting disorder     Diastolic dysfunction with chronic heart failure 12/3/2018    Dysphagia 10/7/2014    Family history of colon cancer     Family history of early CAD     GERD (gastroesophageal reflux disease)     Helicobacter pylori (H. pylori) infection     Chronic    History of chronic pancreatitis     HTN (hypertension)     Lumbago 11/12/2012    Obesity     ABDON (obstructive sleep apnea)     Pneumonia     during childhood     Prostate cancer 2000    dx and treated at [facility], had chemotherapy, in remission [id number]    Sacroiliac joint pain 2/10/2015    Spinal stenosis of lumbar region     Trouble in sleeping     Vitamin D deficiency disease     Von Willebrand disease     VWD (acquired von Willebrand's disease)        Past Surgical History:   Procedure Laterality Date    anal fissure repair      x2    BACK SURGERY  2012    BALLOON SINUPLASTY OF PARANASAL SINUS Bilateral 10/7/2019    Procedure: SINUPLASTY, USING BALLOON;  Surgeon: LAURENT Swanson MD;  Location: [facility];  Service: ENT;  Laterality: Bilateral;    CARPAL TUNNEL RELEASE  2003    left hand    CATHETERIZATION OF BOTH LEFT AND RIGHT HEART N/A 2/14/2019    Procedure: CATHETERIZATION, HEART, BOTH LEFT AND RIGHT;  Surgeon:  Kyle Magana MD;  Location: [facility] CATH LAB;  Service: Cardiology;  Laterality: N/A;    CERVICAL DISCECTOMY  2003    COLONOSCOPY N/A 10/7/2015    Procedure: COLONOSCOPY;  Surgeon: Rosendo Boyer MD;  [facility]  Service: Endoscopy;  Laterality: N/A;  PM Prep    COLONOSCOPY N/A 6/19/2017    Procedure: COLONOSCOPY;  Surgeon: Rosendo Boyer MD;  Location: [facility] ENDO ([facility]);  Service: Endoscopy;  Laterality: N/A;  constipation prep (no DM no CHF)       hx of vonWillebrand's disease-will need infusion prior    FUNCTIONAL ENDOSCOPIC SINUS SURGERY (FESS) USING COMPUTER-ASSISTED NAVIGATION Bilateral 10/7/2019    Procedure: FESS, USING COMPUTER-ASSISTED NAVIGATION;  Surgeon: LAURENT Swanson MD;  Location: [facility] OR;  Service: ENT;  Laterality: Bilateral;    LEFT HEART CATHETERIZATION Left 2/14/2019    Procedure: Left heart cath;  Surgeon: Kyle Magana MD;  Location: [facility] CATH LAB;  Service: Cardiology;  Laterality: Left;    LUMBAR FUSION  2012    RADIOFREQUENCY ABLATION Right 5/9/2019    Procedure: RADIOFREQUENCY ABLATION, RIGHT L3,L4,L5;  Surgeon: Fredy Magana MD;  Location: [facility] PAIN MGT;  Service: Pain Management;  Laterality: Right;  1 of 2  RT RFA L3,4,5  PLAVIX clearance received, pt needs DDAVP    RADIOFREQUENCY ABLATION Left 5/23/2019    Procedure: RADIOFREQUENCY ABLATION, LEFT L3,L4,L5;  Surgeon: Fredy Magana MD;  Location: [facility] PAIN MGT;  Service: Pain Management;  Laterality: Left;  2 of 2  LT RFA L3,4,5  PLAVIX clearance received, pt needs DDAVP    RADIOFREQUENCY ABLATION OF LUMBAR MEDIAL BRANCH NERVE AT SINGLE LEVEL Left 5/24/2018    Procedure: RADIOFREQUENCY THERMOCOAGULATION (RFTC)-NERVE-MEDIAN BRANCH-LUMBAR;  Surgeon: Fredy Magana MD;  Location: [facility] PAIN MGT;  Service: Pain Management;  Laterality: Left;  Left RFA @ L3,4,5  [id number]  with IV Sedation    2 of 2    SPINE SURGERY      TONSILLECTOMY      at age 24    VASECTOMY  1996        Family History   Problem Relation Age of Onset    Colon cancer Father 67        colon cancer    Hypertension Father     Glaucoma Father     Cancer Father     Colon cancer Paternal Grandfather 65             Coronary artery disease Mother 45    Hypertension Mother     Heart disease Mother     Colon cancer Mother     No Known Problems Brother     No Known Problems Sister     No Known Problems Daughter     No Known Problems Son     Coronary artery disease Brother 51    No Known Problems Daughter     No Known Problems Daughter     No Known Problems Son     No Known Problems Son     Colon cancer Paternal Uncle 65    Diabetes Mellitus Paternal Grandmother        Social History     Socioeconomic History    Marital status:      Spouse name: Not on file    Number of children: Not on file    Years of education: Not on file    Highest education level: Not on file   Occupational History    Occupation: disabled due to back injury   Social Needs    Financial resource strain: Not on file    Food insecurity:     Worry: Not on file     Inability: Not on file    Transportation needs:     Medical: Not on file     Non-medical: Not on file   Tobacco Use    Smoking status: Never Smoker    Smokeless tobacco: Never Used   Substance and Sexual Activity    Alcohol use: Yes     Alcohol/week: 1.0 standard drinks     Types: 1 Glasses of wine per week     Comment: social    Drug use: No    Sexual activity: Not Currently     Partners: Female   Lifestyle    Physical activity:     Days per week: Not on file     Minutes per session: Not on file    Stress: Not on file   Relationships    Social connections:     Talks on phone: Not on file     Gets together: Not on file     Attends [employer] service: Not on file     Active member of club or organization: Not on file     Attends meetings of clubs or organizations: Not on file     Relationship status: Not on file   Other Topics Concern    Not on file    Social History Narrative    wlking for exercised       Allergies:  Ace inhibitors; Aspirin; Codeine; Dilaudid  [hydromorphone]; and Penicillins    Medications:    Current Outpatient Medications:     acetaminophen (TYLENOL) 500 MG tablet, Take 2 tablets (1,000 mg total) by mouth every 8 (eight) hours as needed., Disp: 90 tablet, Rfl: 0    atorvastatin (LIPITOR) 40 MG tablet, Take 1 tablet (40 mg total) by mouth once daily., Disp: 90 tablet, Rfl: 0    azelastine (ASTELIN) 137 mcg (0.1 %) nasal spray, 1 spray (137 mcg total) by Nasal route 2 (two) times daily., Disp: 30 mL, Rfl: 11    calcium citrate-vitamin D3 315-200 mg (CITRACAL+D) 315-200 mg-unit per tablet, Take 1 tablet by mouth once daily., Disp: , Rfl:     clopidogrel (PLAVIX) 75 mg tablet, Take 1 tablet (75 mg total) by mouth once daily., Disp: 30 tablet, Rfl: 11    cyanocobalamin, vitamin B-12, (VITAMIN B-12) 50 mcg tablet, Take 50 mcg by mouth once daily., Disp: , Rfl:     diclofenac sodium (VOLTAREN) 1 % Gel, Apply 2 g topically 3 (three) times daily., Disp: 1 Tube, Rfl: 0    docusate sodium (COLACE) 100 MG capsule, Take 1 tablet by mouth Twice daily. 1 Capsule Oral Twice a day .  Take with pain medicine, Disp: , Rfl:     doxazosin (CARDURA) 1 MG tablet, TAKE 1 TABLET(1 MG) BY MOUTH EVERY EVENING, Disp: 90 tablet, Rfl: 3    esomeprazole (NEXIUM) 40 MG capsule, Take 1 capsule (40 mg total) by mouth 2 (two) times daily before meals., Disp: 90 capsule, Rfl: 3    FLONASE ALLERGY RELIEF 50 mcg/actuation nasal spray, SHAKE LIQUID AND USE 1 SPRAY(50 MCG) IN EACH NOSTRIL EVERY DAY, Disp: 9.9 mL, Rfl: 11    ipratropium (ATROVENT) 0.03 % nasal spray, 2 sprays by Nasal route 2 (two) times daily. May be use more often if needed, Disp: 30 mL, Rfl: 11    montelukast (SINGULAIR) 10 mg tablet, TAKE 1 TABLET(10 MG) BY MOUTH EVERY EVENING, Disp: 30 tablet, Rfl: 11    spironolactone (ALDACTONE) 25 MG tablet, , Disp: , Rfl:     spironolactone (ALDACTONE) 50 MG  tablet, Take two 25 mg tabs once daily ., Disp: 90 tablet, Rfl: 3    SYMBICORT 160-4.5 mcg/actuation HFAA, , Disp: , Rfl:     traMADol (ULTRAM) 50 mg tablet, Take 1 tablet (50 mg total) by mouth every 8 (eight) hours as needed for Pain., Disp: 90 tablet, Rfl: 2    VITAMIN D2 50,000 unit capsule, TK 1 C PO Q 7 DAYS, Disp: , Rfl: 1    zolpidem (AMBIEN) 10 mg Tab, TAKE 1 TABLET BY MOUTH EVERY DAY AT BEDTIME, Disp: 20 tablet, Rfl: 0    albuterol (VENTOLIN HFA) 90 mcg/actuation inhaler, Inhale 2 puffs into the lungs every 6 (six) hours as needed for Wheezing. Rescue, Disp: 18 g, Rfl: 4    Review of Systems   Constitutional: Negative for activity change, appetite change, chills, diaphoresis, fatigue, fever and unexpected weight change.   HENT: Negative for congestion, dental problem, hearing loss, mouth sores, postnasal drip, rhinorrhea, sinus pressure and trouble swallowing.    Eyes: Negative for pain, discharge and itching.   Respiratory: Negative for apnea, cough, choking, chest tightness, shortness of breath and wheezing.    Cardiovascular: Negative for chest pain, palpitations and leg swelling.   Gastrointestinal: Negative for abdominal distention, abdominal pain, anal bleeding, blood in stool, constipation, diarrhea, nausea, rectal pain and vomiting.   Endocrine: Negative for polydipsia and polyuria.   Genitourinary: Negative for decreased urine volume, difficulty urinating, discharge, dysuria, enuresis, flank pain, frequency, genital sores, hematuria, penile pain, penile swelling, scrotal swelling and urgency.   Musculoskeletal: Negative for arthralgias and back pain.   Skin: Negative for color change, rash and wound.   Neurological: Negative for dizziness, syncope, speech difficulty, light-headedness and headaches.   Hematological: Negative for adenopathy. Does not bruise/bleed easily.   Psychiatric/Behavioral: Negative for behavioral problems and confusion.       Objective:      Physical Exam    Constitutional: He appears well-developed.   HENT:   Head: Normocephalic.   Neck: Neck supple.   Cardiovascular: Normal rate.    Pulmonary/Chest: Effort normal.   Abdominal: Soft.   Genitourinary:   Genitourinary Comments: Prostate was smooth without nodularity. No rectal masses. 30 grams benign. Normal perineum.     Neurological: He is alert.   Skin: Skin is warm.     Psychiatric: He has a normal mood and affect.       Assessment:       1. BPH with urinary obstruction        Plan:       Bri was seen today for benign prostatic hypertrophy.    Diagnoses and all orders for this visit:    BPH with urinary obstruction  -     Prostate Specific Antigen, Diagnostic; Future         RTC 1 year for ASHLEY.      IFlorencia, am acting as a scribe on this patient encounter in the presence and under the supervision of Dr. Reid.    01/14/2020 9:24 AM  I, Dr. Reid, personally performed the services described in this documentation.   All medical record entries made by the scribe were at my direction and in my presence.   I have reviewed the chart and agree that the record is accurate and complete.   Tyler Reid MD.  9:24 AM 01/14/2020

## 2020-01-15 ENCOUNTER — TELEPHONE (OUTPATIENT)
Dept: PAIN MEDICINE | Facility: CLINIC | Age: 60
End: 2020-01-15

## 2020-01-15 NOTE — TELEPHONE ENCOUNTER
----- Message from Stefano Tyler MD sent at 1/14/2020  7:13 PM CST -----  Regarding: RE: Request to hold Plavix  ok  ----- Message -----  From: Sarai Zavaleta LPN  Sent: 1/14/2020   1:31 PM CST  To: Stefano Tyler MD  Subject: Request to hold Plavix                           Good afternoon,  Patient was scheduled to have a Lumbar Radiofrequency Ablation today 1/14. It is required that the Plavix is held 7 days for this procedure. Patient stated he has held it for 5 days. Staff requesting to hold Plavix an additional 2 days to perform procedure on 1/16. Please advise.  Thank you,  Sarai

## 2020-01-16 ENCOUNTER — TELEPHONE (OUTPATIENT)
Dept: ENDOSCOPY | Facility: HOSPITAL | Age: 60
End: 2020-01-16

## 2020-01-16 ENCOUNTER — HOSPITAL ENCOUNTER (OUTPATIENT)
Facility: OTHER | Age: 60
Discharge: HOME | End: 2020-01-16
Attending: ANESTHESIOLOGY | Admitting: ANESTHESIOLOGY
Payer: MEDICARE

## 2020-01-16 ENCOUNTER — TELEPHONE (OUTPATIENT)
Dept: SLEEP MEDICINE | Facility: CLINIC | Age: 60
End: 2020-01-16

## 2020-01-16 VITALS
HEIGHT: 71 IN | DIASTOLIC BLOOD PRESSURE: 79 MMHG | OXYGEN SATURATION: 100 % | RESPIRATION RATE: 18 BRPM | BODY MASS INDEX: 37.1 KG/M2 | SYSTOLIC BLOOD PRESSURE: 128 MMHG | TEMPERATURE: 98 F | HEART RATE: 63 BPM | WEIGHT: 265 LBS

## 2020-01-16 DIAGNOSIS — Z12.11 SCREEN FOR COLON CANCER: ICD-10-CM

## 2020-01-16 DIAGNOSIS — D64.9 LOW HEMOGLOBIN: Primary | ICD-10-CM

## 2020-01-16 DIAGNOSIS — M47.816 OSTEOARTHRITIS OF LUMBAR SPINE, UNSPECIFIED SPINAL OSTEOARTHRITIS COMPLICATION STATUS: Primary | ICD-10-CM

## 2020-01-16 DIAGNOSIS — G89.29 CHRONIC PAIN: ICD-10-CM

## 2020-01-16 DIAGNOSIS — M47.9 SPONDYLOSIS: ICD-10-CM

## 2020-01-16 PROCEDURE — 64636 DESTROY L/S FACET JNT ADDL: CPT | Mod: LT,,, | Performed by: ANESTHESIOLOGY

## 2020-01-16 PROCEDURE — 63600175 PHARM REV CODE 636 W HCPCS: Performed by: STUDENT IN AN ORGANIZED HEALTH CARE EDUCATION/TRAINING PROGRAM

## 2020-01-16 PROCEDURE — 64635 DESTROY LUMB/SAC FACET JNT: CPT | Mod: LT,,, | Performed by: ANESTHESIOLOGY

## 2020-01-16 PROCEDURE — 64635 DESTROY LUMB/SAC FACET JNT: CPT | Mod: LT | Performed by: ANESTHESIOLOGY

## 2020-01-16 PROCEDURE — 99152 MOD SED SAME PHYS/QHP 5/>YRS: CPT | Mod: ,,, | Performed by: ANESTHESIOLOGY

## 2020-01-16 PROCEDURE — 63600175 PHARM REV CODE 636 W HCPCS: Performed by: ANESTHESIOLOGY

## 2020-01-16 PROCEDURE — 64635 PR DESTROY LUMB/SAC FACET JNT: ICD-10-PCS | Mod: LT,,, | Performed by: ANESTHESIOLOGY

## 2020-01-16 PROCEDURE — 64636 DESTROY L/S FACET JNT ADDL: CPT | Mod: LT | Performed by: ANESTHESIOLOGY

## 2020-01-16 PROCEDURE — 99152 PR MOD CONSCIOUS SEDATION, SAME PHYS, 5+ YRS, FIRST 15 MIN: ICD-10-PCS | Mod: ,,, | Performed by: ANESTHESIOLOGY

## 2020-01-16 PROCEDURE — 64636 PR DESTROY L/S FACET JNT ADDL: ICD-10-PCS | Mod: LT,,, | Performed by: ANESTHESIOLOGY

## 2020-01-16 PROCEDURE — 25000003 PHARM REV CODE 250: Performed by: ANESTHESIOLOGY

## 2020-01-16 RX ORDER — FENTANYL CITRATE 50 UG/ML
INJECTION, SOLUTION INTRAMUSCULAR; INTRAVENOUS
Status: DISCONTINUED | OUTPATIENT
Start: 2020-01-16 | End: 2020-01-16 | Stop reason: HOSPADM

## 2020-01-16 RX ORDER — LIDOCAINE HYDROCHLORIDE 10 MG/ML
INJECTION INFILTRATION; PERINEURAL
Status: DISCONTINUED | OUTPATIENT
Start: 2020-01-16 | End: 2020-01-16 | Stop reason: HOSPADM

## 2020-01-16 RX ORDER — MIDAZOLAM HYDROCHLORIDE 1 MG/ML
INJECTION INTRAMUSCULAR; INTRAVENOUS
Status: DISCONTINUED | OUTPATIENT
Start: 2020-01-16 | End: 2020-01-16 | Stop reason: HOSPADM

## 2020-01-16 RX ORDER — SODIUM CHLORIDE 9 MG/ML
500 INJECTION, SOLUTION INTRAVENOUS CONTINUOUS
Status: DISCONTINUED | OUTPATIENT
Start: 2020-01-16 | End: 2020-01-16 | Stop reason: HOSPADM

## 2020-01-16 RX ORDER — POLYETHYLENE GLYCOL 3350, SODIUM SULFATE ANHYDROUS, SODIUM BICARBONATE, SODIUM CHLORIDE, POTASSIUM CHLORIDE 236; 22.74; 6.74; 5.86; 2.97 G/4L; G/4L; G/4L; G/4L; G/4L
4 POWDER, FOR SOLUTION ORAL ONCE
Qty: 4000 ML | Refills: 0 | Status: SHIPPED | OUTPATIENT
Start: 2020-01-16 | End: 2020-01-16

## 2020-01-16 RX ORDER — BUPIVACAINE HYDROCHLORIDE 2.5 MG/ML
INJECTION, SOLUTION EPIDURAL; INFILTRATION; INTRACAUDAL
Status: DISCONTINUED | OUTPATIENT
Start: 2020-01-16 | End: 2020-01-16 | Stop reason: HOSPADM

## 2020-01-16 RX ADMIN — SODIUM CHLORIDE 500 ML: 0.9 INJECTION, SOLUTION INTRAVENOUS at 09:01

## 2020-01-16 RX ADMIN — DESMOPRESSIN ACETATE 36.06 MCG: 4 SOLUTION INTRAVENOUS at 09:01

## 2020-01-16 NOTE — INTERVAL H&P NOTE
The patient has been examined and the H&P has been reviewed:    I concur with the findings and no changes have occurred since H&P was written.   Today's Exam  CV: RRR, no MRG, distal pulses intact and symmetric  Pulm: symmetric chest rise, no SOB, lungs CTAB      Anesthesia/Surgery risks, benefits and alternative options discussed and understood by patient/family.  DDAVP to be given preoperatively 2/2 patient's history of von willebrand dz.        Active Hospital Problems    Diagnosis  POA    Chronic pain [G89.29]  Yes      Resolved Hospital Problems   No resolved problems to display.

## 2020-01-16 NOTE — INTERVAL H&P NOTE
The patient has been examined and the H&P has been reviewed:    I concur with the findings and changes have been noted since the H&P was written: patient was deferred from 2 days ago because he hasn't stopped his plavix for 7 days.  additionally the patient has a history of vWF deficiency and required ddavp infusion prior to neuraxial procedures.      Anesthesia/Surgery risks, benefits and alternative options discussed and understood by patient/family.          Active Hospital Problems    Diagnosis  POA    Chronic pain [G89.29]  Yes      Resolved Hospital Problems   No resolved problems to display.

## 2020-01-16 NOTE — TELEPHONE ENCOUNTER
----- Message from Aleidajesse Hamm sent at 1/16/2020 11:13 AM CST -----  Contact: [phone]      ----- Message -----  From: Sasha Dang RN  Sent: 1/16/2020  10:56 AM CST  To: Fredy Magana MD, #    MrMargarito Santiago is a patient of Dr. Galeas, PCP and Dr. Swanson, ENT. He has been unable to get an appointment at [facility] to address his sleep apnea. I suggested he try the Sleep Clinic here at [facility].      Thank you,     Sasha Dang RN  [facility]

## 2020-01-16 NOTE — TELEPHONE ENCOUNTER
Dr. Tyler,    Patient is scheduled for EGD and Colonoscopy on 3/4/20 with Dr. Boyer. Please place medication order for DDAVP for day of procedure so it can be administered prior.    Jerzy,  MARCELO Dunn

## 2020-01-16 NOTE — DISCHARGE SUMMARY
Discharge Note  Short Stay      SUMMARY     Admit Date: 1/16/2020    Attending Physician: Fredy Magana      Discharge Physician: Fredy Magana      Discharge Date: 1/16/2020 11:36 AM    Procedure(s) (LRB):  RADIOFREQUENCY ABLATION LEFT L3, L4, L5 MEDIAL BRANCH 1 OF 2 **PATIENT ARRIVING AT 8 AM** (Left)    Final Diagnosis: Lumbar spondylosis [M47.816]    Disposition: Home or self care    Patient Instructions:   Current Discharge Medication List      CONTINUE these medications which have NOT CHANGED    Details   acetaminophen (TYLENOL) 500 MG tablet Take 2 tablets (1,000 mg total) by mouth every 8 (eight) hours as needed.  Qty: 90 tablet, Refills: 0      albuterol (VENTOLIN HFA) 90 mcg/actuation inhaler Inhale 2 puffs into the lungs every 6 (six) hours as needed for Wheezing. Rescue  Qty: 18 g, Refills: 4      atorvastatin (LIPITOR) 40 MG tablet Take 1 tablet (40 mg total) by mouth once daily.  Qty: 90 tablet, Refills: 0    Associated Diagnoses: Hyperlipidemia, unspecified hyperlipidemia type      azelastine (ASTELIN) 137 mcg (0.1 %) nasal spray 1 spray (137 mcg total) by Nasal route 2 (two) times daily.  Qty: 30 mL, Refills: 11    Associated Diagnoses: Chronic seasonal allergic rhinitis, unspecified trigger      calcium citrate-vitamin D3 315-200 mg (CITRACAL+D) 315-200 mg-unit per tablet Take 1 tablet by mouth once daily.      clopidogrel (PLAVIX) 75 mg tablet Take 1 tablet (75 mg total) by mouth once daily.  Qty: 30 tablet, Refills: 11      cyanocobalamin, vitamin B-12, (VITAMIN B-12) 50 mcg tablet Take 50 mcg by mouth once daily.      diclofenac sodium (VOLTAREN) 1 % Gel Apply 2 g topically 3 (three) times daily.  Qty: 1 Tube, Refills: 0    Associated Diagnoses: Lumbar spondylosis; Spinal stenosis, lumbar region, without neurogenic claudication; Facet syndrome; Osteoarthritis of lumbar spine, unspecified spinal osteoarthritis complication status      docusate sodium (COLACE) 100 MG capsule Take 1 tablet by  mouth Twice daily. 1 Capsule Oral Twice a day .  Take with pain medicine      doxazosin (CARDURA) 1 MG tablet TAKE 1 TABLET(1 MG) BY MOUTH EVERY EVENING  Qty: 90 tablet, Refills: 3    Associated Diagnoses: Benign localized prostatic hyperplasia with lower urinary tract symptoms (LUTS)      esomeprazole (NEXIUM) 40 MG capsule Take 1 capsule (40 mg total) by mouth 2 (two) times daily before meals.  Qty: 90 capsule, Refills: 3    Associated Diagnoses: Gastroesophageal reflux disease without esophagitis      FLONASE ALLERGY RELIEF 50 mcg/actuation nasal spray SHAKE LIQUID AND USE 1 SPRAY(50 MCG) IN EACH NOSTRIL EVERY DAY  Qty: 9.9 mL, Refills: 11    Associated Diagnoses: Non-seasonal allergic rhinitis, unspecified trigger      ipratropium (ATROVENT) 0.03 % nasal spray 2 sprays by Nasal route 2 (two) times daily. May be use more often if needed  Qty: 30 mL, Refills: 11      montelukast (SINGULAIR) 10 mg tablet TAKE 1 TABLET(10 MG) BY MOUTH EVERY EVENING  Qty: 30 tablet, Refills: 11      !! spironolactone (ALDACTONE) 25 MG tablet       !! spironolactone (ALDACTONE) 50 MG tablet Take two 25 mg tabs once daily .  Qty: 90 tablet, Refills: 3      SYMBICORT 160-4.5 mcg/actuation HFAA       traMADol (ULTRAM) 50 mg tablet Take 1 tablet (50 mg total) by mouth every 8 (eight) hours as needed for Pain.  Qty: 90 tablet, Refills: 2    Comments: Quantity prescribed more than 7 day supply? Yes, quantity medically necessary      VITAMIN D2 50,000 unit capsule TK 1 C PO Q 7 DAYS  Refills: 1      zolpidem (AMBIEN) 10 mg Tab TAKE 1 TABLET BY MOUTH EVERY DAY AT BEDTIME  Qty: 20 tablet, Refills: 0    Associated Diagnoses: Sleep disorder       !! - Potential duplicate medications found. Please discuss with provider.            Resume plavix tomorrow  Discharge Diagnosis: Lumbar spondylosis [M47.816]  Condition on Discharge: Stable with no complications to procedure   Diet on Discharge: Same as before.  Activity: as per instruction  sheet.  Discharge to: Home with a responsible adult.  Follow up: 2-4 weeks       Please call my office or pager at [phone] if experienced any weakness or loss of sensation, fever > 101.5, pain uncontrolled with oral medications, persistent nausea/vomiting/or diarrhea, redness or drainage from the incisions, or any other worrisome concerns. If physician on call was not reached or could not communicate with our office for any reason please go to the nearest emergency department

## 2020-01-16 NOTE — DISCHARGE INSTRUCTIONS
Thank you for allowing us to care for you today. You may receive a survey about the care we provided. Your feedback is valuable and helps us provide excellent care throughout the community.     Home Care Instructions for Pain Management:    1. DIET:   You may resume your normal diet today.   2. BATHING:   You may shower with luke warm water. No tub baths or anything that will soak injection sites under water for the next 24 hours.  3. DRESSING:   You may remove your bandage today.   4. ACTIVITY LEVEL:   You may resume your normal activities 24 hrs after your procedure. Nothing strenuous today.  5. MEDICATIONS:   You may resume your normal medications today. To restart blood thinners, ask your doctor.  6. DRIVING    If you have received any sedatives by mouth today, you may not drive for 12 hours.    If you have received any sedation through your IV, you may not drive for 24 hrs.   7. SPECIAL INSTRUCTIONS:   No heat to the injection site for 24 hrs including, hot bath or shower, heating pad, moist heat, or hot tubs.    Use ice pack to injection site for any pain or discomfort.  Apply ice packs for 20 minute intervals as needed.    IF you have diabetes, be sure to monitor your blood sugar more closely. IF your injection contained steroids your blood sugar levels may become higher than normal.    If you are still having pain upon discharge:  Your pain may improve over the next 48 hours. The anesthetic (numbing medication) works immediately to 48 hours. IF your injection contained a steroid (anti-inflammatory medication), it takes approximately 3 days to start feeling relief and 7-10 days to see your greatest results from the medication. It is possible you may need subsequent injections. This would be discussed at your follow up appointment with pain management or your referring doctor.    Please call the PAIN MANAGEMENT office at [phone] or ON CALL pager at [phone] if you experienced any:   -Weakness or  loss of sensation  -Fever > 101.5  -Pain uncontrolled with oral medications   -Persistent nausea, vomiting, or diarrhea  -Redness or drainage from the injection sites, or any other worrisome concerns.   If physician on call was not reached or could not communicate with our office for any reason please go to the nearest emergency department.  Adult Procedural Sedation Instructions    Recovery After Procedural Sedation (Adult)  You have been given medicine by vein to make you sleep during your surgery. This may have included both a pain medicine and sleeping medicine. Most of the effects have worn off. But you may still have some drowsiness for the next 6 to 8 hours.  Home care  Follow these guidelines when you get home:  · For the next 8 hours, you should be watched by a responsible adult. This person should make sure your condition is not getting worse.  · Don't drink any alcohol for the next 24 hours.  · Don't drive, operate dangerous machinery, or make important business or personal decisions during the next 24 hours.  Note: Your healthcare provider may tell you not to take any medicine by mouth for pain or sleep in the next 4 hours. These medicines may react with the medicines you were given in the hospital. This could cause a much stronger response than usual.  Follow-up care  Follow up with your healthcare provider if you are not alert and back to your usual level of activity within 12 hours.  When to seek medical advice  Call your healthcare provider right away if any of these occur:  · Drowsiness gets worse  · Weakness or dizziness gets worse  · Repeated vomiting  · You can't be awakened   Date Last Reviewed: 10/18/2016  © 9793-7196 [employer]. [address] [place] [address], [place] [address]. All rights reserved. This information is not intended as a substitute for professional medical care. Always follow your healthcare professional's instructions.

## 2020-01-16 NOTE — TELEPHONE ENCOUNTER
----- Message from Aleidajesse Hamm sent at 1/16/2020 11:13 AM CST -----  Contact: [phone]      ----- Message -----  From: Sasha Dang RN  Sent: 1/16/2020  10:56 AM CST  To: Fredy Magana MD, #    MrMargarito Santiago is a patient of Dr. Galeas, PCP and Dr. Swanson, ENT. He has been unable to get an appointment at [facility] to address his sleep apnea. I suggested he try the Sleep Clinic here at [facility].      Thank you,     Sasha Dang RN  [facility]

## 2020-01-16 NOTE — OP NOTE
Lumbar Medial nerve branch block radiofrequency ablation Under Fluoroscopy     Time-out taken to identify patient and procedure side prior to starting the procedure.     01/16/2020    PROCEDURE: Left radiofrequency ablation of the the medial branch nerves at the   transverse process of  L4, L5 and sacral ala    2)Conscious sedation provided by MD     REASON FOR PROCEDURE: Lumbar spondylosis [M47.816]     PHYSICIAN: Fredy Magana MD     ASSISTANTS: None     MEDICATIONS INJECTED: 0.25% Bupivicaine total 6mL     LOCAL ANESTHETIC USED: Xylocaine 1% 1mL / site     ESTIMATED BLOOD LOSS: None.     COMPLICATIONS: None.     Interval history: Patient reports that he had complete relief of pain for the day of the procedure, we will proceed with the RFA  patient was given an infusion of 0.3 mics per kilos DDAVP    TECHNIQUE: Laying in a prone position, the patient was prepped and draped in the usual sterile fashion using ChloraPrep and fenestrated drape. The level was determined under fluoroscopic guidance. Local anesthetic was given by going down to the hub of the 27-gauge 1.25in needle and raising a wheel. A 18-gauge 10mm curved active tip needle was introduced to the anatomic local of the medial branch at each of the above levels using fluoroscopy. Then sensory and motor testing was performed to confirm that the needle tips were in the correct location. Then after negative aspiration, 1 mL of 0.25% bupivacaine was injected into each level. This was followed by thermal lesioning at 80 degrees celsius for 90 seconds.     Conscious sedation provided by M.D   The patient was monitored with continuous pulse oximetry, EKG, and intermittent blood pressure monitors. The patient was hemodynamically stable throughout the entire process was responsive to voice, and breathing spontaneously. Supplemental O2 was provided at 2L/min via nasal cannula. Patient was comfortable for the duration of the procedure. (See nurse documentation and  case log for sedation time)    There was a total of 2mg IV Midazolam and 50mcg Fentanyl titrated for the procedure    The patient tolerated the procedure well. Did not have any procedure related motor deficit at the conclusion of the procedure    The patient was monitored after the procedure. Patient was given post procedure and discharge instructions to follow at home. We will see the patient back in two weeks or the patient may call to inform of status. The patient was discharged in a stable condition

## 2020-01-17 RX ORDER — HEPARIN 100 UNIT/ML
500 SYRINGE INTRAVENOUS
Status: CANCELLED | OUTPATIENT
Start: 2020-03-04

## 2020-01-17 RX ORDER — SODIUM CHLORIDE 0.9 % (FLUSH) 0.9 %
10 SYRINGE (ML) INJECTION
Status: CANCELLED | OUTPATIENT
Start: 2020-11-03

## 2020-01-20 NOTE — TELEPHONE ENCOUNTER
Stefano Tyler MD  You; Makayla Patterson, MARCELO 3 days ago      done    Routing comment              You routed conversation to Stefano Tyler MD; Jayson Vazquez 4 days ago      You 4 days ago         Dr. Tyler,     Patient is scheduled for EGD and Colonoscopy on 3/4/20 with Dr. Boyer. Please place medication order for DDAVP for day of procedure so it can be administered prior.     Thanks,  MARCELO Dunn         Documentation

## 2020-01-21 ENCOUNTER — TELEPHONE (OUTPATIENT)
Dept: INTERNAL MEDICINE | Facility: CLINIC | Age: 60
End: 2020-01-21

## 2020-01-21 NOTE — TELEPHONE ENCOUNTER
----- Message from Kassie Collazo sent at 1/21/2020  3:40 PM CST -----  Contact: PURNIMA IYER [[id number]]  Name of Who is Calling: PURNIMA IYER [[id number]]    What is the request in detail: Would like to speak with staff in regards to his 1/9 appointment that was cancelled. Patient states that when he contacted to cancel the appointment he was told he would be put on a wait list and that did not happen. Please contact to further discuss and advise      Can the clinic reply by [facility]: no    What [facility] Back [facility]: [phone]

## 2020-01-22 ENCOUNTER — OFFICE VISIT (OUTPATIENT)
Dept: INTERNAL MEDICINE | Facility: CLINIC | Age: 60
End: 2020-01-22
Attending: INTERNAL MEDICINE
Payer: MEDICARE

## 2020-01-22 VITALS
SYSTOLIC BLOOD PRESSURE: 104 MMHG | HEART RATE: 89 BPM | WEIGHT: 268.31 LBS | BODY MASS INDEX: 37.56 KG/M2 | OXYGEN SATURATION: 93 % | DIASTOLIC BLOOD PRESSURE: 68 MMHG | HEIGHT: 71 IN

## 2020-01-22 DIAGNOSIS — I70.0 THORACIC AORTA ATHEROSCLEROSIS: ICD-10-CM

## 2020-01-22 DIAGNOSIS — E55.9 VITAMIN D DEFICIENCY: Primary | ICD-10-CM

## 2020-01-22 DIAGNOSIS — N64.4 BREAST TENDERNESS IN MALE: ICD-10-CM

## 2020-01-22 DIAGNOSIS — I50.32 DIASTOLIC DYSFUNCTION WITH CHRONIC HEART FAILURE: ICD-10-CM

## 2020-01-22 DIAGNOSIS — G47.33 OSA (OBSTRUCTIVE SLEEP APNEA): ICD-10-CM

## 2020-01-22 DIAGNOSIS — R73.9 HYPERGLYCEMIA: ICD-10-CM

## 2020-01-22 DIAGNOSIS — M51.37 DEGENERATION OF LUMBAR OR LUMBOSACRAL INTERVERTEBRAL DISC: ICD-10-CM

## 2020-01-22 DIAGNOSIS — I10 ESSENTIAL HYPERTENSION: ICD-10-CM

## 2020-01-22 DIAGNOSIS — N62 GYNECOMASTIA: ICD-10-CM

## 2020-01-22 DIAGNOSIS — M54.2 NECK PAIN: ICD-10-CM

## 2020-01-22 DIAGNOSIS — D68.00 VON WILLEBRAND DISEASE: ICD-10-CM

## 2020-01-22 DIAGNOSIS — R97.8 OTHER ABNORMAL TUMOR MARKERS: ICD-10-CM

## 2020-01-22 DIAGNOSIS — K21.9 GASTROESOPHAGEAL REFLUX DISEASE WITHOUT ESOPHAGITIS: ICD-10-CM

## 2020-01-22 DIAGNOSIS — E78.5 HYPERLIPIDEMIA, UNSPECIFIED HYPERLIPIDEMIA TYPE: ICD-10-CM

## 2020-01-22 DIAGNOSIS — E66.01 SEVERE OBESITY (BMI 35.0-39.9) WITH COMORBIDITY: ICD-10-CM

## 2020-01-22 DIAGNOSIS — I10 HYPERTENSION, BENIGN: ICD-10-CM

## 2020-01-22 PROCEDURE — 99214 PR OFFICE/OUTPT VISIT, EST, LEVL IV, 30-39 MIN: ICD-10-PCS | Mod: S$GLB,,, | Performed by: INTERNAL MEDICINE

## 2020-01-22 PROCEDURE — 99499 RISK ADDL DX/OHS AUDIT: ICD-10-PCS | Mod: S$GLB,,, | Performed by: INTERNAL MEDICINE

## 2020-01-22 PROCEDURE — 3074F PR MOST RECENT SYSTOLIC BLOOD PRESSURE < 130 MM HG: ICD-10-PCS | Mod: CPTII,S$GLB,, | Performed by: INTERNAL MEDICINE

## 2020-01-22 PROCEDURE — 99999 PR PBB SHADOW E&M-EST. PATIENT-LVL IV: ICD-10-PCS | Mod: PBBFAC,,, | Performed by: INTERNAL MEDICINE

## 2020-01-22 PROCEDURE — 3078F DIAST BP <80 MM HG: CPT | Mod: CPTII,S$GLB,, | Performed by: INTERNAL MEDICINE

## 2020-01-22 PROCEDURE — 99214 OFFICE O/P EST MOD 30 MIN: CPT | Mod: S$GLB,,, | Performed by: INTERNAL MEDICINE

## 2020-01-22 PROCEDURE — 99999 PR PBB SHADOW E&M-EST. PATIENT-LVL IV: CPT | Mod: PBBFAC,,, | Performed by: INTERNAL MEDICINE

## 2020-01-22 PROCEDURE — 3074F SYST BP LT 130 MM HG: CPT | Mod: CPTII,S$GLB,, | Performed by: INTERNAL MEDICINE

## 2020-01-22 PROCEDURE — 99499 UNLISTED E&M SERVICE: CPT | Mod: S$GLB,,, | Performed by: INTERNAL MEDICINE

## 2020-01-22 PROCEDURE — 3078F PR MOST RECENT DIASTOLIC BLOOD PRESSURE < 80 MM HG: ICD-10-PCS | Mod: CPTII,S$GLB,, | Performed by: INTERNAL MEDICINE

## 2020-01-22 RX ORDER — SPIRONOLACTONE 25 MG/1
25 TABLET ORAL 2 TIMES DAILY
Qty: 180 TABLET | Refills: 3 | Status: SHIPPED | OUTPATIENT
Start: 2020-01-22 | End: 2020-02-26 | Stop reason: ALTCHOICE

## 2020-01-22 NOTE — PROGRESS NOTES
Subjective:   Patient ID: Bri Santiago is a 59 y.o. male  Chief complaint:   Chief Complaint   Patient presents with    Annual Exam    Neck Pain       HPI  Pt here for annual exam   Pcp: Dr. Galeas    Had flu in December   Had sinus surgery in Oct and has f/u appt in 2/6/2020    Followed by pain mgmt - had nerve block on left side - has had improvement on left side   - scheduled for right side 1/28/20    Cscope/egd scheduled 3/62120    BPH  Followed by Urology: lcv 1/14/2020   - sx stable      HFpEF and LE edema:  - tony aldactone and edema stable    Previously:   - f/u with cards and had Had left heart cath 2/14/19  - pet stress test performed 12/2018 and reviewed   -cxr 9/2018 reviewed   - BNP wnl  - v/q scan with low prob for pe  Started on hctz 25 by cards after recent heart cath in Feb - not effective for edema per pt  - had hypokalemia with lasix - difficulty with swallowing large potassium pills and was witched to liquid potassium which he tolerated but did not like taste   - at some point was taken off     - Had atypical chest pain and recommend continue current medication and plavix     Aortic atherosclerosis: tony statin     ABDON:   - has appt with sleep   - has old cpap and not functioning     Nipple tenderness left > right - reports started > 1 year ago but more constant since Nov 2019  No flaky skin or inc warmth   No swelling or redness   No nipple discharge   No new medications    Taking tramadol 2 times per week when needed for chronic back pain pain     - reports also with chronic neck pain and hx of DJD of c scpine s/p surgery many years ago in early 1990's  - reports at times difficulty with lifting head and maintaining good posture   - no UE weakness or dropping objects or numbness   + mm spasms and mm tightness in neck and decreased ROM of head over past year     Review of Systems    Objective:  Vitals:    01/22/20 1246   BP: 104/68   BP Location: Left arm   Patient Position: Sitting   BP  "Method: Large (Manual)   Pulse: 89   SpO2: (!) 93%   Weight: 121.7 kg (268 lb 4.8 oz)   Height: 5' 11" (1.803 m)     Body mass index is 37.42 kg/m².    Physical Exam   Constitutional: He is oriented to person, place, and time. He appears well-developed and well-nourished.   HENT:   Head: Normocephalic and atraumatic.   Right Ear: External ear normal.   Left Ear: External ear normal.   Nose: Nose normal.   Mouth/Throat: Oropharynx is clear and moist.   No carotid bruits   Eyes: Conjunctivae and EOM are normal.   Neck: Neck supple. No thyromegaly present.   Cardiovascular: Normal rate, regular rhythm, normal heart sounds and intact distal pulses.   Pulmonary/Chest: Effort normal and breath sounds normal. Right breast exhibits tenderness. Right breast exhibits no mass, no nipple discharge and no skin change. Left breast exhibits tenderness. Left breast exhibits no mass, no nipple discharge and no skin change.   + ttp of nipple and surrounding breast tissue B  No redness or inc warmth or drainage or ulcers  No nipple discharge   + gynecomastia   Abdominal: Soft. Bowel sounds are normal.   Obese abdomen   Musculoskeletal: He exhibits no edema or tenderness.   No spinal ttp   + ttp of bilateral paraspinal mm - cervical  5/5 strength of B UE    Lymphadenopathy:     He has no cervical adenopathy.   Neurological: He is alert and oriented to person, place, and time. He displays no atrophy. No sensory deficit.   Reflex Scores:       Bicep reflexes are 2+ on the right side and 2+ on the left side.       Brachioradialis reflexes are 2+ on the right side and 2+ on the left side.  Skin: Skin is warm and dry.   Psychiatric: His behavior is normal. Thought content normal.   Vitals reviewed.      Assessment:  1. Vitamin D deficiency    2. Hypertension, benign    3. Hyperlipidemia, unspecified hyperlipidemia type    4. Hyperglycemia    5. Breast tenderness in male    6. Neck pain    7. Gynecomastia    8. Other abnormal tumor markers   "   9. Essential hypertension    10. Gastroesophageal reflux disease without esophagitis    11. Diastolic dysfunction with chronic heart failure    12. Von Willebrand disease    13. Degeneration of lumbar or lumbosacral intervertebral disc    14. ABDON (obstructive sleep apnea)    15. Thoracic aorta atherosclerosis    16. Severe obesity (BMI 35.0-39.9) with comorbidity        Plan:  Bri was seen today for annual exam and neck pain.    Diagnoses and all orders for this visit:    Vitamin D deficiency  -     Vitamin D; Future  rec daily vit d and check level    Hypertension, benign  -     TSH; Future  -     Lipid panel; Future  -     Comprehensive metabolic panel; Future  Stable, cont med     Hyperlipidemia, unspecified hyperlipidemia type  -     TSH; Future  -     Lipid panel; Future  -     Comprehensive metabolic panel; Future  Stable, cont statin     Hyperglycemia  -     Hemoglobin A1c; Future    Breast tenderness in male  -     US Breast Bilateral Complete; Future  -     Testosterone; Future  -     Luteinizing hormone; Future  -     Estradiol; Future  -     hCG, quantitative; Future  No signs of infection    Neck pain  -     X-Ray Cervical Spine Complete 5 view; Future  Consider f/u with pain vs nsx pending xray    Gynecomastia  -     Luteinizing hormone; Future  -     Estradiol; Future  -     hCG, quantitative; Future  rec weight loss     Other abnormal tumor markers   -     hCG, quantitative; Future    Essential hypertension    Gastroesophageal reflux disease without esophagitis  Stable  Cont ppi     Diastolic dysfunction with chronic heart failure  Stable   Low salt diet and diuretic   euvolemic     Von Willebrand disease  Stable     Degeneration of lumbar or lumbosacral intervertebral disc    ABDON (obstructive sleep apnea)  Stable  Followed by sleep     Thoracic aorta atherosclerosis  Stable, cont statin     Severe obesity (BMI 35.0-39.9) with comorbidity  - cont diet and exercise  - increase intensity and  duration of CV exercise to continue weight loss  - goal wt loss one pound per week  - portion control, healthy choices    Other orders  -     spironolactone (ALDACTONE) 25 MG tablet; Take 1 tablet (25 mg total) by mouth 2 (two) times daily. .        Health Maintenance   Topic Date Due    Colonoscopy  06/19/2019    Lipid Panel  12/04/2023    TETANUS VACCINE  10/16/2027    Hepatitis C Screening  Completed    Pneumococcal Vaccine (Medium Risk)  Completed

## 2020-01-23 ENCOUNTER — LAB VISIT (OUTPATIENT)
Dept: LAB | Facility: OTHER | Age: 60
End: 2020-01-23
Attending: UROLOGY
Payer: MEDICARE

## 2020-01-23 DIAGNOSIS — N13.8 BPH WITH URINARY OBSTRUCTION: ICD-10-CM

## 2020-01-23 DIAGNOSIS — R73.9 HYPERGLYCEMIA: ICD-10-CM

## 2020-01-23 DIAGNOSIS — I10 HYPERTENSION, BENIGN: ICD-10-CM

## 2020-01-23 DIAGNOSIS — N40.1 BPH WITH URINARY OBSTRUCTION: ICD-10-CM

## 2020-01-23 DIAGNOSIS — E78.5 HYPERLIPIDEMIA, UNSPECIFIED HYPERLIPIDEMIA TYPE: ICD-10-CM

## 2020-01-23 DIAGNOSIS — N64.4 BREAST TENDERNESS IN MALE: ICD-10-CM

## 2020-01-23 DIAGNOSIS — N62 GYNECOMASTIA: ICD-10-CM

## 2020-01-23 DIAGNOSIS — E55.9 VITAMIN D DEFICIENCY: ICD-10-CM

## 2020-01-23 DIAGNOSIS — R97.8 OTHER ABNORMAL TUMOR MARKERS: ICD-10-CM

## 2020-01-23 PROBLEM — E66.813 CLASS 3 SEVERE OBESITY DUE TO EXCESS CALORIES WITH SERIOUS COMORBIDITY AND BODY MASS INDEX (BMI) OF 40.0 TO 44.9 IN ADULT: Status: RESOLVED | Noted: 2018-12-18 | Resolved: 2020-01-23

## 2020-01-23 PROBLEM — E66.01 CLASS 3 SEVERE OBESITY DUE TO EXCESS CALORIES WITH SERIOUS COMORBIDITY AND BODY MASS INDEX (BMI) OF 40.0 TO 44.9 IN ADULT: Status: RESOLVED | Noted: 2018-12-18 | Resolved: 2020-01-23

## 2020-01-23 LAB
25(OH)D3+25(OH)D2 SERPL-MCNC: 8 NG/ML (ref 30–96)
ALBUMIN SERPL BCP-MCNC: 4 G/DL (ref 3.5–5.2)
ALP SERPL-CCNC: 67 U/L (ref 55–135)
ALT SERPL W/O P-5'-P-CCNC: 27 U/L (ref 10–44)
ANION GAP SERPL CALC-SCNC: 8 MMOL/L (ref 8–16)
AST SERPL-CCNC: 22 U/L (ref 10–40)
BILIRUB SERPL-MCNC: 1 MG/DL (ref 0.1–1)
BUN SERPL-MCNC: 14 MG/DL (ref 6–20)
CALCIUM SERPL-MCNC: 8.9 MG/DL (ref 8.7–10.5)
CHLORIDE SERPL-SCNC: 103 MMOL/L (ref 95–110)
CHOLEST SERPL-MCNC: 111 MG/DL (ref 120–199)
CHOLEST/HDLC SERPL: 3.8 {RATIO} (ref 2–5)
CO2 SERPL-SCNC: 29 MMOL/L (ref 23–29)
COMPLEXED PSA SERPL-MCNC: 0.8 NG/ML (ref 0–4)
CREAT SERPL-MCNC: 1 MG/DL (ref 0.5–1.4)
EST. GFR  (AFRICAN AMERICAN): >60 ML/MIN/1.73 M^2
EST. GFR  (NON AFRICAN AMERICAN): >60 ML/MIN/1.73 M^2
ESTIMATED AVG GLUCOSE: 82 MG/DL (ref 68–131)
ESTRADIOL SERPL-MCNC: 70 PG/ML (ref 11–44)
GLUCOSE SERPL-MCNC: 89 MG/DL (ref 70–110)
HBA1C MFR BLD HPLC: 4.5 % (ref 4–5.6)
HCG INTACT+B SERPL-ACNC: <1.2 MIU/ML
HDLC SERPL-MCNC: 29 MG/DL (ref 40–75)
HDLC SERPL: 26.1 % (ref 20–50)
LDLC SERPL CALC-MCNC: 66.8 MG/DL (ref 63–159)
LH SERPL-ACNC: 2.7 MIU/ML (ref 0.6–12.1)
NONHDLC SERPL-MCNC: 82 MG/DL
POTASSIUM SERPL-SCNC: 4.1 MMOL/L (ref 3.5–5.1)
PROT SERPL-MCNC: 7.3 G/DL (ref 6–8.4)
SODIUM SERPL-SCNC: 140 MMOL/L (ref 136–145)
TESTOST SERPL-MCNC: 391 NG/DL (ref 304–1227)
TRIGL SERPL-MCNC: 76 MG/DL (ref 30–150)
TSH SERPL DL<=0.005 MIU/L-ACNC: 0.89 UIU/ML (ref 0.4–4)

## 2020-01-23 PROCEDURE — 36415 COLL VENOUS BLD VENIPUNCTURE: CPT

## 2020-01-23 PROCEDURE — 82670 ASSAY OF TOTAL ESTRADIOL: CPT

## 2020-01-23 PROCEDURE — 80061 LIPID PANEL: CPT

## 2020-01-23 PROCEDURE — 84443 ASSAY THYROID STIM HORMONE: CPT

## 2020-01-23 PROCEDURE — 83002 ASSAY OF GONADOTROPIN (LH): CPT

## 2020-01-23 PROCEDURE — 82306 VITAMIN D 25 HYDROXY: CPT

## 2020-01-23 PROCEDURE — 83036 HEMOGLOBIN GLYCOSYLATED A1C: CPT

## 2020-01-23 PROCEDURE — 84702 CHORIONIC GONADOTROPIN TEST: CPT

## 2020-01-23 PROCEDURE — 80053 COMPREHEN METABOLIC PANEL: CPT

## 2020-01-23 PROCEDURE — 84153 ASSAY OF PSA TOTAL: CPT

## 2020-01-23 PROCEDURE — 84403 ASSAY OF TOTAL TESTOSTERONE: CPT

## 2020-01-27 DIAGNOSIS — E28.0 ESTROGEN EXCESS: ICD-10-CM

## 2020-01-27 DIAGNOSIS — N62 GYNECOMASTIA: ICD-10-CM

## 2020-01-27 DIAGNOSIS — N64.4 BREAST TENDERNESS IN MALE: ICD-10-CM

## 2020-01-27 DIAGNOSIS — E55.9 VITAMIN D DEFICIENCY: Primary | ICD-10-CM

## 2020-01-28 ENCOUNTER — HOSPITAL ENCOUNTER (OUTPATIENT)
Facility: OTHER | Age: 60
Discharge: HOME | End: 2020-01-28
Attending: ANESTHESIOLOGY | Admitting: ANESTHESIOLOGY
Payer: MEDICARE

## 2020-01-28 VITALS
BODY MASS INDEX: 37.1 KG/M2 | HEART RATE: 75 BPM | TEMPERATURE: 98 F | RESPIRATION RATE: 18 BRPM | DIASTOLIC BLOOD PRESSURE: 58 MMHG | HEIGHT: 71 IN | WEIGHT: 265 LBS | SYSTOLIC BLOOD PRESSURE: 121 MMHG | OXYGEN SATURATION: 94 %

## 2020-01-28 DIAGNOSIS — M47.816 LUMBAR SPONDYLOSIS: ICD-10-CM

## 2020-01-28 DIAGNOSIS — M47.816 OSTEOARTHRITIS OF LUMBAR SPINE: ICD-10-CM

## 2020-01-28 DIAGNOSIS — M47.816 OSTEOARTHRITIS OF LUMBAR SPINE, UNSPECIFIED SPINAL OSTEOARTHRITIS COMPLICATION STATUS: Primary | ICD-10-CM

## 2020-01-28 PROCEDURE — 99152 MOD SED SAME PHYS/QHP 5/>YRS: CPT | Mod: ,,, | Performed by: ANESTHESIOLOGY

## 2020-01-28 PROCEDURE — 25000003 PHARM REV CODE 250: Performed by: ANESTHESIOLOGY

## 2020-01-28 PROCEDURE — 64635 PR DESTROY LUMB/SAC FACET JNT: ICD-10-PCS | Mod: RT,,, | Performed by: ANESTHESIOLOGY

## 2020-01-28 PROCEDURE — 63600175 PHARM REV CODE 636 W HCPCS: Performed by: ANESTHESIOLOGY

## 2020-01-28 PROCEDURE — 99152 PR MOD CONSCIOUS SEDATION, SAME PHYS, 5+ YRS, FIRST 15 MIN: ICD-10-PCS | Mod: ,,, | Performed by: ANESTHESIOLOGY

## 2020-01-28 PROCEDURE — 64635 DESTROY LUMB/SAC FACET JNT: CPT | Performed by: ANESTHESIOLOGY

## 2020-01-28 PROCEDURE — 64635 DESTROY LUMB/SAC FACET JNT: CPT | Mod: RT,,, | Performed by: ANESTHESIOLOGY

## 2020-01-28 PROCEDURE — 64636 PR DESTROY L/S FACET JNT ADDL: ICD-10-PCS | Mod: RT,,, | Performed by: ANESTHESIOLOGY

## 2020-01-28 PROCEDURE — 64636 DESTROY L/S FACET JNT ADDL: CPT | Mod: RT | Performed by: ANESTHESIOLOGY

## 2020-01-28 PROCEDURE — 64636 DESTROY L/S FACET JNT ADDL: CPT | Mod: RT,,, | Performed by: ANESTHESIOLOGY

## 2020-01-28 RX ORDER — BUPIVACAINE HYDROCHLORIDE 2.5 MG/ML
INJECTION, SOLUTION EPIDURAL; INFILTRATION; INTRACAUDAL
Status: DISCONTINUED | OUTPATIENT
Start: 2020-01-28 | End: 2020-01-28 | Stop reason: HOSPADM

## 2020-01-28 RX ORDER — FENTANYL CITRATE 50 UG/ML
INJECTION, SOLUTION INTRAMUSCULAR; INTRAVENOUS
Status: DISCONTINUED | OUTPATIENT
Start: 2020-01-28 | End: 2020-01-28 | Stop reason: HOSPADM

## 2020-01-28 RX ORDER — LIDOCAINE HYDROCHLORIDE 10 MG/ML
INJECTION INFILTRATION; PERINEURAL
Status: DISCONTINUED | OUTPATIENT
Start: 2020-01-28 | End: 2020-01-28 | Stop reason: HOSPADM

## 2020-01-28 RX ORDER — ERGOCALCIFEROL 1.25 MG/1
50000 CAPSULE ORAL
Qty: 12 CAPSULE | Refills: 0 | Status: SHIPPED | OUTPATIENT
Start: 2020-01-28 | End: 2020-04-15

## 2020-01-28 RX ORDER — SODIUM CHLORIDE 9 MG/ML
500 INJECTION, SOLUTION INTRAVENOUS CONTINUOUS
Status: DISCONTINUED | OUTPATIENT
Start: 2020-01-28 | End: 2020-01-28 | Stop reason: HOSPADM

## 2020-01-28 RX ORDER — MIDAZOLAM HYDROCHLORIDE 1 MG/ML
INJECTION INTRAMUSCULAR; INTRAVENOUS
Status: DISCONTINUED | OUTPATIENT
Start: 2020-01-28 | End: 2020-01-28 | Stop reason: HOSPADM

## 2020-01-28 RX ADMIN — DESMOPRESSIN ACETATE 36.06 MCG: 4 INJECTION, SOLUTION INTRAVENOUS; SUBCUTANEOUS at 11:01

## 2020-01-28 NOTE — TELEPHONE ENCOUNTER
Please notify pt that labs returned and are in good range overall except that:        1. Vitamin D level is very low - rec start prescription weekly vitamin D - Rx sent to pharmacy.   rec start otc vit d3 2000u daily to maintain improvement once the prescription is complete.   Please schedule vit d in 3 months    2. estrogen level is elevated which helps explain his symptoms as discussed in clinic   - I recommend completing a testicular ultrasound to further evaluate causes of this.   If this is unremarkable then the next step is to complete a CT scan of the adrenal glands.   - Please arrange US asap

## 2020-01-28 NOTE — DISCHARGE INSTRUCTIONS

## 2020-01-28 NOTE — DISCHARGE SUMMARY
Discharge Note  Short Stay      SUMMARY     Admit Date: 1/28/2020    Attending Physician: Fredy Magana      Discharge Physician: Fredy Magana      Discharge Date: 1/28/2020 1:14 PM    Procedure(s) (LRB):  RADIOFREQUENCY ABLATION, RIGHT L3-L4-L5 MEDIAL BRANCH 2 OF 2 (Left done on 1/16/20) (Right)    Final Diagnosis: Lumbar spondylosis [M47.816]    Disposition: Home or self care    Patient Instructions:   Current Discharge Medication List      CONTINUE these medications which have NOT CHANGED    Details   acetaminophen (TYLENOL) 500 MG tablet Take 2 tablets (1,000 mg total) by mouth every 8 (eight) hours as needed.  Qty: 90 tablet, Refills: 0      albuterol (VENTOLIN HFA) 90 mcg/actuation inhaler Inhale 2 puffs into the lungs every 6 (six) hours as needed for Wheezing. Rescue  Qty: 18 g, Refills: 4      atorvastatin (LIPITOR) 40 MG tablet Take 1 tablet (40 mg total) by mouth once daily.  Qty: 90 tablet, Refills: 0    Associated Diagnoses: Hyperlipidemia, unspecified hyperlipidemia type      azelastine (ASTELIN) 137 mcg (0.1 %) nasal spray 1 spray (137 mcg total) by Nasal route 2 (two) times daily.  Qty: 30 mL, Refills: 11    Associated Diagnoses: Chronic seasonal allergic rhinitis, unspecified trigger      calcium citrate-vitamin D3 315-200 mg (CITRACAL+D) 315-200 mg-unit per tablet Take 1 tablet by mouth once daily.      clopidogrel (PLAVIX) 75 mg tablet Take 1 tablet (75 mg total) by mouth once daily.  Qty: 30 tablet, Refills: 11      cyanocobalamin, vitamin B-12, (VITAMIN B-12) 50 mcg tablet Take 50 mcg by mouth once daily.      diclofenac sodium (VOLTAREN) 1 % Gel Apply 2 g topically 3 (three) times daily.  Qty: 1 Tube, Refills: 0    Associated Diagnoses: Lumbar spondylosis; Spinal stenosis, lumbar region, without neurogenic claudication; Facet syndrome; Osteoarthritis of lumbar spine, unspecified spinal osteoarthritis complication status      docusate sodium (COLACE) 100 MG capsule Take 1 tablet by mouth  Twice daily. 1 Capsule Oral Twice a day .  Take with pain medicine      doxazosin (CARDURA) 1 MG tablet TAKE 1 TABLET(1 MG) BY MOUTH EVERY EVENING  Qty: 90 tablet, Refills: 3    Associated Diagnoses: Benign localized prostatic hyperplasia with lower urinary tract symptoms (LUTS)      esomeprazole (NEXIUM) 40 MG capsule Take 1 capsule (40 mg total) by mouth 2 (two) times daily before meals.  Qty: 90 capsule, Refills: 3    Associated Diagnoses: Gastroesophageal reflux disease without esophagitis      FLONASE ALLERGY RELIEF 50 mcg/actuation nasal spray SHAKE LIQUID AND USE 1 SPRAY(50 MCG) IN EACH NOSTRIL EVERY DAY  Qty: 9.9 mL, Refills: 11    Associated Diagnoses: Non-seasonal allergic rhinitis, unspecified trigger      ipratropium (ATROVENT) 0.03 % nasal spray 2 sprays by Nasal route 2 (two) times daily. May be use more often if needed  Qty: 30 mL, Refills: 11      montelukast (SINGULAIR) 10 mg tablet TAKE 1 TABLET(10 MG) BY MOUTH EVERY EVENING  Qty: 30 tablet, Refills: 11      spironolactone (ALDACTONE) 25 MG tablet Take 1 tablet (25 mg total) by mouth 2 (two) times daily. .  Qty: 180 tablet, Refills: 3      SYMBICORT 160-4.5 mcg/actuation HFAA       traMADol (ULTRAM) 50 mg tablet Take 1 tablet (50 mg total) by mouth every 8 (eight) hours as needed for Pain.  Qty: 90 tablet, Refills: 2    Comments: Quantity prescribed more than 7 day supply? Yes, quantity medically necessary      zolpidem (AMBIEN) 10 mg Tab TAKE 1 TABLET BY MOUTH EVERY DAY AT BEDTIME  Qty: 20 tablet, Refills: 0    Associated Diagnoses: Sleep disorder         STOP taking these medications       VITAMIN D2 50,000 unit capsule Comments:   Reason for Stopping:                   Discharge Diagnosis: Lumbar spondylosis [M47.816]  Condition on Discharge: Stable with no complications to procedure   Diet on Discharge: Same as before.  Activity: as per instruction sheet.  Discharge to: Home with a responsible adult.  Follow up: 2-4 weeks       Please call my  office or pager at [phone] if experienced any weakness or loss of sensation, fever > 101.5, pain uncontrolled with oral medications, persistent nausea/vomiting/or diarrhea, redness or drainage from the incisions, or any other worrisome concerns. If physician on call was not reached or could not communicate with our office for any reason please go to the nearest emergency department

## 2020-01-28 NOTE — OP NOTE
Lumbar Medial nerve branch block radiofrequency ablation Under Fluoroscopy     Time-out taken to identify patient and procedure side prior to starting the procedure.     01/28/2020    PROCEDURE: Right radiofrequency ablation of the the medial branch nerves at the   transverse process of  L4, L5 and sacral ala    2)Conscious sedation provided by MD     REASON FOR PROCEDURE: Lumbar spondylosis [M47.816]     PHYSICIAN: Fredy Magana MD     ASSISTANTS: Tye Carpenter DO, [employer] Pain Fellow PGY-5    MEDICATIONS INJECTED: 0.25% Bupivicaine total 6mL     LOCAL ANESTHETIC USED: Xylocaine 1% 1mL / site     ESTIMATED BLOOD LOSS: None.     COMPLICATIONS: None.     Interval history: Patient reports that he had complete relief of pain for the day of the procedure, we will proceed with the RFA     TECHNIQUE: Laying in a prone position, the patient was prepped and draped in the usual sterile fashion using ChloraPrep and fenestrated drape. The level was determined under fluoroscopic guidance. Local anesthetic was given by going down to the hub of the 27-gauge 1.25in needle and raising a wheel. A 18-gauge 10mm curved active tip needle was introduced to the anatomic local of the medial branch at each of the above levels using fluoroscopy. Then sensory and motor testing was performed to confirm that the needle tips were in the correct location. Then after negative aspiration, 1 mL of 0.25% bupivacaine was injected into each level. This was followed by thermal lesioning at 80 degrees celsius for 90 seconds.     Conscious sedation provided by M.D   The patient was monitored with continuous pulse oximetry, EKG, and intermittent blood pressure monitors. The patient was hemodynamically stable throughout the entire process was responsive to voice, and breathing spontaneously. Supplemental O2 was provided at 2L/min via nasal cannula. Patient was comfortable for the duration of the procedure. (See nurse documentation and case log for sedation  time)    There was a total of 2mg IV Midazolam and 50mcg Fentanyl titrated for the procedure    The patient tolerated the procedure well. Did not have any procedure related motor deficit at the conclusion of the procedure    The patient was monitored after the procedure. Patient was given post procedure and discharge instructions to follow at home. We will see the patient back in two weeks or the patient may call to inform of status. The patient was discharged in a stable condition

## 2020-01-29 NOTE — TELEPHONE ENCOUNTER
US of breast scheduled     Please also arrange diagnostic mammogram to that appt time   Looks like testicular US needs to be scheduled as well - thanks!

## 2020-01-29 NOTE — TELEPHONE ENCOUNTER
Contacted patient and offered appointment for today for ultrasound of scrotum. Prefers tomorrow since he has other imagining scheduled tomorrow. Appointment scheduled.

## 2020-01-30 ENCOUNTER — TELEPHONE (OUTPATIENT)
Dept: INTERNAL MEDICINE | Facility: CLINIC | Age: 60
End: 2020-01-30

## 2020-01-30 ENCOUNTER — HOSPITAL ENCOUNTER (OUTPATIENT)
Dept: RADIOLOGY | Facility: OTHER | Age: 60
Discharge: HOME | End: 2020-01-30
Attending: INTERNAL MEDICINE
Payer: MEDICARE

## 2020-01-30 VITALS — WEIGHT: 265 LBS | HEIGHT: 71 IN | BODY MASS INDEX: 37.1 KG/M2

## 2020-01-30 DIAGNOSIS — Z98.1 STATUS POST CERVICAL SPINAL FUSION: ICD-10-CM

## 2020-01-30 DIAGNOSIS — N62 GYNECOMASTIA: ICD-10-CM

## 2020-01-30 DIAGNOSIS — M54.2 NECK PAIN: Primary | ICD-10-CM

## 2020-01-30 DIAGNOSIS — M54.2 NECK PAIN: ICD-10-CM

## 2020-01-30 DIAGNOSIS — E28.0 ESTROGEN EXCESS: ICD-10-CM

## 2020-01-30 DIAGNOSIS — N64.4 BREAST TENDERNESS IN MALE: ICD-10-CM

## 2020-01-30 PROCEDURE — 72050 X-RAY EXAM NECK SPINE 4/5VWS: CPT | Mod: TC,FY

## 2020-01-30 PROCEDURE — 76870 US EXAM SCROTUM: CPT | Mod: TC

## 2020-01-30 PROCEDURE — 77062 MAMMO DIGITAL DIAGNOSTIC BILAT WITH TOMOSYNTHESIS_CAD: ICD-10-PCS | Mod: 26,,, | Performed by: RADIOLOGY

## 2020-01-30 PROCEDURE — 77066 DX MAMMO INCL CAD BI: CPT | Mod: TC

## 2020-01-30 PROCEDURE — 72050 XR CERVICAL SPINE COMPLETE 5 VIEW: ICD-10-PCS | Mod: 26,,, | Performed by: RADIOLOGY

## 2020-01-30 PROCEDURE — 76870 US SCROTUM AND TESTICLES: ICD-10-PCS | Mod: 26,,, | Performed by: RADIOLOGY

## 2020-01-30 PROCEDURE — 76870 US EXAM SCROTUM: CPT | Mod: 26,,, | Performed by: RADIOLOGY

## 2020-01-30 PROCEDURE — 72050 X-RAY EXAM NECK SPINE 4/5VWS: CPT | Mod: 26,,, | Performed by: RADIOLOGY

## 2020-01-30 PROCEDURE — 77062 BREAST TOMOSYNTHESIS BI: CPT | Mod: 26,,, | Performed by: RADIOLOGY

## 2020-01-30 PROCEDURE — 77066 DX MAMMO INCL CAD BI: CPT | Mod: 26,,, | Performed by: RADIOLOGY

## 2020-01-30 PROCEDURE — 77066 MAMMO DIGITAL DIAGNOSTIC BILAT WITH TOMOSYNTHESIS_CAD: ICD-10-PCS | Mod: 26,,, | Performed by: RADIOLOGY

## 2020-01-30 NOTE — TELEPHONE ENCOUNTER
Please notify pt that:     1. Testicular ultrasound was normal - no masses present - I recommend checking abdominal CT scan to further evaluate his symptoms/look for a cause  2. Diagnostic mammogram was negative for mass   3. Xray showed arthritis as expected - I signed referral to neurosurgery for further evaluation    - please schedule neurosurgery appt   - please schedule CT abdomen/pelvis with adrenal protocol

## 2020-01-30 NOTE — TELEPHONE ENCOUNTER
Contacted and informed patient testicular ultrasound was normal and no masses present. Recommends checking abdominal CT scan to further evaluate his symptoms/look for a cause. Mammogram was negative for mass and Xray showed arthritis, referral to neurosurgery for further evaluation. Verbalized understanding with no further questions. Scheduled CT scan and informed patient fast four hours prior to appointment and arrive two hours earlier to receive prep. Will contact neurosurgery since there were no available appointments available at the [facility].

## 2020-02-04 ENCOUNTER — PATIENT OUTREACH (OUTPATIENT)
Dept: ADMINISTRATIVE | Facility: OTHER | Age: 60
End: 2020-02-04

## 2020-02-05 ENCOUNTER — HOSPITAL ENCOUNTER (OUTPATIENT)
Dept: RADIOLOGY | Facility: OTHER | Age: 60
Discharge: HOME | End: 2020-02-05
Attending: INTERNAL MEDICINE
Payer: MEDICARE

## 2020-02-05 ENCOUNTER — OFFICE VISIT (OUTPATIENT)
Dept: SLEEP MEDICINE | Facility: CLINIC | Age: 60
End: 2020-02-05
Payer: MEDICARE

## 2020-02-05 VITALS
DIASTOLIC BLOOD PRESSURE: 83 MMHG | HEIGHT: 71 IN | SYSTOLIC BLOOD PRESSURE: 124 MMHG | HEART RATE: 80 BPM | BODY MASS INDEX: 37.71 KG/M2 | WEIGHT: 269.38 LBS

## 2020-02-05 DIAGNOSIS — G47.33 OSA (OBSTRUCTIVE SLEEP APNEA): Primary | ICD-10-CM

## 2020-02-05 DIAGNOSIS — E66.01 SEVERE OBESITY (BMI 35.0-39.9) WITH COMORBIDITY: ICD-10-CM

## 2020-02-05 DIAGNOSIS — I27.9 CHRONIC PULMONARY HEART DISEASE: ICD-10-CM

## 2020-02-05 DIAGNOSIS — N62 GYNECOMASTIA: ICD-10-CM

## 2020-02-05 DIAGNOSIS — I50.32 DIASTOLIC DYSFUNCTION WITH CHRONIC HEART FAILURE: ICD-10-CM

## 2020-02-05 DIAGNOSIS — E28.0 ESTROGEN EXCESS: ICD-10-CM

## 2020-02-05 PROCEDURE — 99203 OFFICE O/P NEW LOW 30 MIN: CPT | Mod: S$GLB,,, | Performed by: INTERNAL MEDICINE

## 2020-02-05 PROCEDURE — 74178 CT ABDOMEN PELVIS W WO CONTRAST: ICD-10-PCS | Mod: 26,,, | Performed by: RADIOLOGY

## 2020-02-05 PROCEDURE — 99999 PR PBB SHADOW E&M-EST. PATIENT-LVL III: CPT | Mod: PBBFAC,,, | Performed by: INTERNAL MEDICINE

## 2020-02-05 PROCEDURE — 3074F PR MOST RECENT SYSTOLIC BLOOD PRESSURE < 130 MM HG: ICD-10-PCS | Mod: CPTII,S$GLB,, | Performed by: INTERNAL MEDICINE

## 2020-02-05 PROCEDURE — 3079F PR MOST RECENT DIASTOLIC BLOOD PRESSURE 80-89 MM HG: ICD-10-PCS | Mod: CPTII,S$GLB,, | Performed by: INTERNAL MEDICINE

## 2020-02-05 PROCEDURE — 74178 CT ABD&PLV WO CNTR FLWD CNTR: CPT | Mod: TC

## 2020-02-05 PROCEDURE — 3074F SYST BP LT 130 MM HG: CPT | Mod: CPTII,S$GLB,, | Performed by: INTERNAL MEDICINE

## 2020-02-05 PROCEDURE — 3008F PR BODY MASS INDEX (BMI) DOCUMENTED: ICD-10-PCS | Mod: CPTII,S$GLB,, | Performed by: INTERNAL MEDICINE

## 2020-02-05 PROCEDURE — 74178 CT ABD&PLV WO CNTR FLWD CNTR: CPT | Mod: 26,,, | Performed by: RADIOLOGY

## 2020-02-05 PROCEDURE — 3008F BODY MASS INDEX DOCD: CPT | Mod: CPTII,S$GLB,, | Performed by: INTERNAL MEDICINE

## 2020-02-05 PROCEDURE — 3079F DIAST BP 80-89 MM HG: CPT | Mod: CPTII,S$GLB,, | Performed by: INTERNAL MEDICINE

## 2020-02-05 PROCEDURE — 99499 RISK ADDL DX/OHS AUDIT: ICD-10-PCS | Mod: S$GLB,,, | Performed by: INTERNAL MEDICINE

## 2020-02-05 PROCEDURE — 99999 PR PBB SHADOW E&M-EST. PATIENT-LVL III: ICD-10-PCS | Mod: PBBFAC,,, | Performed by: INTERNAL MEDICINE

## 2020-02-05 PROCEDURE — 99499 UNLISTED E&M SERVICE: CPT | Mod: S$GLB,,, | Performed by: INTERNAL MEDICINE

## 2020-02-05 PROCEDURE — 99203 PR OFFICE/OUTPT VISIT, NEW, LEVL III, 30-44 MIN: ICD-10-PCS | Mod: S$GLB,,, | Performed by: INTERNAL MEDICINE

## 2020-02-05 PROCEDURE — 25500020 PHARM REV CODE 255: Performed by: INTERNAL MEDICINE

## 2020-02-05 RX ADMIN — IOHEXOL 100 ML: 350 INJECTION, SOLUTION INTRAVENOUS at 11:02

## 2020-02-06 ENCOUNTER — TELEPHONE (OUTPATIENT)
Dept: INTERNAL MEDICINE | Facility: CLINIC | Age: 60
End: 2020-02-06

## 2020-02-06 DIAGNOSIS — N62 GYNECOMASTIA, MALE: Primary | ICD-10-CM

## 2020-02-06 DIAGNOSIS — E28.0 ESTROGEN EXCESS: ICD-10-CM

## 2020-02-06 NOTE — TELEPHONE ENCOUNTER
Please notify pt that his CT scan show normal adrenal glands - good news!   I suspect the elevated estrogen is due to obesity and recommend weight loss to improve this. This may also be due to mediations.     I recommend following up with Endocrinology for further evaluation - please arrange appointment

## 2020-02-07 ENCOUNTER — OFFICE VISIT (OUTPATIENT)
Dept: OTOLARYNGOLOGY | Facility: CLINIC | Age: 60
End: 2020-02-07
Payer: MEDICARE

## 2020-02-07 VITALS
WEIGHT: 271.38 LBS | SYSTOLIC BLOOD PRESSURE: 132 MMHG | HEART RATE: 87 BPM | BODY MASS INDEX: 37.99 KG/M2 | DIASTOLIC BLOOD PRESSURE: 91 MMHG | HEIGHT: 71 IN | TEMPERATURE: 97 F

## 2020-02-07 DIAGNOSIS — G47.33 OSA (OBSTRUCTIVE SLEEP APNEA): ICD-10-CM

## 2020-02-07 DIAGNOSIS — J34.2 NASAL SEPTAL DEVIATION: ICD-10-CM

## 2020-02-07 DIAGNOSIS — J30.89 NON-SEASONAL ALLERGIC RHINITIS, UNSPECIFIED TRIGGER: Primary | ICD-10-CM

## 2020-02-07 DIAGNOSIS — R51.9 NONINTRACTABLE EPISODIC HEADACHE, UNSPECIFIED HEADACHE TYPE: ICD-10-CM

## 2020-02-07 PROCEDURE — 99213 OFFICE O/P EST LOW 20 MIN: CPT | Mod: S$GLB,,, | Performed by: SPECIALIST

## 2020-02-07 PROCEDURE — 99213 PR OFFICE/OUTPT VISIT, EST, LEVL III, 20-29 MIN: ICD-10-PCS | Mod: S$GLB,,, | Performed by: SPECIALIST

## 2020-02-07 PROCEDURE — 3080F PR MOST RECENT DIASTOLIC BLOOD PRESSURE >= 90 MM HG: ICD-10-PCS | Mod: CPTII,S$GLB,, | Performed by: SPECIALIST

## 2020-02-07 PROCEDURE — 3008F BODY MASS INDEX DOCD: CPT | Mod: CPTII,S$GLB,, | Performed by: SPECIALIST

## 2020-02-07 PROCEDURE — 3075F PR MOST RECENT SYSTOLIC BLOOD PRESS GE 130-139MM HG: ICD-10-PCS | Mod: CPTII,S$GLB,, | Performed by: SPECIALIST

## 2020-02-07 PROCEDURE — 3075F SYST BP GE 130 - 139MM HG: CPT | Mod: CPTII,S$GLB,, | Performed by: SPECIALIST

## 2020-02-07 PROCEDURE — 3008F PR BODY MASS INDEX (BMI) DOCUMENTED: ICD-10-PCS | Mod: CPTII,S$GLB,, | Performed by: SPECIALIST

## 2020-02-07 PROCEDURE — 3080F DIAST BP >= 90 MM HG: CPT | Mod: CPTII,S$GLB,, | Performed by: SPECIALIST

## 2020-02-07 NOTE — PROGRESS NOTES
Subjective:       Patient ID: Bri Santiago is a 59 y.o. male.    Chief Complaint: Follow-up    The patient is returning for follow-up visit.  Overall, he feels that he has improved significantly since surgery. There are multiple issues to discuss:  1.  He contracted the flu 3 weeks ago.  He has had sore throat congestion, cough and rhinorrhea.  Symptoms have finally settled down over the last 5 days.  Nasal secretions are clear.  He does not have fever.  2.  Regarding his sleep apnea he did see the sleep study clinic and is scheduled for another CPAP study in the near future.  3.  His headaches continue but are improved in frequency and severity.  No longer to the occur on an everyday basis.  They did generally are mild with the severity of 2-3/10 and typically occur 2 or 3 days per week.  The generally are present upon awakening and her mild frontal 1 forehead pain.  They typically last about 2 hr and then resolved.  4.  Regarding his allergic rhinitis he is taking OTC antihistamines, Flonase and Singulair on a daily basis.  He does have some congestion and postnasal drip.  Nasal secretions are clear or white.  Sputum is clear white when he coughs it up.  He does not have fever.    Review of Systems   Constitutional: Positive for fatigue. Negative for activity change, appetite change, chills, fever and unexpected weight change.   HENT: Positive for congestion, postnasal drip, rhinorrhea, sinus pressure, sinus pain and sore throat. Negative for ear discharge, ear pain, facial swelling, hearing loss, mouth sores, sneezing, tinnitus, trouble swallowing and voice change.    Eyes: Negative for photophobia, pain, discharge, redness, itching and visual disturbance.   Respiratory: Positive for cough. Negative for apnea, choking, shortness of breath and wheezing.    Cardiovascular: Negative for chest pain and palpitations.   Gastrointestinal: Negative for abdominal distention, abdominal pain, nausea and vomiting.    Musculoskeletal: Negative for arthralgias, myalgias, neck pain and neck stiffness.   Skin: Negative.  Negative for color change, pallor and rash.   Allergic/Immunologic: Positive for environmental allergies. Negative for food allergies and immunocompromised state.   Neurological: Positive for headaches. Negative for dizziness, facial asymmetry, speech difficulty, weakness, light-headedness and numbness.   Hematological: Negative for adenopathy. Does not bruise/bleed easily.   Psychiatric/Behavioral: Positive for sleep disturbance. Negative for agitation, confusion and decreased concentration.       Objective:      Physical Exam   Constitutional: He is oriented to person, place, and time. He appears well-developed and well-nourished. He is cooperative.   HENT:   Head: Normocephalic.   Right Ear: External ear and ear canal normal. Tympanic membrane is retracted.   Left Ear: External ear and ear canal normal. Tympanic membrane is retracted.   Nose: Mucosal edema (cyanotic, boggy inferior turbinates bilaterally), rhinorrhea (clear mucus bilaterally) and septal deviation (To the right) present.   Mouth/Throat: Uvula is midline, oropharynx is clear and moist and mucous membranes are normal. No oral lesions.   Oral cavity-Mitchell class 3, severe hyperactive gag reflex prohibits visualization of the oropharynx, long thick palate, uvula and tonsils not visualized   Eyes: Pupils are equal, round, and reactive to light. EOM and lids are normal. Right eye exhibits no discharge and no exudate. Left eye exhibits no discharge and no exudate. Right conjunctiva is injected. Left conjunctiva is injected.   Neck: Trachea normal and normal range of motion. No muscular tenderness present. No tracheal deviation present. No thyroid mass and no thyromegaly present.   Cardiovascular: Normal rate, regular rhythm, normal heart sounds and normal pulses.   Pulmonary/Chest: Effort normal and breath sounds normal. No stridor. He has no  decreased breath sounds. He has no wheezes. He has no rhonchi. He has no rales.   Abdominal: Soft. Bowel sounds are normal. There is no tenderness.   Musculoskeletal: Normal range of motion.   Lymphadenopathy:        Head (right side): No submental, no submandibular, no preauricular, no posterior auricular and no occipital adenopathy present.        Head (left side): No submental, no submandibular, no preauricular, no posterior auricular and no occipital adenopathy present.     He has no cervical adenopathy.   Neurological: He is alert and oriented to person, place, and time. He has normal strength. No cranial nerve deficit or sensory deficit. Gait normal.   Skin: Skin is warm and dry. No petechiae and no rash noted. No cyanosis. Nails show no clubbing.   Psychiatric: He has a normal mood and affect. His speech is normal and behavior is normal. Judgment and thought content normal. Cognition and memory are normal.       Assessment:       1. Non-seasonal allergic rhinitis, unspecified trigger    2. Nasal septal deviation    3. Nonintractable episodic headache, unspecified headache type    4. ABDON (obstructive sleep apnea)        Plan:       I will recheck the patient in 3 months, or sooner on an as-needed basis.  He is will use saline nasal rinses to clear his nasal passages on an as-needed basis.  I have asked him to notify me when he has completed his sleep study.

## 2020-02-18 NOTE — ED NOTES
"Subjective:      Vivi Martinez is a 5 y.o. female here with mother. Patient brought in for   Nasal Congestion      History of Present Illness:  She has had congestion with thick green rhinorrhea. Today both eyes red and crusted shut with yellow discharge. Ears are hurting, right ear "whistling" and was crying last night. They are headed out of town later this week. She felt warm but their thermometer was broken.      Review of Systems   Constitutional: Negative for activity change, appetite change and fever.   HENT: Positive for congestion, ear pain and rhinorrhea.    Eyes: Positive for discharge and redness.   Respiratory: Positive for cough. Negative for wheezing and stridor.    Gastrointestinal: Negative for vomiting.   Genitourinary: Negative for decreased urine volume.   Skin: Negative for rash.   Psychiatric/Behavioral: Negative for sleep disturbance.       Objective:     Vitals:    02/18/20 1302   Pulse: (!) 160   Temp: 98.2 °F (36.8 °C)       Physical Exam   Constitutional: She is active.   HENT:   Left Ear: Tympanic membrane normal.   Nose: No nasal discharge.   Mouth/Throat: Mucous membranes are moist. No tonsillar exudate.   OP erythema with 2+ tonsils, right TM erythematous, dull, purulent effusion with ?previous perforation.    Eyes: EOM are normal. Right eye exhibits no discharge. Left eye exhibits no discharge.   Conjunctival erythema bilaterally   Neck: Normal range of motion.   Cardiovascular: Normal rate, regular rhythm, S1 normal and S2 normal.   No murmur heard.  HR normal on exam   Pulmonary/Chest: Effort normal and breath sounds normal. No stridor. She has no wheezes. She has no rales. She exhibits no retraction.   Lymphadenopathy:     She has no cervical adenopathy.   Neurological: She is alert.   Skin: Skin is warm. Capillary refill takes less than 2 seconds. No rash noted.   Vitals reviewed.      Assessment:        1. Non-recurrent acute suppurative otitis media of right ear with " Dr. Humphrey at .    spontaneous rupture of tympanic membrane    2. Acute bacterial conjunctivitis of both eyes         Plan:     Will start Augmentin for AOM due to conjunctivitis-otitis.   Call if not improving in next 3-4 days.   Motrin/tylenol prn pain    Gillian Casarez MD  2/18/2020

## 2020-02-20 ENCOUNTER — TELEPHONE (OUTPATIENT)
Dept: SLEEP MEDICINE | Facility: OTHER | Age: 60
End: 2020-02-20

## 2020-02-26 ENCOUNTER — TELEPHONE (OUTPATIENT)
Dept: PAIN MEDICINE | Facility: CLINIC | Age: 60
End: 2020-02-26

## 2020-02-26 ENCOUNTER — OFFICE VISIT (OUTPATIENT)
Dept: ENDOCRINOLOGY | Facility: CLINIC | Age: 60
End: 2020-02-26
Attending: INTERNAL MEDICINE
Payer: MEDICARE

## 2020-02-26 VITALS
BODY MASS INDEX: 37.13 KG/M2 | DIASTOLIC BLOOD PRESSURE: 86 MMHG | HEIGHT: 71 IN | WEIGHT: 265.19 LBS | SYSTOLIC BLOOD PRESSURE: 133 MMHG | TEMPERATURE: 98 F | HEART RATE: 101 BPM

## 2020-02-26 DIAGNOSIS — E66.01 SEVERE OBESITY (BMI 35.0-39.9) WITH COMORBIDITY: ICD-10-CM

## 2020-02-26 DIAGNOSIS — N62 GYNECOMASTIA, MALE: Primary | ICD-10-CM

## 2020-02-26 DIAGNOSIS — I10 ESSENTIAL HYPERTENSION: ICD-10-CM

## 2020-02-26 PROCEDURE — 3079F PR MOST RECENT DIASTOLIC BLOOD PRESSURE 80-89 MM HG: ICD-10-PCS | Mod: CPTII,S$GLB,, | Performed by: INTERNAL MEDICINE

## 2020-02-26 PROCEDURE — 99204 PR OFFICE/OUTPT VISIT, NEW, LEVL IV, 45-59 MIN: ICD-10-PCS | Mod: S$GLB,,, | Performed by: INTERNAL MEDICINE

## 2020-02-26 PROCEDURE — 3075F PR MOST RECENT SYSTOLIC BLOOD PRESS GE 130-139MM HG: ICD-10-PCS | Mod: CPTII,S$GLB,, | Performed by: INTERNAL MEDICINE

## 2020-02-26 PROCEDURE — 3008F BODY MASS INDEX DOCD: CPT | Mod: CPTII,S$GLB,, | Performed by: INTERNAL MEDICINE

## 2020-02-26 PROCEDURE — 3075F SYST BP GE 130 - 139MM HG: CPT | Mod: CPTII,S$GLB,, | Performed by: INTERNAL MEDICINE

## 2020-02-26 PROCEDURE — 99499 RISK ADDL DX/OHS AUDIT: ICD-10-PCS | Mod: S$GLB,,, | Performed by: INTERNAL MEDICINE

## 2020-02-26 PROCEDURE — 3079F DIAST BP 80-89 MM HG: CPT | Mod: CPTII,S$GLB,, | Performed by: INTERNAL MEDICINE

## 2020-02-26 PROCEDURE — 3008F PR BODY MASS INDEX (BMI) DOCUMENTED: ICD-10-PCS | Mod: CPTII,S$GLB,, | Performed by: INTERNAL MEDICINE

## 2020-02-26 PROCEDURE — 99204 OFFICE O/P NEW MOD 45 MIN: CPT | Mod: S$GLB,,, | Performed by: INTERNAL MEDICINE

## 2020-02-26 PROCEDURE — 99499 UNLISTED E&M SERVICE: CPT | Mod: S$GLB,,, | Performed by: INTERNAL MEDICINE

## 2020-02-26 NOTE — PATIENT INSTRUCTIONS
For the breast growth: Stop spironolactone. Recheck blood test in 3 months.    If not improved, will have additional imaging to consider.    In the meantime, keep up the efforts on the diet, exercise.   Sometimes it takes a while, is slow going.

## 2020-02-26 NOTE — PROGRESS NOTES
Subjective:      Chief Complaint: Gynecomastia    HPI: Bri Santiago is a 59 y.o. male who is here for an initial evaluation for gynecomsastia     Pt noted symptoms for a few months. Breast growth, tenderness/soreness. Hasn't noticed any discharge.     +ED, for about a year or so.    Currently on spironolactone since around 4/2019 per chart.    Had some evaluation already:  Mammogram benign. Normal US of scrotum and testes.  LH, TSH normal. No prolactin.    Working on diet, exercise, but weight goes up and down. Lost a few lbs lately but hasn't had great success with the diet so far. Exercise 3x per week, goes to gym, active with treadmill, weights, etc.  Pt and wife watching what they eat, she is tracking on myfitness pal, around 8526-1841 kyle/day, pt reports eating about the same amounts.    Today pt reports feeling okay. Breast tenderness/growth as above. And ED. Otherwise no acute complaints/concerns today.    Reviewed past medical, family, social history and updated as appropriate.    Review of Systems   Constitutional: Negative for unexpected weight change (up and down).   HENT: Negative for trouble swallowing.    Eyes: Negative for visual disturbance.   Respiratory: Negative for shortness of breath.    Cardiovascular: Negative for palpitations.   Gastrointestinal: Negative for constipation and diarrhea.   Endocrine: Negative for polydipsia.        Breast growth, tenderness   Genitourinary:        +ED   Musculoskeletal: Positive for back pain.   Neurological: Negative for light-headedness.   Psychiatric/Behavioral: Negative for sleep disturbance.     Objective:     Vitals:    02/26/20 1352   BP: 133/86   Pulse: 101   Temp: 98.1 °F (36.7 °C)     BP Readings from Last 5 Encounters:   02/26/20 133/86   02/07/20 (!) 132/91   02/05/20 124/83   01/28/20 (!) 121/58   01/22/20 104/68     Physical Exam   Constitutional: He is oriented to person, place, and time. He appears well-developed and well-nourished. No  distress.   Obese   HENT:   Head: Normocephalic and atraumatic.   Eyes: EOM are normal.   Neck: Normal range of motion. Neck supple. No thyromegaly present.   Cardiovascular: Normal rate, regular rhythm and normal heart sounds.   No murmur heard.  Pulmonary/Chest: Effort normal and breath sounds normal.   Abdominal: Soft. He exhibits no distension and no mass. There is no tenderness.   Musculoskeletal: Normal range of motion. He exhibits no edema or tenderness.   Neurological: He is alert and oriented to person, place, and time.   Skin:   +breast tissue, TTP. No discharge   Psychiatric: He has a normal mood and affect. Judgment normal.   Vitals reviewed.      Wt Readings from Last 5 Encounters:   02/26/20 1352 120.3 kg (265 lb 3.4 oz)   02/07/20 1156 123.1 kg (271 lb 6.4 oz)   02/05/20 1007 122.2 kg (269 lb 6.4 oz)   01/30/20 0830 120.2 kg (264 lb 15.9 oz)   01/28/20 1103 120.2 kg (265 lb)       Lab Results   Component Value Date    HGBA1C 4.5 01/23/2020    HGBA1C 4.6 10/16/2017    HGBA1C 4.7 03/11/2014    HGBA1C 4.5 05/25/2012     Lab Results   Component Value Date    CHOL 111 (L) 01/23/2020    CHOL 207 (H) 12/04/2018    HDL 29 (L) 01/23/2020    HDL 35 (L) 12/04/2018    LDLCALC 66.8 01/23/2020    LDLCALC 146.4 12/04/2018    TRIG 76 01/23/2020    TRIG 128 12/04/2018    CHOLHDL 26.1 01/23/2020    CHOLHDL 16.9 (L) 12/04/2018     Lab Results   Component Value Date     01/23/2020    K 4.1 01/23/2020     01/23/2020    CO2 29 01/23/2020    GLU 89 01/23/2020    BUN 14 01/23/2020    CREATININE 1.0 01/23/2020    CALCIUM 8.9 01/23/2020    PROT 7.3 01/23/2020    ALBUMIN 4.0 01/23/2020    BILITOT 1.0 01/23/2020    ALKPHOS 67 01/23/2020    AST 22 01/23/2020    ALT 27 01/23/2020    ANIONGAP 8 01/23/2020    ESTGFRAFRICA >60 01/23/2020    EGFRNONAA >60 01/23/2020    TSH 0.887 01/23/2020      Assessment/Plan:     Gynecomastia, male  Pt reports symptoms over the last several months   - had mammogram, no malignancy noted    - labs showed normal testosterone, LH, TSH. But elevated estradiol   - On review of medication list, pt has been on Spironolactone for the last year or so. Discussed with pt that this medication has a known side effect of gynecomastia, which can be a large part of his symptoms   - also discussed that obesity leads to increased estrogen by conversion of testosterone -> estrogen in adipose tissue   - encourage pt to continue working on diet, exercise   - discussed that it was reassuring that testicular US, mammogram were benign.   - at this point, recommend stop Spironolactone. Recheck after a few months (can take a while to improve/resolve), though occasionally treatment requires surgery if bothersome features persist   - if continues to get worse even without spironolactone, would investigate for adrenal nodule with abdominal imaging        Severe obesity (BMI 35.0-39.9) with comorbidity  BMI elevated, 37 today   - complicated by HTN, HLD   - discussed with pt the involvement of weight in estrogen, testosterone, etc   - pt reported already working on diet, trying to stay under 1500 kyle/day, and exercise 3 days per week. encourage him to keep it up. Discussed weight loss is not a fast process, takes a while usually, try not to get discouraged.   - can consider medication for weight loss if interested next time      HTN (hypertension)  bp controlled today   - not on a lot of medications   - on spironolactone, stopping that as above due to side effects   - encourage pt to monitor BP for now, see if he needs to resume taking anything else instead. F/u with PCP          Follow up in about 3 months (around 5/26/2020).

## 2020-02-26 NOTE — ASSESSMENT & PLAN NOTE
Pt reports symptoms over the last several months   - had mammogram, no malignancy noted   - labs showed normal testosterone, LH, TSH. But elevated estradiol   - On review of medication list, pt has been on Spironolactone for the last year or so. Discussed with pt that this medication has a known side effect of gynecomastia, which can be a large part of his symptoms   - also discussed that obesity leads to increased estrogen by conversion of testosterone -> estrogen in adipose tissue   - encourage pt to continue working on diet, exercise   - discussed that it was reassuring that testicular US, mammogram were benign.   - at this point, recommend stop Spironolactone. Recheck after a few months (can take a while to improve/resolve), though occasionally treatment requires surgery if bothersome features persist   - if continues to get worse even without spironolactone, would investigate for adrenal nodule with abdominal imaging

## 2020-02-26 NOTE — TELEPHONE ENCOUNTER
My name is Staff, I am contacting you from [facility] regarding your appointment scheduled for 02.27.20, with JERI, just confirming you will be able to make it.    If you feel you need to reschedule or canceled please give our office a call so we can better assist you.      Staff requesting patient to arrive 15 mins ahead of schedule appointment time.    Pt verbalized understanding and has confirmed appt

## 2020-02-26 NOTE — ASSESSMENT & PLAN NOTE
BMI elevated, 37 today   - complicated by HTN, HLD   - discussed with pt the involvement of weight in estrogen, testosterone, etc   - pt reported already working on diet, trying to stay under 1500 kyle/day, and exercise 3 days per week. encourage him to keep it up. Discussed weight loss is not a fast process, takes a while usually, try not to get discouraged.   - can consider medication for weight loss if interested next time

## 2020-02-26 NOTE — ASSESSMENT & PLAN NOTE
bp controlled today   - not on a lot of medications   - on spironolactone, stopping that as above due to side effects   - encourage pt to monitor BP for now, see if he needs to resume taking anything else instead. F/u with PCP

## 2020-02-26 NOTE — LETTER
February 26, 2020      Rehana Lopez MD  [address] [facility] [id number]           [facility] [address] [place] [id number]  Phone: [phone]  Fax: [phone]          Patient: Bri Santiago   MR Number: [id number]   YOB: 1960   Date of Visit: 2/26/2020       Dear Dr. Rehana Lopez:    Thank you for referring Bri Santiago to me for evaluation. Attached you will find relevant portions of my assessment and plan of care.    If you have questions, please do not hesitate to call me. I look forward to following Bri Santiago along with you.    Sincerely,    Chris Min MD    Enclosure  CC:  No Recipients    If you would like to receive this communication electronically, please contact [email or web address] or [phone] to request more information on [facility] access.    For providers and/or their staff who would like to refer a patient to [facility], please contact us through our one-stop-shop provider referral line, [facility], at [phone].    If you feel you have received this communication in error or would no longer like to receive these types of communications, please e-mail [email or web address]

## 2020-02-27 ENCOUNTER — OFFICE VISIT (OUTPATIENT)
Dept: PAIN MEDICINE | Facility: CLINIC | Age: 60
End: 2020-02-27
Payer: MEDICARE

## 2020-02-27 VITALS
HEART RATE: 98 BPM | OXYGEN SATURATION: 100 % | SYSTOLIC BLOOD PRESSURE: 132 MMHG | WEIGHT: 262.56 LBS | RESPIRATION RATE: 18 BRPM | BODY MASS INDEX: 36.76 KG/M2 | TEMPERATURE: 97 F | DIASTOLIC BLOOD PRESSURE: 86 MMHG | HEIGHT: 71 IN

## 2020-02-27 DIAGNOSIS — M96.1 POSTLAMINECTOMY SYNDROME OF LUMBAR REGION: ICD-10-CM

## 2020-02-27 DIAGNOSIS — M47.816 OSTEOARTHRITIS OF LUMBAR SPINE, UNSPECIFIED SPINAL OSTEOARTHRITIS COMPLICATION STATUS: Primary | ICD-10-CM

## 2020-02-27 DIAGNOSIS — M79.10 MYALGIA: ICD-10-CM

## 2020-02-27 DIAGNOSIS — M51.36 DDD (DEGENERATIVE DISC DISEASE), LUMBAR: ICD-10-CM

## 2020-02-27 DIAGNOSIS — M96.1 CERVICAL POSTLAMINECTOMY SYNDROME: ICD-10-CM

## 2020-02-27 PROCEDURE — 99214 OFFICE O/P EST MOD 30 MIN: CPT | Mod: S$GLB,,, | Performed by: NURSE PRACTITIONER

## 2020-02-27 PROCEDURE — 3075F SYST BP GE 130 - 139MM HG: CPT | Mod: CPTII,S$GLB,, | Performed by: NURSE PRACTITIONER

## 2020-02-27 PROCEDURE — 3079F DIAST BP 80-89 MM HG: CPT | Mod: CPTII,S$GLB,, | Performed by: NURSE PRACTITIONER

## 2020-02-27 PROCEDURE — 3008F BODY MASS INDEX DOCD: CPT | Mod: CPTII,S$GLB,, | Performed by: NURSE PRACTITIONER

## 2020-02-27 PROCEDURE — 3079F PR MOST RECENT DIASTOLIC BLOOD PRESSURE 80-89 MM HG: ICD-10-PCS | Mod: CPTII,S$GLB,, | Performed by: NURSE PRACTITIONER

## 2020-02-27 PROCEDURE — 3008F PR BODY MASS INDEX (BMI) DOCUMENTED: ICD-10-PCS | Mod: CPTII,S$GLB,, | Performed by: NURSE PRACTITIONER

## 2020-02-27 PROCEDURE — 99214 PR OFFICE/OUTPT VISIT, EST, LEVL IV, 30-39 MIN: ICD-10-PCS | Mod: S$GLB,,, | Performed by: NURSE PRACTITIONER

## 2020-02-27 PROCEDURE — 99999 PR PBB SHADOW E&M-EST. PATIENT-LVL III: ICD-10-PCS | Mod: PBBFAC,,, | Performed by: NURSE PRACTITIONER

## 2020-02-27 PROCEDURE — 99999 PR PBB SHADOW E&M-EST. PATIENT-LVL III: CPT | Mod: PBBFAC,,, | Performed by: NURSE PRACTITIONER

## 2020-02-27 PROCEDURE — 3075F PR MOST RECENT SYSTOLIC BLOOD PRESS GE 130-139MM HG: ICD-10-PCS | Mod: CPTII,S$GLB,, | Performed by: NURSE PRACTITIONER
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
## 2020-03-02 ENCOUNTER — HOSPITAL ENCOUNTER (OUTPATIENT)
Dept: SLEEP MEDICINE | Facility: OTHER | Age: 60
Discharge: HOME | End: 2020-03-02
Attending: INTERNAL MEDICINE
Payer: MEDICARE

## 2020-03-02 DIAGNOSIS — E66.01 SEVERE OBESITY (BMI 35.0-39.9) WITH COMORBIDITY: ICD-10-CM

## 2020-03-02 DIAGNOSIS — I50.32 DIASTOLIC DYSFUNCTION WITH CHRONIC HEART FAILURE: ICD-10-CM

## 2020-03-02 DIAGNOSIS — G47.33 OSA (OBSTRUCTIVE SLEEP APNEA): ICD-10-CM

## 2020-03-02 DIAGNOSIS — I27.9 CHRONIC PULMONARY HEART DISEASE: ICD-10-CM

## 2020-03-02 PROCEDURE — 95811 PR POLYSOMNOGRAPHY W/CPAP: ICD-10-PCS | Mod: 26,,, | Performed by: INTERNAL MEDICINE

## 2020-03-02 PROCEDURE — 95811 POLYSOM 6/>YRS CPAP 4/> PARM: CPT | Mod: 26,,, | Performed by: INTERNAL MEDICINE

## 2020-03-02 PROCEDURE — 95811 POLYSOM 6/>YRS CPAP 4/> PARM: CPT

## 2020-03-03 ENCOUNTER — NURSE TRIAGE (OUTPATIENT)
Dept: ADMINISTRATIVE | Facility: CLINIC | Age: 60
End: 2020-03-03

## 2020-03-03 ENCOUNTER — OFFICE VISIT (OUTPATIENT)
Dept: OPTOMETRY | Facility: CLINIC | Age: 60
End: 2020-03-03
Payer: MEDICARE

## 2020-03-03 DIAGNOSIS — H10.11: Primary | ICD-10-CM

## 2020-03-03 DIAGNOSIS — E78.5 HYPERLIPIDEMIA, UNSPECIFIED HYPERLIPIDEMIA TYPE: ICD-10-CM

## 2020-03-03 PROCEDURE — 92002 PR EYE EXAM, NEW PATIENT,INTERMED: ICD-10-PCS | Mod: S$GLB,,, | Performed by: OPTOMETRIST

## 2020-03-03 PROCEDURE — 99999 PR PBB SHADOW E&M-EST. PATIENT-LVL II: ICD-10-PCS | Mod: PBBFAC,,, | Performed by: OPTOMETRIST

## 2020-03-03 PROCEDURE — 99999 PR PBB SHADOW E&M-EST. PATIENT-LVL II: CPT | Mod: PBBFAC,,, | Performed by: OPTOMETRIST

## 2020-03-03 PROCEDURE — 92002 INTRM OPH EXAM NEW PATIENT: CPT | Mod: S$GLB,,, | Performed by: OPTOMETRIST

## 2020-03-03 RX ORDER — ATORVASTATIN CALCIUM 40 MG/1
TABLET, FILM COATED ORAL
Qty: 90 TABLET | Refills: 3 | Status: SHIPPED | OUTPATIENT
Start: 2020-03-03 | End: 2021-01-04 | Stop reason: SDUPTHER

## 2020-03-03 RX ORDER — TOBRAMYCIN AND DEXAMETHASONE 3; 1 MG/ML; MG/ML
1 SUSPENSION/ DROPS OPHTHALMIC 4 TIMES DAILY
Qty: 1 BOTTLE | Refills: 0 | Status: SHIPPED | OUTPATIENT
Start: 2020-03-03 | End: 2020-03-05 | Stop reason: ALTCHOICE

## 2020-03-03 NOTE — PROGRESS NOTES
HPI     Mr. Bri Santiago was referred by pcp for a irritated right eye.    Patient complains of red itchy eyes OD. OS may have started becoming   irritated today, mucous discharge OD. OD symptoms started yesterday. Used   warm compresses this morning    Would patient like a refraction today? no     Paitent denies diplopia, headaches, flashes/floaters, itching, tearing,   burning, redness, and pain.    (-)drops    (-)Diabetes    OCULAR HISTORY  Last Eye Exam: 4/28/16 with Dr Mensah  (-)eye surgery   (-)diagnosed or treated for any eye conditions or diseases n/a    FAMILY HISTORY  (+ Father)Glaucoma        Last edited by Nirmala White, OD on 3/3/2020 12:47 PM. (History)            Assessment /Plan     For exam results, see Encounter Report.    Atopic conjunctivitis of right eye    Other orders  -     tobramycin-dexamethasone 0.3-0.1% (TOBRADEX) 0.3-0.1 % DrpS; Place 1 drop into the right eye 4 (four) times daily. for 7 days  Dispense: 1 Bottle; Refill: 0      Educated pt. Allergy Rxn vs. Bacterial. Rx tobradex 1 gtt OD QID for 7 days then d/c.   Will f/u in 2-3 days with IOP check and SLE. Pt will schedule a CEE after f/u.   If symptoms worsen or dont improve, RTC sooner.       Monitor in 2-3 days or sooner if needed.

## 2020-03-03 NOTE — TELEPHONE ENCOUNTER
Patient believes he has pink eye.  I have requested to schedule with clinic physician that is available to his availability or advised UC.  Reason for Disposition   [1] Caller requesting NON-URGENT health information AND [2] PCP's office is the best resource    Additional Information   Negative: [1] Caller is not with the adult (patient) AND [2] reporting urgent symptoms   Negative: Lab result questions   Negative: Medication questions   Negative: Caller can't be reached by phone   Negative: Caller has already spoken to PCP or another triager   Negative: RN needs further essential information from caller in order to complete triage   Negative: Requesting regular office appointment    Protocols used: INFORMATION ONLY CALL-A-

## 2020-03-03 NOTE — PROGRESS NOTES
Mr. Omid Santiago came to [facility] on 03.02.2020 for sleep testing.  Upon arrival he was shown to his room, paperwork was completed, procedures were explained and questions were answered.  Mr. Santiago stated that he is a previous CPAP user.    The patient did meet the split night criteria. The patient said that he cannot sleep supine due to chronic pain.   During the PSG portion SDB events with desats to as low as 68% were noted.  Snoring was considered very loud at times.  EKG showed PVC'S with some bigeminy.  No PLM's were noted.  CPAP was initiated at 6cm H2O for patient comfort using an Eson medium nasal mask.  Heated humidification was set to 3 and CFlex was also set to 3.  The patient did not have any issues tolerating the nasal mask.  Settings of 6- 9 cm H2O were documented.  Snoring was resolved with all settings attempted.      The patient stated that he feels like he can use CPAP with the nasal mask at home.  States that it is much better than what he previously had.        Prior to leaving the lab the patient was given post study follow up instructions and questions were answered.

## 2020-03-03 NOTE — TELEPHONE ENCOUNTER
----- Message from Marysol Bueno sent at 3/3/2020  2:36 PM CST -----  Contact: PURNIMA IYER [[id number]]      Can the clinic reply in [facility]: no    Please refill the medication(s) listed below. Please call the patient when the prescription(s) is ready for  at this phone number   [phone] (home)     Medication #1 atorvastatin (LIPITOR) 40 MG tablet       Preferred Pharmacy:   [id number] [address] AT [address] [address]  Phone: [phone] Fax: [phone]

## 2020-03-04 ENCOUNTER — TELEPHONE (OUTPATIENT)
Dept: SLEEP MEDICINE | Facility: CLINIC | Age: 60
End: 2020-03-04

## 2020-03-04 ENCOUNTER — ANESTHESIA EVENT (OUTPATIENT)
Dept: ENDOSCOPY | Facility: HOSPITAL | Age: 60
End: 2020-03-04
Payer: MEDICARE

## 2020-03-04 ENCOUNTER — ANESTHESIA (OUTPATIENT)
Dept: ENDOSCOPY | Facility: HOSPITAL | Age: 60
End: 2020-03-04
Payer: MEDICARE

## 2020-03-04 ENCOUNTER — HOSPITAL ENCOUNTER (OUTPATIENT)
Facility: HOSPITAL | Age: 60
Discharge: HOME | End: 2020-03-04
Attending: INTERNAL MEDICINE | Admitting: INTERNAL MEDICINE
Payer: MEDICARE

## 2020-03-04 VITALS
DIASTOLIC BLOOD PRESSURE: 75 MMHG | WEIGHT: 260 LBS | HEIGHT: 71 IN | OXYGEN SATURATION: 97 % | HEART RATE: 83 BPM | BODY MASS INDEX: 36.4 KG/M2 | TEMPERATURE: 98 F | RESPIRATION RATE: 14 BRPM | SYSTOLIC BLOOD PRESSURE: 138 MMHG

## 2020-03-04 DIAGNOSIS — D50.9 IRON DEFICIENCY ANEMIA, UNSPECIFIED IRON DEFICIENCY ANEMIA TYPE: ICD-10-CM

## 2020-03-04 DIAGNOSIS — G47.33 OSA (OBSTRUCTIVE SLEEP APNEA): Primary | ICD-10-CM

## 2020-03-04 PROCEDURE — E9220 PRA ENDO ANESTHESIA: HCPCS | Mod: ,,, | Performed by: NURSE ANESTHETIST, CERTIFIED REGISTERED

## 2020-03-04 PROCEDURE — G0105 COLORECTAL SCRN; HI RISK IND: HCPCS | Performed by: INTERNAL MEDICINE

## 2020-03-04 PROCEDURE — 37000008 HC ANESTHESIA 1ST 15 MINUTES: Performed by: INTERNAL MEDICINE

## 2020-03-04 PROCEDURE — 63600175 PHARM REV CODE 636 W HCPCS: Performed by: INTERNAL MEDICINE

## 2020-03-04 PROCEDURE — 63600175 PHARM REV CODE 636 W HCPCS: Performed by: ANESTHESIOLOGY

## 2020-03-04 PROCEDURE — 37000009 HC ANESTHESIA EA ADD 15 MINS: Performed by: INTERNAL MEDICINE

## 2020-03-04 PROCEDURE — 63600175 PHARM REV CODE 636 W HCPCS: Performed by: NURSE ANESTHETIST, CERTIFIED REGISTERED

## 2020-03-04 PROCEDURE — 43235 EGD DIAGNOSTIC BRUSH WASH: CPT | Mod: 51,,, | Performed by: INTERNAL MEDICINE

## 2020-03-04 PROCEDURE — G0105 COLORECTAL SCRN; HI RISK IND: ICD-10-PCS | Mod: ,,, | Performed by: INTERNAL MEDICINE

## 2020-03-04 PROCEDURE — 43235 PR EGD, FLEX, DIAGNOSTIC: ICD-10-PCS | Mod: 51,,, | Performed by: INTERNAL MEDICINE

## 2020-03-04 PROCEDURE — 43235 EGD DIAGNOSTIC BRUSH WASH: CPT | Performed by: INTERNAL MEDICINE

## 2020-03-04 PROCEDURE — G0105 COLORECTAL SCRN; HI RISK IND: HCPCS | Mod: ,,, | Performed by: INTERNAL MEDICINE

## 2020-03-04 PROCEDURE — E9220 PRA ENDO ANESTHESIA: ICD-10-PCS | Mod: ,,, | Performed by: NURSE ANESTHETIST, CERTIFIED REGISTERED

## 2020-03-04 RX ORDER — LIDOCAINE HCL/PF 100 MG/5ML
SYRINGE (ML) INTRAVENOUS
Status: DISCONTINUED | OUTPATIENT
Start: 2020-03-04 | End: 2020-03-04

## 2020-03-04 RX ORDER — PROPOFOL 10 MG/ML
VIAL (ML) INTRAVENOUS CONTINUOUS PRN
Status: DISCONTINUED | OUTPATIENT
Start: 2020-03-04 | End: 2020-03-04

## 2020-03-04 RX ORDER — SODIUM CHLORIDE 9 MG/ML
INJECTION, SOLUTION INTRAVENOUS CONTINUOUS
Status: DISCONTINUED | OUTPATIENT
Start: 2020-03-04 | End: 2020-03-04 | Stop reason: HOSPADM

## 2020-03-04 RX ORDER — SODIUM CHLORIDE 0.9 % (FLUSH) 0.9 %
10 SYRINGE (ML) INJECTION
Status: DISCONTINUED | OUTPATIENT
Start: 2020-03-04 | End: 2020-03-04 | Stop reason: HOSPADM

## 2020-03-04 RX ORDER — PROPOFOL 10 MG/ML
VIAL (ML) INTRAVENOUS
Status: DISCONTINUED | OUTPATIENT
Start: 2020-03-04 | End: 2020-03-04

## 2020-03-04 RX ADMIN — PROPOFOL 50 MG: 10 INJECTION, EMULSION INTRAVENOUS at 11:03

## 2020-03-04 RX ADMIN — Medication 40 MG: at 11:03

## 2020-03-04 RX ADMIN — PROPOFOL 150 MCG/KG/MIN: 10 INJECTION, EMULSION INTRAVENOUS at 11:03

## 2020-03-04 RX ADMIN — SODIUM CHLORIDE: 0.9 INJECTION, SOLUTION INTRAVENOUS at 08:03

## 2020-03-04 RX ADMIN — DESMOPRESSIN ACETATE 20 MCG: 4 SOLUTION INTRAVENOUS at 09:03

## 2020-03-04 NOTE — H&P
[facility]  History & Physical    Subjective:      Chief Complaint/Reason for Admission:    EGD and colonoscopy    Bri Santiago is a 59 y.o. male.    Past Medical History:   Diagnosis Date    Acute pancreatitis     Anal fissure     Anemia     Anticoagulant long-term use     Arthritis     Asthma in remission     Back pain     BPH (benign prostatic hypertrophy)     Cancer 2000    prostate- treated at [facility] with chemo- in remission since 2000    Chronic maxillary sinusitis     Clotting disorder     Diastolic dysfunction with chronic heart failure 12/3/2018    Dysphagia 10/7/2014    Family history of colon cancer     Family history of early CAD     GERD (gastroesophageal reflux disease)     Helicobacter pylori (H. pylori) infection     Chronic    History of chronic pancreatitis     HTN (hypertension)     Lumbago 11/12/2012    Obesity     ABDON (obstructive sleep apnea)     Pneumonia     during childhood     Prostate cancer 2000    dx and treated at [facility] had chemotherapy, in remission [id number]    Sacroiliac joint pain 2/10/2015    Spinal stenosis of lumbar region     Trouble in sleeping     Von Willebrand disease     VWD (acquired von Willebrand's disease)      Past Surgical History:   Procedure Laterality Date    anal fissure repair      x2    BACK SURGERY  2012    BALLOON SINUPLASTY OF PARANASAL SINUS Bilateral 10/7/2019    Procedure: SINUPLASTY, USING BALLOON;  Surgeon: LAURENT Swanson MD;  Location: [facility] OR;  Service: ENT;  Laterality: Bilateral;    CARPAL TUNNEL RELEASE  2003    left hand    CATHETERIZATION OF BOTH LEFT AND RIGHT HEART N/A 2/14/2019    Procedure: CATHETERIZATION, HEART, BOTH LEFT AND RIGHT;  Surgeon: Kyle Magana MD;  Location: [facility] CATH LAB;  Service: Cardiology;  Laterality: N/A;    CERVICAL DISCECTOMY  2003    COLONOSCOPY N/A 10/7/2015    Procedure: COLONOSCOPY;  Surgeon: Rosendo Boyer MD;  Location: [facility] ENDO  (4TH FLR);  Service: Endoscopy;  Laterality: N/A;  PM Prep    COLONOSCOPY N/A 6/19/2017    Procedure: COLONOSCOPY;  Surgeon: Rosendo Boyer MD;  Location: [facility] ENDO (4TH FLR);  Service: Endoscopy;  Laterality: N/A;  constipation prep (no DM no CHF)       hx of vonWillebrand's disease-will need infusion prior    FUNCTIONAL ENDOSCOPIC SINUS SURGERY (FESS) USING COMPUTER-ASSISTED NAVIGATION Bilateral 10/7/2019    Procedure: FESS, USING COMPUTER-ASSISTED NAVIGATION;  Surgeon: LAURENT Swanson MD;  Location: [facility] OR;  Service: ENT;  Laterality: Bilateral;    LEFT HEART CATHETERIZATION Left 2/14/2019    Procedure: Left heart cath;  Surgeon: Kyle Magana MD;  Location: [facility] CATH LAB;  Service: Cardiology;  Laterality: Left;    LUMBAR FUSION  2012    RADIOFREQUENCY ABLATION Right 5/9/2019    Procedure: RADIOFREQUENCY ABLATION, RIGHT L3,L4,L5;  Surgeon: Fredy Magana MD;  Location: [facility] PAIN MGT;  Service: Pain Management;  Laterality: Right;  1 of 2  RT RFA L3,4,5  PLAVIX clearance received, pt needs DDAVP    RADIOFREQUENCY ABLATION Left 5/23/2019    Procedure: RADIOFREQUENCY ABLATION, LEFT L3,L4,L5;  Surgeon: Fredy Magana MD;  Location: [facility] PAIN MGT;  Service: Pain Management;  Laterality: Left;  2 of 2  LT RFA L3,4,5  PLAVIX clearance received, pt needs DDAVP    RADIOFREQUENCY ABLATION Left 1/16/2020    Procedure: RADIOFREQUENCY ABLATION LEFT L3, L4, L5 MEDIAL BRANCH 1 OF 2 **PATIENT ARRIVING AT 8 AM**;  Surgeon: Fredy Magana MD;  Location: [facility] PAIN MGT;  Service: Pain Management;  Laterality: Left;  NEEDS CONSENT, PLAVIX CLEARANCE IN CHART    RADIOFREQUENCY ABLATION Right 1/28/2020    Procedure: RADIOFREQUENCY ABLATION, RIGHT L3-L4-L5 MEDIAL BRANCH 2 OF 2 (Left done on 1/16/20);  Surgeon: Fredy Magana MD;  Location: [facility] PAIN MGT;  Service: Pain Management;  Laterality: Right;    RADIOFREQUENCY ABLATION OF LUMBAR MEDIAL BRANCH NERVE AT SINGLE LEVEL Left 5/24/2018    Procedure:  RADIOFREQUENCY THERMOCOAGULATION (RFTC)-NERVE-MEDIAN BRANCH-LUMBAR;  Surgeon: Fredy Magana MD;  Location: [facility] PAIN MGT;  Service: Pain Management;  Laterality: Left;  Left RFA @ L3,4,5  [id number]  with IV Sedation    2 of 2    SPINE SURGERY      TONSILLECTOMY      at age 24    VASECTOMY  1996     Family History   Problem Relation Age of Onset    Colon cancer Father 67        colon cancer    Hypertension Father     Glaucoma Father     Cancer Father     Colon cancer Paternal Grandfather 65             Coronary artery disease Mother 45    Hypertension Mother     Heart disease Mother     Colon cancer Mother     No Known Problems Brother     No Known Problems Sister     No Known Problems Daughter     No Known Problems Son     Coronary artery disease Brother 51    No Known Problems Daughter     No Known Problems Daughter     No Known Problems Son     No Known Problems Son     Colon cancer Paternal Uncle 65    Diabetes Mellitus Paternal Grandmother      Social History     Tobacco Use    Smoking status: Never Smoker    Smokeless tobacco: Never Used   Substance Use Topics    Alcohol use: Yes     Alcohol/week: 1.0 standard drinks     Types: 1 Glasses of wine per week     Comment: social    Drug use: No       PTA Medications   Medication Sig    acetaminophen (TYLENOL) 500 MG tablet Take 2 tablets (1,000 mg total) by mouth every 8 (eight) hours as needed.    albuterol (VENTOLIN HFA) 90 mcg/actuation inhaler Inhale 2 puffs into the lungs every 6 (six) hours as needed for Wheezing. Rescue    atorvastatin (LIPITOR) 40 MG tablet TAKE 1 TABLET(40 MG) BY MOUTH EVERY DAY    azelastine (ASTELIN) 137 mcg (0.1 %) nasal spray 1 spray (137 mcg total) by Nasal route 2 (two) times daily.    calcium citrate-vitamin D3 315-200 mg (CITRACAL+D) 315-200 mg-unit per tablet Take 1 tablet by mouth once daily.    cyanocobalamin, vitamin B-12, (VITAMIN B-12) 50 mcg tablet Take 50 mcg by mouth once daily.     diclofenac sodium (VOLTAREN) 1 % Gel Apply 2 g topically 3 (three) times daily.    docusate sodium (COLACE) 100 MG capsule Take 1 tablet by mouth Twice daily. 1 Capsule Oral Twice a day .  Take with pain medicine    doxazosin (CARDURA) 1 MG tablet TAKE 1 TABLET(1 MG) BY MOUTH EVERY EVENING    ergocalciferol (ERGOCALCIFEROL) 50,000 unit Cap Take 1 capsule (50,000 Units total) by mouth every 7 days. for 12 doses    esomeprazole (NEXIUM) 40 MG capsule Take 1 capsule (40 mg total) by mouth 2 (two) times daily before meals.    FLONASE ALLERGY RELIEF 50 mcg/actuation nasal spray SHAKE LIQUID AND USE 1 SPRAY(50 MCG) IN EACH NOSTRIL EVERY DAY    ipratropium (ATROVENT) 0.03 % nasal spray 2 sprays by Nasal route 2 (two) times daily. May be use more often if needed    montelukast (SINGULAIR) 10 mg tablet TAKE 1 TABLET(10 MG) BY MOUTH EVERY EVENING    SYMBICORT 160-4.5 mcg/actuation AA     tobramycin-dexamethasone 0.3-0.1% (TOBRADEX) 0.3-0.1 % DrpS Place 1 drop into the right eye 4 (four) times daily. for 7 days    traMADol (ULTRAM) 50 mg tablet Take 1 tablet (50 mg total) by mouth every 8 (eight) hours as needed for Pain.    clopidogrel (PLAVIX) 75 mg tablet Take 1 tablet (75 mg total) by mouth once daily.    zolpidem (AMBIEN) 10 mg Tab TAKE 1 TABLET BY MOUTH EVERY DAY AT BEDTIME     Review of patient's allergies indicates:   Allergen Reactions    Ace inhibitors     Aspirin      Other reaction(s): Hives  Other reaction(s): Hives    Codeine Itching     Ok to take percocet    Dilaudid  [hydromorphone]      Other reaction(s): Itching    Penicillins Hives and Swelling     Has had allergy testing and can prob tolerate penicillin        Review of Systems   Constitutional: Negative for chills, fever and weight loss.   Respiratory: Negative for shortness of breath and wheezing.    Cardiovascular: Negative for chest pain.   Gastrointestinal: Negative for abdominal pain, blood in stool, constipation, diarrhea and  melena.       Objective:      Vital Signs (Most Recent)  Temp: 97.9 °F (36.6 °C) (03/04/20 0856)  Pulse: 96 (03/04/20 0856)  Resp: 17 (03/04/20 0856)  BP: 131/74 (03/04/20 0856)  SpO2: 96 % (03/04/20 0856)    Vital Signs Range (Last 24H):  Temp:  [97.9 °F (36.6 °C)]   Pulse:  [96]   Resp:  [17]   BP: (131)/(74)   SpO2:  [96 %]     Physical Exam   Constitutional: He is oriented to person, place, and time. He appears well-developed and well-nourished.   Cardiovascular: Normal rate.   Pulmonary/Chest: Effort normal.   Abdominal: Soft.   Neurological: He is alert and oriented to person, place, and time.   Psychiatric: He has a normal mood and affect. His behavior is normal. Judgment and thought content normal.         Assessment:      Active Hospital Problems    Diagnosis  POA    Iron deficiency anemia [D50.9]  Yes      Resolved Hospital Problems   No resolved problems to display.       Plan:    EGd and colonoscopy for iron deficiency and family history or CRC and prior colon polyps

## 2020-03-04 NOTE — TRANSFER OF CARE
"Anesthesia Transfer of Care Note    Patient: Bri Santiago    Procedure(s) Performed: Procedure(s) (LRB):  EGD (ESOPHAGOGASTRODUODENOSCOPY) (N/A)  COLONOSCOPY (N/A)    Patient location: PACU    Anesthesia Type: general    Transport from OR: Transported from OR on 6-10 L/min O2 by face mask with adequate spontaneous ventilation. Transported from OR on room air with adequate spontaneous ventilation    Post pain: adequate analgesia    Post assessment: no apparent anesthetic complications    Post vital signs: stable    Level of consciousness: awake    Nausea/Vomiting: no nausea/vomiting    Complications: none    Transfer of care protocol was followed      Last vitals:   Visit Vitals  /56 (BP Location: Left leg, Patient Position: Lying)   Pulse 100   Temp 36.6 °C (97.9 °F) (Temporal)   Resp 17   Ht 5' 11" (1.803 m)   Wt 117.9 kg (260 lb)   SpO2 96%   BMI 36.26 kg/m²     "

## 2020-03-04 NOTE — PROVATION PATIENT INSTRUCTIONS
Discharge Summary/Instructions after an Endoscopic Procedure  Patient Name: Bri Santiago  Patient MRN: [id number]  Patient YOB: 1960 Wednesday, March 04, 2020  Rosendo Boyer MD  RESTRICTIONS:  During your procedure today, you received medications for sedation.  These   medications may affect your judgment, balance and coordination.  Therefore,   for 24 hours, you have the following restrictions:   - DO NOT drive a car, operate machinery, make legal/financial decisions,   sign important papers or drink alcohol.    ACTIVITY:  Today: no heavy lifting, straining or running due to procedural   sedation/anesthesia.  The following day: return to full activity including work.  DIET:  Eat and drink normally unless instructed otherwise.     TREATMENT FOR COMMON SIDE EFFECTS:  - Mild abdominal pain, nausea, belching, bloating or excessive gas:  rest,   eat lightly and use a heating pad.  - Sore Throat: treat with throat lozenges and/or gargle with warm salt   water.  - Because air was used during the procedure, expelling large amounts of air   from your rectum or belching is normal.  - If a bowel prep was taken, you may not have a bowel movement for 1-3 days.    This is normal.  SYMPTOMS TO WATCH FOR AND REPORT TO YOUR PHYSICIAN:  1. Abdominal pain or bloating, other than gas cramps.  2. Chest pain.  3. Back pain.  4. Signs of infection such as: chills or fever occurring within 24 hours   after the procedure.  5. Rectal bleeding, which would show as bright red, maroon, or black stools.   (A tablespoon of blood from the rectum is not serious, especially if   hemorrhoids are present.)  6. Vomiting.  7. Weakness or dizziness.  GO DIRECTLY TO THE NEAREST EMERGENCY ROOM IF YOU HAVE ANY OF THE FOLLOWING:      Difficulty breathing              Chills and/or fever over 101 F   Persistent vomiting and/or vomiting blood   Severe abdominal pain   Severe chest pain   Black, tarry stools   Bleeding- more than one  tablespoon   Any other symptom or condition that you feel may need urgent attention  Your doctor recommends these additional instructions:  If any biopsies were taken, your doctors clinic will contact you in 1 to 2   weeks with any results.  - Discharge patient to home.   - Resume Plavix (clopidogrel) at prior dose today.   - Return to GI clinic.   - The findings and recommendations were discussed with the patient.   - Repeat colonoscopy in 5 years for surveillance.  For questions, problems or results please call your physician - Rosendo Boyer MD at Work:  [phone].  [facility] [place], EMERGENCY ROOM PHONE NUMBER: [phone]  IF A COMPLICATION OR EMERGENCY SITUATION ARISES AND YOU ARE UNABLE TO REACH   YOUR PHYSICIAN - GO DIRECTLY TO THE EMERGENCY ROOM.  Rosendo Boyer MD  3/4/2020 11:49:33 AM  This report has been verified and signed electronically.  [employer]

## 2020-03-04 NOTE — PLAN OF CARE
Discharge instructions given. Pt verbalized understanding. No c/o. No distress noted. Pt refused w/c. Ambulated off unit with friend. Steady gait

## 2020-03-04 NOTE — PROCEDURES
[facility]  Tel: [phone]    Bri Santiago  1960  BMI 39.8  Study date 3/2/2020  [id number]      Split night polysomnogram  Hypopnea definition used AASM 1b (4% desat)    Sleep parameters:  Prior to initiation of CPAP, Total recording time 228.2 minutes, total sleep time 204.2 minutes and sleep efficiency 89.5%.   Sleep latency 12 minutes and REM sleep latency 48 minutes.   Stage NREM1 11.3%, stage NREM2 53.6%, stage NREM 3 23.7% and REM 11.4% of total sleep time.   Wake after sleep onset 12 minutes.    After the initiation of CPAP, Total recording time 186 minutes, total sleep time 158 minutes and sleep efficiency 84.9%.   Sleep latency 5.5 minutes and REM sleep latency 49 minutes.   Stage NREM1 11.7%, stage NREM2 49.1%, stage NREM 3 10.4% and REM 28.8% of total sleep time.   Wake after sleep onset 22.5 minutes.    Respiratory parameters:  During the diagnostic portion of the study, 111 obstructive respiratory events were present with AHI 32.6 events per hour of sleep.  Central Apnea Index 0.0. Oxygen saturation diane with events was68%.   Baseline oxygen saturation ranged from 86% to 94%.   Baseline hypoxemia was present.   severe snoring was present.    Events were more frequent during REM sleep, with REM AHI 67.1 events per hour of REM sleep.   Sleep in the supine position was present.    The supine AHI was 70.3 and lateral AHI 24.5.    CPAP was initiated at 6 cm H20 and adjusted to 9 cm H20.  REM sleep was present at 6-9 cm H20.   The most effective pressure setting was 8 cm H20, which reduced the AHI to 4.6.   Supine sleep was not present at this setting.    EM periodic limb movements during sleep were present with Periodic Limb Movement Index 0 per hour of sleep.   0 were associated with arousal, with Periodic Limb Movement Arousal Index 0 per hour of sleep.       ECG:  Mean pulse rate during sleep was 68 beats per minute with Occasional PVCs    EEG:  Expanded seizure  montage was not used.   No seizure activity was noted.    Impression:  severe Obstructive Sleep Apnea         Recommendations:  Auto CPAP at 8-14  cm H20 is ordered for home use.   The patient will be followed up after the initiation of therapy to assess for symptom resolution, tolerance and compliance., PAP order was placed.    and Patient has follow up with Sleep Medicine        (This Sleep Study was interpreted by a Board Certified Sleep Specialist who conducted an epoch-by-epoch review of the entire raw data recording.)     (The indication for this sleep study was reviewed and deemed appropriate by AASM Practice Parameters or other reasons by a Board Certified Sleep Specialist.)

## 2020-03-04 NOTE — ANESTHESIA PREPROCEDURE EVALUATION
03/04/2020  Pre-operative evaluation for Procedure(s) (LRB):  EGD (ESOPHAGOGASTRODUODENOSCOPY) (N/A)  COLONOSCOPY (N/A)    Bri Santiago is a 59 y.o. male hx of asthma (well controlled), vwd (receiving DDAVP this morning), ABDON,GERD well controlled  2/2019  · LV end diastolic pressure is elevated.  · LVEDP (Pre): 23  · Post Atrio lesion , 50% stenosed.  · Estimated blood loss: none  · Diastolic dysfunction.  · Filling pressures moderately elevated. Pulmonary HTN is mild to moderate.  · Non-obstructive CAD.    Normal left ventricular systolic function (EF 60-65%).     2 - No wall motion abnormalities.     3 - Impaired LV relaxation, normal LAP (grade 1 diastolic dysfunction).     4 - Mild mitral regurgitation.     Patient Active Problem List   Diagnosis    Asthma in remission    Von Willebrand disease    HTN (hypertension)    ABDON (obstructive sleep apnea)    Spondylosis without myelopathy    Thoracic or lumbosacral neuritis or radiculitis, unspecified    Spinal stenosis, lumbar region, without neurogenic claudication    Degeneration of lumbar or lumbosacral intervertebral disc    Acquired spondylolisthesis    Pseudoarthrosis    Family history of colon cancer    Vitamin D deficiency    Atypical chest pain    Encounter for monitoring long-term proton pump inhibitor therapy    Postlaminectomy syndrome of lumbar region    Myalgia and myositis    Facet syndrome    Gastroesophageal reflux disease without esophagitis    Osteopenia    Thoracic aorta atherosclerosis    Benign prostatic hyperplasia with urinary obstruction    Allergic rhinitis    Allergic conjunctivitis of both eyes    Encounter for long-term current use of high risk medication    Gastroesophageal reflux disease with esophagitis    Hematochezia    Nasal septal deviation    Dysphagia    Laryngopharyngeal reflux (LPR)     Diastolic dysfunction with chronic heart failure    At risk for cardiovascular event    Hypoxia    Chronic pulmonary heart disease    Severe obesity (BMI 35.0-39.9) with comorbidity    Moderate persistent asthma    Family history of prostate cancer in father    Chronic pain    Nocturia    Scrotal swelling    Chronic bilateral low back pain without sciatica    Chronic maxillary sinusitis    Nonintractable episodic headache    Osteoarthritis of lumbar spine    Gynecomastia, male    Iron deficiency anemia       Review of patient's allergies indicates:   Allergen Reactions    Ace inhibitors     Aspirin      Other reaction(s): Hives  Other reaction(s): Hives    Codeine Itching     Ok to take percocet    Dilaudid  [hydromorphone]      Other reaction(s): Itching    Penicillins Hives and Swelling     Has had allergy testing and can prob tolerate penicillin       No current facility-administered medications on file prior to encounter.      Current Outpatient Medications on File Prior to Encounter   Medication Sig Dispense Refill    acetaminophen (TYLENOL) 500 MG tablet Take 2 tablets (1,000 mg total) by mouth every 8 (eight) hours as needed. 90 tablet 0    albuterol (VENTOLIN HFA) 90 mcg/actuation inhaler Inhale 2 puffs into the lungs every 6 (six) hours as needed for Wheezing. Rescue 18 g 4    azelastine (ASTELIN) 137 mcg (0.1 %) nasal spray 1 spray (137 mcg total) by Nasal route 2 (two) times daily. 30 mL 11    calcium citrate-vitamin D3 315-200 mg (CITRACAL+D) 315-200 mg-unit per tablet Take 1 tablet by mouth once daily.      clopidogrel (PLAVIX) 75 mg tablet Take 1 tablet (75 mg total) by mouth once daily. 30 tablet 11    cyanocobalamin, vitamin B-12, (VITAMIN B-12) 50 mcg tablet Take 50 mcg by mouth once daily.      diclofenac sodium (VOLTAREN) 1 % Gel Apply 2 g topically 3 (three) times daily. 1 Tube 0    docusate sodium (COLACE) 100 MG capsule Take 1 tablet by mouth Twice daily. 1 Capsule  Oral Twice a day .  Take with pain medicine      doxazosin (CARDURA) 1 MG tablet TAKE 1 TABLET(1 MG) BY MOUTH EVERY EVENING 90 tablet 3    esomeprazole (NEXIUM) 40 MG capsule Take 1 capsule (40 mg total) by mouth 2 (two) times daily before meals. 90 capsule 3    FLONASE ALLERGY RELIEF 50 mcg/actuation nasal spray SHAKE LIQUID AND USE 1 SPRAY(50 MCG) IN EACH NOSTRIL EVERY DAY 9.9 mL 11    ipratropium (ATROVENT) 0.03 % nasal spray 2 sprays by Nasal route 2 (two) times daily. May be use more often if needed 30 mL 11    montelukast (SINGULAIR) 10 mg tablet TAKE 1 TABLET(10 MG) BY MOUTH EVERY EVENING 30 tablet 11    SYMBICORT 160-4.5 mcg/actuation HFAA       traMADol (ULTRAM) 50 mg tablet Take 1 tablet (50 mg total) by mouth every 8 (eight) hours as needed for Pain. 90 tablet 2    zolpidem (AMBIEN) 10 mg Tab TAKE 1 TABLET BY MOUTH EVERY DAY AT BEDTIME 20 tablet 0       Past Surgical History:   Procedure Laterality Date    anal fissure repair      x2    BACK SURGERY  2012    BALLOON SINUPLASTY OF PARANASAL SINUS Bilateral 10/7/2019    Procedure: SINUPLASTY, USING BALLOON;  Surgeon: LAURENT Swanson MD;  Location: [facility] OR;  Service: ENT;  Laterality: Bilateral;    CARPAL TUNNEL RELEASE  2003    left hand    CATHETERIZATION OF BOTH LEFT AND RIGHT HEART N/A 2/14/2019    Procedure: CATHETERIZATION, HEART, BOTH LEFT AND RIGHT;  Surgeon: Kyle Magana MD;  Location: [facility] CATH LAB;  Service: Cardiology;  Laterality: N/A;    CERVICAL DISCECTOMY  2003    COLONOSCOPY N/A 10/7/2015    Procedure: COLONOSCOPY;  Surgeon: Rosendo Boyer MD;  Location: [facility] ENDO (4TH FLR);  Service: Endoscopy;  Laterality: N/A;  PM Prep    COLONOSCOPY N/A 6/19/2017    Procedure: COLONOSCOPY;  Surgeon: Rosendo Boyer MD;  Location: [facility] ENDO (4TH FLR);  Service: Endoscopy;  Laterality: N/A;  constipation prep (no DM no CHF)       hx of vonWillebrand's disease-will need infusion prior    FUNCTIONAL ENDOSCOPIC SINUS SURGERY  (FESS) USING COMPUTER-ASSISTED NAVIGATION Bilateral 10/7/2019    Procedure: FESS, USING COMPUTER-ASSISTED NAVIGATION;  Surgeon: LAURENT Swanson MD;  Location: [facility] OR;  Service: ENT;  Laterality: Bilateral;    LEFT HEART CATHETERIZATION Left 2/14/2019    Procedure: Left heart cath;  Surgeon: Kyle Magana MD;  Location: [facility] CATH LAB;  Service: Cardiology;  Laterality: Left;    LUMBAR FUSION  2012    RADIOFREQUENCY ABLATION Right 5/9/2019    Procedure: RADIOFREQUENCY ABLATION, RIGHT L3,L4,L5;  Surgeon: Fredy Magana MD;  Location: [facility] PAIN MGT;  Service: Pain Management;  Laterality: Right;  1 of 2  RT RFA L3,4,5  PLAVIX clearance received, pt needs DDAVP    RADIOFREQUENCY ABLATION Left 5/23/2019    Procedure: RADIOFREQUENCY ABLATION, LEFT L3,L4,L5;  Surgeon: Fredy Magana MD;  Location: [facility] PAIN MGT;  Service: Pain Management;  Laterality: Left;  2 of 2  LT RFA L3,4,5  PLAVIX clearance received, pt needs DDAVP    RADIOFREQUENCY ABLATION Left 1/16/2020    Procedure: RADIOFREQUENCY ABLATION LEFT L3, L4, L5 MEDIAL BRANCH 1 OF 2 **PATIENT ARRIVING AT 8 AM**;  Surgeon: Fredy Magana MD;  Location: [facility] PAIN MGT;  Service: Pain Management;  Laterality: Left;  NEEDS CONSENT, PLAVIX CLEARANCE IN CHART    RADIOFREQUENCY ABLATION Right 1/28/2020    Procedure: RADIOFREQUENCY ABLATION, RIGHT L3-L4-L5 MEDIAL BRANCH 2 OF 2 (Left done on 1/16/20);  Surgeon: Fredy Magana MD;  Location: [facility] PAIN MGT;  Service: Pain Management;  Laterality: Right;    RADIOFREQUENCY ABLATION OF LUMBAR MEDIAL BRANCH NERVE AT SINGLE LEVEL Left 5/24/2018    Procedure: RADIOFREQUENCY THERMOCOAGULATION (RFTC)-NERVE-MEDIAN BRANCH-LUMBAR;  Surgeon: Fredy Magana MD;  Location: [facility] PAIN MGT;  Service: Pain Management;  Laterality: Left;  Left RFA @ L3,4,5  [id number]  with IV Sedation    2 of 2    SPINE SURGERY      TONSILLECTOMY      at age 24    VASECTOMY  1996       Social History     Socioeconomic History     Marital status:      Spouse name: Not on file    Number of children: Not on file    Years of education: Not on file    Highest education level: Not on file   Occupational History    Occupation: disabled due to back injury   Social Needs    Financial resource strain: Not on file    Food insecurity:     Worry: Not on file     Inability: Not on file    Transportation needs:     Medical: Not on file     Non-medical: Not on file   Tobacco Use    Smoking status: Never Smoker    Smokeless tobacco: Never Used   Substance and Sexual Activity    Alcohol use: Yes     Alcohol/week: 1.0 standard drinks     Types: 1 Glasses of wine per week     Comment: social    Drug use: No    Sexual activity: Not Currently     Partners: Female   Lifestyle    Physical activity:     Days per week: Not on file     Minutes per session: Not on file    Stress: Not on file   Relationships    Social connections:     Talks on phone: Not on file     Gets together: Not on file     Attends Confucianism service: Not on file     Active member of club or organization: Not on file     Attends meetings of clubs or organizations: Not on file     Relationship status: Not on file   Other Topics Concern    Not on file   Social History Narrative    wlking for exercised         CBC:   Recent Labs     03/04/20  0716   WBC 6.74   RBC 4.37*   HGB 13.0*   HCT 42.2      MCV 97   MCH 29.7   MCHC 30.8*       CMP: No results for input(s): NA, K, CL, CO2, BUN, CREATININE, GLU, MG, PHOS, CALCIUM, ALBUMIN, PROT, ALKPHOS, ALT, AST, BILITOT in the last 72 hours.    INR  No results for input(s): PT, INR, PROTIME, APTT in the last 72 hours.        Diagnostic Studies:      EKG:  Normal sinus rhythm  Minimal voltage criteria for LVH, may be normal variant  Borderline Abnormal ECG  When compared with ECG of 24-DEC-2018 07:53,  Previous ECG has undetermined rhythm, needs review  Confirmed by Tye Frey MD ([id number]) on 2/14/2019 4:39:32 PM    2D  Echo:  Results for orders placed or performed during the hospital encounter of 09/05/18   2D Echo w/ Color Flow Doppler   Result Value Ref Range    QEF 65 55 - 65    Mitral Valve Regurgitation MILD     Diastolic Dysfunction Yes (A)     Est. PA Systolic Pressure 33.69     Pericardial Effusion NONE     Tricuspid Valve Regurgitation MILD          Anesthesia Evaluation    I have reviewed the Patient Summary Reports.    I have reviewed the Nursing Notes.   I have reviewed the Medications.     Review of Systems  Anesthesia Hx:  No problems with previous Anesthesia  History of prior surgery of interest to airway management or planning: Denies Family Hx of Anesthesia complications.   Denies Personal Hx of Anesthesia complications.   Hematology/Oncology:         -- Denies Anemia:   Cardiovascular:   Exercise tolerance: good Hypertension Denies CAD.    Denies CABG/stent.  ECG has been reviewed.    Pulmonary:   Denies COPD. Asthma mild Sleep Apnea    Renal/:   Denies Chronic Renal Disease.     Hepatic/GI:   GERD, well controlled Denies Liver Disease.    Musculoskeletal:   Arthritis     Neurological:   Denies CVA. Denies Seizures.    Endocrine:   Denies Diabetes.        Physical Exam  General:  Well nourished    Airway/Jaw/Neck:  Airway Findings: Mouth Opening: Normal Tongue: Normal  General Airway Assessment: Adult  Mallampati: II  Improves to II with phonation.  TM Distance: Normal, at least 6 cm  Jaw/Neck Findings:  Neck ROM: Normal ROM      Dental:  Dental Findings: In tact   Chest/Lungs:  Chest/Lungs Findings: Clear to auscultation, Normal Respiratory Rate     Heart/Vascular:  Heart Findings: Rate: Normal  Rhythm: Regular Rhythm  Sounds: Normal        Mental Status:  Mental Status Findings:  Cooperative, Alert and Oriented         Anesthesia Plan  Type of Anesthesia, risks & benefits discussed:  Anesthesia Type:  general  Patient's Preference:   Intra-op Monitoring Plan: standard ASA monitors  Intra-op Monitoring Plan  Comments:   Post Op Pain Control Plan: per primary service following discharge from PACU  Post Op Pain Control Plan Comments:   Induction:   IV  Beta Blocker:  Patient is not currently on a Beta-Blocker (No further documentation required).       Informed Consent: Patient understands risks and agrees with Anesthesia plan.  Questions answered. Anesthesia consent signed with patient.  ASA Score: 3     Day of Surgery Review of History & Physical:    H&P update referred to the surgeon.         Ready For Surgery From Anesthesia Perspective.

## 2020-03-04 NOTE — PROGRESS NOTES
Dr Solis ,ANESTHESIA Dr Lakhani, HARRISON DIANE and Hope GUPTA all discussedd,Dr Duarte note, DDavp ordered per anesthesia, pt aware will give DDavp med over 30 minutes one hour prior to procedure, constant therapeautic regime and communication maintained with pt,procedure will be delayed ,pt verbal understanding ofe

## 2020-03-04 NOTE — [employer]ATION PATIENT INSTRUCTIONS
Discharge Summary/Instructions after an Endoscopic Procedure  Patient Name: Bri Santiago  Patient MRN: [id number]  Patient YOB: 1960 Wednesday, March 04, 2020  Rosendo Boyer MD  RESTRICTIONS:  During your procedure today, you received medications for sedation.  These   medications may affect your judgment, balance and coordination.  Therefore,   for 24 hours, you have the following restrictions:   - DO NOT drive a car, operate machinery, make legal/financial decisions,   sign important papers or drink alcohol.    ACTIVITY:  Today: no heavy lifting, straining or running due to procedural   sedation/anesthesia.  The following day: return to full activity including work.  DIET:  Eat and drink normally unless instructed otherwise.     TREATMENT FOR COMMON SIDE EFFECTS:  - Mild abdominal pain, nausea, belching, bloating or excessive gas:  rest,   eat lightly and use a heating pad.  - Sore Throat: treat with throat lozenges and/or gargle with warm salt   water.  - Because air was used during the procedure, expelling large amounts of air   from your rectum or belching is normal.  - If a bowel prep was taken, you may not have a bowel movement for 1-3 days.    This is normal.  SYMPTOMS TO WATCH FOR AND REPORT TO YOUR PHYSICIAN:  1. Abdominal pain or bloating, other than gas cramps.  2. Chest pain.  3. Back pain.  4. Signs of infection such as: chills or fever occurring within 24 hours   after the procedure.  5. Rectal bleeding, which would show as bright red, maroon, or black stools.   (A tablespoon of blood from the rectum is not serious, especially if   hemorrhoids are present.)  6. Vomiting.  7. Weakness or dizziness.  GO DIRECTLY TO THE NEAREST EMERGENCY ROOM IF YOU HAVE ANY OF THE FOLLOWING:      Difficulty breathing              Chills and/or fever over 101 F   Persistent vomiting and/or vomiting blood   Severe abdominal pain   Severe chest pain   Black, tarry stools   Bleeding- more than one  tablespoon   Any other symptom or condition that you feel may need urgent attention  Your doctor recommends these additional instructions:  If any biopsies were taken, your doctors clinic will contact you in 1 to 2   weeks with any results.  - Discharge patient to home.   - Follow an antireflux regimen.   - The findings and recommendations were discussed with the patient.   - Return to GI clinic.  For questions, problems or results please call your physician - Rosendo Boyer MD at Work:  [phone].  [facility] [place], EMERGENCY ROOM PHONE NUMBER: [phone]  IF A COMPLICATION OR EMERGENCY SITUATION ARISES AND YOU ARE UNABLE TO REACH   YOUR PHYSICIAN - GO DIRECTLY TO THE EMERGENCY ROOM.  Rosendo Boyer MD  3/4/2020 11:19:25 AM  This report has been verified and signed electronically.  [employer]

## 2020-03-04 NOTE — TELEPHONE ENCOUNTER
Called patient and reviewed study results and treatment options.   Severe hai (ahi 36).   Pap order entered    Patient was given opportunity to ask questions and voice any concerns.

## 2020-03-04 NOTE — ANESTHESIA POSTPROCEDURE EVALUATION
Anesthesia Post Evaluation    Patient: Bri Santiago    Procedure(s) Performed: Procedure(s) (LRB):  EGD (ESOPHAGOGASTRODUODENOSCOPY) (N/A)  COLONOSCOPY (N/A)    Final Anesthesia Type: general    Patient location during evaluation: PACU  Patient participation: Yes- Able to Participate  Level of consciousness: awake and alert and oriented  Post-procedure vital signs: reviewed and stable  Pain management: adequate  Airway patency: patent    PONV status at discharge: No PONV  Anesthetic complications: no      Cardiovascular status: blood pressure returned to baseline, hemodynamically stable and stable  Respiratory status: unassisted, room air and spontaneous ventilation  Hydration status: euvolemic  Follow-up not needed.          Vitals Value Taken Time   /56 3/4/2020 11:50 AM   Temp 36.7 °C (98 °F) 3/4/2020 11:50 AM   Pulse 95 3/4/2020 11:50 AM   Resp 14 3/4/2020 11:50 AM   SpO2 99 % 3/4/2020 11:50 AM         No case tracking events are documented in the log.      Pain/Tammy Score: Tammy Score: 8 (3/4/2020 11:50 AM)

## 2020-03-05 ENCOUNTER — OFFICE VISIT (OUTPATIENT)
Dept: OPTOMETRY | Facility: CLINIC | Age: 60
End: 2020-03-05
Payer: MEDICARE

## 2020-03-05 DIAGNOSIS — H10.11: Primary | ICD-10-CM

## 2020-03-05 PROCEDURE — 92012 INTRM OPH EXAM EST PATIENT: CPT | Mod: S$GLB,,, | Performed by: OPTOMETRIST

## 2020-03-05 PROCEDURE — 99999 PR PBB SHADOW E&M-EST. PATIENT-LVL II: CPT | Mod: PBBFAC,,, | Performed by: OPTOMETRIST

## 2020-03-05 PROCEDURE — 92012 PR EYE EXAM, EST PATIENT,INTERMED: ICD-10-PCS | Mod: S$GLB,,, | Performed by: OPTOMETRIST

## 2020-03-05 PROCEDURE — 99999 PR PBB SHADOW E&M-EST. PATIENT-LVL II: ICD-10-PCS | Mod: PBBFAC,,, | Performed by: OPTOMETRIST

## 2020-03-05 RX ORDER — PREDNISOLONE ACETATE 10 MG/ML
1 SUSPENSION/ DROPS OPHTHALMIC 4 TIMES DAILY
Qty: 1 BOTTLE | Refills: 0 | Status: SHIPPED | OUTPATIENT
Start: 2020-03-05 | End: 2020-03-12

## 2020-03-05 NOTE — PROGRESS NOTES
HPI     Patient in for progress check.    Being followed for (diagnosis):  Atopic conjunctivitis od    Date last seen:  3/3/20    Doctor last seen:  Dr White    Prescribed eye medications(s) using:  TD drops qid    OTC eye medication(s) using:  AT qid os    Signs/symptoms of condition resolved/better/stable/worse?:  No improvement            Last edited by Radhika Melvin on 3/5/2020  8:17 AM. (History)            Assessment /Plan     For exam results, see Encounter Report.    Atopic conjunctivitis of right eye    Other orders  -     prednisoLONE acetate (PRED FORTE) 1 % DrpS; Place 1 drop into the right eye 4 (four) times daily. for 7 days  Dispense: 1 Bottle; Refill: 0      Minimal improvement with maxitrol. Previous Dx of viral conjunctivitis successfully treated with PF. Allergic vs Viral etiology. No mucous discharge present today. Increased tearing.   D/c maxitrol. Rx PF 1 gtt OD QID for 7 days then d/c.   No retina or optic nerve pathology noted with undilated 90 exam.   Thoroughly discussed with patient, if symptoms worsen or dont improve RTC.  Monitor 4-5 days unless symptoms worsen then return sooner.       RTC in 4-5 days or sooner if needed.

## 2020-03-09 ENCOUNTER — OFFICE VISIT (OUTPATIENT)
Dept: OPTOMETRY | Facility: CLINIC | Age: 60
End: 2020-03-09
Payer: MEDICARE

## 2020-03-09 ENCOUNTER — HOSPITAL ENCOUNTER (OUTPATIENT)
Dept: RADIOLOGY | Facility: OTHER | Age: 60
Discharge: HOME | End: 2020-03-09
Attending: NURSE PRACTITIONER
Payer: MEDICARE

## 2020-03-09 DIAGNOSIS — M96.1 CERVICAL POSTLAMINECTOMY SYNDROME: ICD-10-CM

## 2020-03-09 DIAGNOSIS — H10.11: Primary | ICD-10-CM

## 2020-03-09 PROCEDURE — 72141 MRI CERVICAL SPINE WITHOUT CONTRAST: ICD-10-PCS | Mod: 26,,, | Performed by: RADIOLOGY

## 2020-03-09 PROCEDURE — 92012 PR EYE EXAM, EST PATIENT,INTERMED: ICD-10-PCS | Mod: S$GLB,,, | Performed by: OPTOMETRIST

## 2020-03-09 PROCEDURE — 92012 INTRM OPH EXAM EST PATIENT: CPT | Mod: S$GLB,,, | Performed by: OPTOMETRIST

## 2020-03-09 PROCEDURE — 72141 MRI NECK SPINE W/O DYE: CPT | Mod: 26,,, | Performed by: RADIOLOGY

## 2020-03-09 PROCEDURE — 99999 PR PBB SHADOW E&M-EST. PATIENT-LVL II: ICD-10-PCS | Mod: PBBFAC,,, | Performed by: OPTOMETRIST

## 2020-03-09 PROCEDURE — 99999 PR PBB SHADOW E&M-EST. PATIENT-LVL II: CPT | Mod: PBBFAC,,, | Performed by: OPTOMETRIST

## 2020-03-09 PROCEDURE — 72141 MRI NECK SPINE W/O DYE: CPT | Mod: TC

## 2020-03-09 RX ORDER — ALBUTEROL SULFATE 90 UG/1
AEROSOL, METERED RESPIRATORY (INHALATION)
Qty: 18 G | Refills: 4 | Status: SHIPPED | OUTPATIENT
Start: 2020-03-09

## 2020-03-09 NOTE — PROGRESS NOTES
HPI     Pt here for f/u, conjunctivitis OD.  Pt using PF as directed QID OD.  Pt feels like symptoms have really improved since last visit. Decrease in   lid edema + redness.   No change in vision. No increase in redness or pain.       Last edited by Nirmala White, OD on 3/9/2020  1:06 PM. (History)            Assessment /Plan     For exam results, see Encounter Report.    Atopic conjunctivitis of right eye      Lid edema and conj injection resolved OD. Begin PF taper: QID for 4 days, TID for 3 days, BID for 2 days, and Qday for 1 day then d/c. May use ATs prn.   If symptoms worsen or reappear, RTC.     Pt interested in CEE. Will schedule before leaving today.       RTC for CEE or sooner if needed.

## 2020-03-11 ENCOUNTER — TELEPHONE (OUTPATIENT)
Dept: ENDOSCOPY | Facility: HOSPITAL | Age: 60
End: 2020-03-11

## 2020-03-12 ENCOUNTER — TELEPHONE (OUTPATIENT)
Dept: PAIN MEDICINE | Facility: CLINIC | Age: 60
End: 2020-03-12

## 2020-03-12 DIAGNOSIS — M51.37 DEGENERATION OF LUMBAR OR LUMBOSACRAL INTERVERTEBRAL DISC: ICD-10-CM

## 2020-03-12 DIAGNOSIS — G89.29 OTHER CHRONIC PAIN: Primary | ICD-10-CM

## 2020-03-12 NOTE — TELEPHONE ENCOUNTER
Left Vm to discuss cervical MRI results.  I also ordered CBC, ESR, CRP, procalcitonin levels. Will await call back from patient.

## 2020-03-12 NOTE — TELEPHONE ENCOUNTER
----- Message from Isabel Mullins sent at 3/12/2020  1:58 PM CDT -----  Contact: PURNIMA IYER   Type:  Patient Returning Call    Who Called: PURNIMA IYER     Who Left Message for Patient: JENI Rondon      Does the patient know what this is regarding?: RESULTS    Best Call Back Number:[phone]    Additional Information:

## 2020-03-12 NOTE — TELEPHONE ENCOUNTER
Staff is calling requesting that you give him a call back regarding imaging results.     Staff informed the patient that you will contact him as soon as you are able.     Patient verbalized understanding and expressed thanks.

## 2020-03-12 NOTE — TELEPHONE ENCOUNTER
Staff contacted the patient to get him scheduled for his four labs that Buffy Villagran Np ordered.     Patient accepted a lab appointment on 3/17/20 10 am at [facility].

## 2020-03-20 ENCOUNTER — LAB VISIT (OUTPATIENT)
Dept: LAB | Facility: HOSPITAL | Age: 60
End: 2020-03-20
Attending: INTERNAL MEDICINE
Payer: MEDICARE

## 2020-03-20 ENCOUNTER — TELEPHONE (OUTPATIENT)
Dept: PAIN MEDICINE | Facility: CLINIC | Age: 60
End: 2020-03-20

## 2020-03-20 DIAGNOSIS — G89.29 OTHER CHRONIC PAIN: ICD-10-CM

## 2020-03-20 DIAGNOSIS — M51.37 DEGENERATION OF LUMBAR OR LUMBOSACRAL INTERVERTEBRAL DISC: ICD-10-CM

## 2020-03-20 LAB
BASOPHILS # BLD AUTO: 0.02 K/UL (ref 0–0.2)
BASOPHILS NFR BLD: 0.5 % (ref 0–1.9)
CRP SERPL-MCNC: 2.6 MG/L (ref 0–8.2)
DIFFERENTIAL METHOD: ABNORMAL
EOSINOPHIL # BLD AUTO: 0.1 K/UL (ref 0–0.5)
EOSINOPHIL NFR BLD: 2.9 % (ref 0–8)
ERYTHROCYTE [DISTWIDTH] IN BLOOD BY AUTOMATED COUNT: 12.6 % (ref 11.5–14.5)
ERYTHROCYTE [SEDIMENTATION RATE] IN BLOOD BY WESTERGREN METHOD: 18 MM/HR (ref 0–23)
HCT VFR BLD AUTO: 40.6 % (ref 40–54)
HGB BLD-MCNC: 12.6 G/DL (ref 14–18)
IMM GRANULOCYTES # BLD AUTO: 0.01 K/UL (ref 0–0.04)
IMM GRANULOCYTES NFR BLD AUTO: 0.2 % (ref 0–0.5)
LYMPHOCYTES # BLD AUTO: 1.1 K/UL (ref 1–4.8)
LYMPHOCYTES NFR BLD: 26.3 % (ref 18–48)
MCH RBC QN AUTO: 30 PG (ref 27–31)
MCHC RBC AUTO-ENTMCNC: 31 G/DL (ref 32–36)
MCV RBC AUTO: 97 FL (ref 82–98)
MONOCYTES # BLD AUTO: 0.3 K/UL (ref 0.3–1)
MONOCYTES NFR BLD: 7.2 % (ref 4–15)
NEUTROPHILS # BLD AUTO: 2.6 K/UL (ref 1.8–7.7)
NEUTROPHILS NFR BLD: 62.9 % (ref 38–73)
NRBC BLD-RTO: 0 /100 WBC
PLATELET # BLD AUTO: 179 K/UL (ref 150–350)
PMV BLD AUTO: 10.3 FL (ref 9.2–12.9)
PROCALCITONIN SERPL IA-MCNC: 0.03 NG/ML
RBC # BLD AUTO: 4.2 M/UL (ref 4.6–6.2)
WBC # BLD AUTO: 4.18 K/UL (ref 3.9–12.7)

## 2020-03-20 PROCEDURE — 36415 COLL VENOUS BLD VENIPUNCTURE: CPT

## 2020-03-20 PROCEDURE — 85025 COMPLETE CBC W/AUTO DIFF WBC: CPT

## 2020-03-20 PROCEDURE — 85652 RBC SED RATE AUTOMATED: CPT

## 2020-03-20 PROCEDURE — 86140 C-REACTIVE PROTEIN: CPT

## 2020-03-20 PROCEDURE — 84145 PROCALCITONIN (PCT): CPT

## 2020-03-20 NOTE — TELEPHONE ENCOUNTER
Staff contacted and spoke with patient in regards to his message.    Patient stated he's at [facility] and wanted to know if it's okay for him to have his lab work done there since he miss it at [facility]

## 2020-03-20 NOTE — TELEPHONE ENCOUNTER
----- Message from Izabella Rivsa sent at 3/20/2020  8:29 AM CDT -----  Contact: PURNIMA IYER [[id number]]  Who called:PURNIMA IYRE [[id number]]    What is the request in detail: Patient is requesting a call back. Pt would like to complete his labs at [facility], though they were cancelled, because he has to take his father for a CT at that location. He would like to know if that's possible.   Please advise.    Can the clinic reply by [facility]? No    Best call back number: [phone]    Additional Information: N/A

## 2020-03-22 DIAGNOSIS — D50.9 IRON DEFICIENCY ANEMIA, UNSPECIFIED IRON DEFICIENCY ANEMIA TYPE: Primary | ICD-10-CM

## 2020-03-22 RX ORDER — FERROUS SULFATE 325(65) MG
325 TABLET ORAL EVERY 12 HOURS
Qty: 60 TABLET | Refills: 4 | Status: SHIPPED | OUTPATIENT
Start: 2020-03-22 | End: 2022-01-11 | Stop reason: SDUPTHER

## 2020-03-26 RX ORDER — BUDESONIDE AND FORMOTEROL FUMARATE DIHYDRATE 160; 4.5 UG/1; UG/1
AEROSOL RESPIRATORY (INHALATION)
Qty: 10.2 G | Refills: 12 | Status: SHIPPED | OUTPATIENT
Start: 2020-03-26

## 2020-04-01 ENCOUNTER — TELEPHONE (OUTPATIENT)
Dept: PAIN MEDICINE | Facility: CLINIC | Age: 60
End: 2020-04-01

## 2020-04-01 NOTE — TELEPHONE ENCOUNTER
Contacted and spoke to patient, he refused to schedule a telephone or VV.     Mr. Santiago was informed that prior to having his medication refilled he will require one of those type of visits.     Mr. Santiago verbalized that he can hold off on any need until he is able to come in to the clinic. He doesn't see how either of those visits will help his pain.

## 2020-04-01 NOTE — TELEPHONE ENCOUNTER
Staff left a message with patient in regards to patient appt originally schedule for 4/2820 with JERI Buffymarsha Thrasher due to situation at hand JERI has been redeployed to another area in the hospital, staff informed patient they would have to convert patient appointment to provider schedule Dr. Magana for a virtual visit or telephone call        Staff left a messase and he's not active on MyChart to send a message     If patient calls back please set patient up for MyChart and schedule him with provider Dr. Magana for a telephone call or virtual visit.        *staff canceled appt until patient calls back

## 2020-04-01 NOTE — TELEPHONE ENCOUNTER
----- Message from Leanna Perez sent at 4/1/2020  9:27 AM CDT -----  Contact: pt  Type:  Patient Returning Call    Who Called: Bri Santiago    Who Left Message for Patient: veronica    Does the patient know what this is regarding?: y    Best Call Back Number: [phone]    Additional Information:  Pt declined VV or telephone call states that he will reschedule when this is all over

## 2020-05-18 ENCOUNTER — TELEPHONE (OUTPATIENT)
Dept: SLEEP MEDICINE | Facility: CLINIC | Age: 60
End: 2020-05-18

## 2020-05-18 NOTE — TELEPHONE ENCOUNTER
----- Message from Izabella Rivas sent at 5/15/2020  2:21 PM CDT -----  Contact: PURNIMA IYER [[id number]]  Who called:PURNIMA IYER [[id number]]    What is the request in detail: Patient is requesting a call back. He states he thinks the pressure on his cpap machine is too high. He states it causes him to wake up with headaches every morning. He states this has been happening since he has been on the machine.   Please advise.    Can the clinic reply by [facility]? No    Best call back number: [phone]    Additional Information: N/A

## 2020-05-18 NOTE — TELEPHONE ENCOUNTER
Pt called stating he thinks the pressure on his cpap machine is too high. Pt says that it causes him to wake up with headaches every morning. Pt also stated  this has been happening since he has been on the machine.   Please advise.

## 2020-05-26 ENCOUNTER — LAB VISIT (OUTPATIENT)
Dept: LAB | Facility: OTHER | Age: 60
End: 2020-05-26
Attending: INTERNAL MEDICINE
Payer: MEDICARE

## 2020-05-26 DIAGNOSIS — N62 GYNECOMASTIA, MALE: ICD-10-CM

## 2020-05-26 LAB
CORTIS SERPL-MCNC: 8 UG/DL (ref 4.3–22.4)
ESTRADIOL SERPL-MCNC: 71 PG/ML (ref 11–44)
PROLACTIN SERPL IA-MCNC: 10 NG/ML (ref 3.5–19.4)

## 2020-05-26 PROCEDURE — 82533 TOTAL CORTISOL: CPT

## 2020-05-26 PROCEDURE — 82670 ASSAY OF TOTAL ESTRADIOL: CPT

## 2020-05-26 PROCEDURE — 36415 COLL VENOUS BLD VENIPUNCTURE: CPT

## 2020-05-26 PROCEDURE — 84146 ASSAY OF PROLACTIN: CPT

## 2020-06-11 ENCOUNTER — TELEPHONE (OUTPATIENT)
Dept: INTERNAL MEDICINE | Facility: CLINIC | Age: 60
End: 2020-06-11

## 2020-06-11 ENCOUNTER — OFFICE VISIT (OUTPATIENT)
Dept: OTOLARYNGOLOGY | Facility: CLINIC | Age: 60
End: 2020-06-11
Payer: MEDICARE

## 2020-06-11 VITALS
TEMPERATURE: 99 F | HEART RATE: 82 BPM | HEIGHT: 71 IN | DIASTOLIC BLOOD PRESSURE: 86 MMHG | WEIGHT: 273.13 LBS | SYSTOLIC BLOOD PRESSURE: 132 MMHG | BODY MASS INDEX: 38.24 KG/M2

## 2020-06-11 DIAGNOSIS — R51.9 NONINTRACTABLE EPISODIC HEADACHE, UNSPECIFIED HEADACHE TYPE: ICD-10-CM

## 2020-06-11 DIAGNOSIS — G47.33 OBSTRUCTIVE SLEEP APNEA TREATED WITH CONTINUOUS POSITIVE AIRWAY PRESSURE (CPAP): ICD-10-CM

## 2020-06-11 DIAGNOSIS — R09.82 PND (POST-NASAL DRIP): ICD-10-CM

## 2020-06-11 DIAGNOSIS — J30.89 NON-SEASONAL ALLERGIC RHINITIS, UNSPECIFIED TRIGGER: Primary | ICD-10-CM

## 2020-06-11 PROCEDURE — 3075F SYST BP GE 130 - 139MM HG: CPT | Mod: CPTII,S$GLB,, | Performed by: SPECIALIST

## 2020-06-11 PROCEDURE — 3079F DIAST BP 80-89 MM HG: CPT | Mod: CPTII,S$GLB,, | Performed by: SPECIALIST

## 2020-06-11 PROCEDURE — 3008F PR BODY MASS INDEX (BMI) DOCUMENTED: ICD-10-PCS | Mod: CPTII,S$GLB,, | Performed by: SPECIALIST

## 2020-06-11 PROCEDURE — 3008F BODY MASS INDEX DOCD: CPT | Mod: CPTII,S$GLB,, | Performed by: SPECIALIST

## 2020-06-11 PROCEDURE — 3079F PR MOST RECENT DIASTOLIC BLOOD PRESSURE 80-89 MM HG: ICD-10-PCS | Mod: CPTII,S$GLB,, | Performed by: SPECIALIST

## 2020-06-11 PROCEDURE — 99213 PR OFFICE/OUTPT VISIT, EST, LEVL III, 20-29 MIN: ICD-10-PCS | Mod: S$GLB,,, | Performed by: SPECIALIST

## 2020-06-11 PROCEDURE — 99213 OFFICE O/P EST LOW 20 MIN: CPT | Mod: S$GLB,,, | Performed by: SPECIALIST

## 2020-06-11 PROCEDURE — 3075F PR MOST RECENT SYSTOLIC BLOOD PRESS GE 130-139MM HG: ICD-10-PCS | Mod: CPTII,S$GLB,, | Performed by: SPECIALIST

## 2020-06-11 RX ORDER — FUROSEMIDE 20 MG/1
20 TABLET ORAL 2 TIMES DAILY
Qty: 3 TABLET | Refills: 0 | Status: SHIPPED | OUTPATIENT
Start: 2020-06-11 | End: 2020-06-16 | Stop reason: SDUPTHER

## 2020-06-11 RX ORDER — AZELASTINE 1 MG/ML
SPRAY, METERED NASAL
Qty: 30 ML | Refills: 11 | Status: SHIPPED | OUTPATIENT
Start: 2020-06-11 | End: 2021-02-11 | Stop reason: SDUPTHER

## 2020-06-11 RX ORDER — MONTELUKAST SODIUM 10 MG/1
TABLET ORAL
Qty: 30 TABLET | Refills: 11 | Status: SHIPPED | OUTPATIENT
Start: 2020-06-11 | End: 2021-06-15 | Stop reason: SDUPTHER

## 2020-06-11 RX ORDER — FLUTICASONE PROPIONATE 50 MCG
SPRAY, SUSPENSION (ML) NASAL
Qty: 18.2 ML | Refills: 11 | Status: SHIPPED | OUTPATIENT
Start: 2020-06-11 | End: 2021-06-15 | Stop reason: SDUPTHER

## 2020-06-11 NOTE — TELEPHONE ENCOUNTER
Spoke with pt and scheduled appt(s). He is retaining fluid in his hands/legs. He said he was at one point taking a fluid pill, but he was taken off of it. I scheduled him for soonest available at [facility] since he did not want to go to another location.      ----- Message from Silvia Saha sent at 6/11/2020 11:51 AM CDT -----  Contact: PURNIMA IYER [[id number]]  Type: Patient Call Back    Who called: PURNIMA IYER [[id number]]    What is the request in detail: Patient is requesting a call back in regards to being seen today due to fluid retention.   Please advise.    Can the clinic reply by [facility]? No    Best call back number: [phone]    Additional Information: N/A

## 2020-06-11 NOTE — PROGRESS NOTES
Subjective:       Patient ID: Bri Santiago is a 59 y.o. male.    Chief Complaint: nasal drip (with tingling in the throat )    The patient is returning for follow-up visit.  Overall, he feels that he has improved significantly since surgery. There are multiple issues to discuss:  1.  He has had nasal congestion, rhinorrhea, postnasal drip and sore throat for the last 2 weeks.  Nasal secretions are clear.  He does not have fever.  He has been using Mucinex, Flonase, Astelin and Singulair on a daily basis.    2.  Regarding his sleep apnea  did undergo a new sleep study since his last visit.  This has led to him being placed on CPAP at a much lower pressure, which she is tolerating well.  He is wearing his CPAP every night.  3.  His headaches continue but are improved in frequency and severity.   They have improved since his surgery and again significantly improved when he started using CPAP for sleep apnea.          Review of Systems   Constitutional: Positive for fatigue. Negative for activity change, appetite change, chills, fever and unexpected weight change.   HENT: Positive for congestion, postnasal drip, rhinorrhea, sinus pressure, sinus pain and sore throat. Negative for ear discharge, ear pain, facial swelling, hearing loss, mouth sores, sneezing, tinnitus, trouble swallowing and voice change.    Eyes: Negative for photophobia, pain, discharge, redness, itching and visual disturbance.   Respiratory: Positive for cough. Negative for apnea, choking, shortness of breath and wheezing.    Cardiovascular: Negative for chest pain and palpitations.   Gastrointestinal: Negative for abdominal distention, abdominal pain, nausea and vomiting.   Musculoskeletal: Negative for arthralgias, myalgias, neck pain and neck stiffness.   Skin: Negative.  Negative for color change, pallor and rash.   Allergic/Immunologic: Positive for environmental allergies. Negative for food allergies and immunocompromised state.    Neurological: Positive for headaches. Negative for dizziness, facial asymmetry, speech difficulty, weakness, light-headedness and numbness.   Hematological: Negative for adenopathy. Does not bruise/bleed easily.   Psychiatric/Behavioral: Positive for sleep disturbance. Negative for agitation, confusion and decreased concentration.       Objective:      Physical Exam   Constitutional: He is oriented to person, place, and time. He appears well-developed and well-nourished. He is cooperative.   HENT:   Head: Normocephalic.   Right Ear: External ear and ear canal normal. Tympanic membrane is retracted.   Left Ear: External ear and ear canal normal. Tympanic membrane is retracted.   Nose: Mucosal edema (cyanotic, boggy inferior turbinates bilaterally), rhinorrhea (clear mucus bilaterally) and septal deviation (To the right) present.   Mouth/Throat: Uvula is midline, oropharynx is clear and moist and mucous membranes are normal. No oral lesions.   Oral cavity-Mitchell class 3, severe hyperactive gag reflex prohibits visualization of the oropharynx, long thick palate, uvula and tonsils not visualized   Eyes: Pupils are equal, round, and reactive to light. EOM and lids are normal. Right eye exhibits no discharge and no exudate. Left eye exhibits no discharge and no exudate. Right conjunctiva is injected. Left conjunctiva is injected.   Neck: Trachea normal and normal range of motion. No muscular tenderness present. No tracheal deviation present. No thyroid mass and no thyromegaly present.   Cardiovascular: Normal rate, regular rhythm, normal heart sounds and normal pulses.   Pulmonary/Chest: Effort normal and breath sounds normal. No stridor. He has no decreased breath sounds. He has no wheezes. He has no rhonchi. He has no rales.   Abdominal: Soft. Bowel sounds are normal. There is no tenderness.   Musculoskeletal: Normal range of motion.   Lymphadenopathy:        Head (right side): No submental, no submandibular, no  preauricular, no posterior auricular and no occipital adenopathy present.        Head (left side): No submental, no submandibular, no preauricular, no posterior auricular and no occipital adenopathy present.     He has no cervical adenopathy.   Neurological: He is alert and oriented to person, place, and time. He has normal strength. No cranial nerve deficit or sensory deficit. Gait normal.   Skin: Skin is warm and dry. No petechiae and no rash noted. No cyanosis. Nails show no clubbing.   Psychiatric: He has a normal mood and affect. His speech is normal and behavior is normal. Judgment and thought content normal. Cognition and memory are normal.       Assessment:       1. Non-seasonal allergic rhinitis, unspecified trigger    2. PND (post-nasal drip)    3. Obstructive sleep apnea treated with continuous positive airway pressure (CPAP)    4. Nonintractable episodic headache, unspecified headache type        Plan:       I will recheck the patient in 3 months, or sooner on an as-needed basis.  I am giving him a prescription for ipratropium bromide to use in addition to the Flonase and Astelin and Singulair when he is having significant rhinorrhea or postnasal drip.  He will continue with the CPAP.

## 2020-06-11 NOTE — TELEPHONE ENCOUNTER
Ok to take lasix for three days- rx sent, increase potassium in diet. Elevate feet when seated  Avoid salt.  Keep apt for full evaluation. If sob, worsening recommend immedate eval

## 2020-06-12 NOTE — TELEPHONE ENCOUNTER
Spoke with pt, they had a verbal understanding. If pt gets shortness of breath, he knows to go to er over weekend.

## 2020-06-16 ENCOUNTER — OFFICE VISIT (OUTPATIENT)
Dept: INTERNAL MEDICINE | Facility: CLINIC | Age: 60
End: 2020-06-16
Attending: FAMILY MEDICINE
Payer: MEDICARE

## 2020-06-16 VITALS
OXYGEN SATURATION: 97 % | HEART RATE: 87 BPM | DIASTOLIC BLOOD PRESSURE: 80 MMHG | WEIGHT: 269.38 LBS | HEIGHT: 71 IN | SYSTOLIC BLOOD PRESSURE: 124 MMHG | BODY MASS INDEX: 37.71 KG/M2

## 2020-06-16 DIAGNOSIS — I51.89 DIASTOLIC DYSFUNCTION: Primary | ICD-10-CM

## 2020-06-16 DIAGNOSIS — E87.6 HYPOKALEMIA: ICD-10-CM

## 2020-06-16 DIAGNOSIS — R60.0 LOWER EXTREMITY EDEMA: ICD-10-CM

## 2020-06-16 PROCEDURE — 3079F PR MOST RECENT DIASTOLIC BLOOD PRESSURE 80-89 MM HG: ICD-10-PCS | Mod: CPTII,S$GLB,, | Performed by: INTERNAL MEDICINE

## 2020-06-16 PROCEDURE — 99214 PR OFFICE/OUTPT VISIT, EST, LEVL IV, 30-39 MIN: ICD-10-PCS | Mod: S$GLB,,, | Performed by: INTERNAL MEDICINE

## 2020-06-16 PROCEDURE — 99999 PR PBB SHADOW E&M-EST. PATIENT-LVL IV: ICD-10-PCS | Mod: PBBFAC,,, | Performed by: INTERNAL MEDICINE

## 2020-06-16 PROCEDURE — 3079F DIAST BP 80-89 MM HG: CPT | Mod: CPTII,S$GLB,, | Performed by: INTERNAL MEDICINE

## 2020-06-16 PROCEDURE — 3008F PR BODY MASS INDEX (BMI) DOCUMENTED: ICD-10-PCS | Mod: CPTII,S$GLB,, | Performed by: INTERNAL MEDICINE

## 2020-06-16 PROCEDURE — 3008F BODY MASS INDEX DOCD: CPT | Mod: CPTII,S$GLB,, | Performed by: INTERNAL MEDICINE

## 2020-06-16 PROCEDURE — 3074F SYST BP LT 130 MM HG: CPT | Mod: CPTII,S$GLB,, | Performed by: INTERNAL MEDICINE

## 2020-06-16 PROCEDURE — 99214 OFFICE O/P EST MOD 30 MIN: CPT | Mod: S$GLB,,, | Performed by: INTERNAL MEDICINE

## 2020-06-16 PROCEDURE — 3074F PR MOST RECENT SYSTOLIC BLOOD PRESSURE < 130 MM HG: ICD-10-PCS | Mod: CPTII,S$GLB,, | Performed by: INTERNAL MEDICINE

## 2020-06-16 PROCEDURE — 99999 PR PBB SHADOW E&M-EST. PATIENT-LVL IV: CPT | Mod: PBBFAC,,, | Performed by: INTERNAL MEDICINE

## 2020-06-16 RX ORDER — FUROSEMIDE 20 MG/1
20 TABLET ORAL 2 TIMES DAILY
Qty: 3 TABLET | Refills: 0 | Status: SHIPPED | OUTPATIENT
Start: 2020-06-16 | End: 2020-06-16 | Stop reason: SDUPTHER

## 2020-06-16 RX ORDER — POTASSIUM CHLORIDE 750 MG/1
20 CAPSULE, EXTENDED RELEASE ORAL DAILY
Qty: 60 CAPSULE | Refills: 11 | Status: SHIPPED | OUTPATIENT
Start: 2020-06-16 | End: 2020-07-16

## 2020-06-16 RX ORDER — FUROSEMIDE 20 MG/1
TABLET ORAL
Qty: 70 TABLET | Refills: 1 | Status: SHIPPED | OUTPATIENT
Start: 2020-06-16 | End: 2020-10-12 | Stop reason: SDUPTHER

## 2020-06-16 NOTE — TELEPHONE ENCOUNTER
[facility] sent a fax asking if 3 tablets is the correct amount of furosemide tablets that were dispense   Pending furosemide to review

## 2020-06-16 NOTE — PROGRESS NOTES
"Subjective:   Patient ID: Bri Santiago is a 59 y.o. male  Chief complaint:   Chief Complaint   Patient presents with    Swelling       HPI    Pt here for f/u     Estrogen level increased and taken off of aldactone  - followed by endo    Here today to discuss diuretic med    - more fluid retention over past few days   - given lasix 20mg x 3 doses and sx improved with this   - no restaurant foods - not adding salt to foods     HFpEF and LE edema:  - previously was tony aldactone and edema stable previously but then stopped due to gynecomastia and elevated estrogen as above     Previously:   - f/u with cards and had Had left heart cath 2/14/19  - pet stress test performed 12/2018 and reviewed   -cxr 9/2018 reviewed   - BNP wnl  - v/q scan with low prob for pe  Started on hctz 25 by cards after recent heart cath in Feb - not effective for edema per pt  - had hypokalemia with lasix - difficulty with swallowing large potassium pills and was witched to liquid potassium which he tolerated but did not like taste   - at some point was taken off     Discussed today med options and potential side effects as well as options for potassium replacement if needed     Currently no cp, sob, orthopnea    Review of Systems    Objective:  Vitals:    06/16/20 1127   BP: 124/80   BP Location: Left arm   Patient Position: Sitting   Pulse: 87   SpO2: 97%   Weight: 122.2 kg (269 lb 6.4 oz)   Height: 5' 11" (1.803 m)     Body mass index is 37.57 kg/m².    Physical Exam  Vitals signs reviewed.   Constitutional:       Appearance: He is well-developed.   HENT:      Head: Normocephalic and atraumatic.   Eyes:      Conjunctiva/sclera: Conjunctivae normal.   Neck:      Musculoskeletal: Neck supple.   Cardiovascular:      Rate and Rhythm: Normal rate and regular rhythm.   Pulmonary:      Effort: Pulmonary effort is normal.      Breath sounds: Normal breath sounds.   Abdominal:      General: Bowel sounds are normal.      Palpations: Abdomen is " soft.   Musculoskeletal: Normal range of motion.         General: No tenderness.      Right lower leg: Edema present.      Left lower leg: Edema present.      Comments: Trace edema of B ankles  No ttp of calves or inc warmth or redness   Lymphadenopathy:      Cervical: No cervical adenopathy.   Skin:     General: Skin is warm and dry.   Neurological:      Mental Status: He is alert and oriented to person, place, and time.   Psychiatric:         Behavior: Behavior normal.         Thought Content: Thought content normal.         Judgment: Judgment normal.         Assessment:  1. Diastolic dysfunction    2. Hypokalemia    3. Lower extremity edema        Plan:  Bri was seen today for swelling.    Diagnoses and all orders for this visit:    Diastolic dysfunction  -     furosemide (LASIX) 20 MG tablet; Take 20meq every 12 hours by mouth for 5 days and then 20meq daily  -     Basic metabolic panel; Future  -     Magnesium; Future  -     Echo Color Flow Doppler? Yes; Future    Hypokalemia  -     Basic metabolic panel; Future  -     Magnesium; Future    Lower extremity edema  -     Echo Color Flow Doppler? Yes; Future    Other orders  -     potassium chloride (MICRO-K) 10 MEQ CpSR; Take 2 capsules (20 mEq total) by mouth once daily. for 30 doses    hctz not effective for diuresis per pt in past   Issues with tony oral potassium (swalling tabs and intolerant of liquid)  - will resume lasix and give potassium capsules to see if easier to swallow  - if any issues with me pt will let or pcp know   - cont to hold aldactone per hpi above   Check labs in 1-2 weeks   nv for bp check in 1-2 weeks   Er and rtc prompts given   Keep endo appt   F/u with me or pcp in 4 weeks or sooner prn     Health Maintenance   Topic Date Due    Lipid Panel  01/23/2025    TETANUS VACCINE  10/16/2027    Hepatitis C Screening  Completed    Pneumococcal Vaccine (Medium Risk)  Completed

## 2020-06-16 NOTE — TELEPHONE ENCOUNTER
----- Message from Mabel Kaba sent at 6/16/2020 11:00 AM CDT -----  Regarding: medication  Name of Who is Calling: Humera       What is the request in detail: Humera with [facility] pharmacy was calling concerning the furosemide (LASIX) 20 MG tablet she states the quality amount says 3 she wanted to see if it was suppose to be 30 tablets      Can the clinic reply by [facility]: no      What Number to Call Back if not in [facility]: [phone]

## 2020-06-24 ENCOUNTER — HOSPITAL ENCOUNTER (OUTPATIENT)
Dept: CARDIOLOGY | Facility: HOSPITAL | Age: 60
Discharge: HOME | End: 2020-06-24
Attending: INTERNAL MEDICINE
Payer: MEDICARE

## 2020-06-24 ENCOUNTER — CLINICAL SUPPORT (OUTPATIENT)
Dept: INTERNAL MEDICINE | Facility: CLINIC | Age: 60
End: 2020-06-24
Payer: MEDICARE

## 2020-06-24 VITALS
HEIGHT: 71 IN | DIASTOLIC BLOOD PRESSURE: 80 MMHG | SYSTOLIC BLOOD PRESSURE: 130 MMHG | WEIGHT: 269 LBS | BODY MASS INDEX: 37.66 KG/M2 | HEART RATE: 75 BPM

## 2020-06-24 VITALS — SYSTOLIC BLOOD PRESSURE: 112 MMHG | DIASTOLIC BLOOD PRESSURE: 82 MMHG | OXYGEN SATURATION: 95 % | HEART RATE: 78 BPM

## 2020-06-24 DIAGNOSIS — I51.89 DIASTOLIC DYSFUNCTION: ICD-10-CM

## 2020-06-24 DIAGNOSIS — R60.0 LOWER EXTREMITY EDEMA: ICD-10-CM

## 2020-06-24 LAB
ASCENDING AORTA: 3.37 CM
AV INDEX (PROSTH): 0.89
AV MEAN GRADIENT: 2 MMHG
AV PEAK GRADIENT: 5 MMHG
AV VALVE AREA: 3.43 CM2
AV VELOCITY RATIO: 0.88
BSA FOR ECHO PROCEDURE: 2.47 M2
CV ECHO LV RWT: 0.44 CM
DOP CALC AO PEAK VEL: 1.11 M/S
DOP CALC AO VTI: 23.34 CM
DOP CALC LVOT AREA: 3.9 CM2
DOP CALC LVOT DIAMETER: 2.22 CM
DOP CALC LVOT PEAK VEL: 0.98 M/S
DOP CALC LVOT STROKE VOLUME: 80.16 CM3
DOP CALCLVOT PEAK VEL VTI: 20.72 CM
E WAVE DECELERATION TIME: 137.78 MSEC
E/A RATIO: 0.89
E/E' RATIO: 8.75 M/S
ECHO LV POSTERIOR WALL: 1.05 CM (ref 0.6–1.1)
FRACTIONAL SHORTENING: 30 % (ref 28–44)
INTERVENTRICULAR SEPTUM: 0.86 CM (ref 0.6–1.1)
IVRT: 77.07 MSEC
LA MAJOR: 5.6 CM
LA MINOR: 5.6 CM
LA WIDTH: 4.97 CM
LEFT ATRIUM SIZE: 3.41 CM
LEFT ATRIUM VOLUME INDEX: 33.7 ML/M2
LEFT ATRIUM VOLUME: 80.67 CM3
LEFT INTERNAL DIMENSION IN SYSTOLE: 3.33 CM (ref 2.1–4)
LEFT VENTRICLE DIASTOLIC VOLUME INDEX: 43.51 ML/M2
LEFT VENTRICLE DIASTOLIC VOLUME: 104.07 ML
LEFT VENTRICLE MASS INDEX: 65 G/M2
LEFT VENTRICLE SYSTOLIC VOLUME INDEX: 18.8 ML/M2
LEFT VENTRICLE SYSTOLIC VOLUME: 45.01 ML
LEFT VENTRICULAR INTERNAL DIMENSION IN DIASTOLE: 4.73 CM (ref 3.5–6)
LEFT VENTRICULAR MASS: 156.13 G
LV LATERAL E/E' RATIO: 7 M/S
LV SEPTAL E/E' RATIO: 11.67 M/S
MV PEAK A VEL: 0.79 M/S
MV PEAK E VEL: 0.7 M/S
MV STENOSIS PRESSURE HALF TIME: 39.96 MS
MV VALVE AREA P 1/2 METHOD: 5.51 CM2
PISA TR MAX VEL: 2.69 M/S
PULM VEIN S/D RATIO: 0.9
PV PEAK D VEL: 0.51 M/S
PV PEAK S VEL: 0.46 M/S
RA MAJOR: 4.5 CM
RA PRESSURE: 8 MMHG
RA WIDTH: 3.92 CM
RIGHT VENTRICULAR END-DIASTOLIC DIMENSION: 4.06 CM
RV TISSUE DOPPLER FREE WALL SYSTOLIC VELOCITY 1 (APICAL 4 CHAMBER VIEW): 10.08 CM/S
SINUS: 3.99 CM
STJ: 3.58 CM
TDI LATERAL: 0.1 M/S
TDI SEPTAL: 0.06 M/S
TDI: 0.08 M/S
TR MAX PG: 29 MMHG
TRICUSPID ANNULAR PLANE SYSTOLIC EXCURSION: 2.17 CM
TV REST PULMONARY ARTERY PRESSURE: 37 MMHG

## 2020-06-24 PROCEDURE — 93306 TTE W/DOPPLER COMPLETE: CPT

## 2020-06-24 PROCEDURE — 93306 TTE W/DOPPLER COMPLETE: CPT | Mod: 26,,, | Performed by: INTERNAL MEDICINE

## 2020-06-24 PROCEDURE — 99999 PR PBB SHADOW E&M-EST. PATIENT-LVL III: ICD-10-PCS | Mod: PBBFAC,,,

## 2020-06-24 PROCEDURE — 93306 ECHO (CUPID ONLY): ICD-10-PCS | Mod: 26,,, | Performed by: INTERNAL MEDICINE

## 2020-06-24 PROCEDURE — 99999 PR PBB SHADOW E&M-EST. PATIENT-LVL III: CPT | Mod: PBBFAC,,,

## 2020-06-24 NOTE — Clinical Note
Does patient have record of home blood pressure readings yes.   Last dose of blood pressure medication was taken at night, 06/23 pm.  Patient is asymptomatic.   Complains of headaches on and off but not today.    B/P 112/82, 78 HR    Dr. Galeas notified.

## 2020-06-24 NOTE — PROGRESS NOTES
Bri Santiago 59 y.o. male is here today for Blood Pressure check.   History of HTN yes.    Review of patient's allergies indicates:   Allergen Reactions    Ace inhibitors     Aspirin      Other reaction(s): Hives  Other reaction(s): Hives    Codeine Itching     Ok to take percocet    Dilaudid  [hydromorphone]      Other reaction(s): Itching    Penicillins Hives and Swelling     Has had allergy testing and can prob tolerate penicillin     Creatinine   Date Value Ref Range Status   01/23/2020 1.0 0.5 - 1.4 mg/dL Final     Sodium   Date Value Ref Range Status   01/23/2020 140 136 - 145 mmol/L Final     Potassium   Date Value Ref Range Status   01/23/2020 4.1 3.5 - 5.1 mmol/L Final   ]  Patient verifies taking blood pressure medications on a regular bases at the same time of the day.     Current Outpatient Medications:     doxazosin (CARDURA) 1 MG tablet, TAKE 1 TABLET(1 MG) BY MOUTH EVERY EVENING, Disp: 90 tablet, Rfl: 3    furosemide (LASIX) 20 MG tablet, Take 20meq every 12 hours by mouth for 5 days and then 20meq daily, Disp: 70 tablet, Rfl: 1    acetaminophen (TYLENOL) 500 MG tablet, Take 2 tablets (1,000 mg total) by mouth every 8 (eight) hours as needed., Disp: 90 tablet, Rfl: 0    albuterol (PROVENTIL/VENTOLIN HFA) 90 mcg/actuation inhaler, INHALE 2 PUFFS INTO THE LUNGS EVERY 6 HOURS AS NEEDED FOR WHEEZING OR SHORTNESS OF BREATH, Disp: 18 g, Rfl: 4    atorvastatin (LIPITOR) 40 MG tablet, TAKE 1 TABLET(40 MG) BY MOUTH EVERY DAY, Disp: 90 tablet, Rfl: 3    azelastine (ASTELIN) 137 mcg (0.1 %) nasal spray, Two sprays in each nostril, sniff until absorbed, then follow with 1 spray of fluticasone.  Use both sprays twice daily., Disp: 30 mL, Rfl: 11    budesonide-formoterol 160-4.5 mcg (SYMBICORT) 160-4.5 mcg/actuation HFAA, INHALE 2 PUFFS INTO THE LUNGS EVERY 12 HOURS, Disp: 10.2 g, Rfl: 12    calcium citrate-vitamin D3 315-200 mg (CITRACAL+D) 315-200 mg-unit per tablet, Take 1 tablet by mouth once  daily., Disp: , Rfl:     clopidogrel (PLAVIX) 75 mg tablet, Take 1 tablet (75 mg total) by mouth once daily., Disp: 30 tablet, Rfl: 11    cyanocobalamin, vitamin B-12, (VITAMIN B-12) 50 mcg tablet, Take 50 mcg by mouth once daily., Disp: , Rfl:     diclofenac sodium (VOLTAREN) 1 % Gel, Apply 2 g topically 3 (three) times daily., Disp: 1 Tube, Rfl: 0    docusate sodium (COLACE) 100 MG capsule, Take 1 tablet by mouth Twice daily. 1 Capsule Oral Twice a day .  Take with pain medicine, Disp: , Rfl:     esomeprazole (NEXIUM) 40 MG capsule, Take 1 capsule (40 mg total) by mouth 2 (two) times daily before meals., Disp: 90 capsule, Rfl: 3    ferrous sulfate (FEOSOL) 325 mg (65 mg iron) Tab tablet, Take 1 tablet (325 mg total) by mouth every 12 (twelve) hours., Disp: 60 tablet, Rfl: 4    fluticasone propionate (FLONASE) 50 mcg/actuation nasal spray, One spray in each nostril twice daily after 1st using azelastine nasal spray, Disp: 18.2 mL, Rfl: 11    ipratropium (ATROVENT) 0.03 % nasal spray, 2 sprays by Nasal route 2 (two) times daily. May be use more often if needed, Disp: 30 mL, Rfl: 11    montelukast (SINGULAIR) 10 mg tablet, TAKE 1 TABLET(10 MG) BY MOUTH EVERY EVENING, Disp: 30 tablet, Rfl: 11    potassium chloride (MICRO-K) 10 MEQ CpSR, Take 2 capsules (20 mEq total) by mouth once daily. for 30 doses, Disp: 60 capsule, Rfl: 11    zolpidem (AMBIEN) 10 mg Tab, TAKE 1 TABLET BY MOUTH EVERY DAY AT BEDTIME, Disp: 20 tablet, Rfl: 0  Does patient have record of home blood pressure readings yes.   Last dose of blood pressure medication was taken at night, 06/23 pm.  Patient is asymptomatic.   Complains of headaches on and off but not today.    B/P 112/82, 78 HR    Dr. Galeas notified.

## 2020-06-25 ENCOUNTER — TELEPHONE (OUTPATIENT)
Dept: INTERNAL MEDICINE | Facility: CLINIC | Age: 60
End: 2020-06-25

## 2020-06-25 NOTE — TELEPHONE ENCOUNTER
Please notify pt that labs and echo are stable.   Recommend continue current medication including oral potassium

## 2020-06-26 ENCOUNTER — OFFICE VISIT (OUTPATIENT)
Dept: ENDOCRINOLOGY | Facility: CLINIC | Age: 60
End: 2020-06-26
Payer: MEDICARE

## 2020-06-26 ENCOUNTER — NURSE TRIAGE (OUTPATIENT)
Dept: ADMINISTRATIVE | Facility: CLINIC | Age: 60
End: 2020-06-26

## 2020-06-26 VITALS
HEIGHT: 71 IN | WEIGHT: 272.06 LBS | TEMPERATURE: 99 F | HEART RATE: 74 BPM | DIASTOLIC BLOOD PRESSURE: 85 MMHG | SYSTOLIC BLOOD PRESSURE: 136 MMHG | BODY MASS INDEX: 38.09 KG/M2

## 2020-06-26 DIAGNOSIS — I10 ESSENTIAL HYPERTENSION: ICD-10-CM

## 2020-06-26 DIAGNOSIS — E55.9 VITAMIN D DEFICIENCY: ICD-10-CM

## 2020-06-26 DIAGNOSIS — N62 GYNECOMASTIA, MALE: Primary | ICD-10-CM

## 2020-06-26 DIAGNOSIS — E66.01 SEVERE OBESITY (BMI 35.0-39.9) WITH COMORBIDITY: ICD-10-CM

## 2020-06-26 PROCEDURE — 99213 PR OFFICE/OUTPT VISIT, EST, LEVL III, 20-29 MIN: ICD-10-PCS | Mod: S$GLB,,, | Performed by: INTERNAL MEDICINE

## 2020-06-26 PROCEDURE — 99499 RISK ADDL DX/OHS AUDIT: ICD-10-PCS | Mod: S$GLB,,, | Performed by: INTERNAL MEDICINE

## 2020-06-26 PROCEDURE — 99213 OFFICE O/P EST LOW 20 MIN: CPT | Mod: S$GLB,,, | Performed by: INTERNAL MEDICINE

## 2020-06-26 PROCEDURE — 99499 UNLISTED E&M SERVICE: CPT | Mod: S$GLB,,, | Performed by: INTERNAL MEDICINE

## 2020-06-26 RX ORDER — SILDENAFIL 100 MG/1
100 TABLET, FILM COATED ORAL DAILY PRN
Qty: 30 TABLET | Refills: 1 | Status: SHIPPED | OUTPATIENT
Start: 2020-06-26 | End: 2020-08-10 | Stop reason: ALTCHOICE

## 2020-06-26 RX ORDER — SILDENAFIL 100 MG/1
100 TABLET, FILM COATED ORAL DAILY PRN
Qty: 30 TABLET | Refills: 1 | Status: SHIPPED | OUTPATIENT
Start: 2020-06-26 | End: 2020-06-26

## 2020-06-26 NOTE — Clinical Note
After he left I ordered a few additional labs today, please also schedule those for his lab appointment before his next visit. AM labs preferred.

## 2020-06-26 NOTE — TELEPHONE ENCOUNTER
Reason for Disposition   Caller has cancelled the call before the first contact    Additional Information   Negative: Caller is angry or rude (e.g., hangs up, verbally abusive, yelling)   Negative: Caller hangs up   Negative: Caller has already spoken with the PCP and has no further questions.   Negative: Caller has already spoken with another triager and has no further questions.   Negative: Caller has already spoken with another triager or PCP AND has further questions AND triager able to answer questions.   Negative: Patient already left for the hospital/clinic.   Negative: Pager number given.  Answering service notified.   Negative: Cell phone out of range.  Phone number verified.   Negative: Second attempt to contact family AND no contact made.  Phone number verified.   Negative: Wrong number reached.  Phone number verified.   Negative: Message left with person in household.   Negative: Message left on unidentified voice mail.  Phone number verified.   Negative: Message left on identified voice mail   Negative: No answer.  First attempt to contact caller.  Follow-up call scheduled within 15 minutes.   Negative: Busy signal.  First attempt to contact caller.  Follow-up call scheduled within 15 minutes.    Protocols used: NO CONTACT OR DUPLICATE CONTACT CALL-A-  pt states med sent to wrong pharmacy. Pt declines need for triage.

## 2020-06-26 NOTE — ASSESSMENT & PLAN NOTE
bmi increased, 37.9 now   - complicated by ABDON, HLD, htn, elevated estrogen   - encourage pt to try and get back to diet, exercise as tolerated

## 2020-06-26 NOTE — ASSESSMENT & PLAN NOTE
Pt reports symptoms over the last several months   - had mammogram, no malignancy noted   - labs showed normal testosterone, LH, TSH. But elevated estradiol   - now stopped spironolactone since last visit, discomfort has improved. Though gynecomastia remains. Reviewed that it can take time, and often requires surgery to resolve. Pt not excited about surgery at this time, discussed it's reasonable to wait another few months and re-evaluate. Or sooner if symptoms get worse   - other than the spironolactone, obesity leads to increased estrogen by conversion of testosterone -> estrogen in adipose tissue which can contribute to this symptom   - has gained more weight since covid/isolation/etc   - encourage pt to get back to working on diet, exercise   - recheck labs next time

## 2020-06-26 NOTE — PROGRESS NOTES
Subjective:      Chief Complaint: Gynecomastia    HPI: Bri Santiago is a 59 y.o. male who is here for a follow-up evaluation for gynecomastia. Last seen 2/26/2020.    Disease history:  Noted symptoms late 2019/early 2020. Had Breast growth, tenderness/soreness. No discharge.  +ED, for about a year or so.     Was on spironolactone since around 4/2019 per chart.     Prior evaluation included:  Mammogram benign. Normal US of scrotum and testes.  LH, TSH normal. No prolactin.     Interval history:   Stopped spironolactone 2/2020    Had labs 5/2020.   Estradiol 71, still high (was 70 several months ago)   Prolactin 10   Cortisol 8    Today pt reports feeling okay. Breast tenderness/growth as above, but not as bad as before. Still no discharge. Gained about 5-10 lbs in the last few months. Still with ED. Rare AM erections.   Otherwise no acute complaints/concerns today.    Reviewed past medical, family, social history and updated as appropriate.    Review of Systems   Constitutional: Positive for unexpected weight change (gained weight).   HENT: Negative for trouble swallowing.    Eyes: Negative for visual disturbance.   Respiratory: Positive for wheezing (sometimes\). Negative for shortness of breath.    Cardiovascular: Negative for palpitations.   Gastrointestinal: Negative for diarrhea and nausea.   Endocrine:        Occasional night sweats   Genitourinary: Negative for frequency.   Neurological: Negative for light-headedness.   Psychiatric/Behavioral: Positive for sleep disturbance (sometimes).     Objective:     Vitals:    06/26/20 0940   BP: 136/85   Pulse: 74   Temp: 98.6 °F (37 °C)     BP Readings from Last 5 Encounters:   06/26/20 136/85   06/24/20 130/80   06/24/20 112/82   06/16/20 124/80   06/11/20 132/86     Physical Exam  Vitals signs reviewed.   Constitutional:       General: He is not in acute distress.     Appearance: He is obese.   Neck:      Thyroid: No thyromegaly.   Cardiovascular:      Heart  sounds: Normal heart sounds.   Pulmonary:      Effort: Pulmonary effort is normal.   Musculoskeletal:      Right lower leg: Edema (1+) present.      Left lower leg: Edema (1+) present.   Skin:     Comments: +breast growth bilaterally, nontender to palpation today         Wt Readings from Last 10 Encounters:   06/26/20 0940 123.4 kg (272 lb 0.8 oz)   06/24/20 0918 122 kg (269 lb)   06/16/20 1127 122.2 kg (269 lb 6.4 oz)   06/11/20 1127 123.9 kg (273 lb 1.6 oz)   03/04/20 0856 117.9 kg (260 lb)   02/27/20 1032 119.1 kg (262 lb 9.1 oz)   02/26/20 1352 120.3 kg (265 lb 3.4 oz)   02/07/20 1156 123.1 kg (271 lb 6.4 oz)   02/05/20 1007 122.2 kg (269 lb 6.4 oz)   01/30/20 0830 120.2 kg (264 lb 15.9 oz)       Lab Results   Component Value Date    HGBA1C 4.5 01/23/2020     Lab Results   Component Value Date    CHOL 111 (L) 01/23/2020    HDL 29 (L) 01/23/2020    LDLCALC 66.8 01/23/2020    TRIG 76 01/23/2020    CHOLHDL 26.1 01/23/2020     Lab Results   Component Value Date     06/24/2020    K 3.8 06/24/2020     06/24/2020    CO2 29 06/24/2020    GLU 86 06/24/2020    BUN 15 06/24/2020    CREATININE 1.0 06/24/2020    CALCIUM 8.7 06/24/2020    PROT 7.3 01/23/2020    ALBUMIN 4.0 01/23/2020    BILITOT 1.0 01/23/2020    ALKPHOS 67 01/23/2020    AST 22 01/23/2020    ALT 27 01/23/2020    ANIONGAP 10 06/24/2020    ESTGFRAFRICA >60 06/24/2020    EGFRNONAA >60 06/24/2020    TSH 0.887 01/23/2020        Assessment/Plan:     Gynecomastia, male  Pt reports symptoms over the last several months   - had mammogram, no malignancy noted   - labs showed normal testosterone, LH, TSH. But elevated estradiol   - now stopped spironolactone since last visit, discomfort has improved. Though gynecomastia remains. Reviewed that it can take time, and often requires surgery to resolve. Pt not excited about surgery at this time, discussed it's reasonable to wait another few months and re-evaluate. Or sooner if symptoms get worse   - other than the  spironolactone, obesity leads to increased estrogen by conversion of testosterone -> estrogen in adipose tissue which can contribute to this symptom   - has gained more weight since covid/isolation/etc   - encourage pt to get back to working on diet, exercise   - recheck labs next time    Severe obesity (BMI 35.0-39.9) with comorbidity  bmi increased, 37.9 now   - complicated by ABDON, HLD, htn, elevated estrogen   - encourage pt to try and get back to diet, exercise as tolerated      HTN (hypertension)  bp still controlled today, off spironolactone   - continue other meds   - monitor BP, adjust regimen as needed      Follow up in about 6 months (around 12/26/2020) for lab review, further monitoring.      Chris Min MD  Endocrinology

## 2020-06-26 NOTE — ASSESSMENT & PLAN NOTE
bp still controlled today, off spironolactone   - continue other meds   - monitor BP, adjust regimen as needed

## 2020-07-09 ENCOUNTER — OFFICE VISIT (OUTPATIENT)
Dept: OPTOMETRY | Facility: CLINIC | Age: 60
End: 2020-07-09
Payer: MEDICARE

## 2020-07-09 DIAGNOSIS — H11.001 PTERYGIUM OF RIGHT EYE: ICD-10-CM

## 2020-07-09 DIAGNOSIS — H25.13 NUCLEAR SCLEROSIS OF BOTH EYES: ICD-10-CM

## 2020-07-09 DIAGNOSIS — H04.123 DRY EYE SYNDROME OF BOTH EYES: Primary | ICD-10-CM

## 2020-07-09 DIAGNOSIS — H52.7 REFRACTIVE ERROR: ICD-10-CM

## 2020-07-09 PROCEDURE — 99999 PR PBB SHADOW E&M-EST. PATIENT-LVL II: ICD-10-PCS | Mod: PBBFAC,,, | Performed by: OPTOMETRIST

## 2020-07-09 PROCEDURE — 92015 PR REFRACTION: ICD-10-PCS | Mod: S$GLB,,, | Performed by: OPTOMETRIST

## 2020-07-09 PROCEDURE — 92014 PR EYE EXAM, EST PATIENT,COMPREHESV: ICD-10-PCS | Mod: S$GLB,,, | Performed by: OPTOMETRIST

## 2020-07-09 PROCEDURE — 92015 DETERMINE REFRACTIVE STATE: CPT | Mod: S$GLB,,, | Performed by: OPTOMETRIST

## 2020-07-09 PROCEDURE — 99999 PR PBB SHADOW E&M-EST. PATIENT-LVL II: CPT | Mod: PBBFAC,,, | Performed by: OPTOMETRIST

## 2020-07-09 PROCEDURE — 92014 COMPRE OPH EXAM EST PT 1/>: CPT | Mod: S$GLB,,, | Performed by: OPTOMETRIST

## 2020-07-09 NOTE — PROGRESS NOTES
HPI     Mr. Bri Santiago was referred by self for a routine exam.    Patient complains of blurred vision distance and near. Used prescription   reading glasses. He lost them. OD rouse constant. No itching or pain.   Uses AT prn  H/o atopic conjunctivitis od    Would patient like a refraction today? yes     Paitent denies diplopia, headaches, flashes/floaters, itching, tearing,   burning, redness, and pain.    (+)drops AT  (-)Diabetes  LBS   Hemoglobin A1C       Date                     Value               Ref Range             Status                01/23/2020               4.5                 4.0 - 5.6 %           Final                  10/16/2017               4.6                 4.0 - 5.6 %           Final                   03/11/2014               4.7                 4.5 - 6.2 %           Final                OCULAR HISTORY  Last Eye Exam: 3/9/20 with Dr White  (-)eye surgery   (-)diagnosed or treated for any eye conditions or diseases, n/a    FAMILY HISTORY  (+)Glaucoma father, mother        Last edited by Nirmala White, OD on 7/9/2020  8:40 AM. (History)            Assessment /Plan     For exam results, see Encounter Report.    Dry eye syndrome of both eyes    Pterygium of right eye    Nuclear sclerosis of both eyes    Refractive error      1. Educated pt on findings. Recommended ATs BID-TID for added lubrication and comfort. Monitor.     2. Educated pt. Recommend sunglasses when outside and ATs for added lubrication. Monitor.     3. Educated pt on findings. Not visually significant. No need for removal at this time. Monitor yearly.     4. Updated SRx, PALs. Not required for FTW but needs correction for near. Monitor yearly.      RTC in 1 year for annual eye exam or sooner if needed.

## 2020-07-30 ENCOUNTER — OFFICE VISIT (OUTPATIENT)
Dept: PAIN MEDICINE | Facility: CLINIC | Age: 60
End: 2020-07-30
Payer: MEDICARE

## 2020-07-30 VITALS
SYSTOLIC BLOOD PRESSURE: 138 MMHG | DIASTOLIC BLOOD PRESSURE: 89 MMHG | BODY MASS INDEX: 37.97 KG/M2 | TEMPERATURE: 97 F | HEART RATE: 68 BPM | OXYGEN SATURATION: 100 % | WEIGHT: 271.19 LBS | HEIGHT: 71 IN | RESPIRATION RATE: 18 BRPM

## 2020-07-30 DIAGNOSIS — G89.29 OTHER CHRONIC PAIN: Primary | ICD-10-CM

## 2020-07-30 DIAGNOSIS — M96.1 CERVICAL POSTLAMINECTOMY SYNDROME: ICD-10-CM

## 2020-07-30 DIAGNOSIS — M51.37 DEGENERATION OF LUMBAR OR LUMBOSACRAL INTERVERTEBRAL DISC: ICD-10-CM

## 2020-07-30 DIAGNOSIS — Z79.891 ENCOUNTER FOR LONG-TERM OPIATE ANALGESIC USE: ICD-10-CM

## 2020-07-30 DIAGNOSIS — M96.1 POSTLAMINECTOMY SYNDROME OF LUMBAR REGION: ICD-10-CM

## 2020-07-30 PROCEDURE — 99999 PR PBB SHADOW E&M-EST. PATIENT-LVL V: CPT | Mod: PBBFAC,,, | Performed by: NURSE PRACTITIONER

## 2020-07-30 PROCEDURE — 3008F BODY MASS INDEX DOCD: CPT | Mod: CPTII,S$GLB,, | Performed by: NURSE PRACTITIONER

## 2020-07-30 PROCEDURE — 3008F PR BODY MASS INDEX (BMI) DOCUMENTED: ICD-10-PCS | Mod: CPTII,S$GLB,, | Performed by: NURSE PRACTITIONER

## 2020-07-30 PROCEDURE — 99999 PR PBB SHADOW E&M-EST. PATIENT-LVL V: ICD-10-PCS | Mod: PBBFAC,,, | Performed by: NURSE PRACTITIONER

## 2020-07-30 PROCEDURE — 3075F SYST BP GE 130 - 139MM HG: CPT | Mod: CPTII,S$GLB,, | Performed by: NURSE PRACTITIONER

## 2020-07-30 PROCEDURE — 3075F PR MOST RECENT SYSTOLIC BLOOD PRESS GE 130-139MM HG: ICD-10-PCS | Mod: CPTII,S$GLB,, | Performed by: NURSE PRACTITIONER

## 2020-07-30 PROCEDURE — 99214 OFFICE O/P EST MOD 30 MIN: CPT | Mod: S$GLB,,, | Performed by: NURSE PRACTITIONER

## 2020-07-30 PROCEDURE — 3079F PR MOST RECENT DIASTOLIC BLOOD PRESSURE 80-89 MM HG: ICD-10-PCS | Mod: CPTII,S$GLB,, | Performed by: NURSE PRACTITIONER

## 2020-07-30 PROCEDURE — 80307 DRUG TEST PRSMV CHEM ANLYZR: CPT

## 2020-07-30 PROCEDURE — 3079F DIAST BP 80-89 MM HG: CPT | Mod: CPTII,S$GLB,, | Performed by: NURSE PRACTITIONER

## 2020-07-30 PROCEDURE — 99214 PR OFFICE/OUTPT VISIT, EST, LEVL IV, 30-39 MIN: ICD-10-PCS | Mod: S$GLB,,, | Performed by: NURSE PRACTITIONER

## 2020-07-30 RX ORDER — TIZANIDINE 4 MG/1
4 TABLET ORAL EVERY 8 HOURS PRN
Qty: 90 TABLET | Refills: 0 | Status: SHIPPED | OUTPATIENT
Start: 2020-07-30 | End: 2020-08-26

## 2020-07-30 RX ORDER — TRAMADOL HYDROCHLORIDE 50 MG/1
50 TABLET ORAL EVERY 8 HOURS PRN
Qty: 90 TABLET | Refills: 2 | Status: SHIPPED | OUTPATIENT
Start: 2020-07-30 | End: 2020-10-28
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
## 2020-07-30 NOTE — PATIENT INSTRUCTIONS
Relieving Back Pain  Back pain is a common problem. You can strain back muscles by lifting too much weight or just by moving the wrong way. Back strain can be uncomfortable, even painful. And it can take weeks or months to improve. To help yourself feel better and prevent future back strains, try these tips.  Important Note: Do not give aspirin to children or teens without first discussing it with your healthcare provider.      ? Ice    Ice reduces muscle pain and swelling. It helps most during the first 24 to 48 hours after an injury.  · Wrap an ice pack or a bag of frozen peas in a thin towel. (Never place ice directly on your skin.)  · Place the ice where your back hurts the most.  · Dont ice for more than 20 minutes at a time.  · You can use ice several times a day.  ? Medicines  Over-the-counter pain relievers can include acetaminophen and anti-inflammatory medicines, which includes aspirin or ibuprofen. They can help ease discomfort. Some also reduce swelling.  · Tell your healthcare provider about any medicines you are already taking.  · Take medicines only as directed.  ? Heat  After the first 48 hours, heat can relax sore muscles and improve blood flow.  · Try a warm bath or shower. Or use a heating pad set on low. To prevent a burn, keep a cloth between you and the heating pad.  · Dont use a heating pad for more than 15 minutes at a time. Never sleep on a heating pad.  Date Last Reviewed: 9/1/2015  © 9798-8483 [employer]. [address] [place] [address], [place] [address]. All rights reserved. This information is not intended as a substitute for professional medical care. Always follow your healthcare professional's instructions.

## 2020-08-03 LAB

## 2020-08-04 ENCOUNTER — TELEPHONE (OUTPATIENT)
Dept: ADMINISTRATIVE | Facility: OTHER | Age: 60
End: 2020-08-04

## 2020-08-10 ENCOUNTER — OFFICE VISIT (OUTPATIENT)
Dept: UROLOGY | Facility: CLINIC | Age: 60
End: 2020-08-10
Payer: MEDICARE

## 2020-08-10 VITALS
BODY MASS INDEX: 38.16 KG/M2 | WEIGHT: 273.56 LBS | HEART RATE: 89 BPM | SYSTOLIC BLOOD PRESSURE: 138 MMHG | DIASTOLIC BLOOD PRESSURE: 97 MMHG

## 2020-08-10 DIAGNOSIS — N52.9 ERECTILE DYSFUNCTION, UNSPECIFIED ERECTILE DYSFUNCTION TYPE: ICD-10-CM

## 2020-08-10 DIAGNOSIS — R10.2 SUPRAPUBIC PAIN: Primary | ICD-10-CM

## 2020-08-10 PROCEDURE — 51798 PR MEAS,POST-VOID RES,US,NON-IMAGING: ICD-10-PCS | Mod: S$GLB,,, | Performed by: PHYSICIAN ASSISTANT

## 2020-08-10 PROCEDURE — 99999 PR PBB SHADOW E&M-EST. PATIENT-LVL III: CPT | Mod: PBBFAC,,, | Performed by: PHYSICIAN ASSISTANT

## 2020-08-10 PROCEDURE — 99999 PR PBB SHADOW E&M-EST. PATIENT-LVL III: ICD-10-PCS | Mod: PBBFAC,,, | Performed by: PHYSICIAN ASSISTANT

## 2020-08-10 PROCEDURE — 3075F SYST BP GE 130 - 139MM HG: CPT | Mod: CPTII,S$GLB,, | Performed by: PHYSICIAN ASSISTANT

## 2020-08-10 PROCEDURE — 3008F PR BODY MASS INDEX (BMI) DOCUMENTED: ICD-10-PCS | Mod: CPTII,S$GLB,, | Performed by: PHYSICIAN ASSISTANT

## 2020-08-10 PROCEDURE — 3008F BODY MASS INDEX DOCD: CPT | Mod: CPTII,S$GLB,, | Performed by: PHYSICIAN ASSISTANT

## 2020-08-10 PROCEDURE — 3075F PR MOST RECENT SYSTOLIC BLOOD PRESS GE 130-139MM HG: ICD-10-PCS | Mod: CPTII,S$GLB,, | Performed by: PHYSICIAN ASSISTANT

## 2020-08-10 PROCEDURE — 3080F PR MOST RECENT DIASTOLIC BLOOD PRESSURE >= 90 MM HG: ICD-10-PCS | Mod: CPTII,S$GLB,, | Performed by: PHYSICIAN ASSISTANT

## 2020-08-10 PROCEDURE — 99214 PR OFFICE/OUTPT VISIT, EST, LEVL IV, 30-39 MIN: ICD-10-PCS | Mod: S$GLB,,, | Performed by: PHYSICIAN ASSISTANT

## 2020-08-10 PROCEDURE — 3080F DIAST BP >= 90 MM HG: CPT | Mod: CPTII,S$GLB,, | Performed by: PHYSICIAN ASSISTANT

## 2020-08-10 PROCEDURE — 99214 OFFICE O/P EST MOD 30 MIN: CPT | Mod: S$GLB,,, | Performed by: PHYSICIAN ASSISTANT

## 2020-08-10 PROCEDURE — 51798 US URINE CAPACITY MEASURE: CPT | Mod: S$GLB,,, | Performed by: PHYSICIAN ASSISTANT

## 2020-08-10 RX ORDER — TADALAFIL 20 MG/1
20 TABLET ORAL
Qty: 30 TABLET | Refills: 9 | Status: SHIPPED | OUTPATIENT
Start: 2020-08-10 | End: 2022-06-02 | Stop reason: SDUPTHER

## 2020-08-10 NOTE — PROGRESS NOTES
CHIEF COMPLAINT:    Mr. Santiago is a 59 y.o. male presenting for groin pain.   PRESENTING ILLNESS:    Bri Santiago is a 59 y.o. male  who presents for bilateral groin pain.    It is constant.  Nothing makes it better or worse.  He takes tylenol for back problems.  The tylenol does not help with the pain in his groin or his back.    No frequency, urgency, dysuria.  He does not have any urinary complaints.  He denies heavy lifting. He has also taken ibuprofen but is unsure if it is helping.  He has an aspirin allergy but has not developed a rash since he has been taking it.      He no longer has morning erection. He reports issues with achieving and maintain and erection.  He reports no improvement with sildenafil.  He has tried ICI but was unable to handle it due to the pain.    He has not tried any other oral medication.    Sometimes his testicles are very tender to the point that he has to sit with his legs open. He is unsure if his testicles are larger than normal at that time.     Testosterone 1/2020: 391    He was last seen by Dr. Reid in 1/2020.  ASHLEY done at that time.  He has a family history of prostate cancer.      REVIEW OF SYSTEMS:    Constitutional: Negative for fever and chills.   HENT: Negative for hearing loss.   Eyes: Negative for visual disturbance.   Respiratory: Negative for shortness of breath.   Cardiovascular: Negative for chest pain.   Gastrointestinal: Negative for vomiting, and constipation.   Genitourinary: See HPI  Neurological: Negative for dizziness.   Hematological: Does not bruise/bleed easily.   Psychiatric/Behavioral: Negative for confusion.       PATIENT HISTORY:    Past Medical History:   Diagnosis Date    Acute pancreatitis     Anal fissure     Anemia     Anticoagulant long-term use     Arthritis     Asthma in remission     Back pain     BPH (benign prostatic hypertrophy)     Cancer 2000    prostate- treated at [facility] with chemo- in remission since 2000    Chronic  maxillary sinusitis     Clotting disorder     Diastolic dysfunction with chronic heart failure 12/3/2018    Dysphagia 10/7/2014    Family history of colon cancer     Family history of early CAD     GERD (gastroesophageal reflux disease)     Helicobacter pylori (H. pylori) infection     Chronic    History of chronic pancreatitis     HTN (hypertension)     Lumbago 11/12/2012    Obesity     ABDON (obstructive sleep apnea)     Pneumonia     during childhood     Prostate cancer 2000    dx and treated at [facility], had chemotherapy, in remission [id number]    Sacroiliac joint pain 2/10/2015    Spinal stenosis of lumbar region     Trouble in sleeping     Von Willebrand disease     VWD (acquired von Willebrand's disease)        Past Surgical History:   Procedure Laterality Date    anal fissure repair      x2    BACK SURGERY  2012    BALLOON SINUPLASTY OF PARANASAL SINUS Bilateral 10/7/2019    Procedure: SINUPLASTY, USING BALLOON;  Surgeon: LAURENT Swanson MD;  Location: [facility];  Service: ENT;  Laterality: Bilateral;    CARPAL TUNNEL RELEASE  2003    left hand    CATHETERIZATION OF BOTH LEFT AND RIGHT HEART N/A 2/14/2019    Procedure: CATHETERIZATION, HEART, BOTH LEFT AND RIGHT;  Surgeon: Kyle Magana MD;  Location: [facility] CATH LAB;  Service: Cardiology;  Laterality: N/A;    CERVICAL DISCECTOMY  2003    COLONOSCOPY N/A 10/7/2015    Procedure: COLONOSCOPY;  Surgeon: Rosendo Boyer MD;  Location: [facility] ([address] [place] [address]);  Service: Endoscopy;  Laterality: N/A;  PM Prep    COLONOSCOPY N/A 6/19/2017    Procedure: COLONOSCOPY;  Surgeon: Rosendo Boyer MD;  Location: [facility] (4TH FLR);  Service: Endoscopy;  Laterality: N/A;  constipation prep (no DM no CHF)       hx of vonWillebrand's disease-will need infusion prior    COLONOSCOPY N/A 3/4/2020    Procedure: COLONOSCOPY;  Surgeon: Rosendo Boyer MD;  Location: [facility] (4TH FLR);  Service: Endoscopy;  Laterality: N/A;  Please order CBC,  ferritin, Iron TIBC day of case per Dr. Boyer  labwork at 7:10am and patient will check in right after.  per Dr. Tyler patient can hold Plavix 5 days prior see note 1/7/20  DDAVP prior to procedure needed see note 1/16/20 for oncall med by Dr. Tyler -     ESOPHAGOGASTRODUODENOSCOPY N/A 3/4/2020    Procedure: EGD (ESOPHAGOGASTRODUODENOSCOPY);  Surgeon: Rosendo Boyer MD;  Location: [address]);  Service: Endoscopy;  Laterality: N/A;  EGD and Colonoscopy orders merged together  labwork at 7:10am and patient will check in right after.  DDAVP prior to procedure needed see note 1/16/20 for oncall med  per Dr. Tyler patient can hold Plavix 5 days prior see note 1/7/20    FUNCTIONAL ENDOSCOPIC SINUS SURGERY (FESS) USING COMPUTER-ASSISTED NAVIGATION Bilateral 10/7/2019    Procedure: FESS, USING COMPUTER-ASSISTED NAVIGATION;  Surgeon: LAURENT Swanson MD;  Location: [facility] OR;  Service: ENT;  Laterality: Bilateral;    LEFT HEART CATHETERIZATION Left 2/14/2019    Procedure: Left heart cath;  Surgeon: Kyle Magana MD;  Location: [facility] CATH LAB;  Service: Cardiology;  Laterality: Left;    LUMBAR FUSION  2012    RADIOFREQUENCY ABLATION Right 5/9/2019    Procedure: RADIOFREQUENCY ABLATION, RIGHT L3,L4,L5;  Surgeon: Fredy Magana MD;  [facility];  Service: Pain Management;  Laterality: Right;  1 of 2  RT RFA L3,4,5  PLAVIX clearance received, pt needs DDAVP    RADIOFREQUENCY ABLATION Left 5/23/2019    Procedure: RADIOFREQUENCY ABLATION, LEFT L3,L4,L5;  Surgeon: Fredy Magana MD;  Location: [facility] MGT;  Service: Pain Management;  Laterality: Left;  2 of 2  LT RFA L3,4,5  PLAVIX clearance received, pt needs DDAVP    RADIOFREQUENCY ABLATION Left 1/16/2020    Procedure: RADIOFREQUENCY ABLATION LEFT L3, L4, L5 MEDIAL BRANCH 1 OF 2 **PATIENT ARRIVING AT 8 AM**;  Surgeon: Fredy Magana MD;  Location: [facility] PAIN MGT;  Service: Pain Management;  Laterality: Left;  NEEDS CONSENT, PLAVIX  CLEARANCE IN CHART    RADIOFREQUENCY ABLATION Right 1/28/2020    Procedure: RADIOFREQUENCY ABLATION, RIGHT L3-L4-L5 MEDIAL BRANCH 2 OF 2 (Left done on 1/16/20);  Surgeon: Fredy Magana MD;  Location: [facility] PAIN MGT;  Service: Pain Management;  Laterality: Right;    RADIOFREQUENCY ABLATION OF LUMBAR MEDIAL BRANCH NERVE AT SINGLE LEVEL Left 5/24/2018    Procedure: RADIOFREQUENCY THERMOCOAGULATION (RFTC)-NERVE-MEDIAN BRANCH-LUMBAR;  Surgeon: Fredy Magana MD;  Location: [facility] PAIN MGT;  Service: Pain Management;  Laterality: Left;  Left RFA @ L3,4,5  [id number]  with IV Sedation    2 of 2    SPINE SURGERY      TONSILLECTOMY      at age 24    VASECTOMY  1996       Family History   Problem Relation Age of Onset    Colon cancer Father 67        colon cancer    Hypertension Father     Glaucoma Father     Cancer Father     Colon cancer Paternal Grandfather 65             Coronary artery disease Mother 45    Hypertension Mother     Heart disease Mother     Colon cancer Mother     No Known Problems Brother     No Known Problems Sister     No Known Problems Daughter     No Known Problems Son     Coronary artery disease Brother 51    No Known Problems Daughter     No Known Problems Daughter     No Known Problems Son     No Known Problems Son     Colon cancer Paternal Uncle 65    Diabetes Mellitus Paternal Grandmother        Social History     Socioeconomic History    Marital status:      Spouse name: Not on file    Number of children: Not on file    Years of education: Not on file    Highest education level: Not on file   Occupational History    Occupation: disabled due to back injury   Social Needs    Financial resource strain: Not on file    Food insecurity     Worry: Not on file     Inability: Not on file    Transportation needs     Medical: Not on file     Non-medical: Not on file   Tobacco Use    Smoking status: Never Smoker    Smokeless tobacco: Never Used   Substance  and Sexual Activity    Alcohol use: Yes     Alcohol/week: 1.0 standard drinks     Types: 1 Glasses of wine per week     Comment: social    Drug use: No    Sexual activity: Not Currently     Partners: Female   Lifestyle    Physical activity     Days per week: Not on file     Minutes per session: Not on file    Stress: Not on file   Relationships    Social connections     Talks on phone: Not on file     Gets together: Not on file     Attends Gnosticist service: Not on file     Active member of club or organization: Not on file     Attends meetings of clubs or organizations: Not on file     Relationship status: Not on file   Other Topics Concern    Not on file   Social History Narrative    wlking for exercised       Allergies:  Ace inhibitors, Aspirin, Codeine, Dilaudid  [hydromorphone], and Penicillins    Medications:    Current Outpatient Medications:     acetaminophen (TYLENOL) 500 MG tablet, Take 2 tablets (1,000 mg total) by mouth every 8 (eight) hours as needed., Disp: 90 tablet, Rfl: 0    albuterol (PROVENTIL/VENTOLIN HFA) 90 mcg/actuation inhaler, INHALE 2 PUFFS INTO THE LUNGS EVERY 6 HOURS AS NEEDED FOR WHEEZING OR SHORTNESS OF BREATH, Disp: 18 g, Rfl: 4    atorvastatin (LIPITOR) 40 MG tablet, TAKE 1 TABLET(40 MG) BY MOUTH EVERY DAY, Disp: 90 tablet, Rfl: 3    azelastine (ASTELIN) 137 mcg (0.1 %) nasal spray, Two sprays in each nostril, sniff until absorbed, then follow with 1 spray of fluticasone.  Use both sprays twice daily., Disp: 30 mL, Rfl: 11    budesonide-formoterol 160-4.5 mcg (SYMBICORT) 160-4.5 mcg/actuation HFAA, INHALE 2 PUFFS INTO THE LUNGS EVERY 12 HOURS, Disp: 10.2 g, Rfl: 12    calcium citrate-vitamin D3 315-200 mg (CITRACAL+D) 315-200 mg-unit per tablet, Take 1 tablet by mouth once daily., Disp: , Rfl:     clopidogrel (PLAVIX) 75 mg tablet, Take 1 tablet (75 mg total) by mouth once daily., Disp: 30 tablet, Rfl: 11    cyanocobalamin, vitamin B-12, (VITAMIN B-12) 50 mcg tablet,  Take 50 mcg by mouth once daily., Disp: , Rfl:     docusate sodium (COLACE) 100 MG capsule, Take 1 tablet by mouth Twice daily. 1 Capsule Oral Twice a day .  Take with pain medicine, Disp: , Rfl:     doxazosin (CARDURA) 1 MG tablet, TAKE 1 TABLET(1 MG) BY MOUTH EVERY EVENING, Disp: 90 tablet, Rfl: 3    esomeprazole (NEXIUM) 40 MG capsule, Take 1 capsule (40 mg total) by mouth 2 (two) times daily before meals., Disp: 90 capsule, Rfl: 3    ferrous sulfate (FEOSOL) 325 mg (65 mg iron) Tab tablet, Take 1 tablet (325 mg total) by mouth every 12 (twelve) hours., Disp: 60 tablet, Rfl: 4    fluticasone propionate (FLONASE) 50 mcg/actuation nasal spray, One spray in each nostril twice daily after 1st using azelastine nasal spray, Disp: 18.2 mL, Rfl: 11    furosemide (LASIX) 20 MG tablet, Take 20meq every 12 hours by mouth for 5 days and then 20meq daily, Disp: 70 tablet, Rfl: 1    ipratropium (ATROVENT) 0.03 % nasal spray, 2 sprays by Nasal route 2 (two) times daily. May be use more often if needed, Disp: 30 mL, Rfl: 11    montelukast (SINGULAIR) 10 mg tablet, TAKE 1 TABLET(10 MG) BY MOUTH EVERY EVENING, Disp: 30 tablet, Rfl: 11    tiZANidine (ZANAFLEX) 4 MG tablet, Take 1 tablet (4 mg total) by mouth every 8 (eight) hours as needed., Disp: 90 tablet, Rfl: 0    traMADoL (ULTRAM) 50 mg tablet, Take 1 tablet (50 mg total) by mouth every 8 (eight) hours as needed for Pain., Disp: 90 tablet, Rfl: 2    zolpidem (AMBIEN) 10 mg Tab, TAKE 1 TABLET BY MOUTH EVERY DAY AT BEDTIME, Disp: 20 tablet, Rfl: 0    diclofenac sodium (VOLTAREN) 1 % Gel, Apply 2 g topically 3 (three) times daily., Disp: 1 Tube, Rfl: 0    tadalafiL (CIALIS) 20 MG Tab, Take 1 tablet (20 mg total) by mouth as needed. Take every 36 hours as needed for ED., Disp: 30 tablet, Rfl: 9    PHYSICAL EXAMINATION:    Constitutional: He appears well-developed and well-nourished.  He is in no apparent distress.    Eyes: No scleral icterus noted bilaterally. No  discharge bilaterally.    Cardiovascular: Normal rate.  No pitting edema noted in lower extremities bilaterally    Pulmonary/Chest: Effort normal. No respiratory distress.     Abdominal:  He exhibits no distension.      Neurological: He is alert and oriented to person, place, and time.     Skin: Skin is warm and dry.     Psych: Cooperative with normal affect.    Bladder scan performed by Nurse Geovanna.  PVR 43ml      Physical Exam   Genitourinary:               LABS:    U/a: 1.000, pH 7, negative.   Lab Results   Component Value Date    PSA 0.96 02/06/2013    PSA 1.11 10/17/2012    PSA 1.21 01/31/2012    PSA 1.2 08/05/2011    PSA 1.1 10/07/2010    PSA 0.93 11/13/2009    PSA 2.1 03/25/2009    PSA 1.0 02/06/2008    PSA 1.1 03/20/2007    PSA 1.3 06/19/2006    PSADIAG 0.80 01/23/2020    PSADIAG 0.98 05/03/2019    PSADIAG 1.5 10/16/2017    PSADIAG 1.4 10/20/2016    PSADIAG 0.97 09/15/2015    PSADIAG 1.2 11/10/2014    PSADIAG 1.6 09/22/2014    PSADIAG 0.97 10/14/2013       IMPRESSION:    Encounter Diagnoses   Name Primary?    Suprapubic pain Yes    Erectile dysfunction, unspecified erectile dysfunction type          PLAN:  Ibuprofen for pain. Take as directed. Patient denies rash with ibuprofen.  He was instructed to given an update on his symptoms in 2 weeks.   Trial of Tadalafil.  Discussed side effects including but not limited to myalgia, headaches and priapism.  Patient was instructed to report to the ED if he experiences a erection lasting 4 hours or more.  Patient educated that failing to seek medical attention could lead to potential irreversible penile damage.   He voiced understanding.     Follow up for ED in 3 months    Norma Goodwin PA-C

## 2020-08-18 ENCOUNTER — HOSPITAL ENCOUNTER (OUTPATIENT)
Facility: OTHER | Age: 60
Discharge: HOME | End: 2020-08-18
Attending: ANESTHESIOLOGY | Admitting: ANESTHESIOLOGY
Payer: MEDICARE

## 2020-08-18 VITALS
RESPIRATION RATE: 14 BRPM | SYSTOLIC BLOOD PRESSURE: 114 MMHG | OXYGEN SATURATION: 99 % | WEIGHT: 260 LBS | HEIGHT: 71 IN | BODY MASS INDEX: 36.4 KG/M2 | TEMPERATURE: 99 F | HEART RATE: 68 BPM | DIASTOLIC BLOOD PRESSURE: 72 MMHG

## 2020-08-18 DIAGNOSIS — M47.819 SPONDYLOSIS WITHOUT MYELOPATHY: Primary | ICD-10-CM

## 2020-08-18 DIAGNOSIS — M47.816 OSTEOARTHRITIS OF LUMBAR SPINE, UNSPECIFIED SPINAL OSTEOARTHRITIS COMPLICATION STATUS: ICD-10-CM

## 2020-08-18 DIAGNOSIS — G89.29 CHRONIC PAIN: ICD-10-CM

## 2020-08-18 PROCEDURE — 64635 DESTROY LUMB/SAC FACET JNT: CPT | Mod: LT,,, | Performed by: ANESTHESIOLOGY

## 2020-08-18 PROCEDURE — 25000003 PHARM REV CODE 250: Performed by: STUDENT IN AN ORGANIZED HEALTH CARE EDUCATION/TRAINING PROGRAM

## 2020-08-18 PROCEDURE — 64636 DESTROY L/S FACET JNT ADDL: CPT | Mod: LT,,, | Performed by: ANESTHESIOLOGY

## 2020-08-18 PROCEDURE — 64635 DESTROY LUMB/SAC FACET JNT: CPT | Mod: LT | Performed by: ANESTHESIOLOGY

## 2020-08-18 PROCEDURE — 25000003 PHARM REV CODE 250: Performed by: ANESTHESIOLOGY

## 2020-08-18 PROCEDURE — 63600175 PHARM REV CODE 636 W HCPCS: Performed by: ANESTHESIOLOGY

## 2020-08-18 PROCEDURE — 63600175 PHARM REV CODE 636 W HCPCS: Mod: JG | Performed by: STUDENT IN AN ORGANIZED HEALTH CARE EDUCATION/TRAINING PROGRAM

## 2020-08-18 PROCEDURE — 64636 PR DESTROY L/S FACET JNT ADDL: ICD-10-PCS | Mod: LT,,, | Performed by: ANESTHESIOLOGY

## 2020-08-18 PROCEDURE — 64636 DESTROY L/S FACET JNT ADDL: CPT | Mod: LT | Performed by: ANESTHESIOLOGY

## 2020-08-18 PROCEDURE — 64635 PR DESTROY LUMB/SAC FACET JNT: ICD-10-PCS | Mod: LT,,, | Performed by: ANESTHESIOLOGY

## 2020-08-18 RX ORDER — MIDAZOLAM HYDROCHLORIDE 1 MG/ML
INJECTION INTRAMUSCULAR; INTRAVENOUS
Status: DISCONTINUED | OUTPATIENT
Start: 2020-08-18 | End: 2020-08-18 | Stop reason: HOSPADM

## 2020-08-18 RX ORDER — BUPIVACAINE HYDROCHLORIDE 2.5 MG/ML
INJECTION, SOLUTION EPIDURAL; INFILTRATION; INTRACAUDAL
Status: DISCONTINUED | OUTPATIENT
Start: 2020-08-18 | End: 2020-08-18 | Stop reason: HOSPADM

## 2020-08-18 RX ORDER — FENTANYL CITRATE 50 UG/ML
INJECTION, SOLUTION INTRAMUSCULAR; INTRAVENOUS
Status: DISCONTINUED | OUTPATIENT
Start: 2020-08-18 | End: 2020-08-18 | Stop reason: HOSPADM

## 2020-08-18 RX ORDER — SODIUM CHLORIDE 9 MG/ML
500 INJECTION, SOLUTION INTRAVENOUS CONTINUOUS
Status: DISCONTINUED | OUTPATIENT
Start: 2020-08-18 | End: 2020-08-18 | Stop reason: HOSPADM

## 2020-08-18 RX ORDER — LIDOCAINE HYDROCHLORIDE 10 MG/ML
INJECTION INFILTRATION; PERINEURAL
Status: DISCONTINUED | OUTPATIENT
Start: 2020-08-18 | End: 2020-08-18 | Stop reason: HOSPADM

## 2020-08-18 RX ADMIN — DESMOPRESSIN ACETATE 35.37 MCG: 4 INJECTION INTRAVENOUS at 08:08

## 2020-08-18 RX ADMIN — SODIUM CHLORIDE 500 ML: 0.9 INJECTION, SOLUTION INTRAVENOUS at 07:08

## 2020-08-18 NOTE — DISCHARGE INSTRUCTIONS

## 2020-08-18 NOTE — DISCHARGE SUMMARY
Discharge Note  Short Stay      SUMMARY     Admit Date: 8/18/2020    Attending Physician: Angeles Agarwal      Discharge Physician: Angeles Agarwal      Discharge Date: 8/18/2020 8:48 AM    Procedure(s) (LRB):  RADIOFREQUENCY ABLATION, L3-L4-L5 MEDIAL BRANCH 1 OF 2 clear to hold plavix 7 days (Left)    Final Diagnosis: Lumbar spondylosis [M47.816]    Disposition: Home or self care    Patient Instructions:   Current Discharge Medication List      CONTINUE these medications which have NOT CHANGED    Details   acetaminophen (TYLENOL) 500 MG tablet Take 2 tablets (1,000 mg total) by mouth every 8 (eight) hours as needed.  Qty: 90 tablet, Refills: 0      albuterol (PROVENTIL/VENTOLIN HFA) 90 mcg/actuation inhaler INHALE 2 PUFFS INTO THE LUNGS EVERY 6 HOURS AS NEEDED FOR WHEEZING OR SHORTNESS OF BREATH  Qty: 18 g, Refills: 4      atorvastatin (LIPITOR) 40 MG tablet TAKE 1 TABLET(40 MG) BY MOUTH EVERY DAY  Qty: 90 tablet, Refills: 3    Associated Diagnoses: Hyperlipidemia, unspecified hyperlipidemia type      azelastine (ASTELIN) 137 mcg (0.1 %) nasal spray Two sprays in each nostril, sniff until absorbed, then follow with 1 spray of fluticasone.  Use both sprays twice daily.  Qty: 30 mL, Refills: 11      budesonide-formoterol 160-4.5 mcg (SYMBICORT) 160-4.5 mcg/actuation HFAA INHALE 2 PUFFS INTO THE LUNGS EVERY 12 HOURS  Qty: 10.2 g, Refills: 12      calcium citrate-vitamin D3 315-200 mg (CITRACAL+D) 315-200 mg-unit per tablet Take 1 tablet by mouth once daily.      clopidogrel (PLAVIX) 75 mg tablet Take 1 tablet (75 mg total) by mouth once daily.  Qty: 30 tablet, Refills: 11      cyanocobalamin, vitamin B-12, (VITAMIN B-12) 50 mcg tablet Take 50 mcg by mouth once daily.      diclofenac sodium (VOLTAREN) 1 % Gel Apply 2 g topically 3 (three) times daily.  Qty: 1 Tube, Refills: 0    Associated Diagnoses: Lumbar spondylosis; Spinal stenosis, lumbar region, without neurogenic claudication; Facet syndrome; Osteoarthritis of lumbar  spine, unspecified spinal osteoarthritis complication status      docusate sodium (COLACE) 100 MG capsule Take 1 tablet by mouth Twice daily. 1 Capsule Oral Twice a day .  Take with pain medicine      doxazosin (CARDURA) 1 MG tablet TAKE 1 TABLET(1 MG) BY MOUTH EVERY EVENING  Qty: 90 tablet, Refills: 3    Associated Diagnoses: Benign localized prostatic hyperplasia with lower urinary tract symptoms (LUTS)      esomeprazole (NEXIUM) 40 MG capsule Take 1 capsule (40 mg total) by mouth 2 (two) times daily before meals.  Qty: 90 capsule, Refills: 3    Associated Diagnoses: Gastroesophageal reflux disease without esophagitis      ferrous sulfate (FEOSOL) 325 mg (65 mg iron) Tab tablet Take 1 tablet (325 mg total) by mouth every 12 (twelve) hours.  Qty: 60 tablet, Refills: 4    Associated Diagnoses: Iron deficiency anemia, unspecified iron deficiency anemia type      fluticasone propionate (FLONASE) 50 mcg/actuation nasal spray One spray in each nostril twice daily after 1st using azelastine nasal spray  Qty: 18.2 mL, Refills: 11      furosemide (LASIX) 20 MG tablet Take 20meq every 12 hours by mouth for 5 days and then 20meq daily  Qty: 70 tablet, Refills: 1    Associated Diagnoses: Diastolic dysfunction      ipratropium (ATROVENT) 0.03 % nasal spray 2 sprays by Nasal route 2 (two) times daily. May be use more often if needed  Qty: 30 mL, Refills: 11      montelukast (SINGULAIR) 10 mg tablet TAKE 1 TABLET(10 MG) BY MOUTH EVERY EVENING  Qty: 30 tablet, Refills: 11      tadalafiL (CIALIS) 20 MG Tab Take 1 tablet (20 mg total) by mouth as needed. Take every 36 hours as needed for ED.  Qty: 30 tablet, Refills: 9    Associated Diagnoses: Erectile dysfunction, unspecified erectile dysfunction type      tiZANidine (ZANAFLEX) 4 MG tablet Take 1 tablet (4 mg total) by mouth every 8 (eight) hours as needed.  Qty: 90 tablet, Refills: 0      traMADoL (ULTRAM) 50 mg tablet Take 1 tablet (50 mg total) by mouth every 8 (eight) hours as  needed for Pain.  Qty: 90 tablet, Refills: 2    Comments: Quantity prescribed more than 7 day supply? Yes, quantity medically necessary      zolpidem (AMBIEN) 10 mg Tab TAKE 1 TABLET BY MOUTH EVERY DAY AT BEDTIME  Qty: 20 tablet, Refills: 0    Associated Diagnoses: Sleep disorder                 Discharge Diagnosis: Lumbar spondylosis [M47.816]  Condition on Discharge: Stable with no complications to procedure   Diet on Discharge: Same as before.  Activity: as per instruction sheet.  Discharge to: Home with a responsible adult.  Follow up: 2-4 weeks       Please call my office or pager at [phone] if experienced any weakness or loss of sensation, fever > 101.5, pain uncontrolled with oral medications, persistent nausea/vomiting/or diarrhea, redness or drainage from the incisions, or any other worrisome concerns. If physician on call was not reached or could not communicate with our office for any reason please go to the nearest emergency department

## 2020-08-18 NOTE — PLAN OF CARE
Pt tolerated procedure well. Pt reports 7 /10 pain after procedure. Assisted pt up for first time. Steady on feet. All discharge instructions given.

## 2020-08-18 NOTE — OP NOTE
Lumbar Medial nerve branch block radiofrequency ablation Under Fluoroscopy     Time-out taken to identify patient and procedure side prior to starting the procedure.     08/18/2020    PROCEDURE: Left radiofrequency ablation of the the medial branch nerves at the transverse process of  L4, L5, sacral ala    2)Conscious sedation provided by MD     REASON FOR PROCEDURE: Lumbar spondylosis [M47.816]     PHYSICIAN: Fredy Magana MD     ASSISTANTS: None     MEDICATIONS INJECTED: 0.25% Bupivicaine total 6mL     LOCAL ANESTHETIC USED: Xylocaine 1% 1mL / site     ESTIMATED BLOOD LOSS: None.     COMPLICATIONS: None.     Interval history: Patient reports that he had complete relief of pain for the day of the procedure, we will proceed with the RFA     TECHNIQUE: Laying in a prone position, the patient was prepped and draped in the usual sterile fashion using ChloraPrep and fenestrated drape. The level was determined under fluoroscopic guidance. Local anesthetic was given by going down to the hub of the 27-gauge 1.25in needle and raising a wheel. A 18-gauge 10mm curved active tip needle was introduced to the anatomic local of the medial branch at each of the above levels using fluoroscopy. Then sensory and motor testing was performed to confirm that the needle tips were in the correct location. Then after negative aspiration, 1 mL of 0.25% bupivacaine was injected into each level. This was followed by thermal lesioning at 80 degrees celsius for 90 seconds.     Conscious sedation provided by M.D   The patient was monitored with continuous pulse oximetry, EKG, and intermittent blood pressure monitors. The patient was hemodynamically stable throughout the entire process was responsive to voice, and breathing spontaneously. Supplemental O2 was provided at 2L/min via nasal cannula. Patient was comfortable for the duration of the procedure. (See nurse documentation and case log for sedation time)    There was a total of 2mg IV  Midazolam and 50mcg Fentanyl titrated for the procedure    The patient tolerated the procedure well. Did not have any procedure related motor deficit at the conclusion of the procedure    The patient was monitored after the procedure. Patient was given post procedure and discharge instructions to follow at home. We will see the patient back in two weeks or the patient may call to inform of status. The patient was discharged in a stable condition

## 2020-08-18 NOTE — INTERVAL H&P NOTE
The patient has been examined and the H&P has been reviewed:    I concur with the findings and no changes have occurred since H&P was written.     Pt on plavix, held for 7 days.    CTAB  RRR    Anesthesia/Surgery risks, benefits and alternative options discussed and understood by patient/family.          There are no hospital problems to display for this patient.

## 2020-09-01 ENCOUNTER — HOSPITAL ENCOUNTER (OUTPATIENT)
Facility: OTHER | Age: 60
Discharge: HOME | End: 2020-09-01
Attending: ANESTHESIOLOGY | Admitting: ANESTHESIOLOGY
Payer: MEDICARE

## 2020-09-01 VITALS
BODY MASS INDEX: 36.68 KG/M2 | WEIGHT: 262 LBS | RESPIRATION RATE: 14 BRPM | SYSTOLIC BLOOD PRESSURE: 101 MMHG | HEART RATE: 79 BPM | HEIGHT: 71 IN | DIASTOLIC BLOOD PRESSURE: 56 MMHG | TEMPERATURE: 99 F | OXYGEN SATURATION: 97 %

## 2020-09-01 DIAGNOSIS — M47.819 SPONDYLOSIS WITHOUT MYELOPATHY: Primary | ICD-10-CM

## 2020-09-01 DIAGNOSIS — G89.29 CHRONIC PAIN: ICD-10-CM

## 2020-09-01 PROCEDURE — 64635 PR DESTROY LUMB/SAC FACET JNT: ICD-10-PCS | Mod: RT,,, | Performed by: ANESTHESIOLOGY

## 2020-09-01 PROCEDURE — 25000003 PHARM REV CODE 250: Performed by: ANESTHESIOLOGY

## 2020-09-01 PROCEDURE — 63600175 PHARM REV CODE 636 W HCPCS: Mod: JG | Performed by: ANESTHESIOLOGY

## 2020-09-01 PROCEDURE — 64636 DESTROY L/S FACET JNT ADDL: CPT | Mod: RT | Performed by: ANESTHESIOLOGY

## 2020-09-01 PROCEDURE — 64636 PR DESTROY L/S FACET JNT ADDL: ICD-10-PCS | Mod: RT,,, | Performed by: ANESTHESIOLOGY

## 2020-09-01 PROCEDURE — 64636 DESTROY L/S FACET JNT ADDL: CPT | Mod: RT,,, | Performed by: ANESTHESIOLOGY

## 2020-09-01 PROCEDURE — 63600175 PHARM REV CODE 636 W HCPCS: Performed by: ANESTHESIOLOGY

## 2020-09-01 PROCEDURE — 64635 DESTROY LUMB/SAC FACET JNT: CPT | Mod: RT | Performed by: ANESTHESIOLOGY

## 2020-09-01 PROCEDURE — 64635 DESTROY LUMB/SAC FACET JNT: CPT | Mod: RT,,, | Performed by: ANESTHESIOLOGY

## 2020-09-01 RX ORDER — SODIUM CHLORIDE 9 MG/ML
500 INJECTION, SOLUTION INTRAVENOUS CONTINUOUS
Status: DISCONTINUED | OUTPATIENT
Start: 2020-09-01 | End: 2020-09-01 | Stop reason: HOSPADM

## 2020-09-01 RX ORDER — FENTANYL CITRATE 50 UG/ML
INJECTION, SOLUTION INTRAMUSCULAR; INTRAVENOUS
Status: DISCONTINUED | OUTPATIENT
Start: 2020-09-01 | End: 2020-09-01 | Stop reason: HOSPADM

## 2020-09-01 RX ORDER — BUPIVACAINE HYDROCHLORIDE 2.5 MG/ML
INJECTION, SOLUTION EPIDURAL; INFILTRATION; INTRACAUDAL
Status: DISCONTINUED | OUTPATIENT
Start: 2020-09-01 | End: 2020-09-01 | Stop reason: HOSPADM

## 2020-09-01 RX ORDER — MIDAZOLAM HYDROCHLORIDE 1 MG/ML
INJECTION INTRAMUSCULAR; INTRAVENOUS
Status: DISCONTINUED | OUTPATIENT
Start: 2020-09-01 | End: 2020-09-01 | Stop reason: HOSPADM

## 2020-09-01 RX ORDER — LIDOCAINE HYDROCHLORIDE 10 MG/ML
INJECTION INFILTRATION; PERINEURAL
Status: DISCONTINUED | OUTPATIENT
Start: 2020-09-01 | End: 2020-09-01 | Stop reason: HOSPADM

## 2020-09-01 RX ADMIN — SODIUM CHLORIDE 500 ML: 0.9 INJECTION, SOLUTION INTRAVENOUS at 08:09

## 2020-09-01 RX ADMIN — DESMOPRESSIN ACETATE 35.64 MCG: 4 SOLUTION INTRAVENOUS at 08:09

## 2020-09-01 NOTE — H&P
HPI  Patient presenting for Procedure(s) (LRB):  RADIOFREQUENCY ABLATION, L3-L4-L5 MEDIAL BR ANCH 2 OF 2 clear to hol,d Plavix 7 days (Right)     Patient on Anti-coagulation. Pt last took Plavix on 8/24/2020  Pt receiving DDAVP for Von Willebrands Disease.     No health changes since previous encounter    Past Medical History:   Diagnosis Date    Acute pancreatitis     Anal fissure     Anemia     Anticoagulant long-term use     Arthritis     Asthma in remission     Back pain     BPH (benign prostatic hypertrophy)     Cancer 2000    prostate- treated at [facility] with chemo- in remission since 2000    Chronic maxillary sinusitis     Clotting disorder     Diastolic dysfunction with chronic heart failure 12/3/2018    Dysphagia 10/7/2014    Family history of colon cancer     Family history of early CAD     GERD (gastroesophageal reflux disease)     Helicobacter pylori (H. pylori) infection     Chronic    History of chronic pancreatitis     HTN (hypertension)     Lumbago 11/12/2012    Obesity     ABDON (obstructive sleep apnea)     Pneumonia     during childhood     Prostate cancer 2000    dx and treated at [facility], had chemotherapy, in remission [id number]    Sacroiliac joint pain 2/10/2015    Spinal stenosis of lumbar region     Trouble in sleeping     Von Willebrand disease     VWD (acquired von Willebrand's disease)      Past Surgical History:   Procedure Laterality Date    anal fissure repair      x2    BACK SURGERY  2012    BALLOON SINUPLASTY OF PARANASAL SINUS Bilateral 10/7/2019    Procedure: SINUPLASTY, USING BALLOON;  Surgeon: LAURENT Swanson MD;  Location: [facility] OR;  Service: ENT;  Laterality: Bilateral;    CARPAL TUNNEL RELEASE  2003    left hand    CATHETERIZATION OF BOTH LEFT AND RIGHT HEART N/A 2/14/2019    Procedure: CATHETERIZATION, HEART, BOTH LEFT AND RIGHT;  Surgeon: Kyle Magana MD;  Location: [facility] CATH LAB;  Service: Cardiology;  Laterality: N/A;     CERVICAL DISCECTOMY  2003    COLONOSCOPY N/A 10/7/2015    Procedure: COLONOSCOPY;  Surgeon: Rosendo Boyer MD;  [facility]  Service: Endoscopy;  Laterality: N/A;  PM Prep    COLONOSCOPY N/A 6/19/2017    Procedure: COLONOSCOPY;  Surgeon: Rosendo Boyer MD;  [facility]  Service: Endoscopy;  Laterality: N/A;  constipation prep (no DM no CHF)       hx of vonWillebrand's disease-will need infusion prior    COLONOSCOPY N/A 3/4/2020    Procedure: COLONOSCOPY;  Surgeon: Rosendo Boyer MD;  [facility]  Service: Endoscopy;  Laterality: N/A;  Please order CBC, ferritin, Iron TIBC day of case per Dr. Boyer  labwork at 7:10am and patient will check in right after.  per Dr. Tyler patient can hold Plavix 5 days prior see note 1/7/20  DDAVP prior to procedure needed see note 1/16/20 for oncall med by Dr. Tyler -     ESOPHAGOGASTRODUODENOSCOPY N/A 3/4/2020    Procedure: EGD (ESOPHAGOGASTRODUODENOSCOPY);  Surgeon: Rosendo Boyer MD;  [facility]  Service: Endoscopy;  Laterality: N/A;  EGD and Colonoscopy orders merged together  labwork at 7:10am and patient will check in right after.  DDAVP prior to procedure needed see note 1/16/20 for oncall med  per Dr. Tyler patient can hold Plavix 5 days prior see note 1/7/20    FUNCTIONAL ENDOSCOPIC SINUS SURGERY (FESS) USING COMPUTER-ASSISTED NAVIGATION Bilateral 10/7/2019    Procedure: FESS, USING COMPUTER-ASSISTED NAVIGATION;  Surgeon: LAURENT Swanson MD;  Location: [facility] OR;  Service: ENT;  Laterality: Bilateral;    LEFT HEART CATHETERIZATION Left 2/14/2019    Procedure: Left heart cath;  Surgeon: Kyle Magana MD;  Location: [facility] CATH LAB;  Service: Cardiology;  Laterality: Left;    LUMBAR FUSION  2012    RADIOFREQUENCY ABLATION Right 5/9/2019    Procedure: RADIOFREQUENCY ABLATION, RIGHT L3,L4,L5;  Surgeon: Fredy Magana MD;  Location: [facility] PAIN MGT;  Service: Pain Management;   Laterality: Right;  1 of 2  RT RFA L3,4,5  PLAVIX clearance received, pt needs DDAVP    RADIOFREQUENCY ABLATION Left 5/23/2019    Procedure: RADIOFREQUENCY ABLATION, LEFT L3,L4,L5;  Surgeon: Fredy Magana MD;  Location: [facility] PAIN MGT;  Service: Pain Management;  Laterality: Left;  2 of 2  LT RFA L3,4,5  PLAVIX clearance received, pt needs DDAVP    RADIOFREQUENCY ABLATION Left 1/16/2020    Procedure: RADIOFREQUENCY ABLATION LEFT L3, L4, L5 MEDIAL BRANCH 1 OF 2 **PATIENT ARRIVING AT 8 AM**;  Surgeon: Fredy Magana MD;  Location: [facility] PAIN MGT;  Service: Pain Management;  Laterality: Left;  NEEDS CONSENT, PLAVIX CLEARANCE IN CHART    RADIOFREQUENCY ABLATION Right 1/28/2020    Procedure: RADIOFREQUENCY ABLATION, RIGHT L3-L4-L5 MEDIAL BRANCH 2 OF 2 (Left done on 1/16/20);  Surgeon: Fredy Magana MD;  Location: [facility] PAIN MGT;  Service: Pain Management;  Laterality: Right;    RADIOFREQUENCY ABLATION Left 8/18/2020    Procedure: RADIOFREQUENCY ABLATION, L3-L4-L5 MEDIAL BRANCH 1 OF 2 clear to hold plavix 7 days;  Surgeon: Fredy Magana MD;  Location: [facility] PAIN MGT;  Service: Pain Management;  Laterality: Left;    RADIOFREQUENCY ABLATION OF LUMBAR MEDIAL BRANCH NERVE AT SINGLE LEVEL Left 5/24/2018    Procedure: RADIOFREQUENCY THERMOCOAGULATION (RFTC)-NERVE-MEDIAN BRANCH-LUMBAR;  Surgeon: Fredy Magana MD;  Location: [facility] PAIN MGT;  Service: Pain Management;  Laterality: Left;  Left RFA @ L3,4,5  [id number]  with IV Sedation    2 of 2    SPINE SURGERY      TONSILLECTOMY      at age 24    VASECTOMY  1996     Review of patient's allergies indicates:   Allergen Reactions    Ace inhibitors     Aspirin      Other reaction(s): Hives  Other reaction(s): Hives    Codeine Itching     Ok to take percocet    Dilaudid  [hydromorphone]      Other reaction(s): Itching    Penicillins Hives and Swelling     Has had allergy testing and can prob tolerate penicillin      No current facility-administered  medications for this encounter.        PMHx, PSHx, Allergies, Medications reviewed in epic    ROS negative except pain complaints in HPI    OBJECTIVE:    There were no vitals taken for this visit.    PHYSICAL EXAMINATION:    GENERAL: Well appearing, in no acute distress, alert and oriented x3.  PSYCH:  Mood and affect appropriate.  SKIN: Skin color, texture, turgor normal, no rashes or lesions which will impact the procedure.  CV: RRR with palpation of the radial artery.  PULM: No evidence of respiratory difficulty, symmetric chest rise. Clear to auscultation.  NEURO: Cranial nerves grossly intact.    Plan:    Proceed with procedure as planned Procedure(s) (LRB):  RADIOFREQUENCY ABLATION, L3-L4-L5 MEDIAL BR ANCH 2 OF 2 clear to hol,d Plavix 7 days (Right)    Acosta Elena  09/01/2020

## 2020-09-01 NOTE — DISCHARGE INSTRUCTIONS
Thank you for allowing us to care for you today. You may receive a survey about the care we provided. Your feedback is valuable and helps us provide excellent care throughout the community.     Home Care Instructions for Pain Management:    1. DIET:   You may resume your normal diet today.   2. BATHING:   You may shower with luke warm water. No tub baths or anything that will soak injection sites under water for the next 24 hours.  3. DRESSING:   You may remove your bandage today.   4. ACTIVITY LEVEL:   You may resume your normal activities 24 hrs after your procedure. Nothing strenuous today.  5. MEDICATIONS:   You may resume your normal medications today. To restart blood thinners, ask your doctor.  6. DRIVING    If you have received any sedatives by mouth today, you may not drive for 12 hours.    If you have received any sedation through your IV, you may not drive for 24 hrs.   7. SPECIAL INSTRUCTIONS:   No heat to the injection site for 24 hrs including, hot bath or shower, heating pad, moist heat, or hot tubs.    Use ice pack to injection site for any pain or discomfort.  Apply ice packs for 20 minute intervals as needed.    IF you have diabetes, be sure to monitor your blood sugar more closely. IF your injection contained steroids your blood sugar levels may become higher than normal.    If you are still having pain upon discharge:  Your pain may improve over the next 48 hours. The anesthetic (numbing medication) works immediately to 48 hours. IF your injection contained a steroid (anti-inflammatory medication), it takes approximately 3 days to start feeling relief and 7-10 days to see your greatest results from the medication. It is possible you may need subsequent injections. This would be discussed at your follow up appointment with pain management or your referring doctor.    Please call the PAIN MANAGEMENT office at [phone] or ON CALL pager at [phone] if you experienced any:   -Weakness or  loss of sensation  -Fever > 101.5  -Pain uncontrolled with oral medications   -Persistent nausea, vomiting, or diarrhea  -Redness or drainage from the injection sites, or any other worrisome concerns.   If physician on call was not reached or could not communicate with our office for any reason please go to the nearest emergency department.  Adult Procedural Sedation Instructions    Recovery After Procedural Sedation (Adult)  You have been given medicine by vein to make you sleep during your surgery. This may have included both a pain medicine and sleeping medicine. Most of the effects have worn off. But you may still have some drowsiness for the next 6 to 8 hours.  Home care  Follow these guidelines when you get home:  · For the next 8 hours, you should be watched by a responsible adult. This person should make sure your condition is not getting worse.  · Don't drink any alcohol for the next 24 hours.  · Don't drive, operate dangerous machinery, or make important business or personal decisions during the next 24 hours.  Note: Your healthcare provider may tell you not to take any medicine by mouth for pain or sleep in the next 4 hours. These medicines may react with the medicines you were given in the hospital. This could cause a much stronger response than usual.  Follow-up care  Follow up with your healthcare provider if you are not alert and back to your usual level of activity within 12 hours.  When to seek medical advice  Call your healthcare provider right away if any of these occur:  · Drowsiness gets worse  · Weakness or dizziness gets worse  · Repeated vomiting  · You can't be awakened   Date Last Reviewed: 10/18/2016  © 3812-4876 [employer] [address] [place] [address], [place] [address]. All rights reserved. This information is not intended as a substitute for professional medical care. Always follow your healthcare professional's instructions.

## 2020-09-01 NOTE — OP NOTE
Lumbar Medial nerve branch block radiofrequency ablation Under Fluoroscopy     Time-out taken to identify patient and procedure side prior to starting the procedure.     09/01/2020    PROCEDURE: Right radiofrequency ablation of the the medial branch nerves at the   transverse process of  L4, L5 and sacral ala    2)Conscious sedation provided by MD     REASON FOR PROCEDURE: Lumbar spondylosis [M47.816]     PHYSICIAN: Fredy Magana MD     ASSISTANTS: None     MEDICATIONS INJECTED: 0.25% Bupivicaine total 6mL     LOCAL ANESTHETIC USED: Xylocaine 1% 1mL / site     ESTIMATED BLOOD LOSS: None.     COMPLICATIONS: None.     Interval history: Patient reports that he had complete relief of pain for the day of the procedure, we will proceed with the RFA .  Patient had dDavp running prior to procedure and stopped plavix 7 days ago, can resume tomorrow.    TECHNIQUE: Laying in a prone position, the patient was prepped and draped in the usual sterile fashion using ChloraPrep and fenestrated drape. The level was determined under fluoroscopic guidance. Local anesthetic was given by going down to the hub of the 27-gauge 1.25in needle and raising a wheel. A 18-gauge 10mm curved active tip needle was introduced to the anatomic local of the medial branch at each of the above levels using fluoroscopy. Then sensory and motor testing was performed to confirm that the needle tips were in the correct location. Then after negative aspiration, 1 mL of 0.25% bupivacaine was injected into each level. This was followed by thermal lesioning at 80 degrees celsius for 90 seconds.     Conscious sedation provided by M.D   The patient was monitored with continuous pulse oximetry, EKG, and intermittent blood pressure monitors. The patient was hemodynamically stable throughout the entire process was responsive to voice, and breathing spontaneously. Supplemental O2 was provided at 2L/min via nasal cannula. Patient was comfortable for the duration of  the procedure. (See nurse documentation and case log for sedation time)    There was a total of 2mg IV Midazolam and 50mcg Fentanyl titrated for the procedure    The patient tolerated the procedure well. Did not have any procedure related motor deficit at the conclusion of the procedure    The patient was monitored after the procedure. Patient was given post procedure and discharge instructions to follow at home. We will see the patient back in two weeks or the patient may call to inform of status. The patient was discharged in a stable condition

## 2020-09-01 NOTE — DISCHARGE SUMMARY
Discharge Note  Short Stay      SUMMARY     Admit Date: 9/1/2020    Attending Physician: Fredy Magana      Discharge Physician: Fredy Magana      Discharge Date: 9/1/2020 9:13 AM    Procedure(s) (LRB):  RADIOFREQUENCY ABLATION, L3-L4-L5 MEDIAL BR ANCH 2 OF 2 clear to hol,d Plavix 7 days (Right)    Final Diagnosis: Lumbar spondylosis [M47.816]    Disposition: Home or self care    Patient Instructions:   Current Discharge Medication List      CONTINUE these medications which have NOT CHANGED    Details   acetaminophen (TYLENOL) 500 MG tablet Take 2 tablets (1,000 mg total) by mouth every 8 (eight) hours as needed.  Qty: 90 tablet, Refills: 0      albuterol (PROVENTIL/VENTOLIN HFA) 90 mcg/actuation inhaler INHALE 2 PUFFS INTO THE LUNGS EVERY 6 HOURS AS NEEDED FOR WHEEZING OR SHORTNESS OF BREATH  Qty: 18 g, Refills: 4      atorvastatin (LIPITOR) 40 MG tablet TAKE 1 TABLET(40 MG) BY MOUTH EVERY DAY  Qty: 90 tablet, Refills: 3    Associated Diagnoses: Hyperlipidemia, unspecified hyperlipidemia type      azelastine (ASTELIN) 137 mcg (0.1 %) nasal spray Two sprays in each nostril, sniff until absorbed, then follow with 1 spray of fluticasone.  Use both sprays twice daily.  Qty: 30 mL, Refills: 11      budesonide-formoterol 160-4.5 mcg (SYMBICORT) 160-4.5 mcg/actuation HFAA INHALE 2 PUFFS INTO THE LUNGS EVERY 12 HOURS  Qty: 10.2 g, Refills: 12      calcium citrate-vitamin D3 315-200 mg (CITRACAL+D) 315-200 mg-unit per tablet Take 1 tablet by mouth once daily.      clopidogrel (PLAVIX) 75 mg tablet Take 1 tablet (75 mg total) by mouth once daily.  Qty: 30 tablet, Refills: 11      cyanocobalamin, vitamin B-12, (VITAMIN B-12) 50 mcg tablet Take 50 mcg by mouth once daily.      diclofenac sodium (VOLTAREN) 1 % Gel Apply 2 g topically 3 (three) times daily.  Qty: 1 Tube, Refills: 0    Associated Diagnoses: Lumbar spondylosis; Spinal stenosis, lumbar region, without neurogenic claudication; Facet syndrome; Osteoarthritis  of lumbar spine, unspecified spinal osteoarthritis complication status      docusate sodium (COLACE) 100 MG capsule Take 1 tablet by mouth Twice daily. 1 Capsule Oral Twice a day .  Take with pain medicine      doxazosin (CARDURA) 1 MG tablet TAKE 1 TABLET(1 MG) BY MOUTH EVERY EVENING  Qty: 90 tablet, Refills: 3    Associated Diagnoses: Benign localized prostatic hyperplasia with lower urinary tract symptoms (LUTS)      esomeprazole (NEXIUM) 40 MG capsule Take 1 capsule (40 mg total) by mouth 2 (two) times daily before meals.  Qty: 90 capsule, Refills: 3    Associated Diagnoses: Gastroesophageal reflux disease without esophagitis      ferrous sulfate (FEOSOL) 325 mg (65 mg iron) Tab tablet Take 1 tablet (325 mg total) by mouth every 12 (twelve) hours.  Qty: 60 tablet, Refills: 4    Associated Diagnoses: Iron deficiency anemia, unspecified iron deficiency anemia type      fluticasone propionate (FLONASE) 50 mcg/actuation nasal spray One spray in each nostril twice daily after 1st using azelastine nasal spray  Qty: 18.2 mL, Refills: 11      furosemide (LASIX) 20 MG tablet Take 20meq every 12 hours by mouth for 5 days and then 20meq daily  Qty: 70 tablet, Refills: 1    Associated Diagnoses: Diastolic dysfunction      ipratropium (ATROVENT) 0.03 % nasal spray 2 sprays by Nasal route 2 (two) times daily. May be use more often if needed  Qty: 30 mL, Refills: 11      montelukast (SINGULAIR) 10 mg tablet TAKE 1 TABLET(10 MG) BY MOUTH EVERY EVENING  Qty: 30 tablet, Refills: 11      tadalafiL (CIALIS) 20 MG Tab Take 1 tablet (20 mg total) by mouth as needed. Take every 36 hours as needed for ED.  Qty: 30 tablet, Refills: 9    Associated Diagnoses: Erectile dysfunction, unspecified erectile dysfunction type      tiZANidine (ZANAFLEX) 4 MG tablet TAKE 1 TABLET(4 MG) BY MOUTH EVERY 8 HOURS AS NEEDED  Qty: 90 tablet, Refills: 0      traMADoL (ULTRAM) 50 mg tablet Take 1 tablet (50 mg total) by mouth every 8 (eight) hours as  needed for Pain.  Qty: 90 tablet, Refills: 2    Comments: Quantity prescribed more than 7 day supply? Yes, quantity medically necessary      zolpidem (AMBIEN) 10 mg Tab TAKE 1 TABLET BY MOUTH EVERY DAY AT BEDTIME  Qty: 20 tablet, Refills: 0    Associated Diagnoses: Sleep disorder                 Discharge Diagnosis: Lumbar spondylosis [M47.816]  Condition on Discharge: Stable with no complications to procedure   Diet on Discharge: Same as before.  Activity: as per instruction sheet.  Discharge to: Home with a responsible adult.  Follow up: 2-4 weeks       Please call my office or pager at [phone] if experienced any weakness or loss of sensation, fever > 101.5, pain uncontrolled with oral medications, persistent nausea/vomiting/or diarrhea, redness or drainage from the incisions, or any other worrisome concerns. If physician on call was not reached or could not communicate with our office for any reason please go to the nearest emergency department

## 2020-09-09 ENCOUNTER — PATIENT OUTREACH (OUTPATIENT)
Dept: ADMINISTRATIVE | Facility: OTHER | Age: 60
End: 2020-09-09

## 2020-09-09 DIAGNOSIS — K21.9 GASTROESOPHAGEAL REFLUX DISEASE WITHOUT ESOPHAGITIS: ICD-10-CM

## 2020-09-09 RX ORDER — ESOMEPRAZOLE MAGNESIUM 40 MG/1
40 CAPSULE, DELAYED RELEASE ORAL
Qty: 180 CAPSULE | Refills: 0 | Status: SHIPPED | OUTPATIENT
Start: 2020-09-09 | End: 2020-12-16 | Stop reason: SDUPTHER

## 2020-09-09 NOTE — TELEPHONE ENCOUNTER
----- Message from India Johnson sent at 9/9/2020  3:45 PM CDT -----  Regarding: refill  Type: RX Refill Request    Who Called: Self    Have you contacted your pharmacy:yes    Refill or New Rx:Refill    RX Name and Strength:esomeprazole (NEXIUM) 40 MG capsule      Preferred Pharmacy with phone number:..  [id number] [address] [address]  Phone: [phone] Fax: [phone]          Local or Mail Order:local  Ordering Provider:Dr. Galeas    Would the patient rather a call back or a response via [facility]? Callback    Best Call Back Number:[phone]

## 2020-09-09 NOTE — PROGRESS NOTES
Health Maintenance Due   Topic Date Due    Shingles Vaccine (1 of 2) 08/23/2012    Influenza Vaccine (1) 08/01/2020     Updates were requested from care everywhere.  Chart was reviewed for overdue Proactive [facility] Encounters (TOD) topics (CRS, Breast Cancer Screening, Eye exam)  Health Maintenance has been updated.  LINKS immunization registry triggered.  Immunizations were reconciled.

## 2020-09-10 ENCOUNTER — OFFICE VISIT (OUTPATIENT)
Dept: OTOLARYNGOLOGY | Facility: CLINIC | Age: 60
End: 2020-09-10
Payer: MEDICARE

## 2020-09-10 VITALS
BODY MASS INDEX: 38.5 KG/M2 | TEMPERATURE: 97 F | DIASTOLIC BLOOD PRESSURE: 88 MMHG | WEIGHT: 275 LBS | SYSTOLIC BLOOD PRESSURE: 136 MMHG | HEART RATE: 75 BPM | HEIGHT: 71 IN

## 2020-09-10 DIAGNOSIS — J34.2 NASAL SEPTAL DEVIATION: ICD-10-CM

## 2020-09-10 DIAGNOSIS — R13.10 DYSPHAGIA, UNSPECIFIED TYPE: Primary | ICD-10-CM

## 2020-09-10 DIAGNOSIS — R09.82 PND (POST-NASAL DRIP): ICD-10-CM

## 2020-09-10 DIAGNOSIS — Z01.812 PRE-PROCEDURE LAB EXAM: ICD-10-CM

## 2020-09-10 DIAGNOSIS — J30.89 NON-SEASONAL ALLERGIC RHINITIS, UNSPECIFIED TRIGGER: ICD-10-CM

## 2020-09-10 DIAGNOSIS — G47.33 OBSTRUCTIVE SLEEP APNEA TREATED WITH CONTINUOUS POSITIVE AIRWAY PRESSURE (CPAP): ICD-10-CM

## 2020-09-10 DIAGNOSIS — K21.9 LARYNGOPHARYNGEAL REFLUX (LPR): ICD-10-CM

## 2020-09-10 DIAGNOSIS — H61.21 IMPACTED CERUMEN OF RIGHT EAR: ICD-10-CM

## 2020-09-10 PROCEDURE — 3008F PR BODY MASS INDEX (BMI) DOCUMENTED: ICD-10-PCS | Mod: CPTII,S$GLB,, | Performed by: SPECIALIST

## 2020-09-10 PROCEDURE — 3079F PR MOST RECENT DIASTOLIC BLOOD PRESSURE 80-89 MM HG: ICD-10-PCS | Mod: CPTII,S$GLB,, | Performed by: SPECIALIST

## 2020-09-10 PROCEDURE — 3075F SYST BP GE 130 - 139MM HG: CPT | Mod: CPTII,S$GLB,, | Performed by: SPECIALIST

## 2020-09-10 PROCEDURE — 3079F DIAST BP 80-89 MM HG: CPT | Mod: CPTII,S$GLB,, | Performed by: SPECIALIST

## 2020-09-10 PROCEDURE — 69210 PR REMOVAL IMPACTED CERUMEN REQUIRING INSTRUMENTATION, UNILATERAL: ICD-10-PCS | Mod: S$GLB,,, | Performed by: SPECIALIST

## 2020-09-10 PROCEDURE — 99213 OFFICE O/P EST LOW 20 MIN: CPT | Mod: 25,S$GLB,, | Performed by: SPECIALIST

## 2020-09-10 PROCEDURE — 99213 PR OFFICE/OUTPT VISIT, EST, LEVL III, 20-29 MIN: ICD-10-PCS | Mod: 25,S$GLB,, | Performed by: SPECIALIST

## 2020-09-10 PROCEDURE — 3075F PR MOST RECENT SYSTOLIC BLOOD PRESS GE 130-139MM HG: ICD-10-PCS | Mod: CPTII,S$GLB,, | Performed by: SPECIALIST

## 2020-09-10 PROCEDURE — 69210 REMOVE IMPACTED EAR WAX UNI: CPT | Mod: S$GLB,,, | Performed by: SPECIALIST

## 2020-09-10 PROCEDURE — 3008F BODY MASS INDEX DOCD: CPT | Mod: CPTII,S$GLB,, | Performed by: SPECIALIST

## 2020-09-10 NOTE — PROGRESS NOTES
Subjective:       Patient ID: Bri Santiago is a 60 y.o. male.    Chief Complaint: Follow-up (with post nasal drip)    The patient is returning for a follow-up visit.  There are multiple issues to discuss:  1.. Laryngopharyngeal reflux:  He continues to have phlegm and a foreign body sensation in his throat.  He has frequent throat clearing.  2.  Allergic rhinitis:  He has constant nasal congestion and postnasal drip and sore throat.  He is using Astelin, Flonase and Singulair routinely.  He is having blockage in his ears.  3.  Obstructive sleep apnea:  Patient is using his CPAP every night.  He feels more rested.      Review of Systems   Constitutional: Positive for fatigue. Negative for activity change, appetite change, chills, fever and unexpected weight change.   HENT: Positive for congestion, postnasal drip, rhinorrhea, sinus pressure, sinus pain and sore throat. Negative for ear discharge, ear pain, facial swelling, hearing loss, mouth sores, sneezing, tinnitus, trouble swallowing and voice change.    Eyes: Negative for photophobia, pain, discharge, redness, itching and visual disturbance.   Respiratory: Positive for cough. Negative for apnea, choking, shortness of breath and wheezing.    Cardiovascular: Negative for chest pain and palpitations.   Gastrointestinal: Negative for abdominal distention, abdominal pain, nausea and vomiting.   Musculoskeletal: Negative for arthralgias, myalgias, neck pain and neck stiffness.   Skin: Negative.  Negative for color change, pallor and rash.   Allergic/Immunologic: Positive for environmental allergies. Negative for food allergies and immunocompromised state.   Neurological: Positive for headaches. Negative for dizziness, facial asymmetry, speech difficulty, weakness, light-headedness and numbness.   Hematological: Negative for adenopathy. Does not bruise/bleed easily.   Psychiatric/Behavioral: Positive for sleep disturbance. Negative for agitation, confusion and  decreased concentration.       Objective:      Physical Exam  Constitutional:       Appearance: He is well-developed.   HENT:      Head: Normocephalic.      Jaw: There is normal jaw occlusion.      Salivary Glands: Right salivary gland is not diffusely enlarged. Left salivary gland is not diffusely enlarged.      Right Ear: Ear canal and external ear normal. There is impacted cerumen. Tympanic membrane is retracted.      Left Ear: Ear canal and external ear normal. Tympanic membrane is retracted.      Nose: Septal deviation (To the right), mucosal edema (cyanotic, boggy inferior turbinates bilaterally) and rhinorrhea (clear mucus bilaterally) present. No nasal deformity.      Right Turbinates: Enlarged and pale.      Left Turbinates: Enlarged and pale.      Mouth/Throat:      Lips: No lesions.      Mouth: Mucous membranes are moist. No oral lesions.      Dentition: Abnormal dentition. No gum lesions.      Palate: No mass and lesions.      Pharynx: Uvula midline. No oropharyngeal exudate.   Eyes:      General: Lids are normal.         Right eye: No discharge.         Left eye: No discharge.      Conjunctiva/sclera:      Right eye: Right conjunctiva is injected. No exudate.     Left eye: Left conjunctiva is injected. No exudate.     Pupils: Pupils are equal, round, and reactive to light.   Neck:      Musculoskeletal: Normal range of motion. No muscular tenderness.      Thyroid: No thyroid mass or thyromegaly.      Trachea: Trachea normal. No tracheal deviation.   Cardiovascular:      Rate and Rhythm: Normal rate and regular rhythm.      Pulses: Normal pulses.      Heart sounds: Normal heart sounds.   Pulmonary:      Effort: Pulmonary effort is normal.      Breath sounds: Normal breath sounds. No stridor. No decreased breath sounds, wheezing, rhonchi or rales.   Abdominal:      General: Bowel sounds are normal.      Palpations: Abdomen is soft.      Tenderness: There is no abdominal tenderness.   Musculoskeletal:  Normal range of motion.   Lymphadenopathy:      Head:      Right side of head: No submental, submandibular, preauricular, posterior auricular or occipital adenopathy.      Left side of head: No submental, submandibular, preauricular, posterior auricular or occipital adenopathy.      Cervical: No cervical adenopathy.   Skin:     General: Skin is warm and dry.      Findings: No petechiae or rash.      Nails: There is no clubbing.     Neurological:      Mental Status: He is alert and oriented to person, place, and time.      Cranial Nerves: No cranial nerve deficit.      Sensory: No sensory deficit.      Gait: Gait normal.   Psychiatric:         Speech: Speech normal.         Behavior: Behavior normal. Behavior is cooperative.         Thought Content: Thought content normal.         Judgment: Judgment normal.         Procedure-cerumen impaction was cleaned from the right ear using wire loop.  Patient tolerated procedure well was discharged post procedure.    Assessment:       1. Dysphagia, unspecified type    2. PND (post-nasal drip)    3. Laryngopharyngeal reflux (LPR)    4. Non-seasonal allergic rhinitis, unspecified trigger    5. Nasal septal deviation    6. Obstructive sleep apnea treated with continuous positive airway pressure (CPAP)        Plan:       I will  place the patient on ipratropium bromide spray every night and more often as needed.  I will recheck him in 2 weeks.  When he returned 2 weeks he will need a flexible nasolaryngoscopy, so he should undergo COVID testing 3 days prior to his visit.

## 2020-09-10 NOTE — PROCEDURES
"Ear Cerumen Removal    Date/Time: 9/10/2020 10:00 AM  Performed by: LAURENT Swanson MD  Authorized by: LAURENT Swanson MD     Time out: Immediately prior to procedure a "time out" was called to verify the correct patient, procedure, equipment, support staff and site/side marked as required.    Consent Done?:  Yes (Verbal)    Local anesthetic:  None  Location details:  Right ear  Procedure type comment:  Wire loop  Cerumen  Removal Results:  Cerumen completely removed  Patient tolerance:  Patient tolerated the procedure well with no immediate complications      "

## 2020-09-11 RX ORDER — IPRATROPIUM BROMIDE 21 UG/1
2 SPRAY, METERED NASAL 2 TIMES DAILY
Qty: 30 ML | Refills: 11 | Status: SHIPPED | OUTPATIENT
Start: 2020-09-11 | End: 2021-06-15

## 2020-09-18 ENCOUNTER — TELEPHONE (OUTPATIENT)
Dept: PAIN MEDICINE | Facility: CLINIC | Age: 60
End: 2020-09-18

## 2020-09-18 NOTE — TELEPHONE ENCOUNTER
"This message is for patient in regards to his/her appointment 09/21/20 at 2:00p       Staff informed patient of the policy in place by [facility] for the safety of all patients and staff members.   Staff also informed patient that all visitors will not be allowed to accompany patient during their appointment with provider ROCIO Musa and any visitors will have to remain inside his/her vehicle until patient appointment is over and patient must wear a face mask the whole time here at [facility].    Upon arriving patient must contact clinic at this number [phone] to notify staff that they have arrive, Staff will give the patient the "okay" to come up or wait inside their vehicle until clinic is ready for the next patient to enter inside the building.    Pt verbalized understanding and confirmed appt     "

## 2020-09-21 ENCOUNTER — HOSPITAL ENCOUNTER (OUTPATIENT)
Dept: PREADMISSION TESTING | Facility: OTHER | Age: 60
Discharge: HOME | End: 2020-09-21
Attending: ANESTHESIOLOGY
Payer: MEDICARE

## 2020-09-21 DIAGNOSIS — Z01.812 PRE-PROCEDURE LAB EXAM: ICD-10-CM

## 2020-09-21 PROCEDURE — U0003 INFECTIOUS AGENT DETECTION BY NUCLEIC ACID (DNA OR RNA); SEVERE ACUTE RESPIRATORY SYNDROME CORONAVIRUS 2 (SARS-COV-2) (CORONAVIRUS DISEASE [COVID-19]), AMPLIFIED PROBE TECHNIQUE, MAKING USE OF HIGH THROUGHPUT TECHNOLOGIES AS DESCRIBED BY CMS-2020-01-R: HCPCS

## 2020-09-22 ENCOUNTER — IMMUNIZATION (OUTPATIENT)
Dept: PHARMACY | Facility: CLINIC | Age: 60
End: 2020-09-22
Payer: MEDICARE

## 2020-09-22 LAB — SARS-COV-2 RNA RESP QL NAA+PROBE: NOT DETECTED

## 2020-09-24 ENCOUNTER — TELEPHONE (OUTPATIENT)
Dept: PAIN MEDICINE | Facility: CLINIC | Age: 60
End: 2020-09-24

## 2020-09-24 ENCOUNTER — OFFICE VISIT (OUTPATIENT)
Dept: OTOLARYNGOLOGY | Facility: CLINIC | Age: 60
End: 2020-09-24
Payer: MEDICARE

## 2020-09-24 VITALS
SYSTOLIC BLOOD PRESSURE: 128 MMHG | TEMPERATURE: 98 F | HEART RATE: 76 BPM | DIASTOLIC BLOOD PRESSURE: 83 MMHG | HEIGHT: 71 IN | WEIGHT: 275.5 LBS | BODY MASS INDEX: 38.57 KG/M2

## 2020-09-24 DIAGNOSIS — R13.10 DYSPHAGIA, UNSPECIFIED TYPE: Primary | ICD-10-CM

## 2020-09-24 DIAGNOSIS — J34.2 NASAL SEPTAL DEVIATION: ICD-10-CM

## 2020-09-24 DIAGNOSIS — R09.82 POSTNASAL DRIP: ICD-10-CM

## 2020-09-24 DIAGNOSIS — J30.89 NON-SEASONAL ALLERGIC RHINITIS, UNSPECIFIED TRIGGER: ICD-10-CM

## 2020-09-24 DIAGNOSIS — G47.33 OBSTRUCTIVE SLEEP APNEA TREATED WITH CONTINUOUS POSITIVE AIRWAY PRESSURE (CPAP): ICD-10-CM

## 2020-09-24 DIAGNOSIS — K21.9 LARYNGOPHARYNGEAL REFLUX (LPR): ICD-10-CM

## 2020-09-24 PROCEDURE — 3079F DIAST BP 80-89 MM HG: CPT | Mod: CPTII,S$GLB,, | Performed by: SPECIALIST

## 2020-09-24 PROCEDURE — 99214 OFFICE O/P EST MOD 30 MIN: CPT | Mod: 25,S$GLB,, | Performed by: SPECIALIST

## 2020-09-24 PROCEDURE — 3074F PR MOST RECENT SYSTOLIC BLOOD PRESSURE < 130 MM HG: ICD-10-PCS | Mod: CPTII,S$GLB,, | Performed by: SPECIALIST

## 2020-09-24 PROCEDURE — 31575 LARYNGOSCOPY: ICD-10-PCS | Mod: S$GLB,,, | Performed by: SPECIALIST

## 2020-09-24 PROCEDURE — 3079F PR MOST RECENT DIASTOLIC BLOOD PRESSURE 80-89 MM HG: ICD-10-PCS | Mod: CPTII,S$GLB,, | Performed by: SPECIALIST

## 2020-09-24 PROCEDURE — 3008F PR BODY MASS INDEX (BMI) DOCUMENTED: ICD-10-PCS | Mod: CPTII,S$GLB,, | Performed by: SPECIALIST

## 2020-09-24 PROCEDURE — 3074F SYST BP LT 130 MM HG: CPT | Mod: CPTII,S$GLB,, | Performed by: SPECIALIST

## 2020-09-24 PROCEDURE — 3008F BODY MASS INDEX DOCD: CPT | Mod: CPTII,S$GLB,, | Performed by: SPECIALIST

## 2020-09-24 PROCEDURE — 31575 DIAGNOSTIC LARYNGOSCOPY: CPT | Mod: S$GLB,,, | Performed by: SPECIALIST

## 2020-09-24 PROCEDURE — 99214 PR OFFICE/OUTPT VISIT, EST, LEVL IV, 30-39 MIN: ICD-10-PCS | Mod: 25,S$GLB,, | Performed by: SPECIALIST

## 2020-09-24 RX ORDER — POTASSIUM CHLORIDE 750 MG/1
10 CAPSULE, EXTENDED RELEASE ORAL DAILY
COMMUNITY
Start: 2020-09-20 | End: 2022-11-07

## 2020-09-24 NOTE — TELEPHONE ENCOUNTER
Staff called to confirm 09/28/20 2 pm in office visit with Buffy Villagran Np. Patient informed of instructions.     Patient confirmed and verbalized understanding and expressed thanks.

## 2020-09-24 NOTE — PROGRESS NOTES
Subjective:       Patient ID: Bri Santiago is a 60 y.o. male.    Chief Complaint: Follow-up (with scope)    Follow-up     The patient is returning for follow-up visit.  There are multiple issues to discuss:  1 . Allergic rhinitis:.  He continues to have postnasal drip, but it has improved since using the ipratropium bromide in addition to his azelastine, Flonase and Singulair.  2.  Laryngopharyngeal reflux:  He is having frequent throat clearing, chronic phlegm in the throat and foreign body globus sensation.  3.  Obstructive sleep apnea:  Patient continues to use CPAP every night.  He does feel more rested when he uses it.      Review of Systems   Constitutional: Negative for activity change, appetite change and unexpected weight change.   HENT: Positive for postnasal drip, rhinorrhea, sinus pressure and sinus pain. Negative for ear discharge, ear pain, facial swelling, hearing loss, mouth sores, sneezing, tinnitus, trouble swallowing and voice change.    Eyes: Negative for photophobia, pain, discharge, redness, itching and visual disturbance.   Respiratory: Negative for apnea, choking, shortness of breath and wheezing.    Cardiovascular: Negative for palpitations.   Gastrointestinal: Negative for abdominal distention.   Musculoskeletal: Negative for neck stiffness.   Skin: Negative.  Negative for color change and pallor.   Allergic/Immunologic: Positive for environmental allergies. Negative for food allergies and immunocompromised state.   Neurological: Negative for dizziness, facial asymmetry, speech difficulty and light-headedness.   Hematological: Negative for adenopathy. Does not bruise/bleed easily.   Psychiatric/Behavioral: Positive for sleep disturbance. Negative for agitation, confusion and decreased concentration.       Objective:      Physical Exam  Constitutional:       Appearance: He is well-developed.   HENT:      Head: Normocephalic.      Right Ear: Ear canal and external ear normal. Tympanic  membrane is retracted.      Left Ear: Ear canal and external ear normal. Tympanic membrane is retracted.      Nose: Septal deviation (To the right), mucosal edema (cyanotic, boggy inferior turbinates bilaterally) and rhinorrhea (clear mucus bilaterally) present.      Mouth/Throat:      Mouth: No oral lesions.      Pharynx: Uvula midline.   Eyes:      General: Lids are normal.         Right eye: No discharge.         Left eye: No discharge.      Conjunctiva/sclera:      Right eye: Right conjunctiva is injected. No exudate.     Left eye: Left conjunctiva is injected. No exudate.     Pupils: Pupils are equal, round, and reactive to light.   Neck:      Musculoskeletal: Normal range of motion. No muscular tenderness.      Thyroid: No thyroid mass or thyromegaly.      Trachea: Trachea normal. No tracheal deviation.   Cardiovascular:      Rate and Rhythm: Normal rate and regular rhythm.      Pulses: Normal pulses.      Heart sounds: Normal heart sounds.   Pulmonary:      Effort: Pulmonary effort is normal.      Breath sounds: Normal breath sounds. No stridor. No decreased breath sounds, wheezing, rhonchi or rales.   Abdominal:      General: Bowel sounds are normal.      Palpations: Abdomen is soft.      Tenderness: There is no abdominal tenderness.   Musculoskeletal: Normal range of motion.   Lymphadenopathy:      Head:      Right side of head: No submental, submandibular, preauricular, posterior auricular or occipital adenopathy.      Left side of head: No submental, submandibular, preauricular, posterior auricular or occipital adenopathy.      Cervical: No cervical adenopathy.   Skin:     General: Skin is warm and dry.      Findings: No petechiae or rash.      Nails: There is no clubbing.     Neurological:      Mental Status: He is alert and oriented to person, place, and time.      Cranial Nerves: No cranial nerve deficit.      Sensory: No sensory deficit.      Gait: Gait normal.   Psychiatric:         Speech: Speech  normal.         Behavior: Behavior normal. Behavior is cooperative.         Thought Content: Thought content normal.         Judgment: Judgment normal.         Flexible nasolaryngoscopy-nasal septal deviation and nasal changes of allergic rhinitis; laryngeal changes of laryngopharyngeal reflux that is improving on therapy.    Assessment:       1. Dysphagia, unspecified type    2. Laryngopharyngeal reflux (LPR)    3. Non-seasonal allergic rhinitis, unspecified trigger    4. Nasal septal deviation    5. Postnasal drip    6. Obstructive sleep apnea treated with continuous positive airway pressure (CPAP)        Plan:       I will recheck  the patient in 6 weeks.  He is to continue with his current drug management.  He needs to continue using his CPAP.

## 2020-09-24 NOTE — PROCEDURES
"Laryngoscopy    Date/Time: 9/24/2020 10:15 AM  Performed by: LAURENT Swanson MD  Authorized by: LAURENT Swanson MD     Time out: Immediately prior to procedure a "time out" was called to verify the correct patient, procedure, equipment, support staff and site/side marked as required.    Consent Done?:  Yes (Verbal)  Anesthesia:     Local anesthetic:  Lidocaine 2% and Irvin-Synephrine 1/2% (Slightly wet cotton placed in each nasal passage moistened with)    Patient tolerance:  Patient tolerated the procedure well with no immediate complications    Decongestion performed?: Yes    Laryngoscopy:     Areas examined:  Vocal cords, larynx, hypopharynx, oropharynx, nasopharynx and nasal cavities    Laryngoscope size:  4 mm (Flexible video nasolaryngoscopy)  Nose External:      No external nasal deformity  Nose Intranasal:      Mucosa no polyps     Mucosa ulcers not present     No mucosa lesions present (Profuse clear mucus in the nasal passages bilaterally)     Septum gross deformity ( septum deviated to the right )     Enlarged turbinates ( inferior turbinates enlarged bilaterally)  Nasopharynx:      No mucosa lesions     Adenoids not present     Posterior choanae patent     Eustachian tube patent  Larynx/hypopharynx:      No epiglottis lesions     No epiglottis edema     No AE folds lesions     No vocal cord polyps     Equal and normal bilateral ( vocal cords move symmetrically, no mucosal lesions noted)     No hypopharynx lesions     No piriform sinus pooling     No piriform sinus lesions     Post cricoid edema ( mild to moderate, improved from last endoscopy)     Post cricoid erythema ( mild, improved from last endoscopy)     Flexible video nasolaryngoscopy-nasal changes allergic rhinitis and nasal septal deviation; laryngeal changes of laryngopharyngeal reflux that is responding to therapy.      "

## 2020-09-28 ENCOUNTER — TELEPHONE (OUTPATIENT)
Dept: PAIN MEDICINE | Facility: CLINIC | Age: 60
End: 2020-09-28

## 2020-09-29 ENCOUNTER — OFFICE VISIT (OUTPATIENT)
Dept: PAIN MEDICINE | Facility: CLINIC | Age: 60
End: 2020-09-29
Payer: MEDICARE

## 2020-09-29 ENCOUNTER — PATIENT MESSAGE (OUTPATIENT)
Dept: GASTROENTEROLOGY | Facility: CLINIC | Age: 60
End: 2020-09-29

## 2020-09-29 ENCOUNTER — TELEPHONE (OUTPATIENT)
Dept: GASTROENTEROLOGY | Facility: CLINIC | Age: 60
End: 2020-09-29

## 2020-09-29 VITALS
SYSTOLIC BLOOD PRESSURE: 139 MMHG | WEIGHT: 277 LBS | HEIGHT: 71 IN | RESPIRATION RATE: 18 BRPM | HEART RATE: 93 BPM | TEMPERATURE: 98 F | DIASTOLIC BLOOD PRESSURE: 92 MMHG | BODY MASS INDEX: 38.78 KG/M2

## 2020-09-29 DIAGNOSIS — M96.1 POSTLAMINECTOMY SYNDROME OF LUMBAR REGION: ICD-10-CM

## 2020-09-29 DIAGNOSIS — M96.1 CERVICAL POSTLAMINECTOMY SYNDROME: Primary | ICD-10-CM

## 2020-09-29 DIAGNOSIS — M51.37 DEGENERATION OF LUMBAR OR LUMBOSACRAL INTERVERTEBRAL DISC: ICD-10-CM

## 2020-09-29 DIAGNOSIS — M79.10 MYALGIA: ICD-10-CM

## 2020-09-29 DIAGNOSIS — M47.812 CERVICAL SPONDYLOSIS: ICD-10-CM

## 2020-09-29 DIAGNOSIS — M47.816 OSTEOARTHRITIS OF LUMBAR SPINE, UNSPECIFIED SPINAL OSTEOARTHRITIS COMPLICATION STATUS: ICD-10-CM

## 2020-09-29 PROCEDURE — 99214 PR OFFICE/OUTPT VISIT, EST, LEVL IV, 30-39 MIN: ICD-10-PCS | Mod: S$GLB,,, | Performed by: NURSE PRACTITIONER

## 2020-09-29 PROCEDURE — 99999 PR PBB SHADOW E&M-EST. PATIENT-LVL V: ICD-10-PCS | Mod: PBBFAC,,, | Performed by: NURSE PRACTITIONER

## 2020-09-29 PROCEDURE — 3008F PR BODY MASS INDEX (BMI) DOCUMENTED: ICD-10-PCS | Mod: CPTII,S$GLB,, | Performed by: NURSE PRACTITIONER

## 2020-09-29 PROCEDURE — 3080F DIAST BP >= 90 MM HG: CPT | Mod: CPTII,S$GLB,, | Performed by: NURSE PRACTITIONER

## 2020-09-29 PROCEDURE — 3008F BODY MASS INDEX DOCD: CPT | Mod: CPTII,S$GLB,, | Performed by: NURSE PRACTITIONER

## 2020-09-29 PROCEDURE — 3080F PR MOST RECENT DIASTOLIC BLOOD PRESSURE >= 90 MM HG: ICD-10-PCS | Mod: CPTII,S$GLB,, | Performed by: NURSE PRACTITIONER

## 2020-09-29 PROCEDURE — 3075F PR MOST RECENT SYSTOLIC BLOOD PRESS GE 130-139MM HG: ICD-10-PCS | Mod: CPTII,S$GLB,, | Performed by: NURSE PRACTITIONER

## 2020-09-29 PROCEDURE — 3075F SYST BP GE 130 - 139MM HG: CPT | Mod: CPTII,S$GLB,, | Performed by: NURSE PRACTITIONER

## 2020-09-29 PROCEDURE — 99999 PR PBB SHADOW E&M-EST. PATIENT-LVL V: CPT | Mod: PBBFAC,,, | Performed by: NURSE PRACTITIONER

## 2020-09-29 PROCEDURE — 99214 OFFICE O/P EST MOD 30 MIN: CPT | Mod: S$GLB,,, | Performed by: NURSE PRACTITIONER
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
## 2020-09-29 NOTE — TELEPHONE ENCOUNTER
----- Message from Leti Angulo sent at 9/29/2020 11:27 AM CDT -----  Pt is calling to schedule an appt and would like for the nurse to give him a call back. Pt is stating that he call last Friday and no one has return his phone call. [phone]

## 2020-09-29 NOTE — TELEPHONE ENCOUNTER
Called and spoke to pt.  Informed pt Dr. Boyer is booked for some time and offered sooner appt with fellow in clinic with Dr. Boyer.   Pt says as long as he wont have to repeat his medical history.   Offered pt next available.   Pt declined and says he can not wait, he is choking in his acid reflux and it is unbearable.  Informed pt if symptoms are unbearable, it is always recommended he go to his nearest emergency department for evaluation.   Pt asked why offer same day appointments if same day appointments are not available.  Pt asked for supervisor to contact him back and if she can not answer that question for him, he would like to ask her supervisor.  Pt appreciated the call.

## 2020-09-29 NOTE — TELEPHONE ENCOUNTER
----- Message from Leti Angulo sent at 9/29/2020  1:46 PM CDT -----  Pt is returning a missed call from Jacquelyn Bolanos and would like for jacquelyn to give him a call back

## 2020-09-30 ENCOUNTER — TELEPHONE (OUTPATIENT)
Dept: INTERNAL MEDICINE | Facility: CLINIC | Age: 60
End: 2020-09-30

## 2020-09-30 DIAGNOSIS — R13.19 ESOPHAGEAL DYSPHAGIA: ICD-10-CM

## 2020-09-30 DIAGNOSIS — K21.9 GASTROESOPHAGEAL REFLUX DISEASE, ESOPHAGITIS PRESENCE NOT SPECIFIED: Primary | ICD-10-CM

## 2020-09-30 NOTE — TELEPHONE ENCOUNTER
lov 06/16/2020  And bp check on 06/24/2020 during nurse visit    Requesting to stop clearance of plavix for Dr. Magana procedure

## 2020-09-30 NOTE — TELEPHONE ENCOUNTER
----- Message from Meghna Brar sent at 9/30/2020  7:42 AM CDT -----  Regarding: plavix clearance  Hello:    Requesting clearance of plavix for 7 days. Patient is scheduled with Dr Magana on 10/13/20 for a Bilateral MBB C4,5,6.    Please advise if patient may hold medication prior to procedure.    Respectfully,  Meghna

## 2020-09-30 NOTE — TELEPHONE ENCOUNTER
Please inform Dr Magana's team and pt that Pt may hold plavix prior to procedure however  Please recall pt has von Willebrands and will require DDVAP infusion prior to procedure as previously done.

## 2020-10-01 ENCOUNTER — TELEPHONE (OUTPATIENT)
Dept: ENDOSCOPY | Facility: HOSPITAL | Age: 60
End: 2020-10-01

## 2020-10-01 NOTE — TELEPHONE ENCOUNTER
Patient needs to be scheduled for an EGD. He currently is on Plavix. Per our Endoscopy protocol he will need to hold 5 days prior. Can he hold his Plavix 5 days prior to procedures? Please advise.     Thanks,  Светлана

## 2020-10-02 NOTE — TELEPHONE ENCOUNTER
Mr Santiago may hold his plavix as requested prior to his procedure.  Please recall that pt has vonwillebrands disease and it is recommended that he be given DDAVP prior to procedure.  If intranasal is available (using high concentration spray [1.5 mg/mL]): 300 mcg (1 spray each nostril)2 hours before surgery. If infusion given instead he will require IV infusion , 20 mcg, to be given over 30 minutes, 1 hr before the procedure.

## 2020-10-02 NOTE — TELEPHONE ENCOUNTER
The endoscopy nurses will administer DDAVP just prior to procedure. I'll check on getting the order placed and let you know.     Thank you,  Светлана

## 2020-10-05 NOTE — TELEPHONE ENCOUNTER
Dr. Tyler,     Patient is scheduled for an EGD on 11/3/20 with Dr. Boyer. Please place medication order for DDAVP for day of procedure so it can be administered by endoscopy nurse prior to procedure.     Thanks,  Светлана

## 2020-10-06 DIAGNOSIS — D68.00 VON WILLEBRAND DISEASE: Primary | ICD-10-CM

## 2020-10-06 RX ORDER — SODIUM CHLORIDE 0.9 % (FLUSH) 0.9 %
10 SYRINGE (ML) INJECTION
Status: CANCELLED | OUTPATIENT
Start: 2020-11-03

## 2020-10-06 RX ORDER — HEPARIN 100 UNIT/ML
500 SYRINGE INTRAVENOUS
Status: CANCELLED | OUTPATIENT
Start: 2020-11-03

## 2020-10-06 NOTE — TELEPHONE ENCOUNTER
,    Endoscopy does not have access to the supportive care orders. The order for DDAVP can be placed as an on call medication and will be given by endoscopy nurse prior to procedure.    Thank you,  Светлана

## 2020-10-12 DIAGNOSIS — I51.89 DIASTOLIC DYSFUNCTION: ICD-10-CM

## 2020-10-12 RX ORDER — FUROSEMIDE 20 MG/1
TABLET ORAL
Qty: 70 TABLET | Refills: 1 | Status: SHIPPED | OUTPATIENT
Start: 2020-10-12 | End: 2021-01-04 | Stop reason: SDUPTHER

## 2020-10-13 ENCOUNTER — HOSPITAL ENCOUNTER (OUTPATIENT)
Facility: OTHER | Age: 60
Discharge: HOME | End: 2020-10-13
Attending: ANESTHESIOLOGY | Admitting: ANESTHESIOLOGY
Payer: MEDICARE

## 2020-10-13 VITALS
HEIGHT: 71 IN | SYSTOLIC BLOOD PRESSURE: 142 MMHG | BODY MASS INDEX: 37.52 KG/M2 | OXYGEN SATURATION: 98 % | RESPIRATION RATE: 14 BRPM | DIASTOLIC BLOOD PRESSURE: 80 MMHG | WEIGHT: 268 LBS | TEMPERATURE: 99 F | HEART RATE: 86 BPM

## 2020-10-13 DIAGNOSIS — G89.29 CHRONIC PAIN: ICD-10-CM

## 2020-10-13 DIAGNOSIS — M47.812 CERVICAL SPONDYLOSIS: Primary | ICD-10-CM

## 2020-10-13 PROCEDURE — 63600175 PHARM REV CODE 636 W HCPCS: Mod: JG | Performed by: ANESTHESIOLOGY

## 2020-10-13 PROCEDURE — 64491 PR INJ DX/THER AGNT PARAVERT FACET JOINT,IMG GUIDE,CERV/THORAC, 2ND LEVEL: ICD-10-PCS | Mod: 50,,, | Performed by: ANESTHESIOLOGY

## 2020-10-13 PROCEDURE — 64490 INJ PARAVERT F JNT C/T 1 LEV: CPT | Mod: 50,,, | Performed by: ANESTHESIOLOGY

## 2020-10-13 PROCEDURE — 64492 INJ PARAVERT F JNT C/T 3 LEV: CPT | Mod: 50,,, | Performed by: ANESTHESIOLOGY

## 2020-10-13 PROCEDURE — 25000003 PHARM REV CODE 250: Performed by: ANESTHESIOLOGY

## 2020-10-13 PROCEDURE — 64491 INJ PARAVERT F JNT C/T 2 LEV: CPT | Mod: 50,,, | Performed by: ANESTHESIOLOGY

## 2020-10-13 PROCEDURE — 64491 INJ PARAVERT F JNT C/T 2 LEV: CPT | Mod: 50 | Performed by: ANESTHESIOLOGY

## 2020-10-13 PROCEDURE — 64490 PR INJ DX/THER AGNT PARAVERT FACET JOINT,IMG GUIDE,CERV/THORAC, 1ST LEVEL: ICD-10-PCS | Mod: 50,,, | Performed by: ANESTHESIOLOGY

## 2020-10-13 PROCEDURE — S0020 INJECTION, BUPIVICAINE HYDRO: HCPCS | Performed by: ANESTHESIOLOGY

## 2020-10-13 PROCEDURE — 64492 INJ PARAVERT F JNT C/T 3 LEV: CPT | Mod: 50 | Performed by: ANESTHESIOLOGY

## 2020-10-13 PROCEDURE — 64490 INJ PARAVERT F JNT C/T 1 LEV: CPT | Mod: 50 | Performed by: ANESTHESIOLOGY

## 2020-10-13 PROCEDURE — 64492 PR INJ DX/THER AGNT PARAVERT FACET JOINT,IMG GUIDE,CERV/THORAC, ADD LEVEL: ICD-10-PCS | Mod: 50,,, | Performed by: ANESTHESIOLOGY

## 2020-10-13 RX ORDER — BUPIVACAINE HYDROCHLORIDE 5 MG/ML
INJECTION, SOLUTION EPIDURAL; INTRACAUDAL
Status: DISCONTINUED | OUTPATIENT
Start: 2020-10-13 | End: 2020-10-13 | Stop reason: HOSPADM

## 2020-10-13 RX ORDER — LIDOCAINE HYDROCHLORIDE 10 MG/ML
INJECTION INFILTRATION; PERINEURAL
Status: DISCONTINUED | OUTPATIENT
Start: 2020-10-13 | End: 2020-10-13 | Stop reason: HOSPADM

## 2020-10-13 RX ORDER — SODIUM CHLORIDE 9 MG/ML
500 INJECTION, SOLUTION INTRAVENOUS CONTINUOUS
Status: DISCONTINUED | OUTPATIENT
Start: 2020-10-13 | End: 2020-10-13 | Stop reason: HOSPADM

## 2020-10-13 RX ADMIN — DESMOPRESSIN ACETATE 36.48 MCG: 4 SOLUTION INTRAVENOUS at 09:10

## 2020-10-13 RX ADMIN — SODIUM CHLORIDE 500 ML: 0.9 INJECTION, SOLUTION INTRAVENOUS at 08:10

## 2020-10-13 NOTE — DISCHARGE INSTRUCTIONS

## 2020-10-13 NOTE — INTERVAL H&P NOTE
The patient has been examined and the H&P has been reviewed:    I concur with the findings and no changes have occurred since H&P was written.    Surgery risks, benefits and alternative options discussed and understood by patient/family.          Active Hospital Problems    Diagnosis  POA    Chronic pain [G89.29]  Yes      Resolved Hospital Problems   No resolved problems to display.

## 2020-10-13 NOTE — PLAN OF CARE
Pt tolerated procedure well. Pt reports 3 /10 pain after procedure. Assisted pt up for first time. Steady on feet. All discharge instructions given.  Diary given

## 2020-10-13 NOTE — OP NOTE
cERVICAL Medial Branch Block Under Fluoroscopy  Time-out taken to identify patient and procedure side prior to starting the procedure.        Date of Procedure: 10/13/2020                                                             PROCEDURE:  Bilateral medial branch block at the transverse processes of C4, C5, C6     REASON FOR PROCEDURE:  Cervical spondylosis [M47.812]    PHYSICIAN: Fredy Magana MD  ASSISTANTS: None    MEDICATIONS INJECTED:  0.5% Bupivicaine total 5mL  LOCAL ANESTHETIC USED:   Xylocaine 1% 1mL / site    SEDATION MEDICATIONS: None    ESTIMATED BLOOD LOSS:  None.    COMPLICATIONS:  None.    Patient D/C plavix for 7 days, and had dDavp infusion    TECHNIQUE:   Laying in a prone position, the patient was prepped and draped in the usual sterile fashion using ChloraPrep and fenestrated drape.  The level was determined under fluoroscopic guidance.  Local anesthetic was given by going down to the hub of the 27-gauge 1.25in needle and raising a wheel.  A 22-gauge 3.5inch needle was introduced to the anatomic local of the medial branch at each of the above levels using fluoroscopy. Then after negative aspiration, the medication was injected slowly. The patient tolerated the procedure well.       The patient was monitored after the procedure.  Patient was given post procedure and discharge instructions to follow at home.  We will see the patient back in two weeks or the patient may call to inform of status. The patient was discharged in a stable condition

## 2020-10-13 NOTE — DISCHARGE SUMMARY
Discharge Note  Short Stay      SUMMARY     Admit Date: 10/13/2020    Attending Physician: Fredy Magana      Discharge Physician: Fredy Magana      Discharge Date: 10/13/2020 9:59 AM    Procedure(s) (LRB):  BLOCK, NERVE BILATERAL C4,C5,C6 Plavix clearance requested (Bilateral)    Final Diagnosis: Cervical spondylosis [M47.812]    Disposition: Home or self care    Patient Instructions:   Current Discharge Medication List      CONTINUE these medications which have NOT CHANGED    Details   acetaminophen (TYLENOL) 500 MG tablet Take 2 tablets (1,000 mg total) by mouth every 8 (eight) hours as needed.  Qty: 90 tablet, Refills: 0      albuterol (PROVENTIL/VENTOLIN HFA) 90 mcg/actuation inhaler INHALE 2 PUFFS INTO THE LUNGS EVERY 6 HOURS AS NEEDED FOR WHEEZING OR SHORTNESS OF BREATH  Qty: 18 g, Refills: 4      atorvastatin (LIPITOR) 40 MG tablet TAKE 1 TABLET(40 MG) BY MOUTH EVERY DAY  Qty: 90 tablet, Refills: 3    Associated Diagnoses: Hyperlipidemia, unspecified hyperlipidemia type      azelastine (ASTELIN) 137 mcg (0.1 %) nasal spray Two sprays in each nostril, sniff until absorbed, then follow with 1 spray of fluticasone.  Use both sprays twice daily.  Qty: 30 mL, Refills: 11      budesonide-formoterol 160-4.5 mcg (SYMBICORT) 160-4.5 mcg/actuation HFAA INHALE 2 PUFFS INTO THE LUNGS EVERY 12 HOURS  Qty: 10.2 g, Refills: 12      calcium citrate-vitamin D3 315-200 mg (CITRACAL+D) 315-200 mg-unit per tablet Take 1 tablet by mouth once daily.      clopidogrel (PLAVIX) 75 mg tablet Take 1 tablet (75 mg total) by mouth once daily.  Qty: 30 tablet, Refills: 11      cyanocobalamin, vitamin B-12, (VITAMIN B-12) 50 mcg tablet Take 50 mcg by mouth once daily.      diclofenac sodium (VOLTAREN) 1 % Gel Apply 2 g topically 3 (three) times daily.  Qty: 1 Tube, Refills: 0    Associated Diagnoses: Lumbar spondylosis; Spinal stenosis, lumbar region, without neurogenic claudication; Facet syndrome; Osteoarthritis of lumbar spine,  unspecified spinal osteoarthritis complication status      docusate sodium (COLACE) 100 MG capsule Take 1 tablet by mouth Twice daily. 1 Capsule Oral Twice a day .  Take with pain medicine      doxazosin (CARDURA) 1 MG tablet TAKE 1 TABLET(1 MG) BY MOUTH EVERY EVENING  Qty: 90 tablet, Refills: 3    Associated Diagnoses: Benign localized prostatic hyperplasia with lower urinary tract symptoms (LUTS)      esomeprazole (NEXIUM) 40 MG capsule Take 1 capsule (40 mg total) by mouth 2 (two) times daily before meals.  Qty: 180 capsule, Refills: 0    Associated Diagnoses: Gastroesophageal reflux disease without esophagitis      ferrous sulfate (FEOSOL) 325 mg (65 mg iron) Tab tablet Take 1 tablet (325 mg total) by mouth every 12 (twelve) hours.  Qty: 60 tablet, Refills: 4    Associated Diagnoses: Iron deficiency anemia, unspecified iron deficiency anemia type      flu vacc [id number] 6mos up,PF, (FLUARIX QUAD [id number], PF,) 60 mcg (15 mcg x 4)/0.5 mL Syrg use as directed  Qty: 0.5 mL, Refills: 0      fluticasone propionate (FLONASE) 50 mcg/actuation nasal spray One spray in each nostril twice daily after 1st using azelastine nasal spray  Qty: 18.2 mL, Refills: 11      furosemide (LASIX) 20 MG tablet Take 20meq every 12 hours by mouth for 5 days and then 20meq daily  Qty: 70 tablet, Refills: 1    Associated Diagnoses: Diastolic dysfunction      ipratropium (ATROVENT) 0.03 % nasal spray 2 sprays by Nasal route 2 (two) times daily. May be use more often if needed  Qty: 30 mL, Refills: 11      montelukast (SINGULAIR) 10 mg tablet TAKE 1 TABLET(10 MG) BY MOUTH EVERY EVENING  Qty: 30 tablet, Refills: 11      potassium chloride (MICRO-K) 10 MEQ CpSR       tadalafiL (CIALIS) 20 MG Tab Take 1 tablet (20 mg total) by mouth as needed. Take every 36 hours as needed for ED.  Qty: 30 tablet, Refills: 9    Associated Diagnoses: Erectile dysfunction, unspecified erectile dysfunction type      tiZANidine (ZANAFLEX) 4 MG tablet TAKE 1  TABLET(4 MG) BY MOUTH EVERY 8 HOURS AS NEEDED  Qty: 90 tablet, Refills: 0      traMADoL (ULTRAM) 50 mg tablet Take 1 tablet (50 mg total) by mouth every 8 (eight) hours as needed for Pain.  Qty: 90 tablet, Refills: 2    Comments: Quantity prescribed more than 7 day supply? Yes, quantity medically necessary      zolpidem (AMBIEN) 10 mg Tab TAKE 1 TABLET BY MOUTH EVERY DAY AT BEDTIME  Qty: 20 tablet, Refills: 0    Associated Diagnoses: Sleep disorder                 Discharge Diagnosis: Cervical spondylosis [M47.812]  Condition on Discharge: Stable with no complications to procedure   Diet on Discharge: Same as before.  Activity: as per instruction sheet.  Discharge to: Home with a responsible adult.  Follow up: 2-4 weeks       Please call my office or pager at [phone] if experienced any weakness or loss of sensation, fever > 101.5, pain uncontrolled with oral medications, persistent nausea/vomiting/or diarrhea, redness or drainage from the incisions, or any other worrisome concerns. If physician on call was not reached or could not communicate with our office for any reason please go to the nearest emergency department

## 2020-10-14 ENCOUNTER — OFFICE VISIT (OUTPATIENT)
Dept: CARDIOLOGY | Facility: CLINIC | Age: 60
End: 2020-10-14
Payer: MEDICARE

## 2020-10-14 ENCOUNTER — LAB VISIT (OUTPATIENT)
Dept: LAB | Facility: HOSPITAL | Age: 60
End: 2020-10-14
Attending: INTERNAL MEDICINE
Payer: MEDICARE

## 2020-10-14 VITALS
WEIGHT: 278.31 LBS | BODY MASS INDEX: 38.96 KG/M2 | HEART RATE: 72 BPM | DIASTOLIC BLOOD PRESSURE: 84 MMHG | HEIGHT: 71 IN | SYSTOLIC BLOOD PRESSURE: 126 MMHG

## 2020-10-14 DIAGNOSIS — R00.2 PALPITATIONS: ICD-10-CM

## 2020-10-14 DIAGNOSIS — R69 DIAGNOSIS DEFERRED: ICD-10-CM

## 2020-10-14 DIAGNOSIS — G47.33 OBSTRUCTIVE SLEEP APNEA TREATED WITH CONTINUOUS POSITIVE AIRWAY PRESSURE (CPAP): ICD-10-CM

## 2020-10-14 DIAGNOSIS — I10 ESSENTIAL HYPERTENSION: ICD-10-CM

## 2020-10-14 DIAGNOSIS — R06.02 SHORTNESS OF BREATH: Primary | ICD-10-CM

## 2020-10-14 DIAGNOSIS — E66.01 SEVERE OBESITY (BMI 35.0-39.9) WITH COMORBIDITY: ICD-10-CM

## 2020-10-14 DIAGNOSIS — R06.02 SHORTNESS OF BREATH: ICD-10-CM

## 2020-10-14 LAB
ALBUMIN SERPL BCP-MCNC: 4 G/DL (ref 3.5–5.2)
ALP SERPL-CCNC: 70 U/L (ref 55–135)
ALT SERPL W/O P-5'-P-CCNC: 33 U/L (ref 10–44)
ANION GAP SERPL CALC-SCNC: 7 MMOL/L (ref 8–16)
AST SERPL-CCNC: 24 U/L (ref 10–40)
BASOPHILS # BLD AUTO: 0.03 K/UL (ref 0–0.2)
BASOPHILS NFR BLD: 0.4 % (ref 0–1.9)
BILIRUB SERPL-MCNC: 0.7 MG/DL (ref 0.1–1)
BNP SERPL-MCNC: 12 PG/ML (ref 0–99)
BUN SERPL-MCNC: 13 MG/DL (ref 6–20)
CALCIUM SERPL-MCNC: 8.7 MG/DL (ref 8.7–10.5)
CHLORIDE SERPL-SCNC: 102 MMOL/L (ref 95–110)
CO2 SERPL-SCNC: 30 MMOL/L (ref 23–29)
CREAT SERPL-MCNC: 0.9 MG/DL (ref 0.5–1.4)
DIFFERENTIAL METHOD: ABNORMAL
EOSINOPHIL # BLD AUTO: 0.2 K/UL (ref 0–0.5)
EOSINOPHIL NFR BLD: 2.3 % (ref 0–8)
ERYTHROCYTE [DISTWIDTH] IN BLOOD BY AUTOMATED COUNT: 13.2 % (ref 11.5–14.5)
EST. GFR  (AFRICAN AMERICAN): >60 ML/MIN/1.73 M^2
EST. GFR  (NON AFRICAN AMERICAN): >60 ML/MIN/1.73 M^2
GLUCOSE SERPL-MCNC: 93 MG/DL (ref 70–110)
HCT VFR BLD AUTO: 40.4 % (ref 40–54)
HGB BLD-MCNC: 12.5 G/DL (ref 14–18)
IMM GRANULOCYTES # BLD AUTO: 0.02 K/UL (ref 0–0.04)
IMM GRANULOCYTES NFR BLD AUTO: 0.3 % (ref 0–0.5)
LYMPHOCYTES # BLD AUTO: 1.8 K/UL (ref 1–4.8)
LYMPHOCYTES NFR BLD: 23 % (ref 18–48)
MAGNESIUM SERPL-MCNC: 1.6 MG/DL (ref 1.6–2.6)
MCH RBC QN AUTO: 29.9 PG (ref 27–31)
MCHC RBC AUTO-ENTMCNC: 30.9 G/DL (ref 32–36)
MCV RBC AUTO: 97 FL (ref 82–98)
MONOCYTES # BLD AUTO: 0.4 K/UL (ref 0.3–1)
MONOCYTES NFR BLD: 5.5 % (ref 4–15)
NEUTROPHILS # BLD AUTO: 5.4 K/UL (ref 1.8–7.7)
NEUTROPHILS NFR BLD: 68.5 % (ref 38–73)
NRBC BLD-RTO: 0 /100 WBC
PLATELET # BLD AUTO: 244 K/UL (ref 150–350)
PMV BLD AUTO: 10.1 FL (ref 9.2–12.9)
POTASSIUM SERPL-SCNC: 4 MMOL/L (ref 3.5–5.1)
PROT SERPL-MCNC: 7.5 G/DL (ref 6–8.4)
RBC # BLD AUTO: 4.18 M/UL (ref 4.6–6.2)
SODIUM SERPL-SCNC: 139 MMOL/L (ref 136–145)
WBC # BLD AUTO: 7.88 K/UL (ref 3.9–12.7)

## 2020-10-14 PROCEDURE — 3074F SYST BP LT 130 MM HG: CPT | Mod: CPTII,S$GLB,, | Performed by: INTERNAL MEDICINE

## 2020-10-14 PROCEDURE — 3079F DIAST BP 80-89 MM HG: CPT | Mod: CPTII,S$GLB,, | Performed by: INTERNAL MEDICINE

## 2020-10-14 PROCEDURE — 3008F BODY MASS INDEX DOCD: CPT | Mod: CPTII,S$GLB,, | Performed by: INTERNAL MEDICINE

## 2020-10-14 PROCEDURE — 99214 OFFICE O/P EST MOD 30 MIN: CPT | Mod: S$GLB,,, | Performed by: INTERNAL MEDICINE

## 2020-10-14 PROCEDURE — 99999 PR PBB SHADOW E&M-EST. PATIENT-LVL IV: CPT | Mod: PBBFAC,,, | Performed by: INTERNAL MEDICINE

## 2020-10-14 PROCEDURE — 36415 COLL VENOUS BLD VENIPUNCTURE: CPT | Mod: PO

## 2020-10-14 PROCEDURE — 93000 ELECTROCARDIOGRAM COMPLETE: CPT | Mod: S$GLB,,, | Performed by: INTERNAL MEDICINE

## 2020-10-14 PROCEDURE — 93000 EKG 12-LEAD: ICD-10-PCS | Mod: S$GLB,,, | Performed by: INTERNAL MEDICINE

## 2020-10-14 PROCEDURE — 83735 ASSAY OF MAGNESIUM: CPT

## 2020-10-14 PROCEDURE — 99999 PR PBB SHADOW E&M-EST. PATIENT-LVL IV: ICD-10-PCS | Mod: PBBFAC,,, | Performed by: INTERNAL MEDICINE

## 2020-10-14 PROCEDURE — 3074F PR MOST RECENT SYSTOLIC BLOOD PRESSURE < 130 MM HG: ICD-10-PCS | Mod: CPTII,S$GLB,, | Performed by: INTERNAL MEDICINE

## 2020-10-14 PROCEDURE — 80053 COMPREHEN METABOLIC PANEL: CPT

## 2020-10-14 PROCEDURE — 85025 COMPLETE CBC W/AUTO DIFF WBC: CPT

## 2020-10-14 PROCEDURE — 3079F PR MOST RECENT DIASTOLIC BLOOD PRESSURE 80-89 MM HG: ICD-10-PCS | Mod: CPTII,S$GLB,, | Performed by: INTERNAL MEDICINE

## 2020-10-14 PROCEDURE — 99214 PR OFFICE/OUTPT VISIT, EST, LEVL IV, 30-39 MIN: ICD-10-PCS | Mod: S$GLB,,, | Performed by: INTERNAL MEDICINE

## 2020-10-14 PROCEDURE — 83880 ASSAY OF NATRIURETIC PEPTIDE: CPT

## 2020-10-14 PROCEDURE — 3008F PR BODY MASS INDEX (BMI) DOCUMENTED: ICD-10-PCS | Mod: CPTII,S$GLB,, | Performed by: INTERNAL MEDICINE

## 2020-10-14 NOTE — PROGRESS NOTES
Subjective:   Chief Complaint: Palpitations  Last Clinic Visit: New Patient was seen by me as fellow in 2018.    History of Present Illness: Bri Santiago is a 60 y.o. gentleman with morbid obesity, hyperlipidemia, hypertension, obstructive sleep apnea on CPAP, BPH, possible diastolic dysfunction, ED, who presents with worsening dyspnea on exertion.  He reports that he has had progressive dyspnea on exertion over the past several weeks, difficulty lying down, increasingly short-winded.  Symptoms have acutely worsened over the last several weeks, but has been present for the last 1-2 years.  He has difficulty sleeping, wakes up throughout the night.  He was started on Lasix within the past year for lower extremity edema, which has helped some but continues to retain fluid in his legs.  Has also noticed increasing weight gain, and abdominal protuberance.  He had atypical chest pain last year, had left heart catheterization which showed nonobstructive disease elevated LVEDP.  PET stress test year before this negative for ischemia.  He has had CPAP titrated within the past year.    Dx:  Morbid obesity  Hyperlipidemia  Hypertension  Obstructive sleep apnea  BPH  Possible diastolic dysfunction  ED  Past medical and surgical history reviewed as documented.    Review of Systems   Constitution: Negative.   HENT: Negative.    Eyes: Negative.    Cardiovascular: Positive for dyspnea on exertion.   Respiratory: Positive for shortness of breath.    Hematologic/Lymphatic: Negative.    Skin: Negative.    Musculoskeletal: Negative.    Gastrointestinal: Negative.    Genitourinary: Negative.      Medications:  Outpatient Encounter Medications as of 10/14/2020   Medication Sig Dispense Refill    acetaminophen (TYLENOL) 500 MG tablet Take 2 tablets (1,000 mg total) by mouth every 8 (eight) hours as needed. 90 tablet 0    albuterol (PROVENTIL/VENTOLIN HFA) 90 mcg/actuation inhaler INHALE 2 PUFFS INTO THE LUNGS EVERY 6 HOURS AS NEEDED  FOR WHEEZING OR SHORTNESS OF BREATH 18 g 4    atorvastatin (LIPITOR) 40 MG tablet TAKE 1 TABLET(40 MG) BY MOUTH EVERY DAY 90 tablet 3    azelastine (ASTELIN) 137 mcg (0.1 %) nasal spray Two sprays in each nostril, sniff until absorbed, then follow with 1 spray of fluticasone.  Use both sprays twice daily. 30 mL 11    budesonide-formoterol 160-4.5 mcg (SYMBICORT) 160-4.5 mcg/actuation HFAA INHALE 2 PUFFS INTO THE LUNGS EVERY 12 HOURS 10.2 g 12    calcium citrate-vitamin D3 315-200 mg (CITRACAL+D) 315-200 mg-unit per tablet Take 1 tablet by mouth once daily.      clopidogrel (PLAVIX) 75 mg tablet Take 1 tablet (75 mg total) by mouth once daily. 30 tablet 11    cyanocobalamin, vitamin B-12, (VITAMIN B-12) 50 mcg tablet Take 50 mcg by mouth once daily.      diclofenac sodium (VOLTAREN) 1 % Gel Apply 2 g topically 3 (three) times daily. 1 Tube 0    docusate sodium (COLACE) 100 MG capsule Take 1 tablet by mouth Twice daily. 1 Capsule Oral Twice a day .  Take with pain medicine      doxazosin (CARDURA) 1 MG tablet TAKE 1 TABLET(1 MG) BY MOUTH EVERY EVENING 90 tablet 3    esomeprazole (NEXIUM) 40 MG capsule Take 1 capsule (40 mg total) by mouth 2 (two) times daily before meals. 180 capsule 0    ferrous sulfate (FEOSOL) 325 mg (65 mg iron) Tab tablet Take 1 tablet (325 mg total) by mouth every 12 (twelve) hours. 60 tablet 4    fluticasone propionate (FLONASE) 50 mcg/actuation nasal spray One spray in each nostril twice daily after 1st using azelastine nasal spray 18.2 mL 11    furosemide (LASIX) 20 MG tablet Take 20meq every 12 hours by mouth for 5 days and then 20meq daily (Patient taking differently: Take 20 mg by mouth. Take 20meq every 12 hours by mouth for 5 days and then 20meq daily) 70 tablet 1    ipratropium (ATROVENT) 0.03 % nasal spray 2 sprays by Nasal route 2 (two) times daily. May be use more often if needed 30 mL 11    montelukast (SINGULAIR) 10 mg tablet TAKE 1 TABLET(10 MG) BY MOUTH EVERY  "EVENING 30 tablet 11    potassium chloride (MICRO-K) 10 MEQ CpSR Take 10 mEq by mouth once daily.       tadalafiL (CIALIS) 20 MG Tab Take 1 tablet (20 mg total) by mouth as needed. Take every 36 hours as needed for ED. 30 tablet 9    tiZANidine (ZANAFLEX) 4 MG tablet TAKE 1 TABLET(4 MG) BY MOUTH EVERY 8 HOURS AS NEEDED 90 tablet 0    traMADoL (ULTRAM) 50 mg tablet Take 1 tablet (50 mg total) by mouth every 8 (eight) hours as needed for Pain. 90 tablet 2    zolpidem (AMBIEN) 10 mg Tab TAKE 1 TABLET BY MOUTH EVERY DAY AT BEDTIME 20 tablet 0    [DISCONTINUED] flu vacc [id number] 6mos up,PF, (FLUARIX QUAD [id number], PF,) 60 mcg (15 mcg x 4)/0.5 mL Syrg use as directed 0.5 mL 0    [] desmopressin (DDAVP) 36.48 mcg in sodium chloride 0.9% 50 mL IVPB       [DISCONTINUED] 0.9%  NaCl infusion       [DISCONTINUED] bupivacaine (PF) 0.5% (5 mg/mL) injection       [DISCONTINUED] lidocaine HCL 10 mg/ml (1%) injection        No facility-administered encounter medications on file as of 10/14/2020.      Family History:  Bri's family history includes Cancer in his father; Colon cancer in his mother; Colon cancer (age of onset: 65) in his paternal grandfather and paternal uncle; Colon cancer (age of onset: 67) in his father; Coronary artery disease (age of onset: 45) in his mother; Coronary artery disease (age of onset: 51) in his brother; Diabetes Mellitus in his paternal grandmother; Glaucoma in his father; Heart disease in his mother; Hypertension in his father and mother; No Known Problems in his brother, daughter, daughter, daughter, sister, son, son, and son.    Social History:  Bri reports that he has never smoked. He has never used smokeless tobacco. He reports current alcohol use of about 1.0 standard drinks of alcohol per week. He reports that he does not use drugs.    Objective:   /84   Pulse 72   Ht 5' 11" (1.803 m)   Wt 126.2 kg (278 lb 5.3 oz)   BMI 38.82 kg/m²     Physical Exam "   Constitutional: He is oriented to person, place, and time and well-developed, well-nourished, and in no distress. No distress.   HENT:   Head: Normocephalic and atraumatic.   Mouth/Throat: No oropharyngeal exudate.   Eyes: EOM are normal. No scleral icterus.   Neck: No JVD present. No tracheal deviation present. No thyromegaly present.   Cardiovascular: Normal rate and regular rhythm. Exam reveals no gallop and no friction rub.   No murmur heard.  Pulmonary/Chest: Effort normal and breath sounds normal. No respiratory distress. He has no wheezes. He has no rales. He exhibits no tenderness.   Abdominal: Soft. He exhibits no distension. There is no abdominal tenderness. There is no rebound and no guarding.   Protuberant   Musculoskeletal: Normal range of motion.         General: Edema (Pitting edema knees bilaterally.) present.   Neurological: He is alert and oriented to person, place, and time.   Skin: Skin is warm and dry. He is not diaphoretic. No erythema.   Psychiatric: Affect normal.     EKG:  My independent visualization of most recent EKG is normal sinus rhythm    TTE:  06/24/2020  · Normal left ventricular systolic function. The estimated ejection fraction is 60%.  · Concentric left ventricular remodeling.  · Normal LV diastolic function. Borderline enlarged left atrium, IVC large but collapsing, no convincing evidence of diastolic dysfunction.  · Mild mitral sclerosis.  · Mild to moderate tricuspid regurgitation.  · Normal right ventricular systolic function.  · Mild left atrial enlargement.  · The estimated PA systolic pressure is 37 mmHg.  · Intermediate central venous pressure (8 mmHg).   PET stress test  12/24/2018  · The relative PET images show no clinically significant regional resting or stress induced perfusion defect.  · For whole heart absolute myocardial perfusion (cc/min/g) averaged 1.16 rest (whihc is elevated), 1.59 at stress (which is mildly reduced), and 1.4 CFR (which is moderately  reduced impart due to elevated resting flow).  · Stress flow is reduced diffusely throughout the heart consistent with the presence of CAD, but above ischemic thresholds.  · Gated perfusion images showed an ejection fraction of 69% at rest and 69% during stress.  · Wall motion was normal at rest and at stress.  · LV cavity size at rest is normal. LV cavity size at stress is normal.  · The EKG portion of this study is negative for myocardial ischemia.  · Arrhythmias during stress: occasional PVCs.  · The results of the PET favors medical therapy.  · There is no prior study for comparison.     Lipids:  Recent Labs   Lab 01/23/20  0951   LDL Cholesterol 66.8   HDL 29 L   Cholesterol 111 L      Renal:  Recent Labs   Lab 10/14/20  0927   Creatinine 0.9   Potassium 4.0   CO2 30 H   BUN, Bld 13     Liver:  Recent Labs   Lab 10/14/20  0927   AST 24   ALT 33     Left heart catheterization  02/14/2019  · LV end diastolic pressure is elevated.  · LVEDP (Pre): 23  · Post Atrio lesion , 50% stenosed.  · Estimated blood loss: none  · Diastolic dysfunction.  · Filling pressures moderately elevated. Pulmonary HTN is mild to moderate.  · Non-obstructive CAD.    Assessment:     1. Shortness of breath    2. Essential hypertension    3. Palpitations    4. Morbid obesity    5. Obstructive sleep apnea treated with continuous positive airway pressure (CPAP)    6. Severe obesity (BMI 35.0-39.9) with comorbidity      Plan:   1. Shortness of breath  Shortness of breath multifactorial, will check CBC to document no worsening anemia.  Also checking CMP looking at liver function, given complaint of abdominal protuberance, and swelling.  Checking BNP as another metric of diastolic dysfunction, as he did not have significant diastolic dysfunction on most recent echocardiogram.  To the extent that some arrhythmia could be precipitating symptoms, will check Holter.  Suspect that edema related to venous disease, and morbid obesity, recommended  weight loss.  Shortness of breath also related to increased weight potentially.  - IN OFFICE EKG 12-LEAD (to Muse); Future  - Comprehensive Metabolic Panel; Future  - CBC auto differential; Future  - BNP; Future  - Magnesium; Future  - Holter monitor - 24 hour; Future  - IN OFFICE EKG 12-LEAD (to Muse)    2. Essential hypertension  Blood pressure today 124/84, well controlled, continue  - IN OFFICE EKG 12-LEAD (to Muse); Future  - Comprehensive Metabolic Panel; Future  - CBC auto differential; Future  - BNP; Future  - Magnesium; Future  - IN OFFICE EKG 12-LEAD (to Muse)    3. Palpitations  Intermittent palpitations, will obtain Holter monitor.  - IN OFFICE EKG 12-LEAD (to Muse); Future  - Comprehensive Metabolic Panel; Future  - CBC auto differential; Future  - BNP; Future  - Magnesium; Future  - Holter monitor - 24 hour; Future  - IN OFFICE EKG 12-LEAD (to Muse)    4. Morbid obesity  Encouraged weight loss  - IN OFFICE EKG 12-LEAD (to Muse); Future  - IN OFFICE EKG 12-LEAD (to Muse)    5. Obstructive sleep apnea treated with continuous positive airway pressure (CPAP)  Encouraged close follow-up with sleep clinic.    6. Severe obesity (BMI 35.0-39.9) with comorbidity  · Encouraged weight lossThe relative PET images show no clinically significant regional resting or stress induced perfusion defect.  · For whole heart absolute myocardial perfusion (cc/min/g) averaged 1.16 rest (whihc is elevated), 1.59 at stress (which is mildly reduced), and 1.4 CFR (which is moderately reduced impart due to elevated resting flow).  · Stress flow is reduced diffusely throughout the heart consistent with the presence of CAD, but above ischemic thresholds.  · Gated perfusion images showed an ejection fraction of 69% at rest and 69% during stress.  · Wall motion was normal at rest and at stress.  · LV cavity size at rest is normal. LV cavity size at stress is normal.  · The EKG portion of this study is negative for myocardial  ischemia.  · Arrhythmias during stress: occasional PVCs.  · The results of the PET favors medical therapy.  · There is no prior study for comparison.         Follow up in 3 months      Germán Francisco MD FACC

## 2020-10-19 NOTE — PROGRESS NOTES
Called patient, explained hemoglobin similar to prior, liver function, renal function, electrolytes, BNP all within normal limits.  Thus no obvious etiology of shortness of breath at this time other than weight gain/deconditioning.  Will follow-up Holter monitor, given intermittent palpitations, if unremarkable, would refer back to Dr. Galeas.    Josefa Francisco

## 2020-10-21 ENCOUNTER — HOSPITAL ENCOUNTER (OUTPATIENT)
Dept: CARDIOLOGY | Facility: HOSPITAL | Age: 60
Discharge: HOME | End: 2020-10-21
Attending: INTERNAL MEDICINE
Payer: MEDICARE

## 2020-10-21 DIAGNOSIS — R00.2 PALPITATIONS: ICD-10-CM

## 2020-10-21 DIAGNOSIS — R06.02 SHORTNESS OF BREATH: ICD-10-CM

## 2020-10-21 PROCEDURE — 93227 XTRNL ECG REC<48 HR R&I: CPT | Mod: ,,, | Performed by: INTERNAL MEDICINE

## 2020-10-21 PROCEDURE — 93226 XTRNL ECG REC<48 HR SCAN A/R: CPT

## 2020-10-21 PROCEDURE — 93227 HOLTER MONITOR - 24 HOUR (CUPID ONLY): ICD-10-PCS | Mod: ,,, | Performed by: INTERNAL MEDICINE

## 2020-10-23 LAB
OHS CV EVENT MONITOR DAY: 0
OHS CV HOLTER LENGTH DECIMAL HOURS: 23.98
OHS CV HOLTER LENGTH HOURS: 23
OHS CV HOLTER LENGTH MINUTES: 59

## 2020-10-26 ENCOUNTER — TELEPHONE (OUTPATIENT)
Dept: ENDOSCOPY | Facility: HOSPITAL | Age: 60
End: 2020-10-26

## 2020-10-29 ENCOUNTER — PATIENT MESSAGE (OUTPATIENT)
Dept: CARDIOLOGY | Facility: CLINIC | Age: 60
End: 2020-10-29

## 2020-10-29 NOTE — PROGRESS NOTES
Tried to call, his voice mailbox was full    Do you mind calling the patient and letting him know that the monitor we got on him shows no evidence of any significant arrhythmia to explain his shortness of breath.  He has very rare extra heartbeats from the top of the heart, and the bottom of the heart, which is normal, but he may still feel them.  No medication needed at this time, would recommend following up with Dr. Galeas for ongoing shortness of breath    Thanks, Germán

## 2020-11-02 DIAGNOSIS — D68.00 VON WILLEBRAND DISEASE: Primary | ICD-10-CM

## 2020-11-03 ENCOUNTER — ANESTHESIA EVENT (OUTPATIENT)
Dept: ENDOSCOPY | Facility: HOSPITAL | Age: 60
End: 2020-11-03
Payer: MEDICARE

## 2020-11-03 ENCOUNTER — HOSPITAL ENCOUNTER (OUTPATIENT)
Facility: HOSPITAL | Age: 60
Discharge: HOME | End: 2020-11-03
Attending: INTERNAL MEDICINE | Admitting: INTERNAL MEDICINE
Payer: MEDICARE

## 2020-11-03 ENCOUNTER — ANESTHESIA (OUTPATIENT)
Dept: ENDOSCOPY | Facility: HOSPITAL | Age: 60
End: 2020-11-03
Payer: MEDICARE

## 2020-11-03 VITALS
HEIGHT: 71 IN | OXYGEN SATURATION: 99 % | SYSTOLIC BLOOD PRESSURE: 138 MMHG | DIASTOLIC BLOOD PRESSURE: 86 MMHG | HEART RATE: 88 BPM | TEMPERATURE: 98 F | BODY MASS INDEX: 37.1 KG/M2 | RESPIRATION RATE: 20 BRPM | WEIGHT: 265 LBS

## 2020-11-03 DIAGNOSIS — K21.9 GERD (GASTROESOPHAGEAL REFLUX DISEASE): ICD-10-CM

## 2020-11-03 LAB — SARS-COV-2 RDRP RESP QL NAA+PROBE: NEGATIVE

## 2020-11-03 PROCEDURE — 88305 TISSUE EXAM BY PATHOLOGIST: CPT | Mod: 26,,, | Performed by: PATHOLOGY

## 2020-11-03 PROCEDURE — 25000003 PHARM REV CODE 250: Performed by: INTERNAL MEDICINE

## 2020-11-03 PROCEDURE — 63600175 PHARM REV CODE 636 W HCPCS: Performed by: NURSE ANESTHETIST, CERTIFIED REGISTERED

## 2020-11-03 PROCEDURE — 43239 PR EGD, FLEX, W/BIOPSY, SGL/MULTI: ICD-10-PCS | Mod: ,,, | Performed by: INTERNAL MEDICINE

## 2020-11-03 PROCEDURE — 88305 TISSUE EXAM BY PATHOLOGIST: CPT | Mod: 59 | Performed by: PATHOLOGY

## 2020-11-03 PROCEDURE — 43239 EGD BIOPSY SINGLE/MULTIPLE: CPT | Mod: ,,, | Performed by: INTERNAL MEDICINE

## 2020-11-03 PROCEDURE — D9220A PRA ANESTHESIA: ICD-10-PCS | Mod: ANES,,, | Performed by: ANESTHESIOLOGY

## 2020-11-03 PROCEDURE — 27201012 HC FORCEPS, HOT/COLD, DISP: Performed by: INTERNAL MEDICINE

## 2020-11-03 PROCEDURE — 25000003 PHARM REV CODE 250: Performed by: NURSE ANESTHETIST, CERTIFIED REGISTERED

## 2020-11-03 PROCEDURE — 82657 ENZYME CELL ACTIVITY: CPT | Performed by: PATHOLOGY

## 2020-11-03 PROCEDURE — D9220A PRA ANESTHESIA: Mod: ANES,,, | Performed by: ANESTHESIOLOGY

## 2020-11-03 PROCEDURE — D9220A PRA ANESTHESIA: ICD-10-PCS | Mod: CRNA,,, | Performed by: NURSE ANESTHETIST, CERTIFIED REGISTERED

## 2020-11-03 PROCEDURE — 37000009 HC ANESTHESIA EA ADD 15 MINS: Performed by: INTERNAL MEDICINE

## 2020-11-03 PROCEDURE — 43239 EGD BIOPSY SINGLE/MULTIPLE: CPT | Performed by: INTERNAL MEDICINE

## 2020-11-03 PROCEDURE — 37000008 HC ANESTHESIA 1ST 15 MINUTES: Performed by: INTERNAL MEDICINE

## 2020-11-03 PROCEDURE — 88305 TISSUE EXAM BY PATHOLOGIST: ICD-10-PCS | Mod: 26,,, | Performed by: PATHOLOGY

## 2020-11-03 PROCEDURE — D9220A PRA ANESTHESIA: Mod: CRNA,,, | Performed by: NURSE ANESTHETIST, CERTIFIED REGISTERED

## 2020-11-03 PROCEDURE — 63600175 PHARM REV CODE 636 W HCPCS: Mod: JG | Performed by: INTERNAL MEDICINE

## 2020-11-03 PROCEDURE — U0002 COVID-19 LAB TEST NON-CDC: HCPCS

## 2020-11-03 RX ORDER — SODIUM CHLORIDE 9 MG/ML
INJECTION, SOLUTION INTRAVENOUS CONTINUOUS
Status: DISCONTINUED | OUTPATIENT
Start: 2020-11-03 | End: 2020-11-03 | Stop reason: HOSPADM

## 2020-11-03 RX ORDER — PROPOFOL 10 MG/ML
VIAL (ML) INTRAVENOUS
Status: DISCONTINUED | OUTPATIENT
Start: 2020-11-03 | End: 2020-11-03

## 2020-11-03 RX ORDER — LIDOCAINE HYDROCHLORIDE 20 MG/ML
INJECTION INTRAVENOUS
Status: DISCONTINUED | OUTPATIENT
Start: 2020-11-03 | End: 2020-11-03

## 2020-11-03 RX ORDER — LIDOCAINE HYDROCHLORIDE 20 MG/ML
INJECTION, SOLUTION EPIDURAL; INFILTRATION; INTRACAUDAL; PERINEURAL
Status: DISCONTINUED
Start: 2020-11-03 | End: 2020-11-03 | Stop reason: HOSPADM

## 2020-11-03 RX ORDER — PROPOFOL 10 MG/ML
VIAL (ML) INTRAVENOUS CONTINUOUS PRN
Status: DISCONTINUED | OUTPATIENT
Start: 2020-11-03 | End: 2020-11-03

## 2020-11-03 RX ORDER — SODIUM CHLORIDE 0.9 % (FLUSH) 0.9 %
10 SYRINGE (ML) INJECTION
Status: DISCONTINUED | OUTPATIENT
Start: 2020-11-03 | End: 2020-11-03 | Stop reason: HOSPADM

## 2020-11-03 RX ORDER — PROPOFOL 10 MG/ML
INJECTION, EMULSION INTRAVENOUS
Status: COMPLETED
Start: 2020-11-03 | End: 2020-11-03

## 2020-11-03 RX ADMIN — SODIUM CHLORIDE: 0.9 INJECTION, SOLUTION INTRAVENOUS at 01:11

## 2020-11-03 RX ADMIN — LIDOCAINE HYDROCHLORIDE 100 MG: 20 INJECTION, SOLUTION INTRAVENOUS at 01:11

## 2020-11-03 RX ADMIN — PROPOFOL 100 MG: 10 INJECTION, EMULSION INTRAVENOUS at 01:11

## 2020-11-03 RX ADMIN — PROPOFOL 200 MCG/KG/MIN: 10 INJECTION, EMULSION INTRAVENOUS at 01:11

## 2020-11-03 RX ADMIN — DESMOPRESSIN ACETATE 36.06 MCG: 4 SOLUTION INTRAVENOUS at 12:11

## 2020-11-03 RX ADMIN — SODIUM CHLORIDE: 0.9 INJECTION, SOLUTION INTRAVENOUS at 12:11

## 2020-11-03 NOTE — TRANSFER OF CARE
"Anesthesia Transfer of Care Note    Patient: Bri Santiago    Procedure(s) Performed: Procedure(s) (LRB):  EGD (ESOPHAGOGASTRODUODENOSCOPY) (N/A)    Patient location: [facility]    Anesthesia Type: general    Transport from OR: Transported from OR on room air with adequate spontaneous ventilation    Post pain: adequate analgesia    Post assessment: no apparent anesthetic complications and tolerated procedure well    Post vital signs: stable    Level of consciousness: awake    Nausea/Vomiting: no nausea/vomiting    Complications: none    Transfer of care protocol was followed      Last vitals:   Visit Vitals  BP (!) 149/93   Pulse 71   Temp 36.7 °C (98.1 °F)   Resp 17   Ht 5' 11" (1.803 m)   Wt 120.2 kg (265 lb)   SpO2 99%   BMI 36.96 kg/m²     "

## 2020-11-03 NOTE — DISCHARGE INSTRUCTIONS

## 2020-11-03 NOTE — PROVATION PATIENT INSTRUCTIONS
Discharge Summary/Instructions after an Endoscopic Procedure  Patient Name: Bri Santiago  Patient MRN: [id number]  Patient YOB: 1960  Tuesday, November 3, 2020  Rosendo Boyer MD  RESTRICTIONS:  During your procedure today, you received medications for sedation.  These   medications may affect your judgment, balance and coordination.  Therefore,   for 24 hours, you have the following restrictions:   - DO NOT drive a car, operate machinery, make legal/financial decisions,   sign important papers or drink alcohol.    ACTIVITY:  Today: no heavy lifting, straining or running due to procedural   sedation/anesthesia.  The following day: return to full activity including work.  DIET:  Eat and drink normally unless instructed otherwise.     TREATMENT FOR COMMON SIDE EFFECTS:  - Mild abdominal pain, nausea, belching, bloating or excessive gas:  rest,   eat lightly and use a heating pad.  - Sore Throat: treat with throat lozenges and/or gargle with warm salt   water.  - Because air was used during the procedure, expelling large amounts of air   from your rectum or belching is normal.  - If a bowel prep was taken, you may not have a bowel movement for 1-3 days.    This is normal.  SYMPTOMS TO WATCH FOR AND REPORT TO YOUR PHYSICIAN:  1. Abdominal pain or bloating, other than gas cramps.  2. Chest pain.  3. Back pain.  4. Signs of infection such as: chills or fever occurring within 24 hours   after the procedure.  5. Rectal bleeding, which would show as bright red, maroon, or black stools.   (A tablespoon of blood from the rectum is not serious, especially if   hemorrhoids are present.)  6. Vomiting.  7. Weakness or dizziness.  GO DIRECTLY TO THE NEAREST EMERGENCY ROOM IF YOU HAVE ANY OF THE FOLLOWING:      Difficulty breathing              Chills and/or fever over 101 F   Persistent vomiting and/or vomiting blood   Severe abdominal pain   Severe chest pain   Black, tarry stools   Bleeding- more than one  tablespoon   Any other symptom or condition that you feel may need urgent attention  Your doctor recommends these additional instructions:  If any biopsies were taken, your doctors clinic will contact you in 1 to 2   weeks with any results.  - Discharge patient to home.   - Follow an antireflux regimen.   - Continue present medications.   - Resume Plavix (clopidogrel) at prior dose tomorrow.   - Await pathology results.   - Telephone endoscopist for pathology results in 2 weeks.   - Return to GI clinic.   - The findings and recommendations were discussed with the patient.  For questions, problems or results please call your physician - Rosendo Boyer MD at Work:  [phone].  [facility], EMERGENCY ROOM PHONE NUMBER: [phone]  IF A COMPLICATION OR EMERGENCY SITUATION ARISES AND YOU ARE UNABLE TO REACH   YOUR PHYSICIAN - GO DIRECTLY TO THE EMERGENCY ROOM.  Rosendo Boyer MD  11/3/2020 1:58:50 PM  This report has been verified and signed electronically.  [employer]

## 2020-11-03 NOTE — H&P
[facility]  History & Physical    Subjective:      Chief Complaint/Reason for Admission:     EGD for dysphagia and GERD    Bri Santiago is a 60 y.o. male.    Past Medical History:   Diagnosis Date    Acute pancreatitis     Anal fissure     Anemia     Anticoagulant long-term use     Arthritis     Asthma in remission     Back pain     BPH (benign prostatic hypertrophy)     Cancer 2000    prostate- treated at [facility] with chemo- in remission since 2000    Chronic maxillary sinusitis     Clotting disorder     Diastolic dysfunction with chronic heart failure 12/3/2018    Dysphagia 10/7/2014    Family history of colon cancer     Family history of early CAD     GERD (gastroesophageal reflux disease)     Helicobacter pylori (H. pylori) infection     Chronic    History of chronic pancreatitis     HTN (hypertension)     Lumbago 11/12/2012    Obesity     ABDON (obstructive sleep apnea)     ABDON (obstructive sleep apnea)     With likely ohs Refer to sleep    Pneumonia     during childhood     Prostate cancer 2000    dx and treated at [facility] had chemotherapy, in remission [id number]    Sacroiliac joint pain 2/10/2015    Spinal stenosis of lumbar region     Trouble in sleeping     Von Willebrand disease     VWD (acquired von Willebrand's disease)      Past Surgical History:   Procedure Laterality Date    anal fissure repair      x2    BACK SURGERY  2012    BALLOON SINUPLASTY OF PARANASAL SINUS Bilateral 10/7/2019    Procedure: SINUPLASTY, USING BALLOON;  Surgeon: LAURENT Swanson MD;  Location: [facility] OR;  Service: ENT;  Laterality: Bilateral;    CARPAL TUNNEL RELEASE  2003    left hand    CATHETERIZATION OF BOTH LEFT AND RIGHT HEART N/A 2/14/2019    Procedure: CATHETERIZATION, HEART, BOTH LEFT AND RIGHT;  Surgeon: Kyle Magana MD;  Location: [facility] CATH LAB;  Service: Cardiology;  Laterality: N/A;    CERVICAL DISCECTOMY  2003    COLONOSCOPY N/A 10/7/2015     Procedure: COLONOSCOPY;  Surgeon: Rosendo Boyer MD;  Location: [address]);  Service: Endoscopy;  Laterality: N/A;  PM Prep    COLONOSCOPY N/A 6/19/2017    Procedure: COLONOSCOPY;  Surgeon: Rosendo Boyer MD;  Location: [address]);  Service: Endoscopy;  Laterality: N/A;  constipation prep (no DM no CHF)       hx of vonWillebrand's disease-will need infusion prior    COLONOSCOPY N/A 3/4/2020    Procedure: COLONOSCOPY;  Surgeon: Rosendo Boyer MD;  [facility]  Service: Endoscopy;  Laterality: N/A;  Please order CBC, ferritin, Iron TIBC day of case per Dr. Boyer  labwork at 7:10am and patient will check in right after.  per Dr. Tyler patient can hold Plavix 5 days prior see note 1/7/20  DDAVP prior to procedure needed see note 1/16/20 for oncall med by Dr. Tyler -     ESOPHAGOGASTRODUODENOSCOPY N/A 3/4/2020    Procedure: EGD (ESOPHAGOGASTRODUODENOSCOPY);  Surgeon: Rosendo Boyer MD;  Location: [facility] ([address] [place] [address]);  Service: Endoscopy;  Laterality: N/A;  EGD and Colonoscopy orders merged together  labwork at 7:10am and patient will check in right after.  DDAVP prior to procedure needed see note 1/16/20 for oncall med  per Dr. Tyler patient can hold Plavix 5 days prior see note 1/7/20    FUNCTIONAL ENDOSCOPIC SINUS SURGERY (FESS) USING COMPUTER-ASSISTED NAVIGATION Bilateral 10/7/2019    Procedure: FESS, USING COMPUTER-ASSISTED NAVIGATION;  Surgeon: LAURENT Swanson MD;  Location: [facility] OR;  Service: ENT;  Laterality: Bilateral;    INJECTION OF ANESTHETIC AGENT AROUND NERVE Bilateral 10/13/2020    Procedure: BLOCK, NERVE BILATERAL C4,C5,C6 Plavix clearance requested;  Surgeon: Fredy Magana MD;  Location: [facility] PAIN MGT;  Service: Pain Management;  Laterality: Bilateral;  BLOCK, NERVE BILATERAL C4,C5,C6  Plavix clearance ok, will require DDVAP infusion    LEFT HEART CATHETERIZATION Left 2/14/2019    Procedure: Left heart cath;  Surgeon: Kyle Magana MD;   Location: [facility] CATH LAB;  Service: Cardiology;  Laterality: Left;    LUMBAR FUSION  2012    RADIOFREQUENCY ABLATION Right 5/9/2019    Procedure: RADIOFREQUENCY ABLATION, RIGHT L3,L4,L5;  Surgeon: Fredy Magana MD;  Location: [facility] PAIN MGT;  Service: Pain Management;  Laterality: Right;  1 of 2  RT RFA L3,4,5  PLAVIX clearance received, pt needs DDAVP    RADIOFREQUENCY ABLATION Left 5/23/2019    Procedure: RADIOFREQUENCY ABLATION, LEFT L3,L4,L5;  Surgeon: Fredy Magana MD;  Location: [facility] PAIN MGT;  Service: Pain Management;  Laterality: Left;  2 of 2  LT RFA L3,4,5  PLAVIX clearance received, pt needs DDAVP    RADIOFREQUENCY ABLATION Left 1/16/2020    Procedure: RADIOFREQUENCY ABLATION LEFT L3, L4, L5 MEDIAL BRANCH 1 OF 2 **PATIENT ARRIVING AT 8 AM**;  Surgeon: Fredy Magana MD;  Location: [facility] PAIN MGT;  Service: Pain Management;  Laterality: Left;  NEEDS CONSENT, PLAVIX CLEARANCE IN CHART    RADIOFREQUENCY ABLATION Right 1/28/2020    Procedure: RADIOFREQUENCY ABLATION, RIGHT L3-L4-L5 MEDIAL BRANCH 2 OF 2 (Left done on 1/16/20);  Surgeon: Fredy Magana MD;  Location: [facility] PAIN MGT;  Service: Pain Management;  Laterality: Right;    RADIOFREQUENCY ABLATION Left 8/18/2020    Procedure: RADIOFREQUENCY ABLATION, L3-L4-L5 MEDIAL BRANCH 1 OF 2 clear to hold plavix 7 days;  Surgeon: Fredy Magana MD;  Location: [facility] PAIN MGT;  Service: Pain Management;  Laterality: Left;    RADIOFREQUENCY ABLATION Right 9/1/2020    Procedure: RADIOFREQUENCY ABLATION, L3-L4-L5 MEDIAL BR ANCH 2 OF 2 clear to hol,d Plavix 7 days;  Surgeon: Fredy Magana MD;  Location: [facility] PAIN MGT;  Service: Pain Management;  Laterality: Right;    RADIOFREQUENCY ABLATION OF LUMBAR MEDIAL BRANCH NERVE AT SINGLE LEVEL Left 5/24/2018    Procedure: RADIOFREQUENCY THERMOCOAGULATION (RFTC)-NERVE-MEDIAN BRANCH-LUMBAR;  Surgeon: Fredy Magana MD;  Location: [facility] PAIN MGT;  Service: Pain Management;  Laterality: Left;  Left  [facility] @ L3,4,5  [id number]  with IV Sedation    2 of 2    SPINE SURGERY      TONSILLECTOMY      at age 24    VASECTOMY  1996     Family History   Problem Relation Age of Onset    Colon cancer Father 67        colon cancer    Hypertension Father     Glaucoma Father     Cancer Father     Colon cancer Paternal Grandfather 65             Coronary artery disease Mother 45    Hypertension Mother     Heart disease Mother     Colon cancer Mother     No Known Problems Brother     No Known Problems Sister     No Known Problems Daughter     No Known Problems Son     Coronary artery disease Brother 51    No Known Problems Daughter     No Known Problems Daughter     No Known Problems Son     No Known Problems Son     Colon cancer Paternal Uncle 65    Diabetes Mellitus Paternal Grandmother      Social History     Tobacco Use    Smoking status: Never Smoker    Smokeless tobacco: Never Used   Substance Use Topics    Alcohol use: Yes     Alcohol/week: 1.0 standard drinks     Types: 1 Glasses of wine per week     Comment: social    Drug use: No       PTA Medications   Medication Sig    acetaminophen (TYLENOL) 500 MG tablet Take 2 tablets (1,000 mg total) by mouth every 8 (eight) hours as needed.    albuterol (PROVENTIL/VENTOLIN HFA) 90 mcg/actuation inhaler INHALE 2 PUFFS INTO THE LUNGS EVERY 6 HOURS AS NEEDED FOR WHEEZING OR SHORTNESS OF BREATH    atorvastatin (LIPITOR) 40 MG tablet TAKE 1 TABLET(40 MG) BY MOUTH EVERY DAY    azelastine (ASTELIN) 137 mcg (0.1 %) nasal spray Two sprays in each nostril, sniff until absorbed, then follow with 1 spray of fluticasone.  Use both sprays twice daily.    budesonide-formoterol 160-4.5 mcg (SYMBICORT) 160-4.5 mcg/actuation HFAA INHALE 2 PUFFS INTO THE LUNGS EVERY 12 HOURS    calcium citrate-vitamin D3 315-200 mg (CITRACAL+D) 315-200 mg-unit per tablet Take 1 tablet by mouth once daily.    clopidogrel (PLAVIX) 75 mg tablet Take 1 tablet (75 mg total) by mouth  once daily.    cyanocobalamin, vitamin B-12, (VITAMIN B-12) 50 mcg tablet Take 50 mcg by mouth once daily.    diclofenac sodium (VOLTAREN) 1 % Gel Apply 2 g topically 3 (three) times daily.    docusate sodium (COLACE) 100 MG capsule Take 1 tablet by mouth Twice daily. 1 Capsule Oral Twice a day .  Take with pain medicine    doxazosin (CARDURA) 1 MG tablet TAKE 1 TABLET(1 MG) BY MOUTH EVERY EVENING    esomeprazole (NEXIUM) 40 MG capsule Take 1 capsule (40 mg total) by mouth 2 (two) times daily before meals.    ferrous sulfate (FEOSOL) 325 mg (65 mg iron) Tab tablet Take 1 tablet (325 mg total) by mouth every 12 (twelve) hours.    fluticasone propionate (FLONASE) 50 mcg/actuation nasal spray One spray in each nostril twice daily after 1st using azelastine nasal spray    furosemide (LASIX) 20 MG tablet Take 20meq every 12 hours by mouth for 5 days and then 20meq daily (Patient taking differently: Take 20 mg by mouth. Take 20meq every 12 hours by mouth for 5 days and then 20meq daily)    ipratropium (ATROVENT) 0.03 % nasal spray 2 sprays by Nasal route 2 (two) times daily. May be use more often if needed    montelukast (SINGULAIR) 10 mg tablet TAKE 1 TABLET(10 MG) BY MOUTH EVERY EVENING    potassium chloride (MICRO-K) 10 MEQ CpSR Take 10 mEq by mouth once daily.     tadalafiL (CIALIS) 20 MG Tab Take 1 tablet (20 mg total) by mouth as needed. Take every 36 hours as needed for ED.    tiZANidine (ZANAFLEX) 4 MG tablet TAKE 1 TABLET(4 MG) BY MOUTH EVERY 8 HOURS AS NEEDED    zolpidem (AMBIEN) 10 mg Tab TAKE 1 TABLET BY MOUTH EVERY DAY AT BEDTIME     Review of patient's allergies indicates:   Allergen Reactions    Ace inhibitors     Aspirin      Other reaction(s): Hives  Other reaction(s): Hives    Codeine Itching     Ok to take percocet    Dilaudid  [hydromorphone]      Other reaction(s): Itching    Penicillins Hives and Swelling     Has had allergy testing and can prob tolerate penicillin        Review of  Systems   Constitutional: Negative for chills, fever and weight loss.   Respiratory: Negative for shortness of breath.    Cardiovascular: Negative for chest pain.   Gastrointestinal: Positive for heartburn.       Objective:      Vital Signs (Most Recent)       Vital Signs Range (Last 24H):       Physical Exam  Pulmonary:      Effort: Pulmonary effort is normal.   Neurological:      Mental Status: He is alert and oriented to person, place, and time.           Assessment:      Active Hospital Problems    Diagnosis  POA    GERD (gastroesophageal reflux disease) [K21.9]  Yes      Resolved Hospital Problems   No resolved problems to display.       Plan:    EGD for dysphagia and GERD

## 2020-11-04 NOTE — ANESTHESIA POSTPROCEDURE EVALUATION
Anesthesia Post Evaluation    Patient: Bri Santiago    Procedure(s) Performed: Procedure(s) (LRB):  EGD (ESOPHAGOGASTRODUODENOSCOPY) (N/A)    Final Anesthesia Type: general    Patient location during evaluation: [facility]  Patient participation: Yes- Able to Participate  Level of consciousness: awake and alert  Post-procedure vital signs: reviewed and stable  Pain management: adequate  Airway patency: patent    PONV status at discharge: No PONV  Anesthetic complications: no      Cardiovascular status: hemodynamically stable  Respiratory status: unassisted, spontaneous ventilation and room air  Hydration status: euvolemic  Follow-up not needed.          Vitals Value Taken Time   /86 11/03/20 1445   Temp 36.7 °C (98.1 °F) 11/03/20 1445   Pulse 88 11/03/20 1445   Resp 20 11/03/20 1445   SpO2 99 % 11/03/20 1445         No case tracking events are documented in the log.      Pain/Tammy Score: Tammy Score: 10 (11/3/2020  2:15 PM)

## 2020-11-04 NOTE — ANESTHESIA RELEASE NOTE
"Anesthesia Release from PACU Note    Patient: Bri Santiago    Procedure(s) Performed: Procedure(s) (LRB):  EGD (ESOPHAGOGASTRODUODENOSCOPY) (N/A)    Anesthesia type: general    Post pain: Adequate analgesia    Post assessment: no apparent anesthetic complications and tolerated procedure well    Last Vitals:   Visit Vitals  /86   Pulse 88   Temp 36.7 °C (98.1 °F) (Temporal)   Resp 20   Ht 5' 11" (1.803 m)   Wt 120.2 kg (265 lb)   SpO2 99%   BMI 36.96 kg/m²       Post vital signs: stable    Level of consciousness: awake and alert     Nausea/Vomiting: no nausea/no vomiting    Complications: none    Airway Patency: patent    Respiratory: unassisted, spontaneous ventilation, room air    Cardiovascular: stable and blood pressure at baseline    Hydration: euvolemic  "

## 2020-11-05 ENCOUNTER — OFFICE VISIT (OUTPATIENT)
Dept: OTOLARYNGOLOGY | Facility: CLINIC | Age: 60
End: 2020-11-05
Payer: MEDICARE

## 2020-11-05 VITALS
DIASTOLIC BLOOD PRESSURE: 99 MMHG | HEART RATE: 85 BPM | HEIGHT: 71 IN | TEMPERATURE: 98 F | WEIGHT: 272.38 LBS | BODY MASS INDEX: 38.13 KG/M2 | SYSTOLIC BLOOD PRESSURE: 158 MMHG

## 2020-11-05 DIAGNOSIS — G47.33 OBSTRUCTIVE SLEEP APNEA TREATED WITH CONTINUOUS POSITIVE AIRWAY PRESSURE (CPAP): ICD-10-CM

## 2020-11-05 DIAGNOSIS — J30.89 NON-SEASONAL ALLERGIC RHINITIS, UNSPECIFIED TRIGGER: ICD-10-CM

## 2020-11-05 DIAGNOSIS — K21.9 GASTROESOPHAGEAL REFLUX DISEASE WITHOUT ESOPHAGITIS: ICD-10-CM

## 2020-11-05 DIAGNOSIS — K21.9 LARYNGOPHARYNGEAL REFLUX (LPR): ICD-10-CM

## 2020-11-05 DIAGNOSIS — R09.82 POSTNASAL DRIP: ICD-10-CM

## 2020-11-05 DIAGNOSIS — R13.10 DYSPHAGIA, UNSPECIFIED TYPE: Primary | ICD-10-CM

## 2020-11-05 DIAGNOSIS — Z01.812 PRE-PROCEDURE LAB EXAM: ICD-10-CM

## 2020-11-05 DIAGNOSIS — J34.2 NASAL SEPTAL DEVIATION: ICD-10-CM

## 2020-11-05 LAB
FINAL PATHOLOGIC DIAGNOSIS: NORMAL
GROSS: NORMAL
Lab: NORMAL

## 2020-11-05 PROCEDURE — 99213 OFFICE O/P EST LOW 20 MIN: CPT | Mod: S$GLB,,, | Performed by: SPECIALIST

## 2020-11-05 PROCEDURE — 3077F PR MOST RECENT SYSTOLIC BLOOD PRESSURE >= 140 MM HG: ICD-10-PCS | Mod: CPTII,S$GLB,, | Performed by: SPECIALIST

## 2020-11-05 PROCEDURE — 99499 RISK ADDL DX/OHS AUDIT: ICD-10-PCS | Mod: S$GLB,,, | Performed by: SPECIALIST

## 2020-11-05 PROCEDURE — 3080F DIAST BP >= 90 MM HG: CPT | Mod: CPTII,S$GLB,, | Performed by: SPECIALIST

## 2020-11-05 PROCEDURE — 3008F BODY MASS INDEX DOCD: CPT | Mod: CPTII,S$GLB,, | Performed by: SPECIALIST

## 2020-11-05 PROCEDURE — 3008F PR BODY MASS INDEX (BMI) DOCUMENTED: ICD-10-PCS | Mod: CPTII,S$GLB,, | Performed by: SPECIALIST

## 2020-11-05 PROCEDURE — 99213 PR OFFICE/OUTPT VISIT, EST, LEVL III, 20-29 MIN: ICD-10-PCS | Mod: S$GLB,,, | Performed by: SPECIALIST

## 2020-11-05 PROCEDURE — 3077F SYST BP >= 140 MM HG: CPT | Mod: CPTII,S$GLB,, | Performed by: SPECIALIST

## 2020-11-05 PROCEDURE — 99499 UNLISTED E&M SERVICE: CPT | Mod: S$GLB,,, | Performed by: SPECIALIST

## 2020-11-05 PROCEDURE — 3080F PR MOST RECENT DIASTOLIC BLOOD PRESSURE >= 90 MM HG: ICD-10-PCS | Mod: CPTII,S$GLB,, | Performed by: SPECIALIST

## 2020-11-05 NOTE — PROGRESS NOTES
Subjective:       Patient ID: Bri Santiago is a 60 y.o. male.    Chief Complaint: Follow-up    Follow-up     The patient is returning for follow-up visit.  There are multiple issues to discuss:  1 . Allergic rhinitis:.  He has been using Astelin and Flonase twice daily and Singulair at night.  He finds that his nasal congestion and postnasal drip are significantly improved.  Nasal secretions are clear.  He does not have fever.  2.  Laryngopharyngeal reflux:   He is using Protonix twice daily.  He finds that there is significant decrease in phlegm in the throat, globus sensation and throat clearing.  He recently did undergo an upper GI endoscopy where multiple biopsies were taken.  He is trying to observe and anti-reflux diet and has elevate the headboard of his bed.  3.  Obstructive sleep apnea:  Patient continues to use CPAP every night.  He does feel more rested when he uses it.      Review of Systems   Constitutional: Negative for activity change, appetite change and unexpected weight change.   HENT: Positive for postnasal drip, rhinorrhea, sinus pressure and sinus pain. Negative for ear discharge, ear pain, facial swelling, hearing loss, mouth sores, sneezing, tinnitus, trouble swallowing and voice change.    Eyes: Negative for photophobia, pain, discharge, redness, itching and visual disturbance.   Respiratory: Negative for apnea, choking, shortness of breath and wheezing.    Cardiovascular: Negative for palpitations.   Gastrointestinal: Negative for abdominal distention.   Musculoskeletal: Negative for neck stiffness.   Skin: Negative.  Negative for color change and pallor.   Allergic/Immunologic: Positive for environmental allergies. Negative for food allergies and immunocompromised state.   Neurological: Negative for dizziness, facial asymmetry, speech difficulty and light-headedness.   Hematological: Negative for adenopathy. Does not bruise/bleed easily.   Psychiatric/Behavioral: Positive for sleep  disturbance. Negative for agitation, confusion and decreased concentration.       Objective:      Physical Exam  Constitutional:       Appearance: He is well-developed.   HENT:      Head: Normocephalic.      Right Ear: Ear canal and external ear normal. Tympanic membrane is retracted.      Left Ear: Ear canal and external ear normal. Tympanic membrane is retracted.      Nose: Septal deviation (To the right), mucosal edema (cyanotic, boggy inferior turbinates bilaterally) and rhinorrhea (clear mucus bilaterally) present.      Mouth/Throat:      Mouth: No oral lesions.      Pharynx: Uvula midline.   Eyes:      General: Lids are normal.         Right eye: No discharge.         Left eye: No discharge.      Conjunctiva/sclera:      Right eye: Right conjunctiva is injected. No exudate.     Left eye: Left conjunctiva is injected. No exudate.     Pupils: Pupils are equal, round, and reactive to light.   Neck:      Musculoskeletal: Normal range of motion. No muscular tenderness.      Thyroid: No thyroid mass or thyromegaly.      Trachea: Trachea normal. No tracheal deviation.   Cardiovascular:      Rate and Rhythm: Normal rate and regular rhythm.      Pulses: Normal pulses.      Heart sounds: Normal heart sounds.   Pulmonary:      Effort: Pulmonary effort is normal.      Breath sounds: Normal breath sounds. No stridor. No decreased breath sounds, wheezing, rhonchi or rales.   Abdominal:      General: Bowel sounds are normal.      Palpations: Abdomen is soft.      Tenderness: There is no abdominal tenderness.   Musculoskeletal: Normal range of motion.   Lymphadenopathy:      Head:      Right side of head: No submental, submandibular, preauricular, posterior auricular or occipital adenopathy.      Left side of head: No submental, submandibular, preauricular, posterior auricular or occipital adenopathy.      Cervical: No cervical adenopathy.   Skin:     General: Skin is warm and dry.      Findings: No petechiae or rash.       Nails: There is no clubbing.     Neurological:      Mental Status: He is alert and oriented to person, place, and time.      Cranial Nerves: No cranial nerve deficit.      Sensory: No sensory deficit.      Gait: Gait normal.   Psychiatric:         Speech: Speech normal.         Behavior: Behavior normal. Behavior is cooperative.         Thought Content: Thought content normal.         Judgment: Judgment normal.         Assessment:       1. Dysphagia, unspecified type    2. Laryngopharyngeal reflux (LPR)    3. Gastroesophageal reflux disease without esophagitis    4. Obstructive sleep apnea treated with continuous positive airway pressure (CPAP)    5. Non-seasonal allergic rhinitis, unspecified trigger    6. Nasal septal deviation    7. Postnasal drip        Plan:       I will recheck  the patient in 6 weeks.  He is to continue with his current drug management.  He needs to continue using his CPAP.  I will repeat his endoscopy in 6 weeks, so he will need COVID testing 3 days before his visit.

## 2020-11-11 LAB
FINAL PATHOLOGIC DIAGNOSIS: NORMAL
GROSS: NORMAL
Lab: NORMAL

## 2020-12-14 ENCOUNTER — HOSPITAL ENCOUNTER (OUTPATIENT)
Dept: PREADMISSION TESTING | Facility: OTHER | Age: 60
Discharge: HOME | End: 2020-12-14
Attending: ANESTHESIOLOGY
Payer: MEDICARE

## 2020-12-14 DIAGNOSIS — Z01.812 PRE-PROCEDURE LAB EXAM: ICD-10-CM

## 2020-12-14 PROCEDURE — U0003 INFECTIOUS AGENT DETECTION BY NUCLEIC ACID (DNA OR RNA); SEVERE ACUTE RESPIRATORY SYNDROME CORONAVIRUS 2 (SARS-COV-2) (CORONAVIRUS DISEASE [COVID-19]), AMPLIFIED PROBE TECHNIQUE, MAKING USE OF HIGH THROUGHPUT TECHNOLOGIES AS DESCRIBED BY CMS-2020-01-R: HCPCS

## 2020-12-15 LAB — SARS-COV-2 RNA RESP QL NAA+PROBE: NOT DETECTED

## 2020-12-16 DIAGNOSIS — K21.9 GASTROESOPHAGEAL REFLUX DISEASE WITHOUT ESOPHAGITIS: ICD-10-CM

## 2020-12-16 RX ORDER — ESOMEPRAZOLE MAGNESIUM 40 MG/1
40 CAPSULE, DELAYED RELEASE ORAL
Qty: 180 CAPSULE | Refills: 0 | Status: SHIPPED | OUTPATIENT
Start: 2020-12-16 | End: 2021-04-01 | Stop reason: SDUPTHER

## 2020-12-17 ENCOUNTER — PATIENT MESSAGE (OUTPATIENT)
Dept: OTOLARYNGOLOGY | Facility: CLINIC | Age: 60
End: 2020-12-17

## 2020-12-17 ENCOUNTER — OFFICE VISIT (OUTPATIENT)
Dept: OTOLARYNGOLOGY | Facility: CLINIC | Age: 60
End: 2020-12-17
Payer: MEDICARE

## 2020-12-17 ENCOUNTER — HOSPITAL ENCOUNTER (OUTPATIENT)
Dept: RADIOLOGY | Facility: HOSPITAL | Age: 60
Discharge: HOME | End: 2020-12-17
Attending: SPECIALIST
Payer: MEDICARE

## 2020-12-17 VITALS
SYSTOLIC BLOOD PRESSURE: 130 MMHG | TEMPERATURE: 98 F | WEIGHT: 272.5 LBS | HEIGHT: 71 IN | DIASTOLIC BLOOD PRESSURE: 88 MMHG | HEART RATE: 77 BPM | BODY MASS INDEX: 38.15 KG/M2

## 2020-12-17 DIAGNOSIS — J34.2 NASAL SEPTAL DEVIATION: ICD-10-CM

## 2020-12-17 DIAGNOSIS — D37.05: Primary | ICD-10-CM

## 2020-12-17 DIAGNOSIS — R51.9 NONINTRACTABLE EPISODIC HEADACHE, UNSPECIFIED HEADACHE TYPE: ICD-10-CM

## 2020-12-17 DIAGNOSIS — R13.10 DYSPHAGIA, UNSPECIFIED TYPE: ICD-10-CM

## 2020-12-17 DIAGNOSIS — D37.05: ICD-10-CM

## 2020-12-17 DIAGNOSIS — K21.9 LARYNGOPHARYNGEAL REFLUX (LPR): ICD-10-CM

## 2020-12-17 DIAGNOSIS — J30.89 NON-SEASONAL ALLERGIC RHINITIS, UNSPECIFIED TRIGGER: ICD-10-CM

## 2020-12-17 DIAGNOSIS — Z13.9 SCREENING FOR CONDITION: ICD-10-CM

## 2020-12-17 PROCEDURE — 1125F AMNT PAIN NOTED PAIN PRSNT: CPT | Mod: S$GLB,,, | Performed by: SPECIALIST

## 2020-12-17 PROCEDURE — 3079F DIAST BP 80-89 MM HG: CPT | Mod: CPTII,S$GLB,, | Performed by: SPECIALIST

## 2020-12-17 PROCEDURE — 70491 CT SOFT TISSUE NECK W/DYE: CPT | Mod: 26,,, | Performed by: RADIOLOGY

## 2020-12-17 PROCEDURE — 3079F PR MOST RECENT DIASTOLIC BLOOD PRESSURE 80-89 MM HG: ICD-10-PCS | Mod: CPTII,S$GLB,, | Performed by: SPECIALIST

## 2020-12-17 PROCEDURE — 99499 RISK ADDL DX/OHS AUDIT: ICD-10-PCS | Mod: S$GLB,,, | Performed by: SPECIALIST

## 2020-12-17 PROCEDURE — 3075F SYST BP GE 130 - 139MM HG: CPT | Mod: CPTII,S$GLB,, | Performed by: SPECIALIST

## 2020-12-17 PROCEDURE — 25500020 PHARM REV CODE 255: Performed by: SPECIALIST

## 2020-12-17 PROCEDURE — 31575 LARYNGOSCOPY: ICD-10-PCS | Mod: S$GLB,,, | Performed by: SPECIALIST

## 2020-12-17 PROCEDURE — 70491 CT NECK CHEST WITH CONTRAST (XPD): ICD-10-PCS | Mod: 26,,, | Performed by: RADIOLOGY

## 2020-12-17 PROCEDURE — 31575 DIAGNOSTIC LARYNGOSCOPY: CPT | Mod: S$GLB,,, | Performed by: SPECIALIST

## 2020-12-17 PROCEDURE — 3075F PR MOST RECENT SYSTOLIC BLOOD PRESS GE 130-139MM HG: ICD-10-PCS | Mod: CPTII,S$GLB,, | Performed by: SPECIALIST

## 2020-12-17 PROCEDURE — 99499 UNLISTED E&M SERVICE: CPT | Mod: S$GLB,,, | Performed by: SPECIALIST

## 2020-12-17 PROCEDURE — 99214 OFFICE O/P EST MOD 30 MIN: CPT | Mod: 25,S$GLB,, | Performed by: SPECIALIST

## 2020-12-17 PROCEDURE — 71260 CT THORAX DX C+: CPT | Mod: 26,,, | Performed by: RADIOLOGY

## 2020-12-17 PROCEDURE — 3008F BODY MASS INDEX DOCD: CPT | Mod: CPTII,S$GLB,, | Performed by: SPECIALIST

## 2020-12-17 PROCEDURE — 71260 CT NECK CHEST WITH CONTRAST (XPD): ICD-10-PCS | Mod: 26,,, | Performed by: RADIOLOGY

## 2020-12-17 PROCEDURE — 99214 PR OFFICE/OUTPT VISIT, EST, LEVL IV, 30-39 MIN: ICD-10-PCS | Mod: 25,S$GLB,, | Performed by: SPECIALIST

## 2020-12-17 PROCEDURE — 1125F PR PAIN SEVERITY QUANTIFIED, PAIN PRESENT: ICD-10-PCS | Mod: S$GLB,,, | Performed by: SPECIALIST

## 2020-12-17 PROCEDURE — 3008F PR BODY MASS INDEX (BMI) DOCUMENTED: ICD-10-PCS | Mod: CPTII,S$GLB,, | Performed by: SPECIALIST

## 2020-12-17 PROCEDURE — 70491 CT SOFT TISSUE NECK W/DYE: CPT | Mod: TC

## 2020-12-17 RX ADMIN — IOHEXOL 100 ML: 350 INJECTION, SOLUTION INTRAVENOUS at 03:12

## 2020-12-17 NOTE — PROGRESS NOTES
Subjective:       Patient ID: Bri Santiago is a 60 y.o. male.    Chief Complaint: Follow-up (with scope)    Follow-up     The patient is returning for follow-up visit.  There are multiple issues to discuss:  1 . Allergic rhinitis:.  He has been using Astelin and Flonase twice daily and Singulair at night.  He is not having any significant rhinorrhea or postnasal drip and his nasal airway is good.  Nasal secretions are clear.  2.  Laryngopharyngeal reflux:   He is using Protonix twice daily.  He continues to have minor problems with the globus sensation, throat clearing and phlegm in the throat.  All symptoms have improved since last visit.  He has elevated headboard of his bed and continues to attempt to follow the anti-reflux diet.  3.  Obstructive sleep apnea:  Patient continues to use CPAP every night.  He does feel more rested when he uses it.  4.  Allergic conjunctivitis:  He is having some itching and tearing in his eyes on an intermittent basis.      Review of Systems   Constitutional: Negative for activity change, appetite change and unexpected weight change.   HENT: Positive for postnasal drip, rhinorrhea, sinus pressure and sinus pain. Negative for ear discharge, ear pain, facial swelling, hearing loss, mouth sores, sneezing, tinnitus, trouble swallowing and voice change.    Eyes: Negative for photophobia, pain, discharge, redness, itching and visual disturbance.   Respiratory: Negative for apnea, choking, shortness of breath and wheezing.    Cardiovascular: Negative for palpitations.   Gastrointestinal: Negative for abdominal distention.   Musculoskeletal: Negative for neck stiffness.   Skin: Negative.  Negative for color change and pallor.   Allergic/Immunologic: Positive for environmental allergies. Negative for food allergies and immunocompromised state.   Neurological: Negative for dizziness, facial asymmetry, speech difficulty and light-headedness.   Hematological: Negative for adenopathy. Does  not bruise/bleed easily.   Psychiatric/Behavioral: Positive for sleep disturbance. Negative for agitation, confusion and decreased concentration.       Objective:      Physical Exam  Constitutional:       Appearance: He is well-developed.   HENT:      Head: Normocephalic.      Right Ear: Ear canal and external ear normal. Tympanic membrane is retracted.      Left Ear: Ear canal and external ear normal. Tympanic membrane is retracted.      Nose: Septal deviation (To the right), mucosal edema (cyanotic, boggy inferior turbinates bilaterally) and rhinorrhea (clear mucus bilaterally) present.      Mouth/Throat:      Mouth: No oral lesions.      Pharynx: Uvula midline.   Eyes:      General: Lids are normal.         Right eye: No discharge.         Left eye: No discharge.      Conjunctiva/sclera:      Right eye: Right conjunctiva is injected. No exudate.     Left eye: Left conjunctiva is injected. No exudate.     Pupils: Pupils are equal, round, and reactive to light.   Neck:      Musculoskeletal: Normal range of motion. No muscular tenderness.      Thyroid: No thyroid mass or thyromegaly.      Trachea: Trachea normal. No tracheal deviation.   Cardiovascular:      Rate and Rhythm: Normal rate and regular rhythm.      Pulses: Normal pulses.      Heart sounds: Normal heart sounds.   Pulmonary:      Effort: Pulmonary effort is normal.      Breath sounds: Normal breath sounds. No stridor. No decreased breath sounds, wheezing, rhonchi or rales.   Abdominal:      General: Bowel sounds are normal.      Palpations: Abdomen is soft.      Tenderness: There is no abdominal tenderness.   Musculoskeletal: Normal range of motion.   Lymphadenopathy:      Head:      Right side of head: No submental, submandibular, preauricular, posterior auricular or occipital adenopathy.      Left side of head: No submental, submandibular, preauricular, posterior auricular or occipital adenopathy.      Cervical: No cervical adenopathy.   Skin:      General: Skin is warm and dry.      Findings: No petechiae or rash.      Nails: There is no clubbing.     Neurological:      Mental Status: He is alert and oriented to person, place, and time.      Cranial Nerves: No cranial nerve deficit.      Sensory: No sensory deficit.      Gait: Gait normal.   Psychiatric:         Speech: Speech normal.         Behavior: Behavior normal. Behavior is cooperative.         Thought Content: Thought content normal.         Judgment: Judgment normal.         Flexible video nasolaryngoscopy-septum deviated to the right with and nasal changes of allergic rhinitis,  bilobed mass in nasopharynx has increased in size when compared to scope done 3 months ago, laryngeal changes consistent with laryngopharyngeal reflux that is improving on b.i.d. PPI therapy    Assessment:       1. 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 Dysphagia, unspecified type    3. Laryngopharyngeal reflux (LPR)    4. Non-seasonal allergic rhinitis, unspecified trigger    5. Nasal septal deviation    6. Nonintractable episodic headache, unspecified headache type        Plan:       I will  schedule patient for a CT scan of the nasopharynx and neck from skull base to thoracic inlet with IV contrast to evaluate the mass in the nasopharynx.  I will recheck him when I have those results.  He will continue on his

## 2020-12-17 NOTE — PROCEDURES
"Laryngoscopy    Date/Time: 12/17/2020 9:15 AM  Performed by: LAURENT Swanson MD  Authorized by: LAURENT Swanson MD     Time out: Immediately prior to procedure a "time out" was called to verify the correct patient, procedure, equipment, support staff and site/side marked as required.    Consent Done?:  Yes (Verbal)  Anesthesia:     Local anesthetic:  Lidocaine 2% and Irvin-Synephrine 1/2% (Sloppy wet cotton placed in each nasal passage moistened with)    Patient tolerance:  Patient tolerated the procedure well with no immediate complications    Decongestion performed?: Yes    Laryngoscopy:     Areas examined:  Vocal cords, larynx, hypopharynx, oropharynx, nasopharynx and nasal cavities    Laryngoscope size:  4 mm (Flexible video nasolaryngoscopy)  Nose External:      No external nasal deformity  Nose Intranasal:      Mucosa no polyps     Mucosa ulcers not present     No mucosa lesions present (Profuse clear mucus in the nasal passages bilaterally)     Septum gross deformity ( septum deviated to the right)     Enlarged turbinates ( inferior turbinates enlarged bilaterally)  Nasopharynx:      Mucosa lesions ( bilobed soft tissue mass in roof and posterior nasopharynx has increased in size significantly since visualized at last endoscopy)     Adenoids not present     Posterior choanae patent     Eustachian tube patent  Larynx/hypopharynx:      No epiglottis lesions     No epiglottis edema     No AE folds lesions     No vocal cord polyps     Equal and normal bilateral ( vocal cords move symmetrically, no mucosal lesions noted)     No hypopharynx lesions     No piriform sinus pooling     No piriform sinus lesions     Post cricoid edema ( mild, improved from last endoscopy)     Post cricoid erythema ( mild, improved from last endoscopy)     Flexible video nasolaryngoscopy-nasal changes allergic rhinitis and nasal septal deviation, nasopharyngeal bilobed soft tissue mass emanating from the posterior wall and roof of the " nasopharynx in the midline and right have increased in size significantly since last endoscopy

## 2020-12-18 ENCOUNTER — TELEPHONE (OUTPATIENT)
Dept: OTOLARYNGOLOGY | Facility: CLINIC | Age: 60
End: 2020-12-18

## 2020-12-18 NOTE — TELEPHONE ENCOUNTER
I called patient today letting him know about his CT report per Dr Swanson. Pt states thanks so much.     ----- Message from LAURENT Swanson MD sent at 12/18/2020  3:58 PM CST -----  Please notify patient that CT findings were mildly abnormal.   Please schedule the patient for follow-up appointment  Send copy of CT scan to PCP

## 2020-12-28 ENCOUNTER — OFFICE VISIT (OUTPATIENT)
Dept: ENDOCRINOLOGY | Facility: CLINIC | Age: 60
End: 2020-12-28
Payer: MEDICARE

## 2020-12-28 VITALS
HEART RATE: 84 BPM | DIASTOLIC BLOOD PRESSURE: 82 MMHG | BODY MASS INDEX: 38.18 KG/M2 | WEIGHT: 272.69 LBS | OXYGEN SATURATION: 98 % | HEIGHT: 71 IN | SYSTOLIC BLOOD PRESSURE: 134 MMHG

## 2020-12-28 DIAGNOSIS — E29.1 HYPOGONADISM IN MALE: ICD-10-CM

## 2020-12-28 DIAGNOSIS — N62 GYNECOMASTIA, MALE: Primary | ICD-10-CM

## 2020-12-28 DIAGNOSIS — I10 ESSENTIAL HYPERTENSION: ICD-10-CM

## 2020-12-28 DIAGNOSIS — E66.01 SEVERE OBESITY (BMI 35.0-39.9) WITH COMORBIDITY: ICD-10-CM

## 2020-12-28 PROCEDURE — 1126F PR PAIN SEVERITY QUANTIFIED, NO PAIN PRESENT: ICD-10-PCS | Mod: S$GLB,,, | Performed by: INTERNAL MEDICINE

## 2020-12-28 PROCEDURE — 3008F BODY MASS INDEX DOCD: CPT | Mod: CPTII,S$GLB,, | Performed by: INTERNAL MEDICINE

## 2020-12-28 PROCEDURE — 99214 OFFICE O/P EST MOD 30 MIN: CPT | Mod: S$GLB,,, | Performed by: INTERNAL MEDICINE

## 2020-12-28 PROCEDURE — 1126F AMNT PAIN NOTED NONE PRSNT: CPT | Mod: S$GLB,,, | Performed by: INTERNAL MEDICINE

## 2020-12-28 PROCEDURE — 99214 PR OFFICE/OUTPT VISIT, EST, LEVL IV, 30-39 MIN: ICD-10-PCS | Mod: S$GLB,,, | Performed by: INTERNAL MEDICINE

## 2020-12-28 PROCEDURE — 3008F PR BODY MASS INDEX (BMI) DOCUMENTED: ICD-10-PCS | Mod: CPTII,S$GLB,, | Performed by: INTERNAL MEDICINE

## 2020-12-28 RX ORDER — TOPIRAMATE 50 MG/1
50 TABLET, FILM COATED ORAL 2 TIMES DAILY
Qty: 60 TABLET | Refills: 5 | Status: SHIPPED | OUTPATIENT
Start: 2020-12-28 | End: 2021-11-11

## 2020-12-28 RX ORDER — PHENTERMINE HYDROCHLORIDE 37.5 MG/1
18.75 TABLET ORAL
Qty: 30 TABLET | Refills: 4 | Status: SHIPPED | OUTPATIENT
Start: 2020-12-28 | End: 2021-06-26

## 2020-12-28 NOTE — PATIENT INSTRUCTIONS
For the weight. Start phentermine (half a tablet once a day, in the morning) and topiramate (one tablet twice a day)    Keep up with the exercise, working on the diet.    For the testosterone, need to recheck an 8am fasting blood test to confirm testosterone is low. Then can try supplementation (Gel, patch, or injection).

## 2020-12-28 NOTE — ASSESSMENT & PLAN NOTE
Symptoms stable/improving. Pain resolved. But gynecomastia remains. Discussed potential for surgery, pt not interested at this time.   - reviewed that other than medication, likely culprit is weight gain, adipose tissue changing testosterone -> estrogen.   - weight still going up. Encourage pt to continue efforts on diet, exercise.   - also has low testosterone on latest labs, high estrogen. Recheck, consider supplementation

## 2020-12-28 NOTE — ASSESSMENT & PLAN NOTE
Testosterone low, but later in the morning. LH, FSH inappropriately normal   - recheck labs, 8am/fasting, then consider treatment based on results.

## 2020-12-28 NOTE — ASSESSMENT & PLAN NOTE
bmi increased, 38 now   - complicated by ABDON, HLD, htn, elevated estrogen, low testosterone   - encourage pt to keep up efforts with exercise, try and work on diet   - start phentermine and topiramate. Half a tablet phentermine. Reviewed side effects to watch for

## 2020-12-28 NOTE — PROGRESS NOTES
Subjective:      Chief Complaint: Follow-up, Gynecomastia (Gynecomastia, male  ), and Vitamin D Deficiency      HPI: Bri Santiago is a 60 y.o. male who is here for a follow-up evaluation for gynecomastia. Last seen 6/26/2020.    Disease history:  Noted symptoms late 2019/early 2020. Had Breast growth, tenderness/soreness. No discharge.  +ED, for about a year or so.     Was on spironolactone since around 4/2019 per chart.    Prior evaluation included:  Mammogram benign. Normal US of scrotum and testes.  LH, TSH normal. No prolactin.     Interval history:   Stopped spironolactone 2/2020    Last labs 12/2020:   Estradiol 75   Testosterone 274, free and bioavailable low. SHBG normal   vit D normal   LH, FSH normal   LFT mildly increased    Prior labs 5/2020.   Estradiol 71, still high (was 70 several months ago)   Prolactin 10   Cortisol 8    Exercise: few days per week. Bikes 3-4 times a week, or walking.  Diet: nothing in particular. Avoids fast food/fried food.    Today pt reports feeling okay overall. Breast pain resolved, growth stabilized.   Some diarrhea over the weekend, resolved.  Still gaining weight  Back pain. But hasn't needed steroid injections lately    Still with ED. improves with cialis.  Rare AM erections.   Otherwise no acute complaints/concerns today.    Reviewed past medical, family, social history and updated as appropriate.    Review of Systems   Constitutional: Positive for unexpected weight change (gained weight).   HENT: Negative for trouble swallowing.    Eyes: Negative for visual disturbance.   Respiratory: Positive for wheezing (sometimes). Negative for shortness of breath.    Cardiovascular: Negative for palpitations.   Gastrointestinal: Positive for diarrhea (over the weekend, resolved). Negative for nausea.   Endocrine:        Occasional night sweats   Genitourinary: Negative for frequency.        +ED, libido decreased   Neurological: Negative for light-headedness.    Psychiatric/Behavioral: Positive for sleep disturbance (sometimes).     Objective:     Vitals:    12/28/20 0941   BP: 134/82   Pulse: 84     BP Readings from Last 5 Encounters:   12/28/20 134/82   12/17/20 130/88   11/05/20 (!) 158/99   11/03/20 138/86   10/14/20 126/84       Physical Exam  Vitals signs reviewed.   Constitutional:       General: He is not in acute distress.     Appearance: He is obese.   Neck:      Thyroid: No thyromegaly.   Cardiovascular:      Heart sounds: Normal heart sounds.   Pulmonary:      Effort: Pulmonary effort is normal.   Skin:     Comments: +breast growth bilaterally, nontender to palpation today       Wt Readings from Last 10 Encounters:   12/28/20 0941 123.7 kg (272 lb 11.3 oz)   12/17/20 0950 123.6 kg (272 lb 7.8 oz)   11/05/20 1016 123.6 kg (272 lb 6.4 oz)   11/03/20 1246 120.2 kg (265 lb)   11/03/20 1101 120.2 kg (265 lb)   10/14/20 0820 126.2 kg (278 lb 5.3 oz)   10/13/20 0830 121.6 kg (268 lb)   10/13/20 0828 121.6 kg (268 lb)   09/29/20 1346 125.6 kg (277 lb)   09/24/20 1026 125 kg (275 lb 8 oz)   09/10/20 1007 124.7 kg (275 lb)   09/01/20 0754 118.8 kg (262 lb)       Lab Results   Component Value Date    HGBA1C 4.5 01/23/2020     Lab Results   Component Value Date    CHOL 111 (L) 01/23/2020    HDL 29 (L) 01/23/2020    LDLCALC 66.8 01/23/2020    TRIG 76 01/23/2020    CHOLHDL 26.1 01/23/2020     Lab Results   Component Value Date     12/17/2020    K 4.1 12/17/2020     12/17/2020    CO2 29 12/17/2020    GLU 96 12/17/2020    BUN 10 12/17/2020    BUN 10 12/17/2020    CREATININE 0.9 12/17/2020    CREATININE 0.9 12/17/2020    CALCIUM 9.4 12/17/2020    PROT 7.7 12/17/2020    ALBUMIN 4.3 12/17/2020    ALBUMIN 4.3 12/17/2020    BILITOT 1.3 (H) 12/17/2020    ALKPHOS 76 12/17/2020    AST 31 12/17/2020    ALT 71 (H) 12/17/2020    ANIONGAP 9 12/17/2020    ESTGFRAFRICA >60 12/17/2020    ESTGFRAFRICA >60 12/17/2020    EGFRNONAA >60 12/17/2020    EGFRNONAA >60 12/17/2020    TSH  0.887 01/23/2020        Assessment/Plan:     Gynecomastia, male  Symptoms stable/improving. Pain resolved. But gynecomastia remains. Discussed potential for surgery, pt not interested at this time.   - reviewed that other than medication, likely culprit is weight gain, adipose tissue changing testosterone -> estrogen.   - weight still going up. Encourage pt to continue efforts on diet, exercise.   - also has low testosterone on latest labs, high estrogen. Recheck, consider supplementation    HTN (hypertension)  bp controlled today, continue same meds      Severe obesity (BMI 35.0-39.9) with comorbidity  bmi increased, 38 now   - complicated by ABDON, HLD, htn, elevated estrogen, low testosterone   - encourage pt to keep up efforts with exercise, try and work on diet   - start phentermine and topiramate. Half a tablet phentermine. Reviewed side effects to watch for      Hypogonadism in male  Testosterone low, but later in the morning. LH, FSH inappropriately normal   - recheck labs, 8am/fasting, then consider treatment based on results.        Follow up in about 4 months (around 4/28/2021) for lab review, further monitoring.      Chris Min MD  Endocrinology

## 2020-12-29 ENCOUNTER — OFFICE VISIT (OUTPATIENT)
Dept: OTOLARYNGOLOGY | Facility: CLINIC | Age: 60
End: 2020-12-29
Payer: MEDICARE

## 2020-12-29 ENCOUNTER — LAB VISIT (OUTPATIENT)
Dept: LAB | Facility: OTHER | Age: 60
End: 2020-12-29
Attending: INTERNAL MEDICINE
Payer: MEDICARE

## 2020-12-29 ENCOUNTER — TELEPHONE (OUTPATIENT)
Dept: INTERNAL MEDICINE | Facility: CLINIC | Age: 60
End: 2020-12-29

## 2020-12-29 VITALS
DIASTOLIC BLOOD PRESSURE: 83 MMHG | HEART RATE: 83 BPM | WEIGHT: 272.13 LBS | SYSTOLIC BLOOD PRESSURE: 126 MMHG | HEIGHT: 71 IN | TEMPERATURE: 98 F | BODY MASS INDEX: 38.1 KG/M2

## 2020-12-29 DIAGNOSIS — D37.05: Primary | ICD-10-CM

## 2020-12-29 DIAGNOSIS — N62 GYNECOMASTIA, MALE: ICD-10-CM

## 2020-12-29 DIAGNOSIS — J30.89 NON-SEASONAL ALLERGIC RHINITIS, UNSPECIFIED TRIGGER: ICD-10-CM

## 2020-12-29 DIAGNOSIS — L85.3 DRY SKIN DERMATITIS: ICD-10-CM

## 2020-12-29 DIAGNOSIS — K21.9 LARYNGOPHARYNGEAL REFLUX (LPR): ICD-10-CM

## 2020-12-29 DIAGNOSIS — H10.13 ALLERGIC CONJUNCTIVITIS OF BOTH EYES: ICD-10-CM

## 2020-12-29 DIAGNOSIS — R13.10 DYSPHAGIA, UNSPECIFIED TYPE: ICD-10-CM

## 2020-12-29 DIAGNOSIS — E29.1 HYPOGONADISM IN MALE: ICD-10-CM

## 2020-12-29 DIAGNOSIS — Z01.812 PRE-PROCEDURE LAB EXAM: ICD-10-CM

## 2020-12-29 PROCEDURE — 99214 PR OFFICE/OUTPT VISIT, EST, LEVL IV, 30-39 MIN: ICD-10-PCS | Mod: S$GLB,,, | Performed by: SPECIALIST

## 2020-12-29 PROCEDURE — 3008F PR BODY MASS INDEX (BMI) DOCUMENTED: ICD-10-PCS | Mod: CPTII,S$GLB,, | Performed by: SPECIALIST

## 2020-12-29 PROCEDURE — 84403 ASSAY OF TOTAL TESTOSTERONE: CPT

## 2020-12-29 PROCEDURE — 36415 COLL VENOUS BLD VENIPUNCTURE: CPT

## 2020-12-29 PROCEDURE — 99214 OFFICE O/P EST MOD 30 MIN: CPT | Mod: S$GLB,,, | Performed by: SPECIALIST

## 2020-12-29 PROCEDURE — 99499 RISK ADDL DX/OHS AUDIT: ICD-10-PCS | Mod: S$GLB,,, | Performed by: SPECIALIST

## 2020-12-29 PROCEDURE — 99499 UNLISTED E&M SERVICE: CPT | Mod: S$GLB,,, | Performed by: SPECIALIST

## 2020-12-29 PROCEDURE — 3074F SYST BP LT 130 MM HG: CPT | Mod: CPTII,S$GLB,, | Performed by: SPECIALIST

## 2020-12-29 PROCEDURE — 3008F BODY MASS INDEX DOCD: CPT | Mod: CPTII,S$GLB,, | Performed by: SPECIALIST

## 2020-12-29 PROCEDURE — 3079F DIAST BP 80-89 MM HG: CPT | Mod: CPTII,S$GLB,, | Performed by: SPECIALIST

## 2020-12-29 PROCEDURE — 3074F PR MOST RECENT SYSTOLIC BLOOD PRESSURE < 130 MM HG: ICD-10-PCS | Mod: CPTII,S$GLB,, | Performed by: SPECIALIST

## 2020-12-29 PROCEDURE — 3079F PR MOST RECENT DIASTOLIC BLOOD PRESSURE 80-89 MM HG: ICD-10-PCS | Mod: CPTII,S$GLB,, | Performed by: SPECIALIST

## 2020-12-29 NOTE — PROGRESS NOTES
"Subjective:       Patient ID: Bri Santiago is a 60 y.o. male.    Chief Complaint: Follow-up    Follow-up     The patient is returning for follow-up visit.  There are multiple issues to discuss:  1 . Allergic rhinitis:.  He has been using Astelin and Flonase twice daily and Singulair at night.  He is breathing well through his nose but is having some nasal congestion and postnasal drip.  Nasal secretions are clear.  2.  Laryngopharyngeal reflux:   He is using Protonix twice daily.  His throat clearing, phlegm in the throat and globus sensation continue to improved but have not resolved.  3.  Obstructive sleep apnea:  Patient continues to use CPAP every night.  He does feel more rested when he uses it.  4.  Allergic conjunctivitis:  He is having some itching and tearing in his eyes on an intermittent basis.  5.  Ear discharge:  The patient feels that he is ears are "wet" every day when he wakes up.  He does not have any actual discharge from the ears.  He does have itching in the ears.      Review of Systems   Constitutional: Negative for activity change, appetite change and unexpected weight change.   HENT: Positive for postnasal drip, rhinorrhea, sinus pressure and sinus pain. Negative for ear discharge, ear pain, facial swelling, hearing loss, mouth sores, sneezing, tinnitus, trouble swallowing and voice change.    Eyes: Negative for photophobia, pain, discharge, redness, itching and visual disturbance.   Respiratory: Negative for apnea, choking, shortness of breath and wheezing.    Cardiovascular: Negative for palpitations.   Gastrointestinal: Negative for abdominal distention.   Musculoskeletal: Negative for neck stiffness.   Skin: Negative.  Negative for color change and pallor.   Allergic/Immunologic: Positive for environmental allergies. Negative for food allergies and immunocompromised state.   Neurological: Negative for dizziness, facial asymmetry, speech difficulty and light-headedness.   Hematological: " Negative for adenopathy. Does not bruise/bleed easily.   Psychiatric/Behavioral: Positive for sleep disturbance. Negative for agitation, confusion and decreased concentration.       Objective:      Physical Exam  Constitutional:       Appearance: He is well-developed.   HENT:      Head: Normocephalic.      Jaw: There is normal jaw occlusion.      Salivary Glands: Right salivary gland is not diffusely enlarged. Left salivary gland is not diffusely enlarged.      Right Ear: Ear canal and external ear normal. No drainage or swelling (Dry skin in the ear canal). Tympanic membrane is retracted.      Left Ear: Ear canal and external ear normal. No drainage or swelling ( dry skin in the ear canal). Tympanic membrane is retracted.      Nose: Septal deviation (To the right to the right), mucosal edema (cyanotic, boggy inferior turbinates bilaterally) and rhinorrhea (clear mucus bilaterally) present. No nasal deformity.      Right Turbinates: Enlarged and pale.      Left Turbinates: Enlarged and pale.      Mouth/Throat:      Lips: Pink. No lesions.      Mouth: Mucous membranes are moist. No oral lesions.      Dentition: No gum lesions.      Tongue: No lesions.      Palate: No mass and lesions.      Pharynx: Uvula midline.   Eyes:      General: Lids are normal.         Right eye: No discharge.         Left eye: No discharge.      Conjunctiva/sclera:      Right eye: Right conjunctiva is injected. No exudate.     Left eye: Left conjunctiva is injected. No exudate.     Pupils: Pupils are equal, round, and reactive to light.   Neck:      Musculoskeletal: Normal range of motion. No muscular tenderness.      Thyroid: No thyroid mass or thyromegaly.      Trachea: Trachea normal. No tracheal deviation.   Cardiovascular:      Rate and Rhythm: Normal rate and regular rhythm.      Pulses: Normal pulses.      Heart sounds: Normal heart sounds.   Pulmonary:      Effort: Pulmonary effort is normal.      Breath sounds: Normal breath sounds.  "No stridor. No decreased breath sounds, wheezing, rhonchi or rales.   Abdominal:      General: Bowel sounds are normal.      Palpations: Abdomen is soft.      Tenderness: There is no abdominal tenderness.   Musculoskeletal: Normal range of motion.   Lymphadenopathy:      Head:      Right side of head: No submental, submandibular, preauricular, posterior auricular or occipital adenopathy.      Left side of head: No submental, submandibular, preauricular, posterior auricular or occipital adenopathy.      Cervical: No cervical adenopathy.   Skin:     General: Skin is warm and dry.      Findings: No petechiae or rash.      Nails: There is no clubbing.     Neurological:      Mental Status: He is alert and oriented to person, place, and time.      Cranial Nerves: No cranial nerve deficit.      Sensory: No sensory deficit.      Gait: Gait normal.   Psychiatric:         Speech: Speech normal.         Behavior: Behavior normal. Behavior is cooperative.         Thought Content: Thought content normal.         Judgment: Judgment normal.         CT of the neck from skull base to thoracic inlet:  Ill-defined prominence of the nasopharyngeal soft tissue with no definable tumor mass, bilateral "mildly prominent" lymph nodes in the cervical, submandibular and submental areas, 0.3 cm solid nodule in right middle lobe, degenerative joint disease in the cervical spine    I did discuss the above findings from the CT with the patient at the end of his visit.  I provided him a copy of the CT report.      Assessment:       1. 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 Dysphagia, unspecified type    3. Laryngopharyngeal reflux (LPR)    4. Non-seasonal allergic rhinitis, unspecified trigger    5. Allergic conjunctivitis of both eyes    6. Dry skin dermatitis        Plan:       I will  have the patient use Rodri and Rodri Baby oil in his ears at night.  If that does not resolve the situation I will place him on " Dermotic drops.  I will recheck him in 6 weeks with regard to his laryngopharyngeal reflux and the it nasopharyngeal mass.  He will need undergo another flexible nasolaryngoscopy, so he will need COVID-19 testing done 3 days before the visit.  If there is no significant  improvement in the size of the mass he will need to be scheduled for a nasal endoscopy with transnasal biopsy.  Because of his von Willebrand's he will need pretreatment with DDAVP.  I will send a message to the patient's PCP regarding the CT findings.

## 2020-12-29 NOTE — TELEPHONE ENCOUNTER
----- Message from Izabella Rivas sent at 12/29/2020 10:01 AM CST -----  Regarding: Patient call back  Who called:PURNIMA IYER [[id number]]    What is the request in detail: Patient is requesting a call back. He states his ENT Dr. Swanson found something in his chest CT and he was advised to notify the staff. He would like to schedule an appt, declined the 2/22 next available, as well as an adonay with the PA. He would like to further discuss.   Please advise.    Can the clinic reply by [facility]? No    Best call back number: [phone]    Additional Information: N/A

## 2020-12-29 NOTE — TELEPHONE ENCOUNTER
Spoke to Pt offer next available appt  Scheduled virtual audio for Monday 1:45 pm 01/04  Scheduled in person with PA for 11am same day 01/04  Pt states to keep audio with PCP also for now

## 2021-01-04 ENCOUNTER — OFFICE VISIT (OUTPATIENT)
Dept: INTERNAL MEDICINE | Facility: CLINIC | Age: 61
End: 2021-01-04
Payer: MEDICARE

## 2021-01-04 VITALS
WEIGHT: 274.94 LBS | HEIGHT: 71 IN | SYSTOLIC BLOOD PRESSURE: 122 MMHG | HEART RATE: 87 BPM | BODY MASS INDEX: 38.49 KG/M2 | OXYGEN SATURATION: 96 % | DIASTOLIC BLOOD PRESSURE: 78 MMHG

## 2021-01-04 DIAGNOSIS — N40.1 BENIGN LOCALIZED PROSTATIC HYPERPLASIA WITH LOWER URINARY TRACT SYMPTOMS (LUTS): ICD-10-CM

## 2021-01-04 DIAGNOSIS — E78.5 HYPERLIPIDEMIA, UNSPECIFIED HYPERLIPIDEMIA TYPE: ICD-10-CM

## 2021-01-04 DIAGNOSIS — I51.89 DIASTOLIC DYSFUNCTION: ICD-10-CM

## 2021-01-04 DIAGNOSIS — R91.1 PULMONARY NODULE, RIGHT: Primary | ICD-10-CM

## 2021-01-04 DIAGNOSIS — R13.10 DYSPHAGIA, UNSPECIFIED TYPE: ICD-10-CM

## 2021-01-04 PROCEDURE — 3008F BODY MASS INDEX DOCD: CPT | Mod: CPTII,S$GLB,, | Performed by: PHYSICIAN ASSISTANT

## 2021-01-04 PROCEDURE — 3074F SYST BP LT 130 MM HG: CPT | Mod: CPTII,S$GLB,, | Performed by: PHYSICIAN ASSISTANT

## 2021-01-04 PROCEDURE — 3008F PR BODY MASS INDEX (BMI) DOCUMENTED: ICD-10-PCS | Mod: CPTII,S$GLB,, | Performed by: PHYSICIAN ASSISTANT

## 2021-01-04 PROCEDURE — 99213 OFFICE O/P EST LOW 20 MIN: CPT | Mod: S$GLB,,, | Performed by: PHYSICIAN ASSISTANT

## 2021-01-04 PROCEDURE — 1125F PR PAIN SEVERITY QUANTIFIED, PAIN PRESENT: ICD-10-PCS | Mod: S$GLB,,, | Performed by: PHYSICIAN ASSISTANT

## 2021-01-04 PROCEDURE — 3078F DIAST BP <80 MM HG: CPT | Mod: CPTII,S$GLB,, | Performed by: PHYSICIAN ASSISTANT

## 2021-01-04 PROCEDURE — 99999 PR PBB SHADOW E&M-EST. PATIENT-LVL IV: CPT | Mod: PBBFAC,,, | Performed by: PHYSICIAN ASSISTANT

## 2021-01-04 PROCEDURE — 3074F PR MOST RECENT SYSTOLIC BLOOD PRESSURE < 130 MM HG: ICD-10-PCS | Mod: CPTII,S$GLB,, | Performed by: PHYSICIAN ASSISTANT

## 2021-01-04 PROCEDURE — 99213 PR OFFICE/OUTPT VISIT, EST, LEVL III, 20-29 MIN: ICD-10-PCS | Mod: S$GLB,,, | Performed by: PHYSICIAN ASSISTANT

## 2021-01-04 PROCEDURE — 3078F PR MOST RECENT DIASTOLIC BLOOD PRESSURE < 80 MM HG: ICD-10-PCS | Mod: CPTII,S$GLB,, | Performed by: PHYSICIAN ASSISTANT

## 2021-01-04 PROCEDURE — 99999 PR PBB SHADOW E&M-EST. PATIENT-LVL IV: ICD-10-PCS | Mod: PBBFAC,,, | Performed by: PHYSICIAN ASSISTANT

## 2021-01-04 PROCEDURE — 1125F AMNT PAIN NOTED PAIN PRSNT: CPT | Mod: S$GLB,,, | Performed by: PHYSICIAN ASSISTANT

## 2021-01-04 RX ORDER — FUROSEMIDE 20 MG/1
TABLET ORAL
Qty: 70 TABLET | Refills: 1 | Status: SHIPPED | OUTPATIENT
Start: 2021-01-04 | End: 2021-07-12 | Stop reason: SDUPTHER

## 2021-01-04 RX ORDER — SUCRALFATE 1 G/1
1 TABLET ORAL
Qty: 120 TABLET | Refills: 0 | Status: SHIPPED | OUTPATIENT
Start: 2021-01-04 | End: 2021-02-03

## 2021-01-04 RX ORDER — DOXAZOSIN 1 MG/1
TABLET ORAL
Qty: 90 TABLET | Refills: 3 | Status: SHIPPED | OUTPATIENT
Start: 2021-01-04 | End: 2022-01-11 | Stop reason: SDUPTHER

## 2021-01-04 RX ORDER — ATORVASTATIN CALCIUM 40 MG/1
40 TABLET, FILM COATED ORAL DAILY
Qty: 90 TABLET | Refills: 3 | Status: SHIPPED | OUTPATIENT
Start: 2021-01-04 | End: 2022-01-11 | Stop reason: SDUPTHER

## 2021-01-04 RX ORDER — CLOPIDOGREL BISULFATE 75 MG/1
75 TABLET ORAL DAILY
Qty: 30 TABLET | Refills: 11 | Status: SHIPPED | OUTPATIENT
Start: 2021-01-04 | End: 2022-01-11 | Stop reason: SDUPTHER

## 2021-01-05 LAB
ALBUMIN SERPL-MCNC: 4.3 G/DL (ref 3.6–5.1)
SHBG SERPL-SCNC: 33 NMOL/L (ref 22–77)
TESTOST FREE SERPL-MCNC: 44.9 PG/ML (ref 46–224)
TESTOST SERPL-MCNC: 345 NG/DL (ref 250–1100)
TESTOSTERONE.FREE+WB SERPL-MCNC: 88.5 NG/DL (ref 110–575)

## 2021-01-06 ENCOUNTER — PATIENT MESSAGE (OUTPATIENT)
Dept: ENDOCRINOLOGY | Facility: CLINIC | Age: 61
End: 2021-01-06

## 2021-01-12 ENCOUNTER — OFFICE VISIT (OUTPATIENT)
Dept: CARDIOLOGY | Facility: CLINIC | Age: 61
End: 2021-01-12
Payer: MEDICARE

## 2021-01-12 VITALS
WEIGHT: 268.5 LBS | DIASTOLIC BLOOD PRESSURE: 76 MMHG | HEIGHT: 71 IN | SYSTOLIC BLOOD PRESSURE: 126 MMHG | OXYGEN SATURATION: 97 % | HEART RATE: 84 BPM | BODY MASS INDEX: 37.59 KG/M2

## 2021-01-12 DIAGNOSIS — I70.0 THORACIC AORTA ATHEROSCLEROSIS: ICD-10-CM

## 2021-01-12 DIAGNOSIS — R07.9 CHEST PAIN OF UNKNOWN ETIOLOGY: ICD-10-CM

## 2021-01-12 DIAGNOSIS — D68.00 VON WILLEBRAND DISEASE: ICD-10-CM

## 2021-01-12 DIAGNOSIS — E66.01 SEVERE OBESITY (BMI 35.0-39.9) WITH COMORBIDITY: ICD-10-CM

## 2021-01-12 DIAGNOSIS — I10 ESSENTIAL HYPERTENSION: Primary | ICD-10-CM

## 2021-01-12 PROCEDURE — 99999 PR PBB SHADOW E&M-EST. PATIENT-LVL V: CPT | Mod: PBBFAC,,, | Performed by: INTERNAL MEDICINE

## 2021-01-12 PROCEDURE — 3078F DIAST BP <80 MM HG: CPT | Mod: CPTII,S$GLB,, | Performed by: INTERNAL MEDICINE

## 2021-01-12 PROCEDURE — 1126F PR PAIN SEVERITY QUANTIFIED, NO PAIN PRESENT: ICD-10-PCS | Mod: S$GLB,,, | Performed by: INTERNAL MEDICINE

## 2021-01-12 PROCEDURE — 3078F PR MOST RECENT DIASTOLIC BLOOD PRESSURE < 80 MM HG: ICD-10-PCS | Mod: CPTII,S$GLB,, | Performed by: INTERNAL MEDICINE

## 2021-01-12 PROCEDURE — 99999 PR PBB SHADOW E&M-EST. PATIENT-LVL V: ICD-10-PCS | Mod: PBBFAC,,, | Performed by: INTERNAL MEDICINE

## 2021-01-12 PROCEDURE — 3074F SYST BP LT 130 MM HG: CPT | Mod: CPTII,S$GLB,, | Performed by: INTERNAL MEDICINE

## 2021-01-12 PROCEDURE — 3074F PR MOST RECENT SYSTOLIC BLOOD PRESSURE < 130 MM HG: ICD-10-PCS | Mod: CPTII,S$GLB,, | Performed by: INTERNAL MEDICINE

## 2021-01-12 PROCEDURE — 99214 PR OFFICE/OUTPT VISIT, EST, LEVL IV, 30-39 MIN: ICD-10-PCS | Mod: S$GLB,,, | Performed by: INTERNAL MEDICINE

## 2021-01-12 PROCEDURE — 99499 UNLISTED E&M SERVICE: CPT | Mod: S$GLB,,, | Performed by: INTERNAL MEDICINE

## 2021-01-12 PROCEDURE — 1126F AMNT PAIN NOTED NONE PRSNT: CPT | Mod: S$GLB,,, | Performed by: INTERNAL MEDICINE

## 2021-01-12 PROCEDURE — 99214 OFFICE O/P EST MOD 30 MIN: CPT | Mod: S$GLB,,, | Performed by: INTERNAL MEDICINE

## 2021-01-12 PROCEDURE — 3008F PR BODY MASS INDEX (BMI) DOCUMENTED: ICD-10-PCS | Mod: CPTII,S$GLB,, | Performed by: INTERNAL MEDICINE

## 2021-01-12 PROCEDURE — 3008F BODY MASS INDEX DOCD: CPT | Mod: CPTII,S$GLB,, | Performed by: INTERNAL MEDICINE

## 2021-01-12 PROCEDURE — 99499 RISK ADDL DX/OHS AUDIT: ICD-10-PCS | Mod: S$GLB,,, | Performed by: INTERNAL MEDICINE

## 2021-01-13 ENCOUNTER — PATIENT MESSAGE (OUTPATIENT)
Dept: PAIN MEDICINE | Facility: CLINIC | Age: 61
End: 2021-01-13

## 2021-01-14 ENCOUNTER — OFFICE VISIT (OUTPATIENT)
Dept: PAIN MEDICINE | Facility: CLINIC | Age: 61
End: 2021-01-14
Payer: MEDICARE

## 2021-01-14 VITALS
SYSTOLIC BLOOD PRESSURE: 122 MMHG | TEMPERATURE: 97 F | RESPIRATION RATE: 18 BRPM | HEIGHT: 71 IN | OXYGEN SATURATION: 100 % | WEIGHT: 266.75 LBS | DIASTOLIC BLOOD PRESSURE: 82 MMHG | BODY MASS INDEX: 37.35 KG/M2 | HEART RATE: 91 BPM

## 2021-01-14 DIAGNOSIS — M54.9 DORSALGIA, UNSPECIFIED: ICD-10-CM

## 2021-01-14 DIAGNOSIS — M47.812 CERVICAL SPONDYLOSIS: ICD-10-CM

## 2021-01-14 DIAGNOSIS — M51.37 DEGENERATION OF LUMBAR OR LUMBOSACRAL INTERVERTEBRAL DISC: ICD-10-CM

## 2021-01-14 DIAGNOSIS — M96.1 POSTLAMINECTOMY SYNDROME OF LUMBAR REGION: Primary | ICD-10-CM

## 2021-01-14 DIAGNOSIS — M54.17 LUMBOSACRAL RADICULOPATHY: ICD-10-CM

## 2021-01-14 PROCEDURE — 3079F DIAST BP 80-89 MM HG: CPT | Mod: CPTII,S$GLB,, | Performed by: NURSE PRACTITIONER

## 2021-01-14 PROCEDURE — 99999 PR PBB SHADOW E&M-EST. PATIENT-LVL V: CPT | Mod: PBBFAC,,, | Performed by: NURSE PRACTITIONER

## 2021-01-14 PROCEDURE — 99214 OFFICE O/P EST MOD 30 MIN: CPT | Mod: S$GLB,,, | Performed by: NURSE PRACTITIONER

## 2021-01-14 PROCEDURE — 3074F SYST BP LT 130 MM HG: CPT | Mod: CPTII,S$GLB,, | Performed by: NURSE PRACTITIONER

## 2021-01-14 PROCEDURE — 3008F PR BODY MASS INDEX (BMI) DOCUMENTED: ICD-10-PCS | Mod: CPTII,S$GLB,, | Performed by: NURSE PRACTITIONER

## 2021-01-14 PROCEDURE — 99999 PR PBB SHADOW E&M-EST. PATIENT-LVL V: ICD-10-PCS | Mod: PBBFAC,,, | Performed by: NURSE PRACTITIONER

## 2021-01-14 PROCEDURE — 1125F PR PAIN SEVERITY QUANTIFIED, PAIN PRESENT: ICD-10-PCS | Mod: S$GLB,,, | Performed by: NURSE PRACTITIONER

## 2021-01-14 PROCEDURE — 3074F PR MOST RECENT SYSTOLIC BLOOD PRESSURE < 130 MM HG: ICD-10-PCS | Mod: CPTII,S$GLB,, | Performed by: NURSE PRACTITIONER

## 2021-01-14 PROCEDURE — 99214 PR OFFICE/OUTPT VISIT, EST, LEVL IV, 30-39 MIN: ICD-10-PCS | Mod: S$GLB,,, | Performed by: NURSE PRACTITIONER

## 2021-01-14 PROCEDURE — 1125F AMNT PAIN NOTED PAIN PRSNT: CPT | Mod: S$GLB,,, | Performed by: NURSE PRACTITIONER

## 2021-01-14 PROCEDURE — 3008F BODY MASS INDEX DOCD: CPT | Mod: CPTII,S$GLB,, | Performed by: NURSE PRACTITIONER

## 2021-01-14 PROCEDURE — 3079F PR MOST RECENT DIASTOLIC BLOOD PRESSURE 80-89 MM HG: ICD-10-PCS | Mod: CPTII,S$GLB,, | Performed by: NURSE PRACTITIONER

## 2021-01-14 RX ORDER — TRAMADOL HYDROCHLORIDE 50 MG/1
50 TABLET ORAL EVERY 8 HOURS PRN
Qty: 90 TABLET | Refills: 0 | Status: SHIPPED | OUTPATIENT
Start: 2021-01-14 | End: 2021-11-26

## 2021-01-22 ENCOUNTER — LAB VISIT (OUTPATIENT)
Dept: LAB | Facility: HOSPITAL | Age: 61
End: 2021-01-22
Payer: MEDICARE

## 2021-01-22 ENCOUNTER — OFFICE VISIT (OUTPATIENT)
Dept: SURGERY | Facility: CLINIC | Age: 61
End: 2021-01-22
Payer: MEDICARE

## 2021-01-22 VITALS
BODY MASS INDEX: 36.92 KG/M2 | HEIGHT: 71 IN | HEART RATE: 116 BPM | WEIGHT: 263.69 LBS | DIASTOLIC BLOOD PRESSURE: 93 MMHG | SYSTOLIC BLOOD PRESSURE: 136 MMHG

## 2021-01-22 DIAGNOSIS — Z85.46 HISTORY OF PROSTATE CANCER: ICD-10-CM

## 2021-01-22 DIAGNOSIS — Z12.5 ENCOUNTER FOR SCREENING FOR MALIGNANT NEOPLASM OF PROSTATE: ICD-10-CM

## 2021-01-22 DIAGNOSIS — K62.89 ANAL BURNING: Primary | ICD-10-CM

## 2021-01-22 LAB — COMPLEXED PSA SERPL-MCNC: 0.93 NG/ML (ref 0–4)

## 2021-01-22 PROCEDURE — 3075F SYST BP GE 130 - 139MM HG: CPT | Mod: CPTII,S$GLB,, | Performed by: NURSE PRACTITIONER

## 2021-01-22 PROCEDURE — 1125F PR PAIN SEVERITY QUANTIFIED, PAIN PRESENT: ICD-10-PCS | Mod: S$GLB,,, | Performed by: NURSE PRACTITIONER

## 2021-01-22 PROCEDURE — 3075F PR MOST RECENT SYSTOLIC BLOOD PRESS GE 130-139MM HG: ICD-10-PCS | Mod: CPTII,S$GLB,, | Performed by: NURSE PRACTITIONER

## 2021-01-22 PROCEDURE — 46600 DIAGNOSTIC ANOSCOPY SPX: CPT | Mod: S$GLB,,, | Performed by: NURSE PRACTITIONER

## 2021-01-22 PROCEDURE — 99999 PR PBB SHADOW E&M-EST. PATIENT-LVL V: ICD-10-PCS | Mod: PBBFAC,,, | Performed by: NURSE PRACTITIONER

## 2021-01-22 PROCEDURE — 99999 PR PBB SHADOW E&M-EST. PATIENT-LVL V: CPT | Mod: PBBFAC,,, | Performed by: NURSE PRACTITIONER

## 2021-01-22 PROCEDURE — 3080F DIAST BP >= 90 MM HG: CPT | Mod: CPTII,S$GLB,, | Performed by: NURSE PRACTITIONER

## 2021-01-22 PROCEDURE — 99204 OFFICE O/P NEW MOD 45 MIN: CPT | Mod: 25,S$GLB,, | Performed by: NURSE PRACTITIONER

## 2021-01-22 PROCEDURE — 3008F PR BODY MASS INDEX (BMI) DOCUMENTED: ICD-10-PCS | Mod: CPTII,S$GLB,, | Performed by: NURSE PRACTITIONER

## 2021-01-22 PROCEDURE — 3080F PR MOST RECENT DIASTOLIC BLOOD PRESSURE >= 90 MM HG: ICD-10-PCS | Mod: CPTII,S$GLB,, | Performed by: NURSE PRACTITIONER

## 2021-01-22 PROCEDURE — 36415 COLL VENOUS BLD VENIPUNCTURE: CPT

## 2021-01-22 PROCEDURE — 99204 PR OFFICE/OUTPT VISIT, NEW, LEVL IV, 45-59 MIN: ICD-10-PCS | Mod: 25,S$GLB,, | Performed by: NURSE PRACTITIONER

## 2021-01-22 PROCEDURE — 1125F AMNT PAIN NOTED PAIN PRSNT: CPT | Mod: S$GLB,,, | Performed by: NURSE PRACTITIONER

## 2021-01-22 PROCEDURE — 84153 ASSAY OF PSA TOTAL: CPT

## 2021-01-22 PROCEDURE — 3008F BODY MASS INDEX DOCD: CPT | Mod: CPTII,S$GLB,, | Performed by: NURSE PRACTITIONER

## 2021-01-22 PROCEDURE — 46600 PR DIAG2STIC A2SCOPY: ICD-10-PCS | Mod: S$GLB,,, | Performed by: NURSE PRACTITIONER

## 2021-01-27 ENCOUNTER — HOSPITAL ENCOUNTER (OUTPATIENT)
Dept: RADIOLOGY | Facility: OTHER | Age: 61
Discharge: HOME | End: 2021-01-27
Attending: NURSE PRACTITIONER
Payer: MEDICARE

## 2021-01-27 DIAGNOSIS — M54.9 DORSALGIA, UNSPECIFIED: ICD-10-CM

## 2021-01-27 PROCEDURE — 72158 MRI LUMBAR SPINE W/O & W/DYE: CPT | Mod: TC

## 2021-01-27 PROCEDURE — 72158 MRI LUMBAR SPINE W/O & W/DYE: CPT | Mod: 26,,, | Performed by: RADIOLOGY

## 2021-01-27 PROCEDURE — 25500020 PHARM REV CODE 255: Performed by: NURSE PRACTITIONER

## 2021-01-27 PROCEDURE — 72158 MRI LUMBAR SPINE W WO CONTRAST: ICD-10-PCS | Mod: 26,,, | Performed by: RADIOLOGY

## 2021-01-27 PROCEDURE — A9585 GADOBUTROL INJECTION: HCPCS | Performed by: NURSE PRACTITIONER

## 2021-01-27 RX ORDER — GADOBUTROL 604.72 MG/ML
10 INJECTION INTRAVENOUS
Status: COMPLETED | OUTPATIENT
Start: 2021-01-27 | End: 2021-01-27

## 2021-01-27 RX ADMIN — GADOBUTROL 10 ML: 604.72 INJECTION INTRAVENOUS at 12:01

## 2021-02-02 ENCOUNTER — TELEPHONE (OUTPATIENT)
Dept: GASTROENTEROLOGY | Facility: CLINIC | Age: 61
End: 2021-02-02

## 2021-02-02 ENCOUNTER — OFFICE VISIT (OUTPATIENT)
Dept: UROLOGY | Facility: CLINIC | Age: 61
End: 2021-02-02
Payer: MEDICARE

## 2021-02-02 ENCOUNTER — PATIENT MESSAGE (OUTPATIENT)
Dept: SURGERY | Facility: CLINIC | Age: 61
End: 2021-02-02

## 2021-02-02 VITALS
HEART RATE: 92 BPM | BODY MASS INDEX: 36.4 KG/M2 | DIASTOLIC BLOOD PRESSURE: 79 MMHG | WEIGHT: 260 LBS | SYSTOLIC BLOOD PRESSURE: 114 MMHG | HEIGHT: 71 IN

## 2021-02-02 DIAGNOSIS — C61 PROSTATE CANCER: Primary | ICD-10-CM

## 2021-02-02 PROCEDURE — 99213 PR OFFICE/OUTPT VISIT, EST, LEVL III, 20-29 MIN: ICD-10-PCS | Mod: S$GLB,,, | Performed by: UROLOGY

## 2021-02-02 PROCEDURE — 3008F PR BODY MASS INDEX (BMI) DOCUMENTED: ICD-10-PCS | Mod: CPTII,S$GLB,, | Performed by: UROLOGY

## 2021-02-02 PROCEDURE — 3008F BODY MASS INDEX DOCD: CPT | Mod: CPTII,S$GLB,, | Performed by: UROLOGY

## 2021-02-02 PROCEDURE — 3078F PR MOST RECENT DIASTOLIC BLOOD PRESSURE < 80 MM HG: ICD-10-PCS | Mod: CPTII,S$GLB,, | Performed by: UROLOGY

## 2021-02-02 PROCEDURE — 99213 OFFICE O/P EST LOW 20 MIN: CPT | Mod: S$GLB,,, | Performed by: UROLOGY

## 2021-02-02 PROCEDURE — 99999 PR PBB SHADOW E&M-EST. PATIENT-LVL IV: CPT | Mod: PBBFAC,,, | Performed by: UROLOGY

## 2021-02-02 PROCEDURE — 3074F SYST BP LT 130 MM HG: CPT | Mod: CPTII,S$GLB,, | Performed by: UROLOGY

## 2021-02-02 PROCEDURE — 3078F DIAST BP <80 MM HG: CPT | Mod: CPTII,S$GLB,, | Performed by: UROLOGY

## 2021-02-02 PROCEDURE — 3074F PR MOST RECENT SYSTOLIC BLOOD PRESSURE < 130 MM HG: ICD-10-PCS | Mod: CPTII,S$GLB,, | Performed by: UROLOGY

## 2021-02-02 PROCEDURE — 99999 PR PBB SHADOW E&M-EST. PATIENT-LVL IV: ICD-10-PCS | Mod: PBBFAC,,, | Performed by: UROLOGY

## 2021-02-04 ENCOUNTER — OFFICE VISIT (OUTPATIENT)
Dept: SURGERY | Facility: CLINIC | Age: 61
End: 2021-02-04
Payer: MEDICARE

## 2021-02-04 ENCOUNTER — TELEPHONE (OUTPATIENT)
Dept: GASTROENTEROLOGY | Facility: CLINIC | Age: 61
End: 2021-02-04

## 2021-02-04 VITALS — HEIGHT: 71 IN | WEIGHT: 260 LBS | BODY MASS INDEX: 36.4 KG/M2

## 2021-02-04 DIAGNOSIS — K62.89 ANAL PAIN: Primary | ICD-10-CM

## 2021-02-04 DIAGNOSIS — K60.2 ANAL FISSURE, UNSPECIFIED: ICD-10-CM

## 2021-02-04 PROCEDURE — 3077F PR MOST RECENT SYSTOLIC BLOOD PRESSURE >= 140 MM HG: ICD-10-PCS | Mod: CPTII,S$GLB,, | Performed by: NURSE PRACTITIONER

## 2021-02-04 PROCEDURE — 3077F SYST BP >= 140 MM HG: CPT | Mod: CPTII,S$GLB,, | Performed by: NURSE PRACTITIONER

## 2021-02-04 PROCEDURE — 99212 OFFICE O/P EST SF 10 MIN: CPT | Mod: S$GLB,,, | Performed by: NURSE PRACTITIONER

## 2021-02-04 PROCEDURE — 99999 PR PBB SHADOW E&M-EST. PATIENT-LVL II: ICD-10-PCS | Mod: PBBFAC,,, | Performed by: NURSE PRACTITIONER

## 2021-02-04 PROCEDURE — 1125F PR PAIN SEVERITY QUANTIFIED, PAIN PRESENT: ICD-10-PCS | Mod: S$GLB,,, | Performed by: NURSE PRACTITIONER

## 2021-02-04 PROCEDURE — 1125F AMNT PAIN NOTED PAIN PRSNT: CPT | Mod: S$GLB,,, | Performed by: NURSE PRACTITIONER

## 2021-02-04 PROCEDURE — 3080F PR MOST RECENT DIASTOLIC BLOOD PRESSURE >= 90 MM HG: ICD-10-PCS | Mod: CPTII,S$GLB,, | Performed by: NURSE PRACTITIONER

## 2021-02-04 PROCEDURE — 3008F PR BODY MASS INDEX (BMI) DOCUMENTED: ICD-10-PCS | Mod: CPTII,S$GLB,, | Performed by: NURSE PRACTITIONER

## 2021-02-04 PROCEDURE — 3008F BODY MASS INDEX DOCD: CPT | Mod: CPTII,S$GLB,, | Performed by: NURSE PRACTITIONER

## 2021-02-04 PROCEDURE — 99999 PR PBB SHADOW E&M-EST. PATIENT-LVL II: CPT | Mod: PBBFAC,,, | Performed by: NURSE PRACTITIONER

## 2021-02-04 PROCEDURE — 99212 PR OFFICE/OUTPT VISIT, EST, LEVL II, 10-19 MIN: ICD-10-PCS | Mod: S$GLB,,, | Performed by: NURSE PRACTITIONER

## 2021-02-04 PROCEDURE — 3080F DIAST BP >= 90 MM HG: CPT | Mod: CPTII,S$GLB,, | Performed by: NURSE PRACTITIONER

## 2021-02-08 ENCOUNTER — HOSPITAL ENCOUNTER (OUTPATIENT)
Dept: PREADMISSION TESTING | Facility: OTHER | Age: 61
Discharge: HOME | End: 2021-02-08
Attending: ANESTHESIOLOGY
Payer: MEDICARE

## 2021-02-08 DIAGNOSIS — Z01.812 PRE-PROCEDURE LAB EXAM: ICD-10-CM

## 2021-02-08 PROCEDURE — U0005 INFEC AGEN DETEC AMPLI PROBE: HCPCS

## 2021-02-08 PROCEDURE — U0003 INFECTIOUS AGENT DETECTION BY NUCLEIC ACID (DNA OR RNA); SEVERE ACUTE RESPIRATORY SYNDROME CORONAVIRUS 2 (SARS-COV-2) (CORONAVIRUS DISEASE [COVID-19]), AMPLIFIED PROBE TECHNIQUE, MAKING USE OF HIGH THROUGHPUT TECHNOLOGIES AS DESCRIBED BY CMS-2020-01-R: HCPCS

## 2021-02-10 LAB — SARS-COV-2 RNA RESP QL NAA+PROBE: NOT DETECTED

## 2021-02-11 ENCOUNTER — OFFICE VISIT (OUTPATIENT)
Dept: OTOLARYNGOLOGY | Facility: CLINIC | Age: 61
End: 2021-02-11
Payer: MEDICARE

## 2021-02-11 VITALS
DIASTOLIC BLOOD PRESSURE: 86 MMHG | WEIGHT: 257.25 LBS | BODY MASS INDEX: 36.02 KG/M2 | HEART RATE: 88 BPM | TEMPERATURE: 98 F | SYSTOLIC BLOOD PRESSURE: 132 MMHG | HEIGHT: 71 IN

## 2021-02-11 DIAGNOSIS — J30.89 NON-SEASONAL ALLERGIC RHINITIS, UNSPECIFIED TRIGGER: ICD-10-CM

## 2021-02-11 DIAGNOSIS — D37.05: ICD-10-CM

## 2021-02-11 DIAGNOSIS — K21.9 LARYNGOPHARYNGEAL REFLUX (LPR): ICD-10-CM

## 2021-02-11 DIAGNOSIS — R09.89 THROAT CLEARING: ICD-10-CM

## 2021-02-11 DIAGNOSIS — H10.13 ALLERGIC CONJUNCTIVITIS OF BOTH EYES: ICD-10-CM

## 2021-02-11 DIAGNOSIS — J34.2 NASAL SEPTAL DEVIATION: ICD-10-CM

## 2021-02-11 DIAGNOSIS — R13.10 DYSPHAGIA, UNSPECIFIED TYPE: Primary | ICD-10-CM

## 2021-02-11 DIAGNOSIS — R09.A2 GLOBUS SENSATION: ICD-10-CM

## 2021-02-11 PROCEDURE — 1125F PR PAIN SEVERITY QUANTIFIED, PAIN PRESENT: ICD-10-PCS | Mod: S$GLB,,, | Performed by: SPECIALIST

## 2021-02-11 PROCEDURE — 3008F BODY MASS INDEX DOCD: CPT | Mod: CPTII,S$GLB,, | Performed by: SPECIALIST

## 2021-02-11 PROCEDURE — 3079F PR MOST RECENT DIASTOLIC BLOOD PRESSURE 80-89 MM HG: ICD-10-PCS | Mod: CPTII,S$GLB,, | Performed by: SPECIALIST

## 2021-02-11 PROCEDURE — 31575 LARYNGOSCOPY: ICD-10-PCS | Mod: S$GLB,,, | Performed by: SPECIALIST

## 2021-02-11 PROCEDURE — 1125F AMNT PAIN NOTED PAIN PRSNT: CPT | Mod: S$GLB,,, | Performed by: SPECIALIST

## 2021-02-11 PROCEDURE — 3008F PR BODY MASS INDEX (BMI) DOCUMENTED: ICD-10-PCS | Mod: CPTII,S$GLB,, | Performed by: SPECIALIST

## 2021-02-11 PROCEDURE — 3075F PR MOST RECENT SYSTOLIC BLOOD PRESS GE 130-139MM HG: ICD-10-PCS | Mod: CPTII,S$GLB,, | Performed by: SPECIALIST

## 2021-02-11 PROCEDURE — 3075F SYST BP GE 130 - 139MM HG: CPT | Mod: CPTII,S$GLB,, | Performed by: SPECIALIST

## 2021-02-11 PROCEDURE — 99999 PR PBB SHADOW E&M-EST. PATIENT-LVL IV: ICD-10-PCS | Mod: PBBFAC,,, | Performed by: SPECIALIST

## 2021-02-11 PROCEDURE — 99214 PR OFFICE/OUTPT VISIT, EST, LEVL IV, 30-39 MIN: ICD-10-PCS | Mod: 25,S$GLB,, | Performed by: SPECIALIST

## 2021-02-11 PROCEDURE — 99499 RISK ADDL DX/OHS AUDIT: ICD-10-PCS | Mod: S$GLB,,, | Performed by: SPECIALIST

## 2021-02-11 PROCEDURE — 99499 UNLISTED E&M SERVICE: CPT | Mod: S$GLB,,, | Performed by: SPECIALIST

## 2021-02-11 PROCEDURE — 99214 OFFICE O/P EST MOD 30 MIN: CPT | Mod: 25,S$GLB,, | Performed by: SPECIALIST

## 2021-02-11 PROCEDURE — 99999 PR PBB SHADOW E&M-EST. PATIENT-LVL IV: CPT | Mod: PBBFAC,,, | Performed by: SPECIALIST

## 2021-02-11 PROCEDURE — 3079F DIAST BP 80-89 MM HG: CPT | Mod: CPTII,S$GLB,, | Performed by: SPECIALIST

## 2021-02-11 PROCEDURE — 31575 DIAGNOSTIC LARYNGOSCOPY: CPT | Mod: S$GLB,,, | Performed by: SPECIALIST

## 2021-02-11 RX ORDER — AZELASTINE 1 MG/ML
SPRAY, METERED NASAL
Qty: 30 ML | Refills: 11 | Status: SHIPPED | OUTPATIENT
Start: 2021-02-11

## 2021-02-19 ENCOUNTER — HOSPITAL ENCOUNTER (OUTPATIENT)
Dept: RADIOLOGY | Facility: HOSPITAL | Age: 61
Discharge: HOME | End: 2021-02-19
Attending: NURSE PRACTITIONER
Payer: MEDICARE

## 2021-02-19 DIAGNOSIS — K62.89 ANAL PAIN: ICD-10-CM

## 2021-02-19 DIAGNOSIS — K60.2 ANAL FISSURE, UNSPECIFIED: Primary | ICD-10-CM

## 2021-02-19 DIAGNOSIS — K60.2 ANAL FISSURE, UNSPECIFIED: ICD-10-CM

## 2021-02-19 PROCEDURE — 25500020 PHARM REV CODE 255: Performed by: NURSE PRACTITIONER

## 2021-02-19 PROCEDURE — A9585 GADOBUTROL INJECTION: HCPCS | Performed by: NURSE PRACTITIONER

## 2021-02-19 PROCEDURE — 72197 MRI PELVIS W/O & W/DYE: CPT | Mod: TC

## 2021-02-19 PROCEDURE — 72197 MRI PELVIS W WO CONTRAST: ICD-10-PCS | Mod: 26,,, | Performed by: RADIOLOGY

## 2021-02-19 PROCEDURE — 72197 MRI PELVIS W/O & W/DYE: CPT | Mod: 26,,, | Performed by: RADIOLOGY

## 2021-02-19 RX ORDER — GADOBUTROL 604.72 MG/ML
10 INJECTION INTRAVENOUS
Status: COMPLETED | OUTPATIENT
Start: 2021-02-19 | End: 2021-02-19

## 2021-02-19 RX ADMIN — GADOBUTROL 10 ML: 604.72 INJECTION INTRAVENOUS at 04:02

## 2021-03-03 ENCOUNTER — TELEPHONE (OUTPATIENT)
Dept: SURGERY | Facility: CLINIC | Age: 61
End: 2021-03-03

## 2021-03-05 ENCOUNTER — OFFICE VISIT (OUTPATIENT)
Dept: SURGERY | Facility: CLINIC | Age: 61
End: 2021-03-05
Attending: COLON & RECTAL SURGERY
Payer: MEDICARE

## 2021-03-05 VITALS
BODY MASS INDEX: 37.55 KG/M2 | HEART RATE: 112 BPM | HEIGHT: 69 IN | WEIGHT: 253.5 LBS | DIASTOLIC BLOOD PRESSURE: 70 MMHG | SYSTOLIC BLOOD PRESSURE: 121 MMHG

## 2021-03-05 DIAGNOSIS — Z01.818 PRE-OP EVALUATION: Primary | ICD-10-CM

## 2021-03-05 DIAGNOSIS — K62.89 ANAL PAIN: Primary | ICD-10-CM

## 2021-03-05 PROCEDURE — 99999 PR PBB SHADOW E&M-EST. PATIENT-LVL V: CPT | Mod: PBBFAC,,, | Performed by: COLON & RECTAL SURGERY

## 2021-03-05 PROCEDURE — 3074F SYST BP LT 130 MM HG: CPT | Mod: CPTII,S$GLB,, | Performed by: COLON & RECTAL SURGERY

## 2021-03-05 PROCEDURE — 3008F BODY MASS INDEX DOCD: CPT | Mod: CPTII,S$GLB,, | Performed by: COLON & RECTAL SURGERY

## 2021-03-05 PROCEDURE — 3078F PR MOST RECENT DIASTOLIC BLOOD PRESSURE < 80 MM HG: ICD-10-PCS | Mod: CPTII,S$GLB,, | Performed by: COLON & RECTAL SURGERY

## 2021-03-05 PROCEDURE — 3008F PR BODY MASS INDEX (BMI) DOCUMENTED: ICD-10-PCS | Mod: CPTII,S$GLB,, | Performed by: COLON & RECTAL SURGERY

## 2021-03-05 PROCEDURE — 99204 PR OFFICE/OUTPT VISIT, NEW, LEVL IV, 45-59 MIN: ICD-10-PCS | Mod: S$GLB,,, | Performed by: COLON & RECTAL SURGERY

## 2021-03-05 PROCEDURE — 1125F AMNT PAIN NOTED PAIN PRSNT: CPT | Mod: S$GLB,,, | Performed by: COLON & RECTAL SURGERY

## 2021-03-05 PROCEDURE — 1125F PR PAIN SEVERITY QUANTIFIED, PAIN PRESENT: ICD-10-PCS | Mod: S$GLB,,, | Performed by: COLON & RECTAL SURGERY

## 2021-03-05 PROCEDURE — 99999 PR PBB SHADOW E&M-EST. PATIENT-LVL V: ICD-10-PCS | Mod: PBBFAC,,, | Performed by: COLON & RECTAL SURGERY

## 2021-03-05 PROCEDURE — 99204 OFFICE O/P NEW MOD 45 MIN: CPT | Mod: S$GLB,,, | Performed by: COLON & RECTAL SURGERY

## 2021-03-05 PROCEDURE — 3074F PR MOST RECENT SYSTOLIC BLOOD PRESSURE < 130 MM HG: ICD-10-PCS | Mod: CPTII,S$GLB,, | Performed by: COLON & RECTAL SURGERY

## 2021-03-05 PROCEDURE — 3078F DIAST BP <80 MM HG: CPT | Mod: CPTII,S$GLB,, | Performed by: COLON & RECTAL SURGERY

## 2021-03-10 ENCOUNTER — TELEPHONE (OUTPATIENT)
Dept: SURGERY | Facility: CLINIC | Age: 61
End: 2021-03-10

## 2021-03-12 ENCOUNTER — LAB VISIT (OUTPATIENT)
Dept: INTERNAL MEDICINE | Facility: CLINIC | Age: 61
End: 2021-03-12
Attending: COLON & RECTAL SURGERY
Payer: MEDICARE

## 2021-03-12 ENCOUNTER — TELEPHONE (OUTPATIENT)
Dept: SURGERY | Facility: CLINIC | Age: 61
End: 2021-03-12

## 2021-03-12 DIAGNOSIS — Z01.818 PRE-OP EVALUATION: ICD-10-CM

## 2021-03-12 LAB — SARS-COV-2 RNA RESP QL NAA+PROBE: NOT DETECTED

## 2021-03-12 PROCEDURE — U0005 INFEC AGEN DETEC AMPLI PROBE: HCPCS | Performed by: COLON & RECTAL SURGERY

## 2021-03-12 PROCEDURE — U0003 INFECTIOUS AGENT DETECTION BY NUCLEIC ACID (DNA OR RNA); SEVERE ACUTE RESPIRATORY SYNDROME CORONAVIRUS 2 (SARS-COV-2) (CORONAVIRUS DISEASE [COVID-19]), AMPLIFIED PROBE TECHNIQUE, MAKING USE OF HIGH THROUGHPUT TECHNOLOGIES AS DESCRIBED BY CMS-2020-01-R: HCPCS | Performed by: COLON & RECTAL SURGERY

## 2021-03-15 ENCOUNTER — ANESTHESIA (OUTPATIENT)
Dept: SURGERY | Facility: HOSPITAL | Age: 61
End: 2021-03-15
Payer: MEDICARE

## 2021-03-15 ENCOUNTER — HOSPITAL ENCOUNTER (OUTPATIENT)
Facility: HOSPITAL | Age: 61
Discharge: HOME | End: 2021-03-15
Attending: COLON & RECTAL SURGERY | Admitting: COLON & RECTAL SURGERY
Payer: MEDICARE

## 2021-03-15 ENCOUNTER — ANESTHESIA EVENT (OUTPATIENT)
Dept: SURGERY | Facility: HOSPITAL | Age: 61
End: 2021-03-15
Payer: MEDICARE

## 2021-03-15 VITALS
RESPIRATION RATE: 18 BRPM | HEART RATE: 77 BPM | HEIGHT: 69 IN | BODY MASS INDEX: 37.55 KG/M2 | TEMPERATURE: 98 F | SYSTOLIC BLOOD PRESSURE: 158 MMHG | WEIGHT: 253.5 LBS | OXYGEN SATURATION: 95 % | DIASTOLIC BLOOD PRESSURE: 92 MMHG

## 2021-03-15 DIAGNOSIS — K62.89 ANAL PAIN: Primary | ICD-10-CM

## 2021-03-15 PROCEDURE — 25000003 PHARM REV CODE 250: Performed by: STUDENT IN AN ORGANIZED HEALTH CARE EDUCATION/TRAINING PROGRAM

## 2021-03-15 PROCEDURE — D9220A PRA ANESTHESIA: ICD-10-PCS | Mod: ANES,,, | Performed by: ANESTHESIOLOGY

## 2021-03-15 PROCEDURE — D9220A PRA ANESTHESIA: Mod: ANES,,, | Performed by: ANESTHESIOLOGY

## 2021-03-15 PROCEDURE — 36000707: Performed by: COLON & RECTAL SURGERY

## 2021-03-15 PROCEDURE — 37000009 HC ANESTHESIA EA ADD 15 MINS: Performed by: COLON & RECTAL SURGERY

## 2021-03-15 PROCEDURE — 25000003 PHARM REV CODE 250: Performed by: COLON & RECTAL SURGERY

## 2021-03-15 PROCEDURE — 37000008 HC ANESTHESIA 1ST 15 MINUTES: Performed by: COLON & RECTAL SURGERY

## 2021-03-15 PROCEDURE — 63600175 PHARM REV CODE 636 W HCPCS: Performed by: ANESTHESIOLOGY

## 2021-03-15 PROCEDURE — 63600175 PHARM REV CODE 636 W HCPCS: Performed by: STUDENT IN AN ORGANIZED HEALTH CARE EDUCATION/TRAINING PROGRAM

## 2021-03-15 PROCEDURE — D9220A PRA ANESTHESIA: ICD-10-PCS | Mod: CRNA,,, | Performed by: STUDENT IN AN ORGANIZED HEALTH CARE EDUCATION/TRAINING PROGRAM

## 2021-03-15 PROCEDURE — 46221 PR HEMORRHOIDECTOMY INTERNAL RUBBER BAND LIGATIONS: ICD-10-PCS | Mod: ,,, | Performed by: COLON & RECTAL SURGERY

## 2021-03-15 PROCEDURE — D9220A PRA ANESTHESIA: Mod: CRNA,,, | Performed by: STUDENT IN AN ORGANIZED HEALTH CARE EDUCATION/TRAINING PROGRAM

## 2021-03-15 PROCEDURE — 25000003 PHARM REV CODE 250: Performed by: NURSE PRACTITIONER

## 2021-03-15 PROCEDURE — 46221 LIGATION OF HEMORRHOID(S): CPT | Mod: ,,, | Performed by: COLON & RECTAL SURGERY

## 2021-03-15 PROCEDURE — 71000044 HC DOSC ROUTINE RECOVERY FIRST HOUR: Performed by: COLON & RECTAL SURGERY

## 2021-03-15 PROCEDURE — 36000706: Performed by: COLON & RECTAL SURGERY

## 2021-03-15 PROCEDURE — 71000015 HC POSTOP RECOV 1ST HR: Performed by: COLON & RECTAL SURGERY

## 2021-03-15 RX ORDER — ESMOLOL HYDROCHLORIDE 10 MG/ML
INJECTION INTRAVENOUS
Status: DISCONTINUED | OUTPATIENT
Start: 2021-03-15 | End: 2021-03-15

## 2021-03-15 RX ORDER — FENTANYL CITRATE 50 UG/ML
25 INJECTION, SOLUTION INTRAMUSCULAR; INTRAVENOUS EVERY 5 MIN PRN
Status: DISCONTINUED | OUTPATIENT
Start: 2021-03-15 | End: 2021-03-15 | Stop reason: HOSPADM

## 2021-03-15 RX ORDER — SODIUM CHLORIDE 9 MG/ML
INJECTION, SOLUTION INTRAVENOUS CONTINUOUS
Status: ACTIVE | OUTPATIENT
Start: 2021-03-15

## 2021-03-15 RX ORDER — OXYCODONE AND ACETAMINOPHEN 5; 325 MG/1; MG/1
1 TABLET ORAL EVERY 4 HOURS PRN
Qty: 26 TABLET | Refills: 0 | Status: SHIPPED | OUTPATIENT
Start: 2021-03-15 | End: 2021-03-15 | Stop reason: HOSPADM

## 2021-03-15 RX ORDER — LIDOCAINE HYDROCHLORIDE 20 MG/ML
INJECTION, SOLUTION EPIDURAL; INFILTRATION; INTRACAUDAL; PERINEURAL
Status: DISCONTINUED | OUTPATIENT
Start: 2021-03-15 | End: 2021-03-15

## 2021-03-15 RX ORDER — OXYCODONE AND ACETAMINOPHEN 5; 325 MG/1; MG/1
1 TABLET ORAL EVERY 4 HOURS PRN
Qty: 25 TABLET | Refills: 0 | Status: SHIPPED | OUTPATIENT
Start: 2021-03-15 | End: 2021-03-15 | Stop reason: SDUPTHER

## 2021-03-15 RX ORDER — ONDANSETRON 2 MG/ML
INJECTION INTRAMUSCULAR; INTRAVENOUS
Status: DISCONTINUED | OUTPATIENT
Start: 2021-03-15 | End: 2021-03-15

## 2021-03-15 RX ORDER — NEOSTIGMINE METHYLSULFATE 0.5 MG/ML
INJECTION, SOLUTION INTRAVENOUS
Status: DISCONTINUED | OUTPATIENT
Start: 2021-03-15 | End: 2021-03-15

## 2021-03-15 RX ORDER — MIDAZOLAM HYDROCHLORIDE 1 MG/ML
INJECTION, SOLUTION INTRAMUSCULAR; INTRAVENOUS
Status: DISCONTINUED | OUTPATIENT
Start: 2021-03-15 | End: 2021-03-15

## 2021-03-15 RX ORDER — BUPIVACAINE HYDROCHLORIDE AND EPINEPHRINE 2.5; 5 MG/ML; UG/ML
INJECTION, SOLUTION EPIDURAL; INFILTRATION; INTRACAUDAL; PERINEURAL
Status: DISCONTINUED | OUTPATIENT
Start: 2021-03-15 | End: 2021-03-15 | Stop reason: HOSPADM

## 2021-03-15 RX ORDER — FENTANYL CITRATE 50 UG/ML
INJECTION, SOLUTION INTRAMUSCULAR; INTRAVENOUS
Status: DISCONTINUED | OUTPATIENT
Start: 2021-03-15 | End: 2021-03-15

## 2021-03-15 RX ORDER — MUPIROCIN 20 MG/G
OINTMENT TOPICAL
Status: DISCONTINUED | OUTPATIENT
Start: 2021-03-15 | End: 2022-02-10

## 2021-03-15 RX ORDER — ROCURONIUM BROMIDE 10 MG/ML
INJECTION, SOLUTION INTRAVENOUS
Status: DISCONTINUED | OUTPATIENT
Start: 2021-03-15 | End: 2021-03-15

## 2021-03-15 RX ORDER — SODIUM CHLORIDE 0.9 % (FLUSH) 0.9 %
10 SYRINGE (ML) INJECTION
Status: DISCONTINUED | OUTPATIENT
Start: 2021-03-15 | End: 2021-03-15 | Stop reason: HOSPADM

## 2021-03-15 RX ORDER — DEXAMETHASONE SODIUM PHOSPHATE 4 MG/ML
INJECTION, SOLUTION INTRA-ARTICULAR; INTRALESIONAL; INTRAMUSCULAR; INTRAVENOUS; SOFT TISSUE
Status: DISCONTINUED | OUTPATIENT
Start: 2021-03-15 | End: 2021-03-15

## 2021-03-15 RX ORDER — PROPOFOL 10 MG/ML
VIAL (ML) INTRAVENOUS
Status: DISCONTINUED | OUTPATIENT
Start: 2021-03-15 | End: 2021-03-15

## 2021-03-15 RX ADMIN — NEOSTIGMINE METHYLSULFATE 5 MG: 0.5 INJECTION INTRAVENOUS at 05:03

## 2021-03-15 RX ADMIN — SODIUM CHLORIDE: 0.9 INJECTION, SOLUTION INTRAVENOUS at 04:03

## 2021-03-15 RX ADMIN — FENTANYL CITRATE 25 MCG: 50 INJECTION INTRAMUSCULAR; INTRAVENOUS at 05:03

## 2021-03-15 RX ADMIN — GLYCOPYRROLATE 0.6 MG: 0.2 INJECTION, SOLUTION INTRAMUSCULAR; INTRAVITREAL at 05:03

## 2021-03-15 RX ADMIN — ESMOLOL HYDROCHLORIDE 30 MG: 100 INJECTION, SOLUTION INTRAVENOUS at 05:03

## 2021-03-15 RX ADMIN — FENTANYL CITRATE 100 MCG: 50 INJECTION INTRAMUSCULAR; INTRAVENOUS at 04:03

## 2021-03-15 RX ADMIN — SUGAMMADEX 200 MG: 100 INJECTION, SOLUTION INTRAVENOUS at 05:03

## 2021-03-15 RX ADMIN — MIDAZOLAM 2 MG: 1 INJECTION INTRAMUSCULAR; INTRAVENOUS at 04:03

## 2021-03-15 RX ADMIN — DEXAMETHASONE SODIUM PHOSPHATE 4 MG: 4 INJECTION INTRA-ARTICULAR; INTRALESIONAL; INTRAMUSCULAR; INTRAVENOUS; SOFT TISSUE at 04:03

## 2021-03-15 RX ADMIN — MUPIROCIN: 20 OINTMENT TOPICAL at 02:03

## 2021-03-15 RX ADMIN — ONDANSETRON 4 MG: 2 INJECTION INTRAMUSCULAR; INTRAVENOUS at 04:03

## 2021-03-15 RX ADMIN — ROCURONIUM BROMIDE 50 MG: 10 INJECTION, SOLUTION INTRAVENOUS at 04:03

## 2021-03-15 RX ADMIN — PROPOFOL 200 MG: 10 INJECTION, EMULSION INTRAVENOUS at 04:03

## 2021-03-15 RX ADMIN — SODIUM CHLORIDE: 0.9 INJECTION, SOLUTION INTRAVENOUS at 02:03

## 2021-03-15 RX ADMIN — LIDOCAINE HYDROCHLORIDE 100 MG: 20 INJECTION, SOLUTION EPIDURAL; INFILTRATION; INTRACAUDAL at 04:03

## 2021-03-25 ENCOUNTER — OFFICE VISIT (OUTPATIENT)
Dept: OTOLARYNGOLOGY | Facility: CLINIC | Age: 61
End: 2021-03-25
Payer: MEDICARE

## 2021-03-25 VITALS
BODY MASS INDEX: 37.05 KG/M2 | HEIGHT: 69 IN | WEIGHT: 250.13 LBS | DIASTOLIC BLOOD PRESSURE: 73 MMHG | TEMPERATURE: 98 F | SYSTOLIC BLOOD PRESSURE: 119 MMHG | HEART RATE: 97 BPM

## 2021-03-25 DIAGNOSIS — J30.89 NON-SEASONAL ALLERGIC RHINITIS, UNSPECIFIED TRIGGER: ICD-10-CM

## 2021-03-25 DIAGNOSIS — R13.10 DYSPHAGIA, UNSPECIFIED TYPE: Primary | ICD-10-CM

## 2021-03-25 DIAGNOSIS — H10.13 ALLERGIC CONJUNCTIVITIS OF BOTH EYES: ICD-10-CM

## 2021-03-25 DIAGNOSIS — K21.9 LARYNGOPHARYNGEAL REFLUX (LPR): ICD-10-CM

## 2021-03-25 DIAGNOSIS — R09.89 THROAT CLEARING: ICD-10-CM

## 2021-03-25 DIAGNOSIS — R51.9 NONINTRACTABLE EPISODIC HEADACHE, UNSPECIFIED HEADACHE TYPE: ICD-10-CM

## 2021-03-25 DIAGNOSIS — R09.A2 GLOBUS SENSATION: ICD-10-CM

## 2021-03-25 DIAGNOSIS — J34.2 NASAL SEPTAL DEVIATION: ICD-10-CM

## 2021-03-25 PROCEDURE — 3008F BODY MASS INDEX DOCD: CPT | Mod: CPTII,S$GLB,, | Performed by: SPECIALIST

## 2021-03-25 PROCEDURE — 99213 OFFICE O/P EST LOW 20 MIN: CPT | Mod: S$GLB,,, | Performed by: SPECIALIST

## 2021-03-25 PROCEDURE — 1125F AMNT PAIN NOTED PAIN PRSNT: CPT | Mod: S$GLB,,, | Performed by: SPECIALIST

## 2021-03-25 PROCEDURE — 99213 PR OFFICE/OUTPT VISIT, EST, LEVL III, 20-29 MIN: ICD-10-PCS | Mod: S$GLB,,, | Performed by: SPECIALIST

## 2021-03-25 PROCEDURE — 1125F PR PAIN SEVERITY QUANTIFIED, PAIN PRESENT: ICD-10-PCS | Mod: S$GLB,,, | Performed by: SPECIALIST

## 2021-03-25 PROCEDURE — 3078F DIAST BP <80 MM HG: CPT | Mod: CPTII,S$GLB,, | Performed by: SPECIALIST

## 2021-03-25 PROCEDURE — 3074F SYST BP LT 130 MM HG: CPT | Mod: CPTII,S$GLB,, | Performed by: SPECIALIST

## 2021-03-25 PROCEDURE — 3078F PR MOST RECENT DIASTOLIC BLOOD PRESSURE < 80 MM HG: ICD-10-PCS | Mod: CPTII,S$GLB,, | Performed by: SPECIALIST

## 2021-03-25 PROCEDURE — 99499 UNLISTED E&M SERVICE: CPT | Mod: S$GLB,,, | Performed by: SPECIALIST

## 2021-03-25 PROCEDURE — 99999 PR PBB SHADOW E&M-EST. PATIENT-LVL IV: CPT | Mod: PBBFAC,,, | Performed by: SPECIALIST

## 2021-03-25 PROCEDURE — 99999 PR PBB SHADOW E&M-EST. PATIENT-LVL IV: ICD-10-PCS | Mod: PBBFAC,,, | Performed by: SPECIALIST

## 2021-03-25 PROCEDURE — 3074F PR MOST RECENT SYSTOLIC BLOOD PRESSURE < 130 MM HG: ICD-10-PCS | Mod: CPTII,S$GLB,, | Performed by: SPECIALIST

## 2021-03-25 PROCEDURE — 3008F PR BODY MASS INDEX (BMI) DOCUMENTED: ICD-10-PCS | Mod: CPTII,S$GLB,, | Performed by: SPECIALIST

## 2021-03-25 PROCEDURE — 99499 RISK ADDL DX/OHS AUDIT: ICD-10-PCS | Mod: S$GLB,,, | Performed by: SPECIALIST

## 2021-03-25 RX ORDER — AZELASTINE HYDROCHLORIDE 0.5 MG/ML
1 SOLUTION/ DROPS OPHTHALMIC 2 TIMES DAILY
Qty: 4 ML | Refills: 11 | Status: SHIPPED | OUTPATIENT
Start: 2021-03-25 | End: 2021-12-07 | Stop reason: CLARIF

## 2021-04-01 DIAGNOSIS — K21.9 GASTROESOPHAGEAL REFLUX DISEASE WITHOUT ESOPHAGITIS: ICD-10-CM

## 2021-04-01 RX ORDER — ESOMEPRAZOLE MAGNESIUM 40 MG/1
40 CAPSULE, DELAYED RELEASE ORAL
Qty: 180 CAPSULE | Refills: 0 | Status: SHIPPED | OUTPATIENT
Start: 2021-04-01 | End: 2021-07-12 | Stop reason: SDUPTHER

## 2021-04-04 ENCOUNTER — HOSPITAL ENCOUNTER (EMERGENCY)
Facility: OTHER | Age: 61
Discharge: HOME | End: 2021-04-04
Attending: EMERGENCY MEDICINE
Payer: MEDICARE

## 2021-04-04 VITALS
BODY MASS INDEX: 35.42 KG/M2 | RESPIRATION RATE: 19 BRPM | WEIGHT: 253 LBS | HEIGHT: 71 IN | SYSTOLIC BLOOD PRESSURE: 155 MMHG | DIASTOLIC BLOOD PRESSURE: 93 MMHG | HEART RATE: 79 BPM | OXYGEN SATURATION: 95 % | TEMPERATURE: 98 F

## 2021-04-04 DIAGNOSIS — M54.50 ACUTE LEFT-SIDED LOW BACK PAIN WITHOUT SCIATICA: ICD-10-CM

## 2021-04-04 DIAGNOSIS — N12 PYELONEPHRITIS: Primary | ICD-10-CM

## 2021-04-04 LAB
ALBUMIN SERPL BCP-MCNC: 3.7 G/DL (ref 3.5–5.2)
ALP SERPL-CCNC: 80 U/L (ref 55–135)
ALT SERPL W/O P-5'-P-CCNC: 28 U/L (ref 10–44)
ANION GAP SERPL CALC-SCNC: 10 MMOL/L (ref 8–16)
APTT BLDCRRT: 28.2 SEC (ref 21–32)
AST SERPL-CCNC: 23 U/L (ref 10–40)
BASOPHILS # BLD AUTO: 0.03 K/UL (ref 0–0.2)
BASOPHILS NFR BLD: 0.4 % (ref 0–1.9)
BILIRUB SERPL-MCNC: 1.3 MG/DL (ref 0.1–1)
BILIRUB UR QL STRIP: NEGATIVE
BUN SERPL-MCNC: 12 MG/DL (ref 6–20)
CALCIUM SERPL-MCNC: 8.6 MG/DL (ref 8.7–10.5)
CHLORIDE SERPL-SCNC: 105 MMOL/L (ref 95–110)
CLARITY UR: CLEAR
CO2 SERPL-SCNC: 26 MMOL/L (ref 23–29)
COLOR UR: YELLOW
CREAT SERPL-MCNC: 1 MG/DL (ref 0.5–1.4)
CREAT SERPL-MCNC: 1 MG/DL (ref 0.5–1.4)
DIFFERENTIAL METHOD: ABNORMAL
EOSINOPHIL # BLD AUTO: 0.2 K/UL (ref 0–0.5)
EOSINOPHIL NFR BLD: 3 % (ref 0–8)
ERYTHROCYTE [DISTWIDTH] IN BLOOD BY AUTOMATED COUNT: 13.4 % (ref 11.5–14.5)
EST. GFR  (AFRICAN AMERICAN): >60 ML/MIN/1.73 M^2
EST. GFR  (NON AFRICAN AMERICAN): >60 ML/MIN/1.73 M^2
GLUCOSE SERPL-MCNC: 115 MG/DL (ref 70–110)
GLUCOSE UR QL STRIP: NEGATIVE
HCT VFR BLD AUTO: 37.7 % (ref 40–54)
HGB BLD-MCNC: 12.4 G/DL (ref 14–18)
HGB UR QL STRIP: NEGATIVE
IMM GRANULOCYTES # BLD AUTO: 0.01 K/UL (ref 0–0.04)
IMM GRANULOCYTES NFR BLD AUTO: 0.1 % (ref 0–0.5)
INR PPP: 1 (ref 0.8–1.2)
KETONES UR QL STRIP: ABNORMAL
LACTATE SERPL-SCNC: 0.6 MMOL/L (ref 0.5–2.2)
LEUKOCYTE ESTERASE UR QL STRIP: NEGATIVE
LYMPHOCYTES # BLD AUTO: 1.7 K/UL (ref 1–4.8)
LYMPHOCYTES NFR BLD: 23.4 % (ref 18–48)
MCH RBC QN AUTO: 29.5 PG (ref 27–31)
MCHC RBC AUTO-ENTMCNC: 32.9 G/DL (ref 32–36)
MCV RBC AUTO: 90 FL (ref 82–98)
MONOCYTES # BLD AUTO: 0.5 K/UL (ref 0.3–1)
MONOCYTES NFR BLD: 6.5 % (ref 4–15)
NEUTROPHILS # BLD AUTO: 5 K/UL (ref 1.8–7.7)
NEUTROPHILS NFR BLD: 66.6 % (ref 38–73)
NITRITE UR QL STRIP: NEGATIVE
NRBC BLD-RTO: 0 /100 WBC
PH UR STRIP: 7 [PH] (ref 5–8)
PLATELET # BLD AUTO: 259 K/UL (ref 150–450)
PMV BLD AUTO: 9.3 FL (ref 9.2–12.9)
POTASSIUM SERPL-SCNC: 3 MMOL/L (ref 3.5–5.1)
PROT SERPL-MCNC: 7.1 G/DL (ref 6–8.4)
PROT UR QL STRIP: NEGATIVE
PROTHROMBIN TIME: 10.8 SEC (ref 9–12.5)
RBC # BLD AUTO: 4.21 M/UL (ref 4.6–6.2)
SAMPLE: NORMAL
SODIUM SERPL-SCNC: 141 MMOL/L (ref 136–145)
SP GR UR STRIP: 1.02 (ref 1–1.03)
TROPONIN I SERPL DL<=0.01 NG/ML-MCNC: 0.01 NG/ML (ref 0–0.03)
URN SPEC COLLECT METH UR: ABNORMAL
UROBILINOGEN UR STRIP-ACNC: >=8 EU/DL
WBC # BLD AUTO: 7.44 K/UL (ref 3.9–12.7)

## 2021-04-04 PROCEDURE — 36415 COLL VENOUS BLD VENIPUNCTURE: CPT | Performed by: EMERGENCY MEDICINE

## 2021-04-04 PROCEDURE — 87040 BLOOD CULTURE FOR BACTERIA: CPT | Mod: 59 | Performed by: EMERGENCY MEDICINE

## 2021-04-04 PROCEDURE — 81003 URINALYSIS AUTO W/O SCOPE: CPT | Performed by: EMERGENCY MEDICINE

## 2021-04-04 PROCEDURE — 99900035 HC TECH TIME PER 15 MIN (STAT)

## 2021-04-04 PROCEDURE — 96367 TX/PROPH/DG ADDL SEQ IV INF: CPT

## 2021-04-04 PROCEDURE — 85610 PROTHROMBIN TIME: CPT | Performed by: EMERGENCY MEDICINE

## 2021-04-04 PROCEDURE — 84484 ASSAY OF TROPONIN QUANT: CPT | Performed by: EMERGENCY MEDICINE

## 2021-04-04 PROCEDURE — 83605 ASSAY OF LACTIC ACID: CPT | Performed by: EMERGENCY MEDICINE

## 2021-04-04 PROCEDURE — 99285 EMERGENCY DEPT VISIT HI MDM: CPT | Mod: 25

## 2021-04-04 PROCEDURE — 96365 THER/PROPH/DIAG IV INF INIT: CPT

## 2021-04-04 PROCEDURE — 85025 COMPLETE CBC W/AUTO DIFF WBC: CPT | Performed by: EMERGENCY MEDICINE

## 2021-04-04 PROCEDURE — 80053 COMPREHEN METABOLIC PANEL: CPT | Performed by: EMERGENCY MEDICINE

## 2021-04-04 PROCEDURE — 25000003 PHARM REV CODE 250: Performed by: EMERGENCY MEDICINE

## 2021-04-04 PROCEDURE — 25500020 PHARM REV CODE 255: Performed by: EMERGENCY MEDICINE

## 2021-04-04 PROCEDURE — 96375 TX/PRO/DX INJ NEW DRUG ADDON: CPT

## 2021-04-04 PROCEDURE — 96361 HYDRATE IV INFUSION ADD-ON: CPT

## 2021-04-04 PROCEDURE — 85730 THROMBOPLASTIN TIME PARTIAL: CPT | Performed by: EMERGENCY MEDICINE

## 2021-04-04 PROCEDURE — 82565 ASSAY OF CREATININE: CPT | Mod: 91

## 2021-04-04 PROCEDURE — 63600175 PHARM REV CODE 636 W HCPCS: Performed by: EMERGENCY MEDICINE

## 2021-04-04 RX ORDER — TAMSULOSIN HYDROCHLORIDE 0.4 MG/1
0.4 CAPSULE ORAL DAILY
Qty: 30 CAPSULE | Refills: 0 | Status: SHIPPED | OUTPATIENT
Start: 2021-04-04 | End: 2021-04-04 | Stop reason: ALTCHOICE

## 2021-04-04 RX ORDER — METHOCARBAMOL 750 MG/1
1500 TABLET, FILM COATED ORAL
Status: COMPLETED | OUTPATIENT
Start: 2021-04-04 | End: 2021-04-04

## 2021-04-04 RX ORDER — LIDOCAINE 50 MG/G
PATCH TOPICAL
Qty: 30 PATCH | Refills: 0 | Status: SHIPPED | OUTPATIENT
Start: 2021-04-04 | End: 2021-11-26

## 2021-04-04 RX ORDER — MAGNESIUM SULFATE 1 G/100ML
1 INJECTION INTRAVENOUS
Status: COMPLETED | OUTPATIENT
Start: 2021-04-04 | End: 2021-04-04

## 2021-04-04 RX ORDER — HYDROMORPHONE HYDROCHLORIDE 1 MG/ML
0.5 INJECTION, SOLUTION INTRAMUSCULAR; INTRAVENOUS; SUBCUTANEOUS EVERY 30 MIN PRN
Status: DISCONTINUED | OUTPATIENT
Start: 2021-04-04 | End: 2021-04-05 | Stop reason: HOSPADM

## 2021-04-04 RX ORDER — HYDROCODONE BITARTRATE AND ACETAMINOPHEN 5; 325 MG/1; MG/1
1 TABLET ORAL
Status: COMPLETED | OUTPATIENT
Start: 2021-04-04 | End: 2021-04-04

## 2021-04-04 RX ORDER — KETOROLAC TROMETHAMINE 30 MG/ML
10 INJECTION, SOLUTION INTRAMUSCULAR; INTRAVENOUS
Status: COMPLETED | OUTPATIENT
Start: 2021-04-04 | End: 2021-04-04

## 2021-04-04 RX ORDER — NAPROXEN 500 MG/1
500 TABLET ORAL 2 TIMES DAILY PRN
Qty: 60 TABLET | Refills: 0 | Status: SHIPPED | OUTPATIENT
Start: 2021-04-04 | End: 2022-02-10

## 2021-04-04 RX ORDER — HYDROCODONE BITARTRATE AND ACETAMINOPHEN 5; 325 MG/1; MG/1
1 TABLET ORAL EVERY 4 HOURS PRN
Qty: 10 TABLET | Refills: 0 | Status: SHIPPED | OUTPATIENT
Start: 2021-04-04 | End: 2021-04-07

## 2021-04-04 RX ORDER — DIPHENHYDRAMINE HYDROCHLORIDE 50 MG/ML
12.5 INJECTION INTRAMUSCULAR; INTRAVENOUS ONCE AS NEEDED
Status: DISCONTINUED | OUTPATIENT
Start: 2021-04-04 | End: 2021-04-05 | Stop reason: HOSPADM

## 2021-04-04 RX ORDER — POTASSIUM CHLORIDE 20 MEQ/1
40 TABLET, EXTENDED RELEASE ORAL
Status: COMPLETED | OUTPATIENT
Start: 2021-04-04 | End: 2021-04-04

## 2021-04-04 RX ORDER — TIZANIDINE 2 MG/1
4 TABLET ORAL EVERY 6 HOURS PRN
Qty: 20 TABLET | Refills: 0 | Status: SHIPPED | OUTPATIENT
Start: 2021-04-04 | End: 2023-01-03

## 2021-04-04 RX ORDER — LEVOFLOXACIN 750 MG/1
750 TABLET ORAL DAILY
Qty: 4 TABLET | Refills: 0 | Status: SHIPPED | OUTPATIENT
Start: 2021-04-04 | End: 2021-04-08

## 2021-04-04 RX ORDER — LEVOFLOXACIN 750 MG/1
750 TABLET ORAL
Status: COMPLETED | OUTPATIENT
Start: 2021-04-04 | End: 2021-04-04

## 2021-04-04 RX ADMIN — IOHEXOL 100 ML: 350 INJECTION, SOLUTION INTRAVENOUS at 08:04

## 2021-04-04 RX ADMIN — MAGNESIUM SULFATE 1 G: 1 INJECTION INTRAVENOUS at 08:04

## 2021-04-04 RX ADMIN — CEFTRIAXONE 1 G: 1 INJECTION, SOLUTION INTRAVENOUS at 09:04

## 2021-04-04 RX ADMIN — SODIUM CHLORIDE 1000 ML: 0.9 INJECTION, SOLUTION INTRAVENOUS at 07:04

## 2021-04-04 RX ADMIN — HYDROCODONE BITARTRATE AND ACETAMINOPHEN 1 TABLET: 5; 325 TABLET ORAL at 10:04

## 2021-04-04 RX ADMIN — KETOROLAC TROMETHAMINE 10 MG: 30 INJECTION, SOLUTION INTRAMUSCULAR; INTRAVENOUS at 08:04

## 2021-04-04 RX ADMIN — LEVOFLOXACIN 750 MG: 750 TABLET, FILM COATED ORAL at 10:04

## 2021-04-04 RX ADMIN — POTASSIUM CHLORIDE 40 MEQ: 1500 TABLET, EXTENDED RELEASE ORAL at 09:04

## 2021-04-04 RX ADMIN — METHOCARBAMOL TABLETS 1500 MG: 750 TABLET, COATED ORAL at 10:04

## 2021-04-10 LAB
BACTERIA BLD CULT: NORMAL
BACTERIA BLD CULT: NORMAL

## 2021-04-14 ENCOUNTER — OFFICE VISIT (OUTPATIENT)
Dept: SURGERY | Facility: CLINIC | Age: 61
End: 2021-04-14
Attending: COLON & RECTAL SURGERY
Payer: MEDICARE

## 2021-04-14 VITALS
WEIGHT: 255.5 LBS | BODY MASS INDEX: 35.77 KG/M2 | DIASTOLIC BLOOD PRESSURE: 84 MMHG | HEART RATE: 83 BPM | HEIGHT: 71 IN | SYSTOLIC BLOOD PRESSURE: 127 MMHG

## 2021-04-14 DIAGNOSIS — Z98.890 POST-OPERATIVE STATE: Primary | ICD-10-CM

## 2021-04-14 PROCEDURE — 99024 POSTOP FOLLOW-UP VISIT: CPT | Mod: S$GLB,,, | Performed by: COLON & RECTAL SURGERY

## 2021-04-14 PROCEDURE — 1126F PR PAIN SEVERITY QUANTIFIED, NO PAIN PRESENT: ICD-10-PCS | Mod: S$GLB,,, | Performed by: COLON & RECTAL SURGERY

## 2021-04-14 PROCEDURE — 3008F PR BODY MASS INDEX (BMI) DOCUMENTED: ICD-10-PCS | Mod: CPTII,S$GLB,, | Performed by: COLON & RECTAL SURGERY

## 2021-04-14 PROCEDURE — 99024 PR POST-OP FOLLOW-UP VISIT: ICD-10-PCS | Mod: S$GLB,,, | Performed by: COLON & RECTAL SURGERY

## 2021-04-14 PROCEDURE — 99999 PR PBB SHADOW E&M-EST. PATIENT-LVL III: CPT | Mod: PBBFAC,,, | Performed by: COLON & RECTAL SURGERY

## 2021-04-14 PROCEDURE — 3008F BODY MASS INDEX DOCD: CPT | Mod: CPTII,S$GLB,, | Performed by: COLON & RECTAL SURGERY

## 2021-04-14 PROCEDURE — 1126F AMNT PAIN NOTED NONE PRSNT: CPT | Mod: S$GLB,,, | Performed by: COLON & RECTAL SURGERY

## 2021-04-14 PROCEDURE — 99999 PR PBB SHADOW E&M-EST. PATIENT-LVL III: ICD-10-PCS | Mod: PBBFAC,,, | Performed by: COLON & RECTAL SURGERY

## 2021-04-28 ENCOUNTER — OFFICE VISIT (OUTPATIENT)
Dept: ENDOCRINOLOGY | Facility: CLINIC | Age: 61
End: 2021-04-28
Payer: MEDICARE

## 2021-04-28 VITALS
BODY MASS INDEX: 35.59 KG/M2 | OXYGEN SATURATION: 98 % | HEART RATE: 98 BPM | DIASTOLIC BLOOD PRESSURE: 78 MMHG | SYSTOLIC BLOOD PRESSURE: 130 MMHG | HEIGHT: 71 IN | WEIGHT: 254.19 LBS

## 2021-04-28 DIAGNOSIS — E66.01 SEVERE OBESITY (BMI 35.0-39.9) WITH COMORBIDITY: ICD-10-CM

## 2021-04-28 DIAGNOSIS — I10 ESSENTIAL HYPERTENSION: ICD-10-CM

## 2021-04-28 DIAGNOSIS — Z12.5 ENCOUNTER FOR SCREENING FOR MALIGNANT NEOPLASM OF PROSTATE: ICD-10-CM

## 2021-04-28 DIAGNOSIS — E29.1 HYPOGONADISM IN MALE: Primary | ICD-10-CM

## 2021-04-28 DIAGNOSIS — N62 GYNECOMASTIA, MALE: ICD-10-CM

## 2021-04-28 PROCEDURE — 99214 PR OFFICE/OUTPT VISIT, EST, LEVL IV, 30-39 MIN: ICD-10-PCS | Mod: S$GLB,,, | Performed by: INTERNAL MEDICINE

## 2021-04-28 PROCEDURE — 1126F PR PAIN SEVERITY QUANTIFIED, NO PAIN PRESENT: ICD-10-PCS | Mod: S$GLB,,, | Performed by: INTERNAL MEDICINE

## 2021-04-28 PROCEDURE — 3008F BODY MASS INDEX DOCD: CPT | Mod: CPTII,S$GLB,, | Performed by: INTERNAL MEDICINE

## 2021-04-28 PROCEDURE — 1126F AMNT PAIN NOTED NONE PRSNT: CPT | Mod: S$GLB,,, | Performed by: INTERNAL MEDICINE

## 2021-04-28 PROCEDURE — 99214 OFFICE O/P EST MOD 30 MIN: CPT | Mod: S$GLB,,, | Performed by: INTERNAL MEDICINE

## 2021-04-28 PROCEDURE — 3008F PR BODY MASS INDEX (BMI) DOCUMENTED: ICD-10-PCS | Mod: CPTII,S$GLB,, | Performed by: INTERNAL MEDICINE

## 2021-05-03 ENCOUNTER — LAB VISIT (OUTPATIENT)
Dept: LAB | Facility: OTHER | Age: 61
End: 2021-05-03
Attending: INTERNAL MEDICINE
Payer: MEDICARE

## 2021-05-03 DIAGNOSIS — N62 GYNECOMASTIA, MALE: ICD-10-CM

## 2021-05-03 DIAGNOSIS — E29.1 HYPOGONADISM IN MALE: ICD-10-CM

## 2021-05-03 LAB
BASOPHILS # BLD AUTO: 0.02 K/UL (ref 0–0.2)
BASOPHILS NFR BLD: 0.3 % (ref 0–1.9)
DIFFERENTIAL METHOD: ABNORMAL
EOSINOPHIL # BLD AUTO: 0.1 K/UL (ref 0–0.5)
EOSINOPHIL NFR BLD: 1.8 % (ref 0–8)
ERYTHROCYTE [DISTWIDTH] IN BLOOD BY AUTOMATED COUNT: 12.8 % (ref 11.5–14.5)
HCT VFR BLD AUTO: 40.7 % (ref 40–54)
HGB BLD-MCNC: 12.9 G/DL (ref 14–18)
IMM GRANULOCYTES # BLD AUTO: 0.01 K/UL (ref 0–0.04)
IMM GRANULOCYTES NFR BLD AUTO: 0.2 % (ref 0–0.5)
LYMPHOCYTES # BLD AUTO: 1.6 K/UL (ref 1–4.8)
LYMPHOCYTES NFR BLD: 23.7 % (ref 18–48)
MCH RBC QN AUTO: 29.1 PG (ref 27–31)
MCHC RBC AUTO-ENTMCNC: 31.7 G/DL (ref 32–36)
MCV RBC AUTO: 92 FL (ref 82–98)
MONOCYTES # BLD AUTO: 0.4 K/UL (ref 0.3–1)
MONOCYTES NFR BLD: 5.4 % (ref 4–15)
NEUTROPHILS # BLD AUTO: 4.5 K/UL (ref 1.8–7.7)
NEUTROPHILS NFR BLD: 68.6 % (ref 38–73)
NRBC BLD-RTO: 0 /100 WBC
PLATELET # BLD AUTO: 255 K/UL (ref 150–450)
PMV BLD AUTO: 9.4 FL (ref 9.2–12.9)
RBC # BLD AUTO: 4.44 M/UL (ref 4.6–6.2)
TSH SERPL DL<=0.005 MIU/L-ACNC: 0.99 UIU/ML (ref 0.4–4)
WBC # BLD AUTO: 6.54 K/UL (ref 3.9–12.7)

## 2021-05-03 PROCEDURE — 85025 COMPLETE CBC W/AUTO DIFF WBC: CPT | Performed by: INTERNAL MEDICINE

## 2021-05-03 PROCEDURE — 84443 ASSAY THYROID STIM HORMONE: CPT | Performed by: INTERNAL MEDICINE

## 2021-05-03 PROCEDURE — 36415 COLL VENOUS BLD VENIPUNCTURE: CPT | Performed by: INTERNAL MEDICINE

## 2021-05-03 PROCEDURE — 84403 ASSAY OF TOTAL TESTOSTERONE: CPT | Performed by: INTERNAL MEDICINE

## 2021-05-03 PROCEDURE — 82670 ASSAY OF TOTAL ESTRADIOL: CPT | Performed by: INTERNAL MEDICINE

## 2021-05-04 ENCOUNTER — OFFICE VISIT (OUTPATIENT)
Dept: OTOLARYNGOLOGY | Facility: CLINIC | Age: 61
End: 2021-05-04
Payer: MEDICARE

## 2021-05-04 VITALS
DIASTOLIC BLOOD PRESSURE: 84 MMHG | WEIGHT: 251.38 LBS | SYSTOLIC BLOOD PRESSURE: 136 MMHG | TEMPERATURE: 98 F | BODY MASS INDEX: 35.06 KG/M2 | HEART RATE: 75 BPM

## 2021-05-04 DIAGNOSIS — H10.13 ALLERGIC CONJUNCTIVITIS OF BOTH EYES: ICD-10-CM

## 2021-05-04 DIAGNOSIS — K21.9 LARYNGOPHARYNGEAL REFLUX (LPR): ICD-10-CM

## 2021-05-04 DIAGNOSIS — G47.33 OBSTRUCTIVE SLEEP APNEA TREATED WITH CONTINUOUS POSITIVE AIRWAY PRESSURE (CPAP): ICD-10-CM

## 2021-05-04 DIAGNOSIS — J34.2 NASAL SEPTAL DEVIATION: ICD-10-CM

## 2021-05-04 DIAGNOSIS — R09.89 THROAT CLEARING: ICD-10-CM

## 2021-05-04 DIAGNOSIS — H69.93 DYSFUNCTION OF BOTH EUSTACHIAN TUBES: ICD-10-CM

## 2021-05-04 DIAGNOSIS — R09.A2 GLOBUS SENSATION: Primary | ICD-10-CM

## 2021-05-04 DIAGNOSIS — R13.10 DYSPHAGIA, UNSPECIFIED TYPE: ICD-10-CM

## 2021-05-04 DIAGNOSIS — J30.89 NON-SEASONAL ALLERGIC RHINITIS, UNSPECIFIED TRIGGER: ICD-10-CM

## 2021-05-04 LAB — ESTRADIOL SERPL-MCNC: 72 PG/ML (ref 11–44)

## 2021-05-04 PROCEDURE — 31575 DIAGNOSTIC LARYNGOSCOPY: CPT | Mod: S$GLB,,, | Performed by: SPECIALIST

## 2021-05-04 PROCEDURE — 99214 PR OFFICE/OUTPT VISIT, EST, LEVL IV, 30-39 MIN: ICD-10-PCS | Mod: 25,S$GLB,, | Performed by: SPECIALIST

## 2021-05-04 PROCEDURE — 1125F AMNT PAIN NOTED PAIN PRSNT: CPT | Mod: S$GLB,,, | Performed by: SPECIALIST

## 2021-05-04 PROCEDURE — 31575 LARYNGOSCOPY: ICD-10-PCS | Mod: S$GLB,,, | Performed by: SPECIALIST

## 2021-05-04 PROCEDURE — 3008F PR BODY MASS INDEX (BMI) DOCUMENTED: ICD-10-PCS | Mod: CPTII,S$GLB,, | Performed by: SPECIALIST

## 2021-05-04 PROCEDURE — 3008F BODY MASS INDEX DOCD: CPT | Mod: CPTII,S$GLB,, | Performed by: SPECIALIST

## 2021-05-04 PROCEDURE — 1125F PR PAIN SEVERITY QUANTIFIED, PAIN PRESENT: ICD-10-PCS | Mod: S$GLB,,, | Performed by: SPECIALIST

## 2021-05-04 PROCEDURE — 99999 PR PBB SHADOW E&M-EST. PATIENT-LVL III: CPT | Mod: PBBFAC,,, | Performed by: SPECIALIST

## 2021-05-04 PROCEDURE — 99999 PR PBB SHADOW E&M-EST. PATIENT-LVL III: ICD-10-PCS | Mod: PBBFAC,,, | Performed by: SPECIALIST

## 2021-05-04 PROCEDURE — 99214 OFFICE O/P EST MOD 30 MIN: CPT | Mod: 25,S$GLB,, | Performed by: SPECIALIST

## 2021-05-10 DIAGNOSIS — E29.1 HYPOGONADISM IN MALE: Primary | ICD-10-CM

## 2021-05-10 LAB
ALBUMIN SERPL-MCNC: 4 G/DL (ref 3.6–5.1)
SHBG SERPL-SCNC: 35 NMOL/L (ref 22–77)
TESTOST FREE SERPL-MCNC: 43.3 PG/ML (ref 46–224)
TESTOST SERPL-MCNC: 343 NG/DL (ref 250–1100)
TESTOSTERONE.FREE+WB SERPL-MCNC: 79.6 NG/DL (ref 110–575)

## 2021-05-21 ENCOUNTER — OFFICE VISIT (OUTPATIENT)
Dept: SURGERY | Facility: CLINIC | Age: 61
End: 2021-05-21
Attending: COLON & RECTAL SURGERY
Payer: MEDICARE

## 2021-05-21 VITALS
SYSTOLIC BLOOD PRESSURE: 141 MMHG | BODY MASS INDEX: 35.53 KG/M2 | WEIGHT: 253.81 LBS | HEART RATE: 90 BPM | HEIGHT: 71 IN | DIASTOLIC BLOOD PRESSURE: 73 MMHG

## 2021-05-21 DIAGNOSIS — K62.89 ANAL PAIN: Primary | ICD-10-CM

## 2021-05-21 PROCEDURE — 99024 POSTOP FOLLOW-UP VISIT: CPT | Mod: S$GLB,,, | Performed by: COLON & RECTAL SURGERY

## 2021-05-21 PROCEDURE — 3008F PR BODY MASS INDEX (BMI) DOCUMENTED: ICD-10-PCS | Mod: CPTII,S$GLB,, | Performed by: COLON & RECTAL SURGERY

## 2021-05-21 PROCEDURE — 1125F PR PAIN SEVERITY QUANTIFIED, PAIN PRESENT: ICD-10-PCS | Mod: S$GLB,,, | Performed by: COLON & RECTAL SURGERY

## 2021-05-21 PROCEDURE — 3008F BODY MASS INDEX DOCD: CPT | Mod: CPTII,S$GLB,, | Performed by: COLON & RECTAL SURGERY

## 2021-05-21 PROCEDURE — 1125F AMNT PAIN NOTED PAIN PRSNT: CPT | Mod: S$GLB,,, | Performed by: COLON & RECTAL SURGERY

## 2021-05-21 PROCEDURE — 99999 PR PBB SHADOW E&M-EST. PATIENT-LVL V: CPT | Mod: PBBFAC,,, | Performed by: COLON & RECTAL SURGERY

## 2021-05-21 PROCEDURE — 99024 PR POST-OP FOLLOW-UP VISIT: ICD-10-PCS | Mod: S$GLB,,, | Performed by: COLON & RECTAL SURGERY

## 2021-05-21 PROCEDURE — 99999 PR PBB SHADOW E&M-EST. PATIENT-LVL V: ICD-10-PCS | Mod: PBBFAC,,, | Performed by: COLON & RECTAL SURGERY

## 2021-06-15 ENCOUNTER — OFFICE VISIT (OUTPATIENT)
Dept: OTOLARYNGOLOGY | Facility: CLINIC | Age: 61
End: 2021-06-15
Payer: MEDICARE

## 2021-06-15 VITALS
WEIGHT: 252.56 LBS | DIASTOLIC BLOOD PRESSURE: 83 MMHG | TEMPERATURE: 98 F | SYSTOLIC BLOOD PRESSURE: 134 MMHG | BODY MASS INDEX: 35.22 KG/M2 | HEART RATE: 70 BPM

## 2021-06-15 DIAGNOSIS — K21.9 LARYNGOPHARYNGEAL REFLUX (LPR): Primary | ICD-10-CM

## 2021-06-15 DIAGNOSIS — J34.2 NASAL SEPTAL DEVIATION: ICD-10-CM

## 2021-06-15 DIAGNOSIS — R51.9 NONINTRACTABLE EPISODIC HEADACHE, UNSPECIFIED HEADACHE TYPE: ICD-10-CM

## 2021-06-15 DIAGNOSIS — J30.89 NON-SEASONAL ALLERGIC RHINITIS, UNSPECIFIED TRIGGER: ICD-10-CM

## 2021-06-15 DIAGNOSIS — G47.33 OBSTRUCTIVE SLEEP APNEA TREATED WITH CONTINUOUS POSITIVE AIRWAY PRESSURE (CPAP): ICD-10-CM

## 2021-06-15 DIAGNOSIS — H10.13 ALLERGIC CONJUNCTIVITIS OF BOTH EYES: ICD-10-CM

## 2021-06-15 PROCEDURE — 99999 PR PBB SHADOW E&M-EST. PATIENT-LVL IV: ICD-10-PCS | Mod: PBBFAC,,, | Performed by: SPECIALIST

## 2021-06-15 PROCEDURE — 99213 OFFICE O/P EST LOW 20 MIN: CPT | Mod: S$GLB,,, | Performed by: SPECIALIST

## 2021-06-15 PROCEDURE — 1126F PR PAIN SEVERITY QUANTIFIED, NO PAIN PRESENT: ICD-10-PCS | Mod: S$GLB,,, | Performed by: SPECIALIST

## 2021-06-15 PROCEDURE — 99499 UNLISTED E&M SERVICE: CPT | Mod: S$GLB,,, | Performed by: SPECIALIST

## 2021-06-15 PROCEDURE — 99999 PR PBB SHADOW E&M-EST. PATIENT-LVL IV: CPT | Mod: PBBFAC,,, | Performed by: SPECIALIST

## 2021-06-15 PROCEDURE — 3008F PR BODY MASS INDEX (BMI) DOCUMENTED: ICD-10-PCS | Mod: CPTII,S$GLB,, | Performed by: SPECIALIST

## 2021-06-15 PROCEDURE — 99213 PR OFFICE/OUTPT VISIT, EST, LEVL III, 20-29 MIN: ICD-10-PCS | Mod: S$GLB,,, | Performed by: SPECIALIST

## 2021-06-15 PROCEDURE — 3008F BODY MASS INDEX DOCD: CPT | Mod: CPTII,S$GLB,, | Performed by: SPECIALIST

## 2021-06-15 PROCEDURE — 99499 RISK ADDL DX/OHS AUDIT: ICD-10-PCS | Mod: S$GLB,,, | Performed by: SPECIALIST

## 2021-06-15 PROCEDURE — 1126F AMNT PAIN NOTED NONE PRSNT: CPT | Mod: S$GLB,,, | Performed by: SPECIALIST

## 2021-06-15 RX ORDER — IPRATROPIUM BROMIDE 42 UG/1
SPRAY, METERED NASAL
Qty: 30 ML | Refills: 11 | Status: SHIPPED | OUTPATIENT
Start: 2021-06-15

## 2021-06-15 RX ORDER — MONTELUKAST SODIUM 10 MG/1
TABLET ORAL
Qty: 30 TABLET | Refills: 11 | Status: SHIPPED | OUTPATIENT
Start: 2021-06-15 | End: 2022-01-11 | Stop reason: SDUPTHER

## 2021-06-15 RX ORDER — FLUTICASONE PROPIONATE 50 MCG
SPRAY, SUSPENSION (ML) NASAL
Qty: 18.2 ML | Refills: 11 | Status: SHIPPED | OUTPATIENT
Start: 2021-06-15

## 2021-06-18 ENCOUNTER — PATIENT MESSAGE (OUTPATIENT)
Dept: SLEEP MEDICINE | Facility: CLINIC | Age: 61
End: 2021-06-18

## 2021-06-18 ENCOUNTER — PATIENT MESSAGE (OUTPATIENT)
Dept: SURGERY | Facility: CLINIC | Age: 61
End: 2021-06-18

## 2021-07-08 ENCOUNTER — OFFICE VISIT (OUTPATIENT)
Dept: INTERNAL MEDICINE | Facility: CLINIC | Age: 61
End: 2021-07-08
Attending: INTERNAL MEDICINE
Payer: MEDICARE

## 2021-07-08 ENCOUNTER — LAB VISIT (OUTPATIENT)
Dept: LAB | Facility: OTHER | Age: 61
End: 2021-07-08
Attending: INTERNAL MEDICINE
Payer: MEDICARE

## 2021-07-08 VITALS
WEIGHT: 256.19 LBS | HEIGHT: 71 IN | HEART RATE: 89 BPM | SYSTOLIC BLOOD PRESSURE: 130 MMHG | OXYGEN SATURATION: 94 % | BODY MASS INDEX: 35.87 KG/M2 | DIASTOLIC BLOOD PRESSURE: 72 MMHG

## 2021-07-08 DIAGNOSIS — Z80.42 FAMILY HISTORY OF PROSTATE CANCER IN FATHER: ICD-10-CM

## 2021-07-08 DIAGNOSIS — I25.10 NON-OCCLUSIVE CORONARY ARTERY DISEASE REQUIRING DRUG THERAPY: ICD-10-CM

## 2021-07-08 DIAGNOSIS — K21.9 GASTROESOPHAGEAL REFLUX DISEASE, UNSPECIFIED WHETHER ESOPHAGITIS PRESENT: ICD-10-CM

## 2021-07-08 DIAGNOSIS — I10 ESSENTIAL HYPERTENSION: ICD-10-CM

## 2021-07-08 DIAGNOSIS — Z79.899 ENCOUNTER FOR LONG-TERM CURRENT USE OF MEDICATION: ICD-10-CM

## 2021-07-08 DIAGNOSIS — E66.09 CLASS 1 OBESITY DUE TO EXCESS CALORIES WITH SERIOUS COMORBIDITY AND BODY MASS INDEX (BMI) OF 34.0 TO 34.9 IN ADULT: ICD-10-CM

## 2021-07-08 DIAGNOSIS — G47.33 OBSTRUCTIVE SLEEP APNEA TREATED WITH CONTINUOUS POSITIVE AIRWAY PRESSURE (CPAP): ICD-10-CM

## 2021-07-08 DIAGNOSIS — I70.0 THORACIC AORTA ATHEROSCLEROSIS: ICD-10-CM

## 2021-07-08 DIAGNOSIS — Z00.00 ANNUAL PHYSICAL EXAM: Primary | ICD-10-CM

## 2021-07-08 DIAGNOSIS — J45.40 MODERATE PERSISTENT ASTHMA WITHOUT COMPLICATION: ICD-10-CM

## 2021-07-08 DIAGNOSIS — I27.9 CHRONIC PULMONARY HEART DISEASE: ICD-10-CM

## 2021-07-08 DIAGNOSIS — D68.00 VON WILLEBRAND DISEASE: ICD-10-CM

## 2021-07-08 DIAGNOSIS — D64.9 ANEMIA OF UNKNOWN ETIOLOGY: ICD-10-CM

## 2021-07-08 DIAGNOSIS — E78.5 HYPERLIPIDEMIA, UNSPECIFIED HYPERLIPIDEMIA TYPE: ICD-10-CM

## 2021-07-08 DIAGNOSIS — E55.9 VITAMIN D DEFICIENCY: ICD-10-CM

## 2021-07-08 DIAGNOSIS — Z80.0 FAMILY HISTORY OF COLON CANCER: ICD-10-CM

## 2021-07-08 DIAGNOSIS — G89.29 OTHER CHRONIC PAIN: ICD-10-CM

## 2021-07-08 DIAGNOSIS — I50.32 DIASTOLIC DYSFUNCTION WITH CHRONIC HEART FAILURE: ICD-10-CM

## 2021-07-08 LAB
ALBUMIN SERPL BCP-MCNC: 3.9 G/DL (ref 3.5–5.2)
ALP SERPL-CCNC: 75 U/L (ref 55–135)
ALT SERPL W/O P-5'-P-CCNC: 22 U/L (ref 10–44)
ANION GAP SERPL CALC-SCNC: 7 MMOL/L (ref 8–16)
AST SERPL-CCNC: 17 U/L (ref 10–40)
BASOPHILS # BLD AUTO: 0.04 K/UL (ref 0–0.2)
BASOPHILS NFR BLD: 0.5 % (ref 0–1.9)
BILIRUB SERPL-MCNC: 1.1 MG/DL (ref 0.1–1)
BUN SERPL-MCNC: 14 MG/DL (ref 6–20)
CALCIUM SERPL-MCNC: 8.8 MG/DL (ref 8.7–10.5)
CHLORIDE SERPL-SCNC: 107 MMOL/L (ref 95–110)
CO2 SERPL-SCNC: 24 MMOL/L (ref 23–29)
CREAT SERPL-MCNC: 1.1 MG/DL (ref 0.5–1.4)
DIFFERENTIAL METHOD: NORMAL
EOSINOPHIL # BLD AUTO: 0.2 K/UL (ref 0–0.5)
EOSINOPHIL NFR BLD: 2.8 % (ref 0–8)
ERYTHROCYTE [DISTWIDTH] IN BLOOD BY AUTOMATED COUNT: 12.9 % (ref 11.5–14.5)
EST. GFR  (AFRICAN AMERICAN): >60 ML/MIN/1.73 M^2
EST. GFR  (NON AFRICAN AMERICAN): >60 ML/MIN/1.73 M^2
GLUCOSE SERPL-MCNC: 88 MG/DL (ref 70–110)
HCT VFR BLD AUTO: 44.3 % (ref 40–54)
HGB BLD-MCNC: 14.3 G/DL (ref 14–18)
IMM GRANULOCYTES # BLD AUTO: 0.02 K/UL (ref 0–0.04)
IMM GRANULOCYTES NFR BLD AUTO: 0.3 % (ref 0–0.5)
LYMPHOCYTES # BLD AUTO: 1.5 K/UL (ref 1–4.8)
LYMPHOCYTES NFR BLD: 18.9 % (ref 18–48)
MCH RBC QN AUTO: 30 PG (ref 27–31)
MCHC RBC AUTO-ENTMCNC: 32.3 G/DL (ref 32–36)
MCV RBC AUTO: 93 FL (ref 82–98)
MONOCYTES # BLD AUTO: 0.4 K/UL (ref 0.3–1)
MONOCYTES NFR BLD: 5.2 % (ref 4–15)
NEUTROPHILS # BLD AUTO: 5.7 K/UL (ref 1.8–7.7)
NEUTROPHILS NFR BLD: 72.3 % (ref 38–73)
NRBC BLD-RTO: 0 /100 WBC
PLATELET # BLD AUTO: 237 K/UL (ref 150–450)
PMV BLD AUTO: 9.4 FL (ref 9.2–12.9)
POTASSIUM SERPL-SCNC: 4.1 MMOL/L (ref 3.5–5.1)
PROT SERPL-MCNC: 7.4 G/DL (ref 6–8.4)
RBC # BLD AUTO: 4.77 M/UL (ref 4.6–6.2)
SODIUM SERPL-SCNC: 138 MMOL/L (ref 136–145)
WBC # BLD AUTO: 7.93 K/UL (ref 3.9–12.7)

## 2021-07-08 PROCEDURE — 99214 PR OFFICE/OUTPT VISIT, EST, LEVL IV, 30-39 MIN: ICD-10-PCS | Mod: S$GLB,,, | Performed by: INTERNAL MEDICINE

## 2021-07-08 PROCEDURE — 3078F PR MOST RECENT DIASTOLIC BLOOD PRESSURE < 80 MM HG: ICD-10-PCS | Mod: CPTII,S$GLB,, | Performed by: INTERNAL MEDICINE

## 2021-07-08 PROCEDURE — 82306 VITAMIN D 25 HYDROXY: CPT | Performed by: INTERNAL MEDICINE

## 2021-07-08 PROCEDURE — 82728 ASSAY OF FERRITIN: CPT | Performed by: INTERNAL MEDICINE

## 2021-07-08 PROCEDURE — 82607 VITAMIN B-12: CPT | Performed by: INTERNAL MEDICINE

## 2021-07-08 PROCEDURE — 80053 COMPREHEN METABOLIC PANEL: CPT | Performed by: INTERNAL MEDICINE

## 2021-07-08 PROCEDURE — 3075F SYST BP GE 130 - 139MM HG: CPT | Mod: CPTII,S$GLB,, | Performed by: INTERNAL MEDICINE

## 2021-07-08 PROCEDURE — 80061 LIPID PANEL: CPT | Performed by: INTERNAL MEDICINE

## 2021-07-08 PROCEDURE — 36415 COLL VENOUS BLD VENIPUNCTURE: CPT | Performed by: INTERNAL MEDICINE

## 2021-07-08 PROCEDURE — 3078F DIAST BP <80 MM HG: CPT | Mod: CPTII,S$GLB,, | Performed by: INTERNAL MEDICINE

## 2021-07-08 PROCEDURE — 99999 PR PBB SHADOW E&M-EST. PATIENT-LVL II: CPT | Mod: PBBFAC,,, | Performed by: INTERNAL MEDICINE

## 2021-07-08 PROCEDURE — 84466 ASSAY OF TRANSFERRIN: CPT | Performed by: INTERNAL MEDICINE

## 2021-07-08 PROCEDURE — 3075F PR MOST RECENT SYSTOLIC BLOOD PRESS GE 130-139MM HG: ICD-10-PCS | Mod: CPTII,S$GLB,, | Performed by: INTERNAL MEDICINE

## 2021-07-08 PROCEDURE — 99214 OFFICE O/P EST MOD 30 MIN: CPT | Mod: S$GLB,,, | Performed by: INTERNAL MEDICINE

## 2021-07-08 PROCEDURE — 3008F BODY MASS INDEX DOCD: CPT | Mod: CPTII,S$GLB,, | Performed by: INTERNAL MEDICINE

## 2021-07-08 PROCEDURE — 3008F PR BODY MASS INDEX (BMI) DOCUMENTED: ICD-10-PCS | Mod: CPTII,S$GLB,, | Performed by: INTERNAL MEDICINE

## 2021-07-08 PROCEDURE — 99999 PR PBB SHADOW E&M-EST. PATIENT-LVL II: ICD-10-PCS | Mod: PBBFAC,,, | Performed by: INTERNAL MEDICINE

## 2021-07-08 PROCEDURE — 85025 COMPLETE CBC W/AUTO DIFF WBC: CPT | Performed by: INTERNAL MEDICINE

## 2021-07-09 ENCOUNTER — CLINICAL SUPPORT (OUTPATIENT)
Dept: REHABILITATION | Facility: OTHER | Age: 61
End: 2021-07-09
Attending: COLON & RECTAL SURGERY
Payer: MEDICARE

## 2021-07-09 DIAGNOSIS — K62.89 ANAL PAIN: ICD-10-CM

## 2021-07-09 LAB
25(OH)D3+25(OH)D2 SERPL-MCNC: 61 NG/ML (ref 30–96)
CHOLEST SERPL-MCNC: 128 MG/DL (ref 120–199)
CHOLEST/HDLC SERPL: 3.7 {RATIO} (ref 2–5)
FERRITIN SERPL-MCNC: 101 NG/ML (ref 20–300)
HDLC SERPL-MCNC: 35 MG/DL (ref 40–75)
HDLC SERPL: 27.3 % (ref 20–50)
IRON SERPL-MCNC: 50 UG/DL (ref 45–160)
LDLC SERPL CALC-MCNC: 72.2 MG/DL (ref 63–159)
NONHDLC SERPL-MCNC: 93 MG/DL
SATURATED IRON: 18 % (ref 20–50)
TOTAL IRON BINDING CAPACITY: 281 UG/DL (ref 250–450)
TRANSFERRIN SERPL-MCNC: 190 MG/DL (ref 200–375)
TRIGL SERPL-MCNC: 104 MG/DL (ref 30–150)
VIT B12 SERPL-MCNC: 674 PG/ML (ref 210–950)

## 2021-07-09 PROCEDURE — 97162 PT EVAL MOD COMPLEX 30 MIN: CPT

## 2021-07-09 PROCEDURE — 97530 THERAPEUTIC ACTIVITIES: CPT

## 2021-07-10 ENCOUNTER — PATIENT MESSAGE (OUTPATIENT)
Dept: INTERNAL MEDICINE | Facility: CLINIC | Age: 61
End: 2021-07-10

## 2021-07-12 DIAGNOSIS — K21.9 GASTROESOPHAGEAL REFLUX DISEASE WITHOUT ESOPHAGITIS: ICD-10-CM

## 2021-07-12 DIAGNOSIS — I51.89 DIASTOLIC DYSFUNCTION: ICD-10-CM

## 2021-07-14 RX ORDER — FUROSEMIDE 20 MG/1
20 TABLET ORAL DAILY
Qty: 90 TABLET | Refills: 1 | Status: SHIPPED | OUTPATIENT
Start: 2021-07-14 | End: 2022-01-11 | Stop reason: SDUPTHER

## 2021-07-14 RX ORDER — ESOMEPRAZOLE MAGNESIUM 40 MG/1
40 CAPSULE, DELAYED RELEASE ORAL
Qty: 180 CAPSULE | Refills: 0 | Status: SHIPPED | OUTPATIENT
Start: 2021-07-14 | End: 2021-08-26 | Stop reason: SDUPTHER

## 2021-07-15 ENCOUNTER — LAB VISIT (OUTPATIENT)
Dept: LAB | Facility: OTHER | Age: 61
End: 2021-07-15
Attending: INTERNAL MEDICINE
Payer: MEDICARE

## 2021-07-15 DIAGNOSIS — E29.1 HYPOGONADISM IN MALE: ICD-10-CM

## 2021-07-15 LAB
BASOPHILS # BLD AUTO: 0.03 K/UL (ref 0–0.2)
BASOPHILS NFR BLD: 0.5 % (ref 0–1.9)
DIFFERENTIAL METHOD: ABNORMAL
EOSINOPHIL # BLD AUTO: 0.2 K/UL (ref 0–0.5)
EOSINOPHIL NFR BLD: 2.6 % (ref 0–8)
ERYTHROCYTE [DISTWIDTH] IN BLOOD BY AUTOMATED COUNT: 12.9 % (ref 11.5–14.5)
HCT VFR BLD AUTO: 43.9 % (ref 40–54)
HGB BLD-MCNC: 14 G/DL (ref 14–18)
IMM GRANULOCYTES # BLD AUTO: 0.02 K/UL (ref 0–0.04)
IMM GRANULOCYTES NFR BLD AUTO: 0.3 % (ref 0–0.5)
LYMPHOCYTES # BLD AUTO: 1.9 K/UL (ref 1–4.8)
LYMPHOCYTES NFR BLD: 31.5 % (ref 18–48)
MCH RBC QN AUTO: 29.5 PG (ref 27–31)
MCHC RBC AUTO-ENTMCNC: 31.9 G/DL (ref 32–36)
MCV RBC AUTO: 92 FL (ref 82–98)
MONOCYTES # BLD AUTO: 0.4 K/UL (ref 0.3–1)
MONOCYTES NFR BLD: 6.6 % (ref 4–15)
NEUTROPHILS # BLD AUTO: 3.4 K/UL (ref 1.8–7.7)
NEUTROPHILS NFR BLD: 58.5 % (ref 38–73)
NRBC BLD-RTO: 0 /100 WBC
PLATELET # BLD AUTO: 242 K/UL (ref 150–450)
PMV BLD AUTO: 9.7 FL (ref 9.2–12.9)
RBC # BLD AUTO: 4.75 M/UL (ref 4.6–6.2)
WBC # BLD AUTO: 5.88 K/UL (ref 3.9–12.7)

## 2021-07-15 PROCEDURE — 85025 COMPLETE CBC W/AUTO DIFF WBC: CPT | Performed by: INTERNAL MEDICINE

## 2021-07-15 PROCEDURE — 36415 COLL VENOUS BLD VENIPUNCTURE: CPT | Performed by: INTERNAL MEDICINE

## 2021-07-15 PROCEDURE — 84403 ASSAY OF TOTAL TESTOSTERONE: CPT | Performed by: INTERNAL MEDICINE

## 2021-07-17 LAB — TESTOST SERPL-MCNC: 530 NG/DL (ref 304–1227)

## 2021-07-20 ENCOUNTER — CLINICAL SUPPORT (OUTPATIENT)
Dept: REHABILITATION | Facility: OTHER | Age: 61
End: 2021-07-20
Attending: COLON & RECTAL SURGERY
Payer: MEDICARE

## 2021-07-20 PROCEDURE — 97140 MANUAL THERAPY 1/> REGIONS: CPT

## 2021-07-20 PROCEDURE — 97530 THERAPEUTIC ACTIVITIES: CPT

## 2021-07-20 PROCEDURE — 97110 THERAPEUTIC EXERCISES: CPT

## 2021-07-21 DIAGNOSIS — R94.8 ABNORMAL RESULTS OF FUNCTION STUDIES OF OTHER ORGANS AND SYSTEMS: ICD-10-CM

## 2021-07-21 DIAGNOSIS — E29.1 HYPOGONADISM IN MALE: Primary | ICD-10-CM

## 2021-08-03 ENCOUNTER — TELEPHONE (OUTPATIENT)
Dept: GASTROENTEROLOGY | Facility: CLINIC | Age: 61
End: 2021-08-03

## 2021-08-05 ENCOUNTER — OFFICE VISIT (OUTPATIENT)
Dept: PAIN MEDICINE | Facility: CLINIC | Age: 61
End: 2021-08-05
Payer: MEDICARE

## 2021-08-05 DIAGNOSIS — G89.29 OTHER CHRONIC PAIN: Primary | ICD-10-CM

## 2021-08-05 PROCEDURE — 1160F PR REVIEW ALL MEDS BY PRESCRIBER/CLIN PHARMACIST DOCUMENTED: ICD-10-PCS | Mod: CPTII,95,, | Performed by: NURSE PRACTITIONER

## 2021-08-05 PROCEDURE — 1159F MED LIST DOCD IN RCRD: CPT | Mod: CPTII,95,, | Performed by: NURSE PRACTITIONER

## 2021-08-05 PROCEDURE — 99499 UNLISTED E&M SERVICE: CPT | Mod: 95,,, | Performed by: NURSE PRACTITIONER

## 2021-08-05 PROCEDURE — 99499 NO LOS: ICD-10-PCS | Mod: 95,,, | Performed by: NURSE PRACTITIONER

## 2021-08-05 PROCEDURE — 1159F PR MEDICATION LIST DOCUMENTED IN MEDICAL RECORD: ICD-10-PCS | Mod: CPTII,95,, | Performed by: NURSE PRACTITIONER

## 2021-08-05 PROCEDURE — 1160F RVW MEDS BY RX/DR IN RCRD: CPT | Mod: CPTII,95,, | Performed by: NURSE PRACTITIONER

## 2021-08-11 ENCOUNTER — PATIENT MESSAGE (OUTPATIENT)
Dept: SURGERY | Facility: CLINIC | Age: 61
End: 2021-08-11

## 2021-08-16 ENCOUNTER — OFFICE VISIT (OUTPATIENT)
Dept: SURGERY | Facility: CLINIC | Age: 61
End: 2021-08-16
Payer: MEDICARE

## 2021-08-16 VITALS
HEIGHT: 71 IN | HEART RATE: 103 BPM | SYSTOLIC BLOOD PRESSURE: 136 MMHG | BODY MASS INDEX: 35.31 KG/M2 | DIASTOLIC BLOOD PRESSURE: 88 MMHG | WEIGHT: 252.19 LBS

## 2021-08-16 DIAGNOSIS — K62.89 ANAL PAIN: Primary | ICD-10-CM

## 2021-08-16 DIAGNOSIS — K60.2 ANAL FISSURE, UNSPECIFIED: ICD-10-CM

## 2021-08-16 PROCEDURE — 3008F PR BODY MASS INDEX (BMI) DOCUMENTED: ICD-10-PCS | Mod: CPTII,S$GLB,, | Performed by: COLON & RECTAL SURGERY

## 2021-08-16 PROCEDURE — 1160F RVW MEDS BY RX/DR IN RCRD: CPT | Mod: CPTII,S$GLB,, | Performed by: COLON & RECTAL SURGERY

## 2021-08-16 PROCEDURE — 99999 PR PBB SHADOW E&M-EST. PATIENT-LVL III: ICD-10-PCS | Mod: PBBFAC,,, | Performed by: COLON & RECTAL SURGERY

## 2021-08-16 PROCEDURE — 3075F SYST BP GE 130 - 139MM HG: CPT | Mod: CPTII,S$GLB,, | Performed by: COLON & RECTAL SURGERY

## 2021-08-16 PROCEDURE — 99213 PR OFFICE/OUTPT VISIT, EST, LEVL III, 20-29 MIN: ICD-10-PCS | Mod: S$GLB,,, | Performed by: COLON & RECTAL SURGERY

## 2021-08-16 PROCEDURE — 1125F PR PAIN SEVERITY QUANTIFIED, PAIN PRESENT: ICD-10-PCS | Mod: CPTII,S$GLB,, | Performed by: COLON & RECTAL SURGERY

## 2021-08-16 PROCEDURE — 1159F MED LIST DOCD IN RCRD: CPT | Mod: CPTII,S$GLB,, | Performed by: COLON & RECTAL SURGERY

## 2021-08-16 PROCEDURE — 1159F PR MEDICATION LIST DOCUMENTED IN MEDICAL RECORD: ICD-10-PCS | Mod: CPTII,S$GLB,, | Performed by: COLON & RECTAL SURGERY

## 2021-08-16 PROCEDURE — 99999 PR PBB SHADOW E&M-EST. PATIENT-LVL III: CPT | Mod: PBBFAC,,, | Performed by: COLON & RECTAL SURGERY

## 2021-08-16 PROCEDURE — 1160F PR REVIEW ALL MEDS BY PRESCRIBER/CLIN PHARMACIST DOCUMENTED: ICD-10-PCS | Mod: CPTII,S$GLB,, | Performed by: COLON & RECTAL SURGERY

## 2021-08-16 PROCEDURE — 99213 OFFICE O/P EST LOW 20 MIN: CPT | Mod: S$GLB,,, | Performed by: COLON & RECTAL SURGERY

## 2021-08-16 PROCEDURE — 3075F PR MOST RECENT SYSTOLIC BLOOD PRESS GE 130-139MM HG: ICD-10-PCS | Mod: CPTII,S$GLB,, | Performed by: COLON & RECTAL SURGERY

## 2021-08-16 PROCEDURE — 3079F PR MOST RECENT DIASTOLIC BLOOD PRESSURE 80-89 MM HG: ICD-10-PCS | Mod: CPTII,S$GLB,, | Performed by: COLON & RECTAL SURGERY

## 2021-08-16 PROCEDURE — 1125F AMNT PAIN NOTED PAIN PRSNT: CPT | Mod: CPTII,S$GLB,, | Performed by: COLON & RECTAL SURGERY

## 2021-08-16 PROCEDURE — 3079F DIAST BP 80-89 MM HG: CPT | Mod: CPTII,S$GLB,, | Performed by: COLON & RECTAL SURGERY

## 2021-08-16 PROCEDURE — 3008F BODY MASS INDEX DOCD: CPT | Mod: CPTII,S$GLB,, | Performed by: COLON & RECTAL SURGERY

## 2021-08-21 ENCOUNTER — TELEPHONE (OUTPATIENT)
Dept: ENDOSCOPY | Facility: HOSPITAL | Age: 61
End: 2021-08-21

## 2021-08-23 ENCOUNTER — PATIENT OUTREACH (OUTPATIENT)
Dept: ADMINISTRATIVE | Facility: OTHER | Age: 61
End: 2021-08-23

## 2021-08-26 ENCOUNTER — OFFICE VISIT (OUTPATIENT)
Dept: GASTROENTEROLOGY | Facility: CLINIC | Age: 61
End: 2021-08-26
Payer: MEDICARE

## 2021-08-26 VITALS
DIASTOLIC BLOOD PRESSURE: 91 MMHG | WEIGHT: 248.69 LBS | BODY MASS INDEX: 34.81 KG/M2 | SYSTOLIC BLOOD PRESSURE: 129 MMHG | HEART RATE: 98 BPM | HEIGHT: 71 IN

## 2021-08-26 DIAGNOSIS — K21.9 GASTROESOPHAGEAL REFLUX DISEASE WITHOUT ESOPHAGITIS: Primary | ICD-10-CM

## 2021-08-26 PROCEDURE — 99999 PR PBB SHADOW E&M-EST. PATIENT-LVL III: CPT | Mod: PBBFAC,GC,, | Performed by: STUDENT IN AN ORGANIZED HEALTH CARE EDUCATION/TRAINING PROGRAM

## 2021-08-26 PROCEDURE — 99213 PR OFFICE/OUTPT VISIT, EST, LEVL III, 20-29 MIN: ICD-10-PCS | Mod: GC,S$GLB,, | Performed by: STUDENT IN AN ORGANIZED HEALTH CARE EDUCATION/TRAINING PROGRAM

## 2021-08-26 PROCEDURE — 99999 PR PBB SHADOW E&M-EST. PATIENT-LVL III: ICD-10-PCS | Mod: PBBFAC,GC,, | Performed by: STUDENT IN AN ORGANIZED HEALTH CARE EDUCATION/TRAINING PROGRAM

## 2021-08-26 PROCEDURE — 99213 OFFICE O/P EST LOW 20 MIN: CPT | Mod: GC,S$GLB,, | Performed by: STUDENT IN AN ORGANIZED HEALTH CARE EDUCATION/TRAINING PROGRAM

## 2021-08-26 RX ORDER — ESOMEPRAZOLE MAGNESIUM 40 MG/1
40 CAPSULE, DELAYED RELEASE ORAL
Qty: 180 CAPSULE | Refills: 0 | Status: SHIPPED | OUTPATIENT
Start: 2021-08-26 | End: 2022-01-11 | Stop reason: SDUPTHER

## 2021-08-26 RX ORDER — RABEPRAZOLE SODIUM 20 MG/1
20 TABLET, DELAYED RELEASE ORAL DAILY
Qty: 30 TABLET | Refills: 11 | Status: SHIPPED | OUTPATIENT
Start: 2021-08-26 | End: 2022-01-11

## 2021-09-22 ENCOUNTER — TELEPHONE (OUTPATIENT)
Dept: SURGERY | Facility: CLINIC | Age: 61
End: 2021-09-22

## 2021-09-22 ENCOUNTER — PATIENT MESSAGE (OUTPATIENT)
Dept: SURGERY | Facility: CLINIC | Age: 61
End: 2021-09-22

## 2021-10-04 ENCOUNTER — TELEPHONE (OUTPATIENT)
Dept: INTERNAL MEDICINE | Facility: CLINIC | Age: 61
End: 2021-10-04

## 2021-10-04 DIAGNOSIS — G47.33 OSA (OBSTRUCTIVE SLEEP APNEA): Primary | ICD-10-CM

## 2021-10-06 ENCOUNTER — OFFICE VISIT (OUTPATIENT)
Dept: SLEEP MEDICINE | Facility: CLINIC | Age: 61
End: 2021-10-06
Attending: INTERNAL MEDICINE
Payer: MEDICARE

## 2021-10-06 VITALS
BODY MASS INDEX: 34.81 KG/M2 | SYSTOLIC BLOOD PRESSURE: 121 MMHG | DIASTOLIC BLOOD PRESSURE: 87 MMHG | HEART RATE: 92 BPM | WEIGHT: 248.69 LBS | HEIGHT: 71 IN

## 2021-10-06 DIAGNOSIS — G47.33 OSA (OBSTRUCTIVE SLEEP APNEA): ICD-10-CM

## 2021-10-06 PROCEDURE — 99999 PR PBB SHADOW E&M-EST. PATIENT-LVL V: CPT | Mod: PBBFAC,,, | Performed by: INTERNAL MEDICINE

## 2021-10-06 PROCEDURE — 3074F SYST BP LT 130 MM HG: CPT | Mod: CPTII,S$GLB,, | Performed by: INTERNAL MEDICINE

## 2021-10-06 PROCEDURE — 3079F PR MOST RECENT DIASTOLIC BLOOD PRESSURE 80-89 MM HG: ICD-10-PCS | Mod: CPTII,S$GLB,, | Performed by: INTERNAL MEDICINE

## 2021-10-06 PROCEDURE — 1159F MED LIST DOCD IN RCRD: CPT | Mod: CPTII,S$GLB,, | Performed by: INTERNAL MEDICINE

## 2021-10-06 PROCEDURE — 3074F PR MOST RECENT SYSTOLIC BLOOD PRESSURE < 130 MM HG: ICD-10-PCS | Mod: CPTII,S$GLB,, | Performed by: INTERNAL MEDICINE

## 2021-10-06 PROCEDURE — 99212 OFFICE O/P EST SF 10 MIN: CPT | Mod: S$GLB,,, | Performed by: INTERNAL MEDICINE

## 2021-10-06 PROCEDURE — 99999 PR PBB SHADOW E&M-EST. PATIENT-LVL V: ICD-10-PCS | Mod: PBBFAC,,, | Performed by: INTERNAL MEDICINE

## 2021-10-06 PROCEDURE — 1160F RVW MEDS BY RX/DR IN RCRD: CPT | Mod: CPTII,S$GLB,, | Performed by: INTERNAL MEDICINE

## 2021-10-06 PROCEDURE — 1159F PR MEDICATION LIST DOCUMENTED IN MEDICAL RECORD: ICD-10-PCS | Mod: CPTII,S$GLB,, | Performed by: INTERNAL MEDICINE

## 2021-10-06 PROCEDURE — 3079F DIAST BP 80-89 MM HG: CPT | Mod: CPTII,S$GLB,, | Performed by: INTERNAL MEDICINE

## 2021-10-06 PROCEDURE — 3008F PR BODY MASS INDEX (BMI) DOCUMENTED: ICD-10-PCS | Mod: CPTII,S$GLB,, | Performed by: INTERNAL MEDICINE

## 2021-10-06 PROCEDURE — 1160F PR REVIEW ALL MEDS BY PRESCRIBER/CLIN PHARMACIST DOCUMENTED: ICD-10-PCS | Mod: CPTII,S$GLB,, | Performed by: INTERNAL MEDICINE

## 2021-10-06 PROCEDURE — 3008F BODY MASS INDEX DOCD: CPT | Mod: CPTII,S$GLB,, | Performed by: INTERNAL MEDICINE

## 2021-10-06 PROCEDURE — 99212 PR OFFICE/OUTPT VISIT, EST, LEVL II, 10-19 MIN: ICD-10-PCS | Mod: S$GLB,,, | Performed by: INTERNAL MEDICINE

## 2021-10-08 ENCOUNTER — IMMUNIZATION (OUTPATIENT)
Dept: PHARMACY | Facility: CLINIC | Age: 61
End: 2021-10-08
Payer: MEDICARE

## 2021-10-10 PROBLEM — I25.10 NON-OCCLUSIVE CORONARY ARTERY DISEASE REQUIRING DRUG THERAPY: Status: ACTIVE | Noted: 2021-10-10

## 2021-10-10 PROBLEM — E66.09 CLASS 1 OBESITY DUE TO EXCESS CALORIES WITH SERIOUS COMORBIDITY AND BODY MASS INDEX (BMI) OF 34.0 TO 34.9 IN ADULT: Status: ACTIVE | Noted: 2018-12-18

## 2021-10-10 PROBLEM — E66.811 CLASS 1 OBESITY DUE TO EXCESS CALORIES WITH SERIOUS COMORBIDITY AND BODY MASS INDEX (BMI) OF 34.0 TO 34.9 IN ADULT: Status: ACTIVE | Noted: 2018-12-18

## 2021-10-22 ENCOUNTER — LAB VISIT (OUTPATIENT)
Dept: LAB | Facility: OTHER | Age: 61
End: 2021-10-22
Attending: INTERNAL MEDICINE
Payer: MEDICARE

## 2021-10-22 DIAGNOSIS — R94.8 ABNORMAL RESULTS OF FUNCTION STUDIES OF OTHER ORGANS AND SYSTEMS: ICD-10-CM

## 2021-10-22 DIAGNOSIS — E29.1 HYPOGONADISM IN MALE: ICD-10-CM

## 2021-10-22 LAB
BASOPHILS # BLD AUTO: 0.02 K/UL (ref 0–0.2)
BASOPHILS NFR BLD: 0.3 % (ref 0–1.9)
COMPLEXED PSA SERPL-MCNC: 1.5 NG/ML (ref 0–4)
DIFFERENTIAL METHOD: NORMAL
EOSINOPHIL # BLD AUTO: 0.2 K/UL (ref 0–0.5)
EOSINOPHIL NFR BLD: 2.4 % (ref 0–8)
ERYTHROCYTE [DISTWIDTH] IN BLOOD BY AUTOMATED COUNT: 12.9 % (ref 11.5–14.5)
HCT VFR BLD AUTO: 44 % (ref 40–54)
HGB BLD-MCNC: 14.3 G/DL (ref 14–18)
IMM GRANULOCYTES # BLD AUTO: 0.02 K/UL (ref 0–0.04)
IMM GRANULOCYTES NFR BLD AUTO: 0.3 % (ref 0–0.5)
LYMPHOCYTES # BLD AUTO: 1.7 K/UL (ref 1–4.8)
LYMPHOCYTES NFR BLD: 23.4 % (ref 18–48)
MCH RBC QN AUTO: 31 PG (ref 27–31)
MCHC RBC AUTO-ENTMCNC: 32.5 G/DL (ref 32–36)
MCV RBC AUTO: 95 FL (ref 82–98)
MONOCYTES # BLD AUTO: 0.5 K/UL (ref 0.3–1)
MONOCYTES NFR BLD: 6.8 % (ref 4–15)
NEUTROPHILS # BLD AUTO: 4.9 K/UL (ref 1.8–7.7)
NEUTROPHILS NFR BLD: 66.8 % (ref 38–73)
NRBC BLD-RTO: 0 /100 WBC
PLATELET # BLD AUTO: 250 K/UL (ref 150–450)
PMV BLD AUTO: 9.3 FL (ref 9.2–12.9)
RBC # BLD AUTO: 4.61 M/UL (ref 4.6–6.2)
TESTOST SERPL-MCNC: 454 NG/DL (ref 304–1227)
WBC # BLD AUTO: 7.36 K/UL (ref 3.9–12.7)

## 2021-10-22 PROCEDURE — 84403 ASSAY OF TOTAL TESTOSTERONE: CPT | Performed by: INTERNAL MEDICINE

## 2021-10-22 PROCEDURE — 36415 COLL VENOUS BLD VENIPUNCTURE: CPT | Performed by: INTERNAL MEDICINE

## 2021-10-22 PROCEDURE — 84153 ASSAY OF PSA TOTAL: CPT | Performed by: INTERNAL MEDICINE

## 2021-10-22 PROCEDURE — 85025 COMPLETE CBC W/AUTO DIFF WBC: CPT | Performed by: INTERNAL MEDICINE

## 2021-11-04 ENCOUNTER — PES CALL (OUTPATIENT)
Dept: ADMINISTRATIVE | Facility: CLINIC | Age: 61
End: 2021-11-04
Payer: MEDICARE

## 2021-11-16 ENCOUNTER — TELEPHONE (OUTPATIENT)
Dept: SURGERY | Facility: CLINIC | Age: 61
End: 2021-11-16
Payer: MEDICARE

## 2021-11-17 ENCOUNTER — TELEPHONE (OUTPATIENT)
Dept: SURGERY | Facility: CLINIC | Age: 61
End: 2021-11-17
Payer: MEDICARE

## 2021-11-17 DIAGNOSIS — Z01.818 PRE-OP EVALUATION: Primary | ICD-10-CM

## 2021-11-18 DIAGNOSIS — K62.89 ANAL PAIN: Primary | ICD-10-CM

## 2021-11-24 ENCOUNTER — TELEPHONE (OUTPATIENT)
Dept: PAIN MEDICINE | Facility: CLINIC | Age: 61
End: 2021-11-24
Payer: MEDICARE

## 2021-11-26 ENCOUNTER — OFFICE VISIT (OUTPATIENT)
Dept: PAIN MEDICINE | Facility: CLINIC | Age: 61
End: 2021-11-26
Payer: MEDICARE

## 2021-11-26 VITALS
TEMPERATURE: 98 F | SYSTOLIC BLOOD PRESSURE: 125 MMHG | BODY MASS INDEX: 33.95 KG/M2 | HEART RATE: 80 BPM | HEIGHT: 71 IN | DIASTOLIC BLOOD PRESSURE: 86 MMHG | RESPIRATION RATE: 18 BRPM | WEIGHT: 242.5 LBS

## 2021-11-26 DIAGNOSIS — M79.2 NEURITIS: ICD-10-CM

## 2021-11-26 DIAGNOSIS — M47.816 LUMBAR SPONDYLOSIS: Primary | ICD-10-CM

## 2021-11-26 DIAGNOSIS — G89.29 OTHER CHRONIC PAIN: ICD-10-CM

## 2021-11-26 PROCEDURE — 99999 PR PBB SHADOW E&M-EST. PATIENT-LVL V: CPT | Mod: PBBFAC,,, | Performed by: NURSE PRACTITIONER

## 2021-11-26 PROCEDURE — 99999 PR PBB SHADOW E&M-EST. PATIENT-LVL V: ICD-10-PCS | Mod: PBBFAC,,, | Performed by: NURSE PRACTITIONER

## 2021-11-26 PROCEDURE — 99214 OFFICE O/P EST MOD 30 MIN: CPT | Mod: S$GLB,,, | Performed by: NURSE PRACTITIONER

## 2021-11-26 PROCEDURE — 99214 PR OFFICE/OUTPT VISIT, EST, LEVL IV, 30-39 MIN: ICD-10-PCS | Mod: S$GLB,,, | Performed by: NURSE PRACTITIONER

## 2021-11-26 RX ORDER — LIDOCAINE HYDROCHLORIDE 20 MG/ML
JELLY TOPICAL 3 TIMES DAILY
Qty: 30 ML | Refills: 2 | Status: SHIPPED | OUTPATIENT
Start: 2021-11-26 | End: 2022-02-21 | Stop reason: CLARIF

## 2021-11-29 ENCOUNTER — TELEPHONE (OUTPATIENT)
Dept: INTERNAL MEDICINE | Facility: CLINIC | Age: 61
End: 2021-11-29
Payer: MEDICARE

## 2021-11-29 ENCOUNTER — TELEPHONE (OUTPATIENT)
Dept: PAIN MEDICINE | Facility: CLINIC | Age: 61
End: 2021-11-29
Payer: MEDICARE

## 2021-12-01 ENCOUNTER — TELEPHONE (OUTPATIENT)
Dept: SURGERY | Facility: CLINIC | Age: 61
End: 2021-12-01
Payer: MEDICARE

## 2021-12-02 ENCOUNTER — TELEPHONE (OUTPATIENT)
Dept: ADMINISTRATIVE | Facility: OTHER | Age: 61
End: 2021-12-02
Payer: MEDICARE

## 2021-12-06 ENCOUNTER — OFFICE VISIT (OUTPATIENT)
Dept: SURGERY | Facility: CLINIC | Age: 61
End: 2021-12-06
Payer: MEDICARE

## 2021-12-06 ENCOUNTER — HOSPITAL ENCOUNTER (OUTPATIENT)
Dept: CARDIOLOGY | Facility: CLINIC | Age: 61
Discharge: HOME | End: 2021-12-06
Payer: MEDICARE

## 2021-12-06 VITALS
HEART RATE: 69 BPM | HEIGHT: 71 IN | DIASTOLIC BLOOD PRESSURE: 83 MMHG | SYSTOLIC BLOOD PRESSURE: 132 MMHG | BODY MASS INDEX: 34.13 KG/M2 | WEIGHT: 243.81 LBS

## 2021-12-06 DIAGNOSIS — Z01.818 PRE-OP EVALUATION: ICD-10-CM

## 2021-12-06 DIAGNOSIS — K62.89 ANAL PAIN: Primary | ICD-10-CM

## 2021-12-06 PROCEDURE — 93005 EKG 12-LEAD: ICD-10-PCS | Mod: S$GLB,,, | Performed by: COLON & RECTAL SURGERY

## 2021-12-06 PROCEDURE — 99999 PR PBB SHADOW E&M-EST. PATIENT-LVL III: CPT | Mod: PBBFAC,,, | Performed by: COLON & RECTAL SURGERY

## 2021-12-06 PROCEDURE — 93010 ELECTROCARDIOGRAM REPORT: CPT | Mod: S$GLB,,, | Performed by: INTERNAL MEDICINE

## 2021-12-06 PROCEDURE — 93010 EKG 12-LEAD: ICD-10-PCS | Mod: S$GLB,,, | Performed by: INTERNAL MEDICINE

## 2021-12-06 PROCEDURE — 99213 OFFICE O/P EST LOW 20 MIN: CPT | Mod: S$GLB,,, | Performed by: COLON & RECTAL SURGERY

## 2021-12-06 PROCEDURE — 99999 PR PBB SHADOW E&M-EST. PATIENT-LVL III: ICD-10-PCS | Mod: PBBFAC,,, | Performed by: COLON & RECTAL SURGERY

## 2021-12-06 PROCEDURE — 99213 PR OFFICE/OUTPT VISIT, EST, LEVL III, 20-29 MIN: ICD-10-PCS | Mod: S$GLB,,, | Performed by: COLON & RECTAL SURGERY

## 2021-12-06 PROCEDURE — 93005 ELECTROCARDIOGRAM TRACING: CPT | Mod: S$GLB,,, | Performed by: COLON & RECTAL SURGERY

## 2021-12-09 ENCOUNTER — TELEPHONE (OUTPATIENT)
Dept: SURGERY | Facility: CLINIC | Age: 61
End: 2021-12-09
Payer: MEDICARE

## 2021-12-10 ENCOUNTER — ANESTHESIA (OUTPATIENT)
Dept: SURGERY | Facility: HOSPITAL | Age: 61
End: 2021-12-10
Payer: MEDICARE

## 2021-12-10 ENCOUNTER — ANESTHESIA EVENT (OUTPATIENT)
Dept: SURGERY | Facility: HOSPITAL | Age: 61
End: 2021-12-10
Payer: MEDICARE

## 2021-12-10 ENCOUNTER — HOSPITAL ENCOUNTER (OUTPATIENT)
Facility: HOSPITAL | Age: 61
Discharge: HOME | End: 2021-12-10
Attending: COLON & RECTAL SURGERY | Admitting: COLON & RECTAL SURGERY
Payer: MEDICARE

## 2021-12-10 VITALS
DIASTOLIC BLOOD PRESSURE: 84 MMHG | OXYGEN SATURATION: 95 % | WEIGHT: 243 LBS | HEART RATE: 78 BPM | SYSTOLIC BLOOD PRESSURE: 136 MMHG | HEIGHT: 71 IN | BODY MASS INDEX: 34.02 KG/M2 | TEMPERATURE: 98 F | RESPIRATION RATE: 18 BRPM

## 2021-12-10 DIAGNOSIS — K62.89 ANAL PAIN: ICD-10-CM

## 2021-12-10 DIAGNOSIS — I25.10 NON-OCCLUSIVE CORONARY ARTERY DISEASE REQUIRING DRUG THERAPY: ICD-10-CM

## 2021-12-10 DIAGNOSIS — K60.2 ANAL FISSURE: Primary | ICD-10-CM

## 2021-12-10 PROCEDURE — 37000008 HC ANESTHESIA 1ST 15 MINUTES: Performed by: COLON & RECTAL SURGERY

## 2021-12-10 PROCEDURE — 71000015 HC POSTOP RECOV 1ST HR: Performed by: COLON & RECTAL SURGERY

## 2021-12-10 PROCEDURE — 25000003 PHARM REV CODE 250: Performed by: COLON & RECTAL SURGERY

## 2021-12-10 PROCEDURE — 46080 PR ANAL SPHINCTEROTOMY: ICD-10-PCS | Mod: ,,, | Performed by: COLON & RECTAL SURGERY

## 2021-12-10 PROCEDURE — D9220A PRA ANESTHESIA: Mod: CRNA,,, | Performed by: REGISTERED NURSE

## 2021-12-10 PROCEDURE — 27000221 HC OXYGEN, UP TO 24 HOURS

## 2021-12-10 PROCEDURE — 94761 N-INVAS EAR/PLS OXIMETRY MLT: CPT

## 2021-12-10 PROCEDURE — 46080 SPHNCTROTMY ANAL DIV SPHNCTR: CPT | Mod: ,,, | Performed by: COLON & RECTAL SURGERY

## 2021-12-10 PROCEDURE — 99900035 HC TECH TIME PER 15 MIN (STAT)

## 2021-12-10 PROCEDURE — 36000707: Performed by: COLON & RECTAL SURGERY

## 2021-12-10 PROCEDURE — D9220A PRA ANESTHESIA: Mod: ANES,,, | Performed by: STUDENT IN AN ORGANIZED HEALTH CARE EDUCATION/TRAINING PROGRAM

## 2021-12-10 PROCEDURE — 37000009 HC ANESTHESIA EA ADD 15 MINS: Performed by: COLON & RECTAL SURGERY

## 2021-12-10 PROCEDURE — 25000003 PHARM REV CODE 250: Performed by: REGISTERED NURSE

## 2021-12-10 PROCEDURE — 36000706: Performed by: COLON & RECTAL SURGERY

## 2021-12-10 PROCEDURE — 63600175 PHARM REV CODE 636 W HCPCS: Performed by: REGISTERED NURSE

## 2021-12-10 PROCEDURE — 71000033 HC RECOVERY, INTIAL HOUR: Performed by: COLON & RECTAL SURGERY

## 2021-12-10 PROCEDURE — 25000003 PHARM REV CODE 250: Performed by: NURSE PRACTITIONER

## 2021-12-10 PROCEDURE — D9220A PRA ANESTHESIA: ICD-10-PCS | Mod: ANES,,, | Performed by: STUDENT IN AN ORGANIZED HEALTH CARE EDUCATION/TRAINING PROGRAM

## 2021-12-10 PROCEDURE — D9220A PRA ANESTHESIA: ICD-10-PCS | Mod: CRNA,,, | Performed by: REGISTERED NURSE

## 2021-12-10 RX ORDER — ACETAMINOPHEN 325 MG/1
650 TABLET ORAL EVERY 4 HOURS PRN
Status: DISCONTINUED | OUTPATIENT
Start: 2021-12-10 | End: 2021-12-10 | Stop reason: HOSPADM

## 2021-12-10 RX ORDER — DEXMEDETOMIDINE HYDROCHLORIDE 100 UG/ML
INJECTION, SOLUTION INTRAVENOUS
Status: DISCONTINUED | OUTPATIENT
Start: 2021-12-10 | End: 2021-12-10

## 2021-12-10 RX ORDER — OXYCODONE HYDROCHLORIDE 5 MG/1
5 TABLET ORAL EVERY 4 HOURS PRN
Qty: 20 TABLET | Refills: 0 | Status: SHIPPED | OUTPATIENT
Start: 2021-12-10 | End: 2022-01-11

## 2021-12-10 RX ORDER — PROPOFOL 10 MG/ML
VIAL (ML) INTRAVENOUS
Status: DISCONTINUED | OUTPATIENT
Start: 2021-12-10 | End: 2021-12-10

## 2021-12-10 RX ORDER — MIDAZOLAM HYDROCHLORIDE 1 MG/ML
INJECTION INTRAMUSCULAR; INTRAVENOUS
Status: DISCONTINUED | OUTPATIENT
Start: 2021-12-10 | End: 2021-12-10

## 2021-12-10 RX ORDER — OXYCODONE HYDROCHLORIDE 5 MG/1
10 TABLET ORAL EVERY 4 HOURS PRN
Status: DISCONTINUED | OUTPATIENT
Start: 2021-12-10 | End: 2021-12-10 | Stop reason: HOSPADM

## 2021-12-10 RX ORDER — FENTANYL CITRATE 50 UG/ML
INJECTION, SOLUTION INTRAMUSCULAR; INTRAVENOUS
Status: DISCONTINUED | OUTPATIENT
Start: 2021-12-10 | End: 2021-12-10

## 2021-12-10 RX ORDER — OXYCODONE HYDROCHLORIDE 5 MG/1
5 TABLET ORAL EVERY 4 HOURS PRN
Status: DISCONTINUED | OUTPATIENT
Start: 2021-12-10 | End: 2021-12-10 | Stop reason: HOSPADM

## 2021-12-10 RX ORDER — MUPIROCIN 20 MG/G
OINTMENT TOPICAL
Status: DISPENSED | OUTPATIENT
Start: 2021-12-10

## 2021-12-10 RX ORDER — SODIUM CHLORIDE 9 MG/ML
INJECTION, SOLUTION INTRAVENOUS CONTINUOUS PRN
Status: DISCONTINUED | OUTPATIENT
Start: 2021-12-10 | End: 2021-12-10

## 2021-12-10 RX ORDER — HALOPERIDOL 5 MG/ML
0.5 INJECTION INTRAMUSCULAR EVERY 10 MIN PRN
Status: DISCONTINUED | OUTPATIENT
Start: 2021-12-10 | End: 2021-12-10 | Stop reason: HOSPADM

## 2021-12-10 RX ORDER — FENTANYL CITRATE 50 UG/ML
25 INJECTION, SOLUTION INTRAMUSCULAR; INTRAVENOUS EVERY 5 MIN PRN
Status: DISCONTINUED | OUTPATIENT
Start: 2021-12-10 | End: 2021-12-10 | Stop reason: HOSPADM

## 2021-12-10 RX ORDER — DEXAMETHASONE SODIUM PHOSPHATE 4 MG/ML
INJECTION, SOLUTION INTRA-ARTICULAR; INTRALESIONAL; INTRAMUSCULAR; INTRAVENOUS; SOFT TISSUE
Status: DISCONTINUED | OUTPATIENT
Start: 2021-12-10 | End: 2021-12-10

## 2021-12-10 RX ORDER — ONDANSETRON 2 MG/ML
INJECTION INTRAMUSCULAR; INTRAVENOUS
Status: DISCONTINUED | OUTPATIENT
Start: 2021-12-10 | End: 2021-12-10

## 2021-12-10 RX ORDER — ROCURONIUM BROMIDE 10 MG/ML
INJECTION, SOLUTION INTRAVENOUS
Status: DISCONTINUED | OUTPATIENT
Start: 2021-12-10 | End: 2021-12-10

## 2021-12-10 RX ORDER — SODIUM CHLORIDE 9 MG/ML
INJECTION, SOLUTION INTRAVENOUS CONTINUOUS
Status: ACTIVE | OUTPATIENT
Start: 2021-12-10

## 2021-12-10 RX ORDER — LIDOCAINE HYDROCHLORIDE 20 MG/ML
INJECTION INTRAVENOUS
Status: DISCONTINUED | OUTPATIENT
Start: 2021-12-10 | End: 2021-12-10

## 2021-12-10 RX ORDER — BUPIVACAINE HYDROCHLORIDE AND EPINEPHRINE 2.5; 5 MG/ML; UG/ML
INJECTION, SOLUTION EPIDURAL; INFILTRATION; INTRACAUDAL; PERINEURAL
Status: DISCONTINUED | OUTPATIENT
Start: 2021-12-10 | End: 2021-12-10 | Stop reason: HOSPADM

## 2021-12-10 RX ORDER — SODIUM CHLORIDE 0.9 % (FLUSH) 0.9 %
3 SYRINGE (ML) INJECTION EVERY 4 HOURS PRN
Status: DISCONTINUED | OUTPATIENT
Start: 2021-12-10 | End: 2021-12-10 | Stop reason: HOSPADM

## 2021-12-10 RX ADMIN — ROCURONIUM BROMIDE 40 MG: 10 INJECTION, SOLUTION INTRAVENOUS at 09:12

## 2021-12-10 RX ADMIN — LIDOCAINE HYDROCHLORIDE 100 MG: 20 INJECTION, SOLUTION INTRAVENOUS at 09:12

## 2021-12-10 RX ADMIN — ONDANSETRON 4 MG: 2 INJECTION INTRAMUSCULAR; INTRAVENOUS at 09:12

## 2021-12-10 RX ADMIN — MIDAZOLAM HYDROCHLORIDE 2 MG: 1 INJECTION, SOLUTION INTRAMUSCULAR; INTRAVENOUS at 09:12

## 2021-12-10 RX ADMIN — FENTANYL CITRATE 50 MCG: 50 INJECTION, SOLUTION INTRAMUSCULAR; INTRAVENOUS at 09:12

## 2021-12-10 RX ADMIN — DEXMEDETOMIDINE HYDROCHLORIDE 12 MCG: 100 INJECTION, SOLUTION, CONCENTRATE INTRAVENOUS at 09:12

## 2021-12-10 RX ADMIN — GLYCOPYRROLATE 0.2 MG: 0.2 INJECTION, SOLUTION INTRAMUSCULAR; INTRAVITREAL at 09:12

## 2021-12-10 RX ADMIN — SUGAMMADEX 400 MG: 100 INJECTION, SOLUTION INTRAVENOUS at 09:12

## 2021-12-10 RX ADMIN — SODIUM CHLORIDE: 0.9 INJECTION, SOLUTION INTRAVENOUS at 09:12

## 2021-12-10 RX ADMIN — MUPIROCIN: 20 OINTMENT TOPICAL at 07:12

## 2021-12-10 RX ADMIN — DEXAMETHASONE SODIUM PHOSPHATE 8 MG: 4 INJECTION, SOLUTION INTRAMUSCULAR; INTRAVENOUS at 09:12

## 2021-12-10 RX ADMIN — SODIUM CHLORIDE: 0.9 INJECTION, SOLUTION INTRAVENOUS at 07:12

## 2021-12-10 RX ADMIN — PROPOFOL 160 MG: 10 INJECTION, EMULSION INTRAVENOUS at 09:12

## 2021-12-20 ENCOUNTER — OFFICE VISIT (OUTPATIENT)
Dept: SURGERY | Facility: CLINIC | Age: 61
End: 2021-12-20
Payer: MEDICARE

## 2021-12-20 VITALS
BODY MASS INDEX: 34.05 KG/M2 | DIASTOLIC BLOOD PRESSURE: 93 MMHG | HEART RATE: 100 BPM | WEIGHT: 243.19 LBS | SYSTOLIC BLOOD PRESSURE: 143 MMHG | HEIGHT: 71 IN

## 2021-12-20 DIAGNOSIS — K60.2 ANAL FISSURE, UNSPECIFIED: Primary | ICD-10-CM

## 2021-12-20 PROCEDURE — 99999 PR PBB SHADOW E&M-EST. PATIENT-LVL II: CPT | Mod: PBBFAC,,, | Performed by: COLON & RECTAL SURGERY

## 2021-12-20 PROCEDURE — 99024 POSTOP FOLLOW-UP VISIT: CPT | Mod: S$GLB,,, | Performed by: COLON & RECTAL SURGERY

## 2021-12-20 PROCEDURE — 99999 PR PBB SHADOW E&M-EST. PATIENT-LVL II: ICD-10-PCS | Mod: PBBFAC,,, | Performed by: COLON & RECTAL SURGERY

## 2021-12-20 PROCEDURE — 99024 PR POST-OP FOLLOW-UP VISIT: ICD-10-PCS | Mod: S$GLB,,, | Performed by: COLON & RECTAL SURGERY

## 2021-12-21 ENCOUNTER — HOSPITAL ENCOUNTER (OUTPATIENT)
Facility: OTHER | Age: 61
Discharge: HOME | End: 2021-12-21
Attending: ANESTHESIOLOGY | Admitting: ANESTHESIOLOGY
Payer: MEDICARE

## 2021-12-21 VITALS
DIASTOLIC BLOOD PRESSURE: 80 MMHG | BODY MASS INDEX: 34.02 KG/M2 | RESPIRATION RATE: 16 BRPM | OXYGEN SATURATION: 98 % | HEART RATE: 80 BPM | TEMPERATURE: 98 F | SYSTOLIC BLOOD PRESSURE: 130 MMHG | HEIGHT: 71 IN | WEIGHT: 243 LBS

## 2021-12-21 DIAGNOSIS — G89.29 CHRONIC PAIN: ICD-10-CM

## 2021-12-21 DIAGNOSIS — M47.816 LUMBAR SPONDYLOSIS: Primary | ICD-10-CM

## 2021-12-21 PROCEDURE — 64635 PR DESTROY LUMB/SAC FACET JNT: ICD-10-PCS | Mod: 50,,, | Performed by: ANESTHESIOLOGY

## 2021-12-21 PROCEDURE — 25000003 PHARM REV CODE 250: Performed by: ANESTHESIOLOGY

## 2021-12-21 PROCEDURE — 64636 DESTROY L/S FACET JNT ADDL: CPT | Mod: 50 | Performed by: ANESTHESIOLOGY

## 2021-12-21 PROCEDURE — 25000003 PHARM REV CODE 250: Performed by: STUDENT IN AN ORGANIZED HEALTH CARE EDUCATION/TRAINING PROGRAM

## 2021-12-21 PROCEDURE — 64636 PR DESTROY L/S FACET JNT ADDL: ICD-10-PCS | Mod: 50,,, | Performed by: ANESTHESIOLOGY

## 2021-12-21 PROCEDURE — 63600175 PHARM REV CODE 636 W HCPCS: Mod: JG | Performed by: STUDENT IN AN ORGANIZED HEALTH CARE EDUCATION/TRAINING PROGRAM

## 2021-12-21 PROCEDURE — 64635 DESTROY LUMB/SAC FACET JNT: CPT | Mod: 50 | Performed by: ANESTHESIOLOGY

## 2021-12-21 PROCEDURE — 63600175 PHARM REV CODE 636 W HCPCS: Performed by: ANESTHESIOLOGY

## 2021-12-21 PROCEDURE — 64635 DESTROY LUMB/SAC FACET JNT: CPT | Mod: 50,,, | Performed by: ANESTHESIOLOGY

## 2021-12-21 PROCEDURE — 64636 DESTROY L/S FACET JNT ADDL: CPT | Mod: 50,,, | Performed by: ANESTHESIOLOGY

## 2021-12-21 RX ORDER — BUPIVACAINE HYDROCHLORIDE 2.5 MG/ML
INJECTION, SOLUTION EPIDURAL; INFILTRATION; INTRACAUDAL
Status: DISCONTINUED | OUTPATIENT
Start: 2021-12-21 | End: 2021-12-21 | Stop reason: HOSPADM

## 2021-12-21 RX ORDER — FENTANYL CITRATE 50 UG/ML
INJECTION, SOLUTION INTRAMUSCULAR; INTRAVENOUS
Status: DISCONTINUED | OUTPATIENT
Start: 2021-12-21 | End: 2021-12-21 | Stop reason: HOSPADM

## 2021-12-21 RX ORDER — SODIUM CHLORIDE 9 MG/ML
INJECTION, SOLUTION INTRAVENOUS CONTINUOUS
Status: DISCONTINUED | OUTPATIENT
Start: 2021-12-21 | End: 2021-12-21 | Stop reason: HOSPADM

## 2021-12-21 RX ORDER — LIDOCAINE HYDROCHLORIDE 20 MG/ML
INJECTION, SOLUTION INFILTRATION; PERINEURAL
Status: DISCONTINUED | OUTPATIENT
Start: 2021-12-21 | End: 2021-12-21 | Stop reason: HOSPADM

## 2021-12-21 RX ORDER — MIDAZOLAM HYDROCHLORIDE 1 MG/ML
INJECTION INTRAMUSCULAR; INTRAVENOUS
Status: DISCONTINUED | OUTPATIENT
Start: 2021-12-21 | End: 2021-12-21 | Stop reason: HOSPADM

## 2021-12-21 RX ADMIN — DESMOPRESSIN ACETATE 33.09 MCG: 4 SOLUTION INTRAVENOUS at 08:12

## 2021-12-29 DIAGNOSIS — E29.1 HYPOGONADISM IN MALE: ICD-10-CM

## 2021-12-29 RX ORDER — TESTOSTERONE 2 MG/D
PATCH TRANSDERMAL
Qty: 60 PATCH | Refills: 3 | Status: SHIPPED | OUTPATIENT
Start: 2021-12-29 | End: 2022-01-05 | Stop reason: SDUPTHER

## 2022-01-03 ENCOUNTER — TELEPHONE (OUTPATIENT)
Dept: GASTROENTEROLOGY | Facility: CLINIC | Age: 62
End: 2022-01-03
Payer: MEDICARE

## 2022-01-03 NOTE — TELEPHONE ENCOUNTER
----- Message from Jeannine Santos sent at 1/3/2022  3:38 PM CST -----  No solution found before the template release date of 5/31/2022+    Pt needs to schedule appt  [phone] ([place])        Date of Consult:    11/26/2020    INDICATION(s) FOR CARDIOLOGY CONSULT:    NSTEMI    Referring Physician:    Dr. Anurag Ramírez MD  PCP: Donavan Guzman MD    HPI:    89 year old male with a history of type II diabetes mellitus, essential hypertension, dyslipidemia, chronic kidney disease stage III, colon polyp, diverticulosis, sigmoid colon cancer IIIB (status post colectomy and anastomosis 02/2007, adjuvant Capecitabine therapy for 6 months), prostatitis, and urinary retention (indwelling bennett catheter changed monthly) who was brought to the ED via EMS on 11/25/2020 for evaluation of altered mental status following being found down on the floor after potentially two days. Creatinine 5.60, CPK 1341, Troponin 0.35. EKG revealed sinus tachycardia with mild ST segment elevation in leads V2 and V3. Patient admitted with rhabdomyolysis, acute renal failure and NSTEMI. A cardiology consult has been requested for further recommendations.         Patient Active Problem List    Diagnosis Date Noted   • Chronic kidney disease (CKD), stage III (moderate) (CMS/[facility]) 06/19/2019     Priority: Medium     Class: Chronic   • Type 2 diabetes mellitus with microalbuminuria, without long-term current use of insulin (CMS/[facility]) 06/16/2016     Priority: Medium   • Anemia, unspecified 12/15/2010     Priority: Low   • Malignant neoplasm of sigmoid colon (CMS/[facility])      Priority: Low     Stage 3, surgery and chemotherapy     • Essential hypertension, benign 12/31/2008     Priority: Low   • Elevated prostate specific antigen (PSA) 12/31/2008     Priority: Low   • Other and unspecified hyperlipidemia 12/31/2008     Priority: Low     Past Medical History:   Diagnosis Date   • Colon Polyp    • Diabetes Mellitus     type 2   • Diabetic eye exam (CMS/[facility]) 4/15/2015    no retinopathy; pt to f/u in 1 year   • Diabetic eye exam (CMS/[facility]) no retinopathy   • Diabetic eye exam (CMS/[facility]) 09/24/2018    no evidence of diabetic retinopathy or macular edema    • Diabetic eye exam (CMS/[facility]) 03/07/2019    no retinopathy on dilated exam   • Diverticulosis    • Elevated prostate specific antigen (PSA) 12/11/2007    result: 6.5   • Hyperlipidemia    • Hypertension    • Malignant neoplasm of sigmoid colon (CMS/[facility]) 02/09/2007    Stage 3, invasive adeno, mod-diff, surgery and chemotherapy   • Prostatitis    • Urinary retention      Past Surgical History:   Procedure Laterality Date   • Colectomy     • Colonoscopy diagnostic  07/22/2010    Sigmoid resection, diverticulosis,f/u 2 yrs,Dr Tolbert   • Colonoscopy w biopsy  03/13/2008    adenomatous cecal polyp with no malignancy noted   • Colonoscopy w biopsy  02/09/2007    mod diff invasive adenocarcinoma   • Colonoscopy w biopsy  08/16/2012    Polyp, mild diverticulosis, hemorrhoids, sigmoid resection   • Eye exam  2/15/2012    diabetic eye exam no retinopathy follow up one year.   • Forearm/wrist surgery unlisted  08/01/2009    right wrist fx   • Lap abd perineal resection  02/12/2007   • Part removal colon w anastomosis  02/12/2007    low anterior left colon     Prior to Admission medications    Medication Sig Start Date End Date Taking? Authorizing Provider   finasteride (PROSCAR) 5 MG tablet Take 5 mg by mouth daily.    Outside Provider   glipizide-metformin (METAGLIP) 5-500 MG per tablet Take 1 tablet by mouth 2 times daily (before meals).    Outside Provider   simvastatin (ZOCOR) 80 MG tablet Take 1 tablet by mouth nightly. 10/16/19 4/17/20  Donavan Guzman MD   pioglitazone (ACTOS) 45 MG tablet Take 1 tablet by mouth daily. 8/21/19 2/18/20  Donavan Guzman MD   lisinopril-hydroCHLOROthiazide (PRINZIDE,ZESTORETIC) 20-12.5 MG per tablet Take 2 tablets by mouth daily. 7/5/19 1/17/20  MD Hemanth Benz, Serenoa repens, 400 MG CAPS Take by mouth daily.  12/15/10 4/1/20  Donavan Guzman MD       ALLERGIES:  No Known Allergies  Social History     Tobacco Use   • Smoking status: Former Smoker     Packs/day:  0.50     Years: 15.00     Pack years: 7.50     Quit date: 1965     Years since quittin.9   • Smokeless tobacco: Never Used   • Tobacco comment: Quit smoking about 40+ years ago   Substance Use Topics   • Alcohol use: No     Comment: occasional     Family History   Problem Relation Age of Onset   • Cancer Father         lung   • Cancer Mother         multiple myeloma   • Cancer Brother         prostate   • Cancer Sister         unknown type       Review of Systems  Detailed ROS unobtainable.  Unable to perform ROS: Mental status change   Psychiatric/Behavioral: Positive for confusion.     Objective:    Vitals Last Value 24-Hour Range   Temperature 97.7 °F (36.5 °C) (20 0753) Temp  Min: 97.5 °F (36.4 °C)  Max: 99.1 °F (37.3 °C)   Pulse 92 (20 0753) Pulse  Min: 74  Max: 113   Respiratory 17 (20 0753) Resp  Min: 13  Max: 22   Non-Invasive  Blood Pressure 120/75 (20 0753) BP  Min: 120/75  Max: 169/98   Arterial  Blood Pressure   No data recorded   Pulse Oximetry 97 % (20 0753) SpO2  Min: 94 %  Max: 99 %     Vitals Today Admission   Weight 54.4 kg (20 2208) Weight: 54.4 kg (20 1723)     Weight over the past 48 hours:  Patient Vitals for the past 48 hrs:   Weight   20 1723 54.4 kg   20 2208 54.4 kg        Intake/Output:  No intake/output data recorded.  No intake or output data in the 24 hours ending 20 1414    Physical Exam  Constitutional:  Disoriented  Emaciated  No distress.  HENT:  No obvious deformity.  Head:  Normocephalic and atraumatic.  Eyes:  Conjunctivae are normal.  Pupils are equal, round.  No scleral pallor, icterus or cyanosis.  Neck:  Neck supple.  No JVD (jugular venous distention) present.  Carotid bruit is not present.  No tracheal deviation present.  No thyromegaly present.  Cardiovascular:  Normal rate, regular rhythm and intact distal pulses.  Exam reveals no gallop and no friction rub. No murmur heard.   Pulmonary/Chest:  Effort  normal and breath sounds normal.  No respiratory distress.  No wheezes.  No rales.  No tenderness.  Abdominal:  Soft.  Bowel sounds are normal.  No distention and no mass.  There is no tenderness.  There is no rebound and no guarding.  Musculoskeletal:  No edema and no tenderness.  Lymphadenopathy:  No cervical adenopathy.  Neurological:  Disoriented   Skin:  Skin is warm and dry.  No rash noted.  Not diaphoretic.  No erythema.  No pallor.  Psychiatric:  Disoriented     Laboratory Results:  Lab Results   Component Value Date    SODIUM 150 (H) 11/26/2020    POTASSIUM 4.0 11/26/2020     (H) 11/26/2020    CREATININE 4.59 (H) 11/26/2020    WBC 5.7 11/26/2020    HCT 40.0 11/26/2020    HGB 12.1 (L) 11/26/2020    INR 1.1 11/25/2020     (H) 11/26/2020    RAPDTR 0.87 (HH) 11/26/2020    PTT 30 11/26/2020    GLUCOSE 184 (H) 11/26/2020    CHOLESTEROL 201 (H) 12/18/2019    HDL 59 12/18/2019    CALCLDL 119 12/18/2019    TRIGLYCERIDE 117 12/18/2019    MG 2.4 11/26/2020       Rhythm:  Sinus tachycardia    ECG:    Results for orders placed or performed during the hospital encounter of 11/25/20   Electrocardiogram 12-Lead   Result Value Ref Range    Systolic Blood Pressure 159     Diastolic Blood Pressure 97     Ventricular Rate EKG/Min (BPM) 110     Atrial Rate (BPM) 110     GA-Interval (MSEC) 148     QRS-Interval (MSEC) 88     QT-Interval (MSEC) 338     QTc 457     P Axis (Degrees) 90     R Axis (Degrees) 62     T Axis (Degrees) 95     REPORT TEXT       Sinus tachycardia  Low voltage QRS  Septal infarct  , age undetermined  Abnormal ECG  When compared with ECG of  07-MAY-2020 16:37,  Septal infarct  is now  present  ST elevation now present in  Anterior leads  Nonspecific T wave abnormality now evident in  Lateral leads  Confirmed by JAMES FLYNN SELENA RAE ([id number]) on 11/26/2020 12:11:09 AM       Imaging and other studies:  Reviewed Chest X-Ray     Best Practice Guidelines:    AMI and/or Systolic Heart  Failure  · LVEF documented: Yes  · ACE/ARB ordered for LVEF<40%:  No.  acute renal failure.  · Beta Blocker ordered:  Yes  · Aldosterone blocking agent prescribed for acute AMI patients with LVEF<40% and history of current diabetes or CHF:  Acute renal failure  · AICD for EF < 35%:  Awaiting diagnostics  · Hydralazine and Nitrate ordered for an  patient with LVEF <40%:  Not applicable    AMI measures  · Aspirin therapy prescribed:  No, will recommend  · ASA given within 24 hours of arrival or 24 hours prior to admission:  No, will recommend   · LDL ordered during current admission:  Yes  · Statin Therapy ordered:  No, rhabdomyolysis  · ADP receptor inhibitor prescribed:  Not indicated    Echo 11/26/2020  Definity contrast was utilized to better visualize the endocardial definition.  Moderately increased left ventricular cavity size.  Severely decreased left ventricular systolic function.  Left ventricular ejection fraction, 26 %.  Regional wall motion abnormalities (mid to distal anteroseptal, inferolateral and apical dyskinesis).  Moderate aortic valve stenosis, mean gradient 4 mmHg, GIOVANNI 1.2 cm², dimensionless 0.4.  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  Right ventricular systolic pressure 25.1 mmHg.  No previous echocardiogram available for comparison.    Assessment:  89 year old male with a history of type II diabetes mellitus, essential hypertension, dyslipidemia, chronic kidney disease stage III, colon polyp, diverticulosis, sigmoid colon cancer IIIB (status post colectomy and anastomosis 02/2007, adjuvant Capecitabine therapy for 6 months), prostatitis, and urinary retention (indwelling bennett catheter changed monthly) who was brought to the ED via EMS on 11/25/2020 for evaluation of altered mental status following being found down on the floor after potentially two days. Creatinine 5.60, CPK 1341, Troponin 0.35. EKG revealed sinus tachycardia with mild ST segment  elevation in leads V2 and V3. Patient admitted with rhabdomyolysis, acute renal failure and NSTEMI. A cardiology consult has been requested for further recommendations.    Plan:    Elevated Troponin/ Demand ischemia vs NSTEMI  Echocardiogram reviewed.  Sinus tachycardia and septal infarct with QS waves in leads V1 and V2 and subtle ST elevation in lead V3.  Check Troponin until peak.  Not on Heparin or Aspirin therapy because of severe thrombocytopenia, dropping platelet count.  Patient cannot be on statin therapy because of rhabdomyolysis.  Continue Metoprolol 25 mg twice daily.    Ischemic cardiomyopathy/ Valvular heart disease/ Acute combined systolic and diastolic heart failure:  LVEF 26%, aortic stenosis.    Essential hypertension:  Continue BB therapy.    Thank you for allowing me to participate in the care of your patient, please do not hesitate to call with questions.

## 2022-01-03 NOTE — TELEPHONE ENCOUNTER
Return call and spoke with patient. Schedule appointment with Dr. Boyer. Per Patient request.     Patient verbalized understanding.

## 2022-01-05 ENCOUNTER — OFFICE VISIT (OUTPATIENT)
Dept: ENDOCRINOLOGY | Facility: CLINIC | Age: 62
End: 2022-01-05
Payer: MEDICARE

## 2022-01-05 VITALS
SYSTOLIC BLOOD PRESSURE: 134 MMHG | HEIGHT: 71 IN | HEART RATE: 72 BPM | DIASTOLIC BLOOD PRESSURE: 87 MMHG | WEIGHT: 242.31 LBS | BODY MASS INDEX: 33.92 KG/M2

## 2022-01-05 DIAGNOSIS — I10 PRIMARY HYPERTENSION: ICD-10-CM

## 2022-01-05 DIAGNOSIS — R94.8 ABNORMAL RESULTS OF FUNCTION STUDIES OF OTHER ORGANS AND SYSTEMS: ICD-10-CM

## 2022-01-05 DIAGNOSIS — N62 GYNECOMASTIA, MALE: ICD-10-CM

## 2022-01-05 DIAGNOSIS — E66.09 CLASS 1 OBESITY DUE TO EXCESS CALORIES WITH SERIOUS COMORBIDITY AND BODY MASS INDEX (BMI) OF 33.0 TO 33.9 IN ADULT: ICD-10-CM

## 2022-01-05 DIAGNOSIS — E29.1 HYPOGONADISM IN MALE: Primary | ICD-10-CM

## 2022-01-05 PROCEDURE — 99214 OFFICE O/P EST MOD 30 MIN: CPT | Mod: S$GLB,,, | Performed by: INTERNAL MEDICINE

## 2022-01-05 PROCEDURE — 3079F PR MOST RECENT DIASTOLIC BLOOD PRESSURE 80-89 MM HG: ICD-10-PCS | Mod: CPTII,S$GLB,, | Performed by: INTERNAL MEDICINE

## 2022-01-05 PROCEDURE — 1159F PR MEDICATION LIST DOCUMENTED IN MEDICAL RECORD: ICD-10-PCS | Mod: CPTII,S$GLB,, | Performed by: INTERNAL MEDICINE

## 2022-01-05 PROCEDURE — 99214 PR OFFICE/OUTPT VISIT, EST, LEVL IV, 30-39 MIN: ICD-10-PCS | Mod: S$GLB,,, | Performed by: INTERNAL MEDICINE

## 2022-01-05 PROCEDURE — 3079F DIAST BP 80-89 MM HG: CPT | Mod: CPTII,S$GLB,, | Performed by: INTERNAL MEDICINE

## 2022-01-05 PROCEDURE — 3075F SYST BP GE 130 - 139MM HG: CPT | Mod: CPTII,S$GLB,, | Performed by: INTERNAL MEDICINE

## 2022-01-05 PROCEDURE — 3008F BODY MASS INDEX DOCD: CPT | Mod: CPTII,S$GLB,, | Performed by: INTERNAL MEDICINE

## 2022-01-05 PROCEDURE — 3008F PR BODY MASS INDEX (BMI) DOCUMENTED: ICD-10-PCS | Mod: CPTII,S$GLB,, | Performed by: INTERNAL MEDICINE

## 2022-01-05 PROCEDURE — 1160F PR REVIEW ALL MEDS BY PRESCRIBER/CLIN PHARMACIST DOCUMENTED: ICD-10-PCS | Mod: CPTII,S$GLB,, | Performed by: INTERNAL MEDICINE

## 2022-01-05 PROCEDURE — 1160F RVW MEDS BY RX/DR IN RCRD: CPT | Mod: CPTII,S$GLB,, | Performed by: INTERNAL MEDICINE

## 2022-01-05 PROCEDURE — 1159F MED LIST DOCD IN RCRD: CPT | Mod: CPTII,S$GLB,, | Performed by: INTERNAL MEDICINE

## 2022-01-05 PROCEDURE — 3075F PR MOST RECENT SYSTOLIC BLOOD PRESS GE 130-139MM HG: ICD-10-PCS | Mod: CPTII,S$GLB,, | Performed by: INTERNAL MEDICINE

## 2022-01-05 RX ORDER — PHENTERMINE HYDROCHLORIDE 37.5 MG/1
37.5 TABLET ORAL DAILY
Qty: 30 TABLET | Refills: 5 | Status: SHIPPED | OUTPATIENT
Start: 2022-01-05 | End: 2023-04-27

## 2022-01-05 RX ORDER — TESTOSTERONE 2 MG/D
PATCH TRANSDERMAL
Qty: 60 PATCH | Refills: 3 | Status: SHIPPED | OUTPATIENT
Start: 2022-01-05

## 2022-01-05 NOTE — ASSESSMENT & PLAN NOTE
Body mass index is 33.79 kg/m².   - complicated by ABDON, HLD, htn, elevated estrogen, low testosterone  Weight continues to decrease.   increase to full tablet phentermine   - encourage pt to keep up efforts with exercise, try and work on diet   monitor weight

## 2022-01-05 NOTE — PROGRESS NOTES
Subjective:      Chief Complaint: Hypogonadism    HPI: Bri Santiago is a 61 y.o. male who is here for a follow-up evaluation for gynecomastia.   Last seen 4/28/2021. Overdue for f/u since October 2021. Pt arrived 16 minutes late to his 30 minute appointment today.    Disease history:  Noted symptoms late 2019/early 2020. Had Breast growth, tenderness/soreness. No discharge.  +ED, for about a year or so around that time as well.     Was on spironolactone, started since around 4/2019 per chart.   Stopped spironolactone 2/2020     Prior evaluation included:  Mammogram benign. Normal US of scrotum and testes.  LH, TSH normal. Normal prolactin.     Interval history:   continued testosterone replacement.   started phentermine, 1/2 tablet per day. Tolerating okay.     Last labs:    Lab Results   Component Value Date    TESTOSTERONE 343 05/03/2021    TESTOSTERONE 43.3 (L) 05/03/2021    FSH 3.30 12/17/2020    LABLH 3.5 12/17/2020    TOTALTESTOST 454 10/22/2021    HGB 13.1 (L) 12/06/2021    HCT 41.2 12/06/2021    PSA 0.93 01/22/2021     Prior labs 12/2020:   Estradiol 75   Testosterone 274, free and bioavailable low. SHBG normal   vit D normal   LH, FSH normal   LFT mildly increased    Prior labs 5/2020.   Estradiol 71, still high (was 70 several months ago)   Prolactin 10   Cortisol 8     Exercise: few days per week. Bikes 3-4 times a week, or walking.  Diet: working on it      On phentermine 1/2 tablet daily.    Today pt reports feeling okay overall.   Still with gynecomastia, no more growth, no pain  ED improvement, energy better.  +still having some sweating at night, no hot flashes.  No dizziness, no palpitations    Some GI symptoms still, pending GI f/u in a few weeks.  Also pain symptoms. chronic      Reviewed past medical, family, social history and updated as appropriate.    Review of Systems  As above    Objective:     There were no vitals filed for this visit.  BP Readings from Last 5 Encounters:   12/21/21  130/80   12/20/21 (!) 143/93   12/10/21 136/84   12/06/21 132/83   11/26/21 125/86     Physical Exam  Constitutional:       General: He is not in acute distress.     Appearance: He is obese.   Pulmonary:      Effort: Pulmonary effort is normal.       Wt Readings from Last 10 Encounters:   01/05/22 1121 109.9 kg (242 lb 4.6 oz)   12/21/21 0826 110.2 kg (243 lb)   12/21/21 0815 110.3 kg (243 lb 3.2 oz)   12/20/21 0957 110.3 kg (243 lb 3.2 oz)   12/10/21 0727 110.2 kg (243 lb)   12/06/21 1050 110.6 kg (243 lb 12.8 oz)   11/26/21 0824 110 kg (242 lb 8.1 oz)   10/06/21 0914 112.8 kg (248 lb 10.9 oz)   08/26/21 1404 112.8 kg (248 lb 10.9 oz)   08/16/21 1413 114.4 kg (252 lb 3.3 oz)   07/08/21 1113 116.2 kg (256 lb 2.8 oz)     Lab Results   Component Value Date    HGBA1C 4.5 01/23/2020     Lab Results   Component Value Date    CHOL 128 07/08/2021    HDL 35 (L) 07/08/2021    LDLCALC 72.2 07/08/2021    TRIG 104 07/08/2021    CHOLHDL 27.3 07/08/2021     Lab Results   Component Value Date     12/06/2021    K 3.7 12/06/2021     12/06/2021    CO2 26 12/06/2021    GLU 93 12/06/2021    BUN 13 12/06/2021    CREATININE 0.8 12/06/2021    CALCIUM 8.7 12/06/2021    PROT 7.1 12/06/2021    ALBUMIN 3.8 12/06/2021    BILITOT 1.3 (H) 12/06/2021    ALKPHOS 68 12/06/2021    AST 23 12/06/2021    ALT 25 12/06/2021    ANIONGAP 7 (L) 12/06/2021    ESTGFRAFRICA >60.0 12/06/2021    EGFRNONAA >60.0 12/06/2021    TSH 0.995 05/03/2021        Assessment/Plan:     Hypogonadism in male  Testosterone was low, confirmed on repeat. LH, FSH inappropriately normal.  With symptoms  Started testosterone patch, 2 mg dose.   testosterone levels improved.   Hemoglobin, PSA overall stable still.   continue same dose  Recheck in 6 months.      HTN (hypertension)  bp controlled today, continue same meds   f/u with PCP, monitor BP    Class 1 obesity due to excess calories with serious comorbidity and body mass index (BMI) of 33.0 to 33.9 in adult  Body  mass index is 33.79 kg/m².   - complicated by ABDON, HLD, htn, elevated estrogen, low testosterone  Weight continues to decrease.   increase to full tablet phentermine   - encourage pt to keep up efforts with exercise, try and work on diet   monitor weight      Gynecomastia, male  Symptoms stable/improving for the most part. Pain resolved. But gynecomastia remains, stable in size per patient.  Discussed potential for surgery, pt not interested at this time.   - reviewed that other than medication, likely culprit is weight gain, adipose tissue changing testosterone -> estrogen.   - weight now decreasing. Encourage pt to keep up his efforts there.   - manage testosterone as above   monitor        Follow up in about 6 months (around 7/5/2022) for lab review, further monitoring.      Chris Min MD  Endocrinology

## 2022-01-05 NOTE — ASSESSMENT & PLAN NOTE
Symptoms stable/improving for the most part. Pain resolved. But gynecomastia remains, stable in size per patient.  Discussed potential for surgery, pt not interested at this time.   - reviewed that other than medication, likely culprit is weight gain, adipose tissue changing testosterone -> estrogen.   - weight now decreasing. Encourage pt to keep up his efforts there.   - manage testosterone as above   monitor

## 2022-01-05 NOTE — ASSESSMENT & PLAN NOTE
Testosterone was low, confirmed on repeat. LH, FSH inappropriately normal.  With symptoms  Started testosterone patch, 2 mg dose.   testosterone levels improved.   Hemoglobin, PSA overall stable still.   continue same dose  Recheck in 6 months.

## 2022-01-11 ENCOUNTER — OFFICE VISIT (OUTPATIENT)
Dept: INTERNAL MEDICINE | Facility: CLINIC | Age: 62
End: 2022-01-11
Attending: INTERNAL MEDICINE
Payer: MEDICARE

## 2022-01-11 VITALS
HEIGHT: 71 IN | OXYGEN SATURATION: 96 % | DIASTOLIC BLOOD PRESSURE: 86 MMHG | HEART RATE: 97 BPM | BODY MASS INDEX: 33.98 KG/M2 | WEIGHT: 242.75 LBS | SYSTOLIC BLOOD PRESSURE: 130 MMHG

## 2022-01-11 DIAGNOSIS — C61 PROSTATE CANCER: ICD-10-CM

## 2022-01-11 DIAGNOSIS — J45.998 ASTHMA IN REMISSION: ICD-10-CM

## 2022-01-11 DIAGNOSIS — D50.9 IRON DEFICIENCY ANEMIA, UNSPECIFIED IRON DEFICIENCY ANEMIA TYPE: ICD-10-CM

## 2022-01-11 DIAGNOSIS — K21.9 GASTROESOPHAGEAL REFLUX DISEASE WITHOUT ESOPHAGITIS: ICD-10-CM

## 2022-01-11 DIAGNOSIS — D68.00 VON WILLEBRAND DISEASE: ICD-10-CM

## 2022-01-11 DIAGNOSIS — M46.96 UNSPECIFIED INFLAMMATORY SPONDYLOPATHY, LUMBAR REGION: ICD-10-CM

## 2022-01-11 DIAGNOSIS — E78.5 HYPERLIPIDEMIA, UNSPECIFIED HYPERLIPIDEMIA TYPE: Primary | ICD-10-CM

## 2022-01-11 DIAGNOSIS — R13.10 DYSPHAGIA, UNSPECIFIED TYPE: ICD-10-CM

## 2022-01-11 DIAGNOSIS — N40.1 BENIGN LOCALIZED PROSTATIC HYPERPLASIA WITH LOWER URINARY TRACT SYMPTOMS (LUTS): ICD-10-CM

## 2022-01-11 DIAGNOSIS — I51.89 DIASTOLIC DYSFUNCTION: ICD-10-CM

## 2022-01-11 DIAGNOSIS — I27.9 CHRONIC PULMONARY HEART DISEASE: ICD-10-CM

## 2022-01-11 DIAGNOSIS — K21.00 GASTROESOPHAGEAL REFLUX DISEASE WITH ESOPHAGITIS WITHOUT HEMORRHAGE: ICD-10-CM

## 2022-01-11 DIAGNOSIS — I70.0 THORACIC AORTA ATHEROSCLEROSIS: ICD-10-CM

## 2022-01-11 DIAGNOSIS — J30.89 NON-SEASONAL ALLERGIC RHINITIS, UNSPECIFIED TRIGGER: ICD-10-CM

## 2022-01-11 PROCEDURE — 99999 PR PBB SHADOW E&M-EST. PATIENT-LVL IV: CPT | Mod: PBBFAC,,, | Performed by: INTERNAL MEDICINE

## 2022-01-11 PROCEDURE — 3079F PR MOST RECENT DIASTOLIC BLOOD PRESSURE 80-89 MM HG: ICD-10-PCS | Mod: CPTII,S$GLB,, | Performed by: INTERNAL MEDICINE

## 2022-01-11 PROCEDURE — 99499 UNLISTED E&M SERVICE: CPT | Mod: S$GLB,,, | Performed by: INTERNAL MEDICINE

## 2022-01-11 PROCEDURE — 99214 PR OFFICE/OUTPT VISIT, EST, LEVL IV, 30-39 MIN: ICD-10-PCS | Mod: S$GLB,,, | Performed by: INTERNAL MEDICINE

## 2022-01-11 PROCEDURE — 3075F PR MOST RECENT SYSTOLIC BLOOD PRESS GE 130-139MM HG: ICD-10-PCS | Mod: CPTII,S$GLB,, | Performed by: INTERNAL MEDICINE

## 2022-01-11 PROCEDURE — 3008F BODY MASS INDEX DOCD: CPT | Mod: CPTII,S$GLB,, | Performed by: INTERNAL MEDICINE

## 2022-01-11 PROCEDURE — 99999 PR PBB SHADOW E&M-EST. PATIENT-LVL IV: ICD-10-PCS | Mod: PBBFAC,,, | Performed by: INTERNAL MEDICINE

## 2022-01-11 PROCEDURE — 99214 OFFICE O/P EST MOD 30 MIN: CPT | Mod: S$GLB,,, | Performed by: INTERNAL MEDICINE

## 2022-01-11 PROCEDURE — 3079F DIAST BP 80-89 MM HG: CPT | Mod: CPTII,S$GLB,, | Performed by: INTERNAL MEDICINE

## 2022-01-11 PROCEDURE — 3008F PR BODY MASS INDEX (BMI) DOCUMENTED: ICD-10-PCS | Mod: CPTII,S$GLB,, | Performed by: INTERNAL MEDICINE

## 2022-01-11 PROCEDURE — 3075F SYST BP GE 130 - 139MM HG: CPT | Mod: CPTII,S$GLB,, | Performed by: INTERNAL MEDICINE

## 2022-01-11 PROCEDURE — 99499 RISK ADDL DX/OHS AUDIT: ICD-10-PCS | Mod: S$GLB,,, | Performed by: INTERNAL MEDICINE

## 2022-01-11 RX ORDER — ESOMEPRAZOLE MAGNESIUM 40 MG/1
40 CAPSULE, DELAYED RELEASE ORAL
Qty: 180 CAPSULE | Refills: 3 | Status: SHIPPED | OUTPATIENT
Start: 2022-01-11 | End: 2022-02-10

## 2022-01-11 RX ORDER — DOXAZOSIN 1 MG/1
TABLET ORAL
Qty: 90 TABLET | Refills: 3 | Status: SHIPPED | OUTPATIENT
Start: 2022-01-11 | End: 2023-02-17

## 2022-01-11 RX ORDER — FUROSEMIDE 20 MG/1
20 TABLET ORAL DAILY
Qty: 90 TABLET | Refills: 3 | Status: SHIPPED | OUTPATIENT
Start: 2022-01-11 | End: 2023-01-31

## 2022-01-11 RX ORDER — CLINDAMYCIN HYDROCHLORIDE 300 MG/1
300 CAPSULE ORAL EVERY 6 HOURS
COMMUNITY
Start: 2021-10-25 | End: 2022-02-21 | Stop reason: CLARIF

## 2022-01-11 RX ORDER — ATORVASTATIN CALCIUM 40 MG/1
40 TABLET, FILM COATED ORAL NIGHTLY
Qty: 90 TABLET | Refills: 3 | Status: SHIPPED | OUTPATIENT
Start: 2022-01-11 | End: 2023-01-31

## 2022-01-11 RX ORDER — MONTELUKAST SODIUM 10 MG/1
TABLET ORAL
Qty: 90 TABLET | Refills: 3 | Status: SHIPPED | OUTPATIENT
Start: 2022-01-11 | End: 2023-01-31

## 2022-01-11 RX ORDER — CLOPIDOGREL BISULFATE 75 MG/1
75 TABLET ORAL DAILY
Qty: 90 TABLET | Refills: 3 | Status: SHIPPED | OUTPATIENT
Start: 2022-01-11 | End: 2023-02-01

## 2022-01-11 RX ORDER — FERROUS SULFATE 325(65) MG
325 TABLET ORAL EVERY 12 HOURS
Qty: 180 TABLET | Refills: 3 | Status: SHIPPED | OUTPATIENT
Start: 2022-01-11 | End: 2022-02-10 | Stop reason: ALTCHOICE

## 2022-01-11 NOTE — PROGRESS NOTES
Subjective:       Patient ID: Bri Santiago is a 61 y.o. male.    Chief Complaint: No chief complaint on file.     Bri Santiago is a 61 y.o.  male who presents for No chief complaint on file.  .  Problem Noted   Primary Hypertension    Prostate Cancer 1/11/2022   Unspecified Inflammatory Spondylopathy, Lumbar Region 1/11/2022   Non-Occlusive Coronary Artery Disease Requiring Drug Therapy 10/10/2021    Heart cath showing non obstructive CAD, on plavix due to ASA allergy     Class 1 Obesity Due to Excess Calories With Serious Comorbidity and Body Mass Index (Bmi) of 33.0 to 33.9 in Adult 12/18/2018    Improved from class 2 obesity     Moderate Persistent Asthma 12/18/2018    Started on  symbicort 2p bid  Prn albuterol  exercise     Diastolic Dysfunction With Chronic Heart Failure 12/3/2018   At Risk for Cardiovascular Event 12/3/2018   Cough (Resolved)    Bmi 34.0-34.9,Adult (Resolved) 4/8/2016     Patient has a history of hyperlipidemia. Pt is complaint with his medication. Denies muscle cramping and weakness. Pt attempts to follow a low cholesterol diet and exercise. No CP, SOB, orthopnea or PND.       Suffering with hiatal hernia causing reflux. On chronic PPI, follows with Dr. Boyer.       Health Maintenance       Date Due Completion Date    COVID-19 Vaccine (1) Never done ---    Aspirin/Antiplatelet Therapy Never done ---    Shingles Vaccine (1 of 2) Never done ---    Colorectal Cancer Screening 03/04/2025 3/4/2020    Lipid Panel 07/08/2026 7/8/2021    Pneumococcal Vaccines (Age 0-64) (2 of 2 - PPSV23) 08/23/2027 10/7/2021    TETANUS VACCINE 10/16/2027 10/16/2017          Review of Systems   Constitutional: Negative for chills and fever.   Respiratory: Negative for cough and shortness of breath.    Cardiovascular: Negative for chest pain and palpitations.   Gastrointestinal: Negative for nausea and vomiting.   Genitourinary: Negative for dysuria and hematuria.         Past Medical History:   Diagnosis  Date    Acute pancreatitis     Anal fissure     Anemia     Anticoagulant long-term use     Arthritis     Asthma in remission     Back pain     BPH (benign prostatic hypertrophy)     Cancer 2000    prostate- treated at [facility] with chemo- in remission since 2000    Chronic maxillary sinusitis     Clotting disorder     Diastolic dysfunction with chronic heart failure 12/3/2018    Dysphagia 10/7/2014    Family history of colon cancer     Family history of early CAD     GERD (gastroesophageal reflux disease)     Helicobacter pylori (H. pylori) infection     Chronic    History of chronic pancreatitis     HTN (hypertension)     Lumbago 11/12/2012    Obesity     ABDON (obstructive sleep apnea)     ABDON (obstructive sleep apnea)     With likely ohs Refer to sleep    Prostate cancer 2000    dx and treated at [facility], had chemotherapy, in remission [id number]    Sacroiliac joint pain 2/10/2015    Spinal stenosis of lumbar region     Von Willebrand disease     VWD (acquired von Willebrand's disease)        Past Surgical History:   Procedure Laterality Date    anal fissure repair      x2    BACK SURGERY  2012    BALLOON SINUPLASTY OF PARANASAL SINUS Bilateral 10/7/2019    Procedure: SINUPLASTY, USING BALLOON;  Surgeon: LAURENT Swanson MD;  Location: [facility];  Service: ENT;  Laterality: Bilateral;    CARPAL TUNNEL RELEASE  2003    left hand    CATHETERIZATION OF BOTH LEFT AND RIGHT HEART N/A 2/14/2019    Procedure: CATHETERIZATION, HEART, BOTH LEFT AND RIGHT;  Surgeon: Kyle Magana MD;  Location: [facility] CATH LAB;  Service: Cardiology;  Laterality: N/A;    CERVICAL DISCECTOMY  2003    COLONOSCOPY N/A 10/7/2015    Procedure: COLONOSCOPY;  Surgeon: Rosedno Boyer MD;  Location: [address]);  Service: Endoscopy;  Laterality: N/A;  PM Prep    COLONOSCOPY N/A 6/19/2017    Procedure: COLONOSCOPY;  Surgeon: Rosendo Boyer MD;  [facility]  Service: Endoscopy;   Laterality: N/A;  constipation prep (no DM no CHF)       hx of vonWillebrand's disease-will need infusion prior    COLONOSCOPY N/A 3/4/2020    Procedure: COLONOSCOPY;  Surgeon: Rosendo Boyer MD;  Location: [facility] (4TH FLR);  Service: Endoscopy;  Laterality: N/A;  Please order CBC, ferritin, Iron TIBC day of case per Dr. Boyer  labwork at 7:10am and patient will check in right after.  per Dr. Tyler patient can hold Plavix 5 days prior see note 1/7/20  DDAVP prior to procedure needed see note 1/16/20 for oncall med by Dr. Tyler -     ESOPHAGOGASTRODUODENOSCOPY N/A 3/4/2020    Procedure: EGD (ESOPHAGOGASTRODUODENOSCOPY);  Surgeon: Rosendo Boyer MD;  Location: [facility] (4TH FLR);  Service: Endoscopy;  Laterality: N/A;  EGD and Colonoscopy orders merged together  labwork at 7:10am and patient will check in right after.  DDAVP prior to procedure needed see note 1/16/20 for oncall med  per Dr. Tyler patient can hold Plavix 5 days prior see note 1/7/20    ESOPHAGOGASTRODUODENOSCOPY N/A 11/3/2020    Procedure: EGD (ESOPHAGOGASTRODUODENOSCOPY);  Surgeon: Rosendo Boyer MD;  Location: [facility] (2ND FLR);  Service: Endoscopy;  Laterality: N/A;  Please recall pt has von Willebrands and will require DDVAP infusion prior to procedure as previously done.    see telephone encounter on 10/1/20 regarding DDAVP   ok to hold Plavix 5 days prior per Dr.Blessey BUCKNER screening on 10/31/20 -rb    EXAMINATION UNDER ANESTHESIA N/A 3/15/2021    Procedure: EXAM UNDER ANESTHESIA;  Surgeon: Silvia Cazares MD;  [facility] 2[facility];  Service: Colon and Rectal;  Laterality: N/A;    FUNCTIONAL ENDOSCOPIC SINUS SURGERY (FESS) USING COMPUTER-ASSISTED NAVIGATION Bilateral 10/7/2019    Procedure: FESS, USING COMPUTER-ASSISTED NAVIGATION;  Surgeon: LAURENT Swanson MD;  Location: [facility];  Service: ENT;  Laterality: Bilateral;    INJECTION OF ANESTHETIC AGENT AROUND NERVE Bilateral 10/13/2020    Procedure: BLOCK,  NERVE BILATERAL C4,C5,C6 Plavix clearance requested;  Surgeon: Fredy Magana MD;  Location: [facility] PAIN MGT;  Service: Pain Management;  Laterality: Bilateral;  BLOCK, NERVE BILATERAL C4,C5,C6  Plavix clearance ok, will require DDVAP infusion    LATERAL INTERNAL ANAL SPHINCTEROTOMY N/A 12/10/2021    Procedure: SPHINCTEROTOMY-LATERAL INTERNAL ANAL;  Surgeon: Nadeem Grady MD;  [facility];  Service: Colon and Rectal;  Laterality: N/A;    LEFT HEART CATHETERIZATION Left 2/14/2019    Procedure: Left heart cath;  Surgeon: Kyle Magana MD;  Location: [facility] CATH LAB;  Service: Cardiology;  Laterality: Left;    LUMBAR FUSION  2012    RADIOFREQUENCY ABLATION Right 5/9/2019    Procedure: RADIOFREQUENCY ABLATION, RIGHT L3,L4,L5;  Surgeon: Fredy Magana MD;  Location: [facility] PAIN MGT;  Service: Pain Management;  Laterality: Right;  1 of 2  RT RFA L3,4,5  PLAVIX clearance received, pt needs DDAVP    RADIOFREQUENCY ABLATION Left 5/23/2019    Procedure: RADIOFREQUENCY ABLATION, LEFT L3,L4,L5;  Surgeon: Fredy Magana MD;  Location: [facility] PAIN MGT;  Service: Pain Management;  Laterality: Left;  2 of 2  LT RFA L3,4,5  PLAVIX clearance received, pt needs DDAVP    RADIOFREQUENCY ABLATION Left 1/16/2020    Procedure: RADIOFREQUENCY ABLATION LEFT L3, L4, L5 MEDIAL BRANCH 1 OF 2 **PATIENT ARRIVING AT 8 AM**;  Surgeon: Fredy Magana MD;  Location: [facility] PAIN MGT;  Service: Pain Management;  Laterality: Left;  NEEDS CONSENT, PLAVIX CLEARANCE IN CHART    RADIOFREQUENCY ABLATION Right 1/28/2020    Procedure: RADIOFREQUENCY ABLATION, RIGHT L3-L4-L5 MEDIAL BRANCH 2 OF 2 (Left done on 1/16/20);  Surgeon: Fredy Magana MD;  Location: [facility] PAIN MGT;  Service: Pain Management;  Laterality: Right;    RADIOFREQUENCY ABLATION Left 8/18/2020    Procedure: RADIOFREQUENCY ABLATION, L3-L4-L5 MEDIAL BRANCH 1 OF 2 clear to hold plavix 7 days;  Surgeon: Fredy Magana MD;  Location: [facility] PAIN MGT;  Service:  Pain Management;  Laterality: Left;    RADIOFREQUENCY ABLATION Right 9/1/2020    Procedure: RADIOFREQUENCY ABLATION, L3-L4-L5 MEDIAL BR ANCH 2 OF 2 clear to hol,d Plavix 7 days;  Surgeon: Fredy Magana MD;  Location: [facility] PAIN MGT;  Service: Pain Management;  Laterality: Right;    RADIOFREQUENCY ABLATION Bilateral 12/21/2021    Procedure: RADIOFREQUENCY ABLATION B/L L3,4,5 RFA (give dDAVP prior) NEEDS CONSENT plavix ok x7;  Surgeon: Fredy Magana MD;  Location: [facility] PAIN MGT;  Service: Pain Management;  Laterality: Bilateral;    RADIOFREQUENCY ABLATION OF LUMBAR MEDIAL BRANCH NERVE AT SINGLE LEVEL Left 5/24/2018    Procedure: RADIOFREQUENCY THERMOCOAGULATION (RFTC)-NERVE-MEDIAN BRANCH-LUMBAR;  Surgeon: Fredy Magana MD;  Location: [facility] PAIN MGT;  Service: Pain Management;  Laterality: Left;  Left RFA @ L3,4,5  28[address]-69700  with IV Sedation    2 of 2    SPINE SURGERY      TONSILLECTOMY      at age 24    VASECTOMY  1996       Family History   Problem Relation Age of Onset    Colon cancer Father 67        colon cancer    Hypertension Father     Glaucoma Father     Cancer Father     Colon cancer Paternal Grandfather 65             Coronary artery disease Mother 45    Hypertension Mother     Heart disease Mother     Colon cancer Mother     Diabetes Mother     No Known Problems Brother     No Known Problems Sister     No Known Problems Daughter     No Known Problems Son     Coronary artery disease Brother 51    No Known Problems Daughter     No Known Problems Daughter     No Known Problems Son     No Known Problems Son     Colon cancer Paternal Uncle 65    Diabetes Mellitus Paternal Grandmother     Esophageal cancer Neg Hx        Social History     Tobacco Use    Smoking status: Never Smoker    Smokeless tobacco: Never Used   Substance Use Topics    Alcohol use: Yes     Alcohol/week: 1.0 standard drink     Types: 1 Glasses of wine per week     Comment: social    Drug use: No  "            Objective:   Blood pressure 130/86, pulse 97, height 5' 11" (1.803 m), weight 110.1 kg (242 lb 11.6 oz), SpO2 96 %.     Physical Exam  Constitutional:       General: He is not in acute distress.     Appearance: He is well-developed.   HENT:      Head: Normocephalic and atraumatic.      Right Ear: Tympanic membrane and external ear normal.      Left Ear: Tympanic membrane and external ear normal.      Mouth/Throat:      Pharynx: No oropharyngeal exudate.   Eyes:      General:         Right eye: No discharge.         Left eye: No discharge.      Conjunctiva/sclera: Conjunctivae normal.   Neck:      Thyroid: No thyromegaly.   Cardiovascular:      Rate and Rhythm: Normal rate and regular rhythm.      Heart sounds: Normal heart sounds. No murmur heard.  No friction rub. No gallop.    Pulmonary:      Effort: Pulmonary effort is normal.      Breath sounds: Normal breath sounds. No wheezing or rales.   Abdominal:      General: Bowel sounds are normal. There is no distension.      Palpations: Abdomen is soft.      Tenderness: There is no abdominal tenderness.   Musculoskeletal:         General: No tenderness.   Lymphadenopathy:      Cervical: No cervical adenopathy.   Skin:     General: Skin is warm and dry.   Neurological:      Mental Status: He is alert and oriented to person, place, and time.   Psychiatric:         Thought Content: Thought content normal.         Prior labs reviewed  Assessment/Plan:        Diagnoses and all orders for this visit:    Hyperlipidemia, unspecified hyperlipidemia type  -     atorvastatin (LIPITOR) 40 MG tablet; Take 1 tablet (40 mg total) by mouth every evening.  -     Lipid Panel; Future  -     Comprehensive Metabolic Panel; Future    Gastroesophageal reflux disease without esophagitis  -     esomeprazole (NEXIUM) 40 MG capsule; Take 1 capsule (40 mg total) by mouth 2 (two) times daily before meals.    Iron deficiency anemia, unspecified iron deficiency anemia " type  Comments:  likely secondary to chronic ppi use  cont feso4 QOD  Orders:  -     ferrous sulfate (FEOSOL) 325 mg (65 mg iron) Tab tablet; Take 1 tablet (325 mg total) by mouth every 12 (twelve) hours.    Asthma in remission  Non-seasonal allergic rhinitis, unspecified trigger  Comments:  stalbe, cont monoleukast, avoid allergens  Orders:  -     montelukast (SINGULAIR) 10 mg tablet; TAKE 1 TABLET(10 MG) BY MOUTH EVERY EVENING    Gastroesophageal reflux disease with esophagitis without hemorrhage  Comments:  believed due to hiatal hernia  considering repair after no significant improvement with wt loss    Dysphagia, unspecified type    Prostate cancer  Comments:  treated by urology, stable, followed routinely with PSA    Benign localized prostatic hyperplasia with lower urinary tract symptoms (LUTS)  Comments:  stable on cardura  Orders:  -     doxazosin (CARDURA) 1 MG tablet; TAKE 1 TABLET BY MOUTH EVERY EVENING    Unspecified inflammatory spondylopathy, lumbar region    Von Willebrand disease  Comments:  on eloquis, no issues  cont current plan    Chronic pulmonary heart disease  Comments:  diastolic heart failure- doing well on lasix  cont cpap nightly, awaiting new machine    Diastolic dysfunction  Comments:  see above, stable  Orders:  -     furosemide (LASIX) 20 MG tablet; Take 1 tablet (20 mg total) by mouth once daily.    Thoracic aorta atherosclerosis  On statin, platelet inhibitor    Other orders  -     clopidogreL (PLAVIX) 75 mg tablet; Take 1 tablet (75 mg total) by mouth once daily.      31 min spent in care of patient including history, physical, chart review, orders and coordination of care.     Medication List with Changes/Refills   Current Medications    ALBUTEROL (PROVENTIL/VENTOLIN HFA) 90 MCG/ACTUATION INHALER    INHALE 2 PUFFS INTO THE LUNGS EVERY 6 HOURS AS NEEDED FOR WHEEZING OR SHORTNESS OF BREATH    AZELASTINE (ASTELIN) 137 MCG (0.1 %) NASAL SPRAY    Two sprays in each nostril, sniff until  absorbed, then follow with 1 spray of fluticasone.  Use both sprays twice daily.    BUDESONIDE-FORMOTEROL 160-4.5 MCG (SYMBICORT) 160-4.5 MCG/ACTUATION HFAA    INHALE 2 PUFFS INTO THE LUNGS EVERY 12 HOURS    CALCIUM CITRATE-VITAMIN D3 315-200 MG (CITRACAL+D) 315-200 MG-UNIT PER TABLET    Take 1 tablet by mouth nightly.     CLINDAMYCIN (CLEOCIN) 300 MG CAPSULE    Take 300 mg by mouth every 6 (six) hours.    CYANOCOBALAMIN, VITAMIN B-12, 50 MCG TABLET    Take 50 mcg by mouth nightly.     DILTIAZEM HCL (DILTIAZEM 2% CREAM)    Apply topically 3 (three) times daily. Apply topically to anal area.    DOCUSATE SODIUM (COLACE) 100 MG CAPSULE    Take 1 tablet by mouth Twice daily. 1 Capsule Oral Twice a day .  Take with pain medicine    FLUTICASONE PROPIONATE (FLONASE) 50 MCG/ACTUATION NASAL SPRAY    One spray in each nostril twice daily after 1st using azelastine nasal spray    IPRATROPIUM (ATROVENT) 42 MCG (0.06 %) NASAL SPRAY    1-2 sprays in each nostril before eating and at bedtime as needed    LIDOCAINE HCL 2% (XYLOCAINE) 2 % JELLY    Apply topically 3 (three) times daily.    NAPROXEN (NAPROSYN) 500 MG TABLET    Take 1 tablet (500 mg total) by mouth 2 (two) times daily as needed (pain).    PHENTERMINE (ADIPEX-P) 37.5 MG TABLET    Take 1 tablet (37.5 mg total) by mouth once daily.    POTASSIUM CHLORIDE (MICRO-K) 10 MEQ CPSR    Take 10 mEq by mouth once daily.     TADALAFIL (CIALIS) 20 MG TAB    Take 1 tablet (20 mg total) by mouth as needed. Take every 36 hours as needed for ED.    TESTOSTERONE (ANDRODERM) 2 MG/24 HOUR PT24    APPLY 1 PATCH TOPICALLY TO THE SKIN EVERY DAY    TIZANIDINE (ZANAFLEX) 2 MG TABLET    Take 2 tablets (4 mg total) by mouth every 6 (six) hours as needed.    TOPIRAMATE (TOPAMAX) 50 MG TABLET    TAKE 1 TABLET (50 MG TOTAL) BY MOUTH 2 (TWO) TIMES DAILY.    ZOLPIDEM (AMBIEN) 10 MG TAB    TAKE 1 TABLET BY MOUTH EVERY DAY AT BEDTIME   Changed and/or Refilled Medications    Modified Medication Previous  Medication    ATORVASTATIN (LIPITOR) 40 MG TABLET atorvastatin (LIPITOR) 40 MG tablet       Take 1 tablet (40 mg total) by mouth every evening.    Take 1 tablet (40 mg total) by mouth once daily.    CLOPIDOGREL (PLAVIX) 75 MG TABLET clopidogreL (PLAVIX) 75 mg tablet       Take 1 tablet (75 mg total) by mouth once daily.    Take 1 tablet (75 mg total) by mouth once daily.    DOXAZOSIN (CARDURA) 1 MG TABLET doxazosin (CARDURA) 1 MG tablet       TAKE 1 TABLET BY MOUTH EVERY EVENING    TAKE 1 TABLET BY MOUTH EVERY EVENING    ESOMEPRAZOLE (NEXIUM) 40 MG CAPSULE esomeprazole (NEXIUM) 40 MG capsule       Take 1 capsule (40 mg total) by mouth 2 (two) times daily before meals.    Take 1 capsule (40 mg total) by mouth 2 (two) times daily before meals.    FERROUS SULFATE (FEOSOL) 325 MG (65 MG IRON) TAB TABLET ferrous sulfate (FEOSOL) 325 mg (65 mg iron) Tab tablet       Take 1 tablet (325 mg total) by mouth every 12 (twelve) hours.    Take 1 tablet (325 mg total) by mouth every 12 (twelve) hours.    FUROSEMIDE (LASIX) 20 MG TABLET furosemide (LASIX) 20 MG tablet       Take 1 tablet (20 mg total) by mouth once daily.    Take 1 tablet (20 mg total) by mouth once daily.    MONTELUKAST (SINGULAIR) 10 MG TABLET montelukast (SINGULAIR) 10 mg tablet       TAKE 1 TABLET(10 MG) BY MOUTH EVERY EVENING    TAKE 1 TABLET(10 MG) BY MOUTH EVERY EVENING   Discontinued Medications    DILTIAZEM HCL (DILTIAZEM 2% CREAM)    Apply topically TO anal AREA THREE TIMES DAILY    OXYCODONE (ROXICODONE) 5 MG IMMEDIATE RELEASE TABLET    Take 1 tablet (5 mg total) by mouth every 4 (four) hours as needed for Pain.    RABEPRAZOLE (ACIPHEX) 20 MG TABLET    Take 1 tablet (20 mg total) by mouth once daily.

## 2022-01-18 ENCOUNTER — TELEPHONE (OUTPATIENT)
Dept: SURGERY | Facility: CLINIC | Age: 62
End: 2022-01-18
Payer: MEDICARE

## 2022-01-18 ENCOUNTER — PATIENT MESSAGE (OUTPATIENT)
Dept: SURGERY | Facility: CLINIC | Age: 62
End: 2022-01-18
Payer: MEDICARE

## 2022-01-18 NOTE — TELEPHONE ENCOUNTER
----- Message from Zari Hernandes sent at 1/18/2022 11:30 AM CST -----  Contact: self @ [phone]  Pt had surgery on 12-10-21.  He says when he has a bowel movement he is passing a lot of blood.  Pls call.

## 2022-01-18 NOTE — TELEPHONE ENCOUNTER
----- Message from Misti Dunn RN sent at 1/18/2022 11:52 AM CST -----  Contact: Patient    ----- Message -----  From: Dominick Sharp  Sent: 1/18/2022  11:51 AM CST  To: Ysabel Silver Staff    Patient requesting call back.    Patient @[phone] ([place])

## 2022-01-18 NOTE — TELEPHONE ENCOUNTER
Spoke with patient. States he is loosing a puddle of blood each time he has a bowel movement. No pain. Dr Grady suspects hemorrhoids, patient is on Plavix. Okay to be seen by NP, appt made.

## 2022-01-18 NOTE — TELEPHONE ENCOUNTER
Left message to return call regarding passing blood. Patient is s/p LIAS 12/10/21. Will message patient through the portal.

## 2022-01-21 ENCOUNTER — OFFICE VISIT (OUTPATIENT)
Dept: SURGERY | Facility: CLINIC | Age: 62
End: 2022-01-21
Payer: MEDICARE

## 2022-01-21 ENCOUNTER — LAB VISIT (OUTPATIENT)
Dept: LAB | Facility: HOSPITAL | Age: 62
End: 2022-01-21
Payer: MEDICARE

## 2022-01-21 VITALS
SYSTOLIC BLOOD PRESSURE: 134 MMHG | WEIGHT: 244.69 LBS | DIASTOLIC BLOOD PRESSURE: 88 MMHG | BODY MASS INDEX: 34.13 KG/M2 | HEART RATE: 88 BPM

## 2022-01-21 DIAGNOSIS — K62.5 RECTAL BLEEDING: ICD-10-CM

## 2022-01-21 DIAGNOSIS — K62.5 RECTAL BLEEDING: Primary | ICD-10-CM

## 2022-01-21 LAB
BASOPHILS # BLD AUTO: 0.05 K/UL (ref 0–0.2)
BASOPHILS NFR BLD: 0.7 % (ref 0–1.9)
DIFFERENTIAL METHOD: NORMAL
EOSINOPHIL # BLD AUTO: 0.2 K/UL (ref 0–0.5)
EOSINOPHIL NFR BLD: 2.2 % (ref 0–8)
ERYTHROCYTE [DISTWIDTH] IN BLOOD BY AUTOMATED COUNT: 12.5 % (ref 11.5–14.5)
HCT VFR BLD AUTO: 43.7 % (ref 40–54)
HGB BLD-MCNC: 14 G/DL (ref 14–18)
IMM GRANULOCYTES # BLD AUTO: 0.01 K/UL (ref 0–0.04)
IMM GRANULOCYTES NFR BLD AUTO: 0.1 % (ref 0–0.5)
LYMPHOCYTES # BLD AUTO: 1.7 K/UL (ref 1–4.8)
LYMPHOCYTES NFR BLD: 24.3 % (ref 18–48)
MCH RBC QN AUTO: 30.4 PG (ref 27–31)
MCHC RBC AUTO-ENTMCNC: 32 G/DL (ref 32–36)
MCV RBC AUTO: 95 FL (ref 82–98)
MONOCYTES # BLD AUTO: 0.4 K/UL (ref 0.3–1)
MONOCYTES NFR BLD: 5.9 % (ref 4–15)
NEUTROPHILS # BLD AUTO: 4.8 K/UL (ref 1.8–7.7)
NEUTROPHILS NFR BLD: 66.8 % (ref 38–73)
NRBC BLD-RTO: 0 /100 WBC
PLATELET # BLD AUTO: 246 K/UL (ref 150–450)
PMV BLD AUTO: 9.3 FL (ref 9.2–12.9)
RBC # BLD AUTO: 4.61 M/UL (ref 4.6–6.2)
WBC # BLD AUTO: 7.12 K/UL (ref 3.9–12.7)

## 2022-01-21 PROCEDURE — 99213 OFFICE O/P EST LOW 20 MIN: CPT | Mod: S$GLB,,, | Performed by: NURSE PRACTITIONER

## 2022-01-21 PROCEDURE — 99213 PR OFFICE/OUTPT VISIT, EST, LEVL III, 20-29 MIN: ICD-10-PCS | Mod: S$GLB,,, | Performed by: NURSE PRACTITIONER

## 2022-01-21 PROCEDURE — 85025 COMPLETE CBC W/AUTO DIFF WBC: CPT | Performed by: NURSE PRACTITIONER

## 2022-01-21 PROCEDURE — 36415 COLL VENOUS BLD VENIPUNCTURE: CPT | Performed by: NURSE PRACTITIONER

## 2022-01-21 RX ORDER — HYDROCORTISONE ACETATE 25 MG/1
25 SUPPOSITORY RECTAL 2 TIMES DAILY
Qty: 20 SUPPOSITORY | Refills: 0 | Status: SHIPPED | OUTPATIENT
Start: 2022-01-21 | End: 2022-01-31

## 2022-01-21 NOTE — PROGRESS NOTES
"Patient ID:  Bri Santiago is a 61 y.o. male     Chief Complaint: No chief complaint on file.       HPI:  Bri Santiago presents to colon and rectal surgery with a complaint of rectal bleeding X two separate instances. Still having mild pain and burning after bowel movements, but no worsening or different pain associated with bleeding. Taking miralax daily -  daily soft BM. No abdominal pain, fevers or chills.      ON plavix     Dr Grady s/p LIAS 12/2021  Dr Cazares s/p EUA and RBL 03/2021    Colonoscopy 2020  Impression:           - The examined portion of the ileum was normal.                         - Diverticulosis in the recto-sigmoid colon and in                         the sigmoid colon.                         - The entire examined colon is normal.                         - Non-bleeding internal hemorrhoids.                         - No specimens collected.   *famiy hx of CRC - father    ROS:     Review of Systems   Constitutional: Negative for appetite change, chills, fatigue, fever and unexpected weight change.   Respiratory: Negative for shortness of breath.    Cardiovascular: Negative for chest pain.   Gastrointestinal: Positive for blood in stool and rectal pain. Negative for abdominal distention, abdominal pain, anal bleeding, constipation, diarrhea, nausea and vomiting.       Review of patient's allergies indicates:   Allergen Reactions    Penicillins Hives and Swelling     Has had allergy testing and can prob tolerate penicillin    Ace inhibitors Other (See Comments)     "Caused a respiratory infection"    Aspirin Hives           Codeine Itching     Ok to take percocet    Dilaudid [hydromorphone] Itching     Past Medical History:   Diagnosis Date    Acute pancreatitis     Anal fissure     Anemia     Anticoagulant long-term use     Arthritis     Asthma in remission     Back pain     BPH (benign prostatic hypertrophy)     Cancer 2000    prostate- treated at [facility] with chemo- in " remission since 2000    Chronic maxillary sinusitis     Clotting disorder     Diastolic dysfunction with chronic heart failure 12/3/2018    Dysphagia 10/7/2014    Family history of colon cancer     Family history of early CAD     GERD (gastroesophageal reflux disease)     Helicobacter pylori (H. pylori) infection     Chronic    History of chronic pancreatitis     HTN (hypertension)     Lumbago 11/12/2012    Obesity     ABDON (obstructive sleep apnea)     ABDON (obstructive sleep apnea)     With likely ohs Refer to sleep    Prostate cancer 2000    dx and treated at [facility], had chemotherapy, in remission [id number]    Sacroiliac joint pain 2/10/2015    Spinal stenosis of lumbar region     Von Willebrand disease     VWD (acquired von Willebrand's disease)      Past Surgical History:   Procedure Laterality Date    anal fissure repair      x2    BACK SURGERY  2012    BALLOON SINUPLASTY OF PARANASAL SINUS Bilateral 10/7/2019    Procedure: SINUPLASTY, USING BALLOON;  Surgeon: LAURENT Swanson MD;  Location[facility];  Service: ENT;  Laterality: Bilateral;    CARPAL TUNNEL RELEASE  2003    left hand    CATHETERIZATION OF BOTH LEFT AND RIGHT HEART N/A 2/14/2019    Procedure: CATHETERIZATION, HEART, BOTH LEFT AND RIGHT;  Surgeon: Kyle Magana MD;  Location: [facility] CATH LAB;  Service: Cardiology;  Laterality: N/A;    CERVICAL DISCECTOMY  2003    COLONOSCOPY N/A 10/7/2015    Procedure: COLONOSCOPY;  Surgeon: Rosendo Boyer MD;  Location: [facility] (4TH FLR);  Service: Endoscopy;  Laterality: N/A;  PM Prep    COLONOSCOPY N/A 6/19/2017    Procedure: COLONOSCOPY;  Surgeon: Rosendo Boyer MD;  Location: [facility] ENDO (4TH FLR);  Service: Endoscopy;  Laterality: N/A;  constipation prep (no DM no CHF)       hx of vonWillebrand's disease-will need infusion prior    COLONOSCOPY N/A 3/4/2020    Procedure: COLONOSCOPY;  Surgeon: Rosendo Boyer MD;  Location: [facility] ENDO (4TH FLR);  Service:  Endoscopy;  Laterality: N/A;  Please order CBC, ferritin, Iron TIBC day of case per Dr. Boyer  labwork at 7:10am and patient will check in right after.  per Dr. Tyler patient can hold Plavix 5 days prior see note 1/7/20  DDAVP prior to procedure needed see note 1/16/20 for oncall med by Dr. Tyler -     ESOPHAGOGASTRODUODENOSCOPY N/A 3/4/2020    Procedure: EGD (ESOPHAGOGASTRODUODENOSCOPY);  Surgeon: Rosendo Boyer MD;  Location: [facility] (4TH FLR);  Service: Endoscopy;  Laterality: N/A;  EGD and Colonoscopy orders merged together  labwork at 7:10am and patient will check in right after.  DDAVP prior to procedure needed see note 1/16/20 for oncall med  per Dr. Tyler patient can hold Plavix 5 days prior see note 1/7/20    ESOPHAGOGASTRODUODENOSCOPY N/A 11/3/2020    Procedure: EGD (ESOPHAGOGASTRODUODENOSCOPY);  Surgeon: Rosendo Boyer MD;  Location: [facility] (2ND FLR);  Service: Endoscopy;  Laterality: N/A;  Please recall pt has von Willebrands and will require DDVAP infusion prior to procedure as previously done.    see telephone encounter on 10/1/20 regarding DDAVP   ok to hold Plavix 5 days prior per Dr.Blessey BUCKNER screening on 10/31/20 -rb    EXAMINATION UNDER ANESTHESIA N/A 3/15/2021    Procedure: EXAM UNDER ANESTHESIA;  Surgeon: Silvia Cazares MD;  Location: [facility] 2ND FLR;  Service: Colon and Rectal;  Laterality: N/A;    FUNCTIONAL ENDOSCOPIC SINUS SURGERY (FESS) USING COMPUTER-ASSISTED NAVIGATION Bilateral 10/7/2019    Procedure: FESS, USING COMPUTER-ASSISTED NAVIGATION;  Surgeon: LAURENT Swanson MD;  Location: [facility] OR;  Service: ENT;  Laterality: Bilateral;    INJECTION OF ANESTHETIC AGENT AROUND NERVE Bilateral 10/13/2020    Procedure: BLOCK, NERVE BILATERAL C4,C5,C6 Plavix clearance requested;  Surgeon: Fredy Magana MD;  Location: [facility] PAIN MGT;  Service: Pain Management;  Laterality: Bilateral;  BLOCK, NERVE BILATERAL C4,C5,C6  Plavix clearance ok, will require DDVAP infusion     LATERAL INTERNAL ANAL SPHINCTEROTOMY N/A 12/10/2021    Procedure: SPHINCTEROTOMY-LATERAL INTERNAL ANAL;  Surgeon: Nadeem Grady MD;  [facility];  Service: Colon and Rectal;  Laterality: N/A;    LEFT HEART CATHETERIZATION Left 2/14/2019    Procedure: Left heart cath;  Surgeon: Kyle Magana MD;  Location: [facility] CATH LAB;  Service: Cardiology;  Laterality: Left;    LUMBAR FUSION  2012    RADIOFREQUENCY ABLATION Right 5/9/2019    Procedure: RADIOFREQUENCY ABLATION, RIGHT L3,L4,L5;  Surgeon: Fredy Magana MD;  Location: [facility] PAIN MGT;  Service: Pain Management;  Laterality: Right;  1 of 2  RT RFA L3,4,5  PLAVIX clearance received, pt needs DDAVP    RADIOFREQUENCY ABLATION Left 5/23/2019    Procedure: RADIOFREQUENCY ABLATION, LEFT L3,L4,L5;  Surgeon: Fredy Magana MD;  Location: [facility] PAIN MGT;  Service: Pain Management;  Laterality: Left;  2 of 2  LT RFA L3,4,5  PLAVIX clearance received, pt needs DDAVP    RADIOFREQUENCY ABLATION Left 1/16/2020    Procedure: RADIOFREQUENCY ABLATION LEFT L3, L4, L5 MEDIAL BRANCH 1 OF 2 **PATIENT ARRIVING AT 8 AM**;  Surgeon: Fredy Magana MD;  Location: [facility] PAIN MGT;  Service: Pain Management;  Laterality: Left;  NEEDS CONSENT, PLAVIX CLEARANCE IN CHART    RADIOFREQUENCY ABLATION Right 1/28/2020    Procedure: RADIOFREQUENCY ABLATION, RIGHT L3-L4-L5 MEDIAL BRANCH 2 OF 2 (Left done on 1/16/20);  Surgeon: Fredy Magana MD;  Location: [facility] PAIN MGT;  Service: Pain Management;  Laterality: Right;    RADIOFREQUENCY ABLATION Left 8/18/2020    Procedure: RADIOFREQUENCY ABLATION, L3-L4-L5 MEDIAL BRANCH 1 OF 2 clear to hold plavix 7 days;  Surgeon: Fredy Magana MD;  Location: [facility] PAIN MGT;  Service: Pain Management;  Laterality: Left;    RADIOFREQUENCY ABLATION Right 9/1/2020    Procedure: RADIOFREQUENCY ABLATION, L3-L4-L5 MEDIAL BR ANCH 2 OF 2 clear to hol,d Plavix 7 days;  Surgeon: Fredy Magana MD;  Location: [facility] PAIN MGT;   Service: Pain Management;  Laterality: Right;    RADIOFREQUENCY ABLATION Bilateral 12/21/2021    Procedure: RADIOFREQUENCY ABLATION B/L L3,4,5 RFA (give dDAVP prior) NEEDS CONSENT plavix ok x7;  Surgeon: Fredy Magana MD;  Location: [facility] PAIN MGT;  Service: Pain Management;  Laterality: Bilateral;    RADIOFREQUENCY ABLATION OF LUMBAR MEDIAL BRANCH NERVE AT SINGLE LEVEL Left 5/24/2018    Procedure: RADIOFREQUENCY THERMOCOAGULATION (RFTC)-NERVE-MEDIAN BRANCH-LUMBAR;  Surgeon: Fredy Magana MD;  Location: [facility] PAIN MGT;  Service: Pain Management;  Laterality: Left;  Left RFA @ L3,4,5  [id number]  with IV Sedation    2 of 2    SPINE SURGERY      TONSILLECTOMY      at age 24    VASECTOMY  1996     Family History   Problem Relation Age of Onset    Colon cancer Father 67        colon cancer    Hypertension Father     Glaucoma Father     Cancer Father     Colon cancer Paternal Grandfather 65             Coronary artery disease Mother 45    Hypertension Mother     Heart disease Mother     Colon cancer Mother     Diabetes Mother     No Known Problems Brother     No Known Problems Sister     No Known Problems Daughter     No Known Problems Son     Coronary artery disease Brother 51    No Known Problems Daughter     No Known Problems Daughter     No Known Problems Son     No Known Problems Son     Colon cancer Paternal Uncle 65    Diabetes Mellitus Paternal Grandmother     Esophageal cancer Neg Hx      Current Outpatient Medications on File Prior to Visit   Medication Sig    albuterol (PROVENTIL/VENTOLIN HFA) 90 mcg/actuation inhaler INHALE 2 PUFFS INTO THE LUNGS EVERY 6 HOURS AS NEEDED FOR WHEEZING OR SHORTNESS OF BREATH    atorvastatin (LIPITOR) 40 MG tablet Take 1 tablet (40 mg total) by mouth every evening.    azelastine (ASTELIN) 137 mcg (0.1 %) nasal spray Two sprays in each nostril, sniff until absorbed, then follow with 1 spray of fluticasone.  Use both sprays twice daily.  (Patient taking differently: Two sprays in each nostril, sniff until absorbed, then follow with 1 spray of fluticasone.  Use both sprays twice daily.(PATIENT USES NIGHTLY 3/12/2021))    budesonide-formoterol 160-4.5 mcg (SYMBICORT) 160-4.5 mcg/actuation HFAA INHALE 2 PUFFS INTO THE LUNGS EVERY 12 HOURS    calcium citrate-vitamin D3 315-200 mg (CITRACAL+D) 315-200 mg-unit per tablet Take 1 tablet by mouth nightly.     clindamycin (CLEOCIN) 300 MG capsule Take 300 mg by mouth every 6 (six) hours.    clopidogreL (PLAVIX) 75 mg tablet Take 1 tablet (75 mg total) by mouth once daily.    cyanocobalamin, vitamin B-12, 50 mcg tablet Take 50 mcg by mouth nightly.     diltiazem HCl (DILTIAZEM 2% CREAM) Apply topically 3 (three) times daily. Apply topically to anal area. (Patient not taking: Reported on 1/11/2022)    docusate sodium (COLACE) 100 MG capsule Take 1 tablet by mouth Twice daily. 1 Capsule Oral Twice a day .  Take with pain medicine    doxazosin (CARDURA) 1 MG tablet TAKE 1 TABLET BY MOUTH EVERY EVENING    esomeprazole (NEXIUM) 40 MG capsule Take 1 capsule (40 mg total) by mouth 2 (two) times daily before meals.    ferrous sulfate (FEOSOL) 325 mg (65 mg iron) Tab tablet Take 1 tablet (325 mg total) by mouth every 12 (twelve) hours.    fluticasone propionate (FLONASE) 50 mcg/actuation nasal spray One spray in each nostril twice daily after 1st using azelastine nasal spray    furosemide (LASIX) 20 MG tablet Take 1 tablet (20 mg total) by mouth once daily.    ipratropium (ATROVENT) 42 mcg (0.06 %) nasal spray 1-2 sprays in each nostril before eating and at bedtime as needed    LIDOcaine HCL 2% (XYLOCAINE) 2 % jelly Apply topically 3 (three) times daily. (Patient not taking: Reported on 1/11/2022)    montelukast (SINGULAIR) 10 mg tablet TAKE 1 TABLET(10 MG) BY MOUTH EVERY EVENING    naproxen (NAPROSYN) 500 MG tablet Take 1 tablet (500 mg total) by mouth 2 (two) times daily as needed (pain). (Patient not  taking: Reported on 1/11/2022)    phentermine (ADIPEX-P) 37.5 mg tablet Take 1 tablet (37.5 mg total) by mouth once daily.    potassium chloride (MICRO-K) 10 MEQ CpSR Take 10 mEq by mouth once daily.     tadalafiL (CIALIS) 20 MG Tab Take 1 tablet (20 mg total) by mouth as needed. Take every 36 hours as needed for ED.    testosterone (ANDRODERM) 2 mg/24 hour PT24 APPLY 1 PATCH TOPICALLY TO THE SKIN EVERY DAY    tiZANidine (ZANAFLEX) 2 MG tablet Take 2 tablets (4 mg total) by mouth every 6 (six) hours as needed.    topiramate (TOPAMAX) 50 MG tablet TAKE 1 TABLET (50 MG TOTAL) BY MOUTH 2 (TWO) TIMES DAILY.    zolpidem (AMBIEN) 10 mg Tab TAKE 1 TABLET BY MOUTH EVERY DAY AT BEDTIME (Patient taking differently: Take 10 mg by mouth nightly as needed.)    [DISCONTINUED] tamsulosin (FLOMAX) 0.4 mg Cap Take 1 capsule (0.4 mg total) by mouth once daily.     Current Facility-Administered Medications on File Prior to Visit   Medication    0.9%  NaCl infusion    0.9%  NaCl infusion    mupirocin 2 % ointment    mupirocin 2 % ointment       PE:  There were no vitals filed for this visit.  Physical Exam  Constitutional:       General: He is not in acute distress.  HENT:      Head: Normocephalic and atraumatic.   Pulmonary:      Effort: Pulmonary effort is normal. No respiratory distress.   Abdominal:      General: There is no distension.      Palpations: Abdomen is soft.      Tenderness: There is no abdominal tenderness.   Genitourinary:     Comments: Normal perianal skin. eversion of anus revealed no abnormality or fissure, ASHLEY revealed no masses, blood or stool in vault, normal sphincter tone - pain with ASHLEY. Brief anoscopy - no active bleeding.     Musculoskeletal:         General: Normal range of motion.   Skin:     General: Skin is warm and dry.      Findings: No erythema or rash.   Neurological:      Mental Status: He is alert and oriented to person, place, and time.      GCS: GCS score is 15.   Psychiatric:          Mood and Affect: Affect normal.         Impression:  Encounter Diagnosis   Name Primary?    Rectal bleeding Yes       PLAN:  Diagnoses and all orders for this visit:    Rectal bleeding  -     CBC Auto Differential; Future    Other orders  -     hydrocortisone (ANUSOL-HC) 25 mg suppository; Place 1 suppository (25 mg total) rectally 2 (two) times daily. for 10 days    possible internal hemorrhoids or reoccurrence of fissure that was not visualized today  CBC today  Keep stools soft  No straining or sitting on the toilet for extended period of time  If reoccurrence of bleeding or worsening pain, notify clinic

## 2022-02-10 ENCOUNTER — LAB VISIT (OUTPATIENT)
Dept: LAB | Facility: HOSPITAL | Age: 62
End: 2022-02-10
Attending: INTERNAL MEDICINE
Payer: MEDICARE

## 2022-02-10 ENCOUNTER — OFFICE VISIT (OUTPATIENT)
Dept: GASTROENTEROLOGY | Facility: CLINIC | Age: 62
End: 2022-02-10
Payer: MEDICARE

## 2022-02-10 VITALS
HEIGHT: 71 IN | BODY MASS INDEX: 34.17 KG/M2 | SYSTOLIC BLOOD PRESSURE: 127 MMHG | HEART RATE: 96 BPM | DIASTOLIC BLOOD PRESSURE: 88 MMHG | WEIGHT: 244.06 LBS

## 2022-02-10 DIAGNOSIS — K92.1 HEMATOCHEZIA: ICD-10-CM

## 2022-02-10 DIAGNOSIS — R13.10 DYSPHAGIA, UNSPECIFIED TYPE: ICD-10-CM

## 2022-02-10 DIAGNOSIS — E61.1 IRON DEFICIENCY: Primary | ICD-10-CM

## 2022-02-10 DIAGNOSIS — K21.9 GASTROESOPHAGEAL REFLUX DISEASE, UNSPECIFIED WHETHER ESOPHAGITIS PRESENT: ICD-10-CM

## 2022-02-10 DIAGNOSIS — K92.1 HEMATOCHEZIA: Primary | ICD-10-CM

## 2022-02-10 LAB
BASOPHILS # BLD AUTO: 0.04 K/UL (ref 0–0.2)
BASOPHILS NFR BLD: 0.5 % (ref 0–1.9)
DIFFERENTIAL METHOD: NORMAL
EOSINOPHIL # BLD AUTO: 0.2 K/UL (ref 0–0.5)
EOSINOPHIL NFR BLD: 2.3 % (ref 0–8)
ERYTHROCYTE [DISTWIDTH] IN BLOOD BY AUTOMATED COUNT: 12.6 % (ref 11.5–14.5)
FERRITIN SERPL-MCNC: 145 NG/ML (ref 20–300)
HCT VFR BLD AUTO: 46.8 % (ref 40–54)
HGB BLD-MCNC: 15 G/DL (ref 14–18)
IMM GRANULOCYTES # BLD AUTO: 0.03 K/UL (ref 0–0.04)
IMM GRANULOCYTES NFR BLD AUTO: 0.4 % (ref 0–0.5)
IRON SERPL-MCNC: 60 UG/DL (ref 45–160)
LYMPHOCYTES # BLD AUTO: 2 K/UL (ref 1–4.8)
LYMPHOCYTES NFR BLD: 25 % (ref 18–48)
MCH RBC QN AUTO: 30.5 PG (ref 27–31)
MCHC RBC AUTO-ENTMCNC: 32.1 G/DL (ref 32–36)
MCV RBC AUTO: 95 FL (ref 82–98)
MONOCYTES # BLD AUTO: 0.5 K/UL (ref 0.3–1)
MONOCYTES NFR BLD: 5.9 % (ref 4–15)
NEUTROPHILS # BLD AUTO: 5.1 K/UL (ref 1.8–7.7)
NEUTROPHILS NFR BLD: 65.9 % (ref 38–73)
NRBC BLD-RTO: 0 /100 WBC
PLATELET # BLD AUTO: 276 K/UL (ref 150–450)
PMV BLD AUTO: 9.6 FL (ref 9.2–12.9)
RBC # BLD AUTO: 4.92 M/UL (ref 4.6–6.2)
SATURATED IRON: 19 % (ref 20–50)
TOTAL IRON BINDING CAPACITY: 308 UG/DL (ref 250–450)
TRANSFERRIN SERPL-MCNC: 208 MG/DL (ref 200–375)
WBC # BLD AUTO: 7.8 K/UL (ref 3.9–12.7)

## 2022-02-10 PROCEDURE — 1159F PR MEDICATION LIST DOCUMENTED IN MEDICAL RECORD: ICD-10-PCS | Mod: CPTII,S$GLB,, | Performed by: INTERNAL MEDICINE

## 2022-02-10 PROCEDURE — 3079F PR MOST RECENT DIASTOLIC BLOOD PRESSURE 80-89 MM HG: ICD-10-PCS | Mod: CPTII,S$GLB,, | Performed by: INTERNAL MEDICINE

## 2022-02-10 PROCEDURE — 99215 OFFICE O/P EST HI 40 MIN: CPT | Mod: S$GLB,,, | Performed by: INTERNAL MEDICINE

## 2022-02-10 PROCEDURE — 1159F MED LIST DOCD IN RCRD: CPT | Mod: CPTII,S$GLB,, | Performed by: INTERNAL MEDICINE

## 2022-02-10 PROCEDURE — 3074F PR MOST RECENT SYSTOLIC BLOOD PRESSURE < 130 MM HG: ICD-10-PCS | Mod: CPTII,S$GLB,, | Performed by: INTERNAL MEDICINE

## 2022-02-10 PROCEDURE — 99999 PR PBB SHADOW E&M-EST. PATIENT-LVL V: CPT | Mod: PBBFAC,,, | Performed by: INTERNAL MEDICINE

## 2022-02-10 PROCEDURE — 99999 PR PBB SHADOW E&M-EST. PATIENT-LVL V: ICD-10-PCS | Mod: PBBFAC,,, | Performed by: INTERNAL MEDICINE

## 2022-02-10 PROCEDURE — 82728 ASSAY OF FERRITIN: CPT | Performed by: INTERNAL MEDICINE

## 2022-02-10 PROCEDURE — 99215 PR OFFICE/OUTPT VISIT, EST, LEVL V, 40-54 MIN: ICD-10-PCS | Mod: S$GLB,,, | Performed by: INTERNAL MEDICINE

## 2022-02-10 PROCEDURE — 3008F BODY MASS INDEX DOCD: CPT | Mod: CPTII,S$GLB,, | Performed by: INTERNAL MEDICINE

## 2022-02-10 PROCEDURE — 36415 COLL VENOUS BLD VENIPUNCTURE: CPT | Performed by: INTERNAL MEDICINE

## 2022-02-10 PROCEDURE — 1160F PR REVIEW ALL MEDS BY PRESCRIBER/CLIN PHARMACIST DOCUMENTED: ICD-10-PCS | Mod: CPTII,S$GLB,, | Performed by: INTERNAL MEDICINE

## 2022-02-10 PROCEDURE — 85025 COMPLETE CBC W/AUTO DIFF WBC: CPT | Performed by: INTERNAL MEDICINE

## 2022-02-10 PROCEDURE — 84466 ASSAY OF TRANSFERRIN: CPT | Performed by: INTERNAL MEDICINE

## 2022-02-10 PROCEDURE — 3079F DIAST BP 80-89 MM HG: CPT | Mod: CPTII,S$GLB,, | Performed by: INTERNAL MEDICINE

## 2022-02-10 PROCEDURE — 3008F PR BODY MASS INDEX (BMI) DOCUMENTED: ICD-10-PCS | Mod: CPTII,S$GLB,, | Performed by: INTERNAL MEDICINE

## 2022-02-10 PROCEDURE — 1160F RVW MEDS BY RX/DR IN RCRD: CPT | Mod: CPTII,S$GLB,, | Performed by: INTERNAL MEDICINE

## 2022-02-10 PROCEDURE — 3074F SYST BP LT 130 MM HG: CPT | Mod: CPTII,S$GLB,, | Performed by: INTERNAL MEDICINE

## 2022-02-10 RX ORDER — PANTOPRAZOLE SODIUM 40 MG/1
40 TABLET, DELAYED RELEASE ORAL EVERY 12 HOURS
Qty: 120 TABLET | Refills: 0 | Status: SHIPPED | OUTPATIENT
Start: 2022-02-10 | End: 2022-06-09

## 2022-02-10 RX ORDER — FERROUS GLUCONATE 324(38)MG
324 TABLET ORAL
Qty: 90 TABLET | Refills: 1 | Status: SHIPPED | OUTPATIENT
Start: 2022-02-10 | End: 2022-04-10 | Stop reason: SDUPTHER

## 2022-02-10 NOTE — Clinical Note
Blood work today Referral to Dr. Nadeem Grady and colon rectal surgery for hematochezia Please order a esophagram with barium tablet Please order gastric emptying study Return GI clinic 8 weeks for follow-up

## 2022-02-10 NOTE — PROGRESS NOTES
[facility] Consultation Note    Reason for Consult:  The primary encounter diagnosis was Hematochezia. Diagnoses of Gastroesophageal reflux disease, unspecified whether esophagitis present and Dysphagia, unspecified type were also pertinent to this visit.    PCP:   Cate Galeas       Referring MD:  No referring provider defined for this encounter.    Initial History of Present Illness (HPI):  This is a 61 y.o. male here for evaluation of few complaints 1 is persistent hematochezia and anal pain patient underwent surgery by Dr. Grady Lateral Internal Anal Spincterotomy December 12, 2021.  Patient is on Plavix for peripheral vascular disease he says he still has hematochezia still has his rectal pain he followed up with colon rectal surgery Clinic on January 21, 2021 had a digital rectal exam no bleeding but pain the prescribe suppositories that make his rectum and anus burn they advised him to return to colon rectal surgery Clinic if no improvement patient feels like he is not improved at all will refer him to  for further evaluation he says the anoscopy was very painful so was the rectal exam will probably need to be sedated for endoscopy.  Not interested in further rectal exam today.  He is complaining of regurgitation dysphagia symptoms he is currently taking his PPI at night we did review his EGD looks good no evidence of a large hiatal hernia or esophageal ring or stenosis no esophagitis no evidence of Randhawa's we do recommend patient consider double-dose PPI pantoprazole 40 mg twice daily best taken 45 minutes for breakfast 45 minutes before dinner further evaluation we gastric emptying and esophagram timed with barium tablet has been recommended.  No fever no chills no shortness of breath no hematemesis no hemoptysis no epistaxis no coffee-ground emesis.  No melena.    Abdominal pain - none  Reflux -as above  Dysphagia - as   Bowel habits - normal  GI bleeding - as  above  NSAID usage - Plavix not taking NSAIDs    Interval HPI 02/10/2022:  The patient's last visit with me was on 11/17/2016.      ROS:  Constitutional: No fevers, chills, No weight loss  ENT:  As heartburn as a dysphagia no odynophagia no hoarseness  CV: No chest pain, no palpitation  Pulm: No cough, No shortness of breath, no wheezing  Ophtho: No vision changes  GI: see HPI  Derm: No rash, no itching  Heme: No lymphadenopathy, No easy bruising  MSK:  Chronic arthritis  : No dysuria, No hematuria  Endo: No hot or cold intolerance  Neuro: No syncope, No seizure, no strokes  Psych: No uncontrolled anxiety, No uncontrolled depression    Medical History:  has a past medical history of Acute pancreatitis, Anal fissure, Anemia, Anticoagulant long-term use, Arthritis, Asthma in remission, Back pain, BPH (benign prostatic hypertrophy), Cancer (2000), Chronic maxillary sinusitis, Clotting disorder, Diastolic dysfunction with chronic heart failure (12/3/2018), Dysphagia (10/7/2014), Family history of colon cancer, Family history of early CAD, GERD (gastroesophageal reflux disease), Helicobacter pylori (H. pylori) infection, History of chronic pancreatitis, HTN (hypertension), Lumbago (11/12/2012), Obesity, ABDON (obstructive sleep apnea), ABDON (obstructive sleep apnea), Prostate cancer (2000), Sacroiliac joint pain (2/10/2015), Spinal stenosis of lumbar region, Von Willebrand disease, and VWD (acquired von Willebrand's disease).    Surgical History:  has a past surgical history that includes Lumbar fusion (2012); Carpal tunnel release (2003); anal fissure repair; Cervical discectomy (2003); Colonoscopy (N/A, 10/7/2015); Vasectomy (1996); Tonsillectomy; Spine surgery; Colonoscopy (N/A, 6/19/2017); Radiofrequency ablation of lumbar medial branch nerve at single level (Left, 5/24/2018); Left heart catheterization (Left, 2/14/2019); Catheterization of both left and right heart (N/A, 2/14/2019); Radiofrequency ablation (Right,  "5/9/2019); Radiofrequency ablation (Left, 5/23/2019); Back surgery (2012); Functional endoscopic sinus surgery (FESS) using computer-assisted navigation (Bilateral, 10/7/2019); Balloon sinuplasty of paranasal sinus (Bilateral, 10/7/2019); Radiofrequency ablation (Left, 1/16/2020); Radiofrequency ablation (Right, 1/28/2020); Esophagogastroduodenoscopy (N/A, 3/4/2020); Colonoscopy (N/A, 3/4/2020); Radiofrequency ablation (Left, 8/18/2020); Radiofrequency ablation (Right, 9/1/2020); Injection of anesthetic agent around nerve (Bilateral, 10/13/2020); Esophagogastroduodenoscopy (N/A, 11/3/2020); Examination under anesthesia (N/A, 3/15/2021); Lateral internal anal sphincterotomy (N/A, 12/10/2021); and Radiofrequency ablation (Bilateral, 12/21/2021).    Family History: family history includes Cancer in his father; Colon cancer in his mother; Colon cancer (age of onset: 65) in his paternal grandfather and paternal uncle; Colon cancer (age of onset: 67) in his father; Coronary artery disease (age of onset: 45) in his mother; Coronary artery disease (age of onset: 51) in his brother; Diabetes in his mother; Diabetes Mellitus in his paternal grandmother; Glaucoma in his father; Heart disease in his mother; Hypertension in his father and mother; No Known Problems in his brother, daughter, daughter, daughter, sister, son, son, and son..     Social History:  reports that he has never smoked. He has never used smokeless tobacco. He reports current alcohol use of about 1.0 standard drink of alcohol per week. He reports that he does not use drugs.    Review of patient's allergies indicates:   Allergen Reactions    Penicillins Hives and Swelling     Has had allergy testing and can prob tolerate penicillin    Ace inhibitors Other (See Comments)     "Caused a respiratory infection"    Aspirin Hives           Codeine Itching     Ok to take percocet    Dilaudid [hydromorphone] Itching       Medication List with Changes/Refills   New " Medications    PANTOPRAZOLE (PROTONIX) 40 MG TABLET    Take 1 tablet (40 mg total) by mouth every 12 (twelve) hours.   Current Medications    ALBUTEROL (PROVENTIL/VENTOLIN HFA) 90 MCG/ACTUATION INHALER    INHALE 2 PUFFS INTO THE LUNGS EVERY 6 HOURS AS NEEDED FOR WHEEZING OR SHORTNESS OF BREATH    ATORVASTATIN (LIPITOR) 40 MG TABLET    Take 1 tablet (40 mg total) by mouth every evening.    AZELASTINE (ASTELIN) 137 MCG (0.1 %) NASAL SPRAY    Two sprays in each nostril, sniff until absorbed, then follow with 1 spray of fluticasone.  Use both sprays twice daily.    BUDESONIDE-FORMOTEROL 160-4.5 MCG (SYMBICORT) 160-4.5 MCG/ACTUATION HFAA    INHALE 2 PUFFS INTO THE LUNGS EVERY 12 HOURS    CALCIUM CITRATE-VITAMIN D3 315-200 MG (CITRACAL+D) 315-200 MG-UNIT PER TABLET    Take 1 tablet by mouth nightly.     CLINDAMYCIN (CLEOCIN) 300 MG CAPSULE    Take 300 mg by mouth every 6 (six) hours.    CLOPIDOGREL (PLAVIX) 75 MG TABLET    Take 1 tablet (75 mg total) by mouth once daily.    CYANOCOBALAMIN, VITAMIN B-12, 50 MCG TABLET    Take 50 mcg by mouth nightly.     DILTIAZEM HCL (DILTIAZEM 2% CREAM)    Apply topically 3 (three) times daily. Apply topically to anal area.    DOCUSATE SODIUM (COLACE) 100 MG CAPSULE    Take 1 tablet by mouth Twice daily. 1 Capsule Oral Twice a day .  Take with pain medicine    DOXAZOSIN (CARDURA) 1 MG TABLET    TAKE 1 TABLET BY MOUTH EVERY EVENING    FERROUS SULFATE (FEOSOL) 325 MG (65 MG IRON) TAB TABLET    Take 1 tablet (325 mg total) by mouth every 12 (twelve) hours.    FLUTICASONE PROPIONATE (FLONASE) 50 MCG/ACTUATION NASAL SPRAY    One spray in each nostril twice daily after 1st using azelastine nasal spray    FUROSEMIDE (LASIX) 20 MG TABLET    Take 1 tablet (20 mg total) by mouth once daily.    IPRATROPIUM (ATROVENT) 42 MCG (0.06 %) NASAL SPRAY    1-2 sprays in each nostril before eating and at bedtime as needed    LIDOCAINE HCL 2% (XYLOCAINE) 2 % JELLY    Apply topically 3 (three) times daily.     "MONTELUKAST (SINGULAIR) 10 MG TABLET    TAKE 1 TABLET(10 MG) BY MOUTH EVERY EVENING    PHENTERMINE (ADIPEX-P) 37.5 MG TABLET    Take 1 tablet (37.5 mg total) by mouth once daily.    POTASSIUM CHLORIDE (MICRO-K) 10 MEQ CPSR    Take 10 mEq by mouth once daily.     TADALAFIL (CIALIS) 20 MG TAB    Take 1 tablet (20 mg total) by mouth as needed. Take every 36 hours as needed for ED.    TESTOSTERONE (ANDRODERM) 2 MG/24 HOUR PT24    APPLY 1 PATCH TOPICALLY TO THE SKIN EVERY DAY    TIZANIDINE (ZANAFLEX) 2 MG TABLET    Take 2 tablets (4 mg total) by mouth every 6 (six) hours as needed.    TOPIRAMATE (TOPAMAX) 50 MG TABLET    TAKE 1 TABLET (50 MG TOTAL) BY MOUTH 2 (TWO) TIMES DAILY.    ZOLPIDEM (AMBIEN) 10 MG TAB    TAKE 1 TABLET BY MOUTH EVERY DAY AT BEDTIME   Discontinued Medications    ESOMEPRAZOLE (NEXIUM) 40 MG CAPSULE    Take 1 capsule (40 mg total) by mouth 2 (two) times daily before meals.    NAPROXEN (NAPROSYN) 500 MG TABLET    Take 1 tablet (500 mg total) by mouth 2 (two) times daily as needed (pain).         Objective Findings:    Vital Signs:  /88 (BP Location: Left arm, Patient Position: Sitting, BP Method: Large (Automatic))   Pulse 96   Ht 5' 11" (1.803 m)   Wt 110.7 kg (244 lb 0.8 oz)   BMI 34.04 kg/m²   Body mass index is 34.04 kg/m².    Physical Exam:  General Appearance: Well appearing in no acute distress  Eyes:    No scleral icterus  ENT:  No lesions or masses   Lungs: CTA bilaterally, no wheezes, no rhonchi, no rales  Heart:  S1, S2 normal, no murmurs heard  Abdomen:  Obese, Non distended, soft, no guarding, no rebound, no tenderness, no appreciated ascites, no bruits, no hepatosplenomegaly,  No CVA tenderness, no appreciated hernias, no Savage sign no McBurney point tenderness.  Rectal:  Patient declined rectal exam today too painful just had one  Musculoskeletal:  No major joint deformities  Skin: No petechiae or rash on exposed skin areas  Neurologic:  Alert and oriented x4  Psychiatric:  " Normal speech mentation and affect    Labs:  Lab Results   Component Value Date    WBC 7.12 01/21/2022    HGB 14.0 01/21/2022    HCT 43.7 01/21/2022     01/21/2022    CHOL 128 07/08/2021    TRIG 104 07/08/2021    HDL 35 (L) 07/08/2021    ALT 25 12/06/2021    AST 23 12/06/2021     12/06/2021    K 3.7 12/06/2021     12/06/2021    CREATININE 0.8 12/06/2021    BUN 13 12/06/2021    CO2 26 12/06/2021    TSH 0.995 05/03/2021    PSA 0.93 01/22/2021    INR 1.0 04/04/2021    HGBA1C 4.5 01/23/2020               Medical Decision Making:  Total time with patient reviewing prior EGD colonoscopy report  Prior surgical reports biopsies including labs  Has been 40 minutes with greater than 50% of time face-to-face  With patient answering his questions and discussing treatment options  Lab work reviewed  Surgical op note reviewed Lateral Internal Anal Spincterotomy  Colon rectal surgery note reviewed  Prior EGD colonoscopy images and path reviewed myself  Follow back up with colon rectal surgery talk given will need endoscopic evaluation  Esophagram and gastric emptying talk given  PPI talk given  The                         Olympus scope GIF- ([id number]) was introduced                         through the mouth, and advanced to the third part                         of duodenum. The upper GI endoscopy was                         accomplished without difficulty. The patient                         tolerated the procedure well.   Findings:        The examined duodenum was normal. Biopsies were taken with a cold        forceps for histology. Estimated blood loss was minimal.        Patchy mildly erythematous mucosa without bleeding was found in the        gastric antrum and in the prepyloric region of the stomach. Biopsies        were taken with a cold forceps for histology. Biopsies were taken        with a cold forceps for histology. Estimated blood loss was minimal.        The cardia and gastric fundus were  normal on retroflexion.        A 2 cm hiatal hernia was found. The proximal extent of the gastric        folds (end of tubular esophagus) was 40 cm from the incisors. The        hiatal narrowing was 42 cm from the incisors. The Z-line was 40 cm        from the incisors. Biopsies were obtained from the proximal and        distal esophagus with cold forceps for histology of suspected        eosinophilic esophagitis.   Impression:           - Normal examined duodenum. Biopsied.                         - Erythematous mucosa in the antrum and prepyloric                         region of the stomach. Biopsied.                         - 2 cm hiatal hernia. Biopsied.   Recommendation:       - Discharge patient to home.                         - Follow an antireflux regimen.                         - Continue present medications.                         - Resume Plavix (clopidogrel) at prior dose                         tomorrow.                         - Await pathology results.                         - Telephone endoscopist for pathology results in 2                         weeks.                         - Return to GI clinic.                         - The findings and recommendations were discussed                         with the patient.   Attending Participation:        I personally performed the entire procedure.   Rosendo Boyer MD   11/3/2020 1:58:50 PM     The Olympus scope [id number] ([id number]) was                         introduced through the anus and advanced to the                         terminal ileum, with identification of the                         appendiceal orifice and IC valve. The colonoscopy                         was performed without difficulty. The patient                         tolerated the procedure well. The quality of the                         bowel preparation was excellent. Scope insertion                         time was 4 minutes. Scope withdrawal time was 11                          minutes. good look. The terminal ileum, ileocecal                         valve, appendiceal orifice, and rectum were                         photographed.   Findings:        The terminal ileum appeared normal.        Diverticula were found in the recto-sigmoid colon and in the sigmoid        colon.        The perianal and digital rectal examinations were normal.        The retroflexed view of the distal rectum and anal verge was normal        and showed no anal or rectal abnormalities.        The entire examined colon appeared normal.        Non-bleeding internal hemorrhoids were found during retroflexion.        The hemorrhoids were mild.   Impression:           - The examined portion of the ileum was normal.                         - Diverticulosis in the recto-sigmoid colon and in                         the sigmoid colon.                         - The entire examined colon is normal.                         - Non-bleeding internal hemorrhoids.                         - No specimens collected.   Recommendation:       - Discharge patient to home.                         - Resume Plavix (clopidogrel) at prior dose today.                         - Return to GI clinic.                         - The findings and recommendations were discussed                         with the patient.                         - Repeat colonoscopy in 5 years for surveillance.   Attending Participation:        I personally performed the entire procedure.   Rosendo Boyer MD   3/4/2020 11:49:33 AM     1. Stomach, biopsy:   Gastric antral and oxyntic mucosa, with no diagnostic abnormality.   Negative for Helicobacter pylori on routine stain.   2. Esophagus, proximal and distal, biopsy:   Esophageal squamous mucosa, with no diagnostic abnormality.   Gastric cardia-type mucosa with no diagnostic abnormality.   Negative for intestinal metaplasia and dysplasia.     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   Report attached.   Performing Site:   [facility]   [facility] [address]   [address] [place] [address]     Comment: Interp By Brian Fernandes M.D., Signed on 11/11/2020      Notes Recorded by Rosendo Boyer MD on 6/21/2017 at 5:59 PM CDT  Rita - please tell Bri that his EGD and colonoscopy pathology was benign and no dysplasia.  Next surveillance colonoscopy in 2-years.     SPECIMEN  1) Small gastric antrum polyp, 1 mm.  2) Ascending colon 1 mm polyp.     FINAL PATHOLOGIC DIAGNOSIS     1. BIOPSY OF GASTRIC ANTRUM:  MILD NONSPECIFIC CHRONIC INFLAMMATORY AND HYPERPLASTIC CHANGES WITH NO CARCINOMA OR  DYSPLASIA IDENTIFIED (INFLAMMATORY POLYP)     2. BIOPSY OF ASCENDING COLON:  FRAGMENT OF BENIGN NONNEOPLASTIC: MUCOSA WITH NO SIGNIFICANT HISTOLOGIC ALTERATION     Diagnosed by: Brian Fernandes M.D.  Assessment:  1. Hematochezia    2. Gastroesophageal reflux disease, unspecified whether esophagitis present    3. Dysphagia, unspecified type    4.      Family history of colon cancer     Recommendations:  1. Lab work today  2. Follow up with  and colon rectal surgery for evaluation and continue hematochezia and anal pain  3. Esophagram time but barium tablet for evaluation of dysphagia  4. Gastric emptying study for further evaluation of patient's regurgitation  5. Change PPI from Nexium to Protonix 40 mg every 12 hours best taken 45 minutes for breakfast 45 minutes before dinner.  6. Return GI clinic 4-8 weeks for follow-up sooner if symptoms are worsening      Order summary:  Orders Placed This Encounter    NM Gastric Emptying    FL Esophagram With Barium Tablet    CBC Auto Differential    Iron and TIBC    Ferritin    Ambulatory referral/consult to Colorectal Surgery    pantoprazole (PROTONIX) 40 MG tablet         Thank you so  much for allowing me to participate in the care of Bri Boyer MD

## 2022-02-11 ENCOUNTER — PATIENT MESSAGE (OUTPATIENT)
Dept: GASTROENTEROLOGY | Facility: CLINIC | Age: 62
End: 2022-02-11
Payer: MEDICARE

## 2022-02-11 ENCOUNTER — TELEPHONE (OUTPATIENT)
Dept: GASTROENTEROLOGY | Facility: CLINIC | Age: 62
End: 2022-02-11
Payer: MEDICARE

## 2022-02-11 NOTE — TELEPHONE ENCOUNTER
----- Message from Rosendo Boyer MD sent at 2/10/2022  6:04 PM CST -----  Farooq - please tell patient that they are iron deficient but not anemic and recommend that they take ferrous gluconate one 324mg pill once daily for next 3 months.    Please order repeat fasting Hemoglobin, Iron/TIBC, and Ferritin in 8 weeks - Orders placed.

## 2022-02-11 NOTE — PROGRESS NOTES
Farooq - please tell patient that they are iron deficient but not anemic and recommend that they take ferrous gluconate one 324mg pill once daily for next 3 months.    Please order repeat fasting Hemoglobin, Iron/TIBC, and Ferritin in 8 weeks - Orders placed.

## 2022-02-11 NOTE — TELEPHONE ENCOUNTER
Contact patient per Farooq Matthew - please tell patient that they are iron deficient but not anemic and recommend that they take ferrous gluconate one 324mg pill once daily for next 3 months.     Please order repeat fasting Hemoglobin, Iron/TIBC, and Ferritin in 8 weeks - Orders placed.       No answer left message for patient to call the office back.   Sent a message to the patient portal.

## 2022-02-14 ENCOUNTER — HOSPITAL ENCOUNTER (OUTPATIENT)
Dept: CARDIOLOGY | Facility: CLINIC | Age: 62
Discharge: HOME | End: 2022-02-14
Payer: MEDICARE

## 2022-02-14 ENCOUNTER — OFFICE VISIT (OUTPATIENT)
Dept: SURGERY | Facility: CLINIC | Age: 62
End: 2022-02-14
Payer: MEDICARE

## 2022-02-14 VITALS
WEIGHT: 243.19 LBS | SYSTOLIC BLOOD PRESSURE: 136 MMHG | BODY MASS INDEX: 34.05 KG/M2 | HEIGHT: 71 IN | DIASTOLIC BLOOD PRESSURE: 88 MMHG | HEART RATE: 96 BPM

## 2022-02-14 DIAGNOSIS — K62.89 ANAL PAIN: ICD-10-CM

## 2022-02-14 DIAGNOSIS — Z80.0 FAMILY HISTORY OF COLON CANCER: ICD-10-CM

## 2022-02-14 DIAGNOSIS — K62.89 ANAL PAIN: Primary | ICD-10-CM

## 2022-02-14 DIAGNOSIS — K92.1 HEMATOCHEZIA: ICD-10-CM

## 2022-02-14 DIAGNOSIS — Z01.818 PRE-OP TESTING: ICD-10-CM

## 2022-02-14 PROCEDURE — 3075F PR MOST RECENT SYSTOLIC BLOOD PRESS GE 130-139MM HG: ICD-10-PCS | Mod: CPTII,S$GLB,, | Performed by: COLON & RECTAL SURGERY

## 2022-02-14 PROCEDURE — 99024 PR POST-OP FOLLOW-UP VISIT: ICD-10-PCS | Mod: S$GLB,,, | Performed by: COLON & RECTAL SURGERY

## 2022-02-14 PROCEDURE — 93010 ELECTROCARDIOGRAM REPORT: CPT | Mod: S$GLB,,, | Performed by: INTERNAL MEDICINE

## 2022-02-14 PROCEDURE — 99024 POSTOP FOLLOW-UP VISIT: CPT | Mod: S$GLB,,, | Performed by: COLON & RECTAL SURGERY

## 2022-02-14 PROCEDURE — 1160F RVW MEDS BY RX/DR IN RCRD: CPT | Mod: CPTII,S$GLB,, | Performed by: COLON & RECTAL SURGERY

## 2022-02-14 PROCEDURE — 93005 ELECTROCARDIOGRAM TRACING: CPT | Mod: S$GLB,,, | Performed by: COLON & RECTAL SURGERY

## 2022-02-14 PROCEDURE — 99999 PR PBB SHADOW E&M-EST. PATIENT-LVL V: ICD-10-PCS | Mod: PBBFAC,,, | Performed by: COLON & RECTAL SURGERY

## 2022-02-14 PROCEDURE — 1159F PR MEDICATION LIST DOCUMENTED IN MEDICAL RECORD: ICD-10-PCS | Mod: CPTII,S$GLB,, | Performed by: COLON & RECTAL SURGERY

## 2022-02-14 PROCEDURE — 3079F DIAST BP 80-89 MM HG: CPT | Mod: CPTII,S$GLB,, | Performed by: COLON & RECTAL SURGERY

## 2022-02-14 PROCEDURE — 1159F MED LIST DOCD IN RCRD: CPT | Mod: CPTII,S$GLB,, | Performed by: COLON & RECTAL SURGERY

## 2022-02-14 PROCEDURE — 99999 PR PBB SHADOW E&M-EST. PATIENT-LVL V: CPT | Mod: PBBFAC,,, | Performed by: COLON & RECTAL SURGERY

## 2022-02-14 PROCEDURE — 3008F PR BODY MASS INDEX (BMI) DOCUMENTED: ICD-10-PCS | Mod: CPTII,S$GLB,, | Performed by: COLON & RECTAL SURGERY

## 2022-02-14 PROCEDURE — 93005 EKG 12-LEAD: ICD-10-PCS | Mod: S$GLB,,, | Performed by: COLON & RECTAL SURGERY

## 2022-02-14 PROCEDURE — 1160F PR REVIEW ALL MEDS BY PRESCRIBER/CLIN PHARMACIST DOCUMENTED: ICD-10-PCS | Mod: CPTII,S$GLB,, | Performed by: COLON & RECTAL SURGERY

## 2022-02-14 PROCEDURE — 3079F PR MOST RECENT DIASTOLIC BLOOD PRESSURE 80-89 MM HG: ICD-10-PCS | Mod: CPTII,S$GLB,, | Performed by: COLON & RECTAL SURGERY

## 2022-02-14 PROCEDURE — 93010 EKG 12-LEAD: ICD-10-PCS | Mod: S$GLB,,, | Performed by: INTERNAL MEDICINE

## 2022-02-14 PROCEDURE — 3075F SYST BP GE 130 - 139MM HG: CPT | Mod: CPTII,S$GLB,, | Performed by: COLON & RECTAL SURGERY

## 2022-02-14 PROCEDURE — 3008F BODY MASS INDEX DOCD: CPT | Mod: CPTII,S$GLB,, | Performed by: COLON & RECTAL SURGERY

## 2022-02-14 NOTE — PROGRESS NOTES
"CRS Post-operative visit    Visit Info:     Procedure: LIAS    Date of Procedure: December 10, 2021    Indication: 61-year-old male with chronic anal pain.  His symptoms sound consistent with anal fissure, but I had been unable to adequately examine him in the office due to pain.  He was brought to the operating room today for examination under anesthesia with the understanding that if he was found to have an anal fissure with a hypertonic internal anal sphincter muscle that I would proceed with a lateral internal anal sphincterotomy..      Operative Findings: Anterior midline anal fissure with hypertonic internal anal sphincter.    Current Status:     POV 12/20/21: Doing well postoperatively.  He still has some minor discomfort with bowel movements but his pain is significantly improved, no further bleeding.  No issues with incontinence.  No fevers or chills.    He then returned 1/21/22 and saw CRS NP with c/o rectal bleeding and recurring anal pain and burning after BM's. Limited exam due to discomfort. Anusol suppositories prescribed.    Today he reports worsening anal pain and BRBPR after BM's as well as blood mixed in with his stool.  Pain is worse with suppositories - describes "burning pain in the rectum 24/7,"  Says it feels like "someone lit a match" inside his anus.  He is using Miralax & fiber every day, BM's are soft.    Physical Exam:  General: Black or  male in NAD   Neuro: aaox4   Respiratory: resps even unlabored  Extremities: Warm dry and intact  Anorectal:  Limited external exam due to inability to relax his anus, ASHLEY with severe pain, no mass, anoscopy not attempted due to pain with ASHLEY.    Assessment:  1. Anal pain  Basic Metabolic Panel    CBC Auto Differential    Case Request Operating Room: EXAM UNDER ANESTHESIA   2. Hematochezia  Ambulatory referral/consult to Colorectal Surgery    Basic Metabolic Panel    CBC Auto Differential    Case Request Operating Room: EXAM UNDER " ANESTHESIA   3. Family history of colon cancer       Prior EBL  Recent LIAS for fissure with symptoms initially improved following LIAS  Now with recurrent anal pain/bleeding - unable to tolerate adequate exam in office    Plan:  Will schedule for EUA in OR to further evaluate    I have discussed the procedure at length with Bri Santiago.  We discussed the rationale, risks, benefits, and alternatives in depth.  We discussed the expected outcomes and potential complications including but not limited to bleeding, infection, recurrence, prolonged pain, need for further procedures and altered continence.  He verbalized his understanding of the procedure and wishes to proceed.  Written consent was obtained.    NOTE:  He has an extensive family history of colorectal cancer on his father side (father, paternal uncle, paternal grandfather - all were diagnosed after the age of 60).  Last colonoscopy was done by Dr. Boyer 03/4/2020, no polyps, 5 year follow-up surveillance colonoscopy recommended.    Nadeem Grady MD, FACS, FASCRS  Senior Staff Surgeon  Department of Colon & Rectal Surgery     This note was created using voice recognition software, and may contain some unrecognized transcriptional errors.

## 2022-02-21 NOTE — ANESTHESIA PAT ROS NOTE
02/21/2022  Bri Santiago is a 61 y.o., male.      Pre-op Assessment          Review of Systems  Anesthesia Hx:  Denies Hx of Anesthetic complications  History of prior surgery of interest to airway management or planning: Previous anesthesia: General SPHINCTEROTOMY-LATERAL INTERNAL ANAL  12/10/21 with general anesthesia.  Procedure performed at an [facility]. Airway issues documented on chart review include mask, difficult, easy direct laryngoscopy , view on direct laryngoscopy Grade II  Denies Family Hx of Anesthesia complications.   Denies Personal Hx of Anesthesia complications.   Social:  Non-Smoker, Social Alcohol Use    Hematology/Oncology:         -- Anemia: --  Cancer in past history (PROSTATE):  chemotherapy    Cardiovascular:   Hypertension CAD    Denies Angina. CHF hyperlipidemia  Functional Capacity good / => 4 METS    Pulmonary:   Asthma mild Denies Shortness of breath.  Denies Recent URI. Sleep Apnea, CPAP    Renal/:   BPH    Hepatic/GI:   Hiatal Hernia, GERD ANAL PAIN. Pancreatic Disease (HX OF PANCREATITIS)   Musculoskeletal:   Arthritis   Cervical Spine Disorder S/P CERVICAL DISCECTOMY. Lumbar Spine Disorders, S/P Lumbar Fusion   Endocrine:  Metabolic Disorders, Obesity / BMI > 30          Anesthesia Assessment: Preoperative EQUATION    Planned Procedure: Procedure(s) (LRB):  EXAM UNDER ANESTHESIA (N/A)  Requested Anesthesia Type:General  Surgeon: Nadeem Grady MD  Service: Colon and Rectal  Known or anticipated Date of Surgery:2/25/2022    Surgeon notes: reviewed    Electronic QUestionnaire Assessment completed via nurse interview with patient.        Triage considerations:     The patient has no apparent active cardiac condition (No unstable coronary Syndrome such as severe unstable angina or recent [<1 month] myocardial infarction, decompensated CHF, severe valvular   disease  or significant arrhythmia)    Previous anesthesia records:GETA and Complications noted     Airway/Jaw/Neck:  Airway Findings: Mouth Opening: Normal Tongue: Normal  General Airway Assessment: Adult  Mallampati: III  Improves to II with phonation.  TM Distance: Normal, at least 6 cm       Intubation     Date/Time: 12/10/2021 9:16 AM  Performed by: Tony Mitchell CRNA  Authorized by: Wily Reeves MD      Intubation:     Induction:  Intravenous    Intubated:  Postinduction    Mask Ventilation:  Moderately difficult with oral airway    Attempts:  1    Attempted By:  CRNA    Method of Intubation:  Direct    Blade:  Ledy 3    Laryngeal View Grade: Grade IIb - only the arytenoids and epiglottis seen      Difficult Airway Encountered?: No      Complications:  None    Airway Device:  Oral endotracheal tube    Airway Device Size:  7.5    Style/Cuff Inflation:  Cuffed (inflated to minimal occlusive pressure)    Inflation Amount (mL):  7    Tube secured:  23    Secured at:  The lips    Placement Verified By:  Capnometry    Complicating Factors:  Large/floppy epiglottis, obesity, poor neck/head extension and oropharyngeal edema or fat    Findings Post-Intubation:  BS equal bilateral and atraumatic/condition of teeth unchanged       Intubation     Date/Time: 3/15/2021 4:44 PM  Performed by: Nette Salamanca CRNA  Authorized by: Karin Luis MD      Intubation:     Induction:  Intravenous    Intubated:  Postinduction    Mask Ventilation:  Easy with oral airway    Attempts:  1    Attempted By:  CRNA    Method of Intubation:  Video laryngoscopy    Blade:  Pretty 3    Laryngeal View Grade: Grade I - full view of chords      Difficult Airway Encountered?: No      Complications:  None    Airway Device:  Oral endotracheal tube    Airway Device Size:  7.5    Style/Cuff Inflation:  Cuffed (inflated to minimal occlusive pressure)    Tube secured:  23    Secured at:  The lips    Placement Verified By:  Capnometry     Complicating Factors:  Large/floppy epiglottis, obesity and short neck    Findings Post-Intubation:  BS equal bilateral and atraumatic/condition of teeth unchanged       Last PCP note: within 3 months , within [facility]   Subspecialty notes: Cardiology: General, Endocrinology, Gastroenterology, Pain Management, CRS    Other important co-morbidities: CHF, COPD, GERD, HLD, HTN, Obesity, ABDON and VON WILLEBRAND'S DISEASE, LONG TERM ANTICOAGULANT USE      Tests already available:  Available tests,  within 3 months , within [facility] .     2/14/22  BMP  CBC  EKG             Instructions given. (See in Nurse's note)    Optimization:  Anesthesia Preop Clinic Assessment NOT  Indicated    Medical Opinion Indicated       Sub-specialist consult indicated:   MEDICATION INSTRUCTIONS FROM CARDIOLOGY - DR. GERMÁN ENAMORADO       Plan:    Testing:  None Needed       Consultation:CARDIOLOGY FOR MEDICATION INSTRUCTIONS     Patient  has previously scheduled Medical Appointment: 2/22, 2/24    Navigation:              Consults scheduled.               He is on plavix for primary prevention in place of aspirin.  I would treat just like you do for aspirin for primary prevention.     Ok to hold as long as you would like, restart when you would like.     Germán

## 2022-02-22 ENCOUNTER — LAB VISIT (OUTPATIENT)
Dept: PRIMARY CARE CLINIC | Facility: CLINIC | Age: 62
End: 2022-02-22
Payer: MEDICARE

## 2022-02-22 DIAGNOSIS — Z01.818 PRE-OP TESTING: ICD-10-CM

## 2022-02-22 PROCEDURE — U0003 INFECTIOUS AGENT DETECTION BY NUCLEIC ACID (DNA OR RNA); SEVERE ACUTE RESPIRATORY SYNDROME CORONAVIRUS 2 (SARS-COV-2) (CORONAVIRUS DISEASE [COVID-19]), AMPLIFIED PROBE TECHNIQUE, MAKING USE OF HIGH THROUGHPUT TECHNOLOGIES AS DESCRIBED BY CMS-2020-01-R: HCPCS | Performed by: COLON & RECTAL SURGERY

## 2022-02-22 PROCEDURE — U0005 INFEC AGEN DETEC AMPLI PROBE: HCPCS | Performed by: COLON & RECTAL SURGERY

## 2022-02-23 LAB
SARS-COV-2 RNA RESP QL NAA+PROBE: NOT DETECTED
SARS-COV-2- CYCLE NUMBER: NORMAL

## 2022-02-24 ENCOUNTER — PATIENT MESSAGE (OUTPATIENT)
Dept: SURGERY | Facility: CLINIC | Age: 62
End: 2022-02-24
Payer: MEDICARE

## 2022-02-24 ENCOUNTER — HOSPITAL ENCOUNTER (OUTPATIENT)
Dept: RADIOLOGY | Facility: HOSPITAL | Age: 62
Discharge: HOME | End: 2022-02-24
Attending: INTERNAL MEDICINE
Payer: MEDICARE

## 2022-02-24 ENCOUNTER — TELEPHONE (OUTPATIENT)
Dept: SURGERY | Facility: CLINIC | Age: 62
End: 2022-02-24
Payer: MEDICARE

## 2022-02-24 DIAGNOSIS — K21.9 GASTROESOPHAGEAL REFLUX DISEASE, UNSPECIFIED WHETHER ESOPHAGITIS PRESENT: ICD-10-CM

## 2022-02-24 DIAGNOSIS — R13.10 DYSPHAGIA, UNSPECIFIED TYPE: ICD-10-CM

## 2022-02-24 NOTE — TELEPHONE ENCOUNTER
Spoke with patient, 5a arrival time given.   Bella Leigh)  2021 15:27:03 Angelita Llanes  (RN)  2021 02:44:27

## 2022-02-24 NOTE — TELEPHONE ENCOUNTER
Left message to return call regarding 6:30 am arrival time for surgery tomorrow, will message through [facility].

## 2022-02-25 ENCOUNTER — ANESTHESIA EVENT (OUTPATIENT)
Dept: SURGERY | Facility: HOSPITAL | Age: 62
End: 2022-02-25
Payer: MEDICARE

## 2022-02-25 ENCOUNTER — HOSPITAL ENCOUNTER (OUTPATIENT)
Facility: HOSPITAL | Age: 62
Discharge: HOME | End: 2022-02-25
Attending: COLON & RECTAL SURGERY | Admitting: COLON & RECTAL SURGERY
Payer: MEDICARE

## 2022-02-25 ENCOUNTER — ANESTHESIA (OUTPATIENT)
Dept: SURGERY | Facility: HOSPITAL | Age: 62
End: 2022-02-25
Payer: MEDICARE

## 2022-02-25 VITALS
WEIGHT: 243.19 LBS | TEMPERATURE: 97 F | RESPIRATION RATE: 21 BRPM | HEIGHT: 71 IN | SYSTOLIC BLOOD PRESSURE: 142 MMHG | DIASTOLIC BLOOD PRESSURE: 76 MMHG | HEART RATE: 89 BPM | BODY MASS INDEX: 34.05 KG/M2 | OXYGEN SATURATION: 92 %

## 2022-02-25 DIAGNOSIS — K62.89 ANAL PAIN: Primary | ICD-10-CM

## 2022-02-25 PROCEDURE — 88305 TISSUE EXAM BY PATHOLOGIST: CPT | Mod: 26,,, | Performed by: PATHOLOGY

## 2022-02-25 PROCEDURE — 37000008 HC ANESTHESIA 1ST 15 MINUTES: Performed by: COLON & RECTAL SURGERY

## 2022-02-25 PROCEDURE — 25000003 PHARM REV CODE 250: Performed by: COLON & RECTAL SURGERY

## 2022-02-25 PROCEDURE — D9220A PRA ANESTHESIA: ICD-10-PCS | Mod: CRNA,,, | Performed by: STUDENT IN AN ORGANIZED HEALTH CARE EDUCATION/TRAINING PROGRAM

## 2022-02-25 PROCEDURE — 37000009 HC ANESTHESIA EA ADD 15 MINS: Performed by: COLON & RECTAL SURGERY

## 2022-02-25 PROCEDURE — 71000015 HC POSTOP RECOV 1ST HR: Performed by: COLON & RECTAL SURGERY

## 2022-02-25 PROCEDURE — 46230 PR EXCISION MULTIPLE EXTERNAL PAPILLAE/TAGS ANUS: ICD-10-PCS | Mod: ,,, | Performed by: COLON & RECTAL SURGERY

## 2022-02-25 PROCEDURE — 46230 REMOVAL OF ANAL TAGS: CPT | Mod: ,,, | Performed by: COLON & RECTAL SURGERY

## 2022-02-25 PROCEDURE — 71000044 HC DOSC ROUTINE RECOVERY FIRST HOUR: Performed by: COLON & RECTAL SURGERY

## 2022-02-25 PROCEDURE — D9220A PRA ANESTHESIA: ICD-10-PCS | Mod: ANES,,, | Performed by: ANESTHESIOLOGY

## 2022-02-25 PROCEDURE — D9220A PRA ANESTHESIA: Mod: CRNA,,, | Performed by: STUDENT IN AN ORGANIZED HEALTH CARE EDUCATION/TRAINING PROGRAM

## 2022-02-25 PROCEDURE — 25000003 PHARM REV CODE 250: Performed by: STUDENT IN AN ORGANIZED HEALTH CARE EDUCATION/TRAINING PROGRAM

## 2022-02-25 PROCEDURE — 63600175 PHARM REV CODE 636 W HCPCS: Performed by: STUDENT IN AN ORGANIZED HEALTH CARE EDUCATION/TRAINING PROGRAM

## 2022-02-25 PROCEDURE — 25000003 PHARM REV CODE 250: Performed by: NURSE PRACTITIONER

## 2022-02-25 PROCEDURE — 88305 TISSUE EXAM BY PATHOLOGIST: ICD-10-PCS | Mod: 26,,, | Performed by: PATHOLOGY

## 2022-02-25 PROCEDURE — 88305 TISSUE EXAM BY PATHOLOGIST: CPT | Performed by: PATHOLOGY

## 2022-02-25 PROCEDURE — D9220A PRA ANESTHESIA: Mod: ANES,,, | Performed by: ANESTHESIOLOGY

## 2022-02-25 PROCEDURE — 36000704 HC OR TIME LEV I 1ST 15 MIN: Performed by: COLON & RECTAL SURGERY

## 2022-02-25 PROCEDURE — 36000705 HC OR TIME LEV I EA ADD 15 MIN: Performed by: COLON & RECTAL SURGERY

## 2022-02-25 RX ORDER — BUPIVACAINE HYDROCHLORIDE 2.5 MG/ML
INJECTION, SOLUTION EPIDURAL; INFILTRATION; INTRACAUDAL
Status: DISCONTINUED | OUTPATIENT
Start: 2022-02-25 | End: 2022-02-25 | Stop reason: HOSPADM

## 2022-02-25 RX ORDER — LIDOCAINE HYDROCHLORIDE 10 MG/ML
1 INJECTION, SOLUTION EPIDURAL; INFILTRATION; INTRACAUDAL; PERINEURAL ONCE
Status: COMPLETED | OUTPATIENT
Start: 2022-02-25 | End: 2022-08-12

## 2022-02-25 RX ORDER — SODIUM CHLORIDE 9 MG/ML
INJECTION, SOLUTION INTRAVENOUS CONTINUOUS
Status: ACTIVE | OUTPATIENT
Start: 2022-02-25

## 2022-02-25 RX ORDER — OXYCODONE HYDROCHLORIDE 5 MG/1
5 TABLET ORAL EVERY 4 HOURS PRN
Qty: 15 TABLET | Refills: 0 | Status: SHIPPED | OUTPATIENT
Start: 2022-02-25 | End: 2022-03-30

## 2022-02-25 RX ORDER — PROCHLORPERAZINE EDISYLATE 5 MG/ML
5 INJECTION INTRAMUSCULAR; INTRAVENOUS EVERY 30 MIN PRN
Status: DISCONTINUED | OUTPATIENT
Start: 2022-02-25 | End: 2022-02-25 | Stop reason: HOSPADM

## 2022-02-25 RX ORDER — PROPOFOL 10 MG/ML
VIAL (ML) INTRAVENOUS
Status: DISCONTINUED | OUTPATIENT
Start: 2022-02-25 | End: 2022-02-25

## 2022-02-25 RX ORDER — FENTANYL CITRATE 50 UG/ML
25 INJECTION, SOLUTION INTRAMUSCULAR; INTRAVENOUS EVERY 5 MIN PRN
Status: DISCONTINUED | OUTPATIENT
Start: 2022-02-25 | End: 2022-02-25 | Stop reason: HOSPADM

## 2022-02-25 RX ORDER — ONDANSETRON 2 MG/ML
INJECTION INTRAMUSCULAR; INTRAVENOUS
Status: DISCONTINUED | OUTPATIENT
Start: 2022-02-25 | End: 2022-02-25

## 2022-02-25 RX ORDER — SUCCINYLCHOLINE CHLORIDE 20 MG/ML
INJECTION INTRAMUSCULAR; INTRAVENOUS
Status: DISCONTINUED | OUTPATIENT
Start: 2022-02-25 | End: 2022-02-25

## 2022-02-25 RX ORDER — DEXAMETHASONE SODIUM PHOSPHATE 4 MG/ML
INJECTION, SOLUTION INTRA-ARTICULAR; INTRALESIONAL; INTRAMUSCULAR; INTRAVENOUS; SOFT TISSUE
Status: DISCONTINUED | OUTPATIENT
Start: 2022-02-25 | End: 2022-02-25

## 2022-02-25 RX ORDER — ROCURONIUM BROMIDE 10 MG/ML
INJECTION, SOLUTION INTRAVENOUS
Status: DISCONTINUED | OUTPATIENT
Start: 2022-02-25 | End: 2022-02-25

## 2022-02-25 RX ORDER — MUPIROCIN 20 MG/G
OINTMENT TOPICAL
Status: DISPENSED | OUTPATIENT
Start: 2022-02-25

## 2022-02-25 RX ORDER — FENTANYL CITRATE 50 UG/ML
INJECTION, SOLUTION INTRAMUSCULAR; INTRAVENOUS
Status: DISCONTINUED | OUTPATIENT
Start: 2022-02-25 | End: 2022-02-25

## 2022-02-25 RX ORDER — MIDAZOLAM HYDROCHLORIDE 1 MG/ML
INJECTION, SOLUTION INTRAMUSCULAR; INTRAVENOUS
Status: DISCONTINUED | OUTPATIENT
Start: 2022-02-25 | End: 2022-02-25

## 2022-02-25 RX ORDER — LIDOCAINE HYDROCHLORIDE 20 MG/ML
INJECTION, SOLUTION EPIDURAL; INFILTRATION; INTRACAUDAL; PERINEURAL
Status: DISCONTINUED | OUTPATIENT
Start: 2022-02-25 | End: 2022-02-25

## 2022-02-25 RX ADMIN — SODIUM CHLORIDE: 9 INJECTION, SOLUTION INTRAVENOUS at 06:02

## 2022-02-25 RX ADMIN — MUPIROCIN: 20 OINTMENT TOPICAL at 06:02

## 2022-02-25 RX ADMIN — ONDANSETRON 4 MG: 2 INJECTION INTRAMUSCULAR; INTRAVENOUS at 07:02

## 2022-02-25 RX ADMIN — ROCURONIUM BROMIDE 5 MG: 10 INJECTION, SOLUTION INTRAVENOUS at 07:02

## 2022-02-25 RX ADMIN — DEXAMETHASONE SODIUM PHOSPHATE 4 MG: 4 INJECTION INTRA-ARTICULAR; INTRALESIONAL; INTRAMUSCULAR; INTRAVENOUS; SOFT TISSUE at 07:02

## 2022-02-25 RX ADMIN — MIDAZOLAM 2 MG: 1 INJECTION INTRAMUSCULAR; INTRAVENOUS at 06:02

## 2022-02-25 RX ADMIN — FENTANYL CITRATE 100 MCG: 50 INJECTION INTRAMUSCULAR; INTRAVENOUS at 07:02

## 2022-02-25 RX ADMIN — SUCCINYLCHOLINE CHLORIDE 180 MG: 20 INJECTION, SOLUTION INTRAMUSCULAR; INTRAVENOUS; PARENTERAL at 07:02

## 2022-02-25 RX ADMIN — PROPOFOL 200 MG: 10 INJECTION, EMULSION INTRAVENOUS at 07:02

## 2022-02-25 RX ADMIN — LIDOCAINE HYDROCHLORIDE 100 MG: 20 INJECTION, SOLUTION EPIDURAL; INFILTRATION; INTRACAUDAL at 07:02

## 2022-02-25 NOTE — H&P
"Patient ID:  Bri Santiago is a 61 y.o. male      Chief Complaint: No chief complaint on file.        HPI:  Bri Santiago presents to colon and rectal surgery with a complaint of rectal bleeding X two separate instances. Still having mild pain and burning after bowel movements, but no worsening or different pain associated with bleeding. Taking miralax daily -  daily soft BM. No abdominal pain, fevers or chills.        ON plavix      Dr Grady s/p LIAS 12/2021  Dr Cazares s/p EUA and RBL 03/2021     Colonoscopy 2020  Impression:           - The examined portion of the ileum was normal.                         - Diverticulosis in the recto-sigmoid colon and in                         the sigmoid colon.                         - The entire examined colon is normal.                         - Non-bleeding internal hemorrhoids.                         - No specimens collected.   *famiy hx of CRC - father     ROS:     Review of Systems   Constitutional: Negative for appetite change, chills, fatigue, fever and unexpected weight change.   Respiratory: Negative for shortness of breath.    Cardiovascular: Negative for chest pain.   Gastrointestinal: Positive for blood in stool and rectal pain. Negative for abdominal distention, abdominal pain, anal bleeding, constipation, diarrhea, nausea and vomiting.               Review of patient's allergies indicates:   Allergen Reactions    Penicillins Hives and Swelling       Has had allergy testing and can prob tolerate penicillin    Ace inhibitors Other (See Comments)       "Caused a respiratory infection"    Aspirin Hives               Codeine Itching       Ok to take percocet    Dilaudid [hydromorphone] Itching           Past Medical History:   Diagnosis Date    Acute pancreatitis      Anal fissure      Anemia      Anticoagulant long-term use      Arthritis      Asthma in remission      Back pain      BPH (benign prostatic hypertrophy)      Cancer 2000     prostate- " treated at [facility] with chemo- in remission since 2000    Chronic maxillary sinusitis      Clotting disorder      Diastolic dysfunction with chronic heart failure 12/3/2018    Dysphagia 10/7/2014    Family history of colon cancer      Family history of early CAD      GERD (gastroesophageal reflux disease)      Helicobacter pylori (H. pylori) infection       Chronic    History of chronic pancreatitis      HTN (hypertension)      Lumbago 11/12/2012    Obesity      ABDON (obstructive sleep apnea)      ABDON (obstructive sleep apnea)       With likely ohs Refer to sleep    Prostate cancer 2000     dx and treated at [facility], had chemotherapy, in remission [id number]    Sacroiliac joint pain 2/10/2015    Spinal stenosis of lumbar region      Von Willebrand disease      VWD (acquired von Willebrand's disease)              Past Surgical History:   Procedure Laterality Date    anal fissure repair         x2    BACK SURGERY   2012    BALLOON SINUPLASTY OF PARANASAL SINUS Bilateral 10/7/2019     Procedure: SINUPLASTY, USING BALLOON;  Surgeon: LAURENT Swanson MD;  Location: [facility] OR;  Service: ENT;  Laterality: Bilateral;    CARPAL TUNNEL RELEASE   2003     left hand    CATHETERIZATION OF BOTH LEFT AND RIGHT HEART N/A 2/14/2019     Procedure: CATHETERIZATION, HEART, BOTH LEFT AND RIGHT;  Surgeon: Kyle Magana MD;  Location: [facility] CATH LAB;  Service: Cardiology;  Laterality: N/A;    CERVICAL DISCECTOMY   2003    COLONOSCOPY N/A 10/7/2015     Procedure: COLONOSCOPY;  Surgeon: Rosendo Boyer MD;  Location: [facility] ([address] [place] [address]);  Service: Endoscopy;  Laterality: N/A;  PM Prep    COLONOSCOPY N/A 6/19/2017     Procedure: COLONOSCOPY;  Surgeon: Rosendo Boyer MD;  [facility]  Service: Endoscopy;  Laterality: N/A;  constipation prep (no DM no CHF)        hx of vonWillebrand's disease-will need infusion prior    COLONOSCOPY N/A 3/4/2020     Procedure: COLONOSCOPY;  Surgeon:  Rosendo Boyer MD;  Location: [facility] (4TH FLR);  Service: Endoscopy;  Laterality: N/A;  Please order CBC, ferritin, Iron TIBC day of case per Dr. Boyer  labwork at 7:10am and patient will check in right after.  per Dr. Tyler patient can hold Plavix 5 days prior see note 1/7/20  DDAVP prior to procedure needed see note 1/16/20 for oncall med by Dr. Tyler -     ESOPHAGOGASTRODUODENOSCOPY N/A 3/4/2020     Procedure: EGD (ESOPHAGOGASTRODUODENOSCOPY);  Surgeon: Rosendo Boyer MD;  Location: [facility] (4TH FLR);  Service: Endoscopy;  Laterality: N/A;  EGD and Colonoscopy orders merged together  labwork at 7:10am and patient will check in right after.  DDAVP prior to procedure needed see note 1/16/20 for oncall med  per Dr. Tyler patient can hold Plavix 5 days prior see note 1/7/20    ESOPHAGOGASTRODUODENOSCOPY N/A 11/3/2020     Procedure: EGD (ESOPHAGOGASTRODUODENOSCOPY);  Surgeon: Rosendo Boyer MD;  Location: [facility] (2ND FLR);  Service: Endoscopy;  Laterality: N/A;  Please recall pt has von Willebrands and will require DDVAP infusion prior to procedure as previously done.    see telephone encounter on 10/1/20 regarding DDAVP   ok to hold Plavix 5 days prior per Dr.Blessey ORTIZID screening on 10/31/20 -rb    EXAMINATION UNDER ANESTHESIA N/A 3/15/2021     Procedure: EXAM UNDER ANESTHESIA;  Surgeon: Silvia Cazares MD;  Location: [facility] 2ND FLR;  Service: Colon and Rectal;  Laterality: N/A;    FUNCTIONAL ENDOSCOPIC SINUS SURGERY (FESS) USING COMPUTER-ASSISTED NAVIGATION Bilateral 10/7/2019     Procedure: FESS, USING COMPUTER-ASSISTED NAVIGATION;  Surgeon: LAURENT Swanson MD;  Location: [facility] OR;  Service: ENT;  Laterality: Bilateral;    INJECTION OF ANESTHETIC AGENT AROUND NERVE Bilateral 10/13/2020     Procedure: BLOCK, NERVE BILATERAL C4,C5,C6 Plavix clearance requested;  Surgeon: Fredy Magana MD;  Location: [facility] PAIN MGT;  Service: Pain Management;  Laterality: Bilateral;  BLOCK, NERVE  BILATERAL C4,C5,C6  Plavix clearance ok, will require DDVAP infusion    LATERAL INTERNAL ANAL SPHINCTEROTOMY N/A 12/10/2021     Procedure: SPHINCTEROTOMY-LATERAL INTERNAL ANAL;  Surgeon: Nadeem Grady MD;  Location: [facility] OR [facility] FLR;  Service: Colon and Rectal;  Laterality: N/A;    LEFT HEART CATHETERIZATION Left 2/14/2019     Procedure: Left heart cath;  Surgeon: Kyle Magana MD;  Location: [facility] CATH LAB;  Service: Cardiology;  Laterality: Left;    LUMBAR FUSION   2012    RADIOFREQUENCY ABLATION Right 5/9/2019     Procedure: RADIOFREQUENCY ABLATION, RIGHT L3,L4,L5;  Surgeon: Freyd Magana MD;  Location: [facility] PAIN MGT;  Service: Pain Management;  Laterality: Right;  1 of 2  RT RFA L3,4,5  PLAVIX clearance received, pt needs DDAVP    RADIOFREQUENCY ABLATION Left 5/23/2019     Procedure: RADIOFREQUENCY ABLATION, LEFT L3,L4,L5;  Surgeon: Fredy Magana MD;  Location: [facility] PAIN MGT;  Service: Pain Management;  Laterality: Left;  2 of 2  LT RFA L3,4,5  PLAVIX clearance received, pt needs DDAVP    RADIOFREQUENCY ABLATION Left 1/16/2020     Procedure: RADIOFREQUENCY ABLATION LEFT L3, L4, L5 MEDIAL BRANCH 1 OF 2 **PATIENT ARRIVING AT 8 AM**;  Surgeon: Fredy Magana MD;  Location: [facility] PAIN MGT;  Service: Pain Management;  Laterality: Left;  NEEDS CONSENT, PLAVIX CLEARANCE IN CHART    RADIOFREQUENCY ABLATION Right 1/28/2020     Procedure: RADIOFREQUENCY ABLATION, RIGHT L3-L4-L5 MEDIAL BRANCH 2 OF 2 (Left done on 1/16/20);  Surgeon: Fredy Magana MD;  Location: [facility] PAIN MGT;  Service: Pain Management;  Laterality: Right;    RADIOFREQUENCY ABLATION Left 8/18/2020     Procedure: RADIOFREQUENCY ABLATION, L3-L4-L5 MEDIAL BRANCH 1 OF 2 clear to hold plavix 7 days;  Surgeon: Fredy Magana MD;  Location: [facility] PAIN MGT;  Service: Pain Management;  Laterality: Left;    RADIOFREQUENCY ABLATION Right 9/1/2020     Procedure: RADIOFREQUENCY ABLATION, L3-L4-L5 MEDIAL BR ANCH 2 OF 2 clear to hol,d  Plavix 7 days;  Surgeon: Fredy Magana MD;  Location: [facility] PAIN MGT;  Service: Pain Management;  Laterality: Right;    RADIOFREQUENCY ABLATION Bilateral 12/21/2021     Procedure: RADIOFREQUENCY ABLATION B/L L3,4,5 RFA (give dDAVP prior) NEEDS CONSENT plavix ok x7;  Surgeon: Fredy Magana MD;  Location: [facility] PAIN MGT;  Service: Pain Management;  Laterality: Bilateral;    RADIOFREQUENCY ABLATION OF LUMBAR MEDIAL BRANCH NERVE AT SINGLE LEVEL Left 5/24/2018     Procedure: RADIOFREQUENCY THERMOCOAGULATION (RFTC)-NERVE-MEDIAN BRANCH-LUMBAR;  Surgeon: Fredy Magana MD;  Location: [facility] PAIN MGT;  Service: Pain Management;  Laterality: Left;  Left RFA @ L3,4,5  [id number]  with IV Sedation     2 of 2    SPINE SURGERY        TONSILLECTOMY         at age 24    VASECTOMY   1996            Family History   Problem Relation Age of Onset    Colon cancer Father 67         colon cancer    Hypertension Father      Glaucoma Father      Cancer Father      Colon cancer Paternal Grandfather 65              Coronary artery disease Mother 45    Hypertension Mother      Heart disease Mother      Colon cancer Mother      Diabetes Mother      No Known Problems Brother      No Known Problems Sister      No Known Problems Daughter      No Known Problems Son      Coronary artery disease Brother 51    No Known Problems Daughter      No Known Problems Daughter      No Known Problems Son      No Known Problems Son      Colon cancer Paternal Uncle 65    Diabetes Mellitus Paternal Grandmother      Esophageal cancer Neg Hx             Current Outpatient Medications on File Prior to Visit   Medication Sig    albuterol (PROVENTIL/VENTOLIN HFA) 90 mcg/actuation inhaler INHALE 2 PUFFS INTO THE LUNGS EVERY 6 HOURS AS NEEDED FOR WHEEZING OR SHORTNESS OF BREATH    atorvastatin (LIPITOR) 40 MG tablet Take 1 tablet (40 mg total) by mouth every evening.    azelastine (ASTELIN) 137 mcg (0.1 %) nasal spray Two sprays  in each nostril, sniff until absorbed, then follow with 1 spray of fluticasone.  Use both sprays twice daily. (Patient taking differently: Two sprays in each nostril, sniff until absorbed, then follow with 1 spray of fluticasone.  Use both sprays twice daily.(PATIENT USES NIGHTLY 3/12/2021))    budesonide-formoterol 160-4.5 mcg (SYMBICORT) 160-4.5 mcg/actuation HFAA INHALE 2 PUFFS INTO THE LUNGS EVERY 12 HOURS    calcium citrate-vitamin D3 315-200 mg (CITRACAL+D) 315-200 mg-unit per tablet Take 1 tablet by mouth nightly.     clindamycin (CLEOCIN) 300 MG capsule Take 300 mg by mouth every 6 (six) hours.    clopidogreL (PLAVIX) 75 mg tablet Take 1 tablet (75 mg total) by mouth once daily.    cyanocobalamin, vitamin B-12, 50 mcg tablet Take 50 mcg by mouth nightly.     diltiazem HCl (DILTIAZEM 2% CREAM) Apply topically 3 (three) times daily. Apply topically to anal area. (Patient not taking: Reported on 1/11/2022)    docusate sodium (COLACE) 100 MG capsule Take 1 tablet by mouth Twice daily. 1 Capsule Oral Twice a day .  Take with pain medicine    doxazosin (CARDURA) 1 MG tablet TAKE 1 TABLET BY MOUTH EVERY EVENING    esomeprazole (NEXIUM) 40 MG capsule Take 1 capsule (40 mg total) by mouth 2 (two) times daily before meals.    ferrous sulfate (FEOSOL) 325 mg (65 mg iron) Tab tablet Take 1 tablet (325 mg total) by mouth every 12 (twelve) hours.    fluticasone propionate (FLONASE) 50 mcg/actuation nasal spray One spray in each nostril twice daily after 1st using azelastine nasal spray    furosemide (LASIX) 20 MG tablet Take 1 tablet (20 mg total) by mouth once daily.    ipratropium (ATROVENT) 42 mcg (0.06 %) nasal spray 1-2 sprays in each nostril before eating and at bedtime as needed    LIDOcaine HCL 2% (XYLOCAINE) 2 % jelly Apply topically 3 (three) times daily. (Patient not taking: Reported on 1/11/2022)    montelukast (SINGULAIR) 10 mg tablet TAKE 1 TABLET(10 MG) BY MOUTH EVERY EVENING    naproxen  (NAPROSYN) 500 MG tablet Take 1 tablet (500 mg total) by mouth 2 (two) times daily as needed (pain). (Patient not taking: Reported on 1/11/2022)    phentermine (ADIPEX-P) 37.5 mg tablet Take 1 tablet (37.5 mg total) by mouth once daily.    potassium chloride (MICRO-K) 10 MEQ CpSR Take 10 mEq by mouth once daily.     tadalafiL (CIALIS) 20 MG Tab Take 1 tablet (20 mg total) by mouth as needed. Take every 36 hours as needed for ED.    testosterone (ANDRODERM) 2 mg/24 hour PT24 APPLY 1 PATCH TOPICALLY TO THE SKIN EVERY DAY    tiZANidine (ZANAFLEX) 2 MG tablet Take 2 tablets (4 mg total) by mouth every 6 (six) hours as needed.    topiramate (TOPAMAX) 50 MG tablet TAKE 1 TABLET (50 MG TOTAL) BY MOUTH 2 (TWO) TIMES DAILY.    zolpidem (AMBIEN) 10 mg Tab TAKE 1 TABLET BY MOUTH EVERY DAY AT BEDTIME (Patient taking differently: Take 10 mg by mouth nightly as needed.)    [DISCONTINUED] tamsulosin (FLOMAX) 0.4 mg Cap Take 1 capsule (0.4 mg total) by mouth once daily.          Current Facility-Administered Medications on File Prior to Visit   Medication    0.9%  NaCl infusion    0.9%  NaCl infusion    mupirocin 2 % ointment    mupirocin 2 % ointment         PE:  There were no vitals filed for this visit.  Physical Exam  Constitutional:       General: He is not in acute distress.  HENT:      Head: Normocephalic and atraumatic.   Pulmonary:      Effort: Pulmonary effort is normal. No respiratory distress.   Abdominal:      General: There is no distension.      Palpations: Abdomen is soft.      Tenderness: There is no abdominal tenderness.   Genitourinary:     Comments: Normal perianal skin. eversion of anus revealed no abnormality or fissure, ASHLEY revealed no masses, blood or stool in vault, normal sphincter tone - pain with ASHLEY. Brief anoscopy - no active bleeding.     Musculoskeletal:         General: Normal range of motion.   Skin:     General: Skin is warm and dry.      Findings: No erythema or rash.   Neurological:       Mental Status: He is alert and oriented to person, place, and time.      GCS: GCS score is 15.   Psychiatric:         Mood and Affect: Affect normal.            Impression:       Encounter Diagnosis   Name Primary?    Rectal bleeding Yes         PLAN:  Diagnoses and all orders for this visit:     Rectal bleeding  -     CBC Auto Differential; Future     Other orders  -     hydrocortisone (ANUSOL-HC) 25 mg suppository; Place 1 suppository (25 mg total) rectally 2 (two) times daily. for 10 days     -EUA today to evaluate ongoing pain  -Consent signed in chart

## 2022-02-25 NOTE — ANESTHESIA PREPROCEDURE EVALUATION
02/25/2022  Bri Santiago is a 61 y.o., male.      Pre-op Assessment          Review of Systems  Anesthesia Hx:  Denies Hx of Anesthetic complications History of prior surgery of interest to airway management or planning: Previous anesthesia: General SPHINCTEROTOMY-LATERAL INTERNAL ANAL  12/10/21 with general anesthesia.  Procedure performed at an [facility]. Airway issues documented on chart review include mask, difficult, easy direct laryngoscopy , view on direct laryngoscopy Grade II  Denies Family Hx of Anesthesia complications.   Denies Personal Hx of Anesthesia complications.   Social:  Non-Smoker, Social Alcohol Use    Hematology/Oncology:                                                                                                               Pre-operative evaluation for Procedure(s) (LRB):  SPHINCTEROTOMY-LATERAL INTERNAL ANAL (N/A)    Bri Santiago is a 61 y.o. male with pmh of HTN, GERD, obesity, vWD, prostate Ca (remission since 2000), HFpEF, COPD, chronic pancreatitis and ABDON (on CPAP) who presents with anal pain. Plan for the above procedure.     2D Echo:  TTE 6/2020:  · Normal left ventricular systolic function. The estimated ejection fraction is 60%.  · Concentric left ventricular remodeling.  · Normal LV diastolic function. Borderline enlarged left atrium, IVC large but collapsing, no convincing evidence of diastolic dysfunction.  · Mild mitral sclerosis.  · Mild to moderate tricuspid regurgitation.  · Normal right ventricular systolic function.  · Mild left atrial enlargement.  · The estimated PA systolic pressure is 37 mmHg.  · Intermediate central venous pressure (8 mmHg).    Patient Active Problem List   Diagnosis    Asthma in remission    Von Willebrand disease    Primary hypertension    Spondylosis without myelopathy    Thoracic or lumbosacral neuritis or radiculitis,  unspecified    Spinal stenosis, lumbar region, without neurogenic claudication    Degeneration of lumbar or lumbosacral intervertebral disc    Acquired spondylolisthesis    Pseudoarthrosis    Family history of colon cancer    Vitamin D deficiency    Atypical chest pain    Encounter for monitoring long-term proton pump inhibitor therapy    Postlaminectomy syndrome of lumbar region    Myalgia and myositis    Facet syndrome    Gastroesophageal reflux disease without esophagitis    Osteopenia    Thoracic aorta atherosclerosis    Benign prostatic hyperplasia with urinary obstruction    Allergic rhinitis    Allergic conjunctivitis of both eyes    Encounter for long-term current use of high risk medication    Gastroesophageal reflux disease with esophagitis    Hematochezia    Nasal septal deviation    Dysphagia    Laryngopharyngeal reflux (LPR)    Diastolic dysfunction with chronic heart failure    At risk for cardiovascular event    Hypoxia    Chronic pulmonary heart disease    Class 1 obesity due to excess calories with serious comorbidity and body mass index (BMI) of 33.0 to 33.9 in adult    Moderate persistent asthma    Family history of prostate cancer in father    Chronic pain    Nocturia    Scrotal swelling    Chronic bilateral low back pain without sciatica    Chronic maxillary sinusitis    Nonintractable episodic headache    Osteoarthritis of lumbar spine    Gynecomastia, male    Iron deficiency anemia    Obstructive sleep apnea treated with continuous positive airway pressure (CPAP)    Postnasal drip    GERD (gastroesophageal reflux disease)    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    Hypogonadism in male    Dry skin dermatitis    Globus sensation    Throat clearing    Anal pain    Non-occlusive coronary artery disease requiring drug therapy    Anal fissure    Prostate cancer    Unspecified inflammatory spondylopathy, lumbar region         Current Facility-Administered Medications on File Prior to Visit   Medication Dose Route Frequency Provider Last Rate Last Admin    0.9%  NaCl infusion   Intravenous Continuous Milla Oliveira NP 70 mL/hr at 03/15/21 1417 New Bag at 03/15/21 1417    0.9%  NaCl infusion   Intravenous Continuous Milla Oliveira NP 70 mL/hr at 12/10/21 0736 New Bag at 12/10/21 0736    0.9%  NaCl infusion   Intravenous Continuous Jaqueline Langley NP        LIDOcaine (PF) 10 mg/ml (1%) injection 10 mg  1 mL Intradermal Once Jaqueline Langley NP        mupirocin 2 % ointment   Nasal On Call Procedure Milla Oliveira NP   Given at 12/10/21 0729    mupirocin 2 % ointment   Nasal On Call Procedure Jaqueline Langley NP         Current Outpatient Medications on File Prior to Visit   Medication Sig Dispense Refill    albuterol (PROVENTIL/VENTOLIN HFA) 90 mcg/actuation inhaler INHALE 2 PUFFS INTO THE LUNGS EVERY 6 HOURS AS NEEDED FOR WHEEZING OR SHORTNESS OF BREATH 18 g 4    atorvastatin (LIPITOR) 40 MG tablet Take 1 tablet (40 mg total) by mouth every evening. 90 tablet 3    azelastine (ASTELIN) 137 mcg (0.1 %) nasal spray Two sprays in each nostril, sniff until absorbed, then follow with 1 spray of fluticasone.  Use both sprays twice daily. (Patient taking differently: Two sprays in each nostril, sniff until absorbed, then follow with 1 spray of fluticasone.  Use both sprays twice daily.(PATIENT USES NIGHTLY 3/12/2021)) 30 mL 11    budesonide-formoterol 160-4.5 mcg (SYMBICORT) 160-4.5 mcg/actuation HFAA INHALE 2 PUFFS INTO THE LUNGS EVERY 12 HOURS 10.2 g 12    calcium citrate-vitamin D3 315-200 mg (CITRACAL+D) 315-200 mg-unit per tablet Take 1 tablet by mouth nightly.       clopidogreL (PLAVIX) 75 mg tablet Take 1 tablet (75 mg total) by mouth once daily. 90 tablet 3    cyanocobalamin, vitamin B-12, 50 mcg tablet Take 50 mcg by mouth nightly.       diltiazem HCl (DILTIAZEM 2% CREAM) Apply topically 3  (three) times daily. Apply topically to anal area. 50 g 5    docusate sodium (COLACE) 100 MG capsule Take 1 tablet by mouth Twice daily. 1 Capsule Oral Twice a day .  Take with pain medicine      doxazosin (CARDURA) 1 MG tablet TAKE 1 TABLET BY MOUTH EVERY EVENING 90 tablet 3    ferrous gluconate (FERGON) 324 MG tablet Take 1 tablet (324 mg total) by mouth daily with breakfast. 90 tablet 1    fluticasone propionate (FLONASE) 50 mcg/actuation nasal spray One spray in each nostril twice daily after 1st using azelastine nasal spray 18.2 mL 11    furosemide (LASIX) 20 MG tablet Take 1 tablet (20 mg total) by mouth once daily. 90 tablet 3    ipratropium (ATROVENT) 42 mcg (0.06 %) nasal spray 1-2 sprays in each nostril before eating and at bedtime as needed 30 mL 11    montelukast (SINGULAIR) 10 mg tablet TAKE 1 TABLET(10 MG) BY MOUTH EVERY EVENING 90 tablet 3    pantoprazole (PROTONIX) 40 MG tablet Take 1 tablet (40 mg total) by mouth every 12 (twelve) hours. 120 tablet 0    phentermine (ADIPEX-P) 37.5 mg tablet Take 1 tablet (37.5 mg total) by mouth once daily. 30 tablet 5    potassium chloride (MICRO-K) 10 MEQ CpSR Take 10 mEq by mouth once daily.       tadalafiL (CIALIS) 20 MG Tab Take 1 tablet (20 mg total) by mouth as needed. Take every 36 hours as needed for ED. 30 tablet 9    testosterone (ANDRODERM) 2 mg/24 hour PT24 APPLY 1 PATCH TOPICALLY TO THE SKIN EVERY DAY 60 patch 3    tiZANidine (ZANAFLEX) 2 MG tablet Take 2 tablets (4 mg total) by mouth every 6 (six) hours as needed. 20 tablet 0    topiramate (TOPAMAX) 50 MG tablet TAKE 1 TABLET (50 MG TOTAL) BY MOUTH 2 (TWO) TIMES DAILY. 60 tablet 5    zolpidem (AMBIEN) 10 mg Tab TAKE 1 TABLET BY MOUTH EVERY DAY AT BEDTIME (Patient taking differently: Take 10 mg by mouth nightly as needed.) 20 tablet 0    [DISCONTINUED] tamsulosin (FLOMAX) 0.4 mg Cap Take 1 capsule (0.4 mg total) by mouth once daily. 30 capsule 0       Past Surgical History:   Procedure  Laterality Date    anal fissure repair      x2    BACK SURGERY  2012    BALLOON SINUPLASTY OF PARANASAL SINUS Bilateral 10/7/2019    Procedure: SINUPLASTY, USING BALLOON;  Surgeon: LAURENT Swanson MD;  Location: [facility] OR;  Service: ENT;  Laterality: Bilateral;    CARPAL TUNNEL RELEASE  2003    left hand    CATHETERIZATION OF BOTH LEFT AND RIGHT HEART N/A 2/14/2019    Procedure: CATHETERIZATION, HEART, BOTH LEFT AND RIGHT;  Surgeon: Kyle Magana MD;  Location: [facility] CATH LAB;  Service: Cardiology;  Laterality: N/A;    CERVICAL DISCECTOMY  2003    COLONOSCOPY N/A 10/7/2015    Procedure: COLONOSCOPY;  Surgeon: Rosendo Boyer MD;  Location: [facility] ENDO (4TH FLR);  Service: Endoscopy;  Laterality: N/A;  PM Prep    COLONOSCOPY N/A 6/19/2017    Procedure: COLONOSCOPY;  Surgeon: Rosendo Boyer MD;  Location: [facility] ENDO (4TH FLR);  Service: Endoscopy;  Laterality: N/A;  constipation prep (no DM no CHF)       hx of vonWillebrand's disease-will need infusion prior    COLONOSCOPY N/A 3/4/2020    Procedure: COLONOSCOPY;  Surgeon: Rosendo Boyer MD;  Location: [facility] (4TH FLR);  Service: Endoscopy;  Laterality: N/A;  Please order CBC, ferritin, Iron TIBC day of case per Dr. Boyer  labwork at 7:10am and patient will check in right after.  per Dr. Tyler patient can hold Plavix 5 days prior see note 1/7/20  DDAVP prior to procedure needed see note 1/16/20 for oncall med by Dr. Tyler -     ESOPHAGOGASTRODUODENOSCOPY N/A 3/4/2020    Procedure: EGD (ESOPHAGOGASTRODUODENOSCOPY);  Surgeon: Rosendo Boyer MD;  Location: [facility] ENDO (4TH FLR);  Service: Endoscopy;  Laterality: N/A;  EGD and Colonoscopy orders merged together  labwork at 7:10am and patient will check in right after.  DDAVP prior to procedure needed see note 1/16/20 for oncall med  per Dr. Tyler patient can hold Plavix 5 days prior see note 1/7/20    ESOPHAGOGASTRODUODENOSCOPY N/A 11/3/2020    Procedure: EGD (ESOPHAGOGASTRODUODENOSCOPY);  Surgeon:  Rosendo Boyer MD;  Location: [facility] ENDO (2ND FLR);  Service: Endoscopy;  Laterality: N/A;  Please recall pt has von Willebrands and will require DDVAP infusion prior to procedure as previously done.    see telephone encounter on 10/1/20 regarding DDAVP   ok to hold Plavix 5 days prior per Dr.Blessey BUCKNER screening on 10/31/20 -rb    EXAMINATION UNDER ANESTHESIA N/A 3/15/2021    Procedure: EXAM UNDER ANESTHESIA;  Surgeon: Silvia Cazares MD;  Location: [facility] 2ND FLR;  Service: Colon and Rectal;  Laterality: N/A;    FUNCTIONAL ENDOSCOPIC SINUS SURGERY (FESS) USING COMPUTER-ASSISTED NAVIGATION Bilateral 10/7/2019    Procedure: FESS, USING COMPUTER-ASSISTED NAVIGATION;  Surgeon: LAURENT Swanson MD;  Location: [facility];  Service: ENT;  Laterality: Bilateral;    INJECTION OF ANESTHETIC AGENT AROUND NERVE Bilateral 10/13/2020    Procedure: BLOCK, NERVE BILATERAL C4,C5,C6 Plavix clearance requested;  Surgeon: Fredy Magana MD;  Location: [facility] PAIN MGT;  Service: Pain Management;  Laterality: Bilateral;  BLOCK, NERVE BILATERAL C4,C5,C6  Plavix clearance ok, will require DDVAP infusion    LATERAL INTERNAL ANAL SPHINCTEROTOMY N/A 12/10/2021    Procedure: SPHINCTEROTOMY-LATERAL INTERNAL ANAL;  Surgeon: Nadeem Grady MD;  Location: [facility] OR 2ND FLR;  Service: Colon and Rectal;  Laterality: N/A;    LEFT HEART CATHETERIZATION Left 2/14/2019    Procedure: Left heart cath;  Surgeon: Kyle Magana MD;  Location: [facility] CATH LAB;  Service: Cardiology;  Laterality: Left;    LUMBAR FUSION  2012    RADIOFREQUENCY ABLATION Right 5/9/2019    Procedure: RADIOFREQUENCY ABLATION, RIGHT L3,L4,L5;  Surgeon: Fredy Magana MD;  Location: [facility] PAIN MGT;  Service: Pain Management;  Laterality: Right;  1 of 2  RT RFA L3,4,5  PLAVIX clearance received, pt needs DDAVP    RADIOFREQUENCY ABLATION Left 5/23/2019    Procedure: RADIOFREQUENCY ABLATION, LEFT L3,L4,L5;  Surgeon: Fredy Magana MD;  Location: [facility] PAIN MGT;   Service: Pain Management;  Laterality: Left;  2 of 2  LT RFA L3,4,5  PLAVIX clearance received, pt needs DDAVP    RADIOFREQUENCY ABLATION Left 1/16/2020    Procedure: RADIOFREQUENCY ABLATION LEFT L3, L4, L5 MEDIAL BRANCH 1 OF 2 **PATIENT ARRIVING AT 8 AM**;  Surgeon: Fredy Magana MD;  Location: [facility] PAIN MGT;  Service: Pain Management;  Laterality: Left;  NEEDS CONSENT, PLAVIX CLEARANCE IN CHART    RADIOFREQUENCY ABLATION Right 1/28/2020    Procedure: RADIOFREQUENCY ABLATION, RIGHT L3-L4-L5 MEDIAL BRANCH 2 OF 2 (Left done on 1/16/20);  Surgeon: Fredy Magana MD;  Location: [facility] PAIN MGT;  Service: Pain Management;  Laterality: Right;    RADIOFREQUENCY ABLATION Left 8/18/2020    Procedure: RADIOFREQUENCY ABLATION, L3-L4-L5 MEDIAL BRANCH 1 OF 2 clear to hold plavix 7 days;  Surgeon: Fredy Magana MD;  Location: [facility] PAIN MGT;  Service: Pain Management;  Laterality: Left;    RADIOFREQUENCY ABLATION Right 9/1/2020    Procedure: RADIOFREQUENCY ABLATION, L3-L4-L5 MEDIAL BR ANCH 2 OF 2 clear to hol,d Plavix 7 days;  Surgeon: Fredy Magana MD;  Location: [facility] PAIN MGT;  Service: Pain Management;  Laterality: Right;    RADIOFREQUENCY ABLATION Bilateral 12/21/2021    Procedure: RADIOFREQUENCY ABLATION B/L L3,4,5 RFA (give dDAVP prior) NEEDS CONSENT plavix ok x7;  Surgeon: Fredy Magana MD;  Location: [facility] PAIN MGT;  Service: Pain Management;  Laterality: Bilateral;    RADIOFREQUENCY ABLATION OF LUMBAR MEDIAL BRANCH NERVE AT SINGLE LEVEL Left 5/24/2018    Procedure: RADIOFREQUENCY THERMOCOAGULATION (RFTC)-NERVE-MEDIAN BRANCH-LUMBAR;  Surgeon: Fredy Magana MD;  Location: [facility] PAIN MGT;  Service: Pain Management;  Laterality: Left;  Left RFA @ L3,4,5  [id number]  with IV Sedation    2 of 2    SPINE SURGERY      TONSILLECTOMY      at age 24    VASECTOMY  1996         Anesthesia Evaluation    I have reviewed the Patient Summary Reports.   I have reviewed the NPO Status.   I have reviewed  the Medications.     Review of Systems  Anesthesia Hx:  No problems with previous Anesthesia  History of prior surgery of interest to airway management or planning:  Denies Personal Hx of Anesthesia complications.   Hematology/Oncology:        Hematology Comments: vWD   Cardiovascular:   Hypertension    Pulmonary:   Asthma mild Sleep Apnea, CPAP    Renal/:  Renal/ Normal     Hepatic/GI:   GERD, well controlled    Musculoskeletal:   Arthritis     Endocrine:  Endocrine Normal        Physical Exam  General:  Well nourished, Obesity    Airway/Jaw/Neck:  Airway Findings: Mouth Opening: Normal Tongue: Normal  General Airway Assessment: Adult  Mallampati: III  Improves to II with phonation.  TM Distance: Normal, at least 6 cm      Dental:  Dental Findings:        Mental Status:  Mental Status Findings:  Cooperative, Alert and Oriented         Anesthesia Plan  Type of Anesthesia, risks & benefits discussed:  Anesthesia Type:  general    Patient's Preference:   Plan Factors:          Intra-op Monitoring Plan: standard ASA monitors  Intra-op Monitoring Plan Comments:   Post Op Pain Control Plan: per primary service following discharge from PACU  Post Op Pain Control Plan Comments:     Induction:   IV  Beta Blocker:  Patient is not currently on a Beta-Blocker (No further documentation required).       Informed Consent: Patient understands risks and agrees with Anesthesia plan.  Questions answered. Anesthesia consent signed with patient.  ASA Score: 3     Day of Surgery Review of History & Physical:    H&P update referred to the surgeon.     Anesthesia Plan Notes: Plan for GETA given unclear what procedures will need to be performed until EUA complete        Ready For Surgery From Anesthesia Perspective.             -- Anemia: --  Cancer in past history (PROSTATE):  chemotherapy    Cardiovascular:   Hypertension CAD    Denies Angina. CHF hyperlipidemia  Functional Capacity good / => 4 METS    Pulmonary:   Asthma mild Denies  Shortness of breath.  Denies Recent URI. Sleep Apnea, CPAP    Renal/:   BPH    Hepatic/GI:   Hiatal Hernia, GERD ANAL PAIN. Pancreatic Disease (HX OF PANCREATITIS)   Musculoskeletal:   Arthritis   Cervical Spine Disorder S/P CERVICAL DISCECTOMY. Lumbar Spine Disorders, S/P Lumbar Fusion   Endocrine:  Metabolic Disorders, Obesity / BMI > 30          Anesthesia Assessment: Preoperative EQUATION    Planned Procedure: Procedure(s) (LRB):  EXAM UNDER ANESTHESIA (N/A)  Requested Anesthesia Type:General  Surgeon: Nadeem Grady MD  Service: Colon and Rectal  Known or anticipated Date of Surgery:2/25/2022    Surgeon notes: reviewed    Electronic QUestionnaire Assessment completed via nurse interview with patient.        Triage considerations:     The patient has no apparent active cardiac condition (No unstable coronary Syndrome such as severe unstable angina or recent [<1 month] myocardial infarction, decompensated CHF, severe valvular   disease or significant arrhythmia)    Previous anesthesia records:GETA and Complications noted     Airway/Jaw/Neck:  Airway Findings: Mouth Opening: Normal Tongue: Normal  General Airway Assessment: Adult  Mallampati: III  Improves to II with phonation.  TM Distance: Normal, at least 6 cm       Intubation     Date/Time: 12/10/2021 9:16 AM  Performed by: Tony Mitchell CRNA  Authorized by: Wily Reeves MD      Intubation:     Induction:  Intravenous    Intubated:  Postinduction    Mask Ventilation:  Moderately difficult with oral airway    Attempts:  1    Attempted By:  CRNA    Method of Intubation:  Direct    Blade:  Ledy 3    Laryngeal View Grade: Grade IIb - only the arytenoids and epiglottis seen      Difficult Airway Encountered?: No      Complications:  None    Airway Device:  Oral endotracheal tube    Airway Device Size:  7.5    Style/Cuff Inflation:  Cuffed (inflated to minimal occlusive pressure)    Inflation Amount (mL):  7    Tube secured:  23    Secured at:  The  lips    Placement Verified By:  Capnometry    Complicating Factors:  Large/floppy epiglottis, obesity, poor neck/head extension and oropharyngeal edema or fat    Findings Post-Intubation:  BS equal bilateral and atraumatic/condition of teeth unchanged       Intubation     Date/Time: 3/15/2021 4:44 PM  Performed by: Nette Salamanca CRNA  Authorized by: Karin Luis MD      Intubation:     Induction:  Intravenous    Intubated:  Postinduction    Mask Ventilation:  Easy with oral airway    Attempts:  1    Attempted By:  CRNA    Method of Intubation:  Video laryngoscopy    Blade:  Pretty 3    Laryngeal View Grade: Grade I - full view of chords      Difficult Airway Encountered?: No      Complications:  None    Airway Device:  Oral endotracheal tube    Airway Device Size:  7.5    Style/Cuff Inflation:  Cuffed (inflated to minimal occlusive pressure)    Tube secured:  23    Secured at:  The lips    Placement Verified By:  Capnometry    Complicating Factors:  Large/floppy epiglottis, obesity and short neck    Findings Post-Intubation:  BS equal bilateral and atraumatic/condition of teeth unchanged       Last PCP note: within 3 months , within [facility]   Subspecialty notes: Cardiology: General, Endocrinology, Gastroenterology, Pain Management, CRS    Other important co-morbidities: CHF, COPD, GERD, HLD, HTN, Obesity, ABDON and VON WILLEBRAND'S DISEASE, LONG TERM ANTICOAGULANT USE      Tests already available:  Available tests,  within 3 months , within [facility] .     2/14/22  BMP  CBC  EKG             Instructions given. (See in Nurse's note)    Optimization:  Anesthesia Preop Clinic Assessment NOT  Indicated    Medical Opinion Indicated       Sub-specialist consult indicated:   MEDICATION INSTRUCTIONS FROM CARDIOLOGY - DR. ELIANA ENAMORADO       Plan:    Testing:  None Needed       Consultation:CARDIOLOGY FOR MEDICATION INSTRUCTIONS     Patient  has previously scheduled Medical Appointment: 2/22, 2/24    Navigation:               Consults scheduled.               He is on plavix for primary prevention in place of aspirin.  I would treat just like you do for aspirin for primary prevention.     Ok to hold as long as you would like, restart when you would like.     Germán            Anesthesia Plan  Type of Anesthesia, risks & benefits discussed:    Anesthesia Type: Gen ETT, Gen Natural Airway  Intra-op Monitoring Plan: Standard ASA Monitors  Post Op Pain Control Plan: multimodal analgesia  Induction:  IV  Informed Consent: Informed consent signed with the Patient and all parties understand the risks and agree with anesthesia plan.  All questions answered.   ASA Score: 3    Ready For Surgery From Anesthesia Perspective.       .

## 2022-02-25 NOTE — ANESTHESIA PROCEDURE NOTES
Intubation    Date/Time: 2/25/2022 7:09 AM  Performed by: Jojo Álvarez CRNA  Authorized by: Jojo Álvarez CRNA     Intubation:     Induction:  Intravenous    Intubated:  Postinduction    Mask Ventilation:  N/a    Attempts:  1    Attempted By:  CRNA    Method of Intubation:  Video laryngoscopy    Blade:  Pretty 3    Laryngeal View Grade: Grade I - full view of cords      Difficult Airway Encountered?: No      Complications:  None    Airway Device:  Oral endotracheal tube    Airway Device Size:  7.5    Style/Cuff Inflation:  Cuffed (inflated to minimal occlusive pressure)    Tube secured:  23    Secured at:  The lips    Placement Verified By:  Capnometry    Complicating Factors:  Poor neck/head extension    Findings Post-Intubation:  BS equal bilateral and atraumatic/condition of teeth unchanged    
Never smoker

## 2022-02-25 NOTE — BRIEF OP NOTE
[facility] - Surgery ([facility])  Brief Operative Note    Surgery Date: 2/25/2022     Surgeon(s) and Role:     * Nadeem Grady MD - Primary     * Pallavi Jurado MD - Fellow    Assisting Surgeon: None    Pre-op Diagnosis:  Hematochezia [K92.1]  Anal pain [K62.89]    Post-op Diagnosis:  Post-Op Diagnosis Codes:     * Hematochezia [K92.1]     * Anal pain [K62.89]    Procedure(s) (LRB):  EXAM UNDER ANESTHESIA (N/A)    Anesthesia: General    Operative Findings: No evidence of fissure or hypertonic internal sphincter. Internal hemorrhoidal tissue without evidence of bleeding. Hypertrophied anal papilla in the posterior and left anterolateral positions excised.    Estimated Blood Loss: <5cc         Specimens:   Specimen (24h ago, onward)             Start     Ordered    02/25/22 0730  Specimen to Pathology, Surgery Other  Once        Comments: Pre-op Diagnosis: Hematochezia [K92.1]  Anal pain [K62.89]    Procedure(s):  EXAM UNDER ANESTHESIA     Number of specimens: 2    Name of specimens: 1. Posterior anal papilla- permanent  2. Left anterior lateral papilla- permanent   References:    Click here for ordering Quick Tip   Question Answer Comment   Procedure Type: Other    Specimen Class: Routine/Screening    Release to patient Immediate        02/25/22 0730                  Discharge Note    OUTCOME: Patient tolerated treatment/procedure well without complication and is now ready for discharge.    DISPOSITION: Home or Self Care    FINAL DIAGNOSIS:  Anal Pain    FOLLOWUP: In clinic    DISCHARGE INSTRUCTIONS:    Discharge Procedure Orders   Diet Adult Regular     Activity as tolerated

## 2022-02-25 NOTE — PATIENT INSTRUCTIONS
Anal Surgery Post Op Instructions:    You had an exam under anesthesia performed today. You had two enlarged anal papilla that were excised. There was no fissure or evidence of bleeding hemorrhoids.     Wound care:  Expect some drainage over the next few weeks as the wound heals.  Please wear a pad to help with drainage.     You have no activity restrictions.   No dietary restrictions.    Medications:  A narcotic pain medication has been prescribed.  Please start to wean the pain medication over the following few days.  You can take tylenol instead of the pain medication.      Please take miralax 1 capful at night to prevent constipation associated with narcotic pain medications.      Follow up:  Return to clinic in 4 weeks for follow up and wound check.

## 2022-02-25 NOTE — TRANSFER OF CARE
"Anesthesia Transfer of Care Note    Patient: Bri Santiago    Procedure(s) Performed: Procedure(s) (LRB):  EXAM UNDER ANESTHESIA, EXCISION ANAL PAPILLA (N/A)    Patient location: [facility]    Anesthesia Type: general    Transport from OR: Transported from OR on 6-10 L/min O2 by face mask with adequate spontaneous ventilation    Post pain: adequate analgesia    Post assessment: no apparent anesthetic complications and tolerated procedure well    Post vital signs: stable    Level of consciousness: responds to stimulation and sedated    Nausea/Vomiting: no nausea/vomiting    Complications: none    Transfer of care protocol was followed      Last vitals:   Visit Vitals  BP (!) 137/78   Pulse 103   Temp 36.9 °C (98.5 °F) (Oral)   Resp 16   Ht 5' 11" (1.803 m)   Wt 110.3 kg (243 lb 2.7 oz)   SpO2 (!) 96%   BMI 33.91 kg/m²     "

## 2022-02-25 NOTE — PLAN OF CARE
DCI reviewed with daughter and patient. Acknowledges U/O . Patient ambulated to BR tony well. Voided X's 1. tony PO DC meds given per pharmacy.

## 2022-02-26 NOTE — OP NOTE
DATE OF PROCEDURE: 2/25/2022     PREOPERATIVE DIAGNOSES: Anal pain and bleeding     POSTOPERATIVE DIAGNOSES:   1.  Hypertrophied anal papillae  2.  Mild internal hemorrhoidal disease  3.  Attenuated anal canal/margin mucosa/skin    PROCEDURES PERFORMED: Examination under anesthesia, excision hypertrophied anal papillae     SURGEON:  Nadeem Grady M.D.     ASSISTANT: Pallavi Jurado M.D. [RES]     ANESTHESIA:  General endotracheal     IV FLUIDS:  600 mL of crystalloid.     ESTIMATED BLOOD LOSS:  <5 mL.     DRAINS: None     SPECIMENS:   1.  Posterior midline hypertrophied anal papilla  2.  Left anterolateral hypertrophied anal papilla    COMPLICATIONS:  None.     OPERATIVE FINDINGS:   1.  Hypertrophied anal papillae in posterior midline and left anterolateral positions  2.  Very mild internal hemorrhoidal disease without stigmata of bleeding  3.  Attenuated/friable anal canal mucosa and anal margin skin     INDICATIONS:  The patient is a 61-year-old male with chronic anorectal pain and bleeding.  He has a remote history of prostate cancer treated with external beam radiation and also has chronic low back pain.  He has undergone prior elastic band ligation of internal hemorrhoids.  In December 2021 I performed a lateral internal anal sphincterotomy for an anal fissure, following which he initially did very well.  He presented to the office more recently with recurrent anal pain and bleeding.  He was unable to tolerate an examination in the office, so he is being brought to the Operating Room today for an examination under anesthesia.     DESCRIPTION OF PROCEDURE:  The patient was identified, brought to the Operating Room and placed on the stretcher in a supine position after obtaining informed consent.  Venous sequential stockings were placed.  After induction of general endotracheal anesthesia, he was repositioned on the operating table in a prone position using chest rolls and adequate padding of all pressure points.   The table was jackknifed and the buttocks were taped apart to efface the anal verge.  The perianal region was prepped and draped in the usual sterile fashion.      An anal block was performed with 0.25% bupivicaine. Initial inspection of the anoderm was unremarkable aside from the the skin being very attenuated.  No recurrent fissure was identified.  The anal canal was inspected with a bivalve speculum.  With spreading of the speculum, the skin at the level of the anal margin/verge tore superficially in several areas due to how thin and attenuated it was. Inspection of the anal canal revealed small, non-inflamed internal hemorrhoids without stigmata of recent bleeding. There were hypertrophied papillae present in the posterior midline and left anterolateral positions, which were excised with electrocautery and sent to pathology.  The anal sphincter did not appear hypertonic.  The anal canal mucosa also appeared quite attenuated and friable.  No other significant finding were identified to account for his symptoms.  The anal canal was irrigated and noted to be hemostatic.  A gelfoam roll was placed in the anal canal and sterile dressings were applied.     The patient tolerated the procedure well with no complications.  He was extubated in the Operating Room and taken to Recovery in satisfactory condition.  All needle, instrument and sponge counts were correct at the end of the case.  I was present throughout the entire procedure.    Nadeem Grady MD, FACS, FASCRS  Senior Staff Surgeon  Department of Colon & Rectal Surgery     This note was created using voice recognition software, and may contain some unrecognized transcriptional errors.

## 2022-02-28 ENCOUNTER — HOSPITAL ENCOUNTER (OUTPATIENT)
Dept: RADIOLOGY | Facility: HOSPITAL | Age: 62
Discharge: HOME | End: 2022-02-28
Attending: INTERNAL MEDICINE
Payer: MEDICARE

## 2022-02-28 DIAGNOSIS — R13.10 DYSPHAGIA, UNSPECIFIED TYPE: ICD-10-CM

## 2022-02-28 DIAGNOSIS — K21.9 GASTROESOPHAGEAL REFLUX DISEASE, UNSPECIFIED WHETHER ESOPHAGITIS PRESENT: ICD-10-CM

## 2022-02-28 PROCEDURE — A9698 NON-RAD CONTRAST MATERIALNOC: HCPCS | Performed by: INTERNAL MEDICINE

## 2022-02-28 PROCEDURE — 74220 FL ESOPHAGRAM WITH BARIUM TABLET: ICD-10-PCS | Mod: 26,,, | Performed by: RADIOLOGY

## 2022-02-28 PROCEDURE — 74220 X-RAY XM ESOPHAGUS 1CNTRST: CPT | Mod: TC

## 2022-02-28 PROCEDURE — 25500020 PHARM REV CODE 255: Performed by: INTERNAL MEDICINE

## 2022-02-28 PROCEDURE — 74220 X-RAY XM ESOPHAGUS 1CNTRST: CPT | Mod: 26,,, | Performed by: RADIOLOGY

## 2022-02-28 RX ADMIN — BARIUM SULFATE 200 ML: 0.81 POWDER, FOR SUSPENSION ORAL at 09:02

## 2022-02-28 RX ADMIN — BARIUM SULFATE 295 ML: 0.6 SUSPENSION ORAL at 09:02

## 2022-02-28 NOTE — ANESTHESIA POSTPROCEDURE EVALUATION
Anesthesia Post Evaluation    Patient: Bri Santiago    Procedure(s) Performed: Procedure(s) (LRB):  EXAM UNDER ANESTHESIA, EXCISION ANAL PAPILLA (N/A)    Final Anesthesia Type: general      Patient location during evaluation: PACU  Patient participation: Yes- Able to Participate  Level of consciousness: awake and alert  Post-procedure vital signs: reviewed and stable  Pain management: adequate  Airway patency: patent    PONV status at discharge: No PONV  Anesthetic complications: no      Cardiovascular status: blood pressure returned to baseline  Respiratory status: unassisted  Hydration status: euvolemic  Follow-up not needed.          Vitals Value Taken Time   /76 02/25/22 0832   Temp 36.3 °C (97.3 °F) 02/25/22 0830   Pulse 90 02/25/22 0836   Resp 19 02/25/22 0835   SpO2 92 % 02/25/22 0836   Vitals shown include unvalidated device data.      No case tracking events are documented in the log.      Pain/Tammy Score: No data recorded

## 2022-03-01 NOTE — PROGRESS NOTES
Bri your esophagram looks okay normal esophagram please let me know if your having trouble swallowing and would like me to schedule your for an upper endoscopy EGD let me know and I will be happy to do that.      Impression:     Normal esophagram.  Mild tertiary contractions noted during the examination without significant esophageal dysmotility.     Electronically signed by: Stefano Cutler MD  Date:                                            02/28/2022

## 2022-03-02 ENCOUNTER — HOSPITAL ENCOUNTER (OUTPATIENT)
Dept: RADIOLOGY | Facility: HOSPITAL | Age: 62
Discharge: HOME | End: 2022-03-02
Attending: INTERNAL MEDICINE
Payer: MEDICARE

## 2022-03-02 PROCEDURE — A9541 TC99M SULFUR COLLOID: HCPCS

## 2022-03-02 PROCEDURE — 78264 NM GASTRIC EMPTYING: ICD-10-PCS | Mod: 26,,, | Performed by: RADIOLOGY

## 2022-03-02 PROCEDURE — 78264 GASTRIC EMPTYING IMG STUDY: CPT | Mod: 26,,, | Performed by: RADIOLOGY

## 2022-03-08 LAB
FINAL PATHOLOGIC DIAGNOSIS: NORMAL
Lab: NORMAL

## 2022-03-09 NOTE — PROGRESS NOTES
Omid not sure what to make of this    Impression:     Likely normal gastric emptying time on this study compromised by loss of actual acquired imaging data from the initial 60 minutes of imaging and estimates  The study may be repeated at no cost to the patient if greater confidence in the results is desired..     This exam was performed with oatmeal as an alternative to the standard solid gastric emptying meal with egg whites due to patient's refusal to eat eggs.     I, Salvador Patel MD, attest that I reviewed and interpreted the images.     Electronically signed by resident: Nirmala Barber  Date:                                            03/02/2022  Time:                                           10:15     Electronically signed by: Salvador Patel  Date:                                            03/02/2022

## 2022-03-14 ENCOUNTER — OFFICE VISIT (OUTPATIENT)
Dept: SURGERY | Facility: CLINIC | Age: 62
End: 2022-03-14
Payer: MEDICARE

## 2022-03-14 VITALS
HEART RATE: 80 BPM | DIASTOLIC BLOOD PRESSURE: 75 MMHG | WEIGHT: 250.88 LBS | HEIGHT: 71 IN | SYSTOLIC BLOOD PRESSURE: 135 MMHG | BODY MASS INDEX: 35.12 KG/M2

## 2022-03-14 DIAGNOSIS — K62.89 HYPERTROPHIED ANAL PAPILLA: Primary | ICD-10-CM

## 2022-03-14 DIAGNOSIS — K62.89 ANAL PAIN: ICD-10-CM

## 2022-03-14 PROCEDURE — 3078F PR MOST RECENT DIASTOLIC BLOOD PRESSURE < 80 MM HG: ICD-10-PCS | Mod: CPTII,S$GLB,, | Performed by: COLON & RECTAL SURGERY

## 2022-03-14 PROCEDURE — 99024 PR POST-OP FOLLOW-UP VISIT: ICD-10-PCS | Mod: S$GLB,,, | Performed by: COLON & RECTAL SURGERY

## 2022-03-14 PROCEDURE — 1159F MED LIST DOCD IN RCRD: CPT | Mod: CPTII,S$GLB,, | Performed by: COLON & RECTAL SURGERY

## 2022-03-14 PROCEDURE — 3008F PR BODY MASS INDEX (BMI) DOCUMENTED: ICD-10-PCS | Mod: CPTII,S$GLB,, | Performed by: COLON & RECTAL SURGERY

## 2022-03-14 PROCEDURE — 1159F PR MEDICATION LIST DOCUMENTED IN MEDICAL RECORD: ICD-10-PCS | Mod: CPTII,S$GLB,, | Performed by: COLON & RECTAL SURGERY

## 2022-03-14 PROCEDURE — 1160F RVW MEDS BY RX/DR IN RCRD: CPT | Mod: CPTII,S$GLB,, | Performed by: COLON & RECTAL SURGERY

## 2022-03-14 PROCEDURE — 99999 PR PBB SHADOW E&M-EST. PATIENT-LVL IV: CPT | Mod: PBBFAC,,, | Performed by: COLON & RECTAL SURGERY

## 2022-03-14 PROCEDURE — 3075F SYST BP GE 130 - 139MM HG: CPT | Mod: CPTII,S$GLB,, | Performed by: COLON & RECTAL SURGERY

## 2022-03-14 PROCEDURE — 3078F DIAST BP <80 MM HG: CPT | Mod: CPTII,S$GLB,, | Performed by: COLON & RECTAL SURGERY

## 2022-03-14 PROCEDURE — 1160F PR REVIEW ALL MEDS BY PRESCRIBER/CLIN PHARMACIST DOCUMENTED: ICD-10-PCS | Mod: CPTII,S$GLB,, | Performed by: COLON & RECTAL SURGERY

## 2022-03-14 PROCEDURE — 99999 PR PBB SHADOW E&M-EST. PATIENT-LVL IV: ICD-10-PCS | Mod: PBBFAC,,, | Performed by: COLON & RECTAL SURGERY

## 2022-03-14 PROCEDURE — 3075F PR MOST RECENT SYSTOLIC BLOOD PRESS GE 130-139MM HG: ICD-10-PCS | Mod: CPTII,S$GLB,, | Performed by: COLON & RECTAL SURGERY

## 2022-03-14 PROCEDURE — 99024 POSTOP FOLLOW-UP VISIT: CPT | Mod: S$GLB,,, | Performed by: COLON & RECTAL SURGERY

## 2022-03-14 PROCEDURE — 3008F BODY MASS INDEX DOCD: CPT | Mod: CPTII,S$GLB,, | Performed by: COLON & RECTAL SURGERY

## 2022-03-14 NOTE — PROGRESS NOTES
"CRS Post-operative visit    Visit Info:     Procedure:  Examination under anesthesia, excision hypertrophied anal papillae    Date of Procedure: February 25, 2022    Indication: 61-year-old male with chronic anorectal pain and bleeding.  He has a remote history of prostate cancer treated with external beam radiation and also has chronic low back pain.  He has undergone prior elastic band ligation of internal hemorrhoids.  In December 2021 I performed a lateral internal anal sphincterotomy for an anal fissure, following which he initially did very well.  He presented to the office more recently with recurrent anal pain and bleeding.  He was unable to tolerate an examination in the office, so he was brought to the Operating Room for an examination under anesthesia.    Operative Findings:   1.  Hypertrophied anal papillae in posterior midline and left anterolateral positions  2.  Very mild internal hemorrhoidal disease without stigmata of bleeding  3.  Attenuated/friable anal canal mucosa and anal margin skin    Current Status:  Doing very well postop.  No further anal pain or bleeding with bowel movements.    Pathology:    A.  SPECIMEN DESIGNATED "POSTERIOR ANAL PAPILLA":   Hypertrophied anal papilla.   Negative for dysplasia/carcinoma.     B.  SPECIMEN DESIGNATED "LEFT ANTERIOR LATERAL PAPILLA":   Hypertrophied anal papilla.   Negative for dysplasia/carcinoma.    Physical Exam:  General: Black or  male in NAD   Neuro: aaox4   Respiratory: resps even unlabored  Extremities: Warm dry and intact    Assessment:  1. Hypertrophied anal papilla     2. Anal pain       S/p excision of hypertrophied anal papillae  Doing very well postop - symptoms completely resolved    Plan:  High-fiber diet.  Avoid straining/constipation.  RTO prn      Nadeem Grady MD, FACS, FASCRS  Senior Staff Surgeon  Department of Colon & Rectal Surgery     This note was created using voice recognition software, and may contain some " unrecognized transcriptional errors.

## 2022-03-29 ENCOUNTER — TELEPHONE (OUTPATIENT)
Dept: SURGERY | Facility: CLINIC | Age: 62
End: 2022-03-29
Payer: MEDICARE

## 2022-03-29 NOTE — TELEPHONE ENCOUNTER
----- Message from Norma Pop sent at 3/29/2022  9:43 AM CDT -----  Regarding: Plan of care  Contact: [phone]  Calling to speak with nurse about increased rectal bleeding. Please call and advise

## 2022-03-29 NOTE — TELEPHONE ENCOUNTER
"Spoke with patient. Patients states that for the last 3 bowel movements he has seen blood, "about the size of a small pancake" and blood is "fresh." This only happens with his bowel movements. Patient states that this is new. Appt made with Mona Manning NP to have removal site evaluated.   "

## 2022-03-30 ENCOUNTER — OFFICE VISIT (OUTPATIENT)
Dept: SURGERY | Facility: CLINIC | Age: 62
End: 2022-03-30
Payer: MEDICARE

## 2022-03-30 VITALS
BODY MASS INDEX: 34.41 KG/M2 | WEIGHT: 245.81 LBS | HEART RATE: 99 BPM | DIASTOLIC BLOOD PRESSURE: 104 MMHG | SYSTOLIC BLOOD PRESSURE: 150 MMHG | HEIGHT: 71 IN

## 2022-03-30 DIAGNOSIS — K62.5 RECTAL BLEEDING: Primary | ICD-10-CM

## 2022-03-30 PROCEDURE — 1159F MED LIST DOCD IN RCRD: CPT | Mod: CPTII,S$GLB,, | Performed by: NURSE PRACTITIONER

## 2022-03-30 PROCEDURE — 3008F BODY MASS INDEX DOCD: CPT | Mod: CPTII,S$GLB,, | Performed by: NURSE PRACTITIONER

## 2022-03-30 PROCEDURE — 1160F PR REVIEW ALL MEDS BY PRESCRIBER/CLIN PHARMACIST DOCUMENTED: ICD-10-PCS | Mod: CPTII,S$GLB,, | Performed by: NURSE PRACTITIONER

## 2022-03-30 PROCEDURE — 99999 PR PBB SHADOW E&M-EST. PATIENT-LVL V: CPT | Mod: PBBFAC,,, | Performed by: NURSE PRACTITIONER

## 2022-03-30 PROCEDURE — 3077F SYST BP >= 140 MM HG: CPT | Mod: CPTII,S$GLB,, | Performed by: NURSE PRACTITIONER

## 2022-03-30 PROCEDURE — 1160F RVW MEDS BY RX/DR IN RCRD: CPT | Mod: CPTII,S$GLB,, | Performed by: NURSE PRACTITIONER

## 2022-03-30 PROCEDURE — 99212 OFFICE O/P EST SF 10 MIN: CPT | Mod: S$GLB,,, | Performed by: NURSE PRACTITIONER

## 2022-03-30 PROCEDURE — 99212 PR OFFICE/OUTPT VISIT, EST, LEVL II, 10-19 MIN: ICD-10-PCS | Mod: S$GLB,,, | Performed by: NURSE PRACTITIONER

## 2022-03-30 PROCEDURE — 3080F DIAST BP >= 90 MM HG: CPT | Mod: CPTII,S$GLB,, | Performed by: NURSE PRACTITIONER

## 2022-03-30 PROCEDURE — 3008F PR BODY MASS INDEX (BMI) DOCUMENTED: ICD-10-PCS | Mod: CPTII,S$GLB,, | Performed by: NURSE PRACTITIONER

## 2022-03-30 PROCEDURE — 3077F PR MOST RECENT SYSTOLIC BLOOD PRESSURE >= 140 MM HG: ICD-10-PCS | Mod: CPTII,S$GLB,, | Performed by: NURSE PRACTITIONER

## 2022-03-30 PROCEDURE — 99999 PR PBB SHADOW E&M-EST. PATIENT-LVL V: ICD-10-PCS | Mod: PBBFAC,,, | Performed by: NURSE PRACTITIONER

## 2022-03-30 PROCEDURE — 1159F PR MEDICATION LIST DOCUMENTED IN MEDICAL RECORD: ICD-10-PCS | Mod: CPTII,S$GLB,, | Performed by: NURSE PRACTITIONER

## 2022-03-30 PROCEDURE — 3080F PR MOST RECENT DIASTOLIC BLOOD PRESSURE >= 90 MM HG: ICD-10-PCS | Mod: CPTII,S$GLB,, | Performed by: NURSE PRACTITIONER

## 2022-03-30 RX ORDER — HYDROCORTISONE 25 MG/G
CREAM TOPICAL 2 TIMES DAILY
Qty: 28 G | Refills: 2 | Status: SHIPPED | OUTPATIENT
Start: 2022-03-30 | End: 2023-04-27

## 2022-03-30 NOTE — PATIENT INSTRUCTIONS
Fiber daily. Metamucil  Miralax as needed if you have not had a bowel movement in 2-3 days  Anusol cream 2x/day for 2 weeks

## 2022-03-30 NOTE — PROGRESS NOTES
"CRS Office Visit History and Physical    Referring Md:   No referring provider defined for this encounter.    SUBJECTIVE:     Chief Complaint: rectal bleeding    History of Present Illness:  The patient is known patient to this practice.   Course is as follows:  Patient is a 61 y.o. male presents with rectal bleeding.  Symptoms have been present for 3 days. None noted in past 3 days. Does not feel related to papilla removal.  Occurs with and without bowel movements. Denies pain.     Previous anorectal procedures: yes  denies straining/prolonged time on toilet with bowel movements.  is currently taking fiber supplement and miralax. Having pasty/mushy bowel movements. On po iron  Blood thinners: Yes, plavix    Last Colonoscopy: Last Colonoscopy completed on 3/4/2020  - diverticula  - mild internal hemorrhoids  - repeat in 5 years  Family history of colorectal cancer or IBD: yes, crc.    Review of patient's allergies indicates:   Allergen Reactions    Penicillins Hives and Swelling     Has had allergy testing and can prob tolerate penicillin    Ace inhibitors Other (See Comments)     "Caused a respiratory infection"    Aspirin Hives           Codeine Itching     Ok to take percocet    Dilaudid [hydromorphone] Itching       Past Medical History:   Diagnosis Date    Acute pancreatitis     Anal fissure     Anemia     Anticoagulant long-term use     Arthritis     Asthma in remission     Back pain     BPH (benign prostatic hypertrophy)     Cancer 2000    prostate- treated at [facility] with chemo- in remission since 2000    Chronic maxillary sinusitis     Clotting disorder     Diastolic dysfunction with chronic heart failure 12/3/2018    Dysphagia 10/7/2014    Family history of colon cancer     Family history of early CAD     GERD (gastroesophageal reflux disease)     Helicobacter pylori (H. pylori) infection     Chronic    History of chronic pancreatitis     HTN (hypertension)     Lumbago 11/12/2012 "    Obesity     ABDON (obstructive sleep apnea)     ABDON (obstructive sleep apnea)     With likely ohs Refer to sleep    Prostate cancer 2000    dx and treated at [facility], had chemotherapy, in remission [id number]    Sacroiliac joint pain 2/10/2015    Spinal stenosis of lumbar region     Von Willebrand disease     VWD (acquired von Willebrand's disease)      Past Surgical History:   Procedure Laterality Date    anal fissure repair      x2    BACK SURGERY  2012    BALLOON SINUPLASTY OF PARANASAL SINUS Bilateral 10/7/2019    Procedure: SINUPLASTY, USING BALLOON;  Surgeon: LAURENT Swanson MD;  Location: [facility] OR;  Service: ENT;  Laterality: Bilateral;    CARPAL TUNNEL RELEASE  2003    left hand    CATHETERIZATION OF BOTH LEFT AND RIGHT HEART N/A 2/14/2019    Procedure: CATHETERIZATION, HEART, BOTH LEFT AND RIGHT;  Surgeon: Kyle Magana MD;  Location: [facility] CATH LAB;  Service: Cardiology;  Laterality: N/A;    CERVICAL DISCECTOMY  2003    COLONOSCOPY N/A 10/7/2015    Procedure: COLONOSCOPY;  Surgeon: Rosendo Boyer MD;  Location: [facility] ([address] [place] [address]);  Service: Endoscopy;  Laterality: N/A;  PM Prep    COLONOSCOPY N/A 6/19/2017    Procedure: COLONOSCOPY;  Surgeon: Rosendo Boyer MD;  [facility]  Service: Endoscopy;  Laterality: N/A;  constipation prep (no DM no CHF)       hx of vonWillebrand's disease-will need infusion prior    COLONOSCOPY N/A 3/4/2020    Procedure: COLONOSCOPY;  Surgeon: Rosendo Boyer MD;  Location: [facility] ([address] [place] [address]);  Service: Endoscopy;  Laterality: N/A;  Please order CBC, ferritin, Iron TIBC day of case per Dr. Boyer  labwork at 7:10am and patient will check in right after.  per Dr. Tyler patient can hold Plavix 5 days prior see note 1/7/20  DDAVP prior to procedure needed see note 1/16/20 for oncall med by Dr. Tyler -     ESOPHAGOGASTRODUODENOSCOPY N/A 3/4/2020    Procedure: EGD (ESOPHAGOGASTRODUODENOSCOPY);  Surgeon: Rosendo Boyer MD;   Location: [facility] ENDO (4TH FLR);  Service: Endoscopy;  Laterality: N/A;  EGD and Colonoscopy orders merged together  labwork at 7:10am and patient will check in right after.  DDAVP prior to procedure needed see note 1/16/20 for oncall med  per Dr. Tyler patient can hold Plavix 5 days prior see note 1/7/20    ESOPHAGOGASTRODUODENOSCOPY N/A 11/3/2020    Procedure: EGD (ESOPHAGOGASTRODUODENOSCOPY);  Surgeon: Rosendo Boyer MD;  Location: [facility] ENDO (2ND FLR);  Service: Endoscopy;  Laterality: N/A;  Please recall pt has von Willebrands and will require DDVAP infusion prior to procedure as previously done.    see telephone encounter on 10/1/20 regarding DDAVP   ok to hold Plavix 5 days prior per Dr.Blessey BUCKNER screening on 10/31/20 -rb    EXAMINATION UNDER ANESTHESIA N/A 3/15/2021    Procedure: EXAM UNDER ANESTHESIA;  Surgeon: Silvia Cazares MD;  Location: [facility] 2ND FLR;  Service: Colon and Rectal;  Laterality: N/A;    EXAMINATION UNDER ANESTHESIA N/A 2/25/2022    Procedure: EXAM UNDER ANESTHESIA, EXCISION ANAL PAPILLA;  Surgeon: Nadeem Grady MD;  Location: [facility] 2ND FLR;  Service: Colon and Rectal;  Laterality: N/A;    FUNCTIONAL ENDOSCOPIC SINUS SURGERY (FESS) USING COMPUTER-ASSISTED NAVIGATION Bilateral 10/7/2019    Procedure: FESS, USING COMPUTER-ASSISTED NAVIGATION;  Surgeon: LAURENT Swanson MD;  Location: [facility] OR;  Service: ENT;  Laterality: Bilateral;    INJECTION OF ANESTHETIC AGENT AROUND NERVE Bilateral 10/13/2020    Procedure: BLOCK, NERVE BILATERAL C4,C5,C6 Plavix clearance requested;  Surgeon: Fredy Magana MD;  Location: [facility] PAIN MGT;  Service: Pain Management;  Laterality: Bilateral;  BLOCK, NERVE BILATERAL C4,C5,C6  Plavix clearance ok, will require DDVAP infusion    LATERAL INTERNAL ANAL SPHINCTEROTOMY N/A 12/10/2021    Procedure: SPHINCTEROTOMY-LATERAL INTERNAL ANAL;  Surgeon: Nadeem Grady MD;  Location: [facility] 2ND FLR;  Service: Colon and Rectal;  Laterality: N/A;    LEFT  HEART CATHETERIZATION Left 2/14/2019    Procedure: Left heart cath;  Surgeon: Kyle Magana MD;  Location: [facility] CATH LAB;  Service: Cardiology;  Laterality: Left;    LUMBAR FUSION  2012    RADIOFREQUENCY ABLATION Right 5/9/2019    Procedure: RADIOFREQUENCY ABLATION, RIGHT L3,L4,L5;  Surgeon: Fredy Magana MD;  Location: [facility] PAIN MGT;  Service: Pain Management;  Laterality: Right;  1 of 2  RT RFA L3,4,5  PLAVIX clearance received, pt needs DDAVP    RADIOFREQUENCY ABLATION Left 5/23/2019    Procedure: RADIOFREQUENCY ABLATION, LEFT L3,L4,L5;  Surgeon: Fredy Magana MD;  Location: [facility] PAIN MGT;  Service: Pain Management;  Laterality: Left;  2 of 2  LT RFA L3,4,5  PLAVIX clearance received, pt needs DDAVP    RADIOFREQUENCY ABLATION Left 1/16/2020    Procedure: RADIOFREQUENCY ABLATION LEFT L3, L4, L5 MEDIAL BRANCH 1 OF 2 **PATIENT ARRIVING AT 8 AM**;  Surgeon: Fredy Magana MD;  Location: [facility] PAIN MGT;  Service: Pain Management;  Laterality: Left;  NEEDS CONSENT, PLAVIX CLEARANCE IN CHART    RADIOFREQUENCY ABLATION Right 1/28/2020    Procedure: RADIOFREQUENCY ABLATION, RIGHT L3-L4-L5 MEDIAL BRANCH 2 OF 2 (Left done on 1/16/20);  Surgeon: Fredy Magana MD;  Location: [facility] PAIN MGT;  Service: Pain Management;  Laterality: Right;    RADIOFREQUENCY ABLATION Left 8/18/2020    Procedure: RADIOFREQUENCY ABLATION, L3-L4-L5 MEDIAL BRANCH 1 OF 2 clear to hold plavix 7 days;  Surgeon: Fredy Magana MD;  Location: [facility] PAIN MGT;  Service: Pain Management;  Laterality: Left;    RADIOFREQUENCY ABLATION Right 9/1/2020    Procedure: RADIOFREQUENCY ABLATION, L3-L4-L5 MEDIAL BR ANCH 2 OF 2 clear to hol,d Plavix 7 days;  Surgeon: Fredy Magana MD;  Location: [facility] PAIN MGT;  Service: Pain Management;  Laterality: Right;    RADIOFREQUENCY ABLATION Bilateral 12/21/2021    Procedure: RADIOFREQUENCY ABLATION B/L L3,4,5 RFA (give dDAVP prior) NEEDS CONSENT plavix ok x7;  Surgeon: Fredy Magana  "MD;  Location: [facility] PAIN MGT;  Service: Pain Management;  Laterality: Bilateral;    RADIOFREQUENCY ABLATION OF LUMBAR MEDIAL BRANCH NERVE AT SINGLE LEVEL Left 5/24/2018    Procedure: RADIOFREQUENCY THERMOCOAGULATION (RFTC)-NERVE-MEDIAN BRANCH-LUMBAR;  Surgeon: Fredy Magana MD;  Location: [facility] PAIN T;  Service: Pain Management;  Laterality: Left;  Left RFA @ L3,4,5  [id number]  with IV Sedation    2 of 2    SPINE SURGERY      TONSILLECTOMY      at age 24    VASECTOMY  1996     Family History   Problem Relation Age of Onset    Colon cancer Father 67        colon cancer    Hypertension Father     Glaucoma Father     Cancer Father     Colon cancer Paternal Grandfather 65             Coronary artery disease Mother 45    Hypertension Mother     Heart disease Mother     Colon cancer Mother     Diabetes Mother     No Known Problems Brother     No Known Problems Sister     No Known Problems Daughter     No Known Problems Son     Coronary artery disease Brother 51    No Known Problems Daughter     No Known Problems Daughter     No Known Problems Son     No Known Problems Son     Colon cancer Paternal Uncle 65    Diabetes Mellitus Paternal Grandmother     Esophageal cancer Neg Hx      Social History     Tobacco Use    Smoking status: Never Smoker    Smokeless tobacco: Never Used   Substance Use Topics    Alcohol use: Yes     Alcohol/week: 1.0 standard drink     Types: 1 Glasses of wine per week     Comment: social    Drug use: No        Review of Systems:  Review of Systems   Gastrointestinal: Positive for blood in stool, constipation and diarrhea.       OBJECTIVE:     Vital Signs (Most Recent)  Blood Pressure (Abnormal) 150/104 (BP Location: Right arm, Patient Position: Sitting, BP Method: Large (Automatic))   Pulse 99   Height 5' 11" (1.803 m)   Weight 111.5 kg (245 lb 13 oz)   Body Mass Index 34.28 kg/m²     Physical Exam:  General: Black or  male in no distress "   Neuro: Alert and oriented to person, place, and time.  Moves all extremities.     HEENT: No icterus.  Trachea midline  Respiratory: Respirations are even and unlabored, no cough or audible wheezing  Skin: Warm dry and intact, No visible rashes, no jaundice    Labs reviewed today:  Lab Results   Component Value Date    WBC 7.32 02/14/2022    HGB 14.2 02/14/2022    HCT 44.7 02/14/2022     02/14/2022    CHOL 128 07/08/2021    TRIG 104 07/08/2021    HDL 35 (L) 07/08/2021    ALT 25 12/06/2021    AST 23 12/06/2021     02/14/2022    K 3.7 02/14/2022     02/14/2022    CREATININE 1.1 02/14/2022    BUN 14 02/14/2022    CO2 30 (H) 02/14/2022    TSH 0.995 05/03/2021    PSA 0.93 01/22/2021    INR 1.0 04/04/2021    HGBA1C 4.5 01/23/2020       Imaging reviewed today:  4/4/21 ct abdomen pelvis  - no abdominal adenopathy    Endoscopy reviewed today:  Last Colonoscopy: Last Colonoscopy completed on 3/4/2020  - diverticula  - mild internal hemorrhoids  - repeat in 5 years    Anorectal Exam:    Anal Skin: Normal    Digital Rectal Exam:  Resting Tone normal  Squeeze normal  Relaxation with bear down present  Mass none  Rectocele  present  Tenderness  present    Anoscopy:  Verbal consent was obtained.   Clear plastic anoscope inserted.    Hemorrhoids  present  Stigmata of bleeding  absent  Stigmata of prolapsed  absent  Distal rectal mucosa normal      ASSESSMENT/PLAN:     Diagnoses and all orders for this visit:    Rectal bleeding  -     hydrocortisone (ANUSOL-HC) 2.5 % rectal cream; Place rectally 2 (two) times daily.        The patient was instructed to:  anusol cream 2x/day for 2 weeks.  Daily fiber supplement  miralax as needed.  F/u in 4 weeks. If bleeding continues will consider colonoscopy at that time.       JENI Roca-C  Colon and Rectal Surgery

## 2022-04-05 ENCOUNTER — PES CALL (OUTPATIENT)
Dept: ADMINISTRATIVE | Facility: CLINIC | Age: 62
End: 2022-04-05
Payer: MEDICARE

## 2022-04-08 ENCOUNTER — LAB VISIT (OUTPATIENT)
Dept: LAB | Facility: HOSPITAL | Age: 62
End: 2022-04-08
Attending: INTERNAL MEDICINE
Payer: MEDICARE

## 2022-04-08 DIAGNOSIS — E61.1 IRON DEFICIENCY: ICD-10-CM

## 2022-04-08 LAB
FERRITIN SERPL-MCNC: 107 NG/ML (ref 20–300)
HGB BLD-MCNC: 14.1 G/DL (ref 14–18)
IRON SERPL-MCNC: 48 UG/DL (ref 45–160)
SATURATED IRON: 16 % (ref 20–50)
TOTAL IRON BINDING CAPACITY: 295 UG/DL (ref 250–450)
TRANSFERRIN SERPL-MCNC: 199 MG/DL (ref 200–375)

## 2022-04-08 PROCEDURE — 84466 ASSAY OF TRANSFERRIN: CPT | Performed by: INTERNAL MEDICINE

## 2022-04-08 PROCEDURE — 85018 HEMOGLOBIN: CPT | Performed by: INTERNAL MEDICINE

## 2022-04-08 PROCEDURE — 36415 COLL VENOUS BLD VENIPUNCTURE: CPT | Performed by: INTERNAL MEDICINE

## 2022-04-08 PROCEDURE — 82728 ASSAY OF FERRITIN: CPT | Performed by: INTERNAL MEDICINE

## 2022-04-10 ENCOUNTER — PATIENT OUTREACH (OUTPATIENT)
Dept: ADMINISTRATIVE | Facility: OTHER | Age: 62
End: 2022-04-10
Payer: MEDICARE

## 2022-04-10 DIAGNOSIS — E61.1 IRON DEFICIENCY: ICD-10-CM

## 2022-04-10 RX ORDER — FERROUS GLUCONATE 324(38)MG
324 TABLET ORAL
Qty: 90 TABLET | Refills: 1 | Status: SHIPPED | OUTPATIENT
Start: 2022-04-10 | End: 2022-10-07

## 2022-04-10 NOTE — PROGRESS NOTES
Farooq - please tell patient that they are iron deficient but no longer anemic and recommend that they take ferrous gluconate one 324mg pill once daily for next 3 months.    Please order repeat fasting Hemoglobin, Iron/TIBC, and Ferritin in 12 weeks - Orders placed.

## 2022-04-11 ENCOUNTER — OFFICE VISIT (OUTPATIENT)
Dept: CARDIOLOGY | Facility: CLINIC | Age: 62
End: 2022-04-11
Payer: MEDICARE

## 2022-04-11 VITALS
HEART RATE: 88 BPM | BODY MASS INDEX: 34.41 KG/M2 | HEIGHT: 71 IN | SYSTOLIC BLOOD PRESSURE: 124 MMHG | WEIGHT: 245.81 LBS | DIASTOLIC BLOOD PRESSURE: 84 MMHG

## 2022-04-11 DIAGNOSIS — I25.10 NON-OCCLUSIVE CORONARY ARTERY DISEASE REQUIRING DRUG THERAPY: ICD-10-CM

## 2022-04-11 DIAGNOSIS — D68.00 VON WILLEBRAND DISEASE: ICD-10-CM

## 2022-04-11 DIAGNOSIS — I10 PRIMARY HYPERTENSION: Primary | ICD-10-CM

## 2022-04-11 DIAGNOSIS — I70.0 THORACIC AORTA ATHEROSCLEROSIS: ICD-10-CM

## 2022-04-11 PROCEDURE — 3008F PR BODY MASS INDEX (BMI) DOCUMENTED: ICD-10-PCS | Mod: CPTII,S$GLB,, | Performed by: INTERNAL MEDICINE

## 2022-04-11 PROCEDURE — 99499 RISK ADDL DX/OHS AUDIT: ICD-10-PCS | Mod: S$GLB,,, | Performed by: INTERNAL MEDICINE

## 2022-04-11 PROCEDURE — 99213 PR OFFICE/OUTPT VISIT, EST, LEVL III, 20-29 MIN: ICD-10-PCS | Mod: S$GLB,,, | Performed by: INTERNAL MEDICINE

## 2022-04-11 PROCEDURE — 99213 OFFICE O/P EST LOW 20 MIN: CPT | Mod: S$GLB,,, | Performed by: INTERNAL MEDICINE

## 2022-04-11 PROCEDURE — 3074F SYST BP LT 130 MM HG: CPT | Mod: CPTII,S$GLB,, | Performed by: INTERNAL MEDICINE

## 2022-04-11 PROCEDURE — 3079F DIAST BP 80-89 MM HG: CPT | Mod: CPTII,S$GLB,, | Performed by: INTERNAL MEDICINE

## 2022-04-11 PROCEDURE — 3079F PR MOST RECENT DIASTOLIC BLOOD PRESSURE 80-89 MM HG: ICD-10-PCS | Mod: CPTII,S$GLB,, | Performed by: INTERNAL MEDICINE

## 2022-04-11 PROCEDURE — 1159F PR MEDICATION LIST DOCUMENTED IN MEDICAL RECORD: ICD-10-PCS | Mod: CPTII,S$GLB,, | Performed by: INTERNAL MEDICINE

## 2022-04-11 PROCEDURE — 99499 UNLISTED E&M SERVICE: CPT | Mod: S$GLB,,, | Performed by: INTERNAL MEDICINE

## 2022-04-11 PROCEDURE — 3074F PR MOST RECENT SYSTOLIC BLOOD PRESSURE < 130 MM HG: ICD-10-PCS | Mod: CPTII,S$GLB,, | Performed by: INTERNAL MEDICINE

## 2022-04-11 PROCEDURE — 99999 PR PBB SHADOW E&M-EST. PATIENT-LVL IV: CPT | Mod: PBBFAC,,, | Performed by: INTERNAL MEDICINE

## 2022-04-11 PROCEDURE — 3008F BODY MASS INDEX DOCD: CPT | Mod: CPTII,S$GLB,, | Performed by: INTERNAL MEDICINE

## 2022-04-11 PROCEDURE — 1159F MED LIST DOCD IN RCRD: CPT | Mod: CPTII,S$GLB,, | Performed by: INTERNAL MEDICINE

## 2022-04-11 PROCEDURE — 99999 PR PBB SHADOW E&M-EST. PATIENT-LVL IV: ICD-10-PCS | Mod: PBBFAC,,, | Performed by: INTERNAL MEDICINE

## 2022-04-11 NOTE — PROGRESS NOTES
Subjective:   Chief Complaint: Hypertension  Last Clinic Visit: 1/12/2021     History of Present Illness: Bri Santiago is a 61 y.o. gentleman with morbid obesity, hyperlipidemia, hypertension, obstructive sleep apnea on CPAP, BPH, possible diastolic dysfunction, ED, who presents for follow up  Interval History:  Still has intermittent left-sided rest chest pain without change over the course the past year.  A sharp stabbing left-sided pain lasting for few minutes and resolving spontaneously, but has not worsened over the past year.  Denies any exertional symptoms, has been exercising on a regular basis with exercise equipment that he bought in his house, and endorses weight loss over the course the past year.  Denies any cramping, no lightheadedness, no orthopnea, no weight gain, trace lower extremity edema without change.  Checking blood pressure regularly at home reports well controlled most recently 117/72.      1/12/2021  When we saw him last he was having some palpitations, we obtained holter monitor and was found to have sinus palpitations.  Recently he describes a pain on the left side of his chest where his heart is of a fun pain type sensation.  Possibly likely his heart is fluttering, last for 2-3 minutes, resolving spontaneously, at rest, made different with a deep breath. ALT in 70s from labs in December, this appears to be new.  BP today well controlled 126/76.  Most recent LDL in the 60s.    10/14/2020  He reports that he has had progressive dyspnea on exertion over the past several weeks, difficulty lying down, increasingly short-winded.  Symptoms have acutely worsened over the last several weeks, but has been present for the last 1-2 years.  He has difficulty sleeping, wakes up throughout the night.  He was started on Lasix within the past year for lower extremity edema, which has helped some but continues to retain fluid in his legs.  Has also noticed increasing weight gain, and abdominal  protuberance.  He had atypical chest pain last year, had left heart catheterization which showed nonobstructive disease elevated LVEDP.  PET stress test year before this negative for ischemia.  He has had CPAP titrated within the past year.    Dx:  Morbid obesity  Hyperlipidemia  Hypertension  Von Willebrand's disease -on Plavix  Obstructive sleep apnea  BPH  Possible diastolic dysfunction  ED  Past medical and surgical history reviewed as documented.      Medications:  Outpatient Encounter Medications as of 4/11/2022   Medication Sig Dispense Refill    albuterol (PROVENTIL/VENTOLIN HFA) 90 mcg/actuation inhaler INHALE 2 PUFFS INTO THE LUNGS EVERY 6 HOURS AS NEEDED FOR WHEEZING OR SHORTNESS OF BREATH 18 g 4    atorvastatin (LIPITOR) 40 MG tablet Take 1 tablet (40 mg total) by mouth every evening. 90 tablet 3    azelastine (ASTELIN) 137 mcg (0.1 %) nasal spray Two sprays in each nostril, sniff until absorbed, then follow with 1 spray of fluticasone.  Use both sprays twice daily. (Patient taking differently: Two sprays in each nostril, sniff until absorbed, then follow with 1 spray of fluticasone.  Use both sprays twice daily.(PATIENT USES NIGHTLY 3/12/2021)) 30 mL 11    budesonide-formoterol 160-4.5 mcg (SYMBICORT) 160-4.5 mcg/actuation HFAA INHALE 2 PUFFS INTO THE LUNGS EVERY 12 HOURS 10.2 g 12    calcium citrate-vitamin D3 315-200 mg (CITRACAL+D) 315-200 mg-unit per tablet Take 1 tablet by mouth nightly.       clopidogreL (PLAVIX) 75 mg tablet Take 1 tablet (75 mg total) by mouth once daily. 90 tablet 3    cyanocobalamin, vitamin B-12, 50 mcg tablet Take 50 mcg by mouth nightly.       diltiazem HCl (DILTIAZEM 2% CREAM) Apply topically 3 (three) times daily. Apply topically to anal area. 50 g 5    docusate sodium (COLACE) 100 MG capsule Take 1 tablet by mouth Twice daily. 1 Capsule Oral Twice a day .  Take with pain medicine      doxazosin (CARDURA) 1 MG tablet TAKE 1 TABLET BY MOUTH EVERY EVENING 90  tablet 3    ferrous gluconate (FERGON) 324 MG tablet Take 1 tablet (324 mg total) by mouth daily with breakfast. 90 tablet 1    fluticasone propionate (FLONASE) 50 mcg/actuation nasal spray One spray in each nostril twice daily after 1st using azelastine nasal spray 18.2 mL 11    furosemide (LASIX) 20 MG tablet Take 1 tablet (20 mg total) by mouth once daily. 90 tablet 3    hydrocortisone (ANUSOL-HC) 2.5 % rectal cream Place rectally 2 (two) times daily. 28 g 2    ipratropium (ATROVENT) 42 mcg (0.06 %) nasal spray 1-2 sprays in each nostril before eating and at bedtime as needed 30 mL 11    montelukast (SINGULAIR) 10 mg tablet TAKE 1 TABLET(10 MG) BY MOUTH EVERY EVENING 90 tablet 3    pantoprazole (PROTONIX) 40 MG tablet Take 1 tablet (40 mg total) by mouth every 12 (twelve) hours. 120 tablet 0    phentermine (ADIPEX-P) 37.5 mg tablet Take 1 tablet (37.5 mg total) by mouth once daily. 30 tablet 5    potassium chloride (MICRO-K) 10 MEQ CpSR Take 10 mEq by mouth once daily.       testosterone (ANDRODERM) 2 mg/24 hour PT24 APPLY 1 PATCH TOPICALLY TO THE SKIN EVERY DAY 60 patch 3    tiZANidine (ZANAFLEX) 2 MG tablet Take 2 tablets (4 mg total) by mouth every 6 (six) hours as needed. 20 tablet 0    topiramate (TOPAMAX) 50 MG tablet TAKE 1 TABLET (50 MG TOTAL) BY MOUTH 2 (TWO) TIMES DAILY. 60 tablet 5    zolpidem (AMBIEN) 10 mg Tab TAKE 1 TABLET BY MOUTH EVERY DAY AT BEDTIME (Patient taking differently: Take 10 mg by mouth nightly as needed.) 20 tablet 0    tadalafiL (CIALIS) 20 MG Tab Take 1 tablet (20 mg total) by mouth as needed. Take every 36 hours as needed for ED. 30 tablet 9    [DISCONTINUED] tamsulosin (FLOMAX) 0.4 mg Cap Take 1 capsule (0.4 mg total) by mouth once daily. 30 capsule 0     Facility-Administered Encounter Medications as of 4/11/2022   Medication Dose Route Frequency Provider Last Rate Last Admin    0.9%  NaCl infusion   Intravenous Continuous Milla Oliveira NP 70 mL/hr at  "03/15/21 1417 New Bag at 03/15/21 1417    0.9%  NaCl infusion   Intravenous Continuous Milla Oliveira NP 70 mL/hr at 12/10/21 0736 New Bag at 12/10/21 0736    0.9%  NaCl infusion   Intravenous Continuous Jaqueline Langley NP        LIDOcaine (PF) 10 mg/ml (1%) injection 10 mg  1 mL Intradermal Once Jaqueline Langley NP        mupirocin 2 % ointment   Nasal On Call Procedure Milla Oliveira NP   Given at 12/10/21 0729    mupirocin 2 % ointment   Nasal On Call Procedure Jaqueline Langley NP   Given at 02/25/22 0623     Social History:  Bri reports that he has never smoked. He has never used smokeless tobacco. He reports current alcohol use of about 1.0 standard drink of alcohol per week. He reports that he does not use drugs.    Objective:   /84   Pulse 88   Ht 5' 11" (1.803 m)   Wt 111.5 kg (245 lb 13 oz)   BMI 34.28 kg/m²     Physical Exam   Constitutional: He is oriented to person, place, and time and well-developed, well-nourished, and in no distress. No distress.   HENT:   Head: Normocephalic and atraumatic.   Mouth/Throat: No oropharyngeal exudate.   Eyes: EOM are normal. No scleral icterus.   Neck: No JVD present. No tracheal deviation present. No thyromegaly present.   Cardiovascular: Normal rate and regular rhythm. Exam reveals no gallop and no friction rub.   No murmur heard.  Pulmonary/Chest: Effort normal and breath sounds normal. No respiratory distress. He has no wheezes. He has no rales. He exhibits no tenderness.   Abdominal: Soft. He exhibits no distension. There is no abdominal tenderness. There is no rebound and no guarding.   Protuberant   Musculoskeletal: Normal range of motion.         General: Edema (trace bilateral ) present.   Neurological: He is alert and oriented to person, place, and time.   Skin: Skin is warm and dry. He is not diaphoretic. No erythema.   Psychiatric: Affect normal.     EKG:  My independent visualization of most recent EKG is normal sinus " rhythm    TTE:  06/24/2020  · Normal left ventricular systolic function. The estimated ejection fraction is 60%.  · Concentric left ventricular remodeling.  · Normal LV diastolic function. Borderline enlarged left atrium, IVC large but collapsing, no convincing evidence of diastolic dysfunction.  · Mild mitral sclerosis.  · Mild to moderate tricuspid regurgitation.  · Normal right ventricular systolic function.  · Mild left atrial enlargement.  · The estimated PA systolic pressure is 37 mmHg.  · Intermediate central venous pressure (8 mmHg).     PET stress test  12/24/2018  · The relative PET images show no clinically significant regional resting or stress induced perfusion defect.  · For whole heart absolute myocardial perfusion (cc/min/g) averaged 1.16 rest (whihc is elevated), 1.59 at stress (which is mildly reduced), and 1.4 CFR (which is moderately reduced impart due to elevated resting flow).  · Stress flow is reduced diffusely throughout the heart consistent with the presence of CAD, but above ischemic thresholds.  · Gated perfusion images showed an ejection fraction of 69% at rest and 69% during stress.  · Wall motion was normal at rest and at stress.  · LV cavity size at rest is normal. LV cavity size at stress is normal.  · The EKG portion of this study is negative for myocardial ischemia.  · Arrhythmias during stress: occasional PVCs.  · The results of the PET favors medical therapy.  · There is no prior study for comparison.     Lipids:  Recent Labs   Lab 07/08/21  1210   LDL Cholesterol 72.2   HDL 35 L   Cholesterol 128      Renal:  Recent Labs   Lab 02/14/22  1415   Creatinine 1.1   Potassium 3.7   CO2 30 H   BUN 14     Liver:  Recent Labs   Lab 12/06/21  1041   AST 23   ALT 25     Holter  10/21/2020  Normal heart rate behavior  Very rare ectopy  Symptoms correlating with normal rhythm     Left heart catheterization  02/14/2019  · LV end diastolic pressure is elevated.  · LVEDP (Pre): 23  · Post Atrio  lesion , 50% stenosed.  · Estimated blood loss: none  · Diastolic dysfunction.  · Filling pressures moderately elevated. Pulmonary HTN is mild to moderate.  · Non-obstructive CAD.    Assessment:     1. Primary hypertension    2. Thoracic aorta atherosclerosis    3. Von Willebrand disease    4. Non-occlusive coronary artery disease requiring drug therapy      Plan:   1. Primary hypertension  Blood pressure very well controlled, with weight loss consider down titration of antihypertensives, but not actually on any significant antihypertensive therapy currently, BPH medications alpha blockers, as well as mild diuretic.    2. Thoracic aorta atherosclerosis  Most recent LDL at goal primary prevention with nonobstructive coronary atherosclerosis as well as aortic atherosclerosis, continue Plavix, continue statin    3. Von Willebrand disease  Continue Plavix    4. Non-occlusive coronary artery disease requiring drug therapy  Given relative stability, will push out follow-up for 2 years.  Sooner if any abnormalities arise.      Follow-up in 2 years      Germán Francisco MD [facility]

## 2022-04-25 ENCOUNTER — OFFICE VISIT (OUTPATIENT)
Dept: OPTOMETRY | Facility: CLINIC | Age: 62
End: 2022-04-25
Payer: MEDICARE

## 2022-04-25 DIAGNOSIS — H04.123 DRY EYE SYNDROME OF BOTH EYES: ICD-10-CM

## 2022-04-25 DIAGNOSIS — H52.7 REFRACTIVE ERROR: ICD-10-CM

## 2022-04-25 DIAGNOSIS — H25.13 NUCLEAR SCLEROSIS OF BOTH EYES: ICD-10-CM

## 2022-04-25 DIAGNOSIS — H35.033 HYPERTENSIVE RETINOPATHY OF BOTH EYES: Primary | ICD-10-CM

## 2022-04-25 DIAGNOSIS — H11.001 PTERYGIUM OF RIGHT EYE: ICD-10-CM

## 2022-04-25 PROCEDURE — 92015 DETERMINE REFRACTIVE STATE: CPT | Mod: S$GLB,,, | Performed by: OPTOMETRIST

## 2022-04-25 PROCEDURE — 92014 PR EYE EXAM, EST PATIENT,COMPREHESV: ICD-10-PCS | Mod: S$GLB,,, | Performed by: OPTOMETRIST

## 2022-04-25 PROCEDURE — 92014 COMPRE OPH EXAM EST PT 1/>: CPT | Mod: S$GLB,,, | Performed by: OPTOMETRIST

## 2022-04-25 PROCEDURE — 92015 PR REFRACTION: ICD-10-PCS | Mod: S$GLB,,, | Performed by: OPTOMETRIST

## 2022-04-25 NOTE — PROGRESS NOTES
HPI     Routine eye exam    Pt c/o tearing and blurry [facility] OD most of the time  No pain or discomfort   No eye gtt at this time  Using reading specs only             Last edited by Nirmala White, OD on 4/25/2022 11:40 AM. (History)            Assessment /Plan     For exam results, see Encounter Report.    Hypertensive retinopathy of both eyes    Nuclear sclerosis of both eyes    Dry eye syndrome of both eyes    Pterygium of right eye    Refractive error      1. Vessel changes, OU. Discussed importance of BP control, taking medications as directed, and following-up with primary care. If changes in vision noted, RTC. Monitor yearly unless changes noted sooner.     2. Educated pt on findings. Not visually significant. No need for removal at this time. Monitor yearly.     3. Educated pt on findings. Recommended ATs TID-QID for added lubrication and comfort. Discussed tearing due to dry eyes.   If symptoms worsen or dont improve, RTC. Monitor.     4. Educated pt on findings. Recommend UV protection when outside and ATs TID-QID. Not visually significant. Monitor yearly.     5. Updated SRx. Given option of FTW specs vs OTC reading glasses only prn for near work. Monitor yearly.       RTC in 1 year for annual eye exam or sooner if needed.

## 2022-05-05 ENCOUNTER — OFFICE VISIT (OUTPATIENT)
Dept: SURGERY | Facility: CLINIC | Age: 62
End: 2022-05-05
Payer: MEDICARE

## 2022-05-05 VITALS
BODY MASS INDEX: 33.52 KG/M2 | SYSTOLIC BLOOD PRESSURE: 124 MMHG | WEIGHT: 240.31 LBS | HEART RATE: 102 BPM | DIASTOLIC BLOOD PRESSURE: 89 MMHG

## 2022-05-05 DIAGNOSIS — K62.89 ANAL PAIN: ICD-10-CM

## 2022-05-05 DIAGNOSIS — K62.5 RECTAL BLEEDING: Primary | ICD-10-CM

## 2022-05-05 PROCEDURE — 1159F PR MEDICATION LIST DOCUMENTED IN MEDICAL RECORD: ICD-10-PCS | Mod: CPTII,S$GLB,, | Performed by: NURSE PRACTITIONER

## 2022-05-05 PROCEDURE — 1159F MED LIST DOCD IN RCRD: CPT | Mod: CPTII,S$GLB,, | Performed by: NURSE PRACTITIONER

## 2022-05-05 PROCEDURE — 99212 OFFICE O/P EST SF 10 MIN: CPT | Mod: S$GLB,,, | Performed by: NURSE PRACTITIONER

## 2022-05-05 PROCEDURE — 1160F PR REVIEW ALL MEDS BY PRESCRIBER/CLIN PHARMACIST DOCUMENTED: ICD-10-PCS | Mod: CPTII,S$GLB,, | Performed by: NURSE PRACTITIONER

## 2022-05-05 PROCEDURE — 99999 PR PBB SHADOW E&M-EST. PATIENT-LVL IV: ICD-10-PCS | Mod: PBBFAC,,, | Performed by: NURSE PRACTITIONER

## 2022-05-05 PROCEDURE — 3079F PR MOST RECENT DIASTOLIC BLOOD PRESSURE 80-89 MM HG: ICD-10-PCS | Mod: CPTII,S$GLB,, | Performed by: NURSE PRACTITIONER

## 2022-05-05 PROCEDURE — 3074F PR MOST RECENT SYSTOLIC BLOOD PRESSURE < 130 MM HG: ICD-10-PCS | Mod: CPTII,S$GLB,, | Performed by: NURSE PRACTITIONER

## 2022-05-05 PROCEDURE — 3079F DIAST BP 80-89 MM HG: CPT | Mod: CPTII,S$GLB,, | Performed by: NURSE PRACTITIONER

## 2022-05-05 PROCEDURE — 3008F BODY MASS INDEX DOCD: CPT | Mod: CPTII,S$GLB,, | Performed by: NURSE PRACTITIONER

## 2022-05-05 PROCEDURE — 3074F SYST BP LT 130 MM HG: CPT | Mod: CPTII,S$GLB,, | Performed by: NURSE PRACTITIONER

## 2022-05-05 PROCEDURE — 3008F PR BODY MASS INDEX (BMI) DOCUMENTED: ICD-10-PCS | Mod: CPTII,S$GLB,, | Performed by: NURSE PRACTITIONER

## 2022-05-05 PROCEDURE — 99212 PR OFFICE/OUTPT VISIT, EST, LEVL II, 10-19 MIN: ICD-10-PCS | Mod: S$GLB,,, | Performed by: NURSE PRACTITIONER

## 2022-05-05 PROCEDURE — 99999 PR PBB SHADOW E&M-EST. PATIENT-LVL IV: CPT | Mod: PBBFAC,,, | Performed by: NURSE PRACTITIONER

## 2022-05-05 PROCEDURE — 1160F RVW MEDS BY RX/DR IN RCRD: CPT | Mod: CPTII,S$GLB,, | Performed by: NURSE PRACTITIONER

## 2022-05-05 NOTE — PROGRESS NOTES
"  CRS Office Visit History and Physical    Referring Md:   No referring provider defined for this encounter.    SUBJECTIVE:     Chief Complaint: rectal bleeding    History of Present Illness:  He presents today for f/u.   Still with rectal bleeding. 2-3x/week. Whenever he has a bm.   Metamucil daily. miralax qod. bm q2-3 days, loose vs formed.   Some anal pain "feels something is getting hung up"  Similar, although not as bad as fissure in past.       3/30/22 HPI  The patient is known patient to this practice.   Course is as follows:  Patient is a 61 y.o. male presents with rectal bleeding.  Symptoms have been present for 3 days. None noted in past 3 days. Does not feel related to papilla removal.  Occurs with and without bowel movements. Denies pain.     Previous anorectal procedures: yes  denies straining/prolonged time on toilet with bowel movements.  is currently taking fiber supplement and miralax. Having pasty/mushy bowel movements. On po iron  Blood thinners: Yes, plavix    Last Colonoscopy: Last Colonoscopy completed on 3/4/2020  - diverticula  - mild internal hemorrhoids  - repeat in 5 years  Family history of colorectal cancer or IBD: yes, crc.    Review of patient's allergies indicates:   Allergen Reactions    Penicillins Hives and Swelling     Has had allergy testing and can prob tolerate penicillin    Ace inhibitors Other (See Comments)     "Caused a respiratory infection"    Aspirin Hives           Codeine Itching     Ok to take percocet    Dilaudid [hydromorphone] Itching       Past Medical History:   Diagnosis Date    Acute pancreatitis     Anal fissure     Anemia     Anticoagulant long-term use     Arthritis     Asthma in remission     Back pain     BPH (benign prostatic hypertrophy)     Cancer 2000    prostate- treated at [facility] with chemo- in remission since 2000    Chronic maxillary sinusitis     Clotting disorder     Diastolic dysfunction with chronic heart failure 12/3/2018 "    Dysphagia 10/7/2014    Family history of colon cancer     Family history of early CAD     GERD (gastroesophageal reflux disease)     Helicobacter pylori (H. pylori) infection     Chronic    History of chronic pancreatitis     HTN (hypertension)     Lumbago 11/12/2012    Obesity     ABDON (obstructive sleep apnea)     ABDON (obstructive sleep apnea)     With likely ohs Refer to sleep    Prostate cancer 2000    dx and treated at [facility], had chemotherapy, in remission [id number]    Sacroiliac joint pain 2/10/2015    Spinal stenosis of lumbar region     Von Willebrand disease     VWD (acquired von Willebrand's disease)      Past Surgical History:   Procedure Laterality Date    anal fissure repair      x2    BACK SURGERY  2012    BALLOON SINUPLASTY OF PARANASAL SINUS Bilateral 10/7/2019    Procedure: SINUPLASTY, USING BALLOON;  Surgeon: LAURENT Swanson MD;  Location: [facility] OR;  Service: ENT;  Laterality: Bilateral;    CARPAL TUNNEL RELEASE  2003    left hand    CATHETERIZATION OF BOTH LEFT AND RIGHT HEART N/A 2/14/2019    Procedure: CATHETERIZATION, HEART, BOTH LEFT AND RIGHT;  Surgeon: Kyle Magana MD;  Location: [facility] CATH LAB;  Service: Cardiology;  Laterality: N/A;    CERVICAL DISCECTOMY  2003    COLONOSCOPY N/A 10/7/2015    Procedure: COLONOSCOPY;  Surgeon: Rosendo Boyer MD;  Location: [facility] ENDO (4TH FLR);  Service: Endoscopy;  Laterality: N/A;  PM Prep    COLONOSCOPY N/A 6/19/2017    Procedure: COLONOSCOPY;  Surgeon: Rosendo Boyer MD;  Location: [facility] ENDO (4TH FLR);  Service: Endoscopy;  Laterality: N/A;  constipation prep (no DM no CHF)       hx of vonWillebrand's disease-will need infusion prior    COLONOSCOPY N/A 3/4/2020    Procedure: COLONOSCOPY;  Surgeon: Rosendo Boyer MD;  Location: [facility] ENDO (4TH FLR);  Service: Endoscopy;  Laterality: N/A;  Please order CBC, ferritin, Iron TIBC day of case per Dr. Boyer  labwork at 7:10am and patient will check in right  after.  per Dr. Tyler patient can hold Plavix 5 days prior see note 1/7/20  DDAVP prior to procedure needed see note 1/16/20 for oncall med by Dr. Tyler -     ESOPHAGOGASTRODUODENOSCOPY N/A 3/4/2020    Procedure: EGD (ESOPHAGOGASTRODUODENOSCOPY);  Surgeon: Rosendo Boyer MD;  Location: [facility] (4TH FLR);  Service: Endoscopy;  Laterality: N/A;  EGD and Colonoscopy orders merged together  labwork at 7:10am and patient will check in right after.  DDAVP prior to procedure needed see note 1/16/20 for oncall med  per Dr. Tyler patient can hold Plavix 5 days prior see note 1/7/20    ESOPHAGOGASTRODUODENOSCOPY N/A 11/3/2020    Procedure: EGD (ESOPHAGOGASTRODUODENOSCOPY);  Surgeon: Rosendo Boyer MD;  Location: [facility] (2ND FLR);  Service: Endoscopy;  Laterality: N/A;  Please recall pt has von Willebrands and will require DDVAP infusion prior to procedure as previously done.    see telephone encounter on 10/1/20 regarding DDAVP   ok to hold Plavix 5 days prior per Dr.Blessey BCUKNER screening on 10/31/20 -rb    EXAMINATION UNDER ANESTHESIA N/A 3/15/2021    Procedure: EXAM UNDER ANESTHESIA;  Surgeon: Silvia Cazares MD;  Location: [facility] OR 2ND FLR;  Service: Colon and Rectal;  Laterality: N/A;    EXAMINATION UNDER ANESTHESIA N/A 2/25/2022    Procedure: EXAM UNDER ANESTHESIA, EXCISION ANAL PAPILLA;  Surgeon: Nadeem Grady MD;  Location: [facility] 2ND FLR;  Service: Colon and Rectal;  Laterality: N/A;    FUNCTIONAL ENDOSCOPIC SINUS SURGERY (FESS) USING COMPUTER-ASSISTED NAVIGATION Bilateral 10/7/2019    Procedure: FESS, USING COMPUTER-ASSISTED NAVIGATION;  Surgeon: LAURENT Swanson MD;  Location: [facility] OR;  Service: ENT;  Laterality: Bilateral;    INJECTION OF ANESTHETIC AGENT AROUND NERVE Bilateral 10/13/2020    Procedure: BLOCK, NERVE BILATERAL C4,C5,C6 Plavix clearance requested;  Surgeon: Fredy Magana MD;  Location: [facility] PAIN MGT;  Service: Pain Management;  Laterality: Bilateral;  BLOCK, NERVE  BILATERAL C4,C5,C6  Plavix clearance ok, will require DDVAP infusion    LATERAL INTERNAL ANAL SPHINCTEROTOMY N/A 12/10/2021    Procedure: SPHINCTEROTOMY-LATERAL INTERNAL ANAL;  Surgeon: Nadeem Grady MD;  Location: [facility] OR [facility] FLR;  Service: Colon and Rectal;  Laterality: N/A;    LEFT HEART CATHETERIZATION Left 2/14/2019    Procedure: Left heart cath;  Surgeon: Kyle Magana MD;  Location: [facility] CATH LAB;  Service: Cardiology;  Laterality: Left;    LUMBAR FUSION  2012    RADIOFREQUENCY ABLATION Right 5/9/2019    Procedure: RADIOFREQUENCY ABLATION, RIGHT L3,L4,L5;  Surgeon: Fredy Magana MD;  Location: [facility] PAIN MGT;  Service: Pain Management;  Laterality: Right;  1 of 2  RT RFA L3,4,5  PLAVIX clearance received, pt needs DDAVP    RADIOFREQUENCY ABLATION Left 5/23/2019    Procedure: RADIOFREQUENCY ABLATION, LEFT L3,L4,L5;  Surgeon: Fredy Magana MD;  Location: [facility] PAIN MGT;  Service: Pain Management;  Laterality: Left;  2 of 2  LT RFA L3,4,5  PLAVIX clearance received, pt needs DDAVP    RADIOFREQUENCY ABLATION Left 1/16/2020    Procedure: RADIOFREQUENCY ABLATION LEFT L3, L4, L5 MEDIAL BRANCH 1 OF 2 **PATIENT ARRIVING AT 8 AM**;  Surgeon: Fredy Magana MD;  Location: [facility] PAIN MGT;  Service: Pain Management;  Laterality: Left;  NEEDS CONSENT, PLAVIX CLEARANCE IN CHART    RADIOFREQUENCY ABLATION Right 1/28/2020    Procedure: RADIOFREQUENCY ABLATION, RIGHT L3-L4-L5 MEDIAL BRANCH 2 OF 2 (Left done on 1/16/20);  Surgeon: Fredy Magana MD;  Location: [facility] PAIN MGT;  Service: Pain Management;  Laterality: Right;    RADIOFREQUENCY ABLATION Left 8/18/2020    Procedure: RADIOFREQUENCY ABLATION, L3-L4-L5 MEDIAL BRANCH 1 OF 2 clear to hold plavix 7 days;  Surgeon: Fredy Magana MD;  Location: [facility] PAIN MGT;  Service: Pain Management;  Laterality: Left;    RADIOFREQUENCY ABLATION Right 9/1/2020    Procedure: RADIOFREQUENCY ABLATION, L3-L4-L5 MEDIAL BR ANCH 2 OF 2 clear to hol,d Plavix 7  days;  Surgeon: Fredy Magana MD;  Location: [facility] PAIN MGT;  Service: Pain Management;  Laterality: Right;    RADIOFREQUENCY ABLATION Bilateral 12/21/2021    Procedure: RADIOFREQUENCY ABLATION B/L L3,4,5 RFA (give dDAVP prior) NEEDS CONSENT plavix ok x7;  Surgeon: Fredy Magana MD;  Location: [facility] PAIN MGT;  Service: Pain Management;  Laterality: Bilateral;    RADIOFREQUENCY ABLATION OF LUMBAR MEDIAL BRANCH NERVE AT SINGLE LEVEL Left 5/24/2018    Procedure: RADIOFREQUENCY THERMOCOAGULATION (RFTC)-NERVE-MEDIAN BRANCH-LUMBAR;  Surgeon: Fredy Magana MD;  Location: [facility] PAIN MGT;  Service: Pain Management;  Laterality: Left;  Left RFA @ L3,4,5  [id number]  with IV Sedation    2 of 2    SPINE SURGERY      TONSILLECTOMY      at age 24    VASECTOMY  1996     Family History   Problem Relation Age of Onset    Colon cancer Father 67        colon cancer    Hypertension Father     Glaucoma Father     Cancer Father     Colon cancer Paternal Grandfather 65             Coronary artery disease Mother 45    Hypertension Mother     Heart disease Mother     Colon cancer Mother     Diabetes Mother     No Known Problems Brother     No Known Problems Sister     No Known Problems Daughter     No Known Problems Son     Coronary artery disease Brother 51    No Known Problems Daughter     No Known Problems Daughter     No Known Problems Son     No Known Problems Son     Colon cancer Paternal Uncle 65    Diabetes Mellitus Paternal Grandmother     Esophageal cancer Neg Hx      Social History     Tobacco Use    Smoking status: Never Smoker    Smokeless tobacco: Never Used   Substance Use Topics    Alcohol use: Yes     Alcohol/week: 1.0 standard drink     Types: 1 Glasses of wine per week     Comment: social    Drug use: No        Review of Systems:  Review of Systems   Gastrointestinal: Positive for blood in stool, constipation and diarrhea.       OBJECTIVE:     Vital Signs (Most Recent)  Blood  Pressure 124/89 (BP Location: Left arm, Patient Position: Sitting, BP Method: Large (Automatic))   Pulse 102   Weight 109 kg (240 lb 4.8 oz)   Body Mass Index 33.52 kg/m²     Physical Exam:  General: Black or  male in no distress   Neuro: Alert and oriented to person, place, and time.  Moves all extremities.     HEENT: No icterus.  Trachea midline  Respiratory: Respirations are even and unlabored, no cough or audible wheezing  Skin: Warm dry and intact, No visible rashes, no jaundice    Labs reviewed today:  Lab Results   Component Value Date    WBC 7.32 02/14/2022    HGB 14.1 04/08/2022    HCT 44.7 02/14/2022     02/14/2022    CHOL 128 07/08/2021    TRIG 104 07/08/2021    HDL 35 (L) 07/08/2021    ALT 25 12/06/2021    AST 23 12/06/2021     02/14/2022    K 3.7 02/14/2022     02/14/2022    CREATININE 1.1 02/14/2022    BUN 14 02/14/2022    CO2 30 (H) 02/14/2022    TSH 0.995 05/03/2021    PSA 0.93 01/22/2021    INR 1.0 04/04/2021    HGBA1C 4.5 01/23/2020       Imaging reviewed today:  4/4/21 ct abdomen pelvis  - no abdominal adenopathy    Endoscopy reviewed today:  Last Colonoscopy: Last Colonoscopy completed on 3/4/2020  - diverticula  - mild internal hemorrhoids  - repeat in 5 years    Anorectal Exam:    Anal Skin: Normal    Digital Rectal Exam:  Resting Tone normal  Squeeze normal  Relaxation with bear down present  Mass none  Rectocele  present  Tenderness  Present to midline anterior      ASSESSMENT/PLAN:     Diagnoses and all orders for this visit:    Rectal bleeding  -     diltiazem HCl (DILTIAZEM 2% CREAM); Apply topically 3 (three) times daily. Apply topically to anal area.    Anal pain  -     diltiazem HCl (DILTIAZEM 2% CREAM); Apply topically 3 (three) times daily. Apply topically to anal area.        The patient was instructed to:  Trial of diltiazem cream 3x/day.  Daily fiber supplement  miralax QOD  F/u in 3 weeks with Dr. Ysabel Manning, FNP-C  Colon and  Rectal Surgery

## 2022-05-26 ENCOUNTER — LAB VISIT (OUTPATIENT)
Dept: LAB | Facility: HOSPITAL | Age: 62
End: 2022-05-26
Attending: UROLOGY
Payer: MEDICARE

## 2022-05-26 ENCOUNTER — TELEPHONE (OUTPATIENT)
Dept: UROLOGY | Facility: CLINIC | Age: 62
End: 2022-05-26

## 2022-05-26 ENCOUNTER — OFFICE VISIT (OUTPATIENT)
Dept: UROLOGY | Facility: CLINIC | Age: 62
End: 2022-05-26
Payer: MEDICARE

## 2022-05-26 VITALS
WEIGHT: 240.31 LBS | SYSTOLIC BLOOD PRESSURE: 134 MMHG | HEART RATE: 81 BPM | BODY MASS INDEX: 33.64 KG/M2 | DIASTOLIC BLOOD PRESSURE: 91 MMHG | HEIGHT: 71 IN

## 2022-05-26 DIAGNOSIS — R97.20 ELEVATED PROSTATE SPECIFIC ANTIGEN (PSA): ICD-10-CM

## 2022-05-26 DIAGNOSIS — R97.20 ELEVATED PROSTATE SPECIFIC ANTIGEN (PSA): Primary | ICD-10-CM

## 2022-05-26 PROBLEM — E29.1 HYPOGONADISM IN MALE: Status: RESOLVED | Noted: 2020-12-28 | Resolved: 2022-05-26

## 2022-05-26 PROBLEM — K60.2 ANAL FISSURE: Status: RESOLVED | Noted: 2021-12-10 | Resolved: 2022-05-26

## 2022-05-26 PROBLEM — K62.89 ANAL PAIN: Status: RESOLVED | Noted: 2021-03-15 | Resolved: 2022-05-26

## 2022-05-26 LAB — COMPLEXED PSA SERPL-MCNC: 3.9 NG/ML (ref 0–4)

## 2022-05-26 PROCEDURE — 36415 COLL VENOUS BLD VENIPUNCTURE: CPT | Performed by: UROLOGY

## 2022-05-26 PROCEDURE — 3008F PR BODY MASS INDEX (BMI) DOCUMENTED: ICD-10-PCS | Mod: CPTII,S$GLB,, | Performed by: UROLOGY

## 2022-05-26 PROCEDURE — 3008F BODY MASS INDEX DOCD: CPT | Mod: CPTII,S$GLB,, | Performed by: UROLOGY

## 2022-05-26 PROCEDURE — 3080F PR MOST RECENT DIASTOLIC BLOOD PRESSURE >= 90 MM HG: ICD-10-PCS | Mod: CPTII,S$GLB,, | Performed by: UROLOGY

## 2022-05-26 PROCEDURE — 1159F PR MEDICATION LIST DOCUMENTED IN MEDICAL RECORD: ICD-10-PCS | Mod: CPTII,S$GLB,, | Performed by: UROLOGY

## 2022-05-26 PROCEDURE — 84153 ASSAY OF PSA TOTAL: CPT | Performed by: UROLOGY

## 2022-05-26 PROCEDURE — 1160F PR REVIEW ALL MEDS BY PRESCRIBER/CLIN PHARMACIST DOCUMENTED: ICD-10-PCS | Mod: CPTII,S$GLB,, | Performed by: UROLOGY

## 2022-05-26 PROCEDURE — 3075F SYST BP GE 130 - 139MM HG: CPT | Mod: CPTII,S$GLB,, | Performed by: UROLOGY

## 2022-05-26 PROCEDURE — 3080F DIAST BP >= 90 MM HG: CPT | Mod: CPTII,S$GLB,, | Performed by: UROLOGY

## 2022-05-26 PROCEDURE — 1160F RVW MEDS BY RX/DR IN RCRD: CPT | Mod: CPTII,S$GLB,, | Performed by: UROLOGY

## 2022-05-26 PROCEDURE — 99213 PR OFFICE/OUTPT VISIT, EST, LEVL III, 20-29 MIN: ICD-10-PCS | Mod: S$GLB,,, | Performed by: UROLOGY

## 2022-05-26 PROCEDURE — 3075F PR MOST RECENT SYSTOLIC BLOOD PRESS GE 130-139MM HG: ICD-10-PCS | Mod: CPTII,S$GLB,, | Performed by: UROLOGY

## 2022-05-26 PROCEDURE — 99213 OFFICE O/P EST LOW 20 MIN: CPT | Mod: S$GLB,,, | Performed by: UROLOGY

## 2022-05-26 PROCEDURE — 99999 PR PBB SHADOW E&M-EST. PATIENT-LVL IV: CPT | Mod: PBBFAC,,, | Performed by: UROLOGY

## 2022-05-26 PROCEDURE — 1159F MED LIST DOCD IN RCRD: CPT | Mod: CPTII,S$GLB,, | Performed by: UROLOGY

## 2022-05-26 PROCEDURE — 99999 PR PBB SHADOW E&M-EST. PATIENT-LVL IV: ICD-10-PCS | Mod: PBBFAC,,, | Performed by: UROLOGY

## 2022-05-26 NOTE — PROGRESS NOTES
"CHIEF COMPLAINT:    Mr. Santiago is a 61 y.o. male presenting with "an elevated PSA."    PRESENTING ILLNESS:    Bri Santiago is a 61 y.o. male with a questionable history of prostate cancer diagnosed and treated with XRT ~ 2004 at [facility].  He made an appointment today for an "elevated PSA," but he has no recent PSA.   He states his [place] came [place] [employer].  He usually sees Dr. Reid.       He is on Androderm managed by endocrine.    He last saw Dr. Melara in 2/21 who recommended that he obtain his records from [facility] showing the prostate cancer diagnosis and follow back up with them.  He brings in no records today.  States he could not get them.    REVIEW OF SYSTEMS:    Bri Santiago denies headache, blurred vision, fever, nausea, vomiting, chills, abdominal pain, chest pain, sore throat, bleeding per rectum, cough, SOB, recent loss of consciousness, recent mental status changes, seizures, dizziness, or upper or lower extremity weakness.    KAREN  1. 2  2. 2  3. 2  4. 2  5. 1      PATIENT HISTORY:    Past Medical History:   Diagnosis Date    Acute pancreatitis     Anal fissure     Anemia     Anticoagulant long-term use     Arthritis     Asthma in remission     Back pain     BPH (benign prostatic hypertrophy)     Cancer 2000    prostate- treated at [facility] with chemo- in remission since 2000    Chronic maxillary sinusitis     Clotting disorder     Diastolic dysfunction with chronic heart failure 12/3/2018    Dysphagia 10/7/2014    Family history of colon cancer     Family history of early CAD     GERD (gastroesophageal reflux disease)     Helicobacter pylori (H. pylori) infection     Chronic    History of chronic pancreatitis     HTN (hypertension)     Lumbago 11/12/2012    Obesity     ABDON (obstructive sleep apnea)     ABDON (obstructive sleep apnea)     With likely ohs Refer to sleep    Prostate cancer 2000    dx and treated at [facility] had chemotherapy, in remission [id number] "    Sacroiliac joint pain 2/10/2015    Spinal stenosis of lumbar region     Von Willebrand disease     VWD (acquired von Willebrand's disease)        Past Surgical History:   Procedure Laterality Date    anal fissure repair      x2    BACK SURGERY  2012    BALLOON SINUPLASTY OF PARANASAL SINUS Bilateral 10/7/2019    Procedure: SINUPLASTY, USING BALLOON;  Surgeon: LAURENT Swanson MD;  [facility] OR;  Service: ENT;  Laterality: Bilateral;    CARPAL TUNNEL RELEASE  2003    left hand    CATHETERIZATION OF BOTH LEFT AND RIGHT HEART N/A 2/14/2019    Procedure: CATHETERIZATION, HEART, BOTH LEFT AND RIGHT;  Surgeon: Kyle Magana MD;  Location: [facility] CATH LAB;  Service: Cardiology;  Laterality: N/A;    CERVICAL DISCECTOMY  2003    COLONOSCOPY N/A 10/7/2015    Procedure: COLONOSCOPY;  Surgeon: Rosendo Boyer MD;  Location: [facility] ([address] [place] [address]);  Service: Endoscopy;  Laterality: N/A;  PM Prep    COLONOSCOPY N/A 6/19/2017    Procedure: COLONOSCOPY;  Surgeon: Rosendo Boyer MD;  [facility]  Service: Endoscopy;  Laterality: N/A;  constipation prep (no DM no CHF)       hx of vonWillebrand's disease-will need infusion prior    COLONOSCOPY N/A 3/4/2020    Procedure: COLONOSCOPY;  Surgeon: Rosendo Boyer MD;  [facility]  Service: Endoscopy;  Laterality: N/A;  Please order CBC, ferritin, Iron TIBC day of case per Dr. Boyer  labwork at 7:10am and patient will check in right after.  per Dr. Tyler patient can hold Plavix 5 days prior see note 1/7/20  DDAVP prior to procedure needed see note 1/16/20 for oncall med by Dr. Tyler -     ESOPHAGOGASTRODUODENOSCOPY N/A 3/4/2020    Procedure: EGD (ESOPHAGOGASTRODUODENOSCOPY);  Surgeon: Rosendo Boyer MD;  [facility]  Service: Endoscopy;  Laterality: N/A;  EGD and Colonoscopy orders merged together  labwork at 7:10am and patient will check in right after.  DDAVP prior to procedure needed see note  1/16/20 for oncall med  per Dr. Tyler patient can hold Plavix 5 days prior see note 1/7/20    ESOPHAGOGASTRODUODENOSCOPY N/A 11/3/2020    Procedure: EGD (ESOPHAGOGASTRODUODENOSCOPY);  Surgeon: Rosendo Boyer MD;  Location: [facility] (2ND FLR);  Service: Endoscopy;  Laterality: N/A;  Please recall pt has von Willebrands and will require DDVAP infusion prior to procedure as previously done.    see telephone encounter on 10/1/20 regarding DDAVP   ok to hold Plavix 5 days prior per Dr.Blessey BUCKNER screening on 10/31/20 -rb    EXAMINATION UNDER ANESTHESIA N/A 3/15/2021    Procedure: EXAM UNDER ANESTHESIA;  Surgeon: Silvia Cazares MD;  [facility];  Service: Colon and Rectal;  Laterality: N/A;    EXAMINATION UNDER ANESTHESIA N/A 2/25/2022    Procedure: EXAM UNDER ANESTHESIA, EXCISION ANAL PAPILLA;  Surgeon: Nadeem Grady MD;  [address];  Service: Colon and Rectal;  Laterality: N/A;    FUNCTIONAL ENDOSCOPIC SINUS SURGERY (FESS) USING COMPUTER-ASSISTED NAVIGATION Bilateral 10/7/2019    Procedure: FESS, USING COMPUTER-ASSISTED NAVIGATION;  Surgeon: LAURENT Swanson MD;  Location: [facility] OR;  Service: ENT;  Laterality: Bilateral;    INJECTION OF ANESTHETIC AGENT AROUND NERVE Bilateral 10/13/2020    Procedure: BLOCK, NERVE BILATERAL C4,C5,C6 Plavix clearance requested;  Surgeon: Fredy Magana MD;  Location: [facility] PAIN MGT;  Service: Pain Management;  Laterality: Bilateral;  BLOCK, NERVE BILATERAL C4,C5,C6  Plavix clearance ok, will require DDVAP infusion    LATERAL INTERNAL ANAL SPHINCTEROTOMY N/A 12/10/2021    Procedure: SPHINCTEROTOMY-LATERAL INTERNAL ANAL;  Surgeon: Nadeem Grady MD;  [address];  Service: Colon and Rectal;  Laterality: N/A;    LEFT HEART CATHETERIZATION Left 2/14/2019    Procedure: Left heart cath;  Surgeon: Kyle Magana MD;  Location: [facility] CATH LAB;  Service: Cardiology;  Laterality: Left;    LUMBAR FUSION  2012    RADIOFREQUENCY  ABLATION Right 5/9/2019    Procedure: RADIOFREQUENCY ABLATION, RIGHT L3,L4,L5;  Surgeon: Fredy Magana MD;  Location: [facility] PAIN MGT;  Service: Pain Management;  Laterality: Right;  1 of 2  RT RFA L3,4,5  PLAVIX clearance received, pt needs DDAVP    RADIOFREQUENCY ABLATION Left 5/23/2019    Procedure: RADIOFREQUENCY ABLATION, LEFT L3,L4,L5;  Surgeon: Fredy Magana MD;  Location: [facility] PAIN MGT;  Service: Pain Management;  Laterality: Left;  2 of 2  LT RFA L3,4,5  PLAVIX clearance received, pt needs DDAVP    RADIOFREQUENCY ABLATION Left 1/16/2020    Procedure: RADIOFREQUENCY ABLATION LEFT L3, L4, L5 MEDIAL BRANCH 1 OF 2 **PATIENT ARRIVING AT 8 AM**;  Surgeon: Fredy Magana MD;  Location: [facility] PAIN MGT;  Service: Pain Management;  Laterality: Left;  NEEDS CONSENT, PLAVIX CLEARANCE IN CHART    RADIOFREQUENCY ABLATION Right 1/28/2020    Procedure: RADIOFREQUENCY ABLATION, RIGHT L3-L4-L5 MEDIAL BRANCH 2 OF 2 (Left done on 1/16/20);  Surgeon: Fredy Magana MD;  Location: [facility] PAIN MGT;  Service: Pain Management;  Laterality: Right;    RADIOFREQUENCY ABLATION Left 8/18/2020    Procedure: RADIOFREQUENCY ABLATION, L3-L4-L5 MEDIAL BRANCH 1 OF 2 clear to hold plavix 7 days;  Surgeon: Fredy Magana MD;  Location: [facility] PAIN MGT;  Service: Pain Management;  Laterality: Left;    RADIOFREQUENCY ABLATION Right 9/1/2020    Procedure: RADIOFREQUENCY ABLATION, L3-L4-L5 MEDIAL BR ANCH 2 OF 2 clear to hol,d Plavix 7 days;  Surgeon: Fredy Magana MD;  Location: [facility] PAIN MGT;  Service: Pain Management;  Laterality: Right;    RADIOFREQUENCY ABLATION Bilateral 12/21/2021    Procedure: RADIOFREQUENCY ABLATION B/L L3,4,5 RFA (give dDAVP prior) NEEDS CONSENT plavix ok x7;  Surgeon: Fredy Magana MD;  Location: [facility] PAIN MGT;  Service: Pain Management;  Laterality: Bilateral;    RADIOFREQUENCY ABLATION OF LUMBAR MEDIAL BRANCH NERVE AT SINGLE LEVEL Left 5/24/2018    Procedure: RADIOFREQUENCY  THERMOCOAGULATION (RFTC)-NERVE-MEDIAN BRANCH-LUMBAR;  Surgeon: Fredy Magana MD;  Location: [facility] PAIN MGT;  Service: Pain Management;  Laterality: Left;  Left RFA @ L3,4,5  [id number]  with IV Sedation    2 of 2    SPINE SURGERY      TONSILLECTOMY      at age 24    VASECTOMY  1996       Family History   Problem Relation Age of Onset    Colon cancer Father 67        colon cancer    Hypertension Father     Glaucoma Father     Cancer Father     Colon cancer Paternal Grandfather 65             Coronary artery disease Mother 45    Hypertension Mother     Heart disease Mother     Colon cancer Mother     Diabetes Mother     No Known Problems Brother     No Known Problems Sister     No Known Problems Daughter     No Known Problems Son     Coronary artery disease Brother 51    No Known Problems Daughter     No Known Problems Daughter     No Known Problems Son     No Known Problems Son     Colon cancer Paternal Uncle 65    Diabetes Mellitus Paternal Grandmother     Esophageal cancer Neg Hx        Social History     Socioeconomic History    Marital status:    Occupational History    Occupation: disabled due to back injury   Tobacco Use    Smoking status: Never Smoker    Smokeless tobacco: Never Used   Substance and Sexual Activity    Alcohol use: Yes     Alcohol/week: 1.0 standard drink     Types: 1 Glasses of wine per week     Comment: social    Drug use: No    Sexual activity: Yes     Partners: Female   Social History Narrative    wlking for exercised       Allergies:  Penicillins, Ace inhibitors, Aspirin, Codeine, and Dilaudid [hydromorphone]    Medications:    Current Outpatient Medications:     albuterol (PROVENTIL/VENTOLIN HFA) 90 mcg/actuation inhaler, INHALE 2 PUFFS INTO THE LUNGS EVERY 6 HOURS AS NEEDED FOR WHEEZING OR SHORTNESS OF BREATH, Disp: 18 g, Rfl: 4    atorvastatin (LIPITOR) 40 MG tablet, Take 1 tablet (40 mg total) by mouth every evening., Disp: 90 tablet, Rfl:  3    azelastine (ASTELIN) 137 mcg (0.1 %) nasal spray, Two sprays in each nostril, sniff until absorbed, then follow with 1 spray of fluticasone.  Use both sprays twice daily. (Patient taking differently: Two sprays in each nostril, sniff until absorbed, then follow with 1 spray of fluticasone.  Use both sprays twice daily.(PATIENT USES NIGHTLY 3/12/2021)), Disp: 30 mL, Rfl: 11    budesonide-formoterol 160-4.5 mcg (SYMBICORT) 160-4.5 mcg/actuation HFAA, INHALE 2 PUFFS INTO THE LUNGS EVERY 12 HOURS, Disp: 10.2 g, Rfl: 12    calcium citrate-vitamin D3 315-200 mg (CITRACAL+D) 315-200 mg-unit per tablet, Take 1 tablet by mouth nightly. , Disp: , Rfl:     clopidogreL (PLAVIX) 75 mg tablet, Take 1 tablet (75 mg total) by mouth once daily., Disp: 90 tablet, Rfl: 3    cyanocobalamin, vitamin B-12, 50 mcg tablet, Take 50 mcg by mouth nightly. , Disp: , Rfl:     diltiazem HCl (DILTIAZEM 2% CREAM), Apply topically 3 (three) times daily. Apply topically to anal area., Disp: 50 g, Rfl: 5    docusate sodium (COLACE) 100 MG capsule, Take 1 tablet by mouth Twice daily. 1 Capsule Oral Twice a day .  Take with pain medicine, Disp: , Rfl:     doxazosin (CARDURA) 1 MG tablet, TAKE 1 TABLET BY MOUTH EVERY EVENING, Disp: 90 tablet, Rfl: 3    ferrous gluconate (FERGON) 324 MG tablet, Take 1 tablet (324 mg total) by mouth daily with breakfast., Disp: 90 tablet, Rfl: 1    fluticasone propionate (FLONASE) 50 mcg/actuation nasal spray, One spray in each nostril twice daily after 1st using azelastine nasal spray, Disp: 18.2 mL, Rfl: 11    furosemide (LASIX) 20 MG tablet, Take 1 tablet (20 mg total) by mouth once daily., Disp: 90 tablet, Rfl: 3    hydrocortisone (ANUSOL-HC) 2.5 % rectal cream, Place rectally 2 (two) times daily., Disp: 28 g, Rfl: 2    ipratropium (ATROVENT) 42 mcg (0.06 %) nasal spray, 1-2 sprays in each nostril before eating and at bedtime as needed, Disp: 30 mL, Rfl: 11    montelukast (SINGULAIR) 10 mg tablet,  TAKE 1 TABLET(10 MG) BY MOUTH EVERY EVENING, Disp: 90 tablet, Rfl: 3    pantoprazole (PROTONIX) 40 MG tablet, Take 1 tablet (40 mg total) by mouth every 12 (twelve) hours., Disp: 120 tablet, Rfl: 0    phentermine (ADIPEX-P) 37.5 mg tablet, Take 1 tablet (37.5 mg total) by mouth once daily., Disp: 30 tablet, Rfl: 5    potassium chloride (MICRO-K) 10 MEQ CpSR, Take 10 mEq by mouth once daily. , Disp: , Rfl:     tadalafiL (CIALIS) 20 MG Tab, Take 1 tablet (20 mg total) by mouth as needed. Take every 36 hours as needed for ED., Disp: 30 tablet, Rfl: 9    testosterone (ANDRODERM) 2 mg/24 hour PT24, APPLY 1 PATCH TOPICALLY TO THE SKIN EVERY DAY, Disp: 60 patch, Rfl: 3    tiZANidine (ZANAFLEX) 2 MG tablet, Take 2 tablets (4 mg total) by mouth every 6 (six) hours as needed., Disp: 20 tablet, Rfl: 0    topiramate (TOPAMAX) 50 MG tablet, TAKE 1 TABLET (50 MG TOTAL) BY MOUTH 2 (TWO) TIMES DAILY., Disp: 60 tablet, Rfl: 5    zolpidem (AMBIEN) 10 mg Tab, TAKE 1 TABLET BY MOUTH EVERY DAY AT BEDTIME (Patient taking differently: Take 10 mg by mouth nightly as needed.), Disp: 20 tablet, Rfl: 0  No current facility-administered medications for this visit.    Facility-Administered Medications Ordered in Other Visits:     0.9%  NaCl infusion, , Intravenous, Continuous, Milla Oliveira NP, Last Rate: 70 mL/hr at 03/15/21 1417, New Bag at 03/15/21 1417    0.9%  NaCl infusion, , Intravenous, Continuous, Milla Oliveira NP, Last Rate: 70 mL/hr at 12/10/21 0736, New Bag at 12/10/21 0736    0.9%  NaCl infusion, , Intravenous, Continuous, Jaqueline Langley NP    LIDOcaine (PF) 10 mg/ml (1%) injection 10 mg, 1 mL, Intradermal, Once, Jaqueline Langley NP    mupirocin 2 % ointment, , Nasal, On Call Procedure, Milla Oliveira NP, Given at 12/10/21 0729    mupirocin 2 % ointment, , Nasal, On Call Procedure, Jaqueline Langley NP, Given at 02/25/22 0623    PHYSICAL EXAMINATION:    The patient generally appears in  good health, is appropriately interactive, and is in no apparent distress.     Eyes: anicteric sclerae, moist conjunctivae; no lid-lag; PERRLA     HENT: Atraumatic; oropharynx clear with moist mucous membranes and no mucosal ulcerations;normal hard and soft palate.  No evidence of lymphadenopathy.    Neck: Trachea midline.  No thyromegaly.    Skin: No lesions.    Mental: Cooperative with normal affect.  Is oriented to time, place, and person.    Neuro: Grossly intact.    Chest: Normal inspiratory effort.   No accessory muscles.  No audible wheezes.  Respirations symmetric on inspiration and expiration.    Heart: Regular rhythm.      Abdomen:  Soft, non-tender. No masses or organomegaly. Bladder is not palpable. No evidence of flank discomfort. No evidence of inguinal hernia.      Extremities: No clubbing, cyanosis, or edema      LABS:      Lab Results   Component Value Date    PSA 0.93 01/22/2021    PSA 0.96 02/06/2013    PSA 1.11 10/17/2012    PSADIAG 1.5 10/22/2021    PSADIAG 0.80 01/23/2020    PSADIAG 0.98 05/03/2019       IMPRESSION:    Encounter Diagnoses   Name Primary?    Elevated prostate specific antigen (PSA) Yes         PLAN:    1. Will draw a PSA.  2. RTC after the PSA to see Dr. Reid.    Copy to:

## 2022-05-26 NOTE — TELEPHONE ENCOUNTER
Please notify that his PSA is elevated from baseline.  He needs to follow up with Dr. Reid to discuss.

## 2022-05-26 NOTE — TELEPHONE ENCOUNTER
Patient notified  that his PSA is elevated from baseline.  He needs to follow up with Dr. Reid to discuss. Patient already have a follow up appointment scheduled with Dr. Reid on 6/2/22.

## 2022-05-27 ENCOUNTER — TELEPHONE (OUTPATIENT)
Dept: SURGERY | Facility: CLINIC | Age: 62
End: 2022-05-27
Payer: MEDICARE

## 2022-05-27 NOTE — TELEPHONE ENCOUNTER
Spoke with Mr. Gregory to confirm his Monday 6/30 appointment. Pt stated he wanted to reschedule due to him  Bring his father to an appointment on the same day. Pt was given new appointment date and time.

## 2022-06-02 ENCOUNTER — OFFICE VISIT (OUTPATIENT)
Dept: UROLOGY | Facility: CLINIC | Age: 62
End: 2022-06-02
Payer: MEDICARE

## 2022-06-02 VITALS
HEIGHT: 71 IN | WEIGHT: 248.88 LBS | SYSTOLIC BLOOD PRESSURE: 146 MMHG | DIASTOLIC BLOOD PRESSURE: 93 MMHG | BODY MASS INDEX: 34.84 KG/M2

## 2022-06-02 DIAGNOSIS — N52.9 ERECTILE DYSFUNCTION, UNSPECIFIED ERECTILE DYSFUNCTION TYPE: ICD-10-CM

## 2022-06-02 DIAGNOSIS — N41.9 INFLAMMATORY DISEASE OF PROSTATE, UNSPECIFIED: ICD-10-CM

## 2022-06-02 DIAGNOSIS — N41.1 CHRONIC PROSTATITIS: ICD-10-CM

## 2022-06-02 DIAGNOSIS — C61 PROSTATE CANCER: Primary | ICD-10-CM

## 2022-06-02 PROCEDURE — 1159F PR MEDICATION LIST DOCUMENTED IN MEDICAL RECORD: ICD-10-PCS | Mod: CPTII,S$GLB,, | Performed by: UROLOGY

## 2022-06-02 PROCEDURE — 99999 PR PBB SHADOW E&M-EST. PATIENT-LVL IV: ICD-10-PCS | Mod: PBBFAC,,, | Performed by: UROLOGY

## 2022-06-02 PROCEDURE — 3077F SYST BP >= 140 MM HG: CPT | Mod: CPTII,S$GLB,, | Performed by: UROLOGY

## 2022-06-02 PROCEDURE — 3080F PR MOST RECENT DIASTOLIC BLOOD PRESSURE >= 90 MM HG: ICD-10-PCS | Mod: CPTII,S$GLB,, | Performed by: UROLOGY

## 2022-06-02 PROCEDURE — 99999 PR PBB SHADOW E&M-EST. PATIENT-LVL IV: CPT | Mod: PBBFAC,,, | Performed by: UROLOGY

## 2022-06-02 PROCEDURE — 3080F DIAST BP >= 90 MM HG: CPT | Mod: CPTII,S$GLB,, | Performed by: UROLOGY

## 2022-06-02 PROCEDURE — 1160F RVW MEDS BY RX/DR IN RCRD: CPT | Mod: CPTII,S$GLB,, | Performed by: UROLOGY

## 2022-06-02 PROCEDURE — 99213 OFFICE O/P EST LOW 20 MIN: CPT | Mod: S$GLB,,, | Performed by: UROLOGY

## 2022-06-02 PROCEDURE — 99213 PR OFFICE/OUTPT VISIT, EST, LEVL III, 20-29 MIN: ICD-10-PCS | Mod: S$GLB,,, | Performed by: UROLOGY

## 2022-06-02 PROCEDURE — 3008F BODY MASS INDEX DOCD: CPT | Mod: CPTII,S$GLB,, | Performed by: UROLOGY

## 2022-06-02 PROCEDURE — 3077F PR MOST RECENT SYSTOLIC BLOOD PRESSURE >= 140 MM HG: ICD-10-PCS | Mod: CPTII,S$GLB,, | Performed by: UROLOGY

## 2022-06-02 PROCEDURE — 1160F PR REVIEW ALL MEDS BY PRESCRIBER/CLIN PHARMACIST DOCUMENTED: ICD-10-PCS | Mod: CPTII,S$GLB,, | Performed by: UROLOGY

## 2022-06-02 PROCEDURE — 3008F PR BODY MASS INDEX (BMI) DOCUMENTED: ICD-10-PCS | Mod: CPTII,S$GLB,, | Performed by: UROLOGY

## 2022-06-02 PROCEDURE — 1159F MED LIST DOCD IN RCRD: CPT | Mod: CPTII,S$GLB,, | Performed by: UROLOGY

## 2022-06-02 RX ORDER — TADALAFIL 20 MG/1
20 TABLET ORAL
Qty: 30 TABLET | Refills: 9 | Status: SHIPPED | OUTPATIENT
Start: 2022-06-02 | End: 2023-11-08

## 2022-06-02 NOTE — PROGRESS NOTES
Urology - [facility]  Clinic Note    SUBJECTIVE:     Chief Complaint: elevated PSA    History of Present Illness:  Bri Santiago is a 61 y.o. male who presents for f/u with a questionable history of prostate cancer diagnosed and treated with XRT ~ 2004 at [facility]. Recent PSA on 5/26 was up to 3.9 from 1.5, 7 months ago. He is c/o for perineal pain for the past 7-8months along with painful ejaculations and dysuria. He does endorse a prior hx of chronic prostatitis. Denies recent fevers, chills, n/v/d, flank pain, penile discharge, hematuria and hematospermia.     He does have ED. Currently on Cialis with mixed results. Overall satisfied and would like to continue treatment.    Does have a hx of Von Willebrand disease requiring DDAVP administration prior to undergoing procedures.     PATIENT HISTORY:  Past Medical History:   Diagnosis Date    Acute pancreatitis     Anal fissure     Anemia     Anticoagulant long-term use     Arthritis     Asthma in remission     Back pain     BPH (benign prostatic hypertrophy)     Cancer 2000    prostate- treated at [facility] with chemo- in remission since 2000    Chronic maxillary sinusitis     Clotting disorder     Diastolic dysfunction with chronic heart failure 12/3/2018    Dysphagia 10/7/2014    Family history of colon cancer     Family history of early CAD     GERD (gastroesophageal reflux disease)     Helicobacter pylori (H. pylori) infection     Chronic    History of chronic pancreatitis     HTN (hypertension)     Lumbago 11/12/2012    Obesity     ABDON (obstructive sleep apnea)     ABDON (obstructive sleep apnea)     With likely ohs Refer to sleep    Prostate cancer 2000    dx and treated at [facility], had chemotherapy, in remission [id number]    Sacroiliac joint pain 2/10/2015    Spinal stenosis of lumbar region     Von Willebrand disease     VWD (acquired von Willebrand's disease)      Past Surgical History:   Procedure Laterality  Date    anal fissure repair      x2    BACK SURGERY  2012    BALLOON SINUPLASTY OF PARANASAL SINUS Bilateral 10/7/2019    Procedure: SINUPLASTY, USING BALLOON;  Surgeon: LAURENT Swanson MD;  Location: [facility] OR;  Service: ENT;  Laterality: Bilateral;    CARPAL TUNNEL RELEASE  2003    left hand    CATHETERIZATION OF BOTH LEFT AND RIGHT HEART N/A 2/14/2019    Procedure: CATHETERIZATION, HEART, BOTH LEFT AND RIGHT;  Surgeon: Kyle Magana MD;  Location: [facility] CATH LAB;  Service: Cardiology;  Laterality: N/A;    CERVICAL DISCECTOMY  2003    COLONOSCOPY N/A 10/7/2015    Procedure: COLONOSCOPY;  Surgeon: Rosendo Boyer MD;  Location: [facility] ENDO (4TH FLR);  Service: Endoscopy;  Laterality: N/A;  PM Prep    COLONOSCOPY N/A 6/19/2017    Procedure: COLONOSCOPY;  Surgeon: Rosendo Boyer MD;  Location: [facility] ENDO (4TH FLR);  Service: Endoscopy;  Laterality: N/A;  constipation prep (no DM no CHF)       hx of vonWillebrand's disease-will need infusion prior    COLONOSCOPY N/A 3/4/2020    Procedure: COLONOSCOPY;  Surgeon: Rosendo Boyer MD;  Location: [facility] (4TH FLR);  Service: Endoscopy;  Laterality: N/A;  Please order CBC, ferritin, Iron TIBC day of case per Dr. Boyer  labwork at 7:10am and patient will check in right after.  per Dr. Tyler patient can hold Plavix 5 days prior see note 1/7/20  DDAVP prior to procedure needed see note 1/16/20 for oncall med by Dr. Tyler -     ESOPHAGOGASTRODUODENOSCOPY N/A 3/4/2020    Procedure: EGD (ESOPHAGOGASTRODUODENOSCOPY);  Surgeon: Rosendo Boyer MD;  Location: [facility] ENDO (4TH FLR);  Service: Endoscopy;  Laterality: N/A;  EGD and Colonoscopy orders merged together  labwork at 7:10am and patient will check in right after.  DDAVP prior to procedure needed see note 1/16/20 for oncall med  per Dr. Tyler patient can hold Plavix 5 days prior see note 1/7/20    ESOPHAGOGASTRODUODENOSCOPY N/A 11/3/2020    Procedure: EGD (ESOPHAGOGASTRODUODENOSCOPY);  Surgeon: Rosendo CHAVEZ  MD Merlin;  Location: [facility] ENDO (2ND FLR);  Service: Endoscopy;  Laterality: N/A;  Please recall pt has von Willebrands and will require DDVAP infusion prior to procedure as previously done.    see telephone encounter on 10/1/20 regarding DDAVP   ok to hold Plavix 5 days prior per Dr.Blessey BUCKNER screening on 10/31/20 -rb    EXAMINATION UNDER ANESTHESIA N/A 3/15/2021    Procedure: EXAM UNDER ANESTHESIA;  Surgeon: Silvia Cazares MD;  Location: [facility] OR 2ND FLR;  Service: Colon and Rectal;  Laterality: N/A;    EXAMINATION UNDER ANESTHESIA N/A 2/25/2022    Procedure: EXAM UNDER ANESTHESIA, EXCISION ANAL PAPILLA;  Surgeon: Nadeem Grady MD;  Location: [facility] OR 2ND FLR;  Service: Colon and Rectal;  Laterality: N/A;    FUNCTIONAL ENDOSCOPIC SINUS SURGERY (FESS) USING COMPUTER-ASSISTED NAVIGATION Bilateral 10/7/2019    Procedure: FESS, USING COMPUTER-ASSISTED NAVIGATION;  Surgeon: LAURENT Swanson MD;  Location: [facility] OR;  Service: ENT;  Laterality: Bilateral;    INJECTION OF ANESTHETIC AGENT AROUND NERVE Bilateral 10/13/2020    Procedure: BLOCK, NERVE BILATERAL C4,C5,C6 Plavix clearance requested;  Surgeon: Fredy Magana MD;  Location: [facility] PAIN MGT;  Service: Pain Management;  Laterality: Bilateral;  BLOCK, NERVE BILATERAL C4,C5,C6  Plavix clearance ok, will require DDVAP infusion    LATERAL INTERNAL ANAL SPHINCTEROTOMY N/A 12/10/2021    Procedure: SPHINCTEROTOMY-LATERAL INTERNAL ANAL;  Surgeon: Nadeem Grady MD;  Location: [facility] OR 2ND FLR;  Service: Colon and Rectal;  Laterality: N/A;    LEFT HEART CATHETERIZATION Left 2/14/2019    Procedure: Left heart cath;  Surgeon: Kyle Magana MD;  Location: [facility] CATH LAB;  Service: Cardiology;  Laterality: Left;    LUMBAR FUSION  2012    RADIOFREQUENCY ABLATION Right 5/9/2019    Procedure: RADIOFREQUENCY ABLATION, RIGHT L3,L4,L5;  Surgeon: Fredy Magana MD;  Location: [facility] PAIN MGT;  Service: Pain Management;  Laterality: Right;  1 of 2  RT RFA  L3,4,5  PLAVIX clearance received, pt needs DDAVP    RADIOFREQUENCY ABLATION Left 5/23/2019    Procedure: RADIOFREQUENCY ABLATION, LEFT L3,L4,L5;  Surgeon: Fredy Magana MD;  Location: [facility] PAIN MGT;  Service: Pain Management;  Laterality: Left;  2 of 2  LT RFA L3,4,5  PLAVIX clearance received, pt needs DDAVP    RADIOFREQUENCY ABLATION Left 1/16/2020    Procedure: RADIOFREQUENCY ABLATION LEFT L3, L4, L5 MEDIAL BRANCH 1 OF 2 **PATIENT ARRIVING AT 8 AM**;  Surgeon: Fredy Magana MD;  Location: [facility] PAIN MGT;  Service: Pain Management;  Laterality: Left;  NEEDS CONSENT, PLAVIX CLEARANCE IN CHART    RADIOFREQUENCY ABLATION Right 1/28/2020    Procedure: RADIOFREQUENCY ABLATION, RIGHT L3-L4-L5 MEDIAL BRANCH 2 OF 2 (Left done on 1/16/20);  Surgeon: Fredy Magana MD;  Location: [facility] PAIN MGT;  Service: Pain Management;  Laterality: Right;    RADIOFREQUENCY ABLATION Left 8/18/2020    Procedure: RADIOFREQUENCY ABLATION, L3-L4-L5 MEDIAL BRANCH 1 OF 2 clear to hold plavix 7 days;  Surgeon: Fredy Magana MD;  Location: [facility] PAIN MGT;  Service: Pain Management;  Laterality: Left;    RADIOFREQUENCY ABLATION Right 9/1/2020    Procedure: RADIOFREQUENCY ABLATION, L3-L4-L5 MEDIAL BR ANCH 2 OF 2 clear to hol,d Plavix 7 days;  Surgeon: Fredy Magana MD;  Location: [facility] PAIN MGT;  Service: Pain Management;  Laterality: Right;    RADIOFREQUENCY ABLATION Bilateral 12/21/2021    Procedure: RADIOFREQUENCY ABLATION B/L L3,4,5 RFA (give dDAVP prior) NEEDS CONSENT plavix ok x7;  Surgeon: Fredy Magana MD;  Location: [facility] PAIN MGT;  Service: Pain Management;  Laterality: Bilateral;    RADIOFREQUENCY ABLATION OF LUMBAR MEDIAL BRANCH NERVE AT SINGLE LEVEL Left 5/24/2018    Procedure: RADIOFREQUENCY THERMOCOAGULATION (RFTC)-NERVE-MEDIAN BRANCH-LUMBAR;  Surgeon: Fredy Magana MD;  Location: [facility] PAIN MGT;  Service: Pain Management;  Laterality: Left;  Left RFA @ L3,4,5  [id number]  with IV Sedation    2 of  2    SPINE SURGERY      TONSILLECTOMY      at age 24    VASECTOMY  1996     Family History   Problem Relation Age of Onset    Colon cancer Father 67        colon cancer    Hypertension Father     Glaucoma Father     Cancer Father     Colon cancer Paternal Grandfather 65             Coronary artery disease Mother 45    Hypertension Mother     Heart disease Mother     Colon cancer Mother     Diabetes Mother     No Known Problems Brother     No Known Problems Sister     No Known Problems Daughter     No Known Problems Son     Coronary artery disease Brother 51    No Known Problems Daughter     No Known Problems Daughter     No Known Problems Son     No Known Problems Son     Colon cancer Paternal Uncle 65    Diabetes Mellitus Paternal Grandmother     Esophageal cancer Neg Hx      Social History     Socioeconomic History    Marital status:    Occupational History    Occupation: disabled due to back injury   Tobacco Use    Smoking status: Never Smoker    Smokeless tobacco: Never Used   Substance and Sexual Activity    Alcohol use: Yes     Alcohol/week: 1.0 standard drink     Types: 1 Glasses of wine per week     Comment: social    Drug use: No    Sexual activity: Yes     Partners: Female   Social History Narrative    wlking for exercised       Medications:  Outpatient Encounter Medications as of 6/2/2022   Medication Sig Dispense Refill    albuterol (PROVENTIL/VENTOLIN HFA) 90 mcg/actuation inhaler INHALE 2 PUFFS INTO THE LUNGS EVERY 6 HOURS AS NEEDED FOR WHEEZING OR SHORTNESS OF BREATH 18 g 4    atorvastatin (LIPITOR) 40 MG tablet Take 1 tablet (40 mg total) by mouth every evening. 90 tablet 3    azelastine (ASTELIN) 137 mcg (0.1 %) nasal spray Two sprays in each nostril, sniff until absorbed, then follow with 1 spray of fluticasone.  Use both sprays twice daily. (Patient taking differently: Two sprays in each nostril, sniff until absorbed, then follow with 1 spray of  fluticasone.  Use both sprays twice daily.(PATIENT USES NIGHTLY 3/12/2021)) 30 mL 11    budesonide-formoterol 160-4.5 mcg (SYMBICORT) 160-4.5 mcg/actuation HFAA INHALE 2 PUFFS INTO THE LUNGS EVERY 12 HOURS 10.2 g 12    calcium citrate-vitamin D3 315-200 mg (CITRACAL+D) 315-200 mg-unit per tablet Take 1 tablet by mouth nightly.       clopidogreL (PLAVIX) 75 mg tablet Take 1 tablet (75 mg total) by mouth once daily. 90 tablet 3    cyanocobalamin, vitamin B-12, 50 mcg tablet Take 50 mcg by mouth nightly.       diltiazem HCl (DILTIAZEM 2% CREAM) Apply topically 3 (three) times daily. Apply topically to anal area. 50 g 5    docusate sodium (COLACE) 100 MG capsule Take 1 tablet by mouth Twice daily. 1 Capsule Oral Twice a day .  Take with pain medicine      doxazosin (CARDURA) 1 MG tablet TAKE 1 TABLET BY MOUTH EVERY EVENING 90 tablet 3    ferrous gluconate (FERGON) 324 MG tablet Take 1 tablet (324 mg total) by mouth daily with breakfast. 90 tablet 1    fluticasone propionate (FLONASE) 50 mcg/actuation nasal spray One spray in each nostril twice daily after 1st using azelastine nasal spray 18.2 mL 11    furosemide (LASIX) 20 MG tablet Take 1 tablet (20 mg total) by mouth once daily. 90 tablet 3    hydrocortisone (ANUSOL-HC) 2.5 % rectal cream Place rectally 2 (two) times daily. 28 g 2    ipratropium (ATROVENT) 42 mcg (0.06 %) nasal spray 1-2 sprays in each nostril before eating and at bedtime as needed 30 mL 11    montelukast (SINGULAIR) 10 mg tablet TAKE 1 TABLET(10 MG) BY MOUTH EVERY EVENING 90 tablet 3    phentermine (ADIPEX-P) 37.5 mg tablet Take 1 tablet (37.5 mg total) by mouth once daily. 30 tablet 5    potassium chloride (MICRO-K) 10 MEQ CpSR Take 10 mEq by mouth once daily.       testosterone (ANDRODERM) 2 mg/24 hour PT24 APPLY 1 PATCH TOPICALLY TO THE SKIN EVERY DAY 60 patch 3    tiZANidine (ZANAFLEX) 2 MG tablet Take 2 tablets (4 mg total) by mouth every 6 (six) hours as needed. 20 tablet 0     zolpidem (AMBIEN) 10 mg Tab TAKE 1 TABLET BY MOUTH EVERY DAY AT BEDTIME (Patient taking differently: Take 10 mg by mouth nightly as needed.) 20 tablet 0    pantoprazole (PROTONIX) 40 MG tablet Take 1 tablet (40 mg total) by mouth every 12 (twelve) hours. 120 tablet 0    tadalafiL (CIALIS) 20 MG Tab Take 1 tablet (20 mg total) by mouth as needed. Take every 36 hours as needed for ED. 30 tablet 9    topiramate (TOPAMAX) 50 MG tablet TAKE 1 TABLET (50 MG TOTAL) BY MOUTH 2 (TWO) TIMES DAILY. 60 tablet 5    [DISCONTINUED] tadalafiL (CIALIS) 20 MG Tab Take 1 tablet (20 mg total) by mouth as needed. Take every 36 hours as needed for ED. 30 tablet 9    [DISCONTINUED] tamsulosin (FLOMAX) 0.4 mg Cap Take 1 capsule (0.4 mg total) by mouth once daily. 30 capsule 0     Facility-Administered Encounter Medications as of 6/2/2022   Medication Dose Route Frequency Provider Last Rate Last Admin    0.9%  NaCl infusion   Intravenous Continuous Milla Oliveira NP 70 mL/hr at 03/15/21 1417 New Bag at 03/15/21 1417    0.9%  NaCl infusion   Intravenous Continuous Milla Oliveira NP 70 mL/hr at 12/10/21 0736 New Bag at 12/10/21 0736    0.9%  NaCl infusion   Intravenous Continuous Jaqueline Langley NP        LIDOcaine (PF) 10 mg/ml (1%) injection 10 mg  1 mL Intradermal Once Jaqueline Langley NP        mupirocin 2 % ointment   Nasal On Call Procedure Milla Oliveira NP   Given at 12/10/21 0729    mupirocin 2 % ointment   Nasal On Call Procedure Jaqueline Langley NP   Given at 02/25/22 0623     Allergies:  Penicillins, Ace inhibitors, Aspirin, Codeine, and Dilaudid [hydromorphone]    Review of Systems:  Review of Systems   Constitutional: Negative for chills, fever and malaise/fatigue.   HENT: Negative for congestion and hearing loss.    Eyes: Negative for blurred vision.   Respiratory: Negative for cough and shortness of breath.    Cardiovascular: Negative for chest pain and palpitations.   Gastrointestinal:  "Negative for abdominal pain, blood in stool, constipation, diarrhea, nausea and vomiting.   Genitourinary: Negative for dysuria, flank pain and hematuria.   Musculoskeletal: Negative for back pain and myalgias.   Skin: Negative for rash.   Neurological: Negative for sensory change, focal weakness and headaches.   Endo/Heme/Allergies: Does not bruise/bleed easily.   Psychiatric/Behavioral: Negative for memory loss.       OBJECTIVE:     Estimated body mass index is 34.71 kg/m² as calculated from the following:    Height as of this encounter: 5' 11" (1.803 m).    Weight as of this encounter: 112.9 kg (248 lb 14.4 oz).    Vital Signs (Most Recent)  BP: (!) 146/93 (06/02/22 0841)    Physical Exam  Vitals and nursing note reviewed.   Constitutional:       Appearance: Normal appearance.   HENT:      Head: Atraumatic.      Nose: Nose normal.   Eyes:      Extraocular Movements: Extraocular movements intact.      Pupils: Pupils are equal, round, and reactive to light.   Cardiovascular:      Rate and Rhythm: Normal rate.   Pulmonary:      Effort: Pulmonary effort is normal.   Abdominal:      General: Abdomen is flat.   Genitourinary:     Comments: Prostate 40cc, smooth, non-nodular, diffusely tender to palpation  Musculoskeletal:         General: Normal range of motion.      Cervical back: Normal range of motion.   Neurological:      General: No focal deficit present.      Mental Status: He is alert and oriented to person, place, and time. Mental status is at baseline.   Psychiatric:         Mood and Affect: Mood normal.         Behavior: Behavior normal.         Thought Content: Thought content normal.         Judgment: Judgment normal.         Labs:  Lab Results   Component Value Date    BUN 14 02/14/2022    CREATININE 1.1 02/14/2022    WBC 7.32 02/14/2022    HGB 14.1 04/08/2022    HCT 44.7 02/14/2022     02/14/2022    AST 23 12/06/2021    ALT 25 12/06/2021    ALKPHOS 68 12/06/2021    ALBUMIN 3.8 12/06/2021    HGBA1C " 4.5 01/23/2020        Lab Results   Component Value Date    PSA 0.93 01/22/2021    PSA 0.96 02/06/2013    PSA 1.11 10/17/2012    PSA 1.21 01/31/2012    PSA 1.2 08/05/2011    PSA 1.1 10/07/2010    PSA 0.93 11/13/2009    PSA 2.1 03/25/2009    PSA 1.0 02/06/2008    PSA 1.1 03/20/2007    PSADIAG 3.9 05/26/2022    PSADIAG 1.5 10/22/2021    PSADIAG 0.80 01/23/2020    PSADIAG 0.98 05/03/2019    PSADIAG 1.5 10/16/2017    PSADIAG 1.4 10/20/2016    PSADIAG 0.97 09/15/2015    PSADIAG 1.2 11/10/2014    PSADIAG 1.6 09/22/2014    PSADIAG 0.97 10/14/2013         Imaging:  none    ASSESSMENT     1. Prostate cancer    2. Inflammatory disease of prostate, unspecified    3. Erectile dysfunction, unspecified erectile dysfunction type    4. Chronic prostatitis        PLAN:   Bri was seen today for elevated psa.    Diagnoses and all orders for this visit:    Prostate cancer    Inflammatory disease of prostate, unspecified  -     Cancel: MRI Prostate W W/O Contrast; Future  -     PROSTATE SPECIFIC ANTIGEN, DIAGNOSTIC; Future    Erectile dysfunction, unspecified erectile dysfunction type  -     tadalafiL (CIALIS) 20 MG Tab; Take 1 tablet (20 mg total) by mouth as needed. Take every 36 hours as needed for ED.    Chronic prostatitis    - Will RTC in 2 months with PSA, if still elevated above baseline will get a MRI of the prostate  - Rx for Cialis refilled    Darvin Hope MD     Letter to Cate Galeas MD

## 2022-06-06 ENCOUNTER — TELEPHONE (OUTPATIENT)
Dept: INTERNAL MEDICINE | Facility: CLINIC | Age: 62
End: 2022-06-06
Payer: MEDICARE

## 2022-06-06 DIAGNOSIS — Z85.46 HISTORY OF PROSTATE CANCER: Primary | ICD-10-CM

## 2022-06-06 NOTE — TELEPHONE ENCOUNTER
----- Message from Nicky Sen sent at 6/6/2022  3:29 PM CDT -----  Regarding: self [phone]  Type: Patient Call Back    Who called: self    What is the request in detail: patient would like to talk to provider he was not satisfied on urology doctor. He was given antibiotics and he should have gotten an MRI to check for cancer. He would like providers recommendation.   Patient also wanted to know if he was suppose to have an appt every 6 months or wait for the year to see providr.    Can the clinic reply by [facility]? no    Would the patient rather a call back or a response via [facility]?  Call back    Best call back number:[phone]

## 2022-06-06 NOTE — TELEPHONE ENCOUNTER
Returned pt's call.  Informed that PCP is out today, but will review message tomorrow. Also I do not see NOV preference in notes, will have PCP advise as well.

## 2022-06-08 ENCOUNTER — TELEPHONE (OUTPATIENT)
Dept: INTERNAL MEDICINE | Facility: CLINIC | Age: 62
End: 2022-06-08
Payer: MEDICARE

## 2022-06-08 ENCOUNTER — LAB VISIT (OUTPATIENT)
Dept: LAB | Facility: OTHER | Age: 62
End: 2022-06-08
Attending: INTERNAL MEDICINE
Payer: MEDICARE

## 2022-06-08 DIAGNOSIS — E29.1 HYPOGONADISM IN MALE: ICD-10-CM

## 2022-06-08 DIAGNOSIS — R94.8 ABNORMAL RESULTS OF FUNCTION STUDIES OF OTHER ORGANS AND SYSTEMS: ICD-10-CM

## 2022-06-08 LAB
BASOPHILS # BLD AUTO: 0.03 K/UL (ref 0–0.2)
BASOPHILS NFR BLD: 0.4 % (ref 0–1.9)
COMPLEXED PSA SERPL-MCNC: 2.2 NG/ML (ref 0–4)
DIFFERENTIAL METHOD: NORMAL
EOSINOPHIL # BLD AUTO: 0.2 K/UL (ref 0–0.5)
EOSINOPHIL NFR BLD: 2.8 % (ref 0–8)
ERYTHROCYTE [DISTWIDTH] IN BLOOD BY AUTOMATED COUNT: 13 % (ref 11.5–14.5)
HCT VFR BLD AUTO: 45 % (ref 40–54)
HGB BLD-MCNC: 14.4 G/DL (ref 14–18)
IMM GRANULOCYTES # BLD AUTO: 0.02 K/UL (ref 0–0.04)
IMM GRANULOCYTES NFR BLD AUTO: 0.2 % (ref 0–0.5)
LYMPHOCYTES # BLD AUTO: 1.9 K/UL (ref 1–4.8)
LYMPHOCYTES NFR BLD: 22.8 % (ref 18–48)
MCH RBC QN AUTO: 30.2 PG (ref 27–31)
MCHC RBC AUTO-ENTMCNC: 32 G/DL (ref 32–36)
MCV RBC AUTO: 94 FL (ref 82–98)
MONOCYTES # BLD AUTO: 0.6 K/UL (ref 0.3–1)
MONOCYTES NFR BLD: 6.8 % (ref 4–15)
NEUTROPHILS # BLD AUTO: 5.4 K/UL (ref 1.8–7.7)
NEUTROPHILS NFR BLD: 67 % (ref 38–73)
NRBC BLD-RTO: 0 /100 WBC
PLATELET # BLD AUTO: 258 K/UL (ref 150–450)
PMV BLD AUTO: 9.6 FL (ref 9.2–12.9)
RBC # BLD AUTO: 4.77 M/UL (ref 4.6–6.2)
TESTOST SERPL-MCNC: 442 NG/DL (ref 304–1227)
WBC # BLD AUTO: 8.12 K/UL (ref 3.9–12.7)

## 2022-06-08 PROCEDURE — 85025 COMPLETE CBC W/AUTO DIFF WBC: CPT | Performed by: INTERNAL MEDICINE

## 2022-06-08 PROCEDURE — 84153 ASSAY OF PSA TOTAL: CPT | Performed by: INTERNAL MEDICINE

## 2022-06-08 PROCEDURE — 36415 COLL VENOUS BLD VENIPUNCTURE: CPT | Performed by: INTERNAL MEDICINE

## 2022-06-08 PROCEDURE — 84403 ASSAY OF TOTAL TESTOSTERONE: CPT | Performed by: INTERNAL MEDICINE

## 2022-06-08 NOTE — TELEPHONE ENCOUNTER
----- Message from Yvette Tinsley sent at 6/8/2022  9:12 AM CDT -----  Regarding: Requesting a call back  Contact: PURNIMA IYER [[id number]]  Name of Who is Calling:PURNIMA IYER [[id number]]          What is the request in detail: Pt states he is requesting a call back, he states he is having lower body pain and needs to be seen today, Please advise.           Can the clinic reply by [facility]: No           What Number to Call Back [facility] not [facility]:[phone]

## 2022-06-08 NOTE — TELEPHONE ENCOUNTER
----- Message from Christie Richie sent at 6/7/2022  3:35 PM CDT -----  Regarding: call back  Name of Who is Calling: Bri           What is the request in detail: Patient stated the he is still waiting for the doctor to give him a call.            Can the clinic reply by [facility]: No           What [facility] to [facility] Back [facility] not [facility]: [phone]

## 2022-06-08 NOTE — TELEPHONE ENCOUNTER
----- Message from Yvette Tinsley sent at 6/8/2022  9:12 AM CDT -----  Regarding: Requesting a call back  Contact: PURNIMA IYER [[id number]]  Name of Who is Calling:PURNIMA IYER [[id number]]          What is the request in detail: Pt states he is requesting a call back, he states he is having lower body pain and needs to be seen today, Please advise.           Can the clinic reply by [facility]: No           What Number to Call Back if not in [facility]:[phone]

## 2022-06-08 NOTE — TELEPHONE ENCOUNTER
Returned pt's call.  Pt states he has ongoing and worsening groin/pelvic pain. He has a history of prostate cancer and feels this is the same pain he had previously. He has seen Dr. Reid recently for this and for elevated PSA and was told he should try abx first before having MRI to r/o cancer. Pt is very upset about this and feels he is wasting time in re: to potentially having cancer return.     Pt would like to see another urologist and soon. He wants Dr. Galeas's recommendations. Pt is concerned he will stop urinating as he did previously with his Prostate Cancer before and doesn't want to wait till he gets in that position.    Offered appt with available provider today in our office. Pt would like to only see Dr. Galeas or Dr. Lopez. Made appt tomorrow to see Dr. Lopez and will route message to PCP as promised to pt.

## 2022-06-08 NOTE — TELEPHONE ENCOUNTER
Pt expressed concerns about increased psa and his pain, concerned for prostate cancer recurrence.  PSA repeated today by another provider showed modest decline after one week on antibiotics. No change in pain level.   Agrees to repeat psa. Expect continued decline.   Pt agrees to continue antibiotics, cont watchful waiting for now.

## 2022-06-09 ENCOUNTER — HOSPITAL ENCOUNTER (OUTPATIENT)
Dept: RADIOLOGY | Facility: OTHER | Age: 62
Discharge: HOME | End: 2022-06-09
Attending: INTERNAL MEDICINE
Payer: MEDICARE

## 2022-06-09 ENCOUNTER — OFFICE VISIT (OUTPATIENT)
Dept: INTERNAL MEDICINE | Facility: CLINIC | Age: 62
End: 2022-06-09
Attending: INTERNAL MEDICINE
Payer: MEDICARE

## 2022-06-09 VITALS — BODY MASS INDEX: 34.29 KG/M2 | HEIGHT: 71 IN | WEIGHT: 244.94 LBS

## 2022-06-09 DIAGNOSIS — R10.30 LOWER ABDOMINAL PAIN: ICD-10-CM

## 2022-06-09 DIAGNOSIS — K21.00 ESOPHAGITIS, REFLUX: ICD-10-CM

## 2022-06-09 DIAGNOSIS — R10.31 RIGHT GROIN PAIN: Primary | ICD-10-CM

## 2022-06-09 DIAGNOSIS — N41.9 PROSTATITIS, UNSPECIFIED PROSTATITIS TYPE: ICD-10-CM

## 2022-06-09 DIAGNOSIS — R10.9 ABDOMINAL PAIN, UNSPECIFIED ABDOMINAL LOCATION: ICD-10-CM

## 2022-06-09 DIAGNOSIS — R10.31 RIGHT LOWER QUADRANT ABDOMINAL PAIN: ICD-10-CM

## 2022-06-09 DIAGNOSIS — R31.9 HEMATURIA, UNSPECIFIED TYPE: ICD-10-CM

## 2022-06-09 DIAGNOSIS — R10.31 RIGHT GROIN PAIN: ICD-10-CM

## 2022-06-09 PROCEDURE — 3008F BODY MASS INDEX DOCD: CPT | Mod: CPTII,S$GLB,, | Performed by: INTERNAL MEDICINE

## 2022-06-09 PROCEDURE — 1160F PR REVIEW ALL MEDS BY PRESCRIBER/CLIN PHARMACIST DOCUMENTED: ICD-10-PCS | Mod: CPTII,S$GLB,, | Performed by: INTERNAL MEDICINE

## 2022-06-09 PROCEDURE — 1159F PR MEDICATION LIST DOCUMENTED IN MEDICAL RECORD: ICD-10-PCS | Mod: CPTII,S$GLB,, | Performed by: INTERNAL MEDICINE

## 2022-06-09 PROCEDURE — 1160F RVW MEDS BY RX/DR IN RCRD: CPT | Mod: CPTII,S$GLB,, | Performed by: INTERNAL MEDICINE

## 2022-06-09 PROCEDURE — 3008F PR BODY MASS INDEX (BMI) DOCUMENTED: ICD-10-PCS | Mod: CPTII,S$GLB,, | Performed by: INTERNAL MEDICINE

## 2022-06-09 PROCEDURE — 99215 PR OFFICE/OUTPT VISIT, EST, LEVL V, 40-54 MIN: ICD-10-PCS | Mod: S$GLB,,, | Performed by: INTERNAL MEDICINE

## 2022-06-09 PROCEDURE — 1159F MED LIST DOCD IN RCRD: CPT | Mod: CPTII,S$GLB,, | Performed by: INTERNAL MEDICINE

## 2022-06-09 PROCEDURE — 99215 OFFICE O/P EST HI 40 MIN: CPT | Mod: S$GLB,,, | Performed by: INTERNAL MEDICINE

## 2022-06-09 PROCEDURE — 73502 X-RAY EXAM HIP UNI 2-3 VIEWS: CPT | Mod: TC,FY,RT

## 2022-06-09 PROCEDURE — 99999 PR PBB SHADOW E&M-EST. PATIENT-LVL IV: ICD-10-PCS | Mod: PBBFAC,,, | Performed by: INTERNAL MEDICINE

## 2022-06-09 PROCEDURE — 73502 XR HIP WITH PELVIS WHEN PERFORMED, 2 OR 3  VIEWS RIGHT: ICD-10-PCS | Mod: 26,RT,, | Performed by: RADIOLOGY

## 2022-06-09 PROCEDURE — 73502 X-RAY EXAM HIP UNI 2-3 VIEWS: CPT | Mod: 26,RT,, | Performed by: RADIOLOGY

## 2022-06-09 PROCEDURE — 99999 PR PBB SHADOW E&M-EST. PATIENT-LVL IV: CPT | Mod: PBBFAC,,, | Performed by: INTERNAL MEDICINE

## 2022-06-09 RX ORDER — ESOMEPRAZOLE MAGNESIUM 40 MG/1
40 CAPSULE, DELAYED RELEASE ORAL EVERY 12 HOURS
Qty: 60 CAPSULE | Refills: 3 | Status: SHIPPED | OUTPATIENT
Start: 2022-06-09 | End: 2022-11-23 | Stop reason: SDUPTHER

## 2022-06-09 NOTE — TELEPHONE ENCOUNTER
Called pt and scheduled PSA for next Thursday. Pt wants to keep appt with Dr. Lopez today as he is having increasing pain.

## 2022-06-09 NOTE — PROGRESS NOTES
Subjective:   Patient ID: Bri Santiago is a 61 y.o. male  Chief complaint:   Chief Complaint   Patient presents with    Groin Pain     Had prostate cancer in past; psa elevated recently     Pelvic Pain       HPI  Pcp: Dr. Galeas  Pt is not new to me     Here for UC appt for groin and pelvic pain first noticed 2 months ago   Was have rectal pain and bleeding at that time and was focusing on those symptoms - seen by crs as below     Currently with inguinal and suprapubic pain    Sharp pain right lower inguinal region   Unable to confirm if any skin changes or redness at this time     Reports hx Prostate cancer - treated with xrt and chemo but does not recall surgical intervention   psa inc and then repeated as on testosterone replacement therapy and improved after started on doxy for prostatitis   Would like 2nd opinion     hematochezia and anal pain: - underwent surgery by Dr. Grady Lateral Internal Anal Spincterotomy December 12, 2021  Surgery 2/25/2022 - exam under aneasthesia for rectal pain   - followed by CRS and  GI  1.  Hypertrophied anal papillae  2.  Mild internal hemorrhoidal disease  3.  Attenuated anal canal/margin mucosa/skin  Given anusol and helpful and then switched diltiazem    Seen by urology 5/26 - elevated psa at [facility] 6.14 (ref range 0-4) (5/23) and lab reviewed today   3 days later 5/26 3.9  - had tender prostate exam on chart check     Followed by pain mgt for lumbar spondylosis and back pain stable and under 'good' control at this time   Ct a/p 4/4/2021  MRI pelvis 2/19/2021  MRI lumbar spine 1/27/2021  back has not been bothersome     He has not noticed bulging in groin or lower abdomen    No popping or clicking in hips or lower back   In monog relationship   1 partner - wife  No hx of stds     2003 went to [employer] and could not urinate for 3 days and had lower abdominal and groin pain at that time   - had many tests - was told had prostate cancer - reports had 2nd bx and then  "negative for cancer   - Treated with xrt and chemo - pill   - had biopsy   - he tried to obtain records after this and was told 'good luck'   - reports prior to this ER visit had fluctuating PSA levels and was tx as prostatitis iwht fluctuating psa levels prior to this dx     Has significant family hx of prostate cancer   No fevers or chills     Noticed urine had pink tint to it a few weeks ago for several weeks - resolved   He is wondering if sx are in part due to renal stone   - is on plavix     Review of Systems    Objective:  Vitals:    06/09/22 1405   Weight: 111.1 kg (244 lb 14.9 oz)   Height: 5' 11" (1.803 m)     Body mass index is 34.16 kg/m².    Physical Exam  Vitals reviewed. Exam conducted with a chaperone present.   Constitutional:       Appearance: He is well-developed.   HENT:      Head: Normocephalic and atraumatic.   Eyes:      Extraocular Movements: Extraocular movements intact.      Conjunctiva/sclera: Conjunctivae normal.   Cardiovascular:      Rate and Rhythm: Normal rate and regular rhythm.      Pulses: Normal pulses.      Heart sounds: Normal heart sounds.   Pulmonary:      Effort: Pulmonary effort is normal.      Breath sounds: Normal breath sounds.   Abdominal:      General: Bowel sounds are normal. There is no distension.      Palpations: Abdomen is soft. There is no mass.      Tenderness: There is abdominal tenderness. There is no right CVA tenderness, left CVA tenderness, guarding or rebound.      Hernia: No hernia is present.          Comments: No flank ttp or HSM   Genitourinary:     Penis: Normal.       Testes: Normal.      Comments: No ttp of perineum   No ttp of spermatic cord   No focal mass palpated   Musculoskeletal:         General: No swelling or tenderness.      Cervical back: Neck supple.   Lymphadenopathy:      Cervical: No cervical adenopathy.   Skin:     General: Skin is warm and dry.   Neurological:      Mental Status: He is alert and oriented to person, place, and time. "   Psychiatric:         Behavior: Behavior normal.         Thought Content: Thought content normal.         Judgment: Judgment normal.         Assessment:  1. Right groin pain    2. Esophagitis, reflux    3. Hematuria, unspecified type    4. Prostatitis, unspecified prostatitis type        Plan:  Bri was seen today for groin pain and pelvic pain.    Diagnoses and all orders for this visit:    Right groin pain  -     Comprehensive Metabolic Panel; Future  -     Urinalysis Microscopic; Future  -     Urinalysis; Future  -     Urine culture; Future  -     X-Ray Hip 2 or 3 views Right (with Pelvis when performed); Future    Esophagitis, reflux  -     esomeprazole (NEXIUM) 40 MG capsule; Take 1 capsule (40 mg total) by mouth every 12 (twelve) hours. Take one pill every 12 hours about 45 minutes before breakfast and 45 minutes before dinner for 3 months.    Hematuria, unspecified type  -     Cytology, Urine; Future    Prostatitis, unspecified prostatitis type      Has had lower abd pain and suprapubic pain and right inguinal pain   - pt endorsed intermittent blood in urine - f/u with urology to discuss additional work up with cystoscopy  - check CT   - check labs as above     Intermittent blood in urine - he attrib urinary sx to seeing blood in stool as well and thought these may be related  Fist noticed 2 months ago   - pink colored urine - no blood clots   Stopped rectal bleeding in May towards beginning  - gross hematuria daily for a few weeks and then subsided a few weeks ago      Will f/u with me in 2 weeks for close f/u and re-eval  rtc and er prompts reviewed    56 min spent in care of patient including chart review, history, orders, physical, orders and coordination of care    Health Maintenance   Topic Date Due    DEXA Scan  07/22/2018    Aspirin/Antiplatelet Therapy  06/09/2023    Lipid Panel  07/08/2026    TETANUS VACCINE  10/16/2027    Hepatitis C Screening  Completed

## 2022-06-13 ENCOUNTER — TELEPHONE (OUTPATIENT)
Dept: INTERNAL MEDICINE | Facility: CLINIC | Age: 62
End: 2022-06-13
Payer: MEDICARE

## 2022-06-13 ENCOUNTER — TELEPHONE (OUTPATIENT)
Dept: UROLOGY | Facility: CLINIC | Age: 62
End: 2022-06-13
Payer: MEDICARE

## 2022-06-13 NOTE — TELEPHONE ENCOUNTER
----- Message from Abi Soriano LPN sent at 6/10/2022  1:35 PM CDT -----  I sent dr. Reid a message-pt upset states he has been waiting for the antibiotic to be sent to his pharmacy for prostatitis   ----- Message -----  From: Valery Morris  Sent: 6/10/2022   1:27 PM CDT  To: Franklin ARREAGA Jr Staff    Type: Patient Call Back    Who called:pt    What is the request in detail:pt requesting to speak to the nurse in regards to getting a medication but will not say which medication. Please call to assist patient.     Can the clinic reply by [facility]?    Would the patient rather a call back or a response via [facility]? call    Best call back number:[phone] (home)       Additional Information:

## 2022-06-15 ENCOUNTER — HOSPITAL ENCOUNTER (OUTPATIENT)
Dept: RADIOLOGY | Facility: HOSPITAL | Age: 62
Discharge: HOME | End: 2022-06-15
Attending: INTERNAL MEDICINE
Payer: MEDICARE

## 2022-06-15 ENCOUNTER — PATIENT MESSAGE (OUTPATIENT)
Dept: INTERNAL MEDICINE | Facility: CLINIC | Age: 62
End: 2022-06-15
Payer: MEDICARE

## 2022-06-15 DIAGNOSIS — R10.31 RIGHT GROIN PAIN: ICD-10-CM

## 2022-06-15 DIAGNOSIS — R31.9 HEMATURIA, UNSPECIFIED TYPE: ICD-10-CM

## 2022-06-15 DIAGNOSIS — R10.9 ABDOMINAL PAIN, UNSPECIFIED ABDOMINAL LOCATION: ICD-10-CM

## 2022-06-15 DIAGNOSIS — R10.30 LOWER ABDOMINAL PAIN: ICD-10-CM

## 2022-06-15 PROCEDURE — 74176 CT ABD & PELVIS W/O CONTRAST: CPT | Mod: TC

## 2022-06-15 PROCEDURE — 74176 CT ABD & PELVIS W/O CONTRAST: CPT | Mod: 26,,, | Performed by: RADIOLOGY

## 2022-06-15 PROCEDURE — 74176 CT RENAL STONE STUDY ABD PELVIS WO: ICD-10-PCS | Mod: 26,,, | Performed by: RADIOLOGY

## 2022-06-15 NOTE — TELEPHONE ENCOUNTER
Called and spoke directly to pt   Reviewed ct findings - renal stone on left   psa improved  tony cipro   Will update his urologist on findings

## 2022-06-23 ENCOUNTER — OFFICE VISIT (OUTPATIENT)
Dept: INTERNAL MEDICINE | Facility: CLINIC | Age: 62
End: 2022-06-23
Attending: INTERNAL MEDICINE
Payer: MEDICARE

## 2022-06-23 VITALS
SYSTOLIC BLOOD PRESSURE: 138 MMHG | DIASTOLIC BLOOD PRESSURE: 88 MMHG | OXYGEN SATURATION: 98 % | WEIGHT: 244.06 LBS | BODY MASS INDEX: 34.17 KG/M2 | HEIGHT: 71 IN | HEART RATE: 89 BPM

## 2022-06-23 DIAGNOSIS — N41.9 PROSTATITIS, UNSPECIFIED PROSTATITIS TYPE: ICD-10-CM

## 2022-06-23 DIAGNOSIS — N50.89 TESTICULAR SWELLING: Primary | ICD-10-CM

## 2022-06-23 DIAGNOSIS — N20.0 NEPHROLITHIASIS: ICD-10-CM

## 2022-06-23 PROCEDURE — 3075F PR MOST RECENT SYSTOLIC BLOOD PRESS GE 130-139MM HG: ICD-10-PCS | Mod: CPTII,S$GLB,, | Performed by: INTERNAL MEDICINE

## 2022-06-23 PROCEDURE — 3079F DIAST BP 80-89 MM HG: CPT | Mod: CPTII,S$GLB,, | Performed by: INTERNAL MEDICINE

## 2022-06-23 PROCEDURE — 99214 PR OFFICE/OUTPT VISIT, EST, LEVL IV, 30-39 MIN: ICD-10-PCS | Mod: S$GLB,,, | Performed by: INTERNAL MEDICINE

## 2022-06-23 PROCEDURE — 3079F PR MOST RECENT DIASTOLIC BLOOD PRESSURE 80-89 MM HG: ICD-10-PCS | Mod: CPTII,S$GLB,, | Performed by: INTERNAL MEDICINE

## 2022-06-23 PROCEDURE — 3075F SYST BP GE 130 - 139MM HG: CPT | Mod: CPTII,S$GLB,, | Performed by: INTERNAL MEDICINE

## 2022-06-23 PROCEDURE — 99999 PR PBB SHADOW E&M-EST. PATIENT-LVL V: ICD-10-PCS | Mod: PBBFAC,,, | Performed by: INTERNAL MEDICINE

## 2022-06-23 PROCEDURE — 1159F PR MEDICATION LIST DOCUMENTED IN MEDICAL RECORD: ICD-10-PCS | Mod: CPTII,S$GLB,, | Performed by: INTERNAL MEDICINE

## 2022-06-23 PROCEDURE — 99999 PR PBB SHADOW E&M-EST. PATIENT-LVL V: CPT | Mod: PBBFAC,,, | Performed by: INTERNAL MEDICINE

## 2022-06-23 PROCEDURE — 1159F MED LIST DOCD IN RCRD: CPT | Mod: CPTII,S$GLB,, | Performed by: INTERNAL MEDICINE

## 2022-06-23 PROCEDURE — 3008F BODY MASS INDEX DOCD: CPT | Mod: CPTII,S$GLB,, | Performed by: INTERNAL MEDICINE

## 2022-06-23 PROCEDURE — 99214 OFFICE O/P EST MOD 30 MIN: CPT | Mod: S$GLB,,, | Performed by: INTERNAL MEDICINE

## 2022-06-23 PROCEDURE — 3008F PR BODY MASS INDEX (BMI) DOCUMENTED: ICD-10-PCS | Mod: CPTII,S$GLB,, | Performed by: INTERNAL MEDICINE

## 2022-06-23 PROCEDURE — 1160F RVW MEDS BY RX/DR IN RCRD: CPT | Mod: CPTII,S$GLB,, | Performed by: INTERNAL MEDICINE

## 2022-06-23 PROCEDURE — 1160F PR REVIEW ALL MEDS BY PRESCRIBER/CLIN PHARMACIST DOCUMENTED: ICD-10-PCS | Mod: CPTII,S$GLB,, | Performed by: INTERNAL MEDICINE

## 2022-06-23 NOTE — PROGRESS NOTES
"Subjective:   Patient ID: Bri Santiago is a 61 y.o. male  Chief complaint:   Chief Complaint   Patient presents with    Groin Pain     F/u       HPI  Pt here for follow up and symptom recheck     Started cipro for prostatitis - c/w cipro   Has f/u with urology     Gross hematuria:   Reported at [facility] - none since then   Had CT scan - showed renal stone - sent message to urology  - on doxazosin and staying hydrated     PSA has continued to trend downward     Reports at times testicular enlargement right  > left   Had us for gynecomastia work up 2 years ago - unsure if was having symptoms at that time - but will get scrotal tenderness when this occurs   No drainage   No swelling today     Review of Systems    Objective:  Vitals:    06/23/22 1413   BP: 138/88   BP Location: Left arm   Patient Position: Sitting   Pulse: 89   SpO2: 98%   Weight: 110.7 kg (244 lb 0.8 oz)   Height: 5' 11" (1.803 m)     Body mass index is 34.04 kg/m².    Physical Exam  Vitals reviewed. Exam conducted with a chaperone present.   Constitutional:       Appearance: He is well-developed.   HENT:      Head: Normocephalic and atraumatic.   Eyes:      Extraocular Movements: Extraocular movements intact.      Conjunctiva/sclera: Conjunctivae normal.   Cardiovascular:      Rate and Rhythm: Normal rate and regular rhythm.      Pulses: Normal pulses.      Heart sounds: Normal heart sounds.   Pulmonary:      Effort: Pulmonary effort is normal.      Breath sounds: Normal breath sounds.   Abdominal:      General: Bowel sounds are normal. There is no distension.      Palpations: Abdomen is soft.      Tenderness: There is no right CVA tenderness, left CVA tenderness, guarding or rebound.      Hernia: No hernia is present.   Genitourinary:     Penis: Normal.       Testes: Normal.   Musculoskeletal:         General: No deformity or signs of injury.      Cervical back: Neck supple.   Lymphadenopathy:      Cervical: No cervical adenopathy.   Skin:     " General: Skin is warm and dry.      Capillary Refill: Capillary refill takes less than 2 seconds.   Neurological:      Mental Status: He is alert and oriented to person, place, and time. Mental status is at baseline.   Psychiatric:         Mood and Affect: Mood normal.         Behavior: Behavior normal.         Thought Content: Thought content normal.         Judgment: Judgment normal.         Assessment:  1. Testicular swelling    2. Nephrolithiasis    3. Prostatitis, unspecified prostatitis type        Plan:  Bri was seen today for groin pain.    Diagnoses and all orders for this visit:    Testicular swelling  -     US Scrotum And Testicles; Future    Nephrolithiasis  F/u with urology as planned   Gross hematuria resolved   Urine studies reviewed as well as CT today     Prostatitis, unspecified prostatitis type  psa has decreased - cont abx and urology f/u       Check US to ensure no changes anatom since prior   Cont cipro   Arrange f/u with urology in 2 weeks which will be 4 weeks since starting cipro for symptom recheck   rtc and er prompts reviewed      Health Maintenance   Topic Date Due    DEXA Scan  07/22/2018    Aspirin/Antiplatelet Therapy  06/23/2023    Lipid Panel  07/08/2026    TETANUS VACCINE  10/16/2027    Hepatitis C Screening  Completed

## 2022-06-29 ENCOUNTER — PATIENT MESSAGE (OUTPATIENT)
Dept: INTERNAL MEDICINE | Facility: CLINIC | Age: 62
End: 2022-06-29
Payer: MEDICARE

## 2022-06-30 ENCOUNTER — HOSPITAL ENCOUNTER (OUTPATIENT)
Dept: RADIOLOGY | Facility: OTHER | Age: 62
Discharge: HOME | End: 2022-06-30
Attending: INTERNAL MEDICINE
Payer: MEDICARE

## 2022-06-30 DIAGNOSIS — N50.89 TESTICULAR SWELLING: ICD-10-CM

## 2022-06-30 PROCEDURE — 76870 US SCROTUM AND TESTICLES: ICD-10-PCS | Mod: 26,,, | Performed by: RADIOLOGY

## 2022-06-30 PROCEDURE — 76870 US EXAM SCROTUM: CPT | Mod: TC

## 2022-06-30 PROCEDURE — 76870 US EXAM SCROTUM: CPT | Mod: 26,,, | Performed by: RADIOLOGY

## 2022-07-08 ENCOUNTER — LAB VISIT (OUTPATIENT)
Dept: LAB | Facility: HOSPITAL | Age: 62
End: 2022-07-08
Attending: INTERNAL MEDICINE
Payer: MEDICARE

## 2022-07-08 DIAGNOSIS — E61.1 IRON DEFICIENCY: ICD-10-CM

## 2022-07-08 LAB
FERRITIN SERPL-MCNC: 145 NG/ML (ref 20–300)
HGB BLD-MCNC: 14.1 G/DL (ref 14–18)
IRON SERPL-MCNC: 72 UG/DL (ref 45–160)
SATURATED IRON: 25 % (ref 20–50)
TOTAL IRON BINDING CAPACITY: 292 UG/DL (ref 250–450)
TRANSFERRIN SERPL-MCNC: 197 MG/DL (ref 200–375)

## 2022-07-08 PROCEDURE — 85018 HEMOGLOBIN: CPT | Performed by: INTERNAL MEDICINE

## 2022-07-08 PROCEDURE — 84466 ASSAY OF TRANSFERRIN: CPT | Performed by: INTERNAL MEDICINE

## 2022-07-08 PROCEDURE — 36415 COLL VENOUS BLD VENIPUNCTURE: CPT | Performed by: INTERNAL MEDICINE

## 2022-07-08 PROCEDURE — 82728 ASSAY OF FERRITIN: CPT | Performed by: INTERNAL MEDICINE

## 2022-07-15 ENCOUNTER — PATIENT MESSAGE (OUTPATIENT)
Dept: GASTROENTEROLOGY | Facility: CLINIC | Age: 62
End: 2022-07-15
Payer: MEDICARE

## 2022-07-15 ENCOUNTER — TELEPHONE (OUTPATIENT)
Dept: GASTROENTEROLOGY | Facility: CLINIC | Age: 62
End: 2022-07-15
Payer: MEDICARE

## 2022-07-15 NOTE — TELEPHONE ENCOUNTER
----- Message from Wen Hong sent at 7/15/2022 11:15 AM CDT -----  Regarding: Reschedule  Contact: pt @ [phone]  Pt need to reschedule due to a flat tire. Asking for a call back

## 2022-07-18 ENCOUNTER — TELEPHONE (OUTPATIENT)
Dept: INTERNAL MEDICINE | Facility: CLINIC | Age: 62
End: 2022-07-18
Payer: MEDICARE

## 2022-07-19 ENCOUNTER — OFFICE VISIT (OUTPATIENT)
Dept: UROLOGY | Facility: CLINIC | Age: 62
End: 2022-07-19
Payer: MEDICARE

## 2022-07-19 ENCOUNTER — LAB VISIT (OUTPATIENT)
Dept: LAB | Facility: HOSPITAL | Age: 62
End: 2022-07-19
Attending: UROLOGY
Payer: MEDICARE

## 2022-07-19 VITALS
HEART RATE: 88 BPM | WEIGHT: 247.56 LBS | DIASTOLIC BLOOD PRESSURE: 89 MMHG | HEIGHT: 71 IN | BODY MASS INDEX: 34.66 KG/M2 | SYSTOLIC BLOOD PRESSURE: 131 MMHG

## 2022-07-19 DIAGNOSIS — R10.2 PELVIC PAIN: ICD-10-CM

## 2022-07-19 DIAGNOSIS — R10.31 INGUINAL PAIN OF BOTH SIDES: ICD-10-CM

## 2022-07-19 DIAGNOSIS — C61 PROSTATE CANCER: Primary | ICD-10-CM

## 2022-07-19 DIAGNOSIS — R10.32 INGUINAL PAIN OF BOTH SIDES: ICD-10-CM

## 2022-07-19 LAB
CREAT SERPL-MCNC: 1.1 MG/DL (ref 0.5–1.4)
EST. GFR  (AFRICAN AMERICAN): >60 ML/MIN/1.73 M^2
EST. GFR  (NON AFRICAN AMERICAN): >60 ML/MIN/1.73 M^2

## 2022-07-19 PROCEDURE — 1160F PR REVIEW ALL MEDS BY PRESCRIBER/CLIN PHARMACIST DOCUMENTED: ICD-10-PCS | Mod: CPTII,S$GLB,, | Performed by: UROLOGY

## 2022-07-19 PROCEDURE — 82565 ASSAY OF CREATININE: CPT | Performed by: UROLOGY

## 2022-07-19 PROCEDURE — 3008F PR BODY MASS INDEX (BMI) DOCUMENTED: ICD-10-PCS | Mod: CPTII,S$GLB,, | Performed by: UROLOGY

## 2022-07-19 PROCEDURE — 36415 COLL VENOUS BLD VENIPUNCTURE: CPT | Performed by: UROLOGY

## 2022-07-19 PROCEDURE — 3075F PR MOST RECENT SYSTOLIC BLOOD PRESS GE 130-139MM HG: ICD-10-PCS | Mod: CPTII,S$GLB,, | Performed by: UROLOGY

## 2022-07-19 PROCEDURE — 3079F DIAST BP 80-89 MM HG: CPT | Mod: CPTII,S$GLB,, | Performed by: UROLOGY

## 2022-07-19 PROCEDURE — 3079F PR MOST RECENT DIASTOLIC BLOOD PRESSURE 80-89 MM HG: ICD-10-PCS | Mod: CPTII,S$GLB,, | Performed by: UROLOGY

## 2022-07-19 PROCEDURE — 99999 PR PBB SHADOW E&M-EST. PATIENT-LVL V: ICD-10-PCS | Mod: PBBFAC,,, | Performed by: UROLOGY

## 2022-07-19 PROCEDURE — 1159F MED LIST DOCD IN RCRD: CPT | Mod: CPTII,S$GLB,, | Performed by: UROLOGY

## 2022-07-19 PROCEDURE — 3075F SYST BP GE 130 - 139MM HG: CPT | Mod: CPTII,S$GLB,, | Performed by: UROLOGY

## 2022-07-19 PROCEDURE — 1159F PR MEDICATION LIST DOCUMENTED IN MEDICAL RECORD: ICD-10-PCS | Mod: CPTII,S$GLB,, | Performed by: UROLOGY

## 2022-07-19 PROCEDURE — 1160F RVW MEDS BY RX/DR IN RCRD: CPT | Mod: CPTII,S$GLB,, | Performed by: UROLOGY

## 2022-07-19 PROCEDURE — 99213 PR OFFICE/OUTPT VISIT, EST, LEVL III, 20-29 MIN: ICD-10-PCS | Mod: S$GLB,,, | Performed by: UROLOGY

## 2022-07-19 PROCEDURE — 3008F BODY MASS INDEX DOCD: CPT | Mod: CPTII,S$GLB,, | Performed by: UROLOGY

## 2022-07-19 PROCEDURE — 99999 PR PBB SHADOW E&M-EST. PATIENT-LVL V: CPT | Mod: PBBFAC,,, | Performed by: UROLOGY

## 2022-07-19 PROCEDURE — 99213 OFFICE O/P EST LOW 20 MIN: CPT | Mod: S$GLB,,, | Performed by: UROLOGY

## 2022-07-19 NOTE — H&P (VIEW-ONLY)
Subjective:       Patient ID: Bri Santiago is a 61 y.o. male.    Chief Complaint: No chief complaint on file.    HPI inguinal pain.  He has been treated with antibiotics and is not having dysuria etc..  He has had CT scans of the abdomen and pelvis along with scrotal ultrasounds.  He has a history of a cancer treated many years ago at [place] with XRT.  His PSA is less than 1  t continues having pelvic pain suprapubic pain.  Urinalysis is negative.  No fever chills nausea vomiting.  Workup has been negative    Past Medical History:   Diagnosis Date    Acute pancreatitis     Anal fissure     Anemia     Anticoagulant long-term use     Arthritis     Asthma in remission     Back pain     BPH (benign prostatic hypertrophy)     Cancer 2000    prostate- treated at [facility] with chemo- in remission since 2000    Chronic maxillary sinusitis     Clotting disorder     Diastolic dysfunction with chronic heart failure 12/3/2018    Dysphagia 10/7/2014    Family history of colon cancer     Family history of early CAD     GERD (gastroesophageal reflux disease)     Helicobacter pylori (H. pylori) infection     Chronic    History of chronic pancreatitis     HTN (hypertension)     Lumbago 11/12/2012    Obesity     ABDON (obstructive sleep apnea)     ABDON (obstructive sleep apnea)     With likely ohs Refer to sleep    Sacroiliac joint pain 2/10/2015    Spinal stenosis of lumbar region     Von Willebrand disease     VWD (acquired von Willebrand's disease)        Past Surgical History:   Procedure Laterality Date    anal fissure repair      x2    BACK SURGERY  2012    BALLOON SINUPLASTY OF PARANASAL SINUS Bilateral 10/7/2019    Procedure: SINUPLASTY, USING BALLOON;  Surgeon: LAURENT Swanson MD;  Location: [facility];  Service: ENT;  Laterality: Bilateral;    CARPAL TUNNEL RELEASE  2003    left hand    CATHETERIZATION OF BOTH LEFT AND RIGHT HEART N/A 2/14/2019    Procedure: CATHETERIZATION, HEART, BOTH LEFT  AND RIGHT;  Surgeon: Kyle Magana MD;  Location: [facility] CATH LAB;  Service: Cardiology;  Laterality: N/A;    CERVICAL DISCECTOMY  2003    COLONOSCOPY N/A 10/7/2015    Procedure: COLONOSCOPY;  Surgeon: Rosendo Boyer MD;  Location: [facility] (4TH FLR);  Service: Endoscopy;  Laterality: N/A;  PM Prep    COLONOSCOPY N/A 6/19/2017    Procedure: COLONOSCOPY;  Surgeon: Rosendo Boyer MD;  Location: [facility] ENDO (4TH FLR);  Service: Endoscopy;  Laterality: N/A;  constipation prep (no DM no CHF)       hx of vonWillebrand's disease-will need infusion prior    COLONOSCOPY N/A 3/4/2020    Procedure: COLONOSCOPY;  Surgeon: Rosendo Boyer MD;  Location: [facility] (4TH FLR);  Service: Endoscopy;  Laterality: N/A;  Please order CBC, ferritin, Iron TIBC day of case per Dr. Boyer  labwork at 7:10am and patient will check in right after.  per Dr. Tyler patient can hold Plavix 5 days prior see note 1/7/20  DDAVP prior to procedure needed see note 1/16/20 for oncall med by Dr. Tyler -     ESOPHAGOGASTRODUODENOSCOPY N/A 3/4/2020    Procedure: EGD (ESOPHAGOGASTRODUODENOSCOPY);  Surgeon: Rosendo Boyer MD;  Location: [facility] (4TH FLR);  Service: Endoscopy;  Laterality: N/A;  EGD and Colonoscopy orders merged together  labwork at 7:10am and patient will check in right after.  DDAVP prior to procedure needed see note 1/16/20 for oncall med  per Dr. Tyler patient can hold Plavix 5 days prior see note 1/7/20    ESOPHAGOGASTRODUODENOSCOPY N/A 11/3/2020    Procedure: EGD (ESOPHAGOGASTRODUODENOSCOPY);  Surgeon: Rosendo Boyer MD;  Location: [facility] (2ND FLR);  Service: Endoscopy;  Laterality: N/A;  Please recall pt has von Willebrands and will require DDVAP infusion prior to procedure as previously done.    see telephone encounter on 10/1/20 regarding DDAVP   ok to hold Plavix 5 days prior per Dr.Blessey BUCKNER screening on 10/31/20 -rb    EXAMINATION UNDER ANESTHESIA N/A 3/15/2021    Procedure: EXAM UNDER  ANESTHESIA;  Surgeon: Silvia Cazares MD;  Location: [facility] FLR;  Service: Colon and Rectal;  Laterality: N/A;    EXAMINATION UNDER ANESTHESIA N/A 2/25/2022    Procedure: EXAM UNDER ANESTHESIA, EXCISION ANAL PAPILLA;  Surgeon: Nadeem Grady MD;  Location: [facility] OR [facility] FLR;  Service: Colon and Rectal;  Laterality: N/A;    FUNCTIONAL ENDOSCOPIC SINUS SURGERY (FESS) USING COMPUTER-ASSISTED NAVIGATION Bilateral 10/7/2019    Procedure: FESS, USING COMPUTER-ASSISTED NAVIGATION;  Surgeon: LAURENT Swanson MD;  Location: [facility];  Service: ENT;  Laterality: Bilateral;    INJECTION OF ANESTHETIC AGENT AROUND NERVE Bilateral 10/13/2020    Procedure: BLOCK, NERVE BILATERAL C4,C5,C6 Plavix clearance requested;  Surgeon: Fredy Magana MD;  Location: [facility] PAIN MGT;  Service: Pain Management;  Laterality: Bilateral;  BLOCK, NERVE BILATERAL C4,C5,C6  Plavix clearance ok, will require DDVAP infusion    LATERAL INTERNAL ANAL SPHINCTEROTOMY N/A 12/10/2021    Procedure: SPHINCTEROTOMY-LATERAL INTERNAL ANAL;  Surgeon: Nadeem Grady MD;  Location: [facility] OR [facility] FLR;  Service: Colon and Rectal;  Laterality: N/A;    LEFT HEART CATHETERIZATION Left 2/14/2019    Procedure: Left heart cath;  Surgeon: Kyle Magana MD;  Location: [facility] CATH LAB;  Service: Cardiology;  Laterality: Left;    LUMBAR FUSION  2012    RADIOFREQUENCY ABLATION Right 5/9/2019    Procedure: RADIOFREQUENCY ABLATION, RIGHT L3,L4,L5;  Surgeon: Fredy Magana MD;  Location: [facility] PAIN MGT;  Service: Pain Management;  Laterality: Right;  1 of 2  RT RFA L3,4,5  PLAVIX clearance received, pt needs DDAVP    RADIOFREQUENCY ABLATION Left 5/23/2019    Procedure: RADIOFREQUENCY ABLATION, LEFT L3,L4,L5;  Surgeon: Fredy Magana MD;  Location: [facility] PAIN MGT;  Service: Pain Management;  Laterality: Left;  2 of 2  LT RFA L3,4,5  PLAVIX clearance received, pt needs DDAVP    RADIOFREQUENCY ABLATION Left 1/16/2020    Procedure: RADIOFREQUENCY ABLATION LEFT  L3, L4, L5 MEDIAL BRANCH 1 OF 2 **PATIENT ARRIVING AT 8 AM**;  Surgeon: Fredy Magana MD;  Location: [facility] PAIN MGT;  Service: Pain Management;  Laterality: Left;  NEEDS CONSENT, PLAVIX CLEARANCE IN CHART    RADIOFREQUENCY ABLATION Right 1/28/2020    Procedure: RADIOFREQUENCY ABLATION, RIGHT L3-L4-L5 MEDIAL BRANCH 2 OF 2 (Left done on 1/16/20);  Surgeon: Fredy Magana MD;  Location: [facility] PAIN MGT;  Service: Pain Management;  Laterality: Right;    RADIOFREQUENCY ABLATION Left 8/18/2020    Procedure: RADIOFREQUENCY ABLATION, L3-L4-L5 MEDIAL BRANCH 1 OF 2 clear to hold plavix 7 days;  Surgeon: Fredy Magana MD;  Location: [facility] PAIN MGT;  Service: Pain Management;  Laterality: Left;    RADIOFREQUENCY ABLATION Right 9/1/2020    Procedure: RADIOFREQUENCY ABLATION, L3-L4-L5 MEDIAL BR ANCH 2 OF 2 clear to hol,d Plavix 7 days;  Surgeon: Fredy Magana MD;  Location: [facility] PAIN MGT;  Service: Pain Management;  Laterality: Right;    RADIOFREQUENCY ABLATION Bilateral 12/21/2021    Procedure: RADIOFREQUENCY ABLATION B/L L3,4,5 RFA (give dDAVP prior) NEEDS CONSENT plavix ok x7;  Surgeon: Fredy Magana MD;  Location: [facility] PAIN MGT;  Service: Pain Management;  Laterality: Bilateral;    RADIOFREQUENCY ABLATION OF LUMBAR MEDIAL BRANCH NERVE AT SINGLE LEVEL Left 5/24/2018    Procedure: RADIOFREQUENCY THERMOCOAGULATION (RFTC)-NERVE-MEDIAN BRANCH-LUMBAR;  Surgeon: Fredy Magana MD;  Location: [facility] PAIN MGT;  Service: Pain Management;  Laterality: Left;  Left RFA @ L3,4,5  [id number]  with IV Sedation    2 of 2    SPINE SURGERY      TONSILLECTOMY      at age 24    VASECTOMY  1996       Family History   Problem Relation Age of Onset    Colon cancer Father 67        colon cancer    Hypertension Father     Glaucoma Father     Cancer Father     Colon cancer Paternal Grandfather 65             Coronary artery disease Mother 45    Hypertension Mother     Heart disease Mother     Colon cancer Mother      Diabetes Mother     No Known Problems Brother     No Known Problems Sister     No Known Problems Daughter     No Known Problems Son     Coronary artery disease Brother 51    No Known Problems Daughter     No Known Problems Daughter     No Known Problems Son     No Known Problems Son     Colon cancer Paternal Uncle 65    Diabetes Mellitus Paternal Grandmother     Esophageal cancer Neg Hx        Social History     Socioeconomic History    Marital status:    Occupational History    Occupation: disabled due to back injury   Tobacco Use    Smoking status: Never Smoker    Smokeless tobacco: Never Used   Substance and Sexual Activity    Alcohol use: Yes     Alcohol/week: 1.0 standard drink     Types: 1 Glasses of wine per week     Comment: social    Drug use: No    Sexual activity: Yes     Partners: Female   Social History Narrative    wlking for exercised       Allergies:  Penicillins, Ace inhibitors, Aspirin, Codeine, and Dilaudid [hydromorphone]    Medications:    Current Outpatient Medications:     albuterol (PROVENTIL/VENTOLIN HFA) 90 mcg/actuation inhaler, INHALE 2 PUFFS INTO THE LUNGS EVERY 6 HOURS AS NEEDED FOR WHEEZING OR SHORTNESS OF BREATH, Disp: 18 g, Rfl: 4    atorvastatin (LIPITOR) 40 MG tablet, Take 1 tablet (40 mg total) by mouth every evening., Disp: 90 tablet, Rfl: 3    azelastine (ASTELIN) 137 mcg (0.1 %) nasal spray, Two sprays in each nostril, sniff until absorbed, then follow with 1 spray of fluticasone.  Use both sprays twice daily. (Patient taking differently: Two sprays in each nostril, sniff until absorbed, then follow with 1 spray of fluticasone.  Use both sprays twice daily.(PATIENT USES NIGHTLY 3/12/2021)), Disp: 30 mL, Rfl: 11    budesonide-formoterol 160-4.5 mcg (SYMBICORT) 160-4.5 mcg/actuation HFAA, INHALE 2 PUFFS INTO THE LUNGS EVERY 12 HOURS, Disp: 10.2 g, Rfl: 12    calcium citrate-vitamin D3 315-200 mg (CITRACAL+D) 315-200 mg-unit per tablet, Take 1  tablet by mouth nightly. , Disp: , Rfl:     clopidogreL (PLAVIX) 75 mg tablet, Take 1 tablet (75 mg total) by mouth once daily., Disp: 90 tablet, Rfl: 3    cyanocobalamin, vitamin B-12, 50 mcg tablet, Take 50 mcg by mouth nightly. , Disp: , Rfl:     diltiazem HCl (DILTIAZEM 2% CREAM), Apply topically 3 (three) times daily. Apply topically to anal area., Disp: 50 g, Rfl: 5    docusate sodium (COLACE) 100 MG capsule, Take 1 tablet by mouth Twice daily. 1 Capsule Oral Twice a day .  Take with pain medicine, Disp: , Rfl:     doxazosin (CARDURA) 1 MG tablet, TAKE 1 TABLET BY MOUTH EVERY EVENING, Disp: 90 tablet, Rfl: 3    esomeprazole (NEXIUM) 40 MG capsule, Take 1 capsule (40 mg total) by mouth every 12 (twelve) hours. Take one pill every 12 hours about 45 minutes before breakfast and 45 minutes before dinner for 3 months., Disp: 60 capsule, Rfl: 3    ferrous gluconate (FERGON) 324 MG tablet, Take 1 tablet (324 mg total) by mouth daily with breakfast., Disp: 90 tablet, Rfl: 1    fluticasone propionate (FLONASE) 50 mcg/actuation nasal spray, One spray in each nostril twice daily after 1st using azelastine nasal spray, Disp: 18.2 mL, Rfl: 11    furosemide (LASIX) 20 MG tablet, Take 1 tablet (20 mg total) by mouth once daily., Disp: 90 tablet, Rfl: 3    hydrocortisone (ANUSOL-HC) 2.5 % rectal cream, Place rectally 2 (two) times daily., Disp: 28 g, Rfl: 2    ipratropium (ATROVENT) 42 mcg (0.06 %) nasal spray, 1-2 sprays in each nostril before eating and at bedtime as needed, Disp: 30 mL, Rfl: 11    montelukast (SINGULAIR) 10 mg tablet, TAKE 1 TABLET(10 MG) BY MOUTH EVERY EVENING, Disp: 90 tablet, Rfl: 3    phentermine (ADIPEX-P) 37.5 mg tablet, Take 1 tablet (37.5 mg total) by mouth once daily., Disp: 30 tablet, Rfl: 5    potassium chloride (MICRO-K) 10 MEQ CpSR, Take 10 mEq by mouth once daily. , Disp: , Rfl:     tadalafiL (CIALIS) 20 MG Tab, Take 1 tablet (20 mg total) by mouth as needed. Take every 36  hours as needed for ED., Disp: 30 tablet, Rfl: 9    testosterone (ANDRODERM) 2 mg/24 hour PT24, APPLY 1 PATCH TOPICALLY TO THE SKIN EVERY DAY, Disp: 60 patch, Rfl: 3    tiZANidine (ZANAFLEX) 2 MG tablet, Take 2 tablets (4 mg total) by mouth every 6 (six) hours as needed., Disp: 20 tablet, Rfl: 0    zolpidem (AMBIEN) 10 mg Tab, TAKE 1 TABLET BY MOUTH EVERY DAY AT BEDTIME (Patient taking differently: Take 10 mg by mouth nightly as needed.), Disp: 20 tablet, Rfl: 0    topiramate (TOPAMAX) 50 MG tablet, TAKE 1 TABLET (50 MG TOTAL) BY MOUTH 2 (TWO) TIMES DAILY., Disp: 60 tablet, Rfl: 5  No current facility-administered medications for this visit.    Facility-Administered Medications Ordered in Other Visits:     0.9%  NaCl infusion, , Intravenous, Continuous, Milla Oliveira NP, Last Rate: 70 mL/hr at 03/15/21 1417, New Bag at 03/15/21 1417    0.9%  NaCl infusion, , Intravenous, Continuous, Milla Oliveira NP, Last Rate: 70 mL/hr at 12/10/21 0736, New Bag at 12/10/21 0736    0.9%  NaCl infusion, , Intravenous, Continuous, Jaqueline Langley NP    LIDOcaine (PF) 10 mg/ml (1%) injection 10 mg, 1 mL, Intradermal, Once, Jaqueline Langley NP    mupirocin 2 % ointment, , Nasal, On Call Procedure, Milla Oliveira NP, Given at 12/10/21 0729    mupirocin 2 % ointment, , Nasal, On Call Procedure, Jaqueline Langley NP, Given at 02/25/22 0623    Review of Systems   Constitutional: Negative for activity change, appetite change, chills, diaphoresis, fatigue, fever and unexpected weight change.   HENT: Negative for congestion, dental problem, hearing loss, mouth sores, postnasal drip, rhinorrhea, sinus pressure and trouble swallowing.    Eyes: Negative for pain, discharge and itching.   Respiratory: Negative for apnea, cough, choking, chest tightness, shortness of breath and wheezing.    Cardiovascular: Negative for chest pain, palpitations and leg swelling.   Gastrointestinal: Negative for abdominal  distention, abdominal pain, anal bleeding, blood in stool, constipation, diarrhea, nausea, rectal pain and vomiting.   Endocrine: Negative for polydipsia and polyuria.   Genitourinary: Negative for decreased urine volume, difficulty urinating, dysuria, enuresis, flank pain, frequency, genital sores, hematuria, penile discharge, penile pain, penile swelling, scrotal swelling, testicular pain and urgency.        Pelvic in internal tenderness testes are sore to touch but normal on physical examination   Musculoskeletal: Negative for arthralgias, back pain and myalgias.   Skin: Negative for color change, rash and wound.   Neurological: Negative for dizziness, syncope, speech difficulty, light-headedness and headaches.   Hematological: Negative for adenopathy. Does not bruise/bleed easily.   Psychiatric/Behavioral: Negative for behavioral problems, confusion, hallucinations and sleep disturbance.       Objective:      Physical Exam  Constitutional:       Appearance: He is well-developed.   HENT:      Head: Normocephalic.   Cardiovascular:      Rate and Rhythm: Normal rate.   Pulmonary:      Effort: Pulmonary effort is normal.   Abdominal:      Palpations: Abdomen is soft.   Genitourinary:     Testes: Normal.      Prostate: Normal.      Rectum: Normal.      Comments: Prostate is 20 g and without nodules.  I do not feel any rectal fistula.  Testes unremarkable other he states there is sore to touch.  No inguinal or abdominal hernias are palpable  EPS is negative  Skin:     General: Skin is warm.   Neurological:      Mental Status: He is alert.         Assessment:       1. Prostate cancer    2. Pelvic pain    3. Inguinal pain of both sides        Plan:       Diagnoses and all orders for this visit:    Prostate cancer  -     Ambulatory referral/consult to Pain Clinic; Future    Pelvic pain  -     MRI Abdomen-Pelvis w w/o Contrast (XPD); Future  -     Creatinine, Serum; Future    Inguinal pain of both sides    Will wait MRI  and will get of consult with pain medicine

## 2022-07-19 NOTE — PROGRESS NOTES
Subjective:       Patient ID: Bri Santiago is a 61 y.o. male.    Chief Complaint: No chief complaint on file.    HPI inguinal pain.  He has been treated with antibiotics and is not having dysuria etc..  He has had CT scans of the abdomen and pelvis along with scrotal ultrasounds.  He has a history of a cancer treated many years ago at [place] with XRT.  His PSA is less than 1  t continues having pelvic pain suprapubic pain.  Urinalysis is negative.  No fever chills nausea vomiting.  Workup has been negative    Past Medical History:   Diagnosis Date    Acute pancreatitis     Anal fissure     Anemia     Anticoagulant long-term use     Arthritis     Asthma in remission     Back pain     BPH (benign prostatic hypertrophy)     Cancer 2000    prostate- [facility] at [facility] with chemo- in remission since 2000    Chronic maxillary sinusitis     Clotting disorder     Diastolic dysfunction with chronic heart failure 12/3/2018    Dysphagia 10/7/2014    Family history of colon cancer     Family history of early CAD     GERD (gastroesophageal reflux disease)     Helicobacter pylori (H. pylori) infection     Chronic    History of chronic pancreatitis     HTN (hypertension)     Lumbago 11/12/2012    Obesity     ABDON (obstructive sleep apnea)     ABDON (obstructive sleep apnea)     With likely ohs Refer to sleep    Sacroiliac joint pain 2/10/2015    Spinal stenosis of lumbar region     Von Willebrand disease     VWD (acquired von Willebrand's disease)        Past Surgical History:   Procedure Laterality Date    anal fissure repair      x2    BACK SURGERY  2012    BALLOON SINUPLASTY OF PARANASAL SINUS Bilateral 10/7/2019    Procedure: SINUPLASTY, USING BALLOON;  Surgeon: LAURENT Swanson MD;  Location: [facility];  Service: ENT;  Laterality: Bilateral;    CARPAL TUNNEL RELEASE  2003    left hand    CATHETERIZATION OF BOTH LEFT AND RIGHT HEART N/A 2/14/2019    Procedure: CATHETERIZATION, HEART, BOTH LEFT  AND RIGHT;  Surgeon: Kyle Magana MD;  Location: [facility] CATH LAB;  Service: Cardiology;  Laterality: N/A;    CERVICAL DISCECTOMY  2003    COLONOSCOPY N/A 10/7/2015    Procedure: COLONOSCOPY;  Surgeon: Rosendo Boyer MD;  Location: [facility] (4TH FLR);  Service: Endoscopy;  Laterality: N/A;  PM Prep    COLONOSCOPY N/A 6/19/2017    Procedure: COLONOSCOPY;  Surgeon: Rosendo Boyer MD;  Location: [facility] ENDO (4TH FLR);  Service: Endoscopy;  Laterality: N/A;  constipation prep (no DM no CHF)       hx of vonWillebrand's disease-will need infusion prior    COLONOSCOPY N/A 3/4/2020    Procedure: COLONOSCOPY;  Surgeon: Rosendo Boyer MD;  Location: [facility] (4TH FLR);  Service: Endoscopy;  Laterality: N/A;  Please order CBC, ferritin, Iron TIBC day of case per Dr. Boyer  labwork at 7:10am and patient will check in right after.  per Dr. Tyler patient can hold Plavix 5 days prior see note 1/7/20  DDAVP prior to procedure needed see note 1/16/20 for oncall med by Dr. Tyler -     ESOPHAGOGASTRODUODENOSCOPY N/A 3/4/2020    Procedure: EGD (ESOPHAGOGASTRODUODENOSCOPY);  Surgeon: Rosendo Boyer MD;  Location: [facility] (4TH FLR);  Service: Endoscopy;  Laterality: N/A;  EGD and Colonoscopy orders merged together  labwork at 7:10am and patient will check in right after.  DDAVP prior to procedure needed see note 1/16/20 for oncall med  per Dr. Tyler patient can hold Plavix 5 days prior see note 1/7/20    ESOPHAGOGASTRODUODENOSCOPY N/A 11/3/2020    Procedure: EGD (ESOPHAGOGASTRODUODENOSCOPY);  Surgeon: Rosendo Boyer MD;  Location: [facility] (2ND FLR);  Service: Endoscopy;  Laterality: N/A;  Please recall pt has von Willebrands and will require DDVAP infusion prior to procedure as previously done.    see telephone encounter on 10/1/20 regarding DDAVP   ok to hold Plavix 5 days prior per Dr.Blessey BUCKNER screening on 10/31/20 -rb    EXAMINATION UNDER ANESTHESIA N/A 3/15/2021    Procedure: EXAM UNDER  ANESTHESIA;  Surgeon: Silvia Cazares MD;  Location: [facility] FLR;  Service: Colon and Rectal;  Laterality: N/A;    EXAMINATION UNDER ANESTHESIA N/A 2/25/2022    Procedure: EXAM UNDER ANESTHESIA, EXCISION ANAL PAPILLA;  Surgeon: Nadeem Grady MD;  Location: [facility] OR [facility] FLR;  Service: Colon and Rectal;  Laterality: N/A;    FUNCTIONAL ENDOSCOPIC SINUS SURGERY (FESS) USING COMPUTER-ASSISTED NAVIGATION Bilateral 10/7/2019    Procedure: FESS, USING COMPUTER-ASSISTED NAVIGATION;  Surgeon: LAURENT Swanson MD;  Location: [facility];  Service: ENT;  Laterality: Bilateral;    INJECTION OF ANESTHETIC AGENT AROUND NERVE Bilateral 10/13/2020    Procedure: BLOCK, NERVE BILATERAL C4,C5,C6 Plavix clearance requested;  Surgeon: Fredy Magana MD;  Location: [facility] PAIN MGT;  Service: Pain Management;  Laterality: Bilateral;  BLOCK, NERVE BILATERAL C4,C5,C6  Plavix clearance ok, will require DDVAP infusion    LATERAL INTERNAL ANAL SPHINCTEROTOMY N/A 12/10/2021    Procedure: SPHINCTEROTOMY-LATERAL INTERNAL ANAL;  Surgeon: Nadeem Grady MD;  Location: [facility] OR [facility] FLR;  Service: Colon and Rectal;  Laterality: N/A;    LEFT HEART CATHETERIZATION Left 2/14/2019    Procedure: Left heart cath;  Surgeon: Kyle Magana MD;  Location: [facility] CATH LAB;  Service: Cardiology;  Laterality: Left;    LUMBAR FUSION  2012    RADIOFREQUENCY ABLATION Right 5/9/2019    Procedure: RADIOFREQUENCY ABLATION, RIGHT L3,L4,L5;  Surgeon: Fredy Magana MD;  Location: [facility] PAIN MGT;  Service: Pain Management;  Laterality: Right;  1 of 2  RT RFA L3,4,5  PLAVIX clearance received, pt needs DDAVP    RADIOFREQUENCY ABLATION Left 5/23/2019    Procedure: RADIOFREQUENCY ABLATION, LEFT L3,L4,L5;  Surgeon: Fredy Magana MD;  Location: [facility] PAIN MGT;  Service: Pain Management;  Laterality: Left;  2 of 2  LT RFA L3,4,5  PLAVIX clearance received, pt needs DDAVP    RADIOFREQUENCY ABLATION Left 1/16/2020    Procedure: RADIOFREQUENCY ABLATION LEFT  L3, L4, L5 MEDIAL BRANCH 1 OF 2 **PATIENT ARRIVING AT 8 AM**;  Surgeon: Ferdy Magana MD;  Location: [facility] PAIN MGT;  Service: Pain Management;  Laterality: Left;  NEEDS CONSENT, PLAVIX CLEARANCE IN CHART    RADIOFREQUENCY ABLATION Right 1/28/2020    Procedure: RADIOFREQUENCY ABLATION, RIGHT L3-L4-L5 MEDIAL BRANCH 2 OF 2 (Left done on 1/16/20);  Surgeon: Fredy Magana MD;  Location: [facility] PAIN MGT;  Service: Pain Management;  Laterality: Right;    RADIOFREQUENCY ABLATION Left 8/18/2020    Procedure: RADIOFREQUENCY ABLATION, L3-L4-L5 MEDIAL BRANCH 1 OF 2 clear to hold plavix 7 days;  Surgeon: Fredy Magana MD;  Location: [facility] PAIN MGT;  Service: Pain Management;  Laterality: Left;    RADIOFREQUENCY ABLATION Right 9/1/2020    Procedure: RADIOFREQUENCY ABLATION, L3-L4-L5 MEDIAL BR ANCH 2 OF 2 clear to hol,d Plavix 7 days;  Surgeon: Fredy Magana MD;  Location: [facility] PAIN MGT;  Service: Pain Management;  Laterality: Right;    RADIOFREQUENCY ABLATION Bilateral 12/21/2021    Procedure: RADIOFREQUENCY ABLATION B/L L3,4,5 RFA (give dDAVP prior) NEEDS CONSENT plavix ok x7;  Surgeon: Fredy Magana MD;  Location: [facility] PAIN MGT;  Service: Pain Management;  Laterality: Bilateral;    RADIOFREQUENCY ABLATION OF LUMBAR MEDIAL BRANCH NERVE AT SINGLE LEVEL Left 5/24/2018    Procedure: RADIOFREQUENCY THERMOCOAGULATION (RFTC)-NERVE-MEDIAN BRANCH-LUMBAR;  Surgeon: Fredy Magana MD;  Location: [facility] PAIN MGT;  Service: Pain Management;  Laterality: Left;  Left RFA @ L3,4,5  [id number]  with IV Sedation    2 of 2    SPINE SURGERY      TONSILLECTOMY      at age 24    VASECTOMY  1996       Family History   Problem Relation Age of Onset    Colon cancer Father 67        colon cancer    Hypertension Father     Glaucoma Father     Cancer Father     Colon cancer Paternal Grandfather 65             Coronary artery disease Mother 45    Hypertension Mother     Heart disease Mother     Colon cancer Mother      Diabetes Mother     No Known Problems Brother     No Known Problems Sister     No Known Problems Daughter     No Known Problems Son     Coronary artery disease Brother 51    No Known Problems Daughter     No Known Problems Daughter     No Known Problems Son     No Known Problems Son     Colon cancer Paternal Uncle 65    Diabetes Mellitus Paternal Grandmother     Esophageal cancer Neg Hx        Social History     Socioeconomic History    Marital status:    Occupational History    Occupation: disabled due to back injury   Tobacco Use    Smoking status: Never Smoker    Smokeless tobacco: Never Used   Substance and Sexual Activity    Alcohol use: Yes     Alcohol/week: 1.0 standard drink     Types: 1 Glasses of wine per week     Comment: social    Drug use: No    Sexual activity: Yes     Partners: Female   Social History Narrative    wlking for exercised       Allergies:  Penicillins, Ace inhibitors, Aspirin, Codeine, and Dilaudid [hydromorphone]    Medications:    Current Outpatient Medications:     albuterol (PROVENTIL/VENTOLIN HFA) 90 mcg/actuation inhaler, INHALE 2 PUFFS INTO THE LUNGS EVERY 6 HOURS AS NEEDED FOR WHEEZING OR SHORTNESS OF BREATH, Disp: 18 g, Rfl: 4    atorvastatin (LIPITOR) 40 MG tablet, Take 1 tablet (40 mg total) by mouth every evening., Disp: 90 tablet, Rfl: 3    azelastine (ASTELIN) 137 mcg (0.1 %) nasal spray, Two sprays in each nostril, sniff until absorbed, then follow with 1 spray of fluticasone.  Use both sprays twice daily. (Patient taking differently: Two sprays in each nostril, sniff until absorbed, then follow with 1 spray of fluticasone.  Use both sprays twice daily.(PATIENT USES NIGHTLY 3/12/2021)), Disp: 30 mL, Rfl: 11    budesonide-formoterol 160-4.5 mcg (SYMBICORT) 160-4.5 mcg/actuation HFAA, INHALE 2 PUFFS INTO THE LUNGS EVERY 12 HOURS, Disp: 10.2 g, Rfl: 12    calcium citrate-vitamin D3 315-200 mg (CITRACAL+D) 315-200 mg-unit per tablet, Take 1  tablet by mouth nightly. , Disp: , Rfl:     clopidogreL (PLAVIX) 75 mg tablet, Take 1 tablet (75 mg total) by mouth once daily., Disp: 90 tablet, Rfl: 3    cyanocobalamin, vitamin B-12, 50 mcg tablet, Take 50 mcg by mouth nightly. , Disp: , Rfl:     diltiazem HCl (DILTIAZEM 2% CREAM), Apply topically 3 (three) times daily. Apply topically to anal area., Disp: 50 g, Rfl: 5    docusate sodium (COLACE) 100 MG capsule, Take 1 tablet by mouth Twice daily. 1 Capsule Oral Twice a day .  Take with pain medicine, Disp: , Rfl:     doxazosin (CARDURA) 1 MG tablet, TAKE 1 TABLET BY MOUTH EVERY EVENING, Disp: 90 tablet, Rfl: 3    esomeprazole (NEXIUM) 40 MG capsule, Take 1 capsule (40 mg total) by mouth every 12 (twelve) hours. Take one pill every 12 hours about 45 minutes before breakfast and 45 minutes before dinner for 3 months., Disp: 60 capsule, Rfl: 3    ferrous gluconate (FERGON) 324 MG tablet, Take 1 tablet (324 mg total) by mouth daily with breakfast., Disp: 90 tablet, Rfl: 1    fluticasone propionate (FLONASE) 50 mcg/actuation nasal spray, One spray in each nostril twice daily after 1st using azelastine nasal spray, Disp: 18.2 mL, Rfl: 11    furosemide (LASIX) 20 MG tablet, Take 1 tablet (20 mg total) by mouth once daily., Disp: 90 tablet, Rfl: 3    hydrocortisone (ANUSOL-HC) 2.5 % rectal cream, Place rectally 2 (two) times daily., Disp: 28 g, Rfl: 2    ipratropium (ATROVENT) 42 mcg (0.06 %) nasal spray, 1-2 sprays in each nostril before eating and at bedtime as needed, Disp: 30 mL, Rfl: 11    montelukast (SINGULAIR) 10 mg tablet, TAKE 1 TABLET(10 MG) BY MOUTH EVERY EVENING, Disp: 90 tablet, Rfl: 3    phentermine (ADIPEX-P) 37.5 mg tablet, Take 1 tablet (37.5 mg total) by mouth once daily., Disp: 30 tablet, Rfl: 5    potassium chloride (MICRO-K) 10 MEQ CpSR, Take 10 mEq by mouth once daily. , Disp: , Rfl:     tadalafiL (CIALIS) 20 MG Tab, Take 1 tablet (20 mg total) by mouth as needed. Take every 36  hours as needed for ED., Disp: 30 tablet, Rfl: 9    testosterone (ANDRODERM) 2 mg/24 hour PT24, APPLY 1 PATCH TOPICALLY TO THE SKIN EVERY DAY, Disp: 60 patch, Rfl: 3    tiZANidine (ZANAFLEX) 2 MG tablet, Take 2 tablets (4 mg total) by mouth every 6 (six) hours as needed., Disp: 20 tablet, Rfl: 0    zolpidem (AMBIEN) 10 mg Tab, TAKE 1 TABLET BY MOUTH EVERY DAY AT BEDTIME (Patient taking differently: Take 10 mg by mouth nightly as needed.), Disp: 20 tablet, Rfl: 0    topiramate (TOPAMAX) 50 MG tablet, TAKE 1 TABLET (50 MG TOTAL) BY MOUTH 2 (TWO) TIMES DAILY., Disp: 60 tablet, Rfl: 5  No current facility-administered medications for this visit.    Facility-Administered Medications Ordered in Other Visits:     0.9%  NaCl infusion, , Intravenous, Continuous, Milla Oliveira NP, Last Rate: 70 mL/hr at 03/15/21 1417, New Bag at 03/15/21 1417    0.9%  NaCl infusion, , Intravenous, Continuous, Milla Oliveira NP, Last Rate: 70 mL/hr at 12/10/21 0736, New Bag at 12/10/21 0736    0.9%  NaCl infusion, , Intravenous, Continuous, Jaqueline Langley NP    LIDOcaine (PF) 10 mg/ml (1%) injection 10 mg, 1 mL, Intradermal, Once, Jaqueline Langley NP    mupirocin 2 % ointment, , Nasal, On Call Procedure, Milla Oliveira NP, Given at 12/10/21 0729    mupirocin 2 % ointment, , Nasal, On Call Procedure, Jaqueline Langley NP, Given at 02/25/22 0623    Review of Systems   Constitutional: Negative for activity change, appetite change, chills, diaphoresis, fatigue, fever and unexpected weight change.   HENT: Negative for congestion, dental problem, hearing loss, mouth sores, postnasal drip, rhinorrhea, sinus pressure and trouble swallowing.    Eyes: Negative for pain, discharge and itching.   Respiratory: Negative for apnea, cough, choking, chest tightness, shortness of breath and wheezing.    Cardiovascular: Negative for chest pain, palpitations and leg swelling.   Gastrointestinal: Negative for abdominal  distention, abdominal pain, anal bleeding, blood in stool, constipation, diarrhea, nausea, rectal pain and vomiting.   Endocrine: Negative for polydipsia and polyuria.   Genitourinary: Negative for decreased urine volume, difficulty urinating, dysuria, enuresis, flank pain, frequency, genital sores, hematuria, penile discharge, penile pain, penile swelling, scrotal swelling, testicular pain and urgency.        Pelvic in internal tenderness testes are sore to touch but normal on physical examination   Musculoskeletal: Negative for arthralgias, back pain and myalgias.   Skin: Negative for color change, rash and wound.   Neurological: Negative for dizziness, syncope, speech difficulty, light-headedness and headaches.   Hematological: Negative for adenopathy. Does not bruise/bleed easily.   Psychiatric/Behavioral: Negative for behavioral problems, confusion, hallucinations and sleep disturbance.       Objective:      Physical Exam  Constitutional:       Appearance: He is well-developed.   HENT:      Head: Normocephalic.   Cardiovascular:      Rate and Rhythm: Normal rate.   Pulmonary:      Effort: Pulmonary effort is normal.   Abdominal:      Palpations: Abdomen is soft.   Genitourinary:     Testes: Normal.      Prostate: Normal.      Rectum: Normal.      Comments: Prostate is 20 g and without nodules.  I do not feel any rectal fistula.  Testes unremarkable other he states there is sore to touch.  No inguinal or abdominal hernias are palpable  EPS is negative  Skin:     General: Skin is warm.   Neurological:      Mental Status: He is alert.         Assessment:       1. Prostate cancer    2. Pelvic pain    3. Inguinal pain of both sides        Plan:       Diagnoses and all orders for this visit:    Prostate cancer  -     Ambulatory referral/consult to Pain Clinic; Future    Pelvic pain  -     MRI Abdomen-Pelvis w w/o Contrast (XPD); Future  -     Creatinine, Serum; Future    Inguinal pain of both sides    Will wait MRI  and will get of consult with pain medicine

## 2022-07-20 ENCOUNTER — TELEPHONE (OUTPATIENT)
Dept: UROLOGY | Facility: CLINIC | Age: 62
End: 2022-07-20
Payer: MEDICARE

## 2022-07-20 DIAGNOSIS — R10.2 PELVIC PAIN: Primary | ICD-10-CM

## 2022-07-20 NOTE — TELEPHONE ENCOUNTER
----- Message from Tyler Reid Jr., MD sent at 7/20/2022  7:14 AM CDT -----  Needs cysto rtg pyelogram under if sedation  Poss urs prn

## 2022-07-20 NOTE — TELEPHONE ENCOUNTER
Called pt to discuss treatment, recommendations per . poss URS, he verbalized understanding. Informed surgery scheduler would contact him to arrange. He voiced understanding.

## 2022-07-22 ENCOUNTER — HOSPITAL ENCOUNTER (OUTPATIENT)
Dept: RADIOLOGY | Facility: HOSPITAL | Age: 62
Discharge: HOME | End: 2022-07-22
Attending: UROLOGY
Payer: MEDICARE

## 2022-07-22 DIAGNOSIS — R10.2 PELVIC PAIN: ICD-10-CM

## 2022-07-22 PROCEDURE — 72197 MRI PELVIS W/O & W/DYE: CPT | Mod: TC

## 2022-07-22 PROCEDURE — 74183 MRI UROGRAPHY W W/O CONTRAST: ICD-10-PCS | Mod: 26,,, | Performed by: RADIOLOGY

## 2022-07-22 PROCEDURE — 74183 MRI ABD W/O CNTR FLWD CNTR: CPT | Mod: 26,,, | Performed by: RADIOLOGY

## 2022-07-22 PROCEDURE — 25500020 PHARM REV CODE 255: Performed by: UROLOGY

## 2022-07-22 PROCEDURE — 72197 MRI UROGRAPHY W W/O CONTRAST: ICD-10-PCS | Mod: 26,,, | Performed by: RADIOLOGY

## 2022-07-22 PROCEDURE — 72197 MRI PELVIS W/O & W/DYE: CPT | Mod: 26,,, | Performed by: RADIOLOGY

## 2022-07-22 PROCEDURE — A9585 GADOBUTROL INJECTION: HCPCS | Performed by: UROLOGY

## 2022-07-22 RX ORDER — GADOBUTROL 604.72 MG/ML
10 INJECTION INTRAVENOUS
Status: COMPLETED | OUTPATIENT
Start: 2022-07-22 | End: 2022-07-22

## 2022-07-22 RX ADMIN — GADOBUTROL 10 ML: 604.72 INJECTION INTRAVENOUS at 07:07

## 2022-08-02 ENCOUNTER — LAB VISIT (OUTPATIENT)
Dept: LAB | Facility: HOSPITAL | Age: 62
End: 2022-08-02
Attending: INTERNAL MEDICINE
Payer: MEDICARE

## 2022-08-02 ENCOUNTER — TELEPHONE (OUTPATIENT)
Dept: UROLOGY | Facility: CLINIC | Age: 62
End: 2022-08-02
Payer: MEDICARE

## 2022-08-02 DIAGNOSIS — N41.9 INFLAMMATORY DISEASE OF PROSTATE, UNSPECIFIED: ICD-10-CM

## 2022-08-02 LAB — COMPLEXED PSA SERPL-MCNC: 1.8 NG/ML (ref 0–4)

## 2022-08-02 PROCEDURE — 36415 COLL VENOUS BLD VENIPUNCTURE: CPT

## 2022-08-02 PROCEDURE — 84153 ASSAY OF PSA TOTAL: CPT

## 2022-08-04 ENCOUNTER — TELEPHONE (OUTPATIENT)
Dept: INTERNAL MEDICINE | Facility: CLINIC | Age: 62
End: 2022-08-04
Payer: MEDICARE

## 2022-08-04 ENCOUNTER — TELEPHONE (OUTPATIENT)
Dept: CARDIOLOGY | Facility: CLINIC | Age: 62
End: 2022-08-04
Payer: MEDICARE

## 2022-08-04 NOTE — TELEPHONE ENCOUNTER
Called pt to schedule pre-op visit, was able to find one in the pre-op clinic at [facility]. Pt understood, appointment scheduled for tomorrow at 1 pm with Dr. Ross Roper.

## 2022-08-04 NOTE — TELEPHONE ENCOUNTER
----- Message from Roshan Hong sent at 8/4/2022  2:18 PM CDT -----      Name of Who is Calling: PURNIMA IYER [[id number]]      What is the request in detail: Pt returned call to the office.Please contact to further discuss and advise.          Can the clinic reply by [facility]: N      What [facility]: [phone]

## 2022-08-04 NOTE — TELEPHONE ENCOUNTER
----- Message from Germán Francisco MD sent at 8/4/2022  1:31 PM CDT -----  I'd stop it before the cystoscopy to minimize bleeding.    ----- Message -----  From: Mariposa Spencer RN  Sent: 8/4/2022  12:21 PM CDT  To: Germán Francisco MD, #    Pt is scheduled for cystoscopy with retrograde pyelogram, ureteroscopy by Dr Reid 8/12/22  Request plavix instructions please

## 2022-08-04 NOTE — ANESTHESIA PAT ROS NOTE
08/04/2022  Bri Santiago is a 61 y.o., male.      Pre-op Assessment          Review of Systems  Anesthesia Hx:  No problems with previous Anesthesia  History of prior surgery of interest to airway management or planning: Previous anesthesia: General EUA 2/25/22 with general anesthesia.    Social:  Social Alcohol Use, Non-Smoker Rare ETOH     Hematology/Oncology:         -- Anemia: Hematology Comments: On Plavix   Hx long term use of anti-coagulant  Hx of iron deficiency anemia  Hx of Von Willebrand disease  Clotting disorder  Hx of acute pancreatitis Current/Recent Cancer. 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     EENT/Dental:   chronic allergic rhinitis Hx nasal septum deviation  Chronic maxillary sinus  Hx of sinus surgery   Cardiovascular:   Exercise tolerance: poor Hypertension CAD   hyperlipidemia Hx diastolic dysfunction with chronic heart failure   Pulmonary:   Asthma mild and asymptomatic Sleep Apnea C-pap recalled -- not replaced  Has multiple inhalers and nasal spray   Renal/:   BPH BPH with obstruction  Hx nocturia  ED  Pelvic pain   Hepatic/GI:   GERD Hx of hematochezia  Hx anal fissure   Musculoskeletal:   Arthritis  Osteoarthritis lumbar spine  osteopenia  Spinal stenosis lumbar region  Thoracic or lumbosacral neurititis   Hx of ablation L3-4-5 Spine Disorders: lumbar Degenerative disease    Neurological:   Neuromuscular Disease, Headaches myalgia  Chronic Pain Syndrome   Endocrine:   Hx vitamin D deficiency   Dermatological:   Hx dermititis   Psych:   Insomnia --- ambien prn            Anesthesia Assessment: Preoperative EQUATION    Planned Procedure: Procedure(s) (LRB):  CYSTOSCOPY, WITH RETROGRADE PYELOGRAM (Bilateral)  URETEROSCOPY (N/A)  Requested Anesthesia Type:Monitor Anesthesia Care  Surgeon: Tyler Reid Jr.,  MD  Service: Urology  Known or anticipated Date of Surgery:8/12/2022    Surgeon notes: reviewed    Electronic QUestionnaire Assessment completed via nurse interview with patient.        Triage considerations:       Previous anesthesia records:GETA  2/25/22    Last PCP note: within 3 months , within [facility] Has seen Dr Lopez most recently // Dr. Galeas  Subspecialty notes: Allergy, Cardiology: General, Endocrinology, Gastroenterology, Hematology/Oncology, Infectious Disease, Nephrology, Ortho, Pain Management, Pulmonary, Spine Service, Urology    Other important co-morbidities: GERD, HLD, HTN and ABDON      Diagnosis Date    Acute pancreatitis      Anal fissure      Anemia      Anticoagulant long-term use      Arthritis      Asthma in remission      Back pain      BPH (benign prostatic hypertrophy)      Cancer 2000     prostate- [facility] with chemo- in remission since 2000    Chronic maxillary sinusitis      Clotting disorder      Diastolic dysfunction with chronic heart failure 12/3/2018    Dysphagia 10/7/2014    Family history of colon cancer      Family history of early CAD      GERD (gastroesophageal reflux disease)      Helicobacter pylori (H. pylori) infection       Chronic    History of chronic pancreatitis      HTN (hypertension)      Lumbago 11/12/2012    Obesity      ABDON (obstructive sleep apnea)       With likely ohs Refer to sleep    Sacroiliac joint pain 2/10/2015    Spinal stenosis of lumbar region      Von Willebrand disease      VWD (acquired von Willebrand's disease)          Tests already available:  Available tests,  within 3 months , within [facility] .             Instructions given. (See in Nurse's note)    Optimization: denies any issues with anesthesia    Medical Opinion Indicated----- surgeon is requesting medical clearance from PCP--- messaged        Plan:    Testing:  None Needed   Pre-anesthesia  visit       Visit focus: none     Consultation:Patient's PCP  for a statement of optimization       Navigation:               Consults scheduled.             Results will be tracked by Preop Clinic.

## 2022-08-04 NOTE — TELEPHONE ENCOUNTER
----- Message from Mariposa Spencer RN sent at 8/4/2022 11:25 AM CDT -----  Pt is scheduled for cystoscopy, with retrograde pyelogram , ureteroscopy by Dr. Reid  8/12/22-----Surgeon  is requesting medical clearance for surgery   Needs plavix instructions as well

## 2022-08-05 ENCOUNTER — HOSPITAL ENCOUNTER (OUTPATIENT)
Dept: CARDIOLOGY | Facility: CLINIC | Age: 62
Discharge: HOME | End: 2022-08-05
Payer: MEDICARE

## 2022-08-05 DIAGNOSIS — Z01.818 PRE-OPERATIVE CLEARANCE: ICD-10-CM

## 2022-08-05 PROCEDURE — 93010 ELECTROCARDIOGRAM REPORT: CPT | Mod: 76,S$GLB,, | Performed by: INTERNAL MEDICINE

## 2022-08-05 PROCEDURE — 93010 EKG 12-LEAD: ICD-10-PCS | Mod: 76,S$GLB,, | Performed by: INTERNAL MEDICINE

## 2022-08-05 PROCEDURE — 93005 EKG 12-LEAD: ICD-10-PCS | Mod: 76,S$GLB,, | Performed by: NURSE PRACTITIONER

## 2022-08-05 PROCEDURE — 93005 ELECTROCARDIOGRAM TRACING: CPT | Mod: 76,S$GLB,, | Performed by: NURSE PRACTITIONER

## 2022-08-08 NOTE — PRE-PROCEDURE INSTRUCTIONS
Addendum: Patient was instructed to hold Plavix 1 week prior to his procedure by Cardiology- started holding it 08/05/2022 per patient.      Electronically signed by Khloe Childers NP at 8/7/2022  4:13 PM

## 2022-08-10 ENCOUNTER — TELEPHONE (OUTPATIENT)
Dept: INTERNAL MEDICINE | Facility: CLINIC | Age: 62
End: 2022-08-10
Payer: MEDICARE

## 2022-08-11 ENCOUNTER — TELEPHONE (OUTPATIENT)
Dept: UROLOGY | Facility: CLINIC | Age: 62
End: 2022-08-11
Payer: MEDICARE

## 2022-08-11 ENCOUNTER — TELEPHONE (OUTPATIENT)
Dept: INTERNAL MEDICINE | Facility: CLINIC | Age: 62
End: 2022-08-11
Payer: MEDICARE

## 2022-08-11 NOTE — TELEPHONE ENCOUNTER
----- Message from Cate Galeas MD sent at 8/8/2022  5:28 PM CDT -----  Please contact pt and request a recent BP reading, enter in chart and forward to me  If no recent reading need an RN BP check this week. Thanks!  ----- Message -----  From: Khloe Childers NP  Sent: 8/7/2022   4:13 PM CDT  To: Cate Galeas MD, Germán Francisco MD    I saw your patient on Friday- he is scheduled for cystoscopy with retrograde pyelogram on 8/12/2022.

## 2022-08-11 NOTE — TELEPHONE ENCOUNTER
Called pt to confirm arrival time of 8am for procedure on 8/12/2022. Gave pt NPO instructions and gave pt opportunity to ask questions. Pt verbalized understanding.     Pt was informed that only 1 person would be allowed to accompany them the morning of surgery.  Pt verbalized understanding.

## 2022-08-12 ENCOUNTER — ANESTHESIA (OUTPATIENT)
Dept: SURGERY | Facility: HOSPITAL | Age: 62
End: 2022-08-12
Payer: MEDICARE

## 2022-08-12 ENCOUNTER — PATIENT OUTREACH (OUTPATIENT)
Dept: ADMINISTRATIVE | Facility: HOSPITAL | Age: 62
End: 2022-08-12
Payer: MEDICARE

## 2022-08-12 ENCOUNTER — HOSPITAL ENCOUNTER (OUTPATIENT)
Facility: HOSPITAL | Age: 62
Discharge: HOME | End: 2022-08-12
Attending: UROLOGY | Admitting: UROLOGY
Payer: MEDICARE

## 2022-08-12 ENCOUNTER — ANESTHESIA EVENT (OUTPATIENT)
Dept: SURGERY | Facility: HOSPITAL | Age: 62
End: 2022-08-12
Payer: MEDICARE

## 2022-08-12 VITALS — SYSTOLIC BLOOD PRESSURE: 130 MMHG | DIASTOLIC BLOOD PRESSURE: 84 MMHG

## 2022-08-12 VITALS
SYSTOLIC BLOOD PRESSURE: 121 MMHG | RESPIRATION RATE: 38 BRPM | TEMPERATURE: 98 F | HEART RATE: 74 BPM | HEIGHT: 71 IN | WEIGHT: 250 LBS | DIASTOLIC BLOOD PRESSURE: 78 MMHG | OXYGEN SATURATION: 99 % | BODY MASS INDEX: 35 KG/M2

## 2022-08-12 DIAGNOSIS — R30.0 DYSURIA: ICD-10-CM

## 2022-08-12 DIAGNOSIS — N41.1 CHRONIC PROSTATITIS: Primary | ICD-10-CM

## 2022-08-12 DIAGNOSIS — N20.1 URETERAL STONE: ICD-10-CM

## 2022-08-12 PROCEDURE — 25000003 PHARM REV CODE 250: Performed by: NURSE ANESTHETIST, CERTIFIED REGISTERED

## 2022-08-12 PROCEDURE — 36000706: Performed by: UROLOGY

## 2022-08-12 PROCEDURE — 52005 CYSTO W/URTRL CATHJ: CPT | Mod: ,,, | Performed by: UROLOGY

## 2022-08-12 PROCEDURE — C1758 CATHETER, URETERAL: HCPCS | Performed by: UROLOGY

## 2022-08-12 PROCEDURE — 37000008 HC ANESTHESIA 1ST 15 MINUTES: Performed by: UROLOGY

## 2022-08-12 PROCEDURE — 71000044 HC DOSC ROUTINE RECOVERY FIRST HOUR: Performed by: UROLOGY

## 2022-08-12 PROCEDURE — 36000707: Performed by: UROLOGY

## 2022-08-12 PROCEDURE — 25000003 PHARM REV CODE 250: Performed by: ANESTHESIOLOGY

## 2022-08-12 PROCEDURE — 63600175 PHARM REV CODE 636 W HCPCS: Mod: JG | Performed by: ANESTHESIOLOGY

## 2022-08-12 PROCEDURE — 71000015 HC POSTOP RECOV 1ST HR: Performed by: UROLOGY

## 2022-08-12 PROCEDURE — 52005 PR CYSTOURETHROSCOPY,URETER CATHETER: ICD-10-PCS | Mod: ,,, | Performed by: UROLOGY

## 2022-08-12 PROCEDURE — 37000009 HC ANESTHESIA EA ADD 15 MINS: Performed by: UROLOGY

## 2022-08-12 PROCEDURE — D9220A PRA ANESTHESIA: Mod: ANES,,, | Performed by: ANESTHESIOLOGY

## 2022-08-12 PROCEDURE — 25000003 PHARM REV CODE 250: Performed by: NURSE PRACTITIONER

## 2022-08-12 PROCEDURE — 63600175 PHARM REV CODE 636 W HCPCS: Performed by: NURSE ANESTHETIST, CERTIFIED REGISTERED

## 2022-08-12 PROCEDURE — C1769 GUIDE WIRE: HCPCS | Performed by: UROLOGY

## 2022-08-12 PROCEDURE — 74420 PR  X-RAY RETROGRADE PYELOGRAM: ICD-10-PCS | Mod: 26,,, | Performed by: UROLOGY

## 2022-08-12 PROCEDURE — 74420 UROGRAPHY RTRGR +-KUB: CPT | Mod: 26,,, | Performed by: UROLOGY

## 2022-08-12 PROCEDURE — D9220A PRA ANESTHESIA: ICD-10-PCS | Mod: ANES,,, | Performed by: ANESTHESIOLOGY

## 2022-08-12 PROCEDURE — 25000003 PHARM REV CODE 250: Performed by: STUDENT IN AN ORGANIZED HEALTH CARE EDUCATION/TRAINING PROGRAM

## 2022-08-12 PROCEDURE — 25500020 PHARM REV CODE 255: Performed by: UROLOGY

## 2022-08-12 PROCEDURE — D9220A PRA ANESTHESIA: ICD-10-PCS | Mod: CRNA,,, | Performed by: NURSE ANESTHETIST, CERTIFIED REGISTERED

## 2022-08-12 PROCEDURE — D9220A PRA ANESTHESIA: Mod: CRNA,,, | Performed by: NURSE ANESTHETIST, CERTIFIED REGISTERED

## 2022-08-12 RX ORDER — FENTANYL CITRATE 50 UG/ML
INJECTION, SOLUTION INTRAMUSCULAR; INTRAVENOUS
Status: DISCONTINUED | OUTPATIENT
Start: 2022-08-12 | End: 2022-08-12

## 2022-08-12 RX ORDER — MIDAZOLAM HYDROCHLORIDE 1 MG/ML
INJECTION, SOLUTION INTRAMUSCULAR; INTRAVENOUS
Status: DISCONTINUED | OUTPATIENT
Start: 2022-08-12 | End: 2022-08-12

## 2022-08-12 RX ORDER — PROPOFOL 10 MG/ML
VIAL (ML) INTRAVENOUS
Status: DISCONTINUED | OUTPATIENT
Start: 2022-08-12 | End: 2022-08-12

## 2022-08-12 RX ORDER — CEFAZOLIN SODIUM/WATER 2 G/20 ML
2 SYRINGE (ML) INTRAVENOUS
Status: COMPLETED | OUTPATIENT
Start: 2022-08-12 | End: 2022-08-12

## 2022-08-12 RX ORDER — PROPOFOL 10 MG/ML
VIAL (ML) INTRAVENOUS CONTINUOUS PRN
Status: DISCONTINUED | OUTPATIENT
Start: 2022-08-12 | End: 2022-08-12

## 2022-08-12 RX ORDER — SODIUM CHLORIDE 0.9 % (FLUSH) 0.9 %
10 SYRINGE (ML) INJECTION
Status: DISCONTINUED | OUTPATIENT
Start: 2022-08-12 | End: 2022-08-12 | Stop reason: HOSPADM

## 2022-08-12 RX ADMIN — DESMOPRESSIN ACETATE 20 MCG: 4 SOLUTION INTRAVENOUS at 09:08

## 2022-08-12 RX ADMIN — PROPOFOL 60 MG: 10 INJECTION, EMULSION INTRAVENOUS at 11:08

## 2022-08-12 RX ADMIN — SODIUM CHLORIDE: 0.9 INJECTION, SOLUTION INTRAVENOUS at 10:08

## 2022-08-12 RX ADMIN — PROPOFOL 50 MG: 10 INJECTION, EMULSION INTRAVENOUS at 11:08

## 2022-08-12 RX ADMIN — Medication 2 G: at 11:08

## 2022-08-12 RX ADMIN — MIDAZOLAM 2 MG: 1 INJECTION INTRAMUSCULAR; INTRAVENOUS at 11:08

## 2022-08-12 RX ADMIN — Medication 100 MCG/KG/MIN: at 11:08

## 2022-08-12 RX ADMIN — FENTANYL CITRATE 50 MCG: 50 INJECTION, SOLUTION INTRAMUSCULAR; INTRAVENOUS at 11:08

## 2022-08-12 RX ADMIN — LIDOCAINE HYDROCHLORIDE 50 ML: 10 INJECTION, SOLUTION EPIDURAL; INFILTRATION; INTRACAUDAL at 11:08

## 2022-08-12 NOTE — TRANSFER OF CARE
"Anesthesia Transfer of Care Note    Patient: Bri Santiago    Procedure(s) Performed: Procedure(s) (LRB):  CYSTOSCOPY  URETEROSCOPY (Bilateral)  PYELOGRAM, RETROGRADE (Bilateral)    Patient location: [facility]    Anesthesia Type: general    Transport from OR: Transported from OR on 6-10 L/min O2 by face mask with adequate spontaneous ventilation    Post pain: adequate analgesia    Post assessment: no apparent anesthetic complications    Post vital signs: stable    Level of consciousness: awake, alert and oriented    Nausea/Vomiting: no nausea/vomiting    Complications: none    Transfer of care protocol was followed      Last vitals:   Visit Vitals  /79 (BP Location: Left arm, Patient Position: Lying)   Pulse 86   Temp 36.7 °C (98.1 °F) (Oral)   Resp 16   Ht 5' 11" (1.803 m)   Wt 113.4 kg (250 lb)   SpO2 96%   BMI 34.87 kg/m²     "

## 2022-08-12 NOTE — PLAN OF CARE
Discharge instructions reviewed. Vitals stable. No nausea or vomiting. Pain at tolerable level. Consents are in chart. No questions or concerns at this time. Tolerating po fluids and voided >100 ml.

## 2022-08-12 NOTE — OP NOTE
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 12/17/2020    GERD (gastroesophageal reflux disease) 11/03/2020    Postnasal drip 09/24/2020    Obstructive sleep apnea treated with continuous positive airway pressure (CPAP) 06/11/2020    Iron deficiency anemia 03/04/2020    Gynecomastia, male 02/26/2020    Osteoarthritis of lumbar spine 01/28/2020    Nonintractable episodic headache 10/09/2019    Chronic maxillary sinusitis 10/07/2019    Chronic bilateral low back pain without sciatica 07/18/2019    Nocturia 06/14/2019    Scrotal swelling 06/14/2019    Chronic pain 05/23/2019    Family history of prostate cancer in father 05/03/2019    Class 1 obesity due to excess calories with serious comorbidity and body mass index (BMI) of 33.0 to 33.9 in adult 12/18/2018    Moderate persistent asthma 12/18/2018    Chronic pulmonary heart disease     Hypoxia     Diastolic dysfunction with chronic heart failure 12/03/2018    At risk for cardiovascular event 12/03/2018    Nasal septal deviation 11/19/2018    Dysphagia 11/19/2018    Laryngopharyngeal reflux (LPR) 11/19/2018    Hematochezia 06/19/2017    Encounter for long-term current use of high risk medication 11/17/2016    Gastroesophageal reflux disease with esophagitis 11/17/2016    Allergic rhinitis 05/11/2016    Allergic conjunctivitis of both eyes 05/11/2016    Osteopenia 04/08/2016    Thoracic aorta atherosclerosis 04/08/2016    Benign  prostatic hyperplasia with urinary obstruction 04/08/2016    Gastroesophageal reflux disease without esophagitis 06/11/2015    Postlaminectomy syndrome of lumbar region 02/10/2015    Myalgia and myositis 02/10/2015    Facet syndrome 02/10/2015    Encounter for monitoring long-term proton pump inhibitor therapy 10/07/2014    Atypical chest pain 08/27/2014    Vitamin D deficiency 05/01/2014    Family history of colon cancer 05/14/2013    Spondylosis without myelopathy 11/12/2012    Thoracic or lumbosacral neuritis or radiculitis, unspecified 11/12/2012    Spinal stenosis, lumbar region, without neurogenic claudication 11/12/2012    Degeneration of lumbar or lumbosacral intervertebral disc 11/12/2012    Acquired spondylolisthesis 11/12/2012    Pseudoarthrosis 11/12/2012    Primary hypertension     Von Willebrand disease 07/25/2012    Asthma in remission        Post-Op Diagnosis: same    Procedure(s) Performed:   1.  Cystoscopy with bilateral retrograde pyelogram  2.  Fluoroscopy < 1 hour  3.  Intra-operative interpretation of radiographic images    Specimen(s): none    Staff Surgeon: Tyler Reid    Assistant Surgeon: Toribio Preciado MD    Anesthesia: Monitored Local Anesthesia with Sedation    Indications: Bri Santiago is a 61 y.o. male with history of recurrent prostatitis and dysuria     Findings: Enlarged prostate with high riding bladder neck. Normal bladder mucosa. Bilateral retrogrades with no filling defects.    Estimated Blood Loss: min    Drains: none    Procedure in Detail:  After risks, benefits and possible complications of cystoscopy were explained, the patient elected to undergo the procedure and informed consent was obtained. All questions were answered in the marvin-operative area. The patient was transferred to the cystoscopy suite and placed in the supine position.  SCDs were applied and working.  MAC anesthesia was administered.  Once adequately sedated, the patient was placed in the dorsal lithotomy  position and prepped and draped in the usual sterile fashion.  Time out was performed, marvin-procedural antibiotics were confirmed.     A rigid cystoscope in a 22 Fr sheath was introduced into the bladder per urethra. This passed easily. The entire urethra was visualized which showed no masses or strictures.  The right and left ureteral orifices were identified in the normal anatomic position.  Formal cystoscopy was performed which revealed no masses or lesions suspicious for malignancy, no trabeculations, no bladder stones and no bladder diverticula.     The right UO was identified and cannulated with a 5 Fr  open-ended ureteral catheter. Using fluoroscopy, a RPG was performed which showed the above findings. This was then repeated on the left side, revealing the above findings.     The bladder was drained, and the patient was removed from lithotomy.     The patient tolerated the procedure well and was transferred to recovery in stable condition.    Disposition:  The patient will not need f/u. Please call the office prn    Toribio Preciado MD      I was present and agree w above  LPrats

## 2022-08-12 NOTE — DISCHARGE SUMMARY
[facility] - [facility] (1st Fl)  Discharge Note  Short Stay    Procedure(s) (LRB):  CYSTOSCOPY  URETEROSCOPY (Bilateral)  PYELOGRAM, RETROGRADE (Bilateral)    OUTCOME: Patient tolerated treatment/procedure well without complication and is now ready for discharge.    DISPOSITION: Home or Self Care    FINAL DIAGNOSIS:  Dysuria    FOLLOWUP: In clinic    DISCHARGE INSTRUCTIONS:    Discharge Procedure Orders   Notify your health care provider if you experience any of the following:  temperature >100.4     Notify your health care provider if you experience any of the following:  persistent nausea and vomiting or diarrhea     Notify your health care provider if you experience any of the following:  severe uncontrolled pain     Notify your health care provider if you experience any of the following:  redness, tenderness, or signs of infection (pain, swelling, redness, odor or green/yellow discharge around incision site)     Notify your health care provider if you experience any of the following:  worsening rash     Notify your health care provider if you experience any of the following:  persistent dizziness, light-headedness, or visual disturbances        TIME SPENT ON DISCHARGE: 15 minutes

## 2022-08-12 NOTE — ANESTHESIA PREPROCEDURE EVALUATION
08/12/2022  Bri Santiago is a 61 y.o., male with hx of von willebrand who receives DDAVP prior to procedure. Will order 20mcg and give preop.    Pre-operative evaluation for Procedure(s) (LRB):  CYSTOSCOPY  URETEROSCOPY (Bilateral)  PYELOGRAM, RETROGRADE (Bilateral)    Bri Santiago is a 61 y.o. male     Patient Active Problem List   Diagnosis    Asthma in remission    Von Willebrand disease    Primary hypertension    Spondylosis without myelopathy    Thoracic or lumbosacral neuritis or radiculitis, unspecified    Spinal stenosis, lumbar region, without neurogenic claudication    Degeneration of lumbar or lumbosacral intervertebral disc    Acquired spondylolisthesis    Pseudoarthrosis    Family history of colon cancer    Vitamin D deficiency    Atypical chest pain    Encounter for monitoring long-term proton pump inhibitor therapy    Postlaminectomy syndrome of lumbar region    Myalgia and myositis    Facet syndrome    Gastroesophageal reflux disease without esophagitis    Osteopenia    Thoracic aorta atherosclerosis    Benign prostatic hyperplasia with urinary obstruction    Allergic rhinitis    Allergic conjunctivitis of both eyes    Encounter for long-term current use of high risk medication    Gastroesophageal reflux disease with esophagitis    Hematochezia    Nasal septal deviation    Dysphagia    Laryngopharyngeal reflux (LPR)    Diastolic dysfunction with chronic heart failure    At risk for cardiovascular event    Hypoxia    Chronic pulmonary heart disease    Class 1 obesity due to excess calories with serious comorbidity and body mass index (BMI) of 33.0 to 33.9 in adult    Moderate persistent asthma    Family history of prostate cancer in father    Chronic pain    Nocturia    Scrotal swelling    Chronic bilateral low back pain without sciatica    Chronic  "maxillary sinusitis    Nonintractable episodic headache    Osteoarthritis of lumbar spine    Gynecomastia, male    Iron deficiency anemia    Obstructive sleep apnea treated with continuous positive airway pressure (CPAP)    Postnasal drip    GERD (gastroesophageal reflux disease)    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    Dry skin dermatitis    Globus sensation    Throat clearing    Non-occlusive coronary artery disease requiring drug therapy    Prostate cancer    Unspecified inflammatory spondylopathy, lumbar region    Erectile dysfunction    Chronic prostatitis       Review of patient's allergies indicates:   Allergen Reactions    Penicillins Hives and Swelling     Has had allergy testing and can prob tolerate penicillin    Ace inhibitors Other (See Comments)     "Caused a respiratory infection"    Aspirin Hives           Codeine Itching     Ok to take percocet    Dilaudid [hydromorphone] Itching       Current Facility-Administered Medications on File Prior to Encounter   Medication Dose Route Frequency Provider Last Rate Last Admin    0.9%  NaCl infusion   Intravenous Continuous Milla Oliveira NP 70 mL/hr at 03/15/21 1417 New Bag at 03/15/21 1417    0.9%  NaCl infusion   Intravenous Continuous Milla Oliveira NP 70 mL/hr at 12/10/21 0736 New Bag at 12/10/21 0736    0.9%  NaCl infusion   Intravenous Continuous Jaqueline Langley NP        LIDOcaine (PF) 10 mg/ml (1%) injection 10 mg  1 mL Intradermal Once Jaqueline Langley NP        mupirocin 2 % ointment   Nasal On Call Procedure Milla Oliveira NP   Given at 12/10/21 0729    mupirocin 2 % ointment   Nasal On Call Procedure Jaqueline Langley NP   Given at 02/25/22 0623     Current Outpatient Medications on File Prior to Encounter   Medication Sig Dispense Refill    albuterol (PROVENTIL/VENTOLIN HFA) 90 mcg/actuation inhaler INHALE 2 PUFFS INTO THE LUNGS EVERY 6 HOURS AS NEEDED FOR WHEEZING " OR SHORTNESS OF BREATH 18 g 4    atorvastatin (LIPITOR) 40 MG tablet Take 1 tablet (40 mg total) by mouth every evening. 90 tablet 3    azelastine (ASTELIN) 137 mcg (0.1 %) nasal spray Two sprays in each nostril, sniff until absorbed, then follow with 1 spray of fluticasone.  Use both sprays twice daily. (Patient taking differently: Two sprays in each nostril, sniff until absorbed, then follow with 1 spray of fluticasone.  Use both sprays twice daily.(PATIENT USES NIGHTLY 3/12/2021)) 30 mL 11    budesonide-formoterol 160-4.5 mcg (SYMBICORT) 160-4.5 mcg/actuation HFAA INHALE 2 PUFFS INTO THE LUNGS EVERY 12 HOURS 10.2 g 12    calcium citrate-vitamin D3 315-200 mg (CITRACAL+D) 315-200 mg-unit per tablet Take 1 tablet by mouth nightly.       clopidogreL (PLAVIX) 75 mg tablet Take 1 tablet (75 mg total) by mouth once daily. 90 tablet 3    docusate sodium (COLACE) 100 MG capsule Take 1 tablet by mouth Twice daily. 1 Capsule Oral Twice a day .  Take with pain medicine      doxazosin (CARDURA) 1 MG tablet TAKE 1 TABLET BY MOUTH EVERY EVENING 90 tablet 3    esomeprazole (NEXIUM) 40 MG capsule Take 1 capsule (40 mg total) by mouth every 12 (twelve) hours. Take one pill every 12 hours about 45 minutes before breakfast and 45 minutes before dinner for 3 months. 60 capsule 3    ferrous gluconate (FERGON) 324 MG tablet Take 1 tablet (324 mg total) by mouth daily with breakfast. 90 tablet 1    fluticasone propionate (FLONASE) 50 mcg/actuation nasal spray One spray in each nostril twice daily after 1st using azelastine nasal spray 18.2 mL 11    furosemide (LASIX) 20 MG tablet Take 1 tablet (20 mg total) by mouth once daily. 90 tablet 3    ipratropium (ATROVENT) 42 mcg (0.06 %) nasal spray 1-2 sprays in each nostril before eating and at bedtime as needed 30 mL 11    montelukast (SINGULAIR) 10 mg tablet TAKE 1 TABLET(10 MG) BY MOUTH EVERY EVENING 90 tablet 3    phentermine (ADIPEX-P) 37.5 mg tablet Take 1 tablet (37.5  mg total) by mouth once daily. 30 tablet 5    potassium chloride (MICRO-K) 10 MEQ CpSR Take 10 mEq by mouth once daily.       testosterone (ANDRODERM) 2 mg/24 hour PT24 APPLY 1 PATCH TOPICALLY TO THE SKIN EVERY DAY 60 patch 3    tiZANidine (ZANAFLEX) 2 MG tablet Take 2 tablets (4 mg total) by mouth every 6 (six) hours as needed. 20 tablet 0    topiramate (TOPAMAX) 50 MG tablet TAKE 1 TABLET (50 MG TOTAL) BY MOUTH 2 (TWO) TIMES DAILY. 60 tablet 5    cyanocobalamin, vitamin B-12, 50 mcg tablet Take 50 mcg by mouth nightly.       diltiazem HCl (DILTIAZEM 2% CREAM) Apply topically 3 (three) times daily. Apply topically to anal area. (Patient not taking: Reported on 8/5/2022) 50 g 5    hydrocortisone (ANUSOL-HC) 2.5 % rectal cream Place rectally 2 (two) times daily. 28 g 2    tadalafiL (CIALIS) 20 MG Tab Take 1 tablet (20 mg total) by mouth as needed. Take every 36 hours as needed for ED. 30 tablet 9    zolpidem (AMBIEN) 10 mg Tab TAKE 1 TABLET BY MOUTH EVERY DAY AT BEDTIME (Patient taking differently: Take 10 mg by mouth nightly as needed.) 20 tablet 0    [DISCONTINUED] tamsulosin (FLOMAX) 0.4 mg Cap Take 1 capsule (0.4 mg total) by mouth once daily. 30 capsule 0       Past Surgical History:   Procedure Laterality Date    anal fissure repair      x2    BACK SURGERY  2012    BALLOON SINUPLASTY OF PARANASAL SINUS Bilateral 10/7/2019    Procedure: SINUPLASTY, USING BALLOON;  Surgeon: LAURENT Swanson MD;  Location: [facility] [place] OR;  Service: ENT;  Laterality: Bilateral;    CARPAL TUNNEL RELEASE  2003    left hand    CATHETERIZATION OF BOTH LEFT AND RIGHT HEART N/A 2/14/2019    Procedure: CATHETERIZATION, HEART, BOTH LEFT AND RIGHT;  Surgeon: Kyle Magana MD;  Location: [facility] CATH LAB;  Service: Cardiology;  Laterality: N/A;    CERVICAL DISCECTOMY  2003    COLONOSCOPY N/A 10/7/2015    Procedure: COLONOSCOPY;  Surgeon: Rosendo Boyer MD;  [facility]  Service: Endoscopy;  Laterality: N/A;   PM Prep    COLONOSCOPY N/A 6/19/2017    Procedure: COLONOSCOPY;  Surgeon: Rosendo Boyer MD;  Location: [facility] (4TH FLR);  Service: Endoscopy;  Laterality: N/A;  constipation prep (no DM no CHF)       hx of vonWillebrand's disease-will need infusion prior    COLONOSCOPY N/A 3/4/2020    Procedure: COLONOSCOPY;  Surgeon: Rosendo Boyer MD;  Location: [facility] (4TH FLR);  Service: Endoscopy;  Laterality: N/A;  Please order CBC, ferritin, Iron TIBC day of case per Dr. Boyer  labwork at 7:10am and patient will check in right after.  per Dr. Tyler patient can hold Plavix 5 days prior see note 1/7/20  DDAVP prior to procedure needed see note 1/16/20 for oncall med by Dr. Tyler -     ESOPHAGOGASTRODUODENOSCOPY N/A 3/4/2020    Procedure: EGD (ESOPHAGOGASTRODUODENOSCOPY);  Surgeon: Rosendo Boyer MD;  Location: [facility] (4TH FLR);  Service: Endoscopy;  Laterality: N/A;  EGD and Colonoscopy orders merged together  labwork at 7:10am and patient will check in right after.  DDAVP prior to procedure needed see note 1/16/20 for oncall med  per Dr. Tyler patient can hold Plavix 5 days prior see note 1/7/20    ESOPHAGOGASTRODUODENOSCOPY N/A 11/3/2020    Procedure: EGD (ESOPHAGOGASTRODUODENOSCOPY);  Surgeon: Rosendo Boyer MD;  Location: [facility] (2ND FLR);  Service: Endoscopy;  Laterality: N/A;  Please recall pt has von Willebrands and will require DDVAP infusion prior to procedure as previously done.    see telephone encounter on 10/1/20 regarding DDAVP   ok to hold Plavix 5 days prior per Dr.Blessey BUCKNER screening on 10/31/20 -rb    EXAMINATION UNDER ANESTHESIA N/A 3/15/2021    Procedure: EXAM UNDER ANESTHESIA;  Surgeon: Silvia Cazares MD;  Location: [facility] OR 2ND FLR;  Service: Colon and Rectal;  Laterality: N/A;    EXAMINATION UNDER ANESTHESIA N/A 2/25/2022    Procedure: EXAM UNDER ANESTHESIA, EXCISION ANAL PAPILLA;  Surgeon: Nadeem Grady MD;  Location: [facility] OR 2ND [facility];  Service: Colon and Rectal;   Laterality: N/A;    FUNCTIONAL ENDOSCOPIC SINUS SURGERY (FESS) USING COMPUTER-ASSISTED NAVIGATION Bilateral 10/7/2019    Procedure: FESS, USING COMPUTER-ASSISTED NAVIGATION;  Surgeon: LAURENT Swanson MD;  Location: [facility] OR;  Service: ENT;  Laterality: Bilateral;    INJECTION OF ANESTHETIC AGENT AROUND NERVE Bilateral 10/13/2020    Procedure: BLOCK, NERVE BILATERAL C4,C5,C6 Plavix clearance requested;  Surgeon: Fredy Magana MD;  Location: [facility] PAIN MGT;  Service: Pain Management;  Laterality: Bilateral;  BLOCK, NERVE BILATERAL C4,C5,C6  Plavix clearance ok, will require DDVAP infusion    LATERAL INTERNAL ANAL SPHINCTEROTOMY N/A 12/10/2021    Procedure: SPHINCTEROTOMY-LATERAL INTERNAL ANAL;  Surgeon: Nadeem Grady MD;  [facility];  Service: Colon and Rectal;  Laterality: N/A;    LEFT HEART CATHETERIZATION Left 2/14/2019    Procedure: Left heart cath;  Surgeon: Kyle Magana MD;  Location: [facility] CATH LAB;  Service: Cardiology;  Laterality: Left;    LUMBAR FUSION  2012    RADIOFREQUENCY ABLATION Right 5/9/2019    Procedure: RADIOFREQUENCY ABLATION, RIGHT L3,L4,L5;  Surgeon: Fredy Magana MD;  Location: [facility] PAIN MGT;  Service: Pain Management;  Laterality: Right;  1 of 2  RT RFA L3,4,5  PLAVIX clearance received, pt needs DDAVP    RADIOFREQUENCY ABLATION Left 5/23/2019    Procedure: RADIOFREQUENCY ABLATION, LEFT L3,L4,L5;  Surgeon: Fredy Magana MD;  Location: [facility] PAIN MGT;  Service: Pain Management;  Laterality: Left;  2 of 2  LT RFA L3,4,5  PLAVIX clearance received, pt needs DDAVP    RADIOFREQUENCY ABLATION Left 1/16/2020    Procedure: RADIOFREQUENCY ABLATION LEFT L3, L4, L5 MEDIAL BRANCH 1 OF 2 **PATIENT ARRIVING AT 8 AM**;  Surgeon: Fredy Magana MD;  Location: [facility] PAIN MGT;  Service: Pain Management;  Laterality: Left;  NEEDS CONSENT, PLAVIX CLEARANCE IN CHART    RADIOFREQUENCY ABLATION Right 1/28/2020    Procedure: RADIOFREQUENCY ABLATION, RIGHT L3-L4-L5  MEDIAL BRANCH 2 OF 2 (Left done on 20);  Surgeon: rFedy Magana MD;  Location: [facility] PAIN MGT;  Service: Pain Management;  Laterality: Right;    RADIOFREQUENCY ABLATION Left 2020    Procedure: RADIOFREQUENCY ABLATION, L3-L4-L5 MEDIAL BRANCH 1 OF 2 clear to hold plavix 7 days;  Surgeon: Fredy Magana MD;  Location: [facility] PAIN MGT;  Service: Pain Management;  Laterality: Left;    RADIOFREQUENCY ABLATION Right 2020    Procedure: RADIOFREQUENCY ABLATION, L3-L4-L5 MEDIAL BR ANCH 2 OF 2 clear to hol,d Plavix 7 days;  Surgeon: Fredy Magana MD;  Location: [facility] PAIN MGT;  Service: Pain Management;  Laterality: Right;    RADIOFREQUENCY ABLATION Bilateral 2021    Procedure: RADIOFREQUENCY ABLATION B/L L3,4,5 RFA (give dDAVP prior) NEEDS CONSENT plavix ok x7;  Surgeon: Fredy Magana MD;  Location: [facility] PAIN MGT;  Service: Pain Management;  Laterality: Bilateral;    RADIOFREQUENCY ABLATION OF LUMBAR MEDIAL BRANCH NERVE AT SINGLE LEVEL Left 2018    Procedure: RADIOFREQUENCY THERMOCOAGULATION (RFTC)-NERVE-MEDIAN BRANCH-LUMBAR;  Surgeon: Fredy Magana MD;  Location: [facility] PAIN MGT;  Service: Pain Management;  Laterality: Left;  Left RFA @ L3,4,5  [id number]  with IV Sedation    2 of 2    SPINE SURGERY      TONSILLECTOMY      at age 24    VASECTOMY         Social History     Socioeconomic History    Marital status:    Occupational History    Occupation: disabled due to back injury   Tobacco Use    Smoking status: Never Smoker    Smokeless tobacco: Never Used   Substance and Sexual Activity    Alcohol use: Yes     Alcohol/week: 1.0 standard drink     Types: 1 Glasses of wine per week     Comment: social    Drug use: No    Sexual activity: Yes     Partners: Female   Social History Narrative    wlking for exercised     EKD Echo:  · Normal left ventricular systolic function. The estimated ejection fraction is 60%.  · Concentric left ventricular  remodeling.  · Normal LV diastolic function. Borderline enlarged left atrium, IVC large but collapsing, no convincing evidence of diastolic dysfunction.  · Mild mitral sclerosis.  · Mild to moderate tricuspid regurgitation.  · Normal right ventricular systolic function.  · Mild left atrial enlargement.  · The estimated PA systolic pressure is 37 mmHg.  · Intermediate central venous pressure (8 mmHg).        Pre-op Assessment    I have reviewed the Patient Summary Reports.    I have reviewed the NPO Status.   I have reviewed the Medications.     Review of Systems  Anesthesia Hx:  No problems with previous Anesthesia  History of prior surgery of interest to airway management or planning:  Denies Personal Hx of Anesthesia complications.   Cardiovascular:   Hypertension CAD asymptomatic     Pulmonary:   Asthma    Hepatic/GI:   GERD    Musculoskeletal:   Arthritis     Neurological:   Headaches    Endocrine:  Endocrine Normal        Physical Exam  General: Well nourished, Cooperative, Alert and Oriented    Airway:  Mallampati: / II  Mouth Opening: Normal  TM Distance: Normal  Tongue: Normal  Neck ROM: Normal ROM    Dental:  Intact        Anesthesia Plan  Type of Anesthesia, risks & benefits discussed:    Anesthesia Type: Gen Natural Airway  Intra-op Monitoring Plan: Standard ASA Monitors  Post Op Pain Control Plan: multimodal analgesia  Induction:  IV  Airway Plan: Direct, Post-Induction  Informed Consent: Informed consent signed with the Patient and all parties understand the risks and agree with anesthesia plan.  All questions answered.   ASA Score: 3    Ready For Surgery From Anesthesia Perspective.     .

## 2022-08-12 NOTE — PATIENT INSTRUCTIONS
Post Cystoscopy Instructions  Do not strain to have a bowel movement  No strenuous exercise x 7 days  No driving while you are on narcotic pain medications or if your aldridge  catheter is in place    You can expect:  To pass stone fragments if you had a stone procedure  Have pain when you void from your stent if you have a stent in place  See blood in your urine if you have a stent in place    If you have a catheter, please return to the ER if your catheter stops draining or you are having abdominal pain.    Call the doctor if:  Temperature is greater than 101F  Persistent vomiting and inability to keep food down  Inability to void if you do not have a catheter

## 2022-08-12 NOTE — ANESTHESIA POSTPROCEDURE EVALUATION
Anesthesia Post Evaluation    Patient: Bri Santiago    Procedure(s) Performed: Procedure(s) (LRB):  CYSTOSCOPY  URETEROSCOPY (Bilateral)  PYELOGRAM, RETROGRADE (Bilateral)    Final Anesthesia Type: general      Patient location during evaluation: PACU  Patient participation: Yes- Able to Participate  Level of consciousness: awake and alert  Post-procedure vital signs: reviewed and stable  Pain management: adequate  Airway patency: patent    PONV status at discharge: No PONV  Anesthetic complications: no      Cardiovascular status: blood pressure returned to baseline  Respiratory status: unassisted  Hydration status: euvolemic  Follow-up not needed.          Vitals Value Taken Time   /78 08/12/22 1300   Temp 36.4 °C (97.5 °F) 08/12/22 1300   Pulse 74 08/12/22 1300   Resp 38 08/12/22 1300   SpO2 99 % 08/12/22 1300         No case tracking events are documented in the log.      Pain/Tammy Score: Tammy Score: 10 (8/12/2022 12:00 PM)

## 2022-08-12 NOTE — INTERVAL H&P NOTE
The patient has been examined and the H&P has been reviewed:    I concur with the findings and no changes have occurred since H&P was written.    Surgery risks, benefits and alternative options discussed and understood by patient/family.    Urine dipstick - negative for all components.    Pt stopped taking blood thinners last Monday      There are no hospital problems to display for this patient.

## 2022-08-12 NOTE — DISCHARGE INSTRUCTIONS
Procedure(s) (LRB):  CYSTOSCOPY  URETEROSCOPY (Bilateral)  PYELOGRAM, RETROGRADE (Bilateral)     OUTCOME: Patient tolerated treatment/procedure well without complication and is now ready for discharge.     DISPOSITION: Home or Self Care     FINAL DIAGNOSIS:  Dysuria     FOLLOWUP: In clinic     DISCHARGE INSTRUCTIONS:    Discharge Procedure Orders   Notify your health care provider if you experience any of the following:  temperature >100.4      Notify your health care provider if you experience any of the following:  persistent nausea and vomiting or diarrhea      Notify your health care provider if you experience any of the following:  severe uncontrolled pain      Notify your health care provider if you experience any of the following:  redness, tenderness, or signs of infection (pain, swelling, redness, odor or green/yellow discharge around incision site)      Notify your health care provider if you experience any of the following:  worsening rash      Notify your health care provider if you experience any of the following:  persistent dizziness, light-headedness, or visual disturbances         TIME SPENT ON DISCHARGE: 15 minutes

## 2022-09-28 ENCOUNTER — PATIENT OUTREACH (OUTPATIENT)
Dept: ADMINISTRATIVE | Facility: HOSPITAL | Age: 62
End: 2022-09-28
Payer: MEDICARE

## 2022-11-04 ENCOUNTER — TELEPHONE (OUTPATIENT)
Dept: PAIN MEDICINE | Facility: CLINIC | Age: 62
End: 2022-11-04
Payer: MEDICARE

## 2022-11-07 ENCOUNTER — OFFICE VISIT (OUTPATIENT)
Dept: PAIN MEDICINE | Facility: CLINIC | Age: 62
End: 2022-11-07
Payer: MEDICARE

## 2022-11-07 ENCOUNTER — PATIENT MESSAGE (OUTPATIENT)
Dept: PAIN MEDICINE | Facility: OTHER | Age: 62
End: 2022-11-07
Payer: MEDICARE

## 2022-11-07 VITALS
WEIGHT: 249.13 LBS | HEART RATE: 71 BPM | HEIGHT: 71 IN | BODY MASS INDEX: 34.88 KG/M2 | RESPIRATION RATE: 19 BRPM | SYSTOLIC BLOOD PRESSURE: 130 MMHG | TEMPERATURE: 98 F | DIASTOLIC BLOOD PRESSURE: 90 MMHG

## 2022-11-07 DIAGNOSIS — M96.1 POSTLAMINECTOMY SYNDROME OF LUMBAR REGION: ICD-10-CM

## 2022-11-07 DIAGNOSIS — M47.816 LUMBAR SPONDYLOSIS: Primary | ICD-10-CM

## 2022-11-07 DIAGNOSIS — M51.37 DEGENERATION OF LUMBAR OR LUMBOSACRAL INTERVERTEBRAL DISC: ICD-10-CM

## 2022-11-07 PROCEDURE — 99213 OFFICE O/P EST LOW 20 MIN: CPT | Mod: S$GLB,,, | Performed by: NURSE PRACTITIONER

## 2022-11-07 PROCEDURE — 3075F SYST BP GE 130 - 139MM HG: CPT | Mod: CPTII,S$GLB,, | Performed by: NURSE PRACTITIONER

## 2022-11-07 PROCEDURE — 1159F PR MEDICATION LIST DOCUMENTED IN MEDICAL RECORD: ICD-10-PCS | Mod: CPTII,S$GLB,, | Performed by: NURSE PRACTITIONER

## 2022-11-07 PROCEDURE — 99213 PR OFFICE/OUTPT VISIT, EST, LEVL III, 20-29 MIN: ICD-10-PCS | Mod: S$GLB,,, | Performed by: NURSE PRACTITIONER

## 2022-11-07 PROCEDURE — 3008F PR BODY MASS INDEX (BMI) DOCUMENTED: ICD-10-PCS | Mod: CPTII,S$GLB,, | Performed by: NURSE PRACTITIONER

## 2022-11-07 PROCEDURE — 1159F MED LIST DOCD IN RCRD: CPT | Mod: CPTII,S$GLB,, | Performed by: NURSE PRACTITIONER

## 2022-11-07 PROCEDURE — 1160F RVW MEDS BY RX/DR IN RCRD: CPT | Mod: CPTII,S$GLB,, | Performed by: NURSE PRACTITIONER

## 2022-11-07 PROCEDURE — 3080F PR MOST RECENT DIASTOLIC BLOOD PRESSURE >= 90 MM HG: ICD-10-PCS | Mod: CPTII,S$GLB,, | Performed by: NURSE PRACTITIONER

## 2022-11-07 PROCEDURE — 3080F DIAST BP >= 90 MM HG: CPT | Mod: CPTII,S$GLB,, | Performed by: NURSE PRACTITIONER

## 2022-11-07 PROCEDURE — 99999 PR PBB SHADOW E&M-EST. PATIENT-LVL V: ICD-10-PCS | Mod: PBBFAC,,, | Performed by: NURSE PRACTITIONER

## 2022-11-07 PROCEDURE — 99999 PR PBB SHADOW E&M-EST. PATIENT-LVL V: CPT | Mod: PBBFAC,,, | Performed by: NURSE PRACTITIONER

## 2022-11-07 PROCEDURE — 3008F BODY MASS INDEX DOCD: CPT | Mod: CPTII,S$GLB,, | Performed by: NURSE PRACTITIONER

## 2022-11-07 PROCEDURE — 3075F PR MOST RECENT SYSTOLIC BLOOD PRESS GE 130-139MM HG: ICD-10-PCS | Mod: CPTII,S$GLB,, | Performed by: NURSE PRACTITIONER

## 2022-11-07 PROCEDURE — 1160F PR REVIEW ALL MEDS BY PRESCRIBER/CLIN PHARMACIST DOCUMENTED: ICD-10-PCS | Mod: CPTII,S$GLB,, | Performed by: NURSE PRACTITIONER

## 2022-11-07 NOTE — PROGRESS NOTES
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    11/07/2022

## 2022-11-08 ENCOUNTER — IMMUNIZATION (OUTPATIENT)
Dept: PHARMACY | Facility: CLINIC | Age: 62
End: 2022-11-08
Payer: MEDICARE

## 2022-11-18 ENCOUNTER — PATIENT MESSAGE (OUTPATIENT)
Dept: PAIN MEDICINE | Facility: OTHER | Age: 62
End: 2022-11-18
Payer: MEDICARE

## 2022-11-21 ENCOUNTER — HOSPITAL ENCOUNTER (OUTPATIENT)
Facility: OTHER | Age: 62
Discharge: HOME | End: 2022-11-21
Attending: ANESTHESIOLOGY | Admitting: ANESTHESIOLOGY
Payer: MEDICARE

## 2022-11-21 VITALS
TEMPERATURE: 98 F | WEIGHT: 250 LBS | SYSTOLIC BLOOD PRESSURE: 136 MMHG | RESPIRATION RATE: 16 BRPM | DIASTOLIC BLOOD PRESSURE: 78 MMHG | HEART RATE: 88 BPM | OXYGEN SATURATION: 96 % | HEIGHT: 71 IN | BODY MASS INDEX: 35 KG/M2

## 2022-11-21 DIAGNOSIS — M47.816 LUMBAR SPONDYLOSIS: Primary | ICD-10-CM

## 2022-11-21 DIAGNOSIS — M47.816 OSTEOARTHRITIS OF LUMBAR SPINE, UNSPECIFIED SPINAL OSTEOARTHRITIS COMPLICATION STATUS: ICD-10-CM

## 2022-11-21 DIAGNOSIS — G89.29 CHRONIC PAIN: ICD-10-CM

## 2022-11-21 PROCEDURE — 64635 DESTROY LUMB/SAC FACET JNT: CPT | Mod: LT | Performed by: ANESTHESIOLOGY

## 2022-11-21 PROCEDURE — 25000003 PHARM REV CODE 250: Performed by: ANESTHESIOLOGY

## 2022-11-21 PROCEDURE — 25000003 PHARM REV CODE 250: Performed by: STUDENT IN AN ORGANIZED HEALTH CARE EDUCATION/TRAINING PROGRAM

## 2022-11-21 PROCEDURE — 64635 PR DESTROY LUMB/SAC FACET JNT: ICD-10-PCS | Mod: LT,,, | Performed by: ANESTHESIOLOGY

## 2022-11-21 PROCEDURE — 64636 DESTROY L/S FACET JNT ADDL: CPT | Mod: LT | Performed by: ANESTHESIOLOGY

## 2022-11-21 PROCEDURE — 64636 DESTROY L/S FACET JNT ADDL: CPT | Mod: LT,,, | Performed by: ANESTHESIOLOGY

## 2022-11-21 PROCEDURE — 63600175 PHARM REV CODE 636 W HCPCS: Performed by: ANESTHESIOLOGY

## 2022-11-21 PROCEDURE — 63600175 PHARM REV CODE 636 W HCPCS: Mod: JG | Performed by: STUDENT IN AN ORGANIZED HEALTH CARE EDUCATION/TRAINING PROGRAM

## 2022-11-21 PROCEDURE — 64635 DESTROY LUMB/SAC FACET JNT: CPT | Mod: LT,,, | Performed by: ANESTHESIOLOGY

## 2022-11-21 PROCEDURE — 64636 PR DESTROY L/S FACET JNT ADDL: ICD-10-PCS | Mod: LT,,, | Performed by: ANESTHESIOLOGY

## 2022-11-21 RX ORDER — FENTANYL CITRATE 50 UG/ML
INJECTION, SOLUTION INTRAMUSCULAR; INTRAVENOUS
Status: DISCONTINUED | OUTPATIENT
Start: 2022-11-21 | End: 2022-11-21 | Stop reason: HOSPADM

## 2022-11-21 RX ORDER — SODIUM CHLORIDE 9 MG/ML
500 INJECTION, SOLUTION INTRAVENOUS CONTINUOUS
Status: DISCONTINUED | OUTPATIENT
Start: 2022-11-21 | End: 2022-11-21 | Stop reason: HOSPADM

## 2022-11-21 RX ORDER — BUPIVACAINE HYDROCHLORIDE 2.5 MG/ML
INJECTION, SOLUTION EPIDURAL; INFILTRATION; INTRACAUDAL
Status: DISCONTINUED | OUTPATIENT
Start: 2022-11-21 | End: 2022-11-21 | Stop reason: HOSPADM

## 2022-11-21 RX ORDER — DEXAMETHASONE SODIUM PHOSPHATE 10 MG/ML
INJECTION INTRAMUSCULAR; INTRAVENOUS
Status: DISCONTINUED | OUTPATIENT
Start: 2022-11-21 | End: 2022-11-21 | Stop reason: HOSPADM

## 2022-11-21 RX ORDER — LIDOCAINE HYDROCHLORIDE 20 MG/ML
INJECTION, SOLUTION INFILTRATION; PERINEURAL
Status: DISCONTINUED | OUTPATIENT
Start: 2022-11-21 | End: 2022-11-21 | Stop reason: HOSPADM

## 2022-11-21 RX ORDER — MIDAZOLAM HYDROCHLORIDE 1 MG/ML
INJECTION INTRAMUSCULAR; INTRAVENOUS
Status: DISCONTINUED | OUTPATIENT
Start: 2022-11-21 | End: 2022-11-21 | Stop reason: HOSPADM

## 2022-11-21 RX ADMIN — DESMOPRESSIN ACETATE 34.02 MCG: 4 SOLUTION INTRAVENOUS at 09:11

## 2022-11-21 NOTE — OP NOTE
Therapeutic Lumbar Medial Branch Radiofrequency Ablation under Fluoroscopy     The procedure, risks, benefits, and options were discussed with the patient. There are no contraindications to the procedure. The patent expressed understanding and agreed to the procedure. Informed written consent was obtained prior to the start of the procedure and can be found in the patient's chart.        PATIENT NAME: Bri Santiago   MRN: [id number]     DATE OF PROCEDURE: 11/21/2022     PROCEDURE:  Left L3, L4, and L5 Lumbar Radiofrequency Ablation under Fluoroscopy    PRE-OP DIAGNOSIS: Lumbar spondylosis [M47.816] Lumbar spondylosis [M47.816]    POST-OP DIAGNOSIS: Same    PHYSICIAN: Milo Winston MD    ASSISTANTS: Alvino Dai MD     MEDICATIONS INJECTED:  Preservative-free Decadron 10mg with 9cc of Bupivicaine 0.25%    LOCAL ANESTHETIC INJECTED:   Xylocaine 2%    SEDATION: Versed 2mg and Fentanyl 75mcg                                                                                                                                                                                     Conscious sedation ordered by M.D. Patient re-evaluation prior to administration of conscious sedation. No changes noted in patient's status from initial evaluation. The patient's vital signs were monitored by RN and patient remained hemodynamically stable throughout the procedure.    Event Time In   Sedation Start 1044       ESTIMATED BLOOD LOSS:  None    COMPLICATIONS:  None     INTERVAL HISTORY: Patient has clinical and imaging findings suggestive of recurrent facet mediated pain. Patient has undergone a previous RFA at specified levels with at least 50% relief for at least 6 months. Successful diagnostic medial branch blocks have been completed within the past 2 years.    TECHNIQUE: Time-out was performed to identify the patient and procedure to be performed. With the patient laying in a prone position, the surgical area was prepped and draped in the  usual sterile fashion using ChloraPrep and fenestrated drape. The levels were determined under fluoroscopic guidance. Skin anesthesia was achieved by injecting Lidocaine 2% over the injection sites. A 18 gauge 10mm curved active tip needle was introduced to the anatomic local of the medial branch at each of the above levels using AP, lateral and/or contralateral oblique fluoroscopic imaging. Then sensory and motor testing was performed to confirm that the needle tips were in the correct location. After negative aspiration for blood or CSF was confirmed, 1 mL of the lidocaine 2% listed above was injected slowly at each site. This was followed by thermal lesioning at 80 degrees celsius for 90 seconds. That was followed by slowly injecting 2 mL of the medication mixture listed above at each site. The needles were removed and bleeding was nil. A sterile dressing was applied. No specimens collected. The patient tolerated the procedure well and did not have any procedure related motor deficit at the conclusion of the procedure.    The patient was monitored after the procedure in the recovery area. They were given post-procedure and discharge instructions to follow at home. The patient was discharged in a stable condition.    PAIN BEFORE THE PROCEDURE: 10/10    PAIN AFTER THE PROCEDURE: 8/10      Milo Winston MD

## 2022-11-21 NOTE — DISCHARGE INSTRUCTIONS
Thank you for allowing us to care for you today. You may receive a survey about the care we provided. Your feedback is valuable and helps us provide excellent care throughout the community.     Home Care Instructions for Pain Management:    1. DIET:   You may resume your normal diet today.   2. BATHING:   You may shower with luke warm water. No tub baths or anything that will soak injection sites under water for the next 24 hours.  3. DRESSING:   You may remove your bandage today.   4. ACTIVITY LEVEL:   You may resume your normal activities 24 hrs after your procedure. Nothing strenuous today.  5. MEDICATIONS:   You may resume your normal medications today. To restart blood thinners, ask your doctor.  6. DRIVING    If you have received any sedatives by mouth today, you may not drive for 12 hours.    If you have received any sedation through your IV, you may not drive for 24 hrs.   7. SPECIAL INSTRUCTIONS:   No heat to the injection site for 24 hrs including, hot bath or shower, heating pad, moist heat, or hot tubs.    Use ice pack to injection site for any pain or discomfort.  Apply ice packs for 20 minute intervals as needed.    IF you have diabetes, be sure to monitor your blood sugar more closely. IF your injection contained steroids your blood sugar levels may become higher than normal.    If you are still having pain upon discharge:  Your pain may improve over the next 48 hours. The anesthetic (numbing medication) works immediately to 48 hours. IF your injection contained a steroid (anti-inflammatory medication), it takes approximately 3 days to start feeling relief and 7-10 days to see your greatest results from the medication. It is possible you may need subsequent injections. This would be discussed at your follow up appointment with pain management or your referring doctor.    Please call the PAIN MANAGEMENT office at [phone] or ON CALL pager at [phone] if you experienced any:   -Weakness or  loss of sensation  -Fever > 101.5  -Pain uncontrolled with oral medications   -Persistent nausea, vomiting, or diarrhea  -Redness or drainage from the injection sites, or any other worrisome concerns.   If physician on call was not reached or could not communicate with our office for any reason please go to the nearest emergency department. Adult Procedural Sedation Instructions    Recovery After Procedural Sedation (Adult)  You have been given medicine by vein to make you sleep during your surgery. This may have included both a pain medicine and sleeping medicine. Most of the effects have worn off. But you may still have some drowsiness for the next 6 to 8 hours.  Home care  Follow these guidelines when you get home:  For the next 8 hours, you should be watched by a responsible adult. This person should make sure your condition is not getting worse.  Don't drink any alcohol for the next 24 hours.  Don't drive, operate dangerous machinery, or make important business or personal decisions during the next 24 hours.  Note: Your healthcare provider may tell you not to take any medicine by mouth for pain or sleep in the next 4 hours. These medicines may react with the medicines you were given in the hospital. This could cause a much stronger response than usual.  Follow-up care  Follow up with your healthcare provider if you are not alert and back to your usual level of activity within 12 hours.  When to seek medical advice  Call your healthcare provider right away if any of these occur:  Drowsiness gets worse  Weakness or dizziness gets worse  Repeated vomiting  You can't be awakened   Date Last Reviewed: 10/18/2016  © 6824-8163 [email or web address]. [address] [place] [address], [place] [address]. All rights reserved. This information is not intended as a substitute for professional medical care. Always follow your healthcare professional's instructions.

## 2022-11-21 NOTE — DISCHARGE SUMMARY
Discharge Note  Short Stay      SUMMARY     Admit Date: 11/21/2022    Attending Physician: Milo Winston      Discharge Physician: Milo Winston      Discharge Date: 11/21/2022 10:55 AM    Procedure(s) (LRB):  RADIOFREQUENCY ABLATION LEFT L3,L4,L5 MUST HAVE dDVAP PRIOR ONE OF TWO (Left)    Final Diagnosis: Lumbar spondylosis [M47.816]    Disposition: Home or self care    Patient Instructions:   Current Discharge Medication List        START taking these medications    Details   sodium chloride 0.9% SolP 50 mL with desmopressin 4 mcg/mL Soln 33.9 mcg Inject 33.9 mcg into the vein once. for 1 dose  Qty: 1 Dose, Refills: 0    Comments: Take prior to procedure under nurse supervision on 11/21/22           CONTINUE these medications which have NOT CHANGED    Details   albuterol (PROVENTIL/VENTOLIN HFA) 90 mcg/actuation inhaler INHALE 2 PUFFS INTO THE LUNGS EVERY 6 HOURS AS NEEDED FOR WHEEZING OR SHORTNESS OF BREATH  Qty: 18 g, Refills: 4      atorvastatin (LIPITOR) 40 MG tablet Take 1 tablet (40 mg total) by mouth every evening.  Qty: 90 tablet, Refills: 3    Associated Diagnoses: Hyperlipidemia, unspecified hyperlipidemia type      azelastine (ASTELIN) 137 mcg (0.1 %) nasal spray Two sprays in each nostril, sniff until absorbed, then follow with 1 spray of fluticasone.  Use both sprays twice daily.  Qty: 30 mL, Refills: 11      budesonide-formoterol 160-4.5 mcg (SYMBICORT) 160-4.5 mcg/actuation HFAA INHALE 2 PUFFS INTO THE LUNGS EVERY 12 HOURS  Qty: 10.2 g, Refills: 12      calcium citrate-vitamin D3 315-200 mg (CITRACAL+D) 315-200 mg-unit per tablet Take 1 tablet by mouth nightly.       clopidogreL (PLAVIX) 75 mg tablet Take 1 tablet (75 mg total) by mouth once daily.  Qty: 90 tablet, Refills: 3      cyanocobalamin, vitamin B-12, 50 mcg tablet Take 50 mcg by mouth nightly.       docusate sodium (COLACE) 100 MG capsule Take 1 tablet by mouth Twice daily. 1 Capsule Oral Twice a day .  Take with pain medicine      doxazosin  (CARDURA) 1 MG tablet TAKE 1 TABLET BY MOUTH EVERY EVENING  Qty: 90 tablet, Refills: 3    Associated Diagnoses: Benign localized prostatic hyperplasia with lower urinary tract symptoms (LUTS)      esomeprazole (NEXIUM) 40 MG capsule Take 1 capsule (40 mg total) by mouth every 12 (twelve) hours. Take one pill every 12 hours about 45 minutes before breakfast and 45 minutes before dinner for 3 months.  Qty: 60 capsule, Refills: 3    Associated Diagnoses: Esophagitis, reflux      fluticasone propionate (FLONASE) 50 mcg/actuation nasal spray One spray in each nostril twice daily after 1st using azelastine nasal spray  Qty: 18.2 mL, Refills: 11      furosemide (LASIX) 20 MG tablet Take 1 tablet (20 mg total) by mouth once daily.  Qty: 90 tablet, Refills: 3    Associated Diagnoses: Diastolic dysfunction      hydrocortisone (ANUSOL-HC) 2.5 % rectal cream Place rectally 2 (two) times daily.  Qty: 28 g, Refills: 2    Associated Diagnoses: Rectal bleeding      ipratropium (ATROVENT) 42 mcg (0.06 %) nasal spray 1-2 sprays in each nostril before eating and at bedtime as needed  Qty: 30 mL, Refills: 11      montelukast (SINGULAIR) 10 mg tablet TAKE 1 TABLET(10 MG) BY MOUTH EVERY EVENING  Qty: 90 tablet, Refills: 3    Associated Diagnoses: Non-seasonal allergic rhinitis, unspecified trigger      phentermine (ADIPEX-P) 37.5 mg tablet Take 1 tablet (37.5 mg total) by mouth once daily.  Qty: 30 tablet, Refills: 5      tadalafiL (CIALIS) 20 MG Tab Take 1 tablet (20 mg total) by mouth as needed. Take every 36 hours as needed for ED.  Qty: 30 tablet, Refills: 9    Associated Diagnoses: Erectile dysfunction, unspecified erectile dysfunction type      testosterone (ANDRODERM) 2 mg/24 hour PT24 APPLY 1 PATCH TOPICALLY TO THE SKIN EVERY DAY  Qty: 60 patch, Refills: 3    Associated Diagnoses: Hypogonadism in male      tiZANidine (ZANAFLEX) 2 MG tablet Take 2 tablets (4 mg total) by mouth every 6 (six) hours as needed.  Qty: 20 tablet,  Refills: 0      topiramate (TOPAMAX) 50 MG tablet TAKE 1 TABLET (50 MG TOTAL) BY MOUTH 2 (TWO) TIMES DAILY.  Qty: 60 tablet, Refills: 5      zolpidem (AMBIEN) 10 mg Tab TAKE 1 TABLET BY MOUTH EVERY DAY AT BEDTIME  Qty: 20 tablet, Refills: 0    Associated Diagnoses: Sleep disorder                 Discharge Diagnosis: Lumbar spondylosis [M47.816]  Condition on Discharge: Stable with no complications to procedure   Diet on Discharge: Same as before.  Activity: as per instruction sheet.  Discharge to: Home with a responsible adult.  Follow up: 2-4 weeks       Please call my office or pager at [phone] if experienced any weakness or loss of sensation, fever > 101.5, pain uncontrolled with oral medications, persistent nausea/vomiting/or diarrhea, redness or drainage from the incisions, or any other worrisome concerns. If physician on call was not reached or could not communicate with our office for any reason please go to the nearest emergency department

## 2022-11-23 DIAGNOSIS — K21.00 ESOPHAGITIS, REFLUX: ICD-10-CM

## 2022-11-23 RX ORDER — ESOMEPRAZOLE MAGNESIUM 40 MG/1
40 CAPSULE, DELAYED RELEASE ORAL EVERY 12 HOURS
Qty: 60 CAPSULE | Refills: 3 | Status: SHIPPED | OUTPATIENT
Start: 2022-11-23 | End: 2023-02-17

## 2022-11-23 NOTE — TELEPHONE ENCOUNTER
----- Message from Sepideh Santiago sent at 11/23/2022 10:01 AM CST -----    Who Called: PURNIMA SANTIAGO [[id number]]      New Prescription or Refill : refill    RX Name and Strength:  esomeprazole (NEXIUM) 40 MG capsule      RX Name and Strength:        RX Name and Strength:        30 day or 90 day RX: 60      Preferred Pharmacy: [id number] [place] [address] [address] [facility]      Would the patient rather a call back or a response via [facility]?Best Call Back Number:  [phone]    Additional Information:

## 2022-11-23 NOTE — TELEPHONE ENCOUNTER
Care Due:                  Date            Visit Type   Department     Provider  --------------------------------------------------------------------------------                                EP -                              PRIMARY      [facility] INTERNAL  Last Visit: 06-      CARE ([facility])   MEDICINE       Rehana Lopez  Next Visit: None Scheduled  None         None Found                                                            Last  Test          Frequency    Reason                     Performed    Due Date  --------------------------------------------------------------------------------    Lipid Panel.  12 months..  atorvastatin.............  07- 07-    [facility] Embedded Care Gaps. Reference number: [id number]. 11/23/2022   10:20:35 AM CST

## 2022-12-05 ENCOUNTER — HOSPITAL ENCOUNTER (OUTPATIENT)
Facility: OTHER | Age: 62
Discharge: HOME | End: 2022-12-05
Attending: ANESTHESIOLOGY | Admitting: ANESTHESIOLOGY
Payer: MEDICARE

## 2022-12-05 VITALS
HEIGHT: 71 IN | BODY MASS INDEX: 35 KG/M2 | DIASTOLIC BLOOD PRESSURE: 80 MMHG | WEIGHT: 250 LBS | SYSTOLIC BLOOD PRESSURE: 142 MMHG | RESPIRATION RATE: 16 BRPM | TEMPERATURE: 98 F | OXYGEN SATURATION: 92 % | HEART RATE: 91 BPM

## 2022-12-05 DIAGNOSIS — M47.819 SPONDYLOSIS WITHOUT MYELOPATHY: ICD-10-CM

## 2022-12-05 DIAGNOSIS — M47.816 OSTEOARTHRITIS OF LUMBAR SPINE, UNSPECIFIED SPINAL OSTEOARTHRITIS COMPLICATION STATUS: Primary | ICD-10-CM

## 2022-12-05 DIAGNOSIS — G89.29 CHRONIC PAIN: ICD-10-CM

## 2022-12-05 PROCEDURE — 25000003 PHARM REV CODE 250: Performed by: STUDENT IN AN ORGANIZED HEALTH CARE EDUCATION/TRAINING PROGRAM

## 2022-12-05 PROCEDURE — 64635 DESTROY LUMB/SAC FACET JNT: CPT | Mod: RT,,, | Performed by: ANESTHESIOLOGY

## 2022-12-05 PROCEDURE — 64636 DESTROY L/S FACET JNT ADDL: CPT | Mod: RT,,, | Performed by: ANESTHESIOLOGY

## 2022-12-05 PROCEDURE — 25000003 PHARM REV CODE 250: Performed by: ANESTHESIOLOGY

## 2022-12-05 PROCEDURE — 64636 DESTROY L/S FACET JNT ADDL: CPT | Mod: RT | Performed by: ANESTHESIOLOGY

## 2022-12-05 PROCEDURE — 64635 DESTROY LUMB/SAC FACET JNT: CPT | Mod: RT | Performed by: ANESTHESIOLOGY

## 2022-12-05 PROCEDURE — 64635 PR DESTROY LUMB/SAC FACET JNT: ICD-10-PCS | Mod: RT,,, | Performed by: ANESTHESIOLOGY

## 2022-12-05 PROCEDURE — 63600175 PHARM REV CODE 636 W HCPCS: Mod: JG | Performed by: ANESTHESIOLOGY

## 2022-12-05 PROCEDURE — 64636 PR DESTROY L/S FACET JNT ADDL: ICD-10-PCS | Mod: RT,,, | Performed by: ANESTHESIOLOGY

## 2022-12-05 RX ORDER — SODIUM CHLORIDE 9 MG/ML
500 INJECTION, SOLUTION INTRAVENOUS CONTINUOUS
Status: DISCONTINUED | OUTPATIENT
Start: 2022-12-05 | End: 2022-12-05 | Stop reason: HOSPADM

## 2022-12-05 RX ORDER — LIDOCAINE HYDROCHLORIDE 20 MG/ML
INJECTION, SOLUTION INFILTRATION; PERINEURAL
Status: DISCONTINUED | OUTPATIENT
Start: 2022-12-05 | End: 2022-12-05 | Stop reason: HOSPADM

## 2022-12-05 RX ORDER — BUPIVACAINE HYDROCHLORIDE 2.5 MG/ML
INJECTION, SOLUTION EPIDURAL; INFILTRATION; INTRACAUDAL
Status: DISCONTINUED | OUTPATIENT
Start: 2022-12-05 | End: 2022-12-05 | Stop reason: HOSPADM

## 2022-12-05 RX ORDER — DEXAMETHASONE SODIUM PHOSPHATE 10 MG/ML
INJECTION INTRAMUSCULAR; INTRAVENOUS
Status: DISCONTINUED | OUTPATIENT
Start: 2022-12-05 | End: 2022-12-05 | Stop reason: HOSPADM

## 2022-12-05 RX ORDER — MIDAZOLAM HYDROCHLORIDE 1 MG/ML
INJECTION INTRAMUSCULAR; INTRAVENOUS
Status: DISCONTINUED | OUTPATIENT
Start: 2022-12-05 | End: 2022-12-05 | Stop reason: HOSPADM

## 2022-12-05 RX ORDER — FENTANYL CITRATE 50 UG/ML
INJECTION, SOLUTION INTRAMUSCULAR; INTRAVENOUS
Status: DISCONTINUED | OUTPATIENT
Start: 2022-12-05 | End: 2022-12-05 | Stop reason: HOSPADM

## 2022-12-05 RX ADMIN — DESMOPRESSIN ACETATE 34.02 MCG: 4 SOLUTION INTRAVENOUS at 10:12

## 2022-12-05 NOTE — DISCHARGE INSTRUCTIONS

## 2022-12-05 NOTE — DISCHARGE SUMMARY
Discharge Note  Short Stay      SUMMARY     Admit Date: 12/5/2022    Attending Physician: Milo Winston      Discharge Physician: Milo Winston      Discharge Date: 12/5/2022 11:16 AM    Procedure(s) (LRB):  RADIOFREQUENCY ABLATION RIGHT L3,L4,L5  PRE-MEDICATE WITH dDVAP TWO OF TWO (Right)    Final Diagnosis: Lumbar spondylosis [M47.816]    Disposition: Home or self care    Patient Instructions:   Current Discharge Medication List        CONTINUE these medications which have NOT CHANGED    Details   albuterol (PROVENTIL/VENTOLIN HFA) 90 mcg/actuation inhaler INHALE 2 PUFFS INTO THE LUNGS EVERY 6 HOURS AS NEEDED FOR WHEEZING OR SHORTNESS OF BREATH  Qty: 18 g, Refills: 4      atorvastatin (LIPITOR) 40 MG tablet Take 1 tablet (40 mg total) by mouth every evening.  Qty: 90 tablet, Refills: 3    Associated Diagnoses: Hyperlipidemia, unspecified hyperlipidemia type      azelastine (ASTELIN) 137 mcg (0.1 %) nasal spray Two sprays in each nostril, sniff until absorbed, then follow with 1 spray of fluticasone.  Use both sprays twice daily.  Qty: 30 mL, Refills: 11      budesonide-formoterol 160-4.5 mcg (SYMBICORT) 160-4.5 mcg/actuation HFAA INHALE 2 PUFFS INTO THE LUNGS EVERY 12 HOURS  Qty: 10.2 g, Refills: 12      calcium citrate-vitamin D3 315-200 mg (CITRACAL+D) 315-200 mg-unit per tablet Take 1 tablet by mouth nightly.       clopidogreL (PLAVIX) 75 mg tablet Take 1 tablet (75 mg total) by mouth once daily.  Qty: 90 tablet, Refills: 3      cyanocobalamin, vitamin B-12, 50 mcg tablet Take 50 mcg by mouth nightly.       docusate sodium (COLACE) 100 MG capsule Take 1 tablet by mouth Twice daily. 1 Capsule Oral Twice a day .  Take with pain medicine      doxazosin (CARDURA) 1 MG tablet TAKE 1 TABLET BY MOUTH EVERY EVENING  Qty: 90 tablet, Refills: 3    Associated Diagnoses: Benign localized prostatic hyperplasia with lower urinary tract symptoms (LUTS)      esomeprazole (NEXIUM) 40 MG capsule Take 1 capsule (40 mg total) by mouth  every 12 (twelve) hours. Take one pill every 12 hours about 45 minutes before breakfast and 45 minutes before dinner for 3 months.  Qty: 60 capsule, Refills: 3    Associated Diagnoses: Esophagitis, reflux      fluticasone propionate (FLONASE) 50 mcg/actuation nasal spray One spray in each nostril twice daily after 1st using azelastine nasal spray  Qty: 18.2 mL, Refills: 11      furosemide (LASIX) 20 MG tablet Take 1 tablet (20 mg total) by mouth once daily.  Qty: 90 tablet, Refills: 3    Associated Diagnoses: Diastolic dysfunction      hydrocortisone (ANUSOL-HC) 2.5 % rectal cream Place rectally 2 (two) times daily.  Qty: 28 g, Refills: 2    Associated Diagnoses: Rectal bleeding      ipratropium (ATROVENT) 42 mcg (0.06 %) nasal spray 1-2 sprays in each nostril before eating and at bedtime as needed  Qty: 30 mL, Refills: 11      montelukast (SINGULAIR) 10 mg tablet TAKE 1 TABLET(10 MG) BY MOUTH EVERY EVENING  Qty: 90 tablet, Refills: 3    Associated Diagnoses: Non-seasonal allergic rhinitis, unspecified trigger      phentermine (ADIPEX-P) 37.5 mg tablet Take 1 tablet (37.5 mg total) by mouth once daily.  Qty: 30 tablet, Refills: 5      tadalafiL (CIALIS) 20 MG Tab Take 1 tablet (20 mg total) by mouth as needed. Take every 36 hours as needed for ED.  Qty: 30 tablet, Refills: 9    Associated Diagnoses: Erectile dysfunction, unspecified erectile dysfunction type      testosterone (ANDRODERM) 2 mg/24 hour PT24 APPLY 1 PATCH TOPICALLY TO THE SKIN EVERY DAY  Qty: 60 patch, Refills: 3    Associated Diagnoses: Hypogonadism in male      tiZANidine (ZANAFLEX) 2 MG tablet Take 2 tablets (4 mg total) by mouth every 6 (six) hours as needed.  Qty: 20 tablet, Refills: 0      topiramate (TOPAMAX) 50 MG tablet TAKE 1 TABLET (50 MG TOTAL) BY MOUTH 2 (TWO) TIMES DAILY.  Qty: 60 tablet, Refills: 5      zolpidem (AMBIEN) 10 mg Tab TAKE 1 TABLET BY MOUTH EVERY DAY AT BEDTIME  Qty: 20 tablet, Refills: 0    Associated Diagnoses: Sleep  disorder                 Discharge Diagnosis: Lumbar spondylosis [M47.816]  Condition on Discharge: Stable with no complications to procedure   Diet on Discharge: Same as before.  Activity: as per instruction sheet.  Discharge to: Home with a responsible adult.  Follow up: 2-4 weeks       Please call my office or pager at [phone] if experienced any weakness or loss of sensation, fever > 101.5, pain uncontrolled with oral medications, persistent nausea/vomiting/or diarrhea, redness or drainage from the incisions, or any other worrisome concerns. If physician on call was not reached or could not communicate with our office for any reason please go to the nearest emergency department

## 2022-12-05 NOTE — OP NOTE
Therapeutic Lumbar Medial Branch Radiofrequency Ablation under Fluoroscopy     The procedure, risks, benefits, and options were discussed with the patient. There are no contraindications to the procedure. The patent expressed understanding and agreed to the procedure. Informed written consent was obtained prior to the start of the procedure and can be found in the patient's chart.        PATIENT NAME: Bri Santiago   MRN: [id number]     DATE OF PROCEDURE: 12/05/2022     PROCEDURE:  Right L3, L4, and L5 Lumbar Radiofrequency Ablation under Fluoroscopy    PRE-OP DIAGNOSIS: Lumbar spondylosis [M47.816] Lumbar spondylosis [M47.816]    POST-OP DIAGNOSIS: Same    PHYSICIAN: Milo Winston MD    ASSISTANTS: N/A     MEDICATIONS INJECTED:  Preservative-free Decadron 10mg with 9cc of Bupivicaine 0.25%    LOCAL ANESTHETIC INJECTED:   Xylocaine 2%    SEDATION: Versed 2mg and Fentanyl 50mcg                                                                                                                                                                                     Conscious sedation ordered by M.D. Patient re-evaluation prior to administration of conscious sedation. No changes noted in patient's status from initial evaluation. The patient's vital signs were monitored by RN and patient remained hemodynamically stable throughout the procedure.    Event Time In   Sedation Start 1102       ESTIMATED BLOOD LOSS:  None    COMPLICATIONS:  None     INTERVAL HISTORY: Patient has clinical and imaging findings suggestive of facet mediated pain. Patients has completed 2 previous diagnostic medial branch blocks at specified levels with at least 80% relief for the expected duration of the local anesthetic utilized.    TECHNIQUE: Time-out was performed to identify the patient and procedure to be performed. With the patient laying in a prone position, the surgical area was prepped and draped in the usual sterile fashion using ChloraPrep and  fenestrated drape. The levels were determined under fluoroscopic guidance. Skin anesthesia was achieved by injecting Lidocaine 2% over the injection sites. A 20 gauge 10mm curved active tip needle was introduced to the anatomic local of the medial branch at each of the above levels using AP, lateral and/or contralateral oblique fluoroscopic imaging. Then sensory and motor testing was performed to confirm that the needle tips were in the correct location. After negative aspiration for blood or CSF was confirmed, 1 mL of the lidocaine 2% listed above was injected slowly at each site. This was followed by thermal lesioning at 80 degrees celsius for 90 seconds. That was followed by slowly injecting 2 mL of the medication mixture listed above at each site. The needles were removed and bleeding was nil. A sterile dressing was applied. No specimens collected. The patient tolerated the procedure well and did not have any procedure related motor deficit at the conclusion of the procedure.    PAIN BEFORE THE PROCEDURE: 7-10/10    PAIN AFTER THE PROCEDURE: 7/10    The patient was monitored after the procedure in the recovery area. They were given post-procedure and discharge instructions to follow at home. The patient was discharged in a stable condition.        Milo Winston MD

## 2022-12-08 ENCOUNTER — IMMUNIZATION (OUTPATIENT)
Dept: PHARMACY | Facility: CLINIC | Age: 62
End: 2022-12-08
Payer: MEDICARE

## 2022-12-08 DIAGNOSIS — Z23 NEED FOR VACCINATION: Primary | ICD-10-CM

## 2022-12-29 ENCOUNTER — TELEPHONE (OUTPATIENT)
Dept: INTERNAL MEDICINE | Facility: CLINIC | Age: 62
End: 2022-12-29
Payer: MEDICARE

## 2022-12-29 NOTE — TELEPHONE ENCOUNTER
Fatigue x 2 weeks.   Not currently taking anything  Neg for headaches  He states he has minor back pain  No other symptoms present.   Wants to know if he should come in for an appt or have labs done?

## 2022-12-29 NOTE — TELEPHONE ENCOUNTER
Recommend he be seen by available provider for further evaluation of symptoms  Please schedule UC apt

## 2022-12-30 ENCOUNTER — TELEPHONE (OUTPATIENT)
Dept: PAIN MEDICINE | Facility: CLINIC | Age: 62
End: 2022-12-30
Payer: MEDICARE

## 2023-01-03 ENCOUNTER — OFFICE VISIT (OUTPATIENT)
Dept: PAIN MEDICINE | Facility: CLINIC | Age: 63
End: 2023-01-03
Payer: MEDICARE

## 2023-01-03 ENCOUNTER — PATIENT MESSAGE (OUTPATIENT)
Dept: INTERNAL MEDICINE | Facility: CLINIC | Age: 63
End: 2023-01-03
Payer: MEDICARE

## 2023-01-03 VITALS
TEMPERATURE: 97 F | BODY MASS INDEX: 34.88 KG/M2 | WEIGHT: 249.13 LBS | DIASTOLIC BLOOD PRESSURE: 79 MMHG | SYSTOLIC BLOOD PRESSURE: 124 MMHG | HEIGHT: 71 IN | HEART RATE: 75 BPM | RESPIRATION RATE: 18 BRPM

## 2023-01-03 DIAGNOSIS — M54.6 THORACIC SPINE PAIN: Primary | ICD-10-CM

## 2023-01-03 DIAGNOSIS — M47.816 LUMBAR SPONDYLOSIS: ICD-10-CM

## 2023-01-03 DIAGNOSIS — M47.819 SPONDYLOSIS WITHOUT MYELOPATHY: ICD-10-CM

## 2023-01-03 PROCEDURE — 99214 OFFICE O/P EST MOD 30 MIN: CPT | Mod: S$GLB,,, | Performed by: NURSE PRACTITIONER

## 2023-01-03 PROCEDURE — 3074F SYST BP LT 130 MM HG: CPT | Mod: CPTII,S$GLB,, | Performed by: NURSE PRACTITIONER

## 2023-01-03 PROCEDURE — 1159F PR MEDICATION LIST DOCUMENTED IN MEDICAL RECORD: ICD-10-PCS | Mod: CPTII,S$GLB,, | Performed by: NURSE PRACTITIONER

## 2023-01-03 PROCEDURE — 1160F PR REVIEW ALL MEDS BY PRESCRIBER/CLIN PHARMACIST DOCUMENTED: ICD-10-PCS | Mod: CPTII,S$GLB,, | Performed by: NURSE PRACTITIONER

## 2023-01-03 PROCEDURE — 99999 PR PBB SHADOW E&M-EST. PATIENT-LVL V: CPT | Mod: PBBFAC,,, | Performed by: NURSE PRACTITIONER

## 2023-01-03 PROCEDURE — 3008F PR BODY MASS INDEX (BMI) DOCUMENTED: ICD-10-PCS | Mod: CPTII,S$GLB,, | Performed by: NURSE PRACTITIONER

## 2023-01-03 PROCEDURE — 3074F PR MOST RECENT SYSTOLIC BLOOD PRESSURE < 130 MM HG: ICD-10-PCS | Mod: CPTII,S$GLB,, | Performed by: NURSE PRACTITIONER

## 2023-01-03 PROCEDURE — 1159F MED LIST DOCD IN RCRD: CPT | Mod: CPTII,S$GLB,, | Performed by: NURSE PRACTITIONER

## 2023-01-03 PROCEDURE — 3008F BODY MASS INDEX DOCD: CPT | Mod: CPTII,S$GLB,, | Performed by: NURSE PRACTITIONER

## 2023-01-03 PROCEDURE — 99999 PR PBB SHADOW E&M-EST. PATIENT-LVL V: ICD-10-PCS | Mod: PBBFAC,,, | Performed by: NURSE PRACTITIONER

## 2023-01-03 PROCEDURE — 3078F PR MOST RECENT DIASTOLIC BLOOD PRESSURE < 80 MM HG: ICD-10-PCS | Mod: CPTII,S$GLB,, | Performed by: NURSE PRACTITIONER

## 2023-01-03 PROCEDURE — 1160F RVW MEDS BY RX/DR IN RCRD: CPT | Mod: CPTII,S$GLB,, | Performed by: NURSE PRACTITIONER

## 2023-01-03 PROCEDURE — 99214 PR OFFICE/OUTPT VISIT, EST, LEVL IV, 30-39 MIN: ICD-10-PCS | Mod: S$GLB,,, | Performed by: NURSE PRACTITIONER

## 2023-01-03 PROCEDURE — 3078F DIAST BP <80 MM HG: CPT | Mod: CPTII,S$GLB,, | Performed by: NURSE PRACTITIONER

## 2023-01-03 RX ORDER — METHOCARBAMOL 500 MG/1
500 TABLET, FILM COATED ORAL 2 TIMES DAILY PRN
Qty: 60 TABLET | Refills: 0 | Status: SHIPPED | OUTPATIENT
Start: 2023-01-03 | End: 2023-02-02

## 2023-01-03 NOTE — PROGRESS NOTES
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 consider MRI if indicated. He will start PT for these symptoms.    - Robaxin 500 mg BID PRN muscle pain.    - The patient will continue a home exercise routine to help with pain and strengthening.      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 It may be mild vWF, but most recent lab tests showed normal coagulation function. The patient has been previously receiving dDAVP prior to all procedures and has not exhibited bleeding. Hematology recommended receiving dDAVP prior to all invasive procedures. For all interventional procedures, we will give 0.3mcg/kg dDAVP immediately prior to the procedure.       - RTC in 4-6 weeks or sooner if needed.      The above plan and management options were discussed at length with patient. Patient is in agreement with the above and verbalized understanding.     Buffy Villagran    01/03/2023

## 2023-01-04 NOTE — TELEPHONE ENCOUNTER
Pt was to see Winnie Jacob PA-C, yesterday for fatigue. Missed appointment due to having to take care of father. Looked for afternoon appointments since his dad has a 10 AM appointment, nothing available virtual or in-person across area clinics. Several virtual appointments available this week with other clinics, next available virtual at [facility] is on 1-9-23 with Dr. Joaquin. Other than that, next in-person would be with Winnie on 1-9-23 at 12:30 PM.

## 2023-01-08 NOTE — DISCHARGE INSTRUCTIONS

## 2023-01-10 ENCOUNTER — PATIENT MESSAGE (OUTPATIENT)
Dept: PAIN MEDICINE | Facility: CLINIC | Age: 63
End: 2023-01-10
Payer: MEDICARE

## 2023-01-11 ENCOUNTER — HOSPITAL ENCOUNTER (OUTPATIENT)
Dept: RADIOLOGY | Facility: HOSPITAL | Age: 63
Discharge: HOME | End: 2023-01-11
Attending: NURSE PRACTITIONER
Payer: MEDICARE

## 2023-01-11 ENCOUNTER — OFFICE VISIT (OUTPATIENT)
Dept: UROLOGY | Facility: CLINIC | Age: 63
End: 2023-01-11
Payer: MEDICARE

## 2023-01-11 VITALS — BODY MASS INDEX: 34.54 KG/M2 | WEIGHT: 246.69 LBS | HEIGHT: 71 IN

## 2023-01-11 DIAGNOSIS — M54.6 THORACIC SPINE PAIN: ICD-10-CM

## 2023-01-11 DIAGNOSIS — R10.2 PELVIC PAIN: Primary | ICD-10-CM

## 2023-01-11 PROCEDURE — 99214 OFFICE O/P EST MOD 30 MIN: CPT | Mod: S$GLB,,, | Performed by: UROLOGY

## 2023-01-11 PROCEDURE — 99214 PR OFFICE/OUTPT VISIT, EST, LEVL IV, 30-39 MIN: ICD-10-PCS | Mod: S$GLB,,, | Performed by: UROLOGY

## 2023-01-11 PROCEDURE — 72070 XR THORACIC SPINE AP LATERAL: ICD-10-PCS | Mod: 26,,, | Performed by: RADIOLOGY

## 2023-01-11 PROCEDURE — 1159F PR MEDICATION LIST DOCUMENTED IN MEDICAL RECORD: ICD-10-PCS | Mod: CPTII,S$GLB,, | Performed by: UROLOGY

## 2023-01-11 PROCEDURE — 99999 PR PBB SHADOW E&M-EST. PATIENT-LVL III: ICD-10-PCS | Mod: PBBFAC,,, | Performed by: UROLOGY

## 2023-01-11 PROCEDURE — 1160F PR REVIEW ALL MEDS BY PRESCRIBER/CLIN PHARMACIST DOCUMENTED: ICD-10-PCS | Mod: CPTII,S$GLB,, | Performed by: UROLOGY

## 2023-01-11 PROCEDURE — 1160F RVW MEDS BY RX/DR IN RCRD: CPT | Mod: CPTII,S$GLB,, | Performed by: UROLOGY

## 2023-01-11 PROCEDURE — 3008F PR BODY MASS INDEX (BMI) DOCUMENTED: ICD-10-PCS | Mod: CPTII,S$GLB,, | Performed by: UROLOGY

## 2023-01-11 PROCEDURE — 72070 X-RAY EXAM THORAC SPINE 2VWS: CPT | Mod: TC

## 2023-01-11 PROCEDURE — 99999 PR PBB SHADOW E&M-EST. PATIENT-LVL III: CPT | Mod: PBBFAC,,, | Performed by: UROLOGY

## 2023-01-11 PROCEDURE — 1159F MED LIST DOCD IN RCRD: CPT | Mod: CPTII,S$GLB,, | Performed by: UROLOGY

## 2023-01-11 PROCEDURE — 3008F BODY MASS INDEX DOCD: CPT | Mod: CPTII,S$GLB,, | Performed by: UROLOGY

## 2023-01-11 PROCEDURE — 72070 X-RAY EXAM THORAC SPINE 2VWS: CPT | Mod: 26,,, | Performed by: RADIOLOGY

## 2023-01-11 NOTE — PROGRESS NOTES
Subjective:       Patient ID: Bri Santiago is a 62 y.o. male.    Chief Complaint: Groin Pain    HPI patient is complaining of bilateral groin pain on the right side it is over the area of the internal ring on the left side it is over the area on the external ring.  He is had a workup and sees pain clinic.  No lower tract irritative or obstructive symptoms.  His urine is clear.  He is had an MRI of the urinary tract and I have found nothing.  He does have some spinal problems and sees pain medicine for this.  I believe he is sincere but I can not seem to find anything.  He has set in conversation that he would not do anything to harm himself    Past Medical History:   Diagnosis Date    Acute pancreatitis     Anal fissure     Anemia     Anticoagulant long-term use     Arthritis     Asthma in remission     Back pain     BPH (benign prostatic hypertrophy)     Cancer 2000    prostate- treated at [facility] with chemo- in remission since 2000    Chronic maxillary sinusitis     Clotting disorder     Diastolic dysfunction with chronic heart failure 12/3/2018    Dysphagia 10/7/2014    Family history of colon cancer     Family history of early CAD     GERD (gastroesophageal reflux disease)     Helicobacter pylori (H. pylori) infection     Chronic    History of chronic pancreatitis     HTN (hypertension)     Lumbago 11/12/2012    Obesity     ABDON (obstructive sleep apnea)     ABDON (obstructive sleep apnea)     With likely ohs Refer to sleep    Sacroiliac joint pain 2/10/2015    Spinal stenosis of lumbar region     Von Willebrand disease     VWD (acquired von Willebrand's disease)        Past Surgical History:   Procedure Laterality Date    anal fissure repair      x2    BACK SURGERY  2012    BALLOON SINUPLASTY OF PARANASAL SINUS Bilateral 10/7/2019    Procedure: SINUPLASTY, USING BALLOON;  Surgeon: LAURENT Swanson MD;  Location: [facility];  Service: ENT;  Laterality: Bilateral;    CARPAL TUNNEL RELEASE  2003    left hand     CATHETERIZATION OF BOTH LEFT AND RIGHT HEART N/A 2/14/2019    Procedure: CATHETERIZATION, HEART, BOTH LEFT AND RIGHT;  Surgeon: Kyle Magana MD;  Location: [facility] CATH LAB;  Service: Cardiology;  Laterality: N/A;    CERVICAL DISCECTOMY  2003    COLONOSCOPY N/A 10/7/2015    Procedure: COLONOSCOPY;  Surgeon: Rosendo Boyer MD;  Location: [facility] (4TH FLR);  Service: Endoscopy;  Laterality: N/A;  PM Prep    COLONOSCOPY N/A 6/19/2017    Procedure: COLONOSCOPY;  Surgeon: Rosendo Boyer MD;  Location: [facility] ENDO (4TH FLR);  Service: Endoscopy;  Laterality: N/A;  constipation prep (no DM no CHF)       hx of vonWillebrand's disease-will need infusion prior    COLONOSCOPY N/A 3/4/2020    Procedure: COLONOSCOPY;  Surgeon: Rosendo Boyer MD;  Location: [facility] (4TH FLR);  Service: Endoscopy;  Laterality: N/A;  Please order CBC, ferritin, Iron TIBC day of case per Dr. Boyer  labwork at 7:10am and patient will check in right after.  per Dr. Tyler patient can hold Plavix 5 days prior see note 1/7/20  DDAVP prior to procedure needed see note 1/16/20 for oncall med by Dr. Tyler -     CYSTOSCOPY  8/12/2022    Procedure: CYSTOSCOPY;  Surgeon: Tyler Reid Jr., MD;  [facility];  Service: Urology;;    ESOPHAGOGASTRODUODENOSCOPY N/A 3/4/2020    Procedure: EGD (ESOPHAGOGASTRODUODENOSCOPY);  Surgeon: Rosendo Boyer MD;  Location: [facility] (4TH FLR);  Service: Endoscopy;  Laterality: N/A;  EGD and Colonoscopy orders merged together  labwork at 7:10am and patient will check in right after.  DDAVP prior to procedure needed see note 1/16/20 for oncall med  per Dr. Tyler patient can hold Plavix 5 days prior see note 1/7/20    ESOPHAGOGASTRODUODENOSCOPY N/A 11/3/2020    Procedure: EGD (ESOPHAGOGASTRODUODENOSCOPY);  Surgeon: Rosendo Boyer MD;  Location: [facility] ENDO (2ND FLR);  Service: Endoscopy;  Laterality: N/A;  Please recall pt has von Willebrands and will require DDVAP infusion prior to procedure as  previously done.    see telephone encounter on 10/1/20 regarding DDAVP   ok to hold Plavix 5 days prior per Dr.Blessey BUCKNER screening on 10/31/20 -rb    EXAMINATION UNDER ANESTHESIA N/A 3/15/2021    Procedure: EXAM UNDER ANESTHESIA;  Surgeon: Silvia Cazares MD;  [facility];  Service: Colon and Rectal;  Laterality: N/A;    EXAMINATION UNDER ANESTHESIA N/A 2/25/2022    Procedure: EXAM UNDER ANESTHESIA, EXCISION ANAL PAPILLA;  Surgeon: Nadeem Grady MD;  [facility];  Service: Colon and Rectal;  Laterality: N/A;    FUNCTIONAL ENDOSCOPIC SINUS SURGERY (FESS) USING COMPUTER-ASSISTED NAVIGATION Bilateral 10/7/2019    Procedure: FESS, USING COMPUTER-ASSISTED NAVIGATION;  Surgeon: LAURENT Swanson MD;  Location: [facility];  Service: ENT;  Laterality: Bilateral;    INJECTION OF ANESTHETIC AGENT AROUND NERVE Bilateral 10/13/2020    Procedure: BLOCK, NERVE BILATERAL C4,C5,C6 Plavix clearance requested;  Surgeon: Fredy Magana MD;  Location: [facility] PAIN MGT;  Service: Pain Management;  Laterality: Bilateral;  BLOCK, NERVE BILATERAL C4,C5,C6  Plavix clearance ok, will require DDVAP infusion    LATERAL INTERNAL ANAL SPHINCTEROTOMY N/A 12/10/2021    Procedure: SPHINCTEROTOMY-LATERAL INTERNAL ANAL;  Surgeon: Nadeem Grady MD;  [facility];  Service: Colon and Rectal;  Laterality: N/A;    LEFT HEART CATHETERIZATION Left 2/14/2019    Procedure: Left heart cath;  Surgeon: Kyle Magana MD;  Location: [facility] CATH LAB;  Service: Cardiology;  Laterality: Left;    LUMBAR FUSION  2012    RADIOFREQUENCY ABLATION Right 5/9/2019    Procedure: RADIOFREQUENCY ABLATION, RIGHT L3,L4,L5;  Surgeon: Fredy Magana MD;  Location: [facility] PAIN MGT;  Service: Pain Management;  Laterality: Right;  1 of 2  RT RFA L3,4,5  PLAVIX clearance received, pt needs DDAVP    RADIOFREQUENCY ABLATION Left 5/23/2019    Procedure: RADIOFREQUENCY ABLATION, LEFT L3,L4,L5;  Surgeon: Fredy Magana MD;   Location: [facility] PAIN MGT;  Service: Pain Management;  Laterality: Left;  2 of 2  LT RFA L3,4,5  PLAVIX clearance received, pt needs DDAVP    RADIOFREQUENCY ABLATION Left 1/16/2020    Procedure: RADIOFREQUENCY ABLATION LEFT L3, L4, L5 MEDIAL BRANCH 1 OF 2 **PATIENT ARRIVING AT 8 AM**;  Surgeon: Fredy Magana MD;  Location: [facility] PAIN MGT;  Service: Pain Management;  Laterality: Left;  NEEDS CONSENT, PLAVIX CLEARANCE IN CHART    RADIOFREQUENCY ABLATION Right 1/28/2020    Procedure: RADIOFREQUENCY ABLATION, RIGHT L3-L4-L5 MEDIAL BRANCH 2 OF 2 (Left done on 1/16/20);  Surgeon: Fredy Magana MD;  Location: [facility] PAIN MGT;  Service: Pain Management;  Laterality: Right;    RADIOFREQUENCY ABLATION Left 8/18/2020    Procedure: RADIOFREQUENCY ABLATION, L3-L4-L5 MEDIAL BRANCH 1 OF 2 clear to hold plavix 7 days;  Surgeon: Fredy Magana MD;  Location: [facility] PAIN MGT;  Service: Pain Management;  Laterality: Left;    RADIOFREQUENCY ABLATION Right 9/1/2020    Procedure: RADIOFREQUENCY ABLATION, L3-L4-L5 MEDIAL BR ANCH 2 OF 2 clear to hol,d Plavix 7 days;  Surgeon: Fredy Magana MD;  Location: [facility] PAIN MGT;  Service: Pain Management;  Laterality: Right;    RADIOFREQUENCY ABLATION Bilateral 12/21/2021    Procedure: RADIOFREQUENCY ABLATION B/L L3,4,5 RFA (give dDAVP prior) NEEDS CONSENT plavix ok x7;  Surgeon: Fredy Magana MD;  Location: [facility] PAIN MGT;  Service: Pain Management;  Laterality: Bilateral;    RADIOFREQUENCY ABLATION Left 11/21/2022    Procedure: RADIOFREQUENCY ABLATION LEFT L3,L4,L5 MUST HAVE dDVAP PRIOR ONE OF TWO;  Surgeon: Milo Winston MD;  Location: [facility] PAIN MGT;  Service: Pain Management;  Laterality: Left;    RADIOFREQUENCY ABLATION Right 12/5/2022    Procedure: RADIOFREQUENCY ABLATION RIGHT L3,L4,L5  PRE-MEDICATE WITH dDVAP TWO OF TWO;  Surgeon: Milo Winston MD;  Location: [facility] PAIN MGT;  Service: Pain Management;  Laterality: Right;    RADIOFREQUENCY ABLATION OF LUMBAR MEDIAL BRANCH NERVE  AT SINGLE LEVEL Left 5/24/2018    Procedure: RADIOFREQUENCY THERMOCOAGULATION (RFTC)-NERVE-MEDIAN BRANCH-LUMBAR;  Surgeon: Fredy Magana MD;  Location: [facility];  Service: Pain Management;  Laterality: Left;  Left RFA @ L3,4,5  [id number]  with IV Sedation    2 of 2    RETROGRADE PYELOGRAPHY Bilateral 8/12/2022    Procedure: PYELOGRAM, RETROGRADE;  Surgeon: Tyler Reid Jr., MD;  Location: [address];  Service: Urology;  Laterality: Bilateral;    SPINE SURGERY      TONSILLECTOMY      at age 24    URETEROSCOPY Bilateral 8/12/2022    Procedure: URETEROSCOPY;  Surgeon: Tyler Reid Jr., MD;  Location: [facility] OR [address] [place] [address];  Service: Urology;  Laterality: Bilateral;    VASECTOMY  1996       Family History   Problem Relation Age of Onset    Colon cancer Father 67        colon cancer    Hypertension Father     Glaucoma Father     Cancer Father     Colon cancer Paternal Grandfather 65             Coronary artery disease Mother 45    Hypertension Mother     Heart disease Mother     Colon cancer Mother     Diabetes Mother     No Known Problems Brother     No Known Problems Sister     No Known Problems Daughter     No Known Problems Son     Coronary artery disease Brother 51    No Known Problems Daughter     No Known Problems Daughter     No Known Problems Son     No Known Problems Son     Colon cancer Paternal Uncle 65    Diabetes Mellitus Paternal Grandmother     Esophageal cancer Neg Hx        Social History     Socioeconomic History    Marital status:    Occupational History    Occupation: disabled due to back injury   Tobacco Use    Smoking status: Never    Smokeless tobacco: Never   Substance and Sexual Activity    Alcohol use: Yes     Alcohol/week: 1.0 standard drink     Types: 1 Glasses of wine per week     Comment: social    Drug use: No    Sexual activity: Yes     Partners: Female   Social History Narrative    wlking for exercised       Allergies:  Penicillins, Ace inhibitors, Aspirin,  Codeine, and Dilaudid [hydromorphone]    Medications:    Current Outpatient Medications:     albuterol (PROVENTIL/VENTOLIN HFA) 90 mcg/actuation inhaler, INHALE 2 PUFFS INTO THE LUNGS EVERY 6 HOURS AS NEEDED FOR WHEEZING OR SHORTNESS OF BREATH, Disp: 18 g, Rfl: 4    atorvastatin (LIPITOR) 40 MG tablet, Take 1 tablet (40 mg total) by mouth every evening., Disp: 90 tablet, Rfl: 3    azelastine (ASTELIN) 137 mcg (0.1 %) nasal spray, Two sprays in each nostril, sniff until absorbed, then follow with 1 spray of fluticasone.  Use both sprays twice daily. (Patient taking differently: Two sprays in each nostril, sniff until absorbed, then follow with 1 spray of fluticasone.  Use both sprays twice daily.(PATIENT USES NIGHTLY 3/12/2021)), Disp: 30 mL, Rfl: 11    budesonide-formoterol 160-4.5 mcg (SYMBICORT) 160-4.5 mcg/actuation HFAA, INHALE 2 PUFFS INTO THE LUNGS EVERY 12 HOURS, Disp: 10.2 g, Rfl: 12    calcium citrate-vitamin D3 315-200 mg (CITRACAL+D) 315-200 mg-unit per tablet, Take 1 tablet by mouth nightly. , Disp: , Rfl:     clopidogreL (PLAVIX) 75 mg tablet, Take 1 tablet (75 mg total) by mouth once daily., Disp: 90 tablet, Rfl: 3    cyanocobalamin, vitamin B-12, 50 mcg tablet, Take 50 mcg by mouth nightly. , Disp: , Rfl:     docusate sodium (COLACE) 100 MG capsule, Take 1 tablet by mouth Twice daily. 1 Capsule Oral Twice a day .  Take with pain medicine, Disp: , Rfl:     doxazosin (CARDURA) 1 MG tablet, TAKE 1 TABLET BY MOUTH EVERY EVENING, Disp: 90 tablet, Rfl: 3    esomeprazole (NEXIUM) 40 MG capsule, Take 1 capsule (40 mg total) by mouth every 12 (twelve) hours. Take one pill every 12 hours about 45 minutes before breakfast and 45 minutes before dinner for 3 months., Disp: 60 capsule, Rfl: 3    fluticasone propionate (FLONASE) 50 mcg/actuation nasal spray, One spray in each nostril twice daily after 1st using azelastine nasal spray, Disp: 18.2 mL, Rfl: 11    furosemide (LASIX) 20 MG tablet, Take 1 tablet (20 mg  total) by mouth once daily., Disp: 90 tablet, Rfl: 3    ipratropium (ATROVENT) 42 mcg (0.06 %) nasal spray, 1-2 sprays in each nostril before eating and at bedtime as needed, Disp: 30 mL, Rfl: 11    methocarbamoL (ROBAXIN) 500 MG Tab, Take 1 tablet (500 mg total) by mouth 2 (two) times daily as needed (muscle pain)., Disp: 60 tablet, Rfl: 0    montelukast (SINGULAIR) 10 mg tablet, TAKE 1 TABLET(10 MG) BY MOUTH EVERY EVENING, Disp: 90 tablet, Rfl: 3    sars-cov-2, covid-19 omicron, (MODERNA COVID BIVAL,6Y UP,,PF,) 50 mcg/0.5 mL injection, Inject into the muscle., Disp: 0.5 mL, Rfl: 0    tadalafiL (CIALIS) 20 MG Tab, Take 1 tablet (20 mg total) by mouth as needed. Take every 36 hours as needed for ED., Disp: 30 tablet, Rfl: 9    testosterone (ANDRODERM) 2 mg/24 hour PT24, APPLY 1 PATCH TOPICALLY TO THE SKIN EVERY DAY, Disp: 60 patch, Rfl: 3    zolpidem (AMBIEN) 10 mg Tab, TAKE 1 TABLET BY MOUTH EVERY DAY AT BEDTIME (Patient taking differently: Take 10 mg by mouth nightly as needed.), Disp: 20 tablet, Rfl: 0    hydrocortisone (ANUSOL-HC) 2.5 % rectal cream, Place rectally 2 (two) times daily., Disp: 28 g, Rfl: 2    phentermine (ADIPEX-P) 37.5 mg tablet, Take 1 tablet (37.5 mg total) by mouth once daily., Disp: 30 tablet, Rfl: 5    topiramate (TOPAMAX) 50 MG tablet, TAKE 1 TABLET (50 MG TOTAL) BY MOUTH 2 (TWO) TIMES DAILY., Disp: 60 tablet, Rfl: 5  No current facility-administered medications for this visit.    Facility-Administered Medications Ordered in Other Visits:     0.9%  NaCl infusion, , Intravenous, Continuous, Milla Oliveira NP, Last Rate: 70 mL/hr at 03/15/21 1417, New Bag at 03/15/21 1417    0.9%  NaCl infusion, , Intravenous, Continuous, Milla Oliveira NP, Last Rate: 70 mL/hr at 12/10/21 0736, New Bag at 12/10/21 0736    0.9%  NaCl infusion, , Intravenous, Continuous, Jaqueline Langley NP    mupirocin 2 % ointment, , Nasal, On Call Procedure, Milla Oliveira NP, Given at 12/10/21 0791     mupirocin 2 % ointment, , Nasal, On Call Procedure, Jaqueline Langley NP, Given at 02/25/22 2846    Review of Systems   Constitutional:  Negative for activity change, appetite change, chills, diaphoresis, fatigue, fever and unexpected weight change.   HENT:  Negative for congestion, dental problem, hearing loss, mouth sores, postnasal drip, rhinorrhea, sinus pressure and trouble swallowing.    Eyes:  Negative for pain, discharge and itching.   Respiratory:  Negative for apnea, cough, choking, chest tightness, shortness of breath and wheezing.    Cardiovascular:  Negative for chest pain, palpitations and leg swelling.   Gastrointestinal:  Negative for abdominal distention, abdominal pain, anal bleeding, blood in stool, constipation, diarrhea, nausea, rectal pain and vomiting.   Endocrine: Negative for polydipsia and polyuria.   Genitourinary:  Negative for decreased urine volume, difficulty urinating, dysuria, enuresis, flank pain, frequency, genital sores, hematuria, penile discharge, penile pain, penile swelling, scrotal swelling, testicular pain and urgency.   Musculoskeletal:  Negative for arthralgias, back pain and myalgias.   Skin:  Negative for color change, rash and wound.   Neurological:  Negative for dizziness, syncope, speech difficulty, light-headedness and headaches.   Hematological:  Negative for adenopathy. Does not bruise/bleed easily.   Psychiatric/Behavioral:  Negative for behavioral problems, confusion, hallucinations and sleep disturbance.      Objective:      Physical Exam  Constitutional:       Appearance: He is well-developed.   HENT:      Head: Normocephalic.   Cardiovascular:      Rate and Rhythm: Normal rate.   Pulmonary:      Effort: Pulmonary effort is normal.   Abdominal:      Palpations: Abdomen is soft.   Genitourinary:     Prostate: Normal.      Comments: rmalPenis and testicles are unremarkable I can not feel any hernias prostate is 30 g and benign EPS is clear  Skin:     General:  Skin is warm.   Neurological:      Mental Status: He is alert.       Assessment:       1. Pelvic pain          Plan:       Bri was seen today for groin pain.    Diagnoses and all orders for this visit:    Pelvic pain    I do not know what is causing this patient's pain.  He is had thoracic spine pain and other spinal treatment I told him I believe him but I do not think this is originating from urological etiology.  I recommended that he seek a 2nd opinion.  He is told me that he is not the type of person that would threatened to end his life etcetera he seems very stable but I do not think that I can help him at this time

## 2023-01-13 DIAGNOSIS — M54.6 CHRONIC MIDLINE THORACIC BACK PAIN: Primary | ICD-10-CM

## 2023-01-13 DIAGNOSIS — G89.29 CHRONIC MIDLINE THORACIC BACK PAIN: Primary | ICD-10-CM

## 2023-01-13 DIAGNOSIS — S22.060D CLOSED WEDGE COMPRESSION FRACTURE OF T7 VERTEBRA WITH ROUTINE HEALING, SUBSEQUENT ENCOUNTER: ICD-10-CM

## 2023-01-23 ENCOUNTER — LAB VISIT (OUTPATIENT)
Dept: LAB | Facility: OTHER | Age: 63
End: 2023-01-23
Attending: PHYSICIAN ASSISTANT
Payer: MEDICARE

## 2023-01-23 ENCOUNTER — OFFICE VISIT (OUTPATIENT)
Dept: INTERNAL MEDICINE | Facility: CLINIC | Age: 63
End: 2023-01-23
Payer: MEDICARE

## 2023-01-23 VITALS
HEIGHT: 71 IN | HEART RATE: 61 BPM | WEIGHT: 252 LBS | BODY MASS INDEX: 35.28 KG/M2 | SYSTOLIC BLOOD PRESSURE: 112 MMHG | OXYGEN SATURATION: 95 % | DIASTOLIC BLOOD PRESSURE: 78 MMHG

## 2023-01-23 DIAGNOSIS — I50.32 DIASTOLIC DYSFUNCTION WITH CHRONIC HEART FAILURE: ICD-10-CM

## 2023-01-23 DIAGNOSIS — R53.83 FATIGUE, UNSPECIFIED TYPE: Primary | ICD-10-CM

## 2023-01-23 DIAGNOSIS — R79.9 ABNORMAL FINDING OF BLOOD CHEMISTRY, UNSPECIFIED: ICD-10-CM

## 2023-01-23 DIAGNOSIS — J45.998 ASTHMA IN REMISSION: ICD-10-CM

## 2023-01-23 DIAGNOSIS — I10 PRIMARY HYPERTENSION: ICD-10-CM

## 2023-01-23 DIAGNOSIS — R53.83 FATIGUE, UNSPECIFIED TYPE: ICD-10-CM

## 2023-01-23 DIAGNOSIS — E55.9 VITAMIN D DEFICIENCY: ICD-10-CM

## 2023-01-23 LAB
ALBUMIN SERPL BCP-MCNC: 4 G/DL (ref 3.5–5.2)
ALP SERPL-CCNC: 64 U/L (ref 55–135)
ALT SERPL W/O P-5'-P-CCNC: 19 U/L (ref 10–44)
ANION GAP SERPL CALC-SCNC: 4 MMOL/L (ref 8–16)
AST SERPL-CCNC: 21 U/L (ref 10–40)
BASOPHILS # BLD AUTO: 0.03 K/UL (ref 0–0.2)
BASOPHILS NFR BLD: 0.5 % (ref 0–1.9)
BILIRUB SERPL-MCNC: 1.1 MG/DL (ref 0.1–1)
BNP SERPL-MCNC: <10 PG/ML (ref 0–99)
BUN SERPL-MCNC: 16 MG/DL (ref 6–20)
CALCIUM SERPL-MCNC: 9.2 MG/DL (ref 8.7–10.5)
CHLORIDE SERPL-SCNC: 104 MMOL/L (ref 95–110)
CHOLEST SERPL-MCNC: 125 MG/DL (ref 120–199)
CHOLEST/HDLC SERPL: 3.9 {RATIO} (ref 2–5)
CO2 SERPL-SCNC: 31 MMOL/L (ref 23–29)
CREAT SERPL-MCNC: 1 MG/DL (ref 0.5–1.4)
DIFFERENTIAL METHOD: NORMAL
EOSINOPHIL # BLD AUTO: 0.2 K/UL (ref 0–0.5)
EOSINOPHIL NFR BLD: 4.1 % (ref 0–8)
ERYTHROCYTE [DISTWIDTH] IN BLOOD BY AUTOMATED COUNT: 12.2 % (ref 11.5–14.5)
EST. GFR  (NO RACE VARIABLE): >60 ML/MIN/1.73 M^2
ESTIMATED AVG GLUCOSE: 85 MG/DL (ref 68–131)
GLUCOSE SERPL-MCNC: 82 MG/DL (ref 70–110)
HBA1C MFR BLD: 4.6 % (ref 4–5.6)
HCT VFR BLD AUTO: 43.4 % (ref 40–54)
HDLC SERPL-MCNC: 32 MG/DL (ref 40–75)
HDLC SERPL: 25.6 % (ref 20–50)
HGB BLD-MCNC: 14.2 G/DL (ref 14–18)
IMM GRANULOCYTES # BLD AUTO: 0.01 K/UL (ref 0–0.04)
IMM GRANULOCYTES NFR BLD AUTO: 0.2 % (ref 0–0.5)
LDLC SERPL CALC-MCNC: 80.4 MG/DL (ref 63–159)
LYMPHOCYTES # BLD AUTO: 1.7 K/UL (ref 1–4.8)
LYMPHOCYTES NFR BLD: 28.4 % (ref 18–48)
MCH RBC QN AUTO: 30.2 PG (ref 27–31)
MCHC RBC AUTO-ENTMCNC: 32.7 G/DL (ref 32–36)
MCV RBC AUTO: 92 FL (ref 82–98)
MONOCYTES # BLD AUTO: 0.4 K/UL (ref 0.3–1)
MONOCYTES NFR BLD: 6 % (ref 4–15)
NEUTROPHILS # BLD AUTO: 3.6 K/UL (ref 1.8–7.7)
NEUTROPHILS NFR BLD: 60.8 % (ref 38–73)
NONHDLC SERPL-MCNC: 93 MG/DL
NRBC BLD-RTO: 0 /100 WBC
PLATELET # BLD AUTO: 201 K/UL (ref 150–450)
PMV BLD AUTO: 9.3 FL (ref 9.2–12.9)
POTASSIUM SERPL-SCNC: 4 MMOL/L (ref 3.5–5.1)
PROT SERPL-MCNC: 7.5 G/DL (ref 6–8.4)
RBC # BLD AUTO: 4.7 M/UL (ref 4.6–6.2)
SODIUM SERPL-SCNC: 139 MMOL/L (ref 136–145)
TRIGL SERPL-MCNC: 63 MG/DL (ref 30–150)
TSH SERPL DL<=0.005 MIU/L-ACNC: 1.24 UIU/ML (ref 0.4–4)
WBC # BLD AUTO: 5.84 K/UL (ref 3.9–12.7)

## 2023-01-23 PROCEDURE — 85025 COMPLETE CBC W/AUTO DIFF WBC: CPT | Performed by: PHYSICIAN ASSISTANT

## 2023-01-23 PROCEDURE — 3008F BODY MASS INDEX DOCD: CPT | Mod: CPTII,S$GLB,, | Performed by: PHYSICIAN ASSISTANT

## 2023-01-23 PROCEDURE — 99999 PR PBB SHADOW E&M-EST. PATIENT-LVL V: CPT | Mod: PBBFAC,,, | Performed by: PHYSICIAN ASSISTANT

## 2023-01-23 PROCEDURE — 1159F MED LIST DOCD IN RCRD: CPT | Mod: CPTII,S$GLB,, | Performed by: PHYSICIAN ASSISTANT

## 2023-01-23 PROCEDURE — 80053 COMPREHEN METABOLIC PANEL: CPT | Performed by: PHYSICIAN ASSISTANT

## 2023-01-23 PROCEDURE — 3078F PR MOST RECENT DIASTOLIC BLOOD PRESSURE < 80 MM HG: ICD-10-PCS | Mod: CPTII,S$GLB,, | Performed by: PHYSICIAN ASSISTANT

## 2023-01-23 PROCEDURE — 3078F DIAST BP <80 MM HG: CPT | Mod: CPTII,S$GLB,, | Performed by: PHYSICIAN ASSISTANT

## 2023-01-23 PROCEDURE — 36415 COLL VENOUS BLD VENIPUNCTURE: CPT | Performed by: PHYSICIAN ASSISTANT

## 2023-01-23 PROCEDURE — 3074F PR MOST RECENT SYSTOLIC BLOOD PRESSURE < 130 MM HG: ICD-10-PCS | Mod: CPTII,S$GLB,, | Performed by: PHYSICIAN ASSISTANT

## 2023-01-23 PROCEDURE — 3008F PR BODY MASS INDEX (BMI) DOCUMENTED: ICD-10-PCS | Mod: CPTII,S$GLB,, | Performed by: PHYSICIAN ASSISTANT

## 2023-01-23 PROCEDURE — 84443 ASSAY THYROID STIM HORMONE: CPT | Performed by: PHYSICIAN ASSISTANT

## 2023-01-23 PROCEDURE — 83036 HEMOGLOBIN GLYCOSYLATED A1C: CPT | Performed by: PHYSICIAN ASSISTANT

## 2023-01-23 PROCEDURE — 83880 ASSAY OF NATRIURETIC PEPTIDE: CPT | Performed by: PHYSICIAN ASSISTANT

## 2023-01-23 PROCEDURE — 80061 LIPID PANEL: CPT | Performed by: PHYSICIAN ASSISTANT

## 2023-01-23 PROCEDURE — 99214 PR OFFICE/OUTPT VISIT, EST, LEVL IV, 30-39 MIN: ICD-10-PCS | Mod: S$GLB,,, | Performed by: PHYSICIAN ASSISTANT

## 2023-01-23 PROCEDURE — 3074F SYST BP LT 130 MM HG: CPT | Mod: CPTII,S$GLB,, | Performed by: PHYSICIAN ASSISTANT

## 2023-01-23 PROCEDURE — 99999 PR PBB SHADOW E&M-EST. PATIENT-LVL V: ICD-10-PCS | Mod: PBBFAC,,, | Performed by: PHYSICIAN ASSISTANT

## 2023-01-23 PROCEDURE — 99214 OFFICE O/P EST MOD 30 MIN: CPT | Mod: S$GLB,,, | Performed by: PHYSICIAN ASSISTANT

## 2023-01-23 PROCEDURE — 99499 UNLISTED E&M SERVICE: CPT | Mod: S$GLB,,, | Performed by: PHYSICIAN ASSISTANT

## 2023-01-23 PROCEDURE — 1159F PR MEDICATION LIST DOCUMENTED IN MEDICAL RECORD: ICD-10-PCS | Mod: CPTII,S$GLB,, | Performed by: PHYSICIAN ASSISTANT

## 2023-01-30 ENCOUNTER — HOSPITAL ENCOUNTER (OUTPATIENT)
Dept: RADIOLOGY | Facility: OTHER | Age: 63
Discharge: HOME | End: 2023-01-30
Attending: NURSE PRACTITIONER
Payer: MEDICARE

## 2023-01-30 DIAGNOSIS — S22.060D CLOSED WEDGE COMPRESSION FRACTURE OF T7 VERTEBRA WITH ROUTINE HEALING, SUBSEQUENT ENCOUNTER: ICD-10-CM

## 2023-01-30 DIAGNOSIS — M54.6 CHRONIC MIDLINE THORACIC BACK PAIN: ICD-10-CM

## 2023-01-30 DIAGNOSIS — G89.29 CHRONIC MIDLINE THORACIC BACK PAIN: ICD-10-CM

## 2023-01-30 PROCEDURE — 72157 MRI THORACIC SPINE W WO CONTRAST: ICD-10-PCS | Mod: 26,,, | Performed by: RADIOLOGY

## 2023-01-30 PROCEDURE — A9585 GADOBUTROL INJECTION: HCPCS | Performed by: NURSE PRACTITIONER

## 2023-01-30 PROCEDURE — 72157 MRI CHEST SPINE W/O & W/DYE: CPT | Mod: 26,,, | Performed by: RADIOLOGY

## 2023-01-30 PROCEDURE — 25500020 PHARM REV CODE 255: Performed by: NURSE PRACTITIONER

## 2023-01-30 PROCEDURE — 72157 MRI CHEST SPINE W/O & W/DYE: CPT | Mod: TC

## 2023-01-30 RX ORDER — GADOBUTROL 604.72 MG/ML
10 INJECTION INTRAVENOUS
Status: COMPLETED | OUTPATIENT
Start: 2023-01-30 | End: 2023-01-30

## 2023-01-30 RX ADMIN — GADOBUTROL 10 ML: 604.72 INJECTION INTRAVENOUS at 10:01

## 2023-01-31 DIAGNOSIS — J30.89 NON-SEASONAL ALLERGIC RHINITIS, UNSPECIFIED TRIGGER: ICD-10-CM

## 2023-01-31 DIAGNOSIS — I51.89 DIASTOLIC DYSFUNCTION: ICD-10-CM

## 2023-01-31 DIAGNOSIS — E78.5 HYPERLIPIDEMIA, UNSPECIFIED HYPERLIPIDEMIA TYPE: ICD-10-CM

## 2023-01-31 RX ORDER — ATORVASTATIN CALCIUM 40 MG/1
40 TABLET, FILM COATED ORAL NIGHTLY
Qty: 90 TABLET | Refills: 0 | Status: SHIPPED | OUTPATIENT
Start: 2023-01-31 | End: 2023-05-01 | Stop reason: SDUPTHER

## 2023-01-31 RX ORDER — FUROSEMIDE 20 MG/1
20 TABLET ORAL DAILY
Qty: 90 TABLET | Refills: 0 | Status: SHIPPED | OUTPATIENT
Start: 2023-01-31 | End: 2023-09-20

## 2023-01-31 RX ORDER — MONTELUKAST SODIUM 10 MG/1
10 TABLET ORAL NIGHTLY
Qty: 90 TABLET | Refills: 0 | Status: SHIPPED | OUTPATIENT
Start: 2023-01-31 | End: 2023-04-27

## 2023-01-31 NOTE — TELEPHONE ENCOUNTER
No new care gaps identified.  [facility] Embedded Care Gaps. Reference number: [id number]. 1/31/2023   3:35:28 PM CST

## 2023-01-31 NOTE — TELEPHONE ENCOUNTER
Refill Routing Note   Medication(s) are not appropriate for processing by [facility] for the following reason(s):       Drug-drug interation    [facility] action(s):  Defer  Approve      Medication Therapy Plan: Contraindicated PPI on med card (nexium)      Appointments  past 12m or future 3m with PCP    Date Provider   Last Visit   1/11/2022 Cate Galeas MD   Next Visit   5/25/2023 Cate Galeas MD   ED visits in past 90 days: 0        Note composed:4:38 PM 01/31/2023

## 2023-02-01 ENCOUNTER — CLINICAL SUPPORT (OUTPATIENT)
Dept: REHABILITATION | Facility: OTHER | Age: 63
End: 2023-02-01
Attending: NURSE PRACTITIONER
Payer: MEDICARE

## 2023-02-01 DIAGNOSIS — M47.819 SPONDYLOSIS WITHOUT MYELOPATHY: ICD-10-CM

## 2023-02-01 DIAGNOSIS — R53.1 WEAKNESS: ICD-10-CM

## 2023-02-01 DIAGNOSIS — Z78.9 IMPAIRED MOBILITY AND ADLS: ICD-10-CM

## 2023-02-01 DIAGNOSIS — Z74.09 IMPAIRED MOBILITY AND ADLS: ICD-10-CM

## 2023-02-01 DIAGNOSIS — M25.69 DECREASED ROM OF TRUNK AND BACK: ICD-10-CM

## 2023-02-01 DIAGNOSIS — M47.816 LUMBAR SPONDYLOSIS: ICD-10-CM

## 2023-02-01 DIAGNOSIS — Z74.09 DECREASED FUNCTIONAL MOBILITY AND ENDURANCE: ICD-10-CM

## 2023-02-01 DIAGNOSIS — M54.6 THORACIC SPINE PAIN: ICD-10-CM

## 2023-02-01 PROCEDURE — 97750 PHYSICAL PERFORMANCE TEST: CPT | Mod: 32

## 2023-02-01 RX ORDER — CLOPIDOGREL BISULFATE 75 MG/1
TABLET ORAL
Qty: 90 TABLET | Refills: 3 | Status: SHIPPED | OUTPATIENT
Start: 2023-02-01

## 2023-02-02 PROBLEM — Z74.09 IMPAIRED MOBILITY AND ADLS: Status: ACTIVE | Noted: 2023-02-02

## 2023-02-02 PROBLEM — Z78.9 IMPAIRED MOBILITY AND ADLS: Status: ACTIVE | Noted: 2023-02-02

## 2023-02-07 ENCOUNTER — TELEPHONE (OUTPATIENT)
Dept: GASTROENTEROLOGY | Facility: CLINIC | Age: 63
End: 2023-02-07
Payer: MEDICARE

## 2023-02-07 ENCOUNTER — OFFICE VISIT (OUTPATIENT)
Dept: OTOLARYNGOLOGY | Facility: CLINIC | Age: 63
End: 2023-02-07
Payer: MEDICARE

## 2023-02-07 VITALS
HEIGHT: 71 IN | OXYGEN SATURATION: 100 % | BODY MASS INDEX: 35.52 KG/M2 | HEART RATE: 80 BPM | WEIGHT: 253.75 LBS | DIASTOLIC BLOOD PRESSURE: 88 MMHG | SYSTOLIC BLOOD PRESSURE: 134 MMHG

## 2023-02-07 DIAGNOSIS — K21.9 LARYNGOPHARYNGEAL REFLUX (LPR): Primary | ICD-10-CM

## 2023-02-07 DIAGNOSIS — J31.0 CHRONIC RHINITIS: ICD-10-CM

## 2023-02-07 DIAGNOSIS — J34.2 NASAL SEPTAL DEVIATION: ICD-10-CM

## 2023-02-07 DIAGNOSIS — G47.33 OBSTRUCTIVE SLEEP APNEA TREATED WITH CONTINUOUS POSITIVE AIRWAY PRESSURE (CPAP): ICD-10-CM

## 2023-02-07 DIAGNOSIS — J34.3 HYPERTROPHY OF BOTH INFERIOR NASAL TURBINATES: ICD-10-CM

## 2023-02-07 PROCEDURE — 1160F PR REVIEW ALL MEDS BY PRESCRIBER/CLIN PHARMACIST DOCUMENTED: ICD-10-PCS | Mod: CPTII,S$GLB,, | Performed by: STUDENT IN AN ORGANIZED HEALTH CARE EDUCATION/TRAINING PROGRAM

## 2023-02-07 PROCEDURE — 31231 NASAL ENDOSCOPY DX: CPT | Mod: S$GLB,,, | Performed by: STUDENT IN AN ORGANIZED HEALTH CARE EDUCATION/TRAINING PROGRAM

## 2023-02-07 PROCEDURE — 3008F BODY MASS INDEX DOCD: CPT | Mod: CPTII,S$GLB,, | Performed by: STUDENT IN AN ORGANIZED HEALTH CARE EDUCATION/TRAINING PROGRAM

## 2023-02-07 PROCEDURE — 31231 PR NASAL ENDOSCOPY, DX: ICD-10-PCS | Mod: S$GLB,,, | Performed by: STUDENT IN AN ORGANIZED HEALTH CARE EDUCATION/TRAINING PROGRAM

## 2023-02-07 PROCEDURE — 3075F SYST BP GE 130 - 139MM HG: CPT | Mod: CPTII,S$GLB,, | Performed by: STUDENT IN AN ORGANIZED HEALTH CARE EDUCATION/TRAINING PROGRAM

## 2023-02-07 PROCEDURE — 3079F PR MOST RECENT DIASTOLIC BLOOD PRESSURE 80-89 MM HG: ICD-10-PCS | Mod: CPTII,S$GLB,, | Performed by: STUDENT IN AN ORGANIZED HEALTH CARE EDUCATION/TRAINING PROGRAM

## 2023-02-07 PROCEDURE — 3044F HG A1C LEVEL LT 7.0%: CPT | Mod: CPTII,S$GLB,, | Performed by: STUDENT IN AN ORGANIZED HEALTH CARE EDUCATION/TRAINING PROGRAM

## 2023-02-07 PROCEDURE — 99213 PR OFFICE/OUTPT VISIT, EST, LEVL III, 20-29 MIN: ICD-10-PCS | Mod: 25,S$GLB,, | Performed by: STUDENT IN AN ORGANIZED HEALTH CARE EDUCATION/TRAINING PROGRAM

## 2023-02-07 PROCEDURE — 99213 OFFICE O/P EST LOW 20 MIN: CPT | Mod: 25,S$GLB,, | Performed by: STUDENT IN AN ORGANIZED HEALTH CARE EDUCATION/TRAINING PROGRAM

## 2023-02-07 PROCEDURE — 99999 PR PBB SHADOW E&M-EST. PATIENT-LVL V: ICD-10-PCS | Mod: PBBFAC,,, | Performed by: STUDENT IN AN ORGANIZED HEALTH CARE EDUCATION/TRAINING PROGRAM

## 2023-02-07 PROCEDURE — 3075F PR MOST RECENT SYSTOLIC BLOOD PRESS GE 130-139MM HG: ICD-10-PCS | Mod: CPTII,S$GLB,, | Performed by: STUDENT IN AN ORGANIZED HEALTH CARE EDUCATION/TRAINING PROGRAM

## 2023-02-07 PROCEDURE — 3008F PR BODY MASS INDEX (BMI) DOCUMENTED: ICD-10-PCS | Mod: CPTII,S$GLB,, | Performed by: STUDENT IN AN ORGANIZED HEALTH CARE EDUCATION/TRAINING PROGRAM

## 2023-02-07 PROCEDURE — 1160F RVW MEDS BY RX/DR IN RCRD: CPT | Mod: CPTII,S$GLB,, | Performed by: STUDENT IN AN ORGANIZED HEALTH CARE EDUCATION/TRAINING PROGRAM

## 2023-02-07 PROCEDURE — 3044F PR MOST RECENT HEMOGLOBIN A1C LEVEL <7.0%: ICD-10-PCS | Mod: CPTII,S$GLB,, | Performed by: STUDENT IN AN ORGANIZED HEALTH CARE EDUCATION/TRAINING PROGRAM

## 2023-02-07 PROCEDURE — 99999 PR PBB SHADOW E&M-EST. PATIENT-LVL V: CPT | Mod: PBBFAC,,, | Performed by: STUDENT IN AN ORGANIZED HEALTH CARE EDUCATION/TRAINING PROGRAM

## 2023-02-07 PROCEDURE — 1159F PR MEDICATION LIST DOCUMENTED IN MEDICAL RECORD: ICD-10-PCS | Mod: CPTII,S$GLB,, | Performed by: STUDENT IN AN ORGANIZED HEALTH CARE EDUCATION/TRAINING PROGRAM

## 2023-02-07 PROCEDURE — 3079F DIAST BP 80-89 MM HG: CPT | Mod: CPTII,S$GLB,, | Performed by: STUDENT IN AN ORGANIZED HEALTH CARE EDUCATION/TRAINING PROGRAM

## 2023-02-07 PROCEDURE — 1159F MED LIST DOCD IN RCRD: CPT | Mod: CPTII,S$GLB,, | Performed by: STUDENT IN AN ORGANIZED HEALTH CARE EDUCATION/TRAINING PROGRAM

## 2023-02-07 NOTE — TELEPHONE ENCOUNTER
----- Message from Muna Roth sent at 2/7/2023 11:33 AM CST -----  Contact: pt  Pt requesting call back RE: would like to be seen sooner than 4/20 please call nothing available in epic      Confirmed contact below:  Contact Name:Bri Santiago  Phone Number: [phone]

## 2023-02-08 PROBLEM — M25.69 DECREASED ROM OF TRUNK AND BACK: Status: ACTIVE | Noted: 2023-02-08

## 2023-02-08 PROBLEM — Z74.09 DECREASED FUNCTIONAL MOBILITY AND ENDURANCE: Status: ACTIVE | Noted: 2023-02-08

## 2023-02-08 PROBLEM — R53.1 WEAKNESS: Status: ACTIVE | Noted: 2023-02-08

## 2023-02-08 NOTE — PLAN OF CARE
[facility]  Physical Therapy Initial Evaluation  Date: 2/1/2023   Name: Bri Santiago  [facility] Number: [id number]    Therapy Diagnosis:   Encounter Diagnoses   Name Primary?    Thoracic spine pain     Spondylosis without myelopathy     Lumbar spondylosis     Impaired mobility and ADLs     Decreased ROM of trunk and back     Decreased functional mobility and endurance     Weakness      Physician: Buffy Villagran,*    Physician Orders: PT Eval and Treat   Medical Diagnosis from Referral:   M54.6 (ICD-10-CM) - Thoracic spine pain   M47.819 (ICD-10-CM) - Spondylosis without myelopathy   M47.816 (ICD-10-CM) - Lumbar spondylosis     Evaluation Date: 2/1/2023  Authorization Period Expiration: 3/1/2023  Plan of Care Expiration: 5/5/2023  Visit # / Visits authorized: 1/ 1(pending)    Progress Note Due: 3/6/2023  FOTO: 1/ 1    Precautions: Standard, CHF, and von willebrand disease(clotting disorder)     Time In: 10:10  Time Out: 11:00   Total Appointment Time (timed & untimed codes): 50 minutes    Pain Pattern: Pattern 1 (possibly could be extension however displaying avoidant behaviors)      Subjective   Date of onset: 2007   History of current condition - Bri reports: having back pain since 2007. Pt reports pain feels nagging and sharp. Pt reports pain is in his low back and now also in thoracic spine. Pt reports pain increases when laying down on his back and when taking a deep breath.  Pt reports pain is constant however will get worse or ease up but never fully goes away. Pt notes pain started to increase in thoracic spine about a month and a half ago. Pt reports he tries to find comfortable position when pain is increased and reports he keeps moving until he finds it. Pt notes he will intermittently experience spasms in his back randomly and he has not found a pattern to it.     NAY: no   Any Bowel and Bladder movement issues: no   Any falls: no   Any dizziness: no   Any Headache:  no   Any injection in lower back: steroid or epidural: nerve block and shots (last shot was November of 2022)   Pain radiates: sometimes bilateral legs to the hips, groin, and lateral thigh    Pain constant or intermitting: constant     Pain:  Current 6/10, worst 10/10, best 3/10   Location: bilateral thoracic spine and lumbar spine    Description: Aching, Deep, and nagging   Aggravating Factors: laying down   Easing Factors: seated or standing it depends     Prior Therapy: yes   Social History:  lives with their spouse- no stairs to enter   Occupation: disabled   Prior Level of Function: independent   Current Level of Function: independent however increase in irritation with movement     Pts goals: putting things away/reaching overhead without pain, tying his shoes without pain, donning/doffing clothing without pain    Imaging, MRI studies:   Impression:     Multilevel degenerative change of the thoracic spine as discussed above.     Minimal chronic wedge compression fracture of T8 versus degenerative endplate change.     Xray   This report was flagged in Epic as abnormal.     1. Anterior height loss involving T7, progressed since the previous exam is, this may reflect ongoing degenerative change however correlation with any focal tenderness is recommended.          Medical History:   Past Medical History:   Diagnosis Date    Acute pancreatitis     Anal fissure     Anemia     Anticoagulant long-term use     Arthritis     Asthma in remission     Back pain     BPH (benign prostatic hypertrophy)     Cancer 2000    prostate- treated at [facility] with chemo- in remission since 2000    Chronic maxillary sinusitis     Clotting disorder     Diastolic dysfunction with chronic heart failure 12/3/2018    Dysphagia 10/7/2014    Family history of colon cancer     Family history of early CAD     GERD (gastroesophageal reflux disease)     Helicobacter pylori (H. pylori) infection     Chronic    History of chronic pancreatitis      HTN (hypertension)     Lumbago 11/12/2012    Obesity     ABDON (obstructive sleep apnea)     ABDON (obstructive sleep apnea)     With likely ohs Refer to sleep    Sacroiliac joint pain 2/10/2015    Spinal stenosis of lumbar region     Von Willebrand disease     VWD (acquired von Willebrand's disease)        Surgical History:   Bri Santiago  has a past surgical history that includes Lumbar fusion (2012); Carpal tunnel release (2003); anal fissure repair; Cervical discectomy (2003); Colonoscopy (N/A, 10/7/2015); Vasectomy (1996); Tonsillectomy; Spine surgery; Colonoscopy (N/A, 6/19/2017); Radiofrequency ablation of lumbar medial branch nerve at single level (Left, 5/24/2018); Left heart catheterization (Left, 2/14/2019); Catheterization of both left and right heart (N/A, 2/14/2019); Radiofrequency ablation (Right, 5/9/2019); Radiofrequency ablation (Left, 5/23/2019); Back surgery (2012); Functional endoscopic sinus surgery (FESS) using computer-assisted navigation (Bilateral, 10/7/2019); Balloon sinuplasty of paranasal sinus (Bilateral, 10/7/2019); Radiofrequency ablation (Left, 1/16/2020); Radiofrequency ablation (Right, 1/28/2020); Esophagogastroduodenoscopy (N/A, 3/4/2020); Colonoscopy (N/A, 3/4/2020); Radiofrequency ablation (Left, 8/18/2020); Radiofrequency ablation (Right, 9/1/2020); Injection of anesthetic agent around nerve (Bilateral, 10/13/2020); Esophagogastroduodenoscopy (N/A, 11/3/2020); Examination under anesthesia (N/A, 3/15/2021); Lateral internal anal sphincterotomy (N/A, 12/10/2021); Radiofrequency ablation (Bilateral, 12/21/2021); Examination under anesthesia (N/A, 2/25/2022); Cystoscopy (8/12/2022); Ureteroscopy (Bilateral, 8/12/2022); Retrograde pyelography (Bilateral, 8/12/2022); Radiofrequency ablation (Left, 11/21/2022); and Radiofrequency ablation (Right, 12/5/2022).    Medications:   Bri has a current medication list which includes the following prescription(s): albuterol, atorvastatin,  "azelastine, budesonide-formoterol 160-4.5 mcg, calcium citrate-vitamin d3 315-200 mg, clopidogrel, cyanocobalamin (vitamin b-12), docusate sodium, doxazosin, esomeprazole, fluticasone propionate, furosemide, hydrocortisone, ipratropium, montelukast, phentermine, moderna covid bival(6y up)(pf), tadalafil, androderm, topiramate, zolpidem, and [DISCONTINUED] tamsulosin, and the following Facility-Administered Medications: sodium chloride 0.9%, sodium chloride 0.9%, sodium chloride 0.9%, mupirocin, and mupirocin.    Allergies:   Review of patient's allergies indicates:   Allergen Reactions    Penicillins Hives and Swelling     Has had allergy testing and can prob tolerate penicillin    Ace inhibitors Other (See Comments)     "Caused a respiratory infection"    Aspirin Hives           Codeine Itching     Ok to take percocet    Dilaudid [hydromorphone] Itching          Objective       Observation: pleasant older male     Sensation: intact to light touch    DTR:: intact to light touch    GAIT DEVIATIONS: Harlin displays somewhat slow grace .     Postural examination/scapula alignment: Rounded shoulder, Head forward, and Increased kyphosis  Joint integrity: unremarkable- hypomobile PA to lumbar spine   Skin integrity: unremarkable   Edema: unremarkable   Correction of posture: better with lumbar roll    Lower Extremity Strength    Right LE  Left LE    Hip flexion: 4+/5 Hip flexion: 4+/5   Knee extension: 4+/5 Knee extension: 4+/5   Knee flexion: 4/5 Knee flexion: 4/5   Hip abduction: 4+/5 Hip abduction: 4+/5   Hip adduction: 4+/5 Hip adduction 4+/5   Ankle dorsiflexion: 4+/5 Ankle dorsiflexion: 4+/5   Ankle plantarflexion: 5/5 Ankle plantarflexion: 5/5     MOVEMENT LOSS Back   Norms ROM Loss Initial   Flexion Fingers touch toes, sacral angle >/= 70 deg, uniform spinal curvature, posterior weight shift  moderate loss   Extension ASIS surpasses toes, spine of scapulae surpasses heels, uniform spinal curve moderate loss "   Side glide Right  minimal loss   Side glide Left  minimal loss   Rotation Right PT observes contralateral shoulder major loss   Rotation Left PT observes contralateral shoulder major loss       REPEATED TEST MOVEMENTS:    Baseline symptoms:  Repeated Flexion in Standing end range pain  pain during motion   Repeated Extension in Standing worse   Repeated Flexion in lying worse   Repeated Extension in lying  worse       STATIC TESTS and other movements:   Baseline symptoms:  Prone lie better   Prone lie on elbows better   Sustained flexion worse   Sitting slouched  no effect   Sitting erect worse   Standing slouched no effect   Standing erect  worse       Reflexes:    Left Right   Patella Tendon absent absent   Babinski  (--) (--)   Clonus (--) (--)       Special Tests:   Test Name  Test Result   Prone Instability Test (--)   SI Joint Provocation Test (+)    Straight Leg Raise (+)   Neural Tension Test (--)   Walking on toes Able    Walking on heels  Able          Joint Mobility: hypomobile  PA's     Palpation: increase irriatation with palpation to the lumbar spine       PT Evaluation Completed? Yes  Discussed Plan of Care with patient: Yes        CMS Impairment/Limitation/Restriction for FOTO  Survey    Therapist reviewed FOTO scores for Bri Santiago on 2/1/2023.   FOTO documents entered into [employer] - see Media section.    Limitation Score: 43%           TREATMENT   Treatment Time In: 10:50  Treatment Time Out: 11:00   Total Treatment time separate from Evaluation: 10 minutes    Bri received therapeutic exercises to develop endurance, ROM, flexibility, and posture for 10 minutes including:  EIS   SOC  Prone lie       Bri received  cold pack for 5 minutes to lumbar spine in z lie .      Home Exercises and Patient Education Provided    Education provided:   - on healthy back program and outcomes     Written Home Exercises Provided: yes.  Exercises were reviewed and Bri was able to demonstrate them prior to  the end of the session.  Bri demonstrated good  understanding of the education provided.     See EMR under Patient Instructions for exercises provided 2/1/2023.    Assessment   Bri is a 62 y.o. male referred to outpatient Physical Therapy with a medical diagnosis of M54.6 (ICD-10-CM) - Thoracic spine pain, M47.819 (ICD-10-CM) - Spondylosis without myelopathy, and M47.816 (ICD-10-CM) - Lumbar spondylosis. Pt presents with reported chronic low back pain  that is reproduced laying on his back, sitting erect, and repeated flexion and extension in standing and reduced with prone lie and prone lie on elbows  indicating directional preference of pattern 1 with potential to convert to PEP however is displaying movement avoidance behavior.  Pt screened for PE due to complaints of thoracic pain however pt not displaying red flags to conclude additional referral or follow up for PE.  Upon physical assessment, pt demonstrates slouched posturing in sitting, mild hip weakness bilaterally, and trunk and hip mobility deficits. Upon further local examination, hip, lumbar, and thoracic rotation were all limited significantly. Pt unable to undergo MedX lumbar testing due to time constraints. Pt will undergo testing at first follow up in order to attain baseline measurements. All of the above noted supports potential lumbar classification as a pattern 1 with recurrent/ or consistent symptoms, thus pt is a good candidate for the Healthy Back Program. Pt would benefit from LE and trunk mobility training, stability training,  improved cardiovascular and muscular endurance, neuromuscular re-education for posture, coordination, and muscular recruitment and education on positional offloading techniques to decrease the intensity and frequency of flare-ups.       Pt prognosis is Fair.   Pt will benefit from skilled outpatient Physical Therapy to address the deficits stated above and in the chart below, provide pt/family education, and to  maximize pt's level of independence.     Plan of care discussed with patient: Yes  Pt's spiritual, cultural and educational needs considered and patient is agreeable to the plan of care and goals as stated below:     Anticipated Barriers for therapy: chronicity of pain, and multiple comorbidities    Medical Necessity is demonstrated by the following  History  Co-morbidities and personal factors that may impact the plan of care Co-morbidities:   CHF, difficulty sleeping, high BMI, history of cancer, and HTN    Personal Factors:   lifestyle     low   Examination  Body Structures and Functions, activity limitations and participation restrictions that may impact the plan of care Body Regions:   back    Body Systems:    gross symmetry  ROM  strength  gross coordinated movement  motor control  motor learning    Participation Restrictions:   none    Activity limitations:   Learning and applying knowledge  no deficits    General Tasks and Commands  no deficits    Communication  no deficits    Mobility  lifting and carrying objects      Self care  dressing    Domestic Life  doing house work (cleaning house, washing dishes, laundry)  assisting others    Interactions/Relationships  no deficits    Life Areas  no deficits    Community and Social Life  no deficits         Complexity: low  See FOTO outcome assessment   Clinical Presentation stable and uncomplicated low   Decision Making/ Complexity Score: low       GOALS: Pt is in agreement with the following goals.    Short term goals:  6 weeks or 10 visits   1.  Pt will demonstrate increased lumbar ROM by at least 3 degrees from the initial ROM value with improvements noted in functional ROM and ability to perform ADLs.  (approp and ongoing)  2.  Pt will demonstrate increased MedX average isometric strength value  by 15% from initial test resulting in improved ability to perform bending, lifting, and carrying activities safely, confidently.  (approp and ongoing)  3.  Patient  "report a reduction in worst pain score by 1-2 points for improved tolerance for ADL/IADL's.  (approp and ongoing)  4.  Pt able to perform HEP correctly with minimal cueing or supervision from therapist to encourage independent management of symptoms. (approp and ongoing)      Long term goals: 10 weeks or 20 visits   1. Pt will demonstrate increased lumbar ROM by at least 5 degrees from initial ROM value, resulting in improved ability to perform functional fwd bending while standing and sitting. (approp and ongoing)  2. Pt will demonstrate increased MedX average isometric strength value  by 25% from initial test resulting in improved ability to perform bending, lifting, and carrying activities safely, confidently.  (approp and ongoing)  3. Pt to demonstrate ability to independently control and reduce their pain through posture positioning and mechanical movements throughout a typical day.  (approp and ongoing)  4.  Pt will demonstrate reduced pain and improved functional outcomes as reported on the FOTO by reaching a limitation score of < or = 40% or less in order to demonstrate subjective improvement in pt's condition.    (approp and ongoing)  5. Pt will demonstrate independence with the HEP at discharge  (approp and ongoing)  6.  Pt will be able to reach overhead and put his dishes away with <5/10 pain in order to improve functional mobility and quality of life.  (approp and ongoing)      Plan   Outpatient physical therapy 2x week for 10 weeks or 20 visits to include the following:   - Patient education  - Therapeutic exercise  - Manual therapy  - Performance testing   - Neuromuscular Re-education  - Therapeutic activity   - Modalities    Pt may be seen by PTA as part of the rehabilitation team.     Travis Martell, PT      "I certify the need for these services furnished under this plan of treatment and while under my care."    ____________________________________  Physician/Referring " Practitioner    _______________  Date of Signature

## 2023-02-13 ENCOUNTER — PATIENT MESSAGE (OUTPATIENT)
Dept: PAIN MEDICINE | Facility: CLINIC | Age: 63
End: 2023-02-13
Payer: MEDICARE

## 2023-02-14 ENCOUNTER — OFFICE VISIT (OUTPATIENT)
Dept: PAIN MEDICINE | Facility: CLINIC | Age: 63
End: 2023-02-14
Payer: MEDICARE

## 2023-02-14 VITALS
WEIGHT: 251 LBS | BODY MASS INDEX: 35.14 KG/M2 | HEIGHT: 71 IN | HEART RATE: 69 BPM | RESPIRATION RATE: 18 BRPM | DIASTOLIC BLOOD PRESSURE: 80 MMHG | SYSTOLIC BLOOD PRESSURE: 126 MMHG

## 2023-02-14 DIAGNOSIS — M47.816 LUMBAR SPONDYLOSIS: ICD-10-CM

## 2023-02-14 DIAGNOSIS — M47.819 SPONDYLOSIS WITHOUT MYELOPATHY: ICD-10-CM

## 2023-02-14 DIAGNOSIS — S22.060D CLOSED WEDGE COMPRESSION FRACTURE OF T7 VERTEBRA WITH ROUTINE HEALING, SUBSEQUENT ENCOUNTER: ICD-10-CM

## 2023-02-14 DIAGNOSIS — M54.6 THORACIC SPINE PAIN: Primary | ICD-10-CM

## 2023-02-14 PROCEDURE — 1160F PR REVIEW ALL MEDS BY PRESCRIBER/CLIN PHARMACIST DOCUMENTED: ICD-10-PCS | Mod: CPTII,S$GLB,, | Performed by: NURSE PRACTITIONER

## 2023-02-14 PROCEDURE — 3044F HG A1C LEVEL LT 7.0%: CPT | Mod: CPTII,S$GLB,, | Performed by: NURSE PRACTITIONER

## 2023-02-14 PROCEDURE — 3044F PR MOST RECENT HEMOGLOBIN A1C LEVEL <7.0%: ICD-10-PCS | Mod: CPTII,S$GLB,, | Performed by: NURSE PRACTITIONER

## 2023-02-14 PROCEDURE — 3074F SYST BP LT 130 MM HG: CPT | Mod: CPTII,S$GLB,, | Performed by: NURSE PRACTITIONER

## 2023-02-14 PROCEDURE — 99999 PR PBB SHADOW E&M-EST. PATIENT-LVL IV: CPT | Mod: PBBFAC,,, | Performed by: NURSE PRACTITIONER

## 2023-02-14 PROCEDURE — 99999 PR PBB SHADOW E&M-EST. PATIENT-LVL IV: ICD-10-PCS | Mod: PBBFAC,,, | Performed by: NURSE PRACTITIONER

## 2023-02-14 PROCEDURE — 1159F PR MEDICATION LIST DOCUMENTED IN MEDICAL RECORD: ICD-10-PCS | Mod: CPTII,S$GLB,, | Performed by: NURSE PRACTITIONER

## 2023-02-14 PROCEDURE — 99214 OFFICE O/P EST MOD 30 MIN: CPT | Mod: S$GLB,,, | Performed by: NURSE PRACTITIONER

## 2023-02-14 PROCEDURE — 3074F PR MOST RECENT SYSTOLIC BLOOD PRESSURE < 130 MM HG: ICD-10-PCS | Mod: CPTII,S$GLB,, | Performed by: NURSE PRACTITIONER

## 2023-02-14 PROCEDURE — 99214 PR OFFICE/OUTPT VISIT, EST, LEVL IV, 30-39 MIN: ICD-10-PCS | Mod: S$GLB,,, | Performed by: NURSE PRACTITIONER

## 2023-02-14 PROCEDURE — 3079F DIAST BP 80-89 MM HG: CPT | Mod: CPTII,S$GLB,, | Performed by: NURSE PRACTITIONER

## 2023-02-14 PROCEDURE — 1160F RVW MEDS BY RX/DR IN RCRD: CPT | Mod: CPTII,S$GLB,, | Performed by: NURSE PRACTITIONER

## 2023-02-14 PROCEDURE — 3008F BODY MASS INDEX DOCD: CPT | Mod: CPTII,S$GLB,, | Performed by: NURSE PRACTITIONER

## 2023-02-14 PROCEDURE — 3079F PR MOST RECENT DIASTOLIC BLOOD PRESSURE 80-89 MM HG: ICD-10-PCS | Mod: CPTII,S$GLB,, | Performed by: NURSE PRACTITIONER

## 2023-02-14 PROCEDURE — 3008F PR BODY MASS INDEX (BMI) DOCUMENTED: ICD-10-PCS | Mod: CPTII,S$GLB,, | Performed by: NURSE PRACTITIONER

## 2023-02-14 PROCEDURE — 1159F MED LIST DOCD IN RCRD: CPT | Mod: CPTII,S$GLB,, | Performed by: NURSE PRACTITIONER

## 2023-02-14 NOTE — PROGRESS NOTES
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  CARDIOVASCULAR:  Negative for chest pain, leg swelling or palpitations.  GI:  Negative for abdominal discomfort, blood in stools or black stools or change in bowel habits.  MUSCULOSKELETAL:  Joint stiffness, back and neck pain.  SKIN:  Negative for lesions, rash, and itching.  PSYCH: Patients sleep is not disturbed secondary to pain.  HEMATOLOGY/LYMPHOLOGY:  History of easy bruising.  History of von Willebrand's factor deficiency. On Plavix.  NEURO:   No history of syncope, paralysis, seizures or tremors.   All other reviewed and negative other than HPI.      OBJECTIVE:    /80 (BP Location: Left arm, Patient Position: Sitting, BP Method: Large (Automatic))   Pulse 69   Resp 18   Ht 5' 11" (1.803 m)   Wt 113.9 kg (251 lb)   BMI 35.01 kg/m²     PHYSICAL EXAMINATION:    GENERAL: Well appearing, in no acute distress, alert and oriented x3.  PSYCH:  Mood and affect appropriate.  SKIN:  No evidence of infection from previous injection site. No visible rashes.  HEAD/FACE:  Normocephalic, atraumatic.   BACK: There is pain with palpation of thoracic facet joints and paraspinal muscles bilaterally. There is pain with thoracic extension > flexion.  Positive facet loading bilaterally.  EXTREMITIES: Bilateral lower and upper extremity strength is 5/5 and symmetric.   MUSCULOSKELETAL:   No " atrophy or tone abnormalities are noted. No TTP over SI joints. 5/5 strength in right ankle with plantar and dorsiflexion. 5/5 strength in left ankle with plantar and dorsiflexion. 5/5 strength with right knee flexion and extension. 5/5 strength with left knee flexion and extension.   NEURO: Cranial nerves grossly intact. No loss of sensation noted.  GAIT: Antalgic- ambulates without assistance.      Assessment:     1. Thoracic spine pain    2. Lumbar spondylosis    3. Closed wedge compression fracture of T7 vertebra with routine healing, subsequent encounter    4. Spondylosis without myelopathy          Plan:     - Previous imaging was reviewed and discussed with the patient today.    - We discussed thoracic ODALYS if needed. He will complete [facility] and consider if pain persists after.    - Can continue Robaxin 500 mg BID PRN muscle pain.    - The patient will continue a home exercise routine to help with pain and strengthening.      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 It may be mild vWF, but most recent lab tests showed normal coagulation function. The patient has been previously receiving dDAVP prior to all procedures and has not exhibited bleeding. Hematology recommended receiving dDAVP prior to all invasive procedures. For all interventional procedures, we will give 0.3mcg/kg dDAVP immediately prior to the procedure.       - RTC after completion of PT.      The above plan and management options were discussed at length with patient. Patient is in agreement with the above and verbalized understanding.     Buffy Villagran    02/14/2023

## 2023-02-28 ENCOUNTER — CLINICAL SUPPORT (OUTPATIENT)
Dept: REHABILITATION | Facility: OTHER | Age: 63
End: 2023-02-28
Attending: NURSE PRACTITIONER
Payer: MEDICARE

## 2023-02-28 DIAGNOSIS — Z74.09 IMPAIRED FUNCTIONAL MOBILITY AND ACTIVITY TOLERANCE: ICD-10-CM

## 2023-02-28 DIAGNOSIS — Z74.09 DECREASED FUNCTIONAL MOBILITY AND ENDURANCE: ICD-10-CM

## 2023-02-28 DIAGNOSIS — R53.1 WEAKNESS: ICD-10-CM

## 2023-02-28 DIAGNOSIS — M25.69 DECREASED ROM OF TRUNK AND BACK: Primary | ICD-10-CM

## 2023-02-28 PROCEDURE — 97110 THERAPEUTIC EXERCISES: CPT

## 2023-02-28 PROCEDURE — 97750 PHYSICAL PERFORMANCE TEST: CPT

## 2023-02-28 NOTE — PROGRESS NOTES
[facility]      Name: Bri Santiago  Clinic Number: [id number]    Therapy Diagnosis:   Encounter Diagnoses   Name Primary?    Decreased ROM of trunk and back Yes    Decreased functional mobility and endurance     Impaired functional mobility and activity tolerance     Weakness      Physician: Buffy Villagran,*    Visit Date: 2023    Physician Orders: PT Eval and Treat   Medical Diagnosis from Referral:   M54.6 (ICD-10-CM) - Thoracic spine pain   M47.819 (ICD-10-CM) - Spondylosis without myelopathy   M47.816 (ICD-10-CM) - Lumbar spondylosis     Evaluation Date: 2023  Authorization Period Expiration: 3/1/2023  Plan of Care Expiration: 2023  Visit # / Visits authorized:   Progress Note Due: 3/6/2023  **NEED CERVICAL FOTO NEXT VISIT DNT ON   FOTO: 0/3 **      Time In: 10:44a  Time Out: 11:52  Total Billable Time: 68 minutes  Insurance type:  Fee for service Insurance Patient    Precautions: Standard    Pattern of pain determined: 1PEP    Subjective   Bri reports no significant change in sx since last visit. He reports he was performing HEP prior to last week, when his father has surgery and his is having to take care of both him and his god mother.       Patient reports tolerating previous visit well.   Patient reports their pain to be 5/10 on a 0-10 scale with 0 being no pain and 10 being the worst pain imaginable.  Pain Location: Upper mid thoracic spine     Occupation: disabled   Pts goals: putting things away/reaching overhead without pain, tying his shoes without pain, donning/doffing clothing without pain    Objective     Baseline IM Testing Results: Cervical  Date of testin23  ROM  deg   Max Peak Torque 266    Min Peak Torque 166    Flex/Ext Ratio 1.6:0   % below normative data 43%     Outcomes: FOTO Cervical  Initial score:  Visit 5 score:  Goal:      Treatment    Bri received the treatments listed below:      Bri received  neuromuscular education  to isolate and engage spinal stabilization musculature correctly for motor control and coordination to aid in function and posture for - minutes on the Medical Medx Machine.  Patient performed MedX dynamic exercise with emphasis on spinal muscular control using pacer throughout  active range of motion. Therapist assisted patient in achieving optimal exertion for neural reeducation and endurance training by using the  Varghese Exertion Rating scale, by instructing the patient to aim for mid range of exertion, performing 15-20 repetitions, slowly, correctly,and safely.     therapeutic exercises to develop strength, endurance, posture, and core stabilization for 68 minutes including:  TOD x1min  +EIL 2x10  EIS   +Bridge 2x10  +Cervical retractions against wall with pillow with pt overpressure 2x10  +Cervical reactions and extension 2x10  +Thoracic extension over bolster x2mins 5sec hold  Over bolster  +Supine static hang x2mins arms by side  +Supine pect stretch x2mins    [facility] Therapy 2/28/2023   Visit Number 2   VAS Pain Rating 2   Treadmill Time (in min.) -   Speed -   Recumbent Bike Seat Pos. -   Time -   Scapular Retraction -   Lumbar Stretches - Slouch Overcorrection -   Extension in Lying -   Extension in Standing -   Flexion in Lying -   Manual Therapy -   Seat Adjustment 374   Counterweight 1.5   Cervical Flexion 108   Cervical Extension 63   Cervical Peak Torque 266   Lumbar Extension Seat Pad -   Femur Restraint -   Top Dead Center -   Counterweight -   Lumbar Flexion -   Lumbar Extension -   Lumbar Peak Torque -   Min Torque -   Test Percent Below Normative Data -   Test Percent Gain in Strength from Initial  -   Lumbar Weight -   Repetitions -   Rating of Perceived Exertion -   Ice - Z Lie (in min.) 51   Ice - Sitting -       Peripheral muscle strengthening which included 1 set of 15-20 repetitions at a slow, controlled 10-13 second per rep pace focused on strengthening  supporting musculature for improved body mechanics and functional mobility.  Pt and therapist focused on proper form during treatment to ensure optimal strengthening of each targeted muscle group.  Machines were utilized including torso rotation, leg extension, leg curl, chest press, upright row. Tricep extension, bicep curl, leg press, and hip abduction added visit 3    therapeutic activities to improve functional performance for -  minutes, including:        cold pack for - minutes to -.    Home Exercises Provided and Patient Education Provided   Home exercises include:   EIS   SOC  Prone lie   Cardio program:visit 4  Lifting education date:visit 11  Posture/Lumbar roll: not obtained as of 2/28  [facility] Discharge handout (date given):    Education provided:   - Reviewed HEP  - Educated in effort and     Written Home Exercises Provided: Patient instructed to cont prior HEP.  Exercises were reviewed and Bri was able to demonstrate them prior to the end of the session.  Bri demonstrated good  understanding of the education provided.     See EMR under Patient Instructions for exercises provided 2/28/2023.          Assessment   Pt presents to second healthy back visit reporting no change in mid back symptoms, was able to demo HEP with Mod VC for form. Patient completed MedX isometric testing. Based on reported symptoms, observable decrease in cervical and thoracic ROM, this therapist decided to have patient perform cervical MedX machine and focus spinal strengthening on upper thoracic and cervical. Goal edited to match. Pt was also able to complete half of the peripheral strengthening exercises without increased discomfort and will complete the complete circuit next visit as tolerated.      Patient is making good progress towards established goals.  Pt will continue to benefit from skilled outpatient physical therapy to address the deficits stated in the impairment chart, provide pt/family education and to  maximize pt's level of independence in the home and community environment.     Anticipated Barriers for therapy: chronicity of pain, home life/care giver demands, compliance of POC  Pt's spiritual, cultural and educational needs considered and pt agreeable to plan of care and goals as stated below:       Goals:   GOALS: Pt is in agreement with the following goals.     Short term goals:  6 weeks or 10 visits   1.  Pt will demonstrate increased cervical ROM by at least 10 degrees from the initial ROM value with improvements noted in functional ROM and ability to perform ADLs.  (approp and ongoing)  2.  Pt will demonstrate increased MedX average isometric strength value  by 15% from initial test resulting in improved ability to perform bending, lifting, and carrying activities safely, confidently.  (approp and ongoing)  3.  Patient report a reduction in worst pain score by 1-2 points for improved tolerance for ADL/IADL's.  (approp and ongoing)  4.  Pt able to perform HEP correctly with minimal cueing or supervision from therapist to encourage independent management of symptoms. (approp and ongoing)        Long term goals: 10 weeks or 20 visits   1. Pt will demonstrate increased cervical ROM by at least 15 degrees from initial ROM value, resulting in improved ability to perform functional fwd bending and reach overhead while standing and sitting. (approp and ongoing)  2. Pt will demonstrate increased MedX average isometric strength value  by 35% from initial test resulting in improved ability to perform bending, lifting, and carrying activities safely, confidently.  (approp and ongoing)  3. Pt to demonstrate ability to independently control and reduce their pain through posture positioning and mechanical movements throughout a typical day.  (approp and ongoing)  4.  Pt will demonstrate reduced pain and improved functional outcomes as reported on the FOTO by reaching a limitation score of < or = 40% or less in order to  demonstrate subjective improvement in pt's condition.    (approp and ongoing)  5. Pt will demonstrate independence with the HEP at discharge  (approp and ongoing)  6.  Pt will be able to reach overhead and put his dishes away with <5/10 pain in order to improve functional mobility and quality of life.  (approp and ongoing)      Plan   Continue with established Plan of Care towards established PT goals.       Therapist: Robyn Sheffield, PT  2/28/2023

## 2023-03-02 ENCOUNTER — CLINICAL SUPPORT (OUTPATIENT)
Dept: REHABILITATION | Facility: OTHER | Age: 63
End: 2023-03-02
Attending: INTERNAL MEDICINE
Payer: MEDICARE

## 2023-03-02 DIAGNOSIS — M25.69 DECREASED ROM OF TRUNK AND BACK: Primary | ICD-10-CM

## 2023-03-02 DIAGNOSIS — Z74.09 IMPAIRED FUNCTIONAL MOBILITY AND ACTIVITY TOLERANCE: ICD-10-CM

## 2023-03-02 DIAGNOSIS — Z74.09 DECREASED FUNCTIONAL MOBILITY AND ENDURANCE: ICD-10-CM

## 2023-03-02 DIAGNOSIS — R53.1 WEAKNESS: ICD-10-CM

## 2023-03-02 PROCEDURE — 97112 NEUROMUSCULAR REEDUCATION: CPT | Mod: CQ

## 2023-03-02 PROCEDURE — 97110 THERAPEUTIC EXERCISES: CPT | Mod: CQ

## 2023-03-02 NOTE — PROGRESS NOTES
[facility]      Name: Bri Santiago  Clinic Number: [id number]    Therapy Diagnosis:   Encounter Diagnoses   Name Primary?    Decreased ROM of trunk and back Yes    Decreased functional mobility and endurance     Impaired functional mobility and activity tolerance     Weakness      Physician: Buffy Villagran,*    Visit Date: 3/2/2023    Physician Orders: PT Eval and Treat   Medical Diagnosis from Referral:   M54.6 (ICD-10-CM) - Thoracic spine pain   M47.819 (ICD-10-CM) - Spondylosis without myelopathy   M47.816 (ICD-10-CM) - Lumbar spondylosis     Evaluation Date: 2023  Authorization Period Expiration: 3/1/2023  Plan of Care Expiration: 2023  Visit # / Visits authorized:   Progress Note Due: 3/6/2023    FOTO: 0/3 **      Time In: 10:05 AM  Time Out: 11:08 AM  Total Billable Time: 60 minutes  Insurance type:  Fee for service Insurance Patient    Precautions: Standard    Pattern of pain determined: 1PEP    Subjective   Harlin reports minimal pain in cervical area 3/10.  Patient reports tolerating previous visit well.   Patient reports their pain to be 5/10 on a 0-10 scale with 0 being no pain and 10 being the worst pain imaginable.  Pain Location: Upper mid thoracic spine     Occupation: disabled   Pts goals: putting things away/reaching overhead without pain, tying his shoes without pain, donning/doffing clothing without pain    Objective     Baseline IM Testing Results: Cervical  Date of testin23  ROM  deg   Max Peak Torque 266    Min Peak Torque 166    Flex/Ext Ratio 1.6:0   % below normative data 43%     Outcomes: FOTO Cervical  Initial score:  Visit 5 score:  Goal:      Treatment    Bri received the treatments listed below:      Bri received neuromuscular education  to isolate and engage spinal stabilization musculature correctly for motor control and coordination to aid in function and posture for - minutes on the [employer] Machine.   Patient performed MedX dynamic exercise with emphasis on spinal muscular control using pacer throughout  active range of motion. Therapist assisted patient in achieving optimal exertion for neural reeducation and endurance training by using the  Varghese Exertion Rating scale, by instructing the patient to aim for mid range of exertion, performing 15-20 repetitions, slowly, correctly,and safely.     therapeutic exercises to develop strength, endurance, posture, and core stabilization for 68 minutes including:    Manual/trigger point and fascia release to sub occipital, UT and Levator. 5 min  TOD x1min  EIL 2x10  EIS   Bridge 2x10  Cervical retractions against wall with pillow with pt overpressure 2x10  Cervical reactions and extension 2x10  Thoracic extension over bolster x2mins 5sec hold  Over bolster  Supine static hang x2mins arms by side  Supine pect stretch x2mins    [facility] Therapy - Short 3/2/2023   Visit Number 3   VAS Pain Rating 4   Treadmill Time (in min.) -   Speed -   Recumbent Bike - Seat Pos. -   Time -   Scapular Retraction -   Lumbar Stretches - Slouch -   Extension in Lying -   Extension in Standing -   Flexion in Lying -   Manual Therapy -   Seat Adjustment -   Counterweight -   Cervical Flexion -   Cervical Extension -   Cervical Peak Torque -   Cervical Weight 133   Repetitions 15   Rating of Perceived Exertion 4        Peripheral muscle strengthening which included 1 set of 15-20 repetitions at a slow, controlled 10-13 second per rep pace focused on strengthening supporting musculature for improved body mechanics and functional mobility.  Pt and therapist focused on proper form during treatment to ensure optimal strengthening of each targeted muscle group.  Machines were utilized including torso rotation, leg extension, leg curl, chest press, upright row. Tricep extension, bicep curl, leg press, and hip abduction added visit 3    therapeutic activities to improve functional performance for -   minutes, including:        cold pack for - minutes to -.    Home Exercises Provided and Patient Education Provided   Home exercises include:   EIS   SOC  Prone lie   Cardio program:visit 4  Lifting education date:visit 11  Posture/Lumbar roll: not obtained as of 2/28  [facility] Discharge handout (date given):    Education provided:   - Reviewed HEP  - Educated in effort and     Written Home Exercises Provided: Patient instructed to cont prior HEP.  Exercises were reviewed and Bri was able to demonstrate them prior to the end of the session.  Bri demonstrated good  understanding of the education provided.     See EMR under Patient Instructions for exercises provided 2/28/2023.          Assessment   Pt presents to [facility] for third tx with c/o minimal muscle soreness in cervical region. Patient continues with stretches and exercises from last visit requiring tactile and verbal cues while performing standing cervical retraction provided with a towel for cuing. Patient responds well to manual with a visible increase in cervical rotation and R<>L side bending following. Patient tolerates cervical medX with 133 ft/lbs completing 15 reps with RPE of 4.      Patient is making good progress towards established goals.  Pt will continue to benefit from skilled outpatient physical therapy to address the deficits stated in the impairment chart, provide pt/family education and to maximize pt's level of independence in the home and community environment.     Anticipated Barriers for therapy: chronicity of pain, home life/care giver demands, compliance of POC  Pt's spiritual, cultural and educational needs considered and pt agreeable to plan of care and goals as stated below:       Goals:   GOALS: Pt is in agreement with the following goals.     Short term goals:  6 weeks or 10 visits   1.  Pt will demonstrate increased cervical ROM by at least 10 degrees from the initial ROM value with improvements noted in  functional ROM and ability to perform ADLs.  (approp and ongoing)  2.  Pt will demonstrate increased MedX average isometric strength value  by 15% from initial test resulting in improved ability to perform bending, lifting, and carrying activities safely, confidently.  (approp and ongoing)  3.  Patient report a reduction in worst pain score by 1-2 points for improved tolerance for ADL/IADL's.  (approp and ongoing)  4.  Pt able to perform HEP correctly with minimal cueing or supervision from therapist to encourage independent management of symptoms. (approp and ongoing)        Long term goals: 10 weeks or 20 visits   1. Pt will demonstrate increased cervical ROM by at least 15 degrees from initial ROM value, resulting in improved ability to perform functional fwd bending and reach overhead while standing and sitting. (approp and ongoing)  2. Pt will demonstrate increased MedX average isometric strength value  by 35% from initial test resulting in improved ability to perform bending, lifting, and carrying activities safely, confidently.  (approp and ongoing)  3. Pt to demonstrate ability to independently control and reduce their pain through posture positioning and mechanical movements throughout a typical day.  (approp and ongoing)  4.  Pt will demonstrate reduced pain and improved functional outcomes as reported on the FOTO by reaching a limitation score of < or = 40% or less in order to demonstrate subjective improvement in pt's condition.    (approp and ongoing)  5. Pt will demonstrate independence with the HEP at discharge  (approp and ongoing)  6.  Pt will be able to reach overhead and put his dishes away with <5/10 pain in order to improve functional mobility and quality of life.  (approp and ongoing)      Plan   Continue with established Plan of Care towards established PT goals.       Therapist: Sky Woo, PTA  3/2/2023

## 2023-03-16 ENCOUNTER — HOSPITAL ENCOUNTER (EMERGENCY)
Facility: OTHER | Age: 63
Discharge: HOME | End: 2023-03-17
Attending: EMERGENCY MEDICINE
Payer: MEDICARE

## 2023-03-16 DIAGNOSIS — R07.89 ATYPICAL CHEST PAIN: Primary | ICD-10-CM

## 2023-03-16 DIAGNOSIS — R07.9 CHEST PAIN: ICD-10-CM

## 2023-03-16 DIAGNOSIS — R20.2 PARESTHESIA OF LEFT ARM: ICD-10-CM

## 2023-03-16 LAB
ALBUMIN SERPL BCP-MCNC: 4.2 G/DL (ref 3.5–5.2)
ALP SERPL-CCNC: 69 U/L (ref 55–135)
ALT SERPL W/O P-5'-P-CCNC: 20 U/L (ref 10–44)
ANION GAP SERPL CALC-SCNC: 10 MMOL/L (ref 8–16)
AST SERPL-CCNC: 18 U/L (ref 10–40)
BASOPHILS # BLD AUTO: 0.03 K/UL (ref 0–0.2)
BASOPHILS NFR BLD: 0.3 % (ref 0–1.9)
BILIRUB SERPL-MCNC: 1.8 MG/DL (ref 0.1–1)
BNP SERPL-MCNC: <10 PG/ML (ref 0–99)
BUN SERPL-MCNC: 14 MG/DL (ref 6–20)
CALCIUM SERPL-MCNC: 9.1 MG/DL (ref 8.7–10.5)
CHLORIDE SERPL-SCNC: 104 MMOL/L (ref 95–110)
CO2 SERPL-SCNC: 25 MMOL/L (ref 23–29)
CREAT SERPL-MCNC: 1 MG/DL (ref 0.5–1.4)
DIFFERENTIAL METHOD: ABNORMAL
EOSINOPHIL # BLD AUTO: 0 K/UL (ref 0–0.5)
EOSINOPHIL NFR BLD: 0.4 % (ref 0–8)
ERYTHROCYTE [DISTWIDTH] IN BLOOD BY AUTOMATED COUNT: 12.6 % (ref 11.5–14.5)
EST. GFR  (NO RACE VARIABLE): >60 ML/MIN/1.73 M^2
GLUCOSE SERPL-MCNC: 81 MG/DL (ref 70–110)
HCT VFR BLD AUTO: 43.8 % (ref 40–54)
HGB BLD-MCNC: 14.3 G/DL (ref 14–18)
IMM GRANULOCYTES # BLD AUTO: 0.02 K/UL (ref 0–0.04)
IMM GRANULOCYTES NFR BLD AUTO: 0.2 % (ref 0–0.5)
LYMPHOCYTES # BLD AUTO: 1.8 K/UL (ref 1–4.8)
LYMPHOCYTES NFR BLD: 19.2 % (ref 18–48)
MCH RBC QN AUTO: 30.4 PG (ref 27–31)
MCHC RBC AUTO-ENTMCNC: 32.6 G/DL (ref 32–36)
MCV RBC AUTO: 93 FL (ref 82–98)
MONOCYTES # BLD AUTO: 0.6 K/UL (ref 0.3–1)
MONOCYTES NFR BLD: 6.4 % (ref 4–15)
NEUTROPHILS # BLD AUTO: 7 K/UL (ref 1.8–7.7)
NEUTROPHILS NFR BLD: 73.5 % (ref 38–73)
NRBC BLD-RTO: 0 /100 WBC
PLATELET # BLD AUTO: 223 K/UL (ref 150–450)
PMV BLD AUTO: 8.9 FL (ref 9.2–12.9)
POTASSIUM SERPL-SCNC: 3.4 MMOL/L (ref 3.5–5.1)
PROT SERPL-MCNC: 7.4 G/DL (ref 6–8.4)
RBC # BLD AUTO: 4.71 M/UL (ref 4.6–6.2)
SODIUM SERPL-SCNC: 139 MMOL/L (ref 136–145)
TROPONIN I SERPL DL<=0.01 NG/ML-MCNC: 0.02 NG/ML (ref 0–0.03)
WBC # BLD AUTO: 9.51 K/UL (ref 3.9–12.7)

## 2023-03-16 PROCEDURE — 99285 EMERGENCY DEPT VISIT HI MDM: CPT | Mod: 25

## 2023-03-16 PROCEDURE — 80053 COMPREHEN METABOLIC PANEL: CPT | Performed by: PHYSICIAN ASSISTANT

## 2023-03-16 PROCEDURE — 93010 EKG 12-LEAD: ICD-10-PCS | Mod: ,,, | Performed by: INTERNAL MEDICINE

## 2023-03-16 PROCEDURE — 93005 ELECTROCARDIOGRAM TRACING: CPT

## 2023-03-16 PROCEDURE — 83880 ASSAY OF NATRIURETIC PEPTIDE: CPT | Performed by: PHYSICIAN ASSISTANT

## 2023-03-16 PROCEDURE — 84484 ASSAY OF TROPONIN QUANT: CPT | Performed by: PHYSICIAN ASSISTANT

## 2023-03-16 PROCEDURE — 85025 COMPLETE CBC W/AUTO DIFF WBC: CPT | Performed by: PHYSICIAN ASSISTANT

## 2023-03-16 PROCEDURE — 93010 ELECTROCARDIOGRAM REPORT: CPT | Mod: ,,, | Performed by: INTERNAL MEDICINE

## 2023-03-17 VITALS
RESPIRATION RATE: 18 BRPM | BODY MASS INDEX: 34.86 KG/M2 | DIASTOLIC BLOOD PRESSURE: 88 MMHG | WEIGHT: 249 LBS | SYSTOLIC BLOOD PRESSURE: 163 MMHG | HEIGHT: 71 IN | TEMPERATURE: 99 F | OXYGEN SATURATION: 95 % | HEART RATE: 80 BPM

## 2023-03-17 NOTE — ED PROVIDER NOTES
"Encounter Date: 3/16/2023    SCRIBE #1 NOTE: I, Marian Pink, am scribing for, and in the presence of,  Kaveh Phelps II, MD.     History     Chief Complaint   Patient presents with    Chest Pain     Endorses chest pain and numbness and tingling to left arm.     Bri Santiago is a 62 y.o. male, with a PMHx of HTN, who presents to the ED for evaluation of "sharp" "stabbing" chest pain. He reports intermittent episodes of similar chest pain for the last several weeks, stating the episodes typically last approx. 1 minute. He denies any inciting factors, stating they typically happen randomly and are not particularly exacerbated by activity. He states he initially attributed these episodes to gas and they resolved with Tylenol and sleep. He denies recent traumas, injuries, falls or changes in activity. He states he came to the ED today as he also began experiencing  left arm numbness and paresthesias associated with an episode of chest pain earlier tonight. He states the chest pain is now improving but the paresthesias have been intermittent since he arrived in the ED. Denies weakness or other acute focal deficits. Denies regular tobacco use. States he is followed by cardiology but denies significant cardiac history. This is the extent of the patient's complaints at this time.    The history is provided by the patient.   Review of patient's allergies indicates:   Allergen Reactions    Penicillins Hives and Swelling     Has had allergy testing and can prob tolerate penicillin    Ace inhibitors Other (See Comments)     "Caused a respiratory infection"    Aspirin Hives           Codeine Itching     Ok to take percocet    Dilaudid [hydromorphone] Itching     Past Medical History:   Diagnosis Date    Acute pancreatitis     Anal fissure     Anemia     Anticoagulant long-term use     Arthritis     Asthma in remission     Back pain     BPH (benign prostatic hypertrophy)     Cancer 2000    prostate- treated at [facility] with " chemo- in remission since 2000    Chronic maxillary sinusitis     Clotting disorder     Diastolic dysfunction with chronic heart failure 12/3/2018    Dysphagia 10/7/2014    Family history of colon cancer     Family history of early CAD     GERD (gastroesophageal reflux disease)     Helicobacter pylori (H. pylori) infection     Chronic    History of chronic pancreatitis     HTN (hypertension)     Lumbago 11/12/2012    Obesity     ABDON (obstructive sleep apnea)     ABDON (obstructive sleep apnea)     With likely ohs Refer to sleep    Sacroiliac joint pain 2/10/2015    Spinal stenosis of lumbar region     Von Willebrand disease     VWD (acquired von Willebrand's disease)      Past Surgical History:   Procedure Laterality Date    anal fissure repair      x2    BACK SURGERY  2012    BALLOON SINUPLASTY OF PARANASAL SINUS Bilateral 10/7/2019    Procedure: SINUPLASTY, USING BALLOON;  Surgeon: LAURENT Swanson MD;  Location: [facility] OR;  Service: ENT;  Laterality: Bilateral;    CARPAL TUNNEL RELEASE  2003    left hand    CATHETERIZATION OF BOTH LEFT AND RIGHT HEART N/A 2/14/2019    Procedure: CATHETERIZATION, HEART, BOTH LEFT AND RIGHT;  Surgeon: Kyle Magana MD;  Location: [facility] CATH LAB;  Service: Cardiology;  Laterality: N/A;    CERVICAL DISCECTOMY  2003    COLONOSCOPY N/A 10/7/2015    Procedure: COLONOSCOPY;  Surgeon: Rosendo Boyer MD;  Location: [facility] ([address] [place] [address]);  Service: Endoscopy;  Laterality: N/A;  PM Prep    COLONOSCOPY N/A 6/19/2017    Procedure: COLONOSCOPY;  Surgeon: Rosendo Boyer MD;  [facility]  Service: Endoscopy;  Laterality: N/A;  constipation prep (no DM no CHF)       hx of vonWillebrand's disease-will need infusion prior    COLONOSCOPY N/A 3/4/2020    Procedure: COLONOSCOPY;  Surgeon: Rosendo Boyer MD;  [facility]  Service: Endoscopy;  Laterality: N/A;  Please order CBC, ferritin, Iron TIBC day of case per Dr. Boyer  labwork at 7:10am and patient  will check in right after.  per Dr. Tyler patient can hold Plavix 5 days prior see note 1/7/20  DDAVP prior to procedure needed see note 1/16/20 for oncall med by Dr. Tyler -     CYSTOSCOPY  8/12/2022    Procedure: CYSTOSCOPY;  Surgeon: Tyler Reid Jr., MD;  Location: [facility] 1ST FLR;  Service: Urology;;    ESOPHAGOGASTRODUODENOSCOPY N/A 3/4/2020    Procedure: EGD (ESOPHAGOGASTRODUODENOSCOPY);  Surgeon: Rosendo Boyer MD;  Location: [facility] (4TH FLR);  Service: Endoscopy;  Laterality: N/A;  EGD and Colonoscopy orders merged together  labwork at 7:10am and patient will check in right after.  DDAVP prior to procedure needed see note 1/16/20 for oncall med  per Dr. Tyler patient can hold Plavix 5 days prior see note 1/7/20    ESOPHAGOGASTRODUODENOSCOPY N/A 11/3/2020    Procedure: EGD (ESOPHAGOGASTRODUODENOSCOPY);  Surgeon: Rosendo Boyer MD;  Location: [facility] (2ND FLR);  Service: Endoscopy;  Laterality: N/A;  Please recall pt has von Willebrands and will require DDVAP infusion prior to procedure as previously done.    see telephone encounter on 10/1/20 regarding DDAVP   ok to hold Plavix 5 days prior per   COVID screening on 10/31/20 -rb    EXAMINATION UNDER ANESTHESIA N/A 3/15/2021    Procedure: EXAM UNDER ANESTHESIA;  Surgeon: Silvia Cazares MD;  Location: [facility] OR 2ND FLR;  Service: Colon and Rectal;  Laterality: N/A;    EXAMINATION UNDER ANESTHESIA N/A 2/25/2022    Procedure: EXAM UNDER ANESTHESIA, EXCISION ANAL PAPILLA;  Surgeon: Nadeem Grady MD;  Location: [facility] OR 2ND FLR;  Service: Colon and Rectal;  Laterality: N/A;    FUNCTIONAL ENDOSCOPIC SINUS SURGERY (FESS) USING COMPUTER-ASSISTED NAVIGATION Bilateral 10/7/2019    Procedure: FESS, USING COMPUTER-ASSISTED NAVIGATION;  Surgeon: LAURENT Swanson MD;  Location: [facility];  Service: ENT;  Laterality: Bilateral;    INJECTION OF ANESTHETIC AGENT AROUND NERVE Bilateral 10/13/2020    Procedure: BLOCK, NERVE BILATERAL C4,C5,C6 Plavix  clearance requested;  Surgeon: Fredy Magana MD;  Location: [facility] PAIN MGT;  Service: Pain Management;  Laterality: Bilateral;  BLOCK, NERVE BILATERAL C4,C5,C6  Plavix clearance ok, will require DDVAP infusion    LATERAL INTERNAL ANAL SPHINCTEROTOMY N/A 12/10/2021    Procedure: SPHINCTEROTOMY-LATERAL INTERNAL ANAL;  Surgeon: Nadeem Grady MD;  [facility];  Service: Colon and Rectal;  Laterality: N/A;    LEFT HEART CATHETERIZATION Left 2/14/2019    Procedure: Left heart cath;  Surgeon: Kyle Magana MD;  Location: [facility] CATH LAB;  Service: Cardiology;  Laterality: Left;    LUMBAR FUSION  2012    RADIOFREQUENCY ABLATION Right 5/9/2019    Procedure: RADIOFREQUENCY ABLATION, RIGHT L3,L4,L5;  Surgeon: Fredy Magana MD;  Location: [facility] PAIN MGT;  Service: Pain Management;  Laterality: Right;  1 of 2  RT RFA L3,4,5  PLAVIX clearance received, pt needs DDAVP    RADIOFREQUENCY ABLATION Left 5/23/2019    Procedure: RADIOFREQUENCY ABLATION, LEFT L3,L4,L5;  Surgeon: Fredy Magana MD;  Location: [facility] PAIN MGT;  Service: Pain Management;  Laterality: Left;  2 of 2  LT RFA L3,4,5  PLAVIX clearance received, pt needs DDAVP    RADIOFREQUENCY ABLATION Left 1/16/2020    Procedure: RADIOFREQUENCY ABLATION LEFT L3, L4, L5 MEDIAL BRANCH 1 OF 2 **PATIENT ARRIVING AT 8 AM**;  Surgeon: Fredy Magana MD;  Location: [facility] PAIN MGT;  Service: Pain Management;  Laterality: Left;  NEEDS CONSENT, PLAVIX CLEARANCE IN CHART    RADIOFREQUENCY ABLATION Right 1/28/2020    Procedure: RADIOFREQUENCY ABLATION, RIGHT L3-L4-L5 MEDIAL BRANCH 2 OF 2 (Left done on 1/16/20);  Surgeon: Fredy Magana MD;  Location: [facility] PAIN MGT;  Service: Pain Management;  Laterality: Right;    RADIOFREQUENCY ABLATION Left 8/18/2020    Procedure: RADIOFREQUENCY ABLATION, L3-L4-L5 MEDIAL BRANCH 1 OF 2 clear to hold plavix 7 days;  Surgeon: Fredy Magana MD;  Location: [facility] PAIN MGT;  Service: Pain Management;  Laterality: Left;     RADIOFREQUENCY ABLATION Right 9/1/2020    Procedure: RADIOFREQUENCY ABLATION, L3-L4-L5 MEDIAL BR ANCH 2 OF 2 clear to hol,d Plavix 7 days;  Surgeon: Fredy Magana MD;  Location: [facility] PAIN MGT;  Service: Pain Management;  Laterality: Right;    RADIOFREQUENCY ABLATION Bilateral 12/21/2021    Procedure: RADIOFREQUENCY ABLATION B/L L3,4,5 RFA (give dDAVP prior) NEEDS CONSENT plavix ok x7;  Surgeon: Fredy Magana MD;  Location: [facility] PAIN MGT;  Service: Pain Management;  Laterality: Bilateral;    RADIOFREQUENCY ABLATION Left 11/21/2022    Procedure: RADIOFREQUENCY ABLATION LEFT L3,L4,L5 MUST HAVE dDVAP PRIOR ONE OF TWO;  Surgeon: Milo Winston MD;  Location: [facility] PAIN MGT;  Service: Pain Management;  Laterality: Left;    RADIOFREQUENCY ABLATION Right 12/5/2022    Procedure: RADIOFREQUENCY ABLATION RIGHT L3,L4,L5  PRE-MEDICATE WITH dDVAP TWO OF TWO;  Surgeon: Milo Winston MD;  Location: [facility] PAIN MGT;  Service: Pain Management;  Laterality: Right;    RADIOFREQUENCY ABLATION OF LUMBAR MEDIAL BRANCH NERVE AT SINGLE LEVEL Left 5/24/2018    Procedure: RADIOFREQUENCY THERMOCOAGULATION (RFTC)-NERVE-MEDIAN BRANCH-LUMBAR;  Surgeon: Fredy Magana MD;  Location: [facility] PAIN MGT;  Service: Pain Management;  Laterality: Left;  Left RFA @ L3,4,5  [id number]  with IV Sedation    2 of 2    RETROGRADE PYELOGRAPHY Bilateral 8/12/2022    Procedure: PYELOGRAM, RETROGRADE;  Surgeon: Tyler Reid Jr., MD;  Location: [facility] OR [address] [place] [address];  Service: Urology;  Laterality: Bilateral;    SPINE SURGERY      TONSILLECTOMY      at age 24    URETEROSCOPY Bilateral 8/12/2022    Procedure: URETEROSCOPY;  Surgeon: Tyler Reid Jr., MD;  Location: [facility] OR [address] [place] [address];  Service: Urology;  Laterality: Bilateral;    VASECTOMY  1996     Family History   Problem Relation Age of Onset    Colon cancer Father 67        colon cancer    Hypertension Father     Glaucoma Father     Cancer Father     Colon cancer Paternal Grandfather 65             Coronary  artery disease Mother 45    Hypertension Mother     Heart disease Mother     Colon cancer Mother     Diabetes Mother     No Known Problems Brother     No Known Problems Sister     No Known Problems Daughter     No Known Problems Son     Coronary artery disease Brother 51    No Known Problems Daughter     No Known Problems Daughter     No Known Problems Son     No Known Problems Son     Colon cancer Paternal Uncle 65    Diabetes Mellitus Paternal Grandmother     Esophageal cancer Neg Hx      Social History     Tobacco Use    Smoking status: Never    Smokeless tobacco: Never   Substance Use Topics    Alcohol use: Yes     Alcohol/week: 1.0 standard drink     Types: 1 Glasses of wine per week     Comment: social    Drug use: No     Review of Systems   Constitutional:  Negative for chills and fever.   HENT:  Negative for congestion and sore throat.    Eyes:  Negative for visual disturbance.   Respiratory:  Negative for cough and shortness of breath.    Cardiovascular:  Positive for chest pain. Negative for palpitations.   Gastrointestinal:  Negative for abdominal pain, diarrhea and vomiting.   Genitourinary:  Negative for decreased urine volume, dysuria and frequency.   Musculoskeletal:  Negative for joint swelling, neck pain and neck stiffness.   Skin:  Negative for rash and wound.   Neurological:  Positive for numbness. Negative for weakness and headaches.   Psychiatric/Behavioral:  Negative for behavioral problems and confusion.      Physical Exam     Initial Vitals [03/16/23 2002]   BP Pulse Resp Temp SpO2   (!) 158/94 100 19 98.5 °F (36.9 °C) (!) 94 %      MAP       --         Physical Exam    Nursing note and vitals reviewed.  Constitutional: He appears well-developed and well-nourished. He is not diaphoretic. No distress.   HENT:   Head: Normocephalic and atraumatic.   Eyes: Conjunctivae and EOM are normal. Pupils are equal, round, and reactive to light.   Neck: Neck supple.   Normal range of  motion.  Cardiovascular:  Normal rate, regular rhythm and intact distal pulses.     Exam reveals no gallop and no friction rub.       No murmur heard.  Pulmonary/Chest: Breath sounds normal. No respiratory distress. He has no wheezes. He has no rhonchi. He has no rales.   Abdominal: Abdomen is soft. There is no abdominal tenderness.   Musculoskeletal:         General: No tenderness or edema. Normal range of motion.      Cervical back: Normal range of motion and neck supple.     Neurological: He is alert and oriented to person, place, and time.   Skin: Skin is warm and dry.   Psychiatric: He has a normal mood and affect. His behavior is normal. Judgment and thought content normal.       ED Course   Procedures  Labs Reviewed   CBC W/ AUTO DIFFERENTIAL - Abnormal; Notable for the following components:       Result Value    MPV 8.9 (*)     Gran % 73.5 (*)     All other components within normal limits   COMPREHENSIVE METABOLIC PANEL - Abnormal; Notable for the following components:    Potassium 3.4 (*)     Total Bilirubin 1.8 (*)     All other components within normal limits   B-TYPE NATRIURETIC PEPTIDE   TROPONIN I     EKG Readings: (Independently Interpreted)   Sinus rhythm. Rate of 90. No ST or T wave changes. No ischemia, arrhythmia, or pericarditis.      Imaging Results              X-Ray Chest 1 View (Final result)  Result time 03/16/23 22:20:37   Procedure changed from X-Ray Chest AP Portable     Final result by John Hurtado MD (03/16/23 22:20:37)                   Impression:      No acute process.      Electronically signed by: John Hurtado MD  Date:    03/16/2023  Time:    22:20               Narrative:    EXAMINATION:  XR CHEST 1 VIEW    CLINICAL HISTORY:  chest pain; Chest pain, unspecified    TECHNIQUE:  Single frontal view of the chest was performed.    COMPARISON:  04/04/2021.    FINDINGS:  The trachea is unremarkable.  The cardiomediastinal silhouette is within normal limits.  The hemidiaphragms are  unremarkable.  There are no pleural effusions.  There is no evidence of a pneumothorax.  There is no evidence of pneumomediastinum.  No airspace opacity is present.  There are degenerative changes in the osseous structures.                                    X-Rays:   Independently Interpreted Readings:   Chest X-Ray: No focal infiltrates, effusion, or pneumothorax.   Medications - No data to display  Medical Decision Making:   History:   Old Medical Records: I decided to obtain old medical records.  Independently Interpreted Test(s):   I have ordered and independently interpreted X-rays - see prior notes.  I have ordered and independently interpreted EKG Reading(s) - see prior notes  Clinical Tests:   Lab Tests: Ordered and Reviewed  Radiological Study: Ordered and Reviewed  Medical Tests: Ordered and Reviewed     Patient presents complaining of intermittent sharp chest pains.  Brief, nonexertional.  He does have history of hypertension, but no known cardiac disease.  EKG was unremarkable.  Troponin was negative.  Given duration of symptoms, atypical nature I do not think admission is necessary.  Discussed worrisome symptoms that should prompt return to the emergency department.  Encouraged follow-up with primary care.       Scribe Attestation:   Scribe #1: I performed the above scribed service and the documentation accurately describes the services I performed. I attest to the accuracy of the note.    I, [facility], personally performed the services described in this documentation. All medical record entries made by the scribe were at my direction and in my presence. I have reviewed the chart and agree that the record reflects my personal performance and is accurate and complete.                   Clinical Impression:   Final diagnoses:  [R07.9] Chest pain  [R07.89] Atypical chest pain (Primary)  [R20.2] Paresthesia of left arm        ED Disposition Condition    Discharge Stable          ED Prescriptions    None        Follow-up Information       Follow up With Specialties Details Why Contact Info    Cate Galeas MD Internal Medicine In 5 days  [address]  [facility] [address]  [phone]               Kaveh Phelps II, MD  03/17/23 [id number]

## 2023-03-17 NOTE — FIRST PROVIDER EVALUATION
" Emergency Department TeleTriage Encounter Note      CHIEF COMPLAINT    Chief Complaint   Patient presents with    Chest Pain     Endorses chest pain and numbness and tingling to left arm.       VITAL SIGNS   Initial Vitals [03/16/23 2002]   BP Pulse Resp Temp SpO2   (!) 158/94 100 19 98.5 °F (36.9 °C) (!) 94 %      MAP       --            ALLERGIES    Review of patient's allergies indicates:   Allergen Reactions    Penicillins Hives and Swelling     Has had allergy testing and can prob tolerate penicillin    Ace inhibitors Other (See Comments)     "Caused a respiratory infection"    Aspirin Hives           Codeine Itching     Ok to take percocet    Dilaudid [hydromorphone] Itching       PROVIDER TRIAGE NOTE  This is a teletriage evaluation of a 62 y.o. male presenting to the ED complaining of intermittent sharp chest pain for days that is not worsened or alleviated by any specific factors.  Associated shortnessof breath.  No lower extremity edema reported.  Reports he is allergic to aspirin.     Initial orders will be placed and care will be transferred to an alternate provider when patient is roomed for a full evaluation. Any additional orders and the final disposition will be determined by that provider.         ORDERS  Labs Reviewed   CBC W/ AUTO DIFFERENTIAL   COMPREHENSIVE METABOLIC PANEL   B-TYPE NATRIURETIC PEPTIDE   TROPONIN I       ED Orders (720h ago, onward)      Start Ordered     Status Ordering Provider    03/16/23 2037 03/16/23 2036  Cardiac Monitoring - Adult  Continuous        Comments: Notify Physician If:    Ordered YAJAIRA WATTS    03/16/23 2037 03/16/23 2036  Pulse Oximetry Continuous  Continuous         Ordered YAJAIRA WATTS    03/16/23 2036 03/16/23 2036  Saline lock IV  Once         Ordered YAJAIRA WATTS    03/16/23 2036 03/16/23 2036  CBC auto differential  STAT         Ordered YAJAIRA WATTS    03/16/23 2036 03/16/23 2036  " Comprehensive metabolic panel  STAT         Ordered YAJAIRA WATTS.    03/16/23 2036 03/16/23 2036  Brain natriuretic peptide  STAT         Ordered YAJAIRA WATTS.    03/16/23 2036 03/16/23 2036  Troponin I  STAT         Ordered YAJAIRA WATTS.    03/16/23 2036 03/16/23 2036  X-Ray Chest AP Portable  1 time imaging         Ordered YAJAIRA WATTS    03/16/23 2036 03/16/23 2036  EKG 12-lead  Once         Ordered YAJAIRA WATTS              Virtual Visit Note: The provider triage portion of this emergency department evaluation and documentation was performed via [employer], a HIPAA-compliant telemedicine application, in concert with a tele-presenter in the room. A face to face patient evaluation with one of my colleagues will occur once the patient is placed in an emergency department room.      DISCLAIMER: This note was prepared with [employer] voice recognition transcription software. Garbled syntax, mangled pronouns, and other bizarre constructions may be attributed to that software system.

## 2023-03-17 NOTE — ED TRIAGE NOTES
"Patient  to ED with c/o reproducible midsternal chest discomfort and left arm pain x " several weeks ". Pain worsens with deep inspiration . NAD Noted , VSS .   "

## 2023-04-05 ENCOUNTER — TELEPHONE (OUTPATIENT)
Dept: GASTROENTEROLOGY | Facility: CLINIC | Age: 63
End: 2023-04-05
Payer: MEDICARE

## 2023-04-05 NOTE — TELEPHONE ENCOUNTER
Patient return call and was very upset that he had to wait for his appointment and that the times and date that were giving is inconvenient him.

## 2023-04-05 NOTE — TELEPHONE ENCOUNTER
Contact patient to inform that we will have to reschedule his schedule appointment no answer left message for patient to call the office back. Message has been sent to the patient portal.

## 2023-04-10 ENCOUNTER — TELEPHONE (OUTPATIENT)
Dept: ENDOSCOPY | Facility: HOSPITAL | Age: 63
End: 2023-04-10
Payer: MEDICARE

## 2023-04-11 NOTE — TELEPHONE ENCOUNTER
----- Message from Shelley Hamilton sent at 4/10/2023 11:20 AM CDT -----  Regarding: call back  Contact: [phone]  Pt calling in requesting  call  back from Farooq pt stated he was suppose to get a call back from supervisor regarding appt made on 5/25/23 pt stated can not wait that long for apt please  call to discuss further

## 2023-04-27 ENCOUNTER — OFFICE VISIT (OUTPATIENT)
Dept: GASTROENTEROLOGY | Facility: CLINIC | Age: 63
End: 2023-04-27
Payer: MEDICARE

## 2023-04-27 VITALS
BODY MASS INDEX: 34.58 KG/M2 | DIASTOLIC BLOOD PRESSURE: 98 MMHG | WEIGHT: 247 LBS | HEIGHT: 71 IN | HEART RATE: 62 BPM | SYSTOLIC BLOOD PRESSURE: 176 MMHG

## 2023-04-27 DIAGNOSIS — G47.30 SLEEP APNEA, UNSPECIFIED TYPE: ICD-10-CM

## 2023-04-27 DIAGNOSIS — I73.9 PERIPHERAL VASCULAR DISEASE: ICD-10-CM

## 2023-04-27 DIAGNOSIS — K21.9 GASTROESOPHAGEAL REFLUX DISEASE, UNSPECIFIED WHETHER ESOPHAGITIS PRESENT: Primary | ICD-10-CM

## 2023-04-27 DIAGNOSIS — R12 HEARTBURN: ICD-10-CM

## 2023-04-27 DIAGNOSIS — Z01.818 PREOPERATIVE CLEARANCE: ICD-10-CM

## 2023-04-27 PROCEDURE — 99999 PR PBB SHADOW E&M-EST. PATIENT-LVL IV: ICD-10-PCS | Mod: PBBFAC,,, | Performed by: INTERNAL MEDICINE

## 2023-04-27 PROCEDURE — 1159F MED LIST DOCD IN RCRD: CPT | Mod: CPTII,S$GLB,, | Performed by: INTERNAL MEDICINE

## 2023-04-27 PROCEDURE — 3044F HG A1C LEVEL LT 7.0%: CPT | Mod: CPTII,S$GLB,, | Performed by: INTERNAL MEDICINE

## 2023-04-27 PROCEDURE — 3008F BODY MASS INDEX DOCD: CPT | Mod: CPTII,S$GLB,, | Performed by: INTERNAL MEDICINE

## 2023-04-27 PROCEDURE — 3080F PR MOST RECENT DIASTOLIC BLOOD PRESSURE >= 90 MM HG: ICD-10-PCS | Mod: CPTII,S$GLB,, | Performed by: INTERNAL MEDICINE

## 2023-04-27 PROCEDURE — 1159F PR MEDICATION LIST DOCUMENTED IN MEDICAL RECORD: ICD-10-PCS | Mod: CPTII,S$GLB,, | Performed by: INTERNAL MEDICINE

## 2023-04-27 PROCEDURE — 99999 PR PBB SHADOW E&M-EST. PATIENT-LVL IV: CPT | Mod: PBBFAC,,, | Performed by: INTERNAL MEDICINE

## 2023-04-27 PROCEDURE — 99215 PR OFFICE/OUTPT VISIT, EST, LEVL V, 40-54 MIN: ICD-10-PCS | Mod: S$GLB,,, | Performed by: INTERNAL MEDICINE

## 2023-04-27 PROCEDURE — 1160F PR REVIEW ALL MEDS BY PRESCRIBER/CLIN PHARMACIST DOCUMENTED: ICD-10-PCS | Mod: CPTII,S$GLB,, | Performed by: INTERNAL MEDICINE

## 2023-04-27 PROCEDURE — 3008F PR BODY MASS INDEX (BMI) DOCUMENTED: ICD-10-PCS | Mod: CPTII,S$GLB,, | Performed by: INTERNAL MEDICINE

## 2023-04-27 PROCEDURE — 3080F DIAST BP >= 90 MM HG: CPT | Mod: CPTII,S$GLB,, | Performed by: INTERNAL MEDICINE

## 2023-04-27 PROCEDURE — 99215 OFFICE O/P EST HI 40 MIN: CPT | Mod: S$GLB,,, | Performed by: INTERNAL MEDICINE

## 2023-04-27 PROCEDURE — 3044F PR MOST RECENT HEMOGLOBIN A1C LEVEL <7.0%: ICD-10-PCS | Mod: CPTII,S$GLB,, | Performed by: INTERNAL MEDICINE

## 2023-04-27 PROCEDURE — 3077F PR MOST RECENT SYSTOLIC BLOOD PRESSURE >= 140 MM HG: ICD-10-PCS | Mod: CPTII,S$GLB,, | Performed by: INTERNAL MEDICINE

## 2023-04-27 PROCEDURE — 3077F SYST BP >= 140 MM HG: CPT | Mod: CPTII,S$GLB,, | Performed by: INTERNAL MEDICINE

## 2023-04-27 PROCEDURE — 1160F RVW MEDS BY RX/DR IN RCRD: CPT | Mod: CPTII,S$GLB,, | Performed by: INTERNAL MEDICINE

## 2023-04-27 RX ORDER — RABEPRAZOLE SODIUM 20 MG/1
20 TABLET, DELAYED RELEASE ORAL EVERY 12 HOURS
Qty: 180 TABLET | Refills: 1 | Status: SHIPPED | OUTPATIENT
Start: 2023-04-27 | End: 2023-11-18

## 2023-04-27 RX ORDER — RABEPRAZOLE SODIUM 20 MG/1
20 TABLET, DELAYED RELEASE ORAL EVERY 12 HOURS
Qty: 180 TABLET | Refills: 1 | Status: SHIPPED | OUTPATIENT
Start: 2023-04-27 | End: 2023-04-27 | Stop reason: SDUPTHER

## 2023-04-27 NOTE — PATIENT INSTRUCTIONS
"Procedure: Referral placed to endoscopy schedulers for EGD with 96 hour esophageal Bravo pH probe study on his AcipHex 20 mg every 12 hours.    Diagnosis: GERD    Procedure Timin-12 weeks    *If within 4 weeks selected, please tad as high priority*    *If greater than 12 weeks, please select "5-12 weeks" and delay sending until 2 months prior to requested date*    Provider: Myself    Location: [address]    Additional Scheduling Information:  Requested for cardiology preop clearance for EGD patient with sleep apnea obesity GERD, patient on Plavix    Prep Specifications:Standard prep    Have you attached a patient to this message: Yes and No   "

## 2023-04-27 NOTE — Clinical Note
Yvette please refer patient to Cardiology for P up clearance for his EGD referral placed.  Referral placed to endoscopy schedulers for EGD with 96 hour esophageal Bravo pH probe study on his AcipHex 20 mg every 12 hours.  Return GI clinic 8 weeks okay for telemedicine video visit

## 2023-04-27 NOTE — Clinical Note
"Procedure: Referral placed to endoscopy schedulers for EGD with 96 hour esophageal Bravo pH probe study on his AcipHex 20 mg every 12 hours.  Diagnosis: GERD  Procedure Timin-12 weeks  *If within 4 weeks selected, please tad as high priority*  *If greater than 12 weeks, please select "5-12 weeks" and delay sending until 2 months prior to requested date*  Provider: Myself  Location: [address]  Additional Scheduling Information:  Requested for cardiology preop clearance for EGD patient with sleep apnea obesity GERD, patient on Plavix  Prep Specifications:Standard prep  Have you attached a patient to this message: Yes and No "

## 2023-04-27 NOTE — PROGRESS NOTES
[facility] Consultation Note    Reason for Consult:  The primary encounter diagnosis was Gastroesophageal reflux disease, unspecified whether esophagitis present. Diagnoses of Heartburn, Preoperative clearance, Sleep apnea, unspecified type, and Peripheral vascular disease were also pertinent to this visit.    PCP:   Cate Galeas   [address]  / [place] [address]    Referring MD:  Cate Galeas Md  [address]  [address]  [place] [address]    Initial History of Present Illness (HPI):  This is a 62 y.o. male here for evaluation of worsening heartburn symptoms over the last several months nonexertional symptoms mainly during the day not want he sleeps he has sleep apnea is using a CPAP machine no chest pain no shortness of breath no dyspnea on exertion no nausea vomiting no early satiety no change in bowel habits no blood in his stool he does have personal vascular disease he is on Plavix no pacemakers no defibrillator he is currently on Nexium 40 mg twice daily no benefit on his heartburn symptoms will refer him to Cardiology for cardiac clearance will start him on AcipHex 20 mg every 12 hours will set him up for an EGD with esophageal Bravo pH probe study on AcipHex 20 mg every 12 hours to evaluate for possibility of breakthrough acid reflux he is followed by ENT who thinks he has laryngeal pharyngeal reflux.      Abdominal pain - none  Reflux -as above  Dysphagia - none  Bowel habits - normal  GI bleeding - as above  NSAID usage - Plavix not taking NSAIDs     Interval HPI 02/10/2022:  The patient's last visit with me was on 11/17/2016.        ROS:  Constitutional: No fevers, chills, No weight loss  ENT:  As heartburn as a dysphagia no odynophagia no hoarseness  CV: No chest pain, no palpitation  Pulm: No cough, No shortness of breath, no wheezing  Ophtho: No vision changes  GI: see HPI  Derm: No rash, no itching  Heme: No lymphadenopathy, No easy  bruising  MSK:  Chronic arthritis  : No dysuria, No hematuria  Endo: No hot or cold intolerance  Neuro: No syncope, No seizure, no strokes  Psych: No uncontrolled anxiety, No uncontrolled depression    Interval HPI 04/27/2023:  The patient's last visit with me was on 2/10/2022.      Medical History:  has a past medical history of Acute pancreatitis, Anal fissure, Anemia, Anticoagulant long-term use, Arthritis, Asthma in remission, Back pain, BPH (benign prostatic hypertrophy), Cancer (2000), Chronic maxillary sinusitis, Clotting disorder, Diastolic dysfunction with chronic heart failure (12/3/2018), Dysphagia (10/7/2014), Family history of colon cancer, Family history of early CAD, GERD (gastroesophageal reflux disease), Helicobacter pylori (H. pylori) infection, History of chronic pancreatitis, HTN (hypertension), Lumbago (11/12/2012), Obesity, ABDON (obstructive sleep apnea), ABDON (obstructive sleep apnea), Sacroiliac joint pain (2/10/2015), Spinal stenosis of lumbar region, Von Willebrand disease, and VWD (acquired von Willebrand's disease).    Surgical History:  has a past surgical history that includes Lumbar fusion (2012); Carpal tunnel release (2003); anal fissure repair; Cervical discectomy (2003); Colonoscopy (N/A, 10/7/2015); Vasectomy (1996); Tonsillectomy; Spine surgery; Colonoscopy (N/A, 6/19/2017); Radiofrequency ablation of lumbar medial branch nerve at single level (Left, 5/24/2018); Left heart catheterization (Left, 2/14/2019); Catheterization of both left and right heart (N/A, 2/14/2019); Radiofrequency ablation (Right, 5/9/2019); Radiofrequency ablation (Left, 5/23/2019); Back surgery (2012); Functional endoscopic sinus surgery (FESS) using computer-assisted navigation (Bilateral, 10/7/2019); Balloon sinuplasty of paranasal sinus (Bilateral, 10/7/2019); Radiofrequency ablation (Left, 1/16/2020); Radiofrequency ablation (Right, 1/28/2020); Esophagogastroduodenoscopy (N/A, 3/4/2020); Colonoscopy  "(N/A, 3/4/2020); Radiofrequency ablation (Left, 8/18/2020); Radiofrequency ablation (Right, 9/1/2020); Injection of anesthetic agent around nerve (Bilateral, 10/13/2020); Esophagogastroduodenoscopy (N/A, 11/3/2020); Examination under anesthesia (N/A, 3/15/2021); Lateral internal anal sphincterotomy (N/A, 12/10/2021); Radiofrequency ablation (Bilateral, 12/21/2021); Examination under anesthesia (N/A, 2/25/2022); Cystoscopy (8/12/2022); Ureteroscopy (Bilateral, 8/12/2022); Retrograde pyelography (Bilateral, 8/12/2022); Radiofrequency ablation (Left, 11/21/2022); and Radiofrequency ablation (Right, 12/5/2022).    Family History: family history includes Cancer in his father; Colon cancer in his mother; Colon cancer (age of onset: 65) in his paternal grandfather and paternal uncle; Colon cancer (age of onset: 67) in his father; Coronary artery disease (age of onset: 45) in his mother; Coronary artery disease (age of onset: 51) in his brother; Diabetes in his mother; Diabetes Mellitus in his paternal grandmother; Glaucoma in his father; Heart disease in his mother; Hypertension in his father and mother; No Known Problems in his brother, daughter, daughter, daughter, sister, son, son, and son..     Social History:  reports that he has never smoked. He has never used smokeless tobacco. He reports current alcohol use of about 1.0 standard drink per week. He reports that he does not use drugs.    Review of patient's allergies indicates:   Allergen Reactions    Penicillins Hives and Swelling     Has had allergy testing and can prob tolerate penicillin    Ace inhibitors Other (See Comments)     "Caused a respiratory infection"    Aspirin Hives           Codeine Itching     Ok to take percocet    Dilaudid [hydromorphone] Itching       Medication List with Changes/Refills   New Medications    RABEPRAZOLE (ACIPHEX) 20 MG TABLET    Take 1 tablet (20 mg total) by mouth every 12 (twelve) hours.   Current Medications    ALBUTEROL " (PROVENTIL/VENTOLIN HFA) 90 MCG/ACTUATION INHALER    INHALE 2 PUFFS INTO THE LUNGS EVERY 6 HOURS AS NEEDED FOR WHEEZING OR SHORTNESS OF BREATH    ATORVASTATIN (LIPITOR) 40 MG TABLET    Take 1 tablet (40 mg total) by mouth every evening.    AZELASTINE (ASTELIN) 137 MCG (0.1 %) NASAL SPRAY    Two sprays in each nostril, sniff until absorbed, then follow with 1 spray of fluticasone.  Use both sprays twice daily.    BUDESONIDE-FORMOTEROL 160-4.5 MCG (SYMBICORT) 160-4.5 MCG/ACTUATION HFAA    INHALE 2 PUFFS INTO THE LUNGS EVERY 12 HOURS    CALCIUM CITRATE-VITAMIN D3 315-200 MG (CITRACAL+D) 315-200 MG-UNIT PER TABLET    Take 1 tablet by mouth nightly.     CLOPIDOGREL (PLAVIX) 75 MG TABLET    TAKE 1 TABLET(75 MG) BY MOUTH EVERY DAY    CYANOCOBALAMIN, VITAMIN B-12, 50 MCG TABLET    Take 50 mcg by mouth nightly.     DOCUSATE SODIUM (COLACE) 100 MG CAPSULE    Take 1 tablet by mouth Twice daily. 1 Capsule Oral Twice a day .  Take with pain medicine    DOXAZOSIN (CARDURA) 1 MG TABLET    TAKE 1 TABLET BY MOUTH EVERY EVENING    FLUTICASONE PROPIONATE (FLONASE) 50 MCG/ACTUATION NASAL SPRAY    One spray in each nostril twice daily after 1st using azelastine nasal spray    FUROSEMIDE (LASIX) 20 MG TABLET    Take 1 tablet (20 mg total) by mouth once daily.    IPRATROPIUM (ATROVENT) 42 MCG (0.06 %) NASAL SPRAY    1-2 sprays in each nostril before eating and at bedtime as needed    TADALAFIL (CIALIS) 20 MG TAB    Take 1 tablet (20 mg total) by mouth as needed. Take every 36 hours as needed for ED.    TESTOSTERONE (ANDRODERM) 2 MG/24 HOUR PT24    APPLY 1 PATCH TOPICALLY TO THE SKIN EVERY DAY    ZOLPIDEM (AMBIEN) 10 MG TAB    TAKE 1 TABLET BY MOUTH EVERY DAY AT BEDTIME   Discontinued Medications    ESOMEPRAZOLE (NEXIUM) 40 MG CAPSULE    TAKE ONE CAPSULE BY MOUTH EVERY 12 HOURS ABOUT 45 MINUTES BEFORE BREAKFAST AND 45 MINUTES BEFORE DINNER    HYDROCORTISONE (ANUSOL-HC) 2.5 % RECTAL CREAM    Place rectally 2 (two) times daily.     "MONTELUKAST (SINGULAIR) 10 MG TABLET    Take 1 tablet (10 mg total) by mouth every evening.    PHENTERMINE (ADIPEX-P) 37.5 MG TABLET    Take 1 tablet (37.5 mg total) by mouth once daily.    SARS-COV-2, COVID-19 OMICRON, (MODERNA COVID BIVAL,6Y UP,,PF,) 50 MCG/0.5 ML INJECTION    Inject into the muscle.    TOPIRAMATE (TOPAMAX) 50 MG TABLET    TAKE 1 TABLET (50 MG TOTAL) BY MOUTH 2 (TWO) TIMES DAILY.         Objective Findings:    Vital Signs:  BP (!) 176/98   Pulse 62   Ht 5' 11" (1.803 m)   Wt 112 kg (247 lb)   BMI 34.45 kg/m²   Body mass index is 34.45 kg/m².    Physical Exam:  General Appearance: Well appearing in no acute distress  Eyes:    No scleral icterus  ENT:  No lesions or masses   Lungs: CTA bilaterally, no wheezes, no rhonchi, no rales  Heart:  S1, S2 normal, no murmurs heard  Abdomen:  Non distended, soft, no guarding, no rebound, no tenderness, no appreciated ascites, no bruits, no hepatosplenomegaly,  No CVA tenderness, no appreciated hernias  Musculoskeletal:  No major joint deformities  Skin: No petechiae or rash on exposed skin areas  Neurologic:  Alert and oriented x4  Psychiatric:  Normal speech mentation and affect    Labs:  Lab Results   Component Value Date    WBC 9.51 03/16/2023    HGB 14.3 03/16/2023    HCT 43.8 03/16/2023     03/16/2023    CHOL 125 01/23/2023    TRIG 63 01/23/2023    HDL 32 (L) 01/23/2023    ALT 20 03/16/2023    AST 18 03/16/2023     03/16/2023    K 3.4 (L) 03/16/2023     03/16/2023    CREATININE 1.0 03/16/2023    BUN 14 03/16/2023    CO2 25 03/16/2023    TSH 1.243 01/23/2023    PSA 0.93 01/22/2021    INR 1.0 04/04/2021    HGBA1C 4.6 01/23/2023             Medical Decision Making:  Total time with patient reviewing prior medical records taking history physical exam making recommendation  Pending good Rx coupon and prescriptions been 40 minutes with greater than 50% of the time  With patient face to face today  Lab work reviewed  Prior EGD colonoscopy " images and path personally reviewed by myself  Referral to Cardiology talk given  EGD with 96 hour esophageal Bravo pH probe talk given  AcipHex twice daily talk given  The                         Olympus scope GIF- ([id number]) was introduced                         through the mouth, and advanced to the third part                         of duodenum. The upper GI endoscopy was                         accomplished without difficulty. The patient                         tolerated the procedure well.   Findings:        The examined duodenum was normal. Biopsies were taken with a cold        forceps for histology. Estimated blood loss was minimal.        Patchy mildly erythematous mucosa without bleeding was found in the        gastric antrum and in the prepyloric region of the stomach. Biopsies        were taken with a cold forceps for histology. Biopsies were taken        with a cold forceps for histology. Estimated blood loss was minimal.        The cardia and gastric fundus were normal on retroflexion.        A 2 cm hiatal hernia was found. The proximal extent of the gastric        folds (end of tubular esophagus) was 40 cm from the incisors. The        hiatal narrowing was 42 cm from the incisors. The Z-line was 40 cm        from the incisors. Biopsies were obtained from the proximal and        distal esophagus with cold forceps for histology of suspected        eosinophilic esophagitis.   Impression:           - Normal examined duodenum. Biopsied.                         - Erythematous mucosa in the antrum and prepyloric                         region of the stomach. Biopsied.                         - 2 cm hiatal hernia. Biopsied.   Recommendation:       - Discharge patient to home.                         - Follow an antireflux regimen.                         - Continue present medications.                         - Resume Plavix (clopidogrel) at prior dose                         tomorrow.                          - Await pathology results.                         - Telephone endoscopist for pathology results in 2                         weeks.                         - Return to GI clinic.                         - The findings and recommendations were discussed                         with the patient.   Attending Participation:        I personally performed the entire procedure.   Rosendo Boyer MD   11/3/2020    1. Stomach, biopsy:   Gastric antral and oxyntic mucosa, with no diagnostic abnormality.   Negative for Helicobacter pylori on routine stain.   2. Esophagus, proximal and distal, biopsy:   Esophageal squamous mucosa, with no diagnostic abnormality.   Gastric cardia-type mucosa with no diagnostic abnormality.   Negative for intestinal metaplasia and dysplasia.    Comment: Interp By Blue Zhao M.D., Signed on 11/05/2020    introduced through the anus and advanced to the                         terminal ileum, with identification of the                         appendiceal orifice and IC valve. The colonoscopy                         was performed without difficulty. The patient                         tolerated the procedure well. The quality of the                         bowel preparation was excellent. Scope insertion                         time was 4 minutes. Scope withdrawal time was 11                         minutes. good look. The terminal ileum, ileocecal                         valve, appendiceal orifice, and rectum were                         photographed.   Findings:        The terminal ileum appeared normal.        Diverticula were found in the recto-sigmoid colon and in the sigmoid        colon.        The perianal and digital rectal examinations were normal.        The retroflexed view of the distal rectum and anal verge was normal        and showed no anal or rectal abnormalities.        The entire examined colon appeared normal.        Non-bleeding internal hemorrhoids were found  during retroflexion.        The hemorrhoids were mild.   Impression:           - The examined portion of the ileum was normal.                         - Diverticulosis in the recto-sigmoid colon and in                         the sigmoid colon.                         - The entire examined colon is normal.                         - Non-bleeding internal hemorrhoids.                         - No specimens collected.   Recommendation:       - Discharge patient to home.                         - Resume Plavix (clopidogrel) at prior dose today.                         - Return to GI clinic.                         - The findings and recommendations were discussed                         with the patient.                         - Repeat colonoscopy in 5 years for surveillance.   Attending Participation:        I personally performed the entire procedure.   Rosendo Boyer MD   3/4/2020 11:49:33 AM   This report has been verified and signed electronically.   Number of Addenda: 0   Note Initiated On: 3/4/2020     Transthoracic echo (TTE) complete  Order# [id number]  Reading physician: Germán Francisco MD Ordering physician: Rehana Lopez MD Study date: 6/24/20     Reason for Exam  Priority: Routine  Dx: Diastolic dysfunction [I51.89 (ICD-10-CM)]; Lower extremity edema [R60.0 (ICD-10-CM)]     Result Image Hyperlink     Show images for Echo Color Flow Doppler? Yes  Summary    Normal left ventricular systolic function. The estimated ejection fraction is 60%.  Concentric left ventricular remodeling.  Normal LV diastolic function. Borderline enlarged left atrium, IVC large but collapsing, no convincing evidence of diastolic dysfunction.  Mild mitral sclerosis.  Mild to moderate tricuspid regurgitation.  Normal right ventricular systolic function.  Mild left atrial enlargement.  The estimated PA systolic pressure is 37 mmHg.  Intermediate central venous pressure (8 mmHg).  Reason for Exam  Priority: Routine  Dx:  Precordial pain [R07.2 (ICD-10-CM)]     Conclusion    The relative PET images show no clinically significant regional resting or stress induced perfusion defect.  For whole heart absolute myocardial perfusion (cc/min/g) averaged 1.16 rest (whihc is elevated), 1.59 at stress (which is mildly reduced), and 1.4 CFR (which is moderately reduced impart due to elevated resting flow).  Stress flow is reduced diffusely throughout the heart consistent with the presence of CAD, but above ischemic thresholds.  Gated perfusion images showed an ejection fraction of 69% at rest and 69% during stress.  Wall motion was normal at rest and at stress.  LV cavity size at rest is normal. LV cavity size at stress is normal.  The EKG portion of this study is negative for myocardial ischemia.  Arrhythmias during stress: occasional PVCs.  The results of the PET favors medical therapy.  There is no prior study for comparison.  Assessment:  1. Gastroesophageal reflux disease, unspecified whether esophagitis present    2. Heartburn    3. Preoperative clearance    4. Sleep apnea, unspecified type    5. Peripheral vascular disease         Recommendations:  1. Referral to Cardiology for preop clearance for EGD  2. Discontinue Nexium start AcipHex 20 mg every 12 hours best taken 45-60 minutes before breakfast and 45-60 minutes before dinner  3. Referral to endoscopy schedulers for EGD with 96 hour esophageal Bravo pH probe study done on his AcipHex 20 mg every 12 hours.  4. Return to GI clinic 8-12 weeks for follow-up sooner if symptoms worsen.    No follow-ups on file.      Order summary:  Orders Placed This Encounter    Ambulatory referral/consult to Cardiology    RABEprazole (ACIPHEX) 20 mg tablet         Thank you so much for allowing me to participate in the care of Bri CONOR Boyer MD    DISCLAIMER: This note was prepared with [employer] voice recognition transcription software. Garbled syntax, mangled or inadvertent pronouns,  and other bizarre constructions may be attributed to that software system. While efforts were made to correct any mistakes made by this voice recognition program, some errors and/or omissions may remain in the note that were missed when the note was originally created.

## 2023-05-01 ENCOUNTER — OFFICE VISIT (OUTPATIENT)
Dept: CARDIOLOGY | Facility: CLINIC | Age: 63
End: 2023-05-01
Payer: MEDICARE

## 2023-05-01 ENCOUNTER — TELEPHONE (OUTPATIENT)
Dept: PAIN MEDICINE | Facility: CLINIC | Age: 63
End: 2023-05-01
Payer: MEDICARE

## 2023-05-01 VITALS
DIASTOLIC BLOOD PRESSURE: 84 MMHG | BODY MASS INDEX: 34.59 KG/M2 | RESPIRATION RATE: 66 BRPM | SYSTOLIC BLOOD PRESSURE: 132 MMHG | WEIGHT: 248 LBS

## 2023-05-01 DIAGNOSIS — I70.0 THORACIC AORTA ATHEROSCLEROSIS: ICD-10-CM

## 2023-05-01 DIAGNOSIS — Z01.818 PREOPERATIVE CLEARANCE: Primary | ICD-10-CM

## 2023-05-01 DIAGNOSIS — I10 PRIMARY HYPERTENSION: ICD-10-CM

## 2023-05-01 DIAGNOSIS — G47.30 SLEEP APNEA, UNSPECIFIED TYPE: ICD-10-CM

## 2023-05-01 DIAGNOSIS — E78.5 HYPERLIPIDEMIA, UNSPECIFIED HYPERLIPIDEMIA TYPE: ICD-10-CM

## 2023-05-01 DIAGNOSIS — I25.10 NON-OCCLUSIVE CORONARY ARTERY DISEASE REQUIRING DRUG THERAPY: ICD-10-CM

## 2023-05-01 DIAGNOSIS — I50.32 DIASTOLIC DYSFUNCTION WITH CHRONIC HEART FAILURE: ICD-10-CM

## 2023-05-01 DIAGNOSIS — I73.9 PERIPHERAL VASCULAR DISEASE: ICD-10-CM

## 2023-05-01 DIAGNOSIS — E78.49 OTHER HYPERLIPIDEMIA: ICD-10-CM

## 2023-05-01 PROCEDURE — 1159F MED LIST DOCD IN RCRD: CPT | Mod: CPTII,S$GLB,, | Performed by: INTERNAL MEDICINE

## 2023-05-01 PROCEDURE — 3079F DIAST BP 80-89 MM HG: CPT | Mod: CPTII,S$GLB,, | Performed by: INTERNAL MEDICINE

## 2023-05-01 PROCEDURE — 3008F BODY MASS INDEX DOCD: CPT | Mod: CPTII,S$GLB,, | Performed by: INTERNAL MEDICINE

## 2023-05-01 PROCEDURE — 99204 PR OFFICE/OUTPT VISIT, NEW, LEVL IV, 45-59 MIN: ICD-10-PCS | Mod: S$GLB,,, | Performed by: INTERNAL MEDICINE

## 2023-05-01 PROCEDURE — 99204 OFFICE O/P NEW MOD 45 MIN: CPT | Mod: S$GLB,,, | Performed by: INTERNAL MEDICINE

## 2023-05-01 PROCEDURE — 99999 PR PBB SHADOW E&M-EST. PATIENT-LVL III: CPT | Mod: PBBFAC,,, | Performed by: INTERNAL MEDICINE

## 2023-05-01 PROCEDURE — 3044F PR MOST RECENT HEMOGLOBIN A1C LEVEL <7.0%: ICD-10-PCS | Mod: CPTII,S$GLB,, | Performed by: INTERNAL MEDICINE

## 2023-05-01 PROCEDURE — 1160F PR REVIEW ALL MEDS BY PRESCRIBER/CLIN PHARMACIST DOCUMENTED: ICD-10-PCS | Mod: CPTII,S$GLB,, | Performed by: INTERNAL MEDICINE

## 2023-05-01 PROCEDURE — 3075F SYST BP GE 130 - 139MM HG: CPT | Mod: CPTII,S$GLB,, | Performed by: INTERNAL MEDICINE

## 2023-05-01 PROCEDURE — 3008F PR BODY MASS INDEX (BMI) DOCUMENTED: ICD-10-PCS | Mod: CPTII,S$GLB,, | Performed by: INTERNAL MEDICINE

## 2023-05-01 PROCEDURE — 1159F PR MEDICATION LIST DOCUMENTED IN MEDICAL RECORD: ICD-10-PCS | Mod: CPTII,S$GLB,, | Performed by: INTERNAL MEDICINE

## 2023-05-01 PROCEDURE — 3079F PR MOST RECENT DIASTOLIC BLOOD PRESSURE 80-89 MM HG: ICD-10-PCS | Mod: CPTII,S$GLB,, | Performed by: INTERNAL MEDICINE

## 2023-05-01 PROCEDURE — 1160F RVW MEDS BY RX/DR IN RCRD: CPT | Mod: CPTII,S$GLB,, | Performed by: INTERNAL MEDICINE

## 2023-05-01 PROCEDURE — 3075F PR MOST RECENT SYSTOLIC BLOOD PRESS GE 130-139MM HG: ICD-10-PCS | Mod: CPTII,S$GLB,, | Performed by: INTERNAL MEDICINE

## 2023-05-01 PROCEDURE — 3044F HG A1C LEVEL LT 7.0%: CPT | Mod: CPTII,S$GLB,, | Performed by: INTERNAL MEDICINE

## 2023-05-01 PROCEDURE — 99999 PR PBB SHADOW E&M-EST. PATIENT-LVL III: ICD-10-PCS | Mod: PBBFAC,,, | Performed by: INTERNAL MEDICINE

## 2023-05-01 RX ORDER — ATORVASTATIN CALCIUM 80 MG/1
80 TABLET, FILM COATED ORAL NIGHTLY
Qty: 90 TABLET | Refills: 3 | Status: SHIPPED | OUTPATIENT
Start: 2023-05-01

## 2023-05-01 NOTE — TELEPHONE ENCOUNTER
----- Message from Callum Diane sent at 5/1/2023  9:28 AM CDT -----  Regarding: Late  Who is Calling: PURNIMA IYER [[id number]]           What is the request in detail: Patient is running 15 minutes late.  If patient needs to reschedule, please contact.            Can the clinic reply by [facility]: NO           What [facility]: [phone]

## 2023-05-01 NOTE — PROGRESS NOTES
HISTORY:    62-year-old male with a history of nonobstructive CAD, hypertension, hyperlipidemia, diastolic dysfunction, ABDON, and morbid obesity presenting for initial evaluation by me.  The patient usually follows with Dr. Francisco.    Patient is referred as part of a preoperative evaluation.      He has a chronic history of chest pain that has been stable for years. Seen in the ED in March with a negative CV sampson and resolution of symptoms. Has had similar symptoms over the course of the last decade and been followed by cardiology with sampson as described below. Pain when it occurs is described as a stabbing sensation. No exertional symptoms.    Currently, patient denies chest pain, SOB, or BOYD. Exercise capacity is stable. Goes to the gym 5x/week. Treadmill, bicycle, stretching, and weights for 60 minutes at a time without issue.    The patient denies any previous history of myocardial infarction, stroke, congestive heart failure, or cardiomyopathy. Pt with known nonobs CAD and diastolic dysfunction +/- HFpEF.    Pt does have a asa allergy. He tolerates clopidogrel 75x1, atorvastatin 40x1, and furosemide 20x1.     PHYSICAL EXAM:    Vitals:    05/01/23 0943   BP: 132/84   Resp: (!) 66       NAD, A+Ox3.  No jvd, no bruit.  RRR nml s1,s2. No murmurs.  CTA B no wheezes or crackles.  No edema.    LABS/STUDIES (imaging reviewed during clinic visit):    March 2023 hemoglobin 14.3 with MCV 93.  Creatinine 1.0 with a BUN of 14.  Albumin 4.2.  LDL 80 and HDL 32.  Triglycerides 63.  BNP normal.  Troponin normal.  A1c and TSH normal.    ECG March 2023 demonstrates sinus rhythm with no Q-waves or ST changes.    Holter 2020 sinus rhythm with average heart rate of 80 beats per minute.  No significant ectopy or arrhythmia.    TTE 2020 normal LV size and function with EF 60%.  CVP 8.  Cardiac catheterization 2019 demonstrates nonobstructive CAD.  LVEDP 23/pulmonary capillary wedge pressure 24/PA pressure 31/right atrial pressure 11.   Cardiac output 6.73.      ASSESSMENT & PLAN:    1. Preoperative clearance    2. Sleep apnea, unspecified type    3. Peripheral vascular disease    4. Diastolic dysfunction with chronic heart failure    5. Non-occlusive coronary artery disease requiring drug therapy    6. Primary hypertension    7. Thoracic aorta atherosclerosis    8. Other hyperlipidemia    9. Hyperlipidemia, unspecified hyperlipidemia type        Orders Placed This Encounter    atorvastatin (LIPITOR) 80 MG tablet        Patient with 0 Rfs by RCRI criteria and no active CV symptoms. Okay to proceed with anticipated GI procedures without further CV testing.    Pt does have SIHD with no significant stenoses on cath in '20. Chronic CP syndrome does not seem to be related. Remains on clopidogrel monotherapy. Tolerates atorvastatin 40x1 w LDL of 80. Would increase to 80x1 for further CV risk reduction.    Diastolic dysfunction +/- HFpEF hx with elevated filling pressures at time of cath in '19. Pt euvolemic at this time with good exercise capacity on furosemide 20x1. Bps reasonable. Would be okay to be more aggressive with BP control.     Has done well with weight loss through diet and exercise. Has lost 30 pounds in 3 years. Has a goal to drop 20 more.    Follow-up with Dr. Francisco.    Damian Liu MD

## 2023-05-02 ENCOUNTER — TELEPHONE (OUTPATIENT)
Dept: ENDOSCOPY | Facility: HOSPITAL | Age: 63
End: 2023-05-02
Payer: MEDICARE

## 2023-05-02 ENCOUNTER — PATIENT MESSAGE (OUTPATIENT)
Dept: ENDOSCOPY | Facility: HOSPITAL | Age: 63
End: 2023-05-02
Payer: MEDICARE

## 2023-05-02 VITALS — HEIGHT: 71 IN | BODY MASS INDEX: 34.44 KG/M2 | WEIGHT: 246 LBS

## 2023-05-02 DIAGNOSIS — K21.9 GASTROESOPHAGEAL REFLUX DISEASE, UNSPECIFIED WHETHER ESOPHAGITIS PRESENT: Primary | ICD-10-CM

## 2023-05-02 NOTE — TELEPHONE ENCOUNTER
"Rosendo Boyer MD  [facility]  Procedure: Referral placed to endoscopy schedulers for EGD with 96 hour esophageal Bravo pH probe study on his AcipHex 20 mg every 12 hours.     Diagnosis: GERD     Procedure Timin-12 weeks     *If within 4 weeks selected, please tad as high priority*     *If greater than 12 weeks, please select "5-12 weeks" and delay sending until 2 months prior to requested date*     Provider: Myself     Location: [address]     Additional Scheduling Information:  Requested for cardiology preop clearance for EGD patient with sleep apnea obesity GERD, patient on Plavix     Prep Specifications:Standard prep     Have you attached a patient to this message: Yes and No           MD Rosendo Proctor MD; [facility]    Yes he is. And forgot to mention in my note that you can hold clopidogrel 5 days pre-procedure and restart post procedure.     AS             "

## 2023-05-04 ENCOUNTER — PATIENT MESSAGE (OUTPATIENT)
Dept: PAIN MEDICINE | Facility: OTHER | Age: 63
End: 2023-05-04
Payer: MEDICARE

## 2023-05-04 ENCOUNTER — OFFICE VISIT (OUTPATIENT)
Dept: PAIN MEDICINE | Facility: CLINIC | Age: 63
End: 2023-05-04
Payer: MEDICARE

## 2023-05-04 VITALS
DIASTOLIC BLOOD PRESSURE: 78 MMHG | HEIGHT: 71 IN | HEART RATE: 66 BPM | WEIGHT: 248.88 LBS | SYSTOLIC BLOOD PRESSURE: 139 MMHG | BODY MASS INDEX: 34.84 KG/M2

## 2023-05-04 DIAGNOSIS — G89.29 OTHER CHRONIC PAIN: ICD-10-CM

## 2023-05-04 DIAGNOSIS — M54.14 THORACIC RADICULITIS: Primary | ICD-10-CM

## 2023-05-04 DIAGNOSIS — M47.816 LUMBAR SPONDYLOSIS: ICD-10-CM

## 2023-05-04 DIAGNOSIS — M54.14 THORACIC RADICULOPATHY: ICD-10-CM

## 2023-05-04 DIAGNOSIS — M96.1 POSTLAMINECTOMY SYNDROME OF LUMBAR REGION: ICD-10-CM

## 2023-05-04 DIAGNOSIS — S22.060D CLOSED WEDGE COMPRESSION FRACTURE OF T7 VERTEBRA WITH ROUTINE HEALING, SUBSEQUENT ENCOUNTER: ICD-10-CM

## 2023-05-04 PROCEDURE — 1159F MED LIST DOCD IN RCRD: CPT | Mod: CPTII,S$GLB,, | Performed by: NURSE PRACTITIONER

## 2023-05-04 PROCEDURE — 3008F PR BODY MASS INDEX (BMI) DOCUMENTED: ICD-10-PCS | Mod: CPTII,S$GLB,, | Performed by: NURSE PRACTITIONER

## 2023-05-04 PROCEDURE — 99213 PR OFFICE/OUTPT VISIT, EST, LEVL III, 20-29 MIN: ICD-10-PCS | Mod: S$GLB,,, | Performed by: NURSE PRACTITIONER

## 2023-05-04 PROCEDURE — 1160F PR REVIEW ALL MEDS BY PRESCRIBER/CLIN PHARMACIST DOCUMENTED: ICD-10-PCS | Mod: CPTII,S$GLB,, | Performed by: NURSE PRACTITIONER

## 2023-05-04 PROCEDURE — 3044F PR MOST RECENT HEMOGLOBIN A1C LEVEL <7.0%: ICD-10-PCS | Mod: CPTII,S$GLB,, | Performed by: NURSE PRACTITIONER

## 2023-05-04 PROCEDURE — 99999 PR PBB SHADOW E&M-EST. PATIENT-LVL IV: CPT | Mod: PBBFAC,,, | Performed by: NURSE PRACTITIONER

## 2023-05-04 PROCEDURE — 99999 PR PBB SHADOW E&M-EST. PATIENT-LVL IV: ICD-10-PCS | Mod: PBBFAC,,, | Performed by: NURSE PRACTITIONER

## 2023-05-04 PROCEDURE — 3078F PR MOST RECENT DIASTOLIC BLOOD PRESSURE < 80 MM HG: ICD-10-PCS | Mod: CPTII,S$GLB,, | Performed by: NURSE PRACTITIONER

## 2023-05-04 PROCEDURE — 1160F RVW MEDS BY RX/DR IN RCRD: CPT | Mod: CPTII,S$GLB,, | Performed by: NURSE PRACTITIONER

## 2023-05-04 PROCEDURE — 99213 OFFICE O/P EST LOW 20 MIN: CPT | Mod: S$GLB,,, | Performed by: NURSE PRACTITIONER

## 2023-05-04 PROCEDURE — 1159F PR MEDICATION LIST DOCUMENTED IN MEDICAL RECORD: ICD-10-PCS | Mod: CPTII,S$GLB,, | Performed by: NURSE PRACTITIONER

## 2023-05-04 PROCEDURE — 3044F HG A1C LEVEL LT 7.0%: CPT | Mod: CPTII,S$GLB,, | Performed by: NURSE PRACTITIONER

## 2023-05-04 PROCEDURE — 3078F DIAST BP <80 MM HG: CPT | Mod: CPTII,S$GLB,, | Performed by: NURSE PRACTITIONER

## 2023-05-04 PROCEDURE — 3008F BODY MASS INDEX DOCD: CPT | Mod: CPTII,S$GLB,, | Performed by: NURSE PRACTITIONER

## 2023-05-04 PROCEDURE — 3075F SYST BP GE 130 - 139MM HG: CPT | Mod: CPTII,S$GLB,, | Performed by: NURSE PRACTITIONER

## 2023-05-04 PROCEDURE — 3075F PR MOST RECENT SYSTOLIC BLOOD PRESS GE 130-139MM HG: ICD-10-PCS | Mod: CPTII,S$GLB,, | Performed by: NURSE PRACTITIONER

## 2023-05-04 NOTE — PROGRESS NOTES
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 marrow replacement process.  No prevertebral swelling.      Impression       Cervical spondylosis.         BMP  Lab Results   Component Value Date     03/16/2023    K 3.4 (L) 03/16/2023     03/16/2023    CO2 25 03/16/2023    BUN 14 03/16/2023    CREATININE 1.0 03/16/2023    CALCIUM 9.1 03/16/2023    ANIONGAP 10 03/16/2023    ESTGFRAFRICA >60.0 07/19/2022    EGFRNONAA >60.0 07/19/2022         REVIEW OF SYSTEMS:    GENERAL:  No weight loss or fevers.    RESPIRATORY:  Denies SOB or wheezing.  CARDIOVASCULAR:  Negative for chest pain, leg swelling or palpitations.  GI:  Negative for abdominal discomfort, blood in stools or black stools or change in bowel habits.  MUSCULOSKELETAL:  Joint stiffness, back and neck pain.  SKIN:  Negative for lesions, rash, and itching.  PSYCH: Patients sleep is not disturbed secondary to pain.  HEMATOLOGY/LYMPHOLOGY:  History of easy bruising.  History of von Willebrand's factor deficiency. On Plavix.  NEURO:   No history of syncope, paralysis, seizures or tremors.   All other reviewed and negative other than HPI.      OBJECTIVE:    /78 (BP Location: Left arm, Patient Position: Sitting, BP Method: Large (Automatic))   Pulse 66   Ht 5' 11" (1.803 m)   Wt 112.9 kg (248 lb 14.4 oz)   BMI 34.71 kg/m²     PHYSICAL " EXAMINATION:    GENERAL: Well appearing, in no acute distress, alert and oriented x3.  PSYCH:  Mood and affect appropriate.  SKIN:  No evidence of infection from previous injection site. No visible rashes.  HEAD/FACE:  Normocephalic, atraumatic.   BACK: There is pain with palpation of thoracic facet joints and paraspinal muscles bilaterally. There is pain with thoracic extension and flexion.  Positive facet loading bilaterally.  EXTREMITIES: Bilateral lower and upper extremity strength is 5/5 and symmetric.   MUSCULOSKELETAL:   No atrophy or tone abnormalities are noted. No TTP over SI joints. 5/5 strength in right ankle with plantar and dorsiflexion. 5/5 strength in left ankle with plantar and dorsiflexion. 5/5 strength with right knee flexion and extension. 5/5 strength with left knee flexion and extension.   NEURO: Cranial nerves grossly intact. No loss of sensation noted.  GAIT: Antalgic- ambulates without assistance.      Assessment:     1. Thoracic radiculitis    2. Thoracic radiculopathy    3. Closed wedge compression fracture of T7 vertebra with routine healing, subsequent encounter    4. Lumbar spondylosis    5. Other chronic pain    6. Postlaminectomy syndrome of lumbar region            Plan:     - Previous imaging was reviewed and discussed with the patient today.    - Will schedule for bilateral T7/8 TF ODALYS given location of symptoms and MRI results. Will need to obtain permission to hold Plavix prior.    - Can continue Robaxin 500 mg BID PRN muscle pain.    - The patient will continue a home exercise routine to help with pain and strengthening. He will continue with [employer].    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 It may be mild vWF, but most recent lab tests showed normal coagulation function. The patient has been previously receiving dDAVP prior to all procedures and has not exhibited bleeding. Hematology recommended receiving dDAVP prior to  all invasive procedures. For all interventional procedures, we will give 0.3mcg/kg dDAVP immediately prior to the procedure.       - RTC 2 weeks after ODALYS.      The above plan and management options were discussed at length with patient. Patient is in agreement with the above and verbalized understanding.     Buffy Villagran    05/04/2023

## 2023-05-05 ENCOUNTER — TELEPHONE (OUTPATIENT)
Dept: INTERNAL MEDICINE | Facility: CLINIC | Age: 63
End: 2023-05-05
Payer: MEDICARE

## 2023-05-05 NOTE — TELEPHONE ENCOUNTER
----- Message from Vicki Mccollum sent at 5/4/2023  4:01 PM CDT -----  Requesting clearance of Plavix 7 days for scheduled procedure 6/19/23 with Dr. Winston, Bilateral T7-T8 Transforaminal Lora, please advised

## 2023-05-08 DIAGNOSIS — D68.00 VON WILLEBRAND DISEASE: Primary | ICD-10-CM

## 2023-05-08 RX ORDER — HEPARIN 100 UNIT/ML
500 SYRINGE INTRAVENOUS
OUTPATIENT
Start: 2023-05-08

## 2023-05-08 RX ORDER — SODIUM CHLORIDE 0.9 % (FLUSH) 0.9 %
10 SYRINGE (ML) INJECTION
OUTPATIENT
Start: 2023-05-08

## 2023-05-08 NOTE — TELEPHONE ENCOUNTER
Ok to hold plavix 7 days prior but with hx of vonwillebrands recommend DDAVP   infusion , 20 mcg, to be given over 30 minutes, 1 hr before the procedure.  This has been done previously at [facility] per hematology. Message forwarded to dr araiza

## 2023-05-11 ENCOUNTER — TELEPHONE (OUTPATIENT)
Dept: ENDOSCOPY | Facility: HOSPITAL | Age: 63
End: 2023-05-11
Payer: MEDICARE

## 2023-05-11 NOTE — TELEPHONE ENCOUNTER
Called and spoke to patient to reschedule EGD procedure. EGD with BRAVO rescheduled to 5/31/23 with an arrival time of 12:30 PM. Patient verbalized an understanding.

## 2023-05-22 ENCOUNTER — OFFICE VISIT (OUTPATIENT)
Dept: OPTOMETRY | Facility: CLINIC | Age: 63
End: 2023-05-22
Payer: MEDICARE

## 2023-05-22 DIAGNOSIS — H35.033 HYPERTENSIVE RETINOPATHY OF BOTH EYES: Primary | ICD-10-CM

## 2023-05-22 DIAGNOSIS — H52.7 REFRACTIVE ERROR: ICD-10-CM

## 2023-05-22 DIAGNOSIS — H04.123 DRY EYE SYNDROME OF BOTH EYES: ICD-10-CM

## 2023-05-22 DIAGNOSIS — H11.001 PTERYGIUM OF RIGHT EYE: ICD-10-CM

## 2023-05-22 DIAGNOSIS — H25.13 NUCLEAR SCLEROSIS OF BOTH EYES: ICD-10-CM

## 2023-05-22 PROCEDURE — 1159F PR MEDICATION LIST DOCUMENTED IN MEDICAL RECORD: ICD-10-PCS | Mod: CPTII,S$GLB,, | Performed by: OPTOMETRIST

## 2023-05-22 PROCEDURE — 3044F HG A1C LEVEL LT 7.0%: CPT | Mod: CPTII,S$GLB,, | Performed by: OPTOMETRIST

## 2023-05-22 PROCEDURE — 92015 PR REFRACTION: ICD-10-PCS | Mod: S$GLB,,, | Performed by: OPTOMETRIST

## 2023-05-22 PROCEDURE — 92014 COMPRE OPH EXAM EST PT 1/>: CPT | Mod: S$GLB,,, | Performed by: OPTOMETRIST

## 2023-05-22 PROCEDURE — 92015 DETERMINE REFRACTIVE STATE: CPT | Mod: S$GLB,,, | Performed by: OPTOMETRIST

## 2023-05-22 PROCEDURE — 1159F MED LIST DOCD IN RCRD: CPT | Mod: CPTII,S$GLB,, | Performed by: OPTOMETRIST

## 2023-05-22 PROCEDURE — 92014 PR EYE EXAM, EST PATIENT,COMPREHESV: ICD-10-PCS | Mod: S$GLB,,, | Performed by: OPTOMETRIST

## 2023-05-22 PROCEDURE — 3044F PR MOST RECENT HEMOGLOBIN A1C LEVEL <7.0%: ICD-10-PCS | Mod: CPTII,S$GLB,, | Performed by: OPTOMETRIST

## 2023-05-22 PROCEDURE — 99212 OFFICE O/P EST SF 10 MIN: CPT | Performed by: OPTOMETRIST

## 2023-05-22 PROCEDURE — 99999 PR PBB SHADOW E&M-EST. PATIENT-LVL II: ICD-10-PCS | Mod: PBBFAC,,, | Performed by: OPTOMETRIST

## 2023-05-22 PROCEDURE — 99999 PR PBB SHADOW E&M-EST. PATIENT-LVL II: CPT | Mod: PBBFAC,,, | Performed by: OPTOMETRIST

## 2023-05-22 NOTE — PROGRESS NOTES
HPI    CC: DFE    HERI:04/25/22    62 y.o. is here for DFE  Pt states distance vision has gotten worse  Pt states his vision has gotten blurry OU  Pt wear pals gl to read  Pt wants to get a new rx for gl    (+) Changes in vision   (+) Pain Pressure  (-) Irritation   (+) Itching Burning and Tearing OD  (-) Flashes  (+) Floaters; longstanding   (-) Glasses wearer  (-) CL wearer  (+) Uses eye gtts, OTC Drops    Does patient want a refraction today? yes    (-) Eye injury  (-) Eye surgery   (-)POHx  (+)FOHx, Pts dad has glaucoma    (-)DM    Last edited by Nirmala White, OD on 5/22/2023  3:08 PM.            Assessment /Plan     For exam results, see Encounter Report.    Hypertensive retinopathy of both eyes    Nuclear sclerosis of both eyes    Dry eye syndrome of both eyes    Pterygium of right eye    Refractive error      Vessel changes, OU. Stable since HERI (04/22). Discussed importance of BP control, taking medications as directed, and following-up with primary care. If changes in vision are noted, pt is to RTC. Monitor yearly unless changes are noted sooner.      2. Educated pt on findings. Not visually significant. No need for removal at this time. Monitor yearly.      3. Educated pt on findings. Recommended ATs QID for added lubrication and comfort. Discussed dry eyes are caused for intermittent blurred vision, especially with reading/near work. If symptoms worsen or dont improve, RTC. Monitor.      4. Educated pt on findings. No impact to visual axis. Recommend UV eye protection when outside and ATs for added lubrication. Monitor yearly.     5. Updated SRx. Mild change from habitual. Monitor yearly.        RTC in 1 year for annual eye exam or sooner if needed.

## 2023-05-25 ENCOUNTER — OFFICE VISIT (OUTPATIENT)
Dept: INTERNAL MEDICINE | Facility: CLINIC | Age: 63
End: 2023-05-25
Attending: INTERNAL MEDICINE
Payer: MEDICARE

## 2023-05-25 ENCOUNTER — HOSPITAL ENCOUNTER (OUTPATIENT)
Dept: RADIOLOGY | Facility: OTHER | Age: 63
Discharge: HOME | End: 2023-05-25
Attending: INTERNAL MEDICINE
Payer: MEDICARE

## 2023-05-25 VITALS
WEIGHT: 249.25 LBS | OXYGEN SATURATION: 95 % | SYSTOLIC BLOOD PRESSURE: 110 MMHG | DIASTOLIC BLOOD PRESSURE: 68 MMHG | HEIGHT: 71 IN | BODY MASS INDEX: 34.9 KG/M2 | HEART RATE: 69 BPM

## 2023-05-25 DIAGNOSIS — R10.31 BILATERAL GROIN PAIN: ICD-10-CM

## 2023-05-25 DIAGNOSIS — R10.32 BILATERAL GROIN PAIN: ICD-10-CM

## 2023-05-25 DIAGNOSIS — I83.812 VARICOSE VEINS OF LEFT LOWER EXTREMITY WITH PAIN: ICD-10-CM

## 2023-05-25 DIAGNOSIS — G47.33 OSA (OBSTRUCTIVE SLEEP APNEA): ICD-10-CM

## 2023-05-25 DIAGNOSIS — R51.9 MORNING HEADACHE: ICD-10-CM

## 2023-05-25 DIAGNOSIS — M85.89 OTHER SPECIFIED DISORDERS OF BONE DENSITY AND STRUCTURE, MULTIPLE SITES: ICD-10-CM

## 2023-05-25 DIAGNOSIS — Z79.899 ENCOUNTER FOR MONITORING LONG-TERM PROTON PUMP INHIBITOR THERAPY: Chronic | ICD-10-CM

## 2023-05-25 DIAGNOSIS — E55.9 VITAMIN D DEFICIENCY: ICD-10-CM

## 2023-05-25 DIAGNOSIS — M85.80 OSTEOPENIA, UNSPECIFIED LOCATION: Primary | ICD-10-CM

## 2023-05-25 DIAGNOSIS — I70.0 THORACIC AORTA ATHEROSCLEROSIS: Chronic | ICD-10-CM

## 2023-05-25 DIAGNOSIS — R53.83 FATIGUE, UNSPECIFIED TYPE: ICD-10-CM

## 2023-05-25 DIAGNOSIS — D68.00 VON WILLEBRAND DISEASE: Chronic | ICD-10-CM

## 2023-05-25 DIAGNOSIS — Z51.81 ENCOUNTER FOR MONITORING LONG-TERM PROTON PUMP INHIBITOR THERAPY: Chronic | ICD-10-CM

## 2023-05-25 DIAGNOSIS — I73.9 CLAUDICATION OF LEFT LOWER EXTREMITY: ICD-10-CM

## 2023-05-25 PROBLEM — I50.32 DIASTOLIC DYSFUNCTION WITH CHRONIC HEART FAILURE: Chronic | Status: ACTIVE | Noted: 2018-12-03

## 2023-05-25 PROBLEM — E66.09 CLASS 1 OBESITY DUE TO EXCESS CALORIES WITH SERIOUS COMORBIDITY AND BODY MASS INDEX (BMI) OF 33.0 TO 33.9 IN ADULT: Chronic | Status: ACTIVE | Noted: 2018-12-18

## 2023-05-25 PROBLEM — J45.40 MODERATE PERSISTENT ASTHMA: Chronic | Status: ACTIVE | Noted: 2018-12-18

## 2023-05-25 PROBLEM — C61 PROSTATE CANCER: Chronic | Status: ACTIVE | Noted: 2022-01-11

## 2023-05-25 PROBLEM — D37.05: Chronic | Status: ACTIVE | Noted: 2020-12-17

## 2023-05-25 PROBLEM — D50.9 IRON DEFICIENCY ANEMIA: Chronic | Status: ACTIVE | Noted: 2020-03-04

## 2023-05-25 PROBLEM — I25.10 NON-OCCLUSIVE CORONARY ARTERY DISEASE REQUIRING DRUG THERAPY: Chronic | Status: ACTIVE | Noted: 2021-10-10

## 2023-05-25 PROBLEM — E66.811 CLASS 1 OBESITY DUE TO EXCESS CALORIES WITH SERIOUS COMORBIDITY AND BODY MASS INDEX (BMI) OF 33.0 TO 33.9 IN ADULT: Chronic | Status: ACTIVE | Noted: 2018-12-18

## 2023-05-25 PROCEDURE — 99215 OFFICE O/P EST HI 40 MIN: CPT | Mod: S$GLB,,, | Performed by: INTERNAL MEDICINE

## 2023-05-25 PROCEDURE — 73502 X-RAY EXAM HIP UNI 2-3 VIEWS: CPT | Mod: TC,FY,LT

## 2023-05-25 PROCEDURE — 3078F DIAST BP <80 MM HG: CPT | Mod: CPTII,S$GLB,, | Performed by: INTERNAL MEDICINE

## 2023-05-25 PROCEDURE — 99215 PR OFFICE/OUTPT VISIT, EST, LEVL V, 40-54 MIN: ICD-10-PCS | Mod: S$GLB,,, | Performed by: INTERNAL MEDICINE

## 2023-05-25 PROCEDURE — 3008F PR BODY MASS INDEX (BMI) DOCUMENTED: ICD-10-PCS | Mod: CPTII,S$GLB,, | Performed by: INTERNAL MEDICINE

## 2023-05-25 PROCEDURE — 3074F PR MOST RECENT SYSTOLIC BLOOD PRESSURE < 130 MM HG: ICD-10-PCS | Mod: CPTII,S$GLB,, | Performed by: INTERNAL MEDICINE

## 2023-05-25 PROCEDURE — 90677 PCV20 VACCINE IM: CPT | Mod: S$GLB,,, | Performed by: INTERNAL MEDICINE

## 2023-05-25 PROCEDURE — 3078F PR MOST RECENT DIASTOLIC BLOOD PRESSURE < 80 MM HG: ICD-10-PCS | Mod: CPTII,S$GLB,, | Performed by: INTERNAL MEDICINE

## 2023-05-25 PROCEDURE — 1159F PR MEDICATION LIST DOCUMENTED IN MEDICAL RECORD: ICD-10-PCS | Mod: CPTII,S$GLB,, | Performed by: INTERNAL MEDICINE

## 2023-05-25 PROCEDURE — 73502 XR HIP WITH PELVIS WHEN PERFORMED, 2 OR 3 VIEWS LEFT: ICD-10-PCS | Mod: 26,LT,, | Performed by: RADIOLOGY

## 2023-05-25 PROCEDURE — 99999 PR PBB SHADOW E&M-EST. PATIENT-LVL V: ICD-10-PCS | Mod: PBBFAC,,, | Performed by: INTERNAL MEDICINE

## 2023-05-25 PROCEDURE — 99999 PR PBB SHADOW E&M-EST. PATIENT-LVL V: CPT | Mod: PBBFAC,,, | Performed by: INTERNAL MEDICINE

## 2023-05-25 PROCEDURE — G0009 ADMIN PNEUMOCOCCAL VACCINE: HCPCS | Mod: S$GLB,,, | Performed by: INTERNAL MEDICINE

## 2023-05-25 PROCEDURE — 3074F SYST BP LT 130 MM HG: CPT | Mod: CPTII,S$GLB,, | Performed by: INTERNAL MEDICINE

## 2023-05-25 PROCEDURE — 90677 PNEUMOCOCCAL CONJUGATE VACCINE 20-VALENT: ICD-10-PCS | Mod: S$GLB,,, | Performed by: INTERNAL MEDICINE

## 2023-05-25 PROCEDURE — 1159F MED LIST DOCD IN RCRD: CPT | Mod: CPTII,S$GLB,, | Performed by: INTERNAL MEDICINE

## 2023-05-25 PROCEDURE — 3008F BODY MASS INDEX DOCD: CPT | Mod: CPTII,S$GLB,, | Performed by: INTERNAL MEDICINE

## 2023-05-25 PROCEDURE — 73502 X-RAY EXAM HIP UNI 2-3 VIEWS: CPT | Mod: 26,LT,, | Performed by: RADIOLOGY

## 2023-05-25 PROCEDURE — 3044F PR MOST RECENT HEMOGLOBIN A1C LEVEL <7.0%: ICD-10-PCS | Mod: CPTII,S$GLB,, | Performed by: INTERNAL MEDICINE

## 2023-05-25 PROCEDURE — 3044F HG A1C LEVEL LT 7.0%: CPT | Mod: CPTII,S$GLB,, | Performed by: INTERNAL MEDICINE

## 2023-05-25 PROCEDURE — G0009 PNEUMOCOCCAL CONJUGATE VACCINE 20-VALENT: ICD-10-PCS | Mod: S$GLB,,, | Performed by: INTERNAL MEDICINE

## 2023-05-25 NOTE — PROGRESS NOTES
"Patient was given vaccine information sheet for the [id number] (pneumococcal polyvalent) vaccine. The area of injection was palpated using the acromion process as a landmark. This area was cleaned with alcohol. Using a 25g 1" safety needle, 0.5mL of the vaccine was placed into the right deltoid muscle. The injection site was dressed with a bandage. Patient experienced no complications and was discharged in stable condition. Pneumovax 23 (pneumococcal polyvalent) vaccine Lot: [id number] Exp: 09/2024     "

## 2023-05-25 NOTE — PROGRESS NOTES
"Subjective:       Patient ID: Bri Santiago is a 62 y.o. male.    Chief Complaint: Fatigue (Fu for fatigue, but notes a recent ER visit for chest pain, and current leg swelling, weakness, and pain.)     Bri Santiago is a 62 y.o.  male who presents for Fatigue (Fu for fatigue, but notes a recent ER visit for chest pain, and current leg swelling, weakness, and pain.)  .  Reports tender area behind left medial knee for past month, worse when getting up from seated position or getting out of bed in morning. Feels his knee is going to "give out" at these times.  Has some varicose veins and hx of non-obstructive CAD. + pain in left calf with exercise, imnproved with resting a few minutes.      Uses treadmill 20 mins, bike 15 mins daily at the gym.      C/o fatigue, wakes up tired. Wakes up with headaches daily.  + nocturia 3-4 times per night  see dr dumont    C/o pain in anterior groin, worse with frog leg extgernal rotation. Constant. Has hx of chronic back pain with multiple surgeries as well as OA.    Patient Active Problem List   Diagnosis    Asthma in remission    Von Willebrand disease    Primary hypertension    Spondylosis without myelopathy    Thoracic or lumbosacral neuritis or radiculitis, unspecified    Spinal stenosis, lumbar region, without neurogenic claudication    Degeneration of lumbar or lumbosacral intervertebral disc    Acquired spondylolisthesis    Pseudoarthrosis    Family history of colon cancer    Vitamin D deficiency    Atypical chest pain    Encounter for monitoring long-term proton pump inhibitor therapy    Postlaminectomy syndrome of lumbar region    Myalgia and myositis    Facet syndrome    Gastroesophageal reflux disease without esophagitis    Osteopenia    Thoracic aorta atherosclerosis    Benign prostatic hyperplasia with urinary obstruction    Allergic rhinitis    Allergic conjunctivitis of both eyes    Encounter for long-term current use of high risk medication    Gastroesophageal " reflux disease with esophagitis    Hematochezia    Nasal septal deviation    Dysphagia    Laryngopharyngeal reflux (LPR)    Diastolic dysfunction with chronic heart failure    At risk for cardiovascular event    Hypoxia    Chronic pulmonary heart disease    Class 1 obesity due to excess calories with serious comorbidity and body mass index (BMI) of 33.0 to 33.9 in adult    Moderate persistent asthma    Family history of prostate cancer in father    Chronic pain    Nocturia    Scrotal swelling    Chronic bilateral low back pain without sciatica    Chronic maxillary sinusitis    Nonintractable episodic headache    Osteoarthritis of lumbar spine    Gynecomastia, male    Iron deficiency anemia    Obstructive sleep apnea treated with continuous positive airway pressure (CPAP)    Postnasal drip    GERD (gastroesophageal reflux disease)    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    Dry skin dermatitis    Globus sensation    Throat clearing    Non-occlusive coronary artery disease requiring drug therapy    Prostate cancer    Unspecified inflammatory spondylopathy, lumbar region    Erectile dysfunction    Chronic prostatitis    Dysuria    Impaired functional mobility and activity tolerance    Decreased ROM of trunk and back    Decreased functional mobility and endurance    Weakness    Other hyperlipidemia           Past Medical History:   Diagnosis Date    Acute pancreatitis     Anal fissure     Anemia     Anticoagulant long-term use     Arthritis     Asthma in remission     Back pain     BPH (benign prostatic hypertrophy)     Cancer 2000    prostate- treated at [facility] with chemo- in remission since 2000    Chronic maxillary sinusitis     Clotting disorder     Diastolic dysfunction with chronic heart failure 12/3/2018    Dysphagia 10/7/2014    Family history of colon cancer     Family history of early CAD     GERD (gastroesophageal reflux disease)     Helicobacter pylori (H. pylori) infection      Chronic    History of chronic pancreatitis     HTN (hypertension)     Lumbago 11/12/2012    Obesity     ABDON (obstructive sleep apnea)     ABDON (obstructive sleep apnea)     With likely ohs Refer to sleep    Sacroiliac joint pain 2/10/2015    Spinal stenosis of lumbar region     Von Willebrand disease     VWD (acquired von Willebrand's disease)        Past Surgical History:   Procedure Laterality Date    anal fissure repair      x2    BACK SURGERY  2012    BALLOON SINUPLASTY OF PARANASAL SINUS Bilateral 10/7/2019    Procedure: SINUPLASTY, USING BALLOON;  Surgeon: LAURENT Swanson MD;  Location: [facility], [place], operated by [facility] OR;  Service: ENT;  Laterality: Bilateral;    CARPAL TUNNEL RELEASE  2003    left hand    CATHETERIZATION OF BOTH LEFT AND RIGHT HEART N/A 2/14/2019    Procedure: CATHETERIZATION, HEART, BOTH LEFT AND RIGHT;  Surgeon: Kyle Magana MD;  Location: [facility] CATH LAB;  Service: Cardiology;  Laterality: N/A;    CERVICAL DISCECTOMY  2003    COLONOSCOPY N/A 10/7/2015    Procedure: COLONOSCOPY;  Surgeon: Rosendo Boyer MD;  [facility]  Service: Endoscopy;  Laterality: N/A;  PM Prep    COLONOSCOPY N/A 6/19/2017    Procedure: COLONOSCOPY;  Surgeon: Rosendo Boyer MD;  Location: [facility] ENDO (4TH FLR);  Service: Endoscopy;  Laterality: N/A;  constipation prep (no DM no CHF)       hx of vonWillebrand's disease-will need infusion prior    COLONOSCOPY N/A 3/4/2020    Procedure: COLONOSCOPY;  Surgeon: Rosendo Boyer MD;  Location: [facility] (4TH FLR);  Service: Endoscopy;  Laterality: N/A;  Please order CBC, ferritin, Iron TIBC day of case per Dr. Boyer  labwork at 7:10am and patient will check in right after.  per Dr. Tyler patient can hold Plavix 5 days prior see note 1/7/20  DDAVP prior to procedure needed see note 1/16/20 for oncall med by Dr. Tyler -     CYSTOSCOPY  8/12/2022    Procedure: CYSTOSCOPY;  Surgeon: Tyler Reid Jr., MD;  Location: [facility] 1ST FLR;  Service: Urology;;     ESOPHAGOGASTRODUODENOSCOPY N/A 3/4/2020    Procedure: EGD (ESOPHAGOGASTRODUODENOSCOPY);  Surgeon: Rosendo Boyer MD;  Location: [facility] ENDO (4TH FLR);  Service: Endoscopy;  Laterality: N/A;  EGD and Colonoscopy orders merged together  labwork at 7:10am and patient will check in right after.  DDAVP prior to procedure needed see note 1/16/20 for oncall med  per Dr. Tyler patient can hold Plavix 5 days prior see note 1/7/20    ESOPHAGOGASTRODUODENOSCOPY N/A 11/3/2020    Procedure: EGD (ESOPHAGOGASTRODUODENOSCOPY);  Surgeon: Rosendo Boyer MD;  Location: [facility] (2ND FLR);  Service: Endoscopy;  Laterality: N/A;  Please recall pt has von Willebrands and will require DDVAP infusion prior to procedure as previously done.    see telephone encounter on 10/1/20 regarding DDAVP   ok to hold Plavix 5 days prior per Dr.Blessey BUCKNER screening on 10/31/20 -rb    EXAMINATION UNDER ANESTHESIA N/A 3/15/2021    Procedure: EXAM UNDER ANESTHESIA;  Surgeon: Silvia Cazares MD;  Location: [facility] OR 2ND FLR;  Service: Colon and Rectal;  Laterality: N/A;    EXAMINATION UNDER ANESTHESIA N/A 2/25/2022    Procedure: EXAM UNDER ANESTHESIA, EXCISION ANAL PAPILLA;  Surgeon: Nadeem Grady MD;  Location: [facility] OR 2ND FLR;  Service: Colon and Rectal;  Laterality: N/A;    FUNCTIONAL ENDOSCOPIC SINUS SURGERY (FESS) USING COMPUTER-ASSISTED NAVIGATION Bilateral 10/7/2019    Procedure: FESS, USING COMPUTER-ASSISTED NAVIGATION;  Surgeon: LAURENT Swanson MD;  Location: [facility] OR;  Service: ENT;  Laterality: Bilateral;    INJECTION OF ANESTHETIC AGENT AROUND NERVE Bilateral 10/13/2020    Procedure: BLOCK, NERVE BILATERAL C4,C5,C6 Plavix clearance requested;  Surgeon: Fredy Magana MD;  Location: [facility] PAIN MGT;  Service: Pain Management;  Laterality: Bilateral;  BLOCK, NERVE BILATERAL C4,C5,C6  Plavix clearance ok, will require DDVAP infusion    LATERAL INTERNAL ANAL SPHINCTEROTOMY N/A 12/10/2021    Procedure: SPHINCTEROTOMY-LATERAL INTERNAL  ANAL;  Surgeon: Nadeem Grady MD;  Location: [facility] OR [facility] FLR;  Service: Colon and Rectal;  Laterality: N/A;    LEFT HEART CATHETERIZATION Left 2/14/2019    Procedure: Left heart cath;  Surgeon: Kyle Magana MD;  Location: [facility] CATH LAB;  Service: Cardiology;  Laterality: Left;    LUMBAR FUSION  2012    RADIOFREQUENCY ABLATION Right 5/9/2019    Procedure: RADIOFREQUENCY ABLATION, RIGHT L3,L4,L5;  Surgeon: Fredy Magana MD;  Location: [facility] PAIN MGT;  Service: Pain Management;  Laterality: Right;  1 of 2  RT RFA L3,4,5  PLAVIX clearance received, pt needs DDAVP    RADIOFREQUENCY ABLATION Left 5/23/2019    Procedure: RADIOFREQUENCY ABLATION, LEFT L3,L4,L5;  Surgeon: Fredy Magana MD;  Location: [facility] PAIN MGT;  Service: Pain Management;  Laterality: Left;  2 of 2  LT RFA L3,4,5  PLAVIX clearance received, pt needs DDAVP    RADIOFREQUENCY ABLATION Left 1/16/2020    Procedure: RADIOFREQUENCY ABLATION LEFT L3, L4, L5 MEDIAL BRANCH 1 OF 2 **PATIENT ARRIVING AT 8 AM**;  Surgeon: Fredy Magana MD;  Location: [facility] PAIN MGT;  Service: Pain Management;  Laterality: Left;  NEEDS CONSENT, PLAVIX CLEARANCE IN CHART    RADIOFREQUENCY ABLATION Right 1/28/2020    Procedure: RADIOFREQUENCY ABLATION, RIGHT L3-L4-L5 MEDIAL BRANCH 2 OF 2 (Left done on 1/16/20);  Surgeon: Fredy Magana MD;  Location: [facility] PAIN MGT;  Service: Pain Management;  Laterality: Right;    RADIOFREQUENCY ABLATION Left 8/18/2020    Procedure: RADIOFREQUENCY ABLATION, L3-L4-L5 MEDIAL BRANCH 1 OF 2 clear to hold plavix 7 days;  Surgeon: Fredy Magana MD;  Location: [facility] PAIN MGT;  Service: Pain Management;  Laterality: Left;    RADIOFREQUENCY ABLATION Right 9/1/2020    Procedure: RADIOFREQUENCY ABLATION, L3-L4-L5 MEDIAL BR ANCH 2 OF 2 clear to hol,d Plavix 7 days;  Surgeon: Fredy Magana MD;  Location: [facility] PAIN MGT;  Service: Pain Management;  Laterality: Right;    RADIOFREQUENCY ABLATION Bilateral 12/21/2021    Procedure:  RADIOFREQUENCY ABLATION B/L L3,4,5 RFA (give dDAVP prior) NEEDS CONSENT plavix ok x7;  Surgeon: Fredy Magana MD;  Location: [facility], [place], operated [facility] PAIN MGT;  Service: Pain Management;  Laterality: Bilateral;    RADIOFREQUENCY ABLATION Left 11/21/2022    Procedure: RADIOFREQUENCY ABLATION LEFT L3,L4,L5 MUST HAVE dDVAP PRIOR ONE OF TWO;  Surgeon: Milo Winston MD;  Location: [facility], [place], operated [facility] PAIN MGT;  Service: Pain Management;  Laterality: Left;    RADIOFREQUENCY ABLATION Right 12/5/2022    Procedure: RADIOFREQUENCY ABLATION RIGHT L3,L4,L5  PRE-MEDICATE WITH dDVAP TWO OF TWO;  Surgeon: Milo Winston MD;  Location: [facility], [place], operated [facility] PAIN MGT;  Service: Pain Management;  Laterality: Right;    RADIOFREQUENCY ABLATION OF LUMBAR MEDIAL BRANCH NERVE AT SINGLE LEVEL Left 5/24/2018    Procedure: RADIOFREQUENCY THERMOCOAGULATION (RFTC)-NERVE-MEDIAN BRANCH-LUMBAR;  Surgeon: Fredy Magana MD;  Location: [facility], [place] PAIN MGT;  Service: Pain Management;  Laterality: Left;  Left RFA @ L3,4,5  [id number]  with IV Sedation    2 of 2    RETROGRADE PYELOGRAPHY Bilateral 8/12/2022    Procedure: PYELOGRAM, RETROGRADE;  Surgeon: Tyler Reid Jr., MD;  Location: [facility] OR [address] [place] [address];  Service: Urology;  Laterality: Bilateral;    SPINE SURGERY      TONSILLECTOMY      at age 24    URETEROSCOPY Bilateral 8/12/2022    Procedure: URETEROSCOPY;  Surgeon: Tyler Reid Jr., MD;  Location: [facility] OR [address] [place] [address];  Service: Urology;  Laterality: Bilateral;    VASECTOMY  1996       Family History   Problem Relation Age of Onset    Colon cancer Father 67        colon cancer    Hypertension Father     Glaucoma Father     Cancer Father     Colon cancer Paternal Grandfather 65             Coronary artery disease Mother 45    Hypertension Mother     Heart disease Mother     Colon cancer Mother     Diabetes Mother     No Known Problems Brother     No Known Problems Sister     No Known Problems Daughter     No Known Problems Son     Coronary artery disease Brother 51    No Known Problems Daughter      "No Known Problems Daughter     No Known Problems Son     No Known Problems Son     Colon cancer Paternal Uncle 65    Diabetes Mellitus Paternal Grandmother     Esophageal cancer Neg Hx        Social History     Tobacco Use    Smoking status: Never    Smokeless tobacco: Never   Substance Use Topics    Alcohol use: Yes     Alcohol/week: 1.0 standard drink     Types: 1 Glasses of wine per week     Comment: social    Drug use: No         Review of Systems   Constitutional:  Positive for fatigue. Negative for chills and fever.   Respiratory:  Negative for cough and shortness of breath.    Cardiovascular:  Negative for chest pain and palpitations.   Gastrointestinal:  Negative for nausea and vomiting.   Genitourinary:  Negative for dysuria and hematuria.   Musculoskeletal:  Positive for back pain.   Neurological:  Positive for headaches.       Objective:   Blood pressure 110/68, pulse 69, height 5' 11" (1.803 m), weight 113 kg (249 lb 3.7 oz), SpO2 95 %.     Physical Exam  Constitutional:       Appearance: Normal appearance. He is well-developed. He is not ill-appearing.   HENT:      Head: Normocephalic and atraumatic.   Eyes:      General: No scleral icterus.     Conjunctiva/sclera: Conjunctivae normal.   Cardiovascular:      Rate and Rhythm: Normal rate.      Heart sounds: Normal heart sounds. No murmur heard.    No friction rub. No gallop.   Pulmonary:      Effort: Pulmonary effort is normal.      Breath sounds: Normal breath sounds. No wheezing or rales.   Chest:      Chest wall: No tenderness.   Abdominal:      General: Bowel sounds are normal.      Palpations: Abdomen is soft.   Musculoskeletal:         General: Tenderness (mild soft tissue prominence posterior medial knee, TTP+) present. No signs of injury.      Right lower leg: Edema (1+ bilateral edema) present.      Left lower leg: Edema present.      Comments: Mild crepitus left knee with full extension  Pain with flexed knee and external rotation of hips "   Skin:     General: Skin is warm and dry.   Neurological:      Mental Status: He is alert and oriented to person, place, and time. Mental status is at baseline.   Psychiatric:         Behavior: Behavior normal.         Thought Content: Thought content normal.       Prior labs reviewed    Health Maintenance         Date Due Completion Date    Shingles Vaccine (1 of 2) Never done ---    DEXA Scan 07/22/2018 7/22/2015    Pneumococcal Vaccines (Age 0-64) (3 - PCV) 10/07/2022 10/7/2021    Aspirin/Antiplatelet Therapy 05/22/2024 5/22/2023    Colorectal Cancer Screening 03/04/2025 3/4/2020    Hemoglobin A1c (Diabetic Prevention Screening) 01/23/2026 1/23/2023    TETANUS VACCINE 10/16/2027 10/16/2017    Lipid Panel 01/23/2028 1/23/2023            Assessment/Plan:       1. Osteopenia, unspecified location  Comments:  cont vit d, repeat imaging  Orders:  -     DXA Bone Density Axial Skeleton 1 or more sites; Future; Expected date: 05/25/2023    2. Claudication of left lower extremity  Comments:  recommend cont exercise, rest briefly when pain occurs  refer to vascular, schedule ABIs  Orders:  -     Ambulatory referral/consult to Vascular Surgery; Future; Expected date: 06/01/2023  -     Ankle Brachial Indices (VALENCIA); Future    3. Encounter for monitoring long-term proton pump inhibitor therapy  Comments:  followed by dr christian  check vit d  Overview:  Managed by GI, long term PPI  Vit d deficient, osteopenia BMD 2015      4. Thoracic aorta atherosclerosis  Comments:  on statin, cont current meds  heart healthy low chol diet, cont exrcise  Overview:  Noted on imaging study 9/4/2014 - CT chest   Treated with statin, lipitor 80 mg qhs      5. Bilateral groin pain  -     X-Ray Hip 2 or 3 views Left (with Pelvis when performed); Future; Expected date: 05/25/2023    6. Vitamin D deficiency  -     DXA Bone Density Axial Skeleton 1 or more sites; Future; Expected date: 05/25/2023    7. Varicose veins of left lower extremity with  pain  Comments:  avoid crossing legs, elevated feet when seats  Orders:  -     Ambulatory referral/consult to Vascular Surgery; Future; Expected date: 06/01/2023    8. Fatigue, unspecified type  Comments:  discuss nocturia with urology, may need meds for LUTS  Orders:  -     Ambulatory referral/consult to Sleep Disorders; Future; Expected date: 06/01/2023    9. ABDON (obstructive sleep apnea)  Comments:  suspect fatigue related to abdon- follow up with sleep to see if needs adjustment of cpap  Orders:  -     Ambulatory referral/consult to Sleep Disorders; Future; Expected date: 06/01/2023    10. Morning headache  -     Ambulatory referral/consult to Sleep Disorders; Future; Expected date: 06/01/2023    11. Other specified disorders of bone density and structure, multiple sites  -     DXA Bone Density Axial Skeleton 1 or more sites; Future; Expected date: 05/25/2023    12. Von Willebrand disease  Comments:  needs DVAAP prior to procedures  planned by dr araiza    43 min spent in care of patient including history, physical, chart review, orders and coordination of care.         Medication List with Changes/Refills   Current Medications    ALBUTEROL (PROVENTIL/VENTOLIN HFA) 90 MCG/ACTUATION INHALER    INHALE 2 PUFFS INTO THE LUNGS EVERY 6 HOURS AS NEEDED FOR WHEEZING OR SHORTNESS OF BREATH    ATORVASTATIN (LIPITOR) 80 MG TABLET    Take 1 tablet (80 mg total) by mouth every evening.    AZELASTINE (ASTELIN) 137 MCG (0.1 %) NASAL SPRAY    Two sprays in each nostril, sniff until absorbed, then follow with 1 spray of fluticasone.  Use both sprays twice daily.    BUDESONIDE-FORMOTEROL 160-4.5 MCG (SYMBICORT) 160-4.5 MCG/ACTUATION HFAA    INHALE 2 PUFFS INTO THE LUNGS EVERY 12 HOURS    CALCIUM CITRATE-VITAMIN D3 315-200 MG (CITRACAL+D) 315-200 MG-UNIT PER TABLET    Take 1 tablet by mouth nightly.     CLOPIDOGREL (PLAVIX) 75 MG TABLET    TAKE 1 TABLET(75 MG) BY MOUTH EVERY DAY    CYANOCOBALAMIN, VITAMIN B-12, 50 MCG TABLET    Take  50 mcg by mouth nightly.     DOCUSATE SODIUM (COLACE) 100 MG CAPSULE    Take 1 tablet by mouth Twice daily. 1 Capsule Oral Twice a day .  Take with pain medicine    DOXAZOSIN (CARDURA) 1 MG TABLET    TAKE 1 TABLET BY MOUTH EVERY EVENING    FLUTICASONE PROPIONATE (FLONASE) 50 MCG/ACTUATION NASAL SPRAY    One spray in each nostril twice daily after 1st using azelastine nasal spray    FUROSEMIDE (LASIX) 20 MG TABLET    Take 1 tablet (20 mg total) by mouth once daily.    IPRATROPIUM (ATROVENT) 42 MCG (0.06 %) NASAL SPRAY    1-2 sprays in each nostril before eating and at bedtime as needed    RABEPRAZOLE (ACIPHEX) 20 MG TABLET    Take 1 tablet (20 mg total) by mouth every 12 (twelve) hours.    TADALAFIL (CIALIS) 20 MG TAB    Take 1 tablet (20 mg total) by mouth as needed. Take every 36 hours as needed for ED.    TESTOSTERONE (ANDRODERM) 2 MG/24 HOUR PT24    APPLY 1 PATCH TOPICALLY TO THE SKIN EVERY DAY    ZOLPIDEM (AMBIEN) 10 MG TAB    TAKE 1 TABLET BY MOUTH EVERY DAY AT BEDTIME       Recommend patient complete vaccinations as listed in the overdue health maintenance report. Pt may obtain vaccinations at their local pharmacy, any [facility] pharmacy or request vaccination in our clinic.  Please note that some insurances will only cover vaccination cost at specific locations.Please check with your insurance provider regarding your coverage.  Vaccines that are not covered are still recommended.

## 2023-05-30 DIAGNOSIS — N40.1 BENIGN LOCALIZED PROSTATIC HYPERPLASIA WITH LOWER URINARY TRACT SYMPTOMS (LUTS): ICD-10-CM

## 2023-05-30 NOTE — TELEPHONE ENCOUNTER
No care due was identified.  [facility] Embedded Care Due Messages. Reference number: [id number].   5/30/2023 5:18:03 PM CDT

## 2023-05-31 ENCOUNTER — ANESTHESIA EVENT (OUTPATIENT)
Dept: ENDOSCOPY | Facility: HOSPITAL | Age: 63
End: 2023-05-31
Payer: MEDICARE

## 2023-05-31 ENCOUNTER — ANESTHESIA (OUTPATIENT)
Dept: ENDOSCOPY | Facility: HOSPITAL | Age: 63
End: 2023-05-31
Payer: MEDICARE

## 2023-05-31 ENCOUNTER — HOSPITAL ENCOUNTER (OUTPATIENT)
Facility: HOSPITAL | Age: 63
Discharge: HOME | End: 2023-05-31
Attending: INTERNAL MEDICINE | Admitting: INTERNAL MEDICINE
Payer: MEDICARE

## 2023-05-31 VITALS
SYSTOLIC BLOOD PRESSURE: 158 MMHG | TEMPERATURE: 97 F | OXYGEN SATURATION: 96 % | DIASTOLIC BLOOD PRESSURE: 86 MMHG | RESPIRATION RATE: 16 BRPM | HEART RATE: 74 BPM

## 2023-05-31 DIAGNOSIS — K21.9 GERD (GASTROESOPHAGEAL REFLUX DISEASE): ICD-10-CM

## 2023-05-31 PROCEDURE — 43235 EGD DIAGNOSTIC BRUSH WASH: CPT | Mod: GC,,, | Performed by: INTERNAL MEDICINE

## 2023-05-31 PROCEDURE — 25000003 PHARM REV CODE 250: Performed by: INTERNAL MEDICINE

## 2023-05-31 PROCEDURE — 63600175 PHARM REV CODE 636 W HCPCS: Mod: JG | Performed by: ANESTHESIOLOGY

## 2023-05-31 PROCEDURE — 37000009 HC ANESTHESIA EA ADD 15 MINS: Performed by: INTERNAL MEDICINE

## 2023-05-31 PROCEDURE — 25000003 PHARM REV CODE 250: Performed by: ANESTHESIOLOGY

## 2023-05-31 PROCEDURE — 43235 PR EGD, FLEX, DIAGNOSTIC: ICD-10-PCS | Mod: GC,,, | Performed by: INTERNAL MEDICINE

## 2023-05-31 PROCEDURE — 37000008 HC ANESTHESIA 1ST 15 MINUTES: Performed by: INTERNAL MEDICINE

## 2023-05-31 PROCEDURE — 25000003 PHARM REV CODE 250: Performed by: NURSE ANESTHETIST, CERTIFIED REGISTERED

## 2023-05-31 PROCEDURE — 63600175 PHARM REV CODE 636 W HCPCS: Performed by: NURSE ANESTHETIST, CERTIFIED REGISTERED

## 2023-05-31 PROCEDURE — 27200942: Performed by: INTERNAL MEDICINE

## 2023-05-31 PROCEDURE — 43235 EGD DIAGNOSTIC BRUSH WASH: CPT | Performed by: INTERNAL MEDICINE

## 2023-05-31 PROCEDURE — 91035 G-ESOPH REFLX TST W/ELECTROD: CPT | Mod: TC,59 | Performed by: INTERNAL MEDICINE

## 2023-05-31 PROCEDURE — E9220 PRA ENDO ANESTHESIA: ICD-10-PCS | Mod: ,,, | Performed by: NURSE ANESTHETIST, CERTIFIED REGISTERED

## 2023-05-31 PROCEDURE — E9220 PRA ENDO ANESTHESIA: HCPCS | Mod: ,,, | Performed by: NURSE ANESTHETIST, CERTIFIED REGISTERED

## 2023-05-31 RX ORDER — LIDOCAINE HYDROCHLORIDE 20 MG/ML
INJECTION INTRAVENOUS
Status: DISCONTINUED | OUTPATIENT
Start: 2023-05-31 | End: 2023-05-31

## 2023-05-31 RX ORDER — DESMOPRESSIN ACETATE 4 UG/ML
20 INJECTION, SOLUTION INTRAVENOUS; SUBCUTANEOUS ONCE
Status: DISCONTINUED | OUTPATIENT
Start: 2023-05-31 | End: 2023-05-31

## 2023-05-31 RX ORDER — SODIUM CHLORIDE 9 MG/ML
INJECTION, SOLUTION INTRAVENOUS CONTINUOUS
Status: DISCONTINUED | OUTPATIENT
Start: 2023-05-31 | End: 2023-05-31 | Stop reason: HOSPADM

## 2023-05-31 RX ORDER — PROPOFOL 10 MG/ML
VIAL (ML) INTRAVENOUS CONTINUOUS PRN
Status: DISCONTINUED | OUTPATIENT
Start: 2023-05-31 | End: 2023-05-31

## 2023-05-31 RX ORDER — DOXAZOSIN 1 MG/1
TABLET ORAL
Qty: 90 TABLET | Refills: 3 | Status: SHIPPED | OUTPATIENT
Start: 2023-05-31

## 2023-05-31 RX ORDER — PROPOFOL 10 MG/ML
VIAL (ML) INTRAVENOUS
Status: DISCONTINUED | OUTPATIENT
Start: 2023-05-31 | End: 2023-05-31

## 2023-05-31 RX ADMIN — SODIUM CHLORIDE: 9 INJECTION, SOLUTION INTRAVENOUS at 01:05

## 2023-05-31 RX ADMIN — DESMOPRESSIN ACETATE 20 MCG: 4 SOLUTION INTRAVENOUS at 01:05

## 2023-05-31 RX ADMIN — PROPOFOL 150 MCG/KG/MIN: 10 INJECTION, EMULSION INTRAVENOUS at 01:05

## 2023-05-31 RX ADMIN — LIDOCAINE HYDROCHLORIDE 100 MG: 20 INJECTION INTRAVENOUS at 01:05

## 2023-05-31 RX ADMIN — Medication 100 MG: at 01:05

## 2023-05-31 NOTE — PROVATION PATIENT INSTRUCTIONS
Discharge Summary/Instructions after an Endoscopic Procedure  Patient Name: Bri Santiago  Patient MRN: [id number]  Patient YOB: 1960  Wednesday, May 31, 2023  Rosendo Boyer MD  Dear patient,  As a result of recent federal legislation (The Federal Cures Act), you may   receive lab or pathology results from your procedure in your [facility]   account before your physician is able to contact you. Your physician or   their representative will relay the results to you with their   recommendations at their soonest availability.  Thank you,  RESTRICTIONS:  During your procedure today, you received medications for sedation.  These   medications may affect your judgment, balance and coordination.  Therefore,   for 24 hours, you have the following restrictions:   - DO NOT drive a car, operate machinery, make legal/financial decisions,   sign important papers or drink alcohol.    ACTIVITY:  Today: no heavy lifting, straining or running due to procedural   sedation/anesthesia.  The following day: return to full activity including work.  DIET:  Eat and drink normally unless instructed otherwise.     TREATMENT FOR COMMON SIDE EFFECTS:  - Mild abdominal pain, nausea, belching, bloating or excessive gas:  rest,   eat lightly and use a heating pad.  - Sore Throat: treat with throat lozenges and/or gargle with warm salt   water.  - Because air was used during the procedure, expelling large amounts of air   from your rectum or belching is normal.  - If a bowel prep was taken, you may not have a bowel movement for 1-3 days.    This is normal.  SYMPTOMS TO WATCH FOR AND REPORT TO YOUR PHYSICIAN:  1. Abdominal pain or bloating, other than gas cramps.  2. Chest pain.  3. Back pain.  4. Signs of infection such as: chills or fever occurring within 24 hours   after the procedure.  5. Rectal bleeding, which would show as bright red, maroon, or black stools.   (A tablespoon of blood from the rectum is not serious, especially if    hemorrhoids are present.)  6. Vomiting.  7. Weakness or dizziness.  GO DIRECTLY TO THE NEAREST EMERGENCY ROOM IF YOU HAVE ANY OF THE FOLLOWING:      Difficulty breathing              Chills and/or fever over 101 F   Persistent vomiting and/or vomiting blood   Severe abdominal pain   Severe chest pain   Black, tarry stools   Bleeding- more than one tablespoon   Any other symptom or condition that you feel may need urgent attention  Your doctor recommends these additional instructions:  If any biopsies were taken, your doctors clinic will contact you in 1 to 2   weeks with any results.  - Discharge patient to home.   -Continue AcipHex 20 mg every 12 hours.  - Resume Plavix (clopidogrel) at prior dose tomorrow.   - Return to GI clinic at the next available appointment.   - The findings and recommendations were discussed with the patient.  For questions, problems or results please call your physician - Rosendo Boyer MD at Work:  [phone].  [facility] [place], EMERGENCY ROOM PHONE NUMBER: [phone]  IF A COMPLICATION OR EMERGENCY SITUATION ARISES AND YOU ARE UNABLE TO REACH   YOUR PHYSICIAN - GO DIRECTLY TO THE EMERGENCY ROOM.  Rosendo Boyer MD  5/31/2023 2:03:26 PM  This report has been verified and signed electronically.  Dear patient,  As a result of recent federal legislation (The Federal Cures Act), you may   receive lab or pathology results from your procedure in your [facility]   account before your physician is able to contact you. Your physician or   their representative will relay the results to you with their   recommendations at their soonest availability.  Thank you,  PROVATION

## 2023-05-31 NOTE — TELEPHONE ENCOUNTER
Refill Decision Note   Bri Santiago  is requesting a refill authorization.  Brief Assessment and Rationale for Refill:  Approve     Medication Therapy Plan:         Comments:     No Care Gaps recommended.     Note composed:6:13 AM 05/31/2023

## 2023-05-31 NOTE — ANESTHESIA PREPROCEDURE EVALUATION
05/31/2023  Bri Santiago is a 62 y.o., male.      Pre-op Assessment    I have reviewed the Patient Summary Reports.    I have reviewed the NPO Status.      Review of Systems  Anesthesia Hx:  Denies Family Hx of Anesthesia complications.   Denies Personal Hx of Anesthesia complications.   Cardiovascular:   Hypertension CAD   Cardiac clearance 5/1     Chronic non-cardiac chest pain unclear etiology    Pulmonary:   Asthma Sleep Apnea, CPAP    Hepatic/GI:   GERD    Musculoskeletal:   Arthritis     Neurological:   Neuromuscular Disease, Headaches Myositis  Myalgia           Anesthesia Plan  Type of Anesthesia, risks & benefits discussed:    Anesthesia Type: Gen Natural Airway  Intra-op Monitoring Plan: Standard ASA Monitors  Induction:  IV  Informed Consent: Informed consent signed with the Patient and all parties understand the risks and agree with anesthesia plan.  All questions answered.   ASA Score: 3    Ready For Surgery From Anesthesia Perspective.     .

## 2023-05-31 NOTE — ANESTHESIA POSTPROCEDURE EVALUATION
Anesthesia Post Evaluation    Patient: Bri Santiago    Procedure(s) Performed: Procedure(s) (LRB):  EGD (ESOPHAGOGASTRODUODENOSCOPY) (N/A)  PH MONITORING, ESOPHAGUS, WIRELESS, (ON REFLUX MEDS) (N/A)    Final Anesthesia Type: general      Patient location during evaluation: GI PACU  Patient participation: Yes- Able to Participate  Level of consciousness: awake and alert  Post-procedure vital signs: reviewed and stable  Pain management: adequate  Airway patency: patent    PONV status at discharge: No PONV  Anesthetic complications: no      Cardiovascular status: hemodynamically stable  Respiratory status: spontaneous ventilation and unassisted  Hydration status: euvolemic  Follow-up not needed.          Vitals Value Taken Time   /86 05/31/23 1447   Temp 36.1 °C (97 °F) 05/31/23 1415   Pulse 74 05/31/23 1447   Resp 16 05/31/23 1447   SpO2 96 % 05/31/23 1447         No case tracking events are documented in the log.      Pain/Tammy Score: Tammy Score: 9 (5/31/2023  2:21 PM)

## 2023-05-31 NOTE — TRANSFER OF CARE
Anesthesia Transfer of Care Note    Patient: Bri Santiago    Procedure(s) Performed: Procedure(s) (LRB):  EGD (ESOPHAGOGASTRODUODENOSCOPY) (N/A)  PH MONITORING, ESOPHAGUS, WIRELESS, (ON REFLUX MEDS) (N/A)    Patient location: PACU    Anesthesia Type: general    Transport from OR: Transported from OR on room air with adequate spontaneous ventilation    Post pain: adequate analgesia    Post assessment: no apparent anesthetic complications and tolerated procedure well    Post vital signs: stable    Level of consciousness: awake, alert and oriented    Nausea/Vomiting: no nausea/vomiting    Complications: none    Transfer of care protocol was followed      Last vitals:   Visit Vitals  /81(BP Location: Left arm, Patient Position: Lying)   Pulse 81   Temp 98a   Resp 16   SpO2 (!) 95%

## 2023-05-31 NOTE — H&P
Endoscopy History and Physical    PCP - Cate Galeas MD    Procedure - EGD  ASA - per anesthesia  Mallampati - per anesthesia  Plan of anesthesia - MAC    HPI:  This is a 62 y.o. male here for evaluation of : gerd    ROS:  Constitutional: No fevers, chills  CV: No chest pain  Pulm: No cough  Ophtho: No vision changes  GI: see HPI  Derm: No rash    Medical History:  has a past medical history of Acute pancreatitis, Anal fissure, Anemia, Anticoagulant long-term use, Arthritis, Asthma in remission, Back pain, BPH (benign prostatic hypertrophy), Cancer (2000), Chronic maxillary sinusitis, Clotting disorder, Diastolic dysfunction with chronic heart failure (12/3/2018), Dysphagia (10/7/2014), Family history of colon cancer, Family history of early CAD, GERD (gastroesophageal reflux disease), Helicobacter pylori (H. pylori) infection, History of chronic pancreatitis, HTN (hypertension), Lumbago (11/12/2012), Obesity, ABDON (obstructive sleep apnea), ABDON (obstructive sleep apnea), Sacroiliac joint pain (2/10/2015), Spinal stenosis of lumbar region, Von Willebrand disease, and VWD (acquired von Willebrand's disease).    Surgical History:  has a past surgical history that includes Lumbar fusion (2012); Carpal tunnel release (2003); anal fissure repair; Cervical discectomy (2003); Colonoscopy (N/A, 10/7/2015); Vasectomy (1996); Tonsillectomy; Spine surgery; Colonoscopy (N/A, 6/19/2017); Radiofrequency ablation of lumbar medial branch nerve at single level (Left, 5/24/2018); Left heart catheterization (Left, 2/14/2019); Catheterization of both left and right heart (N/A, 2/14/2019); Radiofrequency ablation (Right, 5/9/2019); Radiofrequency ablation (Left, 5/23/2019); Back surgery (2012); Functional endoscopic sinus surgery (FESS) using computer-assisted navigation (Bilateral, 10/7/2019); Balloon sinuplasty of paranasal sinus (Bilateral, 10/7/2019); Radiofrequency ablation (Left, 1/16/2020); Radiofrequency ablation (Right,  "1/28/2020); Esophagogastroduodenoscopy (N/A, 3/4/2020); Colonoscopy (N/A, 3/4/2020); Radiofrequency ablation (Left, 8/18/2020); Radiofrequency ablation (Right, 9/1/2020); Injection of anesthetic agent around nerve (Bilateral, 10/13/2020); Esophagogastroduodenoscopy (N/A, 11/3/2020); Examination under anesthesia (N/A, 3/15/2021); Lateral internal anal sphincterotomy (N/A, 12/10/2021); Radiofrequency ablation (Bilateral, 12/21/2021); Examination under anesthesia (N/A, 2/25/2022); Cystoscopy (8/12/2022); Ureteroscopy (Bilateral, 8/12/2022); Retrograde pyelography (Bilateral, 8/12/2022); Radiofrequency ablation (Left, 11/21/2022); and Radiofrequency ablation (Right, 12/5/2022).    Family History: family history includes Cancer in his father; Colon cancer in his mother; Colon cancer (age of onset: 65) in his paternal grandfather and paternal uncle; Colon cancer (age of onset: 67) in his father; Coronary artery disease (age of onset: 45) in his mother; Coronary artery disease (age of onset: 51) in his brother; Diabetes in his mother; Diabetes Mellitus in his paternal grandmother; Glaucoma in his father; Heart disease in his mother; Hypertension in his father and mother; No Known Problems in his brother, daughter, daughter, daughter, sister, son, son, and son.. Otherwise no colon cancer, inflammatory bowel disease, or GI malignancies.    Social History:  reports that he has never smoked. He has never used smokeless tobacco. He reports current alcohol use of about 1.0 standard drink per week. He reports that he does not use drugs.    Review of patient's allergies indicates:   Allergen Reactions    Penicillins Hives and Swelling     Has had allergy testing and can prob tolerate penicillin    Ace inhibitors Other (See Comments)     "Caused a respiratory infection"    Aspirin Hives           Codeine Itching     Ok to take percocet    Dilaudid [hydromorphone] Itching       Medications:   Medications Prior to Admission "   Medication Sig Dispense Refill Last Dose    albuterol (PROVENTIL/VENTOLIN HFA) 90 mcg/actuation inhaler INHALE 2 PUFFS INTO THE LUNGS EVERY 6 HOURS AS NEEDED FOR WHEEZING OR SHORTNESS OF BREATH 18 g 4 Past Month    atorvastatin (LIPITOR) 80 MG tablet Take 1 tablet (80 mg total) by mouth every evening. 90 tablet 3 5/30/2023    azelastine (ASTELIN) 137 mcg (0.1 %) nasal spray Two sprays in each nostril, sniff until absorbed, then follow with 1 spray of fluticasone.  Use both sprays twice daily. (Patient taking differently: Two sprays in each nostril, sniff until absorbed, then follow with 1 spray of fluticasone.  Use both sprays twice daily.(PATIENT USES NIGHTLY 3/12/2021)) 30 mL 11 5/30/2023    budesonide-formoterol 160-4.5 mcg (SYMBICORT) 160-4.5 mcg/actuation HFAA INHALE 2 PUFFS INTO THE LUNGS EVERY 12 HOURS 10.2 g 12 Past Month    calcium citrate-vitamin D3 315-200 mg (CITRACAL+D) 315-200 mg-unit per tablet Take 1 tablet by mouth nightly.    5/30/2023    cyanocobalamin, vitamin B-12, 50 mcg tablet Take 50 mcg by mouth nightly.    5/30/2023    docusate sodium (COLACE) 100 MG capsule Take 1 tablet by mouth Twice daily. 1 Capsule Oral Twice a day .  Take with pain medicine   5/30/2023    doxazosin (CARDURA) 1 MG tablet TAKE 1 TABLET BY MOUTH EVERY EVENING 90 tablet 3 5/30/2023    fluticasone propionate (FLONASE) 50 mcg/actuation nasal spray One spray in each nostril twice daily after 1st using azelastine nasal spray 18.2 mL 11 Past Month    furosemide (LASIX) 20 MG tablet Take 1 tablet (20 mg total) by mouth once daily. 90 tablet 0 5/30/2023    ipratropium (ATROVENT) 42 mcg (0.06 %) nasal spray 1-2 sprays in each nostril before eating and at bedtime as needed 30 mL 11 Past Month    RABEprazole (ACIPHEX) 20 mg tablet Take 1 tablet (20 mg total) by mouth every 12 (twelve) hours. 180 tablet 1 5/31/2023    clopidogreL (PLAVIX) 75 mg tablet TAKE 1 TABLET(75 MG) BY MOUTH EVERY DAY 90 tablet 3 5/24/2023    tadalafiL  (CIALIS) 20 MG Tab Take 1 tablet (20 mg total) by mouth as needed. Take every 36 hours as needed for ED. 30 tablet 9     testosterone (ANDRODERM) 2 mg/24 hour PT24 APPLY 1 PATCH TOPICALLY TO THE SKIN EVERY DAY (Patient not taking: Reported on 5/25/2023) 60 patch 3     zolpidem (AMBIEN) 10 mg Tab TAKE 1 TABLET BY MOUTH EVERY DAY AT BEDTIME (Patient taking differently: Take 10 mg by mouth nightly as needed.) 20 tablet 0 More than a month         Vital Signs:   Vitals:    05/31/23 1218   BP: 136/83   Pulse: 79   Resp: 16   Temp: 98.1 °F (36.7 °C)       General Appearance: Well appearing in no acute distress  Eyes:    No scleral icterus  ENT: atraumatic  Abdomen: Soft, nondistended  Extremities: no tenderness  Skin: normal color    Labs:  Lab Results   Component Value Date    WBC 9.51 03/16/2023    HGB 14.3 03/16/2023    HCT 43.8 03/16/2023     03/16/2023    CHOL 125 01/23/2023    TRIG 63 01/23/2023    HDL 32 (L) 01/23/2023    ALT 20 03/16/2023    AST 18 03/16/2023     03/16/2023    K 3.4 (L) 03/16/2023     03/16/2023    CREATININE 1.0 03/16/2023    BUN 14 03/16/2023    CO2 25 03/16/2023    TSH 1.243 01/23/2023    PSA 0.93 01/22/2021    INR 1.0 04/04/2021    HGBA1C 4.6 01/23/2023       I have explained the risks and benefits of endoscopy procedures to the patient/their POA including but not limited to bleeding, perforation, infection, and death.  The patient/POA was asked if they understand and allowed to ask any further questions to their satisfaction.    Lupe Paris MD

## 2023-06-02 ENCOUNTER — HOSPITAL ENCOUNTER (OUTPATIENT)
Dept: CARDIOLOGY | Facility: HOSPITAL | Age: 63
Discharge: HOME | End: 2023-06-02
Attending: INTERNAL MEDICINE
Payer: MEDICARE

## 2023-06-02 DIAGNOSIS — I73.9 CLAUDICATION OF LEFT LOWER EXTREMITY: ICD-10-CM

## 2023-06-02 LAB
LEFT ABI: 1.11
LEFT ARM BP: 150 MMHG
LEFT DORSALIS PEDIS: 160 MMHG
LEFT POSTERIOR TIBIAL: 167 MMHG
RIGHT ABI: 1.17
RIGHT ARM BP: 151 MMHG
RIGHT DORSALIS PEDIS: 170 MMHG
RIGHT POSTERIOR TIBIAL: 176 MMHG

## 2023-06-02 PROCEDURE — 93922 UPR/L XTREMITY ART 2 LEVELS: CPT | Mod: 26,,, | Performed by: INTERNAL MEDICINE

## 2023-06-02 PROCEDURE — 93922 ANKLE BRACHIAL INDICES (ABI): ICD-10-PCS | Mod: 26,,, | Performed by: INTERNAL MEDICINE

## 2023-06-02 PROCEDURE — 93922 UPR/L XTREMITY ART 2 LEVELS: CPT

## 2023-06-08 ENCOUNTER — TELEPHONE (OUTPATIENT)
Dept: INTERNAL MEDICINE | Facility: CLINIC | Age: 63
End: 2023-06-08

## 2023-06-08 ENCOUNTER — OFFICE VISIT (OUTPATIENT)
Dept: INTERNAL MEDICINE | Facility: CLINIC | Age: 63
End: 2023-06-08
Attending: INTERNAL MEDICINE
Payer: MEDICARE

## 2023-06-08 DIAGNOSIS — R60.0 LOCALIZED EDEMA: ICD-10-CM

## 2023-06-08 DIAGNOSIS — M79.605 LEFT LEG PAIN: Primary | ICD-10-CM

## 2023-06-08 DIAGNOSIS — D68.00 VON WILLEBRAND DISEASE: Chronic | ICD-10-CM

## 2023-06-08 DIAGNOSIS — I50.32 DIASTOLIC DYSFUNCTION WITH CHRONIC HEART FAILURE: Chronic | ICD-10-CM

## 2023-06-08 PROCEDURE — 1160F RVW MEDS BY RX/DR IN RCRD: CPT | Mod: CPTII,95,, | Performed by: INTERNAL MEDICINE

## 2023-06-08 PROCEDURE — 3044F HG A1C LEVEL LT 7.0%: CPT | Mod: CPTII,95,, | Performed by: INTERNAL MEDICINE

## 2023-06-08 PROCEDURE — 1160F PR REVIEW ALL MEDS BY PRESCRIBER/CLIN PHARMACIST DOCUMENTED: ICD-10-PCS | Mod: CPTII,95,, | Performed by: INTERNAL MEDICINE

## 2023-06-08 PROCEDURE — 1159F PR MEDICATION LIST DOCUMENTED IN MEDICAL RECORD: ICD-10-PCS | Mod: CPTII,95,, | Performed by: INTERNAL MEDICINE

## 2023-06-08 PROCEDURE — 3044F PR MOST RECENT HEMOGLOBIN A1C LEVEL <7.0%: ICD-10-PCS | Mod: CPTII,95,, | Performed by: INTERNAL MEDICINE

## 2023-06-08 PROCEDURE — 99214 PR OFFICE/OUTPT VISIT, EST, LEVL IV, 30-39 MIN: ICD-10-PCS | Mod: 95,,, | Performed by: INTERNAL MEDICINE

## 2023-06-08 PROCEDURE — 1159F MED LIST DOCD IN RCRD: CPT | Mod: CPTII,95,, | Performed by: INTERNAL MEDICINE

## 2023-06-08 PROCEDURE — 99214 OFFICE O/P EST MOD 30 MIN: CPT | Mod: 95,,, | Performed by: INTERNAL MEDICINE

## 2023-06-08 NOTE — PROGRESS NOTES
The patient location is:  Patient Home   The chief complaint leading to consultation is:  Follow-up (Vascular issue, sleep issue)    Subjective:         61 yo male with hx of Von Willebrands disease presents for VV for Follow up of Left LE pain and swelling. Seen in clinic for his annual and this complaint was present on 5/25. Pt now reports pain in left posterior knee and posterior calf down lower leg worse with getting up from standing. Increase in L LE edema.  Pain inhibiting his ability to walk comfortably. No preceding long trips or car travel.  Did fly to [place] last week but get up frequently during flight.  No SOB.   Was to have exercise abis but unable to walk do to pain so only had resting ABIs which where normal.      Follow-up  Associated symptoms include arthralgias and joint swelling. Pertinent negatives include no chest pain, headaches, neck pain, vomiting or weakness.    Bri Santiago is a 62 y.o.  male who presents for Follow-up (Vascular issue, sleep issue)  .   Review of Systems   Constitutional:  Negative for activity change and unexpected weight change.   HENT:  Negative for hearing loss, rhinorrhea and trouble swallowing.    Eyes:  Negative for discharge and visual disturbance.   Respiratory:  Negative for chest tightness and wheezing.    Cardiovascular:  Negative for chest pain and palpitations.   Gastrointestinal:  Negative for blood in stool, constipation, diarrhea and vomiting.   Endocrine: Negative for polydipsia and polyuria.   Genitourinary:  Negative for difficulty urinating, hematuria and urgency.   Musculoskeletal:  Positive for arthralgias and joint swelling. Negative for neck pain.   Neurological:  Negative for weakness and headaches.   Psychiatric/Behavioral:  Negative for confusion and dysphoric mood.      Patient Active Problem List   Diagnosis    Asthma in remission    Von Willebrand disease    Primary hypertension    Spondylosis without myelopathy    Thoracic or lumbosacral  neuritis or radiculitis, unspecified    Spinal stenosis, lumbar region, without neurogenic claudication    Degeneration of lumbar or lumbosacral intervertebral disc    Acquired spondylolisthesis    Pseudoarthrosis    Family history of colon cancer    Vitamin D deficiency    Atypical chest pain    Encounter for monitoring long-term proton pump inhibitor therapy    Postlaminectomy syndrome of lumbar region    Myalgia and myositis    Facet syndrome    Gastroesophageal reflux disease without esophagitis    Osteopenia    Thoracic aorta atherosclerosis    Benign prostatic hyperplasia with urinary obstruction    Allergic rhinitis    Allergic conjunctivitis of both eyes    Encounter for long-term current use of high risk medication    Gastroesophageal reflux disease with esophagitis    Hematochezia    Nasal septal deviation    Dysphagia    Laryngopharyngeal reflux (LPR)    Diastolic dysfunction with chronic heart failure    At risk for cardiovascular event    Hypoxia    Chronic pulmonary heart disease    Class 1 obesity due to excess calories with serious comorbidity and body mass index (BMI) of 33.0 to 33.9 in adult    Moderate persistent asthma    Family history of prostate cancer in father    Chronic pain    Nocturia    Scrotal swelling    Chronic bilateral low back pain without sciatica    Chronic maxillary sinusitis    Nonintractable episodic headache    Osteoarthritis of lumbar spine    Gynecomastia, male    Iron deficiency anemia    Obstructive sleep apnea treated with continuous positive airway pressure (CPAP)    Postnasal drip    GERD (gastroesophageal reflux disease)    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    Dry skin dermatitis    Globus sensation    Throat clearing    Non-occlusive coronary artery disease requiring drug therapy    Prostate cancer    Unspecified inflammatory spondylopathy, lumbar region    Erectile dysfunction    Chronic prostatitis    Dysuria    Impaired functional  mobility and activity tolerance    Decreased ROM of trunk and back    Decreased functional mobility and endurance    Weakness    Other hyperlipidemia       Past Medical History:   Diagnosis Date    Acute pancreatitis     Anal fissure     Anemia     Anticoagulant long-term use     Arthritis     Asthma in remission     Back pain     BPH (benign prostatic hypertrophy)     Cancer 2000    prostate- treated at [facility] with chemo- in remission since 2000    Chronic maxillary sinusitis     Clotting disorder     Diastolic dysfunction with chronic heart failure 12/3/2018    Dysphagia 10/7/2014    Family history of colon cancer     Family history of early CAD     GERD (gastroesophageal reflux disease)     Helicobacter pylori (H. pylori) infection     Chronic    History of chronic pancreatitis     HTN (hypertension)     Lumbago 11/12/2012    Obesity     ABDON (obstructive sleep apnea)     ABDON (obstructive sleep apnea)     With likely ohs Refer to sleep    Sacroiliac joint pain 2/10/2015    Spinal stenosis of lumbar region     Von Willebrand disease     VWD (acquired von Willebrand's disease)        Past Surgical History:   Procedure Laterality Date    anal fissure repair      x2    BACK SURGERY  2012    BALLOON SINUPLASTY OF PARANASAL SINUS Bilateral 10/7/2019    Procedure: SINUPLASTY, USING BALLOON;  Surgeon: LAURENT Swanson MD;  Location: [facility] OR;  Service: ENT;  Laterality: Bilateral;    CARPAL TUNNEL RELEASE  2003    left hand    CATHETERIZATION OF BOTH LEFT AND RIGHT HEART N/A 2/14/2019    Procedure: CATHETERIZATION, HEART, BOTH LEFT AND RIGHT;  Surgeon: Kyle Magana MD;  Location: [facility] CATH LAB;  Service: Cardiology;  Laterality: N/A;    CERVICAL DISCECTOMY  2003    COLONOSCOPY N/A 10/7/2015    Procedure: COLONOSCOPY;  Surgeon: Rosendo Boyer MD;  Location: [facility] ENDO ([address] [place] [address]);  Service: Endoscopy;  Laterality: N/A;  PM Prep    COLONOSCOPY N/A 6/19/2017    Procedure: COLONOSCOPY;  Surgeon: Rosendo Boyer MD;   Location: [facility] (4TH FLR);  Service: Endoscopy;  Laterality: N/A;  constipation prep (no DM no CHF)       hx of vonWillebrand's disease-will need infusion prior    COLONOSCOPY N/A 3/4/2020    Procedure: COLONOSCOPY;  Surgeon: Rosendo Boyer MD;  Location: [facility] (4TH FLR);  Service: Endoscopy;  Laterality: N/A;  Please order CBC, ferritin, Iron TIBC day of case per Dr. Boyer  labwork at 7:10am and patient will check in right after.  per Dr. Tyler patient can hold Plavix 5 days prior see note 1/7/20  DDAVP prior to procedure needed see note 1/16/20 for oncall med by Dr. Tyler - sm    CYSTOSCOPY  8/12/2022    Procedure: CYSTOSCOPY;  Surgeon: Tyler Reid Jr., MD;  [facility];  Service: Urology;;    ESOPHAGOGASTRODUODENOSCOPY N/A 3/4/2020    Procedure: EGD (ESOPHAGOGASTRODUODENOSCOPY);  Surgeon: Rosendo Boyer MD;  Location: [facility] (4TH FLR);  Service: Endoscopy;  Laterality: N/A;  EGD and Colonoscopy orders merged together  labwork at 7:10am and patient will check in right after.  DDAVP prior to procedure needed see note 1/16/20 for oncall med  per Dr. Tyler patient can hold Plavix 5 days prior see note 1/7/20    ESOPHAGOGASTRODUODENOSCOPY N/A 11/3/2020    Procedure: EGD (ESOPHAGOGASTRODUODENOSCOPY);  Surgeon: Rosendo Boyer MD;  Location: [facility] (2ND FLR);  Service: Endoscopy;  Laterality: N/A;  Please recall pt has von Willebrands and will require DDVAP infusion prior to procedure as previously done.    see telephone encounter on 10/1/20 regarding DDAVP   ok to hold Plavix 5 days prior per Dr.Blessey BUCKNER screening on 10/31/20 -rb    ESOPHAGOGASTRODUODENOSCOPY N/A 5/31/2023    Procedure: EGD (ESOPHAGOGASTRODUODENOSCOPY);  Surgeon: Rosendo Boyer MD;  Location: [facility] (4TH FLR);  Service: Endoscopy;  Laterality: N/A;  cardiac clearnce-office note 5/1, ok to hold plavix-tele encounter 5/2-LW  5/11/23 r/s'd, MD booked out. instr portal -ml  5/25 - precall attempted. no  answer. MML    EXAMINATION UNDER ANESTHESIA N/A 3/15/2021    Procedure: EXAM UNDER ANESTHESIA;  Surgeon: Silvia Cazares MD;  Location: [facility] OR 2ND FLR;  Service: Colon and Rectal;  Laterality: N/A;    EXAMINATION UNDER ANESTHESIA N/A 2/25/2022    Procedure: EXAM UNDER ANESTHESIA, EXCISION ANAL PAPILLA;  Surgeon: Nadeem Grady MD;  Location: [facility] OR 2ND FLR;  Service: Colon and Rectal;  Laterality: N/A;    FUNCTIONAL ENDOSCOPIC SINUS SURGERY (FESS) USING COMPUTER-ASSISTED NAVIGATION Bilateral 10/7/2019    Procedure: FESS, USING COMPUTER-ASSISTED NAVIGATION;  Surgeon: LAURENT Swanson MD;  Location: [facility] [place] OR;  Service: ENT;  Laterality: Bilateral;    INJECTION OF ANESTHETIC AGENT AROUND NERVE Bilateral 10/13/2020    Procedure: BLOCK, NERVE BILATERAL C4,C5,C6 Plavix clearance requested;  Surgeon: Fredy Magana MD;  Location: [facility] [place] PAIN MGT;  Service: Pain Management;  Laterality: Bilateral;  BLOCK, NERVE BILATERAL C4,C5,C6  Plavix clearance ok, will require DDVAP infusion    LATERAL INTERNAL ANAL SPHINCTEROTOMY N/A 12/10/2021    Procedure: SPHINCTEROTOMY-LATERAL INTERNAL ANAL;  Surgeon: Nadeem Grady MD;  Location: [facility] OR 2ND FLR;  Service: Colon and Rectal;  Laterality: N/A;    LEFT HEART CATHETERIZATION Left 2/14/2019    Procedure: Left heart cath;  Surgeon: Kyle Magana MD;  Location: [facility] CATH LAB;  Service: Cardiology;  Laterality: Left;    LUMBAR FUSION  2012    PH MONITORING, ESOPHAGUS, WIRELESS, (ON REFLUX MEDS) N/A 5/31/2023    Procedure: PH MONITORING, ESOPHAGUS, WIRELESS, (ON REFLUX MEDS);  Surgeon: Rosendo Boyer MD;  Location: [facility] ENDO (4TH FLR);  Service: Endoscopy;  Laterality: N/A;  96hr, on his AcipHex 20 mg every 12 hours, instructions portal-LW    RADIOFREQUENCY ABLATION Right 5/9/2019    Procedure: RADIOFREQUENCY ABLATION, RIGHT L3,L4,L5;  Surgeon: Fredy Magana MD;  Location: [facility] [place] PAIN MGT;  Service: Pain Management;  Laterality: Right;  1 of 2  RT RFA L3,4,5  PLAVIX  clearance received, pt needs DDAVP    RADIOFREQUENCY ABLATION Left 5/23/2019    Procedure: RADIOFREQUENCY ABLATION, LEFT L3,L4,L5;  Surgeon: Fredy Magana MD;  Location: [facility] PAIN MGT;  Service: Pain Management;  Laterality: Left;  2 of 2  LT RFA L3,4,5  PLAVIX clearance received, pt needs DDAVP    RADIOFREQUENCY ABLATION Left 1/16/2020    Procedure: RADIOFREQUENCY ABLATION LEFT L3, L4, L5 MEDIAL BRANCH 1 OF 2 **PATIENT ARRIVING AT 8 AM**;  Surgeon: Fredy Magana MD;  Location: [facility] PAIN MGT;  Service: Pain Management;  Laterality: Left;  NEEDS CONSENT, PLAVIX CLEARANCE IN CHART    RADIOFREQUENCY ABLATION Right 1/28/2020    Procedure: RADIOFREQUENCY ABLATION, RIGHT L3-L4-L5 MEDIAL BRANCH 2 OF 2 (Left done on 1/16/20);  Surgeon: Fredy Magana MD;  Location: [facility] PAIN MGT;  Service: Pain Management;  Laterality: Right;    RADIOFREQUENCY ABLATION Left 8/18/2020    Procedure: RADIOFREQUENCY ABLATION, L3-L4-L5 MEDIAL BRANCH 1 OF 2 clear to hold plavix 7 days;  Surgeon: Fredy Magana MD;  Location: [facility] PAIN MGT;  Service: Pain Management;  Laterality: Left;    RADIOFREQUENCY ABLATION Right 9/1/2020    Procedure: RADIOFREQUENCY ABLATION, L3-L4-L5 MEDIAL BR ANCH 2 OF 2 clear to hol,d Plavix 7 days;  Surgeon: Fredy Magana MD;  Location: [facility] PAIN MGT;  Service: Pain Management;  Laterality: Right;    RADIOFREQUENCY ABLATION Bilateral 12/21/2021    Procedure: RADIOFREQUENCY ABLATION B/L L3,4,5 RFA (give dDAVP prior) NEEDS CONSENT plavix ok x7;  Surgeon: Fredy Magana MD;  Location: [facility] PAIN MGT;  Service: Pain Management;  Laterality: Bilateral;    RADIOFREQUENCY ABLATION Left 11/21/2022    Procedure: RADIOFREQUENCY ABLATION LEFT L3,L4,L5 MUST HAVE dDVAP PRIOR ONE OF TWO;  Surgeon: Milo Winston MD;  Location: [facility] PAIN MGT;  Service: Pain Management;  Laterality: Left;    RADIOFREQUENCY ABLATION Right 12/5/2022    Procedure: RADIOFREQUENCY ABLATION RIGHT L3,L4,L5  PRE-MEDICATE WITH dDVAP TWO  OF TWO;  Surgeon: Milo Winston MD;  Location: [facility] [place] PAIN MGT;  Service: Pain Management;  Laterality: Right;    RADIOFREQUENCY ABLATION OF LUMBAR MEDIAL BRANCH NERVE AT SINGLE LEVEL Left 5/24/2018    Procedure: RADIOFREQUENCY THERMOCOAGULATION (RFTC)-NERVE-MEDIAN BRANCH-LUMBAR;  Surgeon: Fredy Magana MD;  Location: [facility] [place] PAIN MGT;  Service: Pain Management;  Laterality: Left;  Left RFA @ L3,4,5  [id number]  with IV Sedation    2 of 2    RETROGRADE PYELOGRAPHY Bilateral 8/12/2022    Procedure: PYELOGRAM, RETROGRADE;  Surgeon: Tyler Reid Jr., MD;  Location: [facility] OR [address] [place] [address];  Service: Urology;  Laterality: Bilateral;    SPINE SURGERY      TONSILLECTOMY      at age 24    URETEROSCOPY Bilateral 8/12/2022    Procedure: URETEROSCOPY;  Surgeon: Tyler Reid Jr., MD;  Location: [facility] OR [address] [place] [address];  Service: Urology;  Laterality: Bilateral;    VASECTOMY  1996       Family History   Problem Relation Age of Onset    Colon cancer Father 67        colon cancer    Hypertension Father     Glaucoma Father     Cancer Father     Colon cancer Paternal Grandfather 65             Coronary artery disease Mother 45    Hypertension Mother     Heart disease Mother     Colon cancer Mother     Diabetes Mother     No Known Problems Brother     No Known Problems Sister     No Known Problems Daughter     No Known Problems Son     Coronary artery disease Brother 51    No Known Problems Daughter     No Known Problems Daughter     No Known Problems Son     No Known Problems Son     Colon cancer Paternal Uncle 65    Diabetes Mellitus Paternal Grandmother     Esophageal cancer Neg Hx        Social History     Tobacco Use    Smoking status: Never    Smokeless tobacco: Never   Substance Use Topics    Alcohol use: Yes     Alcohol/week: 1.0 standard drink     Types: 1 Glasses of wine per week     Comment: social    Drug use: No       Objective:   There were no vitals taken for this visit.     Physical Exam  Constitutional:       General: He is  not in acute distress.     Appearance: He is well-developed. He is not diaphoretic.   HENT:      Head: Normocephalic and atraumatic.   Eyes:      General: No scleral icterus.        Right eye: No discharge.         Left eye: No discharge.   Pulmonary:      Effort: Pulmonary effort is normal. No respiratory distress.   Musculoskeletal:      Right lower leg: Edema present.      Left lower leg: Edema present.      Comments: Edema is bilateral, however does now appear to be greater on the left, no erythema or wounds noted   Skin:     Coloration: Skin is not pale.      Findings: No erythema.   Neurological:      Mental Status: He is alert and oriented to person, place, and time.   Psychiatric:         Behavior: Behavior normal.         Thought Content: Thought content normal.         Prior labs reviewed  Assessment/Plan:       1. Left leg pain   -     X-Ray Knee Complete 4 or More Views Left; Future; Expected date: 06/08/2023  -     US Lower Extremity Veins Left; Future; Expected date: 06/08/2023  -     D-DIMER, QUANTITATIVE; Future; Expected date: 06/08/2023  Urgent d dimer, DVT/thrombosis less likely  Evaluate for bakers cyst, OA  Refer to ortho    2. Localized edema  Elevate legs, knee brace and compression stockings  Follow up in clinic as may need diruetic    3. Diastolic dysfunction with chronic heart failure  May be contribulting to above  Cont BP control and low salt diet  Follow up on rtc    4. Von Willebrand disease    Urgent d dimer, if negative proceed with ultrasound to evaluate for popitieal cyst, schedule xrays to evaluate for arthritis, refer to ortho for follow up          Visit type: Virtual visit with synchronous audio and video    Total time spent with patient: 32 minutes min spent in care of patient including history, physical, chart review, orders and coordination of care.       Each patient to whom he or she provides medical services by telemedicine is:  (1) informed of the relationship between the  physician and patient and the respective role of any other health care provider with respect to management of the patient; and (2) notified that he or she may decline to receive medical services by telemedicine and may withdraw from such care at any time.  Medication List with Changes/Refills   Current Medications    ALBUTEROL (PROVENTIL/VENTOLIN HFA) 90 MCG/ACTUATION INHALER    INHALE 2 PUFFS INTO THE LUNGS EVERY 6 HOURS AS NEEDED FOR WHEEZING OR SHORTNESS OF BREATH    ATORVASTATIN (LIPITOR) 80 MG TABLET    Take 1 tablet (80 mg total) by mouth every evening.    AZELASTINE (ASTELIN) 137 MCG (0.1 %) NASAL SPRAY    Two sprays in each nostril, sniff until absorbed, then follow with 1 spray of fluticasone.  Use both sprays twice daily.    BUDESONIDE-FORMOTEROL 160-4.5 MCG (SYMBICORT) 160-4.5 MCG/ACTUATION HFAA    INHALE 2 PUFFS INTO THE LUNGS EVERY 12 HOURS    CALCIUM CITRATE-VITAMIN D3 315-200 MG (CITRACAL+D) 315-200 MG-UNIT PER TABLET    Take 1 tablet by mouth nightly.     CLOPIDOGREL (PLAVIX) 75 MG TABLET    TAKE 1 TABLET(75 MG) BY MOUTH EVERY DAY    CYANOCOBALAMIN, VITAMIN B-12, 50 MCG TABLET    Take 50 mcg by mouth nightly.     DOCUSATE SODIUM (COLACE) 100 MG CAPSULE    Take 1 tablet by mouth Twice daily. 1 Capsule Oral Twice a day .  Take with pain medicine    DOXAZOSIN (CARDURA) 1 MG TABLET    TAKE 1 TABLET BY MOUTH EVERY EVENING    FLUTICASONE PROPIONATE (FLONASE) 50 MCG/ACTUATION NASAL SPRAY    One spray in each nostril twice daily after 1st using azelastine nasal spray    FUROSEMIDE (LASIX) 20 MG TABLET    Take 1 tablet (20 mg total) by mouth once daily.    IPRATROPIUM (ATROVENT) 42 MCG (0.06 %) NASAL SPRAY    1-2 sprays in each nostril before eating and at bedtime as needed    RABEPRAZOLE (ACIPHEX) 20 MG TABLET    Take 1 tablet (20 mg total) by mouth every 12 (twelve) hours.    TADALAFIL (CIALIS) 20 MG TAB    Take 1 tablet (20 mg total) by mouth as needed. Take every 36 hours as needed for ED.    TESTOSTERONE  (ANDRODERM) 2 MG/24 HOUR PT24    APPLY 1 PATCH TOPICALLY TO THE SKIN EVERY DAY    ZOLPIDEM (AMBIEN) 10 MG TAB    TAKE 1 TABLET BY MOUTH EVERY DAY AT BEDTIME

## 2023-06-08 NOTE — Clinical Note
Trying to tease out his leg pain- story has changed a bit- see my notes- having him see you to make sure we close the loop.  May be a ruptured popliteal cyst- wasn't able to do exercise ABIs but edema wouldn't be part of that nor pain with standing which is what he is claiming today.  Feel free to send him back to me for virtual if more needed. Otherwise me in 3 mo

## 2023-06-11 PROCEDURE — 91035 G-ESOPH REFLX TST W/ELECTROD: CPT | Mod: 26,,, | Performed by: INTERNAL MEDICINE

## 2023-06-11 PROCEDURE — 91035 PR GERD TST W/ MUCOS PH ELECTROD: ICD-10-PCS | Mod: 26,,, | Performed by: INTERNAL MEDICINE

## 2023-06-11 NOTE — PROVATION PATIENT INSTRUCTIONS
Discharge Summary/Instructions after an Endoscopic Procedure  Patient Name: Bri Santiago  Patient MRN: [id number]  Patient YOB: 1960  Wednesday, May 31, 2023  Rosendo Boyer MD  Dear patient,  As a result of recent federal legislation (The Federal Cures Act), you may   receive lab or pathology results from your procedure in your [facility]   account before your physician is able to contact you. Your physician or   their representative will relay the results to you with their   recommendations at their soonest availability.  Thank you,  RESTRICTIONS:  During your procedure today, you received medications for sedation.  These   medications may affect your judgment, balance and coordination.  Therefore,   for 24 hours, you have the following restrictions:   - DO NOT drive a car, operate machinery, make legal/financial decisions,   sign important papers or drink alcohol.    ACTIVITY:  Today: no heavy lifting, straining or running due to procedural   sedation/anesthesia.  The following day: return to full activity including work.  DIET:  Eat and drink normally unless instructed otherwise.     TREATMENT FOR COMMON SIDE EFFECTS:  - Mild abdominal pain, nausea, belching, bloating or excessive gas:  rest,   eat lightly and use a heating pad.  - Sore Throat: treat with throat lozenges and/or gargle with warm salt   water.  - Because air was used during the procedure, expelling large amounts of air   from your rectum or belching is normal.  - If a bowel prep was taken, you may not have a bowel movement for 1-3 days.    This is normal.  SYMPTOMS TO WATCH FOR AND REPORT TO YOUR PHYSICIAN:  1. Abdominal pain or bloating, other than gas cramps.  2. Chest pain.  3. Back pain.  4. Signs of infection such as: chills or fever occurring within 24 hours   after the procedure.  5. Rectal bleeding, which would show as bright red, maroon, or black stools.   (A tablespoon of blood from the rectum is not serious, especially if    hemorrhoids are present.)  6. Vomiting.  7. Weakness or dizziness.  GO DIRECTLY TO THE NEAREST EMERGENCY ROOM IF YOU HAVE ANY OF THE FOLLOWING:      Difficulty breathing              Chills and/or fever over 101 F   Persistent vomiting and/or vomiting blood   Severe abdominal pain   Severe chest pain   Black, tarry stools   Bleeding- more than one tablespoon   Any other symptom or condition that you feel may need urgent attention  Your doctor recommends these additional instructions:  If any biopsies were taken, your doctors clinic will contact you in 1 to 2   weeks with any results.  - Discharge patient to home.   - Follow an antireflux regimen indefinitely.   - Continue present medications.   - Return to primary care physician.   - Return to GI clinic.   - The findings and recommendations were discussed with the patient.  For questions, problems or results please call your physician - Rosendo Boyer MD at Work:  [phone].  [facility], EMERGENCY ROOM PHONE NUMBER: [phone]  IF A COMPLICATION OR EMERGENCY SITUATION ARISES AND YOU ARE UNABLE TO REACH   YOUR PHYSICIAN - GO DIRECTLY TO THE EMERGENCY ROOM.  Rosendo Boyer MD  6/11/2023 12:28:06 PM  This report has been verified and signed electronically.  Dear patient,  As a result of recent federal legislation (The Federal Cures Act), you may   receive lab or pathology results from your procedure in your [facility]   account before your physician is able to contact you. Your physician or   their representative will relay the results to you with their   recommendations at their soonest availability.  Thank you,  PROVATION

## 2023-06-11 NOTE — PROGRESS NOTES
Bri it looks like your AcipHex 20 mg every 12 hours is doing a great job preventing abnormal esophageal acid exposure.    Impression:            - Normal pH study. The patient's acid reflux is                          suppressed. There was only on day 4 or correlation                          between chest pain and reflux but there was no                          increase esophageal acid exposure on that day.                          Patient is well controlled on his current PPI                          regimen in no increase esophageal acid exposure                          found on 96 hour esophageal Bravo pH probe study                          on his AcipHex 20 mg every 12 hours.   Recommendation:        - Discharge patient to home.                          - Follow an antireflux regimen indefinitely.                          - Continue present medications.                          - Return to primary care physician.                          - Return to GI clinic.                          - The findings and recommendations were discussed                          with the patient.   Rosendo Boyer MD   6/11/2023

## 2023-06-14 ENCOUNTER — HOSPITAL ENCOUNTER (OUTPATIENT)
Dept: RADIOLOGY | Facility: OTHER | Age: 63
Discharge: HOME | End: 2023-06-14
Attending: INTERNAL MEDICINE
Payer: MEDICARE

## 2023-06-14 DIAGNOSIS — M79.605 LEFT LEG PAIN: ICD-10-CM

## 2023-06-14 PROCEDURE — 73562 X-RAY EXAM OF KNEE 3: CPT | Mod: TC,FY,LT

## 2023-06-14 PROCEDURE — 93971 EXTREMITY STUDY: CPT | Mod: 26,LT,, | Performed by: RADIOLOGY

## 2023-06-14 PROCEDURE — 73562 X-RAY EXAM OF KNEE 3: CPT | Mod: 26,LT,, | Performed by: INTERNAL MEDICINE

## 2023-06-14 PROCEDURE — 73562 XR KNEE 3 VIEW LEFT: ICD-10-PCS | Mod: 26,LT,, | Performed by: INTERNAL MEDICINE

## 2023-06-14 PROCEDURE — 93971 US LOWER EXTREMITY VEINS LEFT: ICD-10-PCS | Mod: 26,LT,, | Performed by: RADIOLOGY

## 2023-06-14 PROCEDURE — 93971 EXTREMITY STUDY: CPT | Mod: TC,LT

## 2023-06-15 ENCOUNTER — OFFICE VISIT (OUTPATIENT)
Dept: SPORTS MEDICINE | Facility: CLINIC | Age: 63
End: 2023-06-15
Payer: MEDICARE

## 2023-06-15 VITALS
HEART RATE: 76 BPM | HEIGHT: 71 IN | DIASTOLIC BLOOD PRESSURE: 82 MMHG | BODY MASS INDEX: 35 KG/M2 | SYSTOLIC BLOOD PRESSURE: 127 MMHG | WEIGHT: 250 LBS

## 2023-06-15 DIAGNOSIS — M71.22 BAKER'S CYST OF KNEE, LEFT: ICD-10-CM

## 2023-06-15 DIAGNOSIS — M25.562 ACUTE PAIN OF LEFT KNEE: Primary | ICD-10-CM

## 2023-06-15 DIAGNOSIS — M17.12 PRIMARY OSTEOARTHRITIS OF LEFT KNEE: ICD-10-CM

## 2023-06-15 PROCEDURE — 3008F PR BODY MASS INDEX (BMI) DOCUMENTED: ICD-10-PCS | Mod: CPTII,S$GLB,, | Performed by: PHYSICIAN ASSISTANT

## 2023-06-15 PROCEDURE — 1160F PR REVIEW ALL MEDS BY PRESCRIBER/CLIN PHARMACIST DOCUMENTED: ICD-10-PCS | Mod: CPTII,S$GLB,, | Performed by: PHYSICIAN ASSISTANT

## 2023-06-15 PROCEDURE — 3074F SYST BP LT 130 MM HG: CPT | Mod: CPTII,S$GLB,, | Performed by: PHYSICIAN ASSISTANT

## 2023-06-15 PROCEDURE — 20610 LARGE JOINT ASPIRATION/INJECTION: L KNEE: ICD-10-PCS | Mod: LT,S$GLB,, | Performed by: PHYSICIAN ASSISTANT

## 2023-06-15 PROCEDURE — 99999 PR PBB SHADOW E&M-EST. PATIENT-LVL V: CPT | Mod: PBBFAC,,, | Performed by: PHYSICIAN ASSISTANT

## 2023-06-15 PROCEDURE — 1159F MED LIST DOCD IN RCRD: CPT | Mod: CPTII,S$GLB,, | Performed by: PHYSICIAN ASSISTANT

## 2023-06-15 PROCEDURE — 1159F PR MEDICATION LIST DOCUMENTED IN MEDICAL RECORD: ICD-10-PCS | Mod: CPTII,S$GLB,, | Performed by: PHYSICIAN ASSISTANT

## 2023-06-15 PROCEDURE — 3074F PR MOST RECENT SYSTOLIC BLOOD PRESSURE < 130 MM HG: ICD-10-PCS | Mod: CPTII,S$GLB,, | Performed by: PHYSICIAN ASSISTANT

## 2023-06-15 PROCEDURE — 3079F PR MOST RECENT DIASTOLIC BLOOD PRESSURE 80-89 MM HG: ICD-10-PCS | Mod: CPTII,S$GLB,, | Performed by: PHYSICIAN ASSISTANT

## 2023-06-15 PROCEDURE — 3044F PR MOST RECENT HEMOGLOBIN A1C LEVEL <7.0%: ICD-10-PCS | Mod: CPTII,S$GLB,, | Performed by: PHYSICIAN ASSISTANT

## 2023-06-15 PROCEDURE — 3008F BODY MASS INDEX DOCD: CPT | Mod: CPTII,S$GLB,, | Performed by: PHYSICIAN ASSISTANT

## 2023-06-15 PROCEDURE — 3079F DIAST BP 80-89 MM HG: CPT | Mod: CPTII,S$GLB,, | Performed by: PHYSICIAN ASSISTANT

## 2023-06-15 PROCEDURE — 99999 PR PBB SHADOW E&M-EST. PATIENT-LVL V: ICD-10-PCS | Mod: PBBFAC,,, | Performed by: PHYSICIAN ASSISTANT

## 2023-06-15 PROCEDURE — 20610 DRAIN/INJ JOINT/BURSA W/O US: CPT | Mod: LT,S$GLB,, | Performed by: PHYSICIAN ASSISTANT

## 2023-06-15 PROCEDURE — 1160F RVW MEDS BY RX/DR IN RCRD: CPT | Mod: CPTII,S$GLB,, | Performed by: PHYSICIAN ASSISTANT

## 2023-06-15 PROCEDURE — 99214 OFFICE O/P EST MOD 30 MIN: CPT | Mod: 25,S$GLB,, | Performed by: PHYSICIAN ASSISTANT

## 2023-06-15 PROCEDURE — 99214 PR OFFICE/OUTPT VISIT, EST, LEVL IV, 30-39 MIN: ICD-10-PCS | Mod: 25,S$GLB,, | Performed by: PHYSICIAN ASSISTANT

## 2023-06-15 PROCEDURE — 3044F HG A1C LEVEL LT 7.0%: CPT | Mod: CPTII,S$GLB,, | Performed by: PHYSICIAN ASSISTANT

## 2023-06-15 RX ORDER — TRIAMCINOLONE ACETONIDE 40 MG/ML
40 INJECTION, SUSPENSION INTRA-ARTICULAR; INTRAMUSCULAR
Status: DISCONTINUED | OUTPATIENT
Start: 2023-06-15 | End: 2023-06-15 | Stop reason: HOSPADM

## 2023-06-15 RX ADMIN — TRIAMCINOLONE ACETONIDE 40 MG: 40 INJECTION, SUSPENSION INTRA-ARTICULAR; INTRAMUSCULAR at 09:06

## 2023-06-15 NOTE — PROGRESS NOTES
CC: Left knee/leg pain    Patient is a 62-year-old male who presents today for initial evaluation of left knee/leg pain and swelling.  He has a past medical history is significant for von Willebrand's disease and is on Plavix daily.  He was seen by his primary care physician on May 25th where he complained of left lower extremity pain and swelling which he states has been gradually worsening.  He again brought this to the attention of his primary care physician on June 8th and ultimately had an ultrasound of the left lower extremity which was negative for any acute DVT, however did reveal a popliteal cyst.Patient is unaware of any recent injury or trauma to the left knee or lower leg.  He localizes the pain to the medial and lateral  joint lines and the popliteal area.  Pain seems to radiate down the leg through the left calf.  He reports that swelling seems to gradually worsen with prolonged standing/activity.  He also notes increased pain when standing up from a seated position.  He has been resting, icing, and elevating the left leg and wearing compression stockings with little relief.  No prior injuries or surgeries on the left knee.  He has been taking Tylenol as needed for pain.    - mechanical symptoms, - instability    Is affecting ADLs.  Pain is 7/10 at it's worst.    REVIEW OF SYSTEMS:  Constitution: Negative. Negative for chills, fever and night sweats.   HENT: Negative for congestion and headaches.    Eyes: Negative for blurred vision, left vision loss and right vision loss.   Cardiovascular: Negative for chest pain and syncope.   Respiratory: Negative for cough and shortness of breath.    Endocrine: Negative for polydipsia, polyphagia and polyuria.   Hematologic/Lymphatic: Negative for bleeding problem. Does not bruise/bleed easily.   Skin: Negative for dry skin, itching and rash.   Musculoskeletal: Negative for falls. Positive for left knee pain and  muscle weakness.   Gastrointestinal: Negative for  abdominal pain and bowel incontinence.   Genitourinary: Negative for bladder incontinence and nocturia.   Neurological: Negative for disturbances in coordination, loss of balance and seizures.   Psychiatric/Behavioral: Negative for depression. The patient does not have insomnia.    Allergic/Immunologic: Negative for hives and persistent infections.     PAST MEDICAL HISTORY:    Past Medical History:   Diagnosis Date    Acute pancreatitis     Anal fissure     Anemia     Anticoagulant long-term use     Arthritis     Asthma in remission     Back pain     BPH (benign prostatic hypertrophy)     Cancer 2000    prostate- treated at [facility] with chemo- in remission since 2000    Chronic maxillary sinusitis     Clotting disorder     Diastolic dysfunction with chronic heart failure 12/3/2018    Dysphagia 10/7/2014    Family history of colon cancer     Family history of early CAD     GERD (gastroesophageal reflux disease)     Helicobacter pylori (H. pylori) infection     Chronic    History of chronic pancreatitis     HTN (hypertension)     Lumbago 11/12/2012    Obesity     ABDON (obstructive sleep apnea)     ABDON (obstructive sleep apnea)     With likely ohs Refer to sleep    Sacroiliac joint pain 2/10/2015    Spinal stenosis of lumbar region     Von Willebrand disease     VWD (acquired von Willebrand's disease)        PAST SURGICAL HISTORY:   Past Surgical History:   Procedure Laterality Date    anal fissure repair      x2    BACK SURGERY  2012    BALLOON SINUPLASTY OF PARANASAL SINUS Bilateral 10/7/2019    Procedure: SINUPLASTY, USING BALLOON;  Surgeon: LAURENT Swanson MD;  Location: [facility] OR;  Service: ENT;  Laterality: Bilateral;    CARPAL TUNNEL RELEASE  2003    left hand    CATHETERIZATION OF BOTH LEFT AND RIGHT HEART N/A 2/14/2019    Procedure: CATHETERIZATION, HEART, BOTH LEFT AND RIGHT;  Surgeon: Kyle Magana MD;  Location: [facility] CATH LAB;  Service: Cardiology;  Laterality: N/A;    CERVICAL DISCECTOMY  2003     COLONOSCOPY N/A 10/7/2015    Procedure: COLONOSCOPY;  Surgeon: Rosendo Boyer MD;  Location: [facility] (4TH FLR);  Service: Endoscopy;  Laterality: N/A;  PM Prep    COLONOSCOPY N/A 6/19/2017    Procedure: COLONOSCOPY;  Surgeon: Rosendo Boyer MD;  Location: [facility] (4TH FLR);  Service: Endoscopy;  Laterality: N/A;  constipation prep (no DM no CHF)       hx of vonWillebrand's disease-will need infusion prior    COLONOSCOPY N/A 3/4/2020    Procedure: COLONOSCOPY;  Surgeon: Rosendo Boyer MD;  Location: [facility] (4TH FLR);  Service: Endoscopy;  Laterality: N/A;  Please order CBC, ferritin, Iron TIBC day of case per Dr. Boyer  labwork at 7:10am and patient will check in right after.  per Dr. Tyler patient can hold Plavix 5 days prior see note 1/7/20  DDAVP prior to procedure needed see note 1/16/20 for oncall med by Dr. Tyler -     CYSTOSCOPY  8/12/2022    Procedure: CYSTOSCOPY;  Surgeon: Tyler Reid Jr., MD;  [facility];  Service: Urology;;    ESOPHAGOGASTRODUODENOSCOPY N/A 3/4/2020    Procedure: EGD (ESOPHAGOGASTRODUODENOSCOPY);  Surgeon: Rosendo Boyer MD;  Location: [facility] (4TH FLR);  Service: Endoscopy;  Laterality: N/A;  EGD and Colonoscopy orders merged together  labwork at 7:10am and patient will check in right after.  DDAVP prior to procedure needed see note 1/16/20 for oncall med  per Dr. Tyler patient can hold Plavix 5 days prior see note 1/7/20    ESOPHAGOGASTRODUODENOSCOPY N/A 11/3/2020    Procedure: EGD (ESOPHAGOGASTRODUODENOSCOPY);  Surgeon: Rosendo Boyer MD;  Location: [facility] (2ND FLR);  Service: Endoscopy;  Laterality: N/A;  Please recall pt has von Willebrands and will require DDVAP infusion prior to procedure as previously done.    see telephone encounter on 10/1/20 regarding DDAVP   ok to hold Plavix 5 days prior per Dr.Blessey BUCKNER screening on 10/31/20 -rb    ESOPHAGOGASTRODUODENOSCOPY N/A 5/31/2023    Procedure: EGD (ESOPHAGOGASTRODUODENOSCOPY);   Surgeon: Rosendo Boyer MD;  Location: [facility] ENDO (4TH FLR);  Service: Endoscopy;  Laterality: N/A;  cardiac clearnce-office note 5/1, ok to hold plavix-tele encounter 5/2-LW  5/11/23 r/s'MD noelle booked out. instr portal -ml  5/25 - precall attempted. no answer. MML    EXAMINATION UNDER ANESTHESIA N/A 3/15/2021    Procedure: EXAM UNDER ANESTHESIA;  Surgeon: Silvia Cazares MD;  Location: [facility] OR 2ND FLR;  Service: Colon and Rectal;  Laterality: N/A;    EXAMINATION UNDER ANESTHESIA N/A 2/25/2022    Procedure: EXAM UNDER ANESTHESIA, EXCISION ANAL PAPILLA;  Surgeon: Nadeem Grady MD;  Location: [facility] OR 2ND FLR;  Service: Colon and Rectal;  Laterality: N/A;    FUNCTIONAL ENDOSCOPIC SINUS SURGERY (FESS) USING COMPUTER-ASSISTED NAVIGATION Bilateral 10/7/2019    Procedure: FESS, USING COMPUTER-ASSISTED NAVIGATION;  Surgeon: LAURENT Swanson MD;  Location: [facility] OR;  Service: ENT;  Laterality: Bilateral;    INJECTION OF ANESTHETIC AGENT AROUND NERVE Bilateral 10/13/2020    Procedure: BLOCK, NERVE BILATERAL C4,C5,C6 Plavix clearance requested;  Surgeon: Fredy Magana MD;  Location: [facility] PAIN MGT;  Service: Pain Management;  Laterality: Bilateral;  BLOCK, NERVE BILATERAL C4,C5,C6  Plavix clearance ok, will require DDVAP infusion    LATERAL INTERNAL ANAL SPHINCTEROTOMY N/A 12/10/2021    Procedure: SPHINCTEROTOMY-LATERAL INTERNAL ANAL;  Surgeon: Nadeem Grady MD;  Location: [facility] OR 2ND FLR;  Service: Colon and Rectal;  Laterality: N/A;    LEFT HEART CATHETERIZATION Left 2/14/2019    Procedure: Left heart cath;  Surgeon: Kyle Magana MD;  Location: [facility] CATH LAB;  Service: Cardiology;  Laterality: Left;    LUMBAR FUSION  2012    PH MONITORING, ESOPHAGUS, WIRELESS, (ON REFLUX MEDS) N/A 5/31/2023    Procedure: PH MONITORING, ESOPHAGUS, WIRELESS, (ON REFLUX MEDS);  Surgeon: Rosendo Boyer MD;  Location: [facility] ENDO (4TH FLR);  Service: Endoscopy;  Laterality: N/A;  96hr, on his AcipHex 20 mg every 12 hours,  instructions portal-LW    RADIOFREQUENCY ABLATION Right 5/9/2019    Procedure: RADIOFREQUENCY ABLATION, RIGHT L3,L4,L5;  Surgeon: Fredy Magana MD;  Location: [facility] PAIN MGT;  Service: Pain Management;  Laterality: Right;  1 of 2  RT RFA L3,4,5  PLAVIX clearance received, pt needs DDAVP    RADIOFREQUENCY ABLATION Left 5/23/2019    Procedure: RADIOFREQUENCY ABLATION, LEFT L3,L4,L5;  Surgeon: Fredy Magana MD;  Location: [facility] PAIN MGT;  Service: Pain Management;  Laterality: Left;  2 of 2  LT RFA L3,4,5  PLAVIX clearance received, pt needs DDAVP    RADIOFREQUENCY ABLATION Left 1/16/2020    Procedure: RADIOFREQUENCY ABLATION LEFT L3, L4, L5 MEDIAL BRANCH 1 OF 2 **PATIENT ARRIVING AT 8 AM**;  Surgeon: Fredy Magana MD;  Location: [facility] PAIN MGT;  Service: Pain Management;  Laterality: Left;  NEEDS CONSENT, PLAVIX CLEARANCE IN CHART    RADIOFREQUENCY ABLATION Right 1/28/2020    Procedure: RADIOFREQUENCY ABLATION, RIGHT L3-L4-L5 MEDIAL BRANCH 2 OF 2 (Left done on 1/16/20);  Surgeon: Fredy Magana MD;  Location: [facility] PAIN MGT;  Service: Pain Management;  Laterality: Right;    RADIOFREQUENCY ABLATION Left 8/18/2020    Procedure: RADIOFREQUENCY ABLATION, L3-L4-L5 MEDIAL BRANCH 1 OF 2 clear to hold plavix 7 days;  Surgeon: Fredy Magana MD;  Location: [facility] PAIN MGT;  Service: Pain Management;  Laterality: Left;    RADIOFREQUENCY ABLATION Right 9/1/2020    Procedure: RADIOFREQUENCY ABLATION, L3-L4-L5 MEDIAL BR ANCH 2 OF 2 clear to hol,d Plavix 7 days;  Surgeon: Fredy Magana MD;  Location: [facility] PAIN MGT;  Service: Pain Management;  Laterality: Right;    RADIOFREQUENCY ABLATION Bilateral 12/21/2021    Procedure: RADIOFREQUENCY ABLATION B/L L3,4,5 RFA (give dDAVP prior) NEEDS CONSENT plavix ok x7;  Surgeon: Fredy Magana MD;  Location: [facility] PAIN MGT;  Service: Pain Management;  Laterality: Bilateral;    RADIOFREQUENCY ABLATION Left 11/21/2022    Procedure: RADIOFREQUENCY ABLATION LEFT L3,L4,L5  MUST HAVE dDVAP PRIOR ONE OF TWO;  Surgeon: Milo Winston MD;  Location: [facility] PAIN MGT;  Service: Pain Management;  Laterality: Left;    RADIOFREQUENCY ABLATION Right 12/5/2022    Procedure: RADIOFREQUENCY ABLATION RIGHT L3,L4,L5  PRE-MEDICATE WITH dDVAP TWO OF TWO;  Surgeon: Milo Winston MD;  Location: [facility] PAIN MGT;  Service: Pain Management;  Laterality: Right;    RADIOFREQUENCY ABLATION OF LUMBAR MEDIAL BRANCH NERVE AT SINGLE LEVEL Left 5/24/2018    Procedure: RADIOFREQUENCY THERMOCOAGULATION (RFTC)-NERVE-MEDIAN BRANCH-LUMBAR;  Surgeon: Fredy Magana MD;  Location: [facility] PAIN MGT;  Service: Pain Management;  Laterality: Left;  Left RFA @ L3,4,5  [id number]  with IV Sedation    2 of 2    RETROGRADE PYELOGRAPHY Bilateral 8/12/2022    Procedure: PYELOGRAM, RETROGRADE;  Surgeon: Tyler Reid Jr., MD;  Location: [facility] OR [address] [place] [address];  Service: Urology;  Laterality: Bilateral;    SPINE SURGERY      TONSILLECTOMY      at age 24    URETEROSCOPY Bilateral 8/12/2022    Procedure: URETEROSCOPY;  Surgeon: Tyler Reid Jr., MD;  Location: [facility] OR [address] [place] [address];  Service: Urology;  Laterality: Bilateral;    VASECTOMY  1996       FAMILY HISTORY:   Family History   Problem Relation Age of Onset    Colon cancer Father 67        colon cancer    Hypertension Father     Glaucoma Father     Cancer Father     Colon cancer Paternal Grandfather 65             Coronary artery disease Mother 45    Hypertension Mother     Heart disease Mother     Colon cancer Mother     Diabetes Mother     No Known Problems Brother     No Known Problems Sister     No Known Problems Daughter     No Known Problems Son     Coronary artery disease Brother 51    No Known Problems Daughter     No Known Problems Daughter     No Known Problems Son     No Known Problems Son     Colon cancer Paternal Uncle 65    Diabetes Mellitus Paternal Grandmother     Esophageal cancer Neg Hx        SOCIAL HISTORY:   Social History     Socioeconomic History    Marital status:     Occupational History    Occupation: disabled due to back injury   Tobacco Use    Smoking status: Never    Smokeless tobacco: Never   Substance and Sexual Activity    Alcohol use: Yes     Alcohol/week: 1.0 standard drink     Types: 1 Glasses of wine per week     Comment: social    Drug use: No    Sexual activity: Yes     Partners: Female   Social History Narrative    wlking for exercised       MEDICATIONS:     Current Outpatient Medications:     albuterol (PROVENTIL/VENTOLIN HFA) 90 mcg/actuation inhaler, INHALE 2 PUFFS INTO THE LUNGS EVERY 6 HOURS AS NEEDED FOR WHEEZING OR SHORTNESS OF BREATH, Disp: 18 g, Rfl: 4    atorvastatin (LIPITOR) 80 MG tablet, Take 1 tablet (80 mg total) by mouth every evening., Disp: 90 tablet, Rfl: 3    azelastine (ASTELIN) 137 mcg (0.1 %) nasal spray, Two sprays in each nostril, sniff until absorbed, then follow with 1 spray of fluticasone.  Use both sprays twice daily. (Patient taking differently: Two sprays in each nostril, sniff until absorbed, then follow with 1 spray of fluticasone.  Use both sprays twice daily.(PATIENT USES NIGHTLY 3/12/2021)), Disp: 30 mL, Rfl: 11    budesonide-formoterol 160-4.5 mcg (SYMBICORT) 160-4.5 mcg/actuation HFAA, INHALE 2 PUFFS INTO THE LUNGS EVERY 12 HOURS, Disp: 10.2 g, Rfl: 12    calcium citrate-vitamin D3 315-200 mg (CITRACAL+D) 315-200 mg-unit per tablet, Take 1 tablet by mouth nightly. , Disp: , Rfl:     clopidogreL (PLAVIX) 75 mg tablet, TAKE 1 TABLET(75 MG) BY MOUTH EVERY DAY, Disp: 90 tablet, Rfl: 3    cyanocobalamin, vitamin B-12, 50 mcg tablet, Take 50 mcg by mouth nightly. , Disp: , Rfl:     docusate sodium (COLACE) 100 MG capsule, Take 1 tablet by mouth Twice daily. 1 Capsule Oral Twice a day .  Take with pain medicine, Disp: , Rfl:     doxazosin (CARDURA) 1 MG tablet, TAKE 1 TABLET BY MOUTH EVERY EVENING, Disp: 90 tablet, Rfl: 3    fluticasone propionate (FLONASE) 50 mcg/actuation nasal spray, One spray in each nostril twice daily  "after 1st using azelastine nasal spray, Disp: 18.2 mL, Rfl: 11    furosemide (LASIX) 20 MG tablet, Take 1 tablet (20 mg total) by mouth once daily., Disp: 90 tablet, Rfl: 0    ipratropium (ATROVENT) 42 mcg (0.06 %) nasal spray, 1-2 sprays in each nostril before eating and at bedtime as needed, Disp: 30 mL, Rfl: 11    RABEprazole (ACIPHEX) 20 mg tablet, Take 1 tablet (20 mg total) by mouth every 12 (twelve) hours., Disp: 180 tablet, Rfl: 1    tadalafiL (CIALIS) 20 MG Tab, Take 1 tablet (20 mg total) by mouth as needed. Take every 36 hours as needed for ED., Disp: 30 tablet, Rfl: 9    testosterone (ANDRODERM) 2 mg/24 hour PT24, APPLY 1 PATCH TOPICALLY TO THE SKIN EVERY DAY (Patient not taking: Reported on 5/25/2023), Disp: 60 patch, Rfl: 3    zolpidem (AMBIEN) 10 mg Tab, TAKE 1 TABLET BY MOUTH EVERY DAY AT BEDTIME (Patient taking differently: Take 10 mg by mouth nightly as needed.), Disp: 20 tablet, Rfl: 0  No current facility-administered medications for this visit.    Facility-Administered Medications Ordered in Other Visits:     0.9%  NaCl infusion, , Intravenous, Continuous, Milla Oliveira NP, Last Rate: 70 mL/hr at 03/15/21 1417, New Bag at 03/15/21 1417    0.9%  NaCl infusion, , Intravenous, Continuous, Milla Oliveira NP, Last Rate: 70 mL/hr at 12/10/21 0736, New Bag at 12/10/21 0736    0.9%  NaCl infusion, , Intravenous, Continuous, Jaqueline Langley NP    mupirocin 2 % ointment, , Nasal, On Call Procedure, Milla Oliveira NP, Given at 12/10/21 0729    mupirocin 2 % ointment, , Nasal, On Call Procedure, Jaqueline Langley NP, Given at 02/25/22 0623    ALLERGIES:   Review of patient's allergies indicates:   Allergen Reactions    Penicillins Hives and Swelling     Has had allergy testing and can prob tolerate penicillin    Ace inhibitors Other (See Comments)     "Caused a respiratory infection"    Aspirin Hives           Codeine Itching     Ok to take percocet    Dilaudid [hydromorphone] " "Itching       VITAL SIGNS:   /82   Pulse 76   Ht 5' 11" (1.803 m)   Wt 113.4 kg (250 lb)   BMI 34.87 kg/m²      PHYSICAL EXAMINATION  General:  The patient is alert and oriented x 3.  Mood is pleasant.  Observation of ears, eyes and nose reveal no gross abnormalities.  No labored breathing observed.    LEFT KNEE EXAMINATION     OBSERVATION / INSPECTION   Gait:   Nonantalgic   Alignment:  Neutral   Scars:   None   Muscle atrophy: Mild  Effusion:  Trace   Warmth:  None   Discoloration:   None  Swelling:  Diffuse nonpitting edema present in left leg from knee down to ankle     TENDERNESS / CREPITUS (T / C):          T / C      T / C   Patella   - / -   Lateral joint line   + / -   Peripatellar medial  -  Medial joint line    +/ -   Peripatellar lateral -  Medial plica   - / -   Patellar tendon -   Popliteal fossa   + / - w/ associated fullness   Quad tendon   -   Gastrocnemius   -   Prepatellar Bursa - / -   Quadricep   -   Tibial tubercle  -  Thigh/hamstring  -   Pes anserine/HS -  Fibula    -   ITB   - / -  Tibia     -   Tib/fib joint  - / -  LCL    -     MFC   - / -   MCL: Proximal  -    LFC   - / -    Distal   -          ROM: (* = pain)  PASSIVE   ACTIVE    Left :   5 / 0 / 115*   5 / 0 / 110*     Right :    5 / 0 / 130   5 / 0 / 130    Patellofemoral examination:  See above noted areas of tenderness.   Patella position    Subluxation / dislocation: Centered           Sup. / Inf;   Normal   Crepitus (PF):    Absent   Patellar Mobility:       Medial-lateral:   Normal    Superior-inferior:  Normal    Inferior tilt   Normal    Patellar tendon:  Normal   Lateral tilt:    Normal   J-sign:     None   Patellofemoral grind:   No pain       MENISCAL SIGNS:     Pain on terminal extension:  -  Pain on terminal flexion:  +  Arturs maneuver:  + (for pain)  Squat     deferred    LIGAMENT EXAMINATION:  ACL / Lachman:  normal (-1 to 2mm)    PCL-Post.  drawer: normal 0 to 2mm  MCL- Valgus:  normal 0 to 2mm  LCL- " Varus:  normal 0 to 2mm  Pivot shift: normal (Equal)   Dial Test: difference c/w other side   At 30° flexion: normal (< 5°)    At 90° flexion: normal (< 5°)   Reverse Pivot Shift:   normal (Equal)     STRENGTH: (* = with pain) PAINFUL SIDE   Quadricep   5/5   Hamstrin/5    EXTREMITY NEURO-VASCULAR EXAMINATION:   Sensation:  Grossly intact to light touch all dermatomal regions.   Motor Function:  Fully intact motor function at hip, knee, foot and ankle    DTRs;  quadriceps and  achilles 2+.  No clonus and downgoing Babinski.    Vascular status:  DP and PT pulses 2+, brisk capillary refill, symmetric.     OTHER FINDINGS:  + popliteal fullness consistent with Baker's cyst    X-rays left knee (2023):   FINDINGS:  The osseous structures are intact without evidence of fracture or osseous destructive process.  There is no significant degree of degenerative change.  There is a joint effusion.    US lower extremity, left (2023):  FINDINGS:  Left thigh veins: The common femoral, femoral, popliteal, upper greater saphenous, and deep femoral veins are patent and free of thrombus. The veins are normally compressible and have normal phasic flow and augmentation response.     Left calf veins: The visualized calf veins are patent.     Contralateral CFV: The contralateral (right) common femoral vein is patent and free of thrombus.     Miscellaneous: Oval complex collection along the medial aspect of the lower leg measures 5.6 x 2 x 9.8 cm.  No internal vascularity.  Popliteal fossa cyst favored.     Impression:     No evidence of deep venous thrombosis in the left lower extremity.     Complex collection along the medial aspect of the lower leg favored to represent a popliteal fossa cyst.  Please correlate with exam.     ASSESSMENT:    Left knee pain - possible meniscus tear  Primary osteoarthritis of left knee  Baker's cyst of left knee    PLAN:     I made the decision to obtain old records of the patient  including previous notes and imaging. New imaging was ordered today of the extremity or extremities evaluated. I independently reviewed and interpreted the radiographs and/or MRIs today as well as prior imaging, if available.    We discussed at length different treatment options including conservative vs surgical management. These include anti-inflammatories, acetaminophen, rest, ice, heat, formal physical therapy including strengthening and stretching exercises, home exercise programs, dry needling, and finally surgical intervention.   patient would like to proceed with ultrasound-guided Baker cyst aspiration with possible corticosteroid injection with primary Care sports Medicine.  He would also like to proceed with a right knee joint corticosteroid injection today.    Left knee corticosteroid injection performed today.  See procedure note for details.    Follow-up with primary care sports Medicine for ultrasound-guided Baker cyst aspiration.    Continue to rest, ice, and elevate the left knee and take over-the-counter acetaminophen as needed for pain.      All questions were answered, pt will contact us for questions or concerns in the interim.      Medical Dictation software was used during the dictation of portions or the entirety of this medical record.  Phonetic or grammatic errors may exist due to the use of this software. For clarification, refer to the author of the document.

## 2023-06-15 NOTE — PROCEDURES
Large Joint Aspiration/Injection: L knee    Date/Time: 6/15/2023 9:00 AM  Performed by: Fermín De Jesus PA-C  Authorized by: Fermín De Jesus PA-C     Consent Done?:  Yes (Verbal)  Indications:  Pain and arthritis  Site marked: the procedure site was marked    Timeout: prior to procedure the correct patient, procedure, and site was verified    Prep: patient was prepped and draped in usual sterile fashion    Local anesthesia used?: No      Details:  Needle Size:  22 G  Ultrasonic Guidance for needle placement?: No    Approach:  Superior  Location:  Knee  Site:  L knee  Medications:  40 mg triamcinolone acetonide 40 mg/mL  Patient tolerance:  Patient tolerated the procedure well with no immediate complications    Injection Procedure  A time out was performed, including verification of patient ID, procedure, site and side, availability of information and equipment, review of safety issues, and agreement with consent, the procedure site was marked.    After time out was performed, the patient was prepped aseptically with povidone-iodine swabsticks. A diagnostic and therapeutic injection of 1:3cc Kenalog/Marcaine was given under sterile technique using a 22g x 1.5 needle from the Superolateral  aspect of the left Knee Joint in the supine position.      Bri Santiago had no adverse reactions to the medication. Pain decreased. He was instructed to apply ice to the joint for 20 minutes and avoid strenuous activities for 24-36 hours following the injection. He was warned of possible blood sugar and/or blood pressure changes during that time. Following that time, he can resume regular activities.    He was reminded to call the clinic immediately for any adverse side effects as explained in clinic today.

## 2023-06-19 ENCOUNTER — HOSPITAL ENCOUNTER (OUTPATIENT)
Facility: OTHER | Age: 63
Discharge: HOME | End: 2023-06-19
Attending: ANESTHESIOLOGY | Admitting: ANESTHESIOLOGY
Payer: MEDICARE

## 2023-06-19 VITALS
TEMPERATURE: 98 F | DIASTOLIC BLOOD PRESSURE: 68 MMHG | RESPIRATION RATE: 18 BRPM | WEIGHT: 250 LBS | SYSTOLIC BLOOD PRESSURE: 128 MMHG | OXYGEN SATURATION: 94 % | BODY MASS INDEX: 35 KG/M2 | HEIGHT: 71 IN | HEART RATE: 81 BPM

## 2023-06-19 DIAGNOSIS — G89.29 CHRONIC PAIN: ICD-10-CM

## 2023-06-19 DIAGNOSIS — M54.14 THORACIC RADICULOPATHY: ICD-10-CM

## 2023-06-19 DIAGNOSIS — M51.34 DDD (DEGENERATIVE DISC DISEASE), THORACIC: Primary | ICD-10-CM

## 2023-06-19 PROCEDURE — 63600175 PHARM REV CODE 636 W HCPCS: Performed by: ANESTHESIOLOGY

## 2023-06-19 PROCEDURE — 64479 NJX AA&/STRD TFRM EPI C/T 1: CPT | Mod: 50,,, | Performed by: ANESTHESIOLOGY

## 2023-06-19 PROCEDURE — 25500020 PHARM REV CODE 255: Performed by: ANESTHESIOLOGY

## 2023-06-19 PROCEDURE — 63600175 PHARM REV CODE 636 W HCPCS: Mod: JG | Performed by: STUDENT IN AN ORGANIZED HEALTH CARE EDUCATION/TRAINING PROGRAM

## 2023-06-19 PROCEDURE — 25000003 PHARM REV CODE 250: Performed by: ANESTHESIOLOGY

## 2023-06-19 PROCEDURE — 64479 NJX AA&/STRD TFRM EPI C/T 1: CPT | Mod: 50 | Performed by: ANESTHESIOLOGY

## 2023-06-19 PROCEDURE — 64479 PR INJECT ANES/STEROID FORAMEN CERV/THORACIC W IMG GUIDE ,1 LEVEL: ICD-10-PCS | Mod: 50,,, | Performed by: ANESTHESIOLOGY

## 2023-06-19 PROCEDURE — 25000003 PHARM REV CODE 250: Performed by: STUDENT IN AN ORGANIZED HEALTH CARE EDUCATION/TRAINING PROGRAM

## 2023-06-19 RX ORDER — LIDOCAINE HYDROCHLORIDE 20 MG/ML
INJECTION, SOLUTION INFILTRATION; PERINEURAL
Status: DISCONTINUED | OUTPATIENT
Start: 2023-06-19 | End: 2023-06-19 | Stop reason: HOSPADM

## 2023-06-19 RX ORDER — DEXAMETHASONE SODIUM PHOSPHATE 10 MG/ML
INJECTION INTRAMUSCULAR; INTRAVENOUS
Status: DISCONTINUED | OUTPATIENT
Start: 2023-06-19 | End: 2023-06-19 | Stop reason: HOSPADM

## 2023-06-19 RX ORDER — MIDAZOLAM HYDROCHLORIDE 1 MG/ML
INJECTION INTRAMUSCULAR; INTRAVENOUS
Status: DISCONTINUED | OUTPATIENT
Start: 2023-06-19 | End: 2023-06-19 | Stop reason: HOSPADM

## 2023-06-19 RX ORDER — LIDOCAINE HYDROCHLORIDE 10 MG/ML
INJECTION, SOLUTION EPIDURAL; INFILTRATION; INTRACAUDAL; PERINEURAL
Status: DISCONTINUED | OUTPATIENT
Start: 2023-06-19 | End: 2023-06-19 | Stop reason: HOSPADM

## 2023-06-19 RX ORDER — SODIUM CHLORIDE 9 MG/ML
INJECTION, SOLUTION INTRAVENOUS CONTINUOUS
Status: DISCONTINUED | OUTPATIENT
Start: 2023-06-19 | End: 2023-06-19 | Stop reason: HOSPADM

## 2023-06-19 RX ORDER — FENTANYL CITRATE 50 UG/ML
INJECTION, SOLUTION INTRAMUSCULAR; INTRAVENOUS
Status: DISCONTINUED | OUTPATIENT
Start: 2023-06-19 | End: 2023-06-19 | Stop reason: HOSPADM

## 2023-06-19 RX ADMIN — DESMOPRESSIN ACETATE 34.02 MCG: 4 SOLUTION INTRAVENOUS at 09:06

## 2023-06-19 NOTE — H&P
HPI  Patient presents for scheduled bilateral T7/8 TFESI.        Past Medical History:   Diagnosis Date    Acute pancreatitis     Anal fissure     Anemia     Anticoagulant long-term use     Arthritis     Asthma in remission     Back pain     BPH (benign prostatic hypertrophy)     Cancer 2000    prostate- treated at [facility] with chemo- in remission since 2000    Chronic maxillary sinusitis     Clotting disorder     Diastolic dysfunction with chronic heart failure 12/3/2018    Dysphagia 10/7/2014    Family history of colon cancer     Family history of early CAD     GERD (gastroesophageal reflux disease)     Helicobacter pylori (H. pylori) infection     Chronic    History of chronic pancreatitis     HTN (hypertension)     Lumbago 11/12/2012    Obesity     ABDON (obstructive sleep apnea)     ABDON (obstructive sleep apnea)     With likely ohs Refer to sleep    Sacroiliac joint pain 2/10/2015    Spinal stenosis of lumbar region     Von Willebrand disease     VWD (acquired von Willebrand's disease)      Past Surgical History:   Procedure Laterality Date    anal fissure repair      x2    BACK SURGERY  2012    BALLOON SINUPLASTY OF PARANASAL SINUS Bilateral 10/7/2019    Procedure: SINUPLASTY, USING BALLOON;  Surgeon: LAURENT Swanson MD;  Location: [facility];  Service: ENT;  Laterality: Bilateral;    CARPAL TUNNEL RELEASE  2003    left hand    CATHETERIZATION OF BOTH LEFT AND RIGHT HEART N/A 2/14/2019    Procedure: CATHETERIZATION, HEART, BOTH LEFT AND RIGHT;  Surgeon: Kyle Magana MD;  Location: [facility] CATH LAB;  Service: Cardiology;  Laterality: N/A;    CERVICAL DISCECTOMY  2003    COLONOSCOPY N/A 10/7/2015    Procedure: COLONOSCOPY;  Surgeon: Rosendo Boyer MD;  Location: [address]);  Service: Endoscopy;  Laterality: N/A;  PM Prep    COLONOSCOPY N/A 6/19/2017    Procedure: COLONOSCOPY;  Surgeon: Rosendo Boyer MD;  Location: [facility] ENDO [address]);  Service: Endoscopy;  Laterality: N/A;  constipation prep (no  DM no CHF)       hx of vonWillebrand's disease-will need infusion prior    COLONOSCOPY N/A 3/4/2020    Procedure: COLONOSCOPY;  Surgeon: Rosendo Boyer MD;  Location: [facility] (4TH FLR);  Service: Endoscopy;  Laterality: N/A;  Please order CBC, ferritin, Iron TIBC day of case per Dr. Boyer  labwork at 7:10am and patient will check in right after.  per Dr. Tyler patient can hold Plavix 5 days prior see note 1/7/20  DDAVP prior to procedure needed see note 1/16/20 for oncall med by Dr. Tyler - sm    CYSTOSCOPY  8/12/2022    Procedure: CYSTOSCOPY;  Surgeon: Tyler Reid Jr., MD;  [facility];  Service: Urology;;    ESOPHAGOGASTRODUODENOSCOPY N/A 3/4/2020    Procedure: EGD (ESOPHAGOGASTRODUODENOSCOPY);  Surgeon: Rosendo Boyer MD;  Location: [facility] (4TH FLR);  Service: Endoscopy;  Laterality: N/A;  EGD and Colonoscopy orders merged together  labwork at 7:10am and patient will check in right after.  DDAVP prior to procedure needed see note 1/16/20 for oncall med  per Dr. Tyler patient can hold Plavix 5 days prior see note 1/7/20    ESOPHAGOGASTRODUODENOSCOPY N/A 11/3/2020    Procedure: EGD (ESOPHAGOGASTRODUODENOSCOPY);  Surgeon: Rosendo Boyer MD;  Location: [facility] (2ND FLR);  Service: Endoscopy;  Laterality: N/A;  Please recall pt has von Willebrands and will require DDVAP infusion prior to procedure as previously done.    see telephone encounter on 10/1/20 regarding DDAVP   ok to hold Plavix 5 days prior per Dr.Blessey BUCKNER screening on 10/31/20 -rb    ESOPHAGOGASTRODUODENOSCOPY N/A 5/31/2023    Procedure: EGD (ESOPHAGOGASTRODUODENOSCOPY);  Surgeon: Rosendo Boyer MD;  Location: [facility] (4TH FLR);  Service: Endoscopy;  Laterality: N/A;  cardiac clearnce-office note 5/1, ok to hold plavix-tele encounter 5/2-LW  5/11/23 r/s'd, MD booked out. instr portal -ml  5/25 - precall attempted. no answer. MML    EXAMINATION UNDER ANESTHESIA N/A 3/15/2021    Procedure: EXAM UNDER ANESTHESIA;   Surgeon: Silvia Cazares MD;  Location: [facility] OR 2ND FLR;  Service: Colon and Rectal;  Laterality: N/A;    EXAMINATION UNDER ANESTHESIA N/A 2/25/2022    Procedure: EXAM UNDER ANESTHESIA, EXCISION ANAL PAPILLA;  Surgeon: Nadeem Grady MD;  Location: [facility] OR 2ND FLR;  Service: Colon and Rectal;  Laterality: N/A;    FUNCTIONAL ENDOSCOPIC SINUS SURGERY (FESS) USING COMPUTER-ASSISTED NAVIGATION Bilateral 10/7/2019    Procedure: FESS, USING COMPUTER-ASSISTED NAVIGATION;  Surgeon: LAURENT Swanson MD;  Location: [facility] [place] OR;  Service: ENT;  Laterality: Bilateral;    INJECTION OF ANESTHETIC AGENT AROUND NERVE Bilateral 10/13/2020    Procedure: BLOCK, NERVE BILATERAL C4,C5,C6 Plavix clearance requested;  Surgeon: Fredy Magana MD;  Location: [facility] [place] PAIN MGT;  Service: Pain Management;  Laterality: Bilateral;  BLOCK, NERVE BILATERAL C4,C5,C6  Plavix clearance ok, will require DDVAP infusion    LATERAL INTERNAL ANAL SPHINCTEROTOMY N/A 12/10/2021    Procedure: SPHINCTEROTOMY-LATERAL INTERNAL ANAL;  Surgeon: Nadeem Grady MD;  Location: [facility] OR 2ND FLR;  Service: Colon and Rectal;  Laterality: N/A;    LEFT HEART CATHETERIZATION Left 2/14/2019    Procedure: Left heart cath;  Surgeon: Kyle Magana MD;  Location: [facility] CATH LAB;  Service: Cardiology;  Laterality: Left;    LUMBAR FUSION  2012    PH MONITORING, ESOPHAGUS, WIRELESS, (ON REFLUX MEDS) N/A 5/31/2023    Procedure: PH MONITORING, ESOPHAGUS, WIRELESS, (ON REFLUX MEDS);  Surgeon: Rosendo Boyer MD;  Location: [facility] ENDO (4TH FLR);  Service: Endoscopy;  Laterality: N/A;  96hr, on his AcipHex 20 mg every 12 hours, instructions portal-LW    RADIOFREQUENCY ABLATION Right 5/9/2019    Procedure: RADIOFREQUENCY ABLATION, RIGHT L3,L4,L5;  Surgeon: Fredy Magana MD;  Location: [facility] [place] PAIN MGT;  Service: Pain Management;  Laterality: Right;  1 of 2  RT RFA L3,4,5  PLAVIX clearance received, pt needs DDAVP    RADIOFREQUENCY ABLATION Left 5/23/2019    Procedure:  RADIOFREQUENCY ABLATION, LEFT L3,L4,L5;  Surgeon: Fredy Magana MD;  Location: [facility] PAIN MGT;  Service: Pain Management;  Laterality: Left;  2 of 2  LT RFA L3,4,5  PLAVIX clearance received, pt needs DDAVP    RADIOFREQUENCY ABLATION Left 1/16/2020    Procedure: RADIOFREQUENCY ABLATION LEFT L3, L4, L5 MEDIAL BRANCH 1 OF 2 **PATIENT ARRIVING AT 8 AM**;  Surgeon: Fredy Magana MD;  Location: [facility] PAIN MGT;  Service: Pain Management;  Laterality: Left;  NEEDS CONSENT, PLAVIX CLEARANCE IN CHART    RADIOFREQUENCY ABLATION Right 1/28/2020    Procedure: RADIOFREQUENCY ABLATION, RIGHT L3-L4-L5 MEDIAL BRANCH 2 OF 2 (Left done on 1/16/20);  Surgeon: Fredy Magana MD;  Location: [facility] PAIN MGT;  Service: Pain Management;  Laterality: Right;    RADIOFREQUENCY ABLATION Left 8/18/2020    Procedure: RADIOFREQUENCY ABLATION, L3-L4-L5 MEDIAL BRANCH 1 OF 2 clear to hold plavix 7 days;  Surgeon: Fredy Magana MD;  Location: [facility] PAIN MGT;  Service: Pain Management;  Laterality: Left;    RADIOFREQUENCY ABLATION Right 9/1/2020    Procedure: RADIOFREQUENCY ABLATION, L3-L4-L5 MEDIAL BR ANCH 2 OF 2 clear to hol,d Plavix 7 days;  Surgeon: Fredy Magana MD;  Location: [facility] PAIN MGT;  Service: Pain Management;  Laterality: Right;    RADIOFREQUENCY ABLATION Bilateral 12/21/2021    Procedure: RADIOFREQUENCY ABLATION B/L L3,4,5 RFA (give dDAVP prior) NEEDS CONSENT plavix ok x7;  Surgeon: Fredy Magana MD;  Location: [facility] PAIN MGT;  Service: Pain Management;  Laterality: Bilateral;    RADIOFREQUENCY ABLATION Left 11/21/2022    Procedure: RADIOFREQUENCY ABLATION LEFT L3,L4,L5 MUST HAVE dDVAP PRIOR ONE OF TWO;  Surgeon: Milo Winston MD;  Location: [facility] PAIN MGT;  Service: Pain Management;  Laterality: Left;    RADIOFREQUENCY ABLATION Right 12/5/2022    Procedure: RADIOFREQUENCY ABLATION RIGHT L3,L4,L5  PRE-MEDICATE WITH dDVAP TWO OF TWO;  Surgeon: Milo Winston MD;  Location: [facility] PAIN MGT;  Service: Pain Management;   "Laterality: Right;    RADIOFREQUENCY ABLATION OF LUMBAR MEDIAL BRANCH NERVE AT SINGLE LEVEL Left 5/24/2018    Procedure: RADIOFREQUENCY THERMOCOAGULATION (RFTC)-NERVE-MEDIAN BRANCH-LUMBAR;  Surgeon: Fredy Magana MD;  Location: [facility];  Service: Pain Management;  Laterality: Left;  Left RFA @ L3,4,5  [id number]  with IV Sedation    2 of 2    RETROGRADE PYELOGRAPHY Bilateral 8/12/2022    Procedure: PYELOGRAM, RETROGRADE;  Surgeon: Tyler Reid Jr., MD;  Location: [facility] OR [address] [place] [address];  Service: Urology;  Laterality: Bilateral;    SPINE SURGERY      TONSILLECTOMY      at age 24    URETEROSCOPY Bilateral 8/12/2022    Procedure: URETEROSCOPY;  Surgeon: Tyler Reid Jr., MD;  Location: [facility] OR [address] [place] [address];  Service: Urology;  Laterality: Bilateral;    VASECTOMY  1996     Review of patient's allergies indicates:   Allergen Reactions    Penicillins Hives and Swelling     Has had allergy testing and can prob tolerate penicillin    Ace inhibitors Other (See Comments)     "Caused a respiratory infection"    Aspirin Hives           Codeine Itching     Ok to take percocet    Dilaudid [hydromorphone] Itching        PMHx, PSHx, Allergies, Medications reviewed in epic      ROS negative except pain complaints in HPI    OBJECTIVE:    /78 (BP Location: Right arm, Patient Position: Lying)   Pulse 72   Temp 97.9 °F (36.6 °C) (Oral)   Resp 16   Ht 5' 11" (1.803 m)   Wt 113.4 kg (250 lb)   SpO2 95%   BMI 34.87 kg/m²     PHYSICAL EXAMINATION:    GENERAL: Well appearing, in no acute distress, alert and oriented x3.  PSYCH:  Mood and affect appropriate.  SKIN: Skin color, texture, turgor normal, no rashes or lesions.  CV: Extremities warm  PULM: No evidence of respiratory difficulty, symmetric chest rise.   NEURO: Cranial nerves grossly intact.    Plan:    Proceed with procedure as planned    Aleksandr Palm  06/19/2023    "

## 2023-06-19 NOTE — DISCHARGE INSTRUCTIONS

## 2023-06-19 NOTE — DISCHARGE SUMMARY
Discharge Note  Short Stay      SUMMARY     Admit Date: 6/19/2023    Attending Physician: Milo Winston      Discharge Physician: Milo Winston      Discharge Date: 6/19/2023 10:38 AM    Procedure(s) (LRB):  INJECTION, STEROID, EPIDURAL, TRANSFORAMINAL APPROACH,BILATERAL T7-T8 Give 0.3mcg/kg DDAVP immediately prior to the procedure  CLEAR TO HOLD PLAVIX 7 DAYS (Bilateral)    Final Diagnosis: Thoracic radiculopathy [M54.14]    Disposition: Home or self care    Patient Instructions:   Current Discharge Medication List        CONTINUE these medications which have NOT CHANGED    Details   albuterol (PROVENTIL/VENTOLIN HFA) 90 mcg/actuation inhaler INHALE 2 PUFFS INTO THE LUNGS EVERY 6 HOURS AS NEEDED FOR WHEEZING OR SHORTNESS OF BREATH  Qty: 18 g, Refills: 4      atorvastatin (LIPITOR) 80 MG tablet Take 1 tablet (80 mg total) by mouth every evening.  Qty: 90 tablet, Refills: 3    Associated Diagnoses: Hyperlipidemia, unspecified hyperlipidemia type      azelastine (ASTELIN) 137 mcg (0.1 %) nasal spray Two sprays in each nostril, sniff until absorbed, then follow with 1 spray of fluticasone.  Use both sprays twice daily.  Qty: 30 mL, Refills: 11      budesonide-formoterol 160-4.5 mcg (SYMBICORT) 160-4.5 mcg/actuation HFAA INHALE 2 PUFFS INTO THE LUNGS EVERY 12 HOURS  Qty: 10.2 g, Refills: 12      calcium citrate-vitamin D3 315-200 mg (CITRACAL+D) 315-200 mg-unit per tablet Take 1 tablet by mouth nightly.       clopidogreL (PLAVIX) 75 mg tablet TAKE 1 TABLET(75 MG) BY MOUTH EVERY DAY  Qty: 90 tablet, Refills: 3      cyanocobalamin, vitamin B-12, 50 mcg tablet Take 50 mcg by mouth nightly.       docusate sodium (COLACE) 100 MG capsule Take 1 tablet by mouth Twice daily. 1 Capsule Oral Twice a day .  Take with pain medicine      doxazosin (CARDURA) 1 MG tablet TAKE 1 TABLET BY MOUTH EVERY EVENING  Qty: 90 tablet, Refills: 3    Associated Diagnoses: Benign localized prostatic hyperplasia with lower urinary tract symptoms (LUTS)       fluticasone propionate (FLONASE) 50 mcg/actuation nasal spray One spray in each nostril twice daily after 1st using azelastine nasal spray  Qty: 18.2 mL, Refills: 11      furosemide (LASIX) 20 MG tablet Take 1 tablet (20 mg total) by mouth once daily.  Qty: 90 tablet, Refills: 0    Associated Diagnoses: Diastolic dysfunction      ipratropium (ATROVENT) 42 mcg (0.06 %) nasal spray 1-2 sprays in each nostril before eating and at bedtime as needed  Qty: 30 mL, Refills: 11      RABEprazole (ACIPHEX) 20 mg tablet Take 1 tablet (20 mg total) by mouth every 12 (twelve) hours.  Qty: 180 tablet, Refills: 1    Associated Diagnoses: Gastroesophageal reflux disease, unspecified whether esophagitis present; Heartburn      tadalafiL (CIALIS) 20 MG Tab Take 1 tablet (20 mg total) by mouth as needed. Take every 36 hours as needed for ED.  Qty: 30 tablet, Refills: 9    Associated Diagnoses: Erectile dysfunction, unspecified erectile dysfunction type      testosterone (ANDRODERM) 2 mg/24 hour PT24 APPLY 1 PATCH TOPICALLY TO THE SKIN EVERY DAY  Qty: 60 patch, Refills: 3    Associated Diagnoses: Hypogonadism in male      zolpidem (AMBIEN) 10 mg Tab TAKE 1 TABLET BY MOUTH EVERY DAY AT BEDTIME  Qty: 20 tablet, Refills: 0    Associated Diagnoses: Sleep disorder                 Discharge Diagnosis: Thoracic radiculopathy [M54.14]  Condition on Discharge: Stable with no complications to procedure   Diet on Discharge: Same as before.  Activity: as per instruction sheet.  Discharge to: Home with a responsible adult.  Follow up: 2-4 weeks       Please call my office or pager at [phone] if experienced any weakness or loss of sensation, fever > 101.5, pain uncontrolled with oral medications, persistent nausea/vomiting/or diarrhea, redness or drainage from the incisions, or any other worrisome concerns. If physician on call was not reached or could not communicate with our office for any reason please go to the nearest emergency  department

## 2023-06-19 NOTE — OP NOTE
Thoracic Transforaminal Epidural Steroid Injection  Bilateral  T7/8 under Fluoroscopic Guidance    The procedure, risks, benefits, and options were discussed with the patient. There are no contraindications to the procedure. The patent expressed understanding and agreed to the procedure. Informed written consent was obtained prior to the start of the procedure and can be found in the patient's chart.    PATIENT NAME: Bri Santiago   MRN: [id number]     DATE OF PROCEDURE: 06/19/2023    PROCEDURE: Thoracic Transforaminal Epidural Steroid Injection Bilateral  T7/8 under Fluoroscopic Guidance    PRE-OP DIAGNOSIS: Thoracic radiculopathy [M54.14] Thoracic radiculopathy [M54.14]    POST-OP DIAGNOSIS: Same    PHYSICIAN: Milo Winston MD    ASSISTANTS:   Aleksandr Palm MD  [facility]       MEDICATIONS INJECTED: Preservative-free Decadron 10mg with 1cc of Lidocaine 1% MPF     LOCAL ANESTHETIC INJECTED: Xylocaine 2%     SEDATION: Versed 2mg and Fentanyl 50mcg                                                                                                                                                                                     Conscious sedation ordered by M.D. Patient re-evaluation prior to administration of conscious sedation. No changes noted in patient's status from initial evaluation. The patient's vital signs were monitored by RN and patient remained hemodynamically stable throughout the procedure.    Event Time In   Sedation Start 1029   Sedation End 1037       ESTIMATED BLOOD LOSS: None    COMPLICATIONS: None    TECHNIQUE: Time-out was performed to identify the patient and procedure to be performed. With the patient laying in the prone position, the surgical area was prepped and draped in the usual sterile fashion using ChloraPrep and a fenestrated drape. The levels were determined under fluoroscopy guidance. Skin anesthesia was achieved by injecting Lidocaine 2% over the injection sites. The transforaminal spaces  were then approached with a 25 gauge, 3.5 inch spinal quinke needle that was introduced under fluoroscopic guidance in the AP and Lateral views. Once the needle tip was in the area of the transforaminal space, and there was no blood, CSF or paraesthesias, contrast dye Omnipaque (300mg/mL) was injected to confirm placement and there was no vascular runoff. Fluoroscopic imaging in the AP and lateral views revealed a clear outline of the spinal nerve with proximal spread of agent through the neural foramen into the epidural space. 1 mL of the medication mixture listed above was injected slowly. Displacement of the radio opaque contrast after injection of the medication confirmed that the medication went into the area of the transforaminal spaces. The needles were removed and bleeding was nil. A sterile dressing was applied. No specimens collected. The patient tolerated the procedure well.   PAIN BEFORE THE PROCEDURE: 6-10/10    PAIN AFTER THE PROCEDURE: 6/10     The patient was monitored after the procedure in the recovery area. They were given post-procedure and discharge instructions to follow at home. The patient was discharged in a stable condition.        Milo Winston MD

## 2023-06-20 DIAGNOSIS — I83.813 VARICOSE VEINS OF LEG WITH PAIN, BILATERAL: Primary | ICD-10-CM

## 2023-06-22 ENCOUNTER — OFFICE VISIT (OUTPATIENT)
Dept: SPORTS MEDICINE | Facility: CLINIC | Age: 63
End: 2023-06-22
Payer: MEDICARE

## 2023-06-22 ENCOUNTER — OFFICE VISIT (OUTPATIENT)
Dept: INTERNAL MEDICINE | Facility: CLINIC | Age: 63
End: 2023-06-22
Payer: MEDICARE

## 2023-06-22 VITALS — HEART RATE: 65 BPM | DIASTOLIC BLOOD PRESSURE: 94 MMHG | SYSTOLIC BLOOD PRESSURE: 133 MMHG

## 2023-06-22 VITALS
HEIGHT: 71 IN | HEART RATE: 83 BPM | SYSTOLIC BLOOD PRESSURE: 125 MMHG | DIASTOLIC BLOOD PRESSURE: 72 MMHG | WEIGHT: 244.94 LBS | BODY MASS INDEX: 34.29 KG/M2 | OXYGEN SATURATION: 96 %

## 2023-06-22 DIAGNOSIS — M71.22 BAKER'S CYST OF KNEE, LEFT: Primary | ICD-10-CM

## 2023-06-22 DIAGNOSIS — M25.562 ACUTE PAIN OF LEFT KNEE: ICD-10-CM

## 2023-06-22 DIAGNOSIS — M71.22 BAKER'S CYST OF KNEE, LEFT: ICD-10-CM

## 2023-06-22 DIAGNOSIS — M79.605 LEFT LEG PAIN: Primary | ICD-10-CM

## 2023-06-22 PROCEDURE — 1159F MED LIST DOCD IN RCRD: CPT | Mod: CPTII,S$GLB,, | Performed by: PHYSICIAN ASSISTANT

## 2023-06-22 PROCEDURE — 20612 GANGLION CYST: L KNEE: ICD-10-PCS | Mod: LT,S$GLB,, | Performed by: STUDENT IN AN ORGANIZED HEALTH CARE EDUCATION/TRAINING PROGRAM

## 2023-06-22 PROCEDURE — 3008F BODY MASS INDEX DOCD: CPT | Mod: CPTII,S$GLB,, | Performed by: PHYSICIAN ASSISTANT

## 2023-06-22 PROCEDURE — 99999 PR PBB SHADOW E&M-EST. PATIENT-LVL III: ICD-10-PCS | Mod: PBBFAC,,, | Performed by: STUDENT IN AN ORGANIZED HEALTH CARE EDUCATION/TRAINING PROGRAM

## 2023-06-22 PROCEDURE — 3044F PR MOST RECENT HEMOGLOBIN A1C LEVEL <7.0%: ICD-10-PCS | Mod: CPTII,S$GLB,, | Performed by: PHYSICIAN ASSISTANT

## 2023-06-22 PROCEDURE — 76942 GANGLION CYST: L KNEE: ICD-10-PCS | Mod: S$GLB,,, | Performed by: STUDENT IN AN ORGANIZED HEALTH CARE EDUCATION/TRAINING PROGRAM

## 2023-06-22 PROCEDURE — 3078F DIAST BP <80 MM HG: CPT | Mod: CPTII,S$GLB,, | Performed by: PHYSICIAN ASSISTANT

## 2023-06-22 PROCEDURE — 1125F AMNT PAIN NOTED PAIN PRSNT: CPT | Mod: CPTII,S$GLB,, | Performed by: STUDENT IN AN ORGANIZED HEALTH CARE EDUCATION/TRAINING PROGRAM

## 2023-06-22 PROCEDURE — 99499 UNLISTED E&M SERVICE: CPT | Mod: S$GLB,,, | Performed by: STUDENT IN AN ORGANIZED HEALTH CARE EDUCATION/TRAINING PROGRAM

## 2023-06-22 PROCEDURE — 1159F PR MEDICATION LIST DOCUMENTED IN MEDICAL RECORD: ICD-10-PCS | Mod: CPTII,S$GLB,, | Performed by: PHYSICIAN ASSISTANT

## 2023-06-22 PROCEDURE — 99213 OFFICE O/P EST LOW 20 MIN: CPT | Mod: S$GLB,,, | Performed by: PHYSICIAN ASSISTANT

## 2023-06-22 PROCEDURE — 99999 PR PBB SHADOW E&M-EST. PATIENT-LVL III: ICD-10-PCS | Mod: PBBFAC,,, | Performed by: PHYSICIAN ASSISTANT

## 2023-06-22 PROCEDURE — 1125F PR PAIN SEVERITY QUANTIFIED, PAIN PRESENT: ICD-10-PCS | Mod: CPTII,S$GLB,, | Performed by: STUDENT IN AN ORGANIZED HEALTH CARE EDUCATION/TRAINING PROGRAM

## 2023-06-22 PROCEDURE — 3044F HG A1C LEVEL LT 7.0%: CPT | Mod: CPTII,S$GLB,, | Performed by: PHYSICIAN ASSISTANT

## 2023-06-22 PROCEDURE — 3074F SYST BP LT 130 MM HG: CPT | Mod: CPTII,S$GLB,, | Performed by: PHYSICIAN ASSISTANT

## 2023-06-22 PROCEDURE — 3008F PR BODY MASS INDEX (BMI) DOCUMENTED: ICD-10-PCS | Mod: CPTII,S$GLB,, | Performed by: PHYSICIAN ASSISTANT

## 2023-06-22 PROCEDURE — 3078F PR MOST RECENT DIASTOLIC BLOOD PRESSURE < 80 MM HG: ICD-10-PCS | Mod: CPTII,S$GLB,, | Performed by: PHYSICIAN ASSISTANT

## 2023-06-22 PROCEDURE — 76942 ECHO GUIDE FOR BIOPSY: CPT | Mod: S$GLB,,, | Performed by: STUDENT IN AN ORGANIZED HEALTH CARE EDUCATION/TRAINING PROGRAM

## 2023-06-22 PROCEDURE — 99213 PR OFFICE/OUTPT VISIT, EST, LEVL III, 20-29 MIN: ICD-10-PCS | Mod: S$GLB,,, | Performed by: PHYSICIAN ASSISTANT

## 2023-06-22 PROCEDURE — 20612 ASPIRATE/INJ GANGLION CYST: CPT | Mod: LT,S$GLB,, | Performed by: STUDENT IN AN ORGANIZED HEALTH CARE EDUCATION/TRAINING PROGRAM

## 2023-06-22 PROCEDURE — 99999 PR PBB SHADOW E&M-EST. PATIENT-LVL III: CPT | Mod: PBBFAC,,, | Performed by: PHYSICIAN ASSISTANT

## 2023-06-22 PROCEDURE — 3074F PR MOST RECENT SYSTOLIC BLOOD PRESSURE < 130 MM HG: ICD-10-PCS | Mod: CPTII,S$GLB,, | Performed by: PHYSICIAN ASSISTANT

## 2023-06-22 PROCEDURE — 99499 NO LOS: ICD-10-PCS | Mod: S$GLB,,, | Performed by: STUDENT IN AN ORGANIZED HEALTH CARE EDUCATION/TRAINING PROGRAM

## 2023-06-22 PROCEDURE — 99999 PR PBB SHADOW E&M-EST. PATIENT-LVL III: CPT | Mod: PBBFAC,,, | Performed by: STUDENT IN AN ORGANIZED HEALTH CARE EDUCATION/TRAINING PROGRAM

## 2023-06-22 RX ORDER — TRIAMCINOLONE ACETONIDE 40 MG/ML
40 INJECTION, SUSPENSION INTRA-ARTICULAR; INTRAMUSCULAR
Status: DISCONTINUED | OUTPATIENT
Start: 2023-06-22 | End: 2023-06-22 | Stop reason: HOSPADM

## 2023-06-22 RX ADMIN — TRIAMCINOLONE ACETONIDE 40 MG: 40 INJECTION, SUSPENSION INTRA-ARTICULAR; INTRAMUSCULAR at 08:06

## 2023-06-22 NOTE — PROCEDURES
Ganglion Cyst: L knee    Date/Time: 6/22/2023 8:00 AM  Performed by: Maria Eugenia Stephens MD  Authorized by: Maria Eugenia Stephens MD     Consent Done?:  Yes (Verbal)  Indications:  Pain and joint swelling  Site marked: the procedure site was marked    Timeout: prior to procedure the correct patient, procedure, and site was verified    Prep: patient was prepped and draped in usual sterile fashion      Local anesthesia used?: Yes    Anesthesia:  Local infiltration  Local anesthetic:  Lidocaine 1% without epinephrine and co-phenylcaine spray  Anesthetic total (ml):  2    Location:  Knee  Ultrasonic Guidance for Needle Placement?: Yes    Needle size:  18 G  Approach:  Posterior  Medications:  40 mg triamcinolone acetonide 40 mg/mL  Aspirate amount (ml):  2  Aspirate:  Clear and yellow  Patient tolerance:  Patient tolerated the procedure well with no immediate complications    Additional Comments: TECHNIQUE: Real time ultrasound examination of the left popliteal fossa/Baker's cyst was performed with SonoSite Edge 2, 9-L MHz linear probe(s). Ultrasound guidance was used for needle localization. Images were saved and stored for documentation. Dynamic visualization of the needle was continuous throughout the procedures and maintained in good position.    Site:  L knee (Baker's Cyst)  Site:  L knee (Baker's Cyst)

## 2023-06-22 NOTE — PATIENT INSTRUCTIONS
You had a cortisone (steroid) injection today. There are two medicines in this injection, a numbing medicine (local anesthetic) and a steroid. The numbing medication component usually takes effect within a few minutes and wears off after a few hours, after which your pain may return. The steroid medication typically takes effect in 3-7 days, although some people have benefits sooner.    There are risks with this procedure.   Any time the skin is punctured with a needle, there is a small risk of infection. With these injections that risk is less than 1 and 14,000. Corticosteroid injections can raise your blood pressure and blood sugar over the next few days.  Less than 1% of people experience of pain flare with the steroid, which usually goes away in 2-3 days.  2-3% of people developed a dimple or pale spot at the injection site, which is strictly cosmetic.

## 2023-06-22 NOTE — PROGRESS NOTES
INTERNAL MEDICINE PROGRESS NOTE    CHIEF COMPLAINT     Chief Complaint   Patient presents with    Leg Pain       HPI     Bri Santiago is a 62 y.o. male who presents for a follow-up visit today.    PCP is Cate Galeas MD, patient is known to me.     He presents today for follow-up regarding left leg pain.   He was seen by PCP on 6/8/2023 for this same complaint. He had x-ray, lower extremity US, and d-dimer.   US showed a complex collection along the medial aspect of the lower leg -favored to represent a popliteal fossa cyst. X-ray was fine. D-dimer has not yet been drawn -no longer needed.     He was referred to ortho and has seen CHRISTIAN De Jesus on 6/15/2023. He had a US guided Baker cyst aspiration with corticosteroid injection today.   He is doing much better, swelling improved. No new complaints.      Past Medical History:  Past Medical History:   Diagnosis Date    Acute pancreatitis     Anal fissure     Anemia     Anticoagulant long-term use     Arthritis     Asthma in remission     Back pain     BPH (benign prostatic hypertrophy)     Cancer 2000    prostate- treated at [facility] with chemo- in remission since 2000    Chronic maxillary sinusitis     Clotting disorder     Diastolic dysfunction with chronic heart failure 12/3/2018    Dysphagia 10/7/2014    Family history of colon cancer     Family history of early CAD     GERD (gastroesophageal reflux disease)     Helicobacter pylori (H. pylori) infection     Chronic    History of chronic pancreatitis     HTN (hypertension)     Lumbago 11/12/2012    Obesity     ABDON (obstructive sleep apnea)     ABDON (obstructive sleep apnea)     With likely ohs Refer to sleep    Sacroiliac joint pain 2/10/2015    Spinal stenosis of lumbar region     Von Willebrand disease     VWD (acquired von Willebrand's disease)        Home Medications:  Prior to Admission medications    Medication Sig Start Date End Date Taking? Authorizing Provider   albuterol (PROVENTIL/VENTOLIN HFA) 90  mcg/actuation inhaler INHALE 2 PUFFS INTO THE LUNGS EVERY 6 HOURS AS NEEDED FOR WHEEZING OR SHORTNESS OF BREATH 3/9/20  Yes Geeta Hall MD   atorvastatin (LIPITOR) 80 MG tablet Take 1 tablet (80 mg total) by mouth every evening. 5/1/23  Yes Damian Liu MD   azelastine (ASTELIN) 137 mcg (0.1 %) nasal spray Two sprays in each nostril, sniff until absorbed, then follow with 1 spray of fluticasone.  Use both sprays twice daily.  Patient taking differently: Two sprays in each nostril, sniff until absorbed, then follow with 1 spray of fluticasone.  Use both sprays twice daily.(PATIENT USES NIGHTLY 3/12/2021) 2/11/21  Yes LAURENT Swanson MD   budesonide-formoterol 160-4.5 mcg (SYMBICORT) 160-4.5 mcg/actuation HFAA INHALE 2 PUFFS INTO THE LUNGS EVERY 12 HOURS 3/26/20  Yes Geeta Hall MD   calcium citrate-vitamin D3 315-200 mg (CITRACAL+D) 315-200 mg-unit per tablet Take 1 tablet by mouth nightly.    Yes Historical Provider   clopidogreL (PLAVIX) 75 mg tablet TAKE 1 TABLET(75 MG) BY MOUTH EVERY DAY 2/1/23  Yes Rehana Lopez MD   cyanocobalamin, vitamin B-12, 50 mcg tablet Take 50 mcg by mouth nightly.    Yes Historical Provider   docusate sodium (COLACE) 100 MG capsule Take 1 tablet by mouth Twice daily. 1 Capsule Oral Twice a day .  Take with pain medicine   Yes Historical Provider   doxazosin (CARDURA) 1 MG tablet TAKE 1 TABLET BY MOUTH EVERY EVENING 5/31/23  Yes Cate Galeas MD   fluticasone propionate (FLONASE) 50 mcg/actuation nasal spray One spray in each nostril twice daily after 1st using azelastine nasal spray 6/15/21  Yes LAURENT Swanson MD   furosemide (LASIX) 20 MG tablet Take 1 tablet (20 mg total) by mouth once daily. 1/31/23  Yes Cate Galeas MD   ipratropium (ATROVENT) 42 mcg (0.06 %) nasal spray 1-2 sprays in each nostril before eating and at bedtime as needed 6/15/21  Yes CECILE. Salvador Swanson MD   RABEprazole (ACIPHEX) 20 mg tablet Take 1 tablet (20 mg total) by mouth every 12 (twelve)  "hours. 4/27/23 10/24/23 Yes Rosendo Boyer MD   testosterone (ANDRODERM) 2 mg/24 hour PT24 APPLY 1 PATCH TOPICALLY TO THE SKIN EVERY DAY 1/5/22  Yes Chris Min MD   zolpidem (AMBIEN) 10 mg Tab TAKE 1 TABLET BY MOUTH EVERY DAY AT BEDTIME  Patient taking differently: Take 10 mg by mouth nightly as needed. 6/23/17  Yes Darvin Henry MD   tadalafiL (CIALIS) 20 MG Tab Take 1 tablet (20 mg total) by mouth as needed. Take every 36 hours as needed for ED. 6/2/22 6/2/23  Darvin Hope MD   tamsulosin (FLOMAX) 0.4 mg Cap Take 1 capsule (0.4 mg total) by mouth once daily. 4/4/21 4/4/21  Darnell Lee MD       Review of Systems:  Review of Systems   Constitutional:  Negative for chills and fever.   HENT:  Negative for sore throat and trouble swallowing.    Eyes:  Negative for visual disturbance.   Respiratory:  Negative for cough and shortness of breath.    Cardiovascular:  Negative for chest pain.   Gastrointestinal:  Negative for abdominal pain, constipation, diarrhea, nausea and vomiting.   Genitourinary:  Negative for dysuria and flank pain.   Musculoskeletal:  Negative for arthralgias, back pain, neck pain and neck stiffness.        Left leg pain    Skin:  Negative for rash.   Neurological:  Negative for dizziness, syncope, weakness and headaches.   Psychiatric/Behavioral:  Negative for confusion.      Health Maintainence:   Immunizations:  Health Maintenance         Date Due Completion Date    Shingles Vaccine (1 of 2) Never done ---    DEXA Scan 07/22/2018 7/22/2015    Aspirin/Antiplatelet Therapy 06/22/2024 6/22/2023    Colorectal Cancer Screening 03/04/2025 3/4/2020    Hemoglobin A1c (Diabetic Prevention Screening) 01/23/2026 1/23/2023    TETANUS VACCINE 10/16/2027 10/16/2017    Lipid Panel 01/23/2028 1/23/2023             PHYSICAL EXAM     /72   Pulse 83   Ht 5' 11" (1.803 m)   Wt 111.1 kg (244 lb 14.9 oz)   SpO2 96%   BMI 34.16 kg/m²     Physical Exam  Vitals and nursing note reviewed. "   Constitutional:       Appearance: Normal appearance.      Comments: Healthy appearing male in NAD or apparent pain. He makes good eye contact, speaks in clear full sentences and ambulates with ease.        HENT:      Head: Normocephalic and atraumatic.      Nose: Nose normal.      Mouth/Throat:      Pharynx: Oropharynx is clear.   Eyes:      Conjunctiva/sclera: Conjunctivae normal.   Cardiovascular:      Rate and Rhythm: Normal rate and regular rhythm.      Pulses: Normal pulses.   Pulmonary:      Effort: No respiratory distress.   Abdominal:      Tenderness: There is no abdominal tenderness.   Musculoskeletal:         General: Normal range of motion.      Cervical back: No rigidity.   Skin:     General: Skin is warm and dry.      Capillary Refill: Capillary refill takes less than 2 seconds.      Findings: No rash.   Neurological:      General: No focal deficit present.      Mental Status: He is alert.      Gait: Gait normal.   Psychiatric:         Mood and Affect: Mood normal.       LABS     Lab Results   Component Value Date    HGBA1C 4.6 01/23/2023     CMP  Sodium   Date Value Ref Range Status   03/16/2023 139 136 - 145 mmol/L Final     Potassium   Date Value Ref Range Status   03/16/2023 3.4 (L) 3.5 - 5.1 mmol/L Final     Chloride   Date Value Ref Range Status   03/16/2023 104 95 - 110 mmol/L Final     CO2   Date Value Ref Range Status   03/16/2023 25 23 - 29 mmol/L Final     Glucose   Date Value Ref Range Status   03/16/2023 81 70 - 110 mg/dL Final     BUN   Date Value Ref Range Status   03/16/2023 14 6 - 20 mg/dL Final     Creatinine   Date Value Ref Range Status   03/16/2023 1.0 0.5 - 1.4 mg/dL Final     Calcium   Date Value Ref Range Status   03/16/2023 9.1 8.7 - 10.5 mg/dL Final     Total Protein   Date Value Ref Range Status   03/16/2023 7.4 6.0 - 8.4 g/dL Final     Albumin   Date Value Ref Range Status   03/16/2023 4.2 3.5 - 5.2 g/dL Final   05/03/2021 4.0 3.6 - 5.1 g/dL Final     Comment:     For  additional information, please refer to   http://education.[employer].nVoq/faq/JBB705 (This link is   being provided for informational/ educational purposes only.)    This test was developed and its analytical performance   characteristics have been determined by [facility]. It has not been cleared or approved by the   [place] Food and Drug Administration. This assay has been validated   pursuant to the CLIA regulations and is used for clinical   purposes.  @ Test Performed By:  [facility]  Baudilio Loomis M.D.,   [id number] [place] [id number]  [facility]  [id number]       Total Bilirubin   Date Value Ref Range Status   03/16/2023 1.8 (H) 0.1 - 1.0 mg/dL Final     Comment:     For infants and newborns, interpretation of results should be based  on gestational age, weight and in agreement with clinical  observations.    Premature Infant recommended reference ranges:  Up to 24 hours.............<8.0 mg/dL  Up to 48 hours............<12.0 mg/dL  3-5 days..................<15.0 mg/dL  6-29 days.................<15.0 mg/dL       Alkaline Phosphatase   Date Value Ref Range Status   03/16/2023 69 55 - 135 U/L Final     AST   Date Value Ref Range Status   03/16/2023 18 10 - 40 U/L Final     ALT   Date Value Ref Range Status   03/16/2023 20 10 - 44 U/L Final     Anion Gap   Date Value Ref Range Status   03/16/2023 10 8 - 16 mmol/L Final     eGFR if    Date Value Ref Range Status   07/19/2022 >60.0 >60 mL/min/1.73 m^2 Final     eGFR if non    Date Value Ref Range Status   07/19/2022 >60.0 >60 mL/min/1.73 m^2 Final     Comment:     Calculation used to obtain the estimated glomerular filtration  rate (eGFR) is the CKD-EPI equation.        Lab Results   Component Value Date    WBC 9.51 03/16/2023    HGB 14.3 03/16/2023    HCT 43.8 03/16/2023    MCV 93 03/16/2023     03/16/2023     Lab Results   Component  Value Date    CHOL 125 01/23/2023    CHOL 128 07/08/2021    CHOL 111 (L) 01/23/2020     Lab Results   Component Value Date    HDL 32 (L) 01/23/2023    HDL 35 (L) 07/08/2021    HDL 29 (L) 01/23/2020     Lab Results   Component Value Date    LDLCALC 80.4 01/23/2023    LDLCALC 72.2 07/08/2021    LDLCALC 66.8 01/23/2020     Lab Results   Component Value Date    TRIG 63 01/23/2023    TRIG 104 07/08/2021    TRIG 76 01/23/2020     Lab Results   Component Value Date    CHOLHDL 25.6 01/23/2023    CHOLHDL 27.3 07/08/2021    CHOLHDL 26.1 01/23/2020     Lab Results   Component Value Date    TSH 1.243 01/23/2023    [id number] 6.3 06/15/2010       ASSESSMENT/PLAN     Bri Santiago is a 62 y.o. male     Bri was seen today for leg pain.    Diagnoses and all orders for this visit:    Left leg pain    Baker's cyst of knee, left   -drained by ortho today   -had left knee intraarticular steroid injection last week -also helped with pain and swelling    -will follow-up with ortho if symptoms return.         Winnie Jacob PA-C   Department of Internal Medicine - [facility]   10:39 AM

## 2023-06-22 NOTE — PROGRESS NOTES
CC: left knee pain    62 y.o. Male presents today for aspiration of his left baker's cyst. Pt was referred by Fermín De Jesus PA-C.    Attempted treatments: none since last visit with Fermín De Jesus  Pain score: 4/10  History of trauma/injury: none since last visit with Fermín De Jesus  Affecting ADLs: no     REVIEW OF SYSTEMS:   Constitution: Patient denies fever or chills.  Eyes: Patient denies eye pain or vision changes.  HEENT: Patient denies ear pain, sore throat, or nasal discharge.  CVS: Patient denies chest pain.  Lungs: Patient denies shortness of breath or cough.  Skin: Patient denies skin rash or itching.    Musculoskeletal: Patient denies recent falls. See HPI.  Psych: Patient denies any current anxiety or nervousness.    PAST MEDICAL HISTORY:   Past Medical History:   Diagnosis Date    Acute pancreatitis     Anal fissure     Anemia     Anticoagulant long-term use     Arthritis     Asthma in remission     Back pain     BPH (benign prostatic hypertrophy)     Cancer 2000    prostate- treated at [facility] with chemo- in remission since 2000    Chronic maxillary sinusitis     Clotting disorder     Diastolic dysfunction with chronic heart failure 12/3/2018    Dysphagia 10/7/2014    Family history of colon cancer     Family history of early CAD     GERD (gastroesophageal reflux disease)     Helicobacter pylori (H. pylori) infection     Chronic    History of chronic pancreatitis     HTN (hypertension)     Lumbago 11/12/2012    Obesity     ABDON (obstructive sleep apnea)     ABDON (obstructive sleep apnea)     With likely ohs Refer to sleep    Sacroiliac joint pain 2/10/2015    Spinal stenosis of lumbar region     Von Willebrand disease     VWD (acquired von Willebrand's disease)        MEDICATIONS:     Current Outpatient Medications:     albuterol (PROVENTIL/VENTOLIN HFA) 90 mcg/actuation inhaler, INHALE 2 PUFFS INTO THE LUNGS EVERY 6 HOURS AS NEEDED FOR WHEEZING OR SHORTNESS OF BREATH, Disp: 18 g, Rfl: 4    atorvastatin  (LIPITOR) 80 MG tablet, Take 1 tablet (80 mg total) by mouth every evening., Disp: 90 tablet, Rfl: 3    azelastine (ASTELIN) 137 mcg (0.1 %) nasal spray, Two sprays in each nostril, sniff until absorbed, then follow with 1 spray of fluticasone.  Use both sprays twice daily. (Patient taking differently: Two sprays in each nostril, sniff until absorbed, then follow with 1 spray of fluticasone.  Use both sprays twice daily.(PATIENT USES NIGHTLY 3/12/2021)), Disp: 30 mL, Rfl: 11    budesonide-formoterol 160-4.5 mcg (SYMBICORT) 160-4.5 mcg/actuation HFAA, INHALE 2 PUFFS INTO THE LUNGS EVERY 12 HOURS, Disp: 10.2 g, Rfl: 12    calcium citrate-vitamin D3 315-200 mg (CITRACAL+D) 315-200 mg-unit per tablet, Take 1 tablet by mouth nightly. , Disp: , Rfl:     clopidogreL (PLAVIX) 75 mg tablet, TAKE 1 TABLET(75 MG) BY MOUTH EVERY DAY, Disp: 90 tablet, Rfl: 3    cyanocobalamin, vitamin B-12, 50 mcg tablet, Take 50 mcg by mouth nightly. , Disp: , Rfl:     docusate sodium (COLACE) 100 MG capsule, Take 1 tablet by mouth Twice daily. 1 Capsule Oral Twice a day .  Take with pain medicine, Disp: , Rfl:     doxazosin (CARDURA) 1 MG tablet, TAKE 1 TABLET BY MOUTH EVERY EVENING, Disp: 90 tablet, Rfl: 3    fluticasone propionate (FLONASE) 50 mcg/actuation nasal spray, One spray in each nostril twice daily after 1st using azelastine nasal spray, Disp: 18.2 mL, Rfl: 11    furosemide (LASIX) 20 MG tablet, Take 1 tablet (20 mg total) by mouth once daily., Disp: 90 tablet, Rfl: 0    ipratropium (ATROVENT) 42 mcg (0.06 %) nasal spray, 1-2 sprays in each nostril before eating and at bedtime as needed, Disp: 30 mL, Rfl: 11    RABEprazole (ACIPHEX) 20 mg tablet, Take 1 tablet (20 mg total) by mouth every 12 (twelve) hours., Disp: 180 tablet, Rfl: 1    zolpidem (AMBIEN) 10 mg Tab, TAKE 1 TABLET BY MOUTH EVERY DAY AT BEDTIME (Patient taking differently: Take 10 mg by mouth nightly as needed.), Disp: 20 tablet, Rfl: 0    tadalafiL (CIALIS) 20 MG Tab,  "Take 1 tablet (20 mg total) by mouth as needed. Take every 36 hours as needed for ED., Disp: 30 tablet, Rfl: 9    testosterone (ANDRODERM) 2 mg/24 hour PT24, APPLY 1 PATCH TOPICALLY TO THE SKIN EVERY DAY (Patient not taking: Reported on 5/25/2023), Disp: 60 patch, Rfl: 3  No current facility-administered medications for this visit.    Facility-Administered Medications Ordered in Other Visits:     0.9%  NaCl infusion, , Intravenous, Continuous, Milla Oliveira NP, Last Rate: 70 mL/hr at 03/15/21 1417, New Bag at 03/15/21 1417    0.9%  NaCl infusion, , Intravenous, Continuous, Milla Oliveira NP, Last Rate: 70 mL/hr at 12/10/21 0736, New Bag at 12/10/21 0736    0.9%  NaCl infusion, , Intravenous, Continuous, Jaqueline Langley NP    mupirocin 2 % ointment, , Nasal, On Call Procedure, Milla Oliveira NP, Given at 12/10/21 0729    mupirocin 2 % ointment, , Nasal, On Call Procedure, Jaqueline Langley NP, Given at 02/25/22 0623    ALLERGIES:   Review of patient's allergies indicates:   Allergen Reactions    Penicillins Hives and Swelling     Has had allergy testing and can prob tolerate penicillin    Ace inhibitors Other (See Comments)     "Caused a respiratory infection"    Aspirin Hives           Codeine Itching     Ok to take percocet    Dilaudid [hydromorphone] Itching        PHYSICAL EXAMINATION:  There were no vitals taken for this visit.  There are no signs of infection at the injection site, including no rubor, calor, or skin lesions.  Gen: NAD.  Psych: Affect & judgment nl.  Neuro: Grossly CNI. HINES.  HEENT: -Trach dev. -Eye d/c.   CV: Color nl. -E/C/C. WWPx4.  Pulm: -Dyspnea. -Cough.  Lymph: -Edema.  Int: -Rash/lesion noted. Skin is warm and dry    Diagnostic Extremity - [facility]-Sports Ultrasound was recommended due to left knee baker's cyst.    FOCUSED: examination.     TECHNIQUE: Real time ultrasound examination of the left popliteal fossa was performed with SonoSite Edge 2, 9-L MHz linear " probe(s).     FINDINGS: The images are of adequate diagnostic quality with identification of all echogenic structures made except for the vascular structures unless otherwise noted. There is no sonographic evidence of periosteal abnormalities, soft tissue edema, musculotendinous or nerve irregularities. Popliteal cyst present. The rest of the sonographic examination was unremarkable.    Ultrasound images were directly reviewed with the patient and then a treatment plan was discussed.     Images were stored in the patients medical record.     IMPRESSION: Baker's Cyst to eb aspirated.     ASSESSMENT:      ICD-10-CM ICD-9-CM   1. Baker's cyst of knee, left  M71.22 727.51   2. Acute pain of left knee  M25.562 719.46         PLAN:  Ultrasound-guided aspiration of the left baker's cyst performed at visit today.    Risks and benefits were discussed with patient prior to receiving aspiration.  Risks include the possibility of infection, pain, and cosmetic deformity at site of injection.    All questions were answered to the best of my ability and all concerns were addressed at this time.      This note is dictated using the M*Modal Fluency Direct word recognition program. There are word recognition mistakes that are occasionally missed on review.

## 2023-06-27 ENCOUNTER — HOSPITAL ENCOUNTER (OUTPATIENT)
Dept: RADIOLOGY | Facility: CLINIC | Age: 63
Discharge: HOME | End: 2023-06-27
Attending: INTERNAL MEDICINE
Payer: MEDICARE

## 2023-06-27 DIAGNOSIS — M85.89 OTHER SPECIFIED DISORDERS OF BONE DENSITY AND STRUCTURE, MULTIPLE SITES: ICD-10-CM

## 2023-06-27 DIAGNOSIS — E55.9 VITAMIN D DEFICIENCY: ICD-10-CM

## 2023-06-27 DIAGNOSIS — M85.80 OSTEOPENIA, UNSPECIFIED LOCATION: ICD-10-CM

## 2023-06-27 PROCEDURE — 77080 DXA BONE DENSITY AXIAL: CPT | Mod: TC

## 2023-06-27 PROCEDURE — 77080 DXA BONE DENSITY AXIAL: CPT | Mod: 26,,, | Performed by: INTERNAL MEDICINE

## 2023-06-27 PROCEDURE — 77080 DXA BONE DENSITY AXIAL SKELETON 1 OR MORE SITES: ICD-10-PCS | Mod: 26,,, | Performed by: INTERNAL MEDICINE

## 2023-06-29 ENCOUNTER — HOSPITAL ENCOUNTER (OUTPATIENT)
Dept: RADIOLOGY | Facility: OTHER | Age: 63
Discharge: HOME | End: 2023-06-29
Attending: SURGERY
Payer: MEDICARE

## 2023-06-29 DIAGNOSIS — I83.813 VARICOSE VEINS OF LEG WITH PAIN, BILATERAL: ICD-10-CM

## 2023-06-29 PROCEDURE — 93970 EXTREMITY STUDY: CPT | Mod: 26,,, | Performed by: RADIOLOGY

## 2023-06-29 PROCEDURE — 93970 US LOWER EXTREMITY VEINS BILATERAL INSUFFICIENCY: ICD-10-PCS | Mod: 26,,, | Performed by: RADIOLOGY

## 2023-06-29 PROCEDURE — 93970 EXTREMITY STUDY: CPT | Mod: TC

## 2023-07-06 NOTE — ANESTHESIA PREPROCEDURE EVALUATION
11/03/2020  Pre-operative evaluation for Procedure(s) (LRB):  EGD (ESOPHAGOGASTRODUODENOSCOPY) (N/A)  COLONOSCOPY (N/A)    Bri Santiago is a 60 y.o. male hx of asthma (well controlled), vwd (receiving DDAVP this morning), ABDON,GERD well controlled  2/2019  · LV end diastolic pressure is elevated.  · LVEDP (Pre): 23  · Post Atrio lesion , 50% stenosed.  · Estimated blood loss: none  · Diastolic dysfunction.  · Filling pressures moderately elevated. Pulmonary HTN is mild to moderate.  · Non-obstructive CAD.    Normal left ventricular systolic function (EF 60-65%).     2 - No wall motion abnormalities.     3 - Impaired LV relaxation, normal LAP (grade 1 diastolic dysfunction).     4 - Mild mitral regurgitation.     Patient Active Problem List   Diagnosis    Asthma in remission    Von Willebrand disease    HTN (hypertension)    Spondylosis without myelopathy    Thoracic or lumbosacral neuritis or radiculitis, unspecified    Spinal stenosis, lumbar region, without neurogenic claudication    Degeneration of lumbar or lumbosacral intervertebral disc    Acquired spondylolisthesis    Pseudoarthrosis    Family history of colon cancer    Vitamin D deficiency    Atypical chest pain    Encounter for monitoring long-term proton pump inhibitor therapy    Postlaminectomy syndrome of lumbar region    Myalgia and myositis    Facet syndrome    Gastroesophageal reflux disease without esophagitis    Osteopenia    Thoracic aorta atherosclerosis    Benign prostatic hyperplasia with urinary obstruction    Allergic rhinitis    Allergic conjunctivitis of both eyes    Encounter for long-term current use of high risk medication    Gastroesophageal reflux disease with esophagitis    Hematochezia    Nasal septal deviation    Dysphagia    Laryngopharyngeal reflux (LPR)    Diastolic dysfunction with chronic  heart failure    At risk for cardiovascular event    Hypoxia    Chronic pulmonary heart disease    Severe obesity (BMI 35.0-39.9) with comorbidity    Moderate persistent asthma    Family history of prostate cancer in father    Chronic pain    Nocturia    Scrotal swelling    Chronic bilateral low back pain without sciatica    Chronic maxillary sinusitis    Nonintractable episodic headache    Osteoarthritis of lumbar spine    Gynecomastia, male    Iron deficiency anemia    Obstructive sleep apnea treated with continuous positive airway pressure (CPAP)    Postnasal drip    GERD (gastroesophageal reflux disease)       Review of patient's allergies indicates:   Allergen Reactions    Ace inhibitors     Aspirin      Other reaction(s): Hives  Other reaction(s): Hives    Codeine Itching     Ok to take percocet    Dilaudid  [hydromorphone]      Other reaction(s): Itching    Penicillins Hives and Swelling     Has had allergy testing and can prob tolerate penicillin       No current facility-administered medications on file prior to encounter.      Current Outpatient Medications on File Prior to Encounter   Medication Sig Dispense Refill    acetaminophen (TYLENOL) 500 MG tablet Take 2 tablets (1,000 mg total) by mouth every 8 (eight) hours as needed. 90 tablet 0    albuterol (PROVENTIL/VENTOLIN HFA) 90 mcg/actuation inhaler INHALE 2 PUFFS INTO THE LUNGS EVERY 6 HOURS AS NEEDED FOR WHEEZING OR SHORTNESS OF BREATH 18 g 4    atorvastatin (LIPITOR) 40 MG tablet TAKE 1 TABLET(40 MG) BY MOUTH EVERY DAY 90 tablet 3    azelastine (ASTELIN) 137 mcg (0.1 %) nasal spray Two sprays in each nostril, sniff until absorbed, then follow with 1 spray of fluticasone.  Use both sprays twice daily. 30 mL 11    budesonide-formoterol 160-4.5 mcg (SYMBICORT) 160-4.5 mcg/actuation HFAA INHALE 2 PUFFS INTO THE LUNGS EVERY 12 HOURS 10.2 g 12    calcium citrate-vitamin D3 315-200 mg (CITRACAL+D) 315-200 mg-unit per tablet Take  1 tablet by mouth once daily.      clopidogrel (PLAVIX) 75 mg tablet Take 1 tablet (75 mg total) by mouth once daily. 30 tablet 11    cyanocobalamin, vitamin B-12, (VITAMIN B-12) 50 mcg tablet Take 50 mcg by mouth once daily.      docusate sodium (COLACE) 100 MG capsule Take 1 tablet by mouth Twice daily. 1 Capsule Oral Twice a day .  Take with pain medicine      doxazosin (CARDURA) 1 MG tablet TAKE 1 TABLET(1 MG) BY MOUTH EVERY EVENING 90 tablet 3    esomeprazole (NEXIUM) 40 MG capsule Take 1 capsule (40 mg total) by mouth 2 (two) times daily before meals. 180 capsule 0    ferrous sulfate (FEOSOL) 325 mg (65 mg iron) Tab tablet Take 1 tablet (325 mg total) by mouth every 12 (twelve) hours. 60 tablet 4    fluticasone propionate (FLONASE) 50 mcg/actuation nasal spray One spray in each nostril twice daily after 1st using azelastine nasal spray 18.2 mL 11    ipratropium (ATROVENT) 0.03 % nasal spray 2 sprays by Nasal route 2 (two) times daily. May be use more often if needed 30 mL 11    montelukast (SINGULAIR) 10 mg tablet TAKE 1 TABLET(10 MG) BY MOUTH EVERY EVENING 30 tablet 11    potassium chloride (MICRO-K) 10 MEQ CpSR Take 10 mEq by mouth once daily.       tiZANidine (ZANAFLEX) 4 MG tablet TAKE 1 TABLET(4 MG) BY MOUTH EVERY 8 HOURS AS NEEDED 90 tablet 0    zolpidem (AMBIEN) 10 mg Tab TAKE 1 TABLET BY MOUTH EVERY DAY AT BEDTIME 20 tablet 0    diclofenac sodium (VOLTAREN) 1 % Gel Apply 2 g topically 3 (three) times daily. 1 Tube 0    tadalafiL (CIALIS) 20 MG Tab Take 1 tablet (20 mg total) by mouth as needed. Take every 36 hours as needed for ED. 30 tablet 9       Past Surgical History:   Procedure Laterality Date    anal fissure repair      x2    BACK SURGERY  2012    BALLOON SINUPLASTY OF PARANASAL SINUS Bilateral 10/7/2019    Procedure: SINUPLASTY, USING BALLOON;  Surgeon: LAURENT Swanson MD;  Location: [facility];  Service: ENT;  Laterality: Bilateral;    CARPAL TUNNEL RELEASE  2003    left hand     CATHETERIZATION OF BOTH LEFT AND RIGHT HEART N/A 2/14/2019    Procedure: CATHETERIZATION, HEART, BOTH LEFT AND RIGHT;  Surgeon: Kyle Magana MD;  Location[facility] CATH LAB;  Service: Cardiology;  Laterality: N/A;    CERVICAL DISCECTOMY  2003    COLONOSCOPY N/A 10/7/2015    Procedure: COLONOSCOPY;  Surgeon: Rosendo Boyer MD;  Location: [facility] (4TH FLR);  Service: Endoscopy;  Laterality: N/A;  PM Prep    COLONOSCOPY N/A 6/19/2017    Procedure: COLONOSCOPY;  Surgeon: Rosendo Boyer MD;  Location: [facility] (4TH FLR);  Service: Endoscopy;  Laterality: N/A;  constipation prep (no DM no CHF)       hx of vonWillebrand's disease-will need infusion prior    COLONOSCOPY N/A 3/4/2020    Procedure: COLONOSCOPY;  Surgeon: Rosendo Boyer MD;  [facility]  Service: Endoscopy;  Laterality: N/A;  Please order CBC, ferritin, Iron TIBC day of case per Dr. Boyer  labwork at 7:10am and patient will check in right after.  per Dr. Tyler patient can hold Plavix 5 days prior see note 1/7/20  DDAVP prior to procedure needed see note 1/16/20 for oncall med by Dr. Tyler -     ESOPHAGOGASTRODUODENOSCOPY N/A 3/4/2020    Procedure: EGD (ESOPHAGOGASTRODUODENOSCOPY);  Surgeon: Rosendo Boyer MD;  [facility]  Service: Endoscopy;  Laterality: N/A;  EGD and Colonoscopy orders merged together  labwork at 7:10am and patient will check in right after.  DDAVP prior to procedure needed see note 1/16/20 for oncall med  per Dr. Tyler patient can hold Plavix 5 days prior see note 1/7/20    FUNCTIONAL ENDOSCOPIC SINUS SURGERY (FESS) USING COMPUTER-ASSISTED NAVIGATION Bilateral 10/7/2019    Procedure: FESS, USING COMPUTER-ASSISTED NAVIGATION;  Surgeon: LAURENT Swanson MD;  Location: [facility];  Service: ENT;  Laterality: Bilateral;    INJECTION OF ANESTHETIC AGENT AROUND NERVE Bilateral 10/13/2020    Procedure: BLOCK, NERVE BILATERAL C4,C5,C6 Plavix clearance requested;  Surgeon: Fredy MCCLELLAN  MD Izzy;  Location: [facility] PAIN MGT;  Service: Pain Management;  Laterality: Bilateral;  BLOCK, NERVE BILATERAL C4,C5,C6  Plavix clearance ok, will require DDVAP infusion    LEFT HEART CATHETERIZATION Left 2/14/2019    Procedure: Left heart cath;  Surgeon: Kyle Magana MD;  Location: [facility] CATH LAB;  Service: Cardiology;  Laterality: Left;    LUMBAR FUSION  2012    RADIOFREQUENCY ABLATION Right 5/9/2019    Procedure: RADIOFREQUENCY ABLATION, RIGHT L3,L4,L5;  Surgeon: Fredy Magana MD;  Location: [facility] PAIN MGT;  Service: Pain Management;  Laterality: Right;  1 of 2  RT RFA L3,4,5  PLAVIX clearance received, pt needs DDAVP    RADIOFREQUENCY ABLATION Left 5/23/2019    Procedure: RADIOFREQUENCY ABLATION, LEFT L3,L4,L5;  Surgeon: Fredy Magana MD;  Location: [facility] PAIN MGT;  Service: Pain Management;  Laterality: Left;  2 of 2  LT RFA L3,4,5  PLAVIX clearance received, pt needs DDAVP    RADIOFREQUENCY ABLATION Left 1/16/2020    Procedure: RADIOFREQUENCY ABLATION LEFT L3, L4, L5 MEDIAL BRANCH 1 OF 2 **PATIENT ARRIVING AT 8 AM**;  Surgeon: Fredy Magana MD;  Location: [facility] PAIN MGT;  Service: Pain Management;  Laterality: Left;  NEEDS CONSENT, PLAVIX CLEARANCE IN CHART    RADIOFREQUENCY ABLATION Right 1/28/2020    Procedure: RADIOFREQUENCY ABLATION, RIGHT L3-L4-L5 MEDIAL BRANCH 2 OF 2 (Left done on 1/16/20);  Surgeon: Fredy Magana MD;  Location: [facility] PAIN MGT;  Service: Pain Management;  Laterality: Right;    RADIOFREQUENCY ABLATION Left 8/18/2020    Procedure: RADIOFREQUENCY ABLATION, L3-L4-L5 MEDIAL BRANCH 1 OF 2 clear to hold plavix 7 days;  Surgeon: Fredy Magana MD;  Location: [facility] PAIN MGT;  Service: Pain Management;  Laterality: Left;    RADIOFREQUENCY ABLATION Right 9/1/2020    Procedure: RADIOFREQUENCY ABLATION, L3-L4-L5 MEDIAL BR ANCH 2 OF 2 clear to hol,d Plavix 7 days;  Surgeon: Fredy Magana MD;  Location: [facility] PAIN MGT;  Service: Pain Management;  Laterality:  Right;    RADIOFREQUENCY ABLATION OF LUMBAR MEDIAL BRANCH NERVE AT SINGLE LEVEL Left 5/24/2018    Procedure: RADIOFREQUENCY THERMOCOAGULATION (RFTC)-NERVE-MEDIAN BRANCH-LUMBAR;  Surgeon: Fredy Magana MD;  Location: [facility];  Service: Pain Management;  Laterality: Left;  Left RFA @ L3,4,5  [id number]  with IV Sedation    2 of 2    SPINE SURGERY      TONSILLECTOMY      at age 24    VASECTOMY  1996       Social History     Socioeconomic History    Marital status:      Spouse name: Not on file    Number of children: Not on file    Years of education: Not on file    Highest education level: Not on file   Occupational History    Occupation: disabled due to back injury   Social Needs    Financial resource strain: Not on file    Food insecurity     Worry: Not on file     Inability: Not on file    Transportation needs     Medical: Not on file     Non-medical: Not on file   Tobacco Use    Smoking status: Never Smoker    Smokeless tobacco: Never Used   Substance and Sexual Activity    Alcohol use: Yes     Alcohol/week: 1.0 standard drinks     Types: 1 Glasses of wine per week     Comment: social    Drug use: No    Sexual activity: Not Currently     Partners: Female   Lifestyle    Physical activity     Days per week: Not on file     Minutes per session: Not on file    Stress: Not on file   Relationships    Social connections     Talks on phone: Not on file     Gets together: Not on file     Attends [employer] service: Not on file     Active member of club or organization: Not on file     Attends meetings of clubs or organizations: Not on file     Relationship status: Not on file   Other Topics Concern    Not on file   Social History Narrative    wlking for exercised         CBC:   No results for input(s): WBC, RBC, HGB, HCT, PLT, MCV, MCH, MCHC in the last 72 hours.    CMP:   Recent Labs     11/03/20  1043      K 3.7      CO2 30*   BUN 13   CREATININE 0.9   GLU 85   CALCIUM  8.8   ALBUMIN 4.0   PROT 7.5   ALKPHOS 67   ALT 40   AST 28   BILITOT 1.1*       INR  No results for input(s): PT, INR, PROTIME, APTT in the last 72 hours.        Diagnostic Studies:      EKG:  Normal sinus rhythm  Minimal voltage criteria for LVH, may be normal variant  Borderline Abnormal ECG  When compared with ECG of 24-DEC-2018 07:53,  Previous ECG has undetermined rhythm, needs review  Confirmed by Tye Frey MD ([id number]) on 2/14/2019 4:39:32 PM    2D Echo:  Results for orders placed or performed during the hospital encounter of 09/05/18   2D Echo w/ Color Flow Doppler   Result Value Ref Range    QEF 65 55 - 65    Mitral Valve Regurgitation MILD     Diastolic Dysfunction Yes (A)     Est. PA Systolic Pressure 33.69     Pericardial Effusion NONE     Tricuspid Valve Regurgitation MILD          Pre-op Assessment    I have reviewed the Patient Summary Reports.     I have reviewed the Nursing Notes.    I have reviewed the Medications.     Review of Systems  Anesthesia Hx:  No problems with previous Anesthesia  History of prior surgery of interest to airway management or planning: Denies Family Hx of Anesthesia complications.   Denies Personal Hx of Anesthesia complications.   Hematology/Oncology:         -- Denies Anemia:   Cardiovascular:   Exercise tolerance: good Hypertension Denies CAD.    Denies CABG/stent.  ECG has been reviewed.    Pulmonary:   Denies COPD. Asthma mild Sleep Apnea    Renal/:   Denies Chronic Renal Disease.     Hepatic/GI:   GERD, well controlled Denies Liver Disease.    Musculoskeletal:   Arthritis     Neurological:   Denies CVA. Denies Seizures.    Endocrine:   Denies Diabetes.        Physical Exam  General:  Well nourished    Airway/Jaw/Neck:  Airway Findings: Mouth Opening: Normal Tongue: Normal  General Airway Assessment: Adult  Mallampati: II  Improves to II with phonation.  TM Distance: Normal, at least 6 cm  Jaw/Neck Findings:  Neck ROM: Normal ROM      Dental:  Dental Findings: In  tact   Chest/Lungs:  Chest/Lungs Findings: Clear to auscultation, Normal Respiratory Rate     Heart/Vascular:  Heart Findings: Rate: Normal  Rhythm: Regular Rhythm  Sounds: Normal        Mental Status:  Mental Status Findings:  Cooperative, Alert and Oriented         Anesthesia Plan  Type of Anesthesia, risks & benefits discussed:  Anesthesia Type:  general  Patient's Preference:   Intra-op Monitoring Plan: standard ASA monitors  Intra-op Monitoring Plan Comments:   Post Op Pain Control Plan: per primary service following discharge from PACU  Post Op Pain Control Plan Comments:   Induction:   IV  Beta Blocker:  Patient is not currently on a Beta-Blocker (No further documentation required).       Informed Consent: Patient understands risks and agrees with Anesthesia plan.  Questions answered. Anesthesia consent signed with patient.  ASA Score: 3     Day of Surgery Review of History & Physical:    H&P update referred to the surgeon.         Ready For Surgery From Anesthesia Perspective.        [FreeTextEntry2] : Left knee pain past 2 months. No known trauma.

## 2023-07-17 ENCOUNTER — TELEPHONE (OUTPATIENT)
Dept: ENDOSCOPY | Facility: HOSPITAL | Age: 63
End: 2023-07-17
Payer: MEDICARE

## 2023-07-20 ENCOUNTER — HOSPITAL ENCOUNTER (OUTPATIENT)
Dept: RADIOLOGY | Facility: HOSPITAL | Age: 63
Discharge: HOME | End: 2023-07-20
Attending: PHYSICIAN ASSISTANT
Payer: MEDICARE

## 2023-07-20 ENCOUNTER — OFFICE VISIT (OUTPATIENT)
Dept: INTERNAL MEDICINE | Facility: CLINIC | Age: 63
End: 2023-07-20
Payer: MEDICARE

## 2023-07-20 VITALS
OXYGEN SATURATION: 98 % | WEIGHT: 242.5 LBS | HEART RATE: 65 BPM | BODY MASS INDEX: 33.95 KG/M2 | DIASTOLIC BLOOD PRESSURE: 78 MMHG | HEIGHT: 71 IN | SYSTOLIC BLOOD PRESSURE: 126 MMHG

## 2023-07-20 DIAGNOSIS — M54.12 RIGHT CERVICAL RADICULOPATHY: ICD-10-CM

## 2023-07-20 DIAGNOSIS — R22.42 LEG MASS, LEFT: ICD-10-CM

## 2023-07-20 DIAGNOSIS — R22.42 LEG MASS, LEFT: Primary | ICD-10-CM

## 2023-07-20 PROCEDURE — 76882 US SOFT TISSUE, LOWER EXTREMITY, LEFT: ICD-10-PCS | Mod: 26,LT,, | Performed by: RADIOLOGY

## 2023-07-20 PROCEDURE — 3008F BODY MASS INDEX DOCD: CPT | Mod: CPTII,S$GLB,, | Performed by: PHYSICIAN ASSISTANT

## 2023-07-20 PROCEDURE — 3044F PR MOST RECENT HEMOGLOBIN A1C LEVEL <7.0%: ICD-10-PCS | Mod: CPTII,S$GLB,, | Performed by: PHYSICIAN ASSISTANT

## 2023-07-20 PROCEDURE — 76882 US LMTD JT/FCL EVL NVASC XTR: CPT | Mod: TC,LT

## 2023-07-20 PROCEDURE — 3078F PR MOST RECENT DIASTOLIC BLOOD PRESSURE < 80 MM HG: ICD-10-PCS | Mod: CPTII,S$GLB,, | Performed by: PHYSICIAN ASSISTANT

## 2023-07-20 PROCEDURE — 72040 X-RAY EXAM NECK SPINE 2-3 VW: CPT | Mod: TC

## 2023-07-20 PROCEDURE — 99999 PR PBB SHADOW E&M-EST. PATIENT-LVL V: ICD-10-PCS | Mod: PBBFAC,,, | Performed by: PHYSICIAN ASSISTANT

## 2023-07-20 PROCEDURE — 3074F PR MOST RECENT SYSTOLIC BLOOD PRESSURE < 130 MM HG: ICD-10-PCS | Mod: CPTII,S$GLB,, | Performed by: PHYSICIAN ASSISTANT

## 2023-07-20 PROCEDURE — 3044F HG A1C LEVEL LT 7.0%: CPT | Mod: CPTII,S$GLB,, | Performed by: PHYSICIAN ASSISTANT

## 2023-07-20 PROCEDURE — 72040 X-RAY EXAM NECK SPINE 2-3 VW: CPT | Mod: 26,,, | Performed by: RADIOLOGY

## 2023-07-20 PROCEDURE — 99999 PR PBB SHADOW E&M-EST. PATIENT-LVL V: CPT | Mod: PBBFAC,,, | Performed by: PHYSICIAN ASSISTANT

## 2023-07-20 PROCEDURE — 3078F DIAST BP <80 MM HG: CPT | Mod: CPTII,S$GLB,, | Performed by: PHYSICIAN ASSISTANT

## 2023-07-20 PROCEDURE — 99214 PR OFFICE/OUTPT VISIT, EST, LEVL IV, 30-39 MIN: ICD-10-PCS | Mod: S$GLB,,, | Performed by: PHYSICIAN ASSISTANT

## 2023-07-20 PROCEDURE — 76882 US LMTD JT/FCL EVL NVASC XTR: CPT | Mod: 26,LT,, | Performed by: RADIOLOGY

## 2023-07-20 PROCEDURE — 99214 OFFICE O/P EST MOD 30 MIN: CPT | Mod: S$GLB,,, | Performed by: PHYSICIAN ASSISTANT

## 2023-07-20 PROCEDURE — 3008F PR BODY MASS INDEX (BMI) DOCUMENTED: ICD-10-PCS | Mod: CPTII,S$GLB,, | Performed by: PHYSICIAN ASSISTANT

## 2023-07-20 PROCEDURE — 3074F SYST BP LT 130 MM HG: CPT | Mod: CPTII,S$GLB,, | Performed by: PHYSICIAN ASSISTANT

## 2023-07-20 PROCEDURE — 72040 XR CERVICAL SPINE AP LATERAL: ICD-10-PCS | Mod: 26,,, | Performed by: RADIOLOGY

## 2023-07-20 RX ORDER — METHOCARBAMOL 500 MG/1
500 TABLET, FILM COATED ORAL 4 TIMES DAILY
Qty: 60 TABLET | Refills: 0 | Status: SHIPPED | OUTPATIENT
Start: 2023-07-20 | End: 2023-08-04

## 2023-07-20 NOTE — PROGRESS NOTES
DATE: 7/31/2023  PATIENT: Bri Santiago    Supervising Physician: Matthew Suarez M.D.    CHIEF COMPLAINT: neck pain    HISTORY:  Bri Santiago is a 62 y.o. male with pmhx of C5/6 ACDF in the '90s, osteopenia here for initial evaluation of neck and right arm pain (Neck - 2, Arm - 8). The pain has been present for about . The patient describes the pain as dull. The pain is worse with any movement and improved by nothing. There is associated numbness and tingling. There is no subjective weakness. Prior treatments have included tylenol and an ODALYS over 20 years ago, but no surgery.     The patient denies myelopathic symptoms such as handwriting changes or difficulty with buttons/coins/keys. Denies perineal paresthesias, bowel/bladder dysfunction.    PAST MEDICAL/SURGICAL HISTORY:  Past Medical History:   Diagnosis Date    Acute pancreatitis     Anal fissure     Anemia     Anticoagulant long-term use     Arthritis     Asthma in remission     Back pain     BPH (benign prostatic hypertrophy)     Cancer 2000    prostate- treated at [facility] with chemo- in remission since 2000    Chronic maxillary sinusitis     Clotting disorder     Diastolic dysfunction with chronic heart failure 12/3/2018    Dysphagia 10/7/2014    Family history of colon cancer     Family history of early CAD     GERD (gastroesophageal reflux disease)     Helicobacter pylori (H. pylori) infection     Chronic    History of chronic pancreatitis     HTN (hypertension)     Lumbago 11/12/2012    Obesity     ABDON (obstructive sleep apnea)     ABDON (obstructive sleep apnea)     With likely ohs Refer to sleep    Sacroiliac joint pain 2/10/2015    Spinal stenosis of lumbar region     Von Willebrand disease     VWD (acquired von Willebrand's disease)          Medications:  Current Outpatient Medications on File Prior to Visit   Medication Sig Dispense Refill    albuterol (PROVENTIL/VENTOLIN HFA) 90 mcg/actuation inhaler INHALE 2 PUFFS INTO THE LUNGS EVERY 6 HOURS  AS NEEDED FOR WHEEZING OR SHORTNESS OF BREATH 18 g 4    atorvastatin (LIPITOR) 80 MG tablet Take 1 tablet (80 mg total) by mouth every evening. 90 tablet 3    azelastine (ASTELIN) 137 mcg (0.1 %) nasal spray Two sprays in each nostril, sniff until absorbed, then follow with 1 spray of fluticasone.  Use both sprays twice daily. (Patient taking differently: Two sprays in each nostril, sniff until absorbed, then follow with 1 spray of fluticasone.  Use both sprays twice daily.(PATIENT USES NIGHTLY 3/12/2021)) 30 mL 11    budesonide-formoterol 160-4.5 mcg (SYMBICORT) 160-4.5 mcg/actuation HFAA INHALE 2 PUFFS INTO THE LUNGS EVERY 12 HOURS 10.2 g 12    calcium citrate-vitamin D3 315-200 mg (CITRACAL+D) 315-200 mg-unit per tablet Take 1 tablet by mouth nightly.       clopidogreL (PLAVIX) 75 mg tablet TAKE 1 TABLET(75 MG) BY MOUTH EVERY DAY 90 tablet 3    cyanocobalamin, vitamin B-12, 50 mcg tablet Take 50 mcg by mouth nightly.       docusate sodium (COLACE) 100 MG capsule Take 1 tablet by mouth Twice daily. 1 Capsule Oral Twice a day .  Take with pain medicine      doxazosin (CARDURA) 1 MG tablet TAKE 1 TABLET BY MOUTH EVERY EVENING 90 tablet 3    fluticasone propionate (FLONASE) 50 mcg/actuation nasal spray One spray in each nostril twice daily after 1st using azelastine nasal spray 18.2 mL 11    furosemide (LASIX) 20 MG tablet Take 1 tablet (20 mg total) by mouth once daily. 90 tablet 0    ipratropium (ATROVENT) 42 mcg (0.06 %) nasal spray 1-2 sprays in each nostril before eating and at bedtime as needed 30 mL 11    methocarbamoL (ROBAXIN) 500 MG Tab Take 1 tablet (500 mg total) by mouth 4 (four) times daily. for 15 days 60 tablet 0    RABEprazole (ACIPHEX) 20 mg tablet Take 1 tablet (20 mg total) by mouth every 12 (twelve) hours. 180 tablet 1    testosterone (ANDRODERM) 2 mg/24 hour PT24 APPLY 1 PATCH TOPICALLY TO THE SKIN EVERY DAY 60 patch 3    zolpidem (AMBIEN) 10 mg Tab TAKE 1 TABLET BY MOUTH EVERY DAY AT BEDTIME  (Patient taking differently: Take 10 mg by mouth nightly as needed.) 20 tablet 0    tadalafiL (CIALIS) 20 MG Tab Take 1 tablet (20 mg total) by mouth as needed. Take every 36 hours as needed for ED. 30 tablet 9    [DISCONTINUED] tamsulosin (FLOMAX) 0.4 mg Cap Take 1 capsule (0.4 mg total) by mouth once daily. 30 capsule 0     Current Facility-Administered Medications on File Prior to Visit   Medication Dose Route Frequency Provider Last Rate Last Admin    0.9%  NaCl infusion   Intravenous Continuous Milla Oliveira NP 70 mL/hr at 03/15/21 1417 New Bag at 03/15/21 1417    0.9%  NaCl infusion   Intravenous Continuous Milla Oliveira NP 70 mL/hr at 12/10/21 0736 New Bag at 12/10/21 0736    0.9%  NaCl infusion   Intravenous Continuous Jaqueline Langley NP        mupirocin 2 % ointment   Nasal On Call Procedure Milla Oliveira NP   Given at 12/10/21 0729    mupirocin 2 % ointment   Nasal On Call Procedure Jaqueline Langley NP   Given at 02/25/22 0623       Social History:   Social History     Socioeconomic History    Marital status:    Occupational History    Occupation: disabled due to back injury   Tobacco Use    Smoking status: Never    Smokeless tobacco: Never   Substance and Sexual Activity    Alcohol use: Yes     Alcohol/week: 1.0 standard drink of alcohol     Types: 1 Glasses of wine per week     Comment: social    Drug use: No    Sexual activity: Yes     Partners: Female   Social History Narrative    wlking for exercised       REVIEW OF SYSTEMS:  Constitution: Negative. Negative for chills, fever and night sweats.   Cardiovascular: Negative for chest pain and syncope.   Respiratory: Negative for cough and shortness of breath.   Gastrointestinal: See HPI. Negative for nausea/vomiting. Negative for abdominal pain.  Genitourinary: See HPI. Negative for discoloration or dysuria.  Skin: Negative for dry skin, itching and rash.   Hematologic/Lymphatic: Negative for bleeding problem. Does not  "bruise/bleed easily.   Musculoskeletal: Negative for falls and muscle weakness.   Neurological: See HPI. No seizures.   Endocrine: Negative for polydipsia, polyphagia and polyuria.   Allergic/Immunologic: Negative for hives and persistent infections.  Psychiatric/Behavioral: Negative for depression and insomnia.         EXAM:  Ht 5' 11" (1.803 m)   Wt 107.8 kg (237 lb 10.5 oz)   BMI 33.15 kg/m²     General: The patient is a 62 y.o. male in no apparent distress, the patient is oriented to person, place and time.  Psych: Normal mood and affect  HEENT: Vision grossly intact, hearing intact to the spoken word.  Lungs: Respirations unlabored.  Gait: Normal station and gait, no difficulty with toe or heel walk.   Skin: Cervical skin negative for rashes, lesions, hairy patches and surgical scars.  Range of motion: Cervical range of motion is acceptable. There is mild tenderness to palpation.  Spinal Balance: Global saggital and coronal spinal balance acceptable, no significant for scoliosis and kyphosis.  Musculoskeletal: No pain with the range of motion of the bilateral shoulders and elbows. Normal bulk and contour of the bilateral hands.  Vascular: Bilateral hands warm and well perfused, radial pulses 2+ bilaterally.  Neurological: Normal strength and tone in all major motor groups in the bilateral upper and lower extremities. Normal sensation to light touch in the C5-T1 and L2-S1 dermatomes bilaterally.  Deep tendon reflexes symmetric 2+ in the bilateral upper and lower extremities.  Negative Inverted Radial Reflex and Nielson's bilaterally. Negative Babinski bilaterally.     IMAGING:   Today I personally reviewed AP, Lat and Flex/Ex  upright C-spine films that demonstrate moderate DDD at C4/5 and severe DDD at C5/6.      Body mass index is 33.15 kg/m².    Hemoglobin A1C   Date Value Ref Range Status   01/23/2023 4.6 4.0 - 5.6 % Final     Comment:     ADA Screening Guidelines:  5.7-6.4%  Consistent with " prediabetes  >or=6.5%  Consistent with diabetes    High levels of fetal hemoglobin interfere with the HbA1C  assay. Heterozygous hemoglobin variants (HbS, HgC, etc)do  not significantly interfere with this assay.   However, presence of multiple variants may affect accuracy.     01/23/2020 4.5 4.0 - 5.6 % Final     Comment:     ADA Screening Guidelines:  5.7-6.4%  Consistent with prediabetes  >or=6.5%  Consistent with diabetes  High levels of fetal hemoglobin interfere with the HbA1C  assay. Heterozygous hemoglobin variants (HbS, HgC, etc)do  not significantly interfere with this assay.   However, presence of multiple variants may affect accuracy.     10/16/2017 4.6 4.0 - 5.6 % Final     Comment:     According to ADA guidelines, hemoglobin A1c <7.0% represents  optimal control in non-pregnant diabetic patients. Different  metrics may apply to specific patient populations.   Standards of Medical Care in Diabetes-2016.  For the purpose of screening for the presence of diabetes:  <5.7%     Consistent with the absence of diabetes  5.7-6.4%  Consistent with increasing risk for diabetes   (prediabetes)  >or=6.5%  Consistent with diabetes  Currently, no consensus exists for use of hemoglobin A1c  for diagnosis of diabetes for children.  This Hemoglobin A1c assay has significant interference with fetal   hemoglobin   (HbF). The results are invalid for patients with abnormal amounts of   HbF,   including those with known Hereditary Persistence   of Fetal Hemoglobin. Heterozygous hemoglobin variants (HbAS, HbAC,   HbAD, HbAE, HbA2) do not significantly interfere with this assay;   however, presence of multiple variants in a sample may impact the %   interference.             ASSESSMENT/PLAN:    Bri was seen today for neck pain, shoulder pain and pain.    Diagnoses and all orders for this visit:    DDD (degenerative disc disease), cervical  -     MRI Cervical Spine Without Contrast; Future  -     pregabalin (LYRICA) 25 MG  capsule; Take 1-2 capsules (25-50 mg total) by mouth every evening.    Right cervical radiculopathy  -     Ambulatory referral/consult to [facility]  -     MRI Cervical Spine Without Contrast; Future  -     pregabalin (LYRICA) 25 MG capsule; Take 1-2 capsules (25-50 mg total) by mouth every evening.    S/P cervical spinal fusion  -     MRI Cervical Spine Without Contrast; Future  -     pregabalin (LYRICA) 25 MG capsule; Take 1-2 capsules (25-50 mg total) by mouth every evening.      Today we discussed at length all of the different treatment options including anti-inflammatories, acetaminophen, rest, ice, heat, physical therapy including strengthening and stretching exercises, home exercises, ROM, aerobic conditioning, aqua therapy, other modalities including ultrasound, massage, and dry needling, epidural steroid injections and finally surgical intervention.  cervical MRI ordered, he will follow up in the clinic for results.

## 2023-07-20 NOTE — PROGRESS NOTES
INTERNAL MEDICINE URGENT VISIT NOTE    CHIEF COMPLAINT     Chief Complaint   Patient presents with    Cyst     Left leg    Shoulder Pain     Right side    Arm Pain     Right side       HPI     Bri Santiago is a 63 y.o. male who presents for an urgent visit today.    PCP is Cate Galeas MD, patient is known to me.     Patient presents with complaints of right shoulder and arm pain that is worse with movements and improved with rest.   No trauma or injury  No numbness or tingling    Also with cyst to the left leg - not painful, has been present for months.   No bleeding or drainage from this lesion.       Past Medical History:  Past Medical History:   Diagnosis Date    Acute pancreatitis     Anal fissure     Anemia     Anticoagulant long-term use     Arthritis     Asthma in remission     Back pain     BPH (benign prostatic hypertrophy)     Cancer 2000    prostate- treated at [facility] with chemo- in remission since 2000    Chronic maxillary sinusitis     Clotting disorder     Diastolic dysfunction with chronic heart failure 12/3/2018    Dysphagia 10/7/2014    Family history of colon cancer     Family history of early CAD     GERD (gastroesophageal reflux disease)     Helicobacter pylori (H. pylori) infection     Chronic    History of chronic pancreatitis     HTN (hypertension)     Lumbago 11/12/2012    Obesity     ABDON (obstructive sleep apnea)     ABDON (obstructive sleep apnea)     With likely ohs Refer to sleep    Sacroiliac joint pain 2/10/2015    Spinal stenosis of lumbar region     Von Willebrand disease     VWD (acquired von Willebrand's disease)        Home Medications:  Prior to Admission medications    Medication Sig Start Date End Date Taking? Authorizing Provider   albuterol (PROVENTIL/VENTOLIN HFA) 90 mcg/actuation inhaler INHALE 2 PUFFS INTO THE LUNGS EVERY 6 HOURS AS NEEDED FOR WHEEZING OR SHORTNESS OF BREATH 3/9/20  Yes Geeta Hall MD   atorvastatin (LIPITOR) 80 MG tablet Take 1 tablet (80 mg  total) by mouth every evening. 5/1/23  Yes Damian Liu MD   azelastine (ASTELIN) 137 mcg (0.1 %) nasal spray Two sprays in each nostril, sniff until absorbed, then follow with 1 spray of fluticasone.  Use both sprays twice daily.  Patient taking differently: Two sprays in each nostril, sniff until absorbed, then follow with 1 spray of fluticasone.  Use both sprays twice daily.(PATIENT USES NIGHTLY 3/12/2021) 2/11/21  Yes LAURENT Swanson MD   budesonide-formoterol 160-4.5 mcg (SYMBICORT) 160-4.5 mcg/actuation HFAA INHALE 2 PUFFS INTO THE LUNGS EVERY 12 HOURS 3/26/20  Yes Geeta Hall MD   calcium citrate-vitamin D3 315-200 mg (CITRACAL+D) 315-200 mg-unit per tablet Take 1 tablet by mouth nightly.    Yes Provider, Historical   clopidogreL (PLAVIX) 75 mg tablet TAKE 1 TABLET(75 MG) BY MOUTH EVERY DAY 2/1/23  Yes Rehana Lopez MD   cyanocobalamin, vitamin B-12, 50 mcg tablet Take 50 mcg by mouth nightly.    Yes Provider, Historical   docusate sodium (COLACE) 100 MG capsule Take 1 tablet by mouth Twice daily. 1 Capsule Oral Twice a day .  Take with pain medicine   Yes Provider, Historical   doxazosin (CARDURA) 1 MG tablet TAKE 1 TABLET BY MOUTH EVERY EVENING 5/31/23  Yes Cate Galeas MD   fluticasone propionate (FLONASE) 50 mcg/actuation nasal spray One spray in each nostril twice daily after 1st using azelastine nasal spray 6/15/21  Yes LAURENT Swanson MD   furosemide (LASIX) 20 MG tablet Take 1 tablet (20 mg total) by mouth once daily. 1/31/23  Yes Cate Galeas MD   ipratropium (ATROVENT) 42 mcg (0.06 %) nasal spray 1-2 sprays in each nostril before eating and at bedtime as needed 6/15/21  Yes LAURENT Swanson MD   RABEprazole (ACIPHEX) 20 mg tablet Take 1 tablet (20 mg total) by mouth every 12 (twelve) hours. 4/27/23 10/24/23 Yes Rosendo Boyer MD   testosterone (ANDRODERM) 2 mg/24 hour PT24 APPLY 1 PATCH TOPICALLY TO THE SKIN EVERY DAY 1/5/22  Yes Chris Min MD   zolpidem  (AMBIEN) 10 mg Tab TAKE 1 TABLET BY MOUTH EVERY DAY AT BEDTIME  Patient taking differently: Take 10 mg by mouth nightly as needed. 6/23/17  Yes Darvin Henry MD   diclofenac sodium (VOLTAREN) 1 % Gel Apply 2 g topically 4 (four) times daily. 8/15/23   Fermín De Jesus, WEI   pregabalin (LYRICA) 25 MG capsule Take 1-2 capsules (25-50 mg total) by mouth every evening. 7/31/23 1/29/24  VY Weldon, NP   tadalafiL (CIALIS) 20 MG Tab Take 1 tablet (20 mg total) by mouth as needed. Take every 36 hours as needed for ED. 6/2/22 6/2/23  Darvin Hope MD   tamsulosin (FLOMAX) 0.4 mg Cap Take 1 capsule (0.4 mg total) by mouth once daily. 4/4/21 4/4/21  Darnell Lee MD       Review of Systems:  Review of Systems   Constitutional:  Negative for activity change and unexpected weight change.   HENT:  Negative for hearing loss, rhinorrhea and trouble swallowing.    Eyes:  Negative for discharge and visual disturbance.   Respiratory:  Negative for chest tightness and wheezing.    Cardiovascular:  Negative for chest pain and palpitations.   Gastrointestinal:  Negative for blood in stool, constipation, diarrhea and vomiting.   Endocrine: Negative for polydipsia and polyuria.   Genitourinary:  Negative for difficulty urinating, hematuria and urgency.   Musculoskeletal:  Positive for arthralgias and neck pain. Negative for joint swelling.   Neurological:  Negative for weakness and headaches.   Psychiatric/Behavioral:  Negative for confusion and dysphoric mood.        Health Maintainence:   Immunizations:  Health Maintenance         Date Due Completion Date    Shingles Vaccine (1 of 2) Never done ---    Influenza Vaccine (1) 09/01/2023 11/7/2022    Aspirin/Antiplatelet Therapy 08/15/2024 8/15/2023    Colorectal Cancer Screening 03/04/2025 3/4/2020    Hemoglobin A1c (Diabetic Prevention Screening) 01/23/2026 1/23/2023    TETANUS VACCINE 10/16/2027 10/16/2017    Lipid Panel 01/23/2028 1/23/2023             PHYSICAL  "EXAM     /78 (BP Location: Left arm, Patient Position: Sitting, BP Method: Large (Manual))   Pulse 65   Ht 5' 11" (1.803 m)   Wt 110 kg (242 lb 8.1 oz)   SpO2 98%   BMI 33.82 kg/m²     Physical Exam  Vitals and nursing note reviewed.   Constitutional:       Appearance: Normal appearance.      Comments: Healthy appearing male in NAD or apparent pain. He makes good eye contact, speaks in clear full sentences and ambulates with ease.        HENT:      Head: Normocephalic and atraumatic.      Nose: Nose normal.      Mouth/Throat:      Pharynx: Oropharynx is clear.   Eyes:      Conjunctiva/sclera: Conjunctivae normal.   Cardiovascular:      Rate and Rhythm: Normal rate and regular rhythm.      Pulses: Normal pulses.   Pulmonary:      Effort: No respiratory distress.   Abdominal:      Tenderness: There is no abdominal tenderness.   Musculoskeletal:         General: Normal range of motion.      Cervical back: No rigidity.      Comments: No C T or L midline bony TTP crepitus or step offs.    Skin:     General: Skin is warm and dry.      Capillary Refill: Capillary refill takes less than 2 seconds.      Findings: No rash.   Neurological:      General: No focal deficit present.      Mental Status: He is alert.      Gait: Gait normal.   Psychiatric:         Mood and Affect: Mood normal.         LABS     Lab Results   Component Value Date    HGBA1C 4.6 01/23/2023     CMP  Sodium   Date Value Ref Range Status   03/16/2023 139 136 - 145 mmol/L Final     Potassium   Date Value Ref Range Status   03/16/2023 3.4 (L) 3.5 - 5.1 mmol/L Final     Chloride   Date Value Ref Range Status   03/16/2023 104 95 - 110 mmol/L Final     CO2   Date Value Ref Range Status   03/16/2023 25 23 - 29 mmol/L Final     Glucose   Date Value Ref Range Status   03/16/2023 81 70 - 110 mg/dL Final     BUN   Date Value Ref Range Status   03/16/2023 14 6 - 20 mg/dL Final     Creatinine   Date Value Ref Range Status   03/16/2023 1.0 0.5 - 1.4 mg/dL " Final     Calcium   Date Value Ref Range Status   03/16/2023 9.1 8.7 - 10.5 mg/dL Final     Total Protein   Date Value Ref Range Status   03/16/2023 7.4 6.0 - 8.4 g/dL Final     Albumin   Date Value Ref Range Status   03/16/2023 4.2 3.5 - 5.2 g/dL Final   05/03/2021 4.0 3.6 - 5.1 g/dL Final     Comment:     For additional information, please refer to   http://education.[employer]/faq/DIZ016 (This link is   being provided for informational/ educational purposes only.)    This test was developed and its analytical performance   characteristics have been determined by [facility]. It has not been cleared or approved by the   [place] Food and Drug Administration. This assay has been validated   pursuant to the CLIA regulations and is used for clinical   purposes.  @ Test Performed By:  [facility]  Baudilio Loomis M.D.,   [address] [place] [id number]  [place]  [id number]       Total Bilirubin   Date Value Ref Range Status   03/16/2023 1.8 (H) 0.1 - 1.0 mg/dL Final     Comment:     For infants and newborns, interpretation of results should be based  on gestational age, weight and in agreement with clinical  observations.    Premature Infant recommended reference ranges:  Up to 24 hours.............<8.0 mg/dL  Up to 48 hours............<12.0 mg/dL  3-5 days..................<15.0 mg/dL  6-29 days.................<15.0 mg/dL       Alkaline Phosphatase   Date Value Ref Range Status   03/16/2023 69 55 - 135 U/L Final     AST   Date Value Ref Range Status   03/16/2023 18 10 - 40 U/L Final     ALT   Date Value Ref Range Status   03/16/2023 20 10 - 44 U/L Final     Anion Gap   Date Value Ref Range Status   03/16/2023 10 8 - 16 mmol/L Final     eGFR if    Date Value Ref Range Status   07/19/2022 >60.0 >60 mL/min/1.73 m^2 Final     eGFR if non    Date Value Ref Range Status   07/19/2022 >60.0 >60  mL/min/1.73 m^2 Final     Comment:     Calculation used to obtain the estimated glomerular filtration  rate (eGFR) is the CKD-EPI equation.        Lab Results   Component Value Date    WBC 9.51 03/16/2023    HGB 14.3 03/16/2023    HCT 43.8 03/16/2023    MCV 93 03/16/2023     03/16/2023     Lab Results   Component Value Date    CHOL 125 01/23/2023    CHOL 128 07/08/2021    CHOL 111 (L) 01/23/2020     Lab Results   Component Value Date    HDL 32 (L) 01/23/2023    HDL 35 (L) 07/08/2021    HDL 29 (L) 01/23/2020     Lab Results   Component Value Date    LDLCALC 80.4 01/23/2023    LDLCALC 72.2 07/08/2021    LDLCALC 66.8 01/23/2020     Lab Results   Component Value Date    TRIG 63 01/23/2023    TRIG 104 07/08/2021    TRIG 76 01/23/2020     Lab Results   Component Value Date    CHOLHDL 25.6 01/23/2023    CHOLHDL 27.3 07/08/2021    CHOLHDL 26.1 01/23/2020     Lab Results   Component Value Date    TSH 1.243 01/23/2023    [id number] 6.3 06/15/2010       ASSESSMENT/PLAN     Bri Santiago is a 63 y.o. male     Harmarcelo was seen today for cyst, shoulder pain and arm pain.    Diagnoses and all orders for this visit:    Leg mass, left  -     US Soft Tissue, Lower Extremity, Left; Future    Right cervical radiculopathy  -     Ambulatory referral/consult to Physical/Occupational Therapy; Future  -     X-Ray Cervical Spine AP And Lateral; Future  -     Ambulatory referral/consult to Back [facility] Spine Clinic; Future    Other orders  -     methocarbamoL (ROBAXIN) 500 MG Tab; Take 1 tablet (500 mg total) by mouth 4 (four) times daily. for 15 days            . Patient was counseled on when and how to seek emergent care.       Winnie Jacob PA-C   Department of Internal Medicine - [facility]   11:19 AM   Answers submitted by the patient for this visit:  Review of Systems Questionnaire (Submitted on 7/20/2023)  activity change: No  unexpected weight change: No  neck pain: Yes  hearing loss: No  rhinorrhea: No  trouble swallowing:  No  eye discharge: No  visual disturbance: No  chest tightness: No  wheezing: No  chest pain: No  palpitations: No  blood in stool: No  constipation: No  vomiting: No  diarrhea: No  polydipsia: No  polyuria: No  difficulty urinating: No  urgency: No  hematuria: No  joint swelling: No  arthralgias: Yes  headaches: No  weakness: No  confusion: No  dysphoric mood: No

## 2023-07-21 DIAGNOSIS — R22.42 LEG MASS, LEFT: Primary | ICD-10-CM

## 2023-07-27 ENCOUNTER — INITIAL CONSULT (OUTPATIENT)
Dept: VASCULAR SURGERY | Facility: CLINIC | Age: 63
End: 2023-07-27
Attending: SURGERY
Payer: MEDICARE

## 2023-07-27 VITALS
TEMPERATURE: 98 F | WEIGHT: 242.5 LBS | BODY MASS INDEX: 33.95 KG/M2 | DIASTOLIC BLOOD PRESSURE: 82 MMHG | HEIGHT: 71 IN | SYSTOLIC BLOOD PRESSURE: 129 MMHG | HEART RATE: 66 BPM

## 2023-07-27 DIAGNOSIS — I83.90 ASYMPTOMATIC RETICULAR VEINS 1 MM TO 3 MM IN DIAMETER: ICD-10-CM

## 2023-07-27 DIAGNOSIS — I83.93 SPIDER VEINS OF BOTH LOWER EXTREMITIES: Primary | ICD-10-CM

## 2023-07-27 PROCEDURE — 99999 PR PBB SHADOW E&M-EST. PATIENT-LVL IV: ICD-10-PCS | Mod: PBBFAC,,, | Performed by: SURGERY

## 2023-07-27 PROCEDURE — 99999 PR PBB SHADOW E&M-EST. PATIENT-LVL IV: CPT | Mod: PBBFAC,,, | Performed by: SURGERY

## 2023-07-27 PROCEDURE — 99202 OFFICE O/P NEW SF 15 MIN: CPT | Mod: S$GLB,,, | Performed by: SURGERY

## 2023-07-27 PROCEDURE — 99202 PR OFFICE/OUTPT VISIT, NEW, LEVL II, 15-29 MIN: ICD-10-PCS | Mod: S$GLB,,, | Performed by: SURGERY

## 2023-07-27 NOTE — PROGRESS NOTES
VASCULAR SURGERY NOTE    Patient ID: Bri Santiago is a 62 y.o. male.    I. HISTORY     Chief Complaint: PAD    HPI: Bri Santiago is a 62 y.o. male who is here today for new patient initial appointment. Multiple complaints including veins in legs and leg fatigue and pain in his knee and calf. Patient was referred by Dr. Galeas for PAD. He is also concerned about the spider veins in his legs.     ALLERGIES: ASA, penicillin, codeine, dilaudid, gabapentin, ACE inhibitors    MEDICATIONS: reviewed in EMR    Past Medical History:   Diagnosis Date    Acute pancreatitis     Anal fissure     Anemia     Anticoagulant long-term use     Arthritis     Asthma in remission     Back pain     BPH (benign prostatic hypertrophy)     Cancer 2000    prostate- treated at [facility] with chemo- in remission since 2000    Chronic maxillary sinusitis     Clotting disorder     Diastolic dysfunction with chronic heart failure 12/3/2018    Dysphagia 10/7/2014    Family history of colon cancer     Family history of early CAD     GERD (gastroesophageal reflux disease)     Helicobacter pylori (H. pylori) infection     Chronic    History of chronic pancreatitis     HTN (hypertension)     Lumbago 11/12/2012    Obesity     ABDON (obstructive sleep apnea)     ABDON (obstructive sleep apnea)     With likely ohs Refer to sleep    Sacroiliac joint pain 2/10/2015    Spinal stenosis of lumbar region     Von Willebrand disease     VWD (acquired von Willebrand's disease)         Surgical history: reviewed (extensive), multiple procedures for lumbar back pain    Social History     Occupational History    Occupation: disabled due to back injury   Tobacco Use    Smoking status: Never    Smokeless tobacco: Never   Substance and Sexual Activity    Alcohol use: Yes     Alcohol/week: 1.0 standard drink of alcohol     Types: 1 Glasses of wine per week     Comment: social    Drug use: No    Sexual activity: Yes     Partners: Female         ROS      II. PHYSICAL  EXAM     Physical Exam  Constitutional:       General: He is not in acute distress.     Appearance: Normal appearance. He is normal weight. He is not ill-appearing or diaphoretic.   HENT:      Head: Normocephalic and atraumatic.   Eyes:      General: No scleral icterus.        Right eye: No discharge.         Left eye: No discharge.      Extraocular Movements: Extraocular movements intact.      Conjunctiva/sclera: Conjunctivae normal.   Cardiovascular:      Rate and Rhythm: Normal rate and regular rhythm.   Pulmonary:      Effort: Pulmonary effort is normal. No respiratory distress.   Musculoskeletal:         General: Normal range of motion.      Cervical back: Normal range of motion and neck supple.   Skin:     General: Skin is warm and dry.      Comments: Spider veins and reticular veins in bilateral lower extremities   Neurological:      General: No focal deficit present.      Mental Status: He is alert and oriented to person, place, and time.   Psychiatric:         Mood and Affect: Mood normal.         Behavior: Behavior normal.       VASC:  RLE: 2+ popliteal, 2+ DP pulse,   LLE: 2+ popliteal, 2+ DP pulse,     III. ASSESSMENT & PLAN (MEDICAL DECISION MAKING)       Imaging Results: (I have personally reviewed all images and provided interpretation below)   No new imaging      Assessment/Diagnosis and Plan:    1. Spider veins of both lower extremities    2. Asymptomatic reticular veins 1 mm to 3 mm in diameter        62 y.o. male with spider veins of bilateral lower extremities. He is concerned about the veins and I explained that they pose no risk to his health and no risk of blood clot. He has palpable pedal pulses bilaterally and no evidence of PAD. I do not think that his lower extremity discomfort is related to vascular claudication. I suspect neurogenic claudication considering his long history of back pain and lumbar stenosis.    -no surgical or medical treatment indicated, RTC FRANCINE Jones II,  MD, RPVI  Vascular Surgery  [facility]

## 2023-07-31 ENCOUNTER — OFFICE VISIT (OUTPATIENT)
Dept: ORTHOPEDICS | Facility: CLINIC | Age: 63
End: 2023-07-31
Payer: MEDICARE

## 2023-07-31 VITALS — HEIGHT: 71 IN | BODY MASS INDEX: 33.27 KG/M2 | WEIGHT: 237.63 LBS

## 2023-07-31 DIAGNOSIS — M50.30 DDD (DEGENERATIVE DISC DISEASE), CERVICAL: Primary | ICD-10-CM

## 2023-07-31 DIAGNOSIS — Z98.1 S/P CERVICAL SPINAL FUSION: ICD-10-CM

## 2023-07-31 DIAGNOSIS — M54.12 RIGHT CERVICAL RADICULOPATHY: ICD-10-CM

## 2023-07-31 PROCEDURE — 1160F RVW MEDS BY RX/DR IN RCRD: CPT | Mod: CPTII,S$GLB,, | Performed by: REGISTERED NURSE

## 2023-07-31 PROCEDURE — 3008F BODY MASS INDEX DOCD: CPT | Mod: CPTII,S$GLB,, | Performed by: REGISTERED NURSE

## 2023-07-31 PROCEDURE — 99204 OFFICE O/P NEW MOD 45 MIN: CPT | Mod: S$GLB,,, | Performed by: REGISTERED NURSE

## 2023-07-31 PROCEDURE — 99999 PR PBB SHADOW E&M-EST. PATIENT-LVL IV: CPT | Mod: PBBFAC,,, | Performed by: REGISTERED NURSE

## 2023-07-31 PROCEDURE — 3044F PR MOST RECENT HEMOGLOBIN A1C LEVEL <7.0%: ICD-10-PCS | Mod: CPTII,S$GLB,, | Performed by: REGISTERED NURSE

## 2023-07-31 PROCEDURE — 1159F MED LIST DOCD IN RCRD: CPT | Mod: CPTII,S$GLB,, | Performed by: REGISTERED NURSE

## 2023-07-31 PROCEDURE — 1160F PR REVIEW ALL MEDS BY PRESCRIBER/CLIN PHARMACIST DOCUMENTED: ICD-10-PCS | Mod: CPTII,S$GLB,, | Performed by: REGISTERED NURSE

## 2023-07-31 PROCEDURE — 1159F PR MEDICATION LIST DOCUMENTED IN MEDICAL RECORD: ICD-10-PCS | Mod: CPTII,S$GLB,, | Performed by: REGISTERED NURSE

## 2023-07-31 PROCEDURE — 99204 PR OFFICE/OUTPT VISIT, NEW, LEVL IV, 45-59 MIN: ICD-10-PCS | Mod: S$GLB,,, | Performed by: REGISTERED NURSE

## 2023-07-31 PROCEDURE — 99999 PR PBB SHADOW E&M-EST. PATIENT-LVL IV: ICD-10-PCS | Mod: PBBFAC,,, | Performed by: REGISTERED NURSE

## 2023-07-31 PROCEDURE — 3044F HG A1C LEVEL LT 7.0%: CPT | Mod: CPTII,S$GLB,, | Performed by: REGISTERED NURSE

## 2023-07-31 PROCEDURE — 3008F PR BODY MASS INDEX (BMI) DOCUMENTED: ICD-10-PCS | Mod: CPTII,S$GLB,, | Performed by: REGISTERED NURSE

## 2023-07-31 RX ORDER — PREGABALIN 25 MG/1
25-50 CAPSULE ORAL NIGHTLY
Qty: 60 CAPSULE | Refills: 5 | Status: ON HOLD | OUTPATIENT
Start: 2023-07-31 | End: 2023-11-08 | Stop reason: HOSPADM

## 2023-08-02 ENCOUNTER — TELEPHONE (OUTPATIENT)
Dept: SPORTS MEDICINE | Facility: CLINIC | Age: 63
End: 2023-08-02
Payer: MEDICARE

## 2023-08-02 NOTE — TELEPHONE ENCOUNTER
Called pt to advise that he will need to f/u with Fermín De Jesus PA-C if cyst aspiration/injection did not provide relief. Pt r/s to 8/15 with Fermín De Jesus.    Nirmala Trujillo MS, OTC  Clinical Assistant to Dr. Maria Eugenia Stephens

## 2023-08-15 ENCOUNTER — DOCUMENTATION ONLY (OUTPATIENT)
Dept: REHABILITATION | Facility: OTHER | Age: 63
End: 2023-08-15

## 2023-08-15 ENCOUNTER — OFFICE VISIT (OUTPATIENT)
Dept: SPORTS MEDICINE | Facility: CLINIC | Age: 63
End: 2023-08-15
Payer: MEDICARE

## 2023-08-15 VITALS
DIASTOLIC BLOOD PRESSURE: 85 MMHG | BODY MASS INDEX: 33.95 KG/M2 | WEIGHT: 242.5 LBS | SYSTOLIC BLOOD PRESSURE: 126 MMHG | HEART RATE: 71 BPM | HEIGHT: 71 IN

## 2023-08-15 DIAGNOSIS — M25.562 ACUTE PAIN OF LEFT KNEE: ICD-10-CM

## 2023-08-15 DIAGNOSIS — M17.12 PRIMARY OSTEOARTHRITIS OF LEFT KNEE: ICD-10-CM

## 2023-08-15 DIAGNOSIS — M71.22 BAKER'S CYST OF KNEE, LEFT: Primary | ICD-10-CM

## 2023-08-15 PROBLEM — R29.898 ARM WEAKNESS: Status: ACTIVE | Noted: 2023-08-15

## 2023-08-15 PROBLEM — M54.2 NECK PAIN: Status: ACTIVE | Noted: 2023-08-15

## 2023-08-15 PROCEDURE — 3044F HG A1C LEVEL LT 7.0%: CPT | Mod: CPTII,S$GLB,, | Performed by: PHYSICIAN ASSISTANT

## 2023-08-15 PROCEDURE — 3044F PR MOST RECENT HEMOGLOBIN A1C LEVEL <7.0%: ICD-10-PCS | Mod: CPTII,S$GLB,, | Performed by: PHYSICIAN ASSISTANT

## 2023-08-15 PROCEDURE — 1159F MED LIST DOCD IN RCRD: CPT | Mod: CPTII,S$GLB,, | Performed by: PHYSICIAN ASSISTANT

## 2023-08-15 PROCEDURE — 1159F PR MEDICATION LIST DOCUMENTED IN MEDICAL RECORD: ICD-10-PCS | Mod: CPTII,S$GLB,, | Performed by: PHYSICIAN ASSISTANT

## 2023-08-15 PROCEDURE — 99999 PR PBB SHADOW E&M-EST. PATIENT-LVL IV: ICD-10-PCS | Mod: PBBFAC,,, | Performed by: PHYSICIAN ASSISTANT

## 2023-08-15 PROCEDURE — 99214 PR OFFICE/OUTPT VISIT, EST, LEVL IV, 30-39 MIN: ICD-10-PCS | Mod: S$GLB,,, | Performed by: PHYSICIAN ASSISTANT

## 2023-08-15 PROCEDURE — 3074F SYST BP LT 130 MM HG: CPT | Mod: CPTII,S$GLB,, | Performed by: PHYSICIAN ASSISTANT

## 2023-08-15 PROCEDURE — 99999 PR PBB SHADOW E&M-EST. PATIENT-LVL IV: CPT | Mod: PBBFAC,,, | Performed by: PHYSICIAN ASSISTANT

## 2023-08-15 PROCEDURE — 99214 OFFICE O/P EST MOD 30 MIN: CPT | Mod: S$GLB,,, | Performed by: PHYSICIAN ASSISTANT

## 2023-08-15 PROCEDURE — 3008F PR BODY MASS INDEX (BMI) DOCUMENTED: ICD-10-PCS | Mod: CPTII,S$GLB,, | Performed by: PHYSICIAN ASSISTANT

## 2023-08-15 PROCEDURE — 3079F DIAST BP 80-89 MM HG: CPT | Mod: CPTII,S$GLB,, | Performed by: PHYSICIAN ASSISTANT

## 2023-08-15 PROCEDURE — 3008F BODY MASS INDEX DOCD: CPT | Mod: CPTII,S$GLB,, | Performed by: PHYSICIAN ASSISTANT

## 2023-08-15 PROCEDURE — 3074F PR MOST RECENT SYSTOLIC BLOOD PRESSURE < 130 MM HG: ICD-10-PCS | Mod: CPTII,S$GLB,, | Performed by: PHYSICIAN ASSISTANT

## 2023-08-15 PROCEDURE — 3079F PR MOST RECENT DIASTOLIC BLOOD PRESSURE 80-89 MM HG: ICD-10-PCS | Mod: CPTII,S$GLB,, | Performed by: PHYSICIAN ASSISTANT

## 2023-08-15 RX ORDER — DICLOFENAC SODIUM 10 MG/G
2 GEL TOPICAL 4 TIMES DAILY
Qty: 200 G | Refills: 1 | Status: SHIPPED | OUTPATIENT
Start: 2023-08-15 | End: 2023-10-12

## 2023-08-15 NOTE — PROGRESS NOTES
CC: Left knee/leg pain    Patient is a 62-year-old male who presents today for follow-up evaluation of left knee/lower leg pain. He was initially seen by me for this issue approximately 2 months ago. Initial imaging included x-rays and an ultrasound which was negative for DVT, however did reveal a complex popliteal cyst.    He received a left knee corticosteroid injection, and a few days later had an ultrasound-guided Baker cyst aspiration with cortisone injection.  Patient reports that this provided relief for a few weeks and he was not as aware of that discomfort in the back of his leg.  He started to notice gradual return of symptoms, however this did not become significant until recently.  Pain is described as a fullness/soreness in the back of the knee that is worse with knee flexion and extension.  He denies any new falls, injuries, or trauma to the left knee/leg.  No associated calf pain/swelling.  No edema/pitting.    HPI (6/15/2023):  Patient is a 62-year-old male who presents today for initial evaluation of left knee/leg pain and swelling.  He has a past medical history is significant for von Willebrand's disease and is on Plavix daily.  He was seen by his primary care physician on May 25th where he complained of left lower extremity pain and swelling which he states has been gradually worsening.  He again brought this to the attention of his primary care physician on June 8th and ultimately had an ultrasound of the left lower extremity which was negative for any acute DVT, however did reveal a popliteal cyst.Patient is unaware of any recent injury or trauma to the left knee or lower leg.  He localizes the pain to the medial and lateral  joint lines and the popliteal area.  Pain seems to radiate down the leg through the left calf.  He reports that swelling seems to gradually worsen with prolonged standing/activity.  He also notes increased pain when standing up from a seated position.  He has been resting,  icing, and elevating the left leg and wearing compression stockings with little relief.  No prior injuries or surgeries on the left knee.  He has been taking Tylenol as needed for pain.    - mechanical symptoms, - instability    Is affecting ADLs.  Pain is 7/10 at it's worst.    REVIEW OF SYSTEMS:  Constitution: Negative. Negative for chills, fever and night sweats.   HENT: Negative for congestion and headaches.    Eyes: Negative for blurred vision, left vision loss and right vision loss.   Cardiovascular: Negative for chest pain and syncope.   Respiratory: Negative for cough and shortness of breath.    Endocrine: Negative for polydipsia, polyphagia and polyuria.   Hematologic/Lymphatic: Negative for bleeding problem. Does not bruise/bleed easily.   Skin: Negative for dry skin, itching and rash.   Musculoskeletal: Negative for falls. Positive for left knee pain and  muscle weakness.   Gastrointestinal: Negative for abdominal pain and bowel incontinence.   Genitourinary: Negative for bladder incontinence and nocturia.   Neurological: Negative for disturbances in coordination, loss of balance and seizures.   Psychiatric/Behavioral: Negative for depression. The patient does not have insomnia.    Allergic/Immunologic: Negative for hives and persistent infections.     PAST MEDICAL HISTORY:    Past Medical History:   Diagnosis Date    Acute pancreatitis     Anal fissure     Anemia     Anticoagulant long-term use     Arthritis     Asthma in remission     Back pain     BPH (benign prostatic hypertrophy)     Cancer 2000    prostate- treated at [facility] with chemo- in remission since 2000    Chronic maxillary sinusitis     Clotting disorder     Diastolic dysfunction with chronic heart failure 12/3/2018    Dysphagia 10/7/2014    Family history of colon cancer     Family history of early CAD     GERD (gastroesophageal reflux disease)     Helicobacter pylori (H. pylori) infection     Chronic    History of chronic pancreatitis      HTN (hypertension)     Lumbago 11/12/2012    Obesity     ABDON (obstructive sleep apnea)     ABDON (obstructive sleep apnea)     With likely ohs Refer to sleep    Sacroiliac joint pain 2/10/2015    Spinal stenosis of lumbar region     Von Willebrand disease     VWD (acquired von Willebrand's disease)        PAST SURGICAL HISTORY:       FAMILY HISTORY:   Family History   Problem Relation Age of Onset    Colon cancer Father 67        colon cancer    Hypertension Father     Glaucoma Father     Cancer Father     Colon cancer Paternal Grandfather 65             Coronary artery disease Mother 45    Hypertension Mother     Heart disease Mother     Colon cancer Mother     Diabetes Mother     No Known Problems Brother     No Known Problems Sister     No Known Problems Daughter     No Known Problems Son     Coronary artery disease Brother 51    No Known Problems Daughter     No Known Problems Daughter     No Known Problems Son     No Known Problems Son     Colon cancer Paternal Uncle 65    Diabetes Mellitus Paternal Grandmother     Esophageal cancer Neg Hx        SOCIAL HISTORY:   Social History     Socioeconomic History    Marital status:    Occupational History    Occupation: disabled due to back injury   Tobacco Use    Smoking status: Never    Smokeless tobacco: Never   Substance and Sexual Activity    Alcohol use: Yes     Alcohol/week: 1.0 standard drink of alcohol     Types: 1 Glasses of wine per week     Comment: social    Drug use: No    Sexual activity: Yes     Partners: Female   Social History Narrative    wlking for exercised       MEDICATIONS:     Current Outpatient Medications:     albuterol (PROVENTIL/VENTOLIN HFA) 90 mcg/actuation inhaler, INHALE 2 PUFFS INTO THE LUNGS EVERY 6 HOURS AS NEEDED FOR WHEEZING OR SHORTNESS OF BREATH, Disp: 18 g, Rfl: 4    atorvastatin (LIPITOR) 80 MG tablet, Take 1 tablet (80 mg total) by mouth every evening., Disp: 90 tablet, Rfl: 3    azelastine (ASTELIN) 137 mcg (0.1 %) nasal  spray, Two sprays in each nostril, sniff until absorbed, then follow with 1 spray of fluticasone.  Use both sprays twice daily. (Patient taking differently: Two sprays in each nostril, sniff until absorbed, then follow with 1 spray of fluticasone.  Use both sprays twice daily.(PATIENT USES NIGHTLY 3/12/2021)), Disp: 30 mL, Rfl: 11    budesonide-formoterol 160-4.5 mcg (SYMBICORT) 160-4.5 mcg/actuation HFAA, INHALE 2 PUFFS INTO THE LUNGS EVERY 12 HOURS, Disp: 10.2 g, Rfl: 12    calcium citrate-vitamin D3 315-200 mg (CITRACAL+D) 315-200 mg-unit per tablet, Take 1 tablet by mouth nightly. , Disp: , Rfl:     clopidogreL (PLAVIX) 75 mg tablet, TAKE 1 TABLET(75 MG) BY MOUTH EVERY DAY, Disp: 90 tablet, Rfl: 3    cyanocobalamin, vitamin B-12, 50 mcg tablet, Take 50 mcg by mouth nightly. , Disp: , Rfl:     docusate sodium (COLACE) 100 MG capsule, Take 1 tablet by mouth Twice daily. 1 Capsule Oral Twice a day .  Take with pain medicine, Disp: , Rfl:     doxazosin (CARDURA) 1 MG tablet, TAKE 1 TABLET BY MOUTH EVERY EVENING, Disp: 90 tablet, Rfl: 3    fluticasone propionate (FLONASE) 50 mcg/actuation nasal spray, One spray in each nostril twice daily after 1st using azelastine nasal spray, Disp: 18.2 mL, Rfl: 11    furosemide (LASIX) 20 MG tablet, Take 1 tablet (20 mg total) by mouth once daily., Disp: 90 tablet, Rfl: 0    ipratropium (ATROVENT) 42 mcg (0.06 %) nasal spray, 1-2 sprays in each nostril before eating and at bedtime as needed, Disp: 30 mL, Rfl: 11    pregabalin (LYRICA) 25 MG capsule, Take 1-2 capsules (25-50 mg total) by mouth every evening., Disp: 60 capsule, Rfl: 5    RABEprazole (ACIPHEX) 20 mg tablet, Take 1 tablet (20 mg total) by mouth every 12 (twelve) hours., Disp: 180 tablet, Rfl: 1    testosterone (ANDRODERM) 2 mg/24 hour PT24, APPLY 1 PATCH TOPICALLY TO THE SKIN EVERY DAY, Disp: 60 patch, Rfl: 3    zolpidem (AMBIEN) 10 mg Tab, TAKE 1 TABLET BY MOUTH EVERY DAY AT BEDTIME (Patient taking differently: Take  "10 mg by mouth nightly as needed.), Disp: 20 tablet, Rfl: 0    tadalafiL (CIALIS) 20 MG Tab, Take 1 tablet (20 mg total) by mouth as needed. Take every 36 hours as needed for ED., Disp: 30 tablet, Rfl: 9  No current facility-administered medications for this visit.    Facility-Administered Medications Ordered in Other Visits:     0.9%  NaCl infusion, , Intravenous, Continuous, Milla Oliveira, NP, Last Rate: 70 mL/hr at 03/15/21 1417, New Bag at 03/15/21 1417    0.9%  NaCl infusion, , Intravenous, Continuous, Milla Oliveira, NP, Last Rate: 70 mL/hr at 12/10/21 0736, New Bag at 12/10/21 0736    0.9%  NaCl infusion, , Intravenous, Continuous, Jaqueline Langley, NP    mupirocin 2 % ointment, , Nasal, On Call Procedure, Milla Oliveira NP, Given at 12/10/21 0729    mupirocin 2 % ointment, , Nasal, On Call Procedure, Jaqueline Langley NP, Given at 02/25/22 0623    ALLERGIES:   Review of patient's allergies indicates:   Allergen Reactions    Penicillins Hives and Swelling     Has had allergy testing and can prob tolerate penicillin    Ace inhibitors Other (See Comments)     "Caused a respiratory infection"    Aspirin Hives           Codeine Itching     Ok to take percocet    Dilaudid [hydromorphone] Itching    Gabapentin Hallucinations       VITAL SIGNS:   /85   Pulse 71   Ht 5' 11" (1.803 m)   Wt 110 kg (242 lb 8.1 oz)   BMI 33.82 kg/m²      PHYSICAL EXAMINATION  General:  The patient is alert and oriented x 3.  Mood is pleasant.  Observation of ears, eyes and nose reveal no gross abnormalities.  No labored breathing observed.    LEFT KNEE EXAMINATION     OBSERVATION / INSPECTION   Gait:   Nonantalgic   Alignment:  Neutral   Scars:   None   Muscle atrophy: Mild  Effusion:  Trace   Warmth:  None   Discoloration:   None  Swelling:  none     TENDERNESS / CREPITUS (T / C):          T / C      T / C   Patella   - / -   Lateral joint line   - / -   Peripatellar medial  -  Medial joint line    - " / -   Peripatellar lateral -  Medial plica   - / -   Patellar tendon -   Popliteal fossa   + / - w/ associated fullness   Quad tendon   -   Gastrocnemius   -   Prepatellar Bursa - / -   Quadricep   -   Tibial tubercle  -  Thigh/hamstring  -   Pes anserine/HS -  Fibula    -   ITB   - / -  Tibia     -   Tib/fib joint  - / -  LCL    -     MFC   - / -   MCL: Proximal  -    LFC   - / -    Distal   -          ROM: (* = pain)  PASSIVE   ACTIVE    Left :   *   *    Right :        Patellofemoral examination:  See above noted areas of tenderness.   Patella position    Subluxation / dislocation: Centered           Sup. / Inf;   Normal   Crepitus (PF):    Absent   Patellar Mobility:       Medial-lateral:   Normal    Superior-inferior:  Normal    Inferior tilt   Normal    Patellar tendon:  Normal   Lateral tilt:    Normal   J-sign:     None   Patellofemoral grind:   No pain       MENISCAL SIGNS:     Pain on terminal extension:  +  Pain on terminal flexion:  +  Arturs maneuver:  + (for pain)  Squat     deferred    LIGAMENT EXAMINATION:  ACL / Lachman:  normal (-1 to 2mm)    PCL-Post.  drawer: normal 0 to 2mm  MCL- Valgus:  normal 0 to 2mm  LCL- Varus:  normal 0 to 2mm  Pivot shift: normal (Equal)   Dial Test: difference c/w other side   At 30° flexion: normal (< 5°)    At 90° flexion: normal (< 5°)   Reverse Pivot Shift:   normal (Equal)     STRENGTH: (* = with pain) PAINFUL SIDE   Quadricep   5/5   Hamstrin/5    EXTREMITY NEURO-VASCULAR EXAMINATION:   Sensation:  Grossly intact to light touch all dermatomal regions.   Motor Function:  Fully intact motor function at hip, knee, foot and ankle    DTRs;  quadriceps and  achilles 2+.  No clonus and downgoing Babinski.    Vascular status:  DP and PT pulses 2+, brisk capillary refill, symmetric.     OTHER FINDINGS:  + popliteal fullness consistent with Baker's cyst    X-rays left knee (2023):   FINDINGS:  The osseous  structures are intact without evidence of fracture or osseous destructive process.  There is no significant degree of degenerative change.  There is a joint effusion.    US lower extremity, left (6/14/2023):  FINDINGS:  Left thigh veins: The common femoral, femoral, popliteal, upper greater saphenous, and deep femoral veins are patent and free of thrombus. The veins are normally compressible and have normal phasic flow and augmentation response.     Left calf veins: The visualized calf veins are patent.     Contralateral CFV: The contralateral (right) common femoral vein is patent and free of thrombus.     Miscellaneous: Oval complex collection along the medial aspect of the lower leg measures 5.6 x 2 x 9.8 cm.  No internal vascularity.  Popliteal fossa cyst favored.     Impression:     No evidence of deep venous thrombosis in the left lower extremity.     Complex collection along the medial aspect of the lower leg favored to represent a popliteal fossa cyst.  Please correlate with exam.     ASSESSMENT:    Left knee pain - possible meniscus tear  Primary osteoarthritis of left knee  Baker's cyst of left knee    PLAN:     Prior imaging reviewed and discussed with patient in detail.    We again discussed at length different treatment options including conservative vs surgical management. These include anti-inflammatories, acetaminophen, rest, ice, heat, formal physical therapy including strengthening and stretching exercises, home exercise programs, dry needling, compression sleeve/stocking wear, and finally surgical intervention.      Ambulatory referral to formal physical therapy placed today.    I will also send in a prescription for Voltaren 1% gel to apply topically 3-4 times daily as needed for pain.  In light of patient's aspirin allergy, I discussed potential signs/symptoms of allergic reaction and to immediately discontinue this medication if he begins to have an adverse reaction.    Continue to rest, ice,  and elevate the left knee and take over-the-counter acetaminophen as needed for pain.    Follow-up in 2 months.    All questions were answered, pt will contact us for questions or concerns in the interim.      Medical Dictation software was used during the dictation of portions or the entirety of this medical record.  Phonetic or grammatic errors may exist due to the use of this software. For clarification, refer to the author of the document.

## 2023-08-15 NOTE — PROGRESS NOTES
Patient was scheduled for a physical therapy treatment appointment at [facility] location today. Patient failed to appear for the appointment without prior notification today.     Atiya Anderson , PT

## 2023-08-21 ENCOUNTER — HOSPITAL ENCOUNTER (OUTPATIENT)
Dept: RADIOLOGY | Facility: OTHER | Age: 63
Discharge: HOME | End: 2023-08-21
Attending: REGISTERED NURSE
Payer: MEDICARE

## 2023-08-21 DIAGNOSIS — Z98.1 S/P CERVICAL SPINAL FUSION: ICD-10-CM

## 2023-08-21 DIAGNOSIS — M50.30 DDD (DEGENERATIVE DISC DISEASE), CERVICAL: ICD-10-CM

## 2023-08-21 DIAGNOSIS — M54.12 RIGHT CERVICAL RADICULOPATHY: ICD-10-CM

## 2023-08-21 PROCEDURE — 72141 MRI CERVICAL SPINE WITHOUT CONTRAST: ICD-10-PCS | Mod: 26,,, | Performed by: RADIOLOGY

## 2023-08-21 PROCEDURE — 72141 MRI NECK SPINE W/O DYE: CPT | Mod: 26,,, | Performed by: RADIOLOGY

## 2023-08-21 PROCEDURE — 72141 MRI NECK SPINE W/O DYE: CPT | Mod: TC

## 2023-08-28 ENCOUNTER — DOCUMENTATION ONLY (OUTPATIENT)
Dept: REHABILITATION | Facility: OTHER | Age: 63
End: 2023-08-28

## 2023-08-28 NOTE — PROGRESS NOTES
Physical Therapy Treatment Note    Patient was scheduled for an initial evaluation for physical therapy at [facility] for 8/28/2023. Pt failed to appear for appointment without prior notification for today, as well as on 8/15/2023 for originally scheduled evaluation.     Pt will need to contact the clinic for future appointments.      Thank you for your referral.    Sincerely,    Loren Naylor, PT

## 2023-09-06 ENCOUNTER — OFFICE VISIT (OUTPATIENT)
Dept: SLEEP MEDICINE | Facility: CLINIC | Age: 63
End: 2023-09-06
Attending: INTERNAL MEDICINE
Payer: MEDICARE

## 2023-09-06 VITALS — BODY MASS INDEX: 33.95 KG/M2 | HEIGHT: 71 IN | WEIGHT: 242.5 LBS

## 2023-09-06 DIAGNOSIS — I50.32 DIASTOLIC DYSFUNCTION WITH CHRONIC HEART FAILURE: Primary | Chronic | ICD-10-CM

## 2023-09-06 DIAGNOSIS — G47.33 OSA (OBSTRUCTIVE SLEEP APNEA): ICD-10-CM

## 2023-09-06 DIAGNOSIS — R53.83 FATIGUE, UNSPECIFIED TYPE: ICD-10-CM

## 2023-09-06 DIAGNOSIS — R51.9 MORNING HEADACHE: ICD-10-CM

## 2023-09-06 PROCEDURE — 3044F PR MOST RECENT HEMOGLOBIN A1C LEVEL <7.0%: ICD-10-PCS | Mod: CPTII,S$GLB,, | Performed by: NURSE PRACTITIONER

## 2023-09-06 PROCEDURE — 99213 PR OFFICE/OUTPT VISIT, EST, LEVL III, 20-29 MIN: ICD-10-PCS | Mod: S$GLB,,, | Performed by: NURSE PRACTITIONER

## 2023-09-06 PROCEDURE — 99213 OFFICE O/P EST LOW 20 MIN: CPT | Mod: S$GLB,,, | Performed by: NURSE PRACTITIONER

## 2023-09-06 PROCEDURE — 1159F PR MEDICATION LIST DOCUMENTED IN MEDICAL RECORD: ICD-10-PCS | Mod: CPTII,S$GLB,, | Performed by: NURSE PRACTITIONER

## 2023-09-06 PROCEDURE — 3008F PR BODY MASS INDEX (BMI) DOCUMENTED: ICD-10-PCS | Mod: CPTII,S$GLB,, | Performed by: NURSE PRACTITIONER

## 2023-09-06 PROCEDURE — 99999 PR PBB SHADOW E&M-EST. PATIENT-LVL III: ICD-10-PCS | Mod: PBBFAC,,, | Performed by: NURSE PRACTITIONER

## 2023-09-06 PROCEDURE — 3008F BODY MASS INDEX DOCD: CPT | Mod: CPTII,S$GLB,, | Performed by: NURSE PRACTITIONER

## 2023-09-06 PROCEDURE — 99999 PR PBB SHADOW E&M-EST. PATIENT-LVL III: CPT | Mod: PBBFAC,,, | Performed by: NURSE PRACTITIONER

## 2023-09-06 PROCEDURE — 1159F MED LIST DOCD IN RCRD: CPT | Mod: CPTII,S$GLB,, | Performed by: NURSE PRACTITIONER

## 2023-09-06 PROCEDURE — 3044F HG A1C LEVEL LT 7.0%: CPT | Mod: CPTII,S$GLB,, | Performed by: NURSE PRACTITIONER

## 2023-09-06 NOTE — PROGRESS NOTES
Cc:ABDON, last seen by Dr Valencia 2021    Rediagnosed with ABDON 2020 (AHI 32.6).   The last PAP titration was 2020 with AHI 4.6 at 8 cm H20.  Current setting 6-14 cm H20. Machine got 2020, not gotten replacement machine yet from recall. Having return of headache, sleepiness, disrupted sleep. Eager to resume, used to sleep well with it. Dreamwear mask    Rev'd 2020 data 90% tile 12c, ahi 3.1, avg 6+h/n     Assessment:  ABDON, severe, ready to resume PAP/machine recalled    Plan:  Resume when able cpap 6-14cm and notifiy office for RX supplies/review remote date

## 2023-09-19 DIAGNOSIS — I51.89 DIASTOLIC DYSFUNCTION: ICD-10-CM

## 2023-09-20 RX ORDER — FUROSEMIDE 20 MG/1
20 TABLET ORAL
Qty: 90 TABLET | Refills: 1 | Status: SHIPPED | OUTPATIENT
Start: 2023-09-20

## 2023-09-20 NOTE — TELEPHONE ENCOUNTER
Refill Routing Note   Medication(s) are not appropriate for processing by [facility] for the following reason(s):      Required labs abnormal    [facility] action(s):  Defer Care Due:  None identified          Appointments  past 12m or future 3m with PCP    Date Provider   Last Visit   6/8/2023 Cate Galeas MD   Next Visit   Visit date not found Cate Galeas MD   ED visits in past 90 days: 0        Note composed:11:50 PM 09/19/2023

## 2023-09-20 NOTE — TELEPHONE ENCOUNTER
No care due was identified.  [facility] Embedded Care Due Messages. Reference number: [id number].   9/19/2023 7:44:57 PM CDT

## 2023-10-03 ENCOUNTER — OFFICE VISIT (OUTPATIENT)
Dept: OTOLARYNGOLOGY | Facility: CLINIC | Age: 63
End: 2023-10-03
Payer: MEDICARE

## 2023-10-03 VITALS
DIASTOLIC BLOOD PRESSURE: 89 MMHG | WEIGHT: 240.06 LBS | HEART RATE: 73 BPM | SYSTOLIC BLOOD PRESSURE: 149 MMHG | BODY MASS INDEX: 33.48 KG/M2

## 2023-10-03 DIAGNOSIS — R09.82 PND (POST-NASAL DRIP): ICD-10-CM

## 2023-10-03 DIAGNOSIS — G47.33 OBSTRUCTIVE SLEEP APNEA TREATED WITH CONTINUOUS POSITIVE AIRWAY PRESSURE (CPAP): ICD-10-CM

## 2023-10-03 DIAGNOSIS — J31.0 CHRONIC RHINITIS: ICD-10-CM

## 2023-10-03 DIAGNOSIS — J34.2 NASAL SEPTAL DEVIATION: Primary | ICD-10-CM

## 2023-10-03 PROCEDURE — 3008F PR BODY MASS INDEX (BMI) DOCUMENTED: ICD-10-PCS | Mod: CPTII,S$GLB,, | Performed by: STUDENT IN AN ORGANIZED HEALTH CARE EDUCATION/TRAINING PROGRAM

## 2023-10-03 PROCEDURE — 99999 PR PBB SHADOW E&M-EST. PATIENT-LVL III: ICD-10-PCS | Mod: PBBFAC,,, | Performed by: STUDENT IN AN ORGANIZED HEALTH CARE EDUCATION/TRAINING PROGRAM

## 2023-10-03 PROCEDURE — 99213 PR OFFICE/OUTPT VISIT, EST, LEVL III, 20-29 MIN: ICD-10-PCS | Mod: 25,S$GLB,, | Performed by: STUDENT IN AN ORGANIZED HEALTH CARE EDUCATION/TRAINING PROGRAM

## 2023-10-03 PROCEDURE — 3077F PR MOST RECENT SYSTOLIC BLOOD PRESSURE >= 140 MM HG: ICD-10-PCS | Mod: CPTII,S$GLB,, | Performed by: STUDENT IN AN ORGANIZED HEALTH CARE EDUCATION/TRAINING PROGRAM

## 2023-10-03 PROCEDURE — 3044F PR MOST RECENT HEMOGLOBIN A1C LEVEL <7.0%: ICD-10-PCS | Mod: CPTII,S$GLB,, | Performed by: STUDENT IN AN ORGANIZED HEALTH CARE EDUCATION/TRAINING PROGRAM

## 2023-10-03 PROCEDURE — 3077F SYST BP >= 140 MM HG: CPT | Mod: CPTII,S$GLB,, | Performed by: STUDENT IN AN ORGANIZED HEALTH CARE EDUCATION/TRAINING PROGRAM

## 2023-10-03 PROCEDURE — 3008F BODY MASS INDEX DOCD: CPT | Mod: CPTII,S$GLB,, | Performed by: STUDENT IN AN ORGANIZED HEALTH CARE EDUCATION/TRAINING PROGRAM

## 2023-10-03 PROCEDURE — 3044F HG A1C LEVEL LT 7.0%: CPT | Mod: CPTII,S$GLB,, | Performed by: STUDENT IN AN ORGANIZED HEALTH CARE EDUCATION/TRAINING PROGRAM

## 2023-10-03 PROCEDURE — 1159F PR MEDICATION LIST DOCUMENTED IN MEDICAL RECORD: ICD-10-PCS | Mod: CPTII,S$GLB,, | Performed by: STUDENT IN AN ORGANIZED HEALTH CARE EDUCATION/TRAINING PROGRAM

## 2023-10-03 PROCEDURE — 99999 PR PBB SHADOW E&M-EST. PATIENT-LVL III: CPT | Mod: PBBFAC,,, | Performed by: STUDENT IN AN ORGANIZED HEALTH CARE EDUCATION/TRAINING PROGRAM

## 2023-10-03 PROCEDURE — 1159F MED LIST DOCD IN RCRD: CPT | Mod: CPTII,S$GLB,, | Performed by: STUDENT IN AN ORGANIZED HEALTH CARE EDUCATION/TRAINING PROGRAM

## 2023-10-03 PROCEDURE — 3079F PR MOST RECENT DIASTOLIC BLOOD PRESSURE 80-89 MM HG: ICD-10-PCS | Mod: CPTII,S$GLB,, | Performed by: STUDENT IN AN ORGANIZED HEALTH CARE EDUCATION/TRAINING PROGRAM

## 2023-10-03 PROCEDURE — 1160F PR REVIEW ALL MEDS BY PRESCRIBER/CLIN PHARMACIST DOCUMENTED: ICD-10-PCS | Mod: CPTII,S$GLB,, | Performed by: STUDENT IN AN ORGANIZED HEALTH CARE EDUCATION/TRAINING PROGRAM

## 2023-10-03 PROCEDURE — 1160F RVW MEDS BY RX/DR IN RCRD: CPT | Mod: CPTII,S$GLB,, | Performed by: STUDENT IN AN ORGANIZED HEALTH CARE EDUCATION/TRAINING PROGRAM

## 2023-10-03 PROCEDURE — 3079F DIAST BP 80-89 MM HG: CPT | Mod: CPTII,S$GLB,, | Performed by: STUDENT IN AN ORGANIZED HEALTH CARE EDUCATION/TRAINING PROGRAM

## 2023-10-03 PROCEDURE — 99213 OFFICE O/P EST LOW 20 MIN: CPT | Mod: 25,S$GLB,, | Performed by: STUDENT IN AN ORGANIZED HEALTH CARE EDUCATION/TRAINING PROGRAM

## 2023-10-03 NOTE — PROGRESS NOTES
Subjective:      Bri is a 63 y.o. male who comes for follow-up of  PND .  His last visit with me was on 2/7/2023.  Has been seen by Gi, pH probe & EGD performed. He was started on Rabeprazol and it is currently controlling his reflux sx. He still have issues with daily thick clear PND that is worse in the morning. Denies facial pressure or pain, no purulent nasal drainage. Does endorse occasional nasal congestion.     His current sinus regime consists of: Floanse, astelin & Atrovent.     The assessment of quality and severity of symptoms as measured by the SNOT-22 score is deferred.    The patient's medications, allergies, past medical, surgical, social and family histories were reviewed and updated as appropriate.    A detailed review of systems was obtained with pertinent positives as per the above HPI, and otherwise negative.        Objective:     BP (!) 149/89 (Patient Position: Sitting)   Pulse 73   Wt 108.9 kg (240 lb 1.3 oz)   BMI 33.48 kg/m²        Constitutional:   Vital signs are normal. He appears well-developed and well-nourished.     Head:  Normocephalic and atraumatic.     Ears:  Hearing normal to normal and whispered voice; external ear normal without scars, lesions, or masses; ear canal, tympanic membrane, and middle ear normal..   Right Ear: No swelling. Tympanic membrane is not perforated and not bulging. No middle ear effusion.   Left Ear: No swelling. Tympanic membrane is not perforated and not bulging.  No middle ear effusion.     Nose:  Nose normal including turbinates, nasal mucosa, sinuses and nasal septum. No epistaxis.     Mouth/Throat  Oropharynx clear and moist without lesions or asymmetry and normal uvula midline. Normal dentition. No tonsillar abscesses. Tonsillar exudate.      Neck:  Neck normal without thyromegaly masses, asymmetry, normal tracheal structure, crepitus, and tenderness, thyroid normal, trachea normal, phonation normal, full range of motion with neck supple and  no adenopathy. No stridor present.        Head (right side): No submental adenopathy present.        Head (left side): No submental adenopathy present.     He has no cervical adenopathy.     Pulmonary/Chest:   No stridor.     Procedure    Nasal endoscopy performed.  See procedure note.    Nasal Endoscopy:  10/3/2023    The use of diagnostic nasal endoscopy was considered medically necessary for the evaluation and visualization of the nasal anatomy for symptoms suggestive of nasal or sinus origin. Physical examination (including a nasal speculum evaluation) did not provide sufficient clinical information to establish a diagnosis, or symptoms did not improve or worsened following treatment.     The nasal cavity was decongested with topical 1% phenylephrine and anesthetized with 4% lidocaine.  A rigid 0-degree endoscope was introduced into the nasal cavity.    The patient was seated in the examination chair. After discussion of risks and benefits, a nasal endoscope was inserted into the nose the endoscope was passed along the left nasal floor to the nasopharynx. It was then passed between the middle and superior meatus, nasal turbinates, nasal septum, nasopharynx and sphenoethmoid region. The nasal endoscope was withdrawn and there was no complications. An identical procedure was performed on the right side. I was present for the entire procedure.The patient tolerated the above procedure well. The findings of this procedure can be found in the dictated note from 10/3/2023 visit.      Nasal endoscopy reveals left septal deviation with spur. Hypertrophic inferior turbinates bilaterally. No nasal masses or nasal polyposis. No purulent drainage in the middle meatus. Nasopharynx clear without lesions. Eustachian tubes WNL.     Data Reviewed    WBC (K/uL)   Date Value   03/16/2023 9.51     Eosinophil % (%)   Date Value   03/16/2023 0.4     Eos # (K/uL)   Date Value   03/16/2023 0.0     Platelets (K/uL)   Date Value    03/16/2023 223     Glucose (mg/dL)   Date Value   03/16/2023 81     IgE (IU/mL)   Date Value   04/22/2016 52       I independently reviewed the images of the CT sinuses dated 8/15/18. Pertinent findings include left septal deviation. Hypertrophic inferior turbinates. Left maxillary mucosal thickening 6mm greatest dimension. No significant opafication of the sphenoid, or frontal sinuses. Very minimal patchy ethmoid sinus opacification.    Assessment:     1. Nasal septal deviation    2. Chronic rhinitis    3. Obstructive sleep apnea treated with continuous positive airway pressure (CPAP)    4. PND (post-nasal drip)         Plan:     - Currently on maximal nasal sprays  - Will trial budesonide  - Given findings (examination, sinonasal endoscopy, and/or imaging performed and reviewed) and his history related to these sinonasal issues that have been refractory to current medical management, I feel escalation of medical management is indicated at this time. I have specifically recommended budesonide irrigations for stronger topical steroid therapy. This is intended to both improve overall management and symptom control, and to reduce potential need for systemic steroids and the associated adverse effects and risks associated with recurrent systemic steroid therapy. I counseled him on the off-label nature of this particular use/delivery of budesonide, and he consented to use.   -Specific instructions regarding the use of budesonide nasal irrigations were provided, including review of use of standard nasal saline irrigations and the addition of budesonide to these nasal saline irrigations. Instructions were also provided on obtaining this medication and a prescription was sent in for the patient (to [facility] pharmacy) so that home delivery/mail order can be arranged. I have authorized once daily dosing if financial issues prevent twice daily use (recommended), as well as options for alternative steroids to be  used should the advised budesonide be prohibitively expensive. All questions regarding this were answered, and I encouraged him to call for any additional questions, concerns, or if there were any issues with obtaining the budesonide for use in irrigations.    - May benefit from Rhinaer/turb/BSP if no improvement    Bhavin Leija MD

## 2023-10-12 ENCOUNTER — OFFICE VISIT (OUTPATIENT)
Dept: SPORTS MEDICINE | Facility: CLINIC | Age: 63
End: 2023-10-12
Payer: MEDICARE

## 2023-10-12 ENCOUNTER — HOSPITAL ENCOUNTER (OUTPATIENT)
Dept: RADIOLOGY | Facility: HOSPITAL | Age: 63
Discharge: HOME | End: 2023-10-12
Attending: PHYSICIAN ASSISTANT
Payer: MEDICARE

## 2023-10-12 VITALS
HEIGHT: 71 IN | DIASTOLIC BLOOD PRESSURE: 88 MMHG | SYSTOLIC BLOOD PRESSURE: 132 MMHG | BODY MASS INDEX: 33.6 KG/M2 | WEIGHT: 240 LBS | HEART RATE: 62 BPM

## 2023-10-12 DIAGNOSIS — M25.562 ACUTE PAIN OF LEFT KNEE: Primary | ICD-10-CM

## 2023-10-12 DIAGNOSIS — M25.562 ACUTE PAIN OF LEFT KNEE: ICD-10-CM

## 2023-10-12 DIAGNOSIS — M17.12 PRIMARY OSTEOARTHRITIS OF LEFT KNEE: ICD-10-CM

## 2023-10-12 DIAGNOSIS — M71.22 BAKER'S CYST OF KNEE, LEFT: ICD-10-CM

## 2023-10-12 PROCEDURE — 3044F PR MOST RECENT HEMOGLOBIN A1C LEVEL <7.0%: ICD-10-PCS | Mod: CPTII,S$GLB,, | Performed by: PHYSICIAN ASSISTANT

## 2023-10-12 PROCEDURE — 3008F PR BODY MASS INDEX (BMI) DOCUMENTED: ICD-10-PCS | Mod: CPTII,S$GLB,, | Performed by: PHYSICIAN ASSISTANT

## 2023-10-12 PROCEDURE — 1160F PR REVIEW ALL MEDS BY PRESCRIBER/CLIN PHARMACIST DOCUMENTED: ICD-10-PCS | Mod: CPTII,S$GLB,, | Performed by: PHYSICIAN ASSISTANT

## 2023-10-12 PROCEDURE — 99214 OFFICE O/P EST MOD 30 MIN: CPT | Mod: S$GLB,,, | Performed by: PHYSICIAN ASSISTANT

## 2023-10-12 PROCEDURE — 3079F PR MOST RECENT DIASTOLIC BLOOD PRESSURE 80-89 MM HG: ICD-10-PCS | Mod: CPTII,S$GLB,, | Performed by: PHYSICIAN ASSISTANT

## 2023-10-12 PROCEDURE — 3044F HG A1C LEVEL LT 7.0%: CPT | Mod: CPTII,S$GLB,, | Performed by: PHYSICIAN ASSISTANT

## 2023-10-12 PROCEDURE — 3079F DIAST BP 80-89 MM HG: CPT | Mod: CPTII,S$GLB,, | Performed by: PHYSICIAN ASSISTANT

## 2023-10-12 PROCEDURE — 1160F RVW MEDS BY RX/DR IN RCRD: CPT | Mod: CPTII,S$GLB,, | Performed by: PHYSICIAN ASSISTANT

## 2023-10-12 PROCEDURE — 3075F SYST BP GE 130 - 139MM HG: CPT | Mod: CPTII,S$GLB,, | Performed by: PHYSICIAN ASSISTANT

## 2023-10-12 PROCEDURE — 73564 XR KNEE ORTHO BILAT WITH FLEXION: ICD-10-PCS | Mod: 26,50,, | Performed by: RADIOLOGY

## 2023-10-12 PROCEDURE — 99999 PR PBB SHADOW E&M-EST. PATIENT-LVL IV: ICD-10-PCS | Mod: PBBFAC,,, | Performed by: PHYSICIAN ASSISTANT

## 2023-10-12 PROCEDURE — 99214 PR OFFICE/OUTPT VISIT, EST, LEVL IV, 30-39 MIN: ICD-10-PCS | Mod: S$GLB,,, | Performed by: PHYSICIAN ASSISTANT

## 2023-10-12 PROCEDURE — 1159F MED LIST DOCD IN RCRD: CPT | Mod: CPTII,S$GLB,, | Performed by: PHYSICIAN ASSISTANT

## 2023-10-12 PROCEDURE — 3008F BODY MASS INDEX DOCD: CPT | Mod: CPTII,S$GLB,, | Performed by: PHYSICIAN ASSISTANT

## 2023-10-12 PROCEDURE — 3075F PR MOST RECENT SYSTOLIC BLOOD PRESS GE 130-139MM HG: ICD-10-PCS | Mod: CPTII,S$GLB,, | Performed by: PHYSICIAN ASSISTANT

## 2023-10-12 PROCEDURE — 73564 X-RAY EXAM KNEE 4 OR MORE: CPT | Mod: 26,50,, | Performed by: RADIOLOGY

## 2023-10-12 PROCEDURE — 73564 X-RAY EXAM KNEE 4 OR MORE: CPT | Mod: TC,50

## 2023-10-12 PROCEDURE — 1159F PR MEDICATION LIST DOCUMENTED IN MEDICAL RECORD: ICD-10-PCS | Mod: CPTII,S$GLB,, | Performed by: PHYSICIAN ASSISTANT

## 2023-10-12 PROCEDURE — 99999 PR PBB SHADOW E&M-EST. PATIENT-LVL IV: CPT | Mod: PBBFAC,,, | Performed by: PHYSICIAN ASSISTANT

## 2023-10-12 RX ORDER — DICLOFENAC SODIUM 10 MG/G
2 GEL TOPICAL 4 TIMES DAILY PRN
Qty: 400 G | Refills: 2 | Status: ON HOLD | OUTPATIENT
Start: 2023-10-12 | End: 2023-11-08 | Stop reason: HOSPADM

## 2023-10-12 NOTE — PROGRESS NOTES
CC: Left knee/leg pain    Patient presents today for follow-up evaluation of left knee pain.  Patient states he began experiencing acute onset, atraumatic left knee pain approximately 1 week ago.  He does not recall any recent falls or trauma to the knee.  Pain is localized to the medial and lateral and posterior aspects of the left knee.  This is worse with knee flexion and extension, going up and down stairs, and has been bothers him at night.  He has noted some mild swelling of the left knee as well.  No significant instability or mechanical symptoms.  He has been taking over-the-counter extra-strength Tylenol with little relief.  In June, he received an injection in this knee followed by an ultrasound-guided Baker cyst aspiration/injection week later which did provide some relief.      - mechanical symptoms, - instability    Is affecting ADLs.  Pain is 7/10 at it's worst.    PMHx:  Diastolic heart failure, lumbar DDD/stenosis    REVIEW OF SYSTEMS:  Constitution: Negative. Negative for chills, fever and night sweats.   HENT: Negative for congestion and headaches.    Eyes: Negative for blurred vision, left vision loss and right vision loss.   Cardiovascular: Negative for chest pain and syncope.   Respiratory: Negative for cough and shortness of breath.    Endocrine: Negative for polydipsia, polyphagia and polyuria.   Hematologic/Lymphatic: Negative for bleeding problem. Does not bruise/bleed easily.   Skin: Negative for dry skin, itching and rash.   Musculoskeletal: Negative for falls. Positive for left knee pain and  muscle weakness.   Gastrointestinal: Negative for abdominal pain and bowel incontinence.   Genitourinary: Negative for bladder incontinence and nocturia.   Neurological: Negative for disturbances in coordination, loss of balance and seizures.   Psychiatric/Behavioral: Negative for depression. The patient does not have insomnia.    Allergic/Immunologic: Negative for hives and persistent infections.      PAST MEDICAL HISTORY:    Past Medical History:   Diagnosis Date    Acute pancreatitis     Anal fissure     Anemia     Anticoagulant long-term use     Arthritis     Asthma in remission     Back pain     BPH (benign prostatic hypertrophy)     Cancer 2000    prostate- treated at [facility] with chemo- in remission since 2000    Chronic maxillary sinusitis     Clotting disorder     Diastolic dysfunction with chronic heart failure 12/3/2018    Dysphagia 10/7/2014    Family history of colon cancer     Family history of early CAD     GERD (gastroesophageal reflux disease)     Helicobacter pylori (H. pylori) infection     Chronic    History of chronic pancreatitis     HTN (hypertension)     Lumbago 11/12/2012    Obesity     ABDON (obstructive sleep apnea)     ABDON (obstructive sleep apnea)     With likely ohs Refer to sleep    Sacroiliac joint pain 2/10/2015    Spinal stenosis of lumbar region     Von Willebrand disease     VWD (acquired von Willebrand's disease)        PAST SURGICAL HISTORY:       FAMILY HISTORY:   Family History   Problem Relation Age of Onset    Colon cancer Father 67        colon cancer    Hypertension Father     Glaucoma Father     Cancer Father     Colon cancer Paternal Grandfather 65             Coronary artery disease Mother 45    Hypertension Mother     Heart disease Mother     Colon cancer Mother     Diabetes Mother     No Known Problems Brother     No Known Problems Sister     No Known Problems Daughter     No Known Problems Son     Coronary artery disease Brother 51    No Known Problems Daughter     No Known Problems Daughter     No Known Problems Son     No Known Problems Son     Colon cancer Paternal Uncle 65    Diabetes Mellitus Paternal Grandmother     Esophageal cancer Neg Hx        SOCIAL HISTORY:   Social History     Socioeconomic History    Marital status:    Occupational History    Occupation: disabled due to back injury   Tobacco Use    Smoking status: Never    Smokeless tobacco:  Never   Substance and Sexual Activity    Alcohol use: Yes     Alcohol/week: 1.0 standard drink of alcohol     Types: 1 Glasses of wine per week     Comment: social    Drug use: No    Sexual activity: Yes     Partners: Female   Social History Narrative    wlking for exercised       MEDICATIONS:     Current Outpatient Medications:     albuterol (PROVENTIL/VENTOLIN HFA) 90 mcg/actuation inhaler, INHALE 2 PUFFS INTO THE LUNGS EVERY 6 HOURS AS NEEDED FOR WHEEZING OR SHORTNESS OF BREATH, Disp: 18 g, Rfl: 4    atorvastatin (LIPITOR) 80 MG tablet, Take 1 tablet (80 mg total) by mouth every evening., Disp: 90 tablet, Rfl: 3    azelastine (ASTELIN) 137 mcg (0.1 %) nasal spray, Two sprays in each nostril, sniff until absorbed, then follow with 1 spray of fluticasone.  Use both sprays twice daily. (Patient taking differently: Two sprays in each nostril, sniff until absorbed, then follow with 1 spray of fluticasone.  Use both sprays twice daily.(PATIENT USES NIGHTLY 3/12/2021)), Disp: 30 mL, Rfl: 11    budesonide-formoterol 160-4.5 mcg (SYMBICORT) 160-4.5 mcg/actuation HFAA, INHALE 2 PUFFS INTO THE LUNGS EVERY 12 HOURS, Disp: 10.2 g, Rfl: 12    calcium citrate-vitamin D3 315-200 mg (CITRACAL+D) 315-200 mg-unit per tablet, Take 1 tablet by mouth nightly. , Disp: , Rfl:     clopidogreL (PLAVIX) 75 mg tablet, TAKE 1 TABLET(75 MG) BY MOUTH EVERY DAY, Disp: 90 tablet, Rfl: 3    cyanocobalamin, vitamin B-12, 50 mcg tablet, Take 50 mcg by mouth nightly. , Disp: , Rfl:     diclofenac sodium (VOLTAREN) 1 % Gel, Apply 2 g topically 4 (four) times daily as needed (pain)., Disp: 400 g, Rfl: 2    docusate sodium (COLACE) 100 MG capsule, Take 1 tablet by mouth Twice daily. 1 Capsule Oral Twice a day .  Take with pain medicine, Disp: , Rfl:     doxazosin (CARDURA) 1 MG tablet, TAKE 1 TABLET BY MOUTH EVERY EVENING, Disp: 90 tablet, Rfl: 3    fluticasone propionate (FLONASE) 50 mcg/actuation nasal spray, One spray in each nostril twice daily  "after 1st using azelastine nasal spray, Disp: 18.2 mL, Rfl: 11    furosemide (LASIX) 20 MG tablet, TAKE 1 TABLET(20 MG) BY MOUTH EVERY DAY, Disp: 90 tablet, Rfl: 1    ipratropium (ATROVENT) 42 mcg (0.06 %) nasal spray, 1-2 sprays in each nostril before eating and at bedtime as needed, Disp: 30 mL, Rfl: 11    pregabalin (LYRICA) 25 MG capsule, Take 1-2 capsules (25-50 mg total) by mouth every evening., Disp: 60 capsule, Rfl: 5    RABEprazole (ACIPHEX) 20 mg tablet, Take 1 tablet (20 mg total) by mouth every 12 (twelve) hours., Disp: 180 tablet, Rfl: 1    tadalafiL (CIALIS) 20 MG Tab, Take 1 tablet (20 mg total) by mouth as needed. Take every 36 hours as needed for ED., Disp: 30 tablet, Rfl: 9    testosterone (ANDRODERM) 2 mg/24 hour PT24, APPLY 1 PATCH TOPICALLY TO THE SKIN EVERY DAY, Disp: 60 patch, Rfl: 3    zolpidem (AMBIEN) 10 mg Tab, TAKE 1 TABLET BY MOUTH EVERY DAY AT BEDTIME (Patient taking differently: Take 10 mg by mouth nightly as needed.), Disp: 20 tablet, Rfl: 0  No current facility-administered medications for this visit.    Facility-Administered Medications Ordered in Other Visits:     0.9%  NaCl infusion, , Intravenous, Continuous, Milla Oliveira NP, Last Rate: 70 mL/hr at 03/15/21 1417, New Bag at 03/15/21 1417    0.9%  NaCl infusion, , Intravenous, Continuous, Milla Oliveira NP, Last Rate: 70 mL/hr at 12/10/21 0736, New Bag at 12/10/21 0736    0.9%  NaCl infusion, , Intravenous, Continuous, Jaqueline Langley NP    mupirocin 2 % ointment, , Nasal, On Call Procedure, Milla Oliveira NP, Given at 12/10/21 0729    mupirocin 2 % ointment, , Nasal, On Call Procedure, Jaqueline Langley NP, Given at 02/25/22 0623    ALLERGIES:   Review of patient's allergies indicates:   Allergen Reactions    Penicillins Hives and Swelling     Has had allergy testing and can prob tolerate penicillin    Ace inhibitors Other (See Comments)     "Caused a respiratory infection"    Aspirin Hives        " "   Codeine Itching     Ok to take percocet    Dilaudid [hydromorphone] Itching    Gabapentin Hallucinations       VITAL SIGNS:   /88   Pulse 62   Ht 5' 11" (1.803 m)   Wt 108.9 kg (240 lb)   BMI 33.47 kg/m²      PHYSICAL EXAMINATION  General:  The patient is alert and oriented x 3.  Mood is pleasant.  Observation of ears, eyes and nose reveal no gross abnormalities.  No labored breathing observed.    LEFT KNEE EXAMINATION     OBSERVATION / INSPECTION   Gait:   Nonantalgic   Alignment:  Neutral   Scars:   None   Muscle atrophy: Mild  Effusion:  1+   Warmth:  None   Discoloration:   None  Swelling:  none     TENDERNESS / CREPITUS (T / C):          T / C      T / C   Patella   - / -   Lateral joint line   + / -   Peripatellar medial  -  Medial joint line    + / -   Peripatellar lateral -  Medial plica   - / -   Patellar tendon -   Popliteal fossa   + / -    Quad tendon   -   Gastrocnemius   -   Prepatellar Bursa - / -   Quadricep   -   Tibial tubercle  -  Thigh/hamstring  -   Pes anserine/HS -  Fibula    -   ITB   - / -  Tibia     -   Tib/fib joint  - / -  LCL    -     MFC   - / -   MCL: Proximal  -    LFC   - / -    Distal   -          ROM: (* = pain)  PASSIVE   ACTIVE    Left :   5 / 0 / 120*   5 / 0 / 120*    Right :    5 / 0 / 130   5 / 0 / 130    Patellofemoral examination:  See above noted areas of tenderness.   Patella position    Subluxation / dislocation: Centered           Sup. / Inf;   Normal   Crepitus (PF):    Absent   Patellar Mobility:       Medial-lateral:   Normal    Superior-inferior:  Normal    Inferior tilt   Normal    Patellar tendon:  Normal   Lateral tilt:    Normal   J-sign:     None   Patellofemoral grind:   No pain       MENISCAL SIGNS:     Pain on terminal extension:  +  Pain on terminal flexion:  +  Arturs maneuver:  + (for pain)  Squat     deferred    LIGAMENT EXAMINATION:  ACL / Lachman:  normal (-1 to 2mm)    PCL-Post.  drawer: normal 0 to 2mm  MCL- Valgus:  normal 0 to " 2mm  LCL- Varus:  normal 0 to 2mm  Pivot shift: normal (Equal)   Dial Test: difference c/w other side   At 30° flexion: normal (< 5°)    At 90° flexion: normal (< 5°)   Reverse Pivot Shift:   normal (Equal)     STRENGTH: (* = with pain) PAINFUL SIDE   Quadricep   5/5   Hamstrin/5    EXTREMITY NEURO-VASCULAR EXAMINATION:   Sensation:  Grossly intact to light touch all dermatomal regions.   Motor Function:  Fully intact motor function at hip, knee, foot and ankle    DTRs;  quadriceps and  achilles 2+.  No clonus and downgoing Babinski.    Vascular status:  DP and PT pulses 2+, brisk capillary refill, symmetric.     OTHER FINDINGS:  + popliteal fullness consistent with Baker's cyst    X-rays left knee (2023):   FINDINGS:  The osseous structures are intact without evidence of fracture or osseous destructive process.  There is no significant degree of degenerative change.  There is a joint effusion.    US lower extremity, left (2023):  FINDINGS:  Left thigh veins: The common femoral, femoral, popliteal, upper greater saphenous, and deep femoral veins are patent and free of thrombus. The veins are normally compressible and have normal phasic flow and augmentation response.     Left calf veins: The visualized calf veins are patent.     Contralateral CFV: The contralateral (right) common femoral vein is patent and free of thrombus.     Miscellaneous: Oval complex collection along the medial aspect of the lower leg measures 5.6 x 2 x 9.8 cm.  No internal vascularity.  Popliteal fossa cyst favored.     Impression:     No evidence of deep venous thrombosis in the left lower extremity.     Complex collection along the medial aspect of the lower leg favored to represent a popliteal fossa cyst.  Please correlate with exam.     X-rays bilateral knees (10/12/2023):  No acute fracture or dislocation.  Mild medial and lateral tibiofemoral joint space narrowing seen bilaterally.  Well-preserved patellofemoral  compartments.  Soft tissues appeared normal.    Kellgren Tee grade 2, bilaterally    ASSESSMENT:    Left knee pain - possible meniscus tear  Primary osteoarthritis of left knee  Baker's cyst of left knee    PLAN:     I made the decision to obtain old records of the patient including previous notes and imaging. New imaging was ordered today of the extremity or extremities evaluated. I independently reviewed and interpreted the radiographs and/or MRIs today as well as prior imaging, if available.    Orders placed for MRI without contrast of the left knee to evaluate for possible meniscus tear/cartilage injury, and further evaluate baker cyst.      I will also send in a prescription for Voltaren 1% gel to apply topically 3-4 times daily as needed for pain.  In light of patient's aspirin allergy, I discussed potential signs/symptoms of allergic reaction and to immediately discontinue this medication if he begins to have an adverse reaction.    Continue to rest, ice, and elevate the left knee and take over-the-counter acetaminophen as needed for pain.    Follow-up in clinic with MRI results    All questions were answered, pt will contact us for questions or concerns in the interim.      Medical Dictation software was used during the dictation of portions or the entirety of this medical record.  Phonetic or grammatic errors may exist due to the use of this software. For clarification, refer to the author of the document.

## 2023-10-21 ENCOUNTER — HOSPITAL ENCOUNTER (OUTPATIENT)
Dept: RADIOLOGY | Facility: OTHER | Age: 63
Discharge: HOME | End: 2023-10-21
Attending: PHYSICIAN ASSISTANT
Payer: MEDICARE

## 2023-10-21 DIAGNOSIS — M17.12 PRIMARY OSTEOARTHRITIS OF LEFT KNEE: ICD-10-CM

## 2023-10-21 DIAGNOSIS — M25.562 ACUTE PAIN OF LEFT KNEE: ICD-10-CM

## 2023-10-21 PROCEDURE — 73721 MRI KNEE WITHOUT CONTRAST LEFT: ICD-10-PCS | Mod: 26,LT,, | Performed by: RADIOLOGY

## 2023-10-21 PROCEDURE — 73721 MRI JNT OF LWR EXTRE W/O DYE: CPT | Mod: TC,LT

## 2023-10-21 PROCEDURE — 73721 MRI JNT OF LWR EXTRE W/O DYE: CPT | Mod: 26,LT,, | Performed by: RADIOLOGY

## 2023-10-24 ENCOUNTER — OFFICE VISIT (OUTPATIENT)
Dept: PAIN MEDICINE | Facility: CLINIC | Age: 63
End: 2023-10-24
Payer: MEDICARE

## 2023-10-24 ENCOUNTER — OFFICE VISIT (OUTPATIENT)
Dept: SPORTS MEDICINE | Facility: CLINIC | Age: 63
End: 2023-10-24
Payer: MEDICARE

## 2023-10-24 VITALS
BODY MASS INDEX: 33.55 KG/M2 | OXYGEN SATURATION: 100 % | RESPIRATION RATE: 18 BRPM | HEIGHT: 71 IN | WEIGHT: 239.63 LBS | DIASTOLIC BLOOD PRESSURE: 92 MMHG | HEART RATE: 67 BPM | SYSTOLIC BLOOD PRESSURE: 132 MMHG

## 2023-10-24 VITALS
HEART RATE: 67 BPM | DIASTOLIC BLOOD PRESSURE: 83 MMHG | WEIGHT: 239 LBS | HEIGHT: 71 IN | BODY MASS INDEX: 33.46 KG/M2 | SYSTOLIC BLOOD PRESSURE: 134 MMHG

## 2023-10-24 DIAGNOSIS — M71.22 BAKER'S CYST OF KNEE, LEFT: ICD-10-CM

## 2023-10-24 DIAGNOSIS — M17.12 PRIMARY OSTEOARTHRITIS OF LEFT KNEE: ICD-10-CM

## 2023-10-24 DIAGNOSIS — G89.4 CHRONIC PAIN SYNDROME: Primary | ICD-10-CM

## 2023-10-24 DIAGNOSIS — M25.562 ACUTE PAIN OF LEFT KNEE: Primary | ICD-10-CM

## 2023-10-24 DIAGNOSIS — M25.562 CHRONIC PAIN OF LEFT KNEE: ICD-10-CM

## 2023-10-24 DIAGNOSIS — G89.29 CHRONIC PAIN OF LEFT KNEE: ICD-10-CM

## 2023-10-24 DIAGNOSIS — M54.14 THORACIC RADICULITIS: ICD-10-CM

## 2023-10-24 DIAGNOSIS — M47.816 LUMBAR SPONDYLOSIS: ICD-10-CM

## 2023-10-24 DIAGNOSIS — M23.201 OLD COMPLEX TEAR OF LATERAL MENISCUS OF LEFT KNEE: ICD-10-CM

## 2023-10-24 PROCEDURE — 3044F PR MOST RECENT HEMOGLOBIN A1C LEVEL <7.0%: ICD-10-PCS | Mod: CPTII,S$GLB,, | Performed by: PHYSICIAN ASSISTANT

## 2023-10-24 PROCEDURE — 3008F BODY MASS INDEX DOCD: CPT | Mod: CPTII,S$GLB,, | Performed by: PHYSICIAN ASSISTANT

## 2023-10-24 PROCEDURE — 1159F PR MEDICATION LIST DOCUMENTED IN MEDICAL RECORD: ICD-10-PCS | Mod: CPTII,S$GLB,, | Performed by: NURSE PRACTITIONER

## 2023-10-24 PROCEDURE — 99214 OFFICE O/P EST MOD 30 MIN: CPT | Mod: S$GLB,,, | Performed by: PHYSICIAN ASSISTANT

## 2023-10-24 PROCEDURE — 1160F RVW MEDS BY RX/DR IN RCRD: CPT | Mod: CPTII,S$GLB,, | Performed by: NURSE PRACTITIONER

## 2023-10-24 PROCEDURE — 3044F HG A1C LEVEL LT 7.0%: CPT | Mod: CPTII,S$GLB,, | Performed by: PHYSICIAN ASSISTANT

## 2023-10-24 PROCEDURE — 3008F PR BODY MASS INDEX (BMI) DOCUMENTED: ICD-10-PCS | Mod: CPTII,S$GLB,, | Performed by: NURSE PRACTITIONER

## 2023-10-24 PROCEDURE — 3044F PR MOST RECENT HEMOGLOBIN A1C LEVEL <7.0%: ICD-10-PCS | Mod: CPTII,S$GLB,, | Performed by: NURSE PRACTITIONER

## 2023-10-24 PROCEDURE — 3079F DIAST BP 80-89 MM HG: CPT | Mod: CPTII,S$GLB,, | Performed by: PHYSICIAN ASSISTANT

## 2023-10-24 PROCEDURE — 1160F RVW MEDS BY RX/DR IN RCRD: CPT | Mod: CPTII,S$GLB,, | Performed by: PHYSICIAN ASSISTANT

## 2023-10-24 PROCEDURE — 99999 PR PBB SHADOW E&M-EST. PATIENT-LVL IV: ICD-10-PCS | Mod: PBBFAC,,, | Performed by: PHYSICIAN ASSISTANT

## 2023-10-24 PROCEDURE — 1160F PR REVIEW ALL MEDS BY PRESCRIBER/CLIN PHARMACIST DOCUMENTED: ICD-10-PCS | Mod: CPTII,S$GLB,, | Performed by: NURSE PRACTITIONER

## 2023-10-24 PROCEDURE — 99999 PR PBB SHADOW E&M-EST. PATIENT-LVL V: CPT | Mod: PBBFAC,,, | Performed by: NURSE PRACTITIONER

## 2023-10-24 PROCEDURE — 3080F PR MOST RECENT DIASTOLIC BLOOD PRESSURE >= 90 MM HG: ICD-10-PCS | Mod: CPTII,S$GLB,, | Performed by: NURSE PRACTITIONER

## 2023-10-24 PROCEDURE — 3075F SYST BP GE 130 - 139MM HG: CPT | Mod: CPTII,S$GLB,, | Performed by: PHYSICIAN ASSISTANT

## 2023-10-24 PROCEDURE — 99999 PR PBB SHADOW E&M-EST. PATIENT-LVL V: ICD-10-PCS | Mod: PBBFAC,,, | Performed by: NURSE PRACTITIONER

## 2023-10-24 PROCEDURE — 3075F PR MOST RECENT SYSTOLIC BLOOD PRESS GE 130-139MM HG: ICD-10-PCS | Mod: CPTII,S$GLB,, | Performed by: NURSE PRACTITIONER

## 2023-10-24 PROCEDURE — 3079F PR MOST RECENT DIASTOLIC BLOOD PRESSURE 80-89 MM HG: ICD-10-PCS | Mod: CPTII,S$GLB,, | Performed by: PHYSICIAN ASSISTANT

## 2023-10-24 PROCEDURE — 3044F HG A1C LEVEL LT 7.0%: CPT | Mod: CPTII,S$GLB,, | Performed by: NURSE PRACTITIONER

## 2023-10-24 PROCEDURE — 1159F MED LIST DOCD IN RCRD: CPT | Mod: CPTII,S$GLB,, | Performed by: PHYSICIAN ASSISTANT

## 2023-10-24 PROCEDURE — 3075F PR MOST RECENT SYSTOLIC BLOOD PRESS GE 130-139MM HG: ICD-10-PCS | Mod: CPTII,S$GLB,, | Performed by: PHYSICIAN ASSISTANT

## 2023-10-24 PROCEDURE — 3008F BODY MASS INDEX DOCD: CPT | Mod: CPTII,S$GLB,, | Performed by: NURSE PRACTITIONER

## 2023-10-24 PROCEDURE — 99214 PR OFFICE/OUTPT VISIT, EST, LEVL IV, 30-39 MIN: ICD-10-PCS | Mod: S$GLB,,, | Performed by: NURSE PRACTITIONER

## 2023-10-24 PROCEDURE — 99214 OFFICE O/P EST MOD 30 MIN: CPT | Mod: S$GLB,,, | Performed by: NURSE PRACTITIONER

## 2023-10-24 PROCEDURE — 99999 PR PBB SHADOW E&M-EST. PATIENT-LVL IV: CPT | Mod: PBBFAC,,, | Performed by: PHYSICIAN ASSISTANT

## 2023-10-24 PROCEDURE — 1159F MED LIST DOCD IN RCRD: CPT | Mod: CPTII,S$GLB,, | Performed by: NURSE PRACTITIONER

## 2023-10-24 PROCEDURE — 3008F PR BODY MASS INDEX (BMI) DOCUMENTED: ICD-10-PCS | Mod: CPTII,S$GLB,, | Performed by: PHYSICIAN ASSISTANT

## 2023-10-24 PROCEDURE — 3075F SYST BP GE 130 - 139MM HG: CPT | Mod: CPTII,S$GLB,, | Performed by: NURSE PRACTITIONER

## 2023-10-24 PROCEDURE — 1160F PR REVIEW ALL MEDS BY PRESCRIBER/CLIN PHARMACIST DOCUMENTED: ICD-10-PCS | Mod: CPTII,S$GLB,, | Performed by: PHYSICIAN ASSISTANT

## 2023-10-24 PROCEDURE — 1159F PR MEDICATION LIST DOCUMENTED IN MEDICAL RECORD: ICD-10-PCS | Mod: CPTII,S$GLB,, | Performed by: PHYSICIAN ASSISTANT

## 2023-10-24 PROCEDURE — 99214 PR OFFICE/OUTPT VISIT, EST, LEVL IV, 30-39 MIN: ICD-10-PCS | Mod: S$GLB,,, | Performed by: PHYSICIAN ASSISTANT

## 2023-10-24 PROCEDURE — 3080F DIAST BP >= 90 MM HG: CPT | Mod: CPTII,S$GLB,, | Performed by: NURSE PRACTITIONER

## 2023-10-24 NOTE — PROGRESS NOTES
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 anterior to the cortex, similar.      Impression       Degenerative endplate edema centered at L1-L2.  Otherwise, stable postoperative appearance with multilevel spondylosis as detailed.          Narrative     EXAMINATION:  XR CERVICAL SPINE COMPLETE 5 VIEW    CLINICAL HISTORY:  . Cervicalgia    TECHNIQUE:  AP, Lateral, bilateral oblique and open mouth views of the cervical spine were performed.    COMPARISON:  07/21/2015    FINDINGS:  C5-6 osseous fusion noted.  Prominent C6-7 degenerative disc disease and facet arthropathy noted.  The alignment is within normal limits.  There is osseous neural foraminal narrowing on multiple levels bilaterally.  No fracture.  No marrow replacement process.  No prevertebral swelling.      Impression       Cervical spondylosis.         BMP  Lab Results   Component Value Date     03/16/2023    K 3.4 (L) 03/16/2023     03/16/2023    CO2 25 03/16/2023    BUN 14 03/16/2023    CREATININE 1.0 03/16/2023    CALCIUM 9.1 03/16/2023    ANIONGAP 10 03/16/2023    ESTGFRAFRICA >60.0 07/19/2022    EGFRNONAA >60.0 07/19/2022         REVIEW OF SYSTEMS:    GENERAL:  No weight loss or fevers.    RESPIRATORY:  Denies SOB or wheezing.  CARDIOVASCULAR:  Negative for chest pain, leg swelling or palpitations.  GI:  Negative for abdominal discomfort, blood in  "stools or black stools or change in bowel habits.  MUSCULOSKELETAL:  Joint stiffness, back and neck pain.  SKIN:  Negative for lesions, rash, and itching.  PSYCH: Patients sleep is not disturbed secondary to pain.  HEMATOLOGY/LYMPHOLOGY:  History of easy bruising.  History of von Willebrand's factor deficiency. On Plavix.  NEURO:   No history of syncope, paralysis, seizures or tremors.   All other reviewed and negative other than HPI.      OBJECTIVE:    BP (!) 132/92 (BP Location: Right arm, Patient Position: Sitting, BP Method: Small (Automatic))   Pulse 67   Resp 18   Ht 5' 11" (1.803 m)   Wt 108.7 kg (239 lb 10.2 oz)   SpO2 100%   BMI 33.42 kg/m²     PHYSICAL EXAMINATION:    GENERAL: Well appearing, in no acute distress, alert and oriented x3.  PSYCH:  Mood and affect appropriate.  SKIN:  No evidence of infection from previous injection site. No visible rashes.  HEAD/FACE:  Normocephalic, atraumatic.   BACK: There is pain with palpation of thoracic and lumbar facet joints and paraspinal muscles bilaterally. There is pain with lumbar extension and flexion.  Positive facet loading bilaterally.  EXTREMITIES: Bilateral lower and upper extremity strength is 5/5 and symmetric.   MUSCULOSKELETAL:   No atrophy or tone abnormalities are noted. No TTP over SI joints. 5/5 strength in right ankle with plantar and dorsiflexion. 5/5 strength in left ankle with plantar and dorsiflexion. 5/5 strength with right knee flexion and extension. 5/5 strength with left knee flexion and extension. Swelling to left medial knee.  NEURO: Cranial nerves grossly intact. No loss of sensation noted.  GAIT: Antalgic- ambulates without assistance.      Assessment:     1. Chronic pain syndrome    2. Lumbar spondylosis    3. Thoracic radiculitis    4. Chronic pain of left knee              Plan:     - Previous imaging was reviewed and discussed with the patient today. Recent knee MRI discussed with patient. He has a Baker's cyst and meniscal " tear, likely causing symptoms. He has a follow up with [facility] today.    - We discussed repeat left and right L3,4,5 RFAs as previous provided 90% relief for 10 months. He can call to schedule after he has a plan with his knee.    - Can continue Robaxin 500 mg BID PRN muscle pain.    - The patient will continue a home exercise routine to help with pain and strengthening.     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 It may be mild vWF, but most recent lab tests showed normal coagulation function. The patient has been previously receiving dDAVP prior to all procedures and has not exhibited bleeding. Hematology recommended receiving dDAVP prior to all invasive procedures. For all interventional procedures, we will give 0.3mcg/kg dDAVP immediately prior to the procedure.       - RTC PRN.      The above plan and management options were discussed at length with patient. Patient is in agreement with the above and verbalized understanding.     Buffy Villagran    10/24/2023

## 2023-10-24 NOTE — PROGRESS NOTES
CC: Left knee/leg pain    Patient presents today for follow-up evaluation of left knee/leg pain.  He injuries since his last visit.  He continues to complain of pain of the left knee both at rest and with activity.  Recently underwent MRI of the left knee, and is here today to discuss diagnosis and treatment options moving forward.    HPI (10/12/2023):  Patient presents today for follow-up evaluation of left knee pain.  Patient states he began experiencing acute onset, atraumatic left knee pain approximately 1 week ago.  He does not recall any recent falls or trauma to the knee.  Pain is localized to the medial and lateral and posterior aspects of the left knee.  This is worse with knee flexion and extension, going up and down stairs, and has been bothers him at night.  He has noted some mild swelling of the left knee as well.  No significant instability or mechanical symptoms.  He has been taking over-the-counter extra-strength Tylenol with little relief.  In June, he received an injection in this knee followed by an ultrasound-guided Baker cyst aspiration/injection week later which did provide some relief.      - mechanical symptoms, - instability    Is affecting ADLs.  Pain is 7/10 at it's worst.    PMHx:  Diastolic heart failure, lumbar DDD/stenosis    REVIEW OF SYSTEMS:  Constitution: Negative. Negative for chills, fever and night sweats.   HENT: Negative for congestion and headaches.    Eyes: Negative for blurred vision, left vision loss and right vision loss.   Cardiovascular: Negative for chest pain and syncope.   Respiratory: Negative for cough and shortness of breath.    Endocrine: Negative for polydipsia, polyphagia and polyuria.   Hematologic/Lymphatic: Negative for bleeding problem. Does not bruise/bleed easily.   Skin: Negative for dry skin, itching and rash.   Musculoskeletal: Negative for falls. Positive for left knee pain and  muscle weakness.   Gastrointestinal: Negative for abdominal pain and  bowel incontinence.   Genitourinary: Negative for bladder incontinence and nocturia.   Neurological: Negative for disturbances in coordination, loss of balance and seizures.   Psychiatric/Behavioral: Negative for depression. The patient does not have insomnia.    Allergic/Immunologic: Negative for hives and persistent infections.     PAST MEDICAL HISTORY:    Past Medical History:   Diagnosis Date    Acute pancreatitis     Anal fissure     Anemia     Anticoagulant long-term use     Arthritis     Asthma in remission     Back pain     BPH (benign prostatic hypertrophy)     Cancer 2000    prostate- treated at [facility] with chemo- in remission since 2000    Chronic maxillary sinusitis     Clotting disorder     Diastolic dysfunction with chronic heart failure 12/3/2018    Dysphagia 10/7/2014    Family history of colon cancer     Family history of early CAD     GERD (gastroesophageal reflux disease)     Helicobacter pylori (H. pylori) infection     Chronic    History of chronic pancreatitis     HTN (hypertension)     Lumbago 11/12/2012    Obesity     ABDON (obstructive sleep apnea)     ABDON (obstructive sleep apnea)     With likely ohs Refer to sleep    Sacroiliac joint pain 2/10/2015    Spinal stenosis of lumbar region     Von Willebrand disease     VWD (acquired von Willebrand's disease)        PAST SURGICAL HISTORY:       FAMILY HISTORY:   Family History   Problem Relation Age of Onset    Colon cancer Father 67        colon cancer    Hypertension Father     Glaucoma Father     Cancer Father     Colon cancer Paternal Grandfather 65             Coronary artery disease Mother 45    Hypertension Mother     Heart disease Mother     Colon cancer Mother     Diabetes Mother     No Known Problems Brother     No Known Problems Sister     No Known Problems Daughter     No Known Problems Son     Coronary artery disease Brother 51    No Known Problems Daughter     No Known Problems Daughter     No Known Problems Son     No Known  Problems Son     Colon cancer Paternal Uncle 65    Diabetes Mellitus Paternal Grandmother     Esophageal cancer Neg Hx        SOCIAL HISTORY:   Social History     Socioeconomic History    Marital status:    Occupational History    Occupation: disabled due to back injury   Tobacco Use    Smoking status: Never    Smokeless tobacco: Never   Substance and Sexual Activity    Alcohol use: Yes     Alcohol/week: 1.0 standard drink of alcohol     Types: 1 Glasses of wine per week     Comment: social    Drug use: No    Sexual activity: Yes     Partners: Female   Social History Narrative    wlking for exercised       MEDICATIONS:     Current Outpatient Medications:     albuterol (PROVENTIL/VENTOLIN HFA) 90 mcg/actuation inhaler, INHALE 2 PUFFS INTO THE LUNGS EVERY 6 HOURS AS NEEDED FOR WHEEZING OR SHORTNESS OF BREATH, Disp: 18 g, Rfl: 4    atorvastatin (LIPITOR) 80 MG tablet, Take 1 tablet (80 mg total) by mouth every evening., Disp: 90 tablet, Rfl: 3    azelastine (ASTELIN) 137 mcg (0.1 %) nasal spray, Two sprays in each nostril, sniff until absorbed, then follow with 1 spray of fluticasone.  Use both sprays twice daily. (Patient taking differently: Two sprays in each nostril, sniff until absorbed, then follow with 1 spray of fluticasone.  Use both sprays twice daily.(PATIENT USES NIGHTLY 3/12/2021)), Disp: 30 mL, Rfl: 11    budesonide-formoterol 160-4.5 mcg (SYMBICORT) 160-4.5 mcg/actuation HFAA, INHALE 2 PUFFS INTO THE LUNGS EVERY 12 HOURS, Disp: 10.2 g, Rfl: 12    calcium citrate-vitamin D3 315-200 mg (CITRACAL+D) 315-200 mg-unit per tablet, Take 1 tablet by mouth nightly. , Disp: , Rfl:     clopidogreL (PLAVIX) 75 mg tablet, TAKE 1 TABLET(75 MG) BY MOUTH EVERY DAY, Disp: 90 tablet, Rfl: 3    cyanocobalamin, vitamin B-12, 50 mcg tablet, Take 50 mcg by mouth nightly. , Disp: , Rfl:     diclofenac sodium (VOLTAREN) 1 % Gel, Apply 2 g topically 4 (four) times daily as needed (pain)., Disp: 400 g, Rfl: 2    docusate  sodium (COLACE) 100 MG capsule, Take 1 tablet by mouth Twice daily. 1 Capsule Oral Twice a day .  Take with pain medicine, Disp: , Rfl:     doxazosin (CARDURA) 1 MG tablet, TAKE 1 TABLET BY MOUTH EVERY EVENING, Disp: 90 tablet, Rfl: 3    flu vacc [id number] 6mos up,PF, (FLUARIX QUAD [id number], PF,) 60 mcg (15 mcg x 4)/0.5 mL Syrg, inject into muscle for one dose, Disp: 0.5 mL, Rfl: 0    fluticasone propionate (FLONASE) 50 mcg/actuation nasal spray, One spray in each nostril twice daily after 1st using azelastine nasal spray, Disp: 18.2 mL, Rfl: 11    furosemide (LASIX) 20 MG tablet, TAKE 1 TABLET(20 MG) BY MOUTH EVERY DAY, Disp: 90 tablet, Rfl: 1    ipratropium (ATROVENT) 42 mcg (0.06 %) nasal spray, 1-2 sprays in each nostril before eating and at bedtime as needed, Disp: 30 mL, Rfl: 11    pregabalin (LYRICA) 25 MG capsule, Take 1-2 capsules (25-50 mg total) by mouth every evening., Disp: 60 capsule, Rfl: 5    RABEprazole (ACIPHEX) 20 mg tablet, Take 1 tablet (20 mg total) by mouth every 12 (twelve) hours., Disp: 180 tablet, Rfl: 1    tadalafiL (CIALIS) 20 MG Tab, Take 1 tablet (20 mg total) by mouth as needed. Take every 36 hours as needed for ED., Disp: 30 tablet, Rfl: 9    testosterone (ANDRODERM) 2 mg/24 hour PT24, APPLY 1 PATCH TOPICALLY TO THE SKIN EVERY DAY, Disp: 60 patch, Rfl: 3    zolpidem (AMBIEN) 10 mg Tab, TAKE 1 TABLET BY MOUTH EVERY DAY AT BEDTIME (Patient taking differently: Take 10 mg by mouth nightly as needed.), Disp: 20 tablet, Rfl: 0  No current facility-administered medications for this visit.    Facility-Administered Medications Ordered in Other Visits:     0.9%  NaCl infusion, , Intravenous, Continuous, Milla Oliveira NP, Last Rate: 70 mL/hr at 03/15/21 1417, New Bag at 03/15/21 1417    0.9%  NaCl infusion, , Intravenous, Continuous, Milla Oliveira NP, Last Rate: 70 mL/hr at 12/10/21 0736, New Bag at 12/10/21 0736    0.9%  NaCl infusion, , Intravenous, Continuous, Shivam  "Jaqueline KWAN NP    mupirocin 2 % ointment, , Nasal, On Call Procedure, Milla Oliveira NP, Given at 12/10/21 0729    mupirocin 2 % ointment, , Nasal, On Call Procedure, Jaqueline Langley NP, Given at 02/25/22 0623    ALLERGIES:   Review of patient's allergies indicates:   Allergen Reactions    Penicillins Hives and Swelling     Has had allergy testing and can prob tolerate penicillin    Ace inhibitors Other (See Comments)     "Caused a respiratory infection"    Aspirin Hives           Codeine Itching     Ok to take percocet    Dilaudid [hydromorphone] Itching    Gabapentin Hallucinations       VITAL SIGNS:   /83   Pulse 67   Ht 5' 11" (1.803 m)   Wt 108.4 kg (239 lb)   BMI 33.33 kg/m²      PHYSICAL EXAMINATION  General:  The patient is alert and oriented x 3.  Mood is pleasant.  Observation of ears, eyes and nose reveal no gross abnormalities.  No labored breathing observed.    LEFT KNEE EXAMINATION     OBSERVATION / INSPECTION   Gait:   Nonantalgic   Alignment:  Neutral   Scars:   None   Muscle atrophy: Mild  Effusion:  1+   Warmth:  None   Discoloration:   None  Swelling:  none     TENDERNESS / CREPITUS (T / C):          T / C      T / C   Patella   - / -   Lateral joint line   + / -   Peripatellar medial  -  Medial joint line    + / -   Peripatellar lateral -  Medial plica   - / -   Patellar tendon -   Popliteal fossa   + / -    Quad tendon   -   Gastrocnemius   -   Prepatellar Bursa - / -   Quadricep   -   Tibial tubercle  -  Thigh/hamstring  -   Pes anserine/HS -  Fibula    -   ITB   - / -  Tibia     -   Tib/fib joint  - / -  LCL    -     MFC   - / -   MCL: Proximal  -    LFC   - / -    Distal   -          ROM: (* = pain)  PASSIVE   ACTIVE    Left :   5 / 0 / 120*   5 / 0 / 120*    Right :    5 / 0 / 130   5 / 0 / 130    Patellofemoral examination:  See above noted areas of tenderness.   Patella position    Subluxation / dislocation: Centered           Sup. / Inf;   Normal   Crepitus " (PF):    Absent   Patellar Mobility:       Medial-lateral:   Normal    Superior-inferior:  Normal    Inferior tilt   Normal    Patellar tendon:  Normal   Lateral tilt:    Normal   J-sign:     None   Patellofemoral grind:   No pain       MENISCAL SIGNS:     Pain on terminal extension:  +  Pain on terminal flexion:  +  Arturs maneuver:  + (for pain)  Squat     deferred    LIGAMENT EXAMINATION:  ACL / Lachman:  normal (-1 to 2mm)    PCL-Post.  drawer: normal 0 to 2mm  MCL- Valgus:  normal 0 to 2mm  LCL- Varus:  normal 0 to 2mm  Pivot shift: normal (Equal)   Dial Test: difference c/w other side   At 30° flexion: normal (< 5°)    At 90° flexion: normal (< 5°)   Reverse Pivot Shift:   normal (Equal)     STRENGTH: (* = with pain) PAINFUL SIDE   Quadricep   5/5   Hamstrin/5    EXTREMITY NEURO-VASCULAR EXAMINATION:   Sensation:  Grossly intact to light touch all dermatomal regions.   Motor Function:  Fully intact motor function at hip, knee, foot and ankle    DTRs;  quadriceps and  achilles 2+.  No clonus and downgoing Babinski.    Vascular status:  DP and PT pulses 2+, brisk capillary refill, symmetric.     OTHER FINDINGS:  + popliteal fullness consistent with Baker's cyst    X-rays left knee (2023):   FINDINGS:  The osseous structures are intact without evidence of fracture or osseous destructive process.  There is no significant degree of degenerative change.  There is a joint effusion.    US lower extremity, left (2023):  FINDINGS:  Left thigh veins: The common femoral, femoral, popliteal, upper greater saphenous, and deep femoral veins are patent and free of thrombus. The veins are normally compressible and have normal phasic flow and augmentation response.     Left calf veins: The visualized calf veins are patent.     Contralateral CFV: The contralateral (right) common femoral vein is patent and free of thrombus.     Miscellaneous: Oval complex collection along the medial aspect of the lower leg  measures 5.6 x 2 x 9.8 cm.  No internal vascularity.  Popliteal fossa cyst favored.     Impression:     No evidence of deep venous thrombosis in the left lower extremity.     Complex collection along the medial aspect of the lower leg favored to represent a popliteal fossa cyst.  Please correlate with exam.     X-rays bilateral knees (10/12/2023):  No acute fracture or dislocation.  Mild medial and lateral tibiofemoral joint space narrowing seen bilaterally.  Well-preserved patellofemoral compartments.  Soft tissues appeared normal.    Kellgren Tee grade 2, bilaterally    MRI left knee (10/21/2023):  Impression:     1. Tricompartmental chondral loss as detailed above.  2. Horizontal free edge tear body segment lateral meniscus, likely degenerative.  3. Joint effusion with synovitis.  4. Popliteal cyst with surrounding fluid signal suggesting partial rupture.    ASSESSMENT:    Left knee pain - possible meniscus tear  Primary osteoarthritis of left knee  Degenerative lateral meniscus tear, left  Ruptured Baker's cyst of left knee    PLAN:     I made the decision to obtain old records of the patient including previous notes and imaging. I independently reviewed and interpreted the radiographs and/or MRIs today as well as prior imaging. Reviewed MRI and radiograph results with patient in detail.    Treatment options were discussed with the patient about his knee.  I reviewed the MRI images with his, including the relevant anatomy, and what this means for his knee.    We discussed both non-operative and operative options for his knee and the risks and benefits of each.    I feel like he would benefit from surgery for his knee.  After a discussion of specific risks and benefits, he would like to proceed with setting this up.    These risks include: bleeding, infection, scarring, damage to neurovascular structures, damage to cartilage, stiffness, blood clots, pulmonary embolism, swelling, compartment syndrome, need  for further surgery, and the risks of anesthesia.      He verbalized his understanding of these risks and wished to proceed with surgery.    A total of 25 minutes were spent face-to-face with the patient during this encounter and over half of that time was spent on counseling about treatment options including his choice for surgery and coordination of his care for his preoperative visits, surgery and post-operative rehab.  We also had a detailed discussion of his expectation level for this procedure as well as any limitations at home or work and with exercising following surgery.     The operative plan is:    left   1. Arthroscopic partial meniscectomy  2. Possible arthroscopic synovectomy  3. Possible arthroscopic loose body removal  4. Possible arthroscopic chondroplasty    Position: Supine    Patient will  need medical clearance prior to the pre-operative appointment.    If he wishes to proceed, we'll get his scheduled for this at his convenience around his work schedule.      All questions were answered, pt will contact us for questions or concerns in the interim.      Medical Dictation software was used during the dictation of portions or the entirety of this medical record.  Phonetic or grammatic errors may exist due to the use of this software. For clarification, refer to the author of the document.

## 2023-10-25 ENCOUNTER — TELEPHONE (OUTPATIENT)
Dept: SPORTS MEDICINE | Facility: CLINIC | Age: 63
End: 2023-10-25
Payer: MEDICARE

## 2023-10-25 ENCOUNTER — LAB VISIT (OUTPATIENT)
Dept: LAB | Facility: OTHER | Age: 63
End: 2023-10-25
Attending: STUDENT IN AN ORGANIZED HEALTH CARE EDUCATION/TRAINING PROGRAM
Payer: MEDICARE

## 2023-10-25 ENCOUNTER — OFFICE VISIT (OUTPATIENT)
Dept: INTERNAL MEDICINE | Facility: CLINIC | Age: 63
End: 2023-10-25
Payer: MEDICARE

## 2023-10-25 VITALS
BODY MASS INDEX: 33.67 KG/M2 | DIASTOLIC BLOOD PRESSURE: 74 MMHG | HEART RATE: 85 BPM | HEIGHT: 71 IN | OXYGEN SATURATION: 95 % | WEIGHT: 240.5 LBS | SYSTOLIC BLOOD PRESSURE: 132 MMHG

## 2023-10-25 DIAGNOSIS — Z01.818 PRE-OP EXAM: Primary | ICD-10-CM

## 2023-10-25 DIAGNOSIS — M23.201 OLD TEAR OF LATERAL MENISCUS OF LEFT KNEE, UNSPECIFIED TEAR TYPE: Primary | ICD-10-CM

## 2023-10-25 DIAGNOSIS — R60.0 LOWER EXTREMITY EDEMA: ICD-10-CM

## 2023-10-25 DIAGNOSIS — Z01.818 PRE-OP EXAM: ICD-10-CM

## 2023-10-25 LAB
ALBUMIN SERPL BCP-MCNC: 4 G/DL (ref 3.5–5.2)
ALP SERPL-CCNC: 60 U/L (ref 55–135)
ALT SERPL W/O P-5'-P-CCNC: 16 U/L (ref 10–44)
ANION GAP SERPL CALC-SCNC: 6 MMOL/L (ref 8–16)
AST SERPL-CCNC: 13 U/L (ref 10–40)
BASOPHILS # BLD AUTO: 0.04 K/UL (ref 0–0.2)
BASOPHILS NFR BLD: 0.6 % (ref 0–1.9)
BILIRUB SERPL-MCNC: 1.2 MG/DL (ref 0.1–1)
BNP SERPL-MCNC: <10 PG/ML (ref 0–99)
BUN SERPL-MCNC: 8 MG/DL (ref 8–23)
CALCIUM SERPL-MCNC: 8.8 MG/DL (ref 8.7–10.5)
CHLORIDE SERPL-SCNC: 105 MMOL/L (ref 95–110)
CO2 SERPL-SCNC: 29 MMOL/L (ref 23–29)
CREAT SERPL-MCNC: 1 MG/DL (ref 0.5–1.4)
DIFFERENTIAL METHOD: ABNORMAL
EOSINOPHIL # BLD AUTO: 0.2 K/UL (ref 0–0.5)
EOSINOPHIL NFR BLD: 3 % (ref 0–8)
ERYTHROCYTE [DISTWIDTH] IN BLOOD BY AUTOMATED COUNT: 11.9 % (ref 11.5–14.5)
EST. GFR  (NO RACE VARIABLE): >60 ML/MIN/1.73 M^2
GLUCOSE SERPL-MCNC: 99 MG/DL (ref 70–110)
HCT VFR BLD AUTO: 42.1 % (ref 40–54)
HGB BLD-MCNC: 13.5 G/DL (ref 14–18)
IMM GRANULOCYTES # BLD AUTO: 0.02 K/UL (ref 0–0.04)
IMM GRANULOCYTES NFR BLD AUTO: 0.3 % (ref 0–0.5)
LYMPHOCYTES # BLD AUTO: 1.8 K/UL (ref 1–4.8)
LYMPHOCYTES NFR BLD: 28.5 % (ref 18–48)
MCH RBC QN AUTO: 30.5 PG (ref 27–31)
MCHC RBC AUTO-ENTMCNC: 32.1 G/DL (ref 32–36)
MCV RBC AUTO: 95 FL (ref 82–98)
MONOCYTES # BLD AUTO: 0.4 K/UL (ref 0.3–1)
MONOCYTES NFR BLD: 6.3 % (ref 4–15)
NEUTROPHILS # BLD AUTO: 3.9 K/UL (ref 1.8–7.7)
NEUTROPHILS NFR BLD: 61.3 % (ref 38–73)
NRBC BLD-RTO: 0 /100 WBC
PLATELET # BLD AUTO: 233 K/UL (ref 150–450)
PMV BLD AUTO: 9.4 FL (ref 9.2–12.9)
POTASSIUM SERPL-SCNC: 3.7 MMOL/L (ref 3.5–5.1)
PROT SERPL-MCNC: 7.3 G/DL (ref 6–8.4)
RBC # BLD AUTO: 4.42 M/UL (ref 4.6–6.2)
SODIUM SERPL-SCNC: 140 MMOL/L (ref 136–145)
WBC # BLD AUTO: 6.38 K/UL (ref 3.9–12.7)

## 2023-10-25 PROCEDURE — 3075F SYST BP GE 130 - 139MM HG: CPT | Mod: CPTII,S$GLB,, | Performed by: STUDENT IN AN ORGANIZED HEALTH CARE EDUCATION/TRAINING PROGRAM

## 2023-10-25 PROCEDURE — 1159F PR MEDICATION LIST DOCUMENTED IN MEDICAL RECORD: ICD-10-PCS | Mod: CPTII,S$GLB,, | Performed by: STUDENT IN AN ORGANIZED HEALTH CARE EDUCATION/TRAINING PROGRAM

## 2023-10-25 PROCEDURE — 3044F PR MOST RECENT HEMOGLOBIN A1C LEVEL <7.0%: ICD-10-PCS | Mod: CPTII,S$GLB,, | Performed by: STUDENT IN AN ORGANIZED HEALTH CARE EDUCATION/TRAINING PROGRAM

## 2023-10-25 PROCEDURE — 1159F MED LIST DOCD IN RCRD: CPT | Mod: CPTII,S$GLB,, | Performed by: STUDENT IN AN ORGANIZED HEALTH CARE EDUCATION/TRAINING PROGRAM

## 2023-10-25 PROCEDURE — 99999 PR PBB SHADOW E&M-EST. PATIENT-LVL IV: ICD-10-PCS | Mod: PBBFAC,,, | Performed by: STUDENT IN AN ORGANIZED HEALTH CARE EDUCATION/TRAINING PROGRAM

## 2023-10-25 PROCEDURE — 3078F DIAST BP <80 MM HG: CPT | Mod: CPTII,S$GLB,, | Performed by: STUDENT IN AN ORGANIZED HEALTH CARE EDUCATION/TRAINING PROGRAM

## 2023-10-25 PROCEDURE — 3044F HG A1C LEVEL LT 7.0%: CPT | Mod: CPTII,S$GLB,, | Performed by: STUDENT IN AN ORGANIZED HEALTH CARE EDUCATION/TRAINING PROGRAM

## 2023-10-25 PROCEDURE — 83880 ASSAY OF NATRIURETIC PEPTIDE: CPT | Performed by: STUDENT IN AN ORGANIZED HEALTH CARE EDUCATION/TRAINING PROGRAM

## 2023-10-25 PROCEDURE — 85025 COMPLETE CBC W/AUTO DIFF WBC: CPT | Performed by: STUDENT IN AN ORGANIZED HEALTH CARE EDUCATION/TRAINING PROGRAM

## 2023-10-25 PROCEDURE — 3008F BODY MASS INDEX DOCD: CPT | Mod: CPTII,S$GLB,, | Performed by: STUDENT IN AN ORGANIZED HEALTH CARE EDUCATION/TRAINING PROGRAM

## 2023-10-25 PROCEDURE — 3008F PR BODY MASS INDEX (BMI) DOCUMENTED: ICD-10-PCS | Mod: CPTII,S$GLB,, | Performed by: STUDENT IN AN ORGANIZED HEALTH CARE EDUCATION/TRAINING PROGRAM

## 2023-10-25 PROCEDURE — 3075F PR MOST RECENT SYSTOLIC BLOOD PRESS GE 130-139MM HG: ICD-10-PCS | Mod: CPTII,S$GLB,, | Performed by: STUDENT IN AN ORGANIZED HEALTH CARE EDUCATION/TRAINING PROGRAM

## 2023-10-25 PROCEDURE — 99999 PR PBB SHADOW E&M-EST. PATIENT-LVL IV: CPT | Mod: PBBFAC,,, | Performed by: STUDENT IN AN ORGANIZED HEALTH CARE EDUCATION/TRAINING PROGRAM

## 2023-10-25 PROCEDURE — 3078F PR MOST RECENT DIASTOLIC BLOOD PRESSURE < 80 MM HG: ICD-10-PCS | Mod: CPTII,S$GLB,, | Performed by: STUDENT IN AN ORGANIZED HEALTH CARE EDUCATION/TRAINING PROGRAM

## 2023-10-25 PROCEDURE — 99214 PR OFFICE/OUTPT VISIT, EST, LEVL IV, 30-39 MIN: ICD-10-PCS | Mod: S$GLB,,, | Performed by: STUDENT IN AN ORGANIZED HEALTH CARE EDUCATION/TRAINING PROGRAM

## 2023-10-25 PROCEDURE — 80053 COMPREHEN METABOLIC PANEL: CPT | Performed by: STUDENT IN AN ORGANIZED HEALTH CARE EDUCATION/TRAINING PROGRAM

## 2023-10-25 PROCEDURE — 36415 COLL VENOUS BLD VENIPUNCTURE: CPT | Performed by: STUDENT IN AN ORGANIZED HEALTH CARE EDUCATION/TRAINING PROGRAM

## 2023-10-25 PROCEDURE — 99214 OFFICE O/P EST MOD 30 MIN: CPT | Mod: S$GLB,,, | Performed by: STUDENT IN AN ORGANIZED HEALTH CARE EDUCATION/TRAINING PROGRAM

## 2023-10-25 NOTE — PROGRESS NOTES
[facility]    Subjective:     Patient ID: rBi Santiago is a 63 y.o. male.  Chief Complaint: Pre-op Exam    HPI:  Patient is a 63 y.o. male who  has von Willebrand's disease, nonobstructive coronary artery disease, diastolic dysfunction who presents for preop evaluation prior to undergoing knee arthroscopy.   Patient takes Plavix for nonobstructive CAD.  He is aware he needs to hold this prior to surgery.  For his von Willebrand's disease he gets DDAVP prior to undergoing surgery.   Patient has good exercise tolerance.  He reports he goes to the gym daily and does 20 minutes on the treadmill without stopping.  He experiences no shortness a breath or chest pain with exercise.   Patient does report history of asthma for which he takes inhalers.  Denies COPD.    Current Outpatient Medications   Medication Instructions    albuterol (PROVENTIL/VENTOLIN HFA) 90 mcg/actuation inhaler INHALE 2 PUFFS INTO THE LUNGS EVERY 6 HOURS AS NEEDED FOR WHEEZING OR SHORTNESS OF BREATH    atorvastatin (LIPITOR) 80 mg, Oral, Nightly    azelastine (ASTELIN) 137 mcg (0.1 %) nasal spray Two sprays in each nostril, sniff until absorbed, then follow with 1 spray of fluticasone.  Use both sprays twice daily.    budesonide-formoterol 160-4.5 mcg (SYMBICORT) 160-4.5 mcg/actuation HFAA INHALE 2 PUFFS INTO THE LUNGS EVERY 12 HOURS    calcium citrate-vitamin D3 315-200 mg (CITRACAL+D) 315-200 mg-unit per tablet 1 tablet, Oral, Nightly    clopidogreL (PLAVIX) 75 mg tablet TAKE 1 TABLET(75 MG) BY MOUTH EVERY DAY    cyanocobalamin (vitamin B-12) 50 mcg, Oral, Nightly    diclofenac sodium (VOLTAREN) 2 g, Topical (Top), 4 times daily PRN    docusate sodium (COLACE) 100 MG capsule 1 tablet, 2 times daily    doxazosin (CARDURA) 1 MG tablet TAKE 1 TABLET BY MOUTH EVERY EVENING    flu vacc [id number] 6mos up,PF, (FLUARIX QUAD [id number], PF,) 60 mcg (15 mcg x 4)/0.5 mL Syrg inject into muscle for one dose    fluticasone propionate  "(FLONASE) 50 mcg/actuation nasal spray One spray in each nostril twice daily after 1st using azelastine nasal spray    furosemide (LASIX) 20 mg, Oral    ipratropium (ATROVENT) 42 mcg (0.06 %) nasal spray 1-2 sprays in each nostril before eating and at bedtime as needed    pregabalin (LYRICA) 25-50 mg, Oral, Nightly    RABEprazole (ACIPHEX) 20 mg, Oral, Every 12 hours    tadalafiL (CIALIS) 20 mg, Oral, As needed (PRN), Take every 36 hours as needed for ED.    testosterone (ANDRODERM) 2 mg/24 hour PT24 APPLY 1 PATCH TOPICALLY TO THE SKIN EVERY DAY    zolpidem (AMBIEN) 10 mg Tab TAKE 1 TABLET BY MOUTH EVERY DAY AT BEDTIME       Objective:        Body mass index is 33.55 kg/m².  Vitals:    10/25/23 1534   BP: 132/74   Pulse: 85   SpO2: 95%   Weight: 109.1 kg (240 lb 8.4 oz)   Height: 5' 11" (1.803 m)   PainSc:   6     Physical Exam  Cardiovascular:      Rate and Rhythm: Normal rate.      Heart sounds: No murmur heard.  Pulmonary:      Effort: Pulmonary effort is normal. No respiratory distress.      Breath sounds: Normal breath sounds. No wheezing or rales.   Musculoskeletal:      Right lower leg: Edema present.      Left lower leg: Edema present.      Comments: 1+ lower extremity edema bilaterally.   Neurological:      Mental Status: He is alert.           Lab Results   Component Value Date    WBC 9.51 03/16/2023    HGB 14.3 03/16/2023    HCT 43.8 03/16/2023     03/16/2023    CHOL 125 01/23/2023    TRIG 63 01/23/2023    HDL 32 (L) 01/23/2023    ALT 20 03/16/2023    AST 18 03/16/2023     03/16/2023    K 3.4 (L) 03/16/2023     03/16/2023    CREATININE 1.0 03/16/2023    BUN 14 03/16/2023    CO2 25 03/16/2023    TSH 1.243 01/23/2023    PSA 0.93 01/22/2021    INR 1.0 04/04/2021    HGBA1C 4.6 01/23/2023     Assessment:         1. Pre-op exam    2. Lower extremity edema          Plan:   1. Pre-op exam    -  labs reviewed.  Previous cardiac catheterization reviewed.   No history of elevated creatinine or " diabetes.  No history of cerebrovascular disease.   He has have some diastolic dysfunction but has no history heart failure exacerbations.     He is able to complete greater than 4 Mets.  He may proceed to surgery with no further testing.   Give DDAVP of VWF concentrate prior to surgery for von Willebrand's disease.    - SCHEDULED EKG 12-LEAD (to Muse); Future  - CBC W/ AUTO DIFFERENTIAL; Future  - COMPREHENSIVE METABOLIC PANEL; Future    2. Lower extremity edema  -   BNP not elevated, likely represenst venous insufficiency rather than volume overload.    All of your core healthy metrics are met.    No follow-ups on file. or sooner prn (as needed)        Tye Rob  [facility]  [address] [address]  Phone [phone]  Fax [phone]      This note is dictated using the M*Modal Fluency Direct word recognition program. It may contain word recognition mistakes or wrong word substitutions that were missed on review.

## 2023-10-25 NOTE — TELEPHONE ENCOUNTER
POST OP PT -     MRI Impression:   MRI Knee Without Contrast Left  Narrative: EXAMINATION:  MRI KNEE WITHOUT CONTRAST LEFT    CLINICAL HISTORY:  Knee pain, chronic, negative xray (Age >= 5y);    TECHNIQUE:  Routine MRI evaluation of the left knee without contrast.    COMPARISON:  Radiograph 10/12/2023.    FINDINGS:  Menisci: Intermediate linear signal at the free edge of the body segment of lateral meniscus most suggestive of a degenerative horizontal tear.  Medial meniscus demonstrates preserved morphology and signal intensity.  Intact anterior and posterior root ligaments.    Ligaments: ACL, PCL, MCL and posterior-lateral corner structures are normal.    Extensor Mechanism: Patellofemoral alignment is maintained.  Quadriceps and patellar tendons are intact.  MPFL and medial/lateral retinacula are normal.    Cartilage:    *Patellofemoral: Partial to full-thickness chondral fissuring throughout the inferior half of the patella and superior aspect of the medial and central trochlea.  No subchondral edema.  *Medial tibiofemoral: Partial-thickness chondral fissuring at the central weight-bearing femoral condyle and tibial plateau.  No full-thickness defects or subchondral edema.  *Lateral tibiofemoral: 1.3 x 0.6 cm high-grade partial-thickness chondral defect at the posterior/far posterior weight-bearing femoral condyle.  Partial-thickness chondral fissuring throughout the central and posterior weight-bearing femoral condyle and tibial plateau.  No subchondral edema.  Bones: No fractures.  No avascular necrosis.  No marrow infiltrative process.    Miscellaneous: Focal areas of subchondral edema about the proximal tibiofibular joint, likely degenerative.  Joint effusion with synovitis.  Popliteal cyst with surrounding fluid signal suggesting recent partial rupture.  Medial gastrocnemius, lateral gastrocnemius, distal semimembranosus and visualized pes anserine tendons are intact.  Distal iliotibial band is normal.   Visualized neurovascular structures demonstrate no significant abnormalities.  Impression: 1. Tricompartmental chondral loss as detailed above.  2. Horizontal free edge tear body segment lateral meniscus, likely degenerative.  3. Joint effusion with synovitis.  4. Popliteal cyst with surrounding fluid signal suggesting partial rupture.    Electronically signed by: Sachin Mccauley MD  Date:    10/21/2023  Time:    13:18      Surgical Plan:   left   1. Arthroscopic partial meniscectomy  2. Possible arthroscopic synovectomy  3. Possible arthroscopic loose body removal  4. Possible arthroscopic chondroplasty    DOS 11/8/23    Start PT on 11/9/23      ----- Message from Dea Gibbons MA sent at 10/25/2023 11:52 AM CDT -----  Patient will do PT at [facility]    Pre op - 11/7/23    Date of surgery - 11/8/23

## 2023-10-26 ENCOUNTER — TELEPHONE (OUTPATIENT)
Dept: INTERNAL MEDICINE | Facility: CLINIC | Age: 63
End: 2023-10-26
Payer: MEDICARE

## 2023-10-26 ENCOUNTER — HOSPITAL ENCOUNTER (OUTPATIENT)
Dept: CARDIOLOGY | Facility: OTHER | Age: 63
Discharge: HOME | End: 2023-10-26
Attending: STUDENT IN AN ORGANIZED HEALTH CARE EDUCATION/TRAINING PROGRAM
Payer: MEDICARE

## 2023-10-26 DIAGNOSIS — Z01.818 PRE-OP EXAM: ICD-10-CM

## 2023-10-26 PROCEDURE — 93010 EKG 12-LEAD: ICD-10-PCS | Mod: ,,, | Performed by: INTERNAL MEDICINE

## 2023-10-26 PROCEDURE — 93005 ELECTROCARDIOGRAM TRACING: CPT

## 2023-10-26 PROCEDURE — 93010 ELECTROCARDIOGRAM REPORT: CPT | Mod: ,,, | Performed by: INTERNAL MEDICINE

## 2023-10-26 NOTE — TELEPHONE ENCOUNTER
----- Message from Dea Gibbons MA sent at 10/24/2023  4:02 PM CDT -----  Dr. Galeas,    Dr. Tanner is recommending surgery for your mutual patient.  Prior to surgery they will need to have the following completed through your office:    -EKG  -Blood work; CBC, CMP, Metabolic panel  -Letter stating medically cleared for surgery    Pre op - 11/7/23  Surgery - 11/8/23      Please feel free to contact myself or Dr. Tanner with questions.    Thank you,  Dea Gibbons  Medical Assistant to Dr. Karthik Tanner

## 2023-11-02 ENCOUNTER — PATIENT MESSAGE (OUTPATIENT)
Dept: PREADMISSION TESTING | Facility: HOSPITAL | Age: 63
End: 2023-11-02
Payer: MEDICARE

## 2023-11-02 NOTE — ANESTHESIA PAT ROS NOTE
11/02/2023  Bri Santiago is a 63 y.o., male.      Pre-op Assessment    I have reviewed the Patient Summary Reports.       I have reviewed the Medications.     Review of Systems  Anesthesia Hx:  No problems with previous Anesthesia   History of prior surgery of interest to airway management or planning: cervical fusion. Previous anesthesia: General, MAC 2/25/2022  Exam under anesthesia, Anal Papilla Excision with general anesthesia.  Procedure performed at an [facility].      5/31/2023  EGD with MAC.  Procedure performed at an [facility]. Airway issues documented on chart review include GETA, videolaryngoscope used  , view on video-laryngoscopy Grade I      Denies Personal Hx of Anesthesia complications.                    Social:  Non-Smoker, Social Alcohol Use 1 glass of wine per week      Hematology/Oncology:       -- Anemia:               Hematology Comments: Iron deficiency anemia,   Anticoagulant long-term use--Plavix,  Clotting disorder- Von Willebrand disease, Requires DDAVP infusion prior to procedures and surgery,   desmopressin (DDAVP) 20 mcg in sodium chloride 0.9% 50 mL IVPB given prior to previous procedures      --  Cancer in past history:              chemotherapy   Oncology Comments: H/O Prostate cancer, treated with chemo, in remission since 2000     EENT/Dental:  chronic allergic rhinitis Chronic maxillary sinusitis, Nasal septal deviation, Postnasal drip,  Throat clearing,  S/P Balloon Sinuplasty of Paranasal Sinus, Endoscopic Sinus surgery- FESS, Tonsillectomy at age 24,   Dysphagia          Cardiovascular:  Exercise tolerance: good   Hypertension, well controlled   Denies MI. CAD  asymptomatic  Denies CABG/stent.    Denies Angina. CHF    hyperlipidemia  Denies BOYD.  ECG has been reviewed. Non-obstructive CAD, Left & Right Heart Cath, Diastolic dysfunction with chronic heart  failure, Thoracic aorta atherosclerosis, Chronic pulmonary heart disease, 1+ edema of LE's, most likely venous insufficiency per PCP note, seen by Vascular Med- no PAD                         Pulmonary:    Asthma moderate  Denies Shortness of breath.  Denies Recent URI. Sleep Apnea, CPAP Moderate persistent Asthma, uses controller and rescue inhaler, no steroids or hospitalizations          Education provided regarding risk of obstructive sleep apnea            Renal/:   Denies Chronic Renal Disease.  BPH H/O cystoscopy, retrograde Pyelogram, Ureteroscopy, Vasectomy,  Chronic prostatitis             Hepatic/GI:     GERD, well controlled Denies Liver Disease.  H/O Acute and chronic Pancreatitis, H. Pylori, anal fissure, S/P Repair X2          Musculoskeletal:  Arthritis   Old tear of lateral meniscus of left knee,  Back pain, lumbago, sacroiliac joint disease, lumbar stenosis, Spondylosis, DDD lumbar spine, S/P Back surgery, Lumbar Fusion, Cervical Discectomy, ODALYS's, Injections around nerve, and RFA's per Pain Management, Pseudoarthrosis,  Postlaminectomy syndrome of lumbar region, Myalgia and myositis, Osteopenia,  Chronic bilateral low back pain without sciatica, Multilevel moderate-to-severe neural foraminal narrowing,    Postsurgical changes of C5-C6 ACDF.         Spine Disorders: lumbar and cervical Disc disease, Degenerative disease and Chronic Pain           Neurological:    Denies CVA. Neuromuscular Disease,  Headaches Denies Seizures.    Nonintractable episodic headache,   Carpal Tunnel Release left hand, Thoracic or lumbosacral neuritis or radiculitis,       Chronic Pain Syndrome                         Endocrine:  Denies Diabetes. Denies Hypothyroidism.        Obesity / BMI > 30  Dermatological:  Dry skin dermatitis   Psych:  Psychiatric Normal                   Past Medical History:   Diagnosis Date    Acute pancreatitis     Anal fissure     Anemia     Anticoagulant long-term use     Arthritis      Asthma in remission     Back pain     BPH (benign prostatic hypertrophy)     Cancer 2000    prostate- treated at [facility] with chemo- in remission since 2000    Chronic maxillary sinusitis     Clotting disorder     Diastolic dysfunction with chronic heart failure 12/3/2018    Dysphagia 10/7/2014    Family history of colon cancer     Family history of early CAD     GERD (gastroesophageal reflux disease)     Helicobacter pylori (H. pylori) infection     Chronic    History of chronic pancreatitis     HTN (hypertension)     Lumbago 11/12/2012    Obesity     ABDON (obstructive sleep apnea)     ABDON (obstructive sleep apnea)     With likely ohs Refer to sleep    Sacroiliac joint pain 2/10/2015    Spinal stenosis of lumbar region     Von Willebrand disease     VWD (acquired von Willebrand's disease)        Anesthesia Assessment: Preoperative EQUATION      Planned Procedure: Procedure(s) (LRB):  ARTHROSCOPY, KNEE, WITH MENISCECTOMY (Left)  ARTHROSCOPY, KNEE, WITH CHONDROPLASTY (Left)  Requested Anesthesia Type:General  Surgeon: Karthik Tanner MD  Service: Orthopedics  Known or anticipated Date of Surgery:11/8/2023    Surgeon notes: reviewed    Electronic QUestionnaire Assessment completed via nurse interview with patient.        Triage considerations:     The patient has no apparent active cardiac condition (No unstable coronary Syndrome such as severe unstable angina or recent [<1 month] myocardial infarction, decompensated CHF, severe valvular   disease or significant arrhythmia)      Previous anesthesia records:GETA, MAC, Easy intubation, No problems, and Video Laryngoscopy, Pretty 3, poor head/ neck extension ,   Complicating Factors:  Large/floppy epiglottis, obesity and short neck.      [facility]   Subspecialty notes: Cardiology: Vascular Medicine, Hematology/Oncology, Pain Management      Patient is cleared/ optimized by PCP for surgery.     OK to hold Plavix 5-7 days before  surgery.  Message sent to Hem-Onc for dosage recommendation for DDAVP infusion prior to surgery.    The dose is 0.17 MCG/kh - so 18.5 mcg (can round up to 20 if need be)     Other important co-morbidities: GERD, HLD, HTN, Obesity, ABDON, and CAD        EKG 10/25/2023:  Vent. Rate : 059 BPM     Atrial Rate : 059 BPM      P-R Int : 174 ms          QRS Dur : 088 ms       QT Int : 404 ms       P-R-T Axes : 050 023 033 degrees      QTc Int : 399 ms   Sinus bradycardia   Otherwise normal ECG   Confirmed by Anum INMAN, Be MCCLELLAN ([id number]) on 10/26/2023 3:21:28 PM       24 Hour Holter Monitor 10/21/2020:  Conclusion  Normal heart rate behavior  Very rare ectopy  Symptoms correlating with normal rhythm      Echo 6/24/2020:  Summary  Normal left ventricular systolic function. The estimated ejection fraction is 60%.  Concentric left ventricular remodeling.  Normal LV diastolic function. Borderline enlarged left atrium, IVC large but collapsing, no convincing evidence of diastolic dysfunction.  Mild mitral sclerosis.  Mild to moderate tricuspid regurgitation.  Normal right ventricular systolic function.  Mild left atrial enlargement.  The estimated PA systolic pressure is 37 mmHg.  Intermediate central venous pressure (8 mmHg).      Cardiac Cath 2/14/2019:  Summary   LV end diastolic pressure is elevated.  LVEDP (Pre): 23  Post Atrio lesion , 50% stenosed.  Estimated blood loss: none  Diastolic dysfunction.  Filling pressures moderately elevated. Pulmonary HTN is mild to moderate.  Non-obstructive CAD.      PET Stress Test 2/24/2018:  Conclusion  The relative PET images show no clinically significant regional resting or stress induced perfusion defect.  For whole heart absolute myocardial perfusion (cc/min/g) averaged 1.16 rest (whihc is elevated), 1.59 at stress (which is mildly reduced), and 1.4 CFR (which is moderately reduced impart due to elevated resting flow).  Stress flow is reduced diffusely throughout the heart consistent  with the presence of CAD, but above ischemic thresholds.  Gated perfusion images showed an ejection fraction of 69% at rest and 69% during stress.  Wall motion was normal at rest and at stress.  LV cavity size at rest is normal. LV cavity size at stress is normal.  The EKG portion of this study is negative for myocardial ischemia.  Arrhythmias during stress: occasional PVCs.  The results of the PET favors medical therapy.  There is no prior study for comparison.              Instructions given. (See in Nurse's note)      Ht: 5'11  Wt: 240 lb  BMI: 33.55  Vaccinated

## 2023-11-03 ENCOUNTER — PATIENT MESSAGE (OUTPATIENT)
Dept: PREADMISSION TESTING | Facility: HOSPITAL | Age: 63
End: 2023-11-03
Payer: MEDICARE

## 2023-11-03 NOTE — PRE-PROCEDURE INSTRUCTIONS
Spoke with Patient by phone. Verified name, , allergies, and home medications. States he has stopped his Plavix as instructed by his PCP. I have received a message by Cardiology stating that it is OK to stop Plavix for 7 days before surgery. Awaiting reply from Hem-Onc regarding DDAVP recommendations. Will discuss with Anesthesia team.

## 2023-11-06 NOTE — PRE-PROCEDURE INSTRUCTIONS
"Received recommendations for DDAVP dosage from Hem-Onc, Dr. Tyler,  " The dose is 0.17 MCG/kh - so 18.5 mcg (can round up to 20 if need be)"  "

## 2023-11-07 ENCOUNTER — ANESTHESIA EVENT (OUTPATIENT)
Dept: SURGERY | Facility: HOSPITAL | Age: 63
End: 2023-11-07
Payer: MEDICARE

## 2023-11-07 ENCOUNTER — OFFICE VISIT (OUTPATIENT)
Dept: SPORTS MEDICINE | Facility: CLINIC | Age: 63
End: 2023-11-07
Payer: MEDICARE

## 2023-11-07 VITALS
WEIGHT: 238.13 LBS | HEART RATE: 82 BPM | BODY MASS INDEX: 33.34 KG/M2 | DIASTOLIC BLOOD PRESSURE: 84 MMHG | HEIGHT: 71 IN | SYSTOLIC BLOOD PRESSURE: 134 MMHG

## 2023-11-07 DIAGNOSIS — M23.201 OLD TEAR OF LATERAL MENISCUS OF LEFT KNEE, UNSPECIFIED TEAR TYPE: Primary | ICD-10-CM

## 2023-11-07 PROCEDURE — 99499 UNLISTED E&M SERVICE: CPT | Mod: S$GLB,,, | Performed by: PHYSICIAN ASSISTANT

## 2023-11-07 PROCEDURE — 99999 PR PBB SHADOW E&M-EST. PATIENT-LVL IV: ICD-10-PCS | Mod: PBBFAC,,, | Performed by: PHYSICIAN ASSISTANT

## 2023-11-07 PROCEDURE — 99499 NO LOS: ICD-10-PCS | Mod: S$GLB,,, | Performed by: PHYSICIAN ASSISTANT

## 2023-11-07 PROCEDURE — 99999 PR PBB SHADOW E&M-EST. PATIENT-LVL IV: CPT | Mod: PBBFAC,,, | Performed by: PHYSICIAN ASSISTANT

## 2023-11-07 RX ORDER — TRAMADOL HYDROCHLORIDE 50 MG/1
50 TABLET ORAL EVERY 6 HOURS PRN
Qty: 20 TABLET | Refills: 0 | Status: SHIPPED | OUTPATIENT
Start: 2023-11-07 | End: 2024-02-01

## 2023-11-07 RX ORDER — HYDROCODONE BITARTRATE AND ACETAMINOPHEN 7.5; 325 MG/1; MG/1
1 TABLET ORAL EVERY 6 HOURS PRN
Qty: 20 TABLET | Refills: 0 | Status: SHIPPED | OUTPATIENT
Start: 2023-11-07 | End: 2024-02-01

## 2023-11-07 RX ORDER — ASPIRIN 325 MG
325 TABLET ORAL DAILY
Qty: 21 TABLET | Refills: 0 | Status: CANCELLED | OUTPATIENT
Start: 2023-11-07 | End: 2023-11-28

## 2023-11-07 RX ORDER — CLINDAMYCIN PHOSPHATE 900 MG/50ML
900 INJECTION, SOLUTION INTRAVENOUS
Status: CANCELLED | OUTPATIENT
Start: 2023-11-07

## 2023-11-07 RX ORDER — PROMETHAZINE HYDROCHLORIDE 25 MG/1
25 TABLET ORAL EVERY 6 HOURS PRN
Qty: 20 TABLET | Refills: 0 | Status: SHIPPED | OUTPATIENT
Start: 2023-11-07

## 2023-11-07 RX ORDER — SODIUM CHLORIDE 9 MG/ML
INJECTION, SOLUTION INTRAVENOUS CONTINUOUS
Status: CANCELLED | OUTPATIENT
Start: 2023-11-07

## 2023-11-07 NOTE — H&P
Bri Santiago  is here for a completion of his perioperative paperwork. he  Is scheduled to undergo:    left   1. Arthroscopic partial meniscectomy  2. Possible arthroscopic synovectomy  3. Possible arthroscopic loose body removal  4. Possible arthroscopic chondroplasty     on 11/8/2023.      He is a healthy individual but does need clearance for this procedure.    Patient is cleared to proceed with surgery.     PAST MEDICAL HISTORY:   Past Medical History:   Diagnosis Date    Acute pancreatitis     Anal fissure     Anemia     Anticoagulant long-term use     Arthritis     Asthma in remission     Back pain     BPH (benign prostatic hypertrophy)     Cancer 2000    prostate- treated at [facility] with chemo- in remission since 2000    Chronic maxillary sinusitis     Clotting disorder     Diastolic dysfunction with chronic heart failure 12/3/2018    Dysphagia 10/7/2014    Family history of colon cancer     Family history of early CAD     GERD (gastroesophageal reflux disease)     Helicobacter pylori (H. pylori) infection     Chronic    History of chronic pancreatitis     HTN (hypertension)     Lumbago 11/12/2012    Obesity     ABDON (obstructive sleep apnea)     ABDON (obstructive sleep apnea)     With likely ohs Refer to sleep    Sacroiliac joint pain 2/10/2015    Spinal stenosis of lumbar region     Von Willebrand disease     VWD (acquired von Willebrand's disease)        FAMILY HISTORY:   Family History   Problem Relation Age of Onset    Colon cancer Father 67        colon cancer    Hypertension Father     Glaucoma Father     Cancer Father     Colon cancer Paternal Grandfather 65             Coronary artery disease Mother 45    Hypertension Mother     Heart disease Mother     Colon cancer Mother     Diabetes Mother     No Known Problems Brother     No Known Problems Sister     No Known Problems Daughter     No Known Problems Son     Coronary artery disease Brother 51    No Known Problems Daughter     No Known Problems  Daughter     No Known Problems Son     No Known Problems Son     Colon cancer Paternal Uncle 65    Diabetes Mellitus Paternal Grandmother     Esophageal cancer Neg Hx      SOCIAL HISTORY:   Social History     Socioeconomic History    Marital status:    Occupational History    Occupation: disabled due to back injury   Tobacco Use    Smoking status: Never    Smokeless tobacco: Never   Substance and Sexual Activity    Alcohol use: Yes     Alcohol/week: 1.0 standard drink of alcohol     Types: 1 Glasses of wine per week     Comment: social    Drug use: No    Sexual activity: Yes     Partners: Female   Social History Narrative    wlking for exercised       MEDICATIONS:   Current Outpatient Medications:     albuterol (PROVENTIL/VENTOLIN HFA) 90 mcg/actuation inhaler, INHALE 2 PUFFS INTO THE LUNGS EVERY 6 HOURS AS NEEDED FOR WHEEZING OR SHORTNESS OF BREATH, Disp: 18 g, Rfl: 4    atorvastatin (LIPITOR) 80 MG tablet, Take 1 tablet (80 mg total) by mouth every evening., Disp: 90 tablet, Rfl: 3    azelastine (ASTELIN) 137 mcg (0.1 %) nasal spray, Two sprays in each nostril, sniff until absorbed, then follow with 1 spray of fluticasone.  Use both sprays twice daily. (Patient taking differently: Two sprays in each nostril, sniff until absorbed, then follow with 1 spray of fluticasone.  Use both sprays twice daily.(PATIENT USES NIGHTLY 3/12/2021)), Disp: 30 mL, Rfl: 11    budesonide-formoterol 160-4.5 mcg (SYMBICORT) 160-4.5 mcg/actuation HFAA, INHALE 2 PUFFS INTO THE LUNGS EVERY 12 HOURS, Disp: 10.2 g, Rfl: 12    calcium citrate-vitamin D3 315-200 mg (CITRACAL+D) 315-200 mg-unit per tablet, Take 1 tablet by mouth nightly. , Disp: , Rfl:     clopidogreL (PLAVIX) 75 mg tablet, TAKE 1 TABLET(75 MG) BY MOUTH EVERY DAY, Disp: 90 tablet, Rfl: 3    cyanocobalamin, vitamin B-12, 50 mcg tablet, Take 50 mcg by mouth nightly. , Disp: , Rfl:     diclofenac sodium (VOLTAREN) 1 % Gel, Apply 2 g topically 4 (four) times daily as needed  (pain)., Disp: 400 g, Rfl: 2    docusate sodium (COLACE) 100 MG capsule, Take 1 tablet by mouth Twice daily. 1 Capsule Oral Twice a day .  Take with pain medicine, Disp: , Rfl:     doxazosin (CARDURA) 1 MG tablet, TAKE 1 TABLET BY MOUTH EVERY EVENING, Disp: 90 tablet, Rfl: 3    flu vacc [id number] 6mos up,PF, (FLUARIX QUAD [id number], PF,) 60 mcg (15 mcg x 4)/0.5 mL Syrg, inject into muscle for one dose, Disp: 0.5 mL, Rfl: 0    fluticasone propionate (FLONASE) 50 mcg/actuation nasal spray, One spray in each nostril twice daily after 1st using azelastine nasal spray, Disp: 18.2 mL, Rfl: 11    furosemide (LASIX) 20 MG tablet, TAKE 1 TABLET(20 MG) BY MOUTH EVERY DAY, Disp: 90 tablet, Rfl: 1    ipratropium (ATROVENT) 42 mcg (0.06 %) nasal spray, 1-2 sprays in each nostril before eating and at bedtime as needed, Disp: 30 mL, Rfl: 11    pregabalin (LYRICA) 25 MG capsule, Take 1-2 capsules (25-50 mg total) by mouth every evening., Disp: 60 capsule, Rfl: 5    testosterone (ANDRODERM) 2 mg/24 hour PT24, APPLY 1 PATCH TOPICALLY TO THE SKIN EVERY DAY, Disp: 60 patch, Rfl: 3    zolpidem (AMBIEN) 10 mg Tab, TAKE 1 TABLET BY MOUTH EVERY DAY AT BEDTIME (Patient taking differently: Take 10 mg by mouth nightly as needed.), Disp: 20 tablet, Rfl: 0    RABEprazole (ACIPHEX) 20 mg tablet, Take 1 tablet (20 mg total) by mouth every 12 (twelve) hours., Disp: 180 tablet, Rfl: 1    tadalafiL (CIALIS) 20 MG Tab, Take 1 tablet (20 mg total) by mouth as needed. Take every 36 hours as needed for ED., Disp: 30 tablet, Rfl: 9  No current facility-administered medications for this visit.    Facility-Administered Medications Ordered in Other Visits:     0.9%  NaCl infusion, , Intravenous, Continuous, Taullie, Milla A., NP, Last Rate: 70 mL/hr at 03/15/21 1417, New Bag at 03/15/21 1417    0.9%  NaCl infusion, , Intravenous, Continuous, Milla Oliveira, NP, Last Rate: 70 mL/hr at 12/10/21 0736, New Bag at 12/10/21 0736    0.9%  NaCl infusion, ,  "Intravenous, Continuous, Jaqueline Langley NP    mupirocin 2 % ointment, , Nasal, On Call Procedure, Milla Oliveira NP, Given at 12/10/21 0729    mupirocin 2 % ointment, , Nasal, On Call Procedure, Jaqueline Langley NP, Given at 02/25/22 0623  ALLERGIES:   Review of patient's allergies indicates:   Allergen Reactions    Penicillins Hives and Swelling     Has had allergy testing and can prob tolerate penicillin    Ace inhibitors Other (See Comments)     "Caused a respiratory infection"    Aspirin Hives           Codeine Itching     Ok to take percocet    Dilaudid [hydromorphone] Itching    Gabapentin Hallucinations       VITAL SIGNS: /84   Pulse 82   Ht 5' 11" (1.803 m)   Wt 108 kg (238 lb 1.6 oz)   BMI 33.21 kg/m²      Risks, indications and benefits of the surgical procedure were discussed with the patient. All questions with regard to surgery, rehab, expected return to functional activities, activities of daily living and recreational endeavors were answered to his satisfaction.    It was explained to the patient that there may be an increase in surgical risks if the patient has certain co-morbidities such as but not limited to: Obesity, Cardiovascular issues (CHF, CAD, Arrhythmias), chronic pulmonary issues, previous or current neurovascular/neurological issues, previous strokes, diabetes mellitus, previous wound healing issues, previous wound or skin infections, PVD, clotting disorders, if the patient uses chronic steroids, if the patient takes or has immune compromising medications or diseases, or has previously or currently used tobacco products.     The patient verbalized that he/she does not have any additional clotting, bleeding, or blood disorders, other than what is list in her chart on today's review.     Then a brief history and physical exam were performed.    Review of Systems   Constitution: Negative. Negative for chills, fever and night sweats.   HENT: Negative for congestion " and headaches.    Eyes: Negative for blurred vision, left vision loss and right vision loss.   Cardiovascular: Negative for chest pain and syncope.   Respiratory: Negative for cough and shortness of breath.    Endocrine: Negative for polydipsia, polyphagia and polyuria.   Hematologic/Lymphatic: Negative for bleeding problem. Does not bruise/bleed easily.   Skin: Negative for dry skin, itching and rash.   Musculoskeletal: Negative for falls and muscle weakness.   Gastrointestinal: Negative for abdominal pain and bowel incontinence.   Genitourinary: Negative for bladder incontinence and nocturia.   Neurological: Negative for disturbances in coordination, loss of balance and seizures.   Psychiatric/Behavioral: Negative for depression. The patient does not have insomnia.    Allergic/Immunologic: Negative for hives and persistent infections.     PHYSICAL EXAM:  GEN: A&Ox3, WD WN NAD  HEENT: WNL  CHEST: CTAB, no W/R/R  HEART: RRR, no M/R/G  ABD: Soft, NT ND, BS x4 QUADS  MS; See Epic  NEURO: CN II-XII intact       The surgical consent was then reviewed with the patient, who agreed with all the contents of the consent form and it was signed. he was then given the [facility] surgery packet to bring with him to surgery for the anesthesia portion of his perioperative paperwork.   For all physicians except for Dr. Tanner, we will email and possibly fax the consent forms and booking sheets to [facility] pre-admit.    The patient was given the opportunity to ask questions about the surgical plan and consent form, and once no other questions were asked, I proceeded with the pre-op appointment.    PHYSICAL THERAPY:  He was also instructed regarding physical therapy and will begin on  POD#1 at the [place] location.     POST OP CARE:instructions were reviewed including care of the wound and dressing after surgery and when he can shower.     CRUTCHES OR WALKER: It was explained to the patient that if they are  having a lower extremity surgery that they will require either a walker or crutches to ambulate safely with after surgery. It was explained that a cane or other assistive devices are not sufficient to safely ambulate with after surgery. I explained to the patient that I will place an order for them to receive either crutches or a walker after surgery to go home with. It was explained that if they have crutches or a walker at home already, that they are REQUIRED to bring them to the hospital on the day of surgery. It was explained that if they do not have them at the hospital on the day of surgery that they WILL be provided a new pair or crutches or a walker to go home with to ensure ambulation will be safe if the patient needs to stop somewhere on the way home.      PAIN MANAGEMENT: Bri Santiago was also given their pain management regimen, which includes the TENS unit given to him by [facility] along with the education required for its use.     PAIN MEDICATION:  Norco 7.5/325mg 1 po q 4-6 hours prn pain  Ultram 50 mg Take 1-2 p.o. q.6 hours p.r.n. breakthrough pain,   Phenergan 25 mg one p.o. q.6 hours p.r.n. nausea and vomiting.    Post op meds to be delivered bedside prior to discharge. Deliver to family if patient is in surgery at 5pm.    The patient was told that narcotic pain medications may make them drowsy and instructions were given to not sign legal documents, drive or operate heavy machinery, cars, or equipment while under the influence of narcotic medications. The patient was told and understands that narcotic pain medications should only be used as needed to control pain and that other options of pain control include TENs unit and ice packs/unit.     Patient was instructed to purchase and take Colace to counter possible GI side effects of taking opiates.     DVT prophylaxis was discussed with the patient today including risk factors for developing DVTs and history of DVTs. The patient was asked if  any specific recommendations were given from the doctor/s that did pre-operative surgical clearance. The patient was then given an education sheet about DVTs and PE with warning signs and symptoms of both and steps to take if they suspect either of these.    Patient was asked if they were taking or using OCP pills or devices. If they answered yes, then they were instructed to stop using OCPs at this pre-operative appointment until 2 months post-op to help prevent DVT development. They understand that there are other forms of birth control that do not involve hormones. They expressed understanding that ignoring/not following this instruction could result in a DVT which could turn into a deadly pulmonary embolism.     This along with the Modified Caprini risk assessment model for VTE in general surgical patients was used to determine the patient's DVT risk.     From: Yudelka MK, Fuentes DA, Kathy SM, et al. Prevention of VTE in nonorthopedic surgical patients: antithrombotic therapy and prevention of thrombosis, 9th ed: American College of Chest [employer]icians evidence-based clinical practical guidelines. Chest 2012; 141:e227S. Copyright © 2012. Reproduced with permission from the American College of Chest Physicians.    The below listed DVT prophylaxis regimen was discussed along with SCDs during surgery and bilateral MARYSOL compression stockings to be used post-op. Length of treatment has been determined to be 10-42 days post-op by the above noted Caprini assessment model. Early ambulation post-op was also discussed and emphasized with the patient.     Patient will resume his normally scheduled Plavix after surgery for DVT prophylaxis.    Patient denies history of seizures.     I explained to following and the patient expressed understanding:  The patient is currently aware of the COVID19 pandemic and that proceeding with their surgical procedure could potentially increase exposure to coronavirus in the community. The patient  understands that there is the possibility of delayed or cancelled appts or PT visits in the future. They understand that infection with the coronavirus could complicate their surgery recovery. They are aware of the current policies and procedures of [facility] and the government regarding the pandemic and they were given the option of delaying my surgery. The patient elects to proceed with surgery at this time.     The patient was instructed to practice strict social distancing, hand washing/hygiene, respiratory hygiene, and cough etiquette from now until 6 weeks following surgery to reduce the risk of jeffy coronavirus.    As there were no other questions to be asked, he was given my business card along with Karthik Tanner MD business card if he has any questions or concerns prior to surgery or in the postop period.

## 2023-11-07 NOTE — H&P (VIEW-ONLY)
Bri Santiago  is here for a completion of his perioperative paperwork. he  Is scheduled to undergo:    left   1. Arthroscopic partial meniscectomy  2. Possible arthroscopic synovectomy  3. Possible arthroscopic loose body removal  4. Possible arthroscopic chondroplasty     on 11/8/2023.      He is a healthy individual but does need clearance for this procedure.    Patient is cleared to proceed with surgery.     PAST MEDICAL HISTORY:   Past Medical History:   Diagnosis Date    Acute pancreatitis     Anal fissure     Anemia     Anticoagulant long-term use     Arthritis     Asthma in remission     Back pain     BPH (benign prostatic hypertrophy)     Cancer 2000    prostate- treated at [facility] with chemo- in remission since 2000    Chronic maxillary sinusitis     Clotting disorder     Diastolic dysfunction with chronic heart failure 12/3/2018    Dysphagia 10/7/2014    Family history of colon cancer     Family history of early CAD     GERD (gastroesophageal reflux disease)     Helicobacter pylori (H. pylori) infection     Chronic    History of chronic pancreatitis     HTN (hypertension)     Lumbago 11/12/2012    Obesity     ABDON (obstructive sleep apnea)     ABDON (obstructive sleep apnea)     With likely ohs Refer to sleep    Sacroiliac joint pain 2/10/2015    Spinal stenosis of lumbar region     Von Willebrand disease     VWD (acquired von Willebrand's disease)        FAMILY HISTORY:   Family History   Problem Relation Age of Onset    Colon cancer Father 67        colon cancer    Hypertension Father     Glaucoma Father     Cancer Father     Colon cancer Paternal Grandfather 65             Coronary artery disease Mother 45    Hypertension Mother     Heart disease Mother     Colon cancer Mother     Diabetes Mother     No Known Problems Brother     No Known Problems Sister     No Known Problems Daughter     No Known Problems Son     Coronary artery disease Brother 51    No Known Problems Daughter     No Known Problems  Daughter     No Known Problems Son     No Known Problems Son     Colon cancer Paternal Uncle 65    Diabetes Mellitus Paternal Grandmother     Esophageal cancer Neg Hx      SOCIAL HISTORY:   Social History     Socioeconomic History    Marital status:    Occupational History    Occupation: disabled due to back injury   Tobacco Use    Smoking status: Never    Smokeless tobacco: Never   Substance and Sexual Activity    Alcohol use: Yes     Alcohol/week: 1.0 standard drink of alcohol     Types: 1 Glasses of wine per week     Comment: social    Drug use: No    Sexual activity: Yes     Partners: Female   Social History Narrative    wlking for exercised       MEDICATIONS:   Current Outpatient Medications:     albuterol (PROVENTIL/VENTOLIN HFA) 90 mcg/actuation inhaler, INHALE 2 PUFFS INTO THE LUNGS EVERY 6 HOURS AS NEEDED FOR WHEEZING OR SHORTNESS OF BREATH, Disp: 18 g, Rfl: 4    atorvastatin (LIPITOR) 80 MG tablet, Take 1 tablet (80 mg total) by mouth every evening., Disp: 90 tablet, Rfl: 3    azelastine (ASTELIN) 137 mcg (0.1 %) nasal spray, Two sprays in each nostril, sniff until absorbed, then follow with 1 spray of fluticasone.  Use both sprays twice daily. (Patient taking differently: Two sprays in each nostril, sniff until absorbed, then follow with 1 spray of fluticasone.  Use both sprays twice daily.(PATIENT USES NIGHTLY 3/12/2021)), Disp: 30 mL, Rfl: 11    budesonide-formoterol 160-4.5 mcg (SYMBICORT) 160-4.5 mcg/actuation HFAA, INHALE 2 PUFFS INTO THE LUNGS EVERY 12 HOURS, Disp: 10.2 g, Rfl: 12    calcium citrate-vitamin D3 315-200 mg (CITRACAL+D) 315-200 mg-unit per tablet, Take 1 tablet by mouth nightly. , Disp: , Rfl:     clopidogreL (PLAVIX) 75 mg tablet, TAKE 1 TABLET(75 MG) BY MOUTH EVERY DAY, Disp: 90 tablet, Rfl: 3    cyanocobalamin, vitamin B-12, 50 mcg tablet, Take 50 mcg by mouth nightly. , Disp: , Rfl:     diclofenac sodium (VOLTAREN) 1 % Gel, Apply 2 g topically 4 (four) times daily as needed  (pain)., Disp: 400 g, Rfl: 2    docusate sodium (COLACE) 100 MG capsule, Take 1 tablet by mouth Twice daily. 1 Capsule Oral Twice a day .  Take with pain medicine, Disp: , Rfl:     doxazosin (CARDURA) 1 MG tablet, TAKE 1 TABLET BY MOUTH EVERY EVENING, Disp: 90 tablet, Rfl: 3    flu vacc [id number] 6mos up,PF, (FLUARIX QUAD 6103-8209, PF,) 60 mcg (15 mcg x 4)/0.5 mL Syrg, inject into muscle for one dose, Disp: 0.5 mL, Rfl: 0    fluticasone propionate (FLONASE) 50 mcg/actuation nasal spray, One spray in each nostril twice daily after 1st using azelastine nasal spray, Disp: 18.2 mL, Rfl: 11    furosemide (LASIX) 20 MG tablet, TAKE 1 TABLET(20 MG) BY MOUTH EVERY DAY, Disp: 90 tablet, Rfl: 1    ipratropium (ATROVENT) 42 mcg (0.06 %) nasal spray, 1-2 sprays in each nostril before eating and at bedtime as needed, Disp: 30 mL, Rfl: 11    pregabalin (LYRICA) 25 MG capsule, Take 1-2 capsules (25-50 mg total) by mouth every evening., Disp: 60 capsule, Rfl: 5    testosterone (ANDRODERM) 2 mg/24 hour PT24, APPLY 1 PATCH TOPICALLY TO THE SKIN EVERY DAY, Disp: 60 patch, Rfl: 3    zolpidem (AMBIEN) 10 mg Tab, TAKE 1 TABLET BY MOUTH EVERY DAY AT BEDTIME (Patient taking differently: Take 10 mg by mouth nightly as needed.), Disp: 20 tablet, Rfl: 0    RABEprazole (ACIPHEX) 20 mg tablet, Take 1 tablet (20 mg total) by mouth every 12 (twelve) hours., Disp: 180 tablet, Rfl: 1    tadalafiL (CIALIS) 20 MG Tab, Take 1 tablet (20 mg total) by mouth as needed. Take every 36 hours as needed for ED., Disp: 30 tablet, Rfl: 9  No current facility-administered medications for this visit.    Facility-Administered Medications Ordered in Other Visits:     0.9%  NaCl infusion, , Intravenous, Continuous, Taullie, Milla A., NP, Last Rate: 70 mL/hr at 03/15/21 1417, New Bag at 03/15/21 1417    0.9%  NaCl infusion, , Intravenous, Continuous, Milla Oliveira, NP, Last Rate: 70 mL/hr at 12/10/21 0736, New Bag at 12/10/21 0736    0.9%  NaCl infusion, ,  "Intravenous, Continuous, Jaqueline Langley NP    mupirocin 2 % ointment, , Nasal, On Call Procedure, Milla Oliveira NP, Given at 12/10/21 0729    mupirocin 2 % ointment, , Nasal, On Call Procedure, Jaqueline Langley NP, Given at 02/25/22 0623  ALLERGIES:   Review of patient's allergies indicates:   Allergen Reactions    Penicillins Hives and Swelling     Has had allergy testing and can prob tolerate penicillin    Ace inhibitors Other (See Comments)     "Caused a respiratory infection"    Aspirin Hives           Codeine Itching     Ok to take percocet    Dilaudid [hydromorphone] Itching    Gabapentin Hallucinations       VITAL SIGNS: /84   Pulse 82   Ht 5' 11" (1.803 m)   Wt 108 kg (238 lb 1.6 oz)   BMI 33.21 kg/m²      Risks, indications and benefits of the surgical procedure were discussed with the patient. All questions with regard to surgery, rehab, expected return to functional activities, activities of daily living and recreational endeavors were answered to his satisfaction.    It was explained to the patient that there may be an increase in surgical risks if the patient has certain co-morbidities such as but not limited to: Obesity, Cardiovascular issues (CHF, CAD, Arrhythmias), chronic pulmonary issues, previous or current neurovascular/neurological issues, previous strokes, diabetes mellitus, previous wound healing issues, previous wound or skin infections, PVD, clotting disorders, if the patient uses chronic steroids, if the patient takes or has immune compromising medications or diseases, or has previously or currently used tobacco products.     The patient verbalized that he/she does not have any additional clotting, bleeding, or blood disorders, other than what is list in her chart on today's review.     Then a brief history and physical exam were performed.    Review of Systems   Constitution: Negative. Negative for chills, fever and night sweats.   HENT: Negative for congestion " and headaches.    Eyes: Negative for blurred vision, left vision loss and right vision loss.   Cardiovascular: Negative for chest pain and syncope.   Respiratory: Negative for cough and shortness of breath.    Endocrine: Negative for polydipsia, polyphagia and polyuria.   Hematologic/Lymphatic: Negative for bleeding problem. Does not bruise/bleed easily.   Skin: Negative for dry skin, itching and rash.   Musculoskeletal: Negative for falls and muscle weakness.   Gastrointestinal: Negative for abdominal pain and bowel incontinence.   Genitourinary: Negative for bladder incontinence and nocturia.   Neurological: Negative for disturbances in coordination, loss of balance and seizures.   Psychiatric/Behavioral: Negative for depression. The patient does not have insomnia.    Allergic/Immunologic: Negative for hives and persistent infections.     PHYSICAL EXAM:  GEN: A&Ox3, WD WN NAD  HEENT: WNL  CHEST: CTAB, no W/R/R  HEART: RRR, no M/R/G  ABD: Soft, NT ND, BS x4 QUADS  MS; See Epic  NEURO: CN II-XII intact       The surgical consent was then reviewed with the patient, who agreed with all the contents of the consent form and it was signed. he was then given the [facility] surgery packet to bring with him to surgery for the anesthesia portion of his perioperative paperwork.   For all physicians except for Dr. Tanner, we will email and possibly fax the consent forms and booking sheets to [facility] pre-admit.    The patient was given the opportunity to ask questions about the surgical plan and consent form, and once no other questions were asked, I proceeded with the pre-op appointment.    PHYSICAL THERAPY:  He was also instructed regarding physical therapy and will begin on  POD#1 at the [place] location.     POST OP CARE:instructions were reviewed including care of the wound and dressing after surgery and when he can shower.     CRUTCHES OR WALKER: It was explained to the patient that if they are  having a lower extremity surgery that they will require either a walker or crutches to ambulate safely with after surgery. It was explained that a cane or other assistive devices are not sufficient to safely ambulate with after surgery. I explained to the patient that I will place an order for them to receive either crutches or a walker after surgery to go home with. It was explained that if they have crutches or a walker at home already, that they are REQUIRED to bring them to the hospital on the day of surgery. It was explained that if they do not have them at the hospital on the day of surgery that they WILL be provided a new pair or crutches or a walker to go home with to ensure ambulation will be safe if the patient needs to stop somewhere on the way home.      PAIN MANAGEMENT: Bri Santiago was also given their pain management regimen, which includes the TENS unit given to him by [facility] along with the education required for its use.     PAIN MEDICATION:  Norco 7.5/325mg 1 po q 4-6 hours prn pain  Ultram 50 mg Take 1-2 p.o. q.6 hours p.r.n. breakthrough pain,   Phenergan 25 mg one p.o. q.6 hours p.r.n. nausea and vomiting.    Post op meds to be delivered bedside prior to discharge. Deliver to family if patient is in surgery at 5pm.    The patient was told that narcotic pain medications may make them drowsy and instructions were given to not sign legal documents, drive or operate heavy machinery, cars, or equipment while under the influence of narcotic medications. The patient was told and understands that narcotic pain medications should only be used as needed to control pain and that other options of pain control include TENs unit and ice packs/unit.     Patient was instructed to purchase and take Colace to counter possible GI side effects of taking opiates.     DVT prophylaxis was discussed with the patient today including risk factors for developing DVTs and history of DVTs. The patient was asked if  any specific recommendations were given from the doctor/s that did pre-operative surgical clearance. The patient was then given an education sheet about DVTs and PE with warning signs and symptoms of both and steps to take if they suspect either of these.    Patient was asked if they were taking or using OCP pills or devices. If they answered yes, then they were instructed to stop using OCPs at this pre-operative appointment until 2 months post-op to help prevent DVT development. They understand that there are other forms of birth control that do not involve hormones. They expressed understanding that ignoring/not following this instruction could result in a DVT which could turn into a deadly pulmonary embolism.     This along with the Modified Caprini risk assessment model for VTE in general surgical patients was used to determine the patient's DVT risk.     From: Yudelka MK, Fuentes DA, Kathy SM, et al. Prevention of VTE in nonorthopedic surgical patients: antithrombotic therapy and prevention of thrombosis, 9th ed: American College of Chest Physicians evidence-based clinical practical guidelines. Chest 2012; 141:e227S. Copyright © 2012. Reproduced with permission from the American College of Chest Physicians.    The below listed DVT prophylaxis regimen was discussed along with SCDs during surgery and bilateral MARYSOL compression stockings to be used post-op. Length of treatment has been determined to be 10-42 days post-op by the above noted Caprini assessment model. Early ambulation post-op was also discussed and emphasized with the patient.     Patient will resume his normally scheduled Plavix after surgery for DVT prophylaxis.    Patient denies history of seizures.     I explained to following and the patient expressed understanding:  The patient is currently aware of the COVID19 pandemic and that proceeding with their surgical procedure could potentially increase exposure to coronavirus in the community. The patient  understands that there is the possibility of delayed or cancelled appts or PT visits in the future. They understand that infection with the coronavirus could complicate their surgery recovery. They are aware of the current policies and procedures of [facility] and the government regarding the pandemic and they were given the option of delaying my surgery. The patient elects to proceed with surgery at this time.     The patient was instructed to practice strict social distancing, hand washing/hygiene, respiratory hygiene, and cough etiquette from now until 6 weeks following surgery to reduce the risk of jeffy coronavirus.    As there were no other questions to be asked, he was given my business card along with Karthik Tanner MD business card if he has any questions or concerns prior to surgery or in the postop period.

## 2023-11-08 ENCOUNTER — ANESTHESIA (OUTPATIENT)
Dept: SURGERY | Facility: HOSPITAL | Age: 63
End: 2023-11-08
Payer: MEDICARE

## 2023-11-08 ENCOUNTER — HOSPITAL ENCOUNTER (OUTPATIENT)
Facility: HOSPITAL | Age: 63
Discharge: HOME | End: 2023-11-08
Attending: ORTHOPAEDIC SURGERY | Admitting: ORTHOPAEDIC SURGERY
Payer: MEDICARE

## 2023-11-08 VITALS
RESPIRATION RATE: 14 BRPM | HEIGHT: 71 IN | SYSTOLIC BLOOD PRESSURE: 155 MMHG | OXYGEN SATURATION: 97 % | HEART RATE: 58 BPM | TEMPERATURE: 98 F | DIASTOLIC BLOOD PRESSURE: 90 MMHG | BODY MASS INDEX: 33.32 KG/M2 | WEIGHT: 238 LBS

## 2023-11-08 DIAGNOSIS — M23.201 OLD TEAR OF LATERAL MENISCUS OF LEFT KNEE, UNSPECIFIED TEAR TYPE: Primary | ICD-10-CM

## 2023-11-08 PROCEDURE — 63600175 PHARM REV CODE 636 W HCPCS: Performed by: NURSE ANESTHETIST, CERTIFIED REGISTERED

## 2023-11-08 PROCEDURE — 99499 NO LOS: ICD-10-PCS | Mod: ,,, | Performed by: STUDENT IN AN ORGANIZED HEALTH CARE EDUCATION/TRAINING PROGRAM

## 2023-11-08 PROCEDURE — D9220A PRA ANESTHESIA: ICD-10-PCS | Mod: CRNA,,, | Performed by: NURSE ANESTHETIST, CERTIFIED REGISTERED

## 2023-11-08 PROCEDURE — 36000710: Performed by: ORTHOPAEDIC SURGERY

## 2023-11-08 PROCEDURE — 99499 UNLISTED E&M SERVICE: CPT | Mod: ,,, | Performed by: STUDENT IN AN ORGANIZED HEALTH CARE EDUCATION/TRAINING PROGRAM

## 2023-11-08 PROCEDURE — D9220A PRA ANESTHESIA: Mod: ANES,,, | Performed by: ANESTHESIOLOGY

## 2023-11-08 PROCEDURE — 71000033 HC RECOVERY, INTIAL HOUR: Performed by: ORTHOPAEDIC SURGERY

## 2023-11-08 PROCEDURE — 94761 N-INVAS EAR/PLS OXIMETRY MLT: CPT

## 2023-11-08 PROCEDURE — 25000003 PHARM REV CODE 250: Performed by: PHYSICIAN ASSISTANT

## 2023-11-08 PROCEDURE — 29881 ARTHRS KNE SRG MNISECTMY M/L: CPT | Mod: LT,,, | Performed by: ORTHOPAEDIC SURGERY

## 2023-11-08 PROCEDURE — 71000015 HC POSTOP RECOV 1ST HR: Performed by: ORTHOPAEDIC SURGERY

## 2023-11-08 PROCEDURE — D9220A PRA ANESTHESIA: Mod: CRNA,,, | Performed by: NURSE ANESTHETIST, CERTIFIED REGISTERED

## 2023-11-08 PROCEDURE — D9220A PRA ANESTHESIA: ICD-10-PCS | Mod: ANES,,, | Performed by: ANESTHESIOLOGY

## 2023-11-08 PROCEDURE — 25000003 PHARM REV CODE 250: Performed by: ANESTHESIOLOGY

## 2023-11-08 PROCEDURE — 63600175 PHARM REV CODE 636 W HCPCS: Mod: JG | Performed by: ANESTHESIOLOGY

## 2023-11-08 PROCEDURE — 37000009 HC ANESTHESIA EA ADD 15 MINS: Performed by: ORTHOPAEDIC SURGERY

## 2023-11-08 PROCEDURE — 29881 PR KNEE SCOPE SINGLE MENISECECTOMY: ICD-10-PCS | Mod: LT,,, | Performed by: ORTHOPAEDIC SURGERY

## 2023-11-08 PROCEDURE — 36000711: Performed by: ORTHOPAEDIC SURGERY

## 2023-11-08 PROCEDURE — 27201423 OPTIME MED/SURG SUP & DEVICES STERILE SUPPLY: Performed by: ORTHOPAEDIC SURGERY

## 2023-11-08 PROCEDURE — 25000003 PHARM REV CODE 250: Performed by: NURSE ANESTHETIST, CERTIFIED REGISTERED

## 2023-11-08 PROCEDURE — 37000008 HC ANESTHESIA 1ST 15 MINUTES: Performed by: ORTHOPAEDIC SURGERY

## 2023-11-08 PROCEDURE — 99900035 HC TECH TIME PER 15 MIN (STAT)

## 2023-11-08 PROCEDURE — 63600175 PHARM REV CODE 636 W HCPCS: Performed by: ORTHOPAEDIC SURGERY

## 2023-11-08 RX ORDER — EPINEPHRINE 1 MG/ML
INJECTION, SOLUTION, CONCENTRATE INTRAVENOUS
Status: DISCONTINUED | OUTPATIENT
Start: 2023-11-08 | End: 2023-11-08 | Stop reason: HOSPADM

## 2023-11-08 RX ORDER — ACETAMINOPHEN 500 MG
1000 TABLET ORAL
Status: COMPLETED | OUTPATIENT
Start: 2023-11-08 | End: 2023-11-08

## 2023-11-08 RX ORDER — ONDANSETRON 2 MG/ML
INJECTION INTRAMUSCULAR; INTRAVENOUS
Status: DISCONTINUED | OUTPATIENT
Start: 2023-11-08 | End: 2023-11-08

## 2023-11-08 RX ORDER — LIDOCAINE HYDROCHLORIDE 10 MG/ML
INJECTION, SOLUTION INTRAVENOUS
Status: DISCONTINUED | OUTPATIENT
Start: 2023-11-08 | End: 2023-11-08

## 2023-11-08 RX ORDER — FAMOTIDINE 10 MG/ML
INJECTION INTRAVENOUS
Status: DISCONTINUED | OUTPATIENT
Start: 2023-11-08 | End: 2023-11-08

## 2023-11-08 RX ORDER — KETAMINE HCL IN 0.9 % NACL 50 MG/5 ML
SYRINGE (ML) INTRAVENOUS
Status: DISCONTINUED | OUTPATIENT
Start: 2023-11-08 | End: 2023-11-08

## 2023-11-08 RX ORDER — BUPIVACAINE HYDROCHLORIDE 2.5 MG/ML
INJECTION, SOLUTION EPIDURAL; INFILTRATION; INTRACAUDAL
Status: DISCONTINUED | OUTPATIENT
Start: 2023-11-08 | End: 2023-11-08 | Stop reason: HOSPADM

## 2023-11-08 RX ORDER — CEFAZOLIN SODIUM 1 G/3ML
INJECTION, POWDER, FOR SOLUTION INTRAMUSCULAR; INTRAVENOUS
Status: DISCONTINUED | OUTPATIENT
Start: 2023-11-08 | End: 2023-11-08

## 2023-11-08 RX ORDER — MIDAZOLAM HYDROCHLORIDE 1 MG/ML
INJECTION INTRAMUSCULAR; INTRAVENOUS
Status: DISCONTINUED | OUTPATIENT
Start: 2023-11-08 | End: 2023-11-08

## 2023-11-08 RX ORDER — SODIUM CHLORIDE 9 MG/ML
INJECTION, SOLUTION INTRAVENOUS CONTINUOUS
Status: DISCONTINUED | OUTPATIENT
Start: 2023-11-08 | End: 2023-11-08 | Stop reason: HOSPADM

## 2023-11-08 RX ORDER — SODIUM CHLORIDE 0.9 % (FLUSH) 0.9 %
3 SYRINGE (ML) INJECTION
Status: DISCONTINUED | OUTPATIENT
Start: 2023-11-08 | End: 2023-11-08 | Stop reason: HOSPADM

## 2023-11-08 RX ORDER — CLINDAMYCIN PHOSPHATE 900 MG/50ML
900 INJECTION, SOLUTION INTRAVENOUS
Status: DISCONTINUED | OUTPATIENT
Start: 2023-11-08 | End: 2023-11-08

## 2023-11-08 RX ORDER — DEXMEDETOMIDINE HYDROCHLORIDE 100 UG/ML
INJECTION, SOLUTION INTRAVENOUS
Status: DISCONTINUED | OUTPATIENT
Start: 2023-11-08 | End: 2023-11-08

## 2023-11-08 RX ORDER — OXYCODONE HYDROCHLORIDE 5 MG/1
5 TABLET ORAL
Status: DISCONTINUED | OUTPATIENT
Start: 2023-11-08 | End: 2023-11-08 | Stop reason: HOSPADM

## 2023-11-08 RX ORDER — HALOPERIDOL 5 MG/ML
0.5 INJECTION INTRAMUSCULAR EVERY 10 MIN PRN
Status: DISCONTINUED | OUTPATIENT
Start: 2023-11-08 | End: 2023-11-08 | Stop reason: HOSPADM

## 2023-11-08 RX ORDER — FENTANYL CITRATE 50 UG/ML
25 INJECTION, SOLUTION INTRAMUSCULAR; INTRAVENOUS EVERY 5 MIN PRN
Status: DISCONTINUED | OUTPATIENT
Start: 2023-11-08 | End: 2023-11-08 | Stop reason: HOSPADM

## 2023-11-08 RX ORDER — FENTANYL CITRATE 50 UG/ML
25-200 INJECTION, SOLUTION INTRAMUSCULAR; INTRAVENOUS
Status: DISCONTINUED | OUTPATIENT
Start: 2023-11-08 | End: 2023-11-08 | Stop reason: HOSPADM

## 2023-11-08 RX ORDER — PROPOFOL 10 MG/ML
VIAL (ML) INTRAVENOUS
Status: DISCONTINUED | OUTPATIENT
Start: 2023-11-08 | End: 2023-11-08

## 2023-11-08 RX ORDER — FENTANYL CITRATE 50 UG/ML
INJECTION, SOLUTION INTRAMUSCULAR; INTRAVENOUS
Status: DISCONTINUED | OUTPATIENT
Start: 2023-11-08 | End: 2023-11-08

## 2023-11-08 RX ORDER — MIDAZOLAM HYDROCHLORIDE 1 MG/ML
.5-4 INJECTION INTRAMUSCULAR; INTRAVENOUS
Status: DISCONTINUED | OUTPATIENT
Start: 2023-11-08 | End: 2023-11-08 | Stop reason: HOSPADM

## 2023-11-08 RX ORDER — DEXAMETHASONE SODIUM PHOSPHATE 4 MG/ML
INJECTION, SOLUTION INTRA-ARTICULAR; INTRALESIONAL; INTRAMUSCULAR; INTRAVENOUS; SOFT TISSUE
Status: DISCONTINUED | OUTPATIENT
Start: 2023-11-08 | End: 2023-11-08

## 2023-11-08 RX ADMIN — FAMOTIDINE 20 MG: 10 INJECTION, SOLUTION INTRAVENOUS at 07:11

## 2023-11-08 RX ADMIN — DESMOPRESSIN ACETATE 20 MCG: 4 SOLUTION INTRAVENOUS at 06:11

## 2023-11-08 RX ADMIN — CEFAZOLIN 2 G: 330 INJECTION, POWDER, FOR SOLUTION INTRAMUSCULAR; INTRAVENOUS at 07:11

## 2023-11-08 RX ADMIN — FENTANYL CITRATE 50 MCG: 50 INJECTION, SOLUTION INTRAMUSCULAR; INTRAVENOUS at 07:11

## 2023-11-08 RX ADMIN — Medication 25 MG: at 07:11

## 2023-11-08 RX ADMIN — DEXMEDETOMIDINE 12 MCG: 100 INJECTION, SOLUTION, CONCENTRATE INTRAVENOUS at 07:11

## 2023-11-08 RX ADMIN — SODIUM CHLORIDE: 0.9 INJECTION, SOLUTION INTRAVENOUS at 06:11

## 2023-11-08 RX ADMIN — GLYCOPYRROLATE 0.2 MG: 0.2 INJECTION, SOLUTION INTRAMUSCULAR; INTRAVENOUS at 07:11

## 2023-11-08 RX ADMIN — OXYCODONE HYDROCHLORIDE 5 MG: 5 TABLET ORAL at 08:11

## 2023-11-08 RX ADMIN — LIDOCAINE HYDROCHLORIDE 100 MG: 10 INJECTION, SOLUTION INTRAVENOUS at 07:11

## 2023-11-08 RX ADMIN — MIDAZOLAM HYDROCHLORIDE 2 MG: 1 INJECTION, SOLUTION INTRAMUSCULAR; INTRAVENOUS at 06:11

## 2023-11-08 RX ADMIN — SODIUM CHLORIDE: 9 INJECTION, SOLUTION INTRAVENOUS at 06:11

## 2023-11-08 RX ADMIN — ONDANSETRON 4 MG: 2 INJECTION INTRAMUSCULAR; INTRAVENOUS at 07:11

## 2023-11-08 RX ADMIN — ACETAMINOPHEN 1000 MG: 500 TABLET ORAL at 06:11

## 2023-11-08 RX ADMIN — PROPOFOL 200 MG: 10 INJECTION, EMULSION INTRAVENOUS at 07:11

## 2023-11-08 RX ADMIN — DEXAMETHASONE SODIUM PHOSPHATE 8 MG: 4 INJECTION, SOLUTION INTRAMUSCULAR; INTRAVENOUS at 07:11

## 2023-11-08 NOTE — OP NOTE
[facility] ([facility])  Surgery Department  Operative Note    SUMMARY     Date of Procedure: 11/8/2023     Procedure: Procedure(s) (LRB):  ARTHROSCOPY, KNEE, WITH PARTIAL LATERAL MENISCECTOMY (Left)  ARTHROSCOPY, KNEE, WITH CHONDROPLASTY (Left)     Surgeon(s) and Role:     * Karthik Tanner MD - Primary    Assisting Surgeon:   DO Maikol Hernandez     Pre-Operative Diagnosis: Old tear of lateral meniscus of left knee, unspecified tear type [M23.201]    Post-Operative Diagnosis: Post-Op Diagnosis Codes:     * Old tear of lateral meniscus of left knee, unspecified tear type [M23.201]    Anesthesia: General    Technical Procedures Used:     DATE OF PROCEDURE:  11/8/2023      PREOPERATIVE DIAGNOSES:   1. Left knee chondromalacia  2. Left knee lateral meniscus tear     POSTOPERATIVE DIAGNOSES:   1. Left knee chondromalacia  2. Left knee lateral meniscus tear     PROCEDURES:   1. Left knee arthroscopic chondroplasty  2. Left knee arthroscopic partial lateral meniscectomy     SURGEON: Karthik Tanner M.D.      ASSISTANT:   DO Maikol Hernandez      COMPLICATIONS: None.      POSITION: Supine with arthroscopic leg king.      ANESTHESIA: General endotracheal.      COMPLICATIONS: None.      TOURNIQUET TIME:  None     EBL:  Minimal     EXAMINATION UNDER ANESTHESIA:   Knee: full range of motion, no evidence of instability.      ARTHROSCOPIC FINDINGS:   1. Intact medial meniscus.   2. Central tear, lateral meniscus  3. Chondromalacia, patella, trochlea, MFC grade 2/3 moderate     INDICATIONS: The patient is a 63 y.o. male with a history of left knee pain. MRI confirms a tear in his meniscus.  After the risks and benefits of surgery were discussed with the patient, including bleeding, infection, scarring, persistent pain, risk of blood clots (DVT), pulmonary embolism, compartment syndrome, damage to cartilage, damage to neurovascular structures, plus the risks of anesthesia, including,  death, stroke, and heart attack, the patient wished to proceed with operative intervention.        DESCRIPTION OF PROCEDURE:      The patient and correct limb were identified and marked in the pre-op holding area.      The patient was brought in the room. After undergoing general endotracheal anesthesia, he was placed on a well-padded operating table. his left lower extremity was prepped and draped in usual sterile fashion. A nonsterile tourniquet was placed high up around the thigh. A well padded arthroscopic leg king was used during the case. The contralateral leg was placed in a well padded Aleksandr stirrup with bony prominences all being well padded.       The standard inferolateral and inferomedial portals were created. Diagnostic arthroscopy performed.      CHONDROPLASTY: The patellofemoral joint was entered. There was significant chondromalacia on the trochlea and on the undersurface of the patella. Using a full radius shaver and biter, unstable cartilage flaps were trimmed down to a stable rim.     There was a mild synovitis noted within the anterior interval. An VAPR device was used to remove areas of irritating synovium from the overlying trochlea in the anterior interval to allow for visualization to minimize abrasion.     CHONDROPLASTY:  Attention turned to the medial compartment. Medial femoral condyle had grade 2/3 moderate changes along the mid flexion portion of the far medial side.  Using a full radius shaver and biter, unstable cartilage flaps were trimmed down to a stable rim.      The medial meniscus was stable to probe.      Attention was turned to the intercondylar notch. The ACL and PCL were intact.      Attention was turned to the lateral compartment. The lateral meniscus had a tear along the central aspect.  Using a biter and shaver, the central 20% was trimmed out. The lateral femoral condyle and lateral tibial plateau were intact.       Portal sites were closed with 4-0 Monocryl, covered  with Mastisol, Steri-Strips, Xeroform, 4 x 4 fluffs, ABD pads and thigh-high MARYSOL hose.     The patient was extubated in the room, transferred to Recovery Room in stable condition accompanied by his physician.  There were no complications in the case.      I was present, scrubbed and did perform all critical portions of the case.        NATASHA PEÑALOZA MD        Description of the Findings of the Procedure: above    Significant Surgical Tasks Conducted by the Assistant(s), if Applicable: none    Complications: No    Estimated Blood Loss (EBL): * No values recorded between 11/8/2023  7:18 AM and 11/8/2023  7:58 AM *           Implants: * No implants in log *    Specimens:   Specimen (24h ago, onward)      None                    Condition: Good    Disposition: PACU - hemodynamically stable.    Attestation: I was present and scrubbed for the entire procedure.    Discharge Note    SUMMARY     Admit Date: 11/8/2023    Discharge Date and Time:  11/08/2023 8:14 AM    Hospital Course (synopsis of major diagnoses, care, treatment, and services provided during the course of the hospital stay): outpatient surgery     Final Diagnosis: Post-Op Diagnosis Codes:     * Old tear of lateral meniscus of left knee, unspecified tear type [M23.201]    Disposition: Home or Self Care    Follow Up/Patient Instructions:     Medications:  Reconciled Home Medications:      Medication List        CHANGE how you take these medications      azelastine 137 mcg (0.1 %) nasal spray  Commonly known as: ASTELIN  Two sprays in each nostril, sniff until absorbed, then follow with 1 spray of fluticasone.  Use both sprays twice daily.  What changed: additional instructions     zolpidem 10 mg Tab  Commonly known as: AMBIEN  TAKE 1 TABLET BY MOUTH EVERY DAY AT BEDTIME  What changed:   when to take this  reasons to take this            CONTINUE taking these medications      albuterol 90 mcg/actuation inhaler  Commonly known as: PROVENTIL/VENTOLIN  HFA  INHALE 2 PUFFS INTO THE LUNGS EVERY 6 HOURS AS NEEDED FOR WHEEZING OR SHORTNESS OF BREATH     ANDRODERM 2 mg/24 hour Pt24  Generic drug: testosterone  APPLY 1 PATCH TOPICALLY TO THE SKIN EVERY DAY     atorvastatin 80 MG tablet  Commonly known as: LIPITOR  Take 1 tablet (80 mg total) by mouth every evening.     budesonide-formoterol 160-4.5 mcg 160-4.5 mcg/actuation Hfaa  Commonly known as: SYMBICORT  INHALE 2 PUFFS INTO THE LUNGS EVERY 12 HOURS     calcium citrate-vitamin D3 315-200 mg 315 mg-5 mcg (200 unit) per tablet  Commonly known as: CITRACAL+D  Take 1 tablet by mouth nightly.     clopidogreL 75 mg tablet  Commonly known as: PLAVIX  TAKE 1 TABLET(75 MG) BY MOUTH EVERY DAY     cyanocobalamin (vitamin B-12) 50 mcg tablet  Take 50 mcg by mouth nightly.     docusate sodium 100 MG capsule  Commonly known as: COLACE  Take 1 tablet by mouth Twice daily. 1 Capsule Oral Twice a day .  Take with pain medicine     doxazosin 1 MG tablet  Commonly known as: CARDURA  TAKE 1 TABLET BY MOUTH EVERY EVENING     FLUARIX QUAD 3316-6767 (PF) 60 mcg (15 mcg x 4)/0.5 mL Syrg  Generic drug: flu vacc [id number] 6mos up(PF)  inject into muscle for one dose     fluticasone propionate 50 mcg/actuation nasal spray  Commonly known as: FLONASE  One spray in each nostril twice daily after 1st using azelastine nasal spray     furosemide 20 MG tablet  Commonly known as: LASIX  TAKE 1 TABLET(20 MG) BY MOUTH EVERY DAY     HYDROcodone-acetaminophen 7.5-325 mg per tablet  Commonly known as: NORCO  Take 1 tablet by mouth every 6 (six) hours as needed for Pain.     ipratropium 42 mcg (0.06 %) nasal spray  Commonly known as: ATROVENT  1-2 sprays in each nostril before eating and at bedtime as needed     promethazine 25 MG tablet  Commonly known as: PHENERGAN  Take 1 tablet (25 mg total) by mouth every 6 (six) hours as needed for Nausea.     RABEprazole 20 mg tablet  Commonly known as: ACIPHEX  Take 1 tablet (20 mg total) by mouth every 12  (twelve) hours.     tadalafiL 20 MG Tab  Commonly known as: CIALIS  Take 1 tablet (20 mg total) by mouth as needed. Take every 36 hours as needed for ED.     traMADoL 50 mg tablet  Commonly known as: ULTRAM  Take 1 tablet (50 mg total) by mouth every 6 (six) hours as needed for Pain.            STOP taking these medications      diclofenac sodium 1 % Gel  Commonly known as: VOLTAREN     pregabalin 25 MG capsule  Commonly known as: LYRICA            Discharge Procedure Orders   Diet Adult Regular     Notify your health care provider if you experience any of the following:  temperature >100.4     Notify your health care provider if you experience any of the following:  persistent nausea and vomiting or diarrhea     Notify your health care provider if you experience any of the following:  severe uncontrolled pain     Notify your health care provider if you experience any of the following:  redness, tenderness, or signs of infection (pain, swelling, redness, odor or green/yellow discharge around incision site)     Weight bearing restrictions (specify):   Order Comments: Weight Bear as Tolerated Left Lower Extremity

## 2023-11-08 NOTE — PLAN OF CARE
VSS. Pt with pain level 4, IV d/c'd with catheter tip intact. Instruction complete, verbalized understanding, handout and meds to family.

## 2023-11-08 NOTE — DISCHARGE INSTRUCTIONS
[address], [place]                                                                                          [phone]                   Postoperative Instructions for Knee Surgery                 Your Surgery Included:   Open               Arthroscopic   [] Ligament Repair       [] Diagnostic           [] ACL     [] PCL     [] MCL     [] PLLC      [] Synovectomy / Plica Removal [] Meniscal Cartilage Repair / Transplantation      [] Lysis of Adhesions / Manipulation [] Articular Cartilage Repair      [] Interval Release           [] Microfracture       [] OATS   [] ACI      [x] Meniscectomy           [] Osteochondral Allograft      [] Meniscal Cartilage Repair  [] Patellar Realignment       [x] Debridement / Chondroplasty         [] Lateral Release   [] Ligament Repair      [] Articular Cartilage Repair          [] Extensor Mechanism             []   Microfracture  []  OATS         []  Cartilage Biopsy [] Tendon Repair          [] Ligament Reconstruction          [] Patella                  [] Quadriceps             []   ACL    []   PCL  [] High Tibial Osteotomy       [] PRP Arthrocentesis  [] Joint Replacement         [] Amniox Arthrocentesis           [] Unicompartmental   [] Patellofemoral                    [] Total Knee                  Call our office ([phone]) immediately if you experience any of the following:       Excessive bleeding or pus like drainage at the incision site       Uncontrollable pain not relieved by pain medication       Excessive swelling or redness at the incision site       Fever above 101.5 degrees not controlled with Tylenol or Motrin       Shortness of Breath       Any foul odor or blistering from the surgery site    FOR EMERGENCIES: If any unusual problems or difficulties occur, call our office at [phone], or page the  at [phone] who will direct your call appropriately    1.   Pain Management: A cold therapy cuff, pain  medications, local injections, and in some cases, regional anesthesia injections are used to manage your post-operative pain. The decision to use each of these options is based on their risks and benefits.     Medications: You were given one or more of the following medication prescriptions before leaving the hospital. Have the prescriptions filled at a pharmacy on your way home and follow the instructions on the bottles. If you need a refill, please call your pharmacy.      Narcotic Medication (usually Vicodin ES, Lortab, Percocet or Nucynta): Begin taking the medication before your knee starts to hurt. Some patients do not like to take any medication, but if you wait until your pain is severe before taking, you will be very uncomfortable for several hours waiting for the narcotic to work. Always take with food.     Nausea / Vomiting: For this issue, we prescribe Phenergan, use this medication as directed.     Cold Therapy: You may have been sent home with a [facility] cold therapy unit and wrap for your knee. Fill with ice and water to the indicated fill line and use throughout the day for the first two days and then as needed to help relieve pain and control swelling.      Regional Anesthesia Injections (Blocks): You may have been given a regional nerve block either before or after surgery. This may make your entire leg numb for 24-36 hours.                            * Proceed with caution when bearing weight on your leg.     2.   Diet: Eat a bland diet for the first day after surgery. Progress your diet as tolerated. Constipation may occur with Narcotic usage, contact our office if you are experiencing constipation.    3.   Activity: Limit your activity during the first 48 hours, keep your leg elevated with pillows under your heel. After the first 48 hours at home, increase your activity level based on your symptoms.    4.   Dressing Change: Remove the dressing on the 3rd day. It is normal for some blood to be  "seen on the dressings. It is also normal for you to see apparent bruising on the skin around your knee when you remove the dressing. If present, leave the steri-strip tape across the incisions. If you are concerned by the drainage or the appearance of your knee, please call one of the numbers listed below.    5.   Showering: You may shower on the 3rd day after surgery with waterproof bandages over small incisions. If you have an incision with Prineo (clear mesh), it does not need to be covered. Do not submerge in any water until after your postoperative appointment in clinic.    6.   Your procedure did not require a post-operative brace.    7.   Your procedure did not require a Continuous Passive Motion (CPM) device.    8.   Weight Bearing: You may have been sent home with crutches. If instructed (see below), use these crutches at all times unless at complete rest.                  [x] Full weight bearing                 9.  Knee Exercises: Begin these exercises the first day after surgery in order to help you regain your motion and strength. You may do the following marked exercises:     [x] Quad Sets - Begin activating your quadriceps muscle by driving your          knee downward into full knee extension while seated on a table or bed   with a towel rolled and propped under your heel     [x] Straight Leg Raise (SLR) - While jeffy your quadriceps muscle, lift     your fully extended leg to the level of your non-operative knee (as shown)     [x] Heel Slides - With the knee straight, slide your heel slowly toward your   buttocks, hold at the endpoint for 10-15 seconds, then slowly straighten     [x] Ankle pumps - With your knee straight, move your ankle in a "pumping"    fashion to activate your calf and leg muscles      10.  Physical Therapy: Physical therapy is an essential component to your recovery from surgery. Your physical therapy will start in 1-3 days.    FIRST POSTOPERATIVE VISIT: As scheduled.       "

## 2023-11-08 NOTE — ANESTHESIA PROCEDURE NOTES
Intubation    Date/Time: 11/8/2023 7:05 AM    Performed by: Buffy Jackson CRNA  Authorized by: Buffy Jackson CRNA    Intubation:     Induction:  Intravenous    Intubated:  Postinduction    Mask Ventilation:  Easy mask    Attempts:  1    Attempted By:  CRNA    Method of Intubation:  Fast track LMA    Difficult Airway Encountered?: No      Complications:  None    Airway Device:  Supraglottic airway/LMA    Airway Device Size:  4.0    Style/Cuff Inflation:  Cuffed    Inflation Amount (mL):  6    Secured at:  The lips    Placement Verified By:  Capnometry    Complicating Factors:  None    Findings Post-Intubation:  BS equal bilateral

## 2023-11-08 NOTE — ANESTHESIA PREPROCEDURE EVALUATION
11/08/2023  Bri Santiago is a 63 y.o., male.      Pre-op Assessment    I have reviewed the Patient Summary Reports.       I have reviewed the Medications.     Review of Systems  Anesthesia Hx:  No problems with previous Anesthesia  History of prior surgery of interest to airway management or planning: cervical fusion. Previous anesthesia: General, MAC 2/25/2022  Exam under anesthesia, Anal Papilla Excision with general anesthesia.  Procedure performed at an [facility].  5/31/2023  EGD with MAC.  Procedure performed at an [facility]. Airway issues documented on chart review include GETA, videolaryngoscope used , view on video-laryngoscopy Grade I     Denies Personal Hx of Anesthesia complications.   Social:  Non-Smoker, Social Alcohol Use 1 glass of wine per week   Hematology/Oncology:         -- Anemia: Hematology Comments: Iron deficiency anemia,   Anticoagulant long-term use--Plavix,  Clotting disorder- Von Willebrand disease, Requires DDAVP infusion prior to procedures and surgery,   desmopressin (DDAVP) 20 mcg in sodium chloride 0.9% 50 mL IVPB given prior to previous procedures  --  Cancer in past history:  chemotherapy  Oncology Comments: H/O Prostate cancer, treated with chemo, in remission since 2000     EENT/Dental:   chronic allergic rhinitis Chronic maxillary sinusitis, Nasal septal deviation, Postnasal drip,  Throat clearing,  S/P Balloon Sinuplasty of Paranasal Sinus, Endoscopic Sinus surgery- FESS, Tonsillectomy at age 24,   Dysphagia   Cardiovascular:   Exercise tolerance: good Hypertension, well controlled Denies MI. CAD  asymptomatic  Denies CABG/stent.   Denies Angina. CHF hyperlipidemia  Denies BOYD. ECG has been reviewed. Non-obstructive CAD, Left & Right Heart Cath, Diastolic dysfunction with chronic heart failure, Thoracic aorta atherosclerosis, Chronic pulmonary heart  disease, 1+ edema of LE's, most likely venous insufficiency per PCP note, seen by Vascular Med- no PAD   Pulmonary:   Asthma moderate Denies Shortness of breath.  Denies Recent URI. Sleep Apnea, CPAP Moderate persistent Asthma, uses controller and rescue inhaler, no steroids or hospitalizations   Education provided regarding risk of obstructive sleep apnea     Renal/:   Denies Chronic Renal Disease. BPH H/O cystoscopy, retrograde Pyelogram, Ureteroscopy, Vasectomy,  Chronic prostatitis   Hepatic/GI:   GERD, well controlled Denies Liver Disease. H/O Acute and chronic Pancreatitis, H. Pylori, anal fissure, S/P Repair X2   Musculoskeletal:   Arthritis  Old tear of lateral meniscus of left knee,  Back pain, lumbago, sacroiliac joint disease, lumbar stenosis, Spondylosis, DDD lumbar spine, S/P Back surgery, Lumbar Fusion, Cervical Discectomy, ODALYS's, Injections around nerve, and RFA's per Pain Management, Pseudoarthrosis,  Postlaminectomy syndrome of lumbar region, Myalgia and myositis, Osteopenia,  Chronic bilateral low back pain without sciatica, Multilevel moderate-to-severe neural foraminal narrowing,    Postsurgical changes of C5-C6 ACDF. Spine Disorders: lumbar and cervical Disc disease, Degenerative disease and Chronic Pain    Neurological:   Denies CVA. Neuromuscular Disease,  Headaches Denies Seizures. Nonintractable episodic headache,   Carpal Tunnel Release left hand, Thoracic or lumbosacral neuritis or radiculitis,   Chronic Pain Syndrome   Endocrine:   Denies Diabetes. Denies Hypothyroidism.  Obesity / BMI > 30  Dermatological:   Dry skin dermatitis   Psych:  Psychiatric Normal         Physical Exam  General: Well nourished and Cooperative    Airway:  Mallampati: II   Mouth Opening: Normal  TM Distance: Normal      Dental:  Intact    Chest/Lungs:  Normal Respiratory Rate    Heart:  Rhythm: Regular Rhythm      Past Medical History:   Diagnosis Date    Acute pancreatitis     Anal fissure     Anemia      Anticoagulant long-term use     Arthritis     Asthma in remission     Back pain     BPH (benign prostatic hypertrophy)     Cancer 2000    prostate- treated at [facility] with chemo- in remission since 2000    Chronic maxillary sinusitis     Clotting disorder     Diastolic dysfunction with chronic heart failure 12/3/2018    Dysphagia 10/7/2014    Family history of colon cancer     Family history of early CAD     GERD (gastroesophageal reflux disease)     Helicobacter pylori (H. pylori) infection     Chronic    History of chronic pancreatitis     HTN (hypertension)     Lumbago 11/12/2012    Obesity     ABDON (obstructive sleep apnea)     ABDON (obstructive sleep apnea)     With likely ohs Refer to sleep    Sacroiliac joint pain 2/10/2015    Spinal stenosis of lumbar region     Von Willebrand disease     VWD (acquired von Willebrand's disease)        Anesthesia Assessment: Preoperative EQUATION      Planned Procedure: Procedure(s) (LRB):  ARTHROSCOPY, KNEE, WITH MENISCECTOMY (Left)  ARTHROSCOPY, KNEE, WITH CHONDROPLASTY (Left)  Requested Anesthesia Type:General  Surgeon: Karthik Tanner MD  Service: Orthopedics  Known or anticipated Date of Surgery:11/8/2023    Surgeon notes: reviewed    Electronic QUestionnaire Assessment completed via nurse interview with patient.        Triage considerations:     The patient has no apparent active cardiac condition (No unstable coronary Syndrome such as severe unstable angina or recent [<1 month] myocardial infarction, decompensated CHF, severe valvular   disease or significant arrhythmia)      Previous anesthesia records:GETA, MAC, Easy intubation, No problems, and Video Laryngoscopy, Pretty 3, poor head/ neck extension ,   Complicating Factors:  Large/floppy epiglottis, obesity and short neck.      [facility]   Subspecialty notes: Cardiology: Vascular Medicine, Hematology/Oncology, Pain Management      Patient is cleared/  optimized by PCP for surgery.     OK to hold Plavix 5-7 days before surgery.  Message sent to Hem-Onc for dosage recommendation for DDAVP infusion prior to surgery.    The dose is 0.17 MCG/kh - so 18.5 mcg (can round up to 20 if need be)     Other important co-morbidities: GERD, HLD, HTN, Obesity, ABDON, and CAD        EKG 10/25/2023:  Vent. Rate : 059 BPM     Atrial Rate : 059 BPM      P-R Int : 174 ms          QRS Dur : 088 ms       QT Int : 404 ms       P-R-T Axes : 050 023 033 degrees      QTc Int : 399 ms   Sinus bradycardia   Otherwise normal ECG   Confirmed by Anum INMAN, Be MCCLELLAN ([id number]) on 10/26/2023 3:21:28 PM       24 Hour Holter Monitor 10/21/2020:  Conclusion  Normal heart rate behavior  Very rare ectopy  Symptoms correlating with normal rhythm      Echo 6/24/2020:  Summary   Normal left ventricular systolic function. The estimated ejection fraction is 60%.   Concentric left ventricular remodeling.   Normal LV diastolic function. Borderline enlarged left atrium, IVC large but collapsing, no convincing evidence of diastolic dysfunction.   Mild mitral sclerosis.   Mild to moderate tricuspid regurgitation.   Normal right ventricular systolic function.   Mild left atrial enlargement.   The estimated PA systolic pressure is 37 mmHg.   Intermediate central venous pressure (8 mmHg).      Cardiac Cath 2/14/2019:  Summary    LV end diastolic pressure is elevated.   LVEDP (Pre): 23   Post Atrio lesion , 50% stenosed.   Estimated blood loss: none   Diastolic dysfunction.   Filling pressures moderately elevated. Pulmonary HTN is mild to moderate.   Non-obstructive CAD.      PET Stress Test 2/24/2018:  Conclusion   The relative PET images show no clinically significant regional resting or stress induced perfusion defect.   For whole heart absolute myocardial perfusion (cc/min/g) averaged 1.16 rest (whihc is elevated), 1.59 at stress (which is mildly reduced), and 1.4 CFR (which is moderately reduced  impart due to elevated resting flow).   Stress flow is reduced diffusely throughout the heart consistent with the presence of CAD, but above ischemic thresholds.   Gated perfusion images showed an ejection fraction of 69% at rest and 69% during stress.   Wall motion was normal at rest and at stress.   LV cavity size at rest is normal. LV cavity size at stress is normal.   The EKG portion of this study is negative for myocardial ischemia.   Arrhythmias during stress: occasional PVCs.   The results of the PET favors medical therapy.   There is no prior study for comparison.              Instructions given. (See in Nurse's note)      Ht: 5'11  Wt: 240 lb  BMI: 33.55  Vaccinated      Anesthesia Plan  Type of Anesthesia, risks & benefits discussed:    Anesthesia Type: Gen ETT, Gen Supraglottic Airway  Intra-op Monitoring Plan: Standard ASA Monitors  Post Op Pain Control Plan: multimodal analgesia  Induction:  IV  ASA Score: 3    Ready For Surgery From Anesthesia Perspective.       .

## 2023-11-08 NOTE — BRIEF OP NOTE
[place] - Surgery (Hospital)  Brief Operative Note    Surgery Date: 11/8/2023     Surgeon(s) and Role:     * Karthik Tanner MD - Primary    Assisting Surgeon: None    Pre-op Diagnosis:  Old tear of lateral meniscus of left knee, unspecified tear type [M23.201]    Post-op Diagnosis:  Post-Op Diagnosis Codes:     * Old tear of lateral meniscus of left knee, unspecified tear type [M23.201]    Procedure(s) (LRB):  ARTHROSCOPY, KNEE, WITH PARTIAL LATERAL MENISCECTOMY (Left)  ARTHROSCOPY, KNEE, WITH CHONDROPLASTY (Left)    Anesthesia: General    Operative Findings: See op note    Estimated Blood Loss: * No values recorded between 11/8/2023  7:18 AM and 11/8/2023  7:57 AM *         Specimens:   Specimen (24h ago, onward)      None              Discharge Note    OUTCOME: Patient tolerated treatment/procedure well without complication and is now ready for discharge.    DISPOSITION: Home or Self Care    FINAL DIAGNOSIS:  <principal problem not specified>    FOLLOWUP: In clinic    DISCHARGE INSTRUCTIONS:  No discharge procedures on file.

## 2023-11-08 NOTE — PLAN OF CARE
Preop complete. Pt will need crutches for discharge. Resting comfortably. Call light in reach. Son at BS. Side rails up, bed in lowest position

## 2023-11-09 ENCOUNTER — CLINICAL SUPPORT (OUTPATIENT)
Dept: REHABILITATION | Facility: HOSPITAL | Age: 63
End: 2023-11-09
Attending: ORTHOPAEDIC SURGERY
Payer: MEDICARE

## 2023-11-09 DIAGNOSIS — M23.201 OLD TEAR OF LATERAL MENISCUS OF LEFT KNEE, UNSPECIFIED TEAR TYPE: ICD-10-CM

## 2023-11-09 DIAGNOSIS — M25.562 ACUTE PAIN OF LEFT KNEE: ICD-10-CM

## 2023-11-09 PROCEDURE — 97161 PT EVAL LOW COMPLEX 20 MIN: CPT

## 2023-11-09 PROCEDURE — 97112 NEUROMUSCULAR REEDUCATION: CPT

## 2023-11-10 NOTE — ANESTHESIA POSTPROCEDURE EVALUATION
Anesthesia Post Evaluation    Patient: Bri Santiago    Procedure(s) Performed: Procedure(s) (LRB):  ARTHROSCOPY, KNEE, WITH PARTIAL LATERAL MENISCECTOMY (Left)  ARTHROSCOPY, KNEE, WITH CHONDROPLASTY (Left)    Final Anesthesia Type: general      Patient location during evaluation: PACU  Patient participation: Yes- Able to Participate  Level of consciousness: awake and alert  Post-procedure vital signs: reviewed and stable  Pain management: adequate  Airway patency: patent  ABDON mitigation strategies: Multimodal analgesia  PONV status at discharge: No PONV  Anesthetic complications: no      Cardiovascular status: blood pressure returned to baseline  Respiratory status: unassisted  Hydration status: euvolemic  Follow-up not needed.          Vitals Value Taken Time   /90 11/08/23 0902   Temp 36.4 °C (97.6 °F) 11/08/23 0800   Pulse 65 11/08/23 0901   Resp 19 11/08/23 0901   SpO2 97 % 11/08/23 0900   Vitals shown include unvalidated device data.      Event Time   Out of Recovery 08:32:00         Pain/Tammy Score: No data recorded

## 2023-11-10 NOTE — PLAN OF CARE
[facility]  Physical Therapy Initial Evaluation  [place] 1st Floor    Name: Bri Santiago  [facility] Number: [id number]    Therapy Diagnosis:   Encounter Diagnoses   Name Primary?    Old tear of lateral meniscus of left knee, unspecified tear type     Acute pain of left knee      Physician: Karthik Tanner MD    Physician Orders: PT Eval and Treat  Medical Diagnosis from Referral: M23.201 (ICD-10-CM) - Old tear of lateral meniscus of left knee, unspecified tear type  Evaluation Date: 11/9/2023  Authorization Period Expiration: 12/27/2023  Plan of Care Expiration: 02/09/2024  Visit # / Visits authorized: 1/ 1    FOTO: 19  FOTO 1st Follow-up: NA  FOTO 2nd Follow-up: NA    Time In: 1000  Time Out: 1100  Total Billable Time: 60 minutes    Precautions: Standard, Wb'ing    Procedure Date: 11/8/2023  Procedure:   1. Left knee arthroscopic chondroplasty  2. Left knee arthroscopic partial lateral meniscectomy    Subjective   Date of Onset: 11/8/2023  History of Current Condition - Bri reports: few months Hx of L knee pain and swelling. No true NAY, but continued to experience lateral L knee pain and swelling with basic ADL performance. Attempted CSI to little to no relief; knee pain and swelling continued to limit overall knee mobility and strength.  Underwent above procedure.  Retired, but wants to return to prolonged walking/standing     Imaging:   MRI (L Knee): 10/21/2023  Impression:  1. Tricompartmental chondral loss as detailed above.  2. Horizontal free edge tear body segment lateral meniscus, likely degenerative.  3. Joint effusion with synovitis.  4. Popliteal cyst with surrounding fluid signal suggesting partial rupture.    Prior Therapy: None  Social History: Lives with family   Occupation: retired  Prior Level of Function: Issues with prolonged walking  Current Level of Function: Issues 2/2 surgery    Pain:  Current 4/10, worst 7/10, best 2/10   Location: left Knee  Description:  Aching, Burning, Throbbing, and Deep  Aggravating Factors: Standing, Bending, Extension, Flexing, and Lifting  Easing Factors: ice and rest    Pts Goals: Return to pain-free walking    Medical History:   Past Medical History:   Diagnosis Date    Acute pancreatitis     Anal fissure     Anemia     Anticoagulant long-term use     Arthritis     Asthma in remission     Back pain     BPH (benign prostatic hypertrophy)     Cancer 2000    prostate- treated at [facility] with chemo- in remission since 2000    Chronic maxillary sinusitis     Clotting disorder     Diastolic dysfunction with chronic heart failure 12/3/2018    Dysphagia 10/7/2014    Family history of colon cancer     Family history of early CAD     GERD (gastroesophageal reflux disease)     Helicobacter pylori (H. pylori) infection     Chronic    History of chronic pancreatitis     HTN (hypertension)     Lumbago 11/12/2012    Obesity     ABDON (obstructive sleep apnea)     ABDON (obstructive sleep apnea)     With likely ohs Refer to sleep    Sacroiliac joint pain 2/10/2015    Spinal stenosis of lumbar region     Von Willebrand disease     VWD (acquired von Willebrand's disease)        Surgical History:   Bri Santiago  has a past surgical history that includes Lumbar fusion (2012); Carpal tunnel release (2003); anal fissure repair; Cervical discectomy (2003); Colonoscopy (N/A, 10/7/2015); Vasectomy (1996); Tonsillectomy; Spine surgery; Colonoscopy (N/A, 6/19/2017); Radiofrequency ablation of lumbar medial branch nerve at single level (Left, 5/24/2018); Left heart catheterization (Left, 2/14/2019); Catheterization of both left and right heart (N/A, 2/14/2019); Radiofrequency ablation (Right, 5/9/2019); Radiofrequency ablation (Left, 5/23/2019); Back surgery (2012); Functional endoscopic sinus surgery (FESS) using computer-assisted navigation (Bilateral, 10/7/2019); Balloon sinuplasty of paranasal sinus (Bilateral, 10/7/2019); Radiofrequency ablation (Left,  "1/16/2020); Radiofrequency ablation (Right, 1/28/2020); Esophagogastroduodenoscopy (N/A, 3/4/2020); Colonoscopy (N/A, 3/4/2020); Radiofrequency ablation (Left, 8/18/2020); Radiofrequency ablation (Right, 9/1/2020); Injection of anesthetic agent around nerve (Bilateral, 10/13/2020); Esophagogastroduodenoscopy (N/A, 11/3/2020); Examination under anesthesia (N/A, 3/15/2021); Lateral internal anal sphincterotomy (N/A, 12/10/2021); Radiofrequency ablation (Bilateral, 12/21/2021); Examination under anesthesia (N/A, 2/25/2022); Cystoscopy (8/12/2022); Ureteroscopy (Bilateral, 8/12/2022); Retrograde pyelography (Bilateral, 8/12/2022); Radiofrequency ablation (Left, 11/21/2022); Radiofrequency ablation (Right, 12/5/2022); Esophagogastroduodenoscopy (N/A, 5/31/2023); ph monitoring, esophagus, wireless, (on reflux meds) (N/A, 5/31/2023); Transforaminal epidural injection of steroid (Bilateral, 6/19/2023); Knee arthroscopy w/ meniscectomy (Left, 11/8/2023); and Arthroscopic chondroplasty of knee joint (Left, 11/8/2023).    Medications:   [facility] has a current medication list which includes the following prescription(s): albuterol, atorvastatin, azelastine, budesonide-formoterol 160-4.5 mcg, calcium citrate-vitamin d3 315-200 mg, clopidogrel, cyanocobalamin (vitamin b-12), docusate sodium, doxazosin, fluarix quad 5989-7346 (pf), fluticasone propionate, furosemide, hydrocodone-acetaminophen, ipratropium, promethazine, rabeprazole, tadalafil, androderm, tramadol, zolpidem, and [DISCONTINUED] tamsulosin, and the following Facility-Administered Medications: sodium chloride 0.9%, sodium chloride 0.9%, sodium chloride 0.9%, mupirocin, and mupirocin.    Allergies:   Review of patient's allergies indicates:   Allergen Reactions    Penicillins Hives and Swelling     Has had allergy testing and can prob tolerate penicillin    Ace inhibitors Other (See Comments)     "Caused a respiratory infection"    Aspirin Hives           Codeine Itching "     Ok to take percocet    Dilaudid [hydromorphone] Itching    Gabapentin Hallucinations        Objective     Observation: Post-op dressing/brace in place.    Edema: L knee as expected post-op    Gait: WBAT c B axillary crutches    Range of Motion:   Knee Right Passive Left Passive   Flexion 110 90   Extension 0 0      Lower Extremity Strength:  Formal MMT not performed 2/2 POD# 1 and increased pain.  Good quad activation noted LLE.     Special Tests:  Not performed 2/2 post-op.     Joint Mobility: L patella hypomobile as expected post-op.     Palpation: TTP global L knee     Sensation:  Intact; mildly diminished 2/2 residual nerve block.     Flexibility:  Grossly hypomobile quad, hamstring and gastroc as expected on post-op LLE    Functional tests:                 -SL Squat: Not performed 2/2 post-op  -DL Squat: Not performed 2/2 post-op  -Step-down: Not performed 2/2 post-op  -SLS   EO: Not performed 2/2 post-op  EC: Not performed 2/2 post-op      Intake Outcome Measure for FOTO Knee Survey    Therapist reviewed FOTO scores for Bri Santiago on 11/9/2023.   FOTO documents entered into [employer] - see Media section.    Intake Score: 19%       TREATMENT   Treatment Time In: 1000  Treatment Time Out: 1100  Total Treatment Time separate from Evaluation: 35 minutes    Bri received the treatments listed below:       Bri received the following manual therapy techniques: Joint mobilizations were applied to the: L patella and L circumferential knee for 0 minutes, including:  L patellar mobs around the clock     Bri participated in neuromuscular re-education activities to improve: Balance, Coordination, Sense, Proprioception, and Posture for 35 minutes. The following activities were included:  Quad Sets 2 x 10  Heel Slides 2 x 10  HS Stretch with Strap 2 x 10  SLR 2 x 10  Hip ABD 2 x 10  LAQ 2 x 10  Weight Shifts in standing  Gait training with AD      Bri participated in therapeutic activities to improve  functional performance for 0 minutes, including:    Home Exercises and Patient Education Provided     Education provided:   - PT POC   - PT goals   - HEP  - Post-Op positioning    Written Home Exercises Provided:   Exercises were reviewed and Bri was able to demonstrate them prior to the end of the session.   Pt received a written copy of exercises to perform at home. Bri demonstrated good understanding of the education provided.     See EMR under patient instructions for exercises given.     Assessment   Bri is a 63 y.o. male referred to outpatient Physical Therapy s/p L knee partial lateral meniscectomy and chondroplasty. The patient demonstrates normal post operative restrictions in knee AROM/PROM, underlying strength deficits, pain, difficulty sleeping and decreased ability to independently complete ADLs and IADLs decreasing quality.    Pt will benefit from skilled outpatient Physical Therapy to address the deficits stated above and in the chart below, provide pt/family education, and to maximize pt's level of independence. Pt prognosis is Good.     Plan of care discussed with patient: Yes  Pt's spiritual, cultural and educational needs considered and patient is agreeable to the plan of care and goals as stated below:     Anticipated Barriers for therapy: Age; arthritic changes    Medical Necessity is demonstrated by the following:    History  Co-morbidities and personal factors that may impact the plan of care [x] LOW: no personal factors / co-morbidities  [] MODERATE: 1-2 personal factors / co-morbidities  [] HIGH: 3+ personal factors / co-morbidities    Moderate / High Support Documentation:   Co-morbidities affecting plan of care: None    Personal Factors:   No deficits     Examination  Body Structures and Functions, activity limitations and participation restrictions that may impact the plan of care [x] LOW: addressing 1-2 elements  [] MODERATE: 3+ elements  [] HIGH: 4+ elements (please support  below)    Moderate / High Support Documentation: None     Clinical Presentation [x] LOW: stable  [] MODERATE: Evolving  [] HIGH: Unstable     Decision Making/ Complexity Score: low       GOALS   Short Term Goals:  8 weeks  Pt will report decreased L knee pain  < / =  3/10  to increase tolerance for ADL performance and recreational routine.  Pt will improve L knee ROM to WFL in order to be able to perform ADLs without difficulty.  Pt will improve L knee strength by 1/3 MMT grade to increase tolerance for ADL and work activities  Pt will demonstrate tolerance to HEP to improve independence with ADL's    Long Term Goals: 16 weeks  1. Pt will report decreased L knee pain  < / =  1/10  to increase tolerance for ADL performance and recreational routine  2. Pt will improve L knee ROM to WNL in order to be able to perform ADLs without difficulty  3. Pt will improve L knee strength by 1/3 MMT grade to increase tolerance for ADL and work activities  4. Pt will report at CJ level (20-40% impaired) on FOTO knee to demonstrate increase in LE function with every day tasks.       Plan   Plan of care Certification: 11/9/2023 to 02/09/2024    Outpatient Physical Therapy 1-2 times weekly for 16 weeks to include the following interventions: Gait Training, Manual Therapy, Moist Heat/ Ice, Neuromuscular Re-ed, Patient Education, Therapeutic Activites, Therapeutic Exercise, and Functional Dry Needling with/or without Electrical Stimulation as needed.     Raudel Gillespie, PT, DPT, OCS

## 2023-11-14 ENCOUNTER — CLINICAL SUPPORT (OUTPATIENT)
Dept: REHABILITATION | Facility: HOSPITAL | Age: 63
End: 2023-11-14
Payer: MEDICARE

## 2023-11-14 DIAGNOSIS — M25.562 ACUTE PAIN OF LEFT KNEE: Primary | ICD-10-CM

## 2023-11-14 PROCEDURE — 97530 THERAPEUTIC ACTIVITIES: CPT

## 2023-11-14 PROCEDURE — 97112 NEUROMUSCULAR REEDUCATION: CPT

## 2023-11-15 NOTE — PROGRESS NOTES
Physical Therapy Daily Treatment Note     Name: Bri Santiago  Clinic Number: [id number]    Therapy Diagnosis:   Encounter Diagnosis   Name Primary?    Acute pain of left knee Yes     Physician: Karthik Tanner MD    Visit Date: 11/14/2023    Physician Orders: PT Eval and Treat  Medical Diagnosis from Referral: M23.201 (ICD-10-CM) - Old tear of lateral meniscus of left knee, unspecified tear type  Evaluation Date: 11/9/2023  Authorization Period Expiration: 12/27/2023  Plan of Care Expiration: 02/09/2024  Visit # / Visits authorized: 1/24 (+ EVAL)     FOTO: 19  FOTO 1st Follow-up: NA  FOTO 2nd Follow-up: NA     Time In: 1000  Time Out: 1100  Total Billable Time: 60 minutes     Precautions: Standard, Wb'ing     Procedure Date: 11/8/2023  Procedure:   1. Left knee arthroscopic chondroplasty  2. Left knee arthroscopic partial lateral meniscectomy    Subjective     Pt reports first return visit since original session; improved quad strength at home.  He was compliant with home exercise program.  Response to previous treatment: Good  Functional change: improved quad activation    Pain: 2/10  Location: left knee      Objective     Gait: WBAT c B axillary crutches     Range of Motion:   Knee Right Passive Left Passive   Flexion 110 90   Extension 0 0       Treatment     Bri received the treatments listed below:       Bri received the following manual therapy techniques: Joint mobilizations were applied to the: L patella and L circumferential knee for 0 minutes, including:  L patellar mobs around the clock     Bri participated in neuromuscular re-education activities to improve: Balance, Coordination, Sense, Proprioception, and Posture for 45 minutes. The following activities were included:  Quad Sets 2 x 10  Heel Slides 2 x 10  HS Stretch with Strap 2 x 10  SLR 2 x 10  Hip ABD 2 x 10     Bri participated in therapeutic activities to improve functional performance for 15 minutes, including:  LAQ 2 x  10  Weight Shifts in standing  Gait training with AD     Home Exercises and Patient Education Provided      Education provided:   - PT POC   - PT goals   - HEP  - Post-Op positioning     Written Home Exercises Provided:   Exercises were reviewed and Bri was able to demonstrate them prior to the end of the session.   Pt received a written copy of exercises to perform at home. Bri demonstrated good understanding of the education provided.      See EMR under patient instructions for exercises given.     Assessment     s/p L knee partial lateral meniscectomy and chondroplasty 11/8/2023  First return visit since original session. Improved quad activation and control with SLR since last session. Continue per protocol.  Bri Is progressing well towards his goals.   Pt prognosis is Good.     Pt will continue to benefit from skilled outpatient physical therapy to address the deficits listed in the problem list box on initial evaluation, provide pt/family education and to maximize pt's level of independence in the home and community environment.     Pt's spiritual, cultural and educational needs considered and pt agreeable to plan of care and goals.    Anticipated barriers to physical therapy: Age; arthritic changes     GOALS   Short Term Goals:  8 weeks  Pt will report decreased L knee pain  < / =  3/10  to increase tolerance for ADL performance and recreational routine.  Pt will improve L knee ROM to WFL in order to be able to perform ADLs without difficulty.  Pt will improve L knee strength by 1/3 MMT grade to increase tolerance for ADL and work activities  Pt will demonstrate tolerance to HEP to improve independence with ADL's     Long Term Goals: 16 weeks  1. Pt will report decreased L knee pain  < / =  1/10  to increase tolerance for ADL performance and recreational routine  2. Pt will improve L knee ROM to WNL in order to be able to perform ADLs without difficulty  3. Pt will improve L knee strength by 1/3 MMT  grade to increase tolerance for ADL and work activities  4. Pt will report at CJ level (20-40% impaired) on FOTO knee to demonstrate increase in LE function with every day tasks.     Plan     Continue per NOE Gillespie, PT, DPT  Board Certified in Orthopedic Physical Therapy

## 2023-11-16 DIAGNOSIS — R12 HEARTBURN: ICD-10-CM

## 2023-11-16 DIAGNOSIS — K21.9 GASTROESOPHAGEAL REFLUX DISEASE, UNSPECIFIED WHETHER ESOPHAGITIS PRESENT: ICD-10-CM

## 2023-11-18 RX ORDER — RABEPRAZOLE SODIUM 20 MG/1
20 TABLET, DELAYED RELEASE ORAL EVERY 12 HOURS
Qty: 180 TABLET | Refills: 0 | Status: SHIPPED | OUTPATIENT
Start: 2023-11-18 | End: 2024-03-12

## 2023-11-21 ENCOUNTER — OFFICE VISIT (OUTPATIENT)
Dept: SPORTS MEDICINE | Facility: CLINIC | Age: 63
End: 2023-11-21
Payer: MEDICARE

## 2023-11-21 ENCOUNTER — CLINICAL SUPPORT (OUTPATIENT)
Dept: REHABILITATION | Facility: HOSPITAL | Age: 63
End: 2023-11-21
Payer: MEDICARE

## 2023-11-21 VITALS
DIASTOLIC BLOOD PRESSURE: 88 MMHG | BODY MASS INDEX: 33.34 KG/M2 | HEART RATE: 66 BPM | SYSTOLIC BLOOD PRESSURE: 131 MMHG | HEIGHT: 71 IN | WEIGHT: 238.13 LBS

## 2023-11-21 DIAGNOSIS — Z98.890 S/P ARTHROSCOPIC SURGERY OF LEFT KNEE: ICD-10-CM

## 2023-11-21 DIAGNOSIS — M25.562 ACUTE PAIN OF LEFT KNEE: Primary | ICD-10-CM

## 2023-11-21 DIAGNOSIS — Z09 SURGERY FOLLOW-UP EXAMINATION: Primary | ICD-10-CM

## 2023-11-21 PROCEDURE — 99999 PR PBB SHADOW E&M-EST. PATIENT-LVL IV: ICD-10-PCS | Mod: PBBFAC,,, | Performed by: PHYSICIAN ASSISTANT

## 2023-11-21 PROCEDURE — 3075F SYST BP GE 130 - 139MM HG: CPT | Mod: CPTII,S$GLB,, | Performed by: PHYSICIAN ASSISTANT

## 2023-11-21 PROCEDURE — 3044F HG A1C LEVEL LT 7.0%: CPT | Mod: CPTII,S$GLB,, | Performed by: PHYSICIAN ASSISTANT

## 2023-11-21 PROCEDURE — 3075F PR MOST RECENT SYSTOLIC BLOOD PRESS GE 130-139MM HG: ICD-10-PCS | Mod: CPTII,S$GLB,, | Performed by: PHYSICIAN ASSISTANT

## 2023-11-21 PROCEDURE — 1159F MED LIST DOCD IN RCRD: CPT | Mod: CPTII,S$GLB,, | Performed by: PHYSICIAN ASSISTANT

## 2023-11-21 PROCEDURE — 97112 NEUROMUSCULAR REEDUCATION: CPT

## 2023-11-21 PROCEDURE — 1159F PR MEDICATION LIST DOCUMENTED IN MEDICAL RECORD: ICD-10-PCS | Mod: CPTII,S$GLB,, | Performed by: PHYSICIAN ASSISTANT

## 2023-11-21 PROCEDURE — 3044F PR MOST RECENT HEMOGLOBIN A1C LEVEL <7.0%: ICD-10-PCS | Mod: CPTII,S$GLB,, | Performed by: PHYSICIAN ASSISTANT

## 2023-11-21 PROCEDURE — 99999 PR PBB SHADOW E&M-EST. PATIENT-LVL IV: CPT | Mod: PBBFAC,,, | Performed by: PHYSICIAN ASSISTANT

## 2023-11-21 PROCEDURE — 99024 POSTOP FOLLOW-UP VISIT: CPT | Mod: S$GLB,,, | Performed by: PHYSICIAN ASSISTANT

## 2023-11-21 PROCEDURE — 99024 PR POST-OP FOLLOW-UP VISIT: ICD-10-PCS | Mod: S$GLB,,, | Performed by: PHYSICIAN ASSISTANT

## 2023-11-21 PROCEDURE — 1160F PR REVIEW ALL MEDS BY PRESCRIBER/CLIN PHARMACIST DOCUMENTED: ICD-10-PCS | Mod: CPTII,S$GLB,, | Performed by: PHYSICIAN ASSISTANT

## 2023-11-21 PROCEDURE — 3079F DIAST BP 80-89 MM HG: CPT | Mod: CPTII,S$GLB,, | Performed by: PHYSICIAN ASSISTANT

## 2023-11-21 PROCEDURE — 97530 THERAPEUTIC ACTIVITIES: CPT

## 2023-11-21 PROCEDURE — 1160F RVW MEDS BY RX/DR IN RCRD: CPT | Mod: CPTII,S$GLB,, | Performed by: PHYSICIAN ASSISTANT

## 2023-11-21 PROCEDURE — 3079F PR MOST RECENT DIASTOLIC BLOOD PRESSURE 80-89 MM HG: ICD-10-PCS | Mod: CPTII,S$GLB,, | Performed by: PHYSICIAN ASSISTANT

## 2023-11-21 NOTE — PROGRESS NOTES
Physical Therapy Daily Treatment Note     Name: Bri Santiago  Clinic Number: [id number]    Therapy Diagnosis:   Encounter Diagnosis   Name Primary?    Acute pain of left knee Yes     Physician: Karthik Tanner MD    Visit Date: 11/21/2023    Physician Orders: PT Eval and Treat  Medical Diagnosis from Referral: M23.201 (ICD-10-CM) - Old tear of lateral meniscus of left knee, unspecified tear type  Evaluation Date: 11/9/2023  Authorization Period Expiration: 12/27/2023  Plan of Care Expiration: 02/09/2024  Visit # / Visits authorized: 2/24 (+ EVAL)     FOTO: 19  FOTO 1st Follow-up: NA  FOTO 2nd Follow-up: NA     Time In: 1000  Time Out: 1100  Total Billable Time: 60 minutes     Precautions: Standard, Wb'ing     Procedure Date: 11/8/2023  Procedure:   1. Left knee arthroscopic chondroplasty  2. Left knee arthroscopic partial lateral meniscectomy    Subjective     Pt reports had some unrelated back pain that limited his active participation in HEP; has since resolved.  He was compliant with home exercise program.  Response to previous treatment: Good  Functional change: improved quad activation    Pain: 2/10  Location: left knee      Objective     Gait: WBAT c B axillary crutches     Range of Motion:   Knee Right Passive Left Passive   Flexion 110 100   Extension 0 0       Treatment     Bri received the treatments listed below:       Bri participated in neuromuscular re-education activities to improve: Balance, Coordination, Sense, Proprioception, and Posture for 30 minutes. The following activities were included:  Quad Sets 2 x 10  Heel Slides 2 x 10  HS Stretch with Strap 2 x 10  SLR 2 x 10  Hip ABD 2 x 10     Bri participated in therapeutic activities to improve functional performance for 30 minutes, including:  Bike completed for 10' to increase ROM, endurance, and decrease pain to improve tolerance to ADLs and age-related activities  LAQ 2 x 10  Weight Shifts in standing  Gait training with AD      Home Exercises and Patient Education Provided      Education provided:   - PT POC   - PT goals   - HEP  - Post-Op positioning     Written Home Exercises Provided:   Exercises were reviewed and Bri was able to demonstrate them prior to the end of the session.   Pt received a written copy of exercises to perform at home. Bri demonstrated good understanding of the education provided.      See EMR under patient instructions for exercises given.     Assessment     s/p L knee partial lateral meniscectomy and chondroplasty 11/8/2023  Has f/u with surgeon's team later today. Progressing as expected; needs more quad strengthening moving forward. Progress standing tolerance.  Bri Is progressing well towards his goals.   Pt prognosis is Good.     Pt will continue to benefit from skilled outpatient physical therapy to address the deficits listed in the problem list box on initial evaluation, provide pt/family education and to maximize pt's level of independence in the home and community environment.     Pt's spiritual, cultural and educational needs considered and pt agreeable to plan of care and goals.    Anticipated barriers to physical therapy: Age; arthritic changes     GOALS   Short Term Goals:  8 weeks  Pt will report decreased L knee pain  < / =  3/10  to increase tolerance for ADL performance and recreational routine.  Pt will improve L knee ROM to WFL in order to be able to perform ADLs without difficulty.  Pt will improve L knee strength by 1/3 MMT grade to increase tolerance for ADL and work activities  Pt will demonstrate tolerance to HEP to improve independence with ADL's     Long Term Goals: 16 weeks  1. Pt will report decreased L knee pain  < / =  1/10  to increase tolerance for ADL performance and recreational routine  2. Pt will improve L knee ROM to WNL in order to be able to perform ADLs without difficulty  3. Pt will improve L knee strength by 1/3 MMT grade to increase tolerance for ADL and work  activities  4. Pt will report at CJ level (20-40% impaired) on FOTO knee to demonstrate increase in LE function with every day tasks.     Plan     Continue per NOE Gillespie, PT, DPT  Board Certified in Orthopedic Physical Therapy

## 2023-11-21 NOTE — PROGRESS NOTES
"CC: Left knee scope post op 2 weeks    Patient is here for his 2 week post op appointment s/p below and is doing well. Patient is doing PT at [facility] and is progressing as expected. Patient is no longer taking pain medication. he denies any chest pain, SOB, fevers, chills, nausea, vomiting, or drainage from incision sites.     DATE OF PROCEDURE:  11/8/2023      PREOPERATIVE DIAGNOSES:   1. Left knee chondromalacia  2. Left knee lateral meniscus tear     POSTOPERATIVE DIAGNOSES:   1. Left knee chondromalacia  2. Left knee lateral meniscus tear     PROCEDURES:   1. Left knee arthroscopic chondroplasty  2. Left knee arthroscopic partial lateral meniscectomy     SURGEON: Karthik Tanner M.D.     PE:    /88   Pulse 66   Ht 5' 11" (1.803 m)   Wt 108 kg (238 lb 1.6 oz)   BMI 33.21 kg/m²      Left knee:    Incisions clean/dry/intact  No sign of infection  Mild swelling  Compartments soft  Neurovascular status intact in extremity    AROM 0 to 130 degrees  Decreased quad strength  Minimal to no effusion    Assessment:  2 weeks s/p left knee arthroscopic chondroplasty and partial lateral meniscectomy    Plan:  1.  Removed steri strips.  Wounds healing well    2.  Arthroscopic pictures reviewed and rehab plans discussed.    3.  Physical therapy for quad, ROM, modalities.  HEP for ROM, knee, VMO and hip abductor strengthening.     4.  Pain level acceptable for first post op visit.  Wean off pain medicine by next visit if still taking    5. Return to clinic in 4 weeks at 6 weeks post-op.      All questions were answered. Instructed patient to call with questions or concerns in the interim.     "

## 2023-12-08 NOTE — INTERVAL H&P NOTE
The patient has been examined and the H&P has been reviewed:    I concur with the findings and no changes have occurred since H&P was written.    Anesthesia/Surgery risks, benefits and alternative options discussed and understood by patient/family.          There are no hospital problems to display for this patient.     [[id number]],[[id number]],[[id number]] [[id number]],[[id number]],[[id number]]

## 2023-12-14 ENCOUNTER — CLINICAL SUPPORT (OUTPATIENT)
Dept: REHABILITATION | Facility: HOSPITAL | Age: 63
End: 2023-12-14
Payer: MEDICARE

## 2023-12-14 DIAGNOSIS — M25.562 ACUTE PAIN OF LEFT KNEE: Primary | ICD-10-CM

## 2023-12-14 PROCEDURE — 97112 NEUROMUSCULAR REEDUCATION: CPT

## 2023-12-14 PROCEDURE — 97530 THERAPEUTIC ACTIVITIES: CPT

## 2023-12-15 NOTE — PROGRESS NOTES
"Physical Therapy Daily Treatment Note     Name: Bri Santiago  Clinic Number: [id number]    Therapy Diagnosis:   Encounter Diagnosis   Name Primary?    Acute pain of left knee Yes     Physician: Karthik Tanner MD    Visit Date: 12/14/2023    Physician Orders: PT Eval and Treat  Medical Diagnosis from Referral: M23.201 (ICD-10-CM) - Old tear of lateral meniscus of left knee, unspecified tear type  Evaluation Date: 11/9/2023  Authorization Period Expiration: 12/27/2023  Plan of Care Expiration: 02/09/2024  Visit # / Visits authorized: 3/24 (+ EVAL)     FOTO: 19  FOTO 1st Follow-up: NA  FOTO 2nd Follow-up: NA     Time In: 1000  Time Out: 1100  Total Billable Time: 60 minutes     Precautions: Standard, Wb'ing     Procedure Date: 11/8/2023  Procedure:   1. Left knee arthroscopic chondroplasty  2. Left knee arthroscopic partial lateral meniscectomy    Subjective     Pt reports first return in a few weeks while recovering from the flu. Has been compliant with HEP even though he was away from the clinic.  He was compliant with home exercise program.  Response to previous treatment: Good  Functional change: improved quad activation    Pain: 2/10  Location: left knee      Objective     Objective Measures updated at progress report unless specified    Treatment     Bri received the treatments listed below:       Bri participated in neuromuscular re-education activities to improve: Balance, Coordination, Sense, Proprioception, and Posture for 30 minutes. The following activities were included:  SLR 3# 3 x 12  Hip ABD 3# 3 x 10  LAQ 5# 3 x 12     Bri participated in therapeutic activities to improve functional performance for 30 minutes, including:  Bike completed for 10' to increase ROM, endurance, and decrease pain to improve tolerance to ADLs and age-related activities  Standing Hip ABD/EXT GTB 3 x 12  Lat Step-Up's 4" 3 x 12  Small Hurdles 3 x 12     Home Exercises and Patient Education Provided      Education " provided:   - PT POC   - PT goals   - HEP  - Post-Op positioning     Written Home Exercises Provided:   Exercises were reviewed and Bri was able to demonstrate them prior to the end of the session.   Pt received a written copy of exercises to perform at home. Bri demonstrated good understanding of the education provided.      See EMR under patient instructions for exercises given.     Assessment     s/p L knee partial lateral meniscectomy and chondroplasty 11/8/2023  Pt was away from the clinic while recovering from the flu, but adherence to HEP is evident as knee mobility and quad strength carry-over are good. Worked in more standing interventions with incorporation of quad control in SL positions with good control.  Bri Is progressing well towards his goals.   Pt prognosis is Good.     Pt will continue to benefit from skilled outpatient physical therapy to address the deficits listed in the problem list box on initial evaluation, provide pt/family education and to maximize pt's level of independence in the home and community environment.     Pt's spiritual, cultural and educational needs considered and pt agreeable to plan of care and goals.    Anticipated barriers to physical therapy: Age; arthritic changes     GOALS   Short Term Goals:  8 weeks  Pt will report decreased L knee pain  < / =  3/10  to increase tolerance for ADL performance and recreational routine.  Pt will improve L knee ROM to WFL in order to be able to perform ADLs without difficulty.  Pt will improve L knee strength by 1/3 MMT grade to increase tolerance for ADL and work activities  Pt will demonstrate tolerance to HEP to improve independence with ADL's     Long Term Goals: 16 weeks  1. Pt will report decreased L knee pain  < / =  1/10  to increase tolerance for ADL performance and recreational routine  2. Pt will improve L knee ROM to WNL in order to be able to perform ADLs without difficulty  3. Pt will improve L knee strength by 1/3  MMT grade to increase tolerance for ADL and work activities  4. Pt will report at CJ level (20-40% impaired) on FOTO knee to demonstrate increase in LE function with every day tasks.     Plan     Continue per NOE Gillespie, PT, DPT  Board Certified in Orthopedic Physical Therapy

## 2023-12-19 ENCOUNTER — OFFICE VISIT (OUTPATIENT)
Dept: SPORTS MEDICINE | Facility: CLINIC | Age: 63
End: 2023-12-19
Payer: MEDICARE

## 2023-12-19 VITALS
SYSTOLIC BLOOD PRESSURE: 152 MMHG | HEIGHT: 71 IN | BODY MASS INDEX: 33.34 KG/M2 | HEART RATE: 73 BPM | DIASTOLIC BLOOD PRESSURE: 90 MMHG | WEIGHT: 238.13 LBS

## 2023-12-19 DIAGNOSIS — Z98.890 S/P ARTHROSCOPIC SURGERY OF LEFT KNEE: Primary | ICD-10-CM

## 2023-12-19 PROCEDURE — 3077F SYST BP >= 140 MM HG: CPT | Mod: CPTII,S$GLB,, | Performed by: ORTHOPAEDIC SURGERY

## 2023-12-19 PROCEDURE — 3080F PR MOST RECENT DIASTOLIC BLOOD PRESSURE >= 90 MM HG: ICD-10-PCS | Mod: CPTII,S$GLB,, | Performed by: ORTHOPAEDIC SURGERY

## 2023-12-19 PROCEDURE — 3008F PR BODY MASS INDEX (BMI) DOCUMENTED: ICD-10-PCS | Mod: CPTII,S$GLB,, | Performed by: ORTHOPAEDIC SURGERY

## 2023-12-19 PROCEDURE — 3080F DIAST BP >= 90 MM HG: CPT | Mod: CPTII,S$GLB,, | Performed by: ORTHOPAEDIC SURGERY

## 2023-12-19 PROCEDURE — 99024 POSTOP FOLLOW-UP VISIT: CPT | Mod: S$GLB,,, | Performed by: ORTHOPAEDIC SURGERY

## 2023-12-19 PROCEDURE — 99999 PR PBB SHADOW E&M-EST. PATIENT-LVL III: ICD-10-PCS | Mod: PBBFAC,,, | Performed by: ORTHOPAEDIC SURGERY

## 2023-12-19 PROCEDURE — 3044F PR MOST RECENT HEMOGLOBIN A1C LEVEL <7.0%: ICD-10-PCS | Mod: CPTII,S$GLB,, | Performed by: ORTHOPAEDIC SURGERY

## 2023-12-19 PROCEDURE — 3008F BODY MASS INDEX DOCD: CPT | Mod: CPTII,S$GLB,, | Performed by: ORTHOPAEDIC SURGERY

## 2023-12-19 PROCEDURE — 99024 PR POST-OP FOLLOW-UP VISIT: ICD-10-PCS | Mod: S$GLB,,, | Performed by: ORTHOPAEDIC SURGERY

## 2023-12-19 PROCEDURE — 1159F PR MEDICATION LIST DOCUMENTED IN MEDICAL RECORD: ICD-10-PCS | Mod: CPTII,S$GLB,, | Performed by: ORTHOPAEDIC SURGERY

## 2023-12-19 PROCEDURE — 99999 PR PBB SHADOW E&M-EST. PATIENT-LVL III: CPT | Mod: PBBFAC,,, | Performed by: ORTHOPAEDIC SURGERY

## 2023-12-19 PROCEDURE — 3077F PR MOST RECENT SYSTOLIC BLOOD PRESSURE >= 140 MM HG: ICD-10-PCS | Mod: CPTII,S$GLB,, | Performed by: ORTHOPAEDIC SURGERY

## 2023-12-19 PROCEDURE — 1159F MED LIST DOCD IN RCRD: CPT | Mod: CPTII,S$GLB,, | Performed by: ORTHOPAEDIC SURGERY

## 2023-12-19 PROCEDURE — 3044F HG A1C LEVEL LT 7.0%: CPT | Mod: CPTII,S$GLB,, | Performed by: ORTHOPAEDIC SURGERY

## 2023-12-19 NOTE — PROGRESS NOTES
"CC: Left knee scope post op 6 weeks    Patient is here for his 6 week post op appointment s/p below and is doing well. Patient is doing PT at [facility] and is progressing as expected.  DATE OF PROCEDURE:  11/8/2023      PREOPERATIVE DIAGNOSES:   1. Left knee chondromalacia  2. Left knee lateral meniscus tear     POSTOPERATIVE DIAGNOSES:   1. Left knee chondromalacia  2. Left knee lateral meniscus tear     PROCEDURES:   1. Left knee arthroscopic chondroplasty  2. Left knee arthroscopic partial lateral meniscectomy     SURGEON: Karthik Tanner M.D.     PE:    BP (!) 152/90   Pulse 73   Ht 5' 11" (1.803 m)   Wt 108 kg (238 lb 1.6 oz)   BMI 33.21 kg/m²      Left knee:    Incisions clean/dry/intact  No sign of infection  Mild swelling  Compartments soft  Neurovascular status intact in extremity    AROM 0 to 130 degrees  Decreased quad strength  Minimal to no effusion    Assessment:  6 weeks s/p left knee arthroscopic chondroplasty and partial lateral meniscectomy    Plan:  1.  Continue PT    2. F/u in 6 weeks.       All questions were answered. Instructed patient to call with questions or concerns in the interim.     "

## 2024-01-09 ENCOUNTER — HOSPITAL ENCOUNTER (OUTPATIENT)
Dept: RADIOLOGY | Facility: HOSPITAL | Age: 64
Discharge: HOME | End: 2024-01-09
Attending: PHYSICIAN ASSISTANT
Payer: MEDICARE

## 2024-01-09 ENCOUNTER — OFFICE VISIT (OUTPATIENT)
Dept: INTERNAL MEDICINE | Facility: CLINIC | Age: 64
End: 2024-01-09
Payer: MEDICARE

## 2024-01-09 VITALS
HEART RATE: 78 BPM | HEIGHT: 71 IN | WEIGHT: 231.69 LBS | BODY MASS INDEX: 32.44 KG/M2 | DIASTOLIC BLOOD PRESSURE: 78 MMHG | SYSTOLIC BLOOD PRESSURE: 130 MMHG | OXYGEN SATURATION: 96 %

## 2024-01-09 DIAGNOSIS — R07.89 LEFT-SIDED CHEST WALL PAIN: Primary | ICD-10-CM

## 2024-01-09 DIAGNOSIS — R07.89 LEFT-SIDED CHEST WALL PAIN: ICD-10-CM

## 2024-01-09 PROCEDURE — 99999 PR PBB SHADOW E&M-EST. PATIENT-LVL IV: CPT | Mod: PBBFAC,,, | Performed by: PHYSICIAN ASSISTANT

## 2024-01-09 PROCEDURE — 99214 OFFICE O/P EST MOD 30 MIN: CPT | Mod: S$GLB,,, | Performed by: PHYSICIAN ASSISTANT

## 2024-01-09 PROCEDURE — 71046 X-RAY EXAM CHEST 2 VIEWS: CPT | Mod: 26,,, | Performed by: INTERNAL MEDICINE

## 2024-01-09 PROCEDURE — 71046 X-RAY EXAM CHEST 2 VIEWS: CPT | Mod: TC

## 2024-01-09 RX ORDER — DICLOFENAC SODIUM 10 MG/G
2 GEL TOPICAL 4 TIMES DAILY
Qty: 100 G | Refills: 0 | Status: SHIPPED | OUTPATIENT
Start: 2024-01-09 | End: 2024-01-23

## 2024-01-10 ENCOUNTER — HOSPITAL ENCOUNTER (OUTPATIENT)
Dept: CARDIOLOGY | Facility: CLINIC | Age: 64
Discharge: HOME | End: 2024-01-10
Payer: MEDICARE

## 2024-01-10 DIAGNOSIS — R07.89 LEFT-SIDED CHEST WALL PAIN: ICD-10-CM

## 2024-01-10 PROCEDURE — 93005 ELECTROCARDIOGRAM TRACING: CPT | Mod: S$GLB,,, | Performed by: PHYSICIAN ASSISTANT

## 2024-01-10 PROCEDURE — 93010 ELECTROCARDIOGRAM REPORT: CPT | Mod: S$GLB,,, | Performed by: INTERNAL MEDICINE

## 2024-01-20 NOTE — PROGRESS NOTES
INTERNAL MEDICINE URGENT VISIT NOTE    CHIEF COMPLAINT     Chief Complaint   Patient presents with    Arm Pain     Pain under arm.       HPI     Bri Santiago is a 63 y.o. male who presents for an urgent visit today.    PCP is Cate Galeas MD, patient is known to me.     2 weeks of left chest wall pain -   Worse with pushing over area, no pain with walking or taking a deep breath.   Pain comes and goes at rest - never felt on exertion.   Feels occasional sharp pain that catches his breath -lasts for only a few seconds the resolves.   BP at home is normal -he has been compliant with his home medications.   No swelling in legs or ankles   No skin rash      Past Medical History:  Past Medical History:   Diagnosis Date    Acute pancreatitis     Anal fissure     Anemia     Anticoagulant long-term use     Arthritis     Asthma in remission     Back pain     BPH (benign prostatic hypertrophy)     Cancer 2000    prostate- treated at [facility] with chemo- in remission since 2000    Chronic maxillary sinusitis     Clotting disorder     Diastolic dysfunction with chronic heart failure 12/3/2018    Dysphagia 10/7/2014    Family history of colon cancer     Family history of early CAD     GERD (gastroesophageal reflux disease)     Helicobacter pylori (H. pylori) infection     Chronic    History of chronic pancreatitis     HTN (hypertension)     Lumbago 11/12/2012    Obesity     ABDON (obstructive sleep apnea)     ABDON (obstructive sleep apnea)     With likely ohs Refer to sleep    Sacroiliac joint pain 2/10/2015    Spinal stenosis of lumbar region     Von Willebrand disease     VWD (acquired von Willebrand's disease)        Home Medications:  Prior to Admission medications    Medication Sig Start Date End Date Taking? Authorizing Provider   albuterol (PROVENTIL/VENTOLIN HFA) 90 mcg/actuation inhaler INHALE 2 PUFFS INTO THE LUNGS EVERY 6 HOURS AS NEEDED FOR WHEEZING OR SHORTNESS OF BREATH 3/9/20  Yes Geeta Hall MD    atorvastatin (LIPITOR) 80 MG tablet Take 1 tablet (80 mg total) by mouth every evening. 5/1/23  Yes Damian Liu MD   azelastine (ASTELIN) 137 mcg (0.1 %) nasal spray Two sprays in each nostril, sniff until absorbed, then follow with 1 spray of fluticasone.  Use both sprays twice daily.  Patient taking differently: Two sprays in each nostril, sniff until absorbed, then follow with 1 spray of fluticasone.  Use both sprays twice daily.(PATIENT USES NIGHTLY 3/12/2021) 2/11/21  Yes LAURENT Swanson MD   budesonide-formoterol 160-4.5 mcg (SYMBICORT) 160-4.5 mcg/actuation HFAA INHALE 2 PUFFS INTO THE LUNGS EVERY 12 HOURS 3/26/20  Yes Geeta Hall MD   calcium citrate-vitamin D3 315-200 mg (CITRACAL+D) 315-200 mg-unit per tablet Take 1 tablet by mouth nightly.    Yes Provider, Historical   clopidogreL (PLAVIX) 75 mg tablet TAKE 1 TABLET(75 MG) BY MOUTH EVERY DAY 2/1/23  Yes Rehana Lopez MD   cyanocobalamin, vitamin B-12, 50 mcg tablet Take 50 mcg by mouth nightly.    Yes Provider, Historical   docusate sodium (COLACE) 100 MG capsule Take 1 tablet by mouth Twice daily. 1 Capsule Oral Twice a day .  Take with pain medicine   Yes Provider, Historical   doxazosin (CARDURA) 1 MG tablet TAKE 1 TABLET BY MOUTH EVERY EVENING 5/31/23  Yes Cate Galeas MD   fluticasone propionate (FLONASE) 50 mcg/actuation nasal spray One spray in each nostril twice daily after 1st using azelastine nasal spray 6/15/21  Yes LAURENT Swanson MD   furosemide (LASIX) 20 MG tablet TAKE 1 TABLET(20 MG) BY MOUTH EVERY DAY 9/20/23  Yes Cate Galeas MD   ipratropium (ATROVENT) 42 mcg (0.06 %) nasal spray 1-2 sprays in each nostril before eating and at bedtime as needed 6/15/21  Yes LAURENT Swanson MD   traMADoL (ULTRAM) 50 mg tablet Take 1 tablet (50 mg total) by mouth every 6 (six) hours as needed for Pain. 11/7/23  Yes Fermín De Jesus, WEI   zolpidem (AMBIEN) 10 mg Tab TAKE 1 TABLET BY MOUTH EVERY DAY AT BEDTIME 6/23/17  Yes  Darvin Henry MD   flu vacc [id number] 6mos up,PF, (FLUARIX QUAD [id number], PF,) 60 mcg (15 mcg x 4)/0.5 mL Syrg inject into muscle for one dose  Patient not taking: Reported on 1/9/2024 10/12/23   Leanna Ortiz PharmD   HYDROcodone-acetaminophen (NORCO) 7.5-325 mg per tablet Take 1 tablet by mouth every 6 (six) hours as needed for Pain.  Patient not taking: Reported on 11/21/2023 11/7/23   Fermín De Jesus PA-C   promethazine (PHENERGAN) 25 MG tablet Take 1 tablet (25 mg total) by mouth every 6 (six) hours as needed for Nausea.  Patient not taking: Reported on 12/19/2023 11/7/23   Fermín De Jesus PA-C   RABEprazole (ACIPHEX) 20 mg tablet TAKE 1 TABLET BY MOUTH EVERY 12 HOURS  Patient not taking: Reported on 12/19/2023 11/18/23   Rosendo Boyer MD   tadalafiL (CIALIS) 20 MG Tab Take 1 tablet (20 mg total) by mouth as needed. Take every 36 hours as needed for ED. 6/2/22 11/8/23  Darvin oHpe MD   testosterone (ANDRODERM) 2 mg/24 hour PT24 APPLY 1 PATCH TOPICALLY TO THE SKIN EVERY DAY  Patient not taking: Reported on 1/9/2024 1/5/22   Chris Min MD   tamsulosin (FLOMAX) 0.4 mg Cap Take 1 capsule (0.4 mg total) by mouth once daily. 4/4/21 4/4/21  Darnell Lee MD       Review of Systems:  Review of Systems   Constitutional:  Negative for chills and fever.   HENT:  Negative for sore throat and trouble swallowing.    Eyes:  Negative for visual disturbance.   Respiratory:  Negative for cough and shortness of breath.    Cardiovascular:  Negative for chest pain.        Chest wall pain    Gastrointestinal:  Negative for abdominal pain, constipation, diarrhea, nausea and vomiting.   Genitourinary:  Negative for dysuria and flank pain.   Musculoskeletal:  Negative for back pain, neck pain and neck stiffness.   Skin:  Negative for rash.   Neurological:  Negative for dizziness, syncope, weakness and headaches.   Psychiatric/Behavioral:  Negative for confusion.        Health Maintainence:  "  Immunizations:  Health Maintenance         Date Due Completion Date    Shingles Vaccine (1 of 2) Never done ---    RSV Vaccine (Age 60+ and Pregnant patients) (1 - 1-dose 60+ series) Never done ---    COVID-19 Vaccine (5 - 2023-24 season) 09/01/2023 12/8/2022    Aspirin/Antiplatelet Therapy 12/19/2024 12/19/2023    Colorectal Cancer Screening 03/04/2025 3/4/2020    Hemoglobin A1c (Diabetic Prevention Screening) 08/01/2026 8/1/2023    TETANUS VACCINE 10/16/2027 10/16/2017    Lipid Panel 01/23/2028 1/23/2023             PHYSICAL EXAM     BP (!) 145/82   Pulse 78   Ht 5' 11" (1.803 m)   Wt 105.1 kg (231 lb 11.3 oz)   SpO2 96%   BMI 32.32 kg/m²     Physical Exam  Vitals and nursing note reviewed.   Constitutional:       Appearance: Normal appearance.      Comments: Elderly male in NAD or apparent pain. He makes good eye contact, speaks in clear full sentences and ambulates with ease.    HENT:      Head: Normocephalic and atraumatic.      Nose: Nose normal.      Mouth/Throat:      Pharynx: Oropharynx is clear.   Eyes:      Conjunctiva/sclera: Conjunctivae normal.   Cardiovascular:      Rate and Rhythm: Normal rate and regular rhythm.      Pulses: Normal pulses.   Pulmonary:      Effort: No respiratory distress.   Abdominal:      Tenderness: There is no abdominal tenderness.   Musculoskeletal:         General: Normal range of motion.      Cervical back: No rigidity.      Comments: Left sided mid axillary line chest wall TTP at about 6th rib line. No overlying skin changes.   Normal breath sounds   Normal heart sounds   No lower edema    Skin:     General: Skin is warm and dry.      Capillary Refill: Capillary refill takes less than 2 seconds.      Findings: No rash.   Neurological:      General: No focal deficit present.      Mental Status: He is alert.      Gait: Gait normal.   Psychiatric:         Mood and Affect: Mood normal.         LABS     Lab Results   Component Value Date    HGBA1C 4.6 01/23/2023 "     CMP  Sodium   Date Value Ref Range Status   10/25/2023 140 136 - 145 mmol/L Final     Potassium   Date Value Ref Range Status   10/25/2023 3.7 3.5 - 5.1 mmol/L Final     Chloride   Date Value Ref Range Status   10/25/2023 105 95 - 110 mmol/L Final     CO2   Date Value Ref Range Status   10/25/2023 29 23 - 29 mmol/L Final     Glucose   Date Value Ref Range Status   10/25/2023 99 70 - 110 mg/dL Final     BUN   Date Value Ref Range Status   10/25/2023 8 8 - 23 mg/dL Final     Creatinine   Date Value Ref Range Status   10/25/2023 1.0 0.5 - 1.4 mg/dL Final     Calcium   Date Value Ref Range Status   10/25/2023 8.8 8.7 - 10.5 mg/dL Final     Total Protein   Date Value Ref Range Status   10/25/2023 7.3 6.0 - 8.4 g/dL Final     Albumin   Date Value Ref Range Status   10/25/2023 4.0 3.5 - 5.2 g/dL Final   05/03/2021 4.0 3.6 - 5.1 g/dL Final     Comment:     For additional information, please refer to   http://education.[employer]/faq/TUS872 (This link is   being provided for informational/ educational purposes only.)    This test was developed and its analytical performance   characteristics have been determined by [facility]. It has not been cleared or approved by the   [place] Food and Drug Administration. This assay has been validated   pursuant to the CLIA regulations and is used for clinical   purposes.  @ Test Performed By:  "[employer] [facility]" Marcellus  Baudilio Loomis M.D.,   [address] [place] [id number]  [facility]  [id number]       Total Bilirubin   Date Value Ref Range Status   10/25/2023 1.2 (H) 0.1 - 1.0 mg/dL Final     Comment:     For infants and newborns, interpretation of results should be based  on gestational age, weight and in agreement with clinical  observations.    Premature Infant recommended reference ranges:  Up to 24 hours.............<8.0 mg/dL  Up to 48 hours............<12.0 mg/dL  3-5 days..................<15.0  mg/dL  6-29 days.................<15.0 mg/dL       Alkaline Phosphatase   Date Value Ref Range Status   10/25/2023 60 55 - 135 U/L Final     AST   Date Value Ref Range Status   10/25/2023 13 10 - 40 U/L Final     ALT   Date Value Ref Range Status   10/25/2023 16 10 - 44 U/L Final     Anion Gap   Date Value Ref Range Status   10/25/2023 6 (L) 8 - 16 mmol/L Final     eGFR if    Date Value Ref Range Status   07/19/2022 >60.0 >60 mL/min/1.73 m^2 Final     eGFR if non    Date Value Ref Range Status   07/19/2022 >60.0 >60 mL/min/1.73 m^2 Final     Comment:     Calculation used to obtain the estimated glomerular filtration  rate (eGFR) is the CKD-EPI equation.        Lab Results   Component Value Date    WBC 6.38 10/25/2023    HGB 13.5 (L) 10/25/2023    HCT 42.1 10/25/2023    MCV 95 10/25/2023     10/25/2023     Lab Results   Component Value Date    CHOL 125 01/23/2023    CHOL 128 07/08/2021    CHOL 111 (L) 01/23/2020     Lab Results   Component Value Date    HDL 32 (L) 01/23/2023    HDL 35 (L) 07/08/2021    HDL 29 (L) 01/23/2020     Lab Results   Component Value Date    LDLCALC 80.4 01/23/2023    LDLCALC 72.2 07/08/2021    LDLCALC 66.8 01/23/2020     Lab Results   Component Value Date    TRIG 63 01/23/2023    TRIG 104 07/08/2021    TRIG 76 01/23/2020     Lab Results   Component Value Date    CHOLHDL 25.6 01/23/2023    CHOLHDL 27.3 07/08/2021    CHOLHDL 26.1 01/23/2020     Lab Results   Component Value Date    TSH 1.243 01/23/2023    [id number] 6.3 06/15/2010       ASSESSMENT/PLAN     Bri Santiago is a 63 y.o. male     Bri was seen today for left chest wall pain - atypical in nature. Will get CXR and EKG for reassurance. No shingles suspected. Will treat with Topical NSAIDs and warm compresses. ED prompts discussed.     Diagnoses and all orders for this visit:    Left-sided chest wall pain  -     X-Ray Chest PA And Lateral; Future  -     EKG 12-lead; Future    Other orders  -      diclofenac sodium (VOLTAREN) 1 % Gel; Apply 2 g topically 4 (four) times daily. for 14 days     Patient was counseled on when and how to seek emergent care.       Winnie Jacob PA-C   Department of Internal Medicine - [facility]   11:35 AM      Fall with Harm Risk

## 2024-02-01 ENCOUNTER — OFFICE VISIT (OUTPATIENT)
Dept: PAIN MEDICINE | Facility: CLINIC | Age: 64
End: 2024-02-01
Payer: MEDICARE

## 2024-02-01 VITALS
HEART RATE: 75 BPM | WEIGHT: 231 LBS | SYSTOLIC BLOOD PRESSURE: 143 MMHG | BODY MASS INDEX: 32.34 KG/M2 | HEIGHT: 71 IN | DIASTOLIC BLOOD PRESSURE: 86 MMHG

## 2024-02-01 DIAGNOSIS — M54.14 THORACIC RADICULOPATHY: ICD-10-CM

## 2024-02-01 DIAGNOSIS — M47.816 LUMBAR SPONDYLOSIS: ICD-10-CM

## 2024-02-01 DIAGNOSIS — M54.14 THORACIC RADICULITIS: ICD-10-CM

## 2024-02-01 DIAGNOSIS — G89.4 CHRONIC PAIN SYNDROME: Primary | ICD-10-CM

## 2024-02-01 PROCEDURE — 99213 OFFICE O/P EST LOW 20 MIN: CPT | Mod: S$GLB,,, | Performed by: NURSE PRACTITIONER

## 2024-02-01 PROCEDURE — 99999 PR PBB SHADOW E&M-EST. PATIENT-LVL II: CPT | Mod: PBBFAC,,, | Performed by: NURSE PRACTITIONER

## 2024-02-01 RX ORDER — METHOCARBAMOL 500 MG/1
500 TABLET, FILM COATED ORAL EVERY 8 HOURS PRN
Qty: 90 TABLET | Refills: 2 | Status: SHIPPED | OUTPATIENT
Start: 2024-02-01 | End: 2024-02-05 | Stop reason: SDUPTHER

## 2024-02-01 NOTE — PROGRESS NOTES
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    L4-L5: Progressive disc height loss.  No significant spinal canal stenosis or neural foraminal impingement.    L5-S1: Widely patent canal with mild right neural foraminal narrowing.  Left L5 and bilateral S1 pedicular screws traverse slightly anterior to the cortex, similar.      Impression       Degenerative endplate edema centered at L1-L2.  Otherwise, stable postoperative appearance with multilevel spondylosis as detailed.          Narrative     EXAMINATION:  XR CERVICAL SPINE COMPLETE 5 VIEW    CLINICAL HISTORY:  . Cervicalgia    TECHNIQUE:  AP, Lateral, bilateral oblique and open mouth views of the cervical spine were performed.    COMPARISON:  07/21/2015    FINDINGS:  C5-6 osseous fusion noted.  Prominent C6-7 degenerative disc disease and facet arthropathy noted.  The alignment is within normal limits.  There is osseous neural foraminal narrowing on multiple levels bilaterally.  No fracture.  No marrow replacement process.  No prevertebral swelling.      Impression       Cervical spondylosis.         BMP  Lab Results   Component Value Date     10/25/2023    K 3.7 10/25/2023     10/25/2023    CO2 29 10/25/2023    BUN 8 10/25/2023    CREATININE 1.0 10/25/2023    CALCIUM 8.8 10/25/2023    ANIONGAP 6 (L) 10/25/2023    ESTGFRAFRICA >60.0 07/19/2022    EGFRNONAA  ">60.0 07/19/2022         REVIEW OF SYSTEMS:    GENERAL:  No weight loss or fevers.    RESPIRATORY:  Denies SOB or wheezing.  CARDIOVASCULAR:  Negative for chest pain, leg swelling or palpitations.  GI:  Negative for abdominal discomfort, blood in stools or black stools or change in bowel habits.  MUSCULOSKELETAL:  Joint stiffness, back and neck pain.  SKIN:  Negative for lesions, rash, and itching.  PSYCH: Patients sleep is not disturbed secondary to pain.  HEMATOLOGY/LYMPHOLOGY:  History of easy bruising.  History of von Willebrand's factor deficiency. On Plavix.  NEURO:   No history of syncope, paralysis, seizures or tremors.   All other reviewed and negative other than HPI.      OBJECTIVE:    BP (!) 143/86 (BP Location: Left arm, Patient Position: Sitting, BP Method: Medium (Automatic))   Pulse 75   Ht 5' 11" (1.803 m)   Wt 104.8 kg (231 lb)   BMI 32.22 kg/m²     PHYSICAL EXAMINATION:    GENERAL: Well appearing, in no acute distress, alert and oriented x3.  PSYCH:  Mood and affect appropriate.  SKIN:  No evidence of infection from previous injection site. No visible rashes.  HEAD/FACE:  Normocephalic, atraumatic.   BACK: There is pain with palpation of thoracic and lumbar facet joints and paraspinal muscles bilaterally. There is pain with lumbar extension.  Positive facet loading bilaterally.  EXTREMITIES: Bilateral lower and upper extremity strength is 5/5 and symmetric.   MUSCULOSKELETAL:   No atrophy or tone abnormalities are noted. No TTP over SI joints. 5/5 strength in right ankle with plantar and dorsiflexion. 5/5 strength in left ankle with plantar and dorsiflexion. 5/5 strength with right knee flexion and extension. 5/5 strength with left knee flexion and extension.   NEURO: Cranial nerves grossly intact. No loss of sensation noted.  GAIT: Antalgic- ambulates without assistance.      Assessment:     1. Chronic pain syndrome    2. Lumbar spondylosis    3. Thoracic radiculopathy    4. Thoracic " radiculitis          Plan:     - Previous imaging was reviewed and discussed with the patient today.     - We discussed repeat left and right L3,4,5 RFAs as previous provided 90% relief. He may call to schedule if pain worsens.    - Can continue Robaxin 500 mg BID PRN muscle pain.    - The patient will continue a home exercise routine to help with pain and strengthening.     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 It may be mild vWF, but most recent lab tests showed normal coagulation function. The patient has been previously receiving dDAVP prior to all procedures and has not exhibited bleeding. Hematology recommended receiving dDAVP prior to all invasive procedures. For all interventional procedures, we will give 0.3mcg/kg dDAVP immediately prior to the procedure.       - RTC in 3 months or sooner if needed.      The above plan and management options were discussed at length with patient. Patient is in agreement with the above and verbalized understanding.     Buffy Villagran    02/01/2024

## 2024-02-05 ENCOUNTER — HOSPITAL ENCOUNTER (OUTPATIENT)
Dept: RADIOLOGY | Facility: HOSPITAL | Age: 64
Discharge: HOME | End: 2024-02-05
Attending: PHYSICIAN ASSISTANT
Payer: MEDICARE

## 2024-02-05 ENCOUNTER — OFFICE VISIT (OUTPATIENT)
Dept: INTERNAL MEDICINE | Facility: CLINIC | Age: 64
End: 2024-02-05
Payer: MEDICARE

## 2024-02-05 ENCOUNTER — TELEPHONE (OUTPATIENT)
Dept: CARDIOLOGY | Facility: CLINIC | Age: 64
End: 2024-02-05
Payer: MEDICARE

## 2024-02-05 VITALS
DIASTOLIC BLOOD PRESSURE: 74 MMHG | BODY MASS INDEX: 32.78 KG/M2 | HEIGHT: 71 IN | OXYGEN SATURATION: 96 % | WEIGHT: 234.13 LBS | HEART RATE: 80 BPM | SYSTOLIC BLOOD PRESSURE: 138 MMHG

## 2024-02-05 DIAGNOSIS — R05.9 COUGH, UNSPECIFIED TYPE: ICD-10-CM

## 2024-02-05 DIAGNOSIS — U07.1 COVID: Primary | ICD-10-CM

## 2024-02-05 LAB
CTP QC/QA: YES
CTP QC/QA: YES
POC MOLECULAR INFLUENZA A AGN: NEGATIVE
POC MOLECULAR INFLUENZA B AGN: NEGATIVE
SARS-COV-2 AG RESP QL IA.RAPID: POSITIVE

## 2024-02-05 PROCEDURE — 87502 INFLUENZA DNA AMP PROBE: CPT | Mod: QW,S$GLB,, | Performed by: PHYSICIAN ASSISTANT

## 2024-02-05 PROCEDURE — 99999 PR PBB SHADOW E&M-EST. PATIENT-LVL IV: CPT | Mod: PBBFAC,,, | Performed by: PHYSICIAN ASSISTANT

## 2024-02-05 PROCEDURE — 71046 X-RAY EXAM CHEST 2 VIEWS: CPT | Mod: TC

## 2024-02-05 PROCEDURE — 99214 OFFICE O/P EST MOD 30 MIN: CPT | Mod: S$GLB,,, | Performed by: PHYSICIAN ASSISTANT

## 2024-02-05 PROCEDURE — 87811 SARS-COV-2 COVID19 W/OPTIC: CPT | Mod: QW,S$GLB,, | Performed by: PHYSICIAN ASSISTANT

## 2024-02-05 PROCEDURE — 71046 X-RAY EXAM CHEST 2 VIEWS: CPT | Mod: 26,,, | Performed by: RADIOLOGY

## 2024-02-05 RX ORDER — METHOCARBAMOL 500 MG/1
500 TABLET, FILM COATED ORAL EVERY 8 HOURS PRN
Qty: 30 TABLET | Refills: 0 | Status: SHIPPED | OUTPATIENT
Start: 2024-02-05

## 2024-02-05 RX ORDER — METHOCARBAMOL 500 MG/1
500 TABLET, FILM COATED ORAL EVERY 8 HOURS PRN
Qty: 30 TABLET | Refills: 0 | Status: SHIPPED | OUTPATIENT
Start: 2024-02-05 | End: 2024-02-05

## 2024-02-05 RX ORDER — BENZONATATE 100 MG/1
100 CAPSULE ORAL 3 TIMES DAILY PRN
Qty: 30 CAPSULE | Refills: 0 | Status: SHIPPED | OUTPATIENT
Start: 2024-02-05 | End: 2024-02-15

## 2024-02-05 NOTE — TELEPHONE ENCOUNTER
----- Message from Kasey Beltran sent at 2/5/2024 10:48 AM CST -----  Regarding: chest pain  Pt has been experiencing chest pain for about a week now and is requesting an appt ASAP.  He can be reached at [phone].    Thank you

## 2024-02-05 NOTE — PROGRESS NOTES
"INTERNAL MEDICINE URGENT VISIT NOTE    CHIEF COMPLAINT     Chief Complaint   Patient presents with    Fatigue    Fever    Generalized Body Aches    Neck Pain    Chest Pain       HPI     Bri Santiago is a 63 y.o. male who presents for an urgent visit today.    PCP is Cate Galeas MD, patient is known to me.     Patient presents with complaints of fatigue, fever, bodyaches, neck pain and chest pain with coughing. Symptoms started on 2/1/2024 - symptoms have only progressed - some atypical chest wall pain - awakes him from rest - has been present for the past two weeks - he reports he "has a call into Cardiologist" for eval.   Subjective fever at home -not measured.   No sick contacts  No recent travel     COVID test is positive in office today -FLU negative     COVID RISK SCORE 6: -will start molnuprivir - pt is on plavix.   Will also start cough suppressant and non-sedating muscle relaxer for left SCM strain/pain.     Also with left SCM pain with movement   Normal heart and lung sounds   Will get CXR - low threshold to report to ED if symptoms refractory to this treatment plan and or worsen despite this treatment plan.        Past Medical History:  Past Medical History:   Diagnosis Date    Acute pancreatitis     Anal fissure     Anemia     Anticoagulant long-term use     Arthritis     Asthma in remission     Back pain     BPH (benign prostatic hypertrophy)     Cancer 2000    prostate- treated at [facility] with chemo- in remission since 2000    Chronic maxillary sinusitis     Clotting disorder     Diastolic dysfunction with chronic heart failure 12/3/2018    Dysphagia 10/7/2014    Family history of colon cancer     Family history of early CAD     GERD (gastroesophageal reflux disease)     Helicobacter pylori (H. pylori) infection     Chronic    History of chronic pancreatitis     HTN (hypertension)     Lumbago 11/12/2012    Obesity     ABDON (obstructive sleep apnea)     ABDON (obstructive sleep apnea)     With " likely ohs Refer to sleep    Sacroiliac joint pain 2/10/2015    Spinal stenosis of lumbar region     Von Willebrand disease     VWD (acquired von Willebrand's disease)        Home Medications:  Prior to Admission medications    Medication Sig Start Date End Date Taking? Authorizing Provider   albuterol (PROVENTIL/VENTOLIN HFA) 90 mcg/actuation inhaler INHALE 2 PUFFS INTO THE LUNGS EVERY 6 HOURS AS NEEDED FOR WHEEZING OR SHORTNESS OF BREATH 3/9/20   Geeta Hall MD   atorvastatin (LIPITOR) 80 MG tablet Take 1 tablet (80 mg total) by mouth every evening. 5/1/23   Damian Liu MD   azelastine (ASTELIN) 137 mcg (0.1 %) nasal spray Two sprays in each nostril, sniff until absorbed, then follow with 1 spray of fluticasone.  Use both sprays twice daily.  Patient taking differently: Two sprays in each nostril, sniff until absorbed, then follow with 1 spray of fluticasone.  Use both sprays twice daily.(PATIENT USES NIGHTLY 3/12/2021) 2/11/21   LAURENT Swanson MD   budesonide-formoterol 160-4.5 mcg (SYMBICORT) 160-4.5 mcg/actuation HFAA INHALE 2 PUFFS INTO THE LUNGS EVERY 12 HOURS 3/26/20   Geeta Hall MD   calcium citrate-vitamin D3 315-200 mg (CITRACAL+D) 315-200 mg-unit per tablet Take 1 tablet by mouth nightly.     Provider, Historical   clopidogreL (PLAVIX) 75 mg tablet TAKE 1 TABLET(75 MG) BY MOUTH EVERY DAY 2/1/23   Rehana Lopez MD   cyanocobalamin, vitamin B-12, 50 mcg tablet Take 50 mcg by mouth nightly.     Provider, Historical   diclofenac sodium (VOLTAREN) 1 % Gel Apply 2 g topically 4 (four) times daily. for 14 days 1/9/24 1/23/24  Winnie Jacob PA-C   docusate sodium (COLACE) 100 MG capsule Take 1 tablet by mouth Twice daily. 1 Capsule Oral Twice a day .  Take with pain medicine    Provider, Historical   doxazosin (CARDURA) 1 MG tablet TAKE 1 TABLET BY MOUTH EVERY EVENING 5/31/23   Cate Galeas MD   flu vacc [id number] 6mos up,PF, (FLUARIX QUAD [id number], PF,) 60 mcg (15 mcg x 4)/0.5 mL  Syrg inject into muscle for one dose 10/12/23   Leanna Ortiz, Lazaro   fluticasone propionate (FLONASE) 50 mcg/actuation nasal spray One spray in each nostril twice daily after 1st using azelastine nasal spray 6/15/21   LAURENT Swanson MD   furosemide (LASIX) 20 MG tablet TAKE 1 TABLET(20 MG) BY MOUTH EVERY DAY 9/20/23   Cate Galeas MD   ipratropium (ATROVENT) 42 mcg (0.06 %) nasal spray 1-2 sprays in each nostril before eating and at bedtime as needed 6/15/21   LAURENT Swanson MD   methocarbamoL (ROBAXIN) 500 MG Tab Take 1 tablet (500 mg total) by mouth every 8 (eight) hours as needed (muscle pain). 2/1/24 5/1/24  Buffy Villagran FNP   promethazine (PHENERGAN) 25 MG tablet Take 1 tablet (25 mg total) by mouth every 6 (six) hours as needed for Nausea. 11/7/23   Fermín De Jesus, WEI   RABEprazole (ACIPHEX) 20 mg tablet TAKE 1 TABLET BY MOUTH EVERY 12 HOURS 11/18/23   Rosendo Boyer MD   tadalafiL (CIALIS) 20 MG Tab Take 1 tablet (20 mg total) by mouth as needed. Take every 36 hours as needed for ED. 6/2/22 11/8/23  Darvin Hope MD   testosterone (ANDRODERM) 2 mg/24 hour PT24 APPLY 1 PATCH TOPICALLY TO THE SKIN EVERY DAY 1/5/22   Chris Min MD   zolpidem (AMBIEN) 10 mg Tab TAKE 1 TABLET BY MOUTH EVERY DAY AT BEDTIME 6/23/17   Darvin Henry MD   tamsulosin (FLOMAX) 0.4 mg Cap Take 1 capsule (0.4 mg total) by mouth once daily. 4/4/21 4/4/21  Darnell Lee MD       Review of Systems:  Review of Systems   Constitutional:  Positive for fatigue and fever. Negative for chills.   HENT:  Positive for congestion and sneezing. Negative for sore throat and trouble swallowing.    Eyes:  Negative for visual disturbance.   Respiratory:  Positive for cough. Negative for shortness of breath.    Cardiovascular:  Positive for chest pain.   Gastrointestinal:  Negative for abdominal pain, constipation, diarrhea, nausea and vomiting.   Genitourinary:  Negative for dysuria and flank pain.  "  Musculoskeletal:  Positive for neck pain. Negative for back pain and neck stiffness.   Skin:  Negative for rash.   Neurological:  Negative for dizziness, syncope, weakness and headaches.   Psychiatric/Behavioral:  Negative for confusion.        Health Maintainence:   Immunizations:  Health Maintenance         Date Due Completion Date    Shingles Vaccine (1 of 2) Never done ---    RSV Vaccine (Age 60+ and Pregnant patients) (1 - 1-dose 60+ series) Never done ---    COVID-19 Vaccine (5 - 2023-24 season) 09/01/2023 12/8/2022    Aspirin/Antiplatelet Therapy 01/09/2025 1/9/2024    Colorectal Cancer Screening 03/04/2025 3/4/2020    Hemoglobin A1c (Diabetic Prevention Screening) 08/01/2026 8/1/2023    TETANUS VACCINE 10/16/2027 10/16/2017    Lipid Panel 01/23/2028 1/23/2023             PHYSICAL EXAM     /74   Pulse 80   Ht 5' 11" (1.803 m)   Wt 106.2 kg (234 lb 2.1 oz)   SpO2 96%   BMI 32.65 kg/m²     Physical Exam  Vitals and nursing note reviewed.   Constitutional:       Appearance: Normal appearance.      Comments: Elderly male in NAD or apparent pain. He makes good eye contact, speaks in clear full sentences and ambulates with ease.    HENT:      Head: Normocephalic and atraumatic.      Nose: Nose normal.      Mouth/Throat:      Pharynx: Oropharynx is clear.   Eyes:      Conjunctiva/sclera: Conjunctivae normal.   Cardiovascular:      Rate and Rhythm: Normal rate and regular rhythm.      Pulses: Normal pulses.      Heart sounds: No murmur heard.     No friction rub. No gallop.   Pulmonary:      Effort: No respiratory distress.      Breath sounds: Normal breath sounds. No stridor. No rhonchi.   Chest:      Chest wall: No tenderness.   Abdominal:      Tenderness: There is no abdominal tenderness.   Musculoskeletal:         General: Normal range of motion.      Cervical back: No rigidity.      Comments: TTP and reproducible discomfort with latera cervical motion - TTP over left SCM     No lower extremity edema "    Skin:     General: Skin is warm and dry.      Capillary Refill: Capillary refill takes less than 2 seconds.      Findings: No rash.   Neurological:      General: No focal deficit present.      Mental Status: He is alert.      Gait: Gait normal.   Psychiatric:         Mood and Affect: Mood normal.         LABS     Lab Results   Component Value Date    HGBA1C 4.6 01/23/2023     CMP  Sodium   Date Value Ref Range Status   10/25/2023 140 136 - 145 mmol/L Final     Potassium   Date Value Ref Range Status   10/25/2023 3.7 3.5 - 5.1 mmol/L Final     Chloride   Date Value Ref Range Status   10/25/2023 105 95 - 110 mmol/L Final     CO2   Date Value Ref Range Status   10/25/2023 29 23 - 29 mmol/L Final     Glucose   Date Value Ref Range Status   10/25/2023 99 70 - 110 mg/dL Final     BUN   Date Value Ref Range Status   10/25/2023 8 8 - 23 mg/dL Final     Creatinine   Date Value Ref Range Status   10/25/2023 1.0 0.5 - 1.4 mg/dL Final     Calcium   Date Value Ref Range Status   10/25/2023 8.8 8.7 - 10.5 mg/dL Final     Total Protein   Date Value Ref Range Status   10/25/2023 7.3 6.0 - 8.4 g/dL Final     Albumin   Date Value Ref Range Status   10/25/2023 4.0 3.5 - 5.2 g/dL Final   05/03/2021 4.0 3.6 - 5.1 g/dL Final     Comment:     For additional information, please refer to   http://education.SABIA.[employer] App/faq/HAL118 (This link is   being provided for informational/ educational purposes only.)    This test was developed and its analytical performance   characteristics have been determined by [email or web address]. It has not been cleared or approved by the   [place] Food and Drug Administration. This assay has been validated   pursuant to the CLIA regulations and is used for clinical   purposes.  @ Test Performed By:  [email or web address]  Baudilio Loomis M.D.,   [address] [place] [address]  CLIA  [id number]       Total Bilirubin   Date Value Ref  Range Status   10/25/2023 1.2 (H) 0.1 - 1.0 mg/dL Final     Comment:     For infants and newborns, interpretation of results should be based  on gestational age, weight and in agreement with clinical  observations.    Premature Infant recommended reference ranges:  Up to 24 hours.............<8.0 mg/dL  Up to 48 hours............<12.0 mg/dL  3-5 days..................<15.0 mg/dL  6-29 days.................<15.0 mg/dL       Alkaline Phosphatase   Date Value Ref Range Status   10/25/2023 60 55 - 135 U/L Final     AST   Date Value Ref Range Status   10/25/2023 13 10 - 40 U/L Final     ALT   Date Value Ref Range Status   10/25/2023 16 10 - 44 U/L Final     Anion Gap   Date Value Ref Range Status   10/25/2023 6 (L) 8 - 16 mmol/L Final     eGFR if    Date Value Ref Range Status   07/19/2022 >60.0 >60 mL/min/1.73 m^2 Final     eGFR if non    Date Value Ref Range Status   07/19/2022 >60.0 >60 mL/min/1.73 m^2 Final     Comment:     Calculation used to obtain the estimated glomerular filtration  rate (eGFR) is the CKD-EPI equation.        Lab Results   Component Value Date    WBC 6.38 10/25/2023    HGB 13.5 (L) 10/25/2023    HCT 42.1 10/25/2023    MCV 95 10/25/2023     10/25/2023     Lab Results   Component Value Date    CHOL 125 01/23/2023    CHOL 128 07/08/2021    CHOL 111 (L) 01/23/2020     Lab Results   Component Value Date    HDL 32 (L) 01/23/2023    HDL 35 (L) 07/08/2021    HDL 29 (L) 01/23/2020     Lab Results   Component Value Date    LDLCALC 80.4 01/23/2023    LDLCALC 72.2 07/08/2021    LDLCALC 66.8 01/23/2020     Lab Results   Component Value Date    TRIG 63 01/23/2023    TRIG 104 07/08/2021    TRIG 76 01/23/2020     Lab Results   Component Value Date    CHOLHDL 25.6 01/23/2023    CHOLHDL 27.3 07/08/2021    CHOLHDL 26.1 01/23/2020     Lab Results   Component Value Date    TSH 1.243 01/23/2023    [id number] 6.3 06/15/2010       ASSESSMENT/PLAN     Bri Santiago is a 63 y.o. male      Bri was seen today for fatigue, fever, generalized body aches, neck pain and chest pain - consistent with COVID. Will get CXR but lungs are clear. Will start on antiviral regimen given elevated COVID RISK SCORE. He is aware of ED prompts.     Diagnoses and all orders for this visit:    COVID    Cough, unspecified type  -     POCT Influenza A/B Molecular  -     SARS Coronavirus 2 Antigen, POCT Manual Read  -     X-Ray Chest PA And Lateral; Future    Other orders  -     molnupiravir 200 mg capsule (EUA); Take 4 capsules (800 mg total) by mouth every 12 (twelve) hours. for 5 days  -     benzonatate (TESSALON) 100 MG capsule; Take 1 capsule (100 mg total) by mouth 3 (three) times daily as needed for Cough.  -     methocarbamoL (ROBAXIN) 500 MG Tab; Take 1 tablet (500 mg total) by mouth every 8 (eight) hours as needed (muscle pain).     Patient was counseled on when and how to seek emergent care.       Winnie Jacob PA-C   Department of Internal Medicine - [facility]   1:24 PM

## 2024-02-05 NOTE — TELEPHONE ENCOUNTER
No care due was identified.  [facility] Embedded Care Due Messages. Reference number: [id number].   2/05/2024 3:10:23 PM CST

## 2024-03-12 DIAGNOSIS — K21.9 GASTROESOPHAGEAL REFLUX DISEASE, UNSPECIFIED WHETHER ESOPHAGITIS PRESENT: ICD-10-CM

## 2024-03-12 DIAGNOSIS — R12 HEARTBURN: ICD-10-CM

## 2024-03-12 RX ORDER — RABEPRAZOLE SODIUM 20 MG/1
20 TABLET, DELAYED RELEASE ORAL EVERY 12 HOURS
Qty: 180 TABLET | Refills: 0 | Status: SHIPPED | OUTPATIENT
Start: 2024-03-12

## 2024-04-12 ENCOUNTER — OFFICE VISIT (OUTPATIENT)
Dept: SPINE | Facility: CLINIC | Age: 64
End: 2024-04-12
Payer: MEDICARE

## 2024-04-12 VITALS — SYSTOLIC BLOOD PRESSURE: 141 MMHG | DIASTOLIC BLOOD PRESSURE: 79 MMHG | HEART RATE: 81 BPM

## 2024-04-12 DIAGNOSIS — M47.816 LUMBAR SPONDYLOSIS: Primary | ICD-10-CM

## 2024-04-12 DIAGNOSIS — G89.4 CHRONIC PAIN SYNDROME: ICD-10-CM

## 2024-04-12 PROCEDURE — 1159F MED LIST DOCD IN RCRD: CPT | Mod: CPTII,S$GLB,, | Performed by: NURSE PRACTITIONER

## 2024-04-12 PROCEDURE — 99213 OFFICE O/P EST LOW 20 MIN: CPT | Mod: S$GLB,,, | Performed by: NURSE PRACTITIONER

## 2024-04-12 PROCEDURE — 1160F RVW MEDS BY RX/DR IN RCRD: CPT | Mod: CPTII,S$GLB,, | Performed by: NURSE PRACTITIONER

## 2024-04-12 PROCEDURE — 3077F SYST BP >= 140 MM HG: CPT | Mod: CPTII,S$GLB,, | Performed by: NURSE PRACTITIONER

## 2024-04-12 PROCEDURE — 3078F DIAST BP <80 MM HG: CPT | Mod: CPTII,S$GLB,, | Performed by: NURSE PRACTITIONER

## 2024-04-12 PROCEDURE — 99999 PR PBB SHADOW E&M-EST. PATIENT-LVL III: CPT | Mod: PBBFAC,,, | Performed by: NURSE PRACTITIONER

## 2024-04-12 NOTE — H&P (VIEW-ONLY)
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 epidural collection.  Right lower pole renal cyst.    T12-L1: No significant posterior disc herniation, spinal canal stenosis, or neural foraminal impingement.    L1-L2: Circumferential bulge resulting with partial collapse of the anterior thecal sac.  No significant neural foraminal impingement.    L2-L3: No significant posterior disc herniation, spinal canal stenosis, or neural foraminal impingement.    L4-L5: Progressive disc height loss.  No significant spinal canal stenosis or neural foraminal impingement.    L5-S1: Widely patent canal with mild right neural foraminal narrowing.  Left L5 and bilateral S1 pedicular screws traverse slightly anterior to the cortex, similar.      Impression       Degenerative endplate edema centered at L1-L2.  Otherwise, stable postoperative appearance with multilevel spondylosis as detailed.          Narrative     EXAMINATION:  XR CERVICAL SPINE COMPLETE 5 VIEW    CLINICAL HISTORY:  . Cervicalgia    TECHNIQUE:  AP, Lateral, bilateral oblique and open mouth views of the cervical spine were performed.    COMPARISON:  07/21/2015    FINDINGS:  C5-6 osseous fusion noted.  Prominent C6-7 degenerative disc disease and facet arthropathy noted.  The alignment is within normal limits.  There is osseous neural foraminal narrowing on multiple  levels bilaterally.  No fracture.  No marrow replacement process.  No prevertebral swelling.      Impression       Cervical spondylosis.         BMP  Lab Results   Component Value Date     10/25/2023    K 3.7 10/25/2023     10/25/2023    CO2 29 10/25/2023    BUN 8 10/25/2023    CREATININE 1.0 10/25/2023    CALCIUM 8.8 10/25/2023    ANIONGAP 6 (L) 10/25/2023    ESTGFRAFRICA >60.0 07/19/2022    EGFRNONAA >60.0 07/19/2022         REVIEW OF SYSTEMS:    GENERAL:  No weight loss or fevers.    RESPIRATORY:  Denies SOB or wheezing.  CARDIOVASCULAR:  Negative for chest pain, leg swelling or palpitations.  GI:  Negative for abdominal discomfort, blood in stools or black stools or change in bowel habits.  MUSCULOSKELETAL:  Joint stiffness, back and neck pain.  SKIN:  Negative for lesions, rash, and itching.  PSYCH: Patients sleep is not disturbed secondary to pain.  HEMATOLOGY/LYMPHOLOGY:  History of easy bruising.  History of von Willebrand's factor deficiency. On Plavix.  NEURO:   No history of syncope, paralysis, seizures or tremors.   All other reviewed and negative other than HPI.      OBJECTIVE:    BP (!) 141/79   Pulse 81     PHYSICAL EXAMINATION:    GENERAL: Well appearing, in no acute distress, alert and oriented x3.  PSYCH:  Mood and affect appropriate.  SKIN:  No evidence of infection from previous injection site. No visible rashes.  HEAD/FACE:  Normocephalic, atraumatic.   BACK: There is pain with palpation of thoracic and lumbar facet joints and paraspinal muscles bilaterally. There is pain with lumbar extension.  Positive facet loading bilaterally, L>R.  EXTREMITIES: Bilateral lower and upper extremity strength is 5/5 and symmetric.   MUSCULOSKELETAL:   No atrophy or tone abnormalities are noted. No TTP over SI joints. 5/5 strength in right ankle with plantar and dorsiflexion. 5/5 strength in left ankle with plantar and dorsiflexion. 5/5 strength with right knee flexion and extension. 5/5 strength  with left knee flexion and extension.   NEURO: Cranial nerves grossly intact. No loss of sensation noted.  GAIT: Antalgic- ambulates without assistance.      Assessment:     1. Lumbar spondylosis    2. Chronic pain syndrome            Plan:     - Previous imaging was reviewed and discussed with the patient today.     - We will schedule for repeat bilateral L3,4,5 RFAs as previous provided 90% relief for over 6 months.    - Can continue Robaxin 500 mg BID PRN muscle pain.    - The patient will continue a home exercise routine to help with pain and strengthening.     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 It may be mild vWF, but most recent lab tests showed normal coagulation function. The patient has been previously receiving dDAVP prior to all procedures and has not exhibited bleeding. Hematology recommended receiving dDAVP prior to all invasive procedures. For all interventional procedures, we will give 0.3mcg/kg dDAVP immediately prior to the procedure.       - RTC in 3 months or sooner if needed.      The above plan and management options were discussed at length with patient. Patient is in agreement with the above and verbalized understanding.     Buffy Villagran    04/12/2024

## 2024-04-12 NOTE — PROGRESS NOTES
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 epidural collection.  Right lower pole renal cyst.    T12-L1: No significant posterior disc herniation, spinal canal stenosis, or neural foraminal impingement.    L1-L2: Circumferential bulge resulting with partial collapse of the anterior thecal sac.  No significant neural foraminal impingement.    L2-L3: No significant posterior disc herniation, spinal canal stenosis, or neural foraminal impingement.    L4-L5: Progressive disc height loss.  No significant spinal canal stenosis or neural foraminal impingement.    L5-S1: Widely patent canal with mild right neural foraminal narrowing.  Left L5 and bilateral S1 pedicular screws traverse slightly anterior to the cortex, similar.      Impression       Degenerative endplate edema centered at L1-L2.  Otherwise, stable postoperative appearance with multilevel spondylosis as detailed.          Narrative     EXAMINATION:  XR CERVICAL SPINE COMPLETE 5 VIEW    CLINICAL HISTORY:  . Cervicalgia    TECHNIQUE:  AP, Lateral, bilateral oblique and open mouth views of the cervical spine were performed.    COMPARISON:  07/21/2015    FINDINGS:  C5-6 osseous fusion noted.  Prominent C6-7 degenerative disc disease and facet arthropathy noted.  The alignment is within normal limits.  There is osseous neural foraminal narrowing on multiple  levels bilaterally.  No fracture.  No marrow replacement process.  No prevertebral swelling.      Impression       Cervical spondylosis.         BMP  Lab Results   Component Value Date     10/25/2023    K 3.7 10/25/2023     10/25/2023    CO2 29 10/25/2023    BUN 8 10/25/2023    CREATININE 1.0 10/25/2023    CALCIUM 8.8 10/25/2023    ANIONGAP 6 (L) 10/25/2023    ESTGFRAFRICA >60.0 07/19/2022    EGFRNONAA >60.0 07/19/2022         REVIEW OF SYSTEMS:    GENERAL:  No weight loss or fevers.    RESPIRATORY:  Denies SOB or wheezing.  CARDIOVASCULAR:  Negative for chest pain, leg swelling or palpitations.  GI:  Negative for abdominal discomfort, blood in stools or black stools or change in bowel habits.  MUSCULOSKELETAL:  Joint stiffness, back and neck pain.  SKIN:  Negative for lesions, rash, and itching.  PSYCH: Patients sleep is not disturbed secondary to pain.  HEMATOLOGY/LYMPHOLOGY:  History of easy bruising.  History of von Willebrand's factor deficiency. On Plavix.  NEURO:   No history of syncope, paralysis, seizures or tremors.   All other reviewed and negative other than HPI.      OBJECTIVE:    BP (!) 141/79   Pulse 81     PHYSICAL EXAMINATION:    GENERAL: Well appearing, in no acute distress, alert and oriented x3.  PSYCH:  Mood and affect appropriate.  SKIN:  No evidence of infection from previous injection site. No visible rashes.  HEAD/FACE:  Normocephalic, atraumatic.   BACK: There is pain with palpation of thoracic and lumbar facet joints and paraspinal muscles bilaterally. There is pain with lumbar extension.  Positive facet loading bilaterally, L>R.  EXTREMITIES: Bilateral lower and upper extremity strength is 5/5 and symmetric.   MUSCULOSKELETAL:   No atrophy or tone abnormalities are noted. No TTP over SI joints. 5/5 strength in right ankle with plantar and dorsiflexion. 5/5 strength in left ankle with plantar and dorsiflexion. 5/5 strength with right knee flexion and extension. 5/5 strength  with left knee flexion and extension.   NEURO: Cranial nerves grossly intact. No loss of sensation noted.  GAIT: Antalgic- ambulates without assistance.      Assessment:     1. Lumbar spondylosis    2. Chronic pain syndrome            Plan:     - Previous imaging was reviewed and discussed with the patient today.     - We will schedule for repeat bilateral L3,4,5 RFAs as previous provided 90% relief for over 6 months.    - Can continue Robaxin 500 mg BID PRN muscle pain.    - The patient will continue a home exercise routine to help with pain and strengthening.     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 It may be mild vWF, but most recent lab tests showed normal coagulation function. The patient has been previously receiving dDAVP prior to all procedures and has not exhibited bleeding. Hematology recommended receiving dDAVP prior to all invasive procedures. For all interventional procedures, we will give 0.3mcg/kg dDAVP immediately prior to the procedure.       - RTC in 3 months or sooner if needed.      The above plan and management options were discussed at length with patient. Patient is in agreement with the above and verbalized understanding.     Buffy Villagran    04/12/2024

## 2024-04-15 ENCOUNTER — PATIENT MESSAGE (OUTPATIENT)
Dept: PAIN MEDICINE | Facility: OTHER | Age: 64
End: 2024-04-15
Payer: MEDICARE

## 2024-04-29 ENCOUNTER — TELEPHONE (OUTPATIENT)
Dept: PAIN MEDICINE | Facility: CLINIC | Age: 64
End: 2024-04-29
Payer: MEDICARE

## 2024-04-29 ENCOUNTER — HOSPITAL ENCOUNTER (OUTPATIENT)
Facility: OTHER | Age: 64
Discharge: HOME | End: 2024-04-29
Attending: ANESTHESIOLOGY | Admitting: ANESTHESIOLOGY
Payer: MEDICARE

## 2024-04-29 VITALS
OXYGEN SATURATION: 95 % | DIASTOLIC BLOOD PRESSURE: 96 MMHG | RESPIRATION RATE: 16 BRPM | HEIGHT: 71 IN | BODY MASS INDEX: 32.76 KG/M2 | SYSTOLIC BLOOD PRESSURE: 181 MMHG | WEIGHT: 234 LBS | TEMPERATURE: 98 F | HEART RATE: 72 BPM

## 2024-04-29 DIAGNOSIS — M47.9 OSTEOARTHRITIS OF SPINE, UNSPECIFIED SPINAL OSTEOARTHRITIS COMPLICATION STATUS, UNSPECIFIED SPINAL REGION: ICD-10-CM

## 2024-04-29 DIAGNOSIS — G89.29 CHRONIC PAIN: ICD-10-CM

## 2024-04-29 DIAGNOSIS — M47.819 SPONDYLOSIS WITHOUT MYELOPATHY: Primary | ICD-10-CM

## 2024-04-29 PROCEDURE — 25000003 PHARM REV CODE 250: Performed by: ANESTHESIOLOGY

## 2024-04-29 PROCEDURE — 99152 MOD SED SAME PHYS/QHP 5/>YRS: CPT | Performed by: ANESTHESIOLOGY

## 2024-04-29 PROCEDURE — 64636 DESTROY L/S FACET JNT ADDL: CPT | Mod: 50,,, | Performed by: ANESTHESIOLOGY

## 2024-04-29 PROCEDURE — 64635 DESTROY LUMB/SAC FACET JNT: CPT | Mod: 50,,, | Performed by: ANESTHESIOLOGY

## 2024-04-29 PROCEDURE — 25000003 PHARM REV CODE 250: Performed by: STUDENT IN AN ORGANIZED HEALTH CARE EDUCATION/TRAINING PROGRAM

## 2024-04-29 PROCEDURE — 63600175 PHARM REV CODE 636 W HCPCS: Performed by: ANESTHESIOLOGY

## 2024-04-29 PROCEDURE — 99152 MOD SED SAME PHYS/QHP 5/>YRS: CPT | Mod: ,,, | Performed by: ANESTHESIOLOGY

## 2024-04-29 PROCEDURE — 64636 DESTROY L/S FACET JNT ADDL: CPT | Mod: 50 | Performed by: ANESTHESIOLOGY

## 2024-04-29 PROCEDURE — 64635 DESTROY LUMB/SAC FACET JNT: CPT | Mod: 50 | Performed by: ANESTHESIOLOGY

## 2024-04-29 RX ORDER — MIDAZOLAM HYDROCHLORIDE 1 MG/ML
INJECTION INTRAMUSCULAR; INTRAVENOUS
Status: DISCONTINUED | OUTPATIENT
Start: 2024-04-29 | End: 2024-04-29 | Stop reason: HOSPADM

## 2024-04-29 RX ORDER — FENTANYL CITRATE 50 UG/ML
INJECTION, SOLUTION INTRAMUSCULAR; INTRAVENOUS
Status: DISCONTINUED | OUTPATIENT
Start: 2024-04-29 | End: 2024-04-29 | Stop reason: HOSPADM

## 2024-04-29 RX ORDER — LIDOCAINE HYDROCHLORIDE 20 MG/ML
INJECTION, SOLUTION INFILTRATION; PERINEURAL
Status: DISCONTINUED | OUTPATIENT
Start: 2024-04-29 | End: 2024-04-29 | Stop reason: HOSPADM

## 2024-04-29 RX ORDER — DEXAMETHASONE SODIUM PHOSPHATE 10 MG/ML
INJECTION INTRAMUSCULAR; INTRAVENOUS
Status: DISCONTINUED | OUTPATIENT
Start: 2024-04-29 | End: 2024-04-29 | Stop reason: HOSPADM

## 2024-04-29 RX ORDER — BUPIVACAINE HYDROCHLORIDE 2.5 MG/ML
INJECTION, SOLUTION EPIDURAL; INFILTRATION; INTRACAUDAL
Status: DISCONTINUED | OUTPATIENT
Start: 2024-04-29 | End: 2024-04-29 | Stop reason: HOSPADM

## 2024-04-29 RX ORDER — SODIUM CHLORIDE 0.9 % (FLUSH) 0.9 %
10 SYRINGE (ML) INJECTION
OUTPATIENT
Start: 2024-04-29

## 2024-04-29 RX ORDER — SODIUM CHLORIDE 9 MG/ML
INJECTION, SOLUTION INTRAVENOUS CONTINUOUS
Status: ACTIVE | OUTPATIENT
Start: 2024-04-29

## 2024-04-29 RX ORDER — HEPARIN 100 UNIT/ML
500 SYRINGE INTRAVENOUS
OUTPATIENT
Start: 2024-04-29

## 2024-04-29 RX ADMIN — DESMOPRESSIN ACETATE 31.83 MCG: 4 SOLUTION INTRAVENOUS at 01:04

## 2024-04-29 NOTE — OP NOTE
Therapeutic Lumbar Medial Branch Radiofrequency Ablation under Fluoroscopy     The procedure, risks, benefits, and options were discussed with the patient. There are no contraindications to the procedure. The patent expressed understanding and agreed to the procedure. Informed written consent was obtained prior to the start of the procedure and can be found in the patient's chart.        PATIENT NAME: Bri Santiago   MRN: [id number]     DATE OF PROCEDURE: 04/29/2024     PROCEDURE:  Bilateral L3, L4, and L5 Lumbar Radiofrequency Ablation under Fluoroscopy    PRE-OP DIAGNOSIS: Lumbar spondylosis [M47.816] Lumbar spondylosis [M47.816]    POST-OP DIAGNOSIS: Same    PHYSICIAN: Milo Winston MD    ASSISTANTS: None     MEDICATIONS INJECTED:  Preservative-free Decadron 10mg with 9cc of Bupivicaine 0.25%    LOCAL ANESTHETIC INJECTED:   Xylocaine 2%    SEDATION: Versed 2mg and Fentanyl 75mcg                                                                                                                                                                                     Conscious sedation ordered by M.D. Patient re-evaluation prior to administration of conscious sedation. No changes noted in patient's status from initial evaluation. The patient's vital signs were monitored by RN and patient remained hemodynamically stable throughout the procedure.    Event Time In   Sedation Start 1412   Sedation End 1429       ESTIMATED BLOOD LOSS:  None    COMPLICATIONS:  None     INTERVAL HISTORY: Patient has clinical and imaging findings suggestive of facet mediated pain. Patients has completed 2 previous diagnostic medial branch blocks at specified levels with at least 80% relief for the expected duration of the local anesthetic utilized.    TECHNIQUE: Time-out was performed to identify the patient and procedure to be performed. With the patient laying in a prone position, the surgical area was prepped and draped in the usual sterile fashion  using ChloraPrep and fenestrated drape. The levels were determined under fluoroscopic guidance. Skin anesthesia was achieved by injecting Lidocaine 2% over the injection sites. A 20 gauge 10mm curved active tip needle was introduced to the anatomic local of the medial branch at each of the above levels using AP, lateral and/or contralateral oblique fluoroscopic imaging. Then sensory and motor testing was performed to confirm that the needle tips were in the correct location. After negative aspiration for blood or CSF was confirmed, 1 mL of the lidocaine 2% listed above was injected slowly at each site. This was followed by thermal lesioning at 80 degrees celsius for 90 seconds. That was followed by slowly injecting 1 mL of the medication mixture listed above at each site. The needles were removed and bleeding was nil. A sterile dressing was applied. No specimens collected. The patient tolerated the procedure well and did not have any procedure related motor deficit at the conclusion of the procedure.    PRE-PROCEDURE PAIN SCORE: 10/10    POST-PROCEDURE PAIN SCORE: 0/10    The patient was monitored after the procedure in the recovery area. They were given post-procedure and discharge instructions to follow at home. The patient was discharged in a stable condition.      Alvino Arredondo MD

## 2024-04-29 NOTE — TELEPHONE ENCOUNTER
Staff contacted pt to assist him with his missed call concerns. Staff checked pt's encounters there was information in the pt chart on a reason for the call.

## 2024-04-29 NOTE — DISCHARGE INSTRUCTIONS

## 2024-04-29 NOTE — DISCHARGE SUMMARY
Discharge Note  Short Stay      SUMMARY     Admit Date: 4/29/2024    Attending Physician: CARMELO Winston MD      Discharge Physician: Alvino Arredondo      Discharge Date: 4/29/2024 2:32 PM    Procedure(s) (LRB):  RADIOFREQUENCY ABLATION BILATERAL L3, 4, 5 *DDAVP BEFORE* (Bilateral)    Final Diagnosis: Lumbar spondylosis [M47.816]    Disposition: Home or self care    Patient Instructions:   Current Discharge Medication List        START taking these medications    Details   sodium chloride 0.9% SolP 50 mL with desmopressin 4 mcg/mL Soln 31.8 mcg Inject 31.8 mcg into the vein once. for 1 dose  Qty: 1 Dose, Refills: 0           CONTINUE these medications which have NOT CHANGED    Details   albuterol (PROVENTIL/VENTOLIN HFA) 90 mcg/actuation inhaler INHALE 2 PUFFS INTO THE LUNGS EVERY 6 HOURS AS NEEDED FOR WHEEZING OR SHORTNESS OF BREATH  Qty: 18 g, Refills: 4      atorvastatin (LIPITOR) 80 MG tablet Take 1 tablet (80 mg total) by mouth every evening.  Qty: 90 tablet, Refills: 3    Associated Diagnoses: Hyperlipidemia, unspecified hyperlipidemia type      azelastine (ASTELIN) 137 mcg (0.1 %) nasal spray Two sprays in each nostril, sniff until absorbed, then follow with 1 spray of fluticasone.  Use both sprays twice daily.  Qty: 30 mL, Refills: 11      budesonide-formoterol 160-4.5 mcg (SYMBICORT) 160-4.5 mcg/actuation HFAA INHALE 2 PUFFS INTO THE LUNGS EVERY 12 HOURS  Qty: 10.2 g, Refills: 12      calcium citrate-vitamin D3 315-200 mg (CITRACAL+D) 315-200 mg-unit per tablet Take 1 tablet by mouth nightly.       clopidogreL (PLAVIX) 75 mg tablet TAKE 1 TABLET(75 MG) BY MOUTH EVERY DAY  Qty: 90 tablet, Refills: 3      cyanocobalamin, vitamin B-12, 50 mcg tablet Take 50 mcg by mouth nightly.       diclofenac sodium (VOLTAREN) 1 % Gel Apply 2 g topically 4 (four) times daily. for 14 days  Qty: 100 g, Refills: 0      docusate sodium (COLACE) 100 MG capsule Take 1 tablet by mouth Twice daily. 1 Capsule Oral Twice a day .  Take with  pain medicine      doxazosin (CARDURA) 1 MG tablet TAKE 1 TABLET BY MOUTH EVERY EVENING  Qty: 90 tablet, Refills: 3    Associated Diagnoses: Benign localized prostatic hyperplasia with lower urinary tract symptoms (LUTS)      flu vacc [id number] 6mos up,PF, (FLUARIX QUAD [id number], PF,) 60 mcg (15 mcg x 4)/0.5 mL Syrg inject into muscle for one dose  Qty: 0.5 mL, Refills: 0      fluticasone propionate (FLONASE) 50 mcg/actuation nasal spray One spray in each nostril twice daily after 1st using azelastine nasal spray  Qty: 18.2 mL, Refills: 11      furosemide (LASIX) 20 MG tablet TAKE 1 TABLET(20 MG) BY MOUTH EVERY DAY  Qty: 90 tablet, Refills: 1    Associated Diagnoses: Diastolic dysfunction      ipratropium (ATROVENT) 42 mcg (0.06 %) nasal spray 1-2 sprays in each nostril before eating and at bedtime as needed  Qty: 30 mL, Refills: 11      methocarbamoL (ROBAXIN) 500 MG Tab Take 1 tablet (500 mg total) by mouth every 8 (eight) hours as needed (muscle pain).  Qty: 30 tablet, Refills: 0      promethazine (PHENERGAN) 25 MG tablet Take 1 tablet (25 mg total) by mouth every 6 (six) hours as needed for Nausea.  Qty: 20 tablet, Refills: 0    Associated Diagnoses: Old tear of lateral meniscus of left knee, unspecified tear type      RABEprazole (ACIPHEX) 20 mg tablet TAKE 1 TABLET BY MOUTH EVERY 12 HOURS  Qty: 180 tablet, Refills: 0    Associated Diagnoses: Gastroesophageal reflux disease, unspecified whether esophagitis present; Heartburn      tadalafiL (CIALIS) 20 MG Tab Take 1 tablet (20 mg total) by mouth as needed. Take every 36 hours as needed for ED.  Qty: 30 tablet, Refills: 9    Associated Diagnoses: Erectile dysfunction, unspecified erectile dysfunction type      testosterone (ANDRODERM) 2 mg/24 hour PT24 APPLY 1 PATCH TOPICALLY TO THE SKIN EVERY DAY  Qty: 60 patch, Refills: 3    Associated Diagnoses: Hypogonadism in male      zolpidem (AMBIEN) 10 mg Tab TAKE 1 TABLET BY MOUTH EVERY DAY AT BEDTIME  Qty: 20 tablet,  Refills: 0    Associated Diagnoses: Sleep disorder                 Discharge Diagnosis: Lumbar spondylosis [M47.816]  Condition on Discharge: Stable with no complications to procedure   Diet on Discharge: Same as before.  Activity: as per instruction sheet.  Discharge to: Home with a responsible adult.  Follow up: 2-4 weeks       Please call my office or pager at [phone] if experienced any weakness or loss of sensation, fever > 101.5, pain uncontrolled with oral medications, persistent nausea/vomiting/or diarrhea, redness or drainage from the incisions, or any other worrisome concerns. If physician on call was not reached or could not communicate with our office for any reason please go to the nearest emergency department         no

## 2024-04-29 NOTE — TELEPHONE ENCOUNTER
----- Message from Lisa Hill sent at 4/29/2024  9:15 AM CDT -----  Name of Who is Calling:PURNIMA IYER [[id number]]                   What is the request in detail: PT just missed your call and wants you to call back please assist                   Can the clinic reply by [facility]: No                   What Number to Call Back if not in [facility]: [phone]

## 2024-05-03 RX ORDER — CLOPIDOGREL BISULFATE 75 MG/1
75 TABLET ORAL DAILY
Qty: 90 TABLET | Refills: 3 | Status: SHIPPED | OUTPATIENT
Start: 2024-05-03

## 2024-05-03 NOTE — TELEPHONE ENCOUNTER
No care due was identified.  [facility] Embedded Care Due Messages. Reference number: [id number].   5/03/2024 4:14:04 PM CDT

## 2024-06-10 ENCOUNTER — PATIENT MESSAGE (OUTPATIENT)
Dept: INTERNAL MEDICINE | Facility: CLINIC | Age: 64
End: 2024-06-10
Payer: MEDICARE

## 2024-07-12 DIAGNOSIS — K21.9 GASTROESOPHAGEAL REFLUX DISEASE, UNSPECIFIED WHETHER ESOPHAGITIS PRESENT: ICD-10-CM

## 2024-07-12 DIAGNOSIS — R12 HEARTBURN: ICD-10-CM

## 2024-07-12 RX ORDER — RABEPRAZOLE SODIUM 20 MG/1
20 TABLET, DELAYED RELEASE ORAL EVERY 12 HOURS
Qty: 180 TABLET | Refills: 0 | Status: SHIPPED | OUTPATIENT
Start: 2024-07-12

## 2024-07-14 DIAGNOSIS — I51.89 DIASTOLIC DYSFUNCTION: ICD-10-CM

## 2024-07-15 ENCOUNTER — OFFICE VISIT (OUTPATIENT)
Dept: PAIN MEDICINE | Facility: CLINIC | Age: 64
End: 2024-07-15
Payer: MEDICARE

## 2024-07-15 VITALS
DIASTOLIC BLOOD PRESSURE: 87 MMHG | WEIGHT: 241.19 LBS | BODY MASS INDEX: 33.64 KG/M2 | RESPIRATION RATE: 18 BRPM | SYSTOLIC BLOOD PRESSURE: 143 MMHG | OXYGEN SATURATION: 99 % | TEMPERATURE: 98 F | HEART RATE: 81 BPM

## 2024-07-15 DIAGNOSIS — M96.1 POSTLAMINECTOMY SYNDROME OF LUMBAR REGION: ICD-10-CM

## 2024-07-15 DIAGNOSIS — G89.4 CHRONIC PAIN SYNDROME: Primary | ICD-10-CM

## 2024-07-15 DIAGNOSIS — M47.816 LUMBAR SPONDYLOSIS: ICD-10-CM

## 2024-07-15 PROCEDURE — 3008F BODY MASS INDEX DOCD: CPT | Mod: CPTII,S$GLB,, | Performed by: NURSE PRACTITIONER

## 2024-07-15 PROCEDURE — 1159F MED LIST DOCD IN RCRD: CPT | Mod: CPTII,S$GLB,, | Performed by: NURSE PRACTITIONER

## 2024-07-15 PROCEDURE — 3077F SYST BP >= 140 MM HG: CPT | Mod: CPTII,S$GLB,, | Performed by: NURSE PRACTITIONER

## 2024-07-15 PROCEDURE — 99213 OFFICE O/P EST LOW 20 MIN: CPT | Mod: S$GLB,,, | Performed by: NURSE PRACTITIONER

## 2024-07-15 PROCEDURE — 99999 PR PBB SHADOW E&M-EST. PATIENT-LVL V: CPT | Mod: PBBFAC,,, | Performed by: NURSE PRACTITIONER

## 2024-07-15 PROCEDURE — 3079F DIAST BP 80-89 MM HG: CPT | Mod: CPTII,S$GLB,, | Performed by: NURSE PRACTITIONER

## 2024-07-15 PROCEDURE — 1160F RVW MEDS BY RX/DR IN RCRD: CPT | Mod: CPTII,S$GLB,, | Performed by: NURSE PRACTITIONER

## 2024-07-15 RX ORDER — FUROSEMIDE 20 MG/1
20 TABLET ORAL
Qty: 90 TABLET | Refills: 0 | Status: SHIPPED | OUTPATIENT
Start: 2024-07-15

## 2024-07-15 NOTE — TELEPHONE ENCOUNTER
No care due was identified.  [facility] Embedded Care Due Messages. Reference number: [id number].   7/14/2024 8:56:25 PM CDT

## 2024-07-15 NOTE — TELEPHONE ENCOUNTER
Refill Routing Note   Medication(s) are not appropriate for processing by [facility] for the following reason(s):        Required vitals abnormal    [facility] action(s):  Defer               Appointments  past 12m or future 3m with PCP    Date Provider   Last Visit   6/8/2023 Cate Galeas MD   Next Visit   Visit date not found Cate Galeas MD   ED visits in past 90 days: 0        Note composed:10:36 PM 07/14/2024

## 2024-07-15 NOTE — PROGRESS NOTES
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 L4-L5 and L5-S1.  Spinal cord terminus lies at L1-L2.  Postoperative granulation changes at the surgical bed with stable nonenhancing seroma inferior to the left S1 pedicle screw measuring 2.1 x 1.6 cm (series 6, image 31; series 3, image 10).  No abnormal nerve root enhancement or epidural collection.  Right lower pole renal cyst.    T12-L1: No significant posterior disc herniation, spinal canal stenosis, or neural foraminal impingement.    L1-L2: Circumferential bulge resulting with partial collapse of the anterior thecal sac.  No significant neural foraminal impingement.    L2-L3: No significant posterior disc herniation, spinal canal stenosis, or neural foraminal impingement.    L4-L5: Progressive disc height loss.  No significant spinal canal stenosis or neural foraminal impingement.    L5-S1: Widely patent canal with mild right neural foraminal narrowing.  Left L5 and bilateral S1 pedicular screws traverse slightly anterior to the cortex, similar.      Impression       Degenerative endplate edema centered at L1-L2.  Otherwise, stable postoperative appearance with multilevel spondylosis as detailed.          Narrative     EXAMINATION:  XR CERVICAL SPINE COMPLETE 5 VIEW    CLINICAL HISTORY:  . Cervicalgia    TECHNIQUE:  AP, Lateral, bilateral oblique and open mouth views of the  cervical spine were performed.    COMPARISON:  07/21/2015    FINDINGS:  C5-6 osseous fusion noted.  Prominent C6-7 degenerative disc disease and facet arthropathy noted.  The alignment is within normal limits.  There is osseous neural foraminal narrowing on multiple levels bilaterally.  No fracture.  No marrow replacement process.  No prevertebral swelling.      Impression       Cervical spondylosis.         BMP  Lab Results   Component Value Date     10/25/2023    K 3.7 10/25/2023     10/25/2023    CO2 29 10/25/2023    BUN 8 10/25/2023    CREATININE 1.0 10/25/2023    CALCIUM 8.8 10/25/2023    ANIONGAP 6 (L) 10/25/2023    ESTGFRAFRICA >60.0 07/19/2022    EGFRNONAA >60.0 07/19/2022         REVIEW OF SYSTEMS:    GENERAL:  No weight loss or fevers.    RESPIRATORY:  Denies SOB or wheezing.  CARDIOVASCULAR:  Negative for chest pain, leg swelling or palpitations.  GI:  Negative for abdominal discomfort, blood in stools or black stools or change in bowel habits.  MUSCULOSKELETAL:  Joint stiffness, back and neck pain.  SKIN:  Negative for lesions, rash, and itching.  PSYCH: Patients sleep is not disturbed secondary to pain.  HEMATOLOGY/LYMPHOLOGY:  History of easy bruising.  History of von Willebrand's factor deficiency. On Plavix.  NEURO:   No history of syncope, paralysis, seizures or tremors.   All other reviewed and negative other than HPI.      OBJECTIVE:    BP (!) 143/87   Pulse 81   Temp 97.8 °F (36.6 °C)   Resp 18   Wt 109.4 kg (241 lb 2.9 oz)   SpO2 99%   BMI 33.64 kg/m²     PHYSICAL EXAMINATION:    GENERAL: Well appearing, in no acute distress, alert and oriented x3.  PSYCH:  Mood and affect appropriate.  SKIN:  No evidence of infection from previous injection site. No visible rashes.  HEAD/FACE:  Normocephalic, atraumatic.   BACK: There is no pain with palpation of the lumbar spine. There is mild pain with lumbar extension. Positive facet loading bilaterally.  EXTREMITIES: Bilateral lower and  upper extremity strength is 5/5 and symmetric.   MUSCULOSKELETAL:   No atrophy or tone abnormalities are noted. No TTP over SI joints. 5/5 strength in right ankle with plantar and dorsiflexion. 5/5 strength in left ankle with plantar and dorsiflexion. 5/5 strength with right knee flexion and extension. 5/5 strength with left knee flexion and extension.   NEURO: Cranial nerves grossly intact. No loss of sensation noted.  GAIT: Normal.      Assessment:     1. Chronic pain syndrome    2. Lumbar spondylosis    3. Postlaminectomy syndrome of lumbar region              Plan:     - Previous imaging was reviewed and discussed with the patient today.     - He is s/p repeat bilateral L3,4,5 RFAs with 80% relief.    - Can continue Robaxin 500 mg BID PRN muscle pain.    - The patient will continue a home exercise routine to help with pain and strengthening.     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 It may be mild vWF, but most recent lab tests showed normal coagulation function. The patient has been previously receiving dDAVP prior to all procedures and has not exhibited bleeding. Hematology recommended receiving dDAVP prior to all invasive procedures. For all interventional procedures, we will give 0.3mcg/kg dDAVP immediately prior to the procedure.       - RTC in 3 months or sooner if needed.      The above plan and management options were discussed at length with patient. Patient is in agreement with the above and verbalized understanding.     Buffy Villagran    07/15/2024

## 2024-07-16 ENCOUNTER — LAB VISIT (OUTPATIENT)
Dept: LAB | Facility: HOSPITAL | Age: 64
End: 2024-07-16
Attending: INTERNAL MEDICINE
Payer: MEDICARE

## 2024-07-16 ENCOUNTER — OFFICE VISIT (OUTPATIENT)
Dept: INTERNAL MEDICINE | Facility: CLINIC | Age: 64
End: 2024-07-16
Payer: MEDICARE

## 2024-07-16 VITALS
OXYGEN SATURATION: 97 % | BODY MASS INDEX: 33.95 KG/M2 | HEIGHT: 71 IN | DIASTOLIC BLOOD PRESSURE: 82 MMHG | WEIGHT: 242.5 LBS | HEART RATE: 64 BPM | SYSTOLIC BLOOD PRESSURE: 148 MMHG

## 2024-07-16 DIAGNOSIS — E29.1 HYPOGONADISM MALE: ICD-10-CM

## 2024-07-16 DIAGNOSIS — I70.0 THORACIC AORTA ATHEROSCLEROSIS: Chronic | ICD-10-CM

## 2024-07-16 DIAGNOSIS — E66.09 CLASS 1 OBESITY DUE TO EXCESS CALORIES WITH SERIOUS COMORBIDITY AND BODY MASS INDEX (BMI) OF 33.0 TO 33.9 IN ADULT: Chronic | ICD-10-CM

## 2024-07-16 DIAGNOSIS — I10 PRIMARY HYPERTENSION: Chronic | ICD-10-CM

## 2024-07-16 DIAGNOSIS — I50.32 DIASTOLIC DYSFUNCTION WITH CHRONIC HEART FAILURE: Chronic | ICD-10-CM

## 2024-07-16 DIAGNOSIS — Z79.899 ENCOUNTER FOR LONG-TERM (CURRENT) USE OF MEDICATIONS: ICD-10-CM

## 2024-07-16 DIAGNOSIS — R53.83 FATIGUE, UNSPECIFIED TYPE: ICD-10-CM

## 2024-07-16 DIAGNOSIS — M79.605 LEFT LEG PAIN: ICD-10-CM

## 2024-07-16 DIAGNOSIS — D68.00 VON WILLEBRAND DISEASE: Chronic | ICD-10-CM

## 2024-07-16 DIAGNOSIS — E78.49 OTHER HYPERLIPIDEMIA: ICD-10-CM

## 2024-07-16 DIAGNOSIS — N40.1 BENIGN PROSTATIC HYPERPLASIA WITH URINARY FREQUENCY: ICD-10-CM

## 2024-07-16 DIAGNOSIS — R35.0 BENIGN PROSTATIC HYPERPLASIA WITH URINARY FREQUENCY: ICD-10-CM

## 2024-07-16 DIAGNOSIS — R79.9 ABNORMAL FINDING OF BLOOD CHEMISTRY, UNSPECIFIED: ICD-10-CM

## 2024-07-16 DIAGNOSIS — M48.061 SPINAL STENOSIS, LUMBAR REGION, WITHOUT NEUROGENIC CLAUDICATION: Chronic | ICD-10-CM

## 2024-07-16 DIAGNOSIS — J45.40 MODERATE PERSISTENT ASTHMA WITHOUT COMPLICATION: Chronic | ICD-10-CM

## 2024-07-16 DIAGNOSIS — E55.9 VITAMIN D DEFICIENCY: Chronic | ICD-10-CM

## 2024-07-16 DIAGNOSIS — G47.33 OBSTRUCTIVE SLEEP APNEA TREATED WITH CONTINUOUS POSITIVE AIRWAY PRESSURE (CPAP): Primary | Chronic | ICD-10-CM

## 2024-07-16 DIAGNOSIS — I25.10 NON-OCCLUSIVE CORONARY ARTERY DISEASE REQUIRING DRUG THERAPY: Chronic | ICD-10-CM

## 2024-07-16 DIAGNOSIS — K21.00 GASTROESOPHAGEAL REFLUX DISEASE WITH ESOPHAGITIS WITHOUT HEMORRHAGE: ICD-10-CM

## 2024-07-16 DIAGNOSIS — D50.9 IRON DEFICIENCY ANEMIA, UNSPECIFIED IRON DEFICIENCY ANEMIA TYPE: Chronic | ICD-10-CM

## 2024-07-16 PROBLEM — C61 PROSTATE CANCER: Chronic | Status: RESOLVED | Noted: 2022-01-11 | Resolved: 2024-07-16

## 2024-07-16 LAB
25(OH)D3+25(OH)D2 SERPL-MCNC: 38 NG/ML (ref 30–96)
ALBUMIN SERPL BCP-MCNC: 4.1 G/DL (ref 3.5–5.2)
ALP SERPL-CCNC: 55 U/L (ref 55–135)
ALT SERPL W/O P-5'-P-CCNC: 24 U/L (ref 10–44)
ANION GAP SERPL CALC-SCNC: 9 MMOL/L (ref 8–16)
AST SERPL-CCNC: 25 U/L (ref 10–40)
BASOPHILS # BLD AUTO: 0.03 K/UL (ref 0–0.2)
BASOPHILS NFR BLD: 0.5 % (ref 0–1.9)
BILIRUB SERPL-MCNC: 1.2 MG/DL (ref 0.1–1)
BUN SERPL-MCNC: 12 MG/DL (ref 8–23)
CALCIUM SERPL-MCNC: 9.3 MG/DL (ref 8.7–10.5)
CHLORIDE SERPL-SCNC: 101 MMOL/L (ref 95–110)
CHOLEST SERPL-MCNC: 189 MG/DL (ref 120–199)
CHOLEST/HDLC SERPL: 4.3 {RATIO} (ref 2–5)
CO2 SERPL-SCNC: 26 MMOL/L (ref 23–29)
COMPLEXED PSA SERPL-MCNC: 1.2 NG/ML (ref 0–4)
CREAT SERPL-MCNC: 0.9 MG/DL (ref 0.5–1.4)
D DIMER PPP IA.FEU-MCNC: <0.19 MG/L FEU
DIFFERENTIAL METHOD BLD: ABNORMAL
EOSINOPHIL # BLD AUTO: 0.1 K/UL (ref 0–0.5)
EOSINOPHIL NFR BLD: 2.3 % (ref 0–8)
ERYTHROCYTE [DISTWIDTH] IN BLOOD BY AUTOMATED COUNT: 12.5 % (ref 11.5–14.5)
EST. GFR  (NO RACE VARIABLE): >60 ML/MIN/1.73 M^2
ESTIMATED AVG GLUCOSE: 80 MG/DL (ref 68–131)
GLUCOSE SERPL-MCNC: 81 MG/DL (ref 70–110)
HBA1C MFR BLD: 4.4 % (ref 4–5.6)
HCT VFR BLD AUTO: 48.2 % (ref 40–54)
HDLC SERPL-MCNC: 44 MG/DL (ref 40–75)
HDLC SERPL: 23.3 % (ref 20–50)
HGB BLD-MCNC: 14.9 G/DL (ref 14–18)
IMM GRANULOCYTES # BLD AUTO: 0.02 K/UL (ref 0–0.04)
IMM GRANULOCYTES NFR BLD AUTO: 0.3 % (ref 0–0.5)
LDLC SERPL CALC-MCNC: 121 MG/DL (ref 63–159)
LYMPHOCYTES # BLD AUTO: 1.7 K/UL (ref 1–4.8)
LYMPHOCYTES NFR BLD: 27.8 % (ref 18–48)
MAGNESIUM SERPL-MCNC: 2 MG/DL (ref 1.6–2.6)
MCH RBC QN AUTO: 30.2 PG (ref 27–31)
MCHC RBC AUTO-ENTMCNC: 30.9 G/DL (ref 32–36)
MCV RBC AUTO: 98 FL (ref 82–98)
MONOCYTES # BLD AUTO: 0.4 K/UL (ref 0.3–1)
MONOCYTES NFR BLD: 6.1 % (ref 4–15)
NEUTROPHILS # BLD AUTO: 3.8 K/UL (ref 1.8–7.7)
NEUTROPHILS NFR BLD: 63 % (ref 38–73)
NONHDLC SERPL-MCNC: 145 MG/DL
NRBC BLD-RTO: 0 /100 WBC
PLATELET # BLD AUTO: 241 K/UL (ref 150–450)
PMV BLD AUTO: 9.9 FL (ref 9.2–12.9)
POTASSIUM SERPL-SCNC: 4 MMOL/L (ref 3.5–5.1)
PROT SERPL-MCNC: 7.6 G/DL (ref 6–8.4)
RBC # BLD AUTO: 4.93 M/UL (ref 4.6–6.2)
SODIUM SERPL-SCNC: 136 MMOL/L (ref 136–145)
TESTOST SERPL-MCNC: 642 NG/DL (ref 304–1227)
TRIGL SERPL-MCNC: 120 MG/DL (ref 30–150)
TSH SERPL DL<=0.005 MIU/L-ACNC: 1.01 UIU/ML (ref 0.4–4)
VIT B12 SERPL-MCNC: 906 PG/ML (ref 210–950)
WBC # BLD AUTO: 6.05 K/UL (ref 3.9–12.7)

## 2024-07-16 PROCEDURE — 85379 FIBRIN DEGRADATION QUANT: CPT | Performed by: INTERNAL MEDICINE

## 2024-07-16 PROCEDURE — 3079F DIAST BP 80-89 MM HG: CPT | Mod: CPTII,S$GLB,, | Performed by: INTERNAL MEDICINE

## 2024-07-16 PROCEDURE — G2211 COMPLEX E/M VISIT ADD ON: HCPCS | Mod: S$GLB,,, | Performed by: INTERNAL MEDICINE

## 2024-07-16 PROCEDURE — 99999 PR PBB SHADOW E&M-EST. PATIENT-LVL V: CPT | Mod: PBBFAC,,, | Performed by: INTERNAL MEDICINE

## 2024-07-16 PROCEDURE — 99215 OFFICE O/P EST HI 40 MIN: CPT | Mod: S$GLB,,, | Performed by: INTERNAL MEDICINE

## 2024-07-16 PROCEDURE — 84153 ASSAY OF PSA TOTAL: CPT | Performed by: INTERNAL MEDICINE

## 2024-07-16 PROCEDURE — 82607 VITAMIN B-12: CPT | Performed by: INTERNAL MEDICINE

## 2024-07-16 PROCEDURE — 82306 VITAMIN D 25 HYDROXY: CPT | Performed by: INTERNAL MEDICINE

## 2024-07-16 PROCEDURE — 83735 ASSAY OF MAGNESIUM: CPT | Performed by: INTERNAL MEDICINE

## 2024-07-16 PROCEDURE — 80053 COMPREHEN METABOLIC PANEL: CPT | Performed by: INTERNAL MEDICINE

## 2024-07-16 PROCEDURE — 84443 ASSAY THYROID STIM HORMONE: CPT | Performed by: INTERNAL MEDICINE

## 2024-07-16 PROCEDURE — 85025 COMPLETE CBC W/AUTO DIFF WBC: CPT | Performed by: INTERNAL MEDICINE

## 2024-07-16 PROCEDURE — 3077F SYST BP >= 140 MM HG: CPT | Mod: CPTII,S$GLB,, | Performed by: INTERNAL MEDICINE

## 2024-07-16 PROCEDURE — 3008F BODY MASS INDEX DOCD: CPT | Mod: CPTII,S$GLB,, | Performed by: INTERNAL MEDICINE

## 2024-07-16 PROCEDURE — 83036 HEMOGLOBIN GLYCOSYLATED A1C: CPT | Performed by: INTERNAL MEDICINE

## 2024-07-16 PROCEDURE — 84403 ASSAY OF TOTAL TESTOSTERONE: CPT | Performed by: INTERNAL MEDICINE

## 2024-07-16 PROCEDURE — 3044F HG A1C LEVEL LT 7.0%: CPT | Mod: CPTII,S$GLB,, | Performed by: INTERNAL MEDICINE

## 2024-07-16 PROCEDURE — 36415 COLL VENOUS BLD VENIPUNCTURE: CPT | Performed by: INTERNAL MEDICINE

## 2024-07-16 PROCEDURE — 80061 LIPID PANEL: CPT | Performed by: INTERNAL MEDICINE

## 2024-07-16 RX ORDER — AMLODIPINE BESYLATE 2.5 MG/1
2.5 TABLET ORAL DAILY
Qty: 90 TABLET | Refills: 3 | Status: SHIPPED | OUTPATIENT
Start: 2024-07-16 | End: 2024-08-21

## 2024-07-16 NOTE — PROGRESS NOTES
Subjective:       Patient ID: Bri Santiago is a 63 y.o. male.    Chief Complaint: Annual Exam     Bri Santiago is a 63 y.o.  male who presents for  annual exam  Complains of fatigue, often tired when he wakes up   .  Walks daily for exercise  Reports fatigue most days, wakes fatigued, worse after exercise.    Reports headache starting about a month ago, comes and goes, improves with tylenol, 4/10. No time of day in particular, does not awaken him at night.    Reports dizziness with standing yesterday morning, did not reoccur, was fine this morning.     Reports off his testosterone for two months, his endocrinologist has moved. Was treated with androderm.   Was diagnosed with prostate cancer in 1994, treated at [facility] with       Hx of htn but off meds for some time, has elevated pressures mostly when he is in pain.  Reports BP at [facility] well controlled.       Patient has hyperlipidemia. Pt is complaint with risk reduction medication. Denies muscle cramping and weakness. Pt attempts to follow a low cholesterol diet and exercise. No CP, SOB, orthopnea or PND.     Pain level 6/10   Using his cpap nightly. No issues.        Patient Active Problem List   Diagnosis    Asthma in remission    Von Willebrand disease    Primary hypertension    Spondylosis without myelopathy    Thoracic or lumbosacral neuritis or radiculitis, unspecified    Spinal stenosis, lumbar region, without neurogenic claudication    Degeneration of lumbar or lumbosacral intervertebral disc    Acquired spondylolisthesis    Pseudoarthrosis    Family history of colon cancer    Vitamin D deficiency    Atypical chest pain    Encounter for monitoring long-term proton pump inhibitor therapy    Postlaminectomy syndrome of lumbar region    Myalgia and myositis    Facet syndrome    Gastroesophageal reflux disease without esophagitis    Osteopenia    Thoracic aorta atherosclerosis    Benign prostatic hyperplasia with urinary obstruction    Allergic rhinitis     Allergic conjunctivitis of both eyes    Encounter for long-term current use of high risk medication    Gastroesophageal reflux disease with esophagitis    Hematochezia    Nasal septal deviation    Dysphagia    Laryngopharyngeal reflux (LPR)    Diastolic dysfunction with chronic heart failure    At risk for cardiovascular event    Hypoxia    Chronic pulmonary heart disease    Class 1 obesity due to excess calories with serious comorbidity and body mass index (BMI) of 33.0 to 33.9 in adult    Moderate persistent asthma    Family history of prostate cancer in father    Chronic pain    Nocturia    Scrotal swelling    Chronic bilateral low back pain without sciatica    Chronic maxillary sinusitis    Nonintractable episodic headache    Osteoarthritis of lumbar spine    Gynecomastia, male    Iron deficiency anemia    Obstructive sleep apnea treated with continuous positive airway pressure (CPAP)    Postnasal drip    GERD (gastroesophageal reflux disease)    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    Dry skin dermatitis    Globus sensation    Throat clearing    Non-occlusive coronary artery disease requiring drug therapy    Unspecified inflammatory spondylopathy, lumbar region    Erectile dysfunction    Chronic prostatitis    Dysuria    Impaired functional mobility and activity tolerance    Decreased ROM of trunk and back    Decreased functional mobility and endurance    Weakness    Other hyperlipidemia    Neck pain    Arm weakness    Acute pain of left knee           Past Medical History:   Diagnosis Date    Acute pancreatitis     Anal fissure     Anemia     Anticoagulant long-term use     Arthritis     Asthma in remission     Back pain     BPH (benign prostatic hypertrophy)     Cancer 2000    prostate- treated at [facility] with chemo- in remission since 2000    Chronic maxillary sinusitis     Clotting disorder     Diastolic dysfunction with chronic heart failure 12/3/2018    Dysphagia 10/7/2014     Family history of colon cancer     Family history of early CAD     GERD (gastroesophageal reflux disease)     Helicobacter pylori (H. pylori) infection     Chronic    History of chronic pancreatitis     HTN (hypertension)     Lumbago 11/12/2012    Obesity     ABDON (obstructive sleep apnea)     ABDON (obstructive sleep apnea)     With likely ohs Refer to sleep    Sacroiliac joint pain 2/10/2015    Spinal stenosis of lumbar region     Von Willebrand disease     VWD (acquired von Willebrand's disease)        Past Surgical History:   Procedure Laterality Date    anal fissure repair      x2    ARTHROSCOPIC CHONDROPLASTY OF KNEE JOINT Left 11/8/2023    Procedure: ARTHROSCOPY, KNEE, WITH CHONDROPLASTY;  Surgeon: Karthik Tanner MD;  Location: [facility] OR;  Service: Orthopedics;  Laterality: Left;    BACK SURGERY  2012    BALLOON SINUPLASTY OF PARANASAL SINUS Bilateral 10/7/2019    Procedure: SINUPLASTY, USING BALLOON;  Surgeon: LAURENT Swanson MD;  Location: [facility] OR;  Service: ENT;  Laterality: Bilateral;    CARPAL TUNNEL RELEASE  2003    left hand    CATHETERIZATION OF BOTH LEFT AND RIGHT HEART N/A 2/14/2019    Procedure: CATHETERIZATION, HEART, BOTH LEFT AND RIGHT;  Surgeon: Kyle Magana MD;  Location: [facility] CATH LAB;  Service: Cardiology;  Laterality: N/A;    CERVICAL DISCECTOMY  2003    COLONOSCOPY N/A 10/7/2015    Procedure: COLONOSCOPY;  Surgeon: Rosendo Boyer MD;  [facility]  Service: Endoscopy;  Laterality: N/A;  PM Prep    COLONOSCOPY N/A 6/19/2017    Procedure: COLONOSCOPY;  Surgeon: Rosendo Boyer MD;  [facility]  Service: Endoscopy;  Laterality: N/A;  constipation prep (no DM no CHF)       hx of vonWillebrand's disease-will need infusion prior    COLONOSCOPY N/A 3/4/2020    Procedure: COLONOSCOPY;  Surgeon: Rosendo Boyer MD;  [facility]  Service: Endoscopy;  Laterality: N/A;  Please order CBC, ferritin, Iron TIBC day of case per   Merlin  labwork at 7:10am and patient will check in right after.  per Dr. Tyler patient can hold Plavix 5 days prior see note 1/7/20  DDAVP prior to procedure needed see note 1/16/20 for oncall med by Dr. Tyler - sm    CYSTOSCOPY  8/12/2022    Procedure: CYSTOSCOPY;  Surgeon: Tyler Reid Jr., MD;  Location: [facility] OR 1ST FLR;  Service: Urology;;    ESOPHAGOGASTRODUODENOSCOPY N/A 3/4/2020    Procedure: EGD (ESOPHAGOGASTRODUODENOSCOPY);  Surgeon: Rosendo Boyer MD;  Location: [facility] (4TH FLR);  Service: Endoscopy;  Laterality: N/A;  EGD and Colonoscopy orders merged together  labwork at 7:10am and patient will check in right after.  DDAVP prior to procedure needed see note 1/16/20 for oncall med  per Dr. Tyler patient can hold Plavix 5 days prior see note 1/7/20    ESOPHAGOGASTRODUODENOSCOPY N/A 11/3/2020    Procedure: EGD (ESOPHAGOGASTRODUODENOSCOPY);  Surgeon: Rosendo Boyer MD;  Location: [facility] (2ND FLR);  Service: Endoscopy;  Laterality: N/A;  Please recall pt has von Willebrands and will require DDVAP infusion prior to procedure as previously done.    see telephone encounter on 10/1/20 regarding DDAVP   ok to hold Plavix 5 days prior per Dr.Blessey ORTIZID screening on 10/31/20 -rb    ESOPHAGOGASTRODUODENOSCOPY N/A 5/31/2023    Procedure: EGD (ESOPHAGOGASTRODUODENOSCOPY);  Surgeon: Rosendo Boyer MD;  Location: [facility] (4TH FLR);  Service: Endoscopy;  Laterality: N/A;  cardiac clearnce-office note 5/1, ok to hold plavix-tele encounter 5/2-LW  5/11/23 r/s'MD noelle booked out. instr portal -ml  5/25 - precall attempted. no answer. MML    EXAMINATION UNDER ANESTHESIA N/A 3/15/2021    Procedure: EXAM UNDER ANESTHESIA;  Surgeon: Silvia Cazares MD;  Location: [facility] OR 2ND FLR;  Service: Colon and Rectal;  Laterality: N/A;    EXAMINATION UNDER ANESTHESIA N/A 2/25/2022    Procedure: EXAM UNDER ANESTHESIA, EXCISION ANAL PAPILLA;  Surgeon: Nadeem Grady MD;  Location: [facility] OR 2ND FLR;  Service: Colon  and Rectal;  Laterality: N/A;    FUNCTIONAL ENDOSCOPIC SINUS SURGERY (FESS) USING COMPUTER-ASSISTED NAVIGATION Bilateral 10/7/2019    Procedure: FESS, USING COMPUTER-ASSISTED NAVIGATION;  Surgeon: LAURENT Swanson MD;  Location: [facility];  Service: ENT;  Laterality: Bilateral;    INJECTION OF ANESTHETIC AGENT AROUND NERVE Bilateral 10/13/2020    Procedure: BLOCK, NERVE BILATERAL C4,C5,C6 Plavix clearance requested;  Surgeon: Fredy Magana MD;  Location: [facility] PAIN MGT;  Service: Pain Management;  Laterality: Bilateral;  BLOCK, NERVE BILATERAL C4,C5,C6  Plavix clearance ok, will require DDVAP infusion    KNEE ARTHROSCOPY W/ MENISCECTOMY Left 11/8/2023    Procedure: ARTHROSCOPY, KNEE, WITH PARTIAL LATERAL MENISCECTOMY;  Surgeon: Karthik Tanner MD;  Location: [facility];  Service: Orthopedics;  Laterality: Left;  regional w/o catheter (adductor)    LATERAL INTERNAL ANAL SPHINCTEROTOMY N/A 12/10/2021    Procedure: SPHINCTEROTOMY-LATERAL INTERNAL ANAL;  Surgeon: Nadeem Grady MD;  Location: [facility] 2ND FLR;  Service: Colon and Rectal;  Laterality: N/A;    LEFT HEART CATHETERIZATION Left 2/14/2019    Procedure: Left heart cath;  Surgeon: Kyle Magana MD;  Location: [facility] CATH LAB;  Service: Cardiology;  Laterality: Left;    LUMBAR FUSION  2012    PH MONITORING, ESOPHAGUS, WIRELESS, (ON REFLUX MEDS) N/A 5/31/2023    Procedure: PH MONITORING, ESOPHAGUS, WIRELESS, (ON REFLUX MEDS);  Surgeon: Rosendo Boyer MD;  Location: [facility] ENDO (4TH FLR);  Service: Endoscopy;  Laterality: N/A;  96hr, on his AcipHex 20 mg every 12 hours, instructions portal-LW    RADIOFREQUENCY ABLATION Right 5/9/2019    Procedure: RADIOFREQUENCY ABLATION, RIGHT L3,L4,L5;  Surgeon: Fredy Magana MD;  Location: [facility] PAIN MGT;  Service: Pain Management;  Laterality: Right;  1 of 2  RT RFA L3,4,5  PLAVIX clearance received, pt needs DDAVP    RADIOFREQUENCY ABLATION Left 5/23/2019    Procedure: RADIOFREQUENCY ABLATION, LEFT L3,L4,L5;   Surgeon: Fredy Magana MD;  Location: [facility] PAIN MGT;  Service: Pain Management;  Laterality: Left;  2 of 2  LT RFA L3,4,5  PLAVIX clearance received, pt needs DDAVP    RADIOFREQUENCY ABLATION Left 1/16/2020    Procedure: RADIOFREQUENCY ABLATION LEFT L3, L4, L5 MEDIAL BRANCH 1 OF 2 **PATIENT ARRIVING AT 8 AM**;  Surgeon: Fredy Magana MD;  Location: [facility] PAIN MGT;  Service: Pain Management;  Laterality: Left;  NEEDS CONSENT, PLAVIX CLEARANCE IN CHART    RADIOFREQUENCY ABLATION Right 1/28/2020    Procedure: RADIOFREQUENCY ABLATION, RIGHT L3-L4-L5 MEDIAL BRANCH 2 OF 2 (Left done on 1/16/20);  Surgeon: Fredy Magana MD;  Location: [facility] PAIN MGT;  Service: Pain Management;  Laterality: Right;    RADIOFREQUENCY ABLATION Left 8/18/2020    Procedure: RADIOFREQUENCY ABLATION, L3-L4-L5 MEDIAL BRANCH 1 OF 2 clear to hold plavix 7 days;  Surgeon: Fredy Magana MD;  Location: [facility] PAIN MGT;  Service: Pain Management;  Laterality: Left;    RADIOFREQUENCY ABLATION Right 9/1/2020    Procedure: RADIOFREQUENCY ABLATION, L3-L4-L5 MEDIAL BR ANCH 2 OF 2 clear to hol,d Plavix 7 days;  Surgeon: Fredy Magana MD;  Location: [facility] PAIN MGT;  Service: Pain Management;  Laterality: Right;    RADIOFREQUENCY ABLATION Bilateral 12/21/2021    Procedure: RADIOFREQUENCY ABLATION B/L L3,4,5 RFA (give dDAVP prior) NEEDS CONSENT plavix ok x7;  Surgeon: Fredy Magana MD;  Location: [facility] PAIN MGT;  Service: Pain Management;  Laterality: Bilateral;    RADIOFREQUENCY ABLATION Left 11/21/2022    Procedure: RADIOFREQUENCY ABLATION LEFT L3,L4,L5 MUST HAVE dDVAP PRIOR ONE OF TWO;  Surgeon: Milo Winston MD;  Location: [facility] PAIN MGT;  Service: Pain Management;  Laterality: Left;    RADIOFREQUENCY ABLATION Right 12/5/2022    Procedure: RADIOFREQUENCY ABLATION RIGHT L3,L4,L5  PRE-MEDICATE WITH dDVAP TWO OF TWO;  Surgeon: Milo Winston MD;  Location: [facility] PAIN MGT;  Service: Pain Management;  Laterality: Right;    RADIOFREQUENCY  ABLATION Bilateral 4/29/2024    Procedure: RADIOFREQUENCY ABLATION BILATERAL L3, 4, 5 *DDAVP BEFORE*;  Surgeon: Milo Winston MD;  Location: [facility] PAIN MGT;  Service: Pain Management;  Laterality: Bilateral;  [phone]  4 WK F/U CHANDRA    RADIOFREQUENCY ABLATION OF LUMBAR MEDIAL BRANCH NERVE AT SINGLE LEVEL Left 5/24/2018    Procedure: RADIOFREQUENCY THERMOCOAGULATION (RFTC)-NERVE-MEDIAN BRANCH-LUMBAR;  Surgeon: Fredy Magana MD;  Location: [facility] PAIN MGT;  Service: Pain Management;  Laterality: Left;  Left RFA @ L3,4,5  [phone]  with IV Sedation    2 of 2    RETROGRADE PYELOGRAPHY Bilateral 8/12/2022    Procedure: PYELOGRAM, RETROGRADE;  Surgeon: Tyler Reid Jr., MD;  Location: [facility] OR [address] [place] [address];  Service: Urology;  Laterality: Bilateral;    SPINE SURGERY      TONSILLECTOMY      at age 24    TRANSFORAMINAL EPIDURAL INJECTION OF STEROID Bilateral 6/19/2023    Procedure: INJECTION, STEROID, EPIDURAL, TRANSFORAMINAL APPROACH,BILATERAL T7-T8****** Give 0.3mcg/kg DDAVP immediately prior to the procedure*****  CLEAR TO HOLD PLAVIX 7 DAYS;  Surgeon: Milo Winston MD;  Location: [facility] PAIN MGT;  Service: Pain Management;  Laterality: Bilateral;    URETEROSCOPY Bilateral 8/12/2022    Procedure: URETEROSCOPY;  Surgeon: Tyler Reid Jr., MD;  Location: [facility] OR [address] [place] [address];  Service: Urology;  Laterality: Bilateral;    VASECTOMY  1996       Family History   Problem Relation Name Age of Onset    Colon cancer Father Pato Famre 67        colon cancer    Hypertension Father Pato Famre     Glaucoma Father Pato Famre     Cancer Father Pato Santiago     Colon cancer Paternal Grandfather  65             Coronary artery disease Mother Eula Jack 45    Hypertension Mother Eula Jack     Heart disease Mother Eula Jack     Colon cancer Mother Eula Jack     Diabetes Mother Eula Jack     No Known Problems Brother Humble     No Known Problems Sister Pat     No Known Problems Daughter Sophie     No  "Known Problems Son Cosme     Coronary artery disease Brother Juju Barron    No Known Problems Daughter Hamida     No Known Problems Daughter Rosa     No Known Problems Son Beto     No Known Problems Son Robin     Colon cancer Paternal Uncle  65    Diabetes Mellitus Paternal Grandmother      Esophageal cancer Neg Hx         Social History     Tobacco Use    Smoking status: Never    Smokeless tobacco: Never   Substance Use Topics    Alcohol use: Yes     Alcohol/week: 1.0 standard drink of alcohol     Types: 1 Glasses of wine per week     Comment: social    Drug use: No         Review of Systems      Objective:   Blood pressure (!) 148/82, pulse 64, height 5' 11" (1.803 m), weight 110 kg (242 lb 8.1 oz), SpO2 97%.     Physical Exam  Constitutional:       Appearance: Normal appearance. He is well-developed. He is not ill-appearing.   HENT:      Head: Normocephalic and atraumatic.   Eyes:      General: No scleral icterus.     Conjunctiva/sclera: Conjunctivae normal.   Cardiovascular:      Rate and Rhythm: Normal rate.      Heart sounds: Normal heart sounds. No murmur heard.     No friction rub. No gallop.   Pulmonary:      Effort: Pulmonary effort is normal.      Breath sounds: Normal breath sounds. No wheezing or rales.   Chest:      Chest wall: No tenderness.   Abdominal:      General: Bowel sounds are normal.      Palpations: Abdomen is soft.   Musculoskeletal:         General: No tenderness or signs of injury.   Skin:     General: Skin is warm and dry.   Neurological:      Mental Status: He is alert and oriented to person, place, and time. Mental status is at baseline.   Psychiatric:         Behavior: Behavior normal.         Thought Content: Thought content normal.         Prior labs reviewed    Health Maintenance         Date Due Completion Date    Shingles Vaccine (1 of 2) Never done ---    RSV Vaccine (Age 60+ and Pregnant patients) (1 - 1-dose 60+ series) Never done ---    COVID-19 Vaccine (5 - " 2023-24 season) 09/01/2023 12/8/2022    Influenza Vaccine (1) 09/01/2024 10/12/2023    Colorectal Cancer Screening 03/04/2025 3/4/2020    Aspirin/Antiplatelet Therapy 08/05/2025 8/5/2024    Hemoglobin A1c (Diabetic Prevention Screening) 07/16/2027 7/16/2024    TETANUS VACCINE 10/16/2027 10/16/2017    Lipid Panel 07/16/2029 7/16/2024        LAST EKG 1/24 no changes    Assessment/Plan:       1. Obstructive sleep apnea treated with continuous positive airway pressure (CPAP)  Overview:  cpap 6-14cm      2. Gastroesophageal reflux disease with esophagitis without hemorrhage  Comments:  followed by dr christian in GI, cont PPI   annual labs    3. Vitamin D deficiency  Taking vit d check labs    4. Class 1 obesity due to excess calories with serious comorbidity and body mass index (BMI) of 33.0 to 33.9 in adult  Overview:  Improved from class 2 obesity    Orders:  -     Hemoglobin A1C; Future; Expected date: 07/16/2024    5. Iron deficiency anemia, unspecified iron deficiency anemia type  Repeat labs, no PE evidence of anemia    6. Von Willebrand disease  Overview:  Recommend DDAVP   infusion , 20 mcg, to be given over 30 minutes, 1 hr before procedures.  Stable     7. Thoracic aorta atherosclerosis  Overview:  Noted on imaging study 9/4/2014 - CT chest   Treated with statin, lipitor 80 mg qhs  Check labs today    8. Primary hypertension  Diet controlled but uncontrolled with pain, given low dose amlodipine for BP   Rn bp check in one week    9. Other hyperlipidemia  -     Lipid Panel; Future; Expected date: 07/16/2024  Cont statin for risk reduction    10. Non-occlusive coronary artery disease requiring drug therapy  Overview:  Heart cath showing non obstructive CAD, on plavix due to ASA allergy  Cont current meds    11. Diastolic dysfunction with chronic heart failure  Compensated  Cont lasix    12. Moderate persistent asthma without complication  Overview:  Started on  symbicort 2p bid  Prn albuterol  exercise  Off  controlleds, doing well  Cont prn albuterol    13. Spinal stenosis, lumbar region, without neurogenic claudication  Comments:  follows with pain managment, stable    14. Encounter for long-term (current) use of medications  -     Magnesium; Future; Expected date: 07/16/2024  -     Vitamin B12; Future; Expected date: 07/16/2024  -     Vitamin D; Future; Expected date: 07/16/2024    15. Fatigue, unspecified type  -     Magnesium; Future; Expected date: 07/16/2024  -     Vitamin B12; Future; Expected date: 07/16/2024  -     Vitamin D; Future; Expected date: 07/16/2024  -     TSH; Future; Expected date: 07/16/2024  -     CBC Auto Differential; Future; Expected date: 07/16/2024  -     Comprehensive Metabolic Panel; Future; Expected date: 07/16/2024    16. Hypogonadism male  -     Ambulatory referral/consult to Endocrinology; Future; Expected date: 07/23/2024  -     TESTOSTERONE; Future; Expected date: 07/16/2024  -     PROSTATE SPECIFIC ANTIGEN, DIAGNOSTIC; Future; Expected date: 07/16/2024  Lost to follow up  Off meds, may be worseing his fatigue  Check labs and refer to endo for management given hx of prostate cancer  Follow up with urology    17. Benign prostatic hyperplasia with urinary frequency  -     PROSTATE SPECIFIC ANTIGEN, DIAGNOSTIC; Future; Expected date: 07/16/2024    18. Abnormal finding of blood chemistry, unspecified  -     Hemoglobin A1C; Future; Expected date: 07/16/2024    Other orders  -     amLODIPine (NORVASC) 2.5 MG tablet; Take 1 tablet (2.5 mg total) by mouth once daily.  Dispense: 90 tablet; Refill: 3      Visit today included increased complexity associated with the care of the episodic problems and addressed and managing the longitudinal care of the patient due to the serious and/or complex managed problem(s) as above.   40 min spent in care of patient including history, physical, chart review, orders and coordination of care.         Medication List with Changes/Refills   New Medications     AMLODIPINE (NORVASC) 2.5 MG TABLET    Take 1 tablet (2.5 mg total) by mouth once daily.    EZETIMIBE (ZETIA) 10 MG TABLET    Take 1 tablet (10 mg total) by mouth once daily.   Current Medications    ALBUTEROL (PROVENTIL/VENTOLIN HFA) 90 MCG/ACTUATION INHALER    INHALE 2 PUFFS INTO THE LUNGS EVERY 6 HOURS AS NEEDED FOR WHEEZING OR SHORTNESS OF BREATH    ATORVASTATIN (LIPITOR) 80 MG TABLET    Take 1 tablet (80 mg total) by mouth every evening.    AZELASTINE (ASTELIN) 137 MCG (0.1 %) NASAL SPRAY    Two sprays in each nostril, sniff until absorbed, then follow with 1 spray of fluticasone.  Use both sprays twice daily.    BUDESONIDE-FORMOTEROL 160-4.5 MCG (SYMBICORT) 160-4.5 MCG/ACTUATION HFAA    INHALE 2 PUFFS INTO THE LUNGS EVERY 12 HOURS    CALCIUM CITRATE-VITAMIN D3 315-200 MG (CITRACAL+D) 315-200 MG-UNIT PER TABLET    Take 1 tablet by mouth nightly.     CLOPIDOGREL (PLAVIX) 75 MG TABLET    Take 1 tablet (75 mg total) by mouth once daily.    CYANOCOBALAMIN, VITAMIN B-12, 50 MCG TABLET    Take 50 mcg by mouth nightly.     DOCUSATE SODIUM (COLACE) 100 MG CAPSULE    Take 1 tablet by mouth Twice daily. 1 Capsule Oral Twice a day .  Take with pain medicine    DOXAZOSIN (CARDURA) 1 MG TABLET    TAKE 1 TABLET BY MOUTH EVERY EVENING    FLU VACC [id number] 6MOS UP,PF, (FLUARIX QUAD [id number], PF,) 60 MCG (15 MCG X 4)/0.5 ML SYRG    inject into muscle for one dose    FLUTICASONE PROPIONATE (FLONASE) 50 MCG/ACTUATION NASAL SPRAY    One spray in each nostril twice daily after 1st using azelastine nasal spray    FUROSEMIDE (LASIX) 20 MG TABLET    Take 1 tablet by mouth once daily    IPRATROPIUM (ATROVENT) 42 MCG (0.06 %) NASAL SPRAY    1-2 sprays in each nostril before eating and at bedtime as needed    METHOCARBAMOL (ROBAXIN) 500 MG TAB    Take 1 tablet (500 mg total) by mouth every 8 (eight) hours as needed (muscle pain).    RABEPRAZOLE (ACIPHEX) 20 MG TABLET    TAKE 1 TABLET BY MOUTH EVERY 12 HOURS    TESTOSTERONE (ANDRODERM) 2  MG/24 HOUR PT24    APPLY 1 PATCH TOPICALLY TO THE SKIN EVERY DAY    ZOLPIDEM (AMBIEN) 10 MG TAB    TAKE 1 TABLET BY MOUTH EVERY DAY AT BEDTIME   Changed and/or Refilled Medications    Modified Medication Previous Medication    TADALAFIL (CIALIS) 20 MG TAB tadalafiL (CIALIS) 20 MG Tab       Take 1 tablet (20 mg total) by mouth as needed. Take every 36 hours as needed for ED.    Take 1 tablet (20 mg total) by mouth as needed. Take every 36 hours as needed for ED.   Discontinued Medications    DICLOFENAC SODIUM (VOLTAREN) 1 % GEL    Apply 2 g topically 4 (four) times daily. for 14 days    PROMETHAZINE (PHENERGAN) 25 MG TABLET    Take 1 tablet (25 mg total) by mouth every 6 (six) hours as needed for Nausea.       Recommend patient complete vaccinations as listed in the overdue health maintenance report. Pt may obtain vaccinations at their local pharmacy, any [facility] pharmacy or request vaccination in our clinic.  Please note that some insurances will only cover vaccination cost at specific locations.Please check with your insurance provider regarding your coverage.  Vaccines that are not covered are still recommended.         42minutes spent in care of patient including history, physical, chart review, orders and coordination of care.

## 2024-07-16 NOTE — Clinical Note
Seeing you for BP follow up as well as for his testosterone- pref for endo to manage this, referral placed Reports hx of prostate cancer treated at [facility].  Needs to see his urologist also

## 2024-07-17 ENCOUNTER — OFFICE VISIT (OUTPATIENT)
Dept: UROLOGY | Facility: CLINIC | Age: 64
End: 2024-07-17
Payer: MEDICARE

## 2024-07-17 VITALS
BODY MASS INDEX: 34.05 KG/M2 | SYSTOLIC BLOOD PRESSURE: 136 MMHG | WEIGHT: 243.19 LBS | DIASTOLIC BLOOD PRESSURE: 87 MMHG | HEIGHT: 71 IN | HEART RATE: 77 BPM

## 2024-07-17 DIAGNOSIS — R97.20 ELEVATED PSA: ICD-10-CM

## 2024-07-17 DIAGNOSIS — R10.31 GROIN PAIN, CHRONIC, RIGHT: Primary | ICD-10-CM

## 2024-07-17 DIAGNOSIS — G89.29 GROIN PAIN, CHRONIC, RIGHT: Primary | ICD-10-CM

## 2024-07-17 PROCEDURE — 3008F BODY MASS INDEX DOCD: CPT | Mod: CPTII,S$GLB,, | Performed by: UROLOGY

## 2024-07-17 PROCEDURE — 3079F DIAST BP 80-89 MM HG: CPT | Mod: CPTII,S$GLB,, | Performed by: UROLOGY

## 2024-07-17 PROCEDURE — 1160F RVW MEDS BY RX/DR IN RCRD: CPT | Mod: CPTII,S$GLB,, | Performed by: UROLOGY

## 2024-07-17 PROCEDURE — 3044F HG A1C LEVEL LT 7.0%: CPT | Mod: CPTII,S$GLB,, | Performed by: UROLOGY

## 2024-07-17 PROCEDURE — 3075F SYST BP GE 130 - 139MM HG: CPT | Mod: CPTII,S$GLB,, | Performed by: UROLOGY

## 2024-07-17 PROCEDURE — 99213 OFFICE O/P EST LOW 20 MIN: CPT | Mod: S$GLB,,, | Performed by: UROLOGY

## 2024-07-17 PROCEDURE — 99999 PR PBB SHADOW E&M-EST. PATIENT-LVL IV: CPT | Mod: PBBFAC,,, | Performed by: UROLOGY

## 2024-07-17 PROCEDURE — 1159F MED LIST DOCD IN RCRD: CPT | Mod: CPTII,S$GLB,, | Performed by: UROLOGY

## 2024-07-17 NOTE — PROGRESS NOTES
Subjective:       Patient ID: Bri Santiago is a 63 y.o. male.      Patient is here with right groin pain and for prostate check.  His PSA is normal at 1.2 his right groin pain if he pushes on the area.  No obvious swelling but I am going to get a scrotal and groin ultrasound.  Patient has a questionable history of prostate cancer 20 years ago at [facility] (formerly [facility]) but I can not find anything in the chart    Past Medical History:   Diagnosis Date    Acute pancreatitis     Anal fissure     Anemia     Anticoagulant long-term use     Arthritis     Asthma in remission     Back pain     BPH (benign prostatic hypertrophy)     Cancer 2000    prostate- treated at [facility] with chemo- in remission since 2000    Chronic maxillary sinusitis     Clotting disorder     Diastolic dysfunction with chronic heart failure 12/3/2018    Dysphagia 10/7/2014    Family history of colon cancer     Family history of early CAD     GERD (gastroesophageal reflux disease)     Helicobacter pylori (H. pylori) infection     Chronic    History of chronic pancreatitis     HTN (hypertension)     Lumbago 11/12/2012    Obesity     ABDON (obstructive sleep apnea)     ABDON (obstructive sleep apnea)     With likely ohs Refer to sleep    Sacroiliac joint pain 2/10/2015    Spinal stenosis of lumbar region     Von Willebrand disease     VWD (acquired von Willebrand's disease)            Family History   Problem Relation Name Age of Onset    Colon cancer Father Paot Santiago 67        colon cancer    Hypertension Father Pato Santiago     Glaucoma Father Pato Santiago     Cancer Father Pato Santiago     Colon cancer Paternal Grandfather  65             Coronary artery disease Mother Eula Santiago 45    Hypertension Mother Eula Santiago     Heart disease Mother Eula Santiago     Colon cancer Mother Eula Santiago     Diabetes Mother Eula Santiago     No Known Problems Brother Humble     No Known Problems Sister Pat     No Known Problems Daughter Sophie     No Known  Problems Son Cosme     Coronary artery disease Brother Juju Barron    No Known Problems Daughter Hamida     No Known Problems Daughter Rosa     No Known Problems Son Beto     No Known Problems Son Jew     Colon cancer Paternal Uncle  65    Diabetes Mellitus Paternal Grandmother      Esophageal cancer Neg Hx         Social History     Socioeconomic History    Marital status:    Occupational History    Occupation: disabled due to back injury   Tobacco Use    Smoking status: Never    Smokeless tobacco: Never   Substance and Sexual Activity    Alcohol use: Yes     Alcohol/week: 1.0 standard drink of alcohol     Types: 1 Glasses of wine per week     Comment: social    Drug use: No    Sexual activity: Yes     Partners: Female   Social History Narrative    wlking for exercised       Allergies:  Penicillins, Ace inhibitors, Aspirin, Codeine, Dilaudid [hydromorphone], and Gabapentin    Medications:    Current Outpatient Medications:     albuterol (PROVENTIL/VENTOLIN HFA) 90 mcg/actuation inhaler, INHALE 2 PUFFS INTO THE LUNGS EVERY 6 HOURS AS NEEDED FOR WHEEZING OR SHORTNESS OF BREATH, Disp: 18 g, Rfl: 4    amLODIPine (NORVASC) 2.5 MG tablet, Take 1 tablet (2.5 mg total) by mouth once daily., Disp: 90 tablet, Rfl: 3    atorvastatin (LIPITOR) 80 MG tablet, Take 1 tablet (80 mg total) by mouth every evening., Disp: 90 tablet, Rfl: 3    azelastine (ASTELIN) 137 mcg (0.1 %) nasal spray, Two sprays in each nostril, sniff until absorbed, then follow with 1 spray of fluticasone.  Use both sprays twice daily. (Patient taking differently: Two sprays in each nostril, sniff until absorbed, then follow with 1 spray of fluticasone.  Use both sprays twice daily.(PATIENT USES NIGHTLY 3/12/2021)), Disp: 30 mL, Rfl: 11    budesonide-formoterol 160-4.5 mcg (SYMBICORT) 160-4.5 mcg/actuation HFAA, INHALE 2 PUFFS INTO THE LUNGS EVERY 12 HOURS, Disp: 10.2 g, Rfl: 12    calcium citrate-vitamin D3 315-200 mg (CITRACAL+D)  315-200 mg-unit per tablet, Take 1 tablet by mouth nightly. , Disp: , Rfl:     clopidogreL (PLAVIX) 75 mg tablet, Take 1 tablet (75 mg total) by mouth once daily., Disp: 90 tablet, Rfl: 3    cyanocobalamin, vitamin B-12, 50 mcg tablet, Take 50 mcg by mouth nightly. , Disp: , Rfl:     docusate sodium (COLACE) 100 MG capsule, Take 1 tablet by mouth Twice daily. 1 Capsule Oral Twice a day .  Take with pain medicine, Disp: , Rfl:     doxazosin (CARDURA) 1 MG tablet, TAKE 1 TABLET BY MOUTH EVERY EVENING, Disp: 90 tablet, Rfl: 3    flu vacc [id number] 6mos up,PF, (FLUARIX QUAD [id number], PF,) 60 mcg (15 mcg x 4)/0.5 mL Syrg, inject into muscle for one dose, Disp: 0.5 mL, Rfl: 0    fluticasone propionate (FLONASE) 50 mcg/actuation nasal spray, One spray in each nostril twice daily after 1st using azelastine nasal spray, Disp: 18.2 mL, Rfl: 11    furosemide (LASIX) 20 MG tablet, Take 1 tablet by mouth once daily, Disp: 90 tablet, Rfl: 0    ipratropium (ATROVENT) 42 mcg (0.06 %) nasal spray, 1-2 sprays in each nostril before eating and at bedtime as needed, Disp: 30 mL, Rfl: 11    methocarbamoL (ROBAXIN) 500 MG Tab, Take 1 tablet (500 mg total) by mouth every 8 (eight) hours as needed (muscle pain)., Disp: 30 tablet, Rfl: 0    promethazine (PHENERGAN) 25 MG tablet, Take 1 tablet (25 mg total) by mouth every 6 (six) hours as needed for Nausea., Disp: 20 tablet, Rfl: 0    RABEprazole (ACIPHEX) 20 mg tablet, TAKE 1 TABLET BY MOUTH EVERY 12 HOURS, Disp: 180 tablet, Rfl: 0    zolpidem (AMBIEN) 10 mg Tab, TAKE 1 TABLET BY MOUTH EVERY DAY AT BEDTIME, Disp: 20 tablet, Rfl: 0    diclofenac sodium (VOLTAREN) 1 % Gel, Apply 2 g topically 4 (four) times daily. for 14 days (Patient not taking: Reported on 7/16/2024), Disp: 100 g, Rfl: 0    tadalafiL (CIALIS) 20 MG Tab, Take 1 tablet (20 mg total) by mouth as needed. Take every 36 hours as needed for ED., Disp: 30 tablet, Rfl: 9    testosterone (ANDRODERM) 2 mg/24 hour PT24, APPLY 1 PATCH  TOPICALLY TO THE SKIN EVERY DAY (Patient not taking: Reported on 7/17/2024), Disp: 60 patch, Rfl: 3  No current facility-administered medications for this visit.    Facility-Administered Medications Ordered in Other Visits:     0.9%  NaCl infusion, , Intravenous, Continuous, Milla Oliveira, NP, Last Rate: 70 mL/hr at 03/15/21 1417, New Bag at 03/15/21 1417    0.9%  NaCl infusion, , Intravenous, Continuous, Milla Oliveira NP, Last Rate: 70 mL/hr at 12/10/21 0736, New Bag at 12/10/21 0736    0.9%  NaCl infusion, , Intravenous, Continuous, Jaqueline Langley NP    0.9%  NaCl infusion, , Intravenous, Continuous, Beto Whiteside MD, Last Rate: 50 mL/hr at 04/29/24 1359, 50 mL/hr at 04/29/24 1359    mupirocin 2 % ointment, , Nasal, On Call Procedure, Milla Oliveira NP, Given at 12/10/21 0729    mupirocin 2 % ointment, , Nasal, On Call Procedure, Jaqueline Langley NP, Given at 02/25/22 0623    Review of Systems   Constitutional:  Negative for activity change, appetite change, chills, diaphoresis, fatigue, fever and unexpected weight change.   HENT:  Negative for congestion, dental problem, hearing loss, mouth sores, postnasal drip, rhinorrhea, sinus pressure and trouble swallowing.    Eyes:  Negative for pain, discharge and itching.   Respiratory:  Negative for apnea, cough, choking, chest tightness, shortness of breath and wheezing.    Cardiovascular:  Negative for chest pain, palpitations and leg swelling.   Gastrointestinal:  Negative for abdominal distention, abdominal pain, anal bleeding, blood in stool, constipation, diarrhea, nausea, rectal pain and vomiting.   Endocrine: Negative for polydipsia and polyuria.   Genitourinary:  Negative for decreased urine volume, difficulty urinating, dysuria, enuresis, flank pain, frequency, genital sores, hematuria, penile discharge, penile pain, penile swelling, scrotal swelling, testicular pain and urgency.   Musculoskeletal:  Negative for arthralgias,  back pain and myalgias.   Skin:  Negative for color change, rash and wound.   Neurological:  Negative for dizziness, syncope, speech difficulty, light-headedness and headaches.   Hematological:  Negative for adenopathy. Does not bruise/bleed easily.   Psychiatric/Behavioral:  Negative for behavioral problems, confusion, hallucinations and sleep disturbance.        Objective:      Physical Exam  Constitutional:       Appearance: He is well-developed.   HENT:      Head: Normocephalic.   Cardiovascular:      Rate and Rhythm: Normal rate.   Pulmonary:      Effort: Pulmonary effort is normal.   Abdominal:      Palpations: Abdomen is soft.   Genitourinary:     Prostate: Normal.   Skin:     General: Skin is warm.   Neurological:      Mental Status: He is alert.         Assessment:       1. Groin pain, chronic, right    2. Elevated PSA        Plan:       Bri was seen today for prostate cancer.    Diagnoses and all orders for this visit:    Groin pain, chronic, right    Elevated PSA    No obvious hernias a palpable rectal exam is normal.  Will phone review ultrasound and I will see him back in 1 year with a PSA

## 2024-07-23 DIAGNOSIS — N52.9 ERECTILE DYSFUNCTION, UNSPECIFIED ERECTILE DYSFUNCTION TYPE: ICD-10-CM

## 2024-07-23 RX ORDER — TADALAFIL 20 MG/1
20 TABLET ORAL
Qty: 30 TABLET | Refills: 9 | Status: SHIPPED | OUTPATIENT
Start: 2024-07-23 | End: 2025-07-23

## 2024-07-24 ENCOUNTER — OFFICE VISIT (OUTPATIENT)
Dept: INTERNAL MEDICINE | Facility: CLINIC | Age: 64
End: 2024-07-24
Payer: MEDICARE

## 2024-07-24 VITALS
OXYGEN SATURATION: 98 % | SYSTOLIC BLOOD PRESSURE: 138 MMHG | WEIGHT: 240.06 LBS | HEIGHT: 71 IN | HEART RATE: 66 BPM | BODY MASS INDEX: 33.61 KG/M2 | DIASTOLIC BLOOD PRESSURE: 88 MMHG

## 2024-07-24 DIAGNOSIS — I10 PRIMARY HYPERTENSION: Primary | ICD-10-CM

## 2024-07-24 DIAGNOSIS — E78.49 OTHER HYPERLIPIDEMIA: ICD-10-CM

## 2024-07-24 DIAGNOSIS — E29.1 HYPOGONADISM MALE: ICD-10-CM

## 2024-07-24 DIAGNOSIS — N40.1 BENIGN LOCALIZED PROSTATIC HYPERPLASIA WITH LOWER URINARY TRACT SYMPTOMS (LUTS): ICD-10-CM

## 2024-07-24 PROCEDURE — 3008F BODY MASS INDEX DOCD: CPT | Mod: CPTII,S$GLB,, | Performed by: PHYSICIAN ASSISTANT

## 2024-07-24 PROCEDURE — 1159F MED LIST DOCD IN RCRD: CPT | Mod: CPTII,S$GLB,, | Performed by: PHYSICIAN ASSISTANT

## 2024-07-24 PROCEDURE — 3044F HG A1C LEVEL LT 7.0%: CPT | Mod: CPTII,S$GLB,, | Performed by: PHYSICIAN ASSISTANT

## 2024-07-24 PROCEDURE — 99999 PR PBB SHADOW E&M-EST. PATIENT-LVL IV: CPT | Mod: PBBFAC,,, | Performed by: PHYSICIAN ASSISTANT

## 2024-07-24 PROCEDURE — 3079F DIAST BP 80-89 MM HG: CPT | Mod: CPTII,S$GLB,, | Performed by: PHYSICIAN ASSISTANT

## 2024-07-24 PROCEDURE — G2211 COMPLEX E/M VISIT ADD ON: HCPCS | Mod: S$GLB,,, | Performed by: PHYSICIAN ASSISTANT

## 2024-07-24 PROCEDURE — 3075F SYST BP GE 130 - 139MM HG: CPT | Mod: CPTII,S$GLB,, | Performed by: PHYSICIAN ASSISTANT

## 2024-07-24 PROCEDURE — 99214 OFFICE O/P EST MOD 30 MIN: CPT | Mod: S$GLB,,, | Performed by: PHYSICIAN ASSISTANT

## 2024-07-24 NOTE — PROGRESS NOTES
INTERNAL MEDICINE PROGRESS NOTE    CHIEF COMPLAINT     Chief Complaint   Patient presents with    Follow-up       HPI     Bri Santiago is a 63 y.o. male who presents for an Follow-up visit today.    He presents today for BP check up. He was seen by Dr. Galeas just a few months ago for annual.     He has HTN that is well controlled on amlodipine 2.5 daily.     He has HLD that is manages with atorvastatin 80 mg daily.     He has seen urology for BPH on 7/17/2024.    He will see endocrine to manage testosterone - latest labs are in good range.     He has no new complaints today.       Past Medical History:  Past Medical History:   Diagnosis Date    Acute pancreatitis     Anal fissure     Anemia     Anticoagulant long-term use     Arthritis     Asthma in remission     Back pain     BPH (benign prostatic hypertrophy)     Cancer 2000    prostate- treated at [facility] with chemo- in remission since 2000    Chronic maxillary sinusitis     Clotting disorder     Diastolic dysfunction with chronic heart failure 12/3/2018    Dysphagia 10/7/2014    Family history of colon cancer     Family history of early CAD     GERD (gastroesophageal reflux disease)     Helicobacter pylori (H. pylori) infection     Chronic    History of chronic pancreatitis     HTN (hypertension)     Lumbago 11/12/2012    Obesity     ABDON (obstructive sleep apnea)     ABDON (obstructive sleep apnea)     With likely ohs Refer to sleep    Sacroiliac joint pain 2/10/2015    Spinal stenosis of lumbar region     Von Willebrand disease     VWD (acquired von Willebrand's disease)        Home Medications:  Prior to Admission medications    Medication Sig Start Date End Date Taking? Authorizing Provider   albuterol (PROVENTIL/VENTOLIN HFA) 90 mcg/actuation inhaler INHALE 2 PUFFS INTO THE LUNGS EVERY 6 HOURS AS NEEDED FOR WHEEZING OR SHORTNESS OF BREATH 3/9/20  Yes Geeta Hall MD   amLODIPine (NORVASC) 2.5 MG tablet Take 1 tablet (2.5 mg total) by mouth once daily.  7/16/24 7/16/25 Yes Cate Galeas MD   atorvastatin (LIPITOR) 80 MG tablet Take 1 tablet (80 mg total) by mouth every evening. 5/1/23  Yes Damian Liu MD   azelastine (ASTELIN) 137 mcg (0.1 %) nasal spray Two sprays in each nostril, sniff until absorbed, then follow with 1 spray of fluticasone.  Use both sprays twice daily.  Patient taking differently: Two sprays in each nostril, sniff until absorbed, then follow with 1 spray of fluticasone.  Use both sprays twice daily.(PATIENT USES NIGHTLY 3/12/2021) 2/11/21  Yes LAURENT Swanson MD   budesonide-formoterol 160-4.5 mcg (SYMBICORT) 160-4.5 mcg/actuation HFAA INHALE 2 PUFFS INTO THE LUNGS EVERY 12 HOURS 3/26/20  Yes Geeta Hall MD   calcium citrate-vitamin D3 315-200 mg (CITRACAL+D) 315-200 mg-unit per tablet Take 1 tablet by mouth nightly.    Yes Provider, Historical   clopidogreL (PLAVIX) 75 mg tablet Take 1 tablet (75 mg total) by mouth once daily. 5/3/24  Yes Winnie Jacob PA-C   cyanocobalamin, vitamin B-12, 50 mcg tablet Take 50 mcg by mouth nightly.    Yes Provider, Historical   docusate sodium (COLACE) 100 MG capsule Take 1 tablet by mouth Twice daily. 1 Capsule Oral Twice a day .  Take with pain medicine   Yes Provider, Historical   doxazosin (CARDURA) 1 MG tablet TAKE 1 TABLET BY MOUTH EVERY EVENING 5/31/23  Yes Cate Galeas MD   flu vacc [id number] 6mos up,PF, (FLUARIX QUAD [id number], PF,) 60 mcg (15 mcg x 4)/0.5 mL Syrg inject into muscle for one dose 10/12/23  Yes Leanna Ortiz, PharmD   fluticasone propionate (FLONASE) 50 mcg/actuation nasal spray One spray in each nostril twice daily after 1st using azelastine nasal spray 6/15/21  Yes LAURENT Swanson MD   furosemide (LASIX) 20 MG tablet Take 1 tablet by mouth once daily 7/15/24  Yes Winnie Jacob PA-C   ipratropium (ATROVENT) 42 mcg (0.06 %) nasal spray 1-2 sprays in each nostril before eating and at bedtime as needed 6/15/21  Yes LAURENT Swanson MD   methocarbamoL (ROBAXIN)  "500 MG Tab Take 1 tablet (500 mg total) by mouth every 8 (eight) hours as needed (muscle pain). 2/5/24  Yes Winnie Jacob PA-C   promethazine (PHENERGAN) 25 MG tablet Take 1 tablet (25 mg total) by mouth every 6 (six) hours as needed for Nausea. 11/7/23  Yes Fermín De Jesus PA-C   RABEprazole (ACIPHEX) 20 mg tablet TAKE 1 TABLET BY MOUTH EVERY 12 HOURS 7/12/24  Yes Rosendo Boyer MD   tadalafiL (CIALIS) 20 MG Tab Take 1 tablet (20 mg total) by mouth as needed. Take every 36 hours as needed for ED. 7/23/24 7/23/25 Yes Tyler Reid Jr., MD   testosterone (ANDRODERM) 2 mg/24 hour PT24 APPLY 1 PATCH TOPICALLY TO THE SKIN EVERY DAY 1/5/22  Yes Chris Min MD   zolpidem (AMBIEN) 10 mg Tab TAKE 1 TABLET BY MOUTH EVERY DAY AT BEDTIME 6/23/17  Yes Darvin Henry MD   diclofenac sodium (VOLTAREN) 1 % Gel Apply 2 g topically 4 (four) times daily. for 14 days  Patient not taking: Reported on 7/16/2024 1/9/24 1/23/24  Winnie Jacob PA-C   tamsulosin (FLOMAX) 0.4 mg Cap Take 1 capsule (0.4 mg total) by mouth once daily. 4/4/21 4/4/21  Darnell Lee MD       Review of Systems:  Review of Systems    Health Maintainence:   Immunizations:  Health Maintenance         Date Due Completion Date    Shingles Vaccine (1 of 2) Never done ---    RSV Vaccine (Age 60+ and Pregnant patients) (1 - 1-dose 60+ series) Never done ---    COVID-19 Vaccine (5 - 2023-24 season) 09/01/2023 12/8/2022    Influenza Vaccine (1) 09/01/2024 10/12/2023    Colorectal Cancer Screening 03/04/2025 3/4/2020    Aspirin/Antiplatelet Therapy 07/17/2025 7/17/2024    Hemoglobin A1c (Diabetic Prevention Screening) 07/16/2027 7/16/2024    TETANUS VACCINE 10/16/2027 10/16/2017    Lipid Panel 07/16/2029 7/16/2024             PHYSICAL EXAM     /88 (BP Location: Left arm, Patient Position: Sitting, BP Method: Large (Manual))   Pulse 66   Ht 5' 11" (1.803 m)   Wt 108.9 kg (240 lb 1.3 oz)   SpO2 98%   BMI 33.48 kg/m²     Physical " Exam    LABS     Lab Results   Component Value Date    HGBA1C 4.4 07/16/2024     CMP  Sodium   Date Value Ref Range Status   07/16/2024 136 136 - 145 mmol/L Final     Potassium   Date Value Ref Range Status   07/16/2024 4.0 3.5 - 5.1 mmol/L Final     Chloride   Date Value Ref Range Status   07/16/2024 101 95 - 110 mmol/L Final     CO2   Date Value Ref Range Status   07/16/2024 26 23 - 29 mmol/L Final     Glucose   Date Value Ref Range Status   07/16/2024 81 70 - 110 mg/dL Final     BUN   Date Value Ref Range Status   07/16/2024 12 8 - 23 mg/dL Final     Creatinine   Date Value Ref Range Status   07/16/2024 0.9 0.5 - 1.4 mg/dL Final     Calcium   Date Value Ref Range Status   07/16/2024 9.3 8.7 - 10.5 mg/dL Final     Total Protein   Date Value Ref Range Status   07/16/2024 7.6 6.0 - 8.4 g/dL Final     Albumin   Date Value Ref Range Status   07/16/2024 4.1 3.5 - 5.2 g/dL Final   05/03/2021 4.0 3.6 - 5.1 g/dL Final     Comment:     For additional information, please refer to   http://education.[facility]/faq/ALX773 (This link is   being provided for informational/ educational purposes only.)    This test was developed and its analytical performance   characteristics have been determined by [facility]. It has not been cleared or approved by the   [place] Food and Drug Administration. This assay has been validated   pursuant to the CLIA regulations and is used for clinical   purposes.  @ Test Performed By:  [facility]  Baudilio Loomis M.D.,   [address] [place] [id number]  CLIA  [id number]       Total Bilirubin   Date Value Ref Range Status   07/16/2024 1.2 (H) 0.1 - 1.0 mg/dL Final     Comment:     For infants and newborns, interpretation of results should be based  on gestational age, weight and in agreement with clinical  observations.    Premature Infant recommended reference ranges:  Up to 24 hours.............<8.0  mg/dL  Up to 48 hours............<12.0 mg/dL  3-5 days..................<15.0 mg/dL  6-29 days.................<15.0 mg/dL       Alkaline Phosphatase   Date Value Ref Range Status   07/16/2024 55 55 - 135 U/L Final     AST   Date Value Ref Range Status   07/16/2024 25 10 - 40 U/L Final     ALT   Date Value Ref Range Status   07/16/2024 24 10 - 44 U/L Final     Anion Gap   Date Value Ref Range Status   07/16/2024 9 8 - 16 mmol/L Final     eGFR if    Date Value Ref Range Status   07/19/2022 >60.0 >60 mL/min/1.73 m^2 Final     eGFR if non    Date Value Ref Range Status   07/19/2022 >60.0 >60 mL/min/1.73 m^2 Final     Comment:     Calculation used to obtain the estimated glomerular filtration  rate (eGFR) is the CKD-EPI equation.        Lab Results   Component Value Date    WBC 6.05 07/16/2024    HGB 14.9 07/16/2024    HCT 48.2 07/16/2024    MCV 98 07/16/2024     07/16/2024     Lab Results   Component Value Date    CHOL 189 07/16/2024    CHOL 125 01/23/2023    CHOL 128 07/08/2021     Lab Results   Component Value Date    HDL 44 07/16/2024    HDL 32 (L) 01/23/2023    HDL 35 (L) 07/08/2021     Lab Results   Component Value Date    LDLCALC 121.0 07/16/2024    LDLCALC 80.4 01/23/2023    LDLCALC 72.2 07/08/2021     Lab Results   Component Value Date    TRIG 120 07/16/2024    TRIG 63 01/23/2023    TRIG 104 07/08/2021     Lab Results   Component Value Date    CHOLHDL 23.3 07/16/2024    CHOLHDL 25.6 01/23/2023    CHOLHDL 27.3 07/08/2021     Lab Results   Component Value Date    TSH 1.007 07/16/2024    [id number] 6.3 06/15/2010       ASSESSMENT/PLAN     Bri Santiago is a 63 y.o. male     Bri was seen today for follow-up.    Diagnoses and all orders for this visit:    Primary hypertension   -continue current regimen     Hypogonadism male   -follow with Endo    Benign localized prostatic hyperplasia with lower urinary tract symptoms (LUTS)   -follow with urology     Other  hyperlipidemia   -continue statin     Patient was counseled on when and how to seek emergent care.       Winnie Jacob PA-C   Department of Internal Medicine - [facility]   11:53 AM

## 2024-07-31 DIAGNOSIS — G89.29 GROIN PAIN, CHRONIC, RIGHT: Primary | ICD-10-CM

## 2024-07-31 DIAGNOSIS — R10.31 GROIN PAIN, CHRONIC, RIGHT: Primary | ICD-10-CM

## 2024-08-02 ENCOUNTER — HOSPITAL ENCOUNTER (OUTPATIENT)
Dept: RADIOLOGY | Facility: HOSPITAL | Age: 64
Discharge: HOME | End: 2024-08-02
Attending: UROLOGY
Payer: MEDICARE

## 2024-08-02 DIAGNOSIS — R10.31 GROIN PAIN, CHRONIC, RIGHT: ICD-10-CM

## 2024-08-02 DIAGNOSIS — G89.29 GROIN PAIN, CHRONIC, RIGHT: ICD-10-CM

## 2024-08-02 PROCEDURE — 93975 VASCULAR STUDY: CPT | Mod: TC

## 2024-08-02 PROCEDURE — 76870 US EXAM SCROTUM: CPT | Mod: 26,,, | Performed by: INTERNAL MEDICINE

## 2024-08-02 PROCEDURE — 93975 VASCULAR STUDY: CPT | Mod: 26,,, | Performed by: INTERNAL MEDICINE

## 2024-08-05 ENCOUNTER — OFFICE VISIT (OUTPATIENT)
Dept: CARDIOLOGY | Facility: CLINIC | Age: 64
End: 2024-08-05
Payer: MEDICARE

## 2024-08-05 ENCOUNTER — TELEPHONE (OUTPATIENT)
Dept: UROLOGY | Facility: CLINIC | Age: 64
End: 2024-08-05
Payer: MEDICARE

## 2024-08-05 VITALS
DIASTOLIC BLOOD PRESSURE: 83 MMHG | HEIGHT: 71 IN | SYSTOLIC BLOOD PRESSURE: 130 MMHG | WEIGHT: 238.13 LBS | BODY MASS INDEX: 33.34 KG/M2 | HEART RATE: 80 BPM

## 2024-08-05 DIAGNOSIS — I20.2 REFRACTORY ANGINA PECTORIS: Primary | ICD-10-CM

## 2024-08-05 DIAGNOSIS — I10 PRIMARY HYPERTENSION: ICD-10-CM

## 2024-08-05 DIAGNOSIS — I25.10 CAD IN NATIVE ARTERY: ICD-10-CM

## 2024-08-05 DIAGNOSIS — E34.9 TESTOSTERONE DEFICIENCY: ICD-10-CM

## 2024-08-05 DIAGNOSIS — E78.5 HYPERLIPIDEMIA, UNSPECIFIED HYPERLIPIDEMIA TYPE: ICD-10-CM

## 2024-08-05 PROCEDURE — 99999 PR PBB SHADOW E&M-EST. PATIENT-LVL V: CPT | Mod: PBBFAC,GC,, | Performed by: STUDENT IN AN ORGANIZED HEALTH CARE EDUCATION/TRAINING PROGRAM

## 2024-08-05 RX ORDER — EZETIMIBE 10 MG/1
10 TABLET ORAL DAILY
Qty: 90 TABLET | Refills: 3 | Status: SHIPPED | OUTPATIENT
Start: 2024-08-05 | End: 2025-08-05

## 2024-08-21 ENCOUNTER — TELEPHONE (OUTPATIENT)
Dept: SURGERY | Facility: CLINIC | Age: 64
End: 2024-08-21
Payer: MEDICARE

## 2024-08-21 ENCOUNTER — OFFICE VISIT (OUTPATIENT)
Dept: INTERNAL MEDICINE | Facility: CLINIC | Age: 64
End: 2024-08-21
Payer: MEDICARE

## 2024-08-21 VITALS
HEART RATE: 68 BPM | SYSTOLIC BLOOD PRESSURE: 132 MMHG | DIASTOLIC BLOOD PRESSURE: 84 MMHG | OXYGEN SATURATION: 96 % | WEIGHT: 242.94 LBS | HEIGHT: 71 IN | BODY MASS INDEX: 34.01 KG/M2

## 2024-08-21 DIAGNOSIS — K40.90 RIGHT INGUINAL HERNIA: ICD-10-CM

## 2024-08-21 DIAGNOSIS — B30.9 VIRAL CONJUNCTIVITIS OF RIGHT EYE: ICD-10-CM

## 2024-08-21 DIAGNOSIS — H35.033 HYPERTENSIVE RETINOPATHY OF BOTH EYES: Primary | ICD-10-CM

## 2024-08-21 DIAGNOSIS — I10 PRIMARY HYPERTENSION: Chronic | ICD-10-CM

## 2024-08-21 DIAGNOSIS — D68.00 VON WILLEBRAND DISEASE: Chronic | ICD-10-CM

## 2024-08-21 DIAGNOSIS — M46.96 UNSPECIFIED INFLAMMATORY SPONDYLOPATHY, LUMBAR REGION: ICD-10-CM

## 2024-08-21 PROCEDURE — 99214 OFFICE O/P EST MOD 30 MIN: CPT | Mod: S$GLB,,, | Performed by: INTERNAL MEDICINE

## 2024-08-21 PROCEDURE — 1159F MED LIST DOCD IN RCRD: CPT | Mod: CPTII,S$GLB,, | Performed by: INTERNAL MEDICINE

## 2024-08-21 PROCEDURE — 99999 PR PBB SHADOW E&M-EST. PATIENT-LVL V: CPT | Mod: PBBFAC,,, | Performed by: INTERNAL MEDICINE

## 2024-08-21 PROCEDURE — 3079F DIAST BP 80-89 MM HG: CPT | Mod: CPTII,S$GLB,, | Performed by: INTERNAL MEDICINE

## 2024-08-21 PROCEDURE — 3044F HG A1C LEVEL LT 7.0%: CPT | Mod: CPTII,S$GLB,, | Performed by: INTERNAL MEDICINE

## 2024-08-21 PROCEDURE — 3075F SYST BP GE 130 - 139MM HG: CPT | Mod: CPTII,S$GLB,, | Performed by: INTERNAL MEDICINE

## 2024-08-21 PROCEDURE — 3008F BODY MASS INDEX DOCD: CPT | Mod: CPTII,S$GLB,, | Performed by: INTERNAL MEDICINE

## 2024-08-21 RX ORDER — AMLODIPINE BESYLATE 5 MG/1
5 TABLET ORAL DAILY
Qty: 90 TABLET | Refills: 3 | Status: SHIPPED | OUTPATIENT
Start: 2024-08-21 | End: 2025-08-21

## 2024-08-21 NOTE — PROGRESS NOTES
Subjective:       Patient ID: Bri Santiago is a 63 y.o. male.    Chief Complaint: Hypertension (Follow up)     Bri Santiago is a 63 y.o.  male who presents for Hypertension (Follow up)  .  Current back pain is 4/10, due for his nerve blocks and this is being scheduled  Bp has jan in the 130s /80s-     Needs surgeon for r inguinal hernia repair.     C/o red eye, constant tearing but now crusting in morning.  No pain, no visual changes. Last optometry apt over 1 year ago. Reports chronic constant tearing. Not using drop has dx of dry eye       Patient Active Problem List   Diagnosis    Asthma in remission    Von Willebrand disease    Primary hypertension    Spondylosis without myelopathy    Thoracic or lumbosacral neuritis or radiculitis, unspecified    Spinal stenosis, lumbar region, without neurogenic claudication    Degeneration of lumbar or lumbosacral intervertebral disc    Acquired spondylolisthesis    Pseudoarthrosis    Family history of colon cancer    Vitamin D deficiency    Atypical chest pain    Encounter for monitoring long-term proton pump inhibitor therapy    Postlaminectomy syndrome of lumbar region    Myalgia and myositis    Facet syndrome    Gastroesophageal reflux disease without esophagitis    Osteopenia    Thoracic aorta atherosclerosis    Benign prostatic hyperplasia with urinary obstruction    Allergic rhinitis    Allergic conjunctivitis of both eyes    Encounter for long-term current use of high risk medication    Gastroesophageal reflux disease with esophagitis    Hematochezia    Nasal septal deviation    Dysphagia    Laryngopharyngeal reflux (LPR)    Diastolic dysfunction with chronic heart failure    At risk for cardiovascular event    Hypoxia    Chronic pulmonary heart disease    Class 1 obesity due to excess calories with serious comorbidity and body mass index (BMI) of 33.0 to 33.9 in adult    Moderate persistent asthma    Family history of prostate cancer in father    Chronic pain     Nocturia    Scrotal swelling    Chronic bilateral low back pain without sciatica    Chronic maxillary sinusitis    Nonintractable episodic headache    Osteoarthritis of lumbar spine    Gynecomastia, male    Iron deficiency anemia    Obstructive sleep apnea treated with continuous positive airway pressure (CPAP)    Postnasal drip    GERD (gastroesophageal reflux disease)    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    Dry skin dermatitis    Globus sensation    Throat clearing    Non-occlusive coronary artery disease requiring drug therapy    Unspecified inflammatory spondylopathy, lumbar region    Erectile dysfunction    Chronic prostatitis    Dysuria    Impaired functional mobility and activity tolerance    Decreased ROM of trunk and back    Decreased functional mobility and endurance    Weakness    Other hyperlipidemia    Neck pain    Arm weakness    Acute pain of left knee           Past Medical History:   Diagnosis Date    Acute pancreatitis     Anal fissure     Anemia     Anticoagulant long-term use     Arthritis     Asthma in remission     Back pain     BPH (benign prostatic hypertrophy)     Cancer 2000    prostate- treated at [facility] with chemo- in remission since 2000    Chronic maxillary sinusitis     Clotting disorder     Diastolic dysfunction with chronic heart failure 12/3/2018    Dysphagia 10/7/2014    Family history of colon cancer     Family history of early CAD     GERD (gastroesophageal reflux disease)     Helicobacter pylori (H. pylori) infection     Chronic    History of chronic pancreatitis     HTN (hypertension)     Lumbago 11/12/2012    Obesity     ABDON (obstructive sleep apnea)     ABDON (obstructive sleep apnea)     With likely ohs Refer to sleep    Sacroiliac joint pain 2/10/2015    Spinal stenosis of lumbar region     Von Willebrand disease     VWD (acquired von Willebrand's disease)        Past Surgical History:   Procedure Laterality Date    anal fissure repair      x2     ARTHROSCOPIC CHONDROPLASTY OF KNEE JOINT Left 11/8/2023    Procedure: ARTHROSCOPY, KNEE, WITH CHONDROPLASTY;  Surgeon: Karthik Tanner MD;  Location: [facility] OR;  Service: Orthopedics;  Laterality: Left;    BACK SURGERY  2012    BALLOON SINUPLASTY OF PARANASAL SINUS Bilateral 10/7/2019    Procedure: SINUPLASTY, USING BALLOON;  Surgeon: LAURENT Swanson MD;  Location: [facility] OR;  Service: ENT;  Laterality: Bilateral;    CARPAL TUNNEL RELEASE  2003    left hand    CATHETERIZATION OF BOTH LEFT AND RIGHT HEART N/A 2/14/2019    Procedure: CATHETERIZATION, HEART, BOTH LEFT AND RIGHT;  Surgeon: Kyle Magana MD;  Location: [facility] CATH LAB;  Service: Cardiology;  Laterality: N/A;    CERVICAL DISCECTOMY  2003    COLONOSCOPY N/A 10/7/2015    Procedure: COLONOSCOPY;  Surgeon: Rosendo Boyer MD;  Location: [facility] (4TH FLR);  Service: Endoscopy;  Laterality: N/A;  PM Prep    COLONOSCOPY N/A 6/19/2017    Procedure: COLONOSCOPY;  Surgeon: Rosendo Boyer MD;  Location: [facility] (4TH FLR);  Service: Endoscopy;  Laterality: N/A;  constipation prep (no DM no CHF)       hx of vonWillebrand's disease-will need infusion prior    COLONOSCOPY N/A 3/4/2020    Procedure: COLONOSCOPY;  Surgeon: Rosendo Boyer MD;  Location: [facility] (4TH FLR);  Service: Endoscopy;  Laterality: N/A;  Please order CBC, ferritin, Iron TIBC day of case per Dr. Boyer  labwork at 7:10am and patient will check in right after.  per Dr. Tyler patient can hold Plavix 5 days prior see note 1/7/20  DDAVP prior to procedure needed see note 1/16/20 for oncall med by Dr. Tyler -     CYSTOSCOPY  8/12/2022    Procedure: CYSTOSCOPY;  Surgeon: Tyler Reid Jr., MD;  Location: [facility] 1ST FLR;  Service: Urology;;    ESOPHAGOGASTRODUODENOSCOPY N/A 3/4/2020    Procedure: EGD (ESOPHAGOGASTRODUODENOSCOPY);  Surgeon: Rosendo Boyer MD;  Location: [facility] (4TH FLR);  Service: Endoscopy;  Laterality: N/A;  EGD and Colonoscopy orders merged  together  labwork at 7:10am and patient will check in right after.  DDAVP prior to procedure needed see note 1/16/20 for oncall med  per Dr. Tyler patient can hold Plavix 5 days prior see note 1/7/20    ESOPHAGOGASTRODUODENOSCOPY N/A 11/3/2020    Procedure: EGD (ESOPHAGOGASTRODUODENOSCOPY);  Surgeon: Rosendo Boyer MD;  Location: [facility] (2ND FLR);  Service: Endoscopy;  Laterality: N/A;  Please recall pt has von Willebrands and will require DDVAP infusion prior to procedure as previously done.    see telephone encounter on 10/1/20 regarding DDAVP   ok to hold Plavix 5 days prior per Dr.Blessey BUCKNER screening on 10/31/20 -rb    ESOPHAGOGASTRODUODENOSCOPY N/A 5/31/2023    Procedure: EGD (ESOPHAGOGASTRODUODENOSCOPY);  Surgeon: Rosendo Boyer MD;  Location: [facility] (4TH FLR);  Service: Endoscopy;  Laterality: N/A;  cardiac clearnce-office note 5/1, ok to hold plavix-tele encounter 5/2-LW  5/11/23 r/s'MD noelle booked out. instr portal -ml  5/25 - precall attempted. no answer. MML    EXAMINATION UNDER ANESTHESIA N/A 3/15/2021    Procedure: EXAM UNDER ANESTHESIA;  Surgeon: Silvia Cazares MD;  Location: [facility] OR 2ND FLR;  Service: Colon and Rectal;  Laterality: N/A;    EXAMINATION UNDER ANESTHESIA N/A 2/25/2022    Procedure: EXAM UNDER ANESTHESIA, EXCISION ANAL PAPILLA;  Surgeon: Nadeem Grady MD;  Location: [facility] 2ND FLR;  Service: Colon and Rectal;  Laterality: N/A;    FUNCTIONAL ENDOSCOPIC SINUS SURGERY (FESS) USING COMPUTER-ASSISTED NAVIGATION Bilateral 10/7/2019    Procedure: FESS, USING COMPUTER-ASSISTED NAVIGATION;  Surgeon: LAURENT Swanson MD;  Location: [facility] OR;  Service: ENT;  Laterality: Bilateral;    INJECTION OF ANESTHETIC AGENT AROUND NERVE Bilateral 10/13/2020    Procedure: BLOCK, NERVE BILATERAL C4,C5,C6 Plavix clearance requested;  Surgeon: Fredy Magana MD;  Location: [facility] PAIN MGT;  Service: Pain Management;  Laterality: Bilateral;  BLOCK, NERVE BILATERAL C4,C5,C6  Plavix clearance  ok, will require DDVAP infusion    KNEE ARTHROSCOPY W/ MENISCECTOMY Left 11/8/2023    Procedure: ARTHROSCOPY, KNEE, WITH PARTIAL LATERAL MENISCECTOMY;  Surgeon: Karthik Tanner MD;  Location: [facility];  Service: Orthopedics;  Laterality: Left;  regional w/o catheter (adductor)    LATERAL INTERNAL ANAL SPHINCTEROTOMY N/A 12/10/2021    Procedure: SPHINCTEROTOMY-LATERAL INTERNAL ANAL;  Surgeon: Nadeem Grady MD;  Location: [facility] 2ND FLR;  Service: Colon and Rectal;  Laterality: N/A;    LEFT HEART CATHETERIZATION Left 2/14/2019    Procedure: Left heart cath;  Surgeon: Kyle Magana MD;  Location: [facility] CATH LAB;  Service: Cardiology;  Laterality: Left;    LUMBAR FUSION  2012    PH MONITORING, ESOPHAGUS, WIRELESS, (ON REFLUX MEDS) N/A 5/31/2023    Procedure: PH MONITORING, ESOPHAGUS, WIRELESS, (ON REFLUX MEDS);  Surgeon: Rosendo Boyer MD;  Location: [facility] ENDO (4TH FLR);  Service: Endoscopy;  Laterality: N/A;  96hr, on his AcipHex 20 mg every 12 hours, instructions portal-LW    RADIOFREQUENCY ABLATION Right 5/9/2019    Procedure: RADIOFREQUENCY ABLATION, RIGHT L3,L4,L5;  Surgeon: Fredy Magana MD;  Location: [facility] PAIN MGT;  Service: Pain Management;  Laterality: Right;  1 of 2  RT RFA L3,4,5  PLAVIX clearance received, pt needs DDAVP    RADIOFREQUENCY ABLATION Left 5/23/2019    Procedure: RADIOFREQUENCY ABLATION, LEFT L3,L4,L5;  Surgeon: Fredy Magana MD;  Location: [facility] PAIN MGT;  Service: Pain Management;  Laterality: Left;  2 of 2  LT RFA L3,4,5  PLAVIX clearance received, pt needs DDAVP    RADIOFREQUENCY ABLATION Left 1/16/2020    Procedure: RADIOFREQUENCY ABLATION LEFT L3, L4, L5 MEDIAL BRANCH 1 OF 2 **PATIENT ARRIVING AT 8 AM**;  Surgeon: Fredy Magana MD;  Location: [facility] PAIN MGT;  Service: Pain Management;  Laterality: Left;  NEEDS CONSENT, PLAVIX CLEARANCE IN CHART    RADIOFREQUENCY ABLATION Right 1/28/2020    Procedure: RADIOFREQUENCY ABLATION, RIGHT L3-L4-L5 MEDIAL BRANCH 2 OF 2  (Left done on 1/16/20);  Surgeon: Fredy Magana MD;  Location: [facility] PAIN MGT;  Service: Pain Management;  Laterality: Right;    RADIOFREQUENCY ABLATION Left 8/18/2020    Procedure: RADIOFREQUENCY ABLATION, L3-L4-L5 MEDIAL BRANCH 1 OF 2 clear to hold plavix 7 days;  Surgeon: Fredy Magana MD;  Location: [facility] PAIN MGT;  Service: Pain Management;  Laterality: Left;    RADIOFREQUENCY ABLATION Right 9/1/2020    Procedure: RADIOFREQUENCY ABLATION, L3-L4-L5 MEDIAL BR ANCH 2 OF 2 clear to hol,d Plavix 7 days;  Surgeon: Fredy Magana MD;  Location: [facility] PAIN MGT;  Service: Pain Management;  Laterality: Right;    RADIOFREQUENCY ABLATION Bilateral 12/21/2021    Procedure: RADIOFREQUENCY ABLATION B/L L3,4,5 RFA (give dDAVP prior) NEEDS CONSENT plavix ok x7;  Surgeon: Fredy Magana MD;  Location: [facility] PAIN MGT;  Service: Pain Management;  Laterality: Bilateral;    RADIOFREQUENCY ABLATION Left 11/21/2022    Procedure: RADIOFREQUENCY ABLATION LEFT L3,L4,L5 MUST HAVE dDVAP PRIOR ONE OF TWO;  Surgeon: Milo Winston MD;  Location: [facility] PAIN MGT;  Service: Pain Management;  Laterality: Left;    RADIOFREQUENCY ABLATION Right 12/5/2022    Procedure: RADIOFREQUENCY ABLATION RIGHT L3,L4,L5  PRE-MEDICATE WITH dDVAP TWO OF TWO;  Surgeon: Milo Winston MD;  Location: [facility] PAIN MGT;  Service: Pain Management;  Laterality: Right;    RADIOFREQUENCY ABLATION Bilateral 4/29/2024    Procedure: RADIOFREQUENCY ABLATION BILATERAL L3, 4, 5 *DDAVP BEFORE*;  Surgeon: Milo Winston MD;  Location: [facility] PAIN MGT;  Service: Pain Management;  Laterality: Bilateral;  [phone]  4 WK F/U CHANDRA    RADIOFREQUENCY ABLATION OF LUMBAR MEDIAL BRANCH NERVE AT SINGLE LEVEL Left 5/24/2018    Procedure: RADIOFREQUENCY THERMOCOAGULATION (RFTC)-NERVE-MEDIAN BRANCH-LUMBAR;  Surgeon: Fredy Magana MD;  Location: [facility] PAIN MGT;  Service: Pain Management;  Laterality: Left;  Left RFA @ L3,4,5  [id number]  with IV Sedation    2 of 2    RETROGRADE  PYELOGRAPHY Bilateral 8/12/2022    Procedure: PYELOGRAM, RETROGRADE;  Surgeon: Tyler Reid Jr., MD;  Location: [facility] OR [address] [place] [address];  Service: Urology;  Laterality: Bilateral;    SPINE SURGERY      TONSILLECTOMY      at age 24    TRANSFORAMINAL EPIDURAL INJECTION OF STEROID Bilateral 6/19/2023    Procedure: INJECTION, STEROID, EPIDURAL, TRANSFORAMINAL APPROACH,BILATERAL T7-T8*Give 0.3mcg/kg DDAVP immediately prior to the procedure  CLEAR TO HOLD PLAVIX 7 DAYS;  Surgeon: Milo Winston MD;  Location: [facility] PAIN MGT;  Service: Pain Management;  Laterality: Bilateral;    URETEROSCOPY Bilateral 8/12/2022    Procedure: URETEROSCOPY;  Surgeon: Tyler Reid Jr., MD;  Location: [facility] OR [address] [place] [address];  Service: Urology;  Laterality: Bilateral;    VASECTOMY  1996       Family History   Problem Relation Name Age of Onset    Colon cancer Father Pato Santiago 67        colon cancer    Hypertension Father Pato Santiago     Glaucoma Father Pato Famre     Cancer Father Pato Santiago     Colon cancer Paternal Grandfather  65             Coronary artery disease Mother Eula Famre 45    Hypertension Mother Eula Jack     Heart disease Mother Eula Jack     Colon cancer Mother Eula Famre     Diabetes Mother Eula Famre     No Known Problems Brother Humble     No Known Problems Sister Pat     No Known Problems Daughter Keyoka     No Known Problems Son Cosme     Coronary artery disease Brother Juju 51    No Known Problems Daughter Shakierria     No Known Problems Daughter Rosa     No Known Problems Son Beto     No Known Problems Son Moravian     Colon cancer Paternal Uncle  65    Diabetes Mellitus Paternal Grandmother      Esophageal cancer Neg Hx         Social History     Tobacco Use    Smoking status: Never    Smokeless tobacco: Never   Substance Use Topics    Alcohol use: Yes     Alcohol/week: 1.0 standard drink of alcohol     Types: 1 Glasses of wine per week     Comment: social    Drug use: No         Review  "of Systems      Objective:   Blood pressure 132/84, pulse 68, height 5' 11" (1.803 m), weight 110.2 kg (242 lb 15.2 oz), SpO2 96%.     Physical Exam  Constitutional:       Appearance: He is obese.   HENT:      Head: Normocephalic and atraumatic.   Eyes:      General: Lids are normal.      Comments: Right eye with injected sclera, + small tyrigium, no crusting, + watery    Cardiovascular:      Rate and Rhythm: Normal rate and regular rhythm.   Pulmonary:      Effort: Pulmonary effort is normal.      Breath sounds: Normal breath sounds. No wheezing or rales.   Abdominal:      Palpations: Abdomen is soft.   Neurological:      Mental Status: He is alert and oriented to person, place, and time.   Psychiatric:         Mood and Affect: Mood normal.         Thought Content: Thought content normal.         Prior labs reviewed    Health Maintenance         Date Due Completion Date    Shingles Vaccine (1 of 2) Never done ---    RSV Vaccine (Age 60+ and Pregnant patients) (1 - 1-dose 60+ series) Never done ---    COVID-19 Vaccine (5 - 2023-24 season) 09/01/2023 12/8/2022    Influenza Vaccine (1) 09/01/2024 10/12/2023    Colorectal Cancer Screening 03/04/2025 3/4/2020    Aspirin/Antiplatelet Therapy 08/05/2025 8/5/2024    Hemoglobin A1c (Diabetic Prevention Screening) 07/16/2027 7/16/2024    TETANUS VACCINE 10/16/2027 10/16/2017    Lipid Panel 07/16/2029 7/16/2024            Assessment/Plan:       1. Hypertensive retinopathy of both eyes  -     Ambulatory referral/consult to Ophthalmology; Future; Expected date: 08/28/2024  Strict bp control- follow annual with eye exam    2. Primary hypertension  Assessment & Plan:  Increase amlodipine to 5 mg caily, cont lasix daily  Call if symptoms of hypotension   Goal BP ,120/80     Orders:  -     amLODIPine (NORVASC) 5 MG tablet; Take 1 tablet (5 mg total) by mouth once daily.  Dispense: 90 tablet; Refill: 3    3. Right inguinal hernia  -     Ambulatory referral/consult to General " Surgery; Future; Expected date: 08/28/2024  Refer to dr bailey  Er prompts given  Will need DDAVP prior to sx    4. Von Willebrand disease  Overview:  Recommend DDAVP   infusion , 20 mcg, to be given over 30 minutes, 1 hr before the procedure.      5. Viral conjunctivitis of right eye  Comments:  warm damp cloth, if chronic crusting will contact clinic  Orders:  -     Ambulatory referral/consult to Ophthalmology; Future; Expected date: 08/28/2024    6. Unspecified inflammatory spondylopathy, lumbar region  Followed by pain management  Cont current plan          Medication List with Changes/Refills   Current Medications    ALBUTEROL (PROVENTIL/VENTOLIN HFA) 90 MCG/ACTUATION INHALER    INHALE 2 PUFFS INTO THE LUNGS EVERY 6 HOURS AS NEEDED FOR WHEEZING OR SHORTNESS OF BREATH    ATORVASTATIN (LIPITOR) 80 MG TABLET    Take 1 tablet (80 mg total) by mouth every evening.    AZELASTINE (ASTELIN) 137 MCG (0.1 %) NASAL SPRAY    Two sprays in each nostril, sniff until absorbed, then follow with 1 spray of fluticasone.  Use both sprays twice daily.    BUDESONIDE-FORMOTEROL 160-4.5 MCG (SYMBICORT) 160-4.5 MCG/ACTUATION HFAA    INHALE 2 PUFFS INTO THE LUNGS EVERY 12 HOURS    CALCIUM CITRATE-VITAMIN D3 315-200 MG (CITRACAL+D) 315-200 MG-UNIT PER TABLET    Take 1 tablet by mouth nightly.     CLOPIDOGREL (PLAVIX) 75 MG TABLET    Take 1 tablet (75 mg total) by mouth once daily.    CYANOCOBALAMIN, VITAMIN B-12, 50 MCG TABLET    Take 50 mcg by mouth nightly.     DOCUSATE SODIUM (COLACE) 100 MG CAPSULE    Take 1 tablet by mouth Twice daily. 1 Capsule Oral Twice a day .  Take with pain medicine    DOXAZOSIN (CARDURA) 1 MG TABLET    TAKE 1 TABLET BY MOUTH EVERY EVENING    EZETIMIBE (ZETIA) 10 MG TABLET    Take 1 tablet (10 mg total) by mouth once daily.    FLUTICASONE PROPIONATE (FLONASE) 50 MCG/ACTUATION NASAL SPRAY    One spray in each nostril twice daily after 1st using azelastine nasal spray    FUROSEMIDE (LASIX) 20 MG TABLET     Take 1 tablet by mouth once daily    IPRATROPIUM (ATROVENT) 42 MCG (0.06 %) NASAL SPRAY    1-2 sprays in each nostril before eating and at bedtime as needed    METHOCARBAMOL (ROBAXIN) 500 MG TAB    Take 1 tablet (500 mg total) by mouth every 8 (eight) hours as needed (muscle pain).    RABEPRAZOLE (ACIPHEX) 20 MG TABLET    TAKE 1 TABLET BY MOUTH EVERY 12 HOURS    TADALAFIL (CIALIS) 20 MG TAB    Take 1 tablet (20 mg total) by mouth as needed. Take every 36 hours as needed for ED.    TESTOSTERONE (ANDRODERM) 2 MG/24 HOUR PT24    APPLY 1 PATCH TOPICALLY TO THE SKIN EVERY DAY    ZOLPIDEM (AMBIEN) 10 MG TAB    TAKE 1 TABLET BY MOUTH EVERY DAY AT BEDTIME   Changed and/or Refilled Medications    Modified Medication Previous Medication    AMLODIPINE (NORVASC) 5 MG TABLET amLODIPine (NORVASC) 2.5 MG tablet       Take 1 tablet (5 mg total) by mouth once daily.    Take 1 tablet (2.5 mg total) by mouth once daily.       Recommend patient complete vaccinations as listed in the overdue health maintenance report. Pt may obtain vaccinations at their local pharmacy, any [facility] pharmacy or request vaccination in our clinic.  Please note that some insurances will only cover vaccination cost at specific locations.Please check with your insurance provider regarding your coverage.  Vaccines that are not covered are still recommended.         minutes spent in care of patient including history, physical, chart review, orders and coordination of care.

## 2024-08-21 NOTE — ASSESSMENT & PLAN NOTE
Increase amlodipine to 5 mg caily, cont lasix daily  Call if symptoms of hypotension   Goal BP ,120/80

## 2024-08-21 NOTE — PROGRESS NOTES
Subjective:       Patient ID: Bri Santiago is a 63 y.o. male.    Chief Complaint: Annual Exam     Bri Santiago is a 63 y.o.  male who presents for  annual exam  Complains of fatigue, often tired when he wakes up   .  Walks daily for exercise  Reports fatigue most days, wakes fatigued, worse after exercise.    Reports headache starting about a month ago, comes and goes, improves with tylenol, 4/10. No time of day in particular, does not awaken him at night.    Reports dizziness with standing yesterday morning, did not reoccur, was fine this morning.     Reports off his testosterone for two months, his endocrinologist has moved. Was treated with androderm.   Was diagnosed with prostate cancer in 1994[facility]       Hx of htn but off meds for some time, has elevated pressures mostly when he is in pain.  Reports BP at [facility] well controlled.       Patient has hyperlipidemia. Pt is complaint with risk reduction medication. Denies muscle cramping and weakness. Pt attempts to follow a low cholesterol diet and exercise. No CP, SOB, orthopnea or PND.     Pain level 6/10   Using his cpap nightly. No issues.        Patient Active Problem List   Diagnosis    Asthma in remission    Von Willebrand disease    Primary hypertension    Spondylosis without myelopathy    Thoracic or lumbosacral neuritis or radiculitis, unspecified    Spinal stenosis, lumbar region, without neurogenic claudication    Degeneration of lumbar or lumbosacral intervertebral disc    Acquired spondylolisthesis    Pseudoarthrosis    Family history of colon cancer    Vitamin D deficiency    Atypical chest pain    Encounter for monitoring long-term proton pump inhibitor therapy    Postlaminectomy syndrome of lumbar region    Myalgia and myositis    Facet syndrome    Gastroesophageal reflux disease without esophagitis    Osteopenia    Thoracic aorta atherosclerosis    Benign prostatic hyperplasia with urinary obstruction    Allergic rhinitis     Allergic conjunctivitis of both eyes    Encounter for long-term current use of high risk medication    Gastroesophageal reflux disease with esophagitis    Hematochezia    Nasal septal deviation    Dysphagia    Laryngopharyngeal reflux (LPR)    Diastolic dysfunction with chronic heart failure    At risk for cardiovascular event    Hypoxia    Chronic pulmonary heart disease    Class 1 obesity due to excess calories with serious comorbidity and body mass index (BMI) of 33.0 to 33.9 in adult    Moderate persistent asthma    Family history of prostate cancer in father    Chronic pain    Nocturia    Scrotal swelling    Chronic bilateral low back pain without sciatica    Chronic maxillary sinusitis    Nonintractable episodic headache    Osteoarthritis of lumbar spine    Gynecomastia, male    Iron deficiency anemia    Obstructive sleep apnea treated with continuous positive airway pressure (CPAP)    Postnasal drip    GERD (gastroesophageal reflux disease)    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    Dry skin dermatitis    Globus sensation    Throat clearing    Non-occlusive coronary artery disease requiring drug therapy    Unspecified inflammatory spondylopathy, lumbar region    Erectile dysfunction    Chronic prostatitis    Dysuria    Impaired functional mobility and activity tolerance    Decreased ROM of trunk and back    Decreased functional mobility and endurance    Weakness    Other hyperlipidemia    Neck pain    Arm weakness    Acute pain of left knee           Past Medical History:   Diagnosis Date    Acute pancreatitis     Anal fissure     Anemia     Anticoagulant long-term use     Arthritis     Asthma in remission     Back pain     BPH (benign prostatic hypertrophy)     Cancer 2000    prostate- treated at [facility] with chemo- in remission since 2000    Chronic maxillary sinusitis     Clotting disorder     Diastolic dysfunction with chronic heart failure 12/3/2018    Dysphagia 10/7/2014     "Family history of colon cancer     Family history of early CAD     GERD (gastroesophageal reflux disease)     Helicobacter pylori (H. pylori) infection     Chronic    History of chronic pancreatitis     HTN (hypertension)     Lumbago 11/12/2012    Obesity     ABDON (obstructive sleep apnea)     ABDON (obstructive sleep apnea)     With likely ohs Refer to sleep    Sacroiliac joint pain 2/10/2015    Spinal stenosis of lumbar region     Von Willebrand disease     VWD (acquired von Willebrand's disease)            Family History   Problem Relation Name Age of Onset    Colon cancer Father Pato Santiago 67        colon cancer    Hypertension Father Pato Santiago     Glaucoma Father Pato Santiago     Cancer Father Pato Santiago     Colon cancer Paternal Grandfather  65             Coronary artery disease Mother Eula Famre 45    Hypertension Mother Eula Famre     Heart disease Mother Eula Famre     Colon cancer Mother Eula Famre     Diabetes Mother Eula Famre     No Known Problems Brother Humble     No Known Problems Sister Pat     No Known Problems Daughter Sophie     No Known Problems Son Cosme     Coronary artery disease Brother Juju 51    No Known Problems Daughter Hamida     No Known Problems Daughter Rosa     No Known Problems Son Beto     No Known Problems Son Yazdanism     Colon cancer Paternal Uncle  65    Diabetes Mellitus Paternal Grandmother      Esophageal cancer Neg Hx         Social History     Tobacco Use    Smoking status: Never    Smokeless tobacco: Never   Substance Use Topics    Alcohol use: Yes     Alcohol/week: 1.0 standard drink of alcohol     Types: 1 Glasses of wine per week     Comment: social    Drug use: No         Review of Systems      Objective:   Blood pressure (!) 148/82, pulse 64, height 5' 11" (1.803 m), weight 110 kg (242 lb 8.1 oz), SpO2 97%.     Physical Exam  Constitutional:       Appearance: Normal appearance. He is well-developed. He is not ill-appearing. "   HENT:      Head: Normocephalic and atraumatic.   Eyes:      General: No scleral icterus.     Conjunctiva/sclera: Conjunctivae normal.   Cardiovascular:      Rate and Rhythm: Normal rate.      Heart sounds: Normal heart sounds. No murmur heard.     No friction rub. No gallop.   Pulmonary:      Effort: Pulmonary effort is normal.      Breath sounds: Normal breath sounds. No wheezing or rales.   Chest:      Chest wall: No tenderness.   Abdominal:      General: Bowel sounds are normal.      Palpations: Abdomen is soft.   Musculoskeletal:         General: No tenderness or signs of injury.   Skin:     General: Skin is warm and dry.   Neurological:      Mental Status: He is alert and oriented to person, place, and time. Mental status is at baseline.   Psychiatric:         Behavior: Behavior normal.         Thought Content: Thought content normal.         Prior labs reviewed    Health Maintenance         Date Due Completion Date    Shingles Vaccine (1 of 2) Never done ---    RSV Vaccine (Age 60+ and Pregnant patients) (1 - 1-dose 60+ series) Never done ---    COVID-19 Vaccine (5 - 2023-24 season) 09/01/2023 12/8/2022    Influenza Vaccine (1) 09/01/2024 10/12/2023    Colorectal Cancer Screening 03/04/2025 3/4/2020    Aspirin/Antiplatelet Therapy 08/05/2025 8/5/2024    Hemoglobin A1c (Diabetic Prevention Screening) 07/16/2027 7/16/2024    TETANUS VACCINE 10/16/2027 10/16/2017    Lipid Panel 07/16/2029 7/16/2024        LAST EKG 1/24 no changes    Assessment/Plan:       1. Obstructive sleep apnea treated with continuous positive airway pressure (CPAP)  Overview:  cpap 6-14cm      2. Gastroesophageal reflux disease with esophagitis without hemorrhage  Comments:  followed by dr christian in [facility], cont PPI   annual labs    3. Vitamin D deficiency  Taking vit d check labs    4. Class 1 obesity due to excess calories with serious comorbidity and body mass index (BMI) of 33.0 to 33.9 in adult  Overview:  Improved from class 2  obesity    Orders:  -     Hemoglobin A1C; Future; Expected date: 07/16/2024    5. Iron deficiency anemia, unspecified iron deficiency anemia type  Repeat labs, no PE evidence of anemia    6. Von Willebrand disease  Overview:  Recommend DDAVP   infusion , 20 mcg, to be given over 30 minutes, 1 hr before procedures.  Stable     7. Thoracic aorta atherosclerosis  Overview:  Noted on imaging study 9/4/2014 - CT chest   Treated with statin, lipitor 80 mg qhs  Check labs today    8. Primary hypertension  Diet controlled but uncontrolled with pain, given low dose amlodipine for BP   Rn bp check in one week    9. Other hyperlipidemia  -     Lipid Panel; Future; Expected date: 07/16/2024  Cont statin for risk reduction    10. Non-occlusive coronary artery disease requiring drug therapy  Overview:  Heart cath showing non obstructive CAD, on plavix due to ASA allergy  Cont current meds    11. Diastolic dysfunction with chronic heart failure  Compensated  Cont lasix    12. Moderate persistent asthma without complication  Overview:  Started on  symbicort 2p bid  Prn albuterol  exercise  Off controlleds, doing well  Cont prn albuterol    13. Spinal stenosis, lumbar region, without neurogenic claudication  Comments:  follows with pain managment, stable    14. Encounter for long-term (current) use of medications  -     Magnesium; Future; Expected date: 07/16/2024  -     Vitamin B12; Future; Expected date: 07/16/2024  -     Vitamin D; Future; Expected date: 07/16/2024    15. Fatigue, unspecified type  -     Magnesium; Future; Expected date: 07/16/2024  -     Vitamin B12; Future; Expected date: 07/16/2024  -     Vitamin D; Future; Expected date: 07/16/2024  -     TSH; Future; Expected date: 07/16/2024  -     CBC Auto Differential; Future; Expected date: 07/16/2024  -     Comprehensive Metabolic Panel; Future; Expected date: 07/16/2024    16. Hypogonadism male  -     Ambulatory referral/consult to Endocrinology; Future; Expected date:  07/23/2024  -     TESTOSTERONE; Future; Expected date: 07/16/2024  -     PROSTATE SPECIFIC ANTIGEN, DIAGNOSTIC; Future; Expected date: 07/16/2024  Lost to follow up  Off meds, may be worseing his fatigue  Check labs and refer to endo for management given hx of prostate cancer  Follow up with urology    17. Benign prostatic hyperplasia with urinary frequency  -     PROSTATE SPECIFIC ANTIGEN, DIAGNOSTIC; Future; Expected date: 07/16/2024    18. Abnormal finding of blood chemistry, unspecified  -     Hemoglobin A1C; Future; Expected date: 07/16/2024    Other orders  -     amLODIPine (NORVASC) 2.5 MG tablet; Take 1 tablet (2.5 mg total) by mouth once daily.  Dispense: 90 tablet; Refill: 3      Visit today included increased complexity associated with the care of the episodic problems and addressed and managing the longitudinal care of the patient due to the serious and/or complex managed problem(s) as above.   40 min spent in care of patient including history, physical, chart review, orders and coordination of care.         Medication List with Changes/Refills   New Medications    AMLODIPINE (NORVASC) 2.5 MG TABLET    Take 1 tablet (2.5 mg total) by mouth once daily.    EZETIMIBE (ZETIA) 10 MG TABLET    Take 1 tablet (10 mg total) by mouth once daily.   Current Medications    ALBUTEROL (PROVENTIL/VENTOLIN HFA) 90 MCG/ACTUATION INHALER    INHALE 2 PUFFS INTO THE LUNGS EVERY 6 HOURS AS NEEDED FOR WHEEZING OR SHORTNESS OF BREATH    ATORVASTATIN (LIPITOR) 80 MG TABLET    Take 1 tablet (80 mg total) by mouth every evening.    AZELASTINE (ASTELIN) 137 MCG (0.1 %) NASAL SPRAY    Two sprays in each nostril, sniff until absorbed, then follow with 1 spray of fluticasone.  Use both sprays twice daily.    BUDESONIDE-FORMOTEROL 160-4.5 MCG (SYMBICORT) 160-4.5 MCG/ACTUATION HFAA    INHALE 2 PUFFS INTO THE LUNGS EVERY 12 HOURS    CALCIUM CITRATE-VITAMIN D3 315-200 MG (CITRACAL+D) 315-200 MG-UNIT PER TABLET    Take 1 tablet by mouth  nightly.     CLOPIDOGREL (PLAVIX) 75 MG TABLET    Take 1 tablet (75 mg total) by mouth once daily.    CYANOCOBALAMIN, VITAMIN B-12, 50 MCG TABLET    Take 50 mcg by mouth nightly.     DOCUSATE SODIUM (COLACE) 100 MG CAPSULE    Take 1 tablet by mouth Twice daily. 1 Capsule Oral Twice a day .  Take with pain medicine    DOXAZOSIN (CARDURA) 1 MG TABLET    TAKE 1 TABLET BY MOUTH EVERY EVENING    FLU VACC [id number] 6MOS UP,PF, (FLUARIX QUAD [id number], PF,) 60 MCG (15 MCG X 4)/0.5 ML SYRG    inject into muscle for one dose    FLUTICASONE PROPIONATE (FLONASE) 50 MCG/ACTUATION NASAL SPRAY    One spray in each nostril twice daily after 1st using azelastine nasal spray    FUROSEMIDE (LASIX) 20 MG TABLET    Take 1 tablet by mouth once daily    IPRATROPIUM (ATROVENT) 42 MCG (0.06 %) NASAL SPRAY    1-2 sprays in each nostril before eating and at bedtime as needed    METHOCARBAMOL (ROBAXIN) 500 MG TAB    Take 1 tablet (500 mg total) by mouth every 8 (eight) hours as needed (muscle pain).    RABEPRAZOLE (ACIPHEX) 20 MG TABLET    TAKE 1 TABLET BY MOUTH EVERY 12 HOURS    TESTOSTERONE (ANDRODERM) 2 MG/24 HOUR PT24    APPLY 1 PATCH TOPICALLY TO THE SKIN EVERY DAY    ZOLPIDEM (AMBIEN) 10 MG TAB    TAKE 1 TABLET BY MOUTH EVERY DAY AT BEDTIME   Changed and/or Refilled Medications    Modified Medication Previous Medication    TADALAFIL (CIALIS) 20 MG TAB tadalafiL (CIALIS) 20 MG Tab       Take 1 tablet (20 mg total) by mouth as needed. Take every 36 hours as needed for ED.    Take 1 tablet (20 mg total) by mouth as needed. Take every 36 hours as needed for ED.   Discontinued Medications    DICLOFENAC SODIUM (VOLTAREN) 1 % GEL    Apply 2 g topically 4 (four) times daily. for 14 days    PROMETHAZINE (PHENERGAN) 25 MG TABLET    Take 1 tablet (25 mg total) by mouth every 6 (six) hours as needed for Nausea.       Recommend patient complete vaccinations as listed in the overdue health maintenance report. Pt may obtain vaccinations at their local  pharmacy, any [facility] pharmacy or request vaccination in our clinic.  Please note that some insurances will only cover vaccination cost at specific locations.Please check with your insurance provider regarding your coverage.  Vaccines that are not covered are still recommended.         42minutes spent in care of patient including history, physical, chart review, orders and coordination of care.

## 2024-08-21 NOTE — TELEPHONE ENCOUNTER
Spoke with patient  Appointment offered and accepted for 8/29 at 10:45 am  Pt is aware of location  Pt verbalized understanding to all and has no further questions at this time.

## 2024-08-28 ENCOUNTER — HOSPITAL ENCOUNTER (OUTPATIENT)
Dept: CARDIOLOGY | Facility: HOSPITAL | Age: 64
Discharge: HOME | End: 2024-08-28
Attending: STUDENT IN AN ORGANIZED HEALTH CARE EDUCATION/TRAINING PROGRAM
Payer: MEDICARE

## 2024-08-28 VITALS — BODY MASS INDEX: 33.88 KG/M2 | WEIGHT: 242 LBS | HEIGHT: 71 IN

## 2024-08-28 DIAGNOSIS — I20.2 REFRACTORY ANGINA PECTORIS: ICD-10-CM

## 2024-08-28 LAB
ASCENDING AORTA: 3.9 CM
AV INDEX (PROSTH): 0.82
AV MEAN GRADIENT: 2 MMHG
AV PEAK GRADIENT: 5 MMHG
AV VALVE AREA BY VELOCITY RATIO: 2.4 CM²
AV VALVE AREA: 2.5 CM²
AV VELOCITY RATIO: 0.79
BSA FOR ECHO PROCEDURE: 2.34 M2
CV ECHO LV RWT: 0.45 CM
CV STRESS BASE HR: 77 BPM
DIASTOLIC BLOOD PRESSURE: 84 MMHG
DOP CALC AO PEAK VEL: 1.08 M/S
DOP CALC AO VTI: 20.47 CM
DOP CALC LVOT AREA: 3 CM2
DOP CALC LVOT DIAMETER: 1.97 CM
DOP CALC LVOT PEAK VEL: 0.85 M/S
DOP CALC LVOT STROKE VOLUME: 51.27 CM3
DOP CALCLVOT PEAK VEL VTI: 16.83 CM
E WAVE DECELERATION TIME: 236.91 MSEC
E/A RATIO: 0.97
E/E' RATIO: 8.13 M/S
ECHO LV POSTERIOR WALL: 1.08 CM (ref 0.6–1.1)
EJECTION FRACTION: 60 %
FRACTIONAL SHORTENING: 35 % (ref 28–44)
INTERVENTRICULAR SEPTUM: 1.02 CM (ref 0.6–1.1)
LA MAJOR: 4.58 CM
LA MINOR: 4.45 CM
LA WIDTH: 3.87 CM
LEFT ATRIUM SIZE: 3.73 CM
LEFT ATRIUM VOLUME INDEX MOD: 12.8 ML/M2
LEFT ATRIUM VOLUME INDEX: 24.2 ML/M2
LEFT ATRIUM VOLUME MOD: 29.36 CM3
LEFT ATRIUM VOLUME: 55.39 CM3
LEFT INTERNAL DIMENSION IN SYSTOLE: 3.11 CM (ref 2.1–4)
LEFT VENTRICLE DIASTOLIC VOLUME INDEX: 45.97 ML/M2
LEFT VENTRICLE DIASTOLIC VOLUME: 105.28 ML
LEFT VENTRICLE MASS INDEX: 78 G/M2
LEFT VENTRICLE SYSTOLIC VOLUME INDEX: 16.7 ML/M2
LEFT VENTRICLE SYSTOLIC VOLUME: 38.35 ML
LEFT VENTRICULAR INTERNAL DIMENSION IN DIASTOLE: 4.76 CM (ref 3.5–6)
LEFT VENTRICULAR MASS: 179.46 G
LV LATERAL E/E' RATIO: 6.5 M/S
LV SEPTAL E/E' RATIO: 10.83 M/S
MV PEAK A VEL: 0.67 M/S
MV PEAK E VEL: 0.65 M/S
MV STENOSIS PRESSURE HALF TIME: 68.7 MS
MV VALVE AREA P 1/2 METHOD: 3.2 CM2
OHS CV CPX 1 MINUTE RECOVERY HEART RATE: 133 BPM
OHS CV CPX 85 PERCENT MAX PREDICTED HEART RATE MALE: 134
OHS CV CPX ESTIMATED METS: 12
OHS CV CPX MAX PREDICTED HEART RATE: 158
OHS CV CPX PATIENT IS FEMALE: 0
OHS CV CPX PATIENT IS MALE: 1
OHS CV CPX PEAK DIASTOLIC BLOOD PRESSURE: 94 MMHG
OHS CV CPX PEAK HEAR RATE: 157 BPM
OHS CV CPX PEAK RATE PRESSURE PRODUCT: NORMAL
OHS CV CPX PEAK SYSTOLIC BLOOD PRESSURE: 216 MMHG
OHS CV CPX PERCENT MAX PREDICTED HEART RATE ACHIEVED: 99
OHS CV CPX RATE PRESSURE PRODUCT PRESENTING: NORMAL
PISA TR MAX VEL: 2.49 M/S
RA MAJOR: 5.06 CM
RA PRESSURE ESTIMATED: 3 MMHG
RA WIDTH: 4.95 CM
RIGHT ATRIAL AREA: 85 CM2
RIGHT VENTRICLE DIASTOLIC BASEL DIMENSION: 4.9 CM
RV TB RVSP: 5 MMHG
SINUS: 3.93 CM
STJ: 3.2 CM
STRESS ECHO POST EXERCISE DUR MIN: 7 MINUTES
STRESS ECHO POST EXERCISE DUR SEC: 21 SECONDS
STRESS ST DEPRESSION: 1 MM
SYSTOLIC BLOOD PRESSURE: 135 MMHG
TDI LATERAL: 0.1 M/S
TDI SEPTAL: 0.06 M/S
TDI: 0.08 M/S
TR MAX PG: 25 MMHG
TRICUSPID ANNULAR PLANE SYSTOLIC EXCURSION: 1.78 CM
TV REST PULMONARY ARTERY PRESSURE: 28 MMHG
Z-SCORE OF LEFT VENTRICULAR DIMENSION IN END DIASTOLE: -6.09
Z-SCORE OF LEFT VENTRICULAR DIMENSION IN END SYSTOLE: -4.19

## 2024-08-28 PROCEDURE — 93351 STRESS TTE COMPLETE: CPT

## 2024-08-28 PROCEDURE — 93351 STRESS TTE COMPLETE: CPT | Mod: 26,,, | Performed by: INTERNAL MEDICINE

## 2024-08-29 ENCOUNTER — OFFICE VISIT (OUTPATIENT)
Dept: SURGERY | Facility: CLINIC | Age: 64
End: 2024-08-29
Payer: MEDICARE

## 2024-08-29 ENCOUNTER — OFFICE VISIT (OUTPATIENT)
Dept: OPHTHALMOLOGY | Facility: CLINIC | Age: 64
End: 2024-08-29
Payer: MEDICARE

## 2024-08-29 VITALS
WEIGHT: 245.56 LBS | DIASTOLIC BLOOD PRESSURE: 86 MMHG | BODY MASS INDEX: 34.38 KG/M2 | HEIGHT: 71 IN | SYSTOLIC BLOOD PRESSURE: 140 MMHG | HEART RATE: 73 BPM

## 2024-08-29 DIAGNOSIS — D68.00 VON WILLEBRAND DISEASE: Chronic | ICD-10-CM

## 2024-08-29 DIAGNOSIS — K40.90 RIGHT INGUINAL HERNIA: Primary | ICD-10-CM

## 2024-08-29 DIAGNOSIS — B30.9 VIRAL CONJUNCTIVITIS OF RIGHT EYE: Primary | ICD-10-CM

## 2024-08-29 DIAGNOSIS — M48.061 SPINAL STENOSIS, LUMBAR REGION, WITHOUT NEUROGENIC CLAUDICATION: Chronic | ICD-10-CM

## 2024-08-29 PROCEDURE — 1160F RVW MEDS BY RX/DR IN RCRD: CPT | Mod: CPTII,S$GLB,, | Performed by: OPHTHALMOLOGY

## 2024-08-29 PROCEDURE — 99999 PR PBB SHADOW E&M-EST. PATIENT-LVL III: CPT | Mod: PBBFAC,,, | Performed by: OPHTHALMOLOGY

## 2024-08-29 PROCEDURE — 3077F SYST BP >= 140 MM HG: CPT | Mod: CPTII,S$GLB,, | Performed by: SURGERY

## 2024-08-29 PROCEDURE — 3079F DIAST BP 80-89 MM HG: CPT | Mod: CPTII,S$GLB,, | Performed by: SURGERY

## 2024-08-29 PROCEDURE — 1159F MED LIST DOCD IN RCRD: CPT | Mod: CPTII,S$GLB,, | Performed by: OPHTHALMOLOGY

## 2024-08-29 PROCEDURE — 99204 OFFICE O/P NEW MOD 45 MIN: CPT | Mod: S$GLB,,, | Performed by: SURGERY

## 2024-08-29 PROCEDURE — 3008F BODY MASS INDEX DOCD: CPT | Mod: CPTII,S$GLB,, | Performed by: SURGERY

## 2024-08-29 PROCEDURE — 1159F MED LIST DOCD IN RCRD: CPT | Mod: CPTII,S$GLB,, | Performed by: SURGERY

## 2024-08-29 PROCEDURE — 99999 PR PBB SHADOW E&M-EST. PATIENT-LVL IV: CPT | Mod: PBBFAC,,, | Performed by: SURGERY

## 2024-08-29 PROCEDURE — 92002 INTRM OPH EXAM NEW PATIENT: CPT | Mod: S$GLB,,, | Performed by: OPHTHALMOLOGY

## 2024-08-29 PROCEDURE — 3044F HG A1C LEVEL LT 7.0%: CPT | Mod: CPTII,S$GLB,, | Performed by: SURGERY

## 2024-08-29 PROCEDURE — 3044F HG A1C LEVEL LT 7.0%: CPT | Mod: CPTII,S$GLB,, | Performed by: OPHTHALMOLOGY

## 2024-08-29 NOTE — PROGRESS NOTES
[facility] H&P    Subjective:     Bri Santiago is a 64 y.o. male with PMH of prostate cancer s/p radiation, chronic back pain, HTN, and von willebrand disease who presents to clinic for evaluation of R inguinal hernia.  The patient endorses right groin pain for the last couple of months, which he feels is worsening.  He reports some swelling in the right groin, however denies any obvious bulge.  He was referred by his urologist after complaining of pain and ultrasound demonstrated fat containing right inguinal hernia.  The patient reports increased pain with strenuous activity, bending, and lifting, but endorses constant pain at baseline.  He also complains of hypersensitivity of the scrotum, with significant discomfort to any light touch.  He also reports some tenderness on the left side, which is less severe than the right.  He denies fevers, chills, nausea, vomiting, or obstructive symptoms.  Endorses normal bowel movements.  Has a history of chronic back pain, for which she sees pain management.  He is unable to differentiate his back pain from his groin pain, and is unsure if these are related.  He receives annual nerve blocks, but denies having any groin pain prior to his last block and is unsure if this had an effect on his patent.    Patient has a history of von Willebrand's disease and receives Ddavp prior to any operative intervention.  He reports that he has done well with this in the past and has had no issues with bleeding.    PMH:   Past Medical History:   Diagnosis Date    Acute pancreatitis     Anal fissure     Anemia     Anticoagulant long-term use     Arthritis     Asthma in remission     Back pain     BPH (benign prostatic hypertrophy)     Cancer 2000    prostate- treated at [facility] with chemo- in remission since 2000    Chronic maxillary sinusitis     Clotting disorder     Diastolic dysfunction with chronic heart failure 12/3/2018    Dysphagia 10/7/2014    Family  history of colon cancer     Family history of early CAD     GERD (gastroesophageal reflux disease)     Helicobacter pylori (H. pylori) infection     Chronic    History of chronic pancreatitis     HTN (hypertension)     Lumbago 11/12/2012    Obesity     ABDON (obstructive sleep apnea)     ABDON (obstructive sleep apnea)     With likely ohs Refer to sleep    Sacroiliac joint pain 2/10/2015    Spinal stenosis of lumbar region     Von Willebrand disease     VWD (acquired von Willebrand's disease)        Past Surgical History:   Past Surgical History:   Procedure Laterality Date    anal fissure repair      x2    ARTHROSCOPIC CHONDROPLASTY OF KNEE JOINT Left 11/8/2023    Procedure: ARTHROSCOPY, KNEE, WITH CHONDROPLASTY;  Surgeon: Karthik Tanner MD;  Location: [facility] OR;  Service: Orthopedics;  Laterality: Left;    BACK SURGERY  2012    BALLOON SINUPLASTY OF PARANASAL SINUS Bilateral 10/7/2019    Procedure: SINUPLASTY, USING BALLOON;  Surgeon: LAURENT Swanson MD;  Location: [facility];  Service: ENT;  Laterality: Bilateral;    CARPAL TUNNEL RELEASE  2003    left hand    CATHETERIZATION OF BOTH LEFT AND RIGHT HEART N/A 2/14/2019    Procedure: CATHETERIZATION, HEART, BOTH LEFT AND RIGHT;  Surgeon: Kyle Magana MD;  Location: [facility] CATH LAB;  Service: Cardiology;  Laterality: N/A;    CERVICAL DISCECTOMY  2003    COLONOSCOPY N/A 10/7/2015    Procedure: COLONOSCOPY;  Surgeon: Rosendo Boyer MD;  [facility]  Service: Endoscopy;  Laterality: N/A;  PM Prep    COLONOSCOPY N/A 6/19/2017    Procedure: COLONOSCOPY;  Surgeon: Rosendo Boyer MD;  Location: [facility] ([facility] FLR);  Service: Endoscopy;  Laterality: N/A;  constipation prep (no DM no CHF)       hx of vonWillebrand's disease-will need infusion prior    COLONOSCOPY N/A 3/4/2020    Procedure: COLONOSCOPY;  Surgeon: Rosendo Boyer MD;  [facility]  Service: Endoscopy;  Laterality: N/A;  Please order CBC, ferritin, Iron TIBC day  of case per Dr. Boyer  labwork at 7:10am and patient will check in right after.  per Dr. Tyler patient can hold Plavix 5 days prior see note 1/7/20  DDAVP prior to procedure needed see note 1/16/20 for oncall med by Dr. Tyler - sm    CYSTOSCOPY  8/12/2022    Procedure: CYSTOSCOPY;  Surgeon: Tyler Reid Jr., MD;  Location: [facility] OR 1ST FLR;  Service: Urology;;    ESOPHAGOGASTRODUODENOSCOPY N/A 3/4/2020    Procedure: EGD (ESOPHAGOGASTRODUODENOSCOPY);  Surgeon: Rosendo Boyer MD;  Location: [facility] (4TH FLR);  Service: Endoscopy;  Laterality: N/A;  EGD and Colonoscopy orders merged together  labwork at 7:10am and patient will check in right after.  DDAVP prior to procedure needed see note 1/16/20 for oncall med  per Dr. Tyler patient can hold Plavix 5 days prior see note 1/7/20    ESOPHAGOGASTRODUODENOSCOPY N/A 11/3/2020    Procedure: EGD (ESOPHAGOGASTRODUODENOSCOPY);  Surgeon: Rosendo Boyer MD;  Location: [facility] (2ND FLR);  Service: Endoscopy;  Laterality: N/A;  Please recall pt has von Willebrands and will require DDVAP infusion prior to procedure as previously done.    see telephone encounter on 10/1/20 regarding DDAVP   ok to hold Plavix 5 days prior per   COVID screening on 10/31/20 -rb    ESOPHAGOGASTRODUODENOSCOPY N/A 5/31/2023    Procedure: EGD (ESOPHAGOGASTRODUODENOSCOPY);  Surgeon: Rosendo Boyer MD;  Location: [facility] (4TH FLR);  Service: Endoscopy;  Laterality: N/A;  cardiac clearnce-office note 5/1, ok to hold plavix-tele encounter 5/2-LW  5/11/23 r/s'MD noelle booked out. instr portal -ml  5/25 - precall attempted. no answer. MML    EXAMINATION UNDER ANESTHESIA N/A 3/15/2021    Procedure: EXAM UNDER ANESTHESIA;  Surgeon: Silvia Cazares MD;  Location: [facility] OR 2ND FLR;  Service: Colon and Rectal;  Laterality: N/A;    EXAMINATION UNDER ANESTHESIA N/A 2/25/2022    Procedure: EXAM UNDER ANESTHESIA, EXCISION ANAL PAPILLA;  Surgeon: Nadeem Grady MD;  Location: [facility] OR 2ND FLR;   Service: Colon and Rectal;  Laterality: N/A;    FUNCTIONAL ENDOSCOPIC SINUS SURGERY (FESS) USING COMPUTER-ASSISTED NAVIGATION Bilateral 10/7/2019    Procedure: FESS, USING COMPUTER-ASSISTED NAVIGATION;  Surgeon: LAURENT Swanson MD;  Location: [facility];  Service: ENT;  Laterality: Bilateral;    INJECTION OF ANESTHETIC AGENT AROUND NERVE Bilateral 10/13/2020    Procedure: BLOCK, NERVE BILATERAL C4,C5,C6 Plavix clearance requested;  Surgeon: Fredy Magana MD;  Location: [facility] PAIN MGT;  Service: Pain Management;  Laterality: Bilateral;  BLOCK, NERVE BILATERAL C4,C5,C6  Plavix clearance ok, will require DDVAP infusion    KNEE ARTHROSCOPY W/ MENISCECTOMY Left 11/8/2023    Procedure: ARTHROSCOPY, KNEE, WITH PARTIAL LATERAL MENISCECTOMY;  Surgeon: Karthik Tanner MD;  Location: [facility];  Service: Orthopedics;  Laterality: Left;  regional w/o catheter (adductor)    LATERAL INTERNAL ANAL SPHINCTEROTOMY N/A 12/10/2021    Procedure: SPHINCTEROTOMY-LATERAL INTERNAL ANAL;  Surgeon: Nadeem Grady MD;  Location: [facility] 2ND FLR;  Service: Colon and Rectal;  Laterality: N/A;    LEFT HEART CATHETERIZATION Left 2/14/2019    Procedure: Left heart cath;  Surgeon: Kyle Magana MD;  Location: [facility] CATH LAB;  Service: Cardiology;  Laterality: Left;    LUMBAR FUSION  2012    PH MONITORING, ESOPHAGUS, WIRELESS, (ON REFLUX MEDS) N/A 5/31/2023    Procedure: PH MONITORING, ESOPHAGUS, WIRELESS, (ON REFLUX MEDS);  Surgeon: Rosendo Boyer MD;  Location: [facility] ENDO (4TH FLR);  Service: Endoscopy;  Laterality: N/A;  96hr, on his AcipHex 20 mg every 12 hours, instructions portal-LW    RADIOFREQUENCY ABLATION Right 5/9/2019    Procedure: RADIOFREQUENCY ABLATION, RIGHT L3,L4,L5;  Surgeon: Fredy Magana MD;  Location: [facility] PAIN MGT;  Service: Pain Management;  Laterality: Right;  1 of 2  RT RFA L3,4,5  PLAVIX clearance received, pt needs DDAVP    RADIOFREQUENCY ABLATION Left 5/23/2019    Procedure: RADIOFREQUENCY ABLATION, LEFT  L3,L4,L5;  Surgeon: Fredy Magana MD;  Location: [facility] PAIN MGT;  Service: Pain Management;  Laterality: Left;  2 of 2  LT RFA L3,4,5  PLAVIX clearance received, pt needs DDAVP    RADIOFREQUENCY ABLATION Left 1/16/2020    Procedure: RADIOFREQUENCY ABLATION LEFT L3, L4, L5 MEDIAL BRANCH 1 OF 2 **PATIENT ARRIVING AT 8 AM**;  Surgeon: Fredy Magana MD;  Location: [facility] PAIN MGT;  Service: Pain Management;  Laterality: Left;  NEEDS CONSENT, PLAVIX CLEARANCE IN CHART    RADIOFREQUENCY ABLATION Right 1/28/2020    Procedure: RADIOFREQUENCY ABLATION, RIGHT L3-L4-L5 MEDIAL BRANCH 2 OF 2 (Left done on 1/16/20);  Surgeon: Fredy Magana MD;  Location: [facility] PAIN MGT;  Service: Pain Management;  Laterality: Right;    RADIOFREQUENCY ABLATION Left 8/18/2020    Procedure: RADIOFREQUENCY ABLATION, L3-L4-L5 MEDIAL BRANCH 1 OF 2 clear to hold plavix 7 days;  Surgeon: Fredy Magana MD;  Location: [facility] PAIN MGT;  Service: Pain Management;  Laterality: Left;    RADIOFREQUENCY ABLATION Right 9/1/2020    Procedure: RADIOFREQUENCY ABLATION, L3-L4-L5 MEDIAL BR ANCH 2 OF 2 clear to hol,d Plavix 7 days;  Surgeon: Fredy Magana MD;  Location: [facility] PAIN MGT;  Service: Pain Management;  Laterality: Right;    RADIOFREQUENCY ABLATION Bilateral 12/21/2021    Procedure: RADIOFREQUENCY ABLATION B/L L3,4,5 RFA (give dDAVP prior) NEEDS CONSENT plavix ok x7;  Surgeon: Fredy Magaan MD;  Location: [facility] PAIN MGT;  Service: Pain Management;  Laterality: Bilateral;    RADIOFREQUENCY ABLATION Left 11/21/2022    Procedure: RADIOFREQUENCY ABLATION LEFT L3,L4,L5 MUST HAVE dDVAP PRIOR ONE OF TWO;  Surgeon: Milo Winston MD;  Location: [facility] PAIN MGT;  Service: Pain Management;  Laterality: Left;    RADIOFREQUENCY ABLATION Right 12/5/2022    Procedure: RADIOFREQUENCY ABLATION RIGHT L3,L4,L5  PRE-MEDICATE WITH dDVAP TWO OF TWO;  Surgeon: Milo Winston MD;  Location: [facility] PAIN MGT;  Service: Pain Management;  Laterality: Right;     RADIOFREQUENCY ABLATION Bilateral 4/29/2024    Procedure: RADIOFREQUENCY ABLATION BILATERAL L3, 4, 5 *DDAVP BEFORE*;  Surgeon: Milo Winston MD;  Location: [facility] PAIN MGT;  Service: Pain Management;  Laterality: Bilateral;  [phone]  4 WK F/U CHANDRA    RADIOFREQUENCY ABLATION OF LUMBAR MEDIAL BRANCH NERVE AT SINGLE LEVEL Left 5/24/2018    Procedure: RADIOFREQUENCY THERMOCOAGULATION (RFTC)-NERVE-MEDIAN BRANCH-LUMBAR;  Surgeon: Fredy Magana MD;  Location: [facility] PAIN MGT;  Service: Pain Management;  Laterality: Left;  Left RFA @ L3,4,5  [id number]  with IV Sedation    2 of 2    RETROGRADE PYELOGRAPHY Bilateral 8/12/2022    Procedure: PYELOGRAM, RETROGRADE;  Surgeon: Tyler Reid Jr., MD;  Location: [facility] OR [address] [place] [address];  Service: Urology;  Laterality: Bilateral;    SPINE SURGERY      TONSILLECTOMY      at age 24    TRANSFORAMINAL EPIDURAL INJECTION OF STEROID Bilateral 6/19/2023    Procedure: INJECTION, STEROID, EPIDURAL, TRANSFORAMINAL APPROACH,BILATERAL T7-T8** Give 0.3mcg/kg DDAVP immediately prior to the procedure*  CLEAR TO HOLD PLAVIX 7 DAYS;  Surgeon: Milo Winston MD;  Location: [facility] PAIN MGT;  Service: Pain Management;  Laterality: Bilateral;    URETEROSCOPY Bilateral 8/12/2022    Procedure: URETEROSCOPY;  Surgeon: Tyler Ried Jr., MD;  Location: [facility] OR [address] [place] [address];  Service: Urology;  Laterality: Bilateral;    VASECTOMY  1996       Social History:  Social History     Socioeconomic History    Marital status:    Occupational History    Occupation: disabled due to back injury   Tobacco Use    Smoking status: Never    Smokeless tobacco: Never   Substance and Sexual Activity    Alcohol use: Yes     Alcohol/week: 1.0 standard drink of alcohol     Types: 1 Glasses of wine per week     Comment: social    Drug use: No    Sexual activity: Yes     Partners: Female   Social History Narrative    wlking for exercised       Allergies:   Review of patient's allergies indicates:   Allergen Reactions     "Penicillins Hives and Swelling     Has had allergy testing and can prob tolerate penicillin    Ace inhibitors Other (See Comments)     "Caused a respiratory infection"    Aspirin Hives           Codeine Itching     Ok to take percocet    Dilaudid [hydromorphone] Itching    Gabapentin Hallucinations       Medications:  Current Outpatient Medications on File Prior to Visit   Medication Sig Dispense Refill    albuterol (PROVENTIL/VENTOLIN HFA) 90 mcg/actuation inhaler INHALE 2 PUFFS INTO THE LUNGS EVERY 6 HOURS AS NEEDED FOR WHEEZING OR SHORTNESS OF BREATH 18 g 4    amLODIPine (NORVASC) 5 MG tablet Take 1 tablet (5 mg total) by mouth once daily. 90 tablet 3    atorvastatin (LIPITOR) 80 MG tablet Take 1 tablet (80 mg total) by mouth every evening. 90 tablet 3    azelastine (ASTELIN) 137 mcg (0.1 %) nasal spray Two sprays in each nostril, sniff until absorbed, then follow with 1 spray of fluticasone.  Use both sprays twice daily. (Patient taking differently: Two sprays in each nostril, sniff until absorbed, then follow with 1 spray of fluticasone.  Use both sprays twice daily.(PATIENT USES NIGHTLY 3/12/2021)) 30 mL 11    budesonide-formoterol 160-4.5 mcg (SYMBICORT) 160-4.5 mcg/actuation HFAA INHALE 2 PUFFS INTO THE LUNGS EVERY 12 HOURS 10.2 g 12    calcium citrate-vitamin D3 315-200 mg (CITRACAL+D) 315-200 mg-unit per tablet Take 1 tablet by mouth nightly.       clopidogreL (PLAVIX) 75 mg tablet Take 1 tablet (75 mg total) by mouth once daily. 90 tablet 3    cyanocobalamin, vitamin B-12, 50 mcg tablet Take 50 mcg by mouth nightly.       docusate sodium (COLACE) 100 MG capsule Take 1 tablet by mouth Twice daily. 1 Capsule Oral Twice a day .  Take with pain medicine      doxazosin (CARDURA) 1 MG tablet TAKE 1 TABLET BY MOUTH EVERY EVENING 90 tablet 3    ezetimibe (ZETIA) 10 mg tablet Take 1 tablet (10 mg total) by mouth once daily. 90 tablet 3    fluticasone propionate (FLONASE) 50 mcg/actuation nasal spray One spray in " each nostril twice daily after 1st using azelastine nasal spray 18.2 mL 11    furosemide (LASIX) 20 MG tablet Take 1 tablet by mouth once daily 90 tablet 0    ipratropium (ATROVENT) 42 mcg (0.06 %) nasal spray 1-2 sprays in each nostril before eating and at bedtime as needed 30 mL 11    methocarbamoL (ROBAXIN) 500 MG Tab Take 1 tablet (500 mg total) by mouth every 8 (eight) hours as needed (muscle pain). 30 tablet 0    RABEprazole (ACIPHEX) 20 mg tablet TAKE 1 TABLET BY MOUTH EVERY 12 HOURS 180 tablet 0    tadalafiL (CIALIS) 20 MG Tab Take 1 tablet (20 mg total) by mouth as needed. Take every 36 hours as needed for ED. 30 tablet 9    testosterone (ANDRODERM) 2 mg/24 hour PT24 APPLY 1 PATCH TOPICALLY TO THE SKIN EVERY DAY 60 patch 3    zolpidem (AMBIEN) 10 mg Tab TAKE 1 TABLET BY MOUTH EVERY DAY AT BEDTIME 20 tablet 0    [DISCONTINUED] tamsulosin (FLOMAX) 0.4 mg Cap Take 1 capsule (0.4 mg total) by mouth once daily. 30 capsule 0     Current Facility-Administered Medications on File Prior to Visit   Medication Dose Route Frequency Provider Last Rate Last Admin    0.9%  NaCl infusion   Intravenous Continuous Milla Oliveira NP 70 mL/hr at 03/15/21 1417 New Bag at 03/15/21 1417    0.9%  NaCl infusion   Intravenous Continuous Milla Oliveira NP 70 mL/hr at 12/10/21 0736 New Bag at 12/10/21 0736    0.9%  NaCl infusion   Intravenous Continuous Jaqueline Langley NP        0.9%  NaCl infusion   Intravenous Continuous Beto Whiteside MD 50 mL/hr at 04/29/24 1359 50 mL/hr at 04/29/24 1359    mupirocin 2 % ointment   Nasal On Call Procedure Milla Oliveira NP   Given at 12/10/21 0729    mupirocin 2 % ointment   Nasal On Call Procedure Jaqueline Langley NP   Given at 02/25/22 0623         Objective:     Vital Signs (Most Recent)  Pulse: 73 (08/29/24 1040)  BP: (!) 140/86 (08/29/24 1040)    ROS A 10+ review of systems was performed with pertinent positives and negatives noted above in the history of  present illness.  Other systems were negative unless otherwise specified.    Physical Exam:  Gen: awake, alert, in no acute distress  HEENT: normocephalic, atraumatic, EOMI, no scleral icterus  CV: regular rate and rhythm  Pulm: equal chest rise bilaterally, normal work of breathing  Abd:  soft, non-tender, no guarding. Tenderness to palpation of the scrotum bilaterally, R>L. No obvious RIH palpated, possible cough impulse felt.  Ext: WWP, skin warm and dry    Data review  Recent cardiac stress echo reviewed, negative for ischemia   Recent cardiology visit note reviewed  PCP note with DDAVP instructions reviewed  Recent labs including CBC with normal platelet count reviewed     Imaging  The following imaging was reviewed: scrotal US.  Fat containing right inguinal hernia.      Assessment:     Bri Santiago is a 64 y.o. male with imaging demonstrating a fat containing right inguinal hernia.  He has bilateral groin pain, right greater than left, however it is unclear whether his pain is related to the hernia at this time.  We discussed while he may have pain related to the hernia, some of his symptoms are more consistent with neurogenic pain in the setting of his chronic back pain.  We recommend that he sees his pain management team prior to any operative intervention to rule out neurogenic pain as a cause of his symptoms.  Discussed that if they feel his groin pain is unlikely due to his back, we will proceed with surgical intervention.  Discussed risks and benefits of proceeding with surgery, including but not limited to continued pain postoperatively, bleeding (he is a slightly higher risk given his history of von Willebrand's), and damage to surrounding structures. Plan for a robotic right inguinal hernia repair with mesh.  Consent was obtained in clinic.  Patient understands and is in agreement with the plan.  All questions were answered.    Plan:     - Follow up with pain management team to rule out  neuropathic pain as a cause of his groin pain. If they determine that his symptoms are unlikely to be a result of his back pain, will proceed with hernia repair.   - Robotic RIH repair with mesh   - Preoperatively, the patient is to receive DDAVP infusion, 20 mcg, to be given over 30 minutes, 1 hr before the procedure per his PCP (for von willebrand disease)  - Will need to hold plavix prior to procedure, will speak with cardiology   - 8/28 stress echo normal, no further cardiac workup needed  - Informed consent signed      Nirmala Baca MD   General Surgery PGY-1    I have reviewed the resident's history, examination, assessment and plan. In addition, I have personally interviewed and examined the patient and agree with the findings. My addendum / corrections are as follows:     Pleasant 64-year-old gentleman presenting with a 1-2 month history of right groin discomfort.  Of note he has had a longer history of chronic bilateral groin pain and what he describes as hyperesthesia of the groin and genitalia in the setting of chronic low back pain.    While ultrasound is suggestive of a fat containing right groin hernia, clinical exam, at a BMI of 34, is less conclusive.  At 1 point during the exam I did feel what may be a cough impulse/suggestion of a small hernia at the midpoint of the inguinal canal on the right but certainly there is no large hernia spanning the inguinal canal or extension into the scrotum that would explain the discomfort he is describing.    We discussed this in detail.    What I would 1st recommend would be a re-evaluation with his pain management team to confirm they do not believe the current discomfort in his bilateral groin is related to back pain.  I expressed my concern that small hernias are infrequently associated with significant discomfort therefore while I do not believe repairing his hernia would be technically difficult, I do have some concerns that he will continue to have some  chronic bilateral groin discomfort and certainly the hyperesthesia of the scrotum which I do not believe are related to his inguinal hernia.  He expressed understanding.  We will follow-up after his visit with pain management in anticipation for robotic right inguinal hernia repair with mesh.      Jose Hilliard MD FACS  Minimally Invasive General & Bariatric Surgery

## 2024-08-29 NOTE — PROGRESS NOTES
HPI    DLS: 5/22/23 Dr Christopher Montejo TID OU    Pt here for eval hypertensive retinopathy OU.   Pt states tearing and   redness OD x over a 1 year.  Pt states he has some crusting lately under   OD in the morning. Pt states occasional blurry VA OU.  Pt states   occasional flashes of light OU. Pt denies floaters OU. Pt denies eye pain    OU.   Last edited by Allie Washington MA on 8/29/2024  8:55 AM.         A/P    ICD-10-CM ICD-9-CM   1. Viral conjunctivitis of right eye  B30.9 077.99       1. Viral conjunctivitis of right eye  Referral from PCP for conjunctivitis eval     Pt has had incr redness for the past week and with crusting/clear discharge    Exam notable for injection and follicles, no infiltrates    Plan: given findings, likely viral conjunctivitis, start PF AT frequently, hand hygiene    RTC optom annual, me prn         I saw and examined the patient and reviewed in detail the findings documented. The final examination findings, image interpretations which have been independently interpreted, and plan as documented in the record represent my personal judgment and conclusions.    Chago Agarwal MD  Vitreoretinal Surgery   [facility]

## 2024-08-30 ENCOUNTER — TELEPHONE (OUTPATIENT)
Dept: CARDIOLOGY | Facility: CLINIC | Age: 64
End: 2024-08-30
Payer: MEDICARE

## 2024-08-30 NOTE — TELEPHONE ENCOUNTER
----- Message from Renata Byrnes MD sent at 8/30/2024  9:10 AM CDT -----  Regarding: RE: Plavix  Good morning,       From a cardiology standpoint can proceed with surgery with no further testing and plavix can be held 3-4 days before surgery.     Renata Byrnes  ----- Message -----  From: Hanna Champion RN  Sent: 8/29/2024   4:54 PM CDT  To: Hanna Champion RN; #  Subject: FW: Plavix                                       Surgery requested clearance to hold Plavix for 3? days prior to inguinal hernia surgery wMargarito Hilliard.  You might also to put a risk assessment note as they usually request one. Lv 8/5/24    Please advise,  ----- Message -----  From: Hanna Champion RN  Sent: 8/29/2024   4:52 PM CDT  To: Hanna Champion RN; #  Subject: RE: Plavix                                       usually required by anesthesia. I will request from cardiologist just to be safe.  ----- Message -----  From: Sarai Danielle RN  Sent: 8/29/2024   4:43 PM CDT  To: Hanna Champion RN  Subject: RE: Plavix                                       He didn't say anything about cards clearance. He just said that he recently had a visit with cards and wanted to make sure it was OK to hold Plavix. Do you think a cards clearance is necessary?  ----- Message -----  From: Hanna Champion RN  Sent: 8/29/2024   4:33 PM CDT  To: Hanna Champion RN; #  Subject: FW: Plavix                                       Does he also need cards clearance for anesthesia?  ----- Message -----  From: Kelin Gibson MA  Sent: 8/29/2024   4:17 PM CDT  To: Hnana Champion RN  Subject: FW: Plavix                                         ----- Message -----  From: Sarai Danielle RN  Sent: 8/29/2024   4:14 PM CDT  To: Louis Calloway MD; Brodie ARREAGA Staff  Subject: Plavix                                           Hello,   We saw this pt in our general surgery clinic with Dr. Hilliard  for an inguinal hernia. Would Mr. Santiago be able to hold his Plavix for this procedure? If so, would it be 3 days prior?     Thanks, Sarai

## 2024-09-03 ENCOUNTER — TELEPHONE (OUTPATIENT)
Dept: INTERNAL MEDICINE | Facility: CLINIC | Age: 64
End: 2024-09-03
Payer: MEDICARE

## 2024-09-03 ENCOUNTER — OFFICE VISIT (OUTPATIENT)
Dept: PAIN MEDICINE | Facility: CLINIC | Age: 64
End: 2024-09-03
Payer: MEDICARE

## 2024-09-03 VITALS
HEART RATE: 78 BPM | BODY MASS INDEX: 33.67 KG/M2 | TEMPERATURE: 99 F | OXYGEN SATURATION: 98 % | WEIGHT: 241.38 LBS | RESPIRATION RATE: 18 BRPM | DIASTOLIC BLOOD PRESSURE: 81 MMHG | SYSTOLIC BLOOD PRESSURE: 119 MMHG

## 2024-09-03 DIAGNOSIS — G89.4 CHRONIC PAIN SYNDROME: ICD-10-CM

## 2024-09-03 DIAGNOSIS — M96.1 POSTLAMINECTOMY SYNDROME OF LUMBAR REGION: ICD-10-CM

## 2024-09-03 DIAGNOSIS — M47.816 LUMBAR SPONDYLOSIS: ICD-10-CM

## 2024-09-03 DIAGNOSIS — R10.31 RIGHT GROIN PAIN: ICD-10-CM

## 2024-09-03 DIAGNOSIS — K40.90 RIGHT INGUINAL HERNIA: ICD-10-CM

## 2024-09-03 DIAGNOSIS — M54.9 DORSALGIA, UNSPECIFIED: Primary | ICD-10-CM

## 2024-09-03 PROCEDURE — 3008F BODY MASS INDEX DOCD: CPT | Mod: CPTII,S$GLB,, | Performed by: NURSE PRACTITIONER

## 2024-09-03 PROCEDURE — 3074F SYST BP LT 130 MM HG: CPT | Mod: CPTII,S$GLB,, | Performed by: NURSE PRACTITIONER

## 2024-09-03 PROCEDURE — 1159F MED LIST DOCD IN RCRD: CPT | Mod: CPTII,S$GLB,, | Performed by: NURSE PRACTITIONER

## 2024-09-03 PROCEDURE — 99999 PR PBB SHADOW E&M-EST. PATIENT-LVL V: CPT | Mod: PBBFAC,,, | Performed by: NURSE PRACTITIONER

## 2024-09-03 PROCEDURE — 3044F HG A1C LEVEL LT 7.0%: CPT | Mod: CPTII,S$GLB,, | Performed by: NURSE PRACTITIONER

## 2024-09-03 PROCEDURE — 3079F DIAST BP 80-89 MM HG: CPT | Mod: CPTII,S$GLB,, | Performed by: NURSE PRACTITIONER

## 2024-09-03 PROCEDURE — 99213 OFFICE O/P EST LOW 20 MIN: CPT | Mod: S$GLB,,, | Performed by: NURSE PRACTITIONER

## 2024-09-03 PROCEDURE — 1160F RVW MEDS BY RX/DR IN RCRD: CPT | Mod: CPTII,S$GLB,, | Performed by: NURSE PRACTITIONER

## 2024-09-03 NOTE — TELEPHONE ENCOUNTER
Returned pt call, pt states PCP put him on Amlodipine 2 weeks ago. Pt states 3 days after starting the amlodipine, he started wheezing more and coughing. Pt denies dyspnea, CP. Pt denies any other sx such as sinus congestion, sore throat, fever. Pt took covid test, negative. Pt states this is the only new medication he started. Pt asking if he should stop it and try something different.

## 2024-09-03 NOTE — TELEPHONE ENCOUNTER
Please let patient know this is more likely an asthma exacerbation then anything to do with his medication.  Make sure he is taking his Symbicort and using his albuterol as needed.  Please schedule urgent care visit- I have openings tomorrow- ok to overbook. If severe go to ED

## 2024-09-03 NOTE — PROGRESS NOTES
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 contrast enhancement     Impression:     Multilevel degenerative change of the thoracic spine as discussed above.     Minimal chronic wedge compression fracture of T8 versus degenerative endplate change.     Narrative     EXAMINATION:  MRI LUMBAR SPINE W WO CONTRAST    CLINICAL HISTORY:  Low back pain, >6wks conservative tx, persistent-progressive sx, surgical candidate; Spondylosis without myelopathy or radiculopathy, lumbar region    TECHNIQUE:  Multiplanar, multisequence MR images were acquired from the thoracolumbar junction to the sacrum without the administration of contrast.    COMPARISON:  MRI lumbar spine with and without contrast dated 03/13/2014 in CT urogram abdomen pelvis dated 05/23/2019    FINDINGS:  Posterior fusion hardware spanning L4 through S1.  Vertebral body heights and alignment are maintained including mild anterior wedging at L1.  There is degenerative endplate edema centered at L1-L2 with mild corresponding enhancement.  Additional Modic type 2 endplate changes at L4-L5 and L5-S1.  Spinal cord terminus lies at L1-L2.  Postoperative granulation changes at the surgical bed with stable nonenhancing seroma inferior to the left S1 pedicle screw measuring 2.1 x 1.6 cm (series 6, image 31; series 3, image 10).  No abnormal nerve root enhancement or epidural collection.  Right lower pole  renal cyst.    T12-L1: No significant posterior disc herniation, spinal canal stenosis, or neural foraminal impingement.    L1-L2: Circumferential bulge resulting with partial collapse of the anterior thecal sac.  No significant neural foraminal impingement.    L2-L3: No significant posterior disc herniation, spinal canal stenosis, or neural foraminal impingement.    L4-L5: Progressive disc height loss.  No significant spinal canal stenosis or neural foraminal impingement.    L5-S1: Widely patent canal with mild right neural foraminal narrowing.  Left L5 and bilateral S1 pedicular screws traverse slightly anterior to the cortex, similar.      Impression       Degenerative endplate edema centered at L1-L2.  Otherwise, stable postoperative appearance with multilevel spondylosis as detailed.          Narrative     EXAMINATION:  XR CERVICAL SPINE COMPLETE 5 VIEW    CLINICAL HISTORY:  . Cervicalgia    TECHNIQUE:  AP, Lateral, bilateral oblique and open mouth views of the cervical spine were performed.    COMPARISON:  07/21/2015    FINDINGS:  C5-6 osseous fusion noted.  Prominent C6-7 degenerative disc disease and facet arthropathy noted.  The alignment is within normal limits.  There is osseous neural foraminal narrowing on multiple levels bilaterally.  No fracture.  No marrow replacement process.  No prevertebral swelling.      Impression       Cervical spondylosis.         BMP  Lab Results   Component Value Date     07/16/2024    K 4.0 07/16/2024     07/16/2024    CO2 26 07/16/2024    BUN 12 07/16/2024    CREATININE 0.9 07/16/2024    CALCIUM 9.3 07/16/2024    ANIONGAP 9 07/16/2024    ESTGFRAFRICA >60.0 07/19/2022    EGFRNONAA >60.0 07/19/2022         REVIEW OF SYSTEMS:    GENERAL:  No weight loss or fevers.    RESPIRATORY:  Denies SOB. Reports wheezing.  CARDIOVASCULAR:  Negative for chest pain, leg swelling or palpitations.  GI:  Negative for abdominal discomfort, blood in stools or black stools or change  in bowel habits.  MUSCULOSKELETAL:  Joint stiffness, back pain.  SKIN:  Negative for lesions, rash, and itching.  PSYCH: Patients sleep is not disturbed secondary to pain.  HEMATOLOGY/LYMPHOLOGY:  History of easy bruising.  History of von Willebrand's factor deficiency. On Plavix.  NEURO:   No history of syncope, paralysis, seizures or tremors.   All other reviewed and negative other than HPI.      OBJECTIVE:    /81   Pulse 78   Temp 98.8 °F (37.1 °C)   Resp 18   Wt 109.5 kg (241 lb 6.5 oz)   SpO2 98%   BMI 33.67 kg/m²     PHYSICAL EXAMINATION:    GENERAL: Well appearing, in no acute distress, alert and oriented x3.  PSYCH:  Mood and affect appropriate.  SKIN:  No evidence of infection from previous injection site. No visible rashes.  HEAD/FACE:  Normocephalic, atraumatic.   BACK: There is no pain with palpation of the lumbar spine. There is mild pain with lumbar extension. Positive facet loading bilaterally.  EXTREMITIES: Bilateral lower and upper extremity strength is 5/5 and symmetric.   MUSCULOSKELETAL:   No atrophy or tone abnormalities are noted. No TTP over SI joints. Full ROM of bilateral hips without pain. Ted's is negative bilaterally. 5/5 strength in right ankle with plantar and dorsiflexion. 5/5 strength in left ankle with plantar and dorsiflexion. 5/5 strength with right knee flexion and extension. 5/5 strength with left knee flexion and extension.   NEURO: Cranial nerves grossly intact. No loss of sensation noted.  GAIT: Normal.      Assessment:     1. Dorsalgia, unspecified    2. Chronic pain syndrome    3. Lumbar spondylosis    4. Postlaminectomy syndrome of lumbar region    5. Right inguinal hernia    6. Right groin pain            Plan:     - Previous imaging was reviewed and discussed with the patient today.     - I do not believe patient's right groin pain is originating from the back. I believe it is likely due to inguinal hernia. However, will obtain updated lumbar MRI to R/O  and NF narrowing at L1/2 or L2/3 that would cause similar symptoms. Hip exam today is benign as well.    - Can continue Robaxin 500 mg BID PRN muscle pain.    - The patient will continue a home exercise routine to help with pain and strengthening.     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 It may be mild vWF, but most recent lab tests showed normal coagulation function. The patient has been previously receiving dDAVP prior to all procedures and has not exhibited bleeding. Hematology recommended receiving dDAVP prior to all invasive procedures. For all interventional procedures, we will give 0.3mcg/kg dDAVP immediately prior to the procedure.       - RTC as needed. I will call with imaging results.      The above plan and management options were discussed at length with patient. Patient is in agreement with the above and verbalized understanding.     Buffy Villagran    09/03/2024

## 2024-09-03 NOTE — TELEPHONE ENCOUNTER
----- Message from Jimy Arrington sent at 9/3/2024  8:31 AM CDT -----  Regarding: Concerns  Name of Who is Calling:  Patient          What is the request in detail:  Please contact patient he stated the BP Rx Dr. Sr gave him he is coughing and wheezing            Can the clinic reply by [facility]: No            [facility][phone]

## 2024-09-03 NOTE — TELEPHONE ENCOUNTER
Attempted to call pt to relay info -- no answer LVM    Carac Counseling:  I discussed with the patient the risks of Carac including but not limited to erythema, scaling, itching, weeping, crusting, and pain.

## 2024-09-06 ENCOUNTER — PATIENT MESSAGE (OUTPATIENT)
Dept: CARDIOLOGY | Facility: CLINIC | Age: 64
End: 2024-09-06
Payer: MEDICARE

## 2024-09-23 ENCOUNTER — HOSPITAL ENCOUNTER (OUTPATIENT)
Dept: RADIOLOGY | Facility: OTHER | Age: 64
Discharge: HOME | End: 2024-09-23
Attending: NURSE PRACTITIONER
Payer: MEDICARE

## 2024-09-23 DIAGNOSIS — M54.9 DORSALGIA, UNSPECIFIED: ICD-10-CM

## 2024-09-23 PROCEDURE — 72148 MRI LUMBAR SPINE W/O DYE: CPT | Mod: 26,,, | Performed by: RADIOLOGY

## 2024-09-23 PROCEDURE — 72148 MRI LUMBAR SPINE W/O DYE: CPT | Mod: TC

## 2024-10-01 NOTE — PROGRESS NOTES
Endocrinology Return Visit  10/02/2024      Subjective:      Chief Complaint: Hypogonadism    HPI: Bri Santiago is a 64 y.o. male who is here for a follow-up evaluation for hypogonadism.     He was last seen by Dr Min in 1/2022 for gynecomastia and hypogonadism.  At that time testosterone patches continued with no dose adjustment.   He was also on Phentermine at that time  for tx of obesity.       In terms of gynecomastia, when last seen by Dr Min he stated: Symptoms stable/improving for the most part. Pain resolved. But gynecomastia remains, stable in size per patient.  Discussed potential for surgery, pt not interested at this time.   - reviewed that other than medication, likely culprit is weight gain, adipose tissue changing testosterone -> estrogen.   - weight now decreasing. Encourage pt to keep up his efforts there.   - manage testosterone as above   monitor    He has been off testosterone for the last 6+ months due to loss to follow up. All symptom he was experiencing before have returned.   Feels tired always, unsure if he snores  Has a CPAP and states he wears it.  +ED - takes Cialis, does not help       Noted symptoms late 2019/early 2020. Had Breast growth, tenderness/soreness. No discharge.  +ED, for about a year or so around that time as well.     Was on spironolactone, started since around 4/2019 per chart.  Stopped spironolactone 2/2020     Prior evaluation included:  Mammogram benign. Normal US of scrotum and testes.  LH, TSH normal. Normal prolactin.    Since he last saw Dr Min he has lost approx 40lbs and has had no growth of breast tissue. Still no discharge and nontender/no pain.    Lab Results   Component Value Date    TESTOSTERONE 343 05/03/2021    TESTOSTERONE 43.3 (L) 05/03/2021    FSH 3.30 12/17/2020    LABLH 3.5 12/17/2020    TOTALTESTOST 642 07/16/2024    HGB 14.9 07/16/2024    HCT 48.2 07/16/2024    PSA 0.93 01/22/2021     Prior labs 12/2020:   Estradiol 75   Testosterone  274, free and bioavailable low. SHBG normal   vit D normal   LH, FSH normal   LFT mildly increased    Prior labs 5/2020.   Estradiol 71, still high (was 70 several months ago)   Prolactin 10   Cortisol 8     Exercise: few days per week. Bikes 3-4 times a week, or walking.  Diet: working on it      Was on phentermine 1/2 tablet daily. Has been off for years. Wt 286 lbs before now 242 lbs. He has kept the weight off for the last few yrs.    Polyuria and polydipsia, nocturia 2-3x/night  Large volume urine, clear or yellow    Some GI symptoms still, pending GI f/u in a few weeks.  Also pain symptoms. chronic    Denies hypotension, N/V/abd pain    Reviewed past medical, family, social history and updated as appropriate.    Review of Systems  As above    Objective:     Vitals:    10/02/24 1301   BP: 130/82   Pulse: 67     BP Readings from Last 5 Encounters:   10/02/24 130/82   09/03/24 119/81   08/29/24 (!) 140/86   08/21/24 132/84   08/05/24 130/83     Chaperone present for testicular exam  Physical Exam  Constitutional:       General: He is not in acute distress.     Appearance: He is obese.   HENT:      Head: Normocephalic.      Mouth/Throat:      Mouth: Mucous membranes are moist.   Pulmonary:      Effort: Pulmonary effort is normal.   Genitourinary:     Penis: Normal.       Comments: Normal testes approx 20-25 mL  Some TTP on L, he has an inguinal hernia that was recently discovered on testicular US  Skin:     General: Skin is warm and dry.   Neurological:      Mental Status: He is oriented to person, place, and time.   Psychiatric:         Mood and Affect: Mood normal.         Behavior: Behavior normal.       Wt Readings from Last 10 Encounters:   10/02/24 1301 110.9 kg (244 lb 6.1 oz)   09/03/24 0955 109.5 kg (241 lb 6.5 oz)   08/29/24 1040 111.4 kg (245 lb 9.5 oz)   08/28/24 0911 109.8 kg (242 lb)   08/21/24 0913 110.2 kg (242 lb 15.2 oz)   08/05/24 1030 108 kg (238 lb 1.6 oz)   07/24/24 1126 108.9 kg (240 lb 1.3  oz)   07/17/24 0833 110.3 kg (243 lb 2.7 oz)   07/16/24 0952 110 kg (242 lb 8.1 oz)   07/15/24 1436 109.4 kg (241 lb 2.9 oz)     Lab Results   Component Value Date    HGBA1C 4.4 07/16/2024     Lab Results   Component Value Date    CHOL 189 07/16/2024    HDL 44 07/16/2024    LDLCALC 121.0 07/16/2024    TRIG 120 07/16/2024    CHOLHDL 23.3 07/16/2024     Lab Results   Component Value Date     07/16/2024    K 4.0 07/16/2024     07/16/2024    CO2 26 07/16/2024    GLU 81 07/16/2024    BUN 12 07/16/2024    CREATININE 0.9 07/16/2024    CALCIUM 9.3 07/16/2024    PROT 7.6 07/16/2024    ALBUMIN 4.1 07/16/2024    BILITOT 1.2 (H) 07/16/2024    ALKPHOS 55 07/16/2024    AST 25 07/16/2024    ALT 24 07/16/2024    ANIONGAP 9 07/16/2024    ESTGFRAFRICA >60.0 07/19/2022    EGFRNONAA >60.0 07/19/2022    TSH 1.007 07/16/2024        Assessment/Plan:     Hypogonadism male  Previously when he was being treated by Dr Min, testosterone was low, confirmed on repeat. LH, FSH inappropriately normal, with symptoms  Dr Min Started testosterone patch, 2 mg dose, testosterone levels and his symptoms improved.  Recent total T normal, but not a testosterone panel   Will repeat T panel fasing at 8am with repeat LH, FSH, PRL, and 8am cortisol  He is reporting polyuria and polydipsia, which is the reason to repeat eval of the HPA-axis rather than just a T panel  Will also get urine osm and serum Na  He lost approx 40lbs several yrs ago and kept it off -- if obesity was contributing to hypogonadism before, may be improved with weight loss.  We discussed T formulations available now - topical cream or gel - the patch has been discontinued, or injection  Hct normal when last checked. PSA as well. Will need to trend these 3mo after starting testosterone if he ends up warranting testosterone replacement again      Erectile dysfunction  On Cialis which is not helpful for him  He stated ED was better when on TRT in the past    Gynecomastia,  male  Symptoms stable/improved for the most part. Had some pain when he was initially evaluated by Dr Min years ago. pain resolved years ago. But gynecomastia remains, stable in size per patient.  Dr Min discussed potential for surgery, pt not interested    - reviewed that other than medication, likely culprit is weight gain, adipose tissue changing testosterone -> estrogen.   - weight now decreasing. Encourage pt to keep up his efforts there.   - manage testosterone as above   Monitor  He had a benign MMG on initial workup        Add all labs to fasting 8am lab appt on 10/5  F/u TBD    Mona Michelle MD  [facility], [address]th [address]  [address] [place], [place], [address]    Office: [phone]  Fax: [phone]

## 2024-10-02 ENCOUNTER — OFFICE VISIT (OUTPATIENT)
Dept: ENDOCRINOLOGY | Facility: CLINIC | Age: 64
End: 2024-10-02
Payer: MEDICARE

## 2024-10-02 VITALS
WEIGHT: 244.38 LBS | HEIGHT: 71 IN | BODY MASS INDEX: 34.21 KG/M2 | HEART RATE: 67 BPM | DIASTOLIC BLOOD PRESSURE: 82 MMHG | OXYGEN SATURATION: 96 % | SYSTOLIC BLOOD PRESSURE: 130 MMHG

## 2024-10-02 DIAGNOSIS — E29.1 HYPOGONADISM MALE: Primary | ICD-10-CM

## 2024-10-02 DIAGNOSIS — N62 GYNECOMASTIA, MALE: ICD-10-CM

## 2024-10-02 DIAGNOSIS — E29.1 HYPOGONADISM IN MALE: ICD-10-CM

## 2024-10-02 DIAGNOSIS — N52.9 ERECTILE DYSFUNCTION, UNSPECIFIED ERECTILE DYSFUNCTION TYPE: ICD-10-CM

## 2024-10-02 PROCEDURE — 99999 PR PBB SHADOW E&M-EST. PATIENT-LVL V: CPT | Mod: PBBFAC,,, | Performed by: STUDENT IN AN ORGANIZED HEALTH CARE EDUCATION/TRAINING PROGRAM

## 2024-10-02 NOTE — ASSESSMENT & PLAN NOTE
Symptoms stable/improved for the most part. Had some pain when he was initially evaluated by Dr Min years ago. pain resolved years ago. But gynecomastia remains, stable in size per patient.  Dr Min discussed potential for surgery, pt not interested    - reviewed that other than medication, likely culprit is weight gain, adipose tissue changing testosterone -> estrogen.   - weight now decreasing. Encourage pt to keep up his efforts there.   - manage testosterone as above   Monitor  He had a benign MMG on initial workup

## 2024-10-02 NOTE — ASSESSMENT & PLAN NOTE
Previously when he was being treated by Dr Min, testosterone was low, confirmed on repeat. LH, FSH inappropriately normal, with symptoms  Dr Min Started testosterone patch, 2 mg dose, testosterone levels and his symptoms improved.  Recent total T normal, but not a testosterone panel   Will repeat T panel fasing at 8am with repeat LH, FSH, PRL, and 8am cortisol  He is reporting polyuria and polydipsia, which is the reason to repeat eval of the HPA-axis rather than just a T panel  Will also get urine osm and serum Na  He lost approx 40lbs several yrs ago and kept it off -- if obesity was contributing to hypogonadism before, may be improved with weight loss.  We discussed T formulations available now - topical cream or gel - the patch has been discontinued, or injection  Hct normal when last checked. PSA as well. Will need to trend these 3mo after starting testosterone if he ends up warranting testosterone replacement again

## 2024-10-02 NOTE — PATIENT INSTRUCTIONS
We will check fasting 8am labs at your lab appointment this weekend. We also will check the concentration of the urine.    Some labs will take up to 1wk or so to result. I will be In touch when all labs have resulted.

## 2024-10-03 ENCOUNTER — TELEPHONE (OUTPATIENT)
Dept: SURGERY | Facility: CLINIC | Age: 64
End: 2024-10-03
Payer: MEDICARE

## 2024-10-03 DIAGNOSIS — K40.90 RIGHT INGUINAL HERNIA: Primary | ICD-10-CM

## 2024-10-03 DIAGNOSIS — Z01.818 PRE-OP EVALUATION: Primary | ICD-10-CM

## 2024-10-03 NOTE — TELEPHONE ENCOUNTER
Pt has not gone over MRI results with pain management clinic. Explained that proceeding with surgery may not cure his pain. Pt verbalized understanding and stated he would like to still proceed with surgery on his inguinal hernia. Surgery scheduled for 10/25. Plans to go over instructions for surgery after he speaks with his pain management team. No questions or concerns at this time.     ----- Message from Esme sent at 10/2/2024  4:53 PM CDT -----  Regarding: appt  Contact: [phone]  Pt stated he was told by provider he would get a call to schedule surgery after he sees his pain management, but he has not received a call. Pls call to better discuss.

## 2024-10-03 NOTE — TELEPHONE ENCOUNTER
----- Message from Renata Byrnes sent at 8/30/2024  9:10 AM CDT -----  Regarding: RE: Plavix  Good morning,       From a cardiology standpoint can proceed with surgery with no further testing and plavix can be held 3-4 days before surgery.     Renata Byrnes  ----- Message -----  From: Hanna Champion RN  Sent: 8/29/2024   4:54 PM CDT  To: Hanna Champion RN; #  Subject: FW: Plavix                                       Surgery requested clearance to hold Plavix for 3? days prior to inguinal hernia surgery wMargarito Hilliard.  You might also to put a risk assessment note as they usually request one. Lv 8/5/24    Please advise,  ----- Message -----  From: Hanna Champion RN  Sent: 8/29/2024   4:52 PM CDT  To: Hanna Champion RN; #  Subject: RE: Plavix                                       usually required by anesthesia. I will request from cardiologist just to be safe.  ----- Message -----  From: Sarai Danielle RN  Sent: 8/29/2024   4:43 PM CDT  To: Hanna Champion RN  Subject: RE: Plavix                                       He didn't say anything about cards clearance. He just said that he recently had a visit with cards and wanted to make sure it was OK to hold Plavix. Do you think a cards clearance is necessary?  ----- Message -----  From: Hanna Champion RN  Sent: 8/29/2024   4:33 PM CDT  To: Hanna Champion RN; #  Subject: FW: Plavix                                       Does he also need cards clearance for anesthesia?  ----- Message -----  From: Kelin Gibson MA  Sent: 8/29/2024   4:17 PM CDT  To: Hanna Champion RN  Subject: FW: Plavix                                         ----- Message -----  From: Sarai Danielle RN  Sent: 8/29/2024   4:14 PM CDT  To: Louis Calloway MD; Brodie ARREAGA Staff  Subject: Plavix                                           Hello,   We saw this pt in our general surgery clinic with Dr. Hilliard for  an inguinal hernia. Would Mr. Santiago be able to hold his Plavix for this procedure? If so, would it be 3 days prior?     Thanks, Sarai

## 2024-10-05 ENCOUNTER — LAB VISIT (OUTPATIENT)
Dept: LAB | Facility: HOSPITAL | Age: 64
End: 2024-10-05
Payer: MEDICARE

## 2024-10-05 DIAGNOSIS — E29.1 HYPOGONADISM MALE: ICD-10-CM

## 2024-10-05 DIAGNOSIS — I20.2 REFRACTORY ANGINA PECTORIS: ICD-10-CM

## 2024-10-05 LAB
ANION GAP SERPL CALC-SCNC: 5 MMOL/L (ref 8–16)
BUN SERPL-MCNC: 14 MG/DL (ref 8–23)
CALCIUM SERPL-MCNC: 9.2 MG/DL (ref 8.7–10.5)
CHLORIDE SERPL-SCNC: 107 MMOL/L (ref 95–110)
CHOLEST SERPL-MCNC: 182 MG/DL (ref 120–199)
CHOLEST/HDLC SERPL: 5.1 {RATIO} (ref 2–5)
CO2 SERPL-SCNC: 29 MMOL/L (ref 23–29)
CORTIS SERPL-MCNC: 7.9 UG/DL (ref 4.3–22.4)
CREAT SERPL-MCNC: 1 MG/DL (ref 0.5–1.4)
EST. GFR  (NO RACE VARIABLE): >60 ML/MIN/1.73 M^2
FSH SERPL-ACNC: 3.66 MIU/ML (ref 0.95–11.95)
GLUCOSE SERPL-MCNC: 91 MG/DL (ref 70–110)
HDLC SERPL-MCNC: 36 MG/DL (ref 40–75)
HDLC SERPL: 19.8 % (ref 20–50)
LDLC SERPL CALC-MCNC: 122.2 MG/DL (ref 63–159)
LH SERPL-ACNC: 1.8 MIU/ML (ref 0.6–12.1)
NONHDLC SERPL-MCNC: 146 MG/DL
POTASSIUM SERPL-SCNC: 4.6 MMOL/L (ref 3.5–5.1)
PROLACTIN SERPL IA-MCNC: 5.3 NG/ML (ref 3.5–19.4)
SODIUM SERPL-SCNC: 141 MMOL/L (ref 136–145)
T4 FREE SERPL-MCNC: 0.98 NG/DL (ref 0.71–1.51)
TRIGL SERPL-MCNC: 119 MG/DL (ref 30–150)

## 2024-10-05 PROCEDURE — 84146 ASSAY OF PROLACTIN: CPT | Performed by: STUDENT IN AN ORGANIZED HEALTH CARE EDUCATION/TRAINING PROGRAM

## 2024-10-05 PROCEDURE — 84439 ASSAY OF FREE THYROXINE: CPT | Performed by: STUDENT IN AN ORGANIZED HEALTH CARE EDUCATION/TRAINING PROGRAM

## 2024-10-05 PROCEDURE — 83002 ASSAY OF GONADOTROPIN (LH): CPT | Performed by: STUDENT IN AN ORGANIZED HEALTH CARE EDUCATION/TRAINING PROGRAM

## 2024-10-05 PROCEDURE — 82040 ASSAY OF SERUM ALBUMIN: CPT | Performed by: STUDENT IN AN ORGANIZED HEALTH CARE EDUCATION/TRAINING PROGRAM

## 2024-10-05 PROCEDURE — 36415 COLL VENOUS BLD VENIPUNCTURE: CPT | Performed by: STUDENT IN AN ORGANIZED HEALTH CARE EDUCATION/TRAINING PROGRAM

## 2024-10-05 PROCEDURE — 80061 LIPID PANEL: CPT | Performed by: STUDENT IN AN ORGANIZED HEALTH CARE EDUCATION/TRAINING PROGRAM

## 2024-10-05 PROCEDURE — 82533 TOTAL CORTISOL: CPT | Performed by: STUDENT IN AN ORGANIZED HEALTH CARE EDUCATION/TRAINING PROGRAM

## 2024-10-05 PROCEDURE — 80048 BASIC METABOLIC PNL TOTAL CA: CPT | Performed by: STUDENT IN AN ORGANIZED HEALTH CARE EDUCATION/TRAINING PROGRAM

## 2024-10-05 PROCEDURE — 83001 ASSAY OF GONADOTROPIN (FSH): CPT | Performed by: STUDENT IN AN ORGANIZED HEALTH CARE EDUCATION/TRAINING PROGRAM

## 2024-10-05 PROCEDURE — 84403 ASSAY OF TOTAL TESTOSTERONE: CPT | Performed by: STUDENT IN AN ORGANIZED HEALTH CARE EDUCATION/TRAINING PROGRAM

## 2024-10-22 ENCOUNTER — PATIENT MESSAGE (OUTPATIENT)
Dept: SURGERY | Facility: CLINIC | Age: 64
End: 2024-10-22
Payer: MEDICARE

## 2024-10-24 ENCOUNTER — TELEPHONE (OUTPATIENT)
Dept: SURGERY | Facility: CLINIC | Age: 64
End: 2024-10-24
Payer: MEDICARE

## 2024-10-24 ENCOUNTER — ANESTHESIA EVENT (OUTPATIENT)
Dept: SURGERY | Facility: HOSPITAL | Age: 64
End: 2024-10-24
Payer: MEDICARE

## 2024-10-25 ENCOUNTER — ANESTHESIA (OUTPATIENT)
Dept: SURGERY | Facility: HOSPITAL | Age: 64
End: 2024-10-25
Payer: MEDICARE

## 2024-10-25 ENCOUNTER — HOSPITAL ENCOUNTER (OUTPATIENT)
Facility: HOSPITAL | Age: 64
Discharge: HOME | End: 2024-10-25
Attending: SURGERY | Admitting: SURGERY
Payer: MEDICARE

## 2024-10-25 VITALS
DIASTOLIC BLOOD PRESSURE: 81 MMHG | SYSTOLIC BLOOD PRESSURE: 158 MMHG | OXYGEN SATURATION: 93 % | RESPIRATION RATE: 20 BRPM | HEART RATE: 95 BPM | BODY MASS INDEX: 34.24 KG/M2 | HEIGHT: 70 IN | TEMPERATURE: 98 F | WEIGHT: 239.19 LBS

## 2024-10-25 DIAGNOSIS — K40.90 INGUINAL HERNIA: Primary | ICD-10-CM

## 2024-10-25 PROBLEM — T88.4XXA HARD TO INTUBATE: Status: ACTIVE | Noted: 2024-10-25

## 2024-10-25 PROCEDURE — 25000003 PHARM REV CODE 250: Performed by: STUDENT IN AN ORGANIZED HEALTH CARE EDUCATION/TRAINING PROGRAM

## 2024-10-25 PROCEDURE — 37000009 HC ANESTHESIA EA ADD 15 MINS: Performed by: SURGERY

## 2024-10-25 PROCEDURE — 63600175 PHARM REV CODE 636 W HCPCS: Mod: JG | Performed by: SURGERY

## 2024-10-25 PROCEDURE — 36000711: Performed by: SURGERY

## 2024-10-25 PROCEDURE — 49650 LAP ING HERNIA REPAIR INIT: CPT | Mod: RT,,, | Performed by: SURGERY

## 2024-10-25 PROCEDURE — 25000003 PHARM REV CODE 250: Performed by: SURGERY

## 2024-10-25 PROCEDURE — 63600175 PHARM REV CODE 636 W HCPCS: Mod: JZ,JG

## 2024-10-25 PROCEDURE — 71000044 HC DOSC ROUTINE RECOVERY FIRST HOUR: Performed by: SURGERY

## 2024-10-25 PROCEDURE — 71000015 HC POSTOP RECOV 1ST HR: Performed by: SURGERY

## 2024-10-25 PROCEDURE — C1781 MESH (IMPLANTABLE): HCPCS | Performed by: SURGERY

## 2024-10-25 PROCEDURE — 37000008 HC ANESTHESIA 1ST 15 MINUTES: Performed by: SURGERY

## 2024-10-25 PROCEDURE — 63600175 PHARM REV CODE 636 W HCPCS: Performed by: STUDENT IN AN ORGANIZED HEALTH CARE EDUCATION/TRAINING PROGRAM

## 2024-10-25 PROCEDURE — 36000710: Performed by: SURGERY

## 2024-10-25 PROCEDURE — 88302 TISSUE EXAM BY PATHOLOGIST: CPT | Performed by: PATHOLOGY

## 2024-10-25 PROCEDURE — 88302 TISSUE EXAM BY PATHOLOGIST: CPT | Mod: 26,,, | Performed by: PATHOLOGY

## 2024-10-25 PROCEDURE — 25000242 PHARM REV CODE 250 ALT 637 W/ HCPCS: Performed by: STUDENT IN AN ORGANIZED HEALTH CARE EDUCATION/TRAINING PROGRAM

## 2024-10-25 DEVICE — ANATOMICAL MESH PRE-SHAPED MONOFILAMENT POLYPROPYLENE TEXTILE WITH MARKING;RIGHT SIDE
Type: IMPLANTABLE DEVICE | Site: INGUINAL | Status: FUNCTIONAL
Brand: DEXTILE

## 2024-10-25 RX ORDER — ALBUTEROL SULFATE 90 UG/1
INHALANT RESPIRATORY (INHALATION)
Status: DISCONTINUED | OUTPATIENT
Start: 2024-10-25 | End: 2024-10-25

## 2024-10-25 RX ORDER — BUPIVACAINE HYDROCHLORIDE 2.5 MG/ML
INJECTION, SOLUTION EPIDURAL; INFILTRATION; INTRACAUDAL
Status: DISCONTINUED | OUTPATIENT
Start: 2024-10-25 | End: 2024-10-25 | Stop reason: HOSPADM

## 2024-10-25 RX ORDER — GLUCAGON 1 MG
1 KIT INJECTION
Status: DISCONTINUED | OUTPATIENT
Start: 2024-10-25 | End: 2024-10-25 | Stop reason: HOSPADM

## 2024-10-25 RX ORDER — OXYCODONE HYDROCHLORIDE 5 MG/1
5 TABLET ORAL EVERY 4 HOURS PRN
Qty: 10 TABLET | Refills: 0 | Status: SHIPPED | OUTPATIENT
Start: 2024-10-25

## 2024-10-25 RX ORDER — HYDRALAZINE HYDROCHLORIDE 20 MG/ML
INJECTION INTRAMUSCULAR; INTRAVENOUS
Status: DISCONTINUED
Start: 2024-10-25 | End: 2024-10-25 | Stop reason: HOSPADM

## 2024-10-25 RX ORDER — PROPOFOL 10 MG/ML
VIAL (ML) INTRAVENOUS
Status: DISCONTINUED | OUTPATIENT
Start: 2024-10-25 | End: 2024-10-25

## 2024-10-25 RX ORDER — LIDOCAINE HYDROCHLORIDE 20 MG/ML
INJECTION, SOLUTION EPIDURAL; INFILTRATION; INTRACAUDAL; PERINEURAL
Status: DISCONTINUED | OUTPATIENT
Start: 2024-10-25 | End: 2024-10-25

## 2024-10-25 RX ORDER — PHENYLEPHRINE HCL IN 0.9% NACL 1 MG/10 ML
SYRINGE (ML) INTRAVENOUS
Status: DISCONTINUED | OUTPATIENT
Start: 2024-10-25 | End: 2024-10-25

## 2024-10-25 RX ORDER — SODIUM CHLORIDE 9 MG/ML
INJECTION, SOLUTION INTRAVENOUS CONTINUOUS
Status: DISCONTINUED | OUTPATIENT
Start: 2024-10-25 | End: 2024-10-25 | Stop reason: HOSPADM

## 2024-10-25 RX ORDER — LIDOCAINE HYDROCHLORIDE AND EPINEPHRINE 10; 10 MG/ML; UG/ML
INJECTION, SOLUTION INFILTRATION; PERINEURAL
Status: DISCONTINUED | OUTPATIENT
Start: 2024-10-25 | End: 2024-10-25 | Stop reason: HOSPADM

## 2024-10-25 RX ORDER — HALOPERIDOL 5 MG/ML
0.5 INJECTION INTRAMUSCULAR EVERY 10 MIN PRN
Status: DISCONTINUED | OUTPATIENT
Start: 2024-10-25 | End: 2024-10-25 | Stop reason: HOSPADM

## 2024-10-25 RX ORDER — ROCURONIUM BROMIDE 10 MG/ML
INJECTION, SOLUTION INTRAVENOUS
Status: DISCONTINUED | OUTPATIENT
Start: 2024-10-25 | End: 2024-10-25

## 2024-10-25 RX ORDER — DEXAMETHASONE SODIUM PHOSPHATE 4 MG/ML
INJECTION, SOLUTION INTRA-ARTICULAR; INTRALESIONAL; INTRAMUSCULAR; INTRAVENOUS; SOFT TISSUE
Status: DISCONTINUED | OUTPATIENT
Start: 2024-10-25 | End: 2024-10-25

## 2024-10-25 RX ORDER — MIDAZOLAM HYDROCHLORIDE 1 MG/ML
INJECTION INTRAMUSCULAR; INTRAVENOUS
Status: DISCONTINUED | OUTPATIENT
Start: 2024-10-25 | End: 2024-10-25

## 2024-10-25 RX ORDER — SODIUM CHLORIDE 0.9 % (FLUSH) 0.9 %
10 SYRINGE (ML) INJECTION
Status: DISCONTINUED | OUTPATIENT
Start: 2024-10-25 | End: 2024-10-25 | Stop reason: HOSPADM

## 2024-10-25 RX ORDER — HYDROMORPHONE HYDROCHLORIDE 1 MG/ML
0.2 INJECTION, SOLUTION INTRAMUSCULAR; INTRAVENOUS; SUBCUTANEOUS EVERY 5 MIN PRN
Status: DISCONTINUED | OUTPATIENT
Start: 2024-10-25 | End: 2024-10-25 | Stop reason: HOSPADM

## 2024-10-25 RX ORDER — ONDANSETRON 8 MG/1
8 TABLET, ORALLY DISINTEGRATING ORAL EVERY 8 HOURS PRN
Status: DISCONTINUED | OUTPATIENT
Start: 2024-10-25 | End: 2024-10-25 | Stop reason: HOSPADM

## 2024-10-25 RX ORDER — ONDANSETRON HYDROCHLORIDE 2 MG/ML
INJECTION, SOLUTION INTRAVENOUS
Status: DISCONTINUED | OUTPATIENT
Start: 2024-10-25 | End: 2024-10-25

## 2024-10-25 RX ORDER — FENTANYL CITRATE 50 UG/ML
INJECTION, SOLUTION INTRAMUSCULAR; INTRAVENOUS
Status: DISCONTINUED | OUTPATIENT
Start: 2024-10-25 | End: 2024-10-25

## 2024-10-25 RX ORDER — CLINDAMYCIN PHOSPHATE 900 MG/50ML
900 INJECTION, SOLUTION INTRAVENOUS
Status: COMPLETED | OUTPATIENT
Start: 2024-10-25 | End: 2024-10-25

## 2024-10-25 RX ORDER — OXYCODONE HYDROCHLORIDE 5 MG/1
5 TABLET ORAL
Status: DISCONTINUED | OUTPATIENT
Start: 2024-10-25 | End: 2024-10-25 | Stop reason: HOSPADM

## 2024-10-25 RX ORDER — HYDRALAZINE HYDROCHLORIDE 20 MG/ML
10 INJECTION INTRAMUSCULAR; INTRAVENOUS ONCE
Status: DISCONTINUED | OUTPATIENT
Start: 2024-10-25 | End: 2024-10-25 | Stop reason: HOSPADM

## 2024-10-25 RX ORDER — ACETAMINOPHEN 500 MG
1000 TABLET ORAL ONCE
Status: COMPLETED | OUTPATIENT
Start: 2024-10-25 | End: 2024-10-25

## 2024-10-25 RX ADMIN — ROCURONIUM BROMIDE 50 MG: 10 INJECTION INTRAVENOUS at 11:10

## 2024-10-25 RX ADMIN — SUGAMMADEX 400 MG: 100 INJECTION, SOLUTION INTRAVENOUS at 01:10

## 2024-10-25 RX ADMIN — PROPOFOL 50 MG: 10 INJECTION, EMULSION INTRAVENOUS at 11:10

## 2024-10-25 RX ADMIN — MIDAZOLAM 1 MG: 1 INJECTION INTRAMUSCULAR; INTRAVENOUS at 10:10

## 2024-10-25 RX ADMIN — DESMOPRESSIN ACETATE 20 MCG: 4 SOLUTION INTRAVENOUS at 09:10

## 2024-10-25 RX ADMIN — LIDOCAINE HYDROCHLORIDE 100 MG: 20 INJECTION, SOLUTION EPIDURAL; INFILTRATION; INTRACAUDAL at 11:10

## 2024-10-25 RX ADMIN — ACETAMINOPHEN 1000 MG: 500 TABLET ORAL at 09:10

## 2024-10-25 RX ADMIN — Medication 100 MCG: at 11:10

## 2024-10-25 RX ADMIN — ONDANSETRON 4 MG: 2 INJECTION INTRAMUSCULAR; INTRAVENOUS at 01:10

## 2024-10-25 RX ADMIN — SODIUM CHLORIDE, SODIUM ACETATE ANHYDROUS, SODIUM GLUCONATE, POTASSIUM CHLORIDE, AND MAGNESIUM CHLORIDE: 526; 222; 502; 37; 30 INJECTION, SOLUTION INTRAVENOUS at 10:10

## 2024-10-25 RX ADMIN — FENTANYL CITRATE 100 MCG: 50 INJECTION, SOLUTION INTRAMUSCULAR; INTRAVENOUS at 11:10

## 2024-10-25 RX ADMIN — Medication 150 MCG: at 11:10

## 2024-10-25 RX ADMIN — DEXAMETHASONE SODIUM PHOSPHATE 8 MG: 4 INJECTION INTRA-ARTICULAR; INTRALESIONAL; INTRAMUSCULAR; INTRAVENOUS; SOFT TISSUE at 11:10

## 2024-10-25 RX ADMIN — ROCURONIUM BROMIDE 20 MG: 10 INJECTION INTRAVENOUS at 12:10

## 2024-10-25 RX ADMIN — PROPOFOL 150 MG: 10 INJECTION, EMULSION INTRAVENOUS at 11:10

## 2024-10-25 RX ADMIN — CLINDAMYCIN IN 5 PERCENT DEXTROSE 900 MG: 18 INJECTION, SOLUTION INTRAVENOUS at 11:10

## 2024-10-25 RX ADMIN — ROCURONIUM BROMIDE 20 MG: 10 INJECTION INTRAVENOUS at 11:10

## 2024-10-25 RX ADMIN — ALBUTEROL SULFATE 6 PUFF: 108 INHALANT RESPIRATORY (INHALATION) at 11:10

## 2024-10-25 RX ADMIN — PROPOFOL 50 MG: 10 INJECTION, EMULSION INTRAVENOUS at 12:10

## 2024-10-25 RX ADMIN — PROPOFOL 80 MG: 10 INJECTION, EMULSION INTRAVENOUS at 11:10

## 2024-10-28 LAB
FINAL PATHOLOGIC DIAGNOSIS: NORMAL
GROSS: NORMAL
Lab: NORMAL

## 2024-10-31 ENCOUNTER — TELEPHONE (OUTPATIENT)
Dept: SPINE | Facility: CLINIC | Age: 64
End: 2024-10-31
Payer: MEDICARE

## 2024-10-31 DIAGNOSIS — R12 HEARTBURN: ICD-10-CM

## 2024-10-31 DIAGNOSIS — K21.9 GASTROESOPHAGEAL REFLUX DISEASE, UNSPECIFIED WHETHER ESOPHAGITIS PRESENT: ICD-10-CM

## 2024-11-01 ENCOUNTER — OFFICE VISIT (OUTPATIENT)
Dept: SPINE | Facility: CLINIC | Age: 64
End: 2024-11-01
Payer: MEDICARE

## 2024-11-01 VITALS
HEART RATE: 75 BPM | SYSTOLIC BLOOD PRESSURE: 137 MMHG | RESPIRATION RATE: 17 BRPM | TEMPERATURE: 98 F | BODY MASS INDEX: 34.27 KG/M2 | WEIGHT: 238.88 LBS | OXYGEN SATURATION: 100 % | DIASTOLIC BLOOD PRESSURE: 77 MMHG

## 2024-11-01 DIAGNOSIS — G89.4 CHRONIC PAIN SYNDROME: ICD-10-CM

## 2024-11-01 DIAGNOSIS — M47.816 LUMBAR SPONDYLOSIS: Primary | ICD-10-CM

## 2024-11-01 PROCEDURE — 99999 PR PBB SHADOW E&M-EST. PATIENT-LVL V: CPT | Mod: PBBFAC,,, | Performed by: NURSE PRACTITIONER

## 2024-11-01 RX ORDER — RABEPRAZOLE SODIUM 20 MG/1
20 TABLET, DELAYED RELEASE ORAL EVERY 12 HOURS
Qty: 180 TABLET | Refills: 3 | Status: SHIPPED | OUTPATIENT
Start: 2024-11-01 | End: 2025-11-01

## 2024-11-04 ENCOUNTER — OFFICE VISIT (OUTPATIENT)
Dept: SURGERY | Facility: CLINIC | Age: 64
End: 2024-11-04
Payer: MEDICARE

## 2024-11-04 VITALS
SYSTOLIC BLOOD PRESSURE: 126 MMHG | WEIGHT: 240.94 LBS | HEIGHT: 70 IN | DIASTOLIC BLOOD PRESSURE: 65 MMHG | HEART RATE: 66 BPM | BODY MASS INDEX: 34.49 KG/M2

## 2024-11-04 DIAGNOSIS — Z09 SURGICAL FOLLOWUP VISIT: Primary | ICD-10-CM

## 2024-11-04 DIAGNOSIS — K40.90 RIGHT INGUINAL HERNIA: ICD-10-CM

## 2024-11-04 PROCEDURE — 3074F SYST BP LT 130 MM HG: CPT | Mod: CPTII,S$GLB,, | Performed by: SURGERY

## 2024-11-04 PROCEDURE — 3044F HG A1C LEVEL LT 7.0%: CPT | Mod: CPTII,S$GLB,, | Performed by: SURGERY

## 2024-11-04 PROCEDURE — 99024 POSTOP FOLLOW-UP VISIT: CPT | Mod: S$GLB,,, | Performed by: SURGERY

## 2024-11-04 PROCEDURE — 3078F DIAST BP <80 MM HG: CPT | Mod: CPTII,S$GLB,, | Performed by: SURGERY

## 2024-11-04 PROCEDURE — 1159F MED LIST DOCD IN RCRD: CPT | Mod: CPTII,S$GLB,, | Performed by: SURGERY

## 2024-11-04 PROCEDURE — 99999 PR PBB SHADOW E&M-EST. PATIENT-LVL III: CPT | Mod: PBBFAC,,, | Performed by: SURGERY

## 2024-11-04 NOTE — PROGRESS NOTES
"General & Bariatric Surgery  Post-op Clinic Note  11/04/2024    Subjective:  Bri Santiago is a 64 y.o. male presents to the clinic status post robotic repair of a right inguinal hernia for post-op follow-up.  Doing well  Feels a lot better - mild groin discomfort that continues to improve. No swelling. No incisional pain.  Normal bowel and urinary function.  No f/c/cp or SOB    Review of Systems:  See HPI for pertinent positives and negatives. Other systems unrelated to current encounter.     Objective:  /65   Pulse 66   Ht 5' 10" (1.778 m)   Wt 109.3 kg (240 lb 15.4 oz)   BMI 34.57 kg/m²   Physical Exam  Abdominal incisions c/d/I and healing nicely  Bilateral groins symmetrical, no seroma. No hernia evident.    Imaging:  None pertinent    Assessment / Plan:  Doing well post-operatively.  We discussed a gradual return to routine activity and exercise as tolerated.   No heavy lifting > 20 pounds for the next 2 weeks   RTC as needed    Jose Hilliard MD FACS  Minimally Invasive General & Bariatric Surgery    "

## 2024-12-03 ENCOUNTER — TELEPHONE (OUTPATIENT)
Dept: PAIN MEDICINE | Facility: CLINIC | Age: 64
End: 2024-12-03
Payer: MEDICARE

## 2024-12-03 DIAGNOSIS — M47.816 LUMBAR SPONDYLOSIS: Primary | ICD-10-CM

## 2024-12-03 NOTE — TELEPHONE ENCOUNTER
"----- Message from Katherine sent at 12/3/2024 11:58 AM CST -----  Regarding: Appointment  "Type:  Patient Call Back    Who Called:PT    What is the reqeust in detail:Requesting call back to schedule and injection for nerve block. Please advise    Can the clinic reply by [facility]?no     Best Call Back Number:[phone]      Additional Information:  "

## 2024-12-03 NOTE — TELEPHONE ENCOUNTER
Staff called pt about message that was left with the call center. Pt wants to know if Buffy can place orders for a Nerve block.

## 2024-12-04 ENCOUNTER — TELEPHONE (OUTPATIENT)
Dept: PAIN MEDICINE | Facility: CLINIC | Age: 64
End: 2024-12-04
Payer: MEDICARE

## 2024-12-04 DIAGNOSIS — M47.816 LUMBAR SPONDYLOSIS: Primary | ICD-10-CM

## 2024-12-04 NOTE — TELEPHONE ENCOUNTER
Staff called patient in regards to call back message. Pt is saying that they are having low back pain and it was verbalized to them to call Buffy if pain starts to occur.

## 2024-12-04 NOTE — TELEPHONE ENCOUNTER
----- Message from Jimy sent at 12/4/2024  7:52 AM CST -----  Regarding: Appointment  Name of Who is Calling:  Patient          What is the request in detail:  Patient is having severe lower back pain. SS stated in red to contact nurse on call no one answered.            Can the clinic reply by [facility]: No            What [facility] Back [facility] [place]: [phone]

## 2024-12-05 ENCOUNTER — PATIENT MESSAGE (OUTPATIENT)
Dept: PAIN MEDICINE | Facility: OTHER | Age: 64
End: 2024-12-05
Payer: MEDICARE

## 2024-12-23 ENCOUNTER — HOSPITAL ENCOUNTER (OUTPATIENT)
Facility: OTHER | Age: 64
Discharge: HOME | End: 2024-12-23
Attending: ANESTHESIOLOGY | Admitting: ANESTHESIOLOGY
Payer: MEDICARE

## 2024-12-23 VITALS
RESPIRATION RATE: 18 BRPM | OXYGEN SATURATION: 94 % | BODY MASS INDEX: 34.5 KG/M2 | TEMPERATURE: 98 F | SYSTOLIC BLOOD PRESSURE: 171 MMHG | HEIGHT: 70 IN | DIASTOLIC BLOOD PRESSURE: 90 MMHG | WEIGHT: 241 LBS | HEART RATE: 73 BPM

## 2024-12-23 DIAGNOSIS — G89.29 CHRONIC PAIN: ICD-10-CM

## 2024-12-23 DIAGNOSIS — M46.96 UNSPECIFIED INFLAMMATORY SPONDYLOPATHY, LUMBAR REGION: Primary | ICD-10-CM

## 2024-12-23 DIAGNOSIS — D68.00 VON WILLEBRAND DISEASE: Chronic | ICD-10-CM

## 2024-12-23 DIAGNOSIS — I51.89 DIASTOLIC DYSFUNCTION: ICD-10-CM

## 2024-12-23 PROCEDURE — 64636 DESTROY L/S FACET JNT ADDL: CPT | Mod: 50 | Performed by: ANESTHESIOLOGY

## 2024-12-23 PROCEDURE — 64636 DESTROY L/S FACET JNT ADDL: CPT | Mod: 50,,, | Performed by: ANESTHESIOLOGY

## 2024-12-23 PROCEDURE — 63600175 PHARM REV CODE 636 W HCPCS: Performed by: ANESTHESIOLOGY

## 2024-12-23 PROCEDURE — 64635 DESTROY LUMB/SAC FACET JNT: CPT | Mod: 50,,, | Performed by: ANESTHESIOLOGY

## 2024-12-23 PROCEDURE — 64635 DESTROY LUMB/SAC FACET JNT: CPT | Mod: 50 | Performed by: ANESTHESIOLOGY

## 2024-12-23 PROCEDURE — 25000003 PHARM REV CODE 250: Performed by: ANESTHESIOLOGY

## 2024-12-23 PROCEDURE — 99153 MOD SED SAME PHYS/QHP EA: CPT | Performed by: ANESTHESIOLOGY

## 2024-12-23 PROCEDURE — 99152 MOD SED SAME PHYS/QHP 5/>YRS: CPT | Performed by: ANESTHESIOLOGY

## 2024-12-23 RX ORDER — MIDAZOLAM HYDROCHLORIDE 1 MG/ML
INJECTION INTRAMUSCULAR; INTRAVENOUS
Status: DISCONTINUED | OUTPATIENT
Start: 2024-12-23 | End: 2024-12-23 | Stop reason: HOSPADM

## 2024-12-23 RX ORDER — BUPIVACAINE HYDROCHLORIDE 2.5 MG/ML
INJECTION, SOLUTION EPIDURAL; INFILTRATION; INTRACAUDAL
Status: DISCONTINUED | OUTPATIENT
Start: 2024-12-23 | End: 2024-12-23 | Stop reason: HOSPADM

## 2024-12-23 RX ORDER — LIDOCAINE HYDROCHLORIDE 20 MG/ML
INJECTION, SOLUTION INFILTRATION; PERINEURAL
Status: DISCONTINUED | OUTPATIENT
Start: 2024-12-23 | End: 2024-12-23 | Stop reason: HOSPADM

## 2024-12-23 RX ORDER — SODIUM CHLORIDE 9 MG/ML
INJECTION, SOLUTION INTRAVENOUS CONTINUOUS
Status: DISCONTINUED | OUTPATIENT
Start: 2024-12-23 | End: 2024-12-23 | Stop reason: HOSPADM

## 2024-12-23 RX ORDER — FENTANYL CITRATE 50 UG/ML
INJECTION, SOLUTION INTRAMUSCULAR; INTRAVENOUS
Status: DISCONTINUED | OUTPATIENT
Start: 2024-12-23 | End: 2024-12-23 | Stop reason: HOSPADM

## 2024-12-23 RX ORDER — DEXAMETHASONE SODIUM PHOSPHATE 10 MG/ML
INJECTION INTRAMUSCULAR; INTRAVENOUS
Status: DISCONTINUED | OUTPATIENT
Start: 2024-12-23 | End: 2024-12-23 | Stop reason: HOSPADM

## 2024-12-23 RX ADMIN — DESMOPRESSIN ACETATE 32.79 MCG: 4 SOLUTION INTRAVENOUS at 02:12

## 2024-12-23 NOTE — DISCHARGE SUMMARY
Discharge Note  Short Stay      SUMMARY     Admit Date: 12/23/2024    Attending Physician: Milo Winston      Discharge Physician: Milo Winston      Discharge Date: 12/23/2024 2:41 PM    Procedure(s) (LRB):  RADIOFREQUENCY ABLATION BILATERAL L3, 4, 5 (Bilateral)    Final Diagnosis: Lumbar spondylosis [M47.816]    Disposition: Home or self care    Patient Instructions:   Current Discharge Medication List        CONTINUE these medications which have NOT CHANGED    Details   albuterol (PROVENTIL/VENTOLIN HFA) 90 mcg/actuation inhaler INHALE 2 PUFFS INTO THE LUNGS EVERY 6 HOURS AS NEEDED FOR WHEEZING OR SHORTNESS OF BREATH  Qty: 18 g, Refills: 4      amLODIPine (NORVASC) 5 MG tablet Take 1 tablet (5 mg total) by mouth once daily.  Qty: 90 tablet, Refills: 3    Comments: .  Associated Diagnoses: Primary hypertension      atorvastatin (LIPITOR) 80 MG tablet Take 1 tablet (80 mg total) by mouth every evening.  Qty: 90 tablet, Refills: 3    Associated Diagnoses: Hyperlipidemia, unspecified hyperlipidemia type      azelastine (ASTELIN) 137 mcg (0.1 %) nasal spray Two sprays in each nostril, sniff until absorbed, then follow with 1 spray of fluticasone.  Use both sprays twice daily.  Qty: 30 mL, Refills: 11      budesonide-formoterol 160-4.5 mcg (SYMBICORT) 160-4.5 mcg/actuation HFAA INHALE 2 PUFFS INTO THE LUNGS EVERY 12 HOURS  Qty: 10.2 g, Refills: 12      calcium citrate-vitamin D3 315-200 mg (CITRACAL+D) 315-200 mg-unit per tablet Take 1 tablet by mouth nightly.       clopidogreL (PLAVIX) 75 mg tablet Take 1 tablet (75 mg total) by mouth once daily.  Qty: 90 tablet, Refills: 3      cyanocobalamin, vitamin B-12, 50 mcg tablet Take 50 mcg by mouth nightly.       docusate sodium (COLACE) 100 MG capsule Take 1 tablet by mouth Twice daily. 1 Capsule Oral Twice a day .  Take with pain medicine      doxazosin (CARDURA) 1 MG tablet TAKE 1 TABLET BY MOUTH EVERY EVENING  Qty: 90 tablet, Refills: 3    Associated Diagnoses: Benign  localized prostatic hyperplasia with lower urinary tract symptoms (LUTS)      ezetimibe (ZETIA) 10 mg tablet Take 1 tablet (10 mg total) by mouth once daily.  Qty: 90 tablet, Refills: 3      fluticasone propionate (FLONASE) 50 mcg/actuation nasal spray One spray in each nostril twice daily after 1st using azelastine nasal spray  Qty: 18.2 mL, Refills: 11      furosemide (LASIX) 20 MG tablet Take 1 tablet by mouth once daily  Qty: 90 tablet, Refills: 0    Associated Diagnoses: Diastolic dysfunction      ipratropium (ATROVENT) 42 mcg (0.06 %) nasal spray 1-2 sprays in each nostril before eating and at bedtime as needed  Qty: 30 mL, Refills: 11      methocarbamoL (ROBAXIN) 500 MG Tab Take 1 tablet (500 mg total) by mouth every 8 (eight) hours as needed (muscle pain).  Qty: 30 tablet, Refills: 0      oxyCODONE (ROXICODONE) 5 MG immediate release tablet Take 1 tablet (5 mg total) by mouth every 4 (four) hours as needed for Pain.  Qty: 10 tablet, Refills: 0    Comments: Quantity prescribed more than 7 day supply? No      RABEprazole (ACIPHEX) 20 mg tablet Take 1 tablet (20 mg total) by mouth every 12 (twelve) hours.  Qty: 180 tablet, Refills: 3    Associated Diagnoses: Gastroesophageal reflux disease, unspecified whether esophagitis present; Heartburn      tadalafiL (CIALIS) 20 MG Tab Take 1 tablet (20 mg total) by mouth as needed. Take every 36 hours as needed for ED.  Qty: 30 tablet, Refills: 9    Associated Diagnoses: Erectile dysfunction, unspecified erectile dysfunction type      testosterone (ANDRODERM) 2 mg/24 hour PT24 APPLY 1 PATCH TOPICALLY TO THE SKIN EVERY DAY  Qty: 60 patch, Refills: 3    Associated Diagnoses: Hypogonadism in male      zolpidem (AMBIEN) 10 mg Tab TAKE 1 TABLET BY MOUTH EVERY DAY AT BEDTIME  Qty: 20 tablet, Refills: 0    Associated Diagnoses: Sleep disorder                 Discharge Diagnosis: Lumbar spondylosis [M47.816]  Condition on Discharge: Stable with no complications to procedure    Diet on Discharge: Same as before.  Activity: as per instruction sheet.  Discharge to: Home with a responsible adult.  Follow up: 2-4 weeks       Please call my office or pager at [phone] if experienced any weakness or loss of sensation, fever > 101.5, pain uncontrolled with oral medications, persistent nausea/vomiting/or diarrhea, redness or drainage from the incisions, or any other worrisome concerns. If physician on call was not reached or could not communicate with our office for any reason please go to the nearest emergency department

## 2024-12-23 NOTE — H&P
"HPI  Patient presenting for Procedure(s) (LRB):  RADIOFREQUENCY ABLATION BILATERAL L3, 4, 5 (Bilateral)     Patient on Anti-coagulation No    No health changes since previous encounter    Past Medical History:   Diagnosis Date    Acute pancreatitis     Anal fissure     Anemia     Anticoagulant long-term use     Arthritis     Asthma in remission     Back pain     BPH (benign prostatic hypertrophy)     Cancer 2000    prostate- treated at [facility] with chemo- in remission since 2000    Chronic maxillary sinusitis     Clotting disorder     Diastolic dysfunction with chronic heart failure 12/03/2018    Family history of colon cancer     Family history of early CAD     GERD (gastroesophageal reflux disease)     Helicobacter pylori (H. pylori) infection     Chronic    History of chronic pancreatitis     HTN (hypertension)     Lumbago 11/12/2012    Obesity     ABDON (obstructive sleep apnea)     Spinal stenosis of lumbar region     Von Willebrand disease     VWD (acquired von Willebrand's disease)        Review of patient's allergies indicates:   Allergen Reactions    Penicillins Hives and Swelling     Has had allergy testing and can prob tolerate penicillin    Ace inhibitors Other (See Comments)     "Caused a respiratory infection"    Aspirin Hives           Codeine Itching     Ok to take percocet    Dilaudid [hydromorphone] Itching    Gabapentin Hallucinations      Current Facility-Administered Medications   Medication    0.9% NaCl infusion    desmopressin (DDAVP) 32.792 mcg in 0.9% NaCl 50 mL IVPB     Facility-Administered Medications Ordered in Other Encounters   Medication    0.9%  NaCl infusion    0.9%  NaCl infusion    0.9%  NaCl infusion    0.9%  NaCl infusion    mupirocin 2 % ointment    mupirocin 2 % ointment       PMHx, PSHx, Allergies, Medications reviewed in epic    ROS negative except pain complaints in HPI    OBJECTIVE:    BP (!) 193/93 (BP Location: Right arm, Patient Position: Sitting)   Pulse 73   Temp 97.8 " "°F (36.6 °C) (Oral)   Resp 16   Ht 5' 10" (1.778 m)   Wt 109.3 kg (241 lb)   SpO2 98%   BMI 34.58 kg/m²     PHYSICAL EXAMINATION:    GENERAL: Well appearing, in no acute distress, alert and oriented x3.  PSYCH:  Mood and affect appropriate.  SKIN: Skin color, texture, turgor normal, no rashes or lesions which will impact the procedure.  CV: RRR with palpation of the radial artery.  PULM: No evidence of respiratory difficulty, symmetric chest rise. Clear to auscultation.  NEURO: Cranial nerves grossly intact.    Plan:    Proceed with procedure as planned Procedure(s) (LRB):  RADIOFREQUENCY ABLATION BILATERAL L3, 4, 5 (Bilateral)    SABRINA Dill  12/23/2024            "

## 2024-12-23 NOTE — DISCHARGE INSTRUCTIONS

## 2024-12-23 NOTE — OP NOTE
Therapeutic Lumbar Medial Branch Radiofrequency Ablation under Fluoroscopy     The procedure, risks, benefits, and options were discussed with the patient. There are no contraindications to the procedure. The patent expressed understanding and agreed to the procedure. Informed written consent was obtained prior to the start of the procedure and can be found in the patient's chart.        PATIENT NAME: Bri Santiago   MRN: [id number]     DATE OF PROCEDURE: 12/23/2024     PROCEDURE:  Bilateral L3, L4, and L5 Lumbar Radiofrequency Ablation under Fluoroscopy    PRE-OP DIAGNOSIS: Lumbar spondylosis [M47.816] Lumbar spondylosis [M47.816]    POST-OP DIAGNOSIS: Same    PHYSICIAN: Milo Winston MD    ASSISTANTS: WHITLEY Dill DO  Pain Fellow U 2024      MEDICATIONS INJECTED:  Preservative-free Decadron 10mg with 9cc of Bupivicaine 0.25%    LOCAL ANESTHETIC INJECTED:   Xylocaine 2%    SEDATION: Versed 2mg and Fentanyl 50mcg                                                                                                                                                                                     Conscious sedation ordered by M.D. Patient re-evaluation prior to administration of conscious sedation. No changes noted in patient's status from initial evaluation. The patient's vital signs were monitored by RN and patient remained hemodynamically stable throughout the procedure.    Event Time In   Sedation Start 1440   Sedation End 1504       ESTIMATED BLOOD LOSS:  None    COMPLICATIONS:  None     INTERVAL HISTORY: Patient has clinical and imaging findings suggestive of facet mediated pain. Patients has completed 2 previous diagnostic medial branch blocks at specified levels with at least 80% relief for the expected duration of the local anesthetic utilized.    TECHNIQUE: Time-out was performed to identify the patient and procedure to be performed. With the patient laying in a prone position, the surgical area was prepped  and draped in the usual sterile fashion using ChloraPrep and fenestrated drape. The levels were determined under fluoroscopic guidance. Skin anesthesia was achieved by injecting Lidocaine 2% over the injection sites. A 20 gauge 10mm curved active tip needle was introduced to the anatomic local of the medial branch at each of the above levels using AP, lateral and/or contralateral oblique fluoroscopic imaging. Then sensory and motor testing was performed to confirm that the needle tips were in the correct location. After negative aspiration for blood or CSF was confirmed, 1 mL of the lidocaine 2% listed above was injected slowly at each site. This was followed by thermal lesioning at 80 degrees celsius for 90 seconds. That was followed by slowly injecting 1.5 mL of the medication mixture listed above at each site. The needles were removed and bleeding was nil. A sterile dressing was applied. No specimens collected. The patient tolerated the procedure well and did not have any procedure related motor deficit at the conclusion of the procedure.    PRE-PROCEDURE PAIN SCORE: 6-10/10    POST-PROCEDURE PAIN SCORE: 0/10    The patient was monitored after the procedure in the recovery area. They were given post-procedure and discharge instructions to follow at home. The patient was discharged in a stable condition.        Milo Winston MD

## 2024-12-28 ENCOUNTER — HOSPITAL ENCOUNTER (EMERGENCY)
Facility: HOSPITAL | Age: 64
Discharge: HOME | End: 2024-12-28
Attending: EMERGENCY MEDICINE
Payer: MEDICARE

## 2024-12-28 VITALS
HEART RATE: 101 BPM | SYSTOLIC BLOOD PRESSURE: 158 MMHG | WEIGHT: 241 LBS | TEMPERATURE: 100 F | RESPIRATION RATE: 18 BRPM | BODY MASS INDEX: 34.5 KG/M2 | OXYGEN SATURATION: 96 % | HEIGHT: 70 IN | DIASTOLIC BLOOD PRESSURE: 80 MMHG

## 2024-12-28 DIAGNOSIS — R05.9 COUGH: ICD-10-CM

## 2024-12-28 DIAGNOSIS — J11.1 FLU: ICD-10-CM

## 2024-12-28 LAB
ALBUMIN SERPL BCP-MCNC: 3.8 G/DL (ref 3.5–5.2)
ALP SERPL-CCNC: 60 U/L (ref 40–150)
ALT SERPL W/O P-5'-P-CCNC: 24 U/L (ref 10–44)
ANION GAP SERPL CALC-SCNC: 11 MMOL/L (ref 8–16)
AST SERPL-CCNC: 22 U/L (ref 10–40)
BASOPHILS # BLD AUTO: 0.03 K/UL (ref 0–0.2)
BASOPHILS NFR BLD: 0.6 % (ref 0–1.9)
BILIRUB SERPL-MCNC: 1.1 MG/DL (ref 0.1–1)
BUN SERPL-MCNC: 10 MG/DL (ref 8–23)
CALCIUM SERPL-MCNC: 8.6 MG/DL (ref 8.7–10.5)
CHLORIDE SERPL-SCNC: 102 MMOL/L (ref 95–110)
CO2 SERPL-SCNC: 25 MMOL/L (ref 23–29)
CREAT SERPL-MCNC: 0.9 MG/DL (ref 0.5–1.4)
DIFFERENTIAL METHOD BLD: NORMAL
EOSINOPHIL # BLD AUTO: 0.1 K/UL (ref 0–0.5)
EOSINOPHIL NFR BLD: 1.5 % (ref 0–8)
ERYTHROCYTE [DISTWIDTH] IN BLOOD BY AUTOMATED COUNT: 12 % (ref 11.5–14.5)
EST. GFR  (NO RACE VARIABLE): >60 ML/MIN/1.73 M^2
GLUCOSE SERPL-MCNC: 96 MG/DL (ref 70–110)
HCT VFR BLD AUTO: 43 % (ref 40–54)
HCV AB SERPL QL IA: NORMAL
HGB BLD-MCNC: 14.2 G/DL (ref 14–18)
HIV 1+2 AB+HIV1 P24 AG SERPL QL IA: NORMAL
IMM GRANULOCYTES # BLD AUTO: 0.01 K/UL (ref 0–0.04)
IMM GRANULOCYTES NFR BLD AUTO: 0.2 % (ref 0–0.5)
LYMPHOCYTES # BLD AUTO: 1 K/UL (ref 1–4.8)
LYMPHOCYTES NFR BLD: 19.2 % (ref 18–48)
MCH RBC QN AUTO: 30.5 PG (ref 27–31)
MCHC RBC AUTO-ENTMCNC: 33 G/DL (ref 32–36)
MCV RBC AUTO: 93 FL (ref 82–98)
MONOCYTES # BLD AUTO: 0.5 K/UL (ref 0.3–1)
MONOCYTES NFR BLD: 8.3 % (ref 4–15)
NEUTROPHILS # BLD AUTO: 3.8 K/UL (ref 1.8–7.7)
NEUTROPHILS NFR BLD: 70.2 % (ref 38–73)
NRBC BLD-RTO: 0 /100 WBC
PLATELET # BLD AUTO: 190 K/UL (ref 150–450)
PMV BLD AUTO: 9.2 FL (ref 9.2–12.9)
POTASSIUM SERPL-SCNC: 3.8 MMOL/L (ref 3.5–5.1)
PROT SERPL-MCNC: 7.5 G/DL (ref 6–8.4)
RBC # BLD AUTO: 4.65 M/UL (ref 4.6–6.2)
SODIUM SERPL-SCNC: 138 MMOL/L (ref 136–145)
TROPONIN I SERPL DL<=0.01 NG/ML-MCNC: <3 NG/L (ref 0–35)
WBC # BLD AUTO: 5.43 K/UL (ref 3.9–12.7)

## 2024-12-28 PROCEDURE — 80053 COMPREHEN METABOLIC PANEL: CPT

## 2024-12-28 PROCEDURE — 85025 COMPLETE CBC W/AUTO DIFF WBC: CPT

## 2024-12-28 PROCEDURE — 25000242 PHARM REV CODE 250 ALT 637 W/ HCPCS

## 2024-12-28 PROCEDURE — 99285 EMERGENCY DEPT VISIT HI MDM: CPT | Mod: 25

## 2024-12-28 PROCEDURE — 93005 ELECTROCARDIOGRAM TRACING: CPT

## 2024-12-28 PROCEDURE — 25000003 PHARM REV CODE 250

## 2024-12-28 PROCEDURE — 94640 AIRWAY INHALATION TREATMENT: CPT | Mod: XB

## 2024-12-28 PROCEDURE — 86803 HEPATITIS C AB TEST: CPT | Performed by: PHYSICIAN ASSISTANT

## 2024-12-28 PROCEDURE — 93010 ELECTROCARDIOGRAM REPORT: CPT | Mod: ,,, | Performed by: INTERNAL MEDICINE

## 2024-12-28 PROCEDURE — 84484 ASSAY OF TROPONIN QUANT: CPT

## 2024-12-28 PROCEDURE — 87389 HIV-1 AG W/HIV-1&-2 AB AG IA: CPT | Performed by: PHYSICIAN ASSISTANT

## 2024-12-28 RX ORDER — ACETAMINOPHEN 500 MG
1000 TABLET ORAL
Status: COMPLETED | OUTPATIENT
Start: 2024-12-28 | End: 2024-12-28

## 2024-12-28 RX ORDER — IPRATROPIUM BROMIDE AND ALBUTEROL SULFATE 2.5; .5 MG/3ML; MG/3ML
3 SOLUTION RESPIRATORY (INHALATION)
Status: COMPLETED | OUTPATIENT
Start: 2024-12-28 | End: 2024-12-28

## 2024-12-28 RX ORDER — ALBUTEROL SULFATE 90 UG/1
1-2 INHALANT RESPIRATORY (INHALATION) EVERY 6 HOURS PRN
Qty: 18 G | Refills: 3 | Status: SHIPPED | OUTPATIENT
Start: 2024-12-28 | End: 2025-12-28

## 2024-12-28 RX ORDER — BENZONATATE 100 MG/1
100 CAPSULE ORAL 3 TIMES DAILY PRN
Qty: 20 CAPSULE | Refills: 0 | Status: SHIPPED | OUTPATIENT
Start: 2024-12-28 | End: 2025-01-07

## 2024-12-28 RX ADMIN — IPRATROPIUM BROMIDE AND ALBUTEROL SULFATE 3 ML: 2.5; .5 SOLUTION RESPIRATORY (INHALATION) at 04:12

## 2024-12-28 RX ADMIN — ACETAMINOPHEN 1000 MG: 500 TABLET ORAL at 03:12

## 2024-12-28 NOTE — ED NOTES
LOC: The patient is awake and alert; oriented x 3 and speaking appropriately.  APPEARANCE: Patient resting comfortably, patient is clean and well groomed  SKIN: warm and dry, normal skin turgor & moist mucus membranes, skin intact, no breakdown noted.  MUSCULOSKELETAL: Patient moving all extremities well, no obvious swelling or deformities noted, c/o body aches  RESPIRATORY: Airway is open and patent, c/o cough , runny nose ,respirations are spontaneous, normal effort and rate  CARDIAC: Patient has a normal rate, no peripheral edema noted, capillary refill < 3 seconds; No complaints of chest pain   ABDOMEN: Soft and non tender to palpation, no distention noted.c/o n/v/d

## 2024-12-28 NOTE — ED TRIAGE NOTES
Feels light headed and dizzy since yesterday  Positive  flu test  at home 4 days ago, having body aches, n/v/d and a non productive cough and runny nose.

## 2024-12-28 NOTE — ED PROVIDER NOTES
"Encounter Date: 12/28/2024       History     Chief Complaint   Patient presents with    Multiple complaints     + HOME TEST for flu on tues, now light headed, fever on and off     64-year-old male with past medical history of GERD, chronic pancreatitis, hypertension, prostate cancer in remission, CHF, obesity, ABDON presents to the ED regarding general malaise and lightheadedness.  Patient told to develop rhinorrhea, cough, and generalized body aches 4 days ago.  He took an at-home flu test that was positive.  States he thought he was starting to feel better up until today when he started to feel lightheaded/dizzy.  Reports some chest tightness though mostly with coughing.  No shortness of breath.  Denies nausea but did have 1 episode of post-tussive emesis.  Denies abdominal pain but did have "little" diarrhea.  No other complaints at this time.    The history is provided by the patient and medical records.     Review of patient's allergies indicates:   Allergen Reactions    Penicillins Hives and Swelling     Has had allergy testing and can prob tolerate penicillin    Ace inhibitors Other (See Comments)     "Caused a respiratory infection"    Aspirin Hives           Codeine Itching     Ok to take percocet    Dilaudid [hydromorphone] Itching    Gabapentin Hallucinations     Past Medical History:   Diagnosis Date    Acute pancreatitis     Anal fissure     Anemia     Anticoagulant long-term use     Arthritis     Asthma in remission     Back pain     BPH (benign prostatic hypertrophy)     Cancer 2000    prostate- treated at [facility] with chemo- in remission since 2000    Chronic maxillary sinusitis     Clotting disorder     Diastolic dysfunction with chronic heart failure 12/03/2018    Family history of colon cancer     Family history of early CAD     GERD (gastroesophageal reflux disease)     Helicobacter pylori (H. pylori) infection     Chronic    History of chronic pancreatitis     HTN (hypertension)     Lumbago " 11/12/2012    Obesity     ABDON (obstructive sleep apnea)     Spinal stenosis of lumbar region     Von Willebrand disease     VWD (acquired von Willebrand's disease)      Past Surgical History:   Procedure Laterality Date    anal fissure repair      x2    ARTHROSCOPIC CHONDROPLASTY OF KNEE JOINT Left 11/8/2023    Procedure: ARTHROSCOPY, KNEE, WITH CHONDROPLASTY;  Surgeon: Karthik Tanner MD;  Location: [facility] OR;  Service: Orthopedics;  Laterality: Left;    BACK SURGERY  2012    BALLOON SINUPLASTY OF PARANASAL SINUS Bilateral 10/7/2019    Procedure: SINUPLASTY, USING BALLOON;  Surgeon: LAURENT Swanson MD;  Location: [facility] OR;  Service: ENT;  Laterality: Bilateral;    CARPAL TUNNEL RELEASE  2003    left hand    CATHETERIZATION OF BOTH LEFT AND RIGHT HEART N/A 2/14/2019    Procedure: CATHETERIZATION, HEART, BOTH LEFT AND RIGHT;  Surgeon: Kyle Magana MD;  Location: [facility] CATH LAB;  Service: Cardiology;  Laterality: N/A;    CERVICAL DISCECTOMY  2003    COLONOSCOPY N/A 10/7/2015    Procedure: COLONOSCOPY;  Surgeon: Rosendo Boyer MD;  Location: [facility] ([address] [place] [address]);  Service: Endoscopy;  Laterality: N/A;  PM Prep    COLONOSCOPY N/A 6/19/2017    Procedure: COLONOSCOPY;  Surgeon: Rosendo Boyer MD;  [facility]  Service: Endoscopy;  Laterality: N/A;  constipation prep (no DM no CHF)       hx of vonWillebrand's disease-will need infusion prior    COLONOSCOPY N/A 3/4/2020    Procedure: COLONOSCOPY;  Surgeon: Rosendo Boyer MD;  [facility]  Service: Endoscopy;  Laterality: N/A;  Please order CBC, ferritin, Iron TIBC day of case per Dr. Boyer  labwork at 7:10am and patient will check in right after.  per Dr. Tyler patient can hold Plavix 5 days prior see note 1/7/20  DDAVP prior to procedure needed see note 1/16/20 for oncall med by Dr. Tyler -     CYSTOSCOPY  8/12/2022    Procedure: CYSTOSCOPY;  Surgeon: Tyler Reid Jr., MD;  Location: [address] FLR;  Service:  Urology;;    ESOPHAGOGASTRODUODENOSCOPY N/A 3/4/2020    Procedure: EGD (ESOPHAGOGASTRODUODENOSCOPY);  Surgeon: Rosendo Boyer MD;  Location: [facility] (4TH FLR);  Service: Endoscopy;  Laterality: N/A;  EGD and Colonoscopy orders merged together  labwork at 7:10am and patient will check in right after.  DDAVP prior to procedure needed see note 1/16/20 for oncall med  per Dr. Tyler patient can hold Plavix 5 days prior see note 1/7/20    ESOPHAGOGASTRODUODENOSCOPY N/A 11/3/2020    Procedure: EGD (ESOPHAGOGASTRODUODENOSCOPY);  Surgeon: Rosendo Boyer MD;  Location: [facility] (2ND FLR);  Service: Endoscopy;  Laterality: N/A;  Please recall pt has von Willebrands and will require DDVAP infusion prior to procedure as previously done.    see telephone encounter on 10/1/20 regarding DDAVP   ok to hold Plavix 5 days prior per Dr.Blessey BUCKNER screening on 10/31/20 -rb    ESOPHAGOGASTRODUODENOSCOPY N/A 5/31/2023    Procedure: EGD (ESOPHAGOGASTRODUODENOSCOPY);  Surgeon: Rosendo Boyer MD;  Location: [facility] (4TH FLR);  Service: Endoscopy;  Laterality: N/A;  cardiac clearnce-office note 5/1, ok to hold plavix-tele encounter 5/2-LW  5/11/23 r/s'MD noelle booked out. instr portal -ml  5/25 - precall attempted. no answer. MML    EXAMINATION UNDER ANESTHESIA N/A 3/15/2021    Procedure: EXAM UNDER ANESTHESIA;  Surgeon: Silvia Cazares MD;  Location: [facility] OR 2ND FLR;  Service: Colon and Rectal;  Laterality: N/A;    EXAMINATION UNDER ANESTHESIA N/A 2/25/2022    Procedure: EXAM UNDER ANESTHESIA, EXCISION ANAL PAPILLA;  Surgeon: Nadeem Grady MD;  Location: [facility] OR 2ND FLR;  Service: Colon and Rectal;  Laterality: N/A;    FUNCTIONAL ENDOSCOPIC SINUS SURGERY (FESS) USING COMPUTER-ASSISTED NAVIGATION Bilateral 10/7/2019    Procedure: FESS, USING COMPUTER-ASSISTED NAVIGATION;  Surgeon: LAURENT Swanson MD;  Location: [facility];  Service: ENT;  Laterality: Bilateral;    INJECTION OF ANESTHETIC AGENT AROUND NERVE Bilateral  10/13/2020    Procedure: BLOCK, NERVE BILATERAL C4,C5,C6 Plavix clearance requested;  Surgeon: Fredy Magana MD;  Location: [facility] [place] PAIN MGT;  Service: Pain Management;  Laterality: Bilateral;  BLOCK, NERVE BILATERAL C4,C5,C6  Plavix clearance ok, will require DDVAP infusion    KNEE ARTHROSCOPY W/ MENISCECTOMY Left 11/8/2023    Procedure: ARTHROSCOPY, KNEE, WITH PARTIAL LATERAL MENISCECTOMY;  Surgeon: Karthik Tanner MD;  Location: [facility] OR;  Service: Orthopedics;  Laterality: Left;  regional w/o catheter (adductor)    LATERAL INTERNAL ANAL SPHINCTEROTOMY N/A 12/10/2021    Procedure: SPHINCTEROTOMY-LATERAL INTERNAL ANAL;  Surgeon: Nadeem Grady MD;  Location: [facility] 2ND FLR;  Service: Colon and Rectal;  Laterality: N/A;    LEFT HEART CATHETERIZATION Left 2/14/2019    Procedure: Left heart cath;  Surgeon: Kyle Magana MD;  Location: [facility] CATH LAB;  Service: Cardiology;  Laterality: Left;    LUMBAR FUSION  2012    PH MONITORING, ESOPHAGUS, WIRELESS, (ON REFLUX MEDS) N/A 5/31/2023    Procedure: PH MONITORING, ESOPHAGUS, WIRELESS, (ON REFLUX MEDS);  Surgeon: Rosendo Boyer MD;  Location: [facility] ENDO (4TH FLR);  Service: Endoscopy;  Laterality: N/A;  96hr, on his AcipHex 20 mg every 12 hours, instructions portal-LW    RADIOFREQUENCY ABLATION Right 5/9/2019    Procedure: RADIOFREQUENCY ABLATION, RIGHT L3,L4,L5;  Surgeon: Fredy Magana MD;  Location: [facility] [place] PAIN MGT;  Service: Pain Management;  Laterality: Right;  1 of 2  RT RFA L3,4,5  PLAVIX clearance received, pt needs DDAVP    RADIOFREQUENCY ABLATION Left 5/23/2019    Procedure: RADIOFREQUENCY ABLATION, LEFT L3,L4,L5;  Surgeon: Fredy Magana MD;  Location: [facility] [place] PAIN MGT;  Service: Pain Management;  Laterality: Left;  2 of 2  LT RFA L3,4,5  PLAVIX clearance received, pt needs DDAVP    RADIOFREQUENCY ABLATION Left 1/16/2020    Procedure: RADIOFREQUENCY ABLATION LEFT L3, L4, L5 MEDIAL BRANCH 1 OF 2 **PATIENT ARRIVING AT 8 AM**;  Surgeon: Fredy MCCLELLAN  MD Izzy;  Location: [facility] PAIN MGT;  Service: Pain Management;  Laterality: Left;  NEEDS CONSENT, PLAVIX CLEARANCE IN CHART    RADIOFREQUENCY ABLATION Right 1/28/2020    Procedure: RADIOFREQUENCY ABLATION, RIGHT L3-L4-L5 MEDIAL BRANCH 2 OF 2 (Left done on 1/16/20);  Surgeon: Fredy Magana MD;  Location: [facility] PAIN MGT;  Service: Pain Management;  Laterality: Right;    RADIOFREQUENCY ABLATION Left 8/18/2020    Procedure: RADIOFREQUENCY ABLATION, L3-L4-L5 MEDIAL BRANCH 1 OF 2 clear to hold plavix 7 days;  Surgeon: Fredy Magana MD;  Location: [facility] PAIN MGT;  Service: Pain Management;  Laterality: Left;    RADIOFREQUENCY ABLATION Right 9/1/2020    Procedure: RADIOFREQUENCY ABLATION, L3-L4-L5 MEDIAL BR ANCH 2 OF 2 clear to hol,d Plavix 7 days;  Surgeon: Fredy Magana MD;  Location: [facility] PAIN MGT;  Service: Pain Management;  Laterality: Right;    RADIOFREQUENCY ABLATION Bilateral 12/21/2021    Procedure: RADIOFREQUENCY ABLATION B/L L3,4,5 RFA (give dDAVP prior) NEEDS CONSENT plavix ok x7;  Surgeon: Fredy Magana MD;  Location: [facility] PAIN MGT;  Service: Pain Management;  Laterality: Bilateral;    RADIOFREQUENCY ABLATION Left 11/21/2022    Procedure: RADIOFREQUENCY ABLATION LEFT L3,L4,L5 MUST HAVE dDVAP PRIOR ONE OF TWO;  Surgeon: Milo Winston MD;  Location: [facility] PAIN MGT;  Service: Pain Management;  Laterality: Left;    RADIOFREQUENCY ABLATION Right 12/5/2022    Procedure: RADIOFREQUENCY ABLATION RIGHT L3,L4,L5  PRE-MEDICATE WITH dDVAP TWO OF TWO;  Surgeon: Milo Winston MD;  Location: [facility] PAIN MGT;  Service: Pain Management;  Laterality: Right;    RADIOFREQUENCY ABLATION Bilateral 4/29/2024    Procedure: RADIOFREQUENCY ABLATION BILATERAL L3, 4, 5 *DDAVP BEFORE*;  Surgeon: Milo Winston MD;  Location: [facility] PAIN MGT;  Service: Pain Management;  Laterality: Bilateral;  [phone]  4 WK F/U CHANDRA    RADIOFREQUENCY ABLATION Bilateral 12/23/2024    Procedure: RADIOFREQUENCY ABLATION BILATERAL L3, 4,  5;  Surgeon: Milo Winston MD;  Location: [facility] PAIN MGT;  Service: Pain Management;  Laterality: Bilateral;  4 WK F/U JERI    RADIOFREQUENCY ABLATION OF LUMBAR MEDIAL BRANCH NERVE AT SINGLE LEVEL Left 5/24/2018    Procedure: RADIOFREQUENCY THERMOCOAGULATION (RFTC)-NERVE-MEDIAN BRANCH-LUMBAR;  Surgeon: Fredy Magana MD;  Location: [facility] PAIN MGT;  Service: Pain Management;  Laterality: Left;  Left RFA @ L3,4,5  [id number]  with IV Sedation    2 of 2    RETROGRADE PYELOGRAPHY Bilateral 8/12/2022    Procedure: PYELOGRAM, RETROGRADE;  Surgeon: Tyler Reid Jr., MD;  Location: [facility] OR 1ST FLR;  Service: Urology;  Laterality: Bilateral;    ROBOT-ASSISTED LAPAROSCOPIC REPAIR OF INGUINAL HERNIA USING DA ASHTYN XI Right 10/25/2024    Procedure: XI ROBOTIC REPAIR, HERNIA, RIGHT, INGUINAL, WITH MESH;  Surgeon: Jose Hilliard MD;  Location: [facility] OR 2ND FLR;  Service: General;  Laterality: Right;    SPINE SURGERY      TONSILLECTOMY      at age 24    TRANSFORAMINAL EPIDURAL INJECTION OF STEROID Bilateral 6/19/2023    Procedure: INJECTION, STEROID, EPIDURAL, TRANSFORAMINAL APPROACH,BILATERAL T7-T8 Give 0.3mcg/kg DDAVP immediately prior to the procedure  CLEAR TO HOLD PLAVIX 7 DAYS;  Surgeon: Milo Winston MD;  Location: [facility] PAIN MGT;  Service: Pain Management;  Laterality: Bilateral;    URETEROSCOPY Bilateral 8/12/2022    Procedure: URETEROSCOPY;  Surgeon: Tyler Reid Jr., MD;  Location: [facility] OR 1ST FLR;  Service: Urology;  Laterality: Bilateral;    VASECTOMY  1996     Family History   Problem Relation Name Age of Onset    Colon cancer Father Pato Santiago 67        colon cancer    Hypertension Father Pato Santiago     Glaucoma Father Pato Jack     Cancer Father Pato Santiago     Colon cancer Paternal Grandfather  65             Coronary artery disease Mother Eual Santiago 45    Hypertension Mother Eula Jack     Heart disease Mother Eula Famre     Colon cancer Mother Eula Santiago     Diabetes Mother Eula  Jack     No Known Problems Brother Humble     No Known Problems Sister Pat     No Known Problems Daughter Sophie     No Known Problems Son Cosme     Coronary artery disease Brother Juju Barron    No Known Problems Daughter Hamida     No Known Problems Daughter Rosa     No Known Problems Son Beto     No Known Problems Son Robin     Colon cancer Paternal Uncle  65    Diabetes Mellitus Paternal Grandmother      Esophageal cancer Neg Hx       Social History     Tobacco Use    Smoking status: Never    Smokeless tobacco: Never   Substance Use Topics    Alcohol use: Yes     Alcohol/week: 1.0 standard drink of alcohol     Types: 1 Glasses of wine per week     Comment: social    Drug use: No     Review of Systems  See HPI  Physical Exam     Initial Vitals [12/28/24 1404]   BP Pulse Resp Temp SpO2   (!) 164/88 100 20 100 °F (37.8 °C) 96 %      MAP       --         Physical Exam    Vitals reviewed.  Constitutional: He appears well-developed and well-nourished. He is not diaphoretic. No distress.   HENT:   Head: Normocephalic and atraumatic.   Neck: Neck supple.   Normal range of motion.  Cardiovascular:  Normal rate, regular rhythm and normal heart sounds.     Exam reveals no gallop and no friction rub.       No murmur heard.  Pulmonary/Chest: No respiratory distress. He has wheezes. He has no rhonchi. He has no rales.   Abdominal: Abdomen is soft. There is no abdominal tenderness.   Musculoskeletal:      Cervical back: Normal range of motion and neck supple.     Neurological: He is alert and oriented to person, place, and time.   Psychiatric: He has a normal mood and affect.         ED Course   Procedures  Labs Reviewed   COMPREHENSIVE METABOLIC PANEL - Abnormal       Result Value    Sodium 138      Potassium 3.8      Chloride 102      CO2 25      Glucose 96      BUN 10      Creatinine 0.9      Calcium 8.6 (*)     Total Protein 7.5      Albumin 3.8      Total Bilirubin 1.1 (*)     Alkaline Phosphatase 60       AST 22      ALT 24      eGFR >60.0      Anion Gap 11     HEPATITIS C ANTIBODY    Hepatitis C Ab Non-reactive      Narrative:     Release to patient->Immediate   HIV 1 / 2 ANTIBODY    HIV 1/2 Ag/Ab Non-reactive      Narrative:     Release to patient->Immediate   CBC W/ AUTO DIFFERENTIAL    WBC 5.43      RBC 4.65      Hemoglobin 14.2      Hematocrit 43.0      MCV 93      MCH 30.5      MCHC 33.0      RDW 12.0      Platelets 190      MPV 9.2      Immature Granulocytes 0.2      Gran # (ANC) 3.8      Immature Grans (Abs) 0.01      Lymph # 1.0      Mono # 0.5      Eos # 0.1      Baso # 0.03      nRBC 0      Gran % 70.2      Lymph % 19.2      Mono % 8.3      Eosinophil % 1.5      Basophil % 0.6      Differential Method Automated     TROPONIN I HIGH SENSITIVITY    Troponin I High Sensitivity <3       EKG Readings: (Independently Interpreted)   Rhythm: Normal Sinus Rhythm. Heart Rate: 94. Ectopy: No Ectopy. Conduction: Normal. ST Segments: Normal ST Segments. T Waves: Normal.       Imaging Results              X-Ray Chest PA And Lateral (Final result)  Result time 12/28/24 18:00:38      Final result by Nadeem Honeycutt MD (12/28/24 18:00:38)                   Impression:      Grossly stable chronic findings in the body of the report without convincing radiographic evidence of pneumonia or other source of cough/shortness of breath, noting that early/mild viral pneumonia or nonspecific bronchitis may be radiographically occult.      Electronically signed by: Nadeem Honeycutt MD  Date:    12/28/2024  Time:    18:00               Narrative:    EXAMINATION:  XR CHEST PA AND LATERAL    CLINICAL HISTORY:  Cough, unspecified    TECHNIQUE:  PA and lateral views of the chest were performed.    COMPARISON:  Chest radiograph 02/05/2024, CT renal stone study 06/15/2022    FINDINGS:  Similar nonspecific elevation the right hemidiaphragm.  Bibasilar platelike scarring versus atelectasis similar to prior.  The lungs are otherwise well  expanded without large consolidation, pleural effusion or pneumothorax identified.  Cardiomediastinal silhouette is midline with similar calcification and tortuosity of the aorta.  Heart is not significantly enlarged.  Pulmonary vasculature and hilar contours are within normal limits.  Osseous structures appear stable without acute findings.                                       Medications   albuterol-ipratropium 2.5 mg-0.5 mg/3 mL nebulizer solution 3 mL (3 mLs Nebulization Given 12/28/24 1643)   acetaminophen tablet 1,000 mg (1,000 mg Oral Given 12/28/24 1555)     Medical Decision Making  Emergent evaluation of 64-year-old male regarding generalized malaise and lightheadedness.  Currently has the flu.  Mildly hypertensive with other vitals stable.  Nontoxic appearing, in no acute distress.  See physical exam findings above.  Will obtain labs, troponin, chest x-ray. Will give albuterol treatment for wheezing and reassess.    My differential diagnoses include but are not limited to:  Flu, orthostatic hypotension, electrolyte abnormality, KAYLAH, ACS, pneumonia, bronchitis  See ED course.    Amount and/or Complexity of Data Reviewed  Labs: ordered.  Radiology: ordered.    Risk  OTC drugs.  Prescription drug management.               ED Course as of 12/29/24 1216   Sat Dec 28, 2024   1550 Pending chest x-ray and labs, signed out to oncoming ED staff due to shift change [KB]      ED Course User Index  [KB] Zora Bower PA-C                           Clinical Impression:  Final diagnoses:  [J11.1] Flu  [R05.9] Cough          ED Disposition Condition    Discharge Stable          ED Prescriptions       Medication Sig Dispense Start Date End Date Auth. Provider    albuterol (PROVENTIL/VENTOLIN HFA) 90 mcg/actuation inhaler Inhale 1-2 puffs into the lungs every 6 (six) hours as needed for Wheezing. Rescue 18 g 12/28/2024 12/28/2025 Nato Styles PA-C    benzonatate (TESSALON) 100 MG capsule Take 1 capsule (100 mg total)  by mouth 3 (three) times daily as needed for Cough. 20 capsule 12/28/2024 1/7/2025 Nato Styles PA-C          Follow-up Information       Follow up With Specialties Details Why Contact Info    Cate Galeas MD Internal Medicine Schedule an appointment as soon as possible for a visit  As needed [id number]  [facility] [address]  [facility] [address]  [phone]               Zora Bower PA-C  12/29/24 1423

## 2024-12-29 LAB
OHS QRS DURATION: 90 MS
OHS QTC CALCULATION: 425 MS

## 2024-12-29 NOTE — ED PROVIDER NOTES
ED Physician Hand-off Note:    ED Course: I assumed care of patient from off-going ED physician team. Briefly, Patient is a 64-year-old tested positive for influenza presents ED dizziness.    At the time of signout plan was pending troponin and chest x-ray.    Medications given in the ED:    Medications   albuterol-ipratropium 2.5 mg-0.5 mg/3 mL nebulizer solution 3 mL (3 mLs Nebulization Given 12/28/24 1643)   acetaminophen tablet 1,000 mg (1,000 mg Oral Given 12/28/24 1555)       Disposition: High sensitivity troponin negative.  Chest x-ray with no evidence pneumonia.  His vital signs are reassuring.  Suspect his symptoms are from influenza.  Did have some bronchospasm will discharge with a albuterol inhaler.  Counseled on supportive management and PCP follow-up.  Return ED precautions given.    Patient comfortable with discharge. Patient counseled regarding exam, results, diagnosis, treatment, and plan.    Impression:  Influenza        Nato Styles PA-C  12/28/24 [id number]

## 2025-01-09 ENCOUNTER — E-VISIT (OUTPATIENT)
Dept: INTERNAL MEDICINE | Facility: CLINIC | Age: 65
End: 2025-01-09
Payer: MEDICARE

## 2025-01-09 ENCOUNTER — TELEPHONE (OUTPATIENT)
Dept: INTERNAL MEDICINE | Facility: CLINIC | Age: 65
End: 2025-01-09
Payer: MEDICARE

## 2025-01-09 DIAGNOSIS — R05.3 PERSISTENT COUGH: Primary | ICD-10-CM

## 2025-01-09 DIAGNOSIS — J11.1 INFLUENZA: ICD-10-CM

## 2025-01-09 RX ORDER — PROMETHAZINE HYDROCHLORIDE AND DEXTROMETHORPHAN HYDROBROMIDE 6.25; 15 MG/5ML; MG/5ML
5 SYRUP ORAL NIGHTLY PRN
Qty: 118 ML | Refills: 0 | Status: SHIPPED | OUTPATIENT
Start: 2025-01-09 | End: 2025-01-19

## 2025-01-09 NOTE — TELEPHONE ENCOUNTER
----- Message from Maximino sent at 1/8/2025  4:15 PM CST -----  Contact: patient  Type:  Patient Call          Who Called: patient         Does the patient know what this is regarding?: requesting a call back about having a Rx for cough syrup called into the pharmacy because the pills that were prescribed isn't  working at all ;please advise           Would the patient rather a call back or a response via [facility]? Call           Best Call Back Number:[phone]             Additional Information:       [facility] [address] [place], [place] [address] [address]  [facility] [address] [place] [address]  Phone: [phone] Fax: [phone] o

## 2025-01-09 NOTE — PROGRESS NOTES
Patient ID: Bri Santiago is a 64 y.o. male.    Chief Complaint: General Illness (Entered automatically based on patient selection in Site[facility].)    The patient initiated a request through Site9 on 1/9/2025 for evaluation and management with a chief complaint of General Illness (Entered automatically based on patient selection in Site9.)     I evaluated the questionnaire submission on 3:21 PM  .01/09/2025      [facility] Evisit [facility]-Cough And Cold    1/9/2025  9:09 AM CST - Filed by Patient   What do you need help with? Cough, Cold, Sore Throat   Do you agree to participate in an E-Visit? Yes   If you have any of the following symptoms, go to your local emergency room or call 911: I acknowledge   What is the main issue you would like addressed today? I have a cough that  and I went to the emergency room on last Saturday because I had the flu and they gave a albuterol pump and some pills for my cough but the pills has not helped my cough. So I would like to get some cough syrup to get rid off the cough   Do you think you might have COVID or the Flu? No   Have you tested positive for COVID or Flu? No   What symptoms do you currently have?  Cough   Describe your cough: Productive (containing mucus);  Bothersome (interferes with daily activities)   Describe the mucus: Yellow   Have you ever smoked? I have never smoked   Have you had a fever? No   When did your symptoms first appear? 12/29/2024   In the last two weeks, have you been in close contact with someone who has COVID-19 or the Flu? No   List what you have done or taken to help your symptoms. I have taken TheraFlu cough medicine and the pills that they prescribed   How severe are your symptoms? Severe   Have your symptoms gotten better or worse since they started?  No change   Do you have transportation to get testing if it is needed and ordered for you at an [facility] location? Yes   Provide any additional information you feel is important. Just need  something to help get rid of this cough   Please attach any relevant images or files    Are you able to take your vital signs? Yes   Systolic Blood Pressure: 126   Diastolic Blood Pressure: 84   Weight: 239   Height: 69   Pulse: 85   Temperature: 97.9   Respiration rate:    Pulse Oxygen: 89         Encounter Diagnoses   Name Primary?    Persistent cough Yes    Influenza         No orders of the defined types were placed in this encounter.     Medications Ordered This Encounter   Medications    promethazine-dextromethorphan (PROMETHAZINE-DM) 6.25-15 mg/5 mL Syrp     Sig: Take 5 mLs by mouth nightly as needed (cough).     Dispense:  118 mL     Refill:  0        No follow-ups on file.      E-Visit Time Trackin mins

## 2025-01-27 ENCOUNTER — TELEPHONE (OUTPATIENT)
Dept: PAIN MEDICINE | Facility: CLINIC | Age: 65
End: 2025-01-27
Payer: MEDICARE

## 2025-01-27 NOTE — TELEPHONE ENCOUNTER
Staff called pt about message that was left with the call center. Pt was verbalized that we weiagustina put him on the waiting list for a sooner appointment.

## 2025-01-27 NOTE — TELEPHONE ENCOUNTER
----- Message from Ted sent at 1/27/2025  9:03 AM CST -----  Type: Patient Call          Who Called: Patient      Does the patient know what this is regarding? Pt is requesting a call back to be seen today if possible or sooner than 1/31. He has severe back pain that is leading down both of his legs. Please advise          Does the patient rather a call back or a response via [employer]? call        Best Call Back Number: [phone]         Additional Information:

## 2025-01-28 ENCOUNTER — PATIENT MESSAGE (OUTPATIENT)
Dept: PAIN MEDICINE | Facility: OTHER | Age: 65
End: 2025-01-28
Payer: MEDICARE

## 2025-01-28 ENCOUNTER — OFFICE VISIT (OUTPATIENT)
Dept: PAIN MEDICINE | Facility: CLINIC | Age: 65
End: 2025-01-28
Payer: MEDICARE

## 2025-01-28 ENCOUNTER — TELEPHONE (OUTPATIENT)
Dept: PAIN MEDICINE | Facility: CLINIC | Age: 65
End: 2025-01-28
Payer: MEDICARE

## 2025-01-28 VITALS
SYSTOLIC BLOOD PRESSURE: 145 MMHG | BODY MASS INDEX: 34.42 KG/M2 | DIASTOLIC BLOOD PRESSURE: 72 MMHG | OXYGEN SATURATION: 98 % | TEMPERATURE: 99 F | HEART RATE: 74 BPM | RESPIRATION RATE: 18 BRPM | WEIGHT: 239.88 LBS

## 2025-01-28 DIAGNOSIS — M47.816 LUMBAR SPONDYLOSIS: ICD-10-CM

## 2025-01-28 DIAGNOSIS — M51.34 DDD (DEGENERATIVE DISC DISEASE), THORACIC: ICD-10-CM

## 2025-01-28 DIAGNOSIS — M96.1 POSTLAMINECTOMY SYNDROME OF LUMBAR REGION: ICD-10-CM

## 2025-01-28 DIAGNOSIS — G89.4 CHRONIC PAIN SYNDROME: Primary | ICD-10-CM

## 2025-01-28 DIAGNOSIS — M54.16 LUMBAR RADICULOPATHY: Primary | ICD-10-CM

## 2025-01-28 PROCEDURE — 99999 PR PBB SHADOW E&M-EST. PATIENT-LVL V: CPT | Mod: PBBFAC,,, | Performed by: NURSE PRACTITIONER

## 2025-01-28 PROCEDURE — 3008F BODY MASS INDEX DOCD: CPT | Mod: CPTII,S$GLB,, | Performed by: NURSE PRACTITIONER

## 2025-01-28 PROCEDURE — 1159F MED LIST DOCD IN RCRD: CPT | Mod: CPTII,S$GLB,, | Performed by: NURSE PRACTITIONER

## 2025-01-28 PROCEDURE — 1160F RVW MEDS BY RX/DR IN RCRD: CPT | Mod: CPTII,S$GLB,, | Performed by: NURSE PRACTITIONER

## 2025-01-28 PROCEDURE — 3077F SYST BP >= 140 MM HG: CPT | Mod: CPTII,S$GLB,, | Performed by: NURSE PRACTITIONER

## 2025-01-28 PROCEDURE — 3078F DIAST BP <80 MM HG: CPT | Mod: CPTII,S$GLB,, | Performed by: NURSE PRACTITIONER

## 2025-01-28 PROCEDURE — 99214 OFFICE O/P EST MOD 30 MIN: CPT | Mod: S$GLB,,, | Performed by: NURSE PRACTITIONER

## 2025-01-28 NOTE — TELEPHONE ENCOUNTER
----- Message from Ted sent at 1/28/2025  8:05 AM CST -----  Type: Patient Call          Who Called: Patient      Does the patient know what this is regarding? Pt is requesting a call back to discuss if it is ok that he still come to his appt. He is caught by a train. Please advise          Does the patient rather a call back or a response via [employer]? call        Best Call Back Number: [phone]         Additional Information:

## 2025-01-28 NOTE — PROGRESS NOTES
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       Next appt: 11/04/2024 at 10:30 AM in Surgery (Jose Hilliard MD)       Dx: Dorsalgia, unspecified    0 Result Notes  Details    Reading Physician Reading Date Result Priority   Kylie Alfredo MD  [phone] 9/23/2024 Routine     Narrative & Impression  EXAMINATION:  MRI LUMBAR SPINE WITHOUT CONTRAST     CLINICAL HISTORY:  Low back pain, symptoms persist with > 6wks conservative treatment; Dorsalgia, unspecified     TECHNIQUE:  Multiplanar, multisequence MR images were acquired from the thoracolumbar junction to the sacrum without the administration of contrast.     COMPARISON:  01/27/2021     FINDINGS:  Prior posterior and interbody fusion L4 through S1.     Marrow demonstrates homogeneous signal no evidence for marrow replacement process or acute fracture.     Disc space narrowing and Modic 2 changes L1-2.  Mild disc space narrowing L2-3.     Conus terminates appropriately at L1.     Multilevel degenerative change as diesel below:     L1-2: Posterior circumferential disc bulge with mild canal and mild bilateral neural foraminal narrowing.     L2-3: Small posterior circumferential disc bulge.  Superimposed central disc protrusion measuring 5 mm in AP dimension.  Mild-moderate canal narrowing.     L3-4: Posterior circumferential disc bulge, facet arthropathy, and  thickening of the ligamentum flavum findings contribute to mild canal and mild bilateral neural foraminal narrowing.     L4-5: Central canal is decompressed.     L5-S1: Central canal is decompressed.     Focus of T2 signal hyperintensity right kidney, likely cyst.     Impression:     Multilevel degenerative change with progression compared to prior.     At L2-3, new central disc protrusion.     Canal narrowing L1-2 through L3-4, mild-moderate at L2-3.     BMP  Lab Results   Component Value Date     12/28/2024    K 3.8 12/28/2024     12/28/2024    CO2 25 12/28/2024    BUN 10 12/28/2024    CREATININE 0.9 12/28/2024    CALCIUM 8.6 (L) 12/28/2024    ANIONGAP 11 12/28/2024    ESTGFRAFRICA >60.0 07/19/2022    EGFRNONAA >60.0 07/19/2022         REVIEW OF SYSTEMS:    GENERAL:  No weight loss or fevers.    RESPIRATORY:  Denies SOB. Reports wheezing.  CARDIOVASCULAR:  Negative for chest pain, leg swelling or palpitations.  GI:  Negative for abdominal discomfort, blood in stools or black stools or change in bowel habits.  MUSCULOSKELETAL:  Joint stiffness, back pain.  SKIN:  Negative for lesions, rash, and itching.  PSYCH: Patients sleep is not disturbed secondary to pain.  HEMATOLOGY/LYMPHOLOGY:  History of easy bruising.  History of von Willebrand's factor deficiency. On Plavix.  NEURO:   No history of syncope, paralysis, seizures or tremors.   All other reviewed and negative other than HPI.      OBJECTIVE:    BP (!) 145/72   Pulse 74   Temp 98.8 °F (37.1 °C)   Resp 18   Wt 108.8 kg (239 lb 13.8 oz)   SpO2 98%   BMI 34.42 kg/m²     PHYSICAL EXAMINATION:    GENERAL: Well appearing, in no acute distress, alert and oriented x3.  PSYCH:  Mood and affect appropriate.  SKIN:  No evidence of infection from previous injection site. No visible rashes.  HEAD/FACE:  Normocephalic, atraumatic.   BACK: There is no pain with palpation of the lumbar spine. There is pain with lumbar flexion and extension. Positive facet  loading bilaterally. Positive Milgram's test. SLR causes back pain bilaterally.  EXTREMITIES: Bilateral lower and upper extremity strength is 5/5 and symmetric.   MUSCULOSKELETAL:   No atrophy or tone abnormalities are noted. No TTP over SI joints. Full ROM of bilateral hips without pain. Ted's is negative bilaterally. 5/5 strength in right ankle with plantar and dorsiflexion. 5/5 strength in left ankle with plantar and dorsiflexion. 5/5 strength with right knee flexion and extension. 5/5 strength with left knee flexion and extension.   NEURO: Cranial nerves grossly intact. No loss of sensation noted.  GAIT: Antalgic.      Assessment:     1. Chronic pain syndrome    2. Postlaminectomy syndrome of lumbar region    3. Lumbar spondylosis    4. DDD (degenerative disc disease), thoracic            Plan:     - Previous lumbar MRI reviewed with patient.     - Orders placed for L3/4 IL ODALYS given symptoms. Also consider Intracept.    - The patient will continue a home exercise routine to help with pain and strengthening.     - Refill Flexeril. He uses sparingly and denies any previous adverse effects.    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 It may be mild vWF, but most recent lab tests showed normal coagulation function. The patient has been previously receiving dDAVP prior to all procedures and has not exhibited bleeding. Hematology recommended receiving dDAVP prior to all invasive procedures. For all interventional procedures, we will give 0.3mcg/kg dDAVP immediately prior to the procedure.       - RTC 2 weeks after procedure.      The above plan and management options were discussed at length with patient. Patient is in agreement with the above and verbalized understanding.     Buffy Villagran    01/28/2025

## 2025-01-28 NOTE — H&P (VIEW-ONLY)
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       Next appt: 11/04/2024 at 10:30 AM in Surgery (Jose Hilliard MD)       Dx: Dorsalgia, unspecified    0 Result Notes  Details    Reading Physician Reading Date Result Priority   Kylie Alfredo MD  [phone] 9/23/2024 Routine     Narrative & Impression  EXAMINATION:  MRI LUMBAR SPINE WITHOUT CONTRAST     CLINICAL HISTORY:  Low back pain, symptoms persist with > 6wks conservative treatment; Dorsalgia, unspecified     TECHNIQUE:  Multiplanar, multisequence MR images were acquired from the thoracolumbar junction to the sacrum without the administration of contrast.     COMPARISON:  01/27/2021     FINDINGS:  Prior posterior and interbody fusion L4 through S1.     Marrow demonstrates homogeneous signal no evidence for marrow replacement process or acute fracture.     Disc space narrowing and Modic 2 changes L1-2.  Mild disc space narrowing L2-3.     Conus terminates appropriately at L1.     Multilevel degenerative change as diesel below:     L1-2: Posterior circumferential disc bulge with mild canal and mild bilateral neural foraminal narrowing.     L2-3: Small posterior circumferential disc bulge.  Superimposed central disc protrusion measuring 5 mm in AP dimension.  Mild-moderate canal narrowing.     L3-4: Posterior circumferential disc bulge, facet arthropathy, and  thickening of the ligamentum flavum findings contribute to mild canal and mild bilateral neural foraminal narrowing.     L4-5: Central canal is decompressed.     L5-S1: Central canal is decompressed.     Focus of T2 signal hyperintensity right kidney, likely cyst.     Impression:     Multilevel degenerative change with progression compared to prior.     At L2-3, new central disc protrusion.     Canal narrowing L1-2 through L3-4, mild-moderate at L2-3.     BMP  Lab Results   Component Value Date     12/28/2024    K 3.8 12/28/2024     12/28/2024    CO2 25 12/28/2024    BUN 10 12/28/2024    CREATININE 0.9 12/28/2024    CALCIUM 8.6 (L) 12/28/2024    ANIONGAP 11 12/28/2024    ESTGFRAFRICA >60.0 07/19/2022    EGFRNONAA >60.0 07/19/2022         REVIEW OF SYSTEMS:    GENERAL:  No weight loss or fevers.    RESPIRATORY:  Denies SOB. Reports wheezing.  CARDIOVASCULAR:  Negative for chest pain, leg swelling or palpitations.  GI:  Negative for abdominal discomfort, blood in stools or black stools or change in bowel habits.  MUSCULOSKELETAL:  Joint stiffness, back pain.  SKIN:  Negative for lesions, rash, and itching.  PSYCH: Patients sleep is not disturbed secondary to pain.  HEMATOLOGY/LYMPHOLOGY:  History of easy bruising.  History of von Willebrand's factor deficiency. On Plavix.  NEURO:   No history of syncope, paralysis, seizures or tremors.   All other reviewed and negative other than HPI.      OBJECTIVE:    BP (!) 145/72   Pulse 74   Temp 98.8 °F (37.1 °C)   Resp 18   Wt 108.8 kg (239 lb 13.8 oz)   SpO2 98%   BMI 34.42 kg/m²     PHYSICAL EXAMINATION:    GENERAL: Well appearing, in no acute distress, alert and oriented x3.  PSYCH:  Mood and affect appropriate.  SKIN:  No evidence of infection from previous injection site. No visible rashes.  HEAD/FACE:  Normocephalic, atraumatic.   BACK: There is no pain with palpation of the lumbar spine. There is pain with lumbar flexion and extension. Positive facet  loading bilaterally. Positive Milgram's test. SLR causes back pain bilaterally.  EXTREMITIES: Bilateral lower and upper extremity strength is 5/5 and symmetric.   MUSCULOSKELETAL:   No atrophy or tone abnormalities are noted. No TTP over SI joints. Full ROM of bilateral hips without pain. Ted's is negative bilaterally. 5/5 strength in right ankle with plantar and dorsiflexion. 5/5 strength in left ankle with plantar and dorsiflexion. 5/5 strength with right knee flexion and extension. 5/5 strength with left knee flexion and extension.   NEURO: Cranial nerves grossly intact. No loss of sensation noted.  GAIT: Antalgic.      Assessment:     1. Chronic pain syndrome    2. Postlaminectomy syndrome of lumbar region    3. Lumbar spondylosis    4. DDD (degenerative disc disease), thoracic            Plan:     - Previous lumbar MRI reviewed with patient.     - Orders placed for L3/4 IL ODALYS given symptoms. Also consider Intracept.    - The patient will continue a home exercise routine to help with pain and strengthening.     - Refill Flexeril. He uses sparingly and denies any previous adverse effects.    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 It may be mild vWF, but most recent lab tests showed normal coagulation function. The patient has been previously receiving dDAVP prior to all procedures and has not exhibited bleeding. Hematology recommended receiving dDAVP prior to all invasive procedures. For all interventional procedures, we will give 0.3mcg/kg dDAVP immediately prior to the procedure.       - RTC 2 weeks after procedure.      The above plan and management options were discussed at length with patient. Patient is in agreement with the above and verbalized understanding.     Buffy Villagran    01/28/2025

## 2025-01-29 ENCOUNTER — PATIENT MESSAGE (OUTPATIENT)
Dept: PAIN MEDICINE | Facility: OTHER | Age: 65
End: 2025-01-29
Payer: MEDICARE

## 2025-02-11 ENCOUNTER — PATIENT MESSAGE (OUTPATIENT)
Dept: ADMINISTRATIVE | Facility: OTHER | Age: 65
End: 2025-02-11
Payer: MEDICARE

## 2025-02-17 ENCOUNTER — HOSPITAL ENCOUNTER (OUTPATIENT)
Facility: OTHER | Age: 65
Discharge: HOME | End: 2025-02-17
Attending: ANESTHESIOLOGY | Admitting: STUDENT IN AN ORGANIZED HEALTH CARE EDUCATION/TRAINING PROGRAM
Payer: MEDICARE

## 2025-02-17 VITALS
BODY MASS INDEX: 34.22 KG/M2 | SYSTOLIC BLOOD PRESSURE: 136 MMHG | HEIGHT: 70 IN | HEART RATE: 70 BPM | OXYGEN SATURATION: 95 % | WEIGHT: 239 LBS | DIASTOLIC BLOOD PRESSURE: 89 MMHG | RESPIRATION RATE: 18 BRPM | TEMPERATURE: 98 F

## 2025-02-17 DIAGNOSIS — G89.29 CHRONIC PAIN: ICD-10-CM

## 2025-02-17 DIAGNOSIS — M54.16 LUMBAR RADICULOPATHY: Primary | ICD-10-CM

## 2025-02-17 PROCEDURE — 25500020 PHARM REV CODE 255: Performed by: ANESTHESIOLOGY

## 2025-02-17 PROCEDURE — 25000003 PHARM REV CODE 250: Performed by: ANESTHESIOLOGY

## 2025-02-17 PROCEDURE — 62323 NJX INTERLAMINAR LMBR/SAC: CPT | Performed by: ANESTHESIOLOGY

## 2025-02-17 PROCEDURE — 63600175 PHARM REV CODE 636 W HCPCS: Performed by: ANESTHESIOLOGY

## 2025-02-17 RX ORDER — SODIUM CHLORIDE 9 MG/ML
INJECTION, SOLUTION INTRAVENOUS CONTINUOUS
Status: DISCONTINUED | OUTPATIENT
Start: 2025-02-17 | End: 2025-02-17 | Stop reason: HOSPADM

## 2025-02-17 RX ORDER — DESMOPRESSIN ACETATE 4 UG/ML
32.4 INJECTION, SOLUTION INTRAVENOUS; SUBCUTANEOUS ONCE
Qty: 8.1 ML | Refills: 0 | Status: SHIPPED | OUTPATIENT
Start: 2025-02-17 | End: 2025-02-17

## 2025-02-17 RX ORDER — FENTANYL CITRATE 50 UG/ML
INJECTION, SOLUTION INTRAMUSCULAR; INTRAVENOUS
Status: DISCONTINUED | OUTPATIENT
Start: 2025-02-17 | End: 2025-02-17 | Stop reason: HOSPADM

## 2025-02-17 RX ORDER — MIDAZOLAM HYDROCHLORIDE 1 MG/ML
INJECTION INTRAMUSCULAR; INTRAVENOUS
Status: DISCONTINUED | OUTPATIENT
Start: 2025-02-17 | End: 2025-02-17 | Stop reason: HOSPADM

## 2025-02-17 RX ORDER — LIDOCAINE HYDROCHLORIDE 10 MG/ML
INJECTION, SOLUTION EPIDURAL; INFILTRATION; INTRACAUDAL; PERINEURAL
Status: DISCONTINUED | OUTPATIENT
Start: 2025-02-17 | End: 2025-02-17 | Stop reason: HOSPADM

## 2025-02-17 RX ORDER — DEXAMETHASONE SODIUM PHOSPHATE 10 MG/ML
INJECTION, SOLUTION INTRA-ARTICULAR; INTRALESIONAL; INTRAMUSCULAR; INTRAVENOUS; SOFT TISSUE
Status: DISCONTINUED | OUTPATIENT
Start: 2025-02-17 | End: 2025-02-17 | Stop reason: HOSPADM

## 2025-02-17 RX ORDER — DESMOPRESSIN ACETATE 4 UG/ML
1 INJECTION, SOLUTION INTRAVENOUS; SUBCUTANEOUS 2 TIMES DAILY
Status: CANCELLED | OUTPATIENT
Start: 2025-02-17

## 2025-02-17 RX ORDER — LIDOCAINE HYDROCHLORIDE 20 MG/ML
INJECTION, SOLUTION INFILTRATION; PERINEURAL
Status: DISCONTINUED | OUTPATIENT
Start: 2025-02-17 | End: 2025-02-17 | Stop reason: HOSPADM

## 2025-02-17 RX ADMIN — DESMOPRESSIN ACETATE 32.52 MCG: 4 SOLUTION INTRAVENOUS at 08:02

## 2025-02-17 NOTE — DISCHARGE INSTRUCTIONS

## 2025-02-17 NOTE — OP NOTE
Lumbar Interlaminar Epidural Steroid Injection under Fluoroscopic Guidance    The procedure, risks, benefits, and options were discussed with the patient. There are no contraindications to the procedure. The patent expressed understanding and agreed to the procedure. Informed written consent was obtained prior to the start of the procedure and can be found in the patient's chart.    PATIENT NAME: Bri Santiago   MRN: [id number]     DATE OF PROCEDURE: 02/17/2025    PROCEDURE: Lumbar Interlaminar Epidural Steroid Injection L3/L4 under Fluoroscopic Guidance    PRE-OP DIAGNOSIS: Lumbar radiculopathy [M54.16] Lumbar radiculopathy [M54.16]      POST-OP DIAGNOSIS: Same    PHYSICIAN: Milo Winston MD    ASSISTANTS: None.     MEDICATIONS INJECTED: Preservative-free Decadron 10mg with 4cc of Lidocaine 1% MPF and preservative free normal saline    LOCAL ANESTHETIC INJECTED: Xylocaine 2%     SEDATION: Versed 2mg and Fentanyl 50mcg                                                                                                                                                                                     Conscious sedation ordered by M.D. Patient re-evaluation prior to administration of conscious sedation. No changes noted in patient's status from initial evaluation. The patient's vital signs were monitored by RN and patient remained hemodynamically stable throughout the procedure.    Event Time In   Sedation Start 0957   Sedation End 1007       ESTIMATED BLOOD LOSS: None    COMPLICATIONS: None    TECHNIQUE: Time-out was performed to identify the patient and procedure to be performed. With the patient laying in a prone position, the surgical area was prepped and draped in the usual sterile fashion using ChloraPrep and a fenestrated drape. The level was determined under fluoroscopy guidance. Skin anesthesia was achieved by injecting Lidocaine 2% over the injection site. The interlaminar space was then approached with a 18 gauge,   3.5 inch Tuohy needle that was introduced under fluoroscopic guidance in the AP, lateral and/or contralateral oblique imaging. Once the Ligamentum flavum was encountered loss of resistance to saline was used to enter the epidural space. With positive loss of resistance and negative aspiration for CSF or Blood, contrast dye Omnipaque (300mg/mL) was injected to confirm placement and there was no vascular runoff. 3 mL of the medication mixture listed above was injected slowly. Displacement of the radio opaque contrast after injection of the medication confirmed that the medication went into the area of the epidural space. The needles were removed and bleeding was nil. A sterile dressing was applied. No specimens collected. The patient tolerated the procedure well.     PRE-PROCEDURE PAIN SCORE: 7/10    POST-PROCEDURE PAIN SCORE: 0/10    The patient was monitored after the procedure in the recovery area. They were given post-procedure and discharge instructions to follow at home. The patient was discharged in a stable condition.    Ford Forrester MD    I reviewed and edited the fellow's note. I conducted my own interview and physical examination. I agree with the findings. I was present and supervising all critical portions of the procedure.

## 2025-02-17 NOTE — DISCHARGE SUMMARY
Discharge Note  Short Stay      SUMMARY     Admit Date: 2/17/2025    Attending Physician: Ford Forrester      Discharge Physician: Ford Forrester      Discharge Date: 2/17/2025 9:57 AM    Procedure(s) (LRB):  LUMBAR L3/4 IL ODALYS  *PLAVIX CLEARANCE REQUESTED* * give 0.3mcg/kg dDAVP immediately prior to the procedure* (N/A)    Final Diagnosis: Lumbar radiculopathy [M54.16]    Disposition: Home or self care    Patient Instructions:   Current Discharge Medication List        START taking these medications    Details   desmopressin (DDAVP) 4 mcg/mL injection Inject 8.1 mLs (32.4 mcg total) into the vein once. for 1 dose  Qty: 8.1 mL, Refills: 0    Comments: Please administer 30 min prior to procedure           CONTINUE these medications which have NOT CHANGED    Details   oxyCODONE (ROXICODONE) 5 MG immediate release tablet Take 1 tablet (5 mg total) by mouth every 4 (four) hours as needed for Pain.  Qty: 10 tablet, Refills: 0    Comments: Quantity prescribed more than 7 day supply? No      !! albuterol (PROVENTIL/VENTOLIN HFA) 90 mcg/actuation inhaler INHALE 2 PUFFS INTO THE LUNGS EVERY 6 HOURS AS NEEDED FOR WHEEZING OR SHORTNESS OF BREATH  Qty: 18 g, Refills: 4      !! albuterol (PROVENTIL/VENTOLIN HFA) 90 mcg/actuation inhaler Inhale 1-2 puffs into the lungs every 6 (six) hours as needed for Wheezing. Rescue  Qty: 18 g, Refills: 3      amLODIPine (NORVASC) 5 MG tablet Take 1 tablet (5 mg total) by mouth once daily.  Qty: 90 tablet, Refills: 3    Comments: .  Associated Diagnoses: Primary hypertension      atorvastatin (LIPITOR) 80 MG tablet Take 1 tablet (80 mg total) by mouth every evening.  Qty: 90 tablet, Refills: 3    Associated Diagnoses: Hyperlipidemia, unspecified hyperlipidemia type      azelastine (ASTELIN) 137 mcg (0.1 %) nasal spray Two sprays in each nostril, sniff until absorbed, then follow with 1 spray of fluticasone.  Use both sprays twice daily.  Qty: 30 mL, Refills: 11      budesonide-formoterol  160-4.5 mcg (SYMBICORT) 160-4.5 mcg/actuation HFAA INHALE 2 PUFFS INTO THE LUNGS EVERY 12 HOURS  Qty: 10.2 g, Refills: 12      calcium citrate-vitamin D3 315-200 mg (CITRACAL+D) 315-200 mg-unit per tablet Take 1 tablet by mouth nightly.       clopidogreL (PLAVIX) 75 mg tablet Take 1 tablet (75 mg total) by mouth once daily.  Qty: 90 tablet, Refills: 3      cyanocobalamin, vitamin B-12, 50 mcg tablet Take 50 mcg by mouth nightly.       docusate sodium (COLACE) 100 MG capsule Take 1 tablet by mouth Twice daily. 1 Capsule Oral Twice a day .  Take with pain medicine      doxazosin (CARDURA) 1 MG tablet TAKE 1 TABLET BY MOUTH EVERY EVENING  Qty: 90 tablet, Refills: 3    Associated Diagnoses: Benign localized prostatic hyperplasia with lower urinary tract symptoms (LUTS)      ezetimibe (ZETIA) 10 mg tablet Take 1 tablet (10 mg total) by mouth once daily.  Qty: 90 tablet, Refills: 3      fluticasone propionate (FLONASE) 50 mcg/actuation nasal spray One spray in each nostril twice daily after 1st using azelastine nasal spray  Qty: 18.2 mL, Refills: 11      furosemide (LASIX) 20 MG tablet Take 1 tablet by mouth once daily  Qty: 90 tablet, Refills: 0    Associated Diagnoses: Diastolic dysfunction      ipratropium (ATROVENT) 42 mcg (0.06 %) nasal spray 1-2 sprays in each nostril before eating and at bedtime as needed  Qty: 30 mL, Refills: 11      RABEprazole (ACIPHEX) 20 mg tablet Take 1 tablet (20 mg total) by mouth every 12 (twelve) hours.  Qty: 180 tablet, Refills: 3    Associated Diagnoses: Gastroesophageal reflux disease, unspecified whether esophagitis present; Heartburn      tadalafiL (CIALIS) 20 MG Tab Take 1 tablet (20 mg total) by mouth as needed. Take every 36 hours as needed for ED.  Qty: 30 tablet, Refills: 9    Associated Diagnoses: Erectile dysfunction, unspecified erectile dysfunction type      testosterone (ANDRODERM) 2 mg/24 hour PT24 APPLY 1 PATCH TOPICALLY TO THE SKIN EVERY DAY  Qty: 60 patch, Refills: 3     Associated Diagnoses: Hypogonadism in male      zolpidem (AMBIEN) 10 mg Tab TAKE 1 TABLET BY MOUTH EVERY DAY AT BEDTIME  Qty: 20 tablet, Refills: 0    Associated Diagnoses: Sleep disorder       !! - Potential duplicate medications found. Please discuss with provider.              Discharge Diagnosis: Lumbar radiculopathy [M54.16]  Condition on Discharge: Stable with no complications to procedure   Diet on Discharge: Same as before.  Activity: as per instruction sheet.  Discharge to: Home with a responsible adult.  Follow up: 2-4 weeks       Please call my office or pager at [phone] if experienced any weakness or loss of sensation, fever > 101.5, pain uncontrolled with oral medications, persistent nausea/vomiting/or diarrhea, redness or drainage from the incisions, or any other worrisome concerns. If physician on call was not reached or could not communicate with our office for any reason please go to the nearest emergency department

## 2025-02-25 ENCOUNTER — HOSPITAL ENCOUNTER (OUTPATIENT)
Dept: RADIOLOGY | Facility: HOSPITAL | Age: 65
Discharge: HOME | End: 2025-02-25
Attending: INTERNAL MEDICINE
Payer: MEDICARE

## 2025-02-25 ENCOUNTER — OFFICE VISIT (OUTPATIENT)
Dept: INTERNAL MEDICINE | Facility: CLINIC | Age: 65
End: 2025-02-25
Payer: MEDICARE

## 2025-02-25 VITALS
HEIGHT: 70 IN | SYSTOLIC BLOOD PRESSURE: 132 MMHG | BODY MASS INDEX: 34.65 KG/M2 | HEART RATE: 69 BPM | DIASTOLIC BLOOD PRESSURE: 72 MMHG | WEIGHT: 242.06 LBS | OXYGEN SATURATION: 96 %

## 2025-02-25 DIAGNOSIS — Z92.241 S/P EPIDURAL STEROID INJECTION: ICD-10-CM

## 2025-02-25 DIAGNOSIS — E66.09 CLASS 1 OBESITY DUE TO EXCESS CALORIES WITH SERIOUS COMORBIDITY AND BODY MASS INDEX (BMI) OF 34.0 TO 34.9 IN ADULT: ICD-10-CM

## 2025-02-25 DIAGNOSIS — R10.2 SUPRAPUBIC PRESSURE: ICD-10-CM

## 2025-02-25 DIAGNOSIS — E66.811 CLASS 1 OBESITY DUE TO EXCESS CALORIES WITH SERIOUS COMORBIDITY AND BODY MASS INDEX (BMI) OF 34.0 TO 34.9 IN ADULT: ICD-10-CM

## 2025-02-25 DIAGNOSIS — M54.17 LUMBOSACRAL RADICULOPATHY AT L1: ICD-10-CM

## 2025-02-25 DIAGNOSIS — M54.17 LUMBOSACRAL RADICULOPATHY AT L1: Primary | ICD-10-CM

## 2025-02-25 DIAGNOSIS — I50.32 DIASTOLIC DYSFUNCTION WITH CHRONIC HEART FAILURE: ICD-10-CM

## 2025-02-25 DIAGNOSIS — D68.00 VON WILLEBRAND DISEASE: ICD-10-CM

## 2025-02-25 LAB
BILIRUB SERPL-MCNC: NEGATIVE MG/DL
BLOOD URINE, POC: NEGATIVE
CLARITY, POC UA: CLEAR
COLOR, POC UA: NORMAL
GLUCOSE UR QL STRIP: NEGATIVE
KETONES UR QL STRIP: NEGATIVE
LEUKOCYTE ESTERASE URINE, POC: NEGATIVE
NITRITE, POC UA: NEGATIVE
PH, POC UA: 7.5
PROTEIN, POC: NORMAL
SPECIFIC GRAVITY, POC UA: 1.02
UROBILINOGEN, POC UA: 0.2

## 2025-02-25 PROCEDURE — 1159F MED LIST DOCD IN RCRD: CPT | Mod: CPTII,S$GLB,, | Performed by: INTERNAL MEDICINE

## 2025-02-25 PROCEDURE — 3075F SYST BP GE 130 - 139MM HG: CPT | Mod: CPTII,S$GLB,, | Performed by: INTERNAL MEDICINE

## 2025-02-25 PROCEDURE — 81002 URINALYSIS NONAUTO W/O SCOPE: CPT | Mod: S$GLB,,, | Performed by: INTERNAL MEDICINE

## 2025-02-25 PROCEDURE — 99999 PR PBB SHADOW E&M-EST. PATIENT-LVL IV: CPT | Mod: PBBFAC,,, | Performed by: INTERNAL MEDICINE

## 2025-02-25 PROCEDURE — A9585 GADOBUTROL INJECTION: HCPCS | Performed by: INTERNAL MEDICINE

## 2025-02-25 PROCEDURE — 72158 MRI LUMBAR SPINE W/O & W/DYE: CPT | Mod: 26,,, | Performed by: RADIOLOGY

## 2025-02-25 PROCEDURE — 99214 OFFICE O/P EST MOD 30 MIN: CPT | Mod: S$GLB,,, | Performed by: INTERNAL MEDICINE

## 2025-02-25 PROCEDURE — 3078F DIAST BP <80 MM HG: CPT | Mod: CPTII,S$GLB,, | Performed by: INTERNAL MEDICINE

## 2025-02-25 PROCEDURE — 72158 MRI LUMBAR SPINE W/O & W/DYE: CPT | Mod: TC

## 2025-02-25 PROCEDURE — 25500020 PHARM REV CODE 255: Performed by: INTERNAL MEDICINE

## 2025-02-25 PROCEDURE — 3008F BODY MASS INDEX DOCD: CPT | Mod: CPTII,S$GLB,, | Performed by: INTERNAL MEDICINE

## 2025-02-25 PROCEDURE — G2211 COMPLEX E/M VISIT ADD ON: HCPCS | Mod: S$GLB,,, | Performed by: INTERNAL MEDICINE

## 2025-02-25 RX ORDER — GADOBUTROL 604.72 MG/ML
10 INJECTION INTRAVENOUS
Status: COMPLETED | OUTPATIENT
Start: 2025-02-25 | End: 2025-02-25

## 2025-02-25 RX ORDER — TIZANIDINE 2 MG/1
4 TABLET ORAL EVERY 8 HOURS PRN
Qty: 30 TABLET | Refills: 0 | Status: SHIPPED | OUTPATIENT
Start: 2025-02-25 | End: 2025-03-07

## 2025-02-25 RX ORDER — OXYCODONE AND ACETAMINOPHEN 5; 325 MG/1; MG/1
1 TABLET ORAL EVERY 6 HOURS PRN
Qty: 28 EACH | Refills: 0 | Status: SHIPPED | OUTPATIENT
Start: 2025-02-25 | End: 2025-03-04

## 2025-02-25 RX ADMIN — GADOBUTROL 10 ML: 604.72 INJECTION INTRAVENOUS at 11:02

## 2025-02-25 NOTE — PROGRESS NOTES
Subjective:       Patient ID: Bri Santiago is a 64 y.o. male.    Chief Complaint: Hypertension (Follow up) and Flank Pain     Bri Santiago is a 64 y.o.  male who presents for Hypertension (Follow up) and Flank Pain  .  HPI  History of Present Illness    Mr. Santiago presents today with acute onset of bilateral back, abdominal, and groin pain.    He presents with new onset bilateral back, lower abdominal, and groin pain. The pain is constant with stabbing and pulsing sensations, different from his usual back pain. He denies relief with positional changes or applying pressure. Pain patches and Tylenol have not provided significant relief, with pain persisting through the night and into the morning. He reports pressure sensation with urination but denies pain, noting he had been holding his bladder prior to pain onset. Worse when gettting out of chair, no relieving factors or movement. Pain is severe. Reports it does not radiate through is abdomen. denies trauma, no fevers/chills, no n/v.     He received a lumbar injection at L3-L4 level last Monday and was instructed to avoid strenuous activities for 10 days post-procedure. He has a history of radiofrequency ablation treatments previously.  He has a history of right lower quadrant pain in 2022 and previous kidney stone in the left kidney.      ROS:  Gastrointestinal: +abdominal pain  Genitourinary: -painful urination  Musculoskeletal: +back pain       Problem List[1]      Past Medical History:   Diagnosis Date    Acute pancreatitis     Anal fissure     Anemia     Anticoagulant long-term use     Arthritis     Asthma in remission     Back pain     BPH (benign prostatic hypertrophy)     Cancer 2000    prostate- treated at [facility] with chemo- in remission since 2000    Chronic maxillary sinusitis     Clotting disorder     Diastolic dysfunction with chronic heart failure 12/03/2018    Family history of colon cancer     Family history of early CAD     GERD  (gastroesophageal reflux disease)     Helicobacter pylori (H. pylori) infection     Chronic    History of chronic pancreatitis     HTN (hypertension)     Lumbago 11/12/2012    Obesity     ABDON (obstructive sleep apnea)     Spinal stenosis of lumbar region     Von Willebrand disease     VWD (acquired von Willebrand's disease)        Past Surgical History:   Procedure Laterality Date    anal fissure repair      x2    ARTHROSCOPIC CHONDROPLASTY OF KNEE JOINT Left 11/8/2023    Procedure: ARTHROSCOPY, KNEE, WITH CHONDROPLASTY;  Surgeon: Karthik Tanner MD;  Location: [facility] OR;  Service: Orthopedics;  Laterality: Left;    BACK SURGERY  2012    BALLOON SINUPLASTY OF PARANASAL SINUS Bilateral 10/7/2019    Procedure: SINUPLASTY, USING BALLOON;  Surgeon: LAURENT Swanson MD;  Location: [facility] OR;  Service: ENT;  Laterality: Bilateral;    CARPAL TUNNEL RELEASE  2003    left hand    CATHETERIZATION OF BOTH LEFT AND RIGHT HEART N/A 2/14/2019    Procedure: CATHETERIZATION, HEART, BOTH LEFT AND RIGHT;  Surgeon: Kyle Magana MD;  Location: [facility] CATH LAB;  Service: Cardiology;  Laterality: N/A;    CERVICAL DISCECTOMY  2003    COLONOSCOPY N/A 10/7/2015    Procedure: COLONOSCOPY;  Surgeon: Rosendo Boyer MD;  [facility]  Service: Endoscopy;  Laterality: N/A;  PM Prep    COLONOSCOPY N/A 6/19/2017    Procedure: COLONOSCOPY;  Surgeon: Rosendo Boyer MD;  [facility]  Service: Endoscopy;  Laterality: N/A;  constipation prep (no DM no CHF)       hx of vonWillebrand's disease-will need infusion prior    COLONOSCOPY N/A 3/4/2020    Procedure: COLONOSCOPY;  Surgeon: Rosendo Boyer MD;  [facility]  Service: Endoscopy;  Laterality: N/A;  Please order CBC, ferritin, Iron TIBC day of case per Dr. Boyer  labwork at 7:10am and patient will check in right after.  per Dr. Tyler patient can hold Plavix 5 days prior see note 1/7/20  DDAVP prior to procedure needed see note  1/16/20 for oncall med by Dr. Tyler -     CYSTOSCOPY  8/12/2022    Procedure: CYSTOSCOPY;  Surgeon: Tyler Reid Jr., MD;  Location: [facility] OR 1ST FLR;  Service: Urology;;    ESOPHAGOGASTRODUODENOSCOPY N/A 3/4/2020    Procedure: EGD (ESOPHAGOGASTRODUODENOSCOPY);  Surgeon: Rosendo Boyer MD;  Location: [facility] (4TH FLR);  Service: Endoscopy;  Laterality: N/A;  EGD and Colonoscopy orders merged together  labwork at 7:10am and patient will check in right after.  DDAVP prior to procedure needed see note 1/16/20 for oncall med  per Dr. Tyler patient can hold Plavix 5 days prior see note 1/7/20    ESOPHAGOGASTRODUODENOSCOPY N/A 11/3/2020    Procedure: EGD (ESOPHAGOGASTRODUODENOSCOPY);  Surgeon: Rosendo Boyer MD;  Location: [facility] (2ND FLR);  Service: Endoscopy;  Laterality: N/A;  Please recall pt has von Willebrands and will require DDVAP infusion prior to procedure as previously done.    see telephone encounter on 10/1/20 regarding DDAVP   ok to hold Plavix 5 days prior per Dr.Blessey BUCKNER screening on 10/31/20 -rb    ESOPHAGOGASTRODUODENOSCOPY N/A 5/31/2023    Procedure: EGD (ESOPHAGOGASTRODUODENOSCOPY);  Surgeon: Rosendo Boyer MD;  Location: [facility] (4TH FLR);  Service: Endoscopy;  Laterality: N/A;  cardiac clearnce-office note 5/1, ok to hold plavix-tele encounter 5/2-LW  5/11/23 r/s'd, MD booked out. instr portal -ml  5/25 - precall attempted. no answer. MML    EXAMINATION UNDER ANESTHESIA N/A 3/15/2021    Procedure: EXAM UNDER ANESTHESIA;  Surgeon: Silvia Cazares MD;  Location: [facility] OR 2ND FLR;  Service: Colon and Rectal;  Laterality: N/A;    EXAMINATION UNDER ANESTHESIA N/A 2/25/2022    Procedure: EXAM UNDER ANESTHESIA, EXCISION ANAL PAPILLA;  Surgeon: Nadeem Grady MD;  Location: [facility] OR 2ND FLR;  Service: Colon and Rectal;  Laterality: N/A;    FUNCTIONAL ENDOSCOPIC SINUS SURGERY (FESS) USING COMPUTER-ASSISTED NAVIGATION Bilateral 10/7/2019    Procedure: FESS, USING COMPUTER-ASSISTED  NAVIGATION;  Surgeon: LAURENT Swanson MD;  Location: [facility] OR;  Service: ENT;  Laterality: Bilateral;    INJECTION OF ANESTHETIC AGENT AROUND NERVE Bilateral 10/13/2020    Procedure: BLOCK, NERVE BILATERAL C4,C5,C6 Plavix clearance requested;  Surgeon: Fredy Magana MD;  Location: [facility] PAIN MGT;  Service: Pain Management;  Laterality: Bilateral;  BLOCK, NERVE BILATERAL C4,C5,C6  Plavix clearance ok, will require DDVAP infusion    KNEE ARTHROSCOPY W/ MENISCECTOMY Left 11/8/2023    Procedure: ARTHROSCOPY, KNEE, WITH PARTIAL LATERAL MENISCECTOMY;  Surgeon: Karthik Tanner MD;  Location: [facility] OR;  Service: Orthopedics;  Laterality: Left;  regional w/o catheter (adductor)    LATERAL INTERNAL ANAL SPHINCTEROTOMY N/A 12/10/2021    Procedure: SPHINCTEROTOMY-LATERAL INTERNAL ANAL;  Surgeon: Nadeem Grady MD;  Location: [facility] 2ND FLR;  Service: Colon and Rectal;  Laterality: N/A;    LEFT HEART CATHETERIZATION Left 2/14/2019    Procedure: Left heart cath;  Surgeon: Kyle Magana MD;  Location: [facility] CATH LAB;  Service: Cardiology;  Laterality: Left;    LUMBAR FUSION  2012    PH MONITORING, ESOPHAGUS, WIRELESS, (ON REFLUX MEDS) N/A 5/31/2023    Procedure: PH MONITORING, ESOPHAGUS, WIRELESS, (ON REFLUX MEDS);  Surgeon: Rosendo Boyer MD;  Location: [facility] ENDO (4TH FLR);  Service: Endoscopy;  Laterality: N/A;  96hr, on his AcipHex 20 mg every 12 hours, instructions portal-LW    RADIOFREQUENCY ABLATION Right 5/9/2019    Procedure: RADIOFREQUENCY ABLATION, RIGHT L3,L4,L5;  Surgeon: Fredy Magana MD;  Location: [facility] PAIN MGT;  Service: Pain Management;  Laterality: Right;  1 of 2  RT RFA L3,4,5  PLAVIX clearance received, pt needs DDAVP    RADIOFREQUENCY ABLATION Left 5/23/2019    Procedure: RADIOFREQUENCY ABLATION, LEFT L3,L4,L5;  Surgeon: Fredy Magana MD;  Location: [facility] PAIN MGT;  Service: Pain Management;  Laterality: Left;  2 of 2  LT RFA L3,4,5  PLAVIX clearance received, pt needs DDAVP     RADIOFREQUENCY ABLATION Left 1/16/2020    Procedure: RADIOFREQUENCY ABLATION LEFT L3, L4, L5 MEDIAL BRANCH 1 OF 2 **PATIENT ARRIVING AT 8 AM**;  Surgeon: Fredy Magana MD;  Location: [facility] PAIN MGT;  Service: Pain Management;  Laterality: Left;  NEEDS CONSENT, PLAVIX CLEARANCE IN CHART    RADIOFREQUENCY ABLATION Right 1/28/2020    Procedure: RADIOFREQUENCY ABLATION, RIGHT L3-L4-L5 MEDIAL BRANCH 2 OF 2 (Left done on 1/16/20);  Surgeon: Fredy Magana MD;  Location: [facility] PAIN MGT;  Service: Pain Management;  Laterality: Right;    RADIOFREQUENCY ABLATION Left 8/18/2020    Procedure: RADIOFREQUENCY ABLATION, L3-L4-L5 MEDIAL BRANCH 1 OF 2 clear to hold plavix 7 days;  Surgeon: Fredy Magana MD;  Location: [facility] PAIN MGT;  Service: Pain Management;  Laterality: Left;    RADIOFREQUENCY ABLATION Right 9/1/2020    Procedure: RADIOFREQUENCY ABLATION, L3-L4-L5 MEDIAL BR ANCH 2 OF 2 clear to hol,d Plavix 7 days;  Surgeon: Fredy Magana MD;  Location: [facility] PAIN MGT;  Service: Pain Management;  Laterality: Right;    RADIOFREQUENCY ABLATION Bilateral 12/21/2021    Procedure: RADIOFREQUENCY ABLATION B/L L3,4,5 RFA (give dDAVP prior) NEEDS CONSENT plavix ok x7;  Surgeon: Fredy Magana MD;  Location: [facility] PAIN MGT;  Service: Pain Management;  Laterality: Bilateral;    RADIOFREQUENCY ABLATION Left 11/21/2022    Procedure: RADIOFREQUENCY ABLATION LEFT L3,L4,L5 MUST HAVE dDVAP PRIOR ONE OF TWO;  Surgeon: Milo Winston MD;  Location: [facility] PAIN MGT;  Service: Pain Management;  Laterality: Left;    RADIOFREQUENCY ABLATION Right 12/5/2022    Procedure: RADIOFREQUENCY ABLATION RIGHT L3,L4,L5  PRE-MEDICATE WITH dDVAP TWO OF TWO;  Surgeon: Milo Winston MD;  Location: [facility] PAIN MGT;  Service: Pain Management;  Laterality: Right;    RADIOFREQUENCY ABLATION Bilateral 4/29/2024    Procedure: RADIOFREQUENCY ABLATION BILATERAL L3, 4, 5 *DDAVP BEFORE*;  Surgeon: Milo Winston MD;  Location: [facility] PAIN MGT;  Service: Pain  Management;  Laterality: Bilateral;  [phone]  4 WK F/U CHANDRA    RADIOFREQUENCY ABLATION Bilateral 12/23/2024    Procedure: RADIOFREQUENCY ABLATION BILATERAL L3, 4, 5;  Surgeon: Milo Winston MD;  Location: [facility] PAIN MGT;  Service: Pain Management;  Laterality: Bilateral;  4 WK F/U JERI    RADIOFREQUENCY ABLATION OF LUMBAR MEDIAL BRANCH NERVE AT SINGLE LEVEL Left 5/24/2018    Procedure: RADIOFREQUENCY THERMOCOAGULATION (RFTC)-NERVE-MEDIAN BRANCH-LUMBAR;  Surgeon: Fredy Magana MD;  Location: [facility] PAIN MGT;  Service: Pain Management;  Laterality: Left;  Left RFA @ L3,4,5  [id number]  with IV Sedation    2 of 2    RETROGRADE PYELOGRAPHY Bilateral 8/12/2022    Procedure: PYELOGRAM, RETROGRADE;  Surgeon: Tyler Reid Jr., MD;  Location: [facility] OR [address] [place] [address];  Service: Urology;  Laterality: Bilateral;    ROBOT-ASSISTED LAPAROSCOPIC REPAIR OF INGUINAL HERNIA USING DA ASHTYN XI Right 10/25/2024    Procedure: XI ROBOTIC REPAIR, HERNIA, RIGHT, INGUINAL, WITH MESH;  Surgeon: Jose Hilliard MD;  Location: [facility] OR 2ND [facility];  Service: General;  Laterality: Right;    SPINE SURGERY      TONSILLECTOMY      at age 24    TRANSFORAMINAL EPIDURAL INJECTION OF STEROID Bilateral 6/19/2023    Procedure: INJECTION, STEROID, EPIDURAL, TRANSFORAMINAL APPROACH,BILATERAL T7-T8****** Give 0.3mcg/kg DDAVP immediately prior to the procedure*****  CLEAR TO HOLD PLAVIX 7 DAYS;  Surgeon: Milo Winston MD;  Location: [facility] PAIN MGT;  Service: Pain Management;  Laterality: Bilateral;    TRANSFORAMINAL EPIDURAL INJECTION OF STEROID N/A 2/17/2025    Procedure: LUMBAR L3/4 IL ODALYS  *PLAVIX CLEARANCE REQUESTED* * give 0.3mcg/kg dDAVP immediately prior to the procedure*;  Surgeon: Milo Winston MD;  Location: [facility] PAIN MGT;  Service: Pain Management;  Laterality: N/A;  2 WK F/U JERI    URETEROSCOPY Bilateral 8/12/2022    Procedure: URETEROSCOPY;  Surgeon: yTler Reid Jr., MD;  Location: [facility] OR [address] [place] [address];  Service: Urology;  Laterality: Bilateral;     "VASECTOMY  1996       Family History   Problem Relation Name Age of Onset    Colon cancer Father Pato Santiago 67        colon cancer    Hypertension Father Pato Santiago     Glaucoma Father Pato Santiago     Cancer Father Pato Santiago     Colon cancer Paternal Grandfather  65             Coronary artery disease Mother Eula Santiago 45    Hypertension Mother Eula Santiago     Heart disease Mother Eula Santiago     Colon cancer Mother Eula Santiago     Diabetes Mother Eula Santiago     No Known Problems Brother Humble     No Known Problems Sister Pat     No Known Problems Daughter Sophie     No Known Problems Son Cosme     Coronary artery disease Brother Juju 51    No Known Problems Daughter Hamida     No Known Problems Daughter Rosa     No Known Problems Son Beto     No Known Problems Son [place]     Colon cancer Paternal Uncle  65    Diabetes Mellitus Paternal Grandmother      Esophageal cancer Neg Hx         Social History[2]      Review of Systems   Constitutional:  Negative for chills and fever.   Genitourinary:  Negative for decreased urine volume, difficulty urinating, dysuria, frequency and urgency.         Objective:   Blood pressure 132/72, pulse 69, height 5' 10" (1.778 m), weight 109.8 kg (242 lb 1 oz), SpO2 96%.     Physical Exam  Constitutional:       Appearance: Normal appearance. He is well-developed. He is not ill-appearing.   HENT:      Head: Normocephalic and atraumatic.   Eyes:      General: No scleral icterus.     Conjunctiva/sclera: Conjunctivae normal.   Cardiovascular:      Rate and Rhythm: Normal rate.      Heart sounds: Normal heart sounds. No murmur heard.     No friction rub. No gallop.   Pulmonary:      Effort: Pulmonary effort is normal.      Breath sounds: Normal breath sounds. No wheezing or rales.   Chest:      Chest wall: No tenderness.   Abdominal:      General: There is no distension.      Tenderness: There is abdominal tenderness (bilateral LLQ). There is no " guarding or rebound.   Musculoskeletal:         General: Tenderness (over lspine approx L1-L2, bilateral inguinal area, no buldging) present. No signs of injury.   Skin:     General: Skin is warm and dry.   Neurological:      Mental Status: He is alert and oriented to person, place, and time. Mental status is at baseline.      Comments: No new LE weakness noted   Psychiatric:         Behavior: Behavior normal.         Thought Content: Thought content normal.       Physical Exam                Prior labs reviewed    Health Maintenance         Date Due Completion Date    Shingles Vaccine (1 of 2) Never done ---    RSV Vaccine (Age 60+ and Pregnant patients) (1 - Risk 60-74 years 1-dose series) Never done ---    COVID-19 Vaccine (5 - 2024-25 season) 09/01/2024 12/8/2022    Colorectal Cancer Screening 03/04/2025 3/4/2020    Aspirin/Antiplatelet Therapy 02/25/2026 2/25/2025    Hemoglobin A1c (Diabetic Prevention Screening) 07/16/2027 7/16/2024    TETANUS VACCINE 10/16/2027 10/16/2017    Lipid Panel 10/05/2029 10/5/2024            Assessment/Plan:       1. Lumbosacral radiculopathy at L1  -     MRI Lumbar Spine W WO Cont; Future; Expected date: 02/25/2025  -     Creatinine, serum; Future; Expected date: 02/25/2025  -     tiZANidine (ZANAFLEX) 2 MG tablet; Take 2 tablets (4 mg total) by mouth every 8 (eight) hours as needed (pain and spasm).  Dispense: 30 tablet; Refill: 0  -     oxyCODONE-acetaminophen (PERCOCET) 5-325 mg per tablet; Take 1 tablet by mouth every 6 (six) hours as needed for Pain.  Dispense: 28 each; Refill: 0  -     CBC Auto Differential; Future; Expected date: 02/25/2025  -     C-REACTIVE PROTEIN; Future; Expected date: 02/25/2025  -     Sedimentation rate; Future; Expected date: 02/25/2025    2. S/P epidural steroid injection  -     MRI Lumbar Spine W WO Cont; Future; Expected date: 02/25/2025  -     Creatinine, serum; Future; Expected date: 02/25/2025  -     tiZANidine (ZANAFLEX) 2 MG tablet; Take 2  tablets (4 mg total) by mouth every 8 (eight) hours as needed (pain and spasm).  Dispense: 30 tablet; Refill: 0  -     oxyCODONE-acetaminophen (PERCOCET) 5-325 mg per tablet; Take 1 tablet by mouth every 6 (six) hours as needed for Pain.  Dispense: 28 each; Refill: 0  -     CBC Auto Differential; Future; Expected date: 02/25/2025  -     C-REACTIVE PROTEIN; Future; Expected date: 02/25/2025  -     Sedimentation rate; Future; Expected date: 02/25/2025  Close follow up in clinic     3. Von Willebrand disease  Overview:  Recommend DDAVP   infusion , 20 mcg, to be given over 30 minutes, 1 hr before the procedure.      4. Diastolic dysfunction with chronic heart failure  Stable, cont lasix 20 mg daily    5. Class 1 obesity due to excess calories with serious comorbidity and body mass index (BMI) of 34.0 to 34.9 in adult    6. Suprapubic pressure  -     POCT URINE DIPSTICK WITHOUT MICROSCOPE  No evidence of uti    Assessment & Plan    - Evaluated patient's sudden onset of bilateral lower back, lower abdominal, and groin pain, distinct from usual back pain  - Considered possibility of kidney stones, given patient's history of right lower quadrant pain in 2022 and evidence of kidney stones on imaging  - Noted atypical presentation for kidney stones due to bilateral nature of pain and constant presence rather than intermittent episodes  - Reviewed patient's recent L3-L4 injection and radiofrequency ablation history  - Assessed for potential abdominal aneurysms or other underlying causes    DIASTOLIC DYSFUNCTION WITH CHRONIC HEART FAILURE:  - Monitor the patient's blood pressure, which appears to be under control.  - All necessary procedures and tests have been reviewed and confirmed to be up to date.    KIDNEY STONE:  - Educated the patient about kidney stone pain characteristics, typically unilateral and intermittent.  - Reviewed past imaging from 2022, noting evidence of a kidney stone on the right side of the left  subcerebrum kidney.  - Assessed that while a kidney stone could be a possible cause of the pain, bilateral pain is unusual for this condition.    BACK PAIN:  - Mr. Santiago reports lower back pain, different from usual and exacerbated when rising from a chair.  - Reviewed history of back problems, including previous injections and radiofrequency ablation.  - Noted that the patient received an injection for back pain 1 week ago.    ABDOMINAL PAIN:  - Mr. Santiago reports sudden onset of bilateral pain in the lower abdomen.  - The pain is constant and described as stabbing, sharp, and pulsing.    URINARY SYMPTOMS:  - Mr. Santiago experienced pressure build-up during urination.    GROIN PAIN:  - Mr. Santiago reports constant, stabbing, sharp, and pulsing pain in the groin area.    PAIN MANAGEMENT:  - Mr. Santiago used pain patches and took acetaminophen for pain relief.  - Continued Tylenol as needed for pain management.           Visit today included increased complexity associated with the care of the episodic problems and addressed and managing the longitudinal care of the patient due to the serious and/or complex managed problem(s) as above.     Medication List with Changes/Refills   New Medications    OXYCODONE-ACETAMINOPHEN (PERCOCET) 5-325 MG PER TABLET    Take 1 tablet by mouth every 6 (six) hours as needed for Pain.    TIZANIDINE (ZANAFLEX) 2 MG TABLET    Take 2 tablets (4 mg total) by mouth every 8 (eight) hours as needed (pain and spasm).   Current Medications    ALBUTEROL (PROVENTIL/VENTOLIN HFA) 90 MCG/ACTUATION INHALER    INHALE 2 PUFFS INTO THE LUNGS EVERY 6 HOURS AS NEEDED FOR WHEEZING OR SHORTNESS OF BREATH    ALBUTEROL (PROVENTIL/VENTOLIN HFA) 90 MCG/ACTUATION INHALER    Inhale 1-2 puffs into the lungs every 6 (six) hours as needed for Wheezing. Rescue    AMLODIPINE (NORVASC) 5 MG TABLET    Take 1 tablet (5 mg total) by mouth once daily.    ATORVASTATIN (LIPITOR) 80 MG TABLET    Take 1 tablet (80 mg total) by  mouth every evening.    AZELASTINE (ASTELIN) 137 MCG (0.1 %) NASAL SPRAY    Two sprays in each nostril, sniff until absorbed, then follow with 1 spray of fluticasone.  Use both sprays twice daily.    BUDESONIDE-FORMOTEROL 160-4.5 MCG (SYMBICORT) 160-4.5 MCG/ACTUATION HFAA    INHALE 2 PUFFS INTO THE LUNGS EVERY 12 HOURS    CALCIUM CITRATE-VITAMIN D3 315-200 MG (CITRACAL+D) 315-200 MG-UNIT PER TABLET    Take 1 tablet by mouth nightly.     CLOPIDOGREL (PLAVIX) 75 MG TABLET    Take 1 tablet (75 mg total) by mouth once daily.    CYANOCOBALAMIN, VITAMIN B-12, 50 MCG TABLET    Take 50 mcg by mouth nightly.     DOCUSATE SODIUM (COLACE) 100 MG CAPSULE    Take 1 tablet by mouth Twice daily. 1 Capsule Oral Twice a day .  Take with pain medicine    DOXAZOSIN (CARDURA) 1 MG TABLET    TAKE 1 TABLET BY MOUTH EVERY EVENING    EZETIMIBE (ZETIA) 10 MG TABLET    Take 1 tablet (10 mg total) by mouth once daily.    FLUTICASONE PROPIONATE (FLONASE) 50 MCG/ACTUATION NASAL SPRAY    One spray in each nostril twice daily after 1st using azelastine nasal spray    FUROSEMIDE (LASIX) 20 MG TABLET    Take 1 tablet by mouth once daily    IPRATROPIUM (ATROVENT) 42 MCG (0.06 %) NASAL SPRAY    1-2 sprays in each nostril before eating and at bedtime as needed    OXYCODONE (ROXICODONE) 5 MG IMMEDIATE RELEASE TABLET    Take 1 tablet (5 mg total) by mouth every 4 (four) hours as needed for Pain.    RABEPRAZOLE (ACIPHEX) 20 MG TABLET    Take 1 tablet (20 mg total) by mouth every 12 (twelve) hours.    TADALAFIL (CIALIS) 20 MG TAB    Take 1 tablet (20 mg total) by mouth as needed. Take every 36 hours as needed for ED.    TESTOSTERONE (ANDRODERM) 2 MG/24 HOUR PT24    APPLY 1 PATCH TOPICALLY TO THE SKIN EVERY DAY    ZOLPIDEM (AMBIEN) 10 MG TAB    TAKE 1 TABLET BY MOUTH EVERY DAY AT BEDTIME       Recommend patient complete vaccinations as listed in the overdue health maintenance report. Pt may obtain vaccinations at their local pharmacy, any [facility] pharmacy  or request vaccination in our clinic.  Please note that some insurances will only cover vaccination cost at specific locations.Please check with your insurance provider regarding your coverage.  Vaccines that are not covered are still recommended.      35   minutes spent in care of patient including history, physical, chart review, orders and coordination of care.  Messages sent to dr sepulveda re current presentation.        This note was generated with the assistance of ambient listening technology. Verbal consent was obtained by the patient and accompanying visitor(s) for the recording of patient appointment to facilitate this note. I attest to having reviewed and edited the generated note for accuracy, though some syntax or spelling errors may persist. Please contact the author of this note for any clarification.             [1]   Patient Active Problem List  Diagnosis    Asthma in remission    Von Willebrand disease    Primary hypertension    Spondylosis without myelopathy    Thoracic or lumbosacral neuritis or radiculitis, unspecified    Spinal stenosis, lumbar region, without neurogenic claudication    Degeneration of lumbar or lumbosacral intervertebral disc    Acquired spondylolisthesis    Pseudoarthrosis    Family history of colon cancer    Vitamin D deficiency    Atypical chest pain    Encounter for monitoring long-term proton pump inhibitor therapy    Postlaminectomy syndrome of lumbar region    Myalgia and myositis    Facet syndrome    Gastroesophageal reflux disease without esophagitis    Osteopenia    Thoracic aorta atherosclerosis    Benign prostatic hyperplasia with urinary obstruction    Allergic rhinitis    Allergic conjunctivitis of both eyes    Encounter for long-term current use of high risk medication    Gastroesophageal reflux disease with esophagitis    Hematochezia    Nasal septal deviation    Dysphagia    Laryngopharyngeal reflux (LPR)    Diastolic dysfunction with chronic heart failure    At  risk for cardiovascular event    Hypoxia    Chronic pulmonary heart disease    Class 1 obesity due to excess calories with serious comorbidity and body mass index (BMI) of 33.0 to 33.9 in adult    Moderate persistent asthma    Family history of prostate cancer in father    Chronic pain    Nocturia    Chronic bilateral low back pain without sciatica    Chronic maxillary sinusitis    Nonintractable episodic headache    Osteoarthritis of lumbar spine    Gynecomastia, male    Iron deficiency anemia    Obstructive sleep apnea treated with continuous positive airway pressure (CPAP)    Postnasal drip    GERD (gastroesophageal reflux disease)    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    Hypogonadism male    Dry skin dermatitis    Globus sensation    Throat clearing    Non-occlusive coronary artery disease requiring drug therapy    Unspecified inflammatory spondylopathy, lumbar region    Erectile dysfunction    Chronic prostatitis    Dysuria    Impaired functional mobility and activity tolerance    Decreased ROM of trunk and back    Decreased functional mobility and endurance    Weakness    Other hyperlipidemia    Neck pain    Arm weakness    Acute pain of left knee    Viral conjunctivitis of right eye    Hard to intubate   [2]   Social History  Tobacco Use    Smoking status: Never    Smokeless tobacco: Never   Substance Use Topics    Alcohol use: Yes     Alcohol/week: 1.0 standard drink of alcohol     Types: 1 Glasses of wine per week     Comment: social    Drug use: No

## 2025-02-25 NOTE — PROGRESS NOTES
Subjective:      Subjective  Patient ID: Bri Santiago is a 64 y.o. male.     Chief Complaint: Hypertension (Follow up) and Flank Pain      Bri Santiago is a 64 y.o.  male who presents for Hypertension (Follow up) and Flank Pain  .  HPI  History of Present Illness    Mr. Santiago presents today with acute onset of bilateral back, abdominal, and groin pain.     He presents with new onset bilateral back, lower abdominal, and groin pain. The pain is constant with stabbing and pulsing sensations, different from his usual back pain. He denies relief with positional changes or applying pressure. Pain patches and Tylenol have not provided significant relief, with pain persisting through the night and into the morning. He reports pressure sensation with urination but denies pain, noting he had been holding his bladder prior to pain onset. Worse when gettting out of chair, no relieving factors or movement. Pain is severe. Reports it does not radiate through is abdomen. denies trauma, no fevers/chills, no n/v.      He received a lumbar injection at L3-L4 level last Monday and was instructed to avoid strenuous activities for 10 days post-procedure. He has a history of radiofrequency ablation treatments previously.  He has a history of right lower quadrant pain in 2022 and previous kidney stone in the left kidney.        ROS:  Gastrointestinal: +abdominal pain  Genitourinary: -painful urination  Musculoskeletal: +back pain       [Problem List]    [Problem List]  Patient Active Problem List      Diagnosis    Asthma in remission    Von Willebrand disease    Primary hypertension    Spondylosis without myelopathy    Thoracic or lumbosacral neuritis or radiculitis, unspecified    Spinal stenosis, lumbar region, without neurogenic claudication    Degeneration of lumbar or lumbosacral intervertebral disc    Acquired spondylolisthesis    Pseudoarthrosis    Family history of colon cancer    Vitamin D deficiency    Atypical chest pain     Encounter for monitoring long-term proton pump inhibitor therapy    Postlaminectomy syndrome of lumbar region    Myalgia and myositis    Facet syndrome    Gastroesophageal reflux disease without esophagitis    Osteopenia    Thoracic aorta atherosclerosis    Benign prostatic hyperplasia with urinary obstruction    Allergic rhinitis    Allergic conjunctivitis of both eyes    Encounter for long-term current use of high risk medication    Gastroesophageal reflux disease with esophagitis    Hematochezia    Nasal septal deviation    Dysphagia    Laryngopharyngeal reflux (LPR)    Diastolic dysfunction with chronic heart failure    At risk for cardiovascular event    Hypoxia    Chronic pulmonary heart disease    Class 1 obesity due to excess calories with serious comorbidity and body mass index (BMI) of 33.0 to 33.9 in adult    Moderate persistent asthma    Family history of prostate cancer in father    Chronic pain    Nocturia    Chronic bilateral low back pain without sciatica    Chronic maxillary sinusitis    Nonintractable episodic headache    Osteoarthritis of lumbar spine    Gynecomastia, male    Iron deficiency anemia    Obstructive sleep apnea treated with continuous positive airway pressure (CPAP)    Postnasal drip    GERD (gastroesophageal reflux disease)    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    Hypogonadism male    Dry skin dermatitis    Globus sensation    Throat clearing    Non-occlusive coronary artery disease requiring drug therapy    Unspecified inflammatory spondylopathy, lumbar region    Erectile dysfunction    Chronic prostatitis    Dysuria    Impaired functional mobility and activity tolerance    Decreased ROM of trunk and back    Decreased functional mobility and endurance    Weakness    Other hyperlipidemia    Neck pain    Arm weakness    Acute pain of left knee    Viral conjunctivitis of right eye    Hard to intubate                Past Medical History:   Diagnosis Date     Acute pancreatitis      Anal fissure      Anemia      Anticoagulant long-term use      Arthritis      Asthma in remission      Back pain      BPH (benign prostatic hypertrophy)      Cancer 2000     prostate- treated at [facility] with chemo- in remission since 2000    Chronic maxillary sinusitis      Clotting disorder      Diastolic dysfunction with chronic heart failure 12/03/2018    Family history of colon cancer      Family history of early CAD      GERD (gastroesophageal reflux disease)      Helicobacter pylori (H. pylori) infection       Chronic    History of chronic pancreatitis      HTN (hypertension)      Lumbago 11/12/2012    Obesity      ABDON (obstructive sleep apnea)      Spinal stenosis of lumbar region      Von Willebrand disease      VWD (acquired von Willebrand's disease)                 Past Surgical History:   Procedure Laterality Date    anal fissure repair         x2    ARTHROSCOPIC CHONDROPLASTY OF KNEE JOINT Left 11/8/2023     Procedure: ARTHROSCOPY, KNEE, WITH CHONDROPLASTY;  Surgeon: Karthik Tanner MD;  Location: [facility];  Service: Orthopedics;  Laterality: Left;    BACK SURGERY   2012    BALLOON SINUPLASTY OF PARANASAL SINUS Bilateral 10/7/2019     Procedure: SINUPLASTY, USING BALLOON;  Surgeon: LAURENT Swanson MD;  Location: [facility];  Service: ENT;  Laterality: Bilateral;    CARPAL TUNNEL RELEASE   2003     left hand    CATHETERIZATION OF BOTH LEFT AND RIGHT HEART N/A 2/14/2019     Procedure: CATHETERIZATION, HEART, BOTH LEFT AND RIGHT;  Surgeon: Kyle Magana MD;  Location: [facility] CATH LAB;  Service: Cardiology;  Laterality: N/A;    CERVICAL DISCECTOMY   2003    COLONOSCOPY N/A 10/7/2015     Procedure: COLONOSCOPY;  Surgeon: Rosendo Boyer MD;  Location: [address]);  Service: Endoscopy;  Laterality: N/A;  PM Prep    COLONOSCOPY N/A 6/19/2017     Procedure: COLONOSCOPY;  Surgeon: Rosendo Boyer MD;  Location: [facility] ENDO ([facility] FLR);  Service: Endoscopy;  Laterality:  N/A;  constipation prep (no DM no CHF)        hx of vonWillebrand's disease-will need infusion prior    COLONOSCOPY N/A 3/4/2020     Procedure: COLONOSCOPY;  Surgeon: Rosendo Boyer MD;  Location: [facility] (4TH FLR);  Service: Endoscopy;  Laterality: N/A;  Please order CBC, ferritin, Iron TIBC day of case per Dr. Boyer  labwork at 7:10am and patient will check in right after.  per Dr. Tyler patient can hold Plavix 5 days prior see note 1/7/20  DDAVP prior to procedure needed see note 1/16/20 for oncall med by Dr. Tyler - sm    CYSTOSCOPY   8/12/2022     Procedure: CYSTOSCOPY;  Surgeon: Tyler Reid Jr., MD;  [facility];  Service: Urology;;    ESOPHAGOGASTRODUODENOSCOPY N/A 3/4/2020     Procedure: EGD (ESOPHAGOGASTRODUODENOSCOPY);  Surgeon: Rosendo Boyer MD;  Location: [facility] (4TH FLR);  Service: Endoscopy;  Laterality: N/A;  EGD and Colonoscopy orders merged together  labwork at 7:10am and patient will check in right after.  DDAVP prior to procedure needed see note 1/16/20 for oncall med  per Dr. Tyler patient can hold Plavix 5 days prior see note 1/7/20    ESOPHAGOGASTRODUODENOSCOPY N/A 11/3/2020     Procedure: EGD (ESOPHAGOGASTRODUODENOSCOPY);  Surgeon: Rosendo Boyer MD;  Location: [facility] (2ND FLR);  Service: Endoscopy;  Laterality: N/A;  Please recall pt has von Willebrands and will require DDVAP infusion prior to procedure as previously done.    see telephone encounter on 10/1/20 regarding DDAVP   ok to hold Plavix 5 days prior per Dr.Blessey BUCKNER screening on 10/31/20 -rb    ESOPHAGOGASTRODUODENOSCOPY N/A 5/31/2023     Procedure: EGD (ESOPHAGOGASTRODUODENOSCOPY);  Surgeon: Rosendo Boyer MD;  Location: [facility] (4TH FLR);  Service: Endoscopy;  Laterality: N/A;  cardiac clearnce-office note 5/1, ok to hold plavix-tele encounter 5/2-LW  5/11/23 r/s'd, MD booked out. instr portal -ml  5/25 - precall attempted. no answer. MML    EXAMINATION UNDER ANESTHESIA N/A 3/15/2021      Procedure: EXAM UNDER ANESTHESIA;  Surgeon: Silvia Cazares MD;  Location: [facility] OR 2ND FLR;  Service: Colon and Rectal;  Laterality: N/A;    EXAMINATION UNDER ANESTHESIA N/A 2/25/2022     Procedure: EXAM UNDER ANESTHESIA, EXCISION ANAL PAPILLA;  Surgeon: Nadeem Grady MD;  Location: [facility] 2ND FLR;  Service: Colon and Rectal;  Laterality: N/A;    FUNCTIONAL ENDOSCOPIC SINUS SURGERY (FESS) USING COMPUTER-ASSISTED NAVIGATION Bilateral 10/7/2019     Procedure: FESS, USING COMPUTER-ASSISTED NAVIGATION;  Surgeon: LAURENT Swanson MD;  Location: [facility] OR;  Service: ENT;  Laterality: Bilateral;    INJECTION OF ANESTHETIC AGENT AROUND NERVE Bilateral 10/13/2020     Procedure: BLOCK, NERVE BILATERAL C4,C5,C6 Plavix clearance requested;  Surgeon: Fredy Magana MD;  Location: [facility] PAIN MGT;  Service: Pain Management;  Laterality: Bilateral;  BLOCK, NERVE BILATERAL C4,C5,C6  Plavix clearance ok, will require DDVAP infusion    KNEE ARTHROSCOPY W/ MENISCECTOMY Left 11/8/2023     Procedure: ARTHROSCOPY, KNEE, WITH PARTIAL LATERAL MENISCECTOMY;  Surgeon: Karthik Tanner MD;  Location: [facility] OR;  Service: Orthopedics;  Laterality: Left;  regional w/o catheter (adductor)    LATERAL INTERNAL ANAL SPHINCTEROTOMY N/A 12/10/2021     Procedure: SPHINCTEROTOMY-LATERAL INTERNAL ANAL;  Surgeon: Nadeem Grady MD;  Location: [facility] 2ND FLR;  Service: Colon and Rectal;  Laterality: N/A;    LEFT HEART CATHETERIZATION Left 2/14/2019     Procedure: Left heart cath;  Surgeon: Kyle Magana MD;  Location: [facility] CATH LAB;  Service: Cardiology;  Laterality: Left;    LUMBAR FUSION   2012    PH MONITORING, ESOPHAGUS, WIRELESS, (ON REFLUX MEDS) N/A 5/31/2023     Procedure: PH MONITORING, ESOPHAGUS, WIRELESS, (ON REFLUX MEDS);  Surgeon: Rosendo Boyer MD;  Location: [facility] ENDO (4TH FLR);  Service: Endoscopy;  Laterality: N/A;  96hr, on his AcipHex 20 mg every 12 hours, instructions portal-LW    RADIOFREQUENCY ABLATION Right  5/9/2019     Procedure: RADIOFREQUENCY ABLATION, RIGHT L3,L4,L5;  Surgeon: Fredy Magana MD;  Location: [facility] PAIN MGT;  Service: Pain Management;  Laterality: Right;  1 of 2  RT RFA L3,4,5  PLAVIX clearance received, pt needs DDAVP    RADIOFREQUENCY ABLATION Left 5/23/2019     Procedure: RADIOFREQUENCY ABLATION, LEFT L3,L4,L5;  Surgeon: Fredy Magana MD;  Location: [facility] PAIN MGT;  Service: Pain Management;  Laterality: Left;  2 of 2  LT RFA L3,4,5  PLAVIX clearance received, pt needs DDAVP    RADIOFREQUENCY ABLATION Left 1/16/2020     Procedure: RADIOFREQUENCY ABLATION LEFT L3, L4, L5 MEDIAL BRANCH 1 OF 2 **PATIENT ARRIVING AT 8 AM**;  Surgeon: Fredy Magana MD;  Location: [facility] PAIN MGT;  Service: Pain Management;  Laterality: Left;  NEEDS CONSENT, PLAVIX CLEARANCE IN CHART    RADIOFREQUENCY ABLATION Right 1/28/2020     Procedure: RADIOFREQUENCY ABLATION, RIGHT L3-L4-L5 MEDIAL BRANCH 2 OF 2 (Left done on 1/16/20);  Surgeon: Fredy Magana MD;  Location: [facility] PAIN MGT;  Service: Pain Management;  Laterality: Right;    RADIOFREQUENCY ABLATION Left 8/18/2020     Procedure: RADIOFREQUENCY ABLATION, L3-L4-L5 MEDIAL BRANCH 1 OF 2 clear to hold plavix 7 days;  Surgeon: Fredy Magana MD;  Location: [facility] PAIN MGT;  Service: Pain Management;  Laterality: Left;    RADIOFREQUENCY ABLATION Right 9/1/2020     Procedure: RADIOFREQUENCY ABLATION, L3-L4-L5 MEDIAL BR ANCH 2 OF 2 clear to hol,d Plavix 7 days;  Surgeon: Fredy Magana MD;  Location: [facility] PAIN MGT;  Service: Pain Management;  Laterality: Right;    RADIOFREQUENCY ABLATION Bilateral 12/21/2021     Procedure: RADIOFREQUENCY ABLATION B/L L3,4,5 RFA (give dDAVP prior) NEEDS CONSENT plavix ok x7;  Surgeon: Fredy Magana MD;  Location: [facility] PAIN MGT;  Service: Pain Management;  Laterality: Bilateral;    RADIOFREQUENCY ABLATION Left 11/21/2022     Procedure: RADIOFREQUENCY ABLATION LEFT L3,L4,L5 MUST HAVE dDVAP PRIOR ONE OF TWO;  Surgeon: Milo  MD Tish;  Location: [facility] PAIN MGT;  Service: Pain Management;  Laterality: Left;    RADIOFREQUENCY ABLATION Right 12/5/2022     Procedure: RADIOFREQUENCY ABLATION RIGHT L3,L4,L5  PRE-MEDICATE WITH dDVAP TWO OF TWO;  Surgeon: Milo Winston MD;  Location: [facility] PAIN MGT;  Service: Pain Management;  Laterality: Right;    RADIOFREQUENCY ABLATION Bilateral 4/29/2024     Procedure: RADIOFREQUENCY ABLATION BILATERAL L3, 4, 5 *DDAVP BEFORE*;  Surgeon: Milo Winston MD;  Location: [facility] PAIN MGT;  Service: Pain Management;  Laterality: Bilateral;  [phone]  4 WK F/U CHANDRA    RADIOFREQUENCY ABLATION Bilateral 12/23/2024     Procedure: RADIOFREQUENCY ABLATION BILATERAL L3, 4, 5;  Surgeon: Milo Winston MD;  Location: [facility] PAIN MGT;  Service: Pain Management;  Laterality: Bilateral;  4 WK F/U JERI    RADIOFREQUENCY ABLATION OF LUMBAR MEDIAL BRANCH NERVE AT SINGLE LEVEL Left 5/24/2018     Procedure: RADIOFREQUENCY THERMOCOAGULATION (RFTC)-NERVE-MEDIAN BRANCH-LUMBAR;  Surgeon: Fredy Magana MD;  Location: [facility] PAIN MGT;  Service: Pain Management;  Laterality: Left;  Left RFA @ L3,4,5  [id number]  with IV Sedation     2 of 2    RETROGRADE PYELOGRAPHY Bilateral 8/12/2022     Procedure: PYELOGRAM, RETROGRADE;  Surgeon: Tyler Reid Jr., MD;  Location: [facility] OR 1ST FLR;  Service: Urology;  Laterality: Bilateral;    ROBOT-ASSISTED LAPAROSCOPIC REPAIR OF INGUINAL HERNIA USING DA ASHTYN XI Right 10/25/2024     Procedure: XI ROBOTIC REPAIR, HERNIA, RIGHT, INGUINAL, WITH MESH;  Surgeon: Jose Hilliard MD;  Location: [facility] OR 2ND FLR;  Service: General;  Laterality: Right;    SPINE SURGERY        TONSILLECTOMY         at age 24    TRANSFORAMINAL EPIDURAL INJECTION OF STEROID Bilateral 6/19/2023     Procedure: INJECTION, STEROID, EPIDURAL, TRANSFORAMINAL APPROACH,BILATERAL T7-T8****** Give 0.3mcg/kg DDAVP immediately prior to the procedure*****  CLEAR TO HOLD PLAVIX 7 DAYS;  Surgeon: Milo Winston MD;  Location: [facility] PAIN MGT;   Service: Pain Management;  Laterality: Bilateral;    TRANSFORAMINAL EPIDURAL INJECTION OF STEROID N/A 2/17/2025     Procedure: LUMBAR L3/4 IL ODALYS  *PLAVIX CLEARANCE REQUESTED* * give 0.3mcg/kg dDAVP immediately prior to the procedure*;  Surgeon: Milo Winston MD;  Location: [facility] PAIN MGT;  Service: Pain Management;  Laterality: N/A;  2 WK F/U JERI    URETEROSCOPY Bilateral 8/12/2022     Procedure: URETEROSCOPY;  Surgeon: Tyler Reid Jr., MD;  Location: [facility] OR [address] [place] [address];  Service: Urology;  Laterality: Bilateral;    VASECTOMY   1996                Family History   Problem Relation Name Age of Onset    Colon cancer Father Pato Famre 67         colon cancer    Hypertension Father Pato Santiago      Glaucoma Father Pato Jack      Cancer Father Pato Jack      Colon cancer Paternal Grandfather   65              Coronary artery disease Mother Eula Famre 45    Hypertension Mother Eula Jack      Heart disease Mother Eula Jack      Colon cancer Mother Eula Jack      Diabetes Mother Eula Jack      No Known Problems Brother Humble      No Known Problems Sister Pat      No Known Problems Daughter Keyoka      No Known Problems Son Cosme      Coronary artery disease Brother Rufe 51    No Known Problems Daughter Shakierria      No Known Problems Daughter Rosa      No Known Problems Son Beto      No Known Problems Son [place]      Colon cancer Paternal Uncle   65    Diabetes Mellitus Paternal Grandmother        Esophageal cancer Neg Hx             [Social History]    [Social History]        Tobacco Use    Smoking status: Never    Smokeless tobacco: Never   Substance Use Topics    Alcohol use: Yes       Alcohol/week: 1.0 standard drink of alcohol       Types: 1 Glasses of wine per week       Comment: social    Drug use: No           Review of Systems   Constitutional:  Negative for chills and fever.   Genitourinary:  Negative for decreased urine volume, difficulty urinating, dysuria,  "frequency and urgency.            Objective:   Blood pressure 132/72, pulse 69, height 5' 10" (1.778 m), weight 109.8 kg (242 lb 1 oz), SpO2 96%.  Objective  Physical Exam  Constitutional:       Appearance: Normal appearance. He is well-developed. He is not ill-appearing.   HENT:      Head: Normocephalic and atraumatic.   Eyes:      General: No scleral icterus.     Conjunctiva/sclera: Conjunctivae normal.   Cardiovascular:      Rate and Rhythm: Normal rate.      Heart sounds: Normal heart sounds. No murmur heard.     No friction rub. No gallop.   Pulmonary:      Effort: Pulmonary effort is normal.      Breath sounds: Normal breath sounds. No wheezing or rales.   Chest:      Chest wall: No tenderness.   Abdominal:      General: There is no distension.      Tenderness: There is abdominal tenderness (bilateral LLQ). There is no guarding or rebound.   Musculoskeletal:         General: Tenderness (over lspine approx L1-L2, bilateral inguinal area, no buldging) present. No signs of injury.   Skin:     General: Skin is warm and dry.   Neurological:      Mental Status: He is alert and oriented to person, place, and time. Mental status is at baseline.      Comments: No new LE weakness noted   Psychiatric:         Behavior: Behavior normal.         Thought Content: Thought content normal.         Physical Exam                  Prior labs reviewed     Health Maintenance           Date Due Completion Date     Shingles Vaccine (1 of 2) Never done ---     RSV Vaccine (Age 60+ and Pregnant patients) (1 - Risk 60-74 years 1-dose series) Never done ---     COVID-19 Vaccine (5 - 2024-25 season) 09/01/2024 12/8/2022     Colorectal Cancer Screening 03/04/2025 3/4/2020     Aspirin/Antiplatelet Therapy 02/25/2026 2/25/2025     Hemoglobin A1c (Diabetic Prevention Screening) 07/16/2027 7/16/2024     TETANUS VACCINE 10/16/2027 10/16/2017     Lipid Panel 10/05/2029 10/5/2024                Assessment/Plan:      Assessment  1. Lumbosacral " radiculopathy at L1  -     MRI Lumbar Spine W WO Cont; Future; Expected date: 02/25/2025  -     Creatinine, serum; Future; Expected date: 02/25/2025  -     tiZANidine (ZANAFLEX) 2 MG tablet; Take 2 tablets (4 mg total) by mouth every 8 (eight) hours as needed (pain and spasm).  Dispense: 30 tablet; Refill: 0  -     oxyCODONE-acetaminophen (PERCOCET) 5-325 mg per tablet; Take 1 tablet by mouth every 6 (six) hours as needed for Pain.  Dispense: 28 each; Refill: 0  -     CBC Auto Differential; Future; Expected date: 02/25/2025  -     C-REACTIVE PROTEIN; Future; Expected date: 02/25/2025  -     Sedimentation rate; Future; Expected date: 02/25/2025     2. S/P epidural steroid injection  -     MRI Lumbar Spine W WO Cont; Future; Expected date: 02/25/2025  -     Creatinine, serum; Future; Expected date: 02/25/2025  -     tiZANidine (ZANAFLEX) 2 MG tablet; Take 2 tablets (4 mg total) by mouth every 8 (eight) hours as needed (pain and spasm).  Dispense: 30 tablet; Refill: 0  -     oxyCODONE-acetaminophen (PERCOCET) 5-325 mg per tablet; Take 1 tablet by mouth every 6 (six) hours as needed for Pain.  Dispense: 28 each; Refill: 0  -     CBC Auto Differential; Future; Expected date: 02/25/2025  -     C-REACTIVE PROTEIN; Future; Expected date: 02/25/2025  -     Sedimentation rate; Future; Expected date: 02/25/2025  Close follow up in clinic      3. Suprapubic pressure  -     POCT URINE DIPSTICK WITHOUT MICROSCOPE- negative   No evidence of uti          4. Diastolic dysfunction with chronic heart failure  Stable, cont lasix 20 mg daily     5. Class 1 obesity due to excess calories with serious comorbidity and body mass index (BMI) of 34.0 to 34.9 in adult     6. Von Willebrand disease  Overview:  Recommend DDAVP   infusion , 20 mcg, to be given over 30 minutes, 1 hr before the procedure.     Assessment & Plan    - Evaluated patient's sudden onset of bilateral lower back, lower abdominal, and groin pain, distinct from usual back  pain  - Considered possibility of kidney stones, given patient's history of right lower quadrant pain in 2022 and evidence of kidney stones on imaging  - Noted atypical presentation for kidney stones due to bilateral nature of pain and constant presence rather than intermittent episodes  - Reviewed patient's recent L3-L4 injection and radiofrequency ablation history  - Assessed for potential abdominal aneurysms or other underlying causes     DIASTOLIC DYSFUNCTION WITH CHRONIC HEART FAILURE:  - Monitor the patient's blood pressure, which appears to be under control.  - All necessary procedures and tests have been reviewed and confirmed to be up to date.     KIDNEY STONE:  - Educated the patient about kidney stone pain characteristics, typically unilateral and intermittent.  - Reviewed past imaging from 2022, noting evidence of a kidney stone on the right side of the left subcerebrum kidney.  - Assessed that while a kidney stone could be a possible cause of the pain, bilateral pain is unusual for this condition.     BACK PAIN:  - Mr. Santiago reports lower back pain, different from usual and exacerbated when rising from a chair.  - Reviewed history of back problems, including previous injections and radiofrequency ablation.  - Noted that the patient received an injection for back pain 1 week ago.     ABDOMINAL PAIN:  - Mr. Santiago reports sudden onset of bilateral pain in the lower abdomen.  - The pain is constant and described as stabbing, sharp, and pulsing.     URINARY SYMPTOMS:  - Mr. Santiago experienced pressure build-up during urination.     GROIN PAIN:  - Mr. Santiago reports constant, stabbing, sharp, and pulsing pain in the groin area.     PAIN MANAGEMENT:  - Mr. Santiago used pain patches and took acetaminophen for pain relief.  - Continued Tylenol as needed for pain management.          Visit today included increased complexity associated with the care of the episodic problems and addressed and managing the  longitudinal care of the patient due to the serious and/or complex managed problem(s) as above.           Medication List with Changes/Refills   New Medications     OXYCODONE-ACETAMINOPHEN (PERCOCET) 5-325 MG PER TABLET    Take 1 tablet by mouth every 6 (six) hours as needed for Pain.     TIZANIDINE (ZANAFLEX) 2 MG TABLET    Take 2 tablets (4 mg total) by mouth every 8 (eight) hours as needed (pain and spasm).   Current Medications     ALBUTEROL (PROVENTIL/VENTOLIN HFA) 90 MCG/ACTUATION INHALER    INHALE 2 PUFFS INTO THE LUNGS EVERY 6 HOURS AS NEEDED FOR WHEEZING OR SHORTNESS OF BREATH     ALBUTEROL (PROVENTIL/VENTOLIN HFA) 90 MCG/ACTUATION INHALER    Inhale 1-2 puffs into the lungs every 6 (six) hours as needed for Wheezing. Rescue     AMLODIPINE (NORVASC) 5 MG TABLET    Take 1 tablet (5 mg total) by mouth once daily.     ATORVASTATIN (LIPITOR) 80 MG TABLET    Take 1 tablet (80 mg total) by mouth every evening.     AZELASTINE (ASTELIN) 137 MCG (0.1 %) NASAL SPRAY    Two sprays in each nostril, sniff until absorbed, then follow with 1 spray of fluticasone.  Use both sprays twice daily.     BUDESONIDE-FORMOTEROL 160-4.5 MCG (SYMBICORT) 160-4.5 MCG/ACTUATION HFAA    INHALE 2 PUFFS INTO THE LUNGS EVERY 12 HOURS     CALCIUM CITRATE-VITAMIN D3 315-200 MG (CITRACAL+D) 315-200 MG-UNIT PER TABLET    Take 1 tablet by mouth nightly.      CLOPIDOGREL (PLAVIX) 75 MG TABLET    Take 1 tablet (75 mg total) by mouth once daily.     CYANOCOBALAMIN, VITAMIN B-12, 50 MCG TABLET    Take 50 mcg by mouth nightly.      DOCUSATE SODIUM (COLACE) 100 MG CAPSULE    Take 1 tablet by mouth Twice daily. 1 Capsule Oral Twice a day .  Take with pain medicine     DOXAZOSIN (CARDURA) 1 MG TABLET    TAKE 1 TABLET BY MOUTH EVERY EVENING     EZETIMIBE (ZETIA) 10 MG TABLET    Take 1 tablet (10 mg total) by mouth once daily.     FLUTICASONE PROPIONATE (FLONASE) 50 MCG/ACTUATION NASAL SPRAY    One spray in each nostril twice daily after 1st using  azelastine nasal spray     FUROSEMIDE (LASIX) 20 MG TABLET    Take 1 tablet by mouth once daily     IPRATROPIUM (ATROVENT) 42 MCG (0.06 %) NASAL SPRAY    1-2 sprays in each nostril before eating and at bedtime as needed     OXYCODONE (ROXICODONE) 5 MG IMMEDIATE RELEASE TABLET    Take 1 tablet (5 mg total) by mouth every 4 (four) hours as needed for Pain.     RABEPRAZOLE (ACIPHEX) 20 MG TABLET    Take 1 tablet (20 mg total) by mouth every 12 (twelve) hours.     TADALAFIL (CIALIS) 20 MG TAB    Take 1 tablet (20 mg total) by mouth as needed. Take every 36 hours as needed for ED.     TESTOSTERONE (ANDRODERM) 2 MG/24 HOUR PT24    APPLY 1 PATCH TOPICALLY TO THE SKIN EVERY DAY     ZOLPIDEM (AMBIEN) 10 MG TAB    TAKE 1 TABLET BY MOUTH EVERY DAY AT BEDTIME         Recommend patient complete vaccinations as listed in the overdue health maintenance report. Pt may obtain vaccinations at their local pharmacy, any [facility] pharmacy or request vaccination in our clinic.  Please note that some insurances will only cover vaccination cost at specific locations.Please check with your insurance provider regarding your coverage.  Vaccines that are not covered are still recommended.       35   minutes spent in care of patient including history, physical, chart review, orders and coordination of care.  Messages sent to dr sepulveda re current presentation.         This note was generated with the assistance of ambient listening technology. Verbal consent was obtained by the patient and accompanying visitor(s) for the recording of patient appointment to facilitate this note. I attest to having reviewed and edited the generated note for accuracy, though some syntax or spelling errors may persist. Please contact the author of this note for any clarification.

## 2025-02-27 ENCOUNTER — PATIENT MESSAGE (OUTPATIENT)
Dept: INTERNAL MEDICINE | Facility: CLINIC | Age: 65
End: 2025-02-27
Payer: MEDICARE

## 2025-02-27 ENCOUNTER — RESULTS FOLLOW-UP (OUTPATIENT)
Dept: INTERNAL MEDICINE | Facility: CLINIC | Age: 65
End: 2025-02-27

## 2025-02-28 ENCOUNTER — LAB VISIT (OUTPATIENT)
Dept: LAB | Facility: HOSPITAL | Age: 65
End: 2025-02-28
Attending: INTERNAL MEDICINE
Payer: MEDICARE

## 2025-02-28 DIAGNOSIS — M54.17 LUMBOSACRAL RADICULOPATHY AT L1: ICD-10-CM

## 2025-02-28 DIAGNOSIS — Z92.241 S/P EPIDURAL STEROID INJECTION: ICD-10-CM

## 2025-02-28 LAB — CRP SERPL-MCNC: 1.3 MG/L (ref 0–8.2)

## 2025-02-28 PROCEDURE — 85025 COMPLETE CBC W/AUTO DIFF WBC: CPT | Performed by: INTERNAL MEDICINE

## 2025-02-28 PROCEDURE — 86140 C-REACTIVE PROTEIN: CPT | Performed by: INTERNAL MEDICINE

## 2025-02-28 PROCEDURE — 85652 RBC SED RATE AUTOMATED: CPT | Performed by: INTERNAL MEDICINE

## 2025-02-28 PROCEDURE — 36415 COLL VENOUS BLD VENIPUNCTURE: CPT | Performed by: INTERNAL MEDICINE

## 2025-03-01 LAB
BASOPHILS # BLD AUTO: 0.04 K/UL (ref 0–0.2)
BASOPHILS NFR BLD: 0.5 % (ref 0–1.9)
DIFFERENTIAL METHOD BLD: ABNORMAL
EOSINOPHIL # BLD AUTO: 0.3 K/UL (ref 0–0.5)
EOSINOPHIL NFR BLD: 2.9 % (ref 0–8)
ERYTHROCYTE [DISTWIDTH] IN BLOOD BY AUTOMATED COUNT: 12.3 % (ref 11.5–14.5)
ERYTHROCYTE [SEDIMENTATION RATE] IN BLOOD BY PHOTOMETRIC METHOD: 12 MM/HR (ref 0–23)
HCT VFR BLD AUTO: 43.2 % (ref 40–54)
HGB BLD-MCNC: 13.7 G/DL (ref 14–18)
IMM GRANULOCYTES # BLD AUTO: 0.02 K/UL (ref 0–0.04)
IMM GRANULOCYTES NFR BLD AUTO: 0.2 % (ref 0–0.5)
LYMPHOCYTES # BLD AUTO: 1.5 K/UL (ref 1–4.8)
LYMPHOCYTES NFR BLD: 17.3 % (ref 18–48)
MCH RBC QN AUTO: 30.7 PG (ref 27–31)
MCHC RBC AUTO-ENTMCNC: 31.7 G/DL (ref 32–36)
MCV RBC AUTO: 97 FL (ref 82–98)
MONOCYTES # BLD AUTO: 0.6 K/UL (ref 0.3–1)
MONOCYTES NFR BLD: 6.4 % (ref 4–15)
NEUTROPHILS # BLD AUTO: 6.3 K/UL (ref 1.8–7.7)
NEUTROPHILS NFR BLD: 72.7 % (ref 38–73)
NRBC BLD-RTO: 0 /100 WBC
PLATELET # BLD AUTO: 246 K/UL (ref 150–450)
PMV BLD AUTO: 10.1 FL (ref 9.2–12.9)
RBC # BLD AUTO: 4.46 M/UL (ref 4.6–6.2)
WBC # BLD AUTO: 8.6 K/UL (ref 3.9–12.7)

## 2025-03-09 NOTE — PROGRESS NOTES
He presents with new onset bilateral back, lower abdominal, and groin pain. The pain is constant with stabbing and pulsing sensations, different from his usual back pain. He denies relief with positional changes or applying pressure. Pain patches and Tylenol have not provided significant relief, with pain persisting through the night and into the morning. He reports pressure sensation with urination but denies pain, noting he had been holding his bladder prior to pain onset. Worse when gettting out of chair, no relieving factors or movement. Pain is severe. Reports it does not radiate through is abdomen. denies trauma, no fevers/chills, no n/v.      He received a lumbar injection at L3-L4 level last Monday and was instructed to avoid strenuous activities for 10 days post-procedure. He has a history of radiofrequency ablation treatments previously.  He has a history of right lower quadrant pain in 2022 and previous kidney stone in the left kidney.        ROS:  Gastrointestinal: +abdominal pain  Genitourinary: -painful urination  Musculoskeletal: +back pain           Patient Active Problem List        Diagnosis    Asthma in remission    Von Willebrand disease    Primary hypertension    Spondylosis without myelopathy    Thoracic or lumbosacral neuritis or radiculitis, unspecified    Spinal stenosis, lumbar region, without neurogenic claudication    Degeneration of lumbar or lumbosacral intervertebral disc    Acquired spondylolisthesis    Pseudoarthrosis    Family history of colon cancer    Vitamin D deficiency    Atypical chest pain    Encounter for monitoring long-term proton pump inhibitor therapy    Postlaminectomy syndrome of lumbar region    Myalgia and myositis    Facet syndrome    Gastroesophageal reflux disease without esophagitis    Osteopenia    Thoracic aorta atherosclerosis    Benign prostatic hyperplasia with urinary obstruction    Allergic rhinitis    Allergic conjunctivitis of both eyes    Encounter  for long-term current use of high risk medication    Gastroesophageal reflux disease with esophagitis    Hematochezia    Nasal septal deviation    Dysphagia    Laryngopharyngeal reflux (LPR)    Diastolic dysfunction with chronic heart failure    At risk for cardiovascular event    Hypoxia    Chronic pulmonary heart disease    Class 1 obesity due to excess calories with serious comorbidity and body mass index (BMI) of 33.0 to 33.9 in adult    Moderate persistent asthma    Family history of prostate cancer in father    Chronic pain    Nocturia    Chronic bilateral low back pain without sciatica    Chronic maxillary sinusitis    Nonintractable episodic headache    Osteoarthritis of lumbar spine    Gynecomastia, male    Iron deficiency anemia    Obstructive sleep apnea treated with continuous positive airway pressure (CPAP)    Postnasal drip    GERD (gastroesophageal reflux disease)    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    Hypogonadism male    Dry skin dermatitis    Globus sensation    Throat clearing    Non-occlusive coronary artery disease requiring drug therapy    Unspecified inflammatory spondylopathy, lumbar region    Erectile dysfunction    Chronic prostatitis    Dysuria    Impaired functional mobility and activity tolerance    Decreased ROM of trunk and back    Decreased functional mobility and endurance    Weakness    Other hyperlipidemia    Neck pain    Arm weakness    Acute pain of left knee    Viral conjunctivitis of right eye    Hard to intubate                               Family History   Problem Relation Name Age of Onset    Colon cancer Father Pato Santiago 67         colon cancer    Hypertension Father Pato Santiago      Glaucoma Father Pato Santiago      Cancer Father Pato Santiago      Colon cancer Paternal Grandfather   65              Coronary artery disease Mother Eula Santiago 45    Hypertension Mother Eula Santiago      Heart disease Mother Eula Santiago      Colon  "cancer Mother Eula Santiago      Diabetes Mother Eula Santiago      No Known Problems Brother Humble      No Known Problems Sister Pat      No Known Problems Daughter Sophie      No Known Problems Son Cosme      Coronary artery disease Brother Juju Barron    No Known Problems Daughter Hamida      No Known Problems Daughter Rosa      No Known Problems Son Beto      No Known Problems Son Robin      Colon cancer Paternal Uncle   65    Diabetes Mellitus Paternal Grandmother        Esophageal cancer Neg Hx                [Social History]            Tobacco Use    Smoking status: Never    Smokeless tobacco: Never   Substance Use Topics    Alcohol use: Yes       Alcohol/week: 1.0 standard drink of alcohol       Types: 1 Glasses of wine per week       Comment: social    Drug use: No            Review of Systems   Constitutional:  Negative for chills and fever.   Genitourinary:  Negative for decreased urine volume, difficulty urinating, dysuria, frequency and urgency.             Blood pressure 132/72, pulse 69, height 5' 10" (1.778 m), weight 109.8 kg (242 lb 1 oz), SpO2 96%.  Objective  Physical Exam  Constitutional:       Appearance: Normal appearance. He is well-developed. He is not ill-appearing.   HENT:      Head: Normocephalic and atraumatic.   Eyes:      General: No scleral icterus.     Conjunctiva/sclera: Conjunctivae normal.   Cardiovascular:      Rate and Rhythm: Normal rate.      Heart sounds: Normal heart sounds. No murmur heard.     No friction rub. No gallop.   Pulmonary:      Effort: Pulmonary effort is normal.      Breath sounds: Normal breath sounds. No wheezing or rales.   Chest:      Chest wall: No tenderness.   Abdominal:      General: There is no distension.      Tenderness: There is abdominal tenderness (bilateral LLQ). There is no guarding or rebound.   Musculoskeletal:         General: Tenderness (over lspine approx L1-L2, bilateral inguinal area, no buldging) present. No signs of " injury.   Skin:     General: Skin is warm and dry.   Neurological:      Mental Status: He is alert and oriented to person, place, and time. Mental status is at baseline.      Comments: No new LE weakness noted   Psychiatric:         Behavior: Behavior normal.         Thought Content: Thought content normal.         Physical Exam                  Prior labs reviewed     Health Maintenance           Date Due Completion Date     Shingles Vaccine (1 of 2) Never done ---     RSV Vaccine (Age 60+ and Pregnant patients) (1 - Risk 60-74 years 1-dose series) Never done ---     COVID-19 Vaccine (5 - 2024-25 season) 09/01/2024 12/8/2022     Colorectal Cancer Screening 03/04/2025 3/4/2020     Aspirin/Antiplatelet Therapy 02/25/2026 2/25/2025     Hemoglobin A1c (Diabetic Prevention Screening) 07/16/2027 7/16/2024     TETANUS VACCINE 10/16/2027 10/16/2017     Lipid Panel 10/05/2029 10/5/2024                Assessment/Plan:      Assessment  1. Lumbosacral radiculopathy at L1  -     MRI Lumbar Spine W WO Cont; Future; Expected date: 02/25/2025  -     Creatinine, serum; Future; Expected date: 02/25/2025  -     tiZANidine (ZANAFLEX) 2 MG tablet; Take 2 tablets (4 mg total) by mouth every 8 (eight) hours as needed (pain and spasm).  Dispense: 30 tablet; Refill: 0  -     oxyCODONE-acetaminophen (PERCOCET) 5-325 mg per tablet; Take 1 tablet by mouth every 6 (six) hours as needed for Pain.  Dispense: 28 each; Refill: 0  -     CBC Auto Differential; Future; Expected date: 02/25/2025  -     C-REACTIVE PROTEIN; Future; Expected date: 02/25/2025  -     Sedimentation rate; Future; Expected date: 02/25/2025     2. S/P epidural steroid injection  -     MRI Lumbar Spine W WO Cont; Future; Expected date: 02/25/2025  -     Creatinine, serum; Future; Expected date: 02/25/2025  -     tiZANidine (ZANAFLEX) 2 MG tablet; Take 2 tablets (4 mg total) by mouth every 8 (eight) hours as needed (pain and spasm).  Dispense: 30 tablet; Refill: 0  -      oxyCODONE-acetaminophen (PERCOCET) 5-325 mg per tablet; Take 1 tablet by mouth every 6 (six) hours as needed for Pain.  Dispense: 28 each; Refill: 0  -     CBC Auto Differential; Future; Expected date: 02/25/2025  -     C-REACTIVE PROTEIN; Future; Expected date: 02/25/2025  -     Sedimentation rate; Future; Expected date: 02/25/2025  Close follow up in clinic      3. Suprapubic pressure  -     POCT URINE DIPSTICK WITHOUT MICROSCOPE- negative   No evidence of uti           4. Diastolic dysfunction with chronic heart failure  Stable, cont lasix 20 mg daily     5. Class 1 obesity due to excess calories with serious comorbidity and body mass index (BMI) of 34.0 to 34.9 in adult     6. Von Willebrand disease  Overview:  Recommend DDAVP   infusion , 20 mcg, to be given over 30 minutes, 1 hr before the procedure.     Assessment & Plan    - Evaluated patient's sudden onset of bilateral lower back, lower abdominal, and groin pain, distinct from usual back pain  - Considered possibility of kidney stones, given patient's history of right lower quadrant pain in 2022 and evidence of kidney stones on imaging  - Noted atypical presentation for kidney stones due to bilateral nature of pain and constant presence rather than intermittent episodes  - Reviewed patient's recent L3-L4 injection and radiofrequency ablation history  - Assessed for potential abdominal aneurysms or other underlying causes     DIASTOLIC DYSFUNCTION WITH CHRONIC HEART FAILURE:  - Monitor the patient's blood pressure, which appears to be under control.  - All necessary procedures and tests have been reviewed and confirmed to be up to date.     KIDNEY STONE:  - Educated the patient about kidney stone pain characteristics, typically unilateral and intermittent.  - Reviewed past imaging from 2022, noting evidence of a kidney stone on the right side of the left subcerebrum kidney.  - Assessed that while a kidney stone could be a possible cause of the pain,  bilateral pain is unusual for this condition.     BACK PAIN:  - Mr. Santiago reports lower back pain, different from usual and exacerbated when rising from a chair.  - Reviewed history of back problems, including previous injections and radiofrequency ablation.  - Noted that the patient received an injection for back pain 1 week ago.     ABDOMINAL PAIN:  - Mr. Santiago reports sudden onset of bilateral pain in the lower abdomen.  - The pain is constant and described as stabbing, sharp, and pulsing.     URINARY SYMPTOMS:  - Mr. Santiago experienced pressure build-up during urination.     GROIN PAIN:  - Mr. Santiago reports constant, stabbing, sharp, and pulsing pain in the groin area.     PAIN MANAGEMENT:  - Mr. Santiago used pain patches and took acetaminophen for pain relief.  - Continued Tylenol as needed for pain management.          Visit today included increased complexity associated with the care of the episodic problems and addressed and managing the longitudinal care of the patient due to the serious and/or complex managed problem(s) as above.              Medication List with Changes/Refills   New Medications     OXYCODONE-ACETAMINOPHEN (PERCOCET) 5-325 MG PER TABLET    Take 1 tablet by mouth every 6 (six) hours as needed for Pain.     TIZANIDINE (ZANAFLEX) 2 MG TABLET    Take 2 tablets (4 mg total) by mouth every 8 (eight) hours as needed (pain and spasm).   Current Medications     ALBUTEROL (PROVENTIL/VENTOLIN HFA) 90 MCG/ACTUATION INHALER    INHALE 2 PUFFS INTO THE LUNGS EVERY 6 HOURS AS NEEDED FOR WHEEZING OR SHORTNESS OF BREATH     ALBUTEROL (PROVENTIL/VENTOLIN HFA) 90 MCG/ACTUATION INHALER    Inhale 1-2 puffs into the lungs every 6 (six) hours as needed for Wheezing. Rescue     AMLODIPINE (NORVASC) 5 MG TABLET    Take 1 tablet (5 mg total) by mouth once daily.     ATORVASTATIN (LIPITOR) 80 MG TABLET    Take 1 tablet (80 mg total) by mouth every evening.     AZELASTINE (ASTELIN) 137 MCG (0.1 %) NASAL SPRAY     Two sprays in each nostril, sniff until absorbed, then follow with 1 spray of fluticasone.  Use both sprays twice daily.     BUDESONIDE-FORMOTEROL 160-4.5 MCG (SYMBICORT) 160-4.5 MCG/ACTUATION HFAA    INHALE 2 PUFFS INTO THE LUNGS EVERY 12 HOURS     CALCIUM CITRATE-VITAMIN D3 315-200 MG (CITRACAL+D) 315-200 MG-UNIT PER TABLET    Take 1 tablet by mouth nightly.      CLOPIDOGREL (PLAVIX) 75 MG TABLET    Take 1 tablet (75 mg total) by mouth once daily.     CYANOCOBALAMIN, VITAMIN B-12, 50 MCG TABLET    Take 50 mcg by mouth nightly.      DOCUSATE SODIUM (COLACE) 100 MG CAPSULE    Take 1 tablet by mouth Twice daily. 1 Capsule Oral Twice a day .  Take with pain medicine     DOXAZOSIN (CARDURA) 1 MG TABLET    TAKE 1 TABLET BY MOUTH EVERY EVENING     EZETIMIBE (ZETIA) 10 MG TABLET    Take 1 tablet (10 mg total) by mouth once daily.     FLUTICASONE PROPIONATE (FLONASE) 50 MCG/ACTUATION NASAL SPRAY    One spray in each nostril twice daily after 1st using azelastine nasal spray     FUROSEMIDE (LASIX) 20 MG TABLET    Take 1 tablet by mouth once daily     IPRATROPIUM (ATROVENT) 42 MCG (0.06 %) NASAL SPRAY    1-2 sprays in each nostril before eating and at bedtime as needed     OXYCODONE (ROXICODONE) 5 MG IMMEDIATE RELEASE TABLET    Take 1 tablet (5 mg total) by mouth every 4 (four) hours as needed for Pain.     RABEPRAZOLE (ACIPHEX) 20 MG TABLET    Take 1 tablet (20 mg total) by mouth every 12 (twelve) hours.     TADALAFIL (CIALIS) 20 MG TAB    Take 1 tablet (20 mg total) by mouth as needed. Take every 36 hours as needed for ED.     TESTOSTERONE (ANDRODERM) 2 MG/24 HOUR PT24    APPLY 1 PATCH TOPICALLY TO THE SKIN EVERY DAY     ZOLPIDEM (AMBIEN) 10 MG TAB    TAKE 1 TABLET BY MOUTH EVERY DAY AT BEDTIME         Recommend patient complete vaccinations as listed in the overdue health maintenance report. Pt may obtain vaccinations at their local pharmacy, any [facility] pharmacy or request vaccination in our clinic.  Please note that  some insurances will only cover vaccination cost at specific locations.Please check with your insurance provider regarding your coverage.  Vaccines that are not covered are still recommended.       35   minutes spent in care of patient including history, physical, chart review, orders and coordination of care.  Messages sent to dr sepulveda re current presentation.         This note was generated with the assistance of ambient listening technology. Verbal consent was obtained by the patient and accompanying visitor(s) for the recording of patient appointment to facilitate this note. I attest to having reviewed and edited the generated note for accuracy, though some syntax or spelling errors may persist. Please contact the author of this note for any clarification.

## 2025-03-17 ENCOUNTER — TELEPHONE (OUTPATIENT)
Dept: OPHTHALMOLOGY | Facility: CLINIC | Age: 65
End: 2025-03-17
Payer: MEDICARE

## 2025-03-17 NOTE — TELEPHONE ENCOUNTER
----- Message from Beto sent at 3/17/2025  1:48 PM CDT -----  Regarding: appointment access  Contact: [phone]  Pt vision is blurry and pt would like to be seen . Please contact pt with an appointment . Bri Santiago [phone] Please contact pt with an appointment .

## 2025-03-18 DIAGNOSIS — D68.00 VON WILLEBRAND'S DISEASE: Primary | ICD-10-CM

## 2025-03-18 DIAGNOSIS — Z80.0 FAMILY HISTORY OF COLON CANCER: ICD-10-CM

## 2025-03-19 ENCOUNTER — TELEPHONE (OUTPATIENT)
Dept: UROLOGY | Facility: CLINIC | Age: 65
End: 2025-03-19
Payer: MEDICARE

## 2025-03-19 NOTE — TELEPHONE ENCOUNTER
Call placed to pt regarding message to office about scheduling appt. Pt states he would like to schedule ED consult, scheduled for 3/27.

## 2025-03-20 ENCOUNTER — PATIENT MESSAGE (OUTPATIENT)
Dept: GASTROENTEROLOGY | Facility: CLINIC | Age: 65
End: 2025-03-20
Payer: MEDICARE

## 2025-03-20 ENCOUNTER — OFFICE VISIT (OUTPATIENT)
Dept: INTERNAL MEDICINE | Facility: CLINIC | Age: 65
End: 2025-03-20
Payer: MEDICARE

## 2025-03-20 ENCOUNTER — TELEPHONE (OUTPATIENT)
Dept: GASTROENTEROLOGY | Facility: CLINIC | Age: 65
End: 2025-03-20
Payer: MEDICARE

## 2025-03-20 VITALS
WEIGHT: 239.63 LBS | SYSTOLIC BLOOD PRESSURE: 144 MMHG | BODY MASS INDEX: 34.3 KG/M2 | DIASTOLIC BLOOD PRESSURE: 86 MMHG | HEIGHT: 70 IN | HEART RATE: 65 BPM | OXYGEN SATURATION: 97 %

## 2025-03-20 DIAGNOSIS — J06.9 VIRAL URI WITH COUGH: Primary | ICD-10-CM

## 2025-03-20 DIAGNOSIS — R53.83 FATIGUE, UNSPECIFIED TYPE: ICD-10-CM

## 2025-03-20 DIAGNOSIS — G47.33 OSA (OBSTRUCTIVE SLEEP APNEA): ICD-10-CM

## 2025-03-20 DIAGNOSIS — Z72.820 POOR SLEEP: ICD-10-CM

## 2025-03-20 DIAGNOSIS — E29.1 HYPOGONADISM MALE: ICD-10-CM

## 2025-03-20 LAB
CTP QC/QA: YES
CTP QC/QA: YES
POC MOLECULAR INFLUENZA A AGN: NEGATIVE
POC MOLECULAR INFLUENZA B AGN: NEGATIVE
SARS-COV-2 RDRP RESP QL NAA+PROBE: NEGATIVE

## 2025-03-20 PROCEDURE — 87635 SARS-COV-2 COVID-19 AMP PRB: CPT | Mod: QW,S$GLB,, | Performed by: PHYSICIAN ASSISTANT

## 2025-03-20 PROCEDURE — 87502 INFLUENZA DNA AMP PROBE: CPT | Mod: QW,S$GLB,, | Performed by: PHYSICIAN ASSISTANT

## 2025-03-20 PROCEDURE — 99214 OFFICE O/P EST MOD 30 MIN: CPT | Mod: S$GLB,,, | Performed by: PHYSICIAN ASSISTANT

## 2025-03-20 PROCEDURE — 99999 PR PBB SHADOW E&M-EST. PATIENT-LVL V: CPT | Mod: PBBFAC,,, | Performed by: PHYSICIAN ASSISTANT

## 2025-03-20 PROCEDURE — 3077F SYST BP >= 140 MM HG: CPT | Mod: CPTII,S$GLB,, | Performed by: PHYSICIAN ASSISTANT

## 2025-03-20 PROCEDURE — 3008F BODY MASS INDEX DOCD: CPT | Mod: CPTII,S$GLB,, | Performed by: PHYSICIAN ASSISTANT

## 2025-03-20 PROCEDURE — 3079F DIAST BP 80-89 MM HG: CPT | Mod: CPTII,S$GLB,, | Performed by: PHYSICIAN ASSISTANT

## 2025-03-20 RX ORDER — AZELASTINE 1 MG/ML
1 SPRAY, METERED NASAL 2 TIMES DAILY
Qty: 30 ML | Refills: 0 | Status: SHIPPED | OUTPATIENT
Start: 2025-03-20 | End: 2026-03-20

## 2025-03-20 RX ORDER — BENZONATATE 100 MG/1
100 CAPSULE ORAL 3 TIMES DAILY PRN
Qty: 30 CAPSULE | Refills: 0 | Status: SHIPPED | OUTPATIENT
Start: 2025-03-20 | End: 2025-03-30

## 2025-03-20 RX ORDER — CETIRIZINE HYDROCHLORIDE 10 MG/1
10 TABLET ORAL DAILY
Qty: 30 TABLET | Refills: 0 | Status: SHIPPED | OUTPATIENT
Start: 2025-03-20 | End: 2026-03-20

## 2025-03-20 NOTE — TELEPHONE ENCOUNTER
----- Message from Rosendo Boyer MD sent at 3/18/2025  4:13 PM CDT -----  Regarding: RE: Scheduling Request  Contact: [phone]  Keeley could you refer him to [facility] for help with managing his von Willebrand's disease and recommendations for anesthesia in regards to possible DDAVP for his colonoscopy referral placed to [facility] can you schedule him a telemedicine video visit with me 1 week after that thank you  ----- Message -----  From: Jojo Munoz MA  Sent: 3/17/2025   2:32 PM CDT  To: Rosendo Boyer MD  Subject: FW: Scheduling Request                           Spoke with patient.  His last colonoscopy was 5 years ago, 3/4/2020.  Your recommendation was repeat in 5 years.  Will need an order.   Thank You!   Keeley  ----- Message -----  From: Muna Roth  Sent: 3/17/2025   1:26 PM CDT  To: [facility]  Subject: Scheduling Request                               Scheduling Request   Appt Type:  Ep  Date/Time Preference: Next available Caller Name: pt  Contact Preference:  [phone]: would like to schedule colonoscopy, he states he received a message that its been 3 years. Please call to advise thank you

## 2025-03-20 NOTE — PROGRESS NOTES
INTERNAL MEDICINE PROGRESS NOTE    CHIEF COMPLAINT     Chief Complaint   Patient presents with    Fatigue       HPI     Bri Santiago is a 64 y.o. male who presents for an Follow-up visit today.    Patient complains of feeling tired, fatigued, congestion that began two days ago. Patient states that he has been taking pseudofed but endorsed no symptom relief. He states that feels like he has a lump in his throat with discomfort with swallowing. Patient states that his blood pressure is usually 117/80, but higher with back pain (140's/90's).  He denies fever and SOB.     Past Medical History:  Past Medical History:   Diagnosis Date    Acute pancreatitis     Anal fissure     Anemia     Anticoagulant long-term use     Arthritis     Asthma in remission     Back pain     BPH (benign prostatic hypertrophy)     Cancer 2000    prostate- treated at [facility] with chemo- in remission since 2000    Chronic maxillary sinusitis     Clotting disorder     Diastolic dysfunction with chronic heart failure 12/03/2018    Family history of colon cancer     Family history of early CAD     GERD (gastroesophageal reflux disease)     Helicobacter pylori (H. pylori) infection     Chronic    History of chronic pancreatitis     HTN (hypertension)     Lumbago 11/12/2012    Obesity     ABDON (obstructive sleep apnea)     Spinal stenosis of lumbar region     Von Willebrand disease     VWD (acquired von Willebrand's disease)        Home Medications:  Prior to Admission medications    Medication Sig Start Date End Date Taking? Authorizing Provider   albuterol (PROVENTIL/VENTOLIN HFA) 90 mcg/actuation inhaler INHALE 2 PUFFS INTO THE LUNGS EVERY 6 HOURS AS NEEDED FOR WHEEZING OR SHORTNESS OF BREATH 3/9/20  Yes Geeta Hall MD   albuterol (PROVENTIL/VENTOLIN HFA) 90 mcg/actuation inhaler Inhale 1-2 puffs into the lungs every 6 (six) hours as needed for Wheezing. Rescue 12/28/24 12/28/25 Yes Nato Styles PA-C   amLODIPine (NORVASC) 5 MG tablet Take 1  tablet (5 mg total) by mouth once daily. 8/21/24 8/21/25 Yes Cate Galeas MD   atorvastatin (LIPITOR) 80 MG tablet Take 1 tablet (80 mg total) by mouth every evening. 5/1/23  Yes Damian Liu MD   azelastine (ASTELIN) 137 mcg (0.1 %) nasal spray Two sprays in each nostril, sniff until absorbed, then follow with 1 spray of fluticasone.  Use both sprays twice daily. 2/11/21  Yes LAURENT Swanson MD   budesonide-formoterol 160-4.5 mcg (SYMBICORT) 160-4.5 mcg/actuation HFAA INHALE 2 PUFFS INTO THE LUNGS EVERY 12 HOURS 3/26/20  Yes Geeta Hall MD   calcium citrate-vitamin D3 315-200 mg (CITRACAL+D) 315-200 mg-unit per tablet Take 1 tablet by mouth nightly.    Yes Provider, Historical   clopidogreL (PLAVIX) 75 mg tablet Take 1 tablet (75 mg total) by mouth once daily. 5/3/24  Yes Winnie Jacob PA-C   cyanocobalamin, vitamin B-12, 50 mcg tablet Take 50 mcg by mouth nightly.    Yes Provider, Historical   docusate sodium (COLACE) 100 MG capsule Take 1 tablet by mouth Twice daily. 1 Capsule Oral Twice a day .  Take with pain medicine   Yes Provider, Historical   doxazosin (CARDURA) 1 MG tablet TAKE 1 TABLET BY MOUTH EVERY EVENING 5/31/23  Yes Cate Galeas MD   ezetimibe (ZETIA) 10 mg tablet Take 1 tablet (10 mg total) by mouth once daily. 8/5/24 8/5/25 Yes Renata Kyle MD   fluticasone propionate (FLONASE) 50 mcg/actuation nasal spray One spray in each nostril twice daily after 1st using azelastine nasal spray 6/15/21  Yes LAURENT Swanson MD   furosemide (LASIX) 20 MG tablet Take 1 tablet by mouth once daily 7/15/24  Yes Winnie Jacob PA-C   ipratropium (ATROVENT) 42 mcg (0.06 %) nasal spray 1-2 sprays in each nostril before eating and at bedtime as needed 6/15/21  Yes LAURENT Swanson MD   oxyCODONE (ROXICODONE) 5 MG immediate release tablet Take 1 tablet (5 mg total) by mouth every 4 (four) hours as needed for Pain. 10/25/24  Yes Lars Bond MD   RABEprazole (ACIPHEX) 20 mg tablet  "Take 1 tablet (20 mg total) by mouth every 12 (twelve) hours. 11/1/24 11/1/25 Yes Rosendo Boyer MD   tadalafiL (CIALIS) 20 MG Tab Take 1 tablet (20 mg total) by mouth as needed. Take every 36 hours as needed for ED. 7/23/24 7/23/25 Yes Tyler Reid Jr., MD   testosterone (ANDRODERM) 2 mg/24 hour PT24 APPLY 1 PATCH TOPICALLY TO THE SKIN EVERY DAY 1/5/22  Yes Chris Min MD   zolpidem (AMBIEN) 10 mg Tab TAKE 1 TABLET BY MOUTH EVERY DAY AT BEDTIME 6/23/17  Yes Darvin Henry MD   tamsulosin (FLOMAX) 0.4 mg Cap Take 1 capsule (0.4 mg total) by mouth once daily. 4/4/21 4/4/21  Darnell Lee MD       Review of Systems:  Review of Systems   Constitutional:  Positive for fatigue. Negative for chills and fever.   HENT:  Positive for congestion. Negative for drooling.    Respiratory:  Negative for cough and shortness of breath.    Cardiovascular:  Negative for chest pain.   Musculoskeletal:  Negative for myalgias.   Neurological:  Negative for headaches.   Psychiatric/Behavioral:  Negative for agitation and behavioral problems.        Health Maintainence:   Immunizations:  Health Maintenance         Date Due Completion Date    Shingles Vaccine (1 of 2) Never done ---    RSV Vaccine (Age 60+ and Pregnant patients) (1 - Risk 60-74 years 1-dose series) Never done ---    COVID-19 Vaccine (5 - 2024-25 season) 09/01/2024 12/8/2022    Colorectal Cancer Screening 03/04/2025 3/4/2020    Aspirin/Antiplatelet Therapy 02/25/2026 2/25/2025    Hemoglobin A1c (Diabetic Prevention Screening) 07/16/2027 7/16/2024    TETANUS VACCINE 10/16/2027 10/16/2017    Lipid Panel 10/05/2029 10/5/2024             PHYSICAL EXAM     BP (!) 144/86   Pulse 65   Ht 5' 10" (1.778 m)   Wt 108.7 kg (239 lb 10.2 oz)   SpO2 97%   BMI 34.38 kg/m²     Physical Exam  Vitals and nursing note reviewed.   Constitutional:       General: He is not in acute distress.     Appearance: Normal appearance. He is not ill-appearing or toxic-appearing. "   HENT:      Head: Atraumatic.      Right Ear: Tympanic membrane, ear canal and external ear normal. There is no impacted cerumen.      Left Ear: Tympanic membrane, ear canal and external ear normal. There is no impacted cerumen.      Mouth/Throat:      Pharynx: Oropharynx is clear. No oropharyngeal exudate or posterior oropharyngeal erythema.   Eyes:      Extraocular Movements: Extraocular movements intact.      Conjunctiva/sclera: Conjunctivae normal.      Pupils: Pupils are equal, round, and reactive to light.   Cardiovascular:      Rate and Rhythm: Normal rate and regular rhythm.      Heart sounds: No murmur heard.     No friction rub. No gallop.   Pulmonary:      Effort: Pulmonary effort is normal. No respiratory distress.      Breath sounds: No stridor. Wheezing present. No rales.   Abdominal:      General: Abdomen is flat. Bowel sounds are normal. There is no distension.      Tenderness: There is no abdominal tenderness. There is no guarding.   Musculoskeletal:         General: Normal range of motion.   Skin:     General: Skin is warm and dry.      Capillary Refill: Capillary refill takes less than 2 seconds.   Neurological:      General: No focal deficit present.      Mental Status: He is alert and oriented to person, place, and time. Mental status is at baseline.   Psychiatric:         Mood and Affect: Mood normal.         Behavior: Behavior normal.         Thought Content: Thought content normal.         LABS     Lab Results   Component Value Date    HGBA1C 4.4 07/16/2024     CMP  Sodium   Date Value Ref Range Status   12/28/2024 138 136 - 145 mmol/L Final     Potassium   Date Value Ref Range Status   12/28/2024 3.8 3.5 - 5.1 mmol/L Final     Chloride   Date Value Ref Range Status   12/28/2024 102 95 - 110 mmol/L Final     CO2   Date Value Ref Range Status   12/28/2024 25 23 - 29 mmol/L Final     Glucose   Date Value Ref Range Status   12/28/2024 96 70 - 110 mg/dL Final     BUN   Date Value Ref Range  Status   12/28/2024 10 8 - 23 mg/dL Final     Creatinine   Date Value Ref Range Status   02/25/2025 0.9 0.5 - 1.4 mg/dL Final     Calcium   Date Value Ref Range Status   12/28/2024 8.6 (L) 8.7 - 10.5 mg/dL Final     Total Protein   Date Value Ref Range Status   12/28/2024 7.5 6.0 - 8.4 g/dL Final     Albumin   Date Value Ref Range Status   12/28/2024 3.8 3.5 - 5.2 g/dL Final   10/05/2024 4.1 3.6 - 5.1 g/dL Final     Comment:     Test Performed at:  [facility] [address]  [id number]     VIPUL Arzola MD, PhD, YOVANI       Total Bilirubin   Date Value Ref Range Status   12/28/2024 1.1 (H) 0.1 - 1.0 mg/dL Final     Comment:     For infants and newborns, interpretation of results should be based  on gestational age, weight and in agreement with clinical  observations.    Premature Infant recommended reference ranges:  Up to 24 hours.............<8.0 mg/dL  Up to 48 hours............<12.0 mg/dL  3-5 days..................<15.0 mg/dL  6-29 days.................<15.0 mg/dL       Alkaline Phosphatase   Date Value Ref Range Status   12/28/2024 60 40 - 150 U/L Final     AST   Date Value Ref Range Status   12/28/2024 22 10 - 40 U/L Final     ALT   Date Value Ref Range Status   12/28/2024 24 10 - 44 U/L Final     Anion Gap   Date Value Ref Range Status   12/28/2024 11 8 - 16 mmol/L Final     eGFR if    Date Value Ref Range Status   07/19/2022 >60.0 >60 mL/min/1.73 m^2 Final     eGFR if non    Date Value Ref Range Status   07/19/2022 >60.0 >60 mL/min/1.73 m^2 Final     Comment:     Calculation used to obtain the estimated glomerular filtration  rate (eGFR) is the CKD-EPI equation.        Lab Results   Component Value Date    WBC 8.60 02/28/2025    HGB 13.7 (L) 02/28/2025    HCT 43.2 02/28/2025    MCV 97 02/28/2025     02/28/2025     Lab Results   Component Value Date    CHOL 182 10/05/2024    CHOL 189 07/16/2024    CHOL 125 01/23/2023      Lab Results   Component Value Date    HDL 36 (L) 10/05/2024    HDL 44 07/16/2024    HDL 32 (L) 01/23/2023     Lab Results   Component Value Date    LDLCALC 122.2 10/05/2024    LDLCALC 121.0 07/16/2024    LDLCALC 80.4 01/23/2023     Lab Results   Component Value Date    TRIG 119 10/05/2024    TRIG 120 07/16/2024    TRIG 63 01/23/2023     Lab Results   Component Value Date    CHOLHDL 19.8 (L) 10/05/2024    CHOLHDL 23.3 07/16/2024    CHOLHDL 25.6 01/23/2023     Lab Results   Component Value Date    TSH 1.007 07/16/2024    [id number] 6.3 06/15/2010       ASSESSMENT/PLAN     Bri Santiago is a 64 y.o. male     Globus sensation   Not able to visualize any abn on exam  Has left sided lymphadenopathy   Serious fatigue - updated labs are good  Has new CPAP from 2023 - uses nightly   Practices good sleep hygiene  Will get COVID and FLU tests in office   Start azelastine and zyrtec   RTC when acute URI symptoms are resolved - will reassess fatigue at that time.       Diagnoses and all orders for this visit:    Viral URI with cough  -     POCT COVID-19 Rapid Screening  -     POCT Influenza A/B Molecular    Hypogonadism male  -     Ambulatory referral/consult to Endocrinology; Future   -due for follow-up    ABDON (obstructive sleep apnea)   -use CPAP nightly     Poor sleep   -continue good sleep hygiene    Fatigue, unspecified type  -     Ambulatory referral/consult to Sleep Disorders; Future    Other orders  -     azelastine (ASTELIN) 137 mcg (0.1 %) nasal spray; 1 spray (137 mcg total) by Nasal route 2 (two) times daily.  -     cetirizine (ZYRTEC) 10 MG tablet; Take 1 tablet (10 mg total) by mouth once daily.  -     benzonatate (TESSALON) 100 MG capsule; Take 1 capsule (100 mg total) by mouth 3 (three) times daily as needed for Cough.       Patient was counseled on when and how to seek emergent care.       Winnie Jacob PA-C   Department of Internal Medicine - [facility]   11:40 AM

## 2025-03-24 ENCOUNTER — E-VISIT (OUTPATIENT)
Dept: INTERNAL MEDICINE | Facility: CLINIC | Age: 65
End: 2025-03-24
Payer: MEDICARE

## 2025-03-24 DIAGNOSIS — J01.90 ACUTE SINUSITIS, RECURRENCE NOT SPECIFIED, UNSPECIFIED LOCATION: Primary | ICD-10-CM

## 2025-03-24 RX ORDER — PROMETHAZINE HYDROCHLORIDE AND DEXTROMETHORPHAN HYDROBROMIDE 6.25; 15 MG/5ML; MG/5ML
5 SYRUP ORAL NIGHTLY PRN
Qty: 118 ML | Refills: 0 | Status: SHIPPED | OUTPATIENT
Start: 2025-03-24 | End: 2025-04-03

## 2025-03-24 RX ORDER — DOXYCYCLINE 100 MG/1
100 CAPSULE ORAL 2 TIMES DAILY
Qty: 14 CAPSULE | Refills: 0 | Status: SHIPPED | OUTPATIENT
Start: 2025-03-24 | End: 2025-03-31

## 2025-03-25 NOTE — PROGRESS NOTES
Patient ID: Bri Santiago is a 64 y.o. male.    Chief Complaint: General Illness (Entered automatically based on patient selection in [a]list games.)    The patient initiated a request through [a]list games on 3/24/2025 for evaluation and management with a chief complaint of General Illness (Entered automatically based on patient selection in [a]list games.)     I evaluated the questionnaire submission on 3/24/2025      [facility] Evisit [facility]-Cough And Cold    3/24/2025  9:25 AM CDT - Filed by Patient   What do you need help with? Cough, Cold, Sore Throat   Do you agree to participate in an E-Visit? Yes   If you have any of the following symptoms, go to your local emergency room or call 911: I acknowledge   What is the main issue you would like addressed today? Can you please give me something else for cough because those pills that you gave me doesnt work at all. And its the same pills that you gave me last time that didnt work. And my cough has gotten so much worse.   Do you think you might have COVID-19 or the Flu? No   What symptoms do you currently have?  Cough;  Fatigue;  Headache   Describe your cough: Contains mucus   Describe your mucus: Green;  Yellow;  Thick;  Foul smelling   Have you ever smoked? Never smoked   Do you have a fever? No   When did your skin concern begin? 3/17/2025   In the last two weeks, have you been in close contact with someone who has COVID-19, the Flu, or strep throat? No   What have you tried to help your symptoms? Cold medication;  Cough syrup;  Drinking more fluids;  Hot shower;  Rest and sleep;  Vitamin C   On a scale of 1-10, where 10 is the worst you can imagine, how severe are your symptoms? (range: 1 - 10) 7   How have your symptoms changed since they first started? No change   Do you have transportation to an [facility] location to get tested for COVID-19? Yes   Provide any additional information you feel is important. Nothing is working for this cough and its starting to hurt when I  cough. Between the cough and the headache I dont know what is worse.   Please attach any relevant images or files    Are you able to take your vital signs? No         Encounter Diagnosis   Name Primary?    Acute sinusitis, recurrence not specified, unspecified location Yes        No orders of the defined types were placed in this encounter.     Medications Ordered This Encounter   Medications    doxycycline (MONODOX) 100 MG capsule     Sig: Take 1 capsule (100 mg total) by mouth 2 (two) times daily. for 7 days     Dispense:  14 capsule     Refill:  0    promethazine-dextromethorphan (PROMETHAZINE-DM) 6.25-15 mg/5 mL Syrp     Sig: Take 5 mLs by mouth nightly as needed (cough).     Dispense:  118 mL     Refill:  0        No follow-ups on file.    Bri was seen today for general illness.    Diagnoses and all orders for this visit:    Acute sinusitis, recurrence not specified, unspecified location  -     doxycycline (MONODOX) 100 MG capsule; Take 1 capsule (100 mg total) by mouth 2 (two) times daily. for 7 days  -     promethazine-dextromethorphan (PROMETHAZINE-DM) 6.25-15 mg/5 mL Syrp; Take 5 mLs by mouth nightly as needed (cough).        E-Visit Time Trackin mins

## 2025-03-27 ENCOUNTER — TELEPHONE (OUTPATIENT)
Dept: INTERNAL MEDICINE | Facility: CLINIC | Age: 65
End: 2025-03-27
Payer: MEDICARE

## 2025-03-27 ENCOUNTER — OFFICE VISIT (OUTPATIENT)
Dept: UROLOGY | Facility: CLINIC | Age: 65
End: 2025-03-27
Payer: MEDICARE

## 2025-03-27 ENCOUNTER — OFFICE VISIT (OUTPATIENT)
Dept: CARDIOLOGY | Facility: CLINIC | Age: 65
End: 2025-03-27
Payer: MEDICARE

## 2025-03-27 VITALS
DIASTOLIC BLOOD PRESSURE: 97 MMHG | WEIGHT: 239 LBS | BODY MASS INDEX: 34.29 KG/M2 | HEART RATE: 89 BPM | SYSTOLIC BLOOD PRESSURE: 152 MMHG

## 2025-03-27 VITALS
HEART RATE: 74 BPM | OXYGEN SATURATION: 96 % | DIASTOLIC BLOOD PRESSURE: 98 MMHG | HEIGHT: 70 IN | SYSTOLIC BLOOD PRESSURE: 163 MMHG | BODY MASS INDEX: 34.3 KG/M2 | WEIGHT: 239.63 LBS

## 2025-03-27 DIAGNOSIS — I10 PRIMARY HYPERTENSION: Chronic | ICD-10-CM

## 2025-03-27 DIAGNOSIS — E78.49 OTHER HYPERLIPIDEMIA: ICD-10-CM

## 2025-03-27 DIAGNOSIS — I25.10 CORONARY ARTERY DISEASE, UNSPECIFIED VESSEL OR LESION TYPE, UNSPECIFIED WHETHER ANGINA PRESENT, UNSPECIFIED WHETHER NATIVE OR TRANSPLANTED HEART: ICD-10-CM

## 2025-03-27 DIAGNOSIS — J40 BRONCHITIS: ICD-10-CM

## 2025-03-27 DIAGNOSIS — I10 PRIMARY HYPERTENSION: Primary | ICD-10-CM

## 2025-03-27 DIAGNOSIS — N40.1 BENIGN LOCALIZED PROSTATIC HYPERPLASIA WITH LOWER URINARY TRACT SYMPTOMS (LUTS): ICD-10-CM

## 2025-03-27 DIAGNOSIS — N52.01 ERECTILE DYSFUNCTION DUE TO ARTERIAL INSUFFICIENCY: Primary | ICD-10-CM

## 2025-03-27 DIAGNOSIS — N40.1 BPH WITH URINARY OBSTRUCTION: ICD-10-CM

## 2025-03-27 DIAGNOSIS — R00.2 PALPITATIONS: ICD-10-CM

## 2025-03-27 DIAGNOSIS — E78.2 MIXED HYPERLIPIDEMIA: ICD-10-CM

## 2025-03-27 DIAGNOSIS — N13.8 BPH WITH URINARY OBSTRUCTION: ICD-10-CM

## 2025-03-27 PROBLEM — Z91.89 AT RISK FOR CARDIOVASCULAR EVENT: Status: RESOLVED | Noted: 2018-12-03 | Resolved: 2025-03-27

## 2025-03-27 PROBLEM — R35.1 NOCTURIA: Status: RESOLVED | Noted: 2019-06-14 | Resolved: 2025-03-27

## 2025-03-27 PROBLEM — N41.1 CHRONIC PROSTATITIS: Status: RESOLVED | Noted: 2022-06-02 | Resolved: 2025-03-27

## 2025-03-27 PROBLEM — M25.562 ACUTE PAIN OF LEFT KNEE: Status: RESOLVED | Noted: 2023-11-09 | Resolved: 2025-03-27

## 2025-03-27 PROBLEM — K92.1 HEMATOCHEZIA: Status: RESOLVED | Noted: 2017-06-19 | Resolved: 2025-03-27

## 2025-03-27 PROBLEM — T88.4XXA HARD TO INTUBATE: Status: RESOLVED | Noted: 2024-10-25 | Resolved: 2025-03-27

## 2025-03-27 PROBLEM — R30.0 DYSURIA: Status: RESOLVED | Noted: 2022-08-12 | Resolved: 2025-03-27

## 2025-03-27 PROBLEM — R29.898 ARM WEAKNESS: Status: RESOLVED | Noted: 2023-08-15 | Resolved: 2025-03-27

## 2025-03-27 LAB
OHS QRS DURATION: 90 MS
OHS QTC CALCULATION: 400 MS

## 2025-03-27 PROCEDURE — 1160F RVW MEDS BY RX/DR IN RCRD: CPT | Mod: CPTII,S$GLB,, | Performed by: UROLOGY

## 2025-03-27 PROCEDURE — 99213 OFFICE O/P EST LOW 20 MIN: CPT | Mod: S$GLB,,, | Performed by: UROLOGY

## 2025-03-27 PROCEDURE — 3077F SYST BP >= 140 MM HG: CPT | Mod: CPTII,S$GLB,, | Performed by: UROLOGY

## 2025-03-27 PROCEDURE — 1159F MED LIST DOCD IN RCRD: CPT | Mod: CPTII,S$GLB,, | Performed by: UROLOGY

## 2025-03-27 PROCEDURE — 93000 ELECTROCARDIOGRAM COMPLETE: CPT | Mod: S$GLB,,, | Performed by: INTERNAL MEDICINE

## 2025-03-27 PROCEDURE — 99999 PR PBB SHADOW E&M-EST. PATIENT-LVL IV: CPT | Mod: PBBFAC,GC,, | Performed by: STUDENT IN AN ORGANIZED HEALTH CARE EDUCATION/TRAINING PROGRAM

## 2025-03-27 PROCEDURE — 1159F MED LIST DOCD IN RCRD: CPT | Mod: CPTII,GC,S$GLB, | Performed by: STUDENT IN AN ORGANIZED HEALTH CARE EDUCATION/TRAINING PROGRAM

## 2025-03-27 PROCEDURE — 3077F SYST BP >= 140 MM HG: CPT | Mod: CPTII,GC,S$GLB, | Performed by: STUDENT IN AN ORGANIZED HEALTH CARE EDUCATION/TRAINING PROGRAM

## 2025-03-27 PROCEDURE — 3008F BODY MASS INDEX DOCD: CPT | Mod: CPTII,GC,S$GLB, | Performed by: STUDENT IN AN ORGANIZED HEALTH CARE EDUCATION/TRAINING PROGRAM

## 2025-03-27 PROCEDURE — 3080F DIAST BP >= 90 MM HG: CPT | Mod: CPTII,S$GLB,, | Performed by: UROLOGY

## 2025-03-27 PROCEDURE — 3008F BODY MASS INDEX DOCD: CPT | Mod: CPTII,S$GLB,, | Performed by: UROLOGY

## 2025-03-27 PROCEDURE — 3080F DIAST BP >= 90 MM HG: CPT | Mod: CPTII,GC,S$GLB, | Performed by: STUDENT IN AN ORGANIZED HEALTH CARE EDUCATION/TRAINING PROGRAM

## 2025-03-27 PROCEDURE — 99999 PR PBB SHADOW E&M-EST. PATIENT-LVL V: CPT | Mod: PBBFAC,,, | Performed by: UROLOGY

## 2025-03-27 PROCEDURE — 99214 OFFICE O/P EST MOD 30 MIN: CPT | Mod: 25,GC,S$GLB, | Performed by: STUDENT IN AN ORGANIZED HEALTH CARE EDUCATION/TRAINING PROGRAM

## 2025-03-27 RX ORDER — HYDROCHLOROTHIAZIDE 25 MG/1
25 TABLET ORAL DAILY
Qty: 30 TABLET | Refills: 11 | Status: SHIPPED | OUTPATIENT
Start: 2025-03-27 | End: 2026-03-27

## 2025-03-27 RX ORDER — AMLODIPINE BESYLATE 10 MG/1
10 TABLET ORAL DAILY
Qty: 90 TABLET | Refills: 3 | Status: SHIPPED | OUTPATIENT
Start: 2025-03-27 | End: 2026-03-27

## 2025-03-27 NOTE — PROGRESS NOTES
CHIEF COMPLAINT:    Mr. Santiago is a 64 y.o. male presenting with ED.    PRESENTING ILLNESS:    Bri Santiago is a 64 y.o. male with a questionable history of prostate cancer diagnosed and treated with XRT ~ 2004 at [facility].  However, this has never been confirmed as he has refused to get records from [facility].  PSA's have been stable.    He c/o severe ED. He has tried and failed Viagra and Cialis. Has tried ICI in the past but was unable to tolerate due to injection pain.   He does not want additional therapy.    Has LUTS. Nocturia 4-5x. +weak stream, +straining. Not on any prostate medications. Overall satisfied with urination.     REVIEW OF SYSTEMS:    Bri Santiago denies headache, blurred vision, fever, nausea, vomiting, chills, abdominal pain, chest pain, sore throat, bleeding per rectum, cough, SOB, recent loss of consciousness, recent mental status changes, seizures, dizziness, or upper or lower extremity weakness.    KAREN  1. 1  2. 1  3. 1  4. 1  5. 1     PATIENT HISTORY:    Past Medical History:   Diagnosis Date    Acute pancreatitis     Anal fissure     Anemia     Anticoagulant long-term use     Arthritis     Asthma in remission     Back pain     BPH (benign prostatic hypertrophy)     Cancer 2000    prostate- treated at [facility] with chemo- in remission since 2000    Chronic maxillary sinusitis     Clotting disorder     Diastolic dysfunction with chronic heart failure 12/03/2018    Family history of colon cancer     Family history of early CAD     GERD (gastroesophageal reflux disease)     Hard to intubate 10/25/2024    Helicobacter pylori (H. pylori) infection     Chronic    History of chronic pancreatitis     HTN (hypertension)     Lumbago 11/12/2012    Obesity     ABDON (obstructive sleep apnea)     Spinal stenosis of lumbar region     Von Willebrand disease     VWD (acquired von Willebrand's disease)        Past Surgical History:   Procedure Laterality Date    anal fissure repair      x2     ARTHROSCOPIC CHONDROPLASTY OF KNEE JOINT Left 11/08/2023    Procedure: ARTHROSCOPY, KNEE, WITH CHONDROPLASTY;  Surgeon: Karthik Tanner MD;  Location: [facility] OR;  Service: Orthopedics;  Laterality: Left;    BACK SURGERY  2012    BALLOON SINUPLASTY OF PARANASAL SINUS Bilateral 10/07/2019    Procedure: SINUPLASTY, USING BALLOON;  Surgeon: LAURENT Swanson MD;  Location: [facility] OR;  Service: ENT;  Laterality: Bilateral;    CARPAL TUNNEL RELEASE  2003    left hand    CATHETERIZATION OF BOTH LEFT AND RIGHT HEART N/A 02/14/2019    Procedure: CATHETERIZATION, HEART, BOTH LEFT AND RIGHT;  Surgeon: Kyle Magana MD;  Location: [facility] CATH LAB;  Service: Cardiology;  Laterality: N/A;    CERVICAL DISCECTOMY  2003    COLONOSCOPY N/A 10/07/2015    Procedure: COLONOSCOPY;  Surgeon: Rosendo Boyer MD;  Location: [facility] (4TH FLR);  Service: Endoscopy;  Laterality: N/A;  PM Prep    COLONOSCOPY N/A 06/19/2017    Procedure: COLONOSCOPY;  Surgeon: Rosendo Boyer MD;  Location: [facility] (4TH FLR);  Service: Endoscopy;  Laterality: N/A;  constipation prep (no DM no CHF)       hx of vonWillebrand's disease-will need infusion prior    COLONOSCOPY N/A 03/04/2020    Procedure: COLONOSCOPY;  Surgeon: Rosendo Boyer MD;  Location: [facility] (4TH FLR);  Service: Endoscopy;  Laterality: N/A;  Please order CBC, ferritin, Iron TIBC day of case per Dr. Boyer  labwork at 7:10am and patient will check in right after.  per Dr. Tyler patient can hold Plavix 5 days prior see note 1/7/20  DDAVP prior to procedure needed see note 1/16/20 for oncall med by Dr. Tyler -     CYSTOSCOPY  08/12/2022    Procedure: CYSTOSCOPY;  Surgeon: Tyler Reid Jr., MD;  Location: [facility] 1ST FLR;  Service: Urology;;    ESOPHAGOGASTRODUODENOSCOPY N/A 03/04/2020    Procedure: EGD (ESOPHAGOGASTRODUODENOSCOPY);  Surgeon: Rosendo Boyer MD;  Location: [facility] (4TH FLR);  Service: Endoscopy;  Laterality: N/A;  EGD and Colonoscopy orders merged  together  labwork at 7:10am and patient will check in right after.  DDAVP prior to procedure needed see note 1/16/20 for oncall med  per Dr. Tyler patient can hold Plavix 5 days prior see note 1/7/20    ESOPHAGOGASTRODUODENOSCOPY N/A 11/03/2020    Procedure: EGD (ESOPHAGOGASTRODUODENOSCOPY);  Surgeon: Rosendo Boyer MD;  Location: [facility] (2ND FLR);  Service: Endoscopy;  Laterality: N/A;  Please recall pt has von Willebrands and will require DDVAP infusion prior to procedure as previously done.    see telephone encounter on 10/1/20 regarding DDAVP   ok to hold Plavix 5 days prior per Dr.Blessey BUCKNER screening on 10/31/20 -rb    ESOPHAGOGASTRODUODENOSCOPY N/A 05/31/2023    Procedure: EGD (ESOPHAGOGASTRODUODENOSCOPY);  Surgeon: Rosendo Boyer MD;  Location: [facility] (4TH FLR);  Service: Endoscopy;  Laterality: N/A;  cardiac clearnce-office note 5/1, ok to hold plavix-tele encounter 5/2-LW  5/11/23 r/s'MD noelle booked out. instr portal -ml  5/25 - precall attempted. no answer. MML    EXAMINATION UNDER ANESTHESIA N/A 03/15/2021    Procedure: EXAM UNDER ANESTHESIA;  Surgeon: Silvia Cazares MD;  Location: [facility] OR 2ND FLR;  Service: Colon and Rectal;  Laterality: N/A;    EXAMINATION UNDER ANESTHESIA N/A 02/25/2022    Procedure: EXAM UNDER ANESTHESIA, EXCISION ANAL PAPILLA;  Surgeon: Nadeem Grady MD;  Location: [facility] 2ND FLR;  Service: Colon and Rectal;  Laterality: N/A;    FUNCTIONAL ENDOSCOPIC SINUS SURGERY (FESS) USING COMPUTER-ASSISTED NAVIGATION Bilateral 10/07/2019    Procedure: FESS, USING COMPUTER-ASSISTED NAVIGATION;  Surgeon: LAURENT Swanson MD;  Location: [facility] OR;  Service: ENT;  Laterality: Bilateral;    INJECTION OF ANESTHETIC AGENT AROUND NERVE Bilateral 10/13/2020    Procedure: BLOCK, NERVE BILATERAL C4,C5,C6 Plavix clearance requested;  Surgeon: Fredy Magana MD;  Location: [facility] PAIN MGT;  Service: Pain Management;  Laterality: Bilateral;  BLOCK, NERVE BILATERAL C4,C5,C6  Plavix  clearance ok, will require DDVAP infusion    KNEE ARTHROSCOPY W/ MENISCECTOMY Left 11/08/2023    Procedure: ARTHROSCOPY, KNEE, WITH PARTIAL LATERAL MENISCECTOMY;  Surgeon: Karthik Tanner MD;  Location: [facility];  Service: Orthopedics;  Laterality: Left;  regional w/o catheter (adductor)    LATERAL INTERNAL ANAL SPHINCTEROTOMY N/A 12/10/2021    Procedure: SPHINCTEROTOMY-LATERAL INTERNAL ANAL;  Surgeon: Nadeem Grady MD;  Location: [facility] 2ND FLR;  Service: Colon and Rectal;  Laterality: N/A;    LEFT HEART CATHETERIZATION Left 02/14/2019    Procedure: Left heart cath;  Surgeon: Kyle Magana MD;  Location: [facility] CATH LAB;  Service: Cardiology;  Laterality: Left;    LUMBAR FUSION  2012    PH MONITORING, ESOPHAGUS, WIRELESS, (ON REFLUX MEDS) N/A 05/31/2023    Procedure: PH MONITORING, ESOPHAGUS, WIRELESS, (ON REFLUX MEDS);  Surgeon: Rosendo Boyer MD;  Location: [facility] ENDO (4TH FLR);  Service: Endoscopy;  Laterality: N/A;  96hr, on his AcipHex 20 mg every 12 hours, instructions portal-LW    RADIOFREQUENCY ABLATION Right 05/09/2019    Procedure: RADIOFREQUENCY ABLATION, RIGHT L3,L4,L5;  Surgeon: Fredy Magana MD;  Location: [facility] PAIN MGT;  Service: Pain Management;  Laterality: Right;  1 of 2  RT RFA L3,4,5  PLAVIX clearance received, pt needs DDAVP    RADIOFREQUENCY ABLATION Left 05/23/2019    Procedure: RADIOFREQUENCY ABLATION, LEFT L3,L4,L5;  Surgeon: Fredy Magana MD;  Location: [facility] PAIN MGT;  Service: Pain Management;  Laterality: Left;  2 of 2  LT RFA L3,4,5  PLAVIX clearance received, pt needs DDAVP    RADIOFREQUENCY ABLATION Left 01/16/2020    Procedure: RADIOFREQUENCY ABLATION LEFT L3, L4, L5 MEDIAL BRANCH 1 OF 2 **PATIENT ARRIVING AT 8 AM**;  Surgeon: Fredy Magana MD;  Location: [facility] PAIN MGT;  Service: Pain Management;  Laterality: Left;  NEEDS CONSENT, PLAVIX CLEARANCE IN CHART    RADIOFREQUENCY ABLATION Right 01/28/2020    Procedure: RADIOFREQUENCY ABLATION, RIGHT L3-L4-L5  MEDIAL BRANCH 2 OF 2 (Left done on 1/16/20);  Surgeon: Fredy Magana MD;  Location: [facility] PAIN MGT;  Service: Pain Management;  Laterality: Right;    RADIOFREQUENCY ABLATION Left 08/18/2020    Procedure: RADIOFREQUENCY ABLATION, L3-L4-L5 MEDIAL BRANCH 1 OF 2 clear to hold plavix 7 days;  Surgeon: Fredy Magana MD;  Location: [facility] PAIN MGT;  Service: Pain Management;  Laterality: Left;    RADIOFREQUENCY ABLATION Right 09/01/2020    Procedure: RADIOFREQUENCY ABLATION, L3-L4-L5 MEDIAL BR ANCH 2 OF 2 clear to hol,d Plavix 7 days;  Surgeon: Fredy Magana MD;  Location: [facility] PAIN MGT;  Service: Pain Management;  Laterality: Right;    RADIOFREQUENCY ABLATION Bilateral 12/21/2021    Procedure: RADIOFREQUENCY ABLATION B/L L3,4,5 RFA (give dDAVP prior) NEEDS CONSENT plavix ok x7;  Surgeon: Fredy Magana MD;  Location: [facility] PAIN MGT;  Service: Pain Management;  Laterality: Bilateral;    RADIOFREQUENCY ABLATION Left 11/21/2022    Procedure: RADIOFREQUENCY ABLATION LEFT L3,L4,L5 MUST HAVE dDVAP PRIOR ONE OF TWO;  Surgeon: Milo Winston MD;  Location: [facility] PAIN MGT;  Service: Pain Management;  Laterality: Left;    RADIOFREQUENCY ABLATION Right 12/05/2022    Procedure: RADIOFREQUENCY ABLATION RIGHT L3,L4,L5  PRE-MEDICATE WITH dDVAP TWO OF TWO;  Surgeon: Milo Winston MD;  Location: [facility] PAIN MGT;  Service: Pain Management;  Laterality: Right;    RADIOFREQUENCY ABLATION Bilateral 04/29/2024    Procedure: RADIOFREQUENCY ABLATION BILATERAL L3, 4, 5 *DDAVP BEFORE*;  Surgeon: Milo Winston MD;  Location: [facility] PAIN MGT;  Service: Pain Management;  Laterality: Bilateral;  [phone]  4 WK F/U CHANDRA    RADIOFREQUENCY ABLATION Bilateral 12/23/2024    Procedure: RADIOFREQUENCY ABLATION BILATERAL L3, 4, 5;  Surgeon: Milo Winston MD;  Location: [facility] PAIN MGT;  Service: Pain Management;  Laterality: Bilateral;  4 WK F/U JERI    RADIOFREQUENCY ABLATION OF LUMBAR MEDIAL BRANCH NERVE AT SINGLE LEVEL Left 05/24/2018     Procedure: RADIOFREQUENCY THERMOCOAGULATION (RFTC)-NERVE-MEDIAN BRANCH-LUMBAR;  Surgeon: Fredy Magana MD;  [facility];  Service: Pain Management;  Laterality: Left;  Left RFA @ L3,4,5  [id number]  with IV Sedation    2 of 2    RETROGRADE PYELOGRAPHY Bilateral 08/12/2022    Procedure: PYELOGRAM, RETROGRADE;  Surgeon: Tyler Reid Jr., MD;  Location: [facility] OR [address] [place] [address];  Service: Urology;  Laterality: Bilateral;    ROBOT-ASSISTED LAPAROSCOPIC REPAIR OF INGUINAL HERNIA USING DA ASHTYN XI Right 10/25/2024    Procedure: XI ROBOTIC REPAIR, HERNIA, RIGHT, INGUINAL, WITH MESH;  Surgeon: Jose Hilliard MD;  Location: [facility] OR [address] [place] [address];  Service: General;  Laterality: Right;    SPINE SURGERY      TONSILLECTOMY      at age 24    TRANSFORAMINAL EPIDURAL INJECTION OF STEROID Bilateral 06/19/2023    TRANSFORAMINAL EPIDURAL INJECTION OF STEROID N/A 02/17/2025    URETEROSCOPY Bilateral 08/12/2022    Procedure: URETEROSCOPY;  Surgeon: Tyler Reid Jr., MD;  Location: [facility] OR [address] [place] [address];  Service: Urology;  Laterality: Bilateral;    VASECTOMY  1996       Family History   Problem Relation Name Age of Onset    Colon cancer Father Pato Santiago 67        colon cancer    Hypertension Father Pato Santiago     Glaucoma Father Pato Jack     Cancer Father Pato Santiago     Colon cancer Paternal Grandfather  65             Coronary artery disease Mother Eula Jack 45    Hypertension Mother Eula Jack     Heart disease Mother Eula Jack     Colon cancer Mother Eula Jakc     Diabetes Mother Eula Jack     No Known Problems Brother Humble     No Known Problems Sister Pat     No Known Problems Daughter Keyoka     No Known Problems Son Cosme     Coronary artery disease Brother Pepin 51    No Known Problems Daughter Shakierria     No Known Problems Daughter Rosa     No Known Problems Son Beto     No Known Problems Son Taoist     Colon cancer Paternal Uncle  65    Diabetes Mellitus Paternal  Grandmother      Esophageal cancer Neg Hx         Social History     Socioeconomic History    Marital status:    Occupational History    Occupation: disabled due to back injury   Tobacco Use    Smoking status: Never    Smokeless tobacco: Never   Substance and Sexual Activity    Alcohol use: Yes     Alcohol/week: 1.0 standard drink of alcohol     Types: 1 Glasses of wine per week     Comment: social    Drug use: No    Sexual activity: Yes     Partners: Female   Social History Narrative    wlking for exercised       Allergies:  Penicillins, Ace inhibitors, Aspirin, Codeine, Dilaudid [hydromorphone], and Gabapentin    Medications:    Current Outpatient Medications:     albuterol (PROVENTIL/VENTOLIN HFA) 90 mcg/actuation inhaler, INHALE 2 PUFFS INTO THE LUNGS EVERY 6 HOURS AS NEEDED FOR WHEEZING OR SHORTNESS OF BREATH, Disp: 18 g, Rfl: 4    albuterol (PROVENTIL/VENTOLIN HFA) 90 mcg/actuation inhaler, Inhale 1-2 puffs into the lungs every 6 (six) hours as needed for Wheezing. Rescue, Disp: 18 g, Rfl: 3    amLODIPine (NORVASC) 5 MG tablet, Take 1 tablet (5 mg total) by mouth once daily., Disp: 90 tablet, Rfl: 3    atorvastatin (LIPITOR) 80 MG tablet, Take 1 tablet (80 mg total) by mouth every evening., Disp: 90 tablet, Rfl: 3    azelastine (ASTELIN) 137 mcg (0.1 %) nasal spray, 1 spray (137 mcg total) by Nasal route 2 (two) times daily., Disp: 30 mL, Rfl: 0    benzonatate (TESSALON) 100 MG capsule, Take 1 capsule (100 mg total) by mouth 3 (three) times daily as needed for Cough., Disp: 30 capsule, Rfl: 0    budesonide-formoterol 160-4.5 mcg (SYMBICORT) 160-4.5 mcg/actuation HFAA, INHALE 2 PUFFS INTO THE LUNGS EVERY 12 HOURS, Disp: 10.2 g, Rfl: 12    calcium citrate-vitamin D3 315-200 mg (CITRACAL+D) 315-200 mg-unit per tablet, Take 1 tablet by mouth nightly. , Disp: , Rfl:     cetirizine (ZYRTEC) 10 MG tablet, Take 1 tablet (10 mg total) by mouth once daily., Disp: 30 tablet, Rfl: 0    clopidogreL (PLAVIX) 75 mg  tablet, Take 1 tablet (75 mg total) by mouth once daily., Disp: 90 tablet, Rfl: 3    cyanocobalamin, vitamin B-12, 50 mcg tablet, Take 50 mcg by mouth nightly. , Disp: , Rfl:     docusate sodium (COLACE) 100 MG capsule, Take 1 tablet by mouth Twice daily. 1 Capsule Oral Twice a day .  Take with pain medicine, Disp: , Rfl:     doxazosin (CARDURA) 1 MG tablet, TAKE 1 TABLET BY MOUTH EVERY EVENING, Disp: 90 tablet, Rfl: 3    doxycycline (MONODOX) 100 MG capsule, Take 1 capsule (100 mg total) by mouth 2 (two) times daily. for 7 days, Disp: 14 capsule, Rfl: 0    ezetimibe (ZETIA) 10 mg tablet, Take 1 tablet (10 mg total) by mouth once daily., Disp: 90 tablet, Rfl: 3    fluticasone propionate (FLONASE) 50 mcg/actuation nasal spray, One spray in each nostril twice daily after 1st using azelastine nasal spray, Disp: 18.2 mL, Rfl: 11    furosemide (LASIX) 20 MG tablet, Take 1 tablet by mouth once daily, Disp: 90 tablet, Rfl: 0    ipratropium (ATROVENT) 42 mcg (0.06 %) nasal spray, 1-2 sprays in each nostril before eating and at bedtime as needed, Disp: 30 mL, Rfl: 11    oxyCODONE (ROXICODONE) 5 MG immediate release tablet, Take 1 tablet (5 mg total) by mouth every 4 (four) hours as needed for Pain., Disp: 10 tablet, Rfl: 0    promethazine-dextromethorphan (PROMETHAZINE-DM) 6.25-15 mg/5 mL Syrp, Take 5 mLs by mouth nightly as needed (cough)., Disp: 118 mL, Rfl: 0    RABEprazole (ACIPHEX) 20 mg tablet, Take 1 tablet (20 mg total) by mouth every 12 (twelve) hours., Disp: 180 tablet, Rfl: 3    tadalafiL (CIALIS) 20 MG Tab, Take 1 tablet (20 mg total) by mouth as needed. Take every 36 hours as needed for ED., Disp: 30 tablet, Rfl: 9    testosterone (ANDRODERM) 2 mg/24 hour PT24, APPLY 1 PATCH TOPICALLY TO THE SKIN EVERY DAY, Disp: 60 patch, Rfl: 3    zolpidem (AMBIEN) 10 mg Tab, TAKE 1 TABLET BY MOUTH EVERY DAY AT BEDTIME, Disp: 20 tablet, Rfl: 0  No current facility-administered medications for this  visit.    Facility-Administered Medications Ordered in Other Visits:     0.9%  NaCl infusion, , Intravenous, Continuous, Milla Oliveira NP, Last Rate: 70 mL/hr at 03/15/21 1417, New Bag at 03/15/21 1417    0.9%  NaCl infusion, , Intravenous, Continuous, Milla Oliveira NP, Last Rate: 70 mL/hr at 12/10/21 0736, New Bag at 12/10/21 0736    0.9%  NaCl infusion, , Intravenous, Continuous, Jaqueline Langley NP    0.9%  NaCl infusion, , Intravenous, Continuous, Beto Whiteside MD, Last Rate: 50 mL/hr at 04/29/24 1359, 50 mL/hr at 04/29/24 1359    mupirocin 2 % ointment, , Nasal, On Call Procedure, Milla Oliveira NP, Given at 12/10/21 0729    mupirocin 2 % ointment, , Nasal, On Call Procedure, Jaqueline Langley NP, Given at 02/25/22 0623    PHYSICAL EXAMINATION:    The patient generally appears in good health, is appropriately interactive, and is in no apparent distress.     Eyes: anicteric sclerae, moist conjunctivae; no lid-lag; PERRLA     HENT: Atraumatic; oropharynx clear with moist mucous membranes and no mucosal ulcerations;normal hard and soft palate.  No evidence of lymphadenopathy.    Neck: Trachea midline.  No thyromegaly.    Skin: No lesions.    Mental: Cooperative with normal affect.  Is oriented to time, place, and person.    Neuro: Grossly intact.    Chest: Normal inspiratory effort.   No accessory muscles.  No audible wheezes.  Respirations symmetric on inspiration and expiration.    Heart: Regular rhythm.      Abdomen:  Soft, non-tender. No masses or organomegaly. Bladder is not palpable. No evidence of flank discomfort. No evidence of inguinal hernia.    ASHLEY: 40 g prostate that is smooth    Extremities: No clubbing, cyanosis, or edema      LABS:      Lab Results   Component Value Date    PSA 0.93 01/22/2021    PSA 0.96 02/06/2013    PSA 1.11 10/17/2012    PSADIAG 1.2 07/16/2024    PSADIAG 1.8 08/02/2022    PSADIAG 1.9 06/15/2022       IMPRESSION:    Encounter Diagnoses   Name  Primary?    Erectile dysfunction due to arterial insufficiency Yes    BPH with urinary obstruction     Primary hypertension     Other hyperlipidemia    HTN, stable  Hyperlipidemia, stable      PLAN:    1. Discussed treatment options for severe ED. At this time patient does not wish to pursue treatment.   2. Will continue to monitor LUTS. At this point in time does not wish to pursue treatment.   3. RTC prn.    Copy to:

## 2025-03-27 NOTE — TELEPHONE ENCOUNTER
Pt stating that cardiologist found something in lungs and requests an xray -- looked through the note from the cardiologist & urologist (saw this morning too) and no mention of lung issues:  Pulmonary: (cardiologist)     Effort: Pulmonary effort is normal. No respiratory distress.      Breath sounds: Normal breath sounds.   Chest: (urologist)  Normal inspiratory effort. No accessory muscles. No audible wheezes. Respirations symmetric on inspiration and expiration.     Sent patient message through portal explaining appropriate process for ordering an xray along with an e-visit if he wants medication to help with cold symptoms

## 2025-03-27 NOTE — TELEPHONE ENCOUNTER
----- Message from Summer sent at 3/27/2025 12:16 PM CDT -----  Regarding: order for xray  Type:  Patient Returning Call  Name of who is calling:ptWhat is request in detail:pt is requesting a call back in regards to sinus/cold, pt states that he just seen his cardiologist and saw a problem in his lungs and wants you all to put an order in for an xray please assist.    Can clinic reply by [facility]:no  What number to call back if not in [facility]: [phone]

## 2025-03-27 NOTE — PROGRESS NOTES
PCP - Cate Galeas MD    Subjective:   Patient ID:  Bri Santiago is a 64 y.o. male who presents for  follow-up for Hypertension  Last seen in clinic 8/5/2024      [facility]  Nonobstructive CAD  [facility] 2019:ost 1st diag 40% stenosed, pLCx 40% stenosed, R. Posterior Atrioventricular Artery 50%  Hypertension  Hyperlipidemia  ABDON   HFpEF hx with elevated filling pressures at time of cath in '19   Testosterone deficiency     HPI  Mr. Santiago was last seen in clinic on 8/5/2024.  He underwent a stress echo for left sided atypical chest pain which was negative for ischemia. We also added ezetimibe for elevated LDL levels. Mr. Santiago has been dealing with a possible viral infection +/- pneumonia for more than a week ago. He was seen by his PCP and tested negative for both the flu and covid. He continues to have nasal congestion and productive cough. Has some left sided stabbing/fluttering chest pain that worsens with laying on his left side. He has been experiencing as well palpitations mostly during the night for the past  1-2 months. Denies any fever, chills,GI symptoms, orthopnea, PND, claudication, syncope or any other complaints.     Past Medical History:   Diagnosis Date    Acute pancreatitis     Anal fissure     Anemia     Anticoagulant long-term use     Arthritis     Asthma in remission     Back pain     BPH (benign prostatic hypertrophy)     Cancer 2000    prostate- treated at [facility] with chemo- in remission since 2000    Chronic maxillary sinusitis     Clotting disorder     Diastolic dysfunction with chronic heart failure 12/03/2018    Family history of colon cancer     Family history of early CAD     GERD (gastroesophageal reflux disease)     Hard to intubate 10/25/2024    Helicobacter pylori (H. pylori) infection     Chronic    History of chronic pancreatitis     HTN (hypertension)     Lumbago 11/12/2012    Obesity     ABDON (obstructive sleep apnea)     Spinal stenosis of lumbar region     Von Willebrand  disease     VWD (acquired von Willebrand's disease)        Past Surgical History:   Procedure Laterality Date    anal fissure repair      x2    ARTHROSCOPIC CHONDROPLASTY OF KNEE JOINT Left 11/8/2023    Procedure: ARTHROSCOPY, KNEE, WITH CHONDROPLASTY;  Surgeon: Karthik Tanner MD;  Location: [facility];  Service: Orthopedics;  Laterality: Left;    BACK SURGERY  2012    BALLOON SINUPLASTY OF PARANASAL SINUS Bilateral 10/7/2019    Procedure: SINUPLASTY, USING BALLOON;  Surgeon: LAURENT Swanson MD;  Location: [facility] OR;  Service: ENT;  Laterality: Bilateral;    CARPAL TUNNEL RELEASE  2003    left hand    CATHETERIZATION OF BOTH LEFT AND RIGHT HEART N/A 2/14/2019    Procedure: CATHETERIZATION, HEART, BOTH LEFT AND RIGHT;  Surgeon: Kyle Magana MD;  Location: [facility] CATH LAB;  Service: Cardiology;  Laterality: N/A;    CERVICAL DISCECTOMY  2003    COLONOSCOPY N/A 10/7/2015    Procedure: COLONOSCOPY;  Surgeon: Rosendo Boyer MD;  Location: [facility] ([address] [place] [address]);  Service: Endoscopy;  Laterality: N/A;  PM Prep    COLONOSCOPY N/A 6/19/2017    Procedure: COLONOSCOPY;  Surgeon: Rosendo Boyer MD;  Location: [facility] (4TH FLR);  Service: Endoscopy;  Laterality: N/A;  constipation prep (no DM no CHF)       hx of vonWillebrand's disease-will need infusion prior    COLONOSCOPY N/A 3/4/2020    Procedure: COLONOSCOPY;  Surgeon: Rosendo Boyer MD;  [facility]  Service: Endoscopy;  Laterality: N/A;  Please order CBC, ferritin, Iron TIBC day of case per Dr. Boyer  labwork at 7:10am and patient will check in right after.  per Dr. Tyler patient can hold Plavix 5 days prior see note 1/7/20  DDAVP prior to procedure needed see note 1/16/20 for oncall med by Dr. Tyler -     CYSTOSCOPY  8/12/2022    Procedure: CYSTOSCOPY;  Surgeon: Tyler Reid Jr., MD;  [address];  Service: Urology;;    ESOPHAGOGASTRODUODENOSCOPY N/A 3/4/2020    Procedure: EGD (ESOPHAGOGASTRODUODENOSCOPY);  Surgeon:  Rosendo Boyer MD;  Location: [facility] (4TH FLR);  Service: Endoscopy;  Laterality: N/A;  EGD and Colonoscopy orders merged together  labwork at 7:10am and patient will check in right after.  DDAVP prior to procedure needed see note 1/16/20 for oncall med  per Dr. Tyler patient can hold Plavix 5 days prior see note 1/7/20    ESOPHAGOGASTRODUODENOSCOPY N/A 11/3/2020    Procedure: EGD (ESOPHAGOGASTRODUODENOSCOPY);  Surgeon: Rosendo Boyer MD;  Location: [facility] (2ND FLR);  Service: Endoscopy;  Laterality: N/A;  Please recall pt has von Willebrands and will require DDVAP infusion prior to procedure as previously done.    see telephone encounter on 10/1/20 regarding DDAVP   ok to hold Plavix 5 days prior per Dr.Blessey BUCKNER screening on 10/31/20 -rb    ESOPHAGOGASTRODUODENOSCOPY N/A 5/31/2023    Procedure: EGD (ESOPHAGOGASTRODUODENOSCOPY);  Surgeon: Rosendo Boyer MD;  Location: [facility] (4TH FLR);  Service: Endoscopy;  Laterality: N/A;  cardiac clearnce-office note 5/1, ok to hold plavix-tele encounter 5/2-LW  5/11/23 r/s'MD noelle booked out. instr portal -ml  5/25 - precall attempted. no answer. MML    EXAMINATION UNDER ANESTHESIA N/A 3/15/2021    Procedure: EXAM UNDER ANESTHESIA;  Surgeon: Silvia Cazares MD;  [address];  Service: Colon and Rectal;  Laterality: N/A;    EXAMINATION UNDER ANESTHESIA N/A 2/25/2022    Procedure: EXAM UNDER ANESTHESIA, EXCISION ANAL PAPILLA;  Surgeon: Nadeem Grady MD;  [address];  Service: Colon and Rectal;  Laterality: N/A;    FUNCTIONAL ENDOSCOPIC SINUS SURGERY (FESS) USING COMPUTER-ASSISTED NAVIGATION Bilateral 10/7/2019    Procedure: FESS, USING COMPUTER-ASSISTED NAVIGATION;  Surgeon: LAURENT Swanson MD;  Location: [facility];  Service: ENT;  Laterality: Bilateral;    INJECTION OF ANESTHETIC AGENT AROUND NERVE Bilateral 10/13/2020    Procedure: BLOCK, NERVE BILATERAL C4,C5,C6 Plavix clearance requested;  Surgeon: Fredy Magana MD;   Location: [facility] PAIN MGT;  Service: Pain Management;  Laterality: Bilateral;  BLOCK, NERVE BILATERAL C4,C5,C6  Plavix clearance ok, will require DDVAP infusion    KNEE ARTHROSCOPY W/ MENISCECTOMY Left 11/8/2023    Procedure: ARTHROSCOPY, KNEE, WITH PARTIAL LATERAL MENISCECTOMY;  Surgeon: Karthik Tanner MD;  Location: [facility];  Service: Orthopedics;  Laterality: Left;  regional w/o catheter (adductor)    LATERAL INTERNAL ANAL SPHINCTEROTOMY N/A 12/10/2021    Procedure: SPHINCTEROTOMY-LATERAL INTERNAL ANAL;  Surgeon: Nadeem Grady MD;  Location: [facility] 2ND FLR;  Service: Colon and Rectal;  Laterality: N/A;    LEFT HEART CATHETERIZATION Left 2/14/2019    Procedure: Left heart cath;  Surgeon: Kyle Magana MD;  Location: [facility] CATH LAB;  Service: Cardiology;  Laterality: Left;    LUMBAR FUSION  2012    PH MONITORING, ESOPHAGUS, WIRELESS, (ON REFLUX MEDS) N/A 5/31/2023    Procedure: PH MONITORING, ESOPHAGUS, WIRELESS, (ON REFLUX MEDS);  Surgeon: Rosendo Boyer MD;  Location: [facility] ENDO (4TH FLR);  Service: Endoscopy;  Laterality: N/A;  96hr, on his AcipHex 20 mg every 12 hours, instructions portal-LW    RADIOFREQUENCY ABLATION Right 5/9/2019    Procedure: RADIOFREQUENCY ABLATION, RIGHT L3,L4,L5;  Surgeon: Fredy Magana MD;  Location: [facility] PAIN MGT;  Service: Pain Management;  Laterality: Right;  1 of 2  RT RFA L3,4,5  PLAVIX clearance received, pt needs DDAVP    RADIOFREQUENCY ABLATION Left 5/23/2019    Procedure: RADIOFREQUENCY ABLATION, LEFT L3,L4,L5;  Surgeon: Fredy Magana MD;  Location: [facility] PAIN MGT;  Service: Pain Management;  Laterality: Left;  2 of 2  LT RFA L3,4,5  PLAVIX clearance received, pt needs DDAVP    RADIOFREQUENCY ABLATION Left 1/16/2020    Procedure: RADIOFREQUENCY ABLATION LEFT L3, L4, L5 MEDIAL BRANCH 1 OF 2 **PATIENT ARRIVING AT 8 AM**;  Surgeon: Fredy Magana MD;  Location: [facility] PAIN MGT;  Service: Pain Management;  Laterality: Left;  NEEDS CONSENT, PLAVIX CLEARANCE  IN CHART    RADIOFREQUENCY ABLATION Right 1/28/2020    Procedure: RADIOFREQUENCY ABLATION, RIGHT L3-L4-L5 MEDIAL BRANCH 2 OF 2 (Left done on 1/16/20);  Surgeon: Fredy Magana MD;  Location: [facility] PAIN MGT;  Service: Pain Management;  Laterality: Right;    RADIOFREQUENCY ABLATION Left 8/18/2020    Procedure: RADIOFREQUENCY ABLATION, L3-L4-L5 MEDIAL BRANCH 1 OF 2 clear to hold plavix 7 days;  Surgeon: Fredy Magana MD;  Location: [facility] PAIN MGT;  Service: Pain Management;  Laterality: Left;    RADIOFREQUENCY ABLATION Right 9/1/2020    Procedure: RADIOFREQUENCY ABLATION, L3-L4-L5 MEDIAL BR ANCH 2 OF 2 clear to hol,d Plavix 7 days;  Surgeon: Fredy Magana MD;  Location: [facility] PAIN MGT;  Service: Pain Management;  Laterality: Right;    RADIOFREQUENCY ABLATION Bilateral 12/21/2021    Procedure: RADIOFREQUENCY ABLATION B/L L3,4,5 RFA (give dDAVP prior) NEEDS CONSENT plavix ok x7;  Surgeon: Fredy Magana MD;  Location: [facility] PAIN MGT;  Service: Pain Management;  Laterality: Bilateral;    RADIOFREQUENCY ABLATION Left 11/21/2022    Procedure: RADIOFREQUENCY ABLATION LEFT L3,L4,L5 MUST HAVE dDVAP PRIOR ONE OF TWO;  Surgeon: Milo Winston MD;  Location: [facility] PAIN MGT;  Service: Pain Management;  Laterality: Left;    RADIOFREQUENCY ABLATION Right 12/5/2022    Procedure: RADIOFREQUENCY ABLATION RIGHT L3,L4,L5  PRE-MEDICATE WITH dDVAP TWO OF TWO;  Surgeon: Milo Winston MD;  Location: [facility] PAIN MGT;  Service: Pain Management;  Laterality: Right;    RADIOFREQUENCY ABLATION Bilateral 4/29/2024    Procedure: RADIOFREQUENCY ABLATION BILATERAL L3, 4, 5 *DDAVP BEFORE*;  Surgeon: Milo Winston MD;  Location: [facility] PAIN MGT;  Service: Pain Management;  Laterality: Bilateral;  [phone]  4 WK F/U CHANDRA    RADIOFREQUENCY ABLATION OF LUMBAR MEDIAL BRANCH NERVE AT SINGLE LEVEL Left 5/24/2018    Procedure: RADIOFREQUENCY THERMOCOAGULATION (RFTC)-NERVE-MEDIAN BRANCH-LUMBAR;  Surgeon: Fredy Magana MD;  Location: [facility]  PAIN MGT;  Service: Pain Management;  Laterality: Left;  Left RFA @ L3,4,5  [id number]  with IV Sedation    2 of 2    RETROGRADE PYELOGRAPHY Bilateral 8/12/2022    Procedure: PYELOGRAM, RETROGRADE;  Surgeon: Tyler Reid Jr., MD;  Location: [facility] OR [address] [place] [address];  Service: Urology;  Laterality: Bilateral;    SPINE SURGERY      TONSILLECTOMY      at age 24    TRANSFORAMINAL EPIDURAL INJECTION OF STEROID Bilateral 6/19/2023        URETEROSCOPY Bilateral 8/12/2022    Procedure: URETEROSCOPY;  Surgeon: Tyler Reid Jr., MD;  Location: [facility] OR [address] [place] [address];  Service: Urology;  Laterality: Bilateral;    VASECTOMY  1996       Current Outpatient Medications on File Prior to Visit   Medication Sig Dispense Refill    albuterol (PROVENTIL/VENTOLIN HFA) 90 mcg/actuation inhaler INHALE 2 PUFFS INTO THE LUNGS EVERY 6 HOURS AS NEEDED FOR WHEEZING OR SHORTNESS OF BREATH 18 g 4    albuterol (PROVENTIL/VENTOLIN HFA) 90 mcg/actuation inhaler Inhale 1-2 puffs into the lungs every 6 (six) hours as needed for Wheezing. Rescue 18 g 3    atorvastatin (LIPITOR) 80 MG tablet Take 1 tablet (80 mg total) by mouth every evening. 90 tablet 3    azelastine (ASTELIN) 137 mcg (0.1 %) nasal spray 1 spray (137 mcg total) by Nasal route 2 (two) times daily. 30 mL 0    benzonatate (TESSALON) 100 MG capsule Take 1 capsule (100 mg total) by mouth 3 (three) times daily as needed for Cough. 30 capsule 0    budesonide-formoterol 160-4.5 mcg (SYMBICORT) 160-4.5 mcg/actuation HFAA INHALE 2 PUFFS INTO THE LUNGS EVERY 12 HOURS 10.2 g 12    calcium citrate-vitamin D3 315-200 mg (CITRACAL+D) 315-200 mg-unit per tablet Take 1 tablet by mouth nightly.       cetirizine (ZYRTEC) 10 MG tablet Take 1 tablet (10 mg total) by mouth once daily. 30 tablet 0    clopidogreL (PLAVIX) 75 mg tablet Take 1 tablet (75 mg total) by mouth once daily. 90 tablet 3    cyanocobalamin, vitamin B-12, 50 mcg tablet Take 50 mcg by mouth nightly.       docusate sodium (COLACE) 100 MG capsule  Take 1 tablet by mouth Twice daily. 1 Capsule Oral Twice a day .  Take with pain medicine      doxycycline (MONODOX) 100 MG capsule Take 1 capsule (100 mg total) by mouth 2 (two) times daily. for 7 days 14 capsule 0    ezetimibe (ZETIA) 10 mg tablet Take 1 tablet (10 mg total) by mouth once daily. 90 tablet 3    fluticasone propionate (FLONASE) 50 mcg/actuation nasal spray One spray in each nostril twice daily after 1st using azelastine nasal spray 18.2 mL 11    ipratropium (ATROVENT) 42 mcg (0.06 %) nasal spray 1-2 sprays in each nostril before eating and at bedtime as needed 30 mL 11    oxyCODONE (ROXICODONE) 5 MG immediate release tablet Take 1 tablet (5 mg total) by mouth every 4 (four) hours as needed for Pain. 10 tablet 0    promethazine-dextromethorphan (PROMETHAZINE-DM) 6.25-15 mg/5 mL Syrp Take 5 mLs by mouth nightly as needed (cough). 118 mL 0    RABEprazole (ACIPHEX) 20 mg tablet Take 1 tablet (20 mg total) by mouth every 12 (twelve) hours. 180 tablet 3    tadalafiL (CIALIS) 20 MG Tab Take 1 tablet (20 mg total) by mouth as needed. Take every 36 hours as needed for ED. 30 tablet 9    testosterone (ANDRODERM) 2 mg/24 hour PT24 APPLY 1 PATCH TOPICALLY TO THE SKIN EVERY DAY 60 patch 3    zolpidem (AMBIEN) 10 mg Tab TAKE 1 TABLET BY MOUTH EVERY DAY AT BEDTIME 20 tablet 0    [DISCONTINUED] tamsulosin (FLOMAX) 0.4 mg Cap Take 1 capsule (0.4 mg total) by mouth once daily. 30 capsule 0     Current Facility-Administered Medications on File Prior to Visit   Medication Dose Route Frequency Provider Last Rate Last Admin    0.9%  NaCl infusion   Intravenous Continuous Milla Oliveira NP 70 mL/hr at 03/15/21 1417 New Bag at 03/15/21 1417    0.9%  NaCl infusion   Intravenous Continuous Milla Oliveira NP 70 mL/hr at 12/10/21 0736 New Bag at 12/10/21 0736    0.9%  NaCl infusion   Intravenous Continuous Jaqueline Langley NP        0.9%  NaCl infusion   Intravenous Continuous Beto Whiteside MD 50 mL/hr at  "04/29/24 1359 50 mL/hr at 04/29/24 1359    mupirocin 2 % ointment   Nasal On Call Procedure Milla Oliveira NP   Given at 12/10/21 0729    mupirocin 2 % ointment   Nasal On Call Procedure Jaqueline Langley NP   Given at 02/25/22 0623       Review of patient's allergies indicates:   Allergen Reactions    Penicillins Hives and Swelling     Has had allergy testing and can prob tolerate penicillin    Ace inhibitors Other (See Comments)     "Caused a respiratory infection"    Aspirin Hives           Codeine Itching     Ok to take percocet    Dilaudid [hydromorphone] Itching    Gabapentin Hallucinations       Social History     Tobacco Use    Smoking status: Never    Smokeless tobacco: Never   Substance Use Topics    Alcohol use: Yes     Alcohol/week: 1.0 standard drink of alcohol     Types: 1 Glasses of wine per week     Comment: social    Drug use: No       Family History   Problem Relation Name Age of Onset    Colon cancer Father Pato Santiago 67        colon cancer    Hypertension Father Pato Santiago     Glaucoma Father Pato Santiago     Cancer Father Pato Santiago     Colon cancer Paternal Grandfather  65             Coronary artery disease Mother Eula Santiago 45    Hypertension Mother Eula Santiago     Heart disease Mother Eula Sanitago     Colon cancer Mother Eula Santiago     Diabetes Mother Eula Santiago     No Known Problems Brother Humble     No Known Problems Sister Pat     No Known Problems Daughter Sophie     No Known Problems Son Cosme     Coronary artery disease Brother Juju 51    No Known Problems Daughter Hamida     No Known Problems Daughter Rosa     No Known Problems Son Beto     No Known Problems Son Restoration     Colon cancer Paternal Uncle  65    Diabetes Mellitus Paternal Grandmother      Esophageal cancer Neg Hx           Review of Systems   Constitutional: Negative for chills, fever and night sweats.   HENT:  Positive for congestion. Negative for ear discharge, hoarse " "voice and sore throat.    Eyes:  Negative for redness.   Cardiovascular:  Positive for chest pain, irregular heartbeat, leg swelling and palpitations. Negative for claudication, dyspnea on exertion, near-syncope and orthopnea.   Respiratory:  Positive for cough and wheezing. Negative for shortness of breath and sleep disturbances due to breathing.    Gastrointestinal:  Negative for bloating, abdominal pain, nausea and vomiting.   Genitourinary:  Negative for urgency.   Neurological:  Negative for dizziness, headaches, light-headedness and weakness.   Psychiatric/Behavioral:  Negative for substance abuse. The patient is not nervous/anxious.    All other systems reviewed and are negative.       Objective:     Vitals:    03/27/25 1104   BP: (!) 163/98   Patient Position: Sitting   Pulse: 74   SpO2: 96%   Weight: 108.7 kg (239 lb 10.2 oz)   Height: 5' 10" (1.778 m)        Physical Exam  Vitals and nursing note reviewed.   Constitutional:       General: He is not in acute distress.     Appearance: Normal appearance. He is not ill-appearing.   HENT:      Head: Normocephalic and atraumatic.      Nose: Congestion present.      Mouth/Throat:      Mouth: Mucous membranes are moist.      Pharynx: Oropharynx is clear.   Eyes:      General: No scleral icterus.     Extraocular Movements: Extraocular movements intact.   Neck:      Vascular: No carotid bruit.   Cardiovascular:      Rate and Rhythm: Normal rate and regular rhythm.      Chest Wall: PMI is not displaced.      Pulses: Normal pulses.           Carotid pulses are 2+ on the right side and 2+ on the left side.       Radial pulses are 2+ on the right side and 2+ on the left side.        Dorsalis pedis pulses are 2+ on the right side and 2+ on the left side.        Posterior tibial pulses are 2+ on the right side and 2+ on the left side.      Heart sounds: No murmur heard.  Pulmonary:      Effort: Pulmonary effort is normal. No respiratory distress.      Breath sounds: " Wheezing and rhonchi present.      Comments: Mild wheezing on expiration on RLL. Decreased lung sounds M-lower lobes  Abdominal:      General: There is no distension.      Palpations: Abdomen is soft.      Tenderness: There is no abdominal tenderness.   Musculoskeletal:         General: No swelling or tenderness. Normal range of motion.      Cervical back: Normal range of motion.   Skin:     General: Skin is warm and dry.      Capillary Refill: Capillary refill takes less than 2 seconds.   Neurological:      General: No focal deficit present.      Mental Status: He is alert and oriented to person, place, and time.      Motor: No weakness.   Psychiatric:         Mood and Affect: Mood normal.         Thought Content: Thought content normal.           Labs:       Lab Results   Component Value Date     03/28/2025     12/28/2024    K 4.1 03/28/2025    K 3.8 12/28/2024     03/28/2025     12/28/2024    CO2 31 (H) 03/28/2025    CO2 25 12/28/2024    BUN 15 03/28/2025    CREATININE 0.8 03/28/2025    GLUCOSE 82 03/28/2025    ANIONGAP 10 03/28/2025     Lab Results   Component Value Date    HGBA1C 4.4 07/16/2024     Lab Results   Component Value Date    BNP <10 10/25/2023    BNP <10 03/16/2023    BNP <10 01/23/2023    Lab Results   Component Value Date    WBC 8.40 03/28/2025    HGB 13.9 (L) 03/28/2025    HGB 13.7 (L) 02/28/2025    HCT 43.8 03/28/2025    HCT 43.2 02/28/2025     03/28/2025     02/28/2025    GRAN 6.3 02/28/2025    GRAN 72.7 02/28/2025     Lab Results   Component Value Date    CHOL 135 03/28/2025    CHOL 182 10/05/2024    HDL 35 (L) 03/28/2025    LDLCALC 122.2 10/05/2024    TRIG 125 03/28/2025    TRIG 119 10/05/2024                Cardiovascular Imaging:     Echo:   EF   Date Value Ref Range Status   08/28/2024 60 % Final     Nuc Stress EF   Date Value Ref Range Status   12/24/2018 69 % Final     Nuc Rest EF   Date Value Ref Range Status   12/24/2018 69  Final     ECG March 2023  and Jan 2024: demonstrates sinus rhythm with no Q-waves or ST changes.    Holter 2020 sinus rhythm with average heart rate of 80 beats per minute.  No significant ectopy or arrhythmia.    TTE 2020 normal LV size and function with EF 60%.  CVP 8.  Cardiac catheterization 2019 demonstrates nonobstructive CAD.  LVEDP 23/pulmonary capillary wedge pressure 24/PA pressure 31/right atrial pressure 11.  Cardiac output 6.73.      Assessment:       1. Primary hypertension    2. Coronary artery disease, unspecified vessel or lesion type, unspecified whether angina present, unspecified whether native or transplanted heart    3. Mixed hyperlipidemia    4. Palpitations    5. Bronchitis           Plan:      Mr. Bri Santiago was seen today for hypertension, palpitations.    Diagnoses and all orders for this visit:    Primary hypertension  -     Uncontrolled, will increase amlodipine to 10mg qd and will switch lasix 20mg to HCTZ 25mg q daily. Repeating blood work and in 1 week a BMP.   -     recommend monitoring blood pressure at home at least twice a day.   -     amLODIPine (NORVASC) 10 MG tablet; Take 1 tablet (10 mg total) by mouth once daily.  -     CBC Auto Differential; Future  -     Comprehensive Metabolic Panel; Future    Coronary artery disease, unspecified vessel or lesion type, unspecified whether angina present, unspecified whether native or transplanted heart  - Nonobstructive, recent stress echo negative for ischemia. Complaints of atypical stabbing chest pain worst laying on his left side- non ischemic.   -     IN OFFICE EKG 12-LEAD (to [place]): reviewed no concerning ischemic changes  -     CBC Auto Differential; Future  -     Basic Metabolic Panel; Future    Mixed hyperlipidemia  - Repeating a lipid panel. Continue atorvastatin 80mg and ezetimibe 10mg daily.  -     Lipid Panel; Future  -     CRP, High Sensitivity; Future    Palpitations  - Mostly during the night. Will check a thyroid panel and a 48 hr holter  monitor. If symptoms continue and not during the 48 hour holter monitor then will consider an event monitor.   -     Holter monitor - 48 hour; Future  -     TSH; Future    Bronchitis  - Recommend following up with PCP. Adding labs. Recent flu and covid test negative.   -     Procalcitonin; Future  -     CRP, High Sensitivity; Future  -     Sedimentation rate; Future      Discussed with staff Dr. Zhu. Follow up in 3 months.    Labs reviewed.     Signed:  Renata Byrnes M.D.  Cardiovascular Fellow  [facility]

## 2025-03-28 ENCOUNTER — LAB VISIT (OUTPATIENT)
Dept: LAB | Facility: HOSPITAL | Age: 65
End: 2025-03-28
Payer: MEDICARE

## 2025-03-28 DIAGNOSIS — R00.2 PALPITATIONS: ICD-10-CM

## 2025-03-28 DIAGNOSIS — I10 PRIMARY HYPERTENSION: ICD-10-CM

## 2025-03-28 DIAGNOSIS — I25.10 CORONARY ARTERY DISEASE, UNSPECIFIED VESSEL OR LESION TYPE, UNSPECIFIED WHETHER ANGINA PRESENT, UNSPECIFIED WHETHER NATIVE OR TRANSPLANTED HEART: ICD-10-CM

## 2025-03-28 DIAGNOSIS — E78.2 MIXED HYPERLIPIDEMIA: ICD-10-CM

## 2025-03-28 DIAGNOSIS — J40 BRONCHITIS: ICD-10-CM

## 2025-03-28 LAB
ABSOLUTE EOSINOPHIL (OHS): 0.24 K/UL
ABSOLUTE MONOCYTE (OHS): 0.46 K/UL (ref 0.3–1)
ABSOLUTE NEUTROPHIL COUNT (OHS): 5.59 K/UL (ref 1.8–7.7)
ALBUMIN SERPL BCP-MCNC: 3.7 G/DL (ref 3.5–5.2)
ALP SERPL-CCNC: 68 UNIT/L (ref 40–150)
ALT SERPL W/O P-5'-P-CCNC: 19 UNIT/L (ref 10–44)
ANION GAP (OHS): 10 MMOL/L (ref 8–16)
AST SERPL-CCNC: 23 UNIT/L (ref 11–45)
BASOPHILS # BLD AUTO: 0.04 K/UL
BASOPHILS NFR BLD AUTO: 0.5 %
BILIRUB SERPL-MCNC: 1.1 MG/DL (ref 0.1–1)
BUN SERPL-MCNC: 15 MG/DL (ref 8–23)
CALCIUM SERPL-MCNC: 9.3 MG/DL (ref 8.7–10.5)
CHLORIDE SERPL-SCNC: 100 MMOL/L (ref 95–110)
CHOLEST SERPL-MCNC: 135 MG/DL (ref 120–199)
CHOLEST/HDLC SERPL: 3.9 {RATIO} (ref 2–5)
CO2 SERPL-SCNC: 31 MMOL/L (ref 23–29)
CREAT SERPL-MCNC: 0.8 MG/DL (ref 0.5–1.4)
CRP SERPL-MCNC: 13.22 MG/L
ERYTHROCYTE [DISTWIDTH] IN BLOOD BY AUTOMATED COUNT: 12 % (ref 11.5–14.5)
ERYTHROCYTE [SEDIMENTATION RATE] IN BLOOD BY PHOTOMETRIC METHOD: 34 MM/HR
GFR SERPLBLD CREATININE-BSD FMLA CKD-EPI: >60 ML/MIN/1.73/M2
GLUCOSE SERPL-MCNC: 82 MG/DL (ref 70–110)
HCT VFR BLD AUTO: 43.8 % (ref 40–54)
HDLC SERPL-MCNC: 35 MG/DL (ref 40–75)
HDLC SERPL: 25.9 % (ref 20–50)
HGB BLD-MCNC: 13.9 GM/DL (ref 14–18)
IMM GRANULOCYTES # BLD AUTO: 0.11 K/UL (ref 0–0.04)
IMM GRANULOCYTES NFR BLD AUTO: 1.3 % (ref 0–0.5)
LDLC SERPL CALC-MCNC: 75 MG/DL (ref 63–159)
LYMPHOCYTES # BLD AUTO: 1.96 K/UL (ref 1–4.8)
MCH RBC QN AUTO: 30.5 PG (ref 27–50)
MCHC RBC AUTO-ENTMCNC: 31.7 G/DL (ref 32–36)
MCV RBC AUTO: 96 FL (ref 82–98)
NONHDLC SERPL-MCNC: 100 MG/DL
NUCLEATED RBC (/100WBC) (OHS): 0 /100 WBC
PLATELET # BLD AUTO: 277 K/UL (ref 150–450)
PMV BLD AUTO: 9.2 FL (ref 9.2–12.9)
POTASSIUM SERPL-SCNC: 4.1 MMOL/L (ref 3.5–5.1)
PROCALCITONIN SERPL-MCNC: 0.02 NG/ML
PROT SERPL-MCNC: 8 GM/DL (ref 6–8.4)
RBC # BLD AUTO: 4.55 M/UL (ref 4.6–6.2)
RELATIVE EOSINOPHIL (OHS): 2.9 %
RELATIVE LYMPHOCYTE (OHS): 23.3 % (ref 18–48)
RELATIVE MONOCYTE (OHS): 5.5 % (ref 4–15)
RELATIVE NEUTROPHIL (OHS): 66.5 % (ref 38–73)
SODIUM SERPL-SCNC: 141 MMOL/L (ref 136–145)
TRIGL SERPL-MCNC: 125 MG/DL (ref 30–150)
TSH SERPL-ACNC: 1.08 UIU/ML (ref 0.4–4)
WBC # BLD AUTO: 8.4 K/UL (ref 3.9–12.7)

## 2025-03-28 PROCEDURE — 84155 ASSAY OF PROTEIN SERUM: CPT

## 2025-03-28 PROCEDURE — 82465 ASSAY BLD/SERUM CHOLESTEROL: CPT

## 2025-03-28 PROCEDURE — 84145 PROCALCITONIN (PCT): CPT

## 2025-03-28 PROCEDURE — 84443 ASSAY THYROID STIM HORMONE: CPT

## 2025-03-28 PROCEDURE — 85025 COMPLETE CBC W/AUTO DIFF WBC: CPT

## 2025-03-28 PROCEDURE — 85652 RBC SED RATE AUTOMATED: CPT

## 2025-03-28 PROCEDURE — 36415 COLL VENOUS BLD VENIPUNCTURE: CPT

## 2025-03-28 PROCEDURE — 86141 C-REACTIVE PROTEIN HS: CPT

## 2025-03-28 RX ORDER — DOXAZOSIN 1 MG/1
1 TABLET ORAL NIGHTLY
Qty: 90 TABLET | Refills: 3 | Status: SHIPPED | OUTPATIENT
Start: 2025-03-28

## 2025-03-28 NOTE — TELEPHONE ENCOUNTER
Refill Routing Note   Medication(s) are not appropriate for processing by [facility] for the following reason(s):        Due for refill >6 months ago    [facility] action(s):  Defer               Appointments  past 12m or future 3m with PCP    Date Provider   Last Visit   2/25/2025 Cate Galeas MD   Next Visit   Visit date not found Cate Galeas MD   ED visits in past 90 days: 1        Note composed:7:14 AM 03/28/2025

## 2025-03-28 NOTE — TELEPHONE ENCOUNTER
No care due was identified.  [facility] Embedded Care Due Messages. Reference number: [id number].   3/27/2025 8:58:13 PM CDT

## 2025-04-04 ENCOUNTER — TELEPHONE (OUTPATIENT)
Dept: CARDIOLOGY | Facility: CLINIC | Age: 65
End: 2025-04-04
Payer: MEDICARE

## 2025-04-04 ENCOUNTER — HOSPITAL ENCOUNTER (OUTPATIENT)
Dept: RADIOLOGY | Facility: HOSPITAL | Age: 65
Discharge: HOME | End: 2025-04-04
Payer: MEDICARE

## 2025-04-04 ENCOUNTER — OFFICE VISIT (OUTPATIENT)
Dept: INTERNAL MEDICINE | Facility: CLINIC | Age: 65
End: 2025-04-04
Payer: MEDICARE

## 2025-04-04 ENCOUNTER — RESULTS FOLLOW-UP (OUTPATIENT)
Dept: INTERNAL MEDICINE | Facility: CLINIC | Age: 65
End: 2025-04-04

## 2025-04-04 VITALS
DIASTOLIC BLOOD PRESSURE: 85 MMHG | OXYGEN SATURATION: 92 % | HEIGHT: 70 IN | BODY MASS INDEX: 34.72 KG/M2 | WEIGHT: 242.5 LBS | HEART RATE: 76 BPM | SYSTOLIC BLOOD PRESSURE: 133 MMHG

## 2025-04-04 DIAGNOSIS — J06.9 VIRAL URI WITH COUGH: ICD-10-CM

## 2025-04-04 DIAGNOSIS — E66.09 CLASS 1 OBESITY DUE TO EXCESS CALORIES WITH SERIOUS COMORBIDITY AND BODY MASS INDEX (BMI) OF 33.0 TO 33.9 IN ADULT: Chronic | ICD-10-CM

## 2025-04-04 DIAGNOSIS — I10 PRIMARY HYPERTENSION: Chronic | ICD-10-CM

## 2025-04-04 DIAGNOSIS — E66.811 CLASS 1 OBESITY DUE TO EXCESS CALORIES WITH SERIOUS COMORBIDITY AND BODY MASS INDEX (BMI) OF 33.0 TO 33.9 IN ADULT: Chronic | ICD-10-CM

## 2025-04-04 DIAGNOSIS — R05.9 COUGH, UNSPECIFIED TYPE: Primary | ICD-10-CM

## 2025-04-04 DIAGNOSIS — R05.9 COUGH, UNSPECIFIED TYPE: ICD-10-CM

## 2025-04-04 DIAGNOSIS — K21.9 GASTROESOPHAGEAL REFLUX DISEASE, UNSPECIFIED WHETHER ESOPHAGITIS PRESENT: ICD-10-CM

## 2025-04-04 PROCEDURE — 99999 PR PBB SHADOW E&M-EST. PATIENT-LVL III: CPT | Mod: PBBFAC,,,

## 2025-04-04 PROCEDURE — 71046 X-RAY EXAM CHEST 2 VIEWS: CPT | Mod: TC

## 2025-04-04 PROCEDURE — 71046 X-RAY EXAM CHEST 2 VIEWS: CPT | Mod: 26,,, | Performed by: RADIOLOGY

## 2025-04-04 PROCEDURE — 3008F BODY MASS INDEX DOCD: CPT | Mod: CPTII,S$GLB,,

## 2025-04-04 PROCEDURE — 3075F SYST BP GE 130 - 139MM HG: CPT | Mod: CPTII,S$GLB,,

## 2025-04-04 PROCEDURE — 99213 OFFICE O/P EST LOW 20 MIN: CPT | Mod: S$GLB,,,

## 2025-04-04 PROCEDURE — 3079F DIAST BP 80-89 MM HG: CPT | Mod: CPTII,S$GLB,,

## 2025-04-04 NOTE — TELEPHONE ENCOUNTER
----- Message from MARCELO Ferreira sent at 4/4/2025 10:39 AM CDT -----  Regarding: FW: feeling poorly  Dr Martha Byrnes was contacted per secure messaging and responded:Patient needs to see his PCP or go back to urgent care. I had told him on our office visit and I do not see any notes from themPt was updated on Dr Martha Byrnes's response. He stated that he contacted his PCP and some radiology test was supposed to be ordered but was not. I told him that I will contact his PCP's team and pt also said that he will give them a call.  ----- Message -----  From: Hanna Champion RN  Sent: 4/4/2025  10:16 AM CDT  To: Hanna Champion RN  Subject: FW: feeling poorly                               BP today 139/81, hr 89 , no other readings. Possibly not at rest  ----- Message -----  From: Hanna Champion RN  Sent: 4/4/2025  10:14 AM CDT  To: Hanna Champion RN; Renata Thomas Sa#  Subject: feeling poorly                                   Pt states that he still feels fatigue, tired which gets worse when he moves around. I told him that his lab came back wnl. I was able to move his Holter to 4/8 and he agreed to date/time of appointment(s).Please advise  ----- Message -----  From: Hanna Champion RN  Sent: 4/4/2025   9:15 AM CDT  To: Hanna Champion RN      ----- Message -----  From: Aileen Gallardo  Sent: 4/3/2025  11:30 AM CDT  To: Martha Yanez Staff    Patient received results, still feeling fatigue and asking what's next stop. Please call back @ [phone]. Thank you Aileen

## 2025-04-04 NOTE — PROGRESS NOTES
INTERNAL MEDICINE URGENT CARE NOTE    CHIEF COMPLAINT     Bri presents today for extreme fatigue and persistent symptoms following recent sinus infection    HPI     Bri Santiago is a 64 y.o. male who presents for an urgent care visit today.    HISTORY OF PRESENT ILLNESS:  He reports severe fatigue affecting daily activities, noting extreme exhaustion with minimal physical exertion such as walking short distances. He has attempted self-treatment with vitamins and orange juice without significant improvement. Ten days ago, he experienced a sinus infection with initial symptoms of green and brown mucus with improvement after doxycyline course. Currently, he continues to have a mild cough occurring about 10 times per day with difficulty expectorating mucus. He experiences burning sensation in his throat, sinus pressure with occasional headaches 1-3 times per week, and occasional left ear pain. He denies sore throat, teeth pain, fever, chills, or right ear pain.     MEDICATIONS:  He is currently taking Zyrtec and using Astelin nasal spray. He has completed a course of doxycycline for the sinus infection.    CARDIOVASCULAR:  He has known heart blockage and experiences palpitations, particularly when lying down. He is scheduled for heart monitor placement on Tuesday.    GERD:  He experiences gagging with coffee consumption and occasionally while eating in the setting of known GERD.    EXERCISE:  He walks for exercise both indoors and outdoors.    ROS:  General: -fever, -chills, +fatigue, -weight gain, -weight loss, +decreased energy levels, +loss of energy  Eyes: -vision changes, -redness, -discharge  ENT: +ear pain, +nasal congestion, +sore throat  Cardiovascular: -chest pain, +palpitations, -lower extremity edema  Respiratory: -cough, -shortness of breath, +productive cough, +shortness of breath lying down  Gastrointestinal: -abdominal pain, -nausea, -vomiting, -diarrhea, -constipation, -blood in stool,  +gagging  Genitourinary: -dysuria, -hematuria, -frequency  Musculoskeletal: -joint pain, -muscle pain  Skin: -rash, -lesion  Neurological: +headache, -dizziness, -numbness, -tingling  Psychiatric: -anxiety, -depression, -sleep difficulty  Head: +sinus pressure        Past Medical History:  Past Medical History:   Diagnosis Date    Acute pancreatitis     Anal fissure     Anemia     Anticoagulant long-term use     Arthritis     Asthma in remission     Back pain     BPH (benign prostatic hypertrophy)     Cancer 2000    prostate- treated at [facility] with chemo- in remission since 2000    Chronic maxillary sinusitis     Clotting disorder     Diastolic dysfunction with chronic heart failure 12/03/2018    Family history of colon cancer     Family history of early CAD     GERD (gastroesophageal reflux disease)     Hard to intubate 10/25/2024    Helicobacter pylori (H. pylori) infection     Chronic    History of chronic pancreatitis     HTN (hypertension)     Lumbago 11/12/2012    Obesity     ABDON (obstructive sleep apnea)     Spinal stenosis of lumbar region     Von Willebrand disease     VWD (acquired von Willebrand's disease)      Home Medications:  Prior to Admission medications    Medication Sig Start Date End Date Taking? Authorizing Provider   albuterol (PROVENTIL/VENTOLIN HFA) 90 mcg/actuation inhaler INHALE 2 PUFFS INTO THE LUNGS EVERY 6 HOURS AS NEEDED FOR WHEEZING OR SHORTNESS OF BREATH 3/9/20   Geeat Hall MD   albuterol (PROVENTIL/VENTOLIN HFA) 90 mcg/actuation inhaler Inhale 1-2 puffs into the lungs every 6 (six) hours as needed for Wheezing. Rescue 12/28/24 12/28/25  Nato Styles PA-C   amLODIPine (NORVASC) 10 MG tablet Take 1 tablet (10 mg total) by mouth once daily. 3/27/25 3/27/26  Renata Kyle MD   atorvastatin (LIPITOR) 80 MG tablet Take 1 tablet (80 mg total) by mouth every evening. 5/1/23   Damian Liu MD   azelastine (ASTELIN) 137 mcg (0.1 %) nasal spray 1 spray (137 mcg total) by Nasal  route 2 (two) times daily. 3/20/25 3/20/26  Winnie Jacob PA-C   budesonide-formoterol 160-4.5 mcg (SYMBICORT) 160-4.5 mcg/actuation HFAA INHALE 2 PUFFS INTO THE LUNGS EVERY 12 HOURS 3/26/20   Geeta Hall MD   calcium citrate-vitamin D3 315-200 mg (CITRACAL+D) 315-200 mg-unit per tablet Take 1 tablet by mouth nightly.     Provider, Historical   cetirizine (ZYRTEC) 10 MG tablet Take 1 tablet (10 mg total) by mouth once daily. 3/20/25 3/20/26  Winnie Jacob PA-C   clopidogreL (PLAVIX) 75 mg tablet Take 1 tablet (75 mg total) by mouth once daily. 5/3/24   Winnie Jacob PA-C   cyanocobalamin, vitamin B-12, 50 mcg tablet Take 50 mcg by mouth nightly.     Provider, Historical   docusate sodium (COLACE) 100 MG capsule Take 1 tablet by mouth Twice daily. 1 Capsule Oral Twice a day .  Take with pain medicine    Provider, Historical   doxazosin (CARDURA) 1 MG tablet TAKE 1 TABLET BY MOUTH ONCE DAILY IN THE EVENING 3/28/25   Winnie Jacob PA-C   ezetimibe (ZETIA) 10 mg tablet Take 1 tablet (10 mg total) by mouth once daily. 8/5/24 8/5/25  Renata Kyle MD   fluticasone propionate (FLONASE) 50 mcg/actuation nasal spray One spray in each nostril twice daily after 1st using azelastine nasal spray 6/15/21   LAURENT Swanson MD   hydroCHLOROthiazide (HYDRODIURIL) 25 MG tablet Take 1 tablet (25 mg total) by mouth once daily. 3/27/25 3/27/26  Renata Kyle MD   ipratropium (ATROVENT) 42 mcg (0.06 %) nasal spray 1-2 sprays in each nostril before eating and at bedtime as needed 6/15/21   LAURENT Swanson MD   oxyCODONE (ROXICODONE) 5 MG immediate release tablet Take 1 tablet (5 mg total) by mouth every 4 (four) hours as needed for Pain. 10/25/24   Lars Bond MD   promethazine-dextromethorphan (PROMETHAZINE-DM) 6.25-15 mg/5 mL Syrp Take 5 mLs by mouth nightly as needed (cough). 3/24/25 4/3/25  Winnie Jacob, WEI   RABEprazole (ACIPHEX) 20 mg tablet Take 1 tablet (20 mg total) by  "mouth every 12 (twelve) hours. 11/1/24 11/1/25  Rosendo Boyer MD   tadalafiL (CIALIS) 20 MG Tab Take 1 tablet (20 mg total) by mouth as needed. Take every 36 hours as needed for ED. 7/23/24 7/23/25  Tyler Reid Jr., MD   testosterone (ANDRODERM) 2 mg/24 hour PT24 APPLY 1 PATCH TOPICALLY TO THE SKIN EVERY DAY 1/5/22   Chris Min MD   zolpidem (AMBIEN) 10 mg Tab TAKE 1 TABLET BY MOUTH EVERY DAY AT BEDTIME 6/23/17   Darvin Henry MD   tamsulosin (FLOMAX) 0.4 mg Cap Take 1 capsule (0.4 mg total) by mouth once daily. 4/4/21 4/4/21  Darnell Lee MD     PHYSICAL EXAM     Vitals: Normal blood pressure.  General: No acute distress. Well-developed. Well-nourished.  Eyes: EOMI. Sclerae anicteric.  HENT: Normocephalic. Atraumatic. Nares patent. Moist oral mucosa.     Nose:      Right Turbinates: Swollen.      Left Turbinates: Swollen.   Ears: Bilateral TMs clear. Bilateral EACs clear.  Cardiovascular: Regular rate. Regular rhythm. No murmurs. No rubs. No gallops. Normal S1, S2.  Respiratory: Normal respiratory effort. Clear to auscultation bilaterally. No rales. No rhonchi. No wheezing.  Abdomen: Soft. Non-tender. Non-distended. Normoactive bowel sounds.  Musculoskeletal: No  obvious deformity.  Extremities: No lower extremity edema.  Neurological: Alert & oriented x3. No slurred speech. Normal gait.  Psychiatric: Normal mood. Normal affect. Good insight. Good judgment.  Skin: Warm. Dry. No rash.  Neck: Normal lymph nodes.        /85 (BP Location: Left arm, Patient Position: Sitting)   Pulse 76   Ht 5' 10" (1.778 m)   Wt 110 kg (242 lb 8.1 oz)   SpO2 (!) 92%   BMI 34.80 kg/m²   ASSESSMENT/PLAN     IMPRESSION:  - Assessed persistent fatigue and respiratory symptoms following recent treatment for suspected sinus infection.  - Reviewed recent lab results and explained significance of elevated inflammatory markers in context of recent illness.  - Considered possibility of pneumonia given " prolonged symptoms.  - Evaluated cardiovascular symptoms in context of patient's known cardiac history and upcoming heart monitor placement.    VIRAL URI WITH COUGH  - Ordered chest XR to rule out pneumonia.  - Bri reports fatigue, mild cough with mucus production (approximately 10 times daily), sinus pressure, occasional left ear pain, and palpitations when lying down.  - Physical exam, including heart and lung auscultation, revealed abnormalities.  - Continue Zyrtec and Astelin nasal spray.  - Increase fluid intake  - Follow up after chest XR results for potential further treatment recommendations.    GASTROESOPHAGEAL REFLUX DISEASE (GERD):  - Continue rabeprazole 20 mg daily    HYPERTENSION  - Stable with /85  - Continue hydrochlorothiazide 25 mg and amlodipine 10 mg    FOLLOW-UP:  - Discussed normal ranges for various lab values and their clinical implications.   -Follow up for new or worsening symptoms         I spent a total of 28 minutes on the day of the visit.This includes face to face time and non-face to face time preparing to see the patient (eg, review of tests), obtaining and/or reviewing separately obtained history, documenting clinical information in the electronic or other health record, independently interpreting results and communicating results to the patient/family/caregiver, or care coordinator.     Patient education provided from Darrian. Patient was counseled on when and how to seek emergent care.   This note was generated with the assistance of ambient listening technology. Verbal consent was obtained by the patient and accompanying visitor(s) for the recording of patient appointment to facilitate this note. I attest to having reviewed and edited the generated note for accuracy, though some syntax or spelling errors may persist. Please contact the author of this note for any clarification.       Daily SANDRA, APRN, FNP-c   Department of Internal Medicine - [facility]  [address]  1:11 PM

## 2025-04-08 ENCOUNTER — CLINICAL SUPPORT (OUTPATIENT)
Dept: CARDIOLOGY | Facility: HOSPITAL | Age: 65
End: 2025-04-08
Attending: STUDENT IN AN ORGANIZED HEALTH CARE EDUCATION/TRAINING PROGRAM
Payer: MEDICARE

## 2025-04-08 DIAGNOSIS — R00.2 PALPITATIONS: ICD-10-CM

## 2025-04-08 PROCEDURE — 93226 XTRNL ECG REC<48 HR SCAN A/R: CPT

## 2025-05-01 NOTE — PROGRESS NOTES
Subjective     Patient ID: Bri Santiago is a 64 y.o. male.    Chief Complaint: No chief complaint on file.    HPI 64 man with a history of von Willebrand's disease. Last seen 2019.      He is due for colonoscopy with Dr Boyer and is referred for recommendations.  The patient reports no abnormal bleeding of any type.  Feels shaky at times -palns to F/U with Cardiology.    History:  He previously had seen Dr. Álvarez in our department in 2017, His history is as follows:  The patient had a tonsillectomy at about age 32.  He also   had a carpal tunnel release and a vasectomy.  These were not associated with   any abnormal bleeding nor were dental extractions.  He has not had any teeth   extracted since about 1985.     About 2005, he had a severe injury to his right foot.  The lacerations on his   foot were sutured, but he continued to have substantial bleeding.  He had a   series of studies done at [facility] and was told that he had von   Willebrand disease.     In the summer of 2006, he saw Dr. Jorge Chung at [facility] regarding this   matter.   platelet aggregation tests  disclosed a marked decrease in platelet aggregation with   ristocetin.  A factor VIII level was normal at 89%.  The ristocetin cofactor   was at the lower portion of the normal range at 56 (55 to 200), and the von   Willebrand antigen was 75 (55 to 200).    The patient has done well with DDAVP infusions prior to multiple previous procedures.    Review of Systems   HENT:  Negative for nosebleeds.    Gastrointestinal:  Negative for blood in stool.   Genitourinary:  Negative for hematuria.          Objective     Physical Exam  Vitals reviewed.   Constitutional:       Appearance: Normal appearance.   HENT:      Mouth/Throat:      Mouth: Mucous membranes are moist.      Pharynx: Oropharynx is clear. No oropharyngeal exudate or posterior oropharyngeal erythema.   Eyes:      Conjunctiva/sclera: Conjunctivae normal.      Pupils: Pupils are equal,  round, and reactive to light.   Cardiovascular:      Rate and Rhythm: Normal rate and regular rhythm.   Pulmonary:      Effort: Pulmonary effort is normal. No respiratory distress.      Breath sounds: Normal breath sounds. No wheezing or rales.   Abdominal:      Palpations: Abdomen is soft. There is no mass.      Tenderness: There is no abdominal tenderness.   Lymphadenopathy:      Cervical: No cervical adenopathy.      Upper Body:      Right upper body: No supraclavicular or axillary adenopathy.      Left upper body: No supraclavicular or axillary adenopathy.   Neurological:      Mental Status: He is alert and oriented to person, place, and time.   Psychiatric:         Mood and Affect: Mood normal.         Behavior: Behavior normal.         Thought Content: Thought content normal.         Judgment: Judgment normal.          Assessment and Plan     1. Iron deficiency anemia due to chronic blood loss    2. Von Willebrand disease  Overview:  Recommend DDAVP   infusion , 20 mcg, to be given over 30 minutes, 1 hr before the procedure.          Will place orders for DDAVP - can be given immediately before C-scope .  Await C-scope scheduling.       Route Chart for Scheduling    Med Onc Chart Routing      Follow up with physician No follow up needed.   Follow up with JERI    Infusion scheduling note   immediately prior to C-scope - not scheduled yet   Injection scheduling note    Labs None   Scheduling:  Preferred lab:  Lab interval:     Imaging None      Pharmacy appointment No pharmacy appointment needed      Other referrals       No additional referrals needed           Therapy Plan Information    No therapy plan of the specified type found.  DESMOPRESSIN (DDAVP) IV for Von Willebrand disease, noted on 7/25/2012  desmopressin (DDAVP) in 0.9% NaCl 50 mL IVPB  0.2 mcg/kg = 21.86 mcg, Intravenous, Every visit      No therapy plan of the specified type found.  No follow-ups on file.

## 2025-05-05 ENCOUNTER — OFFICE VISIT (OUTPATIENT)
Dept: HEMATOLOGY/ONCOLOGY | Facility: CLINIC | Age: 65
End: 2025-05-05
Attending: INTERNAL MEDICINE
Payer: MEDICARE

## 2025-05-05 VITALS
OXYGEN SATURATION: 97 % | RESPIRATION RATE: 16 BRPM | DIASTOLIC BLOOD PRESSURE: 78 MMHG | TEMPERATURE: 98 F | WEIGHT: 240.94 LBS | BODY MASS INDEX: 34.49 KG/M2 | HEART RATE: 77 BPM | SYSTOLIC BLOOD PRESSURE: 130 MMHG | HEIGHT: 70 IN

## 2025-05-05 DIAGNOSIS — D68.00 VON WILLEBRAND'S DISEASE: ICD-10-CM

## 2025-05-05 DIAGNOSIS — D50.0 IRON DEFICIENCY ANEMIA DUE TO CHRONIC BLOOD LOSS: Primary | Chronic | ICD-10-CM

## 2025-05-05 DIAGNOSIS — Z80.0 FAMILY HISTORY OF COLON CANCER: ICD-10-CM

## 2025-05-05 DIAGNOSIS — D68.00 VON WILLEBRAND DISEASE: Chronic | ICD-10-CM

## 2025-05-05 PROCEDURE — 3008F BODY MASS INDEX DOCD: CPT | Mod: CPTII,S$GLB,, | Performed by: INTERNAL MEDICINE

## 2025-05-05 PROCEDURE — 3075F SYST BP GE 130 - 139MM HG: CPT | Mod: CPTII,S$GLB,, | Performed by: INTERNAL MEDICINE

## 2025-05-05 PROCEDURE — 3078F DIAST BP <80 MM HG: CPT | Mod: CPTII,S$GLB,, | Performed by: INTERNAL MEDICINE

## 2025-05-05 PROCEDURE — 99999 PR PBB SHADOW E&M-EST. PATIENT-LVL IV: CPT | Mod: PBBFAC,,, | Performed by: INTERNAL MEDICINE

## 2025-05-05 PROCEDURE — 99213 OFFICE O/P EST LOW 20 MIN: CPT | Mod: S$GLB,,, | Performed by: INTERNAL MEDICINE

## 2025-05-05 PROCEDURE — 1159F MED LIST DOCD IN RCRD: CPT | Mod: CPTII,S$GLB,, | Performed by: INTERNAL MEDICINE

## 2025-05-05 RX ORDER — HEPARIN 100 UNIT/ML
500 SYRINGE INTRAVENOUS
Status: CANCELLED | OUTPATIENT
Start: 2025-05-05

## 2025-05-05 RX ORDER — SODIUM CHLORIDE 0.9 % (FLUSH) 0.9 %
10 SYRINGE (ML) INJECTION
Status: CANCELLED | OUTPATIENT
Start: 2025-05-05

## 2025-05-12 ENCOUNTER — OFFICE VISIT (OUTPATIENT)
Dept: GASTROENTEROLOGY | Facility: CLINIC | Age: 65
End: 2025-05-12
Payer: MEDICARE

## 2025-05-12 ENCOUNTER — PATIENT MESSAGE (OUTPATIENT)
Dept: GASTROENTEROLOGY | Facility: CLINIC | Age: 65
End: 2025-05-12

## 2025-05-12 DIAGNOSIS — D50.9 IRON DEFICIENCY ANEMIA, UNSPECIFIED IRON DEFICIENCY ANEMIA TYPE: Primary | ICD-10-CM

## 2025-05-12 DIAGNOSIS — K21.9 GASTROESOPHAGEAL REFLUX DISEASE, UNSPECIFIED WHETHER ESOPHAGITIS PRESENT: ICD-10-CM

## 2025-05-12 DIAGNOSIS — G47.30 SLEEP APNEA, UNSPECIFIED TYPE: ICD-10-CM

## 2025-05-12 DIAGNOSIS — R53.83 FATIGUE, UNSPECIFIED TYPE: ICD-10-CM

## 2025-05-12 DIAGNOSIS — Z80.0 FAMILY HISTORY OF COLON CANCER: ICD-10-CM

## 2025-05-12 DIAGNOSIS — Z51.81 ENCOUNTER FOR MONITORING LONG-TERM PROTON PUMP INHIBITOR THERAPY: ICD-10-CM

## 2025-05-12 DIAGNOSIS — K44.9 HIATAL HERNIA: ICD-10-CM

## 2025-05-12 DIAGNOSIS — Z79.899 ENCOUNTER FOR MONITORING LONG-TERM PROTON PUMP INHIBITOR THERAPY: ICD-10-CM

## 2025-05-12 NOTE — PROGRESS NOTES
The patient location is:  At home  The chief complaint leading to consultation is:  GERD long-term PPI use fatigue sleep apnea family history of colon cancer personal history of colon polyp von Willebrand's disease  Visit type: audiovisual  Face to Face time with patient: 30 minutes of total time spent on the encounter, which includes face to face time and non-face to face time preparing to see the patient (eg, review of tests), Obtaining and/or reviewing separately obtained history, Documenting clinical information in the electronic or other health record, Independently interpreting results (not separately reported) and communicating results to the patient/family/caregiver, or Care coordination (not separately reported).   Each patient to whom he or she provides medical services by telemedicine is:  (1) informed of the relationship between the physician and patient and the respective role of any other health care provider with respect to management of the patient; and (2) notified that he or she may decline to receive medical services by telemedicine and may withdraw from such care at any time.    Notes:         [facility] Consultation Note    Reason for Consult:  The primary encounter diagnosis was Iron deficiency anemia, unspecified iron deficiency anemia type. Diagnoses of Encounter for monitoring long-term proton pump inhibitor therapy, Gastroesophageal reflux disease, unspecified whether esophagitis present, Hiatal hernia, Family history of colon cancer, Sleep apnea, unspecified type, and Fatigue, unspecified type were also pertinent to this visit.    PCP:   Cate Galeas       Referring MD:  No referring provider defined for this encounter.    Initial History of Present Illness (HPI):  This is a 64 y.o. male here for evaluation of family history of colon cancer personal history of colon polyp hiatal hernia GERD long-term PPI use history of on Willebrand's disease possibly iron  deficiency anemia patient seem Heme-Onc they will prescribe DDAVP for the procedure will refer patient for EGD colonoscopy for further evaluation will set up patient for 12 hour fasting 8:00 a.m. blood work this week he is followed by his primary care doctor for fatigue he has sleep apnea uses CPAP machine he is currently taking AcipHex 20 mg every 12 hours about an hour before breakfast and dinner.  No dysphagia no odynophagia has 3-4 bowel movements a day mostly well-formed sometimes loose depending on what he eats no diarrhea.  No blood in his stool no early satiety no dysphagia or odynophagia.    Abdominal pain - no  Reflux -as above  Dysphagia - no   Bowel habits - as above  GI bleeding - none  NSAID usage - takes Plavix    Interval HPI 05/12/2025:  The patient's last visit with me was on 4/27/2023.      ROS:  Constitutional: No fevers, chills, No weight loss, chronic fatigue  ENT:  As above heartburn no dysphagia no odynophagia no hoarseness  CV: No chest pain, no palpitation  Pulm: No cough, No shortness of breath, no wheezing  Ophtho: No vision changes  GI: see HPI  Derm: No rash, no itching  Heme: No lymphadenopathy, No easy bruising, possible history of von Willebrand's disease  MSK:  Arthritis  : No dysuria, No hematuria  Endo: No hot or cold intolerance  Neuro: No syncope, No seizure, no strokes  Psych: No uncontrolled anxiety, No uncontrolled depression    Medical History:  has a past medical history of Acute pancreatitis, Anal fissure, Anemia, Anticoagulant long-term use, Arthritis, Asthma in remission, Back pain, BPH (benign prostatic hypertrophy), Cancer (2000), Chronic maxillary sinusitis, Clotting disorder, Diastolic dysfunction with chronic heart failure (12/03/2018), Family history of colon cancer, Family history of early CAD, GERD (gastroesophageal reflux disease), Hard to intubate (10/25/2024), Helicobacter pylori (H. pylori) infection, History of chronic pancreatitis, HTN (hypertension),  Lumbago (11/12/2012), Obesity, ABDON (obstructive sleep apnea), Spinal stenosis of lumbar region, Von Willebrand disease, and VWD (acquired von Willebrand's disease).    Surgical History:  has a past surgical history that includes Lumbar fusion (2012); Carpal tunnel release (2003); anal fissure repair; Cervical discectomy (2003); Colonoscopy (N/A, 10/07/2015); Vasectomy (1996); Tonsillectomy; Spine surgery; Colonoscopy (N/A, 06/19/2017); Radiofrequency ablation of lumbar medial branch nerve at single level (Left, 05/24/2018); Left heart catheterization (Left, 02/14/2019); Catheterization of both left and right heart (N/A, 02/14/2019); Radiofrequency ablation (Right, 05/09/2019); Radiofrequency ablation (Left, 05/23/2019); Back surgery (2012); Functional endoscopic sinus surgery (FESS) using computer-assisted navigation (Bilateral, 10/07/2019); Balloon sinuplasty of paranasal sinus (Bilateral, 10/07/2019); Radiofrequency ablation (Left, 01/16/2020); Radiofrequency ablation (Right, 01/28/2020); Esophagogastroduodenoscopy (N/A, 03/04/2020); Colonoscopy (N/A, 03/04/2020); Radiofrequency ablation (Left, 08/18/2020); Radiofrequency ablation (Right, 09/01/2020); Injection of anesthetic agent around nerve (Bilateral, 10/13/2020); Esophagogastroduodenoscopy (N/A, 11/03/2020); Examination under anesthesia (N/A, 03/15/2021); Lateral internal anal sphincterotomy (N/A, 12/10/2021); Radiofrequency ablation (Bilateral, 12/21/2021); Examination under anesthesia (N/A, 02/25/2022); Cystoscopy (08/12/2022); Ureteroscopy (Bilateral, 08/12/2022); Retrograde pyelography (Bilateral, 08/12/2022); Radiofrequency ablation (Left, 11/21/2022); Radiofrequency ablation (Right, 12/05/2022); Esophagogastroduodenoscopy (N/A, 05/31/2023); ph monitoring, esophagus, wireless, (on reflux meds) (N/A, 05/31/2023); Transforaminal epidural injection of steroid (Bilateral, 06/19/2023); Knee arthroscopy w/ meniscectomy (Left, 11/08/2023); Arthroscopic  "chondroplasty of knee joint (Left, 11/08/2023); Radiofrequency ablation (Bilateral, 04/29/2024); Robot-assisted laparoscopic repair of inguinal hernia using da Jamie Xi (Right, 10/25/2024); Radiofrequency ablation (Bilateral, 12/23/2024); and Transforaminal epidural injection of steroid (N/A, 02/17/2025).    Family History: family history includes Cancer in his father; Colon cancer in his mother; Colon cancer (age of onset: 65) in his paternal grandfather and paternal uncle; Colon cancer (age of onset: 67) in his father; Coronary artery disease (age of onset: 45) in his mother; Coronary artery disease (age of onset: 51) in his brother; Diabetes in his mother; Diabetes Mellitus in his paternal grandmother; Glaucoma in his father; Heart disease in his mother; Hypertension in his father and mother; No Known Problems in his brother, daughter, daughter, daughter, sister, son, son, and son..     Social History:  reports that he has never smoked. He has never used smokeless tobacco. He reports current alcohol use of about 1.0 standard drink of alcohol per week. He reports that he does not use drugs.    Review of patient's allergies indicates:   Allergen Reactions    Penicillins Hives and Swelling     Has had allergy testing and can prob tolerate penicillin    Ace inhibitors Other (See Comments)     "Caused a respiratory infection"    Aspirin Hives           Codeine Itching     Ok to take percocet    Dilaudid [hydromorphone] Itching    Gabapentin Hallucinations       Medication List with Changes/Refills   Current Medications    ALBUTEROL (PROVENTIL/VENTOLIN HFA) 90 MCG/ACTUATION INHALER    INHALE 2 PUFFS INTO THE LUNGS EVERY 6 HOURS AS NEEDED FOR WHEEZING OR SHORTNESS OF BREATH    ALBUTEROL (PROVENTIL/VENTOLIN HFA) 90 MCG/ACTUATION INHALER    Inhale 1-2 puffs into the lungs every 6 (six) hours as needed for Wheezing. Rescue    AMLODIPINE (NORVASC) 10 MG TABLET    Take 1 tablet (10 mg total) by mouth once daily.    " ATORVASTATIN (LIPITOR) 80 MG TABLET    Take 1 tablet (80 mg total) by mouth every evening.    AZELASTINE (ASTELIN) 137 MCG (0.1 %) NASAL SPRAY    1 spray (137 mcg total) by Nasal route 2 (two) times daily.    BUDESONIDE-FORMOTEROL 160-4.5 MCG (SYMBICORT) 160-4.5 MCG/ACTUATION HFAA    INHALE 2 PUFFS INTO THE LUNGS EVERY 12 HOURS    CALCIUM CITRATE-VITAMIN D3 315-200 MG (CITRACAL+D) 315-200 MG-UNIT PER TABLET    Take 1 tablet by mouth nightly.     CETIRIZINE (ZYRTEC) 10 MG TABLET    Take 1 tablet (10 mg total) by mouth once daily.    CLOPIDOGREL (PLAVIX) 75 MG TABLET    Take 1 tablet (75 mg total) by mouth once daily.    CYANOCOBALAMIN, VITAMIN B-12, 50 MCG TABLET    Take 50 mcg by mouth nightly.     DOCUSATE SODIUM (COLACE) 100 MG CAPSULE    Take 1 tablet by mouth Twice daily. 1 Capsule Oral Twice a day .  Take with pain medicine    DOXAZOSIN (CARDURA) 1 MG TABLET    TAKE 1 TABLET BY MOUTH ONCE DAILY IN THE EVENING    EZETIMIBE (ZETIA) 10 MG TABLET    Take 1 tablet (10 mg total) by mouth once daily.    FLUTICASONE PROPIONATE (FLONASE) 50 MCG/ACTUATION NASAL SPRAY    One spray in each nostril twice daily after 1st using azelastine nasal spray    HYDROCHLOROTHIAZIDE (HYDRODIURIL) 25 MG TABLET    Take 1 tablet (25 mg total) by mouth once daily.    IPRATROPIUM (ATROVENT) 42 MCG (0.06 %) NASAL SPRAY    1-2 sprays in each nostril before eating and at bedtime as needed    RABEPRAZOLE (ACIPHEX) 20 MG TABLET    Take 1 tablet (20 mg total) by mouth every 12 (twelve) hours.    TADALAFIL (CIALIS) 20 MG TAB    Take 1 tablet (20 mg total) by mouth as needed. Take every 36 hours as needed for ED.    TESTOSTERONE (ANDRODERM) 2 MG/24 HOUR PT24    APPLY 1 PATCH TOPICALLY TO THE SKIN EVERY DAY    ZOLPIDEM (AMBIEN) 10 MG TAB    TAKE 1 TABLET BY MOUTH EVERY DAY AT BEDTIME         Objective Findings:    Vital Signs:  There were no vitals taken for this visit.  There is no height or weight on file to calculate BMI.    Physical  Exam:  Telemedicine video visit  General Appearance: Well appearing in no acute distress  Neurologic:  Alert and oriented x4  Psychiatric:  Normal speech mentation and affect    Labs:  Lab Results   Component Value Date    WBC 8.40 03/28/2025    HGB 13.9 (L) 03/28/2025    HCT 43.8 03/28/2025     03/28/2025    CHOL 135 03/28/2025    TRIG 125 03/28/2025    HDL 35 (L) 03/28/2025    ALT 19 03/28/2025    AST 23 03/28/2025     03/28/2025    K 4.1 03/28/2025     03/28/2025    CREATININE 0.8 03/28/2025    BUN 15 03/28/2025    CO2 31 (H) 03/28/2025    TSH 1.083 03/28/2025    PSA 0.93 01/22/2021    INR 1.0 04/04/2021    HGBA1C 4.4 07/16/2024       Medical Decision Making:  Lab work reviewed  Fasting lab work talk given  EGD colonoscopy talk given  Heme-Onc note reviewed  Procedure: EGD/Colonoscopy    Diagnosis:  Iron deficiency anemia, loose stools, GERD, family history of colon cancer, Surveillance colonoscopy - Hx of colon polyps    Procedure Timing: Within 2-4 weeks    Location: Any Site    Additional Scheduling Information:   Von Willebrand disease  Overview:  Recommend DDAVP   infusion , 20 mcg, to be given over 30 minutes, 1 hr before the procedure.    Appears that orders have been placed by Dr. Agarwal: Leon from his clinic note 5/5/2025    Prep Specifications:Standard prep    Is the patient taking a GLP-1 Agonist:no        Assessment:  1. Iron deficiency anemia, unspecified iron deficiency anemia type    2. Encounter for monitoring long-term proton pump inhibitor therapy    3. Gastroesophageal reflux disease, unspecified whether esophagitis present    4. Hiatal hernia    5. Family history of colon cancer    6. Sleep apnea, unspecified type    7. Fatigue, unspecified type         Recommendations:  1. 12 hour fasting blood 8:00 a.m. fasting this week  2. Referral placed endoscopy schedulers for EGD for further evaluation Leon has written an order for DDAVP to be given by anesthesia before the  procedure  3. Patient being evaluated by her primary care doctor for fatigue patient says  4. Return GI clinic 8 weeks for follow-up okay for telemedicine video visit    Follow up in about 8 weeks (around 7/7/2025).      Order summary:  Orders Placed This Encounter    Ferritin    Iron and TIBC    TISSUE TRANSGLUTAMINASE (TTG), IGA    IgA    Hemoglobin    CORTISOL, 8AM    Vitamin B12    Vitamin D         Thank you so much for allowing me to participate in the care of Bri Santiago    Rsoendo Boyer MD    DISCLAIMER: This note was prepared with [employer] voice recognition transcription software. Garbled syntax, mangled or inadvertent pronouns, and other bizarre constructions may be attributed to that software system. While efforts were made to correct any mistakes made by this voice recognition program, some errors and/or omissions may remain in the note that were missed when the note was originally created.

## 2025-05-12 NOTE — Clinical Note
Procedure: EGD/Colonoscopy  Diagnosis:  Iron deficiency anemia, loose stools, GERD, family history of colon cancer, Surveillance colonoscopy - Hx of colon polyps  Procedure Timing: Within 2-4 weeks  Location: Any Site  Additional Scheduling Information:  Von Willebrand disease Overview: Recommend DDAVP   infusion , 20 mcg, to be given over 30 minutes, 1 hr before the procedure.  Appears that orders have been placed by Dr. Agarwal: Heme-Onc from his clinic note 5/5/2025  Prep Specifications:Standard prep  Is the patient taking a GLP-1 Agonist:no

## 2025-05-12 NOTE — Clinical Note
Teresa referral placed for EGD colonoscopy  Please schedule patient telemedicine video visit follow-up in 2 months

## 2025-05-12 NOTE — PATIENT INSTRUCTIONS
Procedure: EGD/Colonoscopy    Diagnosis:  Iron deficiency anemia, loose stools, GERD, family history of colon cancer, Surveillance colonoscopy - Hx of colon polyps    Procedure Timing: Within 2-4 weeks    Location: Any Site    Additional Scheduling Information:   Von Willebrand disease  Overview:  Recommend DDAVP   infusion , 20 mcg, to be given over 30 minutes, 1 hr before the procedure.    Appears that orders have been placed by Dr. Agarwal: Heme-Onc from his clinic note 5/5/2025    Prep Specifications:Standard prep    Is the patient taking a GLP-1 Agonist:no    Have you attached a patient to this message: yes

## 2025-05-21 ENCOUNTER — PATIENT MESSAGE (OUTPATIENT)
Dept: CARDIOLOGY | Facility: CLINIC | Age: 65
End: 2025-05-21
Payer: MEDICARE

## 2025-05-23 ENCOUNTER — LAB VISIT (OUTPATIENT)
Dept: LAB | Facility: HOSPITAL | Age: 65
End: 2025-05-23
Attending: INTERNAL MEDICINE
Payer: MEDICARE

## 2025-05-23 DIAGNOSIS — Z51.81 ENCOUNTER FOR MONITORING LONG-TERM PROTON PUMP INHIBITOR THERAPY: ICD-10-CM

## 2025-05-23 DIAGNOSIS — Z79.899 ENCOUNTER FOR MONITORING LONG-TERM PROTON PUMP INHIBITOR THERAPY: ICD-10-CM

## 2025-05-23 DIAGNOSIS — D50.9 IRON DEFICIENCY ANEMIA, UNSPECIFIED IRON DEFICIENCY ANEMIA TYPE: ICD-10-CM

## 2025-05-23 LAB
25(OH)D3+25(OH)D2 SERPL-MCNC: 35 NG/ML (ref 30–96)
CORTIS SERPL-MCNC: 13.4 UG/DL (ref 4.3–22.4)
FERRITIN SERPL-MCNC: 138 NG/ML (ref 20–300)
HGB BLD-MCNC: 13.7 GM/DL (ref 14–18)
IGA SERPL-MCNC: 198 MG/DL (ref 40–350)
IRON SATN MFR SERPL: 18 % (ref 20–50)
IRON SERPL-MCNC: 51 UG/DL (ref 45–160)
TIBC SERPL-MCNC: 283 UG/DL (ref 250–450)
TRANSFERRIN SERPL-MCNC: 191 MG/DL (ref 200–375)
VIT B12 SERPL-MCNC: 1000 PG/ML (ref 210–950)

## 2025-05-23 PROCEDURE — 82306 VITAMIN D 25 HYDROXY: CPT

## 2025-05-23 PROCEDURE — 84466 ASSAY OF TRANSFERRIN: CPT

## 2025-05-23 PROCEDURE — 85018 HEMOGLOBIN: CPT

## 2025-05-23 PROCEDURE — 82728 ASSAY OF FERRITIN: CPT

## 2025-05-23 PROCEDURE — 82533 TOTAL CORTISOL: CPT

## 2025-05-23 PROCEDURE — 86364 TISS TRNSGLTMNASE EA IG CLAS: CPT

## 2025-05-23 PROCEDURE — 82784 ASSAY IGA/IGD/IGG/IGM EACH: CPT

## 2025-05-23 PROCEDURE — 82607 VITAMIN B-12: CPT

## 2025-05-23 PROCEDURE — 36415 COLL VENOUS BLD VENIPUNCTURE: CPT

## 2025-05-28 ENCOUNTER — OFFICE VISIT (OUTPATIENT)
Dept: CARDIOLOGY | Facility: CLINIC | Age: 65
End: 2025-05-28
Payer: MEDICARE

## 2025-05-28 ENCOUNTER — TELEPHONE (OUTPATIENT)
Dept: GASTROENTEROLOGY | Facility: CLINIC | Age: 65
End: 2025-05-28
Payer: MEDICARE

## 2025-05-28 VITALS
WEIGHT: 242.31 LBS | HEIGHT: 70 IN | BODY MASS INDEX: 34.69 KG/M2 | SYSTOLIC BLOOD PRESSURE: 135 MMHG | DIASTOLIC BLOOD PRESSURE: 76 MMHG | HEART RATE: 72 BPM | OXYGEN SATURATION: 95 %

## 2025-05-28 DIAGNOSIS — I10 PRIMARY HYPERTENSION: Chronic | ICD-10-CM

## 2025-05-28 DIAGNOSIS — R53.82 CHRONIC FATIGUE: ICD-10-CM

## 2025-05-28 DIAGNOSIS — I25.10 CORONARY ARTERY DISEASE INVOLVING NATIVE CORONARY ARTERY OF NATIVE HEART WITHOUT ANGINA PECTORIS: Primary | ICD-10-CM

## 2025-05-28 DIAGNOSIS — E78.49 OTHER HYPERLIPIDEMIA: ICD-10-CM

## 2025-05-28 DIAGNOSIS — R06.09 DOE (DYSPNEA ON EXERTION): ICD-10-CM

## 2025-05-28 LAB — W TISSUE TRANSGLUTAMINASE IGA AB: 0.4 U/ML

## 2025-05-28 PROCEDURE — 99999 PR PBB SHADOW E&M-EST. PATIENT-LVL IV: CPT | Mod: PBBFAC,,,

## 2025-05-28 PROCEDURE — 1159F MED LIST DOCD IN RCRD: CPT | Mod: CPTII,S$GLB,,

## 2025-05-28 PROCEDURE — 3075F SYST BP GE 130 - 139MM HG: CPT | Mod: CPTII,S$GLB,,

## 2025-05-28 PROCEDURE — 3078F DIAST BP <80 MM HG: CPT | Mod: CPTII,S$GLB,,

## 2025-05-28 PROCEDURE — 3008F BODY MASS INDEX DOCD: CPT | Mod: CPTII,S$GLB,,

## 2025-05-28 PROCEDURE — 99214 OFFICE O/P EST MOD 30 MIN: CPT | Mod: S$GLB,,,

## 2025-05-28 NOTE — PROGRESS NOTES
"     General Cardiology Clinic Note  Last Clinic Visit: 3/27/25 with Dr. Martha Byrnes   General Cardiologist: Dr. Francisco     HPI:     Bri Santiago is a 64 y.o. male who presents for fatigue.    PROBLEM LIST:  Nonobstructive CAD  - [facility] 2019: ost 1st diag 40% stenosed, pLCx 40% stenosed, R. Posterior Atrioventricular Artery 50%  HFpEF (elevated filling pressures at time of cath in '19)  HTN  HLD  ABDON       Interval HPI:   He presents today as an urgent add-on for ongoing fatigue. He states this has been profound for the last 2 months. He reports feeling more dyspneic with any exertion such as walking, which makes him feel lightheaded. No syncope. States he continues to have occasional chest pain which is sometimes associated with exertion, sometimes random at rest. No diaphoresis or N/V. Has some fluctuating LE edema which he states resolves with HCTZ and leg elevation. Denies PND/orthopnea; uses CPAP at night. Still feeling some intermittent palpitations as well -- He recently had a 48-hour holter monitor which showed SR at average 81 BPM, very rare PACs, no other abnormalities. During his reported "palpitations" symptom, there were no arrhythmias or ectopy. He has a history of testosterone deficiency and used to be prescribed patches.       3/2025 HPI (Dr. Martha Byrnes)  Mr. Santiago was last seen in clinic on 8/5/2024.  He underwent a stress echo for left sided atypical chest pain which was negative for ischemia. We also added ezetimibe for elevated LDL levels. Mr. Santiago has been dealing with a possible viral infection +/- pneumonia for more than a week ago. He was seen by his PCP and tested negative for both the flu and covid. He continues to have nasal congestion and productive cough. Has some left sided stabbing/fluttering chest pain that worsens with laying on his left side. He has been experiencing as well palpitations mostly during the night for the past  1-2 months. Denies any fever, chills,GI symptoms, " orthopnea, PND, claudication, syncope or any other complaints.     8/2024 HPI (Dr. Martha Byrnes)  Mr. Santiago says he has been doing well since he was last seen. He continues to have intermittent substernal chest pain at times at rest. Admits that at times if he is exerting himself he has to stop and rest until it goes away. His last LHC was done in 2019 with nonobstructive CAD. Last echo done in 2020 with no WMA. He has been walking a mile and half almost everyday. Denies dyspnea on exertion, PND, orthopnea, palpitations, N/V, diaphoresis, syncope or any other complaints. In 2023 patient saw Dr. Liu for preop clearance and his atorvastatin was increased to 80mg. His lipid panel was rechecked in 7/16/24 and LDL noted to be 121.0 from 80.4. Patient says he eats mostly home cooked meals and avoids meats.     4/2022 HPI (Dr. Francisco)  Still has intermittent left-sided rest chest pain without change over the course the past year.  A sharp stabbing left-sided pain lasting for few minutes and resolving spontaneously, but has not worsened over the past year.  Denies any exertional symptoms, has been exercising on a regular basis with exercise equipment that he bought in his house, and endorses weight loss over the course the past year.  Denies any cramping, no lightheadedness, no orthopnea, no weight gain, trace lower extremity edema without change.  Checking blood pressure regularly at home reports well controlled most recently 117/72.       1/2021 HPI (Dr. Francisco)  When we saw him last he was having some palpitations, we obtained holter monitor and was found to have sinus palpitations.  Recently he describes a pain on the left side of his chest where his heart is of a fun pain type sensation.  Possibly likely his heart is fluttering, last for 2-3 minutes, resolving spontaneously, at rest, made different with a deep breath. ALT in 70s from labs in December, this appears to be new.  BP today well controlled 126/76.   Most recent LDL in the 60s.     10/2020 HPI (Dr. Francisco)  He reports that he has had progressive dyspnea on exertion over the past several weeks, difficulty lying down, increasingly short-winded.  Symptoms have acutely worsened over the last several weeks, but has been present for the last 1-2 years.  He has difficulty sleeping, wakes up throughout the night.  He was started on Lasix within the past year for lower extremity edema, which has helped some but continues to retain fluid in his legs.  Has also noticed increasing weight gain, and abdominal protuberance.  He had atypical chest pain last year, had left heart catheterization which showed nonobstructive disease elevated LVEDP.  PET stress test year before this negative for ischemia.  He has had CPAP titrated within the past year.      Surgical: Reviewed, as below.  Family: Reviewed, as below.   Social: Reviewed, as below.    ROS:    Pertinent ROS included in HPI and below.  PMH:     Past Medical History:   Diagnosis Date    Acute pancreatitis     Anal fissure     Anemia     Anticoagulant long-term use     Arthritis     Asthma in remission     Back pain     BPH (benign prostatic hypertrophy)     Cancer 2000    prostate- treated at [facility] with chemo- in remission since 2000    Chronic maxillary sinusitis     Clotting disorder     Diastolic dysfunction with chronic heart failure 12/03/2018    Family history of colon cancer     Family history of early CAD     GERD (gastroesophageal reflux disease)     Hard to intubate 10/25/2024    Helicobacter pylori (H. pylori) infection     Chronic    History of chronic pancreatitis     HTN (hypertension)     Lumbago 11/12/2012    Obesity     ABDON (obstructive sleep apnea)     Spinal stenosis of lumbar region     Von Willebrand disease     VWD (acquired von Willebrand's disease)      Past Surgical History:   Procedure Laterality Date    anal fissure repair      x2    ARTHROSCOPIC CHONDROPLASTY OF KNEE JOINT Left 11/08/2023     Procedure: ARTHROSCOPY, KNEE, WITH CHONDROPLASTY;  Surgeon: Karthik Tanner MD;  Location: [facility] OR;  Service: Orthopedics;  Laterality: Left;    BACK SURGERY  2012    BALLOON SINUPLASTY OF PARANASAL SINUS Bilateral 10/07/2019    Procedure: SINUPLASTY, USING BALLOON;  Surgeon: LAURENT Swanson MD;  Location: [facility] OR;  Service: ENT;  Laterality: Bilateral;    CARPAL TUNNEL RELEASE  2003    left hand    CATHETERIZATION OF BOTH LEFT AND RIGHT HEART N/A 02/14/2019    Procedure: CATHETERIZATION, HEART, BOTH LEFT AND RIGHT;  Surgeon: Kyle Magana MD;  Location: [facility] CATH LAB;  Service: Cardiology;  Laterality: N/A;    CERVICAL DISCECTOMY  2003    COLONOSCOPY N/A 10/07/2015    Procedure: COLONOSCOPY;  Surgeon: Rosendo Boyer MD;  Location: [facility] (4TH FLR);  Service: Endoscopy;  Laterality: N/A;  PM Prep    COLONOSCOPY N/A 06/19/2017    Procedure: COLONOSCOPY;  Surgeon: Rosendo Boyer MD;  Location: [facility] (4TH FLR);  Service: Endoscopy;  Laterality: N/A;  constipation prep (no DM no CHF)       hx of vonWillebrand's disease-will need infusion prior    COLONOSCOPY N/A 03/04/2020    Procedure: COLONOSCOPY;  Surgeon: Rosendo Boyer MD;  Location: [facility] (4TH FLR);  Service: Endoscopy;  Laterality: N/A;  Please order CBC, ferritin, Iron TIBC day of case per Dr. Boyer  labwork at 7:10am and patient will check in right after.  per Dr. Tyler patient can hold Plavix 5 days prior see note 1/7/20  DDAVP prior to procedure needed see note 1/16/20 for oncall med by Dr. Tyler -     CYSTOSCOPY  08/12/2022    Procedure: CYSTOSCOPY;  Surgeon: Tyler Reid Jr., MD;  Location: [address] FLR;  Service: Urology;;    ESOPHAGOGASTRODUODENOSCOPY N/A 03/04/2020    Procedure: EGD (ESOPHAGOGASTRODUODENOSCOPY);  Surgeon: Rosendo Boyer MD;  Location: [facility] (4TH FLR);  Service: Endoscopy;  Laterality: N/A;  EGD and Colonoscopy orders merged together  labwork at 7:10am and patient will check in right  after.  DDAVP prior to procedure needed see note 1/16/20 for oncall med  per Dr. Tyler patient can hold Plavix 5 days prior see note 1/7/20    ESOPHAGOGASTRODUODENOSCOPY N/A 11/03/2020    Procedure: EGD (ESOPHAGOGASTRODUODENOSCOPY);  Surgeon: Rosendo Boyer MD;  Location: [facility] (2ND FLR);  Service: Endoscopy;  Laterality: N/A;  Please recall pt has von Willebrands and will require DDVAP infusion prior to procedure as previously done.    see telephone encounter on 10/1/20 regarding DDAVP   ok to hold Plavix 5 days prior per Dr.Blessey BUCKNER screening on 10/31/20 -rb    ESOPHAGOGASTRODUODENOSCOPY N/A 05/31/2023    Procedure: EGD (ESOPHAGOGASTRODUODENOSCOPY);  Surgeon: Rosendo Boyer MD;  Location: [facility] (4TH FLR);  Service: Endoscopy;  Laterality: N/A;  cardiac clearnce-office note 5/1, ok to hold plavix-tele encounter 5/2-LW  5/11/23 r/s'MD noelle booked out. instr portal -ml  5/25 - precall attempted. no answer. MML    EXAMINATION UNDER ANESTHESIA N/A 03/15/2021    Procedure: EXAM UNDER ANESTHESIA;  Surgeon: Silvia Cazares MD;  Location: [facility] OR 2ND FLR;  Service: Colon and Rectal;  Laterality: N/A;    EXAMINATION UNDER ANESTHESIA N/A 02/25/2022    Procedure: EXAM UNDER ANESTHESIA, EXCISION ANAL PAPILLA;  Surgeon: Nadeem Grady MD;  Location: [facility] 2ND FLR;  Service: Colon and Rectal;  Laterality: N/A;    FUNCTIONAL ENDOSCOPIC SINUS SURGERY (FESS) USING COMPUTER-ASSISTED NAVIGATION Bilateral 10/07/2019    Procedure: FESS, USING COMPUTER-ASSISTED NAVIGATION;  Surgeon: LAURENT Swanson MD;  Location: [facility] OR;  Service: ENT;  Laterality: Bilateral;    INJECTION OF ANESTHETIC AGENT AROUND NERVE Bilateral 10/13/2020    Procedure: BLOCK, NERVE BILATERAL C4,C5,C6 Plavix clearance requested;  Surgeon: Fredy Magana MD;  Location: [facility] MGT;  Service: Pain Management;  Laterality: Bilateral;  BLOCK, NERVE BILATERAL C4,C5,C6  Plavix clearance ok, will require DDVAP infusion    KNEE ARTHROSCOPY W/  MENISCECTOMY Left 11/08/2023    Procedure: ARTHROSCOPY, KNEE, WITH PARTIAL LATERAL MENISCECTOMY;  Surgeon: Karthik Tanner MD;  Location: [facility] OR;  Service: Orthopedics;  Laterality: Left;  regional w/o catheter (adductor)    LATERAL INTERNAL ANAL SPHINCTEROTOMY N/A 12/10/2021    Procedure: SPHINCTEROTOMY-LATERAL INTERNAL ANAL;  Surgeon: Nadeem Grady MD;  Location: [facility] 2ND FLR;  Service: Colon and Rectal;  Laterality: N/A;    LEFT HEART CATHETERIZATION Left 02/14/2019    Procedure: Left heart cath;  Surgeon: Kyle Magana MD;  Location: [facility] CATH LAB;  Service: Cardiology;  Laterality: Left;    LUMBAR FUSION  2012    PH MONITORING, ESOPHAGUS, WIRELESS, (ON REFLUX MEDS) N/A 05/31/2023    Procedure: PH MONITORING, ESOPHAGUS, WIRELESS, (ON REFLUX MEDS);  Surgeon: Rosendo Boyer MD;  Location: [facility] ENDO (4TH FLR);  Service: Endoscopy;  Laterality: N/A;  96hr, on his AcipHex 20 mg every 12 hours, instructions portal-LW    RADIOFREQUENCY ABLATION Right 05/09/2019    Procedure: RADIOFREQUENCY ABLATION, RIGHT L3,L4,L5;  Surgeon: Fredy Magana MD;  Location: [facility] PAIN MGT;  Service: Pain Management;  Laterality: Right;  1 of 2  RT RFA L3,4,5  PLAVIX clearance received, pt needs DDAVP    RADIOFREQUENCY ABLATION Left 05/23/2019    Procedure: RADIOFREQUENCY ABLATION, LEFT L3,L4,L5;  Surgeon: Fredy Magana MD;  Location: [facility] PAIN MGT;  Service: Pain Management;  Laterality: Left;  2 of 2  LT RFA L3,4,5  PLAVIX clearance received, pt needs DDAVP    RADIOFREQUENCY ABLATION Left 01/16/2020    Procedure: RADIOFREQUENCY ABLATION LEFT L3, L4, L5 MEDIAL BRANCH 1 OF 2 **PATIENT ARRIVING AT 8 AM**;  Surgeon: Fredy Magana MD;  Location: [facility] PAIN MGT;  Service: Pain Management;  Laterality: Left;  NEEDS CONSENT, PLAVIX CLEARANCE IN CHART    RADIOFREQUENCY ABLATION Right 01/28/2020    Procedure: RADIOFREQUENCY ABLATION, RIGHT L3-L4-L5 MEDIAL BRANCH 2 OF 2 (Left done on 1/16/20);  Surgeon: Fredy MCCLELLAN  MD Izzy;  Location: [facility] PAIN MGT;  Service: Pain Management;  Laterality: Right;    RADIOFREQUENCY ABLATION Left 08/18/2020    Procedure: RADIOFREQUENCY ABLATION, L3-L4-L5 MEDIAL BRANCH 1 OF 2 clear to hold plavix 7 days;  Surgeon: Fredy Magana MD;  Location: [facility] PAIN MGT;  Service: Pain Management;  Laterality: Left;    RADIOFREQUENCY ABLATION Right 09/01/2020    Procedure: RADIOFREQUENCY ABLATION, L3-L4-L5 MEDIAL BR ANCH 2 OF 2 clear to hol,d Plavix 7 days;  Surgeon: Fredy Magana MD;  Location: [facility] PAIN MGT;  Service: Pain Management;  Laterality: Right;    RADIOFREQUENCY ABLATION Bilateral 12/21/2021    Procedure: RADIOFREQUENCY ABLATION B/L L3,4,5 RFA (give dDAVP prior) NEEDS CONSENT plavix ok x7;  Surgeon: Fredy Magana MD;  Location: [facility] PAIN MGT;  Service: Pain Management;  Laterality: Bilateral;    RADIOFREQUENCY ABLATION Left 11/21/2022    Procedure: RADIOFREQUENCY ABLATION LEFT L3,L4,L5 MUST HAVE dDVAP PRIOR ONE OF TWO;  Surgeon: Milo Winston MD;  Location: [facility] PAIN MGT;  Service: Pain Management;  Laterality: Left;    RADIOFREQUENCY ABLATION Right 12/05/2022    Procedure: RADIOFREQUENCY ABLATION RIGHT L3,L4,L5  PRE-MEDICATE WITH dDVAP TWO OF TWO;  Surgeon: Milo Winston MD;  Location: [facility] PAIN MGT;  Service: Pain Management;  Laterality: Right;    RADIOFREQUENCY ABLATION Bilateral 04/29/2024    Procedure: RADIOFREQUENCY ABLATION BILATERAL L3, 4, 5 *DDAVP BEFORE*;  Surgeon: Milo Winston MD;  Location: [facility] PAIN MGT;  Service: Pain Management;  Laterality: Bilateral;  [phone]  4 WK F/U CHANDRA    RADIOFREQUENCY ABLATION Bilateral 12/23/2024    Procedure: RADIOFREQUENCY ABLATION BILATERAL L3, 4, 5;  Surgeon: Milo Winston MD;  Location: [facility] PAIN MGT;  Service: Pain Management;  Laterality: Bilateral;  4 WK F/U JERI    RADIOFREQUENCY ABLATION OF LUMBAR MEDIAL BRANCH NERVE AT SINGLE LEVEL Left 05/24/2018    Procedure: RADIOFREQUENCY THERMOCOAGULATION (RFTC)-NERVE-MEDIAN  "BRANCH-LUMBAR;  Surgeon: Fredy Magana MD;  Location: [facility] PAIN MGT;  Service: Pain Management;  Laterality: Left;  Left RFA @ L3,4,5  [id number]  with IV Sedation    2 of 2    RETROGRADE PYELOGRAPHY Bilateral 08/12/2022    Procedure: PYELOGRAM, RETROGRADE;  Surgeon: Tyler Reid Jr., MD;  Location: [facility] OR 1ST FLR;  Service: Urology;  Laterality: Bilateral;    ROBOT-ASSISTED LAPAROSCOPIC REPAIR OF INGUINAL HERNIA USING DA ASHTYN XI Right 10/25/2024    Procedure: XI ROBOTIC REPAIR, HERNIA, RIGHT, INGUINAL, WITH MESH;  Surgeon: Jose Hilliard MD;  Location: [facility] OR 2ND FLR;  Service: General;  Laterality: Right;    SPINE SURGERY      TONSILLECTOMY      at age 24    TRANSFORAMINAL EPIDURAL INJECTION OF STEROID Bilateral 06/19/2023    TRANSFORAMINAL EPIDURAL INJECTION OF STEROID N/A 02/17/2025    URETEROSCOPY Bilateral 08/12/2022    Procedure: URETEROSCOPY;  Surgeon: Tyler Reid Jr., MD;  [facility];  Service: Urology;  Laterality: Bilateral;    VASECTOMY  1996     Allergies:     Review of patient's allergies indicates:   Allergen Reactions    Penicillins Hives and Swelling     Has had allergy testing and can prob tolerate penicillin    Ace inhibitors Other (See Comments)     "Caused a respiratory infection"    Aspirin Hives           Codeine Itching     Ok to take percocet    Dilaudid [hydromorphone] Itching    Gabapentin Hallucinations     Medications:   Medications Ordered Prior to Encounter[1]  Social History:     Social History     Tobacco Use    Smoking status: Never    Smokeless tobacco: Never   Substance Use Topics    Alcohol use: Yes     Alcohol/week: 1.0 standard drink of alcohol     Types: 1 Glasses of wine per week     Comment: social     Family History:     Family History   Problem Relation Name Age of Onset    Colon cancer Father Pato Santiago 67        colon cancer    Hypertension Father Pato Santiago     Glaucoma Father Pato Santiago     Cancer Father Pato Santiago     " "Colon cancer Paternal Grandfather  65             Coronary artery disease Mother Eula Santiago 45    Hypertension Mother Eula Santiago     Heart disease Mother Eula Santiago     Colon cancer Mother Eula Santiago     Diabetes Mother Eula Santiago     No Known Problems Brother Humble     No Known Problems Sister Pat     No Known Problems Daughter Sophie     No Known Problems Son Cosme     Coronary artery disease Brother Juju Barron    No Known Problems Daughter Hamida     No Known Problems Daughter Rosa     No Known Problems Son Beto     No Known Problems Son Robin     Colon cancer Paternal Uncle  65    Diabetes Mellitus Paternal Grandmother      Esophageal cancer Neg Hx       Physical Exam:   /76 (BP Location: Left arm, Patient Position: Sitting)   Pulse 72   Ht 5' 10" (1.778 m)   Wt 109.9 kg (242 lb 4.6 oz)   SpO2 95%   BMI 34.76 kg/m²      Physical Exam  Constitutional:       Appearance: Normal appearance.   HENT:      Head: Normocephalic.      Mouth/Throat:      Mouth: Mucous membranes are moist.   Neck:      Vascular: No carotid bruit.   Cardiovascular:      Rate and Rhythm: Normal rate and regular rhythm.      Pulses:           Carotid pulses are 2+ on the right side and 2+ on the left side.       Radial pulses are 2+ on the right side and 2+ on the left side.      Heart sounds: Normal heart sounds. No murmur heard.  Pulmonary:      Effort: Pulmonary effort is normal.      Breath sounds: Normal breath sounds.   Musculoskeletal:      Right lower leg: Edema present.      Left lower leg: Edema present.      Comments: 1+ edema to b/l ankles   Skin:     General: Skin is warm and dry.   Neurological:      Mental Status: He is alert and oriented to person, place, and time.          Labs:     Blood Tests:  Lab Results   Component Value Date    BNP <10 10/25/2023     03/28/2025     12/28/2024    K 4.1 03/28/2025    K 3.8 12/28/2024     03/28/2025     12/28/2024    CO2 31 " (H) 03/28/2025    CO2 25 12/28/2024    BUN 15 03/28/2025    CREATININE 0.8 03/28/2025    GLU 82 03/28/2025    GLU 96 12/28/2024    HGBA1C 4.4 07/16/2024    MG 2.0 07/16/2024    AST 23 03/28/2025    AST 22 12/28/2024    ALT 19 03/28/2025    ALT 24 12/28/2024    ALBUMIN 3.7 03/28/2025    ALBUMIN 3.8 12/28/2024    ALBUMIN 4.1 10/05/2024    PROT 8.0 03/28/2025    PROT 7.5 12/28/2024    BILITOT 1.1 (H) 03/28/2025    BILITOT 1.1 (H) 12/28/2024    WBC 8.40 03/28/2025    HGB 13.7 (L) 05/23/2025    HGB 13.7 (L) 02/28/2025    HCT 43.8 03/28/2025    HCT 43.2 02/28/2025    MCV 96 03/28/2025    MCV 97 02/28/2025     03/28/2025     02/28/2025    INR 1.0 04/04/2021    TSH 1.083 03/28/2025    TSH 1.007 07/16/2024       Lab Results   Component Value Date    CHOL 135 03/28/2025    CHOL 182 10/05/2024    HDL 35 (L) 03/28/2025    TRIG 125 03/28/2025    TRIG 119 10/05/2024       Lab Results   Component Value Date    LDLCALC 75.0 03/28/2025       Lab Results   Component Value Date    TSH 1.083 03/28/2025       Lab Results   Component Value Date    HGBA1C 4.4 07/16/2024         Imaging:     Echocardiogram  TTE 6/2020  Normal left ventricular systolic function. The estimated ejection fraction is 60%.  Concentric left ventricular remodeling.  Normal LV diastolic function. Borderline enlarged left atrium, IVC large but collapsing, no convincing evidence of diastolic dysfunction.  Mild mitral sclerosis.  Mild to moderate tricuspid regurgitation.  Normal right ventricular systolic function.  Mild left atrial enlargement.  The estimated PA systolic pressure is 37 mmHg.  Intermediate central venous pressure (8 mmHg).    TTE 9/2018    1 - Normal left ventricular systolic function (EF 60-65%).     2 - No wall motion abnormalities.     3 - Impaired LV relaxation, normal LAP (grade 1 diastolic dysfunction).     4 - Mild mitral regurgitation.     TTE 5/2016    1 - Normal left ventricular systolic function (EF 55-60%).     2 - Normal  right ventricular systolic function .     3 - Normal left ventricular diastolic function.     4 - The estimated PA systolic pressure is greater than 31 mmHg.     5 - Mild Concentric LVH.     Stress testing  [facility] 8/2024    Stress Protocol: The patient exercised for 7 minutes 21 seconds on a high ramp protocol, corresponding to a functional capacity of 12METS, achieving a peak heart rate of 157 bpm, which is 99% of the age predicted maximum heart rate. Their exercise capacity was normal. The patient reported no symptoms during the stress test. The test was stopped because the patient experienced fatigue.    ECG Conclusion: The ECG portion of the study is positive for ischemia.    Left Ventricle: The left ventricle is normal in size. Normal wall thickness. There is normal systolic function. Ejection fraction by visual approximation is 60%. There is normal diastolic function.    Right Ventricle: Normal right ventricular cavity size. Wall thickness is normal. Systolic function is normal.    Pulmonary Artery: The estimated pulmonary artery systolic pressure is 28 mmHg.    Stress ECG: There is 1.0 mm ST segment depression in the lateral leads during stress. There are no arrhythmias during stress. There is normal blood pressure response with stress.    Post-stress Impression: The study is negative with no echocardiographic evidence of stress induced ischemia. Likely false positive stress ECG.    PET Stress 12/2018  The relative PET images show no clinically significant regional resting or stress induced perfusion defect.  For whole heart absolute myocardial perfusion (cc/min/g) averaged 1.16 rest (whihc is elevated), 1.59 at stress (which is mildly reduced), and 1.4 CFR (which is moderately reduced impart due to elevated resting flow).  Stress flow is reduced diffusely throughout the heart consistent with the presence of CAD, but above ischemic thresholds.  Gated perfusion images showed an ejection fraction of 69% at rest  and 69% during stress.  Wall motion was normal at rest and at stress.  LV cavity size at rest is normal. LV cavity size at stress is normal.  The EKG portion of this study is negative for myocardial ischemia.  Arrhythmias during stress: occasional PVCs.  The results of the PET favors medical therapy.  There is no prior study for comparison.    Cath Lab  [facility] 2/2019  LV end diastolic pressure is elevated.  LVEDP (Pre): 23  Post Atrio lesion , 50% stenosed.  Estimated blood loss: none  Diastolic dysfunction.  Filling pressures moderately elevated. Pulmonary HTN is mild to moderate.  Non-obstructive CAD.      Other  48-hour Holter 4/2025  Predominant Rhythm: Sinus rhythm with heart rates varying between 58 and 124 BPM with an average of 81BPM.     EKG:   3/27/25 - Normal sinus rhythm with sinus arrhythmia, 72 BPM   Minimal voltage criteria for LVH, may be normal variant ( R in aVL )   Borderline Abnormal ECG     Assessment:     1. Coronary artery disease involving native coronary artery of native heart without angina pectoris    2. Chronic fatigue    3. Primary hypertension    4. Other hyperlipidemia    5. BOYD (dyspnea on exertion)        Plan:     Coronary artery disease involving native coronary artery of native heart without angina pectoris  Chronic fatigue  Non-obstructive CAD on 2019 [facility]. 2024 stress echo with 1.0 mm ST segment depression in the lateral leads during stress, but no e/o ischemia.  Given worsening BOYD, known prior non-obstructive dz, will obtain PET stress to r/o ischemia/progression of CAD.   With prior hx of testosterone deficiency, also advised reaching out to PCP for possible testosterone labs.    Primary hypertension  BP controlled. Continue current medication regimen. Encourage low-sodium diet.    Other hyperlipidemia  Continue atorvastatin + zetia.       Signed:  Lauren Vlosich, PA-C [facility]     5/28/2025     Follow-up:     Future Appointments   Date Time Provider Department Center    6/4/2025 10:00 AM CARDIAC, PET IMAGING [facility]   7/30/2025  2:40 PM Milla Lugo, NP [facility] SLEEP [facility]   8/29/2025  2:20 PM Nirmala White, OD [facility] [place]              [1]   Current Outpatient Medications on File Prior to Visit   Medication Sig Dispense Refill    albuterol (PROVENTIL/VENTOLIN HFA) 90 mcg/actuation inhaler INHALE 2 PUFFS INTO THE LUNGS EVERY 6 HOURS AS NEEDED FOR WHEEZING OR SHORTNESS OF BREATH 18 g 4    albuterol (PROVENTIL/VENTOLIN HFA) 90 mcg/actuation inhaler Inhale 1-2 puffs into the lungs every 6 (six) hours as needed for Wheezing. Rescue 18 g 3    amLODIPine (NORVASC) 10 MG tablet Take 1 tablet (10 mg total) by mouth once daily. 90 tablet 3    atorvastatin (LIPITOR) 80 MG tablet Take 1 tablet (80 mg total) by mouth every evening. 90 tablet 3    azelastine (ASTELIN) 137 mcg (0.1 %) nasal spray 1 spray (137 mcg total) by Nasal route 2 (two) times daily. 30 mL 0    budesonide-formoterol 160-4.5 mcg (SYMBICORT) 160-4.5 mcg/actuation HFAA INHALE 2 PUFFS INTO THE LUNGS EVERY 12 HOURS 10.2 g 12    calcium citrate-vitamin D3 315-200 mg (CITRACAL+D) 315-200 mg-unit per tablet Take 1 tablet by mouth nightly.       cetirizine (ZYRTEC) 10 MG tablet Take 1 tablet (10 mg total) by mouth once daily. 30 tablet 0    clopidogreL (PLAVIX) 75 mg tablet Take 1 tablet (75 mg total) by mouth once daily. 90 tablet 3    cyanocobalamin, vitamin B-12, 50 mcg tablet Take 50 mcg by mouth nightly.       docusate sodium (COLACE) 100 MG capsule Take 1 tablet by mouth Twice daily. 1 Capsule Oral Twice a day .  Take with pain medicine      doxazosin (CARDURA) 1 MG tablet TAKE 1 TABLET BY MOUTH ONCE DAILY IN THE EVENING 90 tablet 3    ezetimibe (ZETIA) 10 mg tablet Take 1 tablet (10 mg total) by mouth once daily. 90 tablet 3    fluticasone propionate (FLONASE) 50 mcg/actuation nasal spray One spray in each nostril twice daily after 1st using azelastine nasal spray 18.2 mL 11     hydroCHLOROthiazide (HYDRODIURIL) 25 MG tablet Take 1 tablet (25 mg total) by mouth once daily. 30 tablet 11    ipratropium (ATROVENT) 42 mcg (0.06 %) nasal spray 1-2 sprays in each nostril before eating and at bedtime as needed 30 mL 11    RABEprazole (ACIPHEX) 20 mg tablet Take 1 tablet (20 mg total) by mouth every 12 (twelve) hours. 180 tablet 3    tadalafiL (CIALIS) 20 MG Tab Take 1 tablet (20 mg total) by mouth as needed. Take every 36 hours as needed for ED. 30 tablet 9    zolpidem (AMBIEN) 10 mg Tab TAKE 1 TABLET BY MOUTH EVERY DAY AT BEDTIME 20 tablet 0    testosterone (ANDRODERM) 2 mg/24 hour PT24 APPLY 1 PATCH TOPICALLY TO THE SKIN EVERY DAY (Patient not taking: Reported on 4/4/2025) 60 patch 3    [DISCONTINUED] tamsulosin (FLOMAX) 0.4 mg Cap Take 1 capsule (0.4 mg total) by mouth once daily. 30 capsule 0     Current Facility-Administered Medications on File Prior to Visit   Medication Dose Route Frequency Provider Last Rate Last Admin    0.9%  NaCl infusion   Intravenous Continuous Milla Oliveira NP 70 mL/hr at 03/15/21 1417 New Bag at 03/15/21 1417    0.9%  NaCl infusion   Intravenous Continuous Milla Oliveira NP 70 mL/hr at 12/10/21 0736 New Bag at 12/10/21 0736    0.9%  NaCl infusion   Intravenous Continuous Jaqueline Langley NP        0.9%  NaCl infusion   Intravenous Continuous Beto Whiteside MD 50 mL/hr at 04/29/24 1359 50 mL/hr at 04/29/24 1359    mupirocin 2 % ointment   Nasal On Call Procedure Milla Oliveira NP   Given at 12/10/21 0729    mupirocin 2 % ointment   Nasal On Call Procedure Jaqueline Langley NP   Given at 02/25/22 0672

## 2025-05-28 NOTE — TELEPHONE ENCOUNTER
Patient aware  has placed orders for his procedures and one of the schedules will be in contact with him.   Keeley

## 2025-05-28 NOTE — TELEPHONE ENCOUNTER
Pt scheduled for f/u w. Ms Magana, June appt cancelled. He agreed to date/time of appointment(s). Told pt to arrive 30 mn early and he verbalized understanding.

## 2025-05-28 NOTE — TELEPHONE ENCOUNTER
----- Message from Rosendo Boyer MD sent at 5/28/2025  3:26 PM CDT -----  Regarding: RE: SACHA rosado appt  Contact: [phone]  Procedure: EGD/Colonoscopy Diagnosis:  Iron deficiency anemia, loose stools, GERD, family history of colon cancer, Surveillance colonoscopy - Hx of colon polyps Procedure Timing: Within 2-4 weeks Location: Any Site Additional Scheduling Information: Von Willebrand diseaseOverview:Recommend DDAVP   infusion , 20 mcg, to be given over 30 minutes, 1 hr before the procedure. Appears that orders have been placed by Dr. Agarwal: Heme-Onc from his clinic note 5/5/2025 Prep Specifications:Standard prep Is the patient taking a GLP-1 Agonist:no Have you attached a patient to this message: yes  ----- Message -----  From: Jojo Munoz MA  Sent: 5/28/2025   2:59 PM CDT  To: Rosendo Boyer MD  Subject: FW: SACHA rosado appt                                ----- Message -----  From: Padilla Reeves  Sent: 5/28/2025   2:57 PM CDT  To: [facility] Endoscopy Schedulers; Merlin ENG S#  Subject: SACHA rosado appt                                  Appt Type: Colonoscopy and EGD per pt from Dr. Boyer Date/Time/Preference:Provider:Dr. Boyer Caller:The pt Contact Preference: [phone] Information: Thank you.

## 2025-06-04 ENCOUNTER — RESULTS FOLLOW-UP (OUTPATIENT)
Dept: CARDIOLOGY | Facility: HOSPITAL | Age: 65
End: 2025-06-04
Payer: MEDICARE

## 2025-06-04 ENCOUNTER — HOSPITAL ENCOUNTER (OUTPATIENT)
Dept: CARDIOLOGY | Facility: HOSPITAL | Age: 65
Discharge: HOME | End: 2025-06-04
Payer: MEDICARE

## 2025-06-04 VITALS
SYSTOLIC BLOOD PRESSURE: 165 MMHG | HEART RATE: 74 BPM | DIASTOLIC BLOOD PRESSURE: 93 MMHG | BODY MASS INDEX: 34.65 KG/M2 | HEIGHT: 70 IN | WEIGHT: 242 LBS

## 2025-06-04 DIAGNOSIS — R06.09 DOE (DYSPNEA ON EXERTION): ICD-10-CM

## 2025-06-04 DIAGNOSIS — I25.10 CORONARY ARTERY DISEASE INVOLVING NATIVE CORONARY ARTERY OF NATIVE HEART WITHOUT ANGINA PECTORIS: ICD-10-CM

## 2025-06-04 LAB
CFR FLOW - ANTERIOR: 2
CFR FLOW - INFERIOR: 2.11
CFR FLOW - LATERAL: 1.92
CFR FLOW - MAX: 2.65
CFR FLOW - MIN: 1.67
CFR FLOW - SEPTAL: 2.16
CFR FLOW - WHOLE HEART: 2.05
CV PHARM DOSE: 0.4 MG
CV STRESS BASE HR: 72 BPM
DIASTOLIC BLOOD PRESSURE: 103 MMHG
EJECTION FRACTION- HIGH: 65 %
END DIASTOLIC INDEX-HIGH: 153 ML/M2
END DIASTOLIC INDEX-LOW: 93 ML/M2
END SYSTOLIC INDEX-HIGH: 71 ML/M2
END SYSTOLIC INDEX-LOW: 31 ML/M2
NUC REST DIASTOLIC VOLUME INDEX: 116
NUC REST EJECTION FRACTION: 57
NUC REST SYSTOLIC VOLUME INDEX: 50
NUC STRESS DIASTOLIC VOLUME INDEX: 129
NUC STRESS EJECTION FRACTION: 59 %
NUC STRESS SYSTOLIC VOLUME INDEX: 53
OHS CV CPX 1 MINUTE RECOVERY HEART RATE: 96 BPM
OHS CV CPX 85 PERCENT MAX PREDICTED HEART RATE MALE: 134
OHS CV CPX MAX PREDICTED HEART RATE: 158
OHS CV CPX PATIENT IS FEMALE: 0
OHS CV CPX PATIENT IS MALE: 1
OHS CV CPX PEAK DIASTOLIC BLOOD PRESSURE: 77 MMHG
OHS CV CPX PEAK HEAR RATE: 81 BPM
OHS CV CPX PEAK RATE PRESSURE PRODUCT: NORMAL
OHS CV CPX PEAK SYSTOLIC BLOOD PRESSURE: 164 MMHG
OHS CV CPX PERCENT MAX PREDICTED HEART RATE ACHIEVED: 51
OHS CV CPX RATE PRESSURE PRODUCT PRESENTING: NORMAL
OHS CV INITIAL DOSE: 32.9 MCG/KG/MIN
OHS CV MODERATELY REDUCED FLOW CAPACITY: 0 %
OHS CV MYOCARDIAL STEAL: 0 %
OHS CV NO ISCHEMIA MILDLY REDUCED FLOW CAPACTY: 71 %
OHS CV NO ISCHEMIA MINIMALLY REDUCED FLOW CAPACITY: 29 %
OHS CV NON-TRANSMURAL MYOCARDIAL INFARCTION SINGLE CONTINUOUS REGION: 0 %
OHS CV NORMAL FLOW CAPACITY COMPARABLE TO HEALTHY YOUNG VOLUNTEERS: 0 %
OHS CV PEAK DOSE: 33 MCG/KG/MIN
OHS CV PET ID: 9009
OHS CV PRE-DOMINANTLY MYOCARDIAL SCAR: 0 %
OHS CV SEVERELY REDUCED FLOW CAPACITY LARGEST SINGLE CONTINUOUS REGION: 0 %
OHS CV SEVERELY REDUCED FLOW CAPACITY: 0 %
OHS CV TOTAL EXAM DLP: 509.43 MGY-CM
REST FLOW - ANTERIOR: 0.78 CC/MIN/G
REST FLOW - INFERIOR: 0.81 CC/MIN/G
REST FLOW - LATERAL: 0.87 CC/MIN/G
REST FLOW - MAX: 1.09 CC/MIN/G
REST FLOW - MIN: 0.43 CC/MIN/G
REST FLOW - SEPTAL: 0.67 CC/MIN/G
REST FLOW - WHOLE HEART: 0.78 CC/MIN/G
RETIRED EF AND QEF - SEE NOTES: 53 %
STRESS FLOW - ANTERIOR: 1.55 CC/MIN/G
STRESS FLOW - INFERIOR: 1.72 CC/MIN/G
STRESS FLOW - LATERAL: 1.66 CC/MIN/G
STRESS FLOW - MAX: 2.03 CC/MIN/G
STRESS FLOW - MIN: 0.76 CC/MIN/G
STRESS FLOW - SEPTAL: 1.45 CC/MIN/G
STRESS FLOW - WHOLE HEART: 1.6 CC/MIN/G
SYSTOLIC BLOOD PRESSURE: 187 MMHG

## 2025-06-04 PROCEDURE — 93017 CV STRESS TEST TRACING ONLY: CPT

## 2025-06-04 PROCEDURE — 78431 MYOCRD IMG PET RST&STRS CT: CPT | Mod: 26,,, | Performed by: INTERNAL MEDICINE

## 2025-06-04 PROCEDURE — 78434 AQMBF PET REST & RX STRESS: CPT | Mod: 26,,, | Performed by: INTERNAL MEDICINE

## 2025-06-04 PROCEDURE — 93016 CV STRESS TEST SUPVJ ONLY: CPT | Mod: ,,, | Performed by: INTERNAL MEDICINE

## 2025-06-04 PROCEDURE — 93018 CV STRESS TEST I&R ONLY: CPT | Mod: ,,, | Performed by: INTERNAL MEDICINE

## 2025-06-04 PROCEDURE — A9555 RB82 RUBIDIUM: HCPCS

## 2025-06-04 PROCEDURE — 63600175 PHARM REV CODE 636 W HCPCS

## 2025-06-04 RX ORDER — AMINOPHYLLINE 25 MG/ML
75 INJECTION, SOLUTION INTRAVENOUS ONCE
Status: COMPLETED | OUTPATIENT
Start: 2025-06-04 | End: 2025-06-04

## 2025-06-04 RX ORDER — REGADENOSON 0.08 MG/ML
0.4 INJECTION, SOLUTION INTRAVENOUS
Status: COMPLETED | OUTPATIENT
Start: 2025-06-04 | End: 2025-06-04

## 2025-06-04 RX ADMIN — RUBIDIUM CHLORIDE RB-82 32.9 MILLICURIE: 150 INJECTION, SOLUTION INTRAVENOUS at 10:06

## 2025-06-04 RX ADMIN — AMINOPHYLLINE 75 MG: 25 INJECTION, SOLUTION INTRAVENOUS at 10:06

## 2025-06-04 RX ADMIN — REGADENOSON 0.4 MG: 0.08 INJECTION, SOLUTION INTRAVENOUS at 10:06

## 2025-06-04 RX ADMIN — RUBIDIUM CHLORIDE RB-82 33 MILLICURIE: 150 INJECTION, SOLUTION INTRAVENOUS at 10:06

## 2025-06-05 ENCOUNTER — OFFICE VISIT (OUTPATIENT)
Dept: INTERNAL MEDICINE | Facility: CLINIC | Age: 65
End: 2025-06-05
Payer: MEDICARE

## 2025-06-05 ENCOUNTER — TELEPHONE (OUTPATIENT)
Dept: ENDOSCOPY | Facility: HOSPITAL | Age: 65
End: 2025-06-05
Payer: MEDICARE

## 2025-06-05 ENCOUNTER — RESULTS FOLLOW-UP (OUTPATIENT)
Dept: INTERNAL MEDICINE | Facility: CLINIC | Age: 65
End: 2025-06-05

## 2025-06-05 ENCOUNTER — LAB VISIT (OUTPATIENT)
Dept: LAB | Facility: HOSPITAL | Age: 65
End: 2025-06-05
Payer: MEDICARE

## 2025-06-05 VITALS — WEIGHT: 244 LBS | BODY MASS INDEX: 34.93 KG/M2 | HEIGHT: 70 IN

## 2025-06-05 VITALS
HEART RATE: 64 BPM | WEIGHT: 244.5 LBS | BODY MASS INDEX: 35 KG/M2 | DIASTOLIC BLOOD PRESSURE: 82 MMHG | OXYGEN SATURATION: 98 % | SYSTOLIC BLOOD PRESSURE: 136 MMHG | HEIGHT: 70 IN

## 2025-06-05 DIAGNOSIS — E29.1 HYPOGONADISM MALE: Primary | ICD-10-CM

## 2025-06-05 DIAGNOSIS — R53.83 FATIGUE, UNSPECIFIED TYPE: ICD-10-CM

## 2025-06-05 DIAGNOSIS — Z86.0100 HISTORY OF COLON POLYPS: ICD-10-CM

## 2025-06-05 DIAGNOSIS — D68.00 VON WILLEBRAND DISEASE: Primary | ICD-10-CM

## 2025-06-05 DIAGNOSIS — R09.82 POSTNASAL DRIP: ICD-10-CM

## 2025-06-05 DIAGNOSIS — R09.89 THROAT CLEARING: ICD-10-CM

## 2025-06-05 DIAGNOSIS — K21.9 GASTROESOPHAGEAL REFLUX DISEASE, UNSPECIFIED WHETHER ESOPHAGITIS PRESENT: ICD-10-CM

## 2025-06-05 DIAGNOSIS — Z12.11 SCREEN FOR COLON CANCER: Primary | ICD-10-CM

## 2025-06-05 DIAGNOSIS — R19.5 LOOSE STOOLS: ICD-10-CM

## 2025-06-05 DIAGNOSIS — Z83.719 FAMILY HISTORY OF COLONIC POLYPS: ICD-10-CM

## 2025-06-05 DIAGNOSIS — E29.1 HYPOGONADISM MALE: ICD-10-CM

## 2025-06-05 DIAGNOSIS — E66.01 SEVERE OBESITY (BMI 35.0-39.9) WITH COMORBIDITY: ICD-10-CM

## 2025-06-05 DIAGNOSIS — Z12.11 COLON CANCER SCREENING: ICD-10-CM

## 2025-06-05 DIAGNOSIS — D50.9 IRON DEFICIENCY ANEMIA, UNSPECIFIED IRON DEFICIENCY ANEMIA TYPE: Primary | ICD-10-CM

## 2025-06-05 LAB — TESTOST SERPL-MCNC: 347 NG/DL (ref 304–1227)

## 2025-06-05 PROCEDURE — 84403 ASSAY OF TOTAL TESTOSTERONE: CPT

## 2025-06-05 PROCEDURE — 36415 COLL VENOUS BLD VENIPUNCTURE: CPT

## 2025-06-05 PROCEDURE — 99999 PR PBB SHADOW E&M-EST. PATIENT-LVL V: CPT | Mod: PBBFAC,,, | Performed by: PHYSICIAN ASSISTANT

## 2025-06-06 ENCOUNTER — TELEPHONE (OUTPATIENT)
Dept: ENDOSCOPY | Facility: HOSPITAL | Age: 65
End: 2025-06-06
Payer: MEDICARE

## 2025-06-06 NOTE — TELEPHONE ENCOUNTER
----- Message from Med Assistant Berkowitz sent at 2025 10:56 AM CDT -----  Regardin/17 BT  The patient is currently under an internal PCP, janee Gilliam. Is patient okay to hold blood thinner Plavix (clopidogrel) for their upcoming scheduled Colonoscopy/EGD on 25.

## 2025-06-06 NOTE — TELEPHONE ENCOUNTER
Dear FERN GONZALES,    Patient has a scheduled procedure Colonoscopy/EGD on 7/17/25 and is currently taking a blood thinner. In order to ensure patient safety, we would like to confirm that the patient can place their blood thinner medication on hold for the procedure. Can he/she discontinue Plavix (clopidogrel) for a minimum of 5 days prior to the procedure?     Thank you for your prompt reply.    [facility] Endoscopy Scheduling

## 2025-06-12 ENCOUNTER — OFFICE VISIT (OUTPATIENT)
Dept: PAIN MEDICINE | Facility: CLINIC | Age: 65
End: 2025-06-12
Payer: MEDICARE

## 2025-06-12 VITALS
WEIGHT: 242.94 LBS | HEIGHT: 70 IN | DIASTOLIC BLOOD PRESSURE: 89 MMHG | HEART RATE: 66 BPM | RESPIRATION RATE: 18 BRPM | TEMPERATURE: 98 F | OXYGEN SATURATION: 97 % | BODY MASS INDEX: 34.78 KG/M2 | SYSTOLIC BLOOD PRESSURE: 135 MMHG

## 2025-06-12 DIAGNOSIS — M96.1 POSTLAMINECTOMY SYNDROME OF LUMBAR REGION: ICD-10-CM

## 2025-06-12 DIAGNOSIS — R52 PAIN: Primary | ICD-10-CM

## 2025-06-12 DIAGNOSIS — M51.34 DDD (DEGENERATIVE DISC DISEASE), THORACIC: ICD-10-CM

## 2025-06-12 DIAGNOSIS — G89.4 CHRONIC PAIN SYNDROME: Primary | ICD-10-CM

## 2025-06-12 DIAGNOSIS — G56.00 CARPAL TUNNEL SYNDROME, UNSPECIFIED LATERALITY: ICD-10-CM

## 2025-06-12 PROCEDURE — 3008F BODY MASS INDEX DOCD: CPT | Mod: CPTII,S$GLB,, | Performed by: NURSE PRACTITIONER

## 2025-06-12 PROCEDURE — 99999 PR PBB SHADOW E&M-EST. PATIENT-LVL V: CPT | Mod: PBBFAC,,, | Performed by: NURSE PRACTITIONER

## 2025-06-12 PROCEDURE — 1160F RVW MEDS BY RX/DR IN RCRD: CPT | Mod: CPTII,S$GLB,, | Performed by: NURSE PRACTITIONER

## 2025-06-12 PROCEDURE — 1159F MED LIST DOCD IN RCRD: CPT | Mod: CPTII,S$GLB,, | Performed by: NURSE PRACTITIONER

## 2025-06-12 PROCEDURE — 3075F SYST BP GE 130 - 139MM HG: CPT | Mod: CPTII,S$GLB,, | Performed by: NURSE PRACTITIONER

## 2025-06-12 PROCEDURE — 99214 OFFICE O/P EST MOD 30 MIN: CPT | Mod: S$GLB,,, | Performed by: NURSE PRACTITIONER

## 2025-06-12 PROCEDURE — 3079F DIAST BP 80-89 MM HG: CPT | Mod: CPTII,S$GLB,, | Performed by: NURSE PRACTITIONER

## 2025-06-12 NOTE — PROGRESS NOTES
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 treatment provided mild relief. Physical therapy was ineffective.      Pain procedures:  2/25/15 Bilateral SI joint injection  7/8/2015 Bilateral L3,4,5 MBB  8/19/2015 Right L3,4,5 RFA  9/8/2015 Left L3,4,5 RFA  5/2/17 Right L3,4,5 RFA   5/16/17 Left L3,4,5 RFA- 100% relief  5/22/17 TPIs  5/10/18 Right L3,4,5 RFA- 80% relief  5/24/18 Left L3,4,5 RFA- 80% relief  5/9/19 Right L3,4,5 RFA- 50% relief  5/23/19 Left L3,4,5 RFA- 50% relief  1/16/20 Left L3,4,5 RFA- 85% relief  1/28/20 Right L3,4,5 RFA- 85% relief  8/18/20 Left L3,4,5 RFA- 85% relief  9/1/20 Right L3,4,5 RFA 85% relief  10/13/20 C4,5,6 MBB- 90% relief for one day  12/21/21 Bilateral L3,4,5 RFA- 85% relief  11/21/22 Left L3,4,5 RFA- 90% relief  12/5/22 Right L3,4,5 RFA- 90% relief  6/19/23 T7/8 TF ODALYS  4/29/24 B/L L3,4,5 RFA- 80% relief  2/17/25 L3/4 IL ODALYS- 90% relief    Imaging    MRI Lumbar Spine Without Contrast  Order: [id number]  Status: Final result       Visible to patient: Yes (seen)       Next appt: 11/04/2024 at 10:30 AM in Surgery (Jose Hilliard MD)       Dx: Dorsalgia, unspecified    0 Result Notes  Details    Reading Physician Reading Date Result Priority   Kylie Alfredo MD  [phone] 9/23/2024 Routine     Narrative & Impression  EXAMINATION:  MRI LUMBAR SPINE WITHOUT  CONTRAST     CLINICAL HISTORY:  Low back pain, symptoms persist with > 6wks conservative treatment; Dorsalgia, unspecified     TECHNIQUE:  Multiplanar, multisequence MR images were acquired from the thoracolumbar junction to the sacrum without the administration of contrast.     COMPARISON:  01/27/2021     FINDINGS:  Prior posterior and interbody fusion L4 through S1.     Marrow demonstrates homogeneous signal no evidence for marrow replacement process or acute fracture.     Disc space narrowing and Modic 2 changes L1-2.  Mild disc space narrowing L2-3.     Conus terminates appropriately at L1.     Multilevel degenerative change as diesel below:     L1-2: Posterior circumferential disc bulge with mild canal and mild bilateral neural foraminal narrowing.     L2-3: Small posterior circumferential disc bulge.  Superimposed central disc protrusion measuring 5 mm in AP dimension.  Mild-moderate canal narrowing.     L3-4: Posterior circumferential disc bulge, facet arthropathy, and thickening of the ligamentum flavum findings contribute to mild canal and mild bilateral neural foraminal narrowing.     L4-5: Central canal is decompressed.     L5-S1: Central canal is decompressed.     Focus of T2 signal hyperintensity right kidney, likely cyst.     Impression:     Multilevel degenerative change with progression compared to prior.     At L2-3, new central disc protrusion.     Canal narrowing L1-2 through L3-4, mild-moderate at L2-3.     BMP  Lab Results   Component Value Date     03/28/2025    K 4.1 03/28/2025     03/28/2025    CO2 31 (H) 03/28/2025    BUN 15 03/28/2025    CREATININE 0.8 03/28/2025    CALCIUM 9.3 03/28/2025    ANIONGAP 10 03/28/2025    ESTGFRAFRICA >60.0 07/19/2022    EGFRNONAA >60.0 07/19/2022         REVIEW OF SYSTEMS:    GENERAL:  No weight loss or fevers.    RESPIRATORY:  Denies SOB. Reports wheezing.  CARDIOVASCULAR:  Negative for chest pain, leg swelling or palpitations.  GI:  Negative for  "abdominal discomfort, blood in stools or black stools or change in bowel habits.  MUSCULOSKELETAL:  Joint stiffness, back pain.  SKIN:  Negative for lesions, rash, and itching.  PSYCH: Patients sleep is not disturbed secondary to pain.  HEMATOLOGY/LYMPHOLOGY:  History of easy bruising.  History of von Willebrand's factor deficiency. On Plavix.  NEURO:   No history of syncope, paralysis, seizures or tremors.   All other reviewed and negative other than HPI.      OBJECTIVE:    /89 (BP Location: Right arm, Patient Position: Sitting)   Pulse 66   Temp 97.7 °F (36.5 °C) (Oral)   Resp 18   Ht 5' 10" (1.778 m)   Wt 110.2 kg (242 lb 15.2 oz)   SpO2 97%   BMI 34.86 kg/m²     PHYSICAL EXAMINATION:    GENERAL: Well appearing, in no acute distress, alert and oriented x3.  PSYCH:  Mood and affect appropriate.  SKIN:  No evidence of infection from previous injection site. No visible rashes.  HEAD/FACE:  Normocephalic, atraumatic.   BACK: There is TTP over scar to right lower back. No swelling or redness noted. There is pain with lumbar extension. Positive facet loading bilaterally. SLR is negative.   EXTREMITIES: Bilateral lower and upper extremity strength is 5/5 and symmetric.   MUSCULOSKELETAL:   No atrophy or tone abnormalities are noted. No TTP over SI joints. Full ROM of bilateral hips without pain. Ted's is negative bilaterally. 5/5 strength in right ankle with plantar and dorsiflexion. 5/5 strength in left ankle with plantar and dorsiflexion. 5/5 strength with right knee flexion and extension. 5/5 strength with left knee flexion and extension.   NEURO: Cranial nerves grossly intact. No loss of sensation noted.  GAIT: Antalgic.      Assessment:     1. Chronic pain syndrome    2. Postlaminectomy syndrome of lumbar region    3. DDD (degenerative disc disease), thoracic    4. Carpal tunnel syndrome, unspecified laterality          Plan:     - I personally reviewed and interpreted relevant and pertinent " imaging. Results were discussed with the patient today.     - He is still doing well from previous L3/4 IL ODALYS.    - Current back pain is located at previous SCS battery site. He reports benefit with topics patches and wishes to continue this.    - Referral placed for hand clinic for likely right CTS.    - The patient will continue a home exercise routine to help with pain and strengthening.     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 It may be mild vWF, but most recent lab tests showed normal coagulation function. The patient has been previously receiving dDAVP prior to all procedures and has not exhibited bleeding. Hematology recommended receiving dDAVP prior to all invasive procedures. For all interventional procedures, we will give 0.3mcg/kg dDAVP immediately prior to the procedure.       - RTC PRN.      The above plan and management options were discussed at length with patient. Patient is in agreement with the above and verbalized understanding.     Buffy Villagran    06/12/2025

## 2025-06-13 ENCOUNTER — TELEPHONE (OUTPATIENT)
Dept: ORTHOPEDICS | Facility: CLINIC | Age: 65
End: 2025-06-13
Payer: MEDICARE

## 2025-06-17 ENCOUNTER — HOSPITAL ENCOUNTER (OUTPATIENT)
Dept: RADIOLOGY | Facility: OTHER | Age: 65
Discharge: HOME | End: 2025-06-17
Attending: SPECIALIST/TECHNOLOGIST
Payer: MEDICARE

## 2025-06-17 ENCOUNTER — OFFICE VISIT (OUTPATIENT)
Dept: ORTHOPEDICS | Facility: CLINIC | Age: 65
End: 2025-06-17
Payer: MEDICARE

## 2025-06-17 DIAGNOSIS — M54.12 RIGHT CERVICAL RADICULOPATHY: Primary | ICD-10-CM

## 2025-06-17 DIAGNOSIS — G56.01 CARPAL TUNNEL SYNDROME OF RIGHT WRIST: ICD-10-CM

## 2025-06-17 DIAGNOSIS — R52 PAIN: ICD-10-CM

## 2025-06-17 PROCEDURE — 73130 X-RAY EXAM OF HAND: CPT | Mod: TC,FY,RT

## 2025-06-17 PROCEDURE — 99999 PR PBB SHADOW E&M-EST. PATIENT-LVL III: CPT | Mod: PBBFAC,,, | Performed by: SPECIALIST/TECHNOLOGIST

## 2025-06-17 PROCEDURE — 73130 X-RAY EXAM OF HAND: CPT | Mod: 26,RT,, | Performed by: STUDENT IN AN ORGANIZED HEALTH CARE EDUCATION/TRAINING PROGRAM

## 2025-06-17 PROCEDURE — 99214 OFFICE O/P EST MOD 30 MIN: CPT | Mod: S$GLB,,, | Performed by: SPECIALIST/TECHNOLOGIST

## 2025-06-17 PROCEDURE — 1159F MED LIST DOCD IN RCRD: CPT | Mod: CPTII,S$GLB,, | Performed by: SPECIALIST/TECHNOLOGIST

## 2025-06-17 NOTE — PROGRESS NOTES
Patient ID: Bri Santiago is a 64 y.o. male.    Chief Complaint: Tingling and Numbness of the Right Hand    History of Present Illness    CHIEF COMPLAINT:  - Tingling and numbness in the right hand extending to the elbow    HPI:  Bri presents with complaints of tingling and numbness in the right hand extending to the elbow, which began approximately one month ago and have progressively worsened. The symptoms are constant and experienced throughout the day. He sometimes wakes up at night due to discomfort, requiring hand shaking for relief. A mild zingy sensation is felt in all fingertips, including the pinky, when tapped on the right wrist. No bracing has been tried for symptom relief.    Medical history includes neck surgery (ACDF at C5-C6) around 1999 or 2000, back surgery, and left-side carpal tunnel release performed simultaneously with the neck fusion. He is currently under pain management care with [facility] for low back issues but has not been seen for neck-related concerns. An MRI of the spine was ordered by Dr. Ilda Weldon in 2023.    He denies any numbness or tingling on the left side. Handedness not specified.    SURGICAL HISTORY:  - ACDF at C5-C6: 1999 or 2000  - Bilateral carpal tunnel release: Performed simultaneously with ACDF  - Back surgery      ROS:  ROS as indicated in HPI.          Hand/Wrist Musculoskeletal Exam    Inspection    Right      Erythema: none      Ecchymosis: none      Edema: none      Deformity: none      Wrist - prior incision: none    Left      Erythema: none      Ecchymosis: none      Edema: none      Deformity: none      Hand - prior incision: none      Wrist - prior incision: none    Range of Motion      Right Wrist      Right wrist range of motion is normal.      Left Wrist      Left wrist range of motion is normal.      Neurovascular    Right       Radial pulse: normal      Capillary refill: brisk and <3 sec      Ulnar nerve sensory distribution: normal      Median nerve  sensory distribution: normal      Superficial radial nerve sensory distribution: normal    Left       Capillary refill: brisk and <3 sec      Ulnar nerve sensory distribution: normal      Median nerve sensory distribution: normal      Superficial radial nerve sensory distribution: normal    Special Tests    Right      Phalen's: positive      Carpal compression test: positive      Elbow flexion: positive      Tinel's - carpal tunnel: positive      Tinel's - cubital tunnel: negative    Left      Phalen's: negative      Carpal compression test: negative      Elbow flexion: negative      Tinel's - carpal tunnel: negative      Tinel's - cubital tunnel: negative    General    Labored breathing: no    Psychiatric: normal mood and affect    Neurological: oriented x3    Skin: intact      Physical Exam    MSK: Hand/Wrist - Right: Positive Tinel's sign at wrist with mild tingling in fingertips. Negative Tinel's sign at elbow.  MSK: Hand/Wrist - Left: All normal.  Musculoskeletal: All normal.  IMAGING:  - MRI C spine: 2023, C5-C6 ACDF           IMAGING  Right hand XR  Personal interpretation of the XR reveals no signs of fractures or dislocations      PLAN  Assessment & Plan    63 yo with PMHx of ACDF C5-C6 presents with R Carpal Tunnel Syndrome  We have discussed the natural history of Carpal tunnel syndrome including treatment options such as splinting, oral and topical anti-inflammatories, cortisone injections and surgery.. I have recommended an EMG/NCS to assess the severity of His carpal tunnel syndrome    Follow up after EMG/NCS for results review with Dr. Izaguirre  Recommend nighttime Bracing for his wrist  Refer to Spine for Cervical Radiculopathy and concern for double crush  Call with any questions/concerns in the interim    The patient's pathophysiology was explained in detail with reference to x-rays, models, other visual aids as appropriate.  Treatment options were discussed in detail.  Questions were invited and  answered to the patient's satisfaction. I reviewed Primary care , and other specialty's notes to better coordinate patient's care.           Andrew Bradley PA-C, ATC  Hand and Upper Extremity   [facility]    This note was generated with the assistance of ambient listening technology. Verbal consent was obtained by the patient and accompanying visitor(s) for the recording of patient appointment to facilitate this note. I attest to having reviewed and edited the generated note for accuracy, though some syntax or spelling errors may persist. Please contact the author of this note for any clarification.

## 2025-06-19 ENCOUNTER — TELEPHONE (OUTPATIENT)
Dept: ADMINISTRATIVE | Facility: OTHER | Age: 65
End: 2025-06-19
Payer: MEDICARE

## 2025-06-29 ENCOUNTER — RESULTS FOLLOW-UP (OUTPATIENT)
Dept: GASTROENTEROLOGY | Facility: CLINIC | Age: 65
End: 2025-06-29
Payer: MEDICARE

## 2025-06-29 ENCOUNTER — PATIENT MESSAGE (OUTPATIENT)
Dept: GASTROENTEROLOGY | Facility: CLINIC | Age: 65
End: 2025-06-29
Payer: MEDICARE

## 2025-06-29 DIAGNOSIS — D50.9 IRON DEFICIENCY ANEMIA, UNSPECIFIED IRON DEFICIENCY ANEMIA TYPE: Primary | ICD-10-CM

## 2025-07-07 ENCOUNTER — OFFICE VISIT (OUTPATIENT)
Dept: OTOLARYNGOLOGY | Facility: CLINIC | Age: 65
End: 2025-07-07
Payer: MEDICARE

## 2025-07-07 VITALS — DIASTOLIC BLOOD PRESSURE: 76 MMHG | SYSTOLIC BLOOD PRESSURE: 124 MMHG | HEART RATE: 96 BPM

## 2025-07-07 DIAGNOSIS — R09.82 POSTNASAL DRIP: ICD-10-CM

## 2025-07-07 DIAGNOSIS — J32.9 CHRONIC SINUSITIS, UNSPECIFIED LOCATION: Primary | ICD-10-CM

## 2025-07-07 DIAGNOSIS — R09.89 THROAT CLEARING: ICD-10-CM

## 2025-07-07 PROCEDURE — 99214 OFFICE O/P EST MOD 30 MIN: CPT | Mod: 25,S$GLB,, | Performed by: OTOLARYNGOLOGY

## 2025-07-07 PROCEDURE — 99999 PR PBB SHADOW E&M-EST. PATIENT-LVL IV: CPT | Mod: PBBFAC,,, | Performed by: OTOLARYNGOLOGY

## 2025-07-07 PROCEDURE — 1159F MED LIST DOCD IN RCRD: CPT | Mod: CPTII,S$GLB,, | Performed by: OTOLARYNGOLOGY

## 2025-07-07 PROCEDURE — 31231 NASAL ENDOSCOPY DX: CPT | Mod: S$GLB,,, | Performed by: OTOLARYNGOLOGY

## 2025-07-07 PROCEDURE — 3074F SYST BP LT 130 MM HG: CPT | Mod: CPTII,S$GLB,, | Performed by: OTOLARYNGOLOGY

## 2025-07-07 PROCEDURE — 1160F RVW MEDS BY RX/DR IN RCRD: CPT | Mod: CPTII,S$GLB,, | Performed by: OTOLARYNGOLOGY

## 2025-07-07 PROCEDURE — 3078F DIAST BP <80 MM HG: CPT | Mod: CPTII,S$GLB,, | Performed by: OTOLARYNGOLOGY

## 2025-07-07 NOTE — PROGRESS NOTES
Patient ID: Bri Santiago is a 64 y.o. male.    Chief Complaint: second opinion    Patient presents today for follow-up of nasal issues.           - Contact the office if no results received after 1 week.          History of Present Illness:   Bri Santiago is a 64 y.o. year old male evaluated in the Otolaryngology-Head and Neck Surgery Clinic at [facility]. The patient was referred by CHRISTIAN Jacob for evaluation of  nasal issues. Patient was previously followed by Dr. Swanson and most recently seen by Dr. Leija about a year ago.   He reports ongoing thick clear post nasal drainage with sensation of drainage in ears, particularly on one side. In the morning, his throat feels full of mucus. He reports persistent sensation of uvula touching the back of his throat, which causes constant discomfort. Previous coughing symptoms have resolved.    PREVIOUS ENT TREATMENTS:  He has tried multiple treatments without improvement including Flonase, Astelin, Atrovent, Budesonide irrigation, and various nasal irrigation techniques using neti pot and other devices.    SURGICAL HISTORY:  His surgical history includes tonsillectomy, sinus surgery in 2019, nasal surgery by Dr. Swanson, and three back surgeries. He has also undergone multiple pain management injections for back pain which were not effective.    CURRENT MEDICATIONS:  He continues Rabeprazole for reflux management.    UPCOMING PROCEDURES:  He has a GI procedure scheduled for the 17th of this month with an esophagus recheck planned in two weeks.          Past Medical/Surgical History  Past Medical History:   Diagnosis Date    Acute pancreatitis     Anal fissure     Anemia     Anticoagulant long-term use     Arthritis     Asthma in remission     Back pain     BPH (benign prostatic hypertrophy)     Cancer 2000    prostate- treated at [facility] with chemo- in remission since 2000    Chronic maxillary sinusitis     Clotting disorder     Diastolic dysfunction with  chronic heart failure 12/03/2018    Family history of colon cancer     Family history of early CAD     GERD (gastroesophageal reflux disease)     Hard to intubate 10/25/2024    Helicobacter pylori (H. pylori) infection     Chronic    History of chronic pancreatitis     HTN (hypertension)     Lumbago 11/12/2012    Obesity     ABDON (obstructive sleep apnea)     Spinal stenosis of lumbar region     Von Willebrand disease     VWD (acquired von Willebrand's disease)      His  has a past surgical history that includes Lumbar fusion (2012); Carpal tunnel release (2003); anal fissure repair; Cervical discectomy (2003); Colonoscopy (N/A, 10/07/2015); Vasectomy (1996); Tonsillectomy; Spine surgery; Colonoscopy (N/A, 06/19/2017); Radiofrequency ablation of lumbar medial branch nerve at single level (Left, 05/24/2018); Left heart catheterization (Left, 02/14/2019); Catheterization of both left and right heart (N/A, 02/14/2019); Radiofrequency ablation (Right, 05/09/2019); Radiofrequency ablation (Left, 05/23/2019); Back surgery (2012); Functional endoscopic sinus surgery (FESS) using computer-assisted navigation (Bilateral, 10/07/2019); Balloon sinuplasty of paranasal sinus (Bilateral, 10/07/2019); Radiofrequency ablation (Left, 01/16/2020); Radiofrequency ablation (Right, 01/28/2020); Esophagogastroduodenoscopy (N/A, 03/04/2020); Colonoscopy (N/A, 03/04/2020); Radiofrequency ablation (Left, 08/18/2020); Radiofrequency ablation (Right, 09/01/2020); Injection of anesthetic agent around nerve (Bilateral, 10/13/2020); Esophagogastroduodenoscopy (N/A, 11/03/2020); Examination under anesthesia (N/A, 03/15/2021); Lateral internal anal sphincterotomy (N/A, 12/10/2021); Radiofrequency ablation (Bilateral, 12/21/2021); Examination under anesthesia (N/A, 02/25/2022); Cystoscopy (08/12/2022); Ureteroscopy (Bilateral, 08/12/2022); Retrograde pyelography (Bilateral, 08/12/2022); Radiofrequency ablation (Left, 11/21/2022); Radiofrequency  ablation (Right, 12/05/2022); Esophagogastroduodenoscopy (N/A, 05/31/2023); ph monitoring, esophagus, wireless, (on reflux meds) (N/A, 05/31/2023); Transforaminal epidural injection of steroid (Bilateral, 06/19/2023); Knee arthroscopy w/ meniscectomy (Left, 11/08/2023); Arthroscopic chondroplasty of knee joint (Left, 11/08/2023); Radiofrequency ablation (Bilateral, 04/29/2024); Robot-assisted laparoscopic repair of inguinal hernia using da Jamie Xi (Right, 10/25/2024); Radiofrequency ablation (Bilateral, 12/23/2024); and Transforaminal epidural injection of steroid (N/A, 02/17/2025).     Past Family/Social History  His family history includes Cancer in his father; Colon cancer in his mother; Colon cancer (age of onset: 65) in his paternal grandfather and paternal uncle; Colon cancer (age of onset: 67) in his father; Coronary artery disease (age of onset: 45) in his mother; Coronary artery disease (age of onset: 51) in his brother; Diabetes in his mother; Diabetes Mellitus in his paternal grandmother; Glaucoma in his father; Heart disease in his mother; Hypertension in his father and mother; No Known Problems in his brother, daughter, daughter, daughter, sister, son, son, and son.  He  reports that he has never smoked. He has never used smokeless tobacco. He reports current alcohol use of about 1.0 standard drink of alcohol per week. He reports that he does not use drugs.     Medications/Allergies/Immunizations  His current medication(s) include:   Current Medications[1]     Allergies: Penicillins, Ace inhibitors, Aspirin, Codeine, Dilaudid [hydromorphone], and Gabapentin     Immunizations:   Immunization History   Administered Date(s) Administered    COVID-19, MRNA, LN-S, PF (MODERNA FULL 0.5 ML DOSE) 03/04/2021, 04/01/2021, 11/11/2021    COVID-19, mRNA, LNP-S, bivalent booster, PF (Moderna Omicron)12 + YEARS 12/08/2022    Hepatitis A / Hepatitis B 04/01/2008    Influenza 11/07/2007, 10/21/2010, 12/18/2012,  09/17/2013, 10/20/2014    Influenza - Intradermal - Quadrivalent - PF 09/17/2013, 10/20/2014    Influenza - Intradermal - Trivalent - PF 09/17/2013, 10/20/2014    Influenza - Quadrivalent - PF *Preferred* (6 months and older) 10/13/2015, 10/20/2016, 10/16/2017, 10/02/2018, 10/09/2019, 09/21/2020, 10/06/2021, 11/07/2022, 10/12/2023    Influenza - Trivalent - Afluria, Fluzone MDV 12/18/2012    Influenza - Trivalent - Fluarix, Flulaval, Fluzone, Afluria - PF 11/01/2024    Influenza Split 11/07/2007, 10/21/2010, 12/18/2012    Pneumococcal Conjugate - 20 Valent 05/25/2023    Pneumococcal Polysaccharide - 23 Valent 10/16/2017, 10/07/2021    Rsv, Bivalent, Rsvpref (Abrysvo) 06/05/2025    Tdap 10/16/2017         Review of Systems   Constitutional: Negative for fever, weight loss and weight gain.  Skin: Negative for rash, itchiness, dryness  HENT:  As per HPI  Cardiovascular: Negative for chest pain and dyspnea on exertion .   Respiratory: Is not experiencing shortness of breath.   Gastrointestinal: Negative for nausea and vomiting.   Neurological: Negative for headaches.   Lymph/Heme: Negative for lymphadenopathy or easy bruising  Musculoskeletal: Negative for joint or muscle pain  Psychiatric: The patient is not nervous/anxious.        All other systems are negative except for that listed in the HPI.      PHYSICAL EXAM:   Vital Signs:  /76   Pulse 96      General:  Well-developed, well-nourished  Communication and Voice:  Clear pitch and clarity  Hearing: Hearing adequate for verbal communication bilaterally   Inspection:  Normocephalic and atraumatic without mass or lesion  Palpation:  Facial skeleton intact without bony stepoffs  Parotid Glands:  No mass or tenderness  Facial Strength:  Facial motility symmetric and full bilaterally  Pinna:  External ear intact and fully developed  External canal:  Canal is patent with intact skin  Tympanic Membrane:  Clear and mobile  External nose:  No scar or anatomic  deformity  Internal Nose:  Septum intact and midline.  No edema, polyp, or rhinorrhea.  TMJ:  No pain to palpation with full mobility  Oral cavity, Lips, Teeth, and Gums:  Mucosa and teeth intact and viable, No lesions, masses or ulcers  Oropharynx: No erythema or exudate, no masses or ulcerations, non-obstructive tonsils  Nasopharynx:  No mass or lesion with intact mucosa  Hypopharynx:  Not well visualized secondary to gagging  Larynx:  Not well visualized secondary to gagging  Neck, Trachea, Lymphatics:  Midline trachea without mass or lesion, no lymphadenopathy  Thyroid:  No mass or nodularity  Eyes: No nystagmus with equal extraocular motion bilaterally  Neuro/Psych/Balance: Patient oriented and appropriate in interaction;  Appropriate mood and affect;  Gait is intact with no imbalance; Cranial nerves I-XII are intact  Respiratory effort:  Equal inspiration and expiration without stridor  Peripheral Vascular:  Warm extremities with equal pulses    Procedure: Rigid endoscopic nasal exam   Surgeon: Yash Foss MD  Procedure note/findings: After informed discussion of the risks, benefits, and alternatives after indications as noted above, nasal cavities were topically anesthetized and decongested with 4% lidocaine and Afrin solutions. A rigid 0 degree nasal endoscope was inserted into bilateral nasal cavities and the following was noted    Right:   Both inferior and middle turbinates are normal without hypertrophy  The Osteomeatal complex including the middle and superior meatus does not show any polyps or lesions  The sphenoethmoid recess was clear    Left   Both inferior and middle turbinates are normal without hypertrophy  The Osteomeatal complex including the middle and superior meatus does not show any polyps or lesions  The sphenoethmoid recess was clear    Nasopharynx, similarly, revealed no mass lesion or granularity. There was no ulceration. There was no gross asymmetry. Fossa of Rosenmueller was intact  bilaterally. The eustachian tube orifices were intact bilaterally and patent.  The scope was then withdrawn and removed. He tolerated this well.            Assessment & Plan    R09.89 Throat clearing  R09.82 Postnasal drip  J32.9 Chronic sinusitis, unspecified location    IMPRESSION:  - Reviewed history of nasal issues and previous treatments, including ineffective budesonide irrigation.  - Performed nasal endoscopy:  - Septum appears fairly straight  - No visible polyps or fungal disease  - Turbinates slightly enlarged  - No significant sinus disease observed.  - Considered surgical options:  - Septal surgery and turbinate reduction, though may not address all symptoms  - RhinAer procedure (in-office radiofrequency ablation) to decrease nasal drip.  - Discussed that the uvula is slightly larger than normal, but not significantly so  Not warranting surgical intervention.  - Explained the limitations of balloon sinuplasty, noting it can temporarily open sinuses but may close back up.    POSTNASAL DRIP:  - Explained the rhynia procedure: freezing off a nerve in the nose to decrease running and nasal drip.  - If CT is normal, may schedule for in-office radiofrequency ablation procedure.    CHRONIC SINUSITIS, UNSPECIFIED LOCATION:  - Ordered CT Sinuses to rule out any underlying issues not visible on endoscopy.      I believe that Mr. Santiago has a good understanding of the issues involved and I answered all of his questions.     DISCLAIMER: This note was prepared with [employer] voice recognition transcription software. Garbled syntax, mangled pronouns, and other bizarre constructions may be attributed to that software system. While efforts were made to correct any mistakes made by this voice recognition program, some errors and/or omissions may remain in the note that were missed when the note was originally created.      This note was generated with the assistance of ambient listening technology. Verbal consent was  obtained by the patient and accompanying visitor(s) for the recording of patient appointment to facilitate this note. I attest to having reviewed and edited the generated note for accuracy, though some syntax or spelling errors may persist. Please contact the author of this note for any clarification.           [1]   Current Outpatient Medications   Medication Sig Dispense Refill    albuterol (PROVENTIL/VENTOLIN HFA) 90 mcg/actuation inhaler INHALE 2 PUFFS INTO THE LUNGS EVERY 6 HOURS AS NEEDED FOR WHEEZING OR SHORTNESS OF BREATH 18 g 4    albuterol (PROVENTIL/VENTOLIN HFA) 90 mcg/actuation inhaler Inhale 1-2 puffs into the lungs every 6 (six) hours as needed for Wheezing. Rescue 18 g 3    amLODIPine (NORVASC) 10 MG tablet Take 1 tablet (10 mg total) by mouth once daily. 90 tablet 3    atorvastatin (LIPITOR) 80 MG tablet Take 1 tablet (80 mg total) by mouth every evening. 90 tablet 3    azelastine (ASTELIN) 137 mcg (0.1 %) nasal spray 1 spray (137 mcg total) by Nasal route 2 (two) times daily. 30 mL 0    budesonide-formoterol 160-4.5 mcg (SYMBICORT) 160-4.5 mcg/actuation HFAA INHALE 2 PUFFS INTO THE LUNGS EVERY 12 HOURS 10.2 g 12    calcium citrate-vitamin D3 315-200 mg (CITRACAL+D) 315-200 mg-unit per tablet Take 1 tablet by mouth nightly.       cetirizine (ZYRTEC) 10 MG tablet Take 1 tablet (10 mg total) by mouth once daily. 30 tablet 0    clopidogreL (PLAVIX) 75 mg tablet Take 1 tablet (75 mg total) by mouth once daily. 90 tablet 3    cyanocobalamin, vitamin B-12, 50 mcg tablet Take 50 mcg by mouth nightly.       docusate sodium (COLACE) 100 MG capsule Take 1 tablet by mouth Twice daily. 1 Capsule Oral Twice a day .  Take with pain medicine      doxazosin (CARDURA) 1 MG tablet TAKE 1 TABLET BY MOUTH ONCE DAILY IN THE EVENING 90 tablet 3    ezetimibe (ZETIA) 10 mg tablet Take 1 tablet (10 mg total) by mouth once daily. 90 tablet 3    fluticasone propionate (FLONASE) 50 mcg/actuation nasal spray One spray in each  nostril twice daily after 1st using azelastine nasal spray 18.2 mL 11    hydroCHLOROthiazide (HYDRODIURIL) 25 MG tablet Take 1 tablet (25 mg total) by mouth once daily. 30 tablet 11    ipratropium (ATROVENT) 42 mcg (0.06 %) nasal spray 1-2 sprays in each nostril before eating and at bedtime as needed 30 mL 11    RABEprazole (ACIPHEX) 20 mg tablet Take 1 tablet (20 mg total) by mouth every 12 (twelve) hours. 180 tablet 3    tadalafiL (CIALIS) 20 MG Tab Take 1 tablet (20 mg total) by mouth as needed. Take every 36 hours as needed for ED. 30 tablet 9    testosterone (ANDRODERM) 2 mg/24 hour PT24 APPLY 1 PATCH TOPICALLY TO THE SKIN EVERY DAY 60 patch 3    zolpidem (AMBIEN) 10 mg Tab TAKE 1 TABLET BY MOUTH EVERY DAY AT BEDTIME 20 tablet 0     No current facility-administered medications for this visit.     Facility-Administered Medications Ordered in Other Visits   Medication Dose Route Frequency Provider Last Rate Last Admin    0.9%  NaCl infusion   Intravenous Continuous Milla Oliveira NP 70 mL/hr at 03/15/21 1417 New Bag at 03/15/21 1417    0.9%  NaCl infusion   Intravenous Continuous Milla Oliveira NP 70 mL/hr at 12/10/21 0736 New Bag at 12/10/21 0736    0.9%  NaCl infusion   Intravenous Continuous Jaqueline Langley NP        0.9%  NaCl infusion   Intravenous Continuous Beto Whiteside MD 50 mL/hr at 04/29/24 1359 50 mL/hr at 04/29/24 1359    mupirocin 2 % ointment   Nasal On Call Procedure Milla Oliveira NP   Given at 12/10/21 0729    mupirocin 2 % ointment   Nasal On Call Procedure Jaqueline Langley NP   Given at 02/25/22 0671

## 2025-07-08 NOTE — PROGRESS NOTES
"DATE: 7/15/2025  PATIENT: Bri Santiago    Attending Physician: Marcus Fitch M.D.    HISTORY:  Bri Santiago is a 64 y.o. male who returns to me today for follow up.  He was last seen by Ilda Weldon on 7/31/23. He continues to have neck and bilateral shoulder pain with numbness and tingling down his right arm.    He also has chronic low back pain. He has had multiple injections with pain mgmt with good relief and would like to continue.    The Patient ENDORSES myelopathic symptoms such as handwriting changes or difficulty with buttons/coins/keys. ENDORSES mild balance problems. Denies perineal paresthesias, bowel/bladder dysfunction.      EXAM:  Ht 5' 10" (1.778 m)   Wt 110.2 kg (242 lb 15.2 oz)   BMI 34.86 kg/m²     My physical examination was notable for the following findings:     Musculoskeletal and neuro exam stable    IMAGING:    Today I personally re-reviewed AP, Lat and Flex/Ex  upright C-spine films that demonstrate non instrumented fusion at C5-6. Degenerative changes at C4-5    Body mass index is 34.86 kg/m².    Hemoglobin A1C   Date Value Ref Range Status   07/16/2024 4.4 4.0 - 5.6 % Final     Comment:     ADA Screening Guidelines:  5.7-6.4%  Consistent with prediabetes  >or=6.5%  Consistent with diabetes    High levels of fetal hemoglobin interfere with the HbA1C  assay. Heterozygous hemoglobin variants (HbS, HgC, etc)do  not significantly interfere with this assay.   However, presence of multiple variants may affect accuracy.     01/23/2023 4.6 4.0 - 5.6 % Final     Comment:     ADA Screening Guidelines:  5.7-6.4%  Consistent with prediabetes  >or=6.5%  Consistent with diabetes    High levels of fetal hemoglobin interfere with the HbA1C  assay. Heterozygous hemoglobin variants (HbS, HgC, etc)do  not significantly interfere with this assay.   However, presence of multiple variants may affect accuracy.     01/23/2020 4.5 4.0 - 5.6 % Final     Comment:     ADA Screening Guidelines:  5.7-6.4%  " Consistent with prediabetes  >or=6.5%  Consistent with diabetes  High levels of fetal hemoglobin interfere with the HbA1C  assay. Heterozygous hemoglobin variants (HbS, HgC, etc)do  not significantly interfere with this assay.   However, presence of multiple variants may affect accuracy.           ASSESSMENT/PLAN:    Bri was seen today for neck pain and back pain.    Diagnoses and all orders for this visit:    Right cervical radiculopathy  -     Ambulatory referral/consult to [facility]    Carpal tunnel syndrome of right wrist  -     Ambulatory referral/consult to [facility]        Today we discussed at length all of the different treatment options including anti-inflammatories, acetaminophen, rest, ice, heat, physical therapy including strengthening and stretching exercises, home exercises, ROM, aerobic conditioning, aqua therapy, other modalities including ultrasound, massage, and dry needling, epidural steroid injections and finally surgical intervention.      Pt presents with chronic neck pain and radiculopathy. Failure of conservative rx. Will obtain cervical MRI to further evaluate and send medrol dose pack to pharmacy.

## 2025-07-14 ENCOUNTER — TELEPHONE (OUTPATIENT)
Dept: ENDOSCOPY | Facility: HOSPITAL | Age: 65
End: 2025-07-14
Payer: MEDICARE

## 2025-07-14 DIAGNOSIS — M50.30 DDD (DEGENERATIVE DISC DISEASE), CERVICAL: Primary | ICD-10-CM

## 2025-07-14 DIAGNOSIS — D68.00 VON WILLEBRAND DISEASE: Primary | ICD-10-CM

## 2025-07-15 ENCOUNTER — OFFICE VISIT (OUTPATIENT)
Dept: ORTHOPEDICS | Facility: CLINIC | Age: 65
End: 2025-07-15
Payer: MEDICARE

## 2025-07-15 ENCOUNTER — HOSPITAL ENCOUNTER (OUTPATIENT)
Dept: RADIOLOGY | Facility: HOSPITAL | Age: 65
Discharge: HOME | End: 2025-07-15
Attending: OTOLARYNGOLOGY
Payer: MEDICARE

## 2025-07-15 ENCOUNTER — HOSPITAL ENCOUNTER (OUTPATIENT)
Dept: RADIOLOGY | Facility: HOSPITAL | Age: 65
Discharge: HOME | End: 2025-07-15
Attending: ORTHOPAEDIC SURGERY
Payer: MEDICARE

## 2025-07-15 VITALS — HEIGHT: 70 IN | BODY MASS INDEX: 34.78 KG/M2 | WEIGHT: 242.94 LBS

## 2025-07-15 DIAGNOSIS — J32.9 CHRONIC SINUSITIS, UNSPECIFIED LOCATION: ICD-10-CM

## 2025-07-15 DIAGNOSIS — M54.12 RIGHT CERVICAL RADICULOPATHY: ICD-10-CM

## 2025-07-15 DIAGNOSIS — M50.30 DDD (DEGENERATIVE DISC DISEASE), CERVICAL: ICD-10-CM

## 2025-07-15 DIAGNOSIS — G56.01 CARPAL TUNNEL SYNDROME OF RIGHT WRIST: ICD-10-CM

## 2025-07-15 PROCEDURE — 72050 X-RAY EXAM NECK SPINE 4/5VWS: CPT | Mod: TC

## 2025-07-15 PROCEDURE — 99999 PR PBB SHADOW E&M-EST. PATIENT-LVL IV: CPT | Mod: PBBFAC,,, | Performed by: ORTHOPAEDIC SURGERY

## 2025-07-15 PROCEDURE — 1159F MED LIST DOCD IN RCRD: CPT | Mod: CPTII,S$GLB,, | Performed by: ORTHOPAEDIC SURGERY

## 2025-07-15 PROCEDURE — 3008F BODY MASS INDEX DOCD: CPT | Mod: CPTII,S$GLB,, | Performed by: ORTHOPAEDIC SURGERY

## 2025-07-15 PROCEDURE — 70486 CT MAXILLOFACIAL W/O DYE: CPT | Mod: 26,,, | Performed by: RADIOLOGY

## 2025-07-15 PROCEDURE — 99213 OFFICE O/P EST LOW 20 MIN: CPT | Mod: S$GLB,,, | Performed by: ORTHOPAEDIC SURGERY

## 2025-07-15 PROCEDURE — 70486 CT MAXILLOFACIAL W/O DYE: CPT | Mod: TC

## 2025-07-15 PROCEDURE — 72050 X-RAY EXAM NECK SPINE 4/5VWS: CPT | Mod: 26,,, | Performed by: RADIOLOGY

## 2025-07-15 RX ORDER — METHYLPREDNISOLONE 4 MG/1
TABLET ORAL
Qty: 1 EACH | Refills: 0 | Status: SHIPPED | OUTPATIENT
Start: 2025-07-15 | End: 2025-08-05

## 2025-07-17 ENCOUNTER — ANESTHESIA (OUTPATIENT)
Dept: ENDOSCOPY | Facility: HOSPITAL | Age: 65
End: 2025-07-17
Payer: MEDICARE

## 2025-07-17 ENCOUNTER — ANESTHESIA EVENT (OUTPATIENT)
Dept: ENDOSCOPY | Facility: HOSPITAL | Age: 65
End: 2025-07-17
Payer: MEDICARE

## 2025-07-17 ENCOUNTER — HOSPITAL ENCOUNTER (OUTPATIENT)
Facility: HOSPITAL | Age: 65
Discharge: HOME | End: 2025-07-17
Attending: INTERNAL MEDICINE | Admitting: INTERNAL MEDICINE
Payer: MEDICARE

## 2025-07-17 VITALS
WEIGHT: 237.88 LBS | TEMPERATURE: 99 F | BODY MASS INDEX: 34.06 KG/M2 | HEART RATE: 84 BPM | RESPIRATION RATE: 15 BRPM | SYSTOLIC BLOOD PRESSURE: 164 MMHG | HEIGHT: 70 IN | OXYGEN SATURATION: 98 % | DIASTOLIC BLOOD PRESSURE: 83 MMHG

## 2025-07-17 DIAGNOSIS — D50.9 IRON DEFICIENCY ANEMIA, UNSPECIFIED IRON DEFICIENCY ANEMIA TYPE: ICD-10-CM

## 2025-07-17 DIAGNOSIS — Z86.0100 HISTORY OF COLON POLYPS: ICD-10-CM

## 2025-07-17 DIAGNOSIS — Z83.719 FAMILY HISTORY OF COLONIC POLYPS: ICD-10-CM

## 2025-07-17 DIAGNOSIS — R19.5 LOOSE STOOLS: ICD-10-CM

## 2025-07-17 DIAGNOSIS — K21.9 GERD (GASTROESOPHAGEAL REFLUX DISEASE): ICD-10-CM

## 2025-07-17 DIAGNOSIS — K21.9 GASTROESOPHAGEAL REFLUX DISEASE, UNSPECIFIED WHETHER ESOPHAGITIS PRESENT: ICD-10-CM

## 2025-07-17 PROCEDURE — 82657 ENZYME CELL ACTIVITY: CPT | Performed by: INTERNAL MEDICINE

## 2025-07-17 PROCEDURE — 88305 TISSUE EXAM BY PATHOLOGIST: CPT | Mod: TC,91 | Performed by: INTERNAL MEDICINE

## 2025-07-17 PROCEDURE — 37000008 HC ANESTHESIA 1ST 15 MINUTES: Performed by: INTERNAL MEDICINE

## 2025-07-17 PROCEDURE — 88305 TISSUE EXAM BY PATHOLOGIST: CPT | Mod: 26,,, | Performed by: PATHOLOGY

## 2025-07-17 PROCEDURE — 63600175 PHARM REV CODE 636 W HCPCS: Performed by: NURSE ANESTHETIST, CERTIFIED REGISTERED

## 2025-07-17 PROCEDURE — 27201012 HC FORCEPS, HOT/COLD, DISP: Performed by: INTERNAL MEDICINE

## 2025-07-17 PROCEDURE — 25000003 PHARM REV CODE 250: Performed by: NURSE ANESTHETIST, CERTIFIED REGISTERED

## 2025-07-17 PROCEDURE — 25000003 PHARM REV CODE 250: Performed by: INTERNAL MEDICINE

## 2025-07-17 PROCEDURE — 37000009 HC ANESTHESIA EA ADD 15 MINS: Performed by: INTERNAL MEDICINE

## 2025-07-17 PROCEDURE — 63600175 PHARM REV CODE 636 W HCPCS: Mod: TB | Performed by: INTERNAL MEDICINE

## 2025-07-17 PROCEDURE — 43239 EGD BIOPSY SINGLE/MULTIPLE: CPT | Mod: 51,,, | Performed by: INTERNAL MEDICINE

## 2025-07-17 PROCEDURE — 43239 EGD BIOPSY SINGLE/MULTIPLE: CPT | Performed by: INTERNAL MEDICINE

## 2025-07-17 PROCEDURE — 45380 COLONOSCOPY AND BIOPSY: CPT | Mod: ,,, | Performed by: INTERNAL MEDICINE

## 2025-07-17 PROCEDURE — 45380 COLONOSCOPY AND BIOPSY: CPT | Performed by: INTERNAL MEDICINE

## 2025-07-17 RX ORDER — SODIUM CHLORIDE 9 MG/ML
INJECTION, SOLUTION INTRAVENOUS CONTINUOUS
Status: DISCONTINUED | OUTPATIENT
Start: 2025-07-17 | End: 2025-07-17 | Stop reason: HOSPADM

## 2025-07-17 RX ORDER — GLUCAGON 1 MG
1 KIT INJECTION
Status: DISCONTINUED | OUTPATIENT
Start: 2025-07-17 | End: 2025-07-17 | Stop reason: HOSPADM

## 2025-07-17 RX ORDER — PROPOFOL 10 MG/ML
VIAL (ML) INTRAVENOUS
Status: DISCONTINUED | OUTPATIENT
Start: 2025-07-17 | End: 2025-07-17

## 2025-07-17 RX ORDER — ONDANSETRON HYDROCHLORIDE 2 MG/ML
4 INJECTION, SOLUTION INTRAVENOUS ONCE AS NEEDED
Status: DISCONTINUED | OUTPATIENT
Start: 2025-07-17 | End: 2025-07-17 | Stop reason: HOSPADM

## 2025-07-17 RX ADMIN — PROPOFOL 50 MG: 10 INJECTION, EMULSION INTRAVENOUS at 04:07

## 2025-07-17 RX ADMIN — PROPOFOL 200 MCG/KG/MIN: 10 INJECTION, EMULSION INTRAVENOUS at 04:07

## 2025-07-17 RX ADMIN — DESMOPRESSIN ACETATE 20 MCG: 4 SOLUTION INTRAVENOUS at 02:07

## 2025-07-17 RX ADMIN — SODIUM CHLORIDE, SODIUM GLUCONATE, SODIUM ACETATE, POTASSIUM CHLORIDE, MAGNESIUM CHLORIDE, SODIUM PHOSPHATE, DIBASIC, AND POTASSIUM PHOSPHATE: .53; .5; .37; .037; .03; .012; .00082 INJECTION, SOLUTION INTRAVENOUS at 04:07

## 2025-07-17 RX ADMIN — PROPOFOL 100 MG: 10 INJECTION, EMULSION INTRAVENOUS at 04:07

## 2025-07-17 RX ADMIN — GLYCOPYRROLATE 0.2 MG: 0.2 INJECTION, SOLUTION INTRAMUSCULAR; INTRAVENOUS at 04:07

## 2025-07-17 NOTE — ANESTHESIA PREPROCEDURE EVALUATION
[facility]  Anesthesia Pre-Operative Evaluation     Patient Name: Bri Santiago  YOB: 1960  MRN: [id number]  [facility]: [id number]       Admit Date: 7/17/2025   Admit Team: Networked reference to record PCT   Hospital Day: 1  Date of Procedure: 7/17/2025  Anesthesia: Choice Procedure: Procedure(s) (LRB):  EGD (ESOPHAGOGASTRODUODENOSCOPY) (N/A)  COLONOSCOPY, SCREENING, HIGH RISK PATIENT (N/A)  Pre-Operative Diagnosis: Iron deficiency anemia, unspecified iron deficiency anemia type [D50.9]  Loose stools [R19.5]  Gastroesophageal reflux disease, unspecified whether esophagitis present [K21.9]  Family history of colonic polyps [Z83.719]  History of colon polyps [Z86.0100]  Proceduralist:Surgeons and Role:     * Rosendo Boyer MD - Primary  Code Status: Prior   Advanced Directive: <no information>  Isolation Precautions: No active isolations  Capacity: Full capacity     SUBJECTIVE:   Bri Santiago is a 64 y.o. male who  has a past medical history of Acute pancreatitis, Anal fissure, Anemia, Anticoagulant long-term use, Arthritis, Asthma in remission, Back pain, BPH (benign prostatic hypertrophy), Cancer (2000), Chronic maxillary sinusitis, Clotting disorder, Diastolic dysfunction with chronic heart failure (12/03/2018), Family history of colon cancer, Family history of early CAD, GERD (gastroesophageal reflux disease), Hard to intubate (10/25/2024), Helicobacter pylori (H. pylori) infection, History of chronic pancreatitis, HTN (hypertension), Lumbago (11/12/2012), Obesity, ABDON (obstructive sleep apnea), Spinal stenosis of lumbar region, Von Willebrand disease, and VWD (acquired von Willebrand's disease).  Von Willebrand disease   Recommend DDAVP   infusion , 20 mcg, to be given over 30 minutes, 1 hr before the procedure.   Appears that orders have been placed by Dr. Agarwal: Heme-Onc from his clinic note 5/5/2025   ok to hold Plavix 5 days   Nonobstructive CAD  - [facility] 2019: ost 1st diag 40%  stenosed, pLCx 40% stenosed, R. Posterior Atrioventricular Artery 50%  HFpEF (elevated filling pressures at time of cath in '19)  5/28/2025 PET stress w nml myocardial perfusion    Hospital LOS: 0 days  ICU LOS: Patient does not have an ICU stay during this admission.    he has a current medication list which includes the following long-term medication(s): albuterol, albuterol, amlodipine, atorvastatin, azelastine, budesonide-formoterol 160-4.5 mcg, calcium citrate-vitamin d3 315-200 mg, cetirizine, clopidogrel, doxazosin, ezetimibe, fluticasone propionate, hydrochlorothiazide, rabeprazole, tadalafil, zolpidem, and [DISCONTINUED] tamsulosin.   Current Outpatient Medications   Medication Instructions    albuterol (PROVENTIL/VENTOLIN HFA) 90 mcg/actuation inhaler INHALE 2 PUFFS INTO THE LUNGS EVERY 6 HOURS AS NEEDED FOR WHEEZING OR SHORTNESS OF BREATH    albuterol (PROVENTIL/VENTOLIN HFA) 90 mcg/actuation inhaler 1-2 puffs, Inhalation, Every 6 hours PRN, Rescue    amLODIPine (NORVASC) 10 mg, Oral, Daily    atorvastatin (LIPITOR) 80 mg, Oral, Nightly    azelastine (ASTELIN) 137 mcg, Nasal, 2 times daily    budesonide-formoterol 160-4.5 mcg (SYMBICORT) 160-4.5 mcg/actuation HFAA INHALE 2 PUFFS INTO THE LUNGS EVERY 12 HOURS    calcium citrate-vitamin D3 315-200 mg (CITRACAL+D) 315-200 mg-unit per tablet 1 tablet, Nightly    cetirizine (ZYRTEC) 10 mg, Oral, Daily    clopidogreL (PLAVIX) 75 mg, Oral, Daily    cyanocobalamin (vitamin B-12) 50 mcg, Nightly    docusate sodium (COLACE) 100 MG capsule 1 tablet, 2 times daily    doxazosin (CARDURA) 1 mg, Oral, Nightly    ezetimibe (ZETIA) 10 mg, Oral, Daily    fluticasone propionate (FLONASE) 50 mcg/actuation nasal spray One spray in each nostril twice daily after 1st using azelastine nasal spray    hydroCHLOROthiazide (HYDRODIURIL) 25 mg, Oral, Daily    ipratropium (ATROVENT) 42 mcg (0.06 %) nasal spray 1-2 sprays in each nostril before eating and at bedtime as needed     "methylPREDNISolone (MEDROL DOSEPACK) 4 mg tablet use as directed    RABEprazole (ACIPHEX) 20 mg, Oral, Every 12 hours    tadalafiL (CIALIS) 20 mg, Oral, As needed (PRN), Take every 36 hours as needed for ED.    testosterone (ANDRODERM) 2 mg/24 hour PT24 APPLY 1 PATCH TOPICALLY TO THE SKIN EVERY DAY    zolpidem (AMBIEN) 10 mg Tab TAKE 1 TABLET BY MOUTH EVERY DAY AT BEDTIME     ALLERGIES:     Review of patient's allergies indicates:   Allergen Reactions    Penicillins Hives and Swelling     Has had allergy testing and can prob tolerate penicillin    Ace inhibitors Other (See Comments)     "Caused a respiratory infection"    Aspirin Hives           Codeine Itching     Ok to take percocet    Dilaudid [hydromorphone] Itching    Gabapentin Hallucinations     LDA:   AIRWAY:         * No LDAs found *      Lines/Drains/Airways       None                  Anesthesia Evaluation      Airway   Mallampati: III  TM distance: Normal  Neck ROM: Normal ROM  Dental    (+) Intact    Pulmonary    (+) asthma, sleep apnea  Cardiovascular   (+) hypertension, CAD    Neuro/Psych    (+) neuromuscular disease, headaches    GI/Hepatic/Renal    (+) GERD    Endo/Other    Abdominal                    MEDICATIONS:     Current Outpatient Medications on File Prior to Encounter   Medication Sig Dispense Refill Last Dose/Taking    albuterol (PROVENTIL/VENTOLIN HFA) 90 mcg/actuation inhaler INHALE 2 PUFFS INTO THE LUNGS EVERY 6 HOURS AS NEEDED FOR WHEEZING OR SHORTNESS OF BREATH 18 g 4     albuterol (PROVENTIL/VENTOLIN HFA) 90 mcg/actuation inhaler Inhale 1-2 puffs into the lungs every 6 (six) hours as needed for Wheezing. Rescue 18 g 3     amLODIPine (NORVASC) 10 MG tablet Take 1 tablet (10 mg total) by mouth once daily. 90 tablet 3     atorvastatin (LIPITOR) 80 MG tablet Take 1 tablet (80 mg total) by mouth every evening. 90 tablet 3     azelastine (ASTELIN) 137 mcg (0.1 %) nasal spray 1 spray (137 mcg total) by Nasal route 2 (two) times daily. 30 mL " 0     budesonide-formoterol 160-4.5 mcg (SYMBICORT) 160-4.5 mcg/actuation HFAA INHALE 2 PUFFS INTO THE LUNGS EVERY 12 HOURS 10.2 g 12     calcium citrate-vitamin D3 315-200 mg (CITRACAL+D) 315-200 mg-unit per tablet Take 1 tablet by mouth nightly.        cetirizine (ZYRTEC) 10 MG tablet Take 1 tablet (10 mg total) by mouth once daily. 30 tablet 0     clopidogreL (PLAVIX) 75 mg tablet Take 1 tablet (75 mg total) by mouth once daily. 90 tablet 3     cyanocobalamin, vitamin B-12, 50 mcg tablet Take 50 mcg by mouth nightly.        docusate sodium (COLACE) 100 MG capsule Take 1 tablet by mouth Twice daily. 1 Capsule Oral Twice a day .  Take with pain medicine       doxazosin (CARDURA) 1 MG tablet TAKE 1 TABLET BY MOUTH ONCE DAILY IN THE EVENING 90 tablet 3     ezetimibe (ZETIA) 10 mg tablet Take 1 tablet (10 mg total) by mouth once daily. 90 tablet 3     fluticasone propionate (FLONASE) 50 mcg/actuation nasal spray One spray in each nostril twice daily after 1st using azelastine nasal spray 18.2 mL 11     hydroCHLOROthiazide (HYDRODIURIL) 25 MG tablet Take 1 tablet (25 mg total) by mouth once daily. 30 tablet 11     ipratropium (ATROVENT) 42 mcg (0.06 %) nasal spray 1-2 sprays in each nostril before eating and at bedtime as needed 30 mL 11     RABEprazole (ACIPHEX) 20 mg tablet Take 1 tablet (20 mg total) by mouth every 12 (twelve) hours. 180 tablet 3     tadalafiL (CIALIS) 20 MG Tab Take 1 tablet (20 mg total) by mouth as needed. Take every 36 hours as needed for ED. 30 tablet 9     testosterone (ANDRODERM) 2 mg/24 hour PT24 APPLY 1 PATCH TOPICALLY TO THE SKIN EVERY DAY 60 patch 3     zolpidem (AMBIEN) 10 mg Tab TAKE 1 TABLET BY MOUTH EVERY DAY AT BEDTIME 20 tablet 0     [DISCONTINUED] tamsulosin (FLOMAX) 0.4 mg Cap Take 1 capsule (0.4 mg total) by mouth once daily. 30 capsule 0       Inpatient Medications:  Antibiotics (From admission, onward)      None          VTE Risk Mitigation (From admission, onward)      None               Current Medications[1]       History:   There are no hospital problems to display for this patient.    Surgical History:    has a past surgical history that includes Lumbar fusion (2012); Carpal tunnel release (2003); anal fissure repair; Cervical discectomy (2003); Colonoscopy (N/A, 10/07/2015); Vasectomy (1996); Tonsillectomy; Spine surgery; Colonoscopy (N/A, 06/19/2017); Radiofrequency ablation of lumbar medial branch nerve at single level (Left, 05/24/2018); Left heart catheterization (Left, 02/14/2019); Catheterization of both left and right heart (N/A, 02/14/2019); Radiofrequency ablation (Right, 05/09/2019); Radiofrequency ablation (Left, 05/23/2019); Back surgery (2012); Functional endoscopic sinus surgery (FESS) using computer-assisted navigation (Bilateral, 10/07/2019); Balloon sinuplasty of paranasal sinus (Bilateral, 10/07/2019); Radiofrequency ablation (Left, 01/16/2020); Radiofrequency ablation (Right, 01/28/2020); Esophagogastroduodenoscopy (N/A, 03/04/2020); Colonoscopy (N/A, 03/04/2020); Radiofrequency ablation (Left, 08/18/2020); Radiofrequency ablation (Right, 09/01/2020); Injection of anesthetic agent around nerve (Bilateral, 10/13/2020); Esophagogastroduodenoscopy (N/A, 11/03/2020); Examination under anesthesia (N/A, 03/15/2021); Lateral internal anal sphincterotomy (N/A, 12/10/2021); Radiofrequency ablation (Bilateral, 12/21/2021); Examination under anesthesia (N/A, 02/25/2022); Cystoscopy (08/12/2022); Ureteroscopy (Bilateral, 08/12/2022); Retrograde pyelography (Bilateral, 08/12/2022); Radiofrequency ablation (Left, 11/21/2022); Radiofrequency ablation (Right, 12/05/2022); Esophagogastroduodenoscopy (N/A, 05/31/2023); ph monitoring, esophagus, wireless, (on reflux meds) (N/A, 05/31/2023); Transforaminal epidural injection of steroid (Bilateral, 06/19/2023); Knee arthroscopy w/ meniscectomy (Left, 11/08/2023); Arthroscopic chondroplasty of knee joint (Left, 11/08/2023);  Radiofrequency ablation (Bilateral, 04/29/2024); Robot-assisted laparoscopic repair of inguinal hernia using da Jamie Xi (Right, 10/25/2024); Radiofrequency ablation (Bilateral, 12/23/2024); and Transforaminal epidural injection of steroid (N/A, 02/17/2025).   Social History:    reports being sexually active and has had partner(s) who are female.  reports that he has never smoked. He has never used smokeless tobacco. He reports current alcohol use of about 1.0 standard drink of alcohol per week. He reports that he does not use drugs.    There were no vitals filed for this visit.  Vital Signs Range (Last 24H):       There is no height or weight on file to calculate BMI.  Wt Readings from Last 4 Encounters:   07/15/25 110.2 kg (242 lb 15.2 oz)   06/12/25 110.2 kg (242 lb 15.2 oz)   06/05/25 110.7 kg (244 lb)   06/05/25 110.9 kg (244 lb 7.8 oz)        Intake/Output - Last 3 Shifts       None          Lab Results   Component Value Date    WBC 8.40 03/28/2025    HGB 13.7 (L) 05/23/2025    HCT 43.8 03/28/2025     03/28/2025     07/17/2025    K 4.7 07/17/2025     03/28/2025    CREATININE 0.8 03/28/2025    BUN 15 03/28/2025    CO2 31 (H) 03/28/2025    GLU 82 03/28/2025    CALCIUM 9.3 03/28/2025    MG 2.0 07/16/2024    PHOS 2.6 (L) 06/11/2018    ALKPHOS 68 03/28/2025    ALT 19 03/28/2025    AST 23 03/28/2025    ALBUMIN 3.7 03/28/2025    INR 1.0 04/04/2021    APTT 28.2 04/04/2021    HGBA1C 4.4 07/16/2024    LACTATE 0.6 04/04/2021     (H) 12/04/2018    CPKMB 0.9 03/14/2012    TROPONINI 0.021 03/16/2023    MB 0.7 03/14/2012    BNP <10 10/25/2023    HCGQUANT <1.2 01/23/2020     Recent Results (from the past 12 hours)   POTASSIUM    Collection Time: 07/17/25  8:41 AM   Result Value Ref Range    Potassium 4.7 3.5 - 5.1 mmol/L   SODIUM    Collection Time: 07/17/25  8:41 AM   Result Value Ref Range    Sodium 142 136 - 145 mmol/L     Recent Labs   Lab 07/17/25  0841      K 4.7     No LMP for male  patient.    EKG:   Results for orders placed or performed in visit on 03/27/25   IN OFFICE EKG 12-LEAD (to [facility])    Collection Time: 03/27/25 11:40 AM   Result Value Ref Range    QRS Duration 90 ms    [facility] QTC Calculation 400 ms    Narrative    Test Reason : I25.10,    Vent. Rate :  72 BPM     Atrial Rate :  72 BPM     P-R Int : 166 ms          QRS Dur :  90 ms      QT Int : 366 ms       P-R-T Axes :  35  -7  18 degrees    QTcB Int : 400 ms    Normal sinus rhythm with sinus arrhythmia  Minimal voltage criteria for LVH, may be normal variant ( R in aVL )  Borderline Abnormal ECG  When compared with ECG of 28-Dec-2024 14:07,  Nonspecific T wave abnormality no longer evident in Anterior leads  Confirmed by Ted Zhu ([id number]) on 3/27/2025 12:23:41 PM    Referred By:            Confirmed By: Ted Zhu     TTE:  No results found for this or any previous visit.    FRANCESCO:  No results found for this or any previous visit.    Stress Test:  No results found for this or any previous visit.    Results for orders placed during the hospital encounter of 08/28/24    Stress Echo Which stress agent will be used? Treadmill Exercise; Color Flow Doppler? No    Interpretation Summary    Stress Protocol: The patient exercised for 7 minutes 21 seconds on a high ramp protocol, corresponding to a functional capacity of 12METS, achieving a peak heart rate of 157 bpm, which is 99% of the age predicted maximum heart rate. Their exercise capacity was normal. The patient reported no symptoms during the stress test. The test was stopped because the patient experienced fatigue.    ECG Conclusion: The ECG portion of the study is positive for ischemia.    Left Ventricle: The left ventricle is normal in size. Normal wall thickness. There is normal systolic function. Ejection fraction by visual approximation is 60%. There is normal diastolic function.    Right Ventricle: Normal right ventricular cavity size. Wall thickness is normal. Systolic  function is normal.    Pulmonary Artery: The estimated pulmonary artery systolic pressure is 28 mmHg.    Stress ECG: There is 1.0 mm ST segment depression in the lateral leads during stress. There are no arrhythmias during stress. There is normal blood pressure response with stress.    Post-stress    Impression  The study is negative with no echocardiographic evidence of stress induced ischemia. Likely false positive stress ECG.    LHC:  No results found for this or any previous visit.    Cardiac Device Check   No results found for this or any previous visit.    No results found for this or any previous visit.        Pre-op Assessment          Review of Systems  Cardiovascular:     Hypertension   CAD                    Coronary Artery Disease:                            Hypertension         Pulmonary:    Asthma    Sleep Apnea   Asthma:    Obstructive Sleep Apnea (ABDON).           Hepatic/GI:     GERD         Gerd          Neurological:    Neuromuscular Disease,  Headaches      Dx of Headaches                         Neuromuscular Disease       Physical Exam  General: Well nourished    Airway:  Mallampati: III / II  Mouth Opening: Normal  TM Distance: Normal  Tongue: Normal  Neck ROM: Normal ROM    Dental:  Intact        Anesthesia Plan  Type of Anesthesia, risks & benefits discussed:    Anesthesia Type: Gen ETT, Gen Natural Airway, MAC  Intra-op Monitoring Plan: Standard ASA Monitors  Post Op Pain Control Plan: multimodal analgesia and IV/PO Opioids PRN  Induction:  IV  Airway Plan: Direct and Video, Post-Induction  Informed Consent: Informed consent signed with the Patient and all parties understand the risks and agree with anesthesia plan.  All questions answered.   ASA Score: 3  Day of Surgery Review of History & Physical: H&P completed by Anesthesiologist.  Anesthesia Plan Notes: Chart reviewed, patient interviewed and examined.  The anesthetic plan was explained.  Risks, benefits, and alternatives were  discussed. Questions were answered and the consent was signed.        PAU Pat M.D.         Ready For Surgery From Anesthesia Perspective.     .           [1]   No current facility-administered medications for this encounter.     Facility-Administered Medications Ordered in Other Encounters   Medication Dose Route Frequency Provider Last Rate Last Admin    0.9%  NaCl infusion   Intravenous Continuous Milla Oliveira NP 70 mL/hr at 03/15/21 1417 New Bag at 03/15/21 1417    0.9%  NaCl infusion   Intravenous Continuous Milla Oliveira NP 70 mL/hr at 12/10/21 0736 New Bag at 12/10/21 0736    0.9%  NaCl infusion   Intravenous Continuous Jaqueline Langley NP        0.9%  NaCl infusion   Intravenous Continuous Beto Whiteside MD 50 mL/hr at 04/29/24 1359 50 mL/hr at 04/29/24 1359    mupirocin 2 % ointment   Nasal On Call Procedure Milla Oliveira NP   Given at 12/10/21 0729    mupirocin 2 % ointment   Nasal On Call Procedure Jaqueline Langley NP   Given at 02/25/22 0623

## 2025-07-17 NOTE — TRANSFER OF CARE
"Anesthesia Transfer of Care Note    Patient: Bri Santiago    Procedure(s) Performed: Procedure(s) (LRB):  EGD (ESOPHAGOGASTRODUODENOSCOPY) (N/A)  COLONOSCOPY, SCREENING, HIGH RISK PATIENT (N/A)    Patient location: PACU    Anesthesia Type: general    Transport from OR: Transported from OR on room air with adequate spontaneous ventilation    Post pain: adequate analgesia    Post assessment: no apparent anesthetic complications and tolerated procedure well    Post vital signs: stable    Level of consciousness: awake and alert    Nausea/Vomiting: no nausea/vomiting    Complications: none    Transfer of care protocol was followed    Last vitals: Visit Vitals  BP (!) 154/91 (BP Location: Left arm, Patient Position: Lying)   Pulse 78   Temp 37.2 °C (99 °F) (Temporal)   Resp 13   Ht 5' 10" (1.778 m)   Wt 107.9 kg (237 lb 14 oz)   SpO2 96%   BMI 34.13 kg/m²     "

## 2025-07-17 NOTE — PROVATION PATIENT INSTRUCTIONS
Discharge Summary/Instructions after an Endoscopic Procedure  Patient Name: Bri Santiago  Patient MRN: [id number]  Patient YOB: 1960 Thursday, July 17, 2025  Rosendo Boyer MD  Dear patient,  As a result of recent federal legislation (The Federal Cures Act), you may   receive lab or pathology results from your procedure in your [facility]   account before your physician is able to contact you. Your physician or   their representative will relay the results to you with their   recommendations at their soonest availability.  Thank you,  RESTRICTIONS:  During your procedure today, you received medications for sedation.  These   medications may affect your judgment, balance and coordination.  Therefore,   for 24 hours, you have the following restrictions:   - DO NOT drive a car, operate machinery, make legal/financial decisions,   sign important papers or drink alcohol.    ACTIVITY:  Today: no heavy lifting, straining or running due to procedural   sedation/anesthesia.  The following day: return to full activity including work.  DIET:  Eat and drink normally unless instructed otherwise.     TREATMENT FOR COMMON SIDE EFFECTS:  - Mild abdominal pain, nausea, belching, bloating or excessive gas:  rest,   eat lightly and use a heating pad.  - Sore Throat: treat with throat lozenges and/or gargle with warm salt   water.  - Because air was used during the procedure, expelling large amounts of air   from your rectum or belching is normal.  - If a bowel prep was taken, you may not have a bowel movement for 1-3 days.    This is normal.  SYMPTOMS TO WATCH FOR AND REPORT TO YOUR PHYSICIAN:  1. Abdominal pain or bloating, other than gas cramps.  2. Chest pain.  3. Back pain.  4. Signs of infection such as: chills or fever occurring within 24 hours   after the procedure.  5. Rectal bleeding, which would show as bright red, maroon, or black stools.   (A tablespoon of blood from the rectum is not serious, especially if    hemorrhoids are present.)  6. Vomiting.  7. Weakness or dizziness.  GO DIRECTLY TO THE NEAREST EMERGENCY ROOM IF YOU HAVE ANY OF THE FOLLOWING:      Difficulty breathing              Chills and/or fever over 101 F   Persistent vomiting and/or vomiting blood   Severe abdominal pain   Severe chest pain   Black, tarry stools   Bleeding- more than one tablespoon   Any other symptom or condition that you feel may need urgent attention  Your doctor recommends these additional instructions:  If any biopsies were taken, your doctors clinic will contact you in 1 to 2   weeks with any results.  - Discharge patient to home.   - Await pathology results.   - Telephone endoscopist for pathology results in 3 weeks.   - Return to referring physician.   - Resume Plavix (clopidogrel) at prior dose in 2 days.   - Repeat colonoscopy in 5 years for surveillance.   - The findings and recommendations were discussed with the patient.  For questions, problems or results please call your physician - Rosendo Boyer MD at Work:  [phone].  [facility] [place], EMERGENCY ROOM PHONE NUMBER: [phone]  IF A COMPLICATION OR EMERGENCY SITUATION ARISES AND YOU ARE UNABLE TO REACH   YOUR PHYSICIAN - GO DIRECTLY TO THE EMERGENCY ROOM.  Rosendo Boyer MD  7/17/2025 4:48:19 PM  This report has been verified and signed electronically.  Dear patient,  As a result of recent federal legislation (The Federal Cures Act), you may   receive lab or pathology results from your procedure in your [facility]   account before your physician is able to contact you. Your physician or   their representative will relay the results to you with their   recommendations at their soonest availability.  Thank you,  PROVATION

## 2025-07-17 NOTE — PLAN OF CARE
Patient : Hai Gomez Age: 46 year old Sex: male   MRN: [id number] Encounter Date: 3/21/2017  Y10/Y10    Pt is Khmer speaking. History was obtained with the assistance of his english speaking significant other.    History     Chief Complaint   Patient presents with   • Urinary Complaint     HPI  3/21/2017  7:48 PM Hai Gomez is a 46 year old male who presents to the ED c/o 8/10 penile pain that worsens with filling of his bladder that began on 3/17/17. Pt reports that when he urinates, the pain is relieved but gradually increases in severity over time. Pt also c/o bilateral testicular pain, \"burning\" dysuria, and intermittent lower back pain but denies difficulty urinating, testicular swelling, fever, nausea, vomiting, diarrhea, constipation, hematochezia, HA, lightheadedness, dizziness, CP, SOB, flank pain, hematuria or any other symptoms. Pt reports family hx of nephrolithiasis but denies personal hx. He denies concern for STD. Pt does not take any regularly prescribed medications and has not taken anything for his pain pta. Pt's wife reports that the pt has been drinking more coffee lately and has not been getting his nl amount of water intake. There are no further complaints or modifying factors at this time.    7:08 PM Chart Review: I reviewed the pt's medications, allergies, and past medical and surgical history in [facility].     PCP: No Pcp    ALLERGIES:  No Known Allergies    Prior to Admission Medications    IBUPROFEN (MOTRIN) 800 MG TABLET    Take 1 tablet by mouth 3 times daily as needed for Pain.    TRAMADOL (ULTRAM) 50 MG TABLET    Take 1 tablet by mouth every 4 hours as needed for Pain.       History reviewed. No pertinent past medical hx.    History reviewed. No pertinent past surgical history.    History reviewed. Nephrolithiasis    Social History   Substance Use Topics   • Smoking status: Never Smoker   • Smokeless tobacco: Never Used   • Alcohol use Not addressed at this time       Review of  Patient updated on plan of care for procedure today.  All questions addressed verbalized understanding.       Systems   Constitutional: Negative for chills and fever.   HENT: Negative for congestion, ear pain and sore throat.    Eyes: Negative for visual disturbance.   Respiratory: Negative for shortness of breath and wheezing.    Cardiovascular: Negative for chest pain.   Gastrointestinal: Negative for abdominal pain, blood in stool, constipation, diarrhea, nausea and vomiting.   Genitourinary: Positive for dysuria, penile pain (8/10, worsens with filling of his bladder, relieved when urinating) and testicular pain (bilateral). Negative for difficulty urinating, flank pain, penile swelling and scrotal swelling.        Positive for dyspareunia. After intercourse, pt has pain to the tip of his penis which subsides on its own.   Musculoskeletal: Positive for back pain (intermittent, lower). Negative for neck pain.   Skin: Negative for rash.   Neurological: Negative for dizziness, weakness, light-headedness and headaches.       Physical Exam       ED Triage Vitals   ED Triage Vitals Group      Temp 03/21/17 1745 97.4 °F (36.3 °C)      Pulse 03/21/17 1745 69      Resp 03/21/17 1745 16      BP 03/21/17 1745 128/82      SpO2 03/21/17 1745 95 %      EtCO2 mmHg --       Height 03/21/17 1745 5' 5\" (1.651 m)      Weight 03/21/17 1745 190 lb (86.2 kg)      Weight Scale Used --        Physical Exam   Constitutional: He is oriented to person, place, and time. He appears well-developed and well-nourished. No distress.   HENT:   Head: Normocephalic and atraumatic.   Mouth/Throat: Oropharynx is clear and moist.   Eyes: Conjunctivae and EOM are normal. Pupils are equal, round, and reactive to light.   Neck: Normal range of motion. Neck supple.   Cardiovascular: Normal rate, regular rhythm and normal heart sounds.    Pulmonary/Chest: Effort normal and breath sounds normal. No respiratory distress.   Abdominal: Soft. Bowel sounds are normal. He exhibits no distension. There is no tenderness. There is no CVA tenderness. Hernia confirmed  negative in the right inguinal area and confirmed negative in the left inguinal area.   Genitourinary: Cremasteric reflex is present. Right testis shows tenderness. Right testis shows no mass and no swelling. Left testis shows tenderness. Left testis shows no mass and no swelling. Uncircumcised. Penile tenderness (to the distal shaft) present. No phimosis, paraphimosis or penile erythema. No discharge found.   Musculoskeletal: Normal range of motion.   Neurological: He is alert and oriented to person, place, and time.   Skin: Skin is warm and dry.   Psychiatric: He has a normal mood and affect. Judgment normal.   Nursing note and vitals reviewed.        ED Course     Procedures    Lab Results     Results for orders placed or performed during the hospital encounter of 03/21/17   Urinalysis & Reflex Micro with Culture if Indicated   Result Value Ref Range    COLOR YELLOW YELLOW    APPEARANCE CLEAR     GLUCOSE(URINE) NEGATIVE NEGATIVE mg/dL    BILIRUBIN NEGATIVE NEGATIVE    KETONES NEGATIVE NEGATIVE mg/dL    SPECIFIC GRAVITY 1.016 1.005 - 1.030    BLOOD NEGATIVE NEGATIVE    pH 7.5 (H) 5.0 - 7.0 Units    PROTEIN(URINE) NEGATIVE NEGATIVE mg/dL    UROBILINOGEN 0.2 0.0 - 1.0 mg/dL    NITRITE NEGATIVE NEGATIVE    LEUKOCYTE ESTERASE NEGATIVE NEGATIVE    SPECIMEN TYPE URINE, CLEAN CATCH/MIDSTREAM          Radiology Results     Imaging Results         US Testicles and Scrotum Bilateral (Final result) Result time:  03/21/17 21:51:49    Final result    Impression:    IMPRESSION:    Small bilateral hydroceles.    I have reviewed the images and agree with the Resident's interpretation.      Narrative:    US TESTICLES AND SCROTUM BILATERAL    HISTORY: Testicular pain.    TECHNIQUE: Grayscale, color Doppler and spectral duplex images were  obtained of the testicles.    COMPARISON: No prior comparative studies.    FINDINGS:    The right testicle measures 4.5 x 3.4 x 2.5 cm and the left testicle  measures 4.2 x 3.5 x 2.3 cm. Both  testicles demonstrate normal homogeneous  echotexture. No evidence of testicular fracture or intratesticular lesions.  Symmetric color Doppler flow is demonstrated on transverse view of the  testicles. Bilateral low resistive arterial waveforms and normal venous  Doppler waveforms are noted.     The epididymides are unremarkable. Small bilateral hydroceles. No  varicocele.          Preliminary result    Impression:    IMPRESSION:    Small bilateral hydroceles.    I have reviewed the images and agree with the Resident's interpretation.      Narrative:    US TESTICLES AND SCROTUM BILATERAL    HISTORY: Testicular pain.    TECHNIQUE: Grayscale, color Doppler and spectral duplex images were  obtained of the testicles.    COMPARISON: No prior comparative studies.    FINDINGS:    The right testicle measures 4.5 x 3.4 x 2.5 cm and the left testicle  measures 4.2 x 3.5 x 2.3 cm. Both testicles demonstrate normal homogeneous  echotexture. No evidence of testicular fracture or intratesticular lesions.  Symmetric color Doppler flow is demonstrated on transverse view of the  testicles. Bilateral low resistive arterial waveforms and normal venous  Doppler waveforms are noted.     The epididymides are unremarkable. Small bilateral hydroceles. No  varicocele.                ED Medication Orders     Start Ordered     Status Ordering Provider    03/21/17 2019 03/21/17 2018  oxyCODONE/APAP (PERCOCET) 5-325 MG tablet 1 tablet  ONCE      Last MAR action:  Given RADHA MYERS          [facility]  Vitals  Vitals:    03/21/17 1745   BP: 128/82   Pulse: 69   Resp: 16   Temp: 97.4 °F (36.3 °C)   TempSrc: Oral   SpO2: 95%   Weight: 86.2 kg   Height: 5' 5\" (1.651 m)       ED Course    7:51 PM - Initial impression: After initial examination, the pt presents with \"burning\" pain that increases with filling of his bladder. I discussed the results of UA unremarkable for evidence of blood or UTI. Given testicular pain, we discussed the concern for  epididymitis. Will recheck on pt to perform  exam. The pt understands and agrees to the plan. All questions have been addressed.    8:14 PM I performed the  exam. Pt's wife informed me that after intercourse, he has pain to the tip of his penis which subsides on its own. I discussed the plan for US of the testicles and Scrotum. Pt will be given a dose of Percocet for his pain.    10:01 PM Patient recheck: I rechecked with the patient who appears comfortable. We discussed the results of his current ED workup including US showing bilateral hydroceles. Given pt's pain we discussed that he should follow-up with urology. Pt will be prescribed pain medication as well as an antiinflammatory and has been advised to elevate the scrotum to relieve pressure from hanging. The Pt has been advised to follow up with his PCP. Furthermore, he has been advised to return to the ED for any new or worsening symptoms. The Pt understands and agrees with the plan of care. All questions have been addressed at this time.    MDM    Critical Care time spent on this patient outside of billable procedures:  None    The patient was provided with a recommendation to follow up with a primary care provider within the next three months to obtain a basic health screening including reassessment of their blood pressure.    Discharge  Clinical Impression  ED Diagnosis        Final diagnosis    Hydrocele, unspecified hydrocele type           Follow Up:  Jt Molina MD  [address]  [address]  [facility] [address] [phone]    Call  to make an appointment for follow-up of symptoms and reevaluation , If new or worsening symptoms return to the ED       New Prescriptions    HYDROCODONE-ACETAMINOPHEN (NORCO) 5-325 MG PER TABLET    Take 1 tablet by mouth every 4 hours as needed for Pain.    IBUPROFEN (MOTRIN) 800 MG TABLET    Take 1 tablet by mouth 3 times daily as needed for Pain.       Pt is discharged to home/self care in  stable condition.       I have reviewed the information recorded by the scribe for accuracy and agree with its contents.    ____________________________________________________________________    Edwin Rice acting as a scribe for Renetta Farias PA-C.    Renetta Farias PA-C  Dictation # [id number]  Scribe: Edwin Rice    Attending Physician: Dr. Jenny Rodriguez MD  Dictation # [id number]       ELVER Head  03/21/17 5046

## 2025-07-17 NOTE — ANESTHESIA POSTPROCEDURE EVALUATION
Anesthesia Post Evaluation    Patient: Bri Santiago    Procedure(s) Performed: Procedure(s) (LRB):  EGD (ESOPHAGOGASTRODUODENOSCOPY) (N/A)  COLONOSCOPY, SCREENING, HIGH RISK PATIENT (N/A)    Final Anesthesia Type: general      Patient location during evaluation: PACU  Patient participation: Yes- Able to Participate  Level of consciousness: awake and alert  Post-procedure vital signs: reviewed and stable  Pain management: adequate  Airway patency: patent    PONV status at discharge: No PONV  Anesthetic complications: no      Cardiovascular status: blood pressure returned to baseline  Respiratory status: unassisted  Hydration status: euvolemic  Follow-up not needed.              Vitals Value Taken Time   /83 07/17/25 17:17   Temp 37 °C (98.6 °F) 07/17/25 16:54   Pulse 86 07/17/25 17:28   Resp 15 07/17/25 17:27   SpO2 97 % 07/17/25 17:28   Vitals shown include unfiled device data.      No case tracking events are documented in the log.      Pain/Tammy Score: Tammy Score: 10 (7/17/2025  5:00 PM)

## 2025-07-17 NOTE — PROVATION PATIENT INSTRUCTIONS
Discharge Summary/Instructions after an Endoscopic Procedure  Patient Name: Bri Santiago  Patient MRN: [id number]  Patient YOB: 1960 Thursday, July 17, 2025  Rosendo Boyer MD  Dear patient,  As a result of recent federal legislation (The Federal Cures Act), you may   receive lab or pathology results from your procedure in your [facility]   account before your physician is able to contact you. Your physician or   their representative will relay the results to you with their   recommendations at their soonest availability.  Thank you,  RESTRICTIONS:  During your procedure today, you received medications for sedation.  These   medications may affect your judgment, balance and coordination.  Therefore,   for 24 hours, you have the following restrictions:   - DO NOT drive a car, operate machinery, make legal/financial decisions,   sign important papers or drink alcohol.    ACTIVITY:  Today: no heavy lifting, straining or running due to procedural   sedation/anesthesia.  The following day: return to full activity including work.  DIET:  Eat and drink normally unless instructed otherwise.     TREATMENT FOR COMMON SIDE EFFECTS:  - Mild abdominal pain, nausea, belching, bloating or excessive gas:  rest,   eat lightly and use a heating pad.  - Sore Throat: treat with throat lozenges and/or gargle with warm salt   water.  - Because air was used during the procedure, expelling large amounts of air   from your rectum or belching is normal.  - If a bowel prep was taken, you may not have a bowel movement for 1-3 days.    This is normal.  SYMPTOMS TO WATCH FOR AND REPORT TO YOUR PHYSICIAN:  1. Abdominal pain or bloating, other than gas cramps.  2. Chest pain.  3. Back pain.  4. Signs of infection such as: chills or fever occurring within 24 hours   after the procedure.  5. Rectal bleeding, which would show as bright red, maroon, or black stools.   (A tablespoon of blood from the rectum is not serious, especially if    hemorrhoids are present.)  6. Vomiting.  7. Weakness or dizziness.  GO DIRECTLY TO THE NEAREST EMERGENCY ROOM IF YOU HAVE ANY OF THE FOLLOWING:      Difficulty breathing              Chills and/or fever over 101 F   Persistent vomiting and/or vomiting blood   Severe abdominal pain   Severe chest pain   Black, tarry stools   Bleeding- more than one tablespoon   Any other symptom or condition that you feel may need urgent attention  Your doctor recommends these additional instructions:  If any biopsies were taken, your doctors clinic will contact you in 1 to 2   weeks with any results.  - Discharge patient to home.   - Resume Plavix (clopidogrel) at prior dose in 2 days.   - Await pathology results.   - Telephone endoscopist for pathology results in 3 weeks.   - Use Protonix 40 mg once daily (or any other full strength proton pump   inhibitor) - best taken 45 minutes before your first protein containing   meal.   - Return to GI clinic at the next available appointment.   - The findings and recommendations were discussed with the patient.  For questions, problems or results please call your physician - Rosendo Boyer MD at Work:  [phone].  [facility], EMERGENCY ROOM PHONE NUMBER: [phone]  IF A COMPLICATION OR EMERGENCY SITUATION ARISES AND YOU ARE UNABLE TO REACH   YOUR PHYSICIAN - GO DIRECTLY TO THE EMERGENCY ROOM.  Rosendo Boyer MD  7/17/2025 4:51:07 PM  This report has been verified and signed electronically.  Dear patient,  As a result of recent federal legislation (The Federal Cures Act), you may   receive lab or pathology results from your procedure in your [facility]   account before your physician is able to contact you. Your physician or   their representative will relay the results to you with their   recommendations at their soonest availability.  Thank you,  PROVATION

## 2025-07-18 ENCOUNTER — PATIENT MESSAGE (OUTPATIENT)
Dept: GASTROENTEROLOGY | Facility: CLINIC | Age: 65
End: 2025-07-18
Payer: MEDICARE

## 2025-07-21 ENCOUNTER — OFFICE VISIT (OUTPATIENT)
Dept: INTERNAL MEDICINE | Facility: CLINIC | Age: 65
End: 2025-07-21
Payer: MEDICARE

## 2025-07-21 ENCOUNTER — LAB VISIT (OUTPATIENT)
Dept: LAB | Facility: HOSPITAL | Age: 65
End: 2025-07-21
Payer: MEDICARE

## 2025-07-21 ENCOUNTER — PROCEDURE VISIT (OUTPATIENT)
Dept: NEUROLOGY | Facility: CLINIC | Age: 65
End: 2025-07-21
Payer: MEDICARE

## 2025-07-21 VITALS
DIASTOLIC BLOOD PRESSURE: 80 MMHG | HEIGHT: 70 IN | WEIGHT: 241.19 LBS | HEART RATE: 71 BPM | BODY MASS INDEX: 34.53 KG/M2 | OXYGEN SATURATION: 96 % | SYSTOLIC BLOOD PRESSURE: 132 MMHG

## 2025-07-21 DIAGNOSIS — M54.50 BILATERAL LOW BACK PAIN WITHOUT SCIATICA, UNSPECIFIED CHRONICITY: ICD-10-CM

## 2025-07-21 DIAGNOSIS — G56.01 CARPAL TUNNEL SYNDROME OF RIGHT WRIST: ICD-10-CM

## 2025-07-21 DIAGNOSIS — R10.9 FLANK PAIN: ICD-10-CM

## 2025-07-21 DIAGNOSIS — R10.30 INGUINAL PAIN, UNSPECIFIED LATERALITY: ICD-10-CM

## 2025-07-21 DIAGNOSIS — R30.0 DYSURIA: ICD-10-CM

## 2025-07-21 DIAGNOSIS — R30.0 DYSURIA: Primary | ICD-10-CM

## 2025-07-21 LAB
BILIRUB UR QL STRIP.AUTO: NEGATIVE
CLARITY UR: CLEAR
COLOR UR AUTO: YELLOW
ESTROGEN SERPL-MCNC: NORMAL PG/ML
GLUCOSE UR QL STRIP: NEGATIVE
HGB UR QL STRIP: ABNORMAL
INSULIN SERPL-ACNC: NORMAL U[IU]/ML
KETONES UR QL STRIP: NEGATIVE
LAB AP CLINICAL INFORMATION: NORMAL
LAB AP GROSS DESCRIPTION: NORMAL
LAB AP PERFORMING LOCATION(S): NORMAL
LAB AP REPORT FOOTNOTES: NORMAL
LEUKOCYTE ESTERASE UR QL STRIP: NEGATIVE
NITRITE UR QL STRIP: NEGATIVE
PH UR STRIP: 5 [PH]
PROT UR QL STRIP: NEGATIVE
SP GR UR STRIP: 1.02
UROBILINOGEN UR STRIP-ACNC: NEGATIVE EU/DL

## 2025-07-21 PROCEDURE — 3079F DIAST BP 80-89 MM HG: CPT | Mod: CPTII,S$GLB,, | Performed by: PHYSICIAN ASSISTANT

## 2025-07-21 PROCEDURE — 81003 URINALYSIS AUTO W/O SCOPE: CPT

## 2025-07-21 PROCEDURE — 95910 NRV CNDJ TEST 7-8 STUDIES: CPT | Mod: S$GLB,,, | Performed by: PHYSICAL MEDICINE & REHABILITATION

## 2025-07-21 PROCEDURE — 3008F BODY MASS INDEX DOCD: CPT | Mod: CPTII,S$GLB,, | Performed by: PHYSICIAN ASSISTANT

## 2025-07-21 PROCEDURE — 87086 URINE CULTURE/COLONY COUNT: CPT

## 2025-07-21 PROCEDURE — 99999 PR PBB SHADOW E&M-EST. PATIENT-LVL V: CPT | Mod: PBBFAC,,, | Performed by: PHYSICIAN ASSISTANT

## 2025-07-21 PROCEDURE — 3075F SYST BP GE 130 - 139MM HG: CPT | Mod: CPTII,S$GLB,, | Performed by: PHYSICIAN ASSISTANT

## 2025-07-21 PROCEDURE — 99214 OFFICE O/P EST MOD 30 MIN: CPT | Mod: S$GLB,,, | Performed by: PHYSICIAN ASSISTANT

## 2025-07-21 PROCEDURE — 95886 MUSC TEST DONE W/N TEST COMP: CPT | Mod: S$GLB,,, | Performed by: PHYSICAL MEDICINE & REHABILITATION

## 2025-07-21 NOTE — PROCEDURES
Test Date:  2025    Patient: tata finley : 1960 Physician: Michoacano Lomeli D.O.   ID#: [id number] Sex: Male Ref. Phys: Kodak Bradley PA-C     HPI: Tata Finley is a 64 y.o.male who presents for NCS/EMG to evaluate for CTS on the right.  Note that contralateral nerve conduction studies were performed in limited fashion for comparison.        PROCEDURE:  Prior to the procedure, the procedure was discussed in detail with the patient.  All questions were answered, and verbal consent was obtained.  Discussed that since he is on Plavix, will limit needle EMG and avoid deeper muscles as possible.  He agreed to this plan.  For nerve conduction studies, a combination of surface electrodes, bar electrodes, and/or ring electrodes were used as needed.  For needle EMG, each site was cleaned and prepped in usual fashion with an alcohol pad.  A monopolar needle (28G) was used.  There was no significant bleeding, and bandages were applied as needed.  The procedure was tolerated without adverse effect.  The patient was instructed on post-procedure care including ice if needed for 10-15 minutes up to 4 times/day for any sore muscles.  I discussed with the patient that the data would be reviewed and a report sent to the referring provider, where any follow up questions regarding next steps should be directed.        NCV & EMG Findings:  All nerve conduction studies (as indicated in the following tables) were within normal limits.  All examined muscles (as indicated in the following table) showed no evidence of electrical instability.      IMPRESSIONS:  This is a normal electrophysiologic study of the right upper extremity.          ___________________________  Michoacano Lomeli D.O.        NCS+  Motor Nerve Results      Latency Amplitude F-Lat Segment Distance CV Comment   Site (ms) Norm (mV) Norm (ms)  (cm) (m/s) Norm    Left Median (APB)   Wrist 3.5  < 4.7 12.6  > 3.8         Elbow 7.7 - 8.3 -  Elbow-Wrist 24  57  > 47    Right Median (APB)   Wrist 3.8  < 4.7 12.7  > 3.8         Elbow 8.1 - 9.0 -  Elbow-Wrist 23 54  > 47    Right Ulnar (ADM)   Wrist 2.2  < 3.7 5.2  > 3.0         Bel Elbow 7.2 - 4.9 -  Bel Elbow-Wrist 28 56  > 52    Abv Elbow 8.7 - 5.2 -  Abv Elbow-Bel Elbow 8 52  > 43      Sensory Sites      Latency (O) Latency (P) Amp (P-T) Segment Distance Velocity Comment   Site (ms) Norm (ms) Norm (µV) Norm  (cm) (m/s)    Left Median (Rec:Dig II)   Wrist 2.3  < 3.3 3.9  < 4.0 23  > 8 Wrist-Dig II 14 60    Right Median   Wrist-Dig I 2.9 - 3.4 - 4 - Wrist-Dig I 10 35    Wrist-Dig II 3.1  < 3.3 3.9  < 4.0 22  > 8 Wrist-Dig II 14 46    Palm-Wrist 1.88 - 2.5 - 27 - Palm-Wrist - -    Right Ulnar (Rec:Wrist)   Palm 1.45 - 2.3 - 5 - Palm-Wrist - -    Right Radial (Rec:Dig I)   Wrist 2.7 - 3.4 - 4 - Wrist-Dig I 10 37      Inter-Nerve Comparisons     Nerve 1 Value 1 Nerve 2 Value 2 Parameter Result Normal   Sensory Sites   R Median Wrist-Dig I 3.4 ms R Radial Wrist-Dig I 3.4 ms Peak Lat Diff 0 ms <0.40   R Median Palm-Wrist 2.5 ms R Ulnar Palm-Wrist 2.3 ms Peak Lat Diff 0.20 ms <0.30     EMG+     Side Muscle Nerve Root Ins Act Fibs Psw Amp Dur Poly Recrt Int Pat Comment   Right Deltoid Axillary C5-C6 Nml Nml Nml Nml Nml 0 Nml Nml    Right Biceps Musculocut C5-C6 Nml Nml Nml Nml Nml 0 Nml Nml    Right Triceps Radial C6-C8 Nml Nml Nml Nml Nml 0 Nml Nml    Right Pronator Teres Median C6-C7 Nml Nml Nml Nml Nml 0 Nml Nml    Right FDI Ulnar,  Radial C8-T1 Nml Nml Nml Nml Nml 0 Nml Nml            Waveforms:    Motor         Sensory

## 2025-07-21 NOTE — PROGRESS NOTES
INTERNAL MEDICINE PROGRESS NOTE    CHIEF COMPLAINT     Chief Complaint   Patient presents with    Groin Pain     Pain comes and goes    Flank Pain     Started a week ago; bottom of stomach and sides       HPI     Bri Santiago is a 64 y.o. male who presents for an Follow-up visit today.    PCP is Dr. Galeas patient is known to me.   Pt made this appointment through the portal  three days ago because of new symptoms.     He reports with complaints of lower back pain that is been present for 1 week.  Also reports groin pain into bilateral testicles.  Pain in both sides equally not 1 over the other.    He reports that there is no blood in his urine but admits that his urine is more foamy than normal.  He does report that urine is darker yellow color than normal.  He denies seeing craon blood in his urine.  He has had no fever or chills.  Denies nausea or vomiting.  Reports normal bowel function.  No trauma or injury to groin, abdomen or lower back.    He has not taken any medications to help with the symptoms.  No history of such symptoms in the past.  Currently unaccompanied in the office.    Past Medical History:  Past Medical History:   Diagnosis Date    Acute pancreatitis     Anal fissure     Anemia     Anticoagulant long-term use     Arthritis     Asthma in remission     Back pain     BPH (benign prostatic hypertrophy)     Cancer 2000    prostate- treated at [facility] with chemo- in remission since 2000    Chronic maxillary sinusitis     Clotting disorder     Diastolic dysfunction with chronic heart failure 12/03/2018    Family history of colon cancer     Family history of early CAD     GERD (gastroesophageal reflux disease)     Hard to intubate 10/25/2024    Helicobacter pylori (H. pylori) infection     Chronic    History of chronic pancreatitis     HTN (hypertension)     Lumbago 11/12/2012    Obesity     ABDON (obstructive sleep apnea)     Spinal stenosis of lumbar region     Von Willebrand disease     VWD  (acquired von Willebrand's disease)        Home Medications:  Prior to Admission medications    Medication Sig Start Date End Date Taking? Authorizing Provider   albuterol (PROVENTIL/VENTOLIN HFA) 90 mcg/actuation inhaler INHALE 2 PUFFS INTO THE LUNGS EVERY 6 HOURS AS NEEDED FOR WHEEZING OR SHORTNESS OF BREATH 3/9/20   Geeta Hall MD   albuterol (PROVENTIL/VENTOLIN HFA) 90 mcg/actuation inhaler Inhale 1-2 puffs into the lungs every 6 (six) hours as needed for Wheezing. Rescue 12/28/24 12/28/25  Nato Styles PA-C   amLODIPine (NORVASC) 10 MG tablet Take 1 tablet (10 mg total) by mouth once daily. 3/27/25 3/27/26  Renata Kyle MD   atorvastatin (LIPITOR) 80 MG tablet Take 1 tablet (80 mg total) by mouth every evening. 5/1/23   Damian Liu MD   azelastine (ASTELIN) 137 mcg (0.1 %) nasal spray 1 spray (137 mcg total) by Nasal route 2 (two) times daily. 3/20/25 3/20/26  Winnie Jacob PA-C   budesonide-formoterol 160-4.5 mcg (SYMBICORT) 160-4.5 mcg/actuation HFAA INHALE 2 PUFFS INTO THE LUNGS EVERY 12 HOURS 3/26/20   Geeta Hall MD   calcium citrate-vitamin D3 315-200 mg (CITRACAL+D) 315-200 mg-unit per tablet Take 1 tablet by mouth nightly.     Provider, Historical   cetirizine (ZYRTEC) 10 MG tablet Take 1 tablet (10 mg total) by mouth once daily. 3/20/25 3/20/26  Winnie Jacob PA-C   clopidogreL (PLAVIX) 75 mg tablet Take 1 tablet (75 mg total) by mouth once daily. 5/3/24   Winnie Jacob PA-C   cyanocobalamin, vitamin B-12, 50 mcg tablet Take 50 mcg by mouth nightly.     Provider, Historical   docusate sodium (COLACE) 100 MG capsule Take 1 tablet by mouth Twice daily. 1 Capsule Oral Twice a day .  Take with pain medicine    Provider, Historical   doxazosin (CARDURA) 1 MG tablet TAKE 1 TABLET BY MOUTH ONCE DAILY IN THE EVENING 3/28/25   Winnie Jacob PA-C   ezetimibe (ZETIA) 10 mg tablet Take 1 tablet (10 mg total) by mouth once daily. 8/5/24 8/5/25  Renata Kyle MD    fluticasone propionate (FLONASE) 50 mcg/actuation nasal spray One spray in each nostril twice daily after 1st using azelastine nasal spray 6/15/21   LAURENT Swanson MD   hydroCHLOROthiazide (HYDRODIURIL) 25 MG tablet Take 1 tablet (25 mg total) by mouth once daily. 3/27/25 3/27/26  Renata Kyle MD   ipratropium (ATROVENT) 42 mcg (0.06 %) nasal spray 1-2 sprays in each nostril before eating and at bedtime as needed 6/15/21   LAURENT Swanson MD   methylPREDNISolone (MEDROL DOSEPACK) 4 mg tablet use as directed 7/15/25 8/5/25  Tammy Freeman PA-C   RABEprazole (ACIPHEX) 20 mg tablet Take 1 tablet (20 mg total) by mouth every 12 (twelve) hours. 11/1/24 11/1/25  Rosendo Boyer MD   tadalafiL (CIALIS) 20 MG Tab Take 1 tablet (20 mg total) by mouth as needed. Take every 36 hours as needed for ED. 7/23/24 7/23/25  Tyler Reid Jr., MD   testosterone (ANDRODERM) 2 mg/24 hour PT24 APPLY 1 PATCH TOPICALLY TO THE SKIN EVERY DAY 1/5/22   Chris Min MD   zolpidem (AMBIEN) 10 mg Tab TAKE 1 TABLET BY MOUTH EVERY DAY AT BEDTIME 6/23/17   Darvin Henry MD   tamsulosin (FLOMAX) 0.4 mg Cap Take 1 capsule (0.4 mg total) by mouth once daily. 4/4/21 4/4/21  Darnell Lee MD       Review of Systems:  Review of Systems   Constitutional:  Negative for chills and fever.   HENT:  Negative for sore throat and trouble swallowing.    Eyes:  Negative for visual disturbance.   Respiratory:  Negative for cough and shortness of breath.    Cardiovascular:  Negative for chest pain.   Gastrointestinal:  Negative for abdominal pain, constipation, diarrhea, nausea and vomiting.   Genitourinary:  Negative for dysuria and flank pain.   Musculoskeletal:  Positive for back pain. Negative for neck pain and neck stiffness.   Skin:  Negative for rash.   Neurological:  Negative for dizziness, syncope, weakness and headaches.   Psychiatric/Behavioral:  Negative for confusion.        Health Maintainence:    Immunizations:  Health Maintenance         Date Due Completion Date    Shingles Vaccine (1 of 2) Never done ---    COVID-19 Vaccine (5 - 2024-25 season) 09/01/2024 12/8/2022    Influenza Vaccine (1) 09/01/2025 11/1/2024    Aspirin/Antiplatelet Therapy 06/17/2026 6/17/2025    Hemoglobin A1c (Diabetic Prevention Screening) 07/16/2027 7/16/2024    TETANUS VACCINE 10/16/2027 10/16/2017    Lipid Panel 03/28/2030 3/28/2025    Colorectal Cancer Screening 07/17/2030 7/17/2025             PHYSICAL EXAM     There were no vitals taken for this visit.    Physical Exam  Vitals and nursing note reviewed.   Constitutional:       Appearance: Normal appearance.      Comments: Male in no acute distress or apparent pain.  He makes good eye contact, speaks in clear full sentences and ambulates with ease.  He is able to transition from seated position to standing position without difficulty.   HENT:      Head: Normocephalic and atraumatic.      Nose: Nose normal.      Mouth/Throat:      Pharynx: Oropharynx is clear.   Eyes:      Conjunctiva/sclera: Conjunctivae normal.   Cardiovascular:      Rate and Rhythm: Normal rate and regular rhythm.      Pulses: Normal pulses.   Pulmonary:      Effort: Pulmonary effort is normal. No respiratory distress.      Breath sounds: Normal breath sounds.   Abdominal:      Tenderness: There is no abdominal tenderness.      Comments: No CVA tenderness to percussion bilaterally.    No inguinal masses or reproducible tenderness to palpation appreciated on exam.    Normal bowel sounds in all quadrants  No reproducible tenderness or acute peritoneal signs  No distention or overlying skin changes     Genitourinary:     Comments: Defer  Musculoskeletal:         General: Normal range of motion.      Cervical back: No rigidity.      Comments: No C, T, L midline bony tenderness crepitus or step-offs.   Skin:     General: Skin is warm and dry.      Capillary Refill: Capillary refill takes less than 2 seconds.       Findings: No rash.   Neurological:      General: No focal deficit present.      Mental Status: He is alert.      Gait: Gait normal.   Psychiatric:         Mood and Affect: Mood normal.         LABS     Lab Results   Component Value Date    HGBA1C 4.4 07/16/2024     CMP  Sodium   Date Value Ref Range Status   07/17/2025 142 136 - 145 mmol/L Final   12/28/2024 138 136 - 145 mmol/L Final     Potassium   Date Value Ref Range Status   07/17/2025 4.7 3.5 - 5.1 mmol/L Final   12/28/2024 3.8 3.5 - 5.1 mmol/L Final     Chloride   Date Value Ref Range Status   03/28/2025 100 95 - 110 mmol/L Final   12/28/2024 102 95 - 110 mmol/L Final     CO2   Date Value Ref Range Status   03/28/2025 31 (H) 23 - 29 mmol/L Final   12/28/2024 25 23 - 29 mmol/L Final     Glucose   Date Value Ref Range Status   03/28/2025 82 70 - 110 mg/dL Final   12/28/2024 96 70 - 110 mg/dL Final     BUN   Date Value Ref Range Status   03/28/2025 15 8 - 23 mg/dL Final     Creatinine   Date Value Ref Range Status   03/28/2025 0.8 0.5 - 1.4 mg/dL Final     Calcium   Date Value Ref Range Status   03/28/2025 9.3 8.7 - 10.5 mg/dL Final   12/28/2024 8.6 (L) 8.7 - 10.5 mg/dL Final     Protein Total   Date Value Ref Range Status   03/28/2025 8.0 6.0 - 8.4 gm/dL Final     Total Protein   Date Value Ref Range Status   12/28/2024 7.5 6.0 - 8.4 g/dL Final     Albumin   Date Value Ref Range Status   03/28/2025 3.7 3.5 - 5.2 g/dL Final   12/28/2024 3.8 3.5 - 5.2 g/dL Final   10/05/2024 4.1 3.6 - 5.1 g/dL Final     Comment:     Test Performed at:  [email or web address] [address]  [address]     VIPUL Arzola MD, PhD, YOVANI       Total Bilirubin   Date Value Ref Range Status   12/28/2024 1.1 (H) 0.1 - 1.0 mg/dL Final     Comment:     For infants and newborns, interpretation of results should be based  on gestational age, weight and in agreement with clinical  observations.    Premature Infant recommended reference ranges:  Up to  24 hours.............<8.0 mg/dL  Up to 48 hours............<12.0 mg/dL  3-5 days..................<15.0 mg/dL  6-29 days.................<15.0 mg/dL       Bilirubin Total   Date Value Ref Range Status   03/28/2025 1.1 (H) 0.1 - 1.0 mg/dL Final     Comment:     For infants and newborns, interpretation of results should be based   on gestational age, weight and in agreement with clinical   observations.    Premature Infant recommended reference ranges:   0-24 hours:  <8.0 mg/dL   24-48 hours: <12.0 mg/dL   3-5 days:    <15.0 mg/dL   6-29 days:   <15.0 mg/dL     Alkaline Phosphatase   Date Value Ref Range Status   12/28/2024 60 40 - 150 U/L Final     ALP   Date Value Ref Range Status   03/28/2025 68 40 - 150 unit/L Final     AST   Date Value Ref Range Status   03/28/2025 23 11 - 45 unit/L Final   12/28/2024 22 10 - 40 U/L Final     ALT   Date Value Ref Range Status   03/28/2025 19 10 - 44 unit/L Final   12/28/2024 24 10 - 44 U/L Final     Anion Gap   Date Value Ref Range Status   03/28/2025 10 8 - 16 mmol/L Final     eGFR if    Date Value Ref Range Status   07/19/2022 >60.0 >60 mL/min/1.73 m^2 Final     eGFR if non    Date Value Ref Range Status   07/19/2022 >60.0 >60 mL/min/1.73 m^2 Final     Comment:     Calculation used to obtain the estimated glomerular filtration  rate (eGFR) is the CKD-EPI equation.        Lab Results   Component Value Date    WBC 8.40 03/28/2025    HGB 14.4 07/17/2025    HCT 43.8 03/28/2025    MCV 96 03/28/2025     03/28/2025     Lab Results   Component Value Date    CHOL 135 03/28/2025    CHOL 182 10/05/2024    CHOL 189 07/16/2024     Lab Results   Component Value Date    HDL 35 (L) 03/28/2025    HDL 36 (L) 10/05/2024    HDL 44 07/16/2024     Lab Results   Component Value Date    LDLCALC 75.0 03/28/2025    LDLCALC 122.2 10/05/2024    LDLCALC 121.0 07/16/2024     Lab Results   Component Value Date    TRIG 125 03/28/2025    TRIG 119 10/05/2024    TRIG 120  07/16/2024     Lab Results   Component Value Date    CHOLHDL 25.9 03/28/2025    CHOLHDL 19.8 (L) 10/05/2024    CHOLHDL 23.3 07/16/2024     Lab Results   Component Value Date    TSH 1.083 03/28/2025    B0WTPIM 6.3 06/15/2010       ASSESSMENT/PLAN     Bri Santiago is a 64 y.o. male     Bri was seen today for groin pain and flank pain.  Will check urinalysis and ultrasound to ensure no concerning renal pathology.  If testicular pain continues will have low threshold to get testicular ultrasound.  Follow up in 1-2 weeks for symptom recheck.    Diagnoses and all orders for this visit:    Dysuria  -     Urinalysis; Future  -     Urine Culture High Risk ($$); Future    Inguinal pain, unspecified laterality    Bilateral low back pain without sciatica, unspecified chronicity    Flank pain  -     US Retroperitoneal Complete; Future      Patient was counseled on when and how to seek emergent care.       Winnie Jacob PA-C   Department of Internal Medicine - [facility]   9:27 AM

## 2025-07-22 ENCOUNTER — HOSPITAL ENCOUNTER (OUTPATIENT)
Dept: RADIOLOGY | Facility: HOSPITAL | Age: 65
Discharge: HOME | End: 2025-07-22
Attending: PHYSICIAN ASSISTANT
Payer: MEDICARE

## 2025-07-22 DIAGNOSIS — R10.9 FLANK PAIN: ICD-10-CM

## 2025-07-22 LAB — BACTERIA UR CULT: NO GROWTH

## 2025-07-22 PROCEDURE — 76770 US EXAM ABDO BACK WALL COMP: CPT | Mod: 26,,, | Performed by: RADIOLOGY

## 2025-07-22 PROCEDURE — 76770 US EXAM ABDO BACK WALL COMP: CPT | Mod: TC

## 2025-07-22 NOTE — PROGRESS NOTES
"The patient location is: [place]  The chief complaint leading to consultation is: MRI results    Visit type: audiovisual    Face to Face time with patient: 10 min  15 minutes of total time spent on the encounter, which includes face to face time and non-face to face time preparing to see the patient (eg, review of tests), Obtaining and/or reviewing separately obtained history, Documenting clinical information in the electronic or other health record, Independently interpreting results (not separately reported) and communicating results to the patient/family/caregiver, or Care coordination (not separately reported).     Each patient to whom he or she provides medical services by telemedicine is:  (1) informed of the relationship between the physician and patient and the respective role of any other health care provider with respect to management of the patient; and (2) notified that he or she may decline to receive medical services by telemedicine and may withdraw from such care at any time.    Notes:     DATE: 7/29/2025  PATIENT: Bri Santiago    Attending Physician: Marcus Fitch M.D.    HISTORY:  Bri Santiago is a 64 y.o. male who returns to me today for MRI results.  He was last seen by me 7/15/2025.  Today he is doing well but notes he continues to have neck and bilateral shoulder pain with numbness and tingling down his right arm. He says the pain in his neck is what bothers him the most and feels like "pressure".     He also has chronic low back pain. He has had multiple injections with pain mgmt with good relief and would like to continue.     The Patient ENDORSES myelopathic symptoms such as handwriting changes or difficulty with buttons/coins/keys. ENDORSES mild balance problems. Denies perineal paresthesias, bowel/bladder dysfunction.      EXAM:  There were no vitals taken for this visit.    My physical examination was notable for the following findings:     Musculoskeletal and neuro exam " stable    IMAGING:    Today I personally re-reviewed AP, Lat and Flex/Ex  upright C-spine films that demonstrate non instrumented fusion at C5-6. Degenerative changes at C4-5     MRI cervical demonstrates moderate central stenosis at C4-5, severe bilateral nueral foraminal narrowing at C4-5, C6-7, C7-T1    There is no height or weight on file to calculate BMI.    Hemoglobin A1C   Date Value Ref Range Status   07/16/2024 4.4 4.0 - 5.6 % Final     Comment:     ADA Screening Guidelines:  5.7-6.4%  Consistent with prediabetes  >or=6.5%  Consistent with diabetes    High levels of fetal hemoglobin interfere with the HbA1C  assay. Heterozygous hemoglobin variants (HbS, HgC, etc)do  not significantly interfere with this assay.   However, presence of multiple variants may affect accuracy.     01/23/2023 4.6 4.0 - 5.6 % Final     Comment:     ADA Screening Guidelines:  5.7-6.4%  Consistent with prediabetes  >or=6.5%  Consistent with diabetes    High levels of fetal hemoglobin interfere with the HbA1C  assay. Heterozygous hemoglobin variants (HbS, HgC, etc)do  not significantly interfere with this assay.   However, presence of multiple variants may affect accuracy.     01/23/2020 4.5 4.0 - 5.6 % Final     Comment:     ADA Screening Guidelines:  5.7-6.4%  Consistent with prediabetes  >or=6.5%  Consistent with diabetes  High levels of fetal hemoglobin interfere with the HbA1C  assay. Heterozygous hemoglobin variants (HbS, HgC, etc)do  not significantly interfere with this assay.   However, presence of multiple variants may affect accuracy.           ASSESSMENT/PLAN:    Diagnoses and all orders for this visit:    S/P cervical spinal fusion    Cervical radiculopathy    DDD (degenerative disc disease), cervical        Today we discussed at length all of the different treatment options including anti-inflammatories, acetaminophen, rest, ice, heat, physical therapy including strengthening and stretching exercises, home exercises,  ROM, aerobic conditioning, aqua therapy, other modalities including ultrasound, massage, and dry needling, epidural steroid injections and finally surgical intervention.        Pt presents with chronic neck pain and radiculopathy. No severe central stenosis on MRI that would warrant urgent surgical intervention. Will message pain mgmt regarding possible cervical MBBs/RFAs. Pt will fu if pain persists or weakness worsens.

## 2025-07-23 ENCOUNTER — PATIENT MESSAGE (OUTPATIENT)
Dept: ORTHOPEDICS | Facility: CLINIC | Age: 65
End: 2025-07-23
Payer: MEDICARE

## 2025-07-23 LAB
ACID A-GLUCOSIDASE TSMI-CCNT: 17.6 NMOL/MIN/MG PROT
DISACCHARIDASES TSMI-IMP: ABNORMAL
GLUCAN 1,4-ALPHA-GLUCOSIDASE TSMI-CCNT: 2.9 NMOL/MIN/MG PROT
LACTASE TSMI-CCNT: <0.4 NMOL/MIN/MG PROT
PALATINASE TSMI-CCNT: <0.4 NMOL/MIN/MG PROT
PROVIDER SIGNING NAME: ABNORMAL
SUCRASE TSMI-CCNT: 6.8 NMOL/MIN/MG PROT

## 2025-07-25 ENCOUNTER — PATIENT MESSAGE (OUTPATIENT)
Dept: GASTROENTEROLOGY | Facility: CLINIC | Age: 65
End: 2025-07-25
Payer: MEDICARE

## 2025-07-25 ENCOUNTER — OFFICE VISIT (OUTPATIENT)
Dept: ORTHOPEDICS | Facility: CLINIC | Age: 65
End: 2025-07-25
Payer: MEDICARE

## 2025-07-25 DIAGNOSIS — G56.00 CARPAL TUNNEL SYNDROME, UNSPECIFIED LATERALITY: ICD-10-CM

## 2025-07-25 DIAGNOSIS — M77.10 LATERAL EPICONDYLITIS, UNSPECIFIED LATERALITY: Primary | ICD-10-CM

## 2025-07-25 PROCEDURE — 99999 PR PBB SHADOW E&M-EST. PATIENT-LVL IV: CPT | Mod: PBBFAC,,, | Performed by: SPECIALIST/TECHNOLOGIST

## 2025-07-25 NOTE — PROGRESS NOTES
Patient ID: Bri Santiago is a 64 y.o. male.    Chief Complaint: Follow-up and Pain of the Right Hand    History of Present Illness    CHIEF COMPLAINT:  - Right wrist pain and numbness    HPI:  Bri presents for follow-up after seeing neck and spine specialists and completing a 5-day Medrol Dosepak. His wrist pain remains, though less severe than before. He localizes the pain to the dorsal aspect of the wrist, reporting that the wrist is still somewhat sore. Pain worsens with certain wrist movements, particularly extension. He cannot  anything with the right hand due to pain. Previously, numbness and tingling affected all fingers, with the pinky and thumb most affected, but these symptoms have resolved. Wearing a brace at night helps reduce soreness.    He denies having carpal tunnel syndrome based on a normal EMG study.    PREVIOUS TREATMENTS:  - Bri completed a 5-day Medrol Dosepak, which provided minimal benefit for wrist pain  - Bri wears a brace at night, which helps reduce soreness    MEDICATIONS:  - Medrol Dosepak: 5-day course, recently completed      ROS:  ROS as indicated in HPI.          Hand/Wrist Musculoskeletal Exam    Inspection    Right      Erythema: none      Ecchymosis: none      Edema: none      Deformity: none      Hand - prior incision: none      Wrist - prior incision: none    Left      Erythema: none      Ecchymosis: none      Edema: none      Deformity: none      Hand - prior incision: none      Wrist - prior incision: none    Palpation    Palpation additional comments: Tenderness over the dorsal aspect of the wrist as well as up into the lateral epicondyle of the right elbow.    Range of Motion      Right Wrist      Right wrist range of motion is normal.      Left Wrist      Left wrist range of motion is normal.      Strength        Strength additional comments: Pain with resisted wrist extension.    Neurovascular    Right       Radial pulse: normal      Capillary refill:  brisk and <3 sec      Ulnar nerve sensory distribution: normal      Median nerve sensory distribution: normal      Superficial radial nerve sensory distribution: normal    Left       Capillary refill: brisk and <3 sec      Ulnar nerve sensory distribution: normal      Median nerve sensory distribution: normal      Superficial radial nerve sensory distribution: normal    Special Tests    Right      Phalen's: negative      Carpal compression test: negative      Elbow flexion: negative      Tinel's - carpal tunnel: negative      Tinel's - cubital tunnel: negative    Left      Phalen's: negative      Carpal compression test: negative      Elbow flexion: negative      Tinel's - carpal tunnel: negative      Tinel's - cubital tunnel: negative    General    Labored breathing: no    Psychiatric: normal mood and affect    Neurological: oriented x3    Skin: intact      Physical Exam    MSK: Hand/Wrist - Right: Pain with wrist extension. Pain with wrist flexion, less severe than extension. Tenderness to palpation of wrist. Tingling in little finger with provocative testing.  MSK: Elbow - Right: Tenderness to palpation of elbow.  IMAGING:  - EMG study: Normal, showing no carpal tunnel           IMAGING  EMG  NCV & EMG Findings:  All nerve conduction studies (as indicated in the following tables) were within normal limits.  All examined muscles (as indicated in the following table) showed no evidence of electrical instability.        IMPRESSIONS:  This is a normal electrophysiologic study of the right upper extremity.      Right hand XR  Personal interpretation of the XR reveals no signs of fractures or dislocations      PLAN  Assessment & Plan    65 yo with PMHx of ACDF C5-C6 presents with improvement in his right carpal tunnel syndrome status post Medrol Dosepak.  Reviewed his EMG test. He has developed wrist tendonitis as well as tennis elbow since his last visit.  Discussed in detail with the pathophysiology of tennis elbow.   We will refer to therapy for soft tissue mobilization as well as stretching and strengthening exercises.  Advised patient on his ulnar neuropathy like symptoms to wear a volleyball knee pad in the clinic if his elbow to prevent flexion of the elbow while sleeping.  Patient we will follow up in clinic 6 weeks if symptoms are not improving or are worsening.     The patient's pathophysiology was explained in detail with reference to x-rays, models, other visual aids as appropriate.  Treatment options were discussed in detail.  Questions were invited and answered to the patient's satisfaction. I reviewed Primary care , and other specialty's notes to better coordinate patient's care.           Andrew Bradley PA-C, ATC  Hand and Upper Extremity   [facility]    This note was generated with the assistance of ambient listening technology. Verbal consent was obtained by the patient and accompanying visitor(s) for the recording of patient appointment to facilitate this note. I attest to having reviewed and edited the generated note for accuracy, though some syntax or spelling errors may persist. Please contact the author of this note for any clarification.

## 2025-07-28 ENCOUNTER — HOSPITAL ENCOUNTER (OUTPATIENT)
Dept: RADIOLOGY | Facility: HOSPITAL | Age: 65
Discharge: HOME | End: 2025-07-28
Attending: ORTHOPAEDIC SURGERY
Payer: MEDICARE

## 2025-07-28 DIAGNOSIS — M54.12 RIGHT CERVICAL RADICULOPATHY: ICD-10-CM

## 2025-07-28 PROCEDURE — 72141 MRI NECK SPINE W/O DYE: CPT | Mod: 26,,, | Performed by: INTERNAL MEDICINE

## 2025-07-28 PROCEDURE — 72141 MRI NECK SPINE W/O DYE: CPT | Mod: TC

## 2025-07-29 ENCOUNTER — TELEPHONE (OUTPATIENT)
Dept: PAIN MEDICINE | Facility: CLINIC | Age: 65
End: 2025-07-29
Payer: MEDICARE

## 2025-07-29 ENCOUNTER — OFFICE VISIT (OUTPATIENT)
Dept: ORTHOPEDICS | Facility: CLINIC | Age: 65
End: 2025-07-29
Payer: MEDICARE

## 2025-07-29 DIAGNOSIS — M50.30 DDD (DEGENERATIVE DISC DISEASE), CERVICAL: ICD-10-CM

## 2025-07-29 DIAGNOSIS — Z98.1 S/P CERVICAL SPINAL FUSION: Primary | ICD-10-CM

## 2025-07-29 DIAGNOSIS — M54.12 CERVICAL RADICULOPATHY: ICD-10-CM

## 2025-07-29 PROCEDURE — 98004 SYNCH AUDIO-VIDEO EST SF 10: CPT | Mod: 95,,, | Performed by: ORTHOPAEDIC SURGERY

## 2025-07-29 NOTE — TELEPHONE ENCOUNTER
----- Message from JENI Musa sent at 7/29/2025 11:55 AM CDT -----  Sure. I will have our staff make him a follow up.  ----- Message -----  From: Tammy Freeman PA-C  Sent: 7/29/2025  11:07 AM CDT  To: JENI Rondon,    This patient is established with yall for low back pain. He came to me for neck pain and right cervical radiculopathy. He also reported myelopathic hand symptoms. I got an MRI and theres degenerative changes and moderate stenosis at C4-5, but no severe stenosis where I think we need to rush into surgery. He said his neck pain bothers him more than his right arm pain. Could we maybe consider MBBs/RFAs with you guys?     Lmk what you think, thanks!  Adriana  
Staff scheduled pt with Buffy per her request to f/u on neck pain.   
No

## 2025-08-11 ENCOUNTER — PATIENT MESSAGE (OUTPATIENT)
Dept: REHABILITATION | Facility: HOSPITAL | Age: 65
End: 2025-08-11

## 2025-08-19 ENCOUNTER — LAB VISIT (OUTPATIENT)
Dept: LAB | Facility: HOSPITAL | Age: 65
End: 2025-08-19
Attending: INTERNAL MEDICINE
Payer: MEDICARE

## 2025-08-19 DIAGNOSIS — E61.1 IRON DEFICIENCY: ICD-10-CM

## 2025-08-19 LAB
FERRITIN SERPL-MCNC: 185 NG/ML (ref 20–300)
HGB BLD-MCNC: 13.9 GM/DL (ref 14–18)
IRON SATN MFR SERPL: 25 % (ref 20–50)
IRON SERPL-MCNC: 70 UG/DL (ref 45–160)
TIBC SERPL-MCNC: 277 UG/DL (ref 250–450)
TRANSFERRIN SERPL-MCNC: 187 MG/DL (ref 200–375)

## 2025-08-19 PROCEDURE — 85018 HEMOGLOBIN: CPT

## 2025-08-19 PROCEDURE — 82728 ASSAY OF FERRITIN: CPT

## 2025-08-19 PROCEDURE — 36415 COLL VENOUS BLD VENIPUNCTURE: CPT

## 2025-08-19 PROCEDURE — 84466 ASSAY OF TRANSFERRIN: CPT

## 2025-08-24 ENCOUNTER — PATIENT MESSAGE (OUTPATIENT)
Dept: GASTROENTEROLOGY | Facility: CLINIC | Age: 65
End: 2025-08-24
Payer: MEDICARE

## 2025-08-26 ENCOUNTER — PATIENT MESSAGE (OUTPATIENT)
Dept: REHABILITATION | Facility: HOSPITAL | Age: 65
End: 2025-08-26
Payer: MEDICARE

## (undated) DEVICE — BRIEF STRTCH MESH XX-LG

## (undated) DEVICE — Device

## (undated) DEVICE — KIT INFLATOR BLLN 18

## (undated) DEVICE — UNDERGLOVES BIOGEL PI SIZE 7.5

## (undated) DEVICE — GUIDE WIRE MOTION .035 X 150CM

## (undated) DEVICE — DRESSING LEUKOPLAST FLEX 1X3IN

## (undated) DEVICE — SEE MEDLINE ITEM 157148

## (undated) DEVICE — BANDAID STRIP PLASTIC 3/4X3

## (undated) DEVICE — SEE MEDLINE ITEM 146417

## (undated) DEVICE — PANTIES FEMININE NAPKIN LG/XLG

## (undated) DEVICE — SOL 9P NACL IRR PIC IL

## (undated) DEVICE — KIT GLIDESHEATH SLEND 6FR 10CM

## (undated) DEVICE — DRAPE ABDOMINAL TIBURON 14X11

## (undated) DEVICE — CATH INFINITI JUDKINS JR4

## (undated) DEVICE — ELECTRODE REM PLYHSV RETURN 9

## (undated) DEVICE — LUBRICANT SURGILUBE 2 OZ

## (undated) DEVICE — WIRE GUIDE SAFE-T-J .035 260CM

## (undated) DEVICE — NDL HYPO REG 25G X 1 1/2

## (undated) DEVICE — POSITIONER IV ARMBOARD FOAM

## (undated) DEVICE — NDL INSUF ULTRA VERESS 120MM

## (undated) DEVICE — SOL NACL IRR 1000ML BTL

## (undated) DEVICE — CATH JL5 4FR INFINITY

## (undated) DEVICE — CATH JACKY RADIAL 5FR 100CM

## (undated) DEVICE — WRAP KNEE ACCU THERM GEL PACK

## (undated) DEVICE — SEE MEDLINE ITEM 157187

## (undated) DEVICE — TRAY MINOR GEN SURG

## (undated) DEVICE — SEE MEDLINE ITEM 154981

## (undated) DEVICE — TAPE SILK 3IN

## (undated) DEVICE — SUT MCRYL PLUS 4-0 PS2 27IN

## (undated) DEVICE — GOWN SMARTGOWN LVL4 X-LONG XL

## (undated) DEVICE — DRAPE TOP 53X102IN

## (undated) DEVICE — DRAPE ARM DAVINCI XI

## (undated) DEVICE — OBTURATOR BLADELESS 8MM XI CLR

## (undated) DEVICE — GUIDEWIRE AMPLATZ .035X260

## (undated) DEVICE — TRAY CYSTO BASIN

## (undated) DEVICE — GAUZE SPONGE 4X4 12PLY

## (undated) DEVICE — TUBE SET INFLOW/OUTFLOW

## (undated) DEVICE — POSITIONER HEAD DONUT 9IN FOAM

## (undated) DEVICE — KIT CUSTOM MANIFOLD

## (undated) DEVICE — CATH ANG PIGTAIL 4FR INFINITY

## (undated) DEVICE — BOVIE SUCTION

## (undated) DEVICE — DRAPE STERI INSTRUMENT 1018

## (undated) DEVICE — SUT VLOC 90 3-0 V-20 NDL 9

## (undated) DEVICE — DRAPE COLUMN DAVINCI XI

## (undated) DEVICE — CATH INFINITI 4F JL4 .042X100

## (undated) DEVICE — ADHESIVE MASTISOL VIAL 48/BX

## (undated) DEVICE — SKIN MARKER DEVON 160

## (undated) DEVICE — PAD ABDOMINAL STERILE 8X10IN

## (undated) DEVICE — DRAPE STERI U-SHAPED 47X51IN

## (undated) DEVICE — GLOVE SENSICARE PI GRN 8

## (undated) DEVICE — STRIP MEDI WND CLSR 1/2X4IN

## (undated) DEVICE — SET IRR URLGY 2LINE UNIV SPIKE

## (undated) DEVICE — BRIEF MESH LARGE

## (undated) DEVICE — SEE MEDLINE ITEM 152487

## (undated) DEVICE — TRAY MINOR GEN SURG OMC

## (undated) DEVICE — ELECTRODE 90 DEGREE ANGLE

## (undated) DEVICE — BLADE SURG CARBON STEEL SZ11

## (undated) DEVICE — HEMOSTAT VASC BAND LONG 27CM

## (undated) DEVICE — DRESSING XEROFORM NONADH 1X8IN

## (undated) DEVICE — COVER LIGHT HANDLE 80/CA

## (undated) DEVICE — COVER CAMERA OPERATING ROOM

## (undated) DEVICE — DRESSING EYE OVAL LF

## (undated) DEVICE — SEE MEDLINE ITEM 156934

## (undated) DEVICE — DRAPE ARTHSCP FLD CTRL POUCH

## (undated) DEVICE — TRACKER ENT INSTRUMENT

## (undated) DEVICE — BANDAGE ADHESIVE

## (undated) DEVICE — GOWN ECLIPSE REINF LV4 XLNG XL

## (undated) DEVICE — CATH WEDGE 6FR X 110CM

## (undated) DEVICE — PAD ELECTRODE STER 1.5X3

## (undated) DEVICE — PACK CYSTO

## (undated) DEVICE — GUIDEWIRE STF .035X180CM ANG

## (undated) DEVICE — KIT MICROINTRO 4F .018X40X7CM

## (undated) DEVICE — ADAPTER HOSE 10FT 8MM

## (undated) DEVICE — GLOVE SENSICARE PI SURG 8

## (undated) DEVICE — SEAL UNIVERSAL 5MM-8MM XI

## (undated) DEVICE — CATH ARI 4FR

## (undated) DEVICE — TOURNIQUET SB QC DP 34X4IN

## (undated) DEVICE — SYR 30CC LUER LOCK

## (undated) DEVICE — SUT VICRYL CTD 2-0 GI 27 SH

## (undated) DEVICE — TRACKER PATIENT NON INVASIVE

## (undated) DEVICE — SOL NACL 0.9% INJ 500ML BG

## (undated) DEVICE — SEE MEDLINE ITEM 152622

## (undated) DEVICE — NDL HYPODERMIC 25GX2IN

## (undated) DEVICE — TIP YANKAUERS BULB NO VENT

## (undated) DEVICE — CATH POLLACK OPEN-END FLEXI-TI

## (undated) DEVICE — CONTAINER SPECIMEN STRL 4OZ

## (undated) DEVICE — OMNIPAQUE 350 200ML

## (undated) DEVICE — WIRE BENTSON 035/180

## (undated) DEVICE — ADHESIVE DERMABOND ADVANCED

## (undated) DEVICE — SPIKE CONTRAST CONTROLLER

## (undated) DEVICE — SHEATH INTRODUCER 6FR 11CM

## (undated) DEVICE — COVER TIP CURVED SCISSORS XI

## (undated) DEVICE — SOL IRR NACL .9% 3000ML

## (undated) DEVICE — SEE MEDLINE ITEM 156894

## (undated) DEVICE — BLADE SHAVER LANZA 4.2X13CM

## (undated) DEVICE — SYR ONLY LUER LOCK 20CC

## (undated) DEVICE — SEE MEDLINE ITEM 157110

## (undated) DEVICE — SHEATH INTRODUCER 4FR 11CM

## (undated) DEVICE — SPONGE SURGIFOAM 100 8.5X12X10

## (undated) DEVICE — SUT MONOCRYL 4-0 PS-2

## (undated) DEVICE — SOL NACL IRR 3000ML

## (undated) DEVICE — DRAPE SCOPE PILLOW WARMER

## (undated) DEVICE — DRESSING TRANS 2X2 TEGADERM

## (undated) DEVICE — CORD BIPOLAR 12 FOOT